# Patient Record
Sex: FEMALE | Race: WHITE | Employment: OTHER | URBAN - METROPOLITAN AREA
[De-identification: names, ages, dates, MRNs, and addresses within clinical notes are randomized per-mention and may not be internally consistent; named-entity substitution may affect disease eponyms.]

---

## 2017-02-16 ENCOUNTER — TELEPHONE (OUTPATIENT)
Dept: PHARMACY | Facility: CLINIC | Age: 62
End: 2017-02-16

## 2017-03-27 ENCOUNTER — EMPLOYEE WELLNESS (OUTPATIENT)
Dept: OTHER | Age: 62
End: 2017-03-27

## 2017-12-19 ENCOUNTER — TELEPHONE (OUTPATIENT)
Dept: PHARMACY | Facility: CLINIC | Age: 62
End: 2017-12-19

## 2017-12-19 NOTE — TELEPHONE ENCOUNTER
Patient calling back; [facility] provider office sending her Nov OV and urine test, but doesn't have an A1c since 8/2016. Noted on enrollment form, patient only taking Trulicity - inquired if she has DM dx and patient unsure, she thinks she may not? Advised her to check with provider office - if not DM dx, she is not eligible for the program.  If she does have DM dx, would need updated A1c now to be eligible.

## 2018-01-09 ENCOUNTER — CLINICAL DOCUMENTATION (OUTPATIENT)
Dept: PHARMACY | Facility: CLINIC | Age: 63
End: 2018-01-09

## 2018-03-20 VITALS — BODY MASS INDEX: 47.55 KG/M2 | WEIGHT: 260 LBS

## 2018-04-21 ENCOUNTER — EMPLOYEE WELLNESS (OUTPATIENT)
Dept: INTERNAL MEDICINE CLINIC | Age: 63
End: 2018-04-21

## 2018-04-21 LAB
CHOLESTEROL, TOTAL: 157 MG/DL (ref 0–199)
GLUCOSE BLD-MCNC: 77 MG/DL (ref 74–107)
HDLC SERPL-MCNC: 40 MG/DL
LDL CHOLESTEROL CALCULATED: 89 MG/DL (ref 0–99)
TRIGL SERPL-MCNC: 142 MG/DL (ref 0–149)

## 2018-05-02 VITALS — BODY MASS INDEX: 48.29 KG/M2 | WEIGHT: 264 LBS

## 2018-09-06 ENCOUNTER — HOSPITAL ENCOUNTER (OUTPATIENT)
Dept: PSYCHIATRY | Age: 63
Discharge: HOME | End: 2018-09-06
Payer: COMMERCIAL

## 2018-09-06 PROCEDURE — 94250 HC DIFFUSION: CPT | Performed by: COUNSELOR

## 2018-09-06 PROCEDURE — 99407 BEHAV CHNG SMOKING > 10 MIN: CPT | Performed by: COUNSELOR

## 2018-10-11 ENCOUNTER — HOSPITAL ENCOUNTER (OUTPATIENT)
Dept: PSYCHIATRY | Age: 63
Discharge: HOME | End: 2018-10-11
Payer: COMMERCIAL

## 2018-10-11 PROCEDURE — 99412 PREVENTIVE COUNSELING GROUP: CPT | Performed by: COUNSELOR

## 2018-10-11 PROCEDURE — 94250 HC DIFFUSION: CPT | Performed by: COUNSELOR

## 2018-10-12 NOTE — PROGRESS NOTES
Zef[place]   Progress Note    Client Name: Carol Ann Roman         Treatment Site:   [x][facility]      [] [facility]                      CO level:  43 ppm    Length of Service: 60 minutes   Session Type:    [] individual   [x]Group Client/Staff Ratio: 5:1                                 Clients target quit date: 10/19/18       Last date of tobacco use: 10/11/18  Pharmacotherapy provided this session:   []  NRT: Patch  []21mg . / []14mg.  / []7mg. []  NRT:  Gum []2mg. / []4mg.   x (  )boxes  []  NRT: Lozenge []2mg.  / []4mg.  x (  ) tubes       [x]  Varenicline [x]0.5 mg.  [x] 1 mg. Wks. (1&2)   []  Bupropion    Wks.  (  )  []  Other: ______________________________________                                                                        Treatment Objectives addressed during the session:       [x] Coping Skils [] Secondhand Smoke Risks   [] Relapse Prevention [] Promote Self Efficacy   [] Addiction [] Enhance Self-Image   [] Stress Management [] Use Self Affirmation   [] Health and Wellness [] Problem Solving Techniques   []  [] Conflict Resolution/Anger Management      Clients Response to counseling:  [x] Active participant                        [] Passive listener        [] No behavior change, still participating                   [] Inappropriate:    [] Implemented other strategy/behavior changes:    [x] Discussed Quit Plan that will be implemented  [x] Re-set Quit Date:  10/19/18  Clients Response to Pharmacotherapy:  [] Decreased cravings  [] Decrease in tobacco use:    [] Maintaining abstinence  [x] No change experienced by client  [x]        Risk/Benefit Meds education provided to client  []        Adverse reaction (if checked - explain):       Plan of Action:  [] Maintain abstinence  [x] Continue treatment; no changes    [x] Change pharmacotherapy: Initiate Chantix use as prescribed  [x] Attend Nicotine Anonymous

## 2018-10-18 ENCOUNTER — HOSPITAL ENCOUNTER (OUTPATIENT)
Dept: PSYCHIATRY | Age: 63
Discharge: HOME | End: 2018-10-18
Payer: COMMERCIAL

## 2018-10-18 PROCEDURE — 99412 PREVENTIVE COUNSELING GROUP: CPT | Performed by: COUNSELOR

## 2018-10-18 PROCEDURE — 94250 HC DIFFUSION: CPT | Performed by: COUNSELOR

## 2018-10-18 NOTE — PROGRESS NOTES
treatment; no changes    [] Change pharmacotherapy:    [x] Attend Nicotine Anonymous meeting   [x]         Assignments/Homework: Read the \"Relapse\" section in the client handbook completing the assignment questions. []         Triggers / Concerns to be addressed:    [] Graduated / received aftercare plan    Comments:      []No call/ no show by client.   Phone call to client:   [] Cancelled:     Counselor Signature: TEQUILA Barcenas Session Date: 10/18/18   Time: 4:10pm

## 2018-10-25 ENCOUNTER — HOSPITAL ENCOUNTER (OUTPATIENT)
Dept: PSYCHIATRY | Age: 63
Discharge: HOME | End: 2018-10-25
Payer: COMMERCIAL

## 2018-10-25 PROCEDURE — 99412 PREVENTIVE COUNSELING GROUP: CPT | Performed by: COUNSELOR

## 2018-10-25 PROCEDURE — 94250 HC DIFFUSION: CPT | Performed by: COUNSELOR

## 2018-10-29 ENCOUNTER — HOSPITAL ENCOUNTER (OUTPATIENT)
Dept: PSYCHIATRY | Age: 63
Discharge: HOME | End: 2018-10-29
Admitting: COUNSELOR
Payer: COMMERCIAL

## 2018-10-29 PROCEDURE — 99407 BEHAV CHNG SMOKING > 10 MIN: CPT | Performed by: COUNSELOR

## 2018-10-29 PROCEDURE — 94250 HC DIFFUSION: CPT | Performed by: COUNSELOR

## 2018-11-05 ENCOUNTER — HOSPITAL ENCOUNTER (OUTPATIENT)
Dept: PSYCHIATRY | Age: 63
Discharge: HOME | End: 2018-11-05
Payer: COMMERCIAL

## 2018-11-05 PROCEDURE — 94250 HC DIFFUSION: CPT | Performed by: COUNSELOR

## 2018-11-05 PROCEDURE — 99412 PREVENTIVE COUNSELING GROUP: CPT | Performed by: COUNSELOR

## 2019-03-06 ENCOUNTER — EMPLOYEE WELLNESS (OUTPATIENT)
Dept: OTHER | Age: 64
End: 2019-03-06

## 2019-03-06 LAB
CHOLESTEROL, TOTAL: 148 MG/DL (ref 0–199)
GLUCOSE BLD-MCNC: 81 MG/DL (ref 74–107)
HDLC SERPL-MCNC: 54 MG/DL
LDL CHOLESTEROL CALCULATED: 81 MG/DL (ref 0–99)
TRIGL SERPL-MCNC: 65 MG/DL (ref 0–149)

## 2019-03-20 VITALS — BODY MASS INDEX: 44.99 KG/M2 | WEIGHT: 246 LBS

## 2019-06-04 ENCOUNTER — APPOINTMENT (OUTPATIENT)
Dept: GENERAL RADIOLOGY | Age: 64
DRG: 871 | End: 2019-06-04
Payer: COMMERCIAL

## 2019-06-04 ENCOUNTER — HOSPITAL ENCOUNTER (INPATIENT)
Age: 64
LOS: 7 days | Discharge: HOME | DRG: 871 | End: 2019-06-12
Attending: EMERGENCY MEDICINE | Admitting: INTERNAL MEDICINE
Payer: COMMERCIAL

## 2019-06-04 DIAGNOSIS — R65.20 SEVERE SEPSIS (HCC): Primary | ICD-10-CM

## 2019-06-04 DIAGNOSIS — A41.9 SEVERE SEPSIS (HCC): Primary | ICD-10-CM

## 2019-06-04 DIAGNOSIS — L03.116 CELLULITIS OF LEFT LOWER LEG: ICD-10-CM

## 2019-06-04 LAB
ALBUMIN SERPL-MCNC: 3.3 G/DL (ref 3.5–5.2)
ALP BLD-CCNC: 118 U/L (ref 35–104)
ALT SERPL-CCNC: 16 U/L (ref 0–32)
ANION GAP SERPL CALCULATED.3IONS-SCNC: 12 MMOL/L (ref 7–16)
AST SERPL-CCNC: 21 U/L (ref 0–31)
BILIRUB SERPL-MCNC: 0.6 MG/DL (ref 0–1.2)
BUN BLDV-MCNC: 20 MG/DL (ref 8–23)
CALCIUM SERPL-MCNC: 8.2 MG/DL (ref 8.6–10.2)
CHLORIDE BLD-SCNC: 104 MMOL/L (ref 98–107)
CO2: 23 MMOL/L (ref 22–29)
CREAT SERPL-MCNC: 1 MG/DL (ref 0.5–1)
GFR AFRICAN AMERICAN: >60
GFR NON-AFRICAN AMERICAN: 56 ML/MIN/1.73
GLUCOSE BLD-MCNC: 111 MG/DL (ref 74–99)
LACTIC ACID, SEPSIS: 4 MMOL/L (ref 0.5–1.9)
LIPASE: 20 U/L (ref 13–60)
POTASSIUM REFLEX MAGNESIUM: 3.9 MMOL/L (ref 3.5–5)
SODIUM BLD-SCNC: 139 MMOL/L (ref 132–146)
TOTAL PROTEIN: 6.5 G/DL (ref 6.4–8.3)
TROPONIN: 0.02 NG/ML (ref 0–0.03)

## 2019-06-04 PROCEDURE — 83690 ASSAY OF LIPASE: CPT

## 2019-06-04 PROCEDURE — 83605 ASSAY OF LACTIC ACID: CPT

## 2019-06-04 PROCEDURE — 96366 THER/PROPH/DIAG IV INF ADDON: CPT

## 2019-06-04 PROCEDURE — 85025 COMPLETE CBC W/AUTO DIFF WBC: CPT

## 2019-06-04 PROCEDURE — 6360000002 HC RX W HCPCS: Performed by: EMERGENCY MEDICINE

## 2019-06-04 PROCEDURE — 96365 THER/PROPH/DIAG IV INF INIT: CPT

## 2019-06-04 PROCEDURE — 36415 COLL VENOUS BLD VENIPUNCTURE: CPT

## 2019-06-04 PROCEDURE — 80053 COMPREHEN METABOLIC PANEL: CPT

## 2019-06-04 PROCEDURE — 6370000000 HC RX 637 (ALT 250 FOR IP): Performed by: EMERGENCY MEDICINE

## 2019-06-04 PROCEDURE — 94664 DEMO&/EVAL PT USE INHALER: CPT

## 2019-06-04 PROCEDURE — 99285 EMERGENCY DEPT VISIT HI MDM: CPT

## 2019-06-04 PROCEDURE — 2580000003 HC RX 258: Performed by: EMERGENCY MEDICINE

## 2019-06-04 PROCEDURE — 71045 X-RAY EXAM CHEST 1 VIEW: CPT

## 2019-06-04 PROCEDURE — 87040 BLOOD CULTURE FOR BACTERIA: CPT

## 2019-06-04 PROCEDURE — 84484 ASSAY OF TROPONIN QUANT: CPT

## 2019-06-04 PROCEDURE — 36556 INSERT NON-TUNNEL CV CATH: CPT

## 2019-06-04 PROCEDURE — 93005 ELECTROCARDIOGRAM TRACING: CPT | Performed by: EMERGENCY MEDICINE

## 2019-06-04 RX ORDER — ACETAMINOPHEN 500 MG
1000 TABLET ORAL ONCE
Status: COMPLETED | OUTPATIENT
Start: 2019-06-04 | End: 2019-06-04

## 2019-06-04 RX ORDER — IPRATROPIUM BROMIDE AND ALBUTEROL SULFATE 2.5; .5 MG/3ML; MG/3ML
1 SOLUTION RESPIRATORY (INHALATION)
Status: DISCONTINUED | OUTPATIENT
Start: 2019-06-05 | End: 2019-06-04

## 2019-06-04 RX ORDER — 0.9 % SODIUM CHLORIDE 0.9 %
30 INTRAVENOUS SOLUTION INTRAVENOUS ONCE
Status: COMPLETED | OUTPATIENT
Start: 2019-06-04 | End: 2019-06-05

## 2019-06-04 RX ADMIN — IPRATROPIUM BROMIDE AND ALBUTEROL SULFATE 1 AMPULE: .5; 3 SOLUTION RESPIRATORY (INHALATION) at 22:25

## 2019-06-04 RX ADMIN — SODIUM CHLORIDE 3348 ML: 9 INJECTION, SOLUTION INTRAVENOUS at 22:50

## 2019-06-04 RX ADMIN — ACETAMINOPHEN 1000 MG: 500 TABLET ORAL at 22:54

## 2019-06-04 RX ADMIN — VANCOMYCIN HYDROCHLORIDE 2000 MG: 10 INJECTION, POWDER, LYOPHILIZED, FOR SOLUTION INTRAVENOUS at 22:51

## 2019-06-04 ASSESSMENT — PAIN DESCRIPTION - LOCATION: LOCATION: KNEE

## 2019-06-04 ASSESSMENT — PAIN SCALES - GENERAL
PAINLEVEL_OUTOF10: 6
PAINLEVEL_OUTOF10: 0

## 2019-06-04 ASSESSMENT — ENCOUNTER SYMPTOMS
ABDOMINAL PAIN: 0
DIARRHEA: 0
EYES NEGATIVE: 1
SORE THROAT: 0
VOMITING: 0
BACK PAIN: 0

## 2019-06-04 ASSESSMENT — PAIN DESCRIPTION - PAIN TYPE: TYPE: CHRONIC PAIN

## 2019-06-04 ASSESSMENT — PAIN DESCRIPTION - ORIENTATION: ORIENTATION: RIGHT;LEFT

## 2019-06-05 ENCOUNTER — APPOINTMENT (OUTPATIENT)
Dept: GENERAL RADIOLOGY | Age: 64
DRG: 871 | End: 2019-06-05
Payer: COMMERCIAL

## 2019-06-05 ENCOUNTER — NURSE TRIAGE (OUTPATIENT)
Dept: OTHER | Facility: CLINIC | Age: 64
End: 2019-06-05

## 2019-06-05 PROBLEM — R65.20 SEVERE SEPSIS (HCC): Status: ACTIVE | Noted: 2019-06-05

## 2019-06-05 PROBLEM — A41.9 SEPSIS (HCC): Status: ACTIVE | Noted: 2019-06-05

## 2019-06-05 PROBLEM — A41.9 SEVERE SEPSIS (HCC): Status: ACTIVE | Noted: 2019-06-05

## 2019-06-05 LAB
ANISOCYTOSIS: ABNORMAL
BACTERIA: NORMAL /HPF
BASOPHILS ABSOLUTE: 0.03 E9/L (ref 0–0.2)
BASOPHILS RELATIVE PERCENT: 0.1 % (ref 0–2)
BILIRUBIN URINE: ABNORMAL
BLOOD, URINE: NEGATIVE
CLARITY: CLEAR
COLOR: YELLOW
EKG ATRIAL RATE: 114 BPM
EKG P AXIS: 61 DEGREES
EKG P-R INTERVAL: 138 MS
EKG Q-T INTERVAL: 330 MS
EKG QRS DURATION: 84 MS
EKG QTC CALCULATION (BAZETT): 454 MS
EKG R AXIS: 19 DEGREES
EKG T AXIS: 54 DEGREES
EKG VENTRICULAR RATE: 114 BPM
EOSINOPHILS ABSOLUTE: 0 E9/L (ref 0.05–0.5)
EOSINOPHILS RELATIVE PERCENT: 0 % (ref 0–6)
FERRITIN: 63 NG/ML
GLUCOSE URINE: NEGATIVE MG/DL
HCT VFR BLD CALC: 28.9 % (ref 34–48)
HCT VFR BLD CALC: 32.9 % (ref 34–48)
HEMOGLOBIN: 9.3 G/DL (ref 11.5–15.5)
IMMATURE GRANULOCYTES #: 0.46 E9/L
IMMATURE GRANULOCYTES %: 1.9 % (ref 0–5)
IMMATURE RETIC FRACT: 17.1 % (ref 3–15.9)
IRON SATURATION: 4 % (ref 15–50)
IRON: 11 MCG/DL (ref 37–145)
KETONES, URINE: NEGATIVE MG/DL
LACTIC ACID: 0.9 MMOL/L (ref 0.5–2.2)
LACTIC ACID: 1.9 MMOL/L (ref 0.5–2.2)
LEUKOCYTE ESTERASE, URINE: NEGATIVE
LYMPHOCYTES ABSOLUTE: 0.72 E9/L (ref 1.5–4)
LYMPHOCYTES RELATIVE PERCENT: 3 % (ref 20–42)
MCH RBC QN AUTO: 19.8 PG (ref 26–35)
MCHC RBC AUTO-ENTMCNC: 28.3 % (ref 32–34.5)
MCV RBC AUTO: 70 FL (ref 80–99.9)
MONOCYTES ABSOLUTE: 1.42 E9/L (ref 0.1–0.95)
MONOCYTES RELATIVE PERCENT: 5.9 % (ref 2–12)
NEUTROPHILS ABSOLUTE: 21.46 E9/L (ref 1.8–7.3)
NEUTROPHILS RELATIVE PERCENT: 89.1 % (ref 43–80)
NITRITE, URINE: NEGATIVE
PDW BLD-RTO: 21.3 FL (ref 11.5–15)
PH UA: 5 (ref 5–9)
PLATELET # BLD: 172 E9/L (ref 130–450)
PMV BLD AUTO: ABNORMAL FL (ref 7–12)
POLYCHROMASIA: ABNORMAL
PROCALCITONIN: 9.13 NG/ML (ref 0–0.08)
PROTEIN UA: 30 MG/DL
RBC # BLD: 4.7 E12/L (ref 3.5–5.5)
RBC UA: NORMAL /HPF (ref 0–2)
RETIC HGB EQUIVALENT: 17.9 PG (ref 28.2–36.6)
RETICULOCYTE ABSOLUTE COUNT: 0.05 E12/L
RETICULOCYTE COUNT PCT: 1.2 % (ref 0.4–1.9)
SPECIFIC GRAVITY UA: 1.02 (ref 1–1.03)
TOTAL IRON BINDING CAPACITY: 298 MCG/DL (ref 250–450)
UROBILINOGEN, URINE: 0.2 E.U./DL
WBC # BLD: 24.1 E9/L (ref 4.5–11.5)
WBC UA: NORMAL /HPF (ref 0–5)

## 2019-06-05 PROCEDURE — 6360000002 HC RX W HCPCS: Performed by: INTERNAL MEDICINE

## 2019-06-05 PROCEDURE — 6370000000 HC RX 637 (ALT 250 FOR IP): Performed by: INTERNAL MEDICINE

## 2019-06-05 PROCEDURE — 99255 IP/OBS CONSLTJ NEW/EST HI 80: CPT | Performed by: INTERNAL MEDICINE

## 2019-06-05 PROCEDURE — 2580000003 HC RX 258: Performed by: INTERNAL MEDICINE

## 2019-06-05 PROCEDURE — 83550 IRON BINDING TEST: CPT

## 2019-06-05 PROCEDURE — 87088 URINE BACTERIA CULTURE: CPT

## 2019-06-05 PROCEDURE — 6360000002 HC RX W HCPCS: Performed by: EMERGENCY MEDICINE

## 2019-06-05 PROCEDURE — 84145 PROCALCITONIN (PCT): CPT

## 2019-06-05 PROCEDURE — 71045 X-RAY EXAM CHEST 1 VIEW: CPT

## 2019-06-05 PROCEDURE — C9113 INJ PANTOPRAZOLE SODIUM, VIA: HCPCS | Performed by: INTERNAL MEDICINE

## 2019-06-05 PROCEDURE — 36415 COLL VENOUS BLD VENIPUNCTURE: CPT

## 2019-06-05 PROCEDURE — 83540 ASSAY OF IRON: CPT

## 2019-06-05 PROCEDURE — 2580000003 HC RX 258: Performed by: EMERGENCY MEDICINE

## 2019-06-05 PROCEDURE — 82728 ASSAY OF FERRITIN: CPT

## 2019-06-05 PROCEDURE — 87081 CULTURE SCREEN ONLY: CPT

## 2019-06-05 PROCEDURE — 83605 ASSAY OF LACTIC ACID: CPT

## 2019-06-05 PROCEDURE — 99222 1ST HOSP IP/OBS MODERATE 55: CPT | Performed by: SURGERY

## 2019-06-05 PROCEDURE — 73590 X-RAY EXAM OF LOWER LEG: CPT

## 2019-06-05 PROCEDURE — 81001 URINALYSIS AUTO W/SCOPE: CPT

## 2019-06-05 PROCEDURE — 6370000000 HC RX 637 (ALT 250 FOR IP): Performed by: EMERGENCY MEDICINE

## 2019-06-05 PROCEDURE — 93010 ELECTROCARDIOGRAM REPORT: CPT | Performed by: INTERNAL MEDICINE

## 2019-06-05 PROCEDURE — 2500000003 HC RX 250 WO HCPCS: Performed by: EMERGENCY MEDICINE

## 2019-06-05 PROCEDURE — 85045 AUTOMATED RETICULOCYTE COUNT: CPT

## 2019-06-05 PROCEDURE — 2000000000 HC ICU R&B

## 2019-06-05 RX ORDER — ACETAMINOPHEN 650 MG/1
650 SUPPOSITORY RECTAL ONCE
Status: COMPLETED | OUTPATIENT
Start: 2019-06-05 | End: 2019-06-05

## 2019-06-05 RX ORDER — HYDROCODONE BITARTRATE AND ACETAMINOPHEN 5; 325 MG/1; MG/1
1 TABLET ORAL EVERY 6 HOURS PRN
Status: DISCONTINUED | OUTPATIENT
Start: 2019-06-05 | End: 2019-06-05

## 2019-06-05 RX ORDER — ACETAMINOPHEN 325 MG/1
650 TABLET ORAL EVERY 6 HOURS PRN
Status: DISCONTINUED | OUTPATIENT
Start: 2019-06-05 | End: 2019-06-05

## 2019-06-05 RX ORDER — SODIUM CHLORIDE 9 MG/ML
INJECTION, SOLUTION INTRAVENOUS CONTINUOUS
Status: DISCONTINUED | OUTPATIENT
Start: 2019-06-05 | End: 2019-06-05

## 2019-06-05 RX ORDER — ONDANSETRON 2 MG/ML
4 INJECTION INTRAMUSCULAR; INTRAVENOUS EVERY 6 HOURS PRN
Status: DISCONTINUED | OUTPATIENT
Start: 2019-06-05 | End: 2019-06-05 | Stop reason: SDUPTHER

## 2019-06-05 RX ORDER — ROPINIROLE 2 MG/1
2 TABLET, FILM COATED ORAL ONCE
Status: COMPLETED | OUTPATIENT
Start: 2019-06-05 | End: 2019-06-05

## 2019-06-05 RX ORDER — BISACODYL 10 MG
10 SUPPOSITORY, RECTAL RECTAL DAILY PRN
Status: DISCONTINUED | OUTPATIENT
Start: 2019-06-05 | End: 2019-06-12 | Stop reason: HOSPADM

## 2019-06-05 RX ORDER — SODIUM CHLORIDE 9 MG/ML
INJECTION, SOLUTION INTRAVENOUS CONTINUOUS
Status: DISCONTINUED | OUTPATIENT
Start: 2019-06-05 | End: 2019-06-05 | Stop reason: SDUPTHER

## 2019-06-05 RX ORDER — ONDANSETRON 2 MG/ML
4 INJECTION INTRAMUSCULAR; INTRAVENOUS EVERY 6 HOURS PRN
Status: DISCONTINUED | OUTPATIENT
Start: 2019-06-05 | End: 2019-06-12 | Stop reason: HOSPADM

## 2019-06-05 RX ORDER — PANTOPRAZOLE SODIUM 40 MG/10ML
40 INJECTION, POWDER, LYOPHILIZED, FOR SOLUTION INTRAVENOUS DAILY
Status: DISCONTINUED | OUTPATIENT
Start: 2019-06-05 | End: 2019-06-07

## 2019-06-05 RX ORDER — 0.9 % SODIUM CHLORIDE 0.9 %
2000 INTRAVENOUS SOLUTION INTRAVENOUS ONCE
Status: COMPLETED | OUTPATIENT
Start: 2019-06-05 | End: 2019-06-05

## 2019-06-05 RX ORDER — 0.9 % SODIUM CHLORIDE 0.9 %
500 INTRAVENOUS SOLUTION INTRAVENOUS ONCE
Status: COMPLETED | OUTPATIENT
Start: 2019-06-05 | End: 2019-06-05

## 2019-06-05 RX ORDER — ACETAMINOPHEN 325 MG/1
650 TABLET ORAL EVERY 6 HOURS PRN
Status: DISCONTINUED | OUTPATIENT
Start: 2019-06-05 | End: 2019-06-12 | Stop reason: HOSPADM

## 2019-06-05 RX ORDER — FENTANYL CITRATE 50 UG/ML
25 INJECTION, SOLUTION INTRAMUSCULAR; INTRAVENOUS
Status: DISCONTINUED | OUTPATIENT
Start: 2019-06-05 | End: 2019-06-06

## 2019-06-05 RX ORDER — SODIUM CHLORIDE 9 MG/ML
INJECTION, SOLUTION INTRAVENOUS CONTINUOUS
Status: DISCONTINUED | OUTPATIENT
Start: 2019-06-05 | End: 2019-06-06

## 2019-06-05 RX ORDER — FENTANYL CITRATE 50 UG/ML
50 INJECTION, SOLUTION INTRAMUSCULAR; INTRAVENOUS ONCE
Status: COMPLETED | OUTPATIENT
Start: 2019-06-05 | End: 2019-06-05

## 2019-06-05 RX ORDER — SODIUM CHLORIDE 0.9 % (FLUSH) 0.9 %
10 SYRINGE (ML) INJECTION PRN
Status: DISCONTINUED | OUTPATIENT
Start: 2019-06-05 | End: 2019-06-12 | Stop reason: HOSPADM

## 2019-06-05 RX ORDER — 0.9 % SODIUM CHLORIDE 0.9 %
10 VIAL (ML) INJECTION DAILY
Status: DISCONTINUED | OUTPATIENT
Start: 2019-06-05 | End: 2019-06-07

## 2019-06-05 RX ORDER — ACETAMINOPHEN 650 MG/1
650 SUPPOSITORY RECTAL EVERY 4 HOURS PRN
Status: DISCONTINUED | OUTPATIENT
Start: 2019-06-05 | End: 2019-06-05

## 2019-06-05 RX ORDER — SODIUM CHLORIDE 0.9 % (FLUSH) 0.9 %
10 SYRINGE (ML) INJECTION EVERY 12 HOURS SCHEDULED
Status: DISCONTINUED | OUTPATIENT
Start: 2019-06-05 | End: 2019-06-12 | Stop reason: HOSPADM

## 2019-06-05 RX ADMIN — Medication 10 ML: at 19:46

## 2019-06-05 RX ADMIN — PIPERACILLIN AND TAZOBACTAM 3.38 G: 3; .375 INJECTION, POWDER, FOR SOLUTION INTRAVENOUS at 01:28

## 2019-06-05 RX ADMIN — ENOXAPARIN SODIUM 40 MG: 40 INJECTION SUBCUTANEOUS at 10:59

## 2019-06-05 RX ADMIN — Medication 10 ML: at 17:19

## 2019-06-05 RX ADMIN — FENTANYL CITRATE 25 MCG: 50 INJECTION, SOLUTION INTRAMUSCULAR; INTRAVENOUS at 17:19

## 2019-06-05 RX ADMIN — Medication 1.5 G: at 14:49

## 2019-06-05 RX ADMIN — SODIUM CHLORIDE 10 ML: 9 INJECTION, SOLUTION INTRAMUSCULAR; INTRAVENOUS; SUBCUTANEOUS at 14:18

## 2019-06-05 RX ADMIN — PANTOPRAZOLE SODIUM 40 MG: 40 INJECTION, POWDER, FOR SOLUTION INTRAVENOUS at 14:18

## 2019-06-05 RX ADMIN — ROPINIROLE HYDROCHLORIDE 2 MG: 2 TABLET, FILM COATED ORAL at 06:17

## 2019-06-05 RX ADMIN — Medication 10 ML: at 21:44

## 2019-06-05 RX ADMIN — SODIUM CHLORIDE 2000 ML: 9 INJECTION, SOLUTION INTRAVENOUS at 02:12

## 2019-06-05 RX ADMIN — SODIUM CHLORIDE: 9 INJECTION, SOLUTION INTRAVENOUS at 17:53

## 2019-06-05 RX ADMIN — FENTANYL CITRATE 50 MCG: 50 INJECTION, SOLUTION INTRAMUSCULAR; INTRAVENOUS at 06:20

## 2019-06-05 RX ADMIN — ACETAMINOPHEN 650 MG: 325 TABLET, FILM COATED ORAL at 19:42

## 2019-06-05 RX ADMIN — FENTANYL CITRATE 25 MCG: 50 INJECTION, SOLUTION INTRAMUSCULAR; INTRAVENOUS at 19:34

## 2019-06-05 RX ADMIN — PIPERACILLIN AND TAZOBACTAM 3.38 G: 3; .375 INJECTION, POWDER, FOR SOLUTION INTRAVENOUS at 10:59

## 2019-06-05 RX ADMIN — ACETAMINOPHEN 650 MG: 650 SUPPOSITORY RECTAL at 10:25

## 2019-06-05 RX ADMIN — Medication 2 MCG/MIN: at 04:20

## 2019-06-05 RX ADMIN — ONDANSETRON HYDROCHLORIDE 4 MG: 2 INJECTION, SOLUTION INTRAMUSCULAR; INTRAVENOUS at 10:53

## 2019-06-05 RX ADMIN — SODIUM CHLORIDE 500 ML: 9 INJECTION, SOLUTION INTRAVENOUS at 16:38

## 2019-06-05 RX ADMIN — ACETAMINOPHEN 650 MG: 325 TABLET, FILM COATED ORAL at 14:18

## 2019-06-05 RX ADMIN — ALTEPLASE 1 MG: 2.2 INJECTION, POWDER, LYOPHILIZED, FOR SOLUTION INTRAVENOUS at 17:50

## 2019-06-05 RX ADMIN — Medication 1.5 G: at 23:57

## 2019-06-05 RX ADMIN — FENTANYL CITRATE 25 MCG: 50 INJECTION, SOLUTION INTRAMUSCULAR; INTRAVENOUS at 21:44

## 2019-06-05 RX ADMIN — PIPERACILLIN AND TAZOBACTAM 3.38 G: 3; .375 INJECTION, POWDER, FOR SOLUTION INTRAVENOUS at 19:34

## 2019-06-05 RX ADMIN — SODIUM CHLORIDE: 9 INJECTION, SOLUTION INTRAVENOUS at 20:51

## 2019-06-05 RX ADMIN — SODIUM CHLORIDE 500 ML: 9 INJECTION, SOLUTION INTRAVENOUS at 14:41

## 2019-06-05 RX ADMIN — SODIUM CHLORIDE: 9 INJECTION, SOLUTION INTRAVENOUS at 10:52

## 2019-06-05 ASSESSMENT — PAIN SCALES - GENERAL
PAINLEVEL_OUTOF10: 0
PAINLEVEL_OUTOF10: 0
PAINLEVEL_OUTOF10: 9
PAINLEVEL_OUTOF10: 10
PAINLEVEL_OUTOF10: 10
PAINLEVEL_OUTOF10: 0
PAINLEVEL_OUTOF10: 9
PAINLEVEL_OUTOF10: 8
PAINLEVEL_OUTOF10: 0
PAINLEVEL_OUTOF10: 10

## 2019-06-05 ASSESSMENT — PAIN DESCRIPTION - PROGRESSION
CLINICAL_PROGRESSION: NOT CHANGED
CLINICAL_PROGRESSION: NOT CHANGED

## 2019-06-05 ASSESSMENT — PAIN DESCRIPTION - LOCATION
LOCATION: KNEE
LOCATION: KNEE;LEG

## 2019-06-05 ASSESSMENT — PAIN DESCRIPTION - PAIN TYPE
TYPE: CHRONIC PAIN
TYPE: CHRONIC PAIN

## 2019-06-05 ASSESSMENT — PAIN DESCRIPTION - ORIENTATION
ORIENTATION: LEFT;RIGHT
ORIENTATION: LEFT;RIGHT

## 2019-06-05 ASSESSMENT — PAIN DESCRIPTION - FREQUENCY: FREQUENCY: CONTINUOUS

## 2019-06-05 ASSESSMENT — PAIN DESCRIPTION - ONSET
ONSET: ON-GOING
ONSET: ON-GOING

## 2019-06-05 ASSESSMENT — PAIN DESCRIPTION - DESCRIPTORS: DESCRIPTORS: OTHER (COMMENT)

## 2019-06-05 NOTE — ED NOTES
Patient incontinent of lose stool. Incontinent care & linen change provided.      Jasbir Frausto RN  06/05/19 [id number]

## 2019-06-05 NOTE — ED NOTES
Verbal orders for Requip 2mg PO and Fentanyl 50mcg IV received.       Lemuel Varela RN  06/05/19 Naun Garrett RN  06/05/19 2916

## 2019-06-05 NOTE — TELEPHONE ENCOUNTER
Reason for Disposition  Veronica Tamez Caller has already spoken with another triager and has no further questions. Protocols used: NO CONTACT OR DUPLICATE CONTACT CALL-ADULT-AH    Daughter is calling in to inform nurse access that patient has been brought into ED via ambulance for sepsis. [address]

## 2019-06-05 NOTE — ED PROVIDER NOTES
Patient is a 59year old female arriving via EMS for AMS and fever. EMS states pt was found in her car since she finished work at 3:30 PM lethargic. They state pt has a fever of 102.6 for them and is tachycardic. BGL was 110. Daughter at bedside states pt fell in April in the [facility] and has a wound to LUE which she was never seen for. Daughter denies pt having hx of COPD but pt does smoke cigarettes daily. She denies any cough, diarrhea, chest pain, or any further complaints. The history is provided by a relative and the EMS personnel (daughter). No  was used. Fever   Max temp prior to arrival:  102.6  Severity:  Moderate  Onset quality:  Unable to specify  Timing:  Constant  Chronicity:  New  Relieved by:  None tried  Ineffective treatments:  None tried  Associated symptoms: no chest pain, no congestion, no diarrhea, no sore throat and no vomiting        Review of Systems   Constitutional: Positive for fever. HENT: Negative for congestion and sore throat. Eyes: Negative. Cardiovascular: Negative for chest pain. Gastrointestinal: Negative for abdominal pain, diarrhea and vomiting. Genitourinary: Negative. Musculoskeletal: Negative for back pain and neck pain. Neurological: Positive for weakness. Decreased responsiveness. Lethargic. All other systems reviewed and are negative. Physical Exam   HENT:   Head: Normocephalic and atraumatic. Eyes: Pupils are equal, round, and reactive to light. EOM are normal.   Neck: Normal range of motion. Neck supple. Cardiovascular: Regular rhythm. Tachycardia present. Pulmonary/Chest: Effort normal. She has wheezes. Abdominal: Soft. There is no tenderness. There is no rebound and no guarding. Musculoskeletal: Normal range of motion. LLE erythematous and appears infected   Neurological:   Lethargic. Responds to verbal stimuli. Moves all extremities. Skin: Skin is warm and dry.    Nursing note and vitals reviewed. Procedures    MDM  Number of Diagnoses or Management Options  Cellulitis of left lower leg: new and requires workup  Severe sepsis [facility]): new and requires workup  Diagnosis management comments: Differential diagnoses include sepsis, sever  sepsis, cellulitis, pneumonia, bronchitis, UTI,       Amount and/or Complexity of Data Reviewed  Clinical lab tests: reviewed and ordered  Tests in the radiology section of CPT®: ordered  Tests in the medicine section of CPT®: ordered and reviewed  Discuss the patient with other providers: yes (Case discussed with Mary de anda. Will admit patient to ICU.)  Independent visualization of images, tracings, or specimens: yes (EKG was done at 10:26 PM.  Sinus tachycardia. Rate of 114. Normal axis. No acute ST-T elevation. No STEMI. No leakage available at this time for comparison.)    Risk of Complications, Morbidity, and/or Mortality  Presenting problems: high  Diagnostic procedures: high  Management options: high  General comments: Patient initially remain unchanged. Pan cultures was done. IV fluid 30 cc/kg was given. Zosyn IV was given. Vancomycin IV was given. Labs were unremarkable except for WBC of 24,000 and lactic acid of 4 area patient was absorbing in ER. Patient dropped her pressure to 90 systolic. Central line was placed in.  2 more liters of IV fluid was given. Case discussed with Dr. Ling Suarez. Case also discussed with Dr. Anup Garnica. Will admit patient to ICU. Case discussed with family member. They understood and agreed with our management. Critical Care  Total time providing critical care:  minutes           Labs      Radiology      EKG Interpretation. 6/4/19, 9:56 PM.    This note is prepared by Jacinda Cole, acting as Scribe for Mony Mckeon DO. Mony Mckeon DO:  The scribe's documentation has been prepared under my direction and personally reviewed by me in its entirety.   I confirm that the note above accurately reflects all work, treatment, procedures, and medical decision making performed by me.         Jude Osler, [place]  06/05/19 7452

## 2019-06-05 NOTE — PROGRESS NOTES
Stage  CI HR MAP TPRI SVI   Baseline 4.2 90 77 1404 47   Challenge 5.0 93 104 1875 56   Result (%Ä) 17.7 3.7 35.1 33.5 19.1

## 2019-06-05 NOTE — ED NOTES
Received report from [place], [address] [place] [address].       Christina Gutierrez RN  06/04/19 2158

## 2019-06-05 NOTE — CONSULTS
Medical Intensive Care Unit     [facility]  Resident Consult Note    Date and time: 6/5/2019 3:10 PM  Patient's name:  Farhan Kirkland  Medical Record Number: [id number]  Patient's account/billing number: [de-identified]  Patient's YOB: 1955  Age: 59 y.o. Date of Admission: 6/4/2019  9:52 PM  Length of stay during current admission: 0    Code Status: Full Code    Reason for ICU admission: Septic shock    History of Present Illness: This is a 59 y.o female with a PMH of b/l knee OA, chronic back pain, lymphedema of lower extremities, who presented to the ED with AMS. She was found in her car by her sister. She was confused and had chills. She was in her car for 3 hours with heater on. She was brought by EMS. In the ED, she was found to be hypotensive with SBP of 80s and be febrile with temp of 102.6. Significant lab work includes with LA of 4.0, WBC of 24.1, Hb of 9.3. She received 5 L of NS bolus. She was started on levophed. She was transferred to MICU for further evaluation and proper management.        CURRENT VENTILATION STATUS:   [] Ventilator  [] BIPAP  [] Nasal Cannula [x] Room Air      SECRETIONS   Amount:  [] Small [] Moderate  [] Large [x] None    Color:     [] White [] Colored  [] Bloody    SEDATION:  RAAS Score:  [] Propofol gtt  [] Versed gtt  [] Ativan gtt   [x] No Sedation    PARALYZED:  [x] No    [] Yes    VASOPRESSORS:  [] No    [x] Yes    If yes -   [x] Levophed       [] Dopamine     [] Vasopressin       [] Dobutamine  [] Phenylephrine         [] Epinephrine    CENTRAL LINES:     [] No   [x] Yes   (Date of Insertion: 6/5  )           If yes -     [] Right IJ     [] Left IJ [] Right Femoral [] Left Femoral                   [] Right Subclavian [] Left Subclavian     BOWDEN'S CATHETER:   [] No   [x] Yes  (Date of Insertion: 6/5  )     URINE OUTPUT:            [] Good   [x] Low              [] Anuric    REVIEW OF SYSTEMS:    · Constitutional: +fever, +chills, no change in weight; good appetite  · HEENT: No blurred vision, no ear problems, no sore throat, no rhinorrhea. · Respiratory: No cough, no sputum production, no pleuritic chest pain, no shortness of breath  · Cardiology: No angina, no dyspnea on exertion, no paroxysmal nocturnal dyspnea, no orthopnea, no palpitation,+b/l leg swelling R<L  · Gastroenterology: No dysphagia, no reflux; no abdominal pain, no nausea or vomiting; no constipation or diarrhea. No hematochezia   · Genitourinary: No dysuria, no frequency, hesitancy; no hematuria  · Musculoskeletal: +b/l knee joint pain, +back pain, +myalgia  · Neurology: no focal weakness in extremities, no slurred speech, no tingling or numbness sensation  · Skin: +painful swelling in LLE with erythema    OBJECTIVE:     VITAL SIGNS:  BP 95/70   Pulse 91   Temp 101.6 °F (38.7 °C) (Axillary)   Resp 20   Ht 5' 2\" (1.575 m)   Wt 246 lb (111.6 kg)   SpO2 97%   BMI 44.99 kg/m²   Tmax over 24 hours:  Temp (24hrs), Av.4 °F (38 °C), Min:98.9 °F (37.2 °C), Max:102.9 °F (39.4 °C)      Patient Vitals for the past 6 hrs:   BP Temp Temp src Pulse Resp SpO2   19 1400 95/70 -- -- 91 20 97 %   19 1300 103/60 -- -- 92 18 99 %   19 1215 110/65 101.6 °F (38.7 °C) Axillary 94 18 99 %   19 1145 107/62 100.2 °F (37.9 °C) Axillary 100 24 96 %   19 1100 114/69 -- -- 103 15 100 %   19 1045 130/70 -- -- 96 22 96 %   19 1015 (!) 96/53 -- -- 100 (!) 3 100 %   19 1000 (!) 96/49 102.9 °F (39.4 °C) Oral 89 15 --         Intake/Output Summary (Last 24 hours) at 2019 1510  Last data filed at 2019 1400  Gross per 24 hour   Intake 5348 ml   Output 1105 ml   Net 4243 ml     Wt Readings from Last 2 Encounters:   19 246 lb (111.6 kg)   19 246 lb (111.6 kg)     Body mass index is 44.99 kg/m².       PHYSICAL EXAMINATION:  General appearance -lethargic, somnolent, and in no distress  Eyes - pupils equal and reactive, extraocular eye movements intact  Mouth - mucous membranes moist, pharynx normal without lesions  Neck - supple, no significant adenopathy  Chest - clear to auscultation, no wheezes, rales or rhonchi, symmetric air entry  Heart - normal rate, regular rhythm, normal S1, S2, no murmurs, rubs, clicks or gallops  Abdomen - soft, nontender, nondistended, no masses or organomegaly  Neurological - drowsy, oriented, normal speech, no focal findings or movement disorder noted  Extremities - peripheral pulses normal, varicose vein noted in RLL, JAZZ edema with tenderness, erythema, warmth, no clubbing or cyanosis  Skin - erythema in LLE with open wound, no purulent discharge noted     Any additional physical findings:    MEDICATIONS:  Scheduled Meds:   enoxaparin  40 mg Subcutaneous Daily    piperacillin-tazobactam  3.375 g Intravenous Q8H    vancomycin  1,500 mg Intravenous Q12H    sodium chloride flush  10 mL Intravenous 2 times per day    pantoprazole  40 mg Intravenous Daily    And    sodium chloride (PF)  10 mL Intravenous Daily    sodium chloride  500 mL Intravenous Once    alteplase  1 mg Intracatheter Once     Continuous Infusions:   norepinephrine 4 mcg/min (06/05/19 0654)     PRN Meds:     ondansetron 4 mg Q6H PRN   bisacodyl 10 mg Daily PRN   sodium chloride flush 10 mL PRN   magnesium hydroxide 30 mL Daily PRN   acetaminophen 650 mg Q6H PRN   fentanNYL 25 mcg Q2H PRN       VENT SETTINGS (Comprehensive) (if applicable): Additional Respiratory  Assessments  Pulse: 91  Resp: 20  SpO2: 97 %  Oral Care: Mouth swabbed    ABGs:   No results for input(s): PH, PCO2, PO2, HCO3, BE, O2SAT in the last 72 hours.     Laboratory findings:  Complete Blood Count:   Recent Labs     06/04/19 2240 06/05/19  1345   WBC 24.1*  --    HGB 9.3*  --    HCT 32.9* 28.9*     --         Last 3 Blood Glucose:   Recent Labs     06/04/19  2240   GLUCOSE 111*        PT/INR:  No results found for: PROTIME, INR  PTT:  No results found for: APTT, PTT    Comprehensive Metabolic Profile:   Recent Labs     06/04/19  2240      K 3.9      CO2 23   BUN 20   CREATININE 1.0   GLUCOSE 111*   CALCIUM 8.2*   PROT 6.5   LABALBU 3.3*   BILITOT 0.6   ALKPHOS 118*   AST 21   ALT 16      Magnesium: No results found for: MG  Phosphorus: No results found for: PHOS  Ionized Calcium: No results found for: CAION   Troponin:   Recent Labs     06/04/19  2240   TROPONINI 0.02     Urinalysis: Urine dipstick shows positive for protein. Micro exam: negative for WBC's or RBC's. Microbiology:  Cultures during this admission:     Blood cultures:                 [x] Drawn      [] Negative             []  Positive (Details:  )  Urine Culture:                   [x] Drawn     [] Negative             []  Positive (Details:  )  Sputum Culture:               [] Drawn       [] Negative             []  Positive (Details:  )   Endotracheal aspirate:     [] Drawn       [] Negative             []  Positive (Details:  )     Other pertinent Labs:     Radiology/Imaging:   Chest Xray (6/5/2019):  1. Suspected underlying mild central pulmonary vascular congestion. 2. Vascular calcifications thoracic aorta.     ASSESSMENT  And PLAN:     <Neurologic>  Acute encephalopathy, likely 2/2 metabolic from sepsis    <Cardiovascular>  Septic shock likely 2/2 JAZZ cellulitis   - Received sepsis fluid resuscitation  - NICOM fluid responsive, order NS bolus, NICOM prn  - Wean levophed able  - Cont Zosyn and Vancomycin per ID     <Gastrointestinal>  NPO    <Infectious Disease>  JAZZ cellulitis  - Obtain XR leg   - F/u blood cx and MRSA nares   - Cont Zosyn and Vancomycin per ID     <Hematology/Oncology>  Chronic microcytic hypochromic anemia  - Baseline Hb 10  - Obtain iron study and reticulocytes     <Dermatologic/Musculoskeletal>  Chronic back pain and b/l knee OA  - Hold home pain medication: buprenorphine buccal film  - Fentanyl 25 mcg q2hr prn and tylenol prn    WEAN PER PROTOCOL:  [] No   []

## 2019-06-05 NOTE — ED NOTES
ED resident Dr. Kris Chowdhury explained CVC insertion procedure and risks to patient. Patient remains oriented. Patient verbalized understanding and gave verbal consent. Patient asked that her daughter sign consent, witnessed by second RN.       Lemuel Varela RN  06/05/19 9068

## 2019-06-05 NOTE — H&P
History and Physical      CHIEF COMPLAINT:  Fever with mental status changes      HISTORY OF PRESENT ILLNESS:      The patient is a 59 y.o. female patient of Dr Sandrine Snow who presents with AMS and fever. Paramedics were called after the patient was found in her car after work lethargic. She is unable to fever 1-2.6 and was tachycardic. Her blood glucose was 110. According to her daughter she fell while in the [facility] in April and had a wound on her left leg. The wound was draining and she self medicated with an adhesive dressing, had been oozing. She did not seek medical attention. In the emergency room she was lethargic and weak and tachycardic and had a markedly swollen erythematous left leg consistent with severe cellulitis. Cultures were obtained and she was given fluid bolus and placed on. Broad-spectrum antibiotics. She had a marked leukocytosis and her blood pressure was marginal. She was rcently placed on vasopressors and admitted to intensive care.     Past Medical History:    Past Medical History:   Diagnosis Date    Anemia     Arthritis     Cellulitis 2015    left leg    Chronic ulcer of leg with fat layer exposed ([address] 2015    Chronic ulcer of right leg, limited to breakdown of skin ([address] 2016    Lymphedema of lower extremity 2015    Peripheral vision loss     Stroke [facility])        Past Surgical History:    Past Surgical History:   Procedure Laterality Date    ABDOMINOPLASTY  2002    BREAST REDUCTION SURGERY      CATARACT REMOVAL      bilateral     SECTION      x2    COLONOSCOPY  2012    and egd    ECHOCARDIOGRAM COMPLETE 2D W DOPPLER W COLOR  2012         GASTRIC BYPASS SURGERY  2000    HERNIA REPAIR             Medications Prior to Admission:    Medications Prior to Admission: fentaNYL (DURAGESIC) 25 MCG/HR, Place 1 patch onto the skin every 72 hours  butenafine (LOTRIMIN ULTRA) 1 % CREA, Apply BID , both legs , X 1 month  Liraglutide (VICTOZA SC), Inject into the skin  ROPINIRole HCl (REQUIP PO), Take 1 mg by mouth  HYDROcodone-acetaminophen (NORCO) 5-325 MG per tablet, Take 1 tablet by mouth every 6 hours as needed for Pain. therapeutic multivitamin-minerals (THERAGRAN-M) tablet, Take 1 tablet by mouth daily. potassium chloride (MICRO-K) 10 MEQ CR capsule, Take 20 mEq by mouth daily as needed. furosemide (LASIX) 20 MG tablet,  Take 40 mg by mouth daily as needed   ferrous sulfate 325 (65 FE) MG tablet, Take 325 mg by mouth daily (with breakfast). Gabapentin (NEURONTIN PO),  Take 300 mg by mouth 3 times daily   Cyclobenzaprine HCl (FLEXERIL PO), Take 10 mg by mouth 2 times daily. Allergies:    Patient has no known allergies. Social History:    reports that she has quit smoking. She has a 19.00 pack-year smoking history. She has never used smokeless tobacco. She reports that she does not drink alcohol or use drugs. Family History:   family history includes Breast Cancer in her mother; Hypertension in her brother and sister; Stroke in her father. REVIEW OF SYSTEMS    Review of systems not obtained due to patient factors. PHYSICAL EXAM:    Vitals:  /62   Pulse 100   Temp 100.2 °F (37.9 °C) (Axillary)   Resp 24   Ht 5' 2\" (1.575 m)   Wt 246 lb (111.6 kg)   SpO2 96%   BMI 44.99 kg/m²     General appearance: delirious, distracted and fatigued  Head: Normocephalic, without obvious abnormality, atraumatic  Eyes: conjunctivae/corneas clear. PERRL, EOM's intact. Fundi benign. Ears: normal TM's and external ear canals both ears  Nose: Nares normal. Septum midline. Mucosa normal. No drainage or sinus tenderness.   Throat: lips, mucosa, and tongue normal; teeth and gums normal  Neck: no adenopathy, no carotid bruit, no JVD, supple, symmetrical, trachea midline and thyroid not enlarged, symmetric, no tenderness/mass/nodules  Lungs: diminished breath sounds bibasilar  Heart: regular rate and rhythm, S1, S2 normal, no murmur, click, rub indwelling catheter     Color, UA Yellow    Clarity, UA Clear    Glucose, Ur Negative mg/dL    Bilirubin Urine SMALLAbnormal     Ketones, Urine Negative mg/dL    Specific Gravity, UA 1.025    Blood, Urine Negative    pH, UA 5.0    Protein, UA 30Abnormal  mg/dL    Urobilinogen, Urine 0.2 E.U./dL    Nitrite, Urine Negative    Leukocyte Esterase, Urine Negative   Lactic Acid, Plasma [[id number]] Collected: 06/05/19 0308   Updated: 06/05/19 0346    Specimen Source: Blood     Lactic Acid 1.9 mmol/L   Urine Culture [[id number]] Collected: 06/05/19 0253   Updated: 06/05/19 0324    Specimen Source: Urine, indwelling catheter    CBC Auto Differential [[id number]] (Abnormal) Collected: 06/04/19 2240   Updated: 06/05/19 0003    Specimen Source: Blood     WBC 24. 1High  E9/L    RBC 4.70 E12/L    Hemoglobin 9.3Low  g/dL    Hematocrit 32.9Low  %    MCV 70.0Low  fL    MCH 19.8Low  pg    MCHC 28.3Low  %    RDW 21.3High  fL    Platelets 332 M1/D    MPV NOT CALC fL    Neutrophils % 89. 1High  %    Immature Granulocytes % 1.9 %    Lymphocytes % 3.0Low  %    Monocytes % 5.9 %    Eosinophils % 0.0 %    Basophils % 0.1 %    Neutrophils # 21. 46High  E9/L    Immature Granulocytes # 0.46 E9/L    Lymphocytes # 0.72Low  E9/L    Monocytes # 1. 42High  E9/L    Eosinophils # 0.00Low  E9/L    Basophils # 0.03 E9/L    Anisocytosis 2+    Polychromasia 1+   Troponin [[id number]] Collected: 06/04/19 2240   Updated: 06/04/19 2320    Specimen Source: Blood     Troponin 0.02 ng/mL    Comment: TROPONIN T BLOOD LEVELS:          0.03 ng/mL     Upper Reference Limit   0.04 - 0.09 ng/mL     Possible myocardial injury       >= 0.10 ng/mL     Myocardial injury       Comprehensive Metabolic Panel w/ Reflex to MG [[id number]] (Abnormal) Collected: 06/04/19 2240   Updated: 06/04/19 2319    Specimen Source: Blood     Sodium 139 mmol/L    Potassium reflex Magnesium 3.9 mmol/L    Chloride 104 mmol/L    CO2 23 mmol/L    Anion Gap 12 mmol/L    Glucose 111High  mg/dL    BUN 20 mg/dL    CREATININE 1.0 mg/dL    GFR Non-African American 56 mL/min/1.73    Comment: Chronic Kidney Disease: less than 60 ml/min/1.73 sq. m.         Kidney Failure: less than 15 ml/min/1.73 sq.m. Results valid for patients 18 years and older. GFR  >60    Calcium 8.2Low  mg/dL    Total Protein 6.5 g/dL    Alb 3.3Low  g/dL    Total Bilirubin 0.6 mg/dL    Alkaline Phosphatase 118High  U/L    ALT 16 U/L    AST 21 U/L   Lipase [[id number]] Collected: 19   Updated: 19    Specimen Source: Blood     Lipase 20 U/L   Lactate, Sepsis [[id number]] (Abnormal) Collected: 19   Updated: 19    Specimen Source: Blood     Lactic Acid, Sepsis 4.0High  mmol/L   XR CHEST PORTABLE [[id number]] Resulted: 19   Updated: 19    Narrative:     Patient MRN:  [id number]  : 1955  Age: 59 years  Gender: Female    Order Date:  2019 10:00 PM    EXAM: XR CHEST PORTABLE    COMPARISON: 2012    INDICATION:  fever   fever     FINDINGS:    There is interstitial prominence in perihilar and infrahilar  locations. The heart is normal in size. No focal airspace opacity or  pleural effusion. No pneumothorax. Impression:       Interstitial prominence in perihilar and infrahilar location could  suggest peribronchial inflammatory changes or pulmonary vascular  congestion. Comment correlation recommended. Short-term follow-up may  be helpful for further evaluation.    Culture blood #2 [[id number]] Collected: 19   Updated: 19    Specimen Source: Blood    Culture blood #1 [[id number]] Collected: 19   Updated: 19    Specimen Type: Blood            Problem list:    Patient Active Problem List   Diagnosis    Back pain    Osteoarthritis of left knee    Osteoarthritis of right knee    Morbid obesity with BMI of 50.0-59.9, adult ([address]    Lymphedema of lower extremity    Chronic ulcer of right leg, limited to breakdown of skin ([address]    Cellulitis    Sepsis ([address]    Severe sepsis (HCC)         ASSESSMENT:      1. Sepsis with septic shock due to left leg cellulitis    2. Cellulitis left leg    3. Diabetes mellitus type II, uncontrolled    4. Chronic back pain    PLAN:     1. Continue vasopressor support    2. Continue fluid resuscitation    3. Continue broad-spectrum empiric antibiotics    4. Continue local wound care    5.  Sliding scale insulin therapy    Discussed with daughter, condition critical, prognosis guarded    Mansi Lowry D.O., [facility]  12:16 PM  6/5/2019

## 2019-06-05 NOTE — CONSULTS
GENERAL SURGERY  CONSULT NOTE  2019    Physician Consulted: Dr. Sharad Reardon  Reason for Consult: soft tissue infection  Referring Physician: Dr. Aguilar CHAVEZ  Sharmin Ely is a 59 y.o. female who is admitted for treatment of septic shock. She was found by EMS in her car yesterday evening and was febrile to 102.6, lethargic, and hypotensive. In the ED had leukocytosis, hypotension, with a cellulitic left leg. She has a history chronic left shin wound since a fall in April--she had a blood blister that popped, and the wound has been healing slowly. She also has history of bilateral lower extremity lymphedema with some chronic cellulitis. She was started on low dose levophed, empiric antibiotics, and admitted to the MICU. Past Medical History:   Diagnosis Date    Anemia     Arthritis     Cellulitis -     left leg    Chronic ulcer of leg with fat layer exposed ([address] 2015    Chronic ulcer of right leg, limited to breakdown of skin ([address] 2016    Lymphedema of lower extremity 2015    Peripheral vision loss     Stroke [facility])        Past Surgical History:   Procedure Laterality Date    ABDOMINOPLASTY  2002    BREAST REDUCTION SURGERY      CATARACT REMOVAL      bilateral     SECTION      x2    COLONOSCOPY  2012    and egd    ECHOCARDIOGRAM COMPLETE 2D W DOPPLER W COLOR  2012         GASTRIC BYPASS SURGERY  2000    HERNIA REPAIR             Medications Prior to Admission:    Prior to Admission medications    Medication Sig Start Date End Date Taking?  Authorizing Provider   fentaNYL (DURAGESIC) 25 MCG/HR Place 1 patch onto the skin every 72 hours    Historical Provider, MD   butenafine (LOTRIMIN ULTRA) 1 % CREA Apply BID , both legs , X 1 month 16   Kori Blakely MD   Liraglutide (VICTOZA SC) Inject into the skin    Historical Provider, MD   ROPINIRole HCl (REQUIP PO) Take 1 mg by mouth    Historical Provider, MD   HYDROcodone-acetaminophen [facility]) Ebony Mohs 24.1*  --    HGB 9.3*  --    HCT 32.9* 28.9*     --      BMP  Recent Labs     06/04/19  2240      K 3.9      CO2 23   BUN 20   CREATININE 1.0   CALCIUM 8.2*     Liver Function  Recent Labs     06/04/19  2240   LIPASE 20   BILITOT 0.6   AST 21   ALT 16   ALKPHOS 118*   PROT 6.5   LABALBU 3.3*     Lactic acid 4 -> 1.9 -> 0.9    ASSESSMENT:  59 y.o. female with septic shock, lower extremity cellulitis    PLAN:  No acute surgical intervention  Empiric antibiotics per ID  Resuscitation per MICU--recent NICOM fluid responsive.     Electronically signed by Mekhi Figueredo MD on 6/5/19 at 2:39 PM

## 2019-06-05 NOTE — ED NOTES
Central Line Placement Procedure Note    Indication: centrally administered medications    Consent: The patient was counseled regarding the procedure, its indications, risks, potential complications and alternatives, and any questions were answered. Consent was obtained to proceed. Procedure: The patient was positioned appropriately and the skin over the right internal jugular vein was prepped with betadine and draped in a sterile fashion. Local anesthesia was obtained by infiltration using 1% Lidocaine without epinephrine. A large bore needle was used to identify the vein. A guide wire was then inserted into the vein through the needle. A triple lumen catheter was then inserted into the vessel over the guide wire using the Seldinger technique. All ports showed good, free flowing blood return and were flushed with saline solution. The catheter was then securely fastened to the skin with suture at 16 cm. Two sutures were placed into the kit included tube clamp and proximal eyelets. An antibiotic disk was placed and the site was then covered with a sterile dressing. A post procedure X-ray was ordered and showed good line position. The patient tolerated the procedure well.     Complications: None           Marnell Holstein, DO  Resident  06/05/19 2297

## 2019-06-05 NOTE — ED NOTES
Message has been sent to main pharmacy to verify and send Requip to ED.       See Mcgraw RN  06/05/19 3877

## 2019-06-05 NOTE — ED NOTES
Verbal orders received per Dr. Sallie Quiñonez at the bedside.      Patrice Friedman RN  06/05/19 5511

## 2019-06-05 NOTE — CONSULTS
[address] [place] [address] [facility]  [facility]    Consultation Note     Admit Date: 2019  9:52 PM    Reason for Consult:  SEPTIC SHOCK    Attending Physician:  Ana Baez DO       Chief Complaint   Patient presents with    Fever     got off work at 1500 today, has been in her car ever since. . Open wound to left thigh since April pt states no new drainage noticed at home. History Obtained From:  For a detailed history please see previous and current available medical records. HISTORY OF PRESENT ILLNESS:             The patient is a 59 y.o. female admitted after found drowsy in her car by her sister in law. She had LLE injury in April while in [facility] after hitting the side of a boat and had been \"baby-ing'' this leg since then. She also had a fall at work 2 days ago. Her BGL was 110 and she was given fluid bolus and one dose of iv vancomycin and zosyn in ER. Her BP was low and she was extremely drowsy as well. She was placed on levophed and sent to MICU after a central line was placed. I evaluated her in the ER before transfer.     Past Medical History:        Diagnosis Date    Anemia     Arthritis     Cellulitis -     left leg    Chronic ulcer of leg with fat layer exposed ([address] 2015    Chronic ulcer of right leg, limited to breakdown of skin ([address] 2016    Lymphedema of lower extremity 2015    Peripheral vision loss     Stroke [facility])      Past Surgical History:        Procedure Laterality Date    ABDOMINOPLASTY  2002    BREAST REDUCTION SURGERY      CATARACT REMOVAL      bilateral     SECTION      x2    COLONOSCOPY  2012    and egd    ECHOCARDIOGRAM COMPLETE 2D W DOPPLER W COLOR  2012         GASTRIC BYPASS SURGERY  2000    HERNIA REPAIR           Current Medications:   Scheduled Meds:   enoxaparin  40 mg Subcutaneous Daily    piperacillin-tazobactam  3.375 g Intravenous Q8H    vancomycin  1,500 mg Intravenous Q12H    sodium chloride flush  10 mL Intravenous 2 times per day    pantoprazole  40 mg Intravenous Daily    And    sodium chloride (PF)  10 mL Intravenous Daily     Continuous Infusions:   norepinephrine 4 mcg/min (06/05/19 0654)     PRN Meds:ondansetron, bisacodyl, sodium chloride flush, magnesium hydroxide, acetaminophen    Allergies:  Patient has no known allergies. Social History:   Social History     Socioeconomic History    Marital status:      Spouse name: None    Number of children: None    Years of education: None    Highest education level: None   Occupational History    None   Social Needs    Financial resource strain: None    Food insecurity:     Worry: None     Inability: None    Transportation needs:     Medical: None     Non-medical: None   Tobacco Use    Smoking status: Former Smoker     Packs/day: 0.50     Years: 38.00     Pack years: 19.00    Smokeless tobacco: Never Used   Substance and Sexual Activity    Alcohol use: No    Drug use: No    Sexual activity: Not Currently   Lifestyle    Physical activity:     Days per week: None     Minutes per session: None    Stress: None   Relationships    Social connections:     Talks on phone: None     Gets together: None     Attends Faith service: None     Active member of club or organization: None     Attends meetings of clubs or organizations: None     Relationship status: None    Intimate partner violence:     Fear of current or ex partner: None     Emotionally abused: None     Physically abused: None     Forced sexual activity: None   Other Topics Concern    None   Social History Narrative    None       Family History:       Problem Relation Age of Onset    Breast Cancer Mother     Stroke Father     Hypertension Sister     Hypertension Brother    . Otherwise non-pertinent to the chief complaint.     REVIEW OF SYSTEMS:    14 ROS negative unless otherwise specified in the HPI    PHYSICAL EXAM:    Vitals:    /60

## 2019-06-05 NOTE — ED NOTES
to contact attending (Dr. Kristine Campos) for admission orders.      Sebas Hayden RN  06/05/19 8545

## 2019-06-05 NOTE — ED NOTES
Dr. Daniel Late updated on patient status and came to bedside to evaluate patient.       Kyler Coto RN  06/05/19 0890

## 2019-06-05 NOTE — ED NOTES
Dr. Lola Garzon notified that patient remains hypotensive after 30mL/kg bolus almost complete     Jennifer Boggs, RN  06/05/19 0144

## 2019-06-05 NOTE — ED NOTES
Blood cultures obtained from right antecubital set one of two by this rn at 2230 and set two of two obtained from right hand by this rn at 2240-pt tolerated well.               Sheela Parikh RN  06/04/19 1530

## 2019-06-06 PROBLEM — A41.9 SEPTIC SHOCK (HCC): Status: ACTIVE | Noted: 2019-06-06

## 2019-06-06 PROBLEM — R65.21 SEPTIC SHOCK (HCC): Status: ACTIVE | Noted: 2019-06-06

## 2019-06-06 LAB
ALBUMIN SERPL-MCNC: 2.7 G/DL (ref 3.5–5.2)
ALP BLD-CCNC: 90 U/L (ref 35–104)
ALT SERPL-CCNC: 24 U/L (ref 0–32)
ANION GAP SERPL CALCULATED.3IONS-SCNC: 9 MMOL/L (ref 7–16)
ANISOCYTOSIS: ABNORMAL
AST SERPL-CCNC: 23 U/L (ref 0–31)
BASOPHILS ABSOLUTE: 0 E9/L (ref 0–0.2)
BASOPHILS RELATIVE PERCENT: 0.2 % (ref 0–2)
BILIRUB SERPL-MCNC: 0.5 MG/DL (ref 0–1.2)
BUN BLDV-MCNC: 13 MG/DL (ref 8–23)
CALCIUM SERPL-MCNC: 7.9 MG/DL (ref 8.6–10.2)
CHLORIDE BLD-SCNC: 106 MMOL/L (ref 98–107)
CO2: 24 MMOL/L (ref 22–29)
CREAT SERPL-MCNC: 0.6 MG/DL (ref 0.5–1)
EOSINOPHILS ABSOLUTE: 0 E9/L (ref 0.05–0.5)
EOSINOPHILS RELATIVE PERCENT: 0 % (ref 0–6)
GFR AFRICAN AMERICAN: >60
GFR NON-AFRICAN AMERICAN: >60 ML/MIN/1.73
GLUCOSE BLD-MCNC: 119 MG/DL (ref 74–99)
HCT VFR BLD CALC: 29.7 % (ref 34–48)
HEMOGLOBIN: 8.3 G/DL (ref 11.5–15.5)
HYPOCHROMIA: ABNORMAL
LYMPHOCYTES ABSOLUTE: 0.17 E9/L (ref 1.5–4)
LYMPHOCYTES RELATIVE PERCENT: 0.9 % (ref 20–42)
MAGNESIUM: 1.8 MG/DL (ref 1.6–2.6)
MCH RBC QN AUTO: 19.4 PG (ref 26–35)
MCHC RBC AUTO-ENTMCNC: 27.9 % (ref 32–34.5)
MCV RBC AUTO: 69.6 FL (ref 80–99.9)
MONOCYTES ABSOLUTE: 0.33 E9/L (ref 0.1–0.95)
MONOCYTES RELATIVE PERCENT: 1.7 % (ref 2–12)
NEUTROPHILS ABSOLUTE: 16.2 E9/L (ref 1.8–7.3)
NEUTROPHILS RELATIVE PERCENT: 97.4 % (ref 43–80)
PDW BLD-RTO: 21.5 FL (ref 11.5–15)
PHOSPHORUS: 2.6 MG/DL (ref 2.5–4.5)
PLATELET # BLD: 120 E9/L (ref 130–450)
PMV BLD AUTO: ABNORMAL FL (ref 7–12)
POLYCHROMASIA: ABNORMAL
POTASSIUM REFLEX MAGNESIUM: 2.9 MMOL/L (ref 3.5–5)
RBC # BLD: 4.27 E12/L (ref 3.5–5.5)
SODIUM BLD-SCNC: 139 MMOL/L (ref 132–146)
TOTAL PROTEIN: 5.2 G/DL (ref 6.4–8.3)
TROPONIN: <0.01 NG/ML (ref 0–0.03)
VANCOMYCIN TROUGH: 12.7 MCG/ML (ref 5–16)
WBC # BLD: 16.7 E9/L (ref 4.5–11.5)

## 2019-06-06 PROCEDURE — C9113 INJ PANTOPRAZOLE SODIUM, VIA: HCPCS | Performed by: INTERNAL MEDICINE

## 2019-06-06 PROCEDURE — 84484 ASSAY OF TROPONIN QUANT: CPT

## 2019-06-06 PROCEDURE — 99232 SBSQ HOSP IP/OBS MODERATE 35: CPT | Performed by: SURGERY

## 2019-06-06 PROCEDURE — 80202 ASSAY OF VANCOMYCIN: CPT

## 2019-06-06 PROCEDURE — 97162 PT EVAL MOD COMPLEX 30 MIN: CPT

## 2019-06-06 PROCEDURE — 2000000000 HC ICU R&B

## 2019-06-06 PROCEDURE — 6370000000 HC RX 637 (ALT 250 FOR IP): Performed by: INTERNAL MEDICINE

## 2019-06-06 PROCEDURE — 2580000003 HC RX 258: Performed by: INTERNAL MEDICINE

## 2019-06-06 PROCEDURE — 97166 OT EVAL MOD COMPLEX 45 MIN: CPT

## 2019-06-06 PROCEDURE — 97530 THERAPEUTIC ACTIVITIES: CPT

## 2019-06-06 PROCEDURE — 80053 COMPREHEN METABOLIC PANEL: CPT

## 2019-06-06 PROCEDURE — 85025 COMPLETE CBC W/AUTO DIFF WBC: CPT

## 2019-06-06 PROCEDURE — 83735 ASSAY OF MAGNESIUM: CPT

## 2019-06-06 PROCEDURE — 97535 SELF CARE MNGMENT TRAINING: CPT

## 2019-06-06 PROCEDURE — 6360000002 HC RX W HCPCS: Performed by: INTERNAL MEDICINE

## 2019-06-06 PROCEDURE — 36415 COLL VENOUS BLD VENIPUNCTURE: CPT

## 2019-06-06 PROCEDURE — 99233 SBSQ HOSP IP/OBS HIGH 50: CPT | Performed by: INTERNAL MEDICINE

## 2019-06-06 PROCEDURE — 84100 ASSAY OF PHOSPHORUS: CPT

## 2019-06-06 RX ORDER — FERROUS SULFATE 325(65) MG
325 TABLET ORAL
Status: DISCONTINUED | OUTPATIENT
Start: 2019-06-07 | End: 2019-06-12 | Stop reason: HOSPADM

## 2019-06-06 RX ORDER — ROPINIROLE 1 MG/1
1 TABLET, FILM COATED ORAL NIGHTLY
Status: DISCONTINUED | OUTPATIENT
Start: 2019-06-06 | End: 2019-06-06

## 2019-06-06 RX ORDER — MAGNESIUM SULFATE IN WATER 40 MG/ML
2 INJECTION, SOLUTION INTRAVENOUS ONCE
Status: COMPLETED | OUTPATIENT
Start: 2019-06-06 | End: 2019-06-06

## 2019-06-06 RX ORDER — POTASSIUM CHLORIDE 29.8 MG/ML
40 INJECTION INTRAVENOUS
Status: COMPLETED | OUTPATIENT
Start: 2019-06-06 | End: 2019-06-06

## 2019-06-06 RX ORDER — ROPINIROLE 1 MG/1
1 TABLET, FILM COATED ORAL 2 TIMES DAILY
Status: DISCONTINUED | OUTPATIENT
Start: 2019-06-06 | End: 2019-06-12 | Stop reason: HOSPADM

## 2019-06-06 RX ADMIN — FENTANYL CITRATE 25 MCG: 50 INJECTION, SOLUTION INTRAMUSCULAR; INTRAVENOUS at 10:30

## 2019-06-06 RX ADMIN — PIPERACILLIN AND TAZOBACTAM 3.38 G: 3; .375 INJECTION, POWDER, FOR SOLUTION INTRAVENOUS at 02:41

## 2019-06-06 RX ADMIN — MAGNESIUM SULFATE 2 G: 2 INJECTION INTRAVENOUS at 11:29

## 2019-06-06 RX ADMIN — Medication 1.5 G: at 12:00

## 2019-06-06 RX ADMIN — SODIUM CHLORIDE: 9 INJECTION, SOLUTION INTRAVENOUS at 06:35

## 2019-06-06 RX ADMIN — Medication 10 ML: at 20:18

## 2019-06-06 RX ADMIN — HYDROMORPHONE HYDROCHLORIDE 1 MG: 1 INJECTION, SOLUTION INTRAMUSCULAR; INTRAVENOUS; SUBCUTANEOUS at 20:18

## 2019-06-06 RX ADMIN — FENTANYL CITRATE 25 MCG: 50 INJECTION, SOLUTION INTRAMUSCULAR; INTRAVENOUS at 08:08

## 2019-06-06 RX ADMIN — Medication 10 ML: at 00:01

## 2019-06-06 RX ADMIN — Medication 10 ML: at 02:11

## 2019-06-06 RX ADMIN — FENTANYL CITRATE 25 MCG: 50 INJECTION, SOLUTION INTRAMUSCULAR; INTRAVENOUS at 14:40

## 2019-06-06 RX ADMIN — Medication 10 ML: at 11:35

## 2019-06-06 RX ADMIN — ROPINIROLE HYDROCHLORIDE 1 MG: 1 TABLET, FILM COATED ORAL at 16:02

## 2019-06-06 RX ADMIN — PIPERACILLIN AND TAZOBACTAM 3.38 G: 3; .375 INJECTION, POWDER, FOR SOLUTION INTRAVENOUS at 11:20

## 2019-06-06 RX ADMIN — POTASSIUM CHLORIDE 40 MEQ: 29.8 INJECTION, SOLUTION INTRAVENOUS at 13:45

## 2019-06-06 RX ADMIN — FENTANYL CITRATE 25 MCG: 50 INJECTION, SOLUTION INTRAMUSCULAR; INTRAVENOUS at 01:12

## 2019-06-06 RX ADMIN — SODIUM CHLORIDE 10 ML: 9 INJECTION, SOLUTION INTRAMUSCULAR; INTRAVENOUS; SUBCUTANEOUS at 11:19

## 2019-06-06 RX ADMIN — Medication 10 ML: at 06:35

## 2019-06-06 RX ADMIN — FENTANYL CITRATE 25 MCG: 50 INJECTION, SOLUTION INTRAMUSCULAR; INTRAVENOUS at 12:30

## 2019-06-06 RX ADMIN — PIPERACILLIN AND TAZOBACTAM 3.38 G: 3; .375 INJECTION, POWDER, FOR SOLUTION INTRAVENOUS at 18:50

## 2019-06-06 RX ADMIN — ENOXAPARIN SODIUM 40 MG: 40 INJECTION SUBCUTANEOUS at 11:19

## 2019-06-06 RX ADMIN — FENTANYL CITRATE 25 MCG: 50 INJECTION, SOLUTION INTRAMUSCULAR; INTRAVENOUS at 05:13

## 2019-06-06 RX ADMIN — HYDROMORPHONE HYDROCHLORIDE 1 MG: 1 INJECTION, SOLUTION INTRAMUSCULAR; INTRAVENOUS; SUBCUTANEOUS at 16:15

## 2019-06-06 RX ADMIN — PANTOPRAZOLE SODIUM 40 MG: 40 INJECTION, POWDER, FOR SOLUTION INTRAVENOUS at 11:19

## 2019-06-06 RX ADMIN — POTASSIUM CHLORIDE 40 MEQ: 29.8 INJECTION, SOLUTION INTRAVENOUS at 11:26

## 2019-06-06 RX ADMIN — Medication 10 ML: at 05:14

## 2019-06-06 ASSESSMENT — PAIN SCALES - GENERAL
PAINLEVEL_OUTOF10: 3
PAINLEVEL_OUTOF10: 10
PAINLEVEL_OUTOF10: 8
PAINLEVEL_OUTOF10: 10
PAINLEVEL_OUTOF10: 8
PAINLEVEL_OUTOF10: 3
PAINLEVEL_OUTOF10: 0
PAINLEVEL_OUTOF10: 0
PAINLEVEL_OUTOF10: 10
PAINLEVEL_OUTOF10: 10

## 2019-06-06 ASSESSMENT — PAIN DESCRIPTION - LOCATION: LOCATION: BACK;LEG

## 2019-06-06 ASSESSMENT — PAIN DESCRIPTION - DESCRIPTORS: DESCRIPTORS: SHARP

## 2019-06-06 ASSESSMENT — PAIN DESCRIPTION - ORIENTATION: ORIENTATION: LOWER;RIGHT;LEFT

## 2019-06-06 ASSESSMENT — PAIN DESCRIPTION - FREQUENCY: FREQUENCY: CONTINUOUS

## 2019-06-06 ASSESSMENT — PAIN DESCRIPTION - PAIN TYPE: TYPE: CHRONIC PAIN

## 2019-06-06 NOTE — PROGRESS NOTES
[facility]   Department of Internal Medicine   Internal Medicine Residency  MICU Progress Note    Patient:  Gagan Lamb 59 y.o. female   MRN: [id number]       Date of Service: 2019    Allergy: Patient has no known allergies. Subjective     Patient was seen and examined in AM. Patient states feels lousy. States she has mild non radiating chest pain. Denies SOB, chills, and N/V.     24 hour change:   - NICOM: not fluid responsive and remains on small dose of levophed  - Tmax 100.6 last night then remain afebrile overnight    Objective     TEMPERATURE:  Current - Temp: 98.2 °F (36.8 °C); Max - Temp  Av.2 °F (37.3 °C)  Min: 97.2 °F (36.2 °C)  Max: 100.9 °F (38.3 °C)  RESPIRATIONS RANGE: Resp  Avg: 15.8  Min: 1  Max: 24  PULSE RANGE: Pulse  Av.7  Min: 76  Max: 100  BLOOD PRESSURE RANGE:  Systolic (87XIV), FGB:853 , Min:84 , RPN:760   ; Diastolic (05EOQ), ZXF:46, Min:42, Max:77    PULSE OXIMETRY RANGE: SpO2  Av.2 %  Min: 89 %  Max: 100 %    I & O - 24hr:    Intake/Output Summary (Last 24 hours) at 2019 1622  Last data filed at 2019 0700  Gross per 24 hour   Intake 2585 ml   Output 2680 ml   Net -95 ml     I/O last 3 completed shifts: In: 3692 [P.O.:240; I.V.:1845; IV Piggyback:500]  Out: 2980 [Urine:2980] No intake/output data recorded. Weight change: 29 lb 1.6 oz (13.2 kg)    Physical Exam:  General Appearance:    Alert, lethargic, somnolent, cooperative, no acute distress. HEENT:    NC/AT, mucous membranes are moist   Neck:   Supple, no jugular venous distention. Resp:     CTAB, No wheezes, No rhonchi, no use of accessory muscles   Heart:    RRR, S1 and S2 normal, no murmur, rub or gallop.     Abdomen:     Soft, non-tender, non-distended with normal bowel sounds   Extremities:   Atraumatic, no cyanosis or edema   Pulses:  Radial and pedal pulses are intact bilaterally   Neurologic:   AAOx3, No focal motor deficit       Medications     Continuous Infusions:   Culture, Routine   Date Value Ref Range Status   06/05/2019 Growth not present, incubation continues  Preliminary     Legionella No results found for: LABLEGI  C Diff PCR No results found for: CDIFPCR  Wound culture/abscess: No results for input(s): WNDABS in the last 72 hours. Tip culture:No results for input(s): CXCATHTIP in the last 72 hours. Antibiotic  Days  Day started   Zosyn 2 6/5   Vancomycin 2   6/5                Oxygen: Additional Respiratory  Assessments  Pulse: 88  Resp: 14  SpO2: 93 %  Oral Care: Mouth swabbed     Nasal cannula L/min  2L   Face mask %     Reservoirs mask %         Lines:  Site  Day  Date inserted     TLC  RIJ  6/5          PICC              Arterial line              Peripheral line              HD cath            Urethral Catheter Non-latex 16 fr-Output (mL): 150 mL    Imaging Studies:    XR tibia 6/5  1. No acute fracture identified. If there is persistent clinical pain or symptomatology, a return to medical attention within 2-7 days and further imaging is recommended. .  2. No cortical destruction to suggest osteomyelitis. If there is  clinical concern for osteomyelitis, a triple phase nuclear medicine bone scan would be recommended  3. Soft tissue swelling concerning for possible edema or cellulitis. 4. Moderately severe medial with mild lateral and moderate  patellofemoral tricompartmental osteoarthritis. 5. Moderate naviculocuneiform and mild tibiotalar osteoarthritis. CXR 6/5/19  1. Suspected underlying mild central pulmonary vascular congestion. 2. Vascular calcifications thoracic aorta.     Resident's Assessment and Plan     <Neurologic>  Acute encephalopathy, likely 2/2 metabolic from sepsis- improving  - AAOx4     <Cardiovascular>  Septic shock likely 2/2 JAZZ cellulitis   - Received sepsis fluid resuscitation  - NICOM fluid not responsive  - Weaning levophed as able  - Blood cx negative, f/u mrsa nares  - Cont Zosyn and Vancomycin per ID    <Gastrointestinal>  Advance diet as tolerates     <Infectious Disease>  JAZZ cellulitis  - XR tibia no signs of cortical destruction to suggest osteomyelitis  - Blood cx negative, f/u mrsa nares  - Cont Zosyn and Vancomycin per ID      <Hematology/Oncology>  Chronic microcytic hypochromic anemia  - Baseline Hb 10  - Iron study reveals mixed picture of PATRICIA and anemia of chronic ds  - High reticulocyte  - start ferrous sulfate 325 mg daily     <Dermatologic/Musculoskeletal>  Chronic back pain and b/l knee OA  - Hold home pain medication: buprenorphine buccal film  - Dilaudid 1mg q4hr prn and tylenol prn    Restless leg syndrome  - Resume home ropinirole 1 mg BID    # Peptic ulcer prophylaxis: Protonix  # DVT Prophylaxis: Lovenox  # Disposition: Cont current care    Phoebe Del Rio M.D., PGY-2  Internal medicine resident  Attending Physician: Dr. Jerrod Liu     Addendum ICU Attending Statement     Dafne Yoder was seen, examined and discussed with the multi-disciplinary ICU team during rounds. A addendum note may be separate to the residents note. If not then, I have personally seen and examined the patient and the key elements of the encounter were performed by me (> 85 % time). The medications & laboratory data was discussed and adjusted where necessary. The radiographic images were reviewed either as a group or with radiologist.  Any changes are document or changed if felt dis-concordant with the exam or history. The above findings were corroborated, plans confirmed and changes made if needed. Family was updated at the bedside as available. Key issues of the case were discussed among consultants. Critical Care time is documented if appropriate.       Emilia Mccormack DO, MPH  Professor of Medicine

## 2019-06-06 NOTE — PROGRESS NOTES
Stage  CI HR MAP TPRI SVI   Baseline 2.6 85 92 2820 30   Challenge 2.5 83 82 2581 31   Result (%Ä) -0.9% -2.6% -10.9% -8.5% 1.9%

## 2019-06-06 NOTE — PROGRESS NOTES
Occupational Therapy  OCCUPATIONAL THERAPY INITIAL EVALUATION      Date:2019  Patient Name: Bernarda Victor  MRN: [id number]  : 1955  Room: [address] [place], [place] [address]    Evaluating OT: Everett Solitario OTR/L     AM-PAC Daily Activity Raw Score: 1624  Recommended Adaptive Equipment: continue to assess; pt may benefit from shower chair, LB AE depending on progress     Diagnosis: Sepsis   Pertinent Medical History:  Anemia, arthritis, cellulitis, leg ulcer , B lymphedema, peripheral vision loss, stroke    Precautions:  Falls, o2, LLE wound     Home Living: Pt lives with dtr  in a 2 story house with 2 step(s) to enter and 1 rail(s); bed/bath on 1st floor  Bathroom setup: Tub-Shower combo  Equipment owned: no DME    Prior Level of Function: IND with ADLs IND with IADLs; using no AD for ambulation. Pt reports having multiple fall recently d/t B knee pain.  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 [place]     Pain Level: yes; low back, B knees and neck; improved with pos change  Cognition: A&O: 3; Follows multi step directions   Memory: G   Sequencing: G   Problem solving: G   Judgement/safety: G     CAM-ICU: negative   RASS: 0     Functional Assessment:   Initial Eval Status  Date:  Treatment Status  Date: Short Term Goals  Treatment frequency: 1-3x/week on PRN    Feeding IND     Grooming/Hygiene Mod I seated  Pt unable to tolerate standing at sink this date d/t pain in LLE, Progress towards standing at sink for grooming task for next session per goals   IND   while standing at sink     UB Dressing Setup A   Mod I/IND  With item retrieval    LB Dressing Max A  Limited by pain and weakness; pt edu on possible need for LB AE    Mod I   with AE PRN   Bathing TBA     Toileting Mod A overall  Pt performed toilet transfer on low commode height, required Mod A x2 for transfer-edu pt on use of 3-1 over commode- RN notified to order for pt   Mod I  Including all clothing mgmt    Bed Mobility  Supine to sit: SBA  Sit to supine: NT     Functional Transfers Sit to stand: Mod Ax2 low commode height  CGA from EOB  Stand to sit: CGA  Mod I/IND  From varying surfaces with G safety    Functional Mobility CGA with [employer]  Short distances in room/bathroom; fair- tolernace   Mod I/IND  with AD PNR   Balance Sitting:      Static: Sup    Dynamic:SUP  Standing: SBA     Endurance/Activity Tolerance F-; limited by medical status, pain and weakness   G    during 15-20 min ADL task    Visual/  Perceptual Glasses: Yes; but does not need them at all times; grossly [facility]        UE strengthening    See UE Assessment Below       UE Assessment  Hand dominance: R       Strength ROM  Comments   RUE  Proximal: 4-/5  Distal: 4-/5 Proximal:WFL  Distal: WFL G  and G FMC/dexterity noted during ADL tasks   LUE Proximal: 4-/5  Distal: 4-/5 Proximal: WFL  Distal: WFL G  and G FMC/dexterity noted during ADL tasks     Hearing: WFL  Sensation:  No c/o numbness or tingling  Tone: generalized weakness  Edema: moderate in LLE    Vitals:  Blood Pressure at rest 142/84 Blood Pressure post session 136/89   Heart Rate at rest 82 bpm Heart Rate post session 87 bpm   SPO2 at rest 98% on 2 L SPO2 post session 97% on 2 L    BP:  142/84 supine. 165/94 EOB. 154/96 standing. Comments/Treatment Narrative:  OK from RN to see pt. Upon arrival pt supine in bed, eager to get OOB d/t back pain. After Bed mobility, dressing task attempt, Functional activity as noted above,simulated toileting task, pt left seated in bedside chair with all devices within reach, all lines and tubes intact. Pt educated on OT POC, OT role, OT d/c plan, Importance of completing ADL tasks daily as IND as possible to aide in recovery process, fall risk precautions, being OOB to chair, ROM exercises, use of LB AE if needed, DME recs for while in hospital and homegoing if needed. Therapist provided skilled monitoring of HR, O2 saturation, blood pressure and patients response during treatment session. Prior to and at the end of session, environmental modifications/line management completed for patients safety and efficiency of treatment session. Eval Complexity:   mod Complexity  · History: Expanded review of medical records and additional review of physical, cognitive, or psychosocial history related to current functional performance  · Exam: 3+ performance deficits  · Assistance/Modification: Min/mod assistance or modifications required to perform tasks. May have comorbidities that affect occupational performance. Assessment of current deficits:   Functional mobility [x]  ADLs [x] Strength [x]  Cognition []  Functional transfers  [x] IADLs [x] Safety Awareness []  Endurance [x]  Fine Motor Coordination [] Balance [x] Vision/perception [] Sensation []   Gross Motor Coordination [] ROM [] Delirium []                  Motor Control []    Plan of Care:  ADL retraining [x]   Equipment needs [x]   Neuromuscular re-education [] Energy Conservation Techniques [x]  Functional Transfer training [x] Patient and/or Family Education [x]  Functional Mobility training [x]  Environmental Modifications [x]  Cognitive re-training []   Compensatory techniques for ADLs [x]  Splinting Needs []   Positioning to improve overall function [x]   Therapeutic Activity [x]                       Therapeutic Exercise  [x]  Visual/Perceptual: []    Delirium prevention/treatment  [x]   Other:  []    Rehab Potential: Good for established goals    Patient / Family Goal: to feel better and decrease back pain     Patient and/or family were instructed/educated on diagnosis, prognosis/goals and plan of care. Demonstrated F+ understanding, further information could be needed. [] Malnutrition indicators have been identified and nursing has been notified to ensure a dietitian consult is ordered.       mod Evaluation + 40 timed treatment minutes  Treatment Time in: 1020  Treatment Time out: [address] [place], [place] [id number]

## 2019-06-06 NOTE — PROGRESS NOTES
[facility]  ATTENDING PHYSICIAN PROGRESS NOTE     I have examined the patient and  reviewed the record. I have reviewed all relevant labs and imaging data. The following summarizes my clinical findings and independent assessment. CC: septic shock    S. Pt is more awake and responsive today. Her levophed requirement has decreased    O.  Vitals:    06/06/19 0700   BP:    Pulse: 88   Resp: 14   Temp:    SpO2: 93%     More awake, less somnolent  Sitting in chair  Breathing comfortable  abd soft ntnd  Lower extremities--  LLE--cellulitis extensive, no fluctuance  RLE-- minimal cellulitis    ASSESSMENT:  Active Problems:    Sepsis (HCC)    Severe sepsis (HCC)    Septic shock (HCC)  Resolved Problems:    * No resolved hospital problems.  *       PLAN:  Septic shock--etiology presumed to be from LLE cellulitis  Wean pressors for MAP >65  No plans for surgical intervention  Nothing to debride  Wbc down to 16 from 24  Appears to be responding to abx  DVT Proph: SCDS/ roland Vigil MD, FACS  6/6/2019  11:25 AM

## 2019-06-06 NOTE — PROGRESS NOTES
CC- lle cellulitis    Subjective: The patient is awake and alert. Still reporting feeling achy all over   Tolerating medications. Reports no side effects. Afebrile. 10 ROS otherwise negative unless otherwise specified above. Objective:    Vitals:    06/06/19 1200   BP:    Pulse:    Resp:    Temp: 98.2 °F (36.8 °C)   SpO2:        General Appearance:    Awake, alert , no acute distress.    HEENT:    Normocephalic,PERRL,neck supple, no JVD, mucosa moist, no thrush   Lungs:     Clear to auscultation bilaterally, no wheeze , crackles   Heart:    Regular rate and rhythm, no murmur   Abdomen:     Soft, non-tender, not distended  bowel sounds present,     Extremities:   LLE redness with warmth upto midcalf with scabbed wound in the middle   Pulses:   2+ and symmetric all extremities   Skin:   Skin color, texture, turgor normal, no rashes or lesions         CBC+dif:  Reviewed and trend followed     Radiology:  Noted     Microbiology:  Pending     Assessment:  · LLE cellulitis with chronic wound  · Possible aspiration pnuemonia  · Severe sepsis  · Lactic acidosis- resolved     Plan:    · Cont iv vancomycin   · DC zosyn and switch to cefepime  · F/U [facility]  · Monitor labs  · Will follow with you            Electronically signed by Anusha Montgomery MD on 6/6/2019 at 1:50 PM

## 2019-06-06 NOTE — PROGRESS NOTES
Pharmacy Consultation Note  (Antibiotic Dosing and Monitoring)    Initial consult date: 6/5  Consulting physician: Dr. Norberto Hashimoto  Drug(s): vancomycin  Indication: sepsis secondary to cellulitis    Shabbir Acosta is a 63/F patient who presented to [facility] after she was found in her car by her sister several hours after arriving home, shaking and confused. She was found to be hypotensive in ER with high grade fever, and open wound on her lower left extremity. She was given fluid bolus and empiric vancomycin and zosyn and transferred to ICU for management of sepsis secondary to cellulitis, ID consulted, per progress note also concern for possible aspiration pneumonia. Clinical pharmacist consulted to dose vancomycin      Age/  Gender Height Weight IBW Dosing weight  Allergy Information   63/F   62\"  111.6 kg  50.1 kg  77 kg  Patient has no known allergies. Other anti-infectives Start date Stop date   Zosyn 3.375 g IV q8hr  6/5                     Date  Tmax WBC BUN/CR CrCL Drug/Dose Time   Given Level(s)   (Time) Comments   6/4 102.6 24.1 20/1 70 ml/min Vancomycin 2000 mg IV x 1  2251     6/5 102.9    Vancomycin 1500 mg IV q12 hr  1449 procalcitonin 9.13                                  Intake/Output Summary (Last 24 hours) at 6/5/2019 2217  Last data filed at 6/5/2019 2145  Gross per 24 hour   Intake 6414 ml   Output 2380 ml   Net 4034 ml       Average urine output: 0.8 ml/kg/hr    Cultures:    6/5 blood cultures sent  6/5 urine culture sent  6/5 mrsa nares screen sent    IV lines:  6/5 CVC TLC RIJ  6/5 peripheral right wrist    Radiology:  6/5 XR left tibia read as No acute fracture identified. If there is persistent clinical pain  or symptomatology, a return to medical attention within 2-7 days and  further imaging is recommended. .  2. No cortical destruction to suggest osteomyelitis. If there is  clinical concern for osteomyelitis, a triple phase nuclear medicine  bone scan would be recommended  3.  Soft tissue swelling concerning for possible edema or cellulitis. 4. Moderately severe medial with mild lateral and moderate  patellofemoral tricompartmental osteoarthritis. 5. Moderate naviculocuneiform and mild tibiotalar osteoarthritis. 6/4 CXR read as Suspected underlying mild central pulmonary vascular congestion.   2. Vascular calcifications thoracic aorta    Assessment:  · 63/F presenting with confusion and fever, admitted to MICU for management of septic shock secondary to cellulitis of left lower extremity  · Empiric vancomycin and zosyn, ID following  · Clinical pharmacist consulted to dose vancomycin; goal trough 15 - 20 mcg/mL    Plan:  · Vancomycin 2000 mg IV x 1 given in ED; vancomycin 1500 mg IV q12 hr ordered per ID, aggressive doses given septic shock requiring vasopressor therapy  · Trough tomorrow at 11:15, hold dose if level > 20 mcg/mL and dose will be adjusted if necessary  · Pharmacist will follow and monitor/adjust dosing as necessary    Leslye Best PharmD, [facility], [address] 6/5/2019 10:17 PM

## 2019-06-06 NOTE — PATIENT CARE CONFERENCE
P Quality Flow/Interdisciplinary Rounds Progress Note        Quality Flow Rounds held on June 6, 2019    Disciplines Attending:  Pt/ot, resp therapy, bedside nurse, charge nurse, nursing management    Garfield Cueto was admitted on 6/4/2019  9:52 PM    Anticipated Discharge Date:  Expected Discharge Date: 06/10/19    Disposition:    Damion Score:  Damion Scale Score: 19    Readmission Risk              Risk of Unplanned Readmission:        13           Discussed patient goal for the day, patient clinical progression, and barriers to discharge. The following Goal(s) of the Day/Commitment(s) have been identified:  Continue to monitor and treat as indicated.       Clive Lui  June 6, 2019

## 2019-06-06 NOTE — PROGRESS NOTES
Physical Therapy  Initial Assessment     Name: Helena Ochoa  : 1955  MRN: [id number]    Date of Service: 2019    Evaluating PT:  Alexandria Prado PT, DPT [id number]    Room #:  1140/7588-S  Diagnosis:  sepsis  PMHx:  Anemia, arthritis, cellulitis, leg ulcer , B lymphedema, peripheral vision loss, stroke  Precautions: Falls, O2, LLE wound   Equipment Needs:  TBD    Pt lives with daugter in a 2 story home with 2 stairs to enter and 1 rail(s). Bedroom and bathroom are on the mainlevel. Pt ambulated with no AD and was independent in ADLs PTA. 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 Initial Evaluation  Date: 19 Treatment Short Term/ Long Term   Goals   AM-PAC 6 Clicks      Was pt agreeable to Eval/treatment? Yes     Does pt have pain? Yes, low back, B knees, and neck unspecified numerical     Bed Mobility  Rolling: modified independent  Supine to sit: SBA  Sit to supine: NT  Scooting: Emmanuel  independent   Transfers Sit to stand: CGA   Stand to sit: CGA from bed; modAx2 from low commode  Stand pivot: CGA with [address]  independent   Ambulation    15x2 feet with CGA with [address]  >200 feet independent   Stair negotiation: ascended and descended NT  >2 steps with 1 rail Modified Independent      ROM BUE:  Per OT eval   BLE:  WFL     Strength BUE:  Per OT eval   BLE:  4+/5     Balance Sitting EOB:  SBA  Dynamic Standing:  CGA  Sitting EOB:  independent  Dynamic Standing:  independent     Pt is A & O x 4  RASS:  0  CAM-ICU:  NT  Sensation:  Pt denies numbness and tingling to extremities  Edema:  LLE cellulitis     Vitals:  Blood Pressure at rest 142/84 Blood Pressure post session 136/89   Heart Rate at rest 82 bpm Heart Rate post session 87 bpm   SPO2 at rest 98% on 2 L SPO2 post session 97% on 2 L    BP:  142/84 supine. 165/94 EOB. 154/96 standing.       Functional Status Score-Intensive Care Unit (FSS-ICU)   Rolling 6/7   Supine to sit transfer 5/7   Unsupported sitting  5/7   Sit to stand transfers  2-5x/week x 7-10 days.     Time in  1020  Time out  [address] [facility] [place], [place]  [id number]

## 2019-06-07 LAB
ALBUMIN SERPL-MCNC: 2.6 G/DL (ref 3.5–5.2)
ALP BLD-CCNC: 88 U/L (ref 35–104)
ALT SERPL-CCNC: 21 U/L (ref 0–32)
ANION GAP SERPL CALCULATED.3IONS-SCNC: 7 MMOL/L (ref 7–16)
ANISOCYTOSIS: ABNORMAL
AST SERPL-CCNC: 17 U/L (ref 0–31)
BASOPHILS ABSOLUTE: 0 E9/L (ref 0–0.2)
BASOPHILS RELATIVE PERCENT: 0.2 % (ref 0–2)
BILIRUB SERPL-MCNC: 0.3 MG/DL (ref 0–1.2)
BUN BLDV-MCNC: 12 MG/DL (ref 8–23)
CALCIUM SERPL-MCNC: 8 MG/DL (ref 8.6–10.2)
CHLORIDE BLD-SCNC: 103 MMOL/L (ref 98–107)
CO2: 25 MMOL/L (ref 22–29)
CREAT SERPL-MCNC: 0.6 MG/DL (ref 0.5–1)
EOSINOPHILS ABSOLUTE: 0.2 E9/L (ref 0.05–0.5)
EOSINOPHILS RELATIVE PERCENT: 1.8 % (ref 0–6)
GFR AFRICAN AMERICAN: >60
GFR NON-AFRICAN AMERICAN: >60 ML/MIN/1.73
GLUCOSE BLD-MCNC: 109 MG/DL (ref 74–99)
HCT VFR BLD CALC: 27.9 % (ref 34–48)
HEMOGLOBIN: 7.8 G/DL (ref 11.5–15.5)
HYPOCHROMIA: ABNORMAL
LYMPHOCYTES ABSOLUTE: 0.22 E9/L (ref 1.5–4)
LYMPHOCYTES RELATIVE PERCENT: 1.8 % (ref 20–42)
MAGNESIUM: 1.9 MG/DL (ref 1.6–2.6)
MCH RBC QN AUTO: 19.6 PG (ref 26–35)
MCHC RBC AUTO-ENTMCNC: 28 % (ref 32–34.5)
MCV RBC AUTO: 70.1 FL (ref 80–99.9)
MONOCYTES ABSOLUTE: 0.56 E9/L (ref 0.1–0.95)
MONOCYTES RELATIVE PERCENT: 5.3 % (ref 2–12)
NEUTROPHILS ABSOLUTE: 10.19 E9/L (ref 1.8–7.3)
NEUTROPHILS RELATIVE PERCENT: 91.2 % (ref 43–80)
OVALOCYTES: ABNORMAL
PDW BLD-RTO: 21.5 FL (ref 11.5–15)
PHOSPHORUS: 2 MG/DL (ref 2.5–4.5)
PLATELET # BLD: 129 E9/L (ref 130–450)
PMV BLD AUTO: ABNORMAL FL (ref 7–12)
POIKILOCYTES: ABNORMAL
POLYCHROMASIA: ABNORMAL
POTASSIUM REFLEX MAGNESIUM: 3.7 MMOL/L (ref 3.5–5)
RBC # BLD: 3.98 E12/L (ref 3.5–5.5)
SODIUM BLD-SCNC: 135 MMOL/L (ref 132–146)
TARGET CELLS: ABNORMAL
TOTAL PROTEIN: 5.3 G/DL (ref 6.4–8.3)
URINE CULTURE, ROUTINE: NORMAL
WBC # BLD: 11.2 E9/L (ref 4.5–11.5)

## 2019-06-07 PROCEDURE — 2580000003 HC RX 258: Performed by: INTERNAL MEDICINE

## 2019-06-07 PROCEDURE — 6360000002 HC RX W HCPCS: Performed by: INTERNAL MEDICINE

## 2019-06-07 PROCEDURE — 85025 COMPLETE CBC W/AUTO DIFF WBC: CPT

## 2019-06-07 PROCEDURE — 99233 SBSQ HOSP IP/OBS HIGH 50: CPT | Performed by: INTERNAL MEDICINE

## 2019-06-07 PROCEDURE — 2500000003 HC RX 250 WO HCPCS: Performed by: INTERNAL MEDICINE

## 2019-06-07 PROCEDURE — 99232 SBSQ HOSP IP/OBS MODERATE 35: CPT | Performed by: SURGERY

## 2019-06-07 PROCEDURE — 6370000000 HC RX 637 (ALT 250 FOR IP): Performed by: INTERNAL MEDICINE

## 2019-06-07 PROCEDURE — 2060000000 HC ICU INTERMEDIATE R&B

## 2019-06-07 PROCEDURE — 83735 ASSAY OF MAGNESIUM: CPT

## 2019-06-07 PROCEDURE — 99231 SBSQ HOSP IP/OBS SF/LOW 25: CPT | Performed by: INTERNAL MEDICINE

## 2019-06-07 PROCEDURE — 36415 COLL VENOUS BLD VENIPUNCTURE: CPT

## 2019-06-07 PROCEDURE — 84100 ASSAY OF PHOSPHORUS: CPT

## 2019-06-07 PROCEDURE — 36592 COLLECT BLOOD FROM PICC: CPT

## 2019-06-07 PROCEDURE — C9113 INJ PANTOPRAZOLE SODIUM, VIA: HCPCS | Performed by: INTERNAL MEDICINE

## 2019-06-07 PROCEDURE — 80053 COMPREHEN METABOLIC PANEL: CPT

## 2019-06-07 RX ORDER — MAGNESIUM SULFATE IN WATER 40 MG/ML
2 INJECTION, SOLUTION INTRAVENOUS ONCE
Status: COMPLETED | OUTPATIENT
Start: 2019-06-07 | End: 2019-06-07

## 2019-06-07 RX ADMIN — ROPINIROLE HYDROCHLORIDE 1 MG: 1 TABLET, FILM COATED ORAL at 09:21

## 2019-06-07 RX ADMIN — Medication 10 ML: at 02:00

## 2019-06-07 RX ADMIN — Medication 10 ML: at 09:23

## 2019-06-07 RX ADMIN — HYDROMORPHONE HYDROCHLORIDE 1 MG: 1 INJECTION, SOLUTION INTRAMUSCULAR; INTRAVENOUS; SUBCUTANEOUS at 04:22

## 2019-06-07 RX ADMIN — CEFEPIME 2 G: 2 INJECTION, POWDER, FOR SOLUTION INTRAVENOUS at 21:04

## 2019-06-07 RX ADMIN — Medication 10 ML: at 23:10

## 2019-06-07 RX ADMIN — PIPERACILLIN AND TAZOBACTAM 3.38 G: 3; .375 INJECTION, POWDER, FOR SOLUTION INTRAVENOUS at 02:30

## 2019-06-07 RX ADMIN — CEFEPIME 2 G: 2 INJECTION, POWDER, FOR SOLUTION INTRAVENOUS at 10:22

## 2019-06-07 RX ADMIN — HYDROMORPHONE HYDROCHLORIDE 1 MG: 1 INJECTION, SOLUTION INTRAMUSCULAR; INTRAVENOUS; SUBCUTANEOUS at 10:31

## 2019-06-07 RX ADMIN — FERROUS SULFATE TAB 325 MG (65 MG ELEMENTAL FE) 325 MG: 325 (65 FE) TAB at 09:21

## 2019-06-07 RX ADMIN — HYDROMORPHONE HYDROCHLORIDE 1 MG: 1 INJECTION, SOLUTION INTRAMUSCULAR; INTRAVENOUS; SUBCUTANEOUS at 00:18

## 2019-06-07 RX ADMIN — Medication 10 ML: at 04:28

## 2019-06-07 RX ADMIN — HYDROMORPHONE HYDROCHLORIDE 1 MG: 1 INJECTION, SOLUTION INTRAMUSCULAR; INTRAVENOUS; SUBCUTANEOUS at 21:04

## 2019-06-07 RX ADMIN — Medication 10 ML: at 04:00

## 2019-06-07 RX ADMIN — ROPINIROLE HYDROCHLORIDE 1 MG: 1 TABLET, FILM COATED ORAL at 21:04

## 2019-06-07 RX ADMIN — Medication 1.5 G: at 23:26

## 2019-06-07 RX ADMIN — Medication 1.5 G: at 12:53

## 2019-06-07 RX ADMIN — POTASSIUM PHOSPHATE, MONOBASIC AND POTASSIUM PHOSPHATE, DIBASIC 15 MMOL: 224; 236 INJECTION, SOLUTION, CONCENTRATE INTRAVENOUS at 10:39

## 2019-06-07 RX ADMIN — Medication 1.5 G: at 00:04

## 2019-06-07 RX ADMIN — PANTOPRAZOLE SODIUM 40 MG: 40 INJECTION, POWDER, FOR SOLUTION INTRAVENOUS at 09:22

## 2019-06-07 RX ADMIN — HYDROMORPHONE HYDROCHLORIDE 1 MG: 1 INJECTION, SOLUTION INTRAMUSCULAR; INTRAVENOUS; SUBCUTANEOUS at 15:11

## 2019-06-07 RX ADMIN — ENOXAPARIN SODIUM 40 MG: 40 INJECTION SUBCUTANEOUS at 09:25

## 2019-06-07 RX ADMIN — MAGNESIUM SULFATE 2 G: 2 INJECTION INTRAVENOUS at 10:29

## 2019-06-07 RX ADMIN — SODIUM CHLORIDE 10 ML: 9 INJECTION, SOLUTION INTRAMUSCULAR; INTRAVENOUS; SUBCUTANEOUS at 09:23

## 2019-06-07 ASSESSMENT — PAIN DESCRIPTION - ORIENTATION
ORIENTATION: RIGHT;LEFT
ORIENTATION: RIGHT;LEFT

## 2019-06-07 ASSESSMENT — PAIN SCALES - GENERAL
PAINLEVEL_OUTOF10: 8
PAINLEVEL_OUTOF10: 3
PAINLEVEL_OUTOF10: 4
PAINLEVEL_OUTOF10: 7
PAINLEVEL_OUTOF10: 8
PAINLEVEL_OUTOF10: 8
PAINLEVEL_OUTOF10: 0
PAINLEVEL_OUTOF10: 9
PAINLEVEL_OUTOF10: 7

## 2019-06-07 ASSESSMENT — PAIN DESCRIPTION - LOCATION
LOCATION: LEG;BACK;KNEE
LOCATION: LEG;BACK;KNEE

## 2019-06-07 ASSESSMENT — PAIN DESCRIPTION - ONSET
ONSET: ON-GOING
ONSET: ON-GOING

## 2019-06-07 ASSESSMENT — PAIN DESCRIPTION - DESCRIPTORS
DESCRIPTORS: ACHING;SORE;DISCOMFORT
DESCRIPTORS: ACHING;SORE;DISCOMFORT

## 2019-06-07 ASSESSMENT — PAIN DESCRIPTION - PAIN TYPE
TYPE: ACUTE PAIN;CHRONIC PAIN
TYPE: ACUTE PAIN;CHRONIC PAIN

## 2019-06-07 ASSESSMENT — PAIN DESCRIPTION - PROGRESSION
CLINICAL_PROGRESSION: GRADUALLY IMPROVING
CLINICAL_PROGRESSION: GRADUALLY IMPROVING

## 2019-06-07 ASSESSMENT — PAIN - FUNCTIONAL ASSESSMENT: PAIN_FUNCTIONAL_ASSESSMENT: PREVENTS OR INTERFERES SOME ACTIVE ACTIVITIES AND ADLS

## 2019-06-07 ASSESSMENT — PAIN DESCRIPTION - FREQUENCY
FREQUENCY: CONTINUOUS
FREQUENCY: CONTINUOUS

## 2019-06-07 NOTE — PROGRESS NOTES
Subjective:    Awake and alert. Ambulating independently but still with significant left leg pain  Denies chest pain or dyspnea. Denies abdominal pain. Tolerating diet. No nausea or vomiting. Objective:    BP (!) 114/54   Pulse 86   Temp 97.2 °F (36.2 °C) (Oral)   Resp 18   Ht 5' 2\" (1.575 m)   Wt 275 lb 1.6 oz (124.8 kg)   SpO2 94%   BMI 50.32 kg/m²   Skin: Warm and dry  Neck: Supple, no JVD  Heart:  RRR, no murmurs, gallops, or rubs. Lungs:  CTA bilaterally, no wheeze, rales or rhonchi  Abd: bowel sounds present, nontender, nondistended, no masses  Extrem:  Left leg remains swollen, erythematous    I/O last 3 completed shifts: In: 566 [I.V.:566]  Out: 7598 [Urine:1163]    Laboratory:     CBC with Differential:    Lab Results   Component Value Date    WBC 11.2 06/07/2019    RBC 3.98 06/07/2019    HGB 7.8 06/07/2019    HCT 27.9 06/07/2019     06/07/2019    MCV 70.1 06/07/2019    MCH 19.6 06/07/2019    MCHC 28.0 06/07/2019    RDW 21.5 06/07/2019    NRBC 1 11/02/2012    SEGSPCT 72 02/28/2014    LYMPHOPCT 1.8 06/07/2019    MONOPCT 5.3 06/07/2019    BASOPCT 0.2 06/07/2019    MONOSABS 0.56 06/07/2019    LYMPHSABS 0.22 06/07/2019    EOSABS 0.20 06/07/2019    BASOSABS 0.00 06/07/2019     CMP:    Lab Results   Component Value Date     06/07/2019    K 3.7 06/07/2019     06/07/2019    CO2 25 06/07/2019    BUN 12 06/07/2019    CREATININE 0.6 06/07/2019    GFRAA >60 06/07/2019    LABGLOM >60 06/07/2019    GLUCOSE 109 06/07/2019    PROT 5.3 06/07/2019    LABALBU 2.6 06/07/2019    CALCIUM 8.0 06/07/2019    BILITOT 0.3 06/07/2019    ALKPHOS 88 06/07/2019    AST 17 06/07/2019    ALT 21 06/07/2019        XR TIBIA FIBULA LEFT (2 VIEWS)   Final Result   1. No acute fracture identified. If there is persistent clinical pain   or symptomatology, a return to medical attention within 2-7 days and   further imaging is recommended. .   2. No cortical destruction to suggest osteomyelitis.  If there is Cyclobenzaprine HCl (FLEXERIL PO) Take 10 mg by mouth 2 times daily. Historical Provider, MD        Current Facility-Administered Medications   Medication Dose Route Frequency Provider Last Rate Last Dose    cefepime (MAXIPIME) 2 g IVPB extended (mini-bag)  2 g Intravenous Q12H Tracy Daly MD   Stopped at 06/07/19 1422    ferrous sulfate tablet 325 mg  325 mg Oral Daily with breakfast Tracy Daly MD   325 mg at 06/07/19 0921    rOPINIRole (REQUIP) tablet 1 mg  1 mg Oral BID Tracy Daly MD   1 mg at 06/07/19 0921    HYDROmorphone (DILAUDID) injection 1 mg  1 mg Intravenous Q4H PRN Tracy Daly MD   1 mg at 06/07/19 1511    ondansetron (ZOFRAN) injection 4 mg  4 mg Intravenous Q6H PRN Tracy Daly MD   4 mg at 06/05/19 1053    enoxaparin (LOVENOX) injection 40 mg  40 mg Subcutaneous Daily Tracy Daly MD   40 mg at 06/07/19 0925    bisacodyl (DULCOLAX) suppository 10 mg  10 mg Rectal Daily PRN Tracy Daly MD        vancomycin 1.5 g in dextrose 5% 300 mL IVPB  1,500 mg Intravenous Q12H Tracy Daly MD   Stopped at 06/07/19 1453    sodium chloride flush 0.9 % injection 10 mL  10 mL Intravenous 2 times per day Tracy Daly MD   10 mL at 06/07/19 0923    sodium chloride flush 0.9 % injection 10 mL  10 mL Intravenous PRN Tracy Daly MD   10 mL at 06/07/19 0428    magnesium hydroxide (MILK OF MAGNESIA) 400 MG/5ML suspension 30 mL  30 mL Oral Daily PRN Tracy Daly MD        acetaminophen (TYLENOL) tablet 650 mg  650 mg Oral Q6H PRN Tracy Daly MD   650 mg at 06/05/19 1942           Problem list:    Active Problems:    Sepsis ([address]    Severe sepsis ([address]    Septic shock ([address]  Resolved Problems:    * No resolved hospital problems. *      Assessment:       1. Sepsis with septic shock due to left leg cellulitis     2. Cellulitis left leg     3. Diabetes mellitus type II, uncontrolled     4. Chronic back pain           Plan:    1. Continue vancomycin    2. Continue cefepime     3. Continue current diabetic regimen    4.  Continue current pain management regimen      Thony Chavez D.O., Kimberly Scruggs  3:35 PM  6/7/2019

## 2019-06-07 NOTE — PLAN OF CARE
Problem: Risk for Impaired Skin Integrity  Goal: Tissue integrity - skin and mucous membranes  6/7/2019 0552 by Jovan Myles RN  Outcome: Met This Shift     Problem: Falls - Risk of:  Goal: Will remain free from falls  6/7/2019 0552 by Jovan Myles RN  Outcome: Met This Shift     Problem: Falls - Risk of:  Goal: Absence of physical injury  6/7/2019 0552 by Jovan Myles RN  Outcome: Met This Shift     Problem: Pain:  Goal: Pain level will decrease  6/7/2019 0552 by Jovan Myles RN  Outcome: Ongoing     Problem: Pain:  Goal: Control of acute pain  6/7/2019 0552 by Jovan Myels RN  Outcome: Ongoing     Problem: Pain:  Goal: Control of chronic pain  6/7/2019 0552 by Jovan Myles RN  Outcome: Ongoing    Plan of care discussed with patient / family.

## 2019-06-07 NOTE — PROGRESS NOTES
[facility] Quality Flow/Interdisciplinary Rounds Progress Note        Quality Flow Rounds held on June 7, 2019    Disciplines Attending:  Bedside Nurse, Nursing Unit Leadership, Respiratory Therapy, Pharmacist, Physical Therapy, Occupational Therapy and ICU team    Ale Dye was admitted on 6/4/2019  9:52 PM    Anticipated Discharge Date:  Expected Discharge Date: 06/10/19    Disposition:    Damion Score:  Damion Scale Score: 19    Readmission Risk              Risk of Unplanned Readmission:        13           Discussed patient goal for the day, patient clinical progression, and barriers to discharge.   The following Goal(s) of the Day/Commitment(s) have been identified: transfer to [address] [place] [address]  June 7, 2019

## 2019-06-07 NOTE — PROGRESS NOTES
Subjective:    Awake and more alert. No problems overnight. Denies chest pain or dyspnea. Denies abdominal pain. Tolerating diet. No nausea or vomiting. Objective:    BP 95/76   Pulse 84   Temp 99.6 °F (37.6 °C)   Resp 10   Ht 5' 2\" (1.575 m)   Wt 275 lb 1.6 oz (124.8 kg)   SpO2 98%   BMI 50.32 kg/m²   Skin: Warm and dry  Neck: Supple, no JVD  Heart:  RRR, no murmurs, gallops, or rubs. Lungs:  CTA bilaterally, no wheeze, rales or rhonchi  Abd: bowel sounds present, nontender, nondistended, no masses  Extrem:  Left leg less swollen, still erythematous    I/O last 3 completed shifts: In: 2757 [P.O.:600;  I.V.:3850; IV Piggyback:500]  Out: 5724 [Urine:3850]    Results      Component Value Units   EKG 12 Lead [[id number]] Collected: 06/06/19 1218   Updated: 06/06/19 1424     Ventricular Rate 80 BPM    Atrial Rate 80 BPM    P-R Interval 172 ms    QRS Duration 70 ms    Q-T Interval 324 ms    QTc Calculation (Bazett) 373 ms    P Axis 60 degrees    R Axis 7 degrees    T Axis 29 degrees   Narrative:     Normal sinus rhythm  Low voltage QRS  Cannot rule out Anterior infarct , age undetermined  Abnormal ECG  When compared with ECG of 04-JUN-2019 22:26,  Significant changes have occurred   Radha Turner [[id number]] Collected: 06/06/19 1118   Updated: 06/06/19 1229    Specimen Type: Blood     Vancomycin Tr 12.7 mcg/mL   Troponin [[id number]] Collected: 06/06/19 1118   Updated: 06/06/19 1227    Specimen Source: Blood     Troponin <0.01 ng/mL    Comment: TROPONIN T BLOOD LEVELS:          0.03 ng/mL     Upper Reference Limit   0.04 - 0.09 ng/mL     Possible myocardial injury       >= 0.10 ng/mL     Myocardial injury       Urine Culture [[id number]] Collected: 06/05/19 0253   Updated: 06/06/19 1122    Specimen Source: Urine, clean catch     Urine Culture, Routine Growth not present, incubation continues   Narrative:     Source: URINE       Site:              CBC auto differential [[id number]] (Abnormal) Collected: 06/06/19 0548   Updated: 06/06/19 0815    Specimen Source: Blood     WBC 16. 7High  E9/L    RBC 4.27 E12/L    Hemoglobin 8.3Low  g/dL    Hematocrit 29.7Low  %    MCV 69.6Low  fL    MCH 19.4Low  pg    MCHC 27.9Low  %    RDW 21.5High  fL    Platelets 387IQH  M7/N    MPV NOT CALC fL    Neutrophils % 97.4High  %    Lymphocytes % 0.9Low  %    Monocytes % 1.7Low  %    Eosinophils % 0.0 %    Basophils % 0.2 %    Neutrophils # 16. 20High  E9/L    Lymphocytes # 0.17Low  E9/L    Monocytes # 0.33 E9/L    Eosinophils # 0.00Low  E9/L    Basophils # 0.00 E9/L    Anisocytosis 2+    Polychromasia 1+    Hypochromia 1+   Comprehensive Metabolic Panel w/ Reflex to MG [[id number]] (Abnormal) Collected: 06/06/19 0548   Updated: 06/06/19 0717    Specimen Source: Blood     Sodium 139 mmol/L    Potassium reflex Magnesium 2.9Low  mmol/L    Chloride 106 mmol/L    CO2 24 mmol/L    Anion Gap 9 mmol/L    Glucose 119High  mg/dL    BUN 13 mg/dL    CREATININE 0.6 mg/dL    GFR Non-African American >60 mL/min/1.73    Comment: Chronic Kidney Disease: less than 60 ml/min/1.73 sq. m.         Kidney Failure: less than 15 ml/min/1.73 sq.m. Results valid for patients 18 years and older.         GFR African American >60    Calcium 7.9Low  mg/dL    Total Protein 5.2Low  g/dL    Alb 2.7Low  g/dL    Total Bilirubin 0.5 mg/dL    Alkaline Phosphatase 90 U/L    ALT 24 U/L    AST 23 U/L   Phosphorus [[id number]] Collected: 06/06/19 0548   Updated: 06/06/19 0717    Specimen Source: Blood     Phosphorus 2.6 mg/dL   Magnesium [[id number]] Collected: 06/06/19 0548   Updated: 06/06/19 0716    Specimen Type: Blood     Magnesium 1.8 mg/dL   Culture blood #2 [[id number]] Collected: 06/04/19 2230   Updated: 06/05/19 2335    Specimen Source: Blood     Culture, Blood 2 24 Hours- no growth   Narrative:     Source: BLOOD       Site:              Culture blood #1 [[id number]] Collected: 06/04/19 2240   Updated: 06/05/19 2331    Specimen Type: Blood     Blood Culture, Routine 24 Hours- no growth   Narrative:     Source: BLOOD       Site: Blood&Blood                   XR TIBIA FIBULA LEFT (2 VIEWS)   Final Result   1. No acute fracture identified. If there is persistent clinical pain   or symptomatology, a return to medical attention within 2-7 days and   further imaging is recommended. .   2. No cortical destruction to suggest osteomyelitis. If there is   clinical concern for osteomyelitis, a triple phase nuclear medicine   bone scan would be recommended   3. Soft tissue swelling concerning for possible edema or cellulitis. 4. Moderately severe medial with mild lateral and moderate   patellofemoral tricompartmental osteoarthritis. 5. Moderate naviculocuneiform and mild tibiotalar osteoarthritis. XR CHEST PORTABLE   Final Result   1. Suspected underlying mild central pulmonary vascular congestion. 2. Vascular calcifications thoracic aorta. XR CHEST PORTABLE   Final Result      Interstitial prominence in perihilar and infrahilar location could   suggest peribronchial inflammatory changes or pulmonary vascular   congestion. Comment correlation recommended. Short-term follow-up may   be helpful for further evaluation. Prior to Admission medications    Medication Sig Start Date End Date Taking? Authorizing Provider   fentaNYL (DURAGESIC) 25 MCG/HR Place 1 patch onto the skin every 72 hours    Historical Provider, MD   butenafine (LOTRIMIN ULTRA) 1 % CREA Apply BID , both legs , X 1 month 9/28/16   Lincoln Cardoso MD   Liraglutide (VICTOZA SC) Inject into the skin    Historical Provider, MD   ROPINIRole HCl (REQUIP PO) Take 1 mg by mouth    Historical Provider, MD   HYDROcodone-acetaminophen (NORCO) 5-325 MG per tablet Take 1 tablet by mouth every 6 hours as needed for Pain. Historical Provider, MD   therapeutic multivitamin-minerals (THERAGRAN-M) tablet Take 1 tablet by mouth daily.       Historical Provider, MD   potassium chloride (MICRO-K) 10 MEQ CR capsule Take 20 mEq by mouth daily as needed. Historical Provider, MD   furosemide (LASIX) 20 MG tablet   Take 40 mg by mouth daily as needed     Historical Provider, MD   ferrous sulfate 325 (65 FE) MG tablet Take 325 mg by mouth daily (with breakfast). Historical Provider, MD   Gabapentin (NEURONTIN PO)   Take 300 mg by mouth 3 times daily     Historical Provider, MD   Cyclobenzaprine HCl (FLEXERIL PO) Take 10 mg by mouth 2 times daily.     Historical Provider, MD        Current Facility-Administered Medications   Medication Dose Route Frequency Provider Last Rate Last Dose    [START ON 6/7/2019] ferrous sulfate tablet 325 mg  325 mg Oral Daily with breakfast Luna Kitchen MD        rOPINIRole (REQUIP) tablet 1 mg  1 mg Oral BID Luna Kitchen MD        HYDROmorphone (DILAUDID) injection 1 mg  1 mg Intravenous Q4H PRN Luna Kitchen MD   1 mg at 06/06/19 2018    norepinephrine (LEVOPHED) 16 mg in dextrose 5% 250 mL infusion  2 mcg/min Intravenous Continuous [employer] DO   Stopped at 06/06/19 1924    ondansetron (ZOFRAN) injection 4 mg  4 mg Intravenous Q6H PRN Nitish Richardson DO   4 mg at 06/05/19 1053    enoxaparin (LOVENOX) injection 40 mg  40 mg Subcutaneous Daily Nitish Richardson DO   40 mg at 06/06/19 1119    bisacodyl (DULCOLAX) suppository 10 mg  10 mg Rectal Daily PRN Nitish Richardson DO        piperacillin-tazobactam (ZOSYN) 3.375 g in dextrose 5 % 100 mL IVPB (mini-bag)  3.375 g Intravenous Q8H Nitish Richardson DO 25 mL/hr at 06/06/19 1850 3.375 g at 06/06/19 1850    vancomycin 1.5 g in dextrose 5% 300 mL IVPB  1,500 mg Intravenous Q12H Zac Sheikh MD   Stopped at 06/06/19 1429    sodium chloride flush 0.9 % injection 10 mL  10 mL Intravenous 2 times per day Luna Kitchen MD   10 mL at 06/06/19 2018    sodium chloride flush 0.9 % injection 10 mL  10 mL Intravenous PRN Luna Kitchen MD   10 mL at 06/06/19 2018    magnesium hydroxide (MILK OF MAGNESIA) 400 MG/5ML suspension 30 mL  30 mL Oral Daily PRN Mary Barreto MD        pantoprazole (PROTONIX) injection 40 mg  40 mg Intravenous Daily Mary Barreto MD   40 mg at 06/06/19 1119    And    sodium chloride (PF) 0.9 % injection 10 mL  10 mL Intravenous Daily Mary Barreto MD   10 mL at 06/06/19 1119    acetaminophen (TYLENOL) tablet 650 mg  650 mg Oral Q6H PRN Mary Barreto MD   650 mg at 06/05/19 1942           Problem list:    Active Problems:    Sepsis ([address]    Severe sepsis ([address]    Septic shock ([facility] [address]  Resolved Problems:    * No resolved hospital problems. *      Assessment:    1. Sepsis with septic shock due to left leg cellulitis     2. Cellulitis left leg     3. Diabetes mellitus type II, uncontrolled     4. Chronic back pain        Plan:    1. Continue Zosyn    2. Continue vancomycin    3. Continue SSI    4.  Continue pain management      Claudean Bread D.O., Jacobo Benavides  8:39 PM  6/6/2019

## 2019-06-07 NOTE — PROGRESS NOTES
cortical destruction to suggest osteomyelitis. If there is  clinical concern for osteomyelitis, a triple phase nuclear medicine  bone scan would be recommended  3. Soft tissue swelling concerning for possible edema or cellulitis. 4. Moderately severe medial with mild lateral and moderate  patellofemoral tricompartmental osteoarthritis. 5. Moderate naviculocuneiform and mild tibiotalar osteoarthritis. 6/4 CXR read as Suspected underlying mild central pulmonary vascular congestion.   2. Vascular calcifications thoracic aorta    Assessment:  · 63/F presenting with confusion and fever, admitted to MICU for management of septic shock secondary to cellulitis of left lower extremity  · Empiric vancomycin and zosyn, ID following  · Clinical pharmacist consulted to dose vancomycin; goal trough 15 - 20 mcg/mL; trough 12.7 mcg/mL before 4th dose, ok for indication of cellulitis    Plan:  · Vancomycin 1500 mg IV q12 hr  · Pharmacist will follow and monitor/adjust dosing as necessary    Jasbir Landin, PharmD, [facility], [address] 6/7/2019 2:28 PM

## 2019-06-07 NOTE — PLAN OF CARE
Problem: Pain:  Goal: Control of acute pain  Description  Control of acute pain  6/7/2019 1912 by Naima Dunn RN  Outcome: Met This Shift     Problem: Falls - Risk of:  Goal: Will remain free from falls  Description  Will remain free from falls  6/7/2019 1912 by Naima Dunn RN  Outcome: Met This Shift

## 2019-06-07 NOTE — PROGRESS NOTES
[address] [facility]   Department of Internal Medicine   Internal Medicine Residency  MICU Progress Note    Patient:  Coby Sanchez 59 y.o. female   MRN: [id number]       Date of Service: 2019    Allergy: Patient has no known allergies. Subjective     Patient was seen and examined in AM. Patient states feels better. No complaints other than LLE pain and chronic back and b/l knee pain. 24 hour change:   - Off pressor  - Afebrile  - Switched from Zosyn and cefepime per ID    Objective     TEMPERATURE:  Current - Temp: 98.4 °F (36.9 °C); Max - Temp  Av.8 °F (37.1 °C)  Min: 98.2 °F (36.8 °C)  Max: 99.6 °F (37.6 °C)  RESPIRATIONS RANGE: Resp  Av.9  Min: 10  Max: 26  PULSE RANGE: Pulse  Av.3  Min: 76  Max: 89  BLOOD PRESSURE RANGE:  Systolic (09WYD), KMB:564 , Min:95 , XFK:262   ; Diastolic (70TVH), YIZ:49, Min:70, Max:94    PULSE OXIMETRY RANGE: SpO2  Av.9 %  Min: 94 %  Max: 100 %    I & O - 24hr:    Intake/Output Summary (Last 24 hours) at 2019 1030  Last data filed at 2019 0600  Gross per 24 hour   Intake 2931 ml   Output 1595 ml   Net 1336 ml     I/O last 3 completed shifts: In: 2931 [P.O.:360; I.V.:2571]  Out: 1965 [Urine:1965] No intake/output data recorded. Weight change:     Physical Exam:  General Appearance:    Alert, awake, cooperative, no acute distress. HEENT:    NC/AT, mucous membranes are moist   Neck:   Supple, no jugular venous distention. Resp:     CTAB, No wheezes, No rhonchi, no use of accessory muscles   Heart:    RRR, S1 and S2 normal, no murmur, rub or gallop.     Abdomen:     Soft, non-tender, non-distended with normal bowel sounds   Extremities:   Atraumatic, no cyanosis or edema   Pulses:  Radial and pedal pulses are intact bilaterally   Neurologic:   AAOx3, No focal motor deficit       Medications     Continuous Infusions:    Scheduled Meds:   cefepime  2 g Intravenous Q12H    magnesium sulfate  2 g Intravenous Once    potassium phosphate IVPB  15 mmol Intravenous Once    ferrous sulfate  325 mg Oral Daily with breakfast    rOPINIRole  1 mg Oral BID    enoxaparin  40 mg Subcutaneous Daily    vancomycin  1,500 mg Intravenous Q12H    sodium chloride flush  10 mL Intravenous 2 times per day    pantoprazole  40 mg Intravenous Daily    And    sodium chloride (PF)  10 mL Intravenous Daily     PRN Meds: HYDROmorphone, ondansetron, bisacodyl, sodium chloride flush, magnesium hydroxide, acetaminophen  Nutrition:   General diet    Labs and Imaging Studies     CBC:   Recent Labs     06/04/19  2240 06/05/19  1345 06/06/19  0548 06/07/19  0425   WBC 24.1*  --  16.7* 11.2   HGB 9.3*  --  8.3* 7.8*   HCT 32.9* 28.9* 29.7* 27.9*   MCV 70.0*  --  69.6* 70.1*     --  120* 129*       BMP:    Recent Labs     06/04/19  2240 06/06/19  0548 06/07/19  0425    139 135   K 3.9 2.9* 3.7    106 103   CO2 23 24 25   BUN 20 13 12   CREATININE 1.0 0.6 0.6   GLUCOSE 111* 119* 109*       LIVER PROFILE:   Recent Labs     06/04/19  2240 06/06/19  0548 06/07/19  0425   AST 21 23 17   ALT 16 24 21   LIPASE 20  --   --    BILITOT 0.6 0.5 0.3   ALKPHOS 118* 90 88       PT/INR:   No results for input(s): PROTIME, INR in the last 72 hours. APTT:   No results for input(s): APTT in the last 72 hours.     Fasting Lipid Panel:    Lab Results   Component Value Date    CHOL 148 03/06/2019    TRIG 65 03/06/2019    HDL 54 03/06/2019       Cardiac Enzymes:    Lab Results   Component Value Date    TROPONINI <0.01 06/06/2019    TROPONINI 0.02 06/04/2019       Notable Cultures:      Blood cultures   Blood Culture, Routine   Date Value Ref Range Status   06/04/2019 24 Hours- no growth  Preliminary     Respiratory cultures No results found for: RESPCULTURE   Gram Stain Result   Date Value Ref Range Status   07/20/2015   Final    Gram stain performed from tissue touch prep  Polymorphonuclear leukocytes not seen  Epithelial cells not seen  No organisms seen       Urine   Urine Culture, Routine   Date Value Ref Range Status   06/05/2019 Growth not present  Final     Legionella No results found for: LABLEGI  C Diff PCR No results found for: CDIFPCR  Wound culture/abscess: No results for input(s): WNDABS in the last 72 hours. Tip culture:No results for input(s): CXCATHTIP in the last 72 hours. Antibiotic  Days  Day started   Zosyn D/c'd 6/5   Vancomycin 3 6/5   Cefepime  2   6/6       Oxygen: Additional Respiratory  Assessments  Pulse: 77  Resp: 20  SpO2: 97 %  Oral Care: Mouthwash     Nasal cannula L/min  2L   Face mask %     Reservoirs mask %         Lines:  Site  Day  Date inserted     TLC  RIJ  3  6/5     PICC              Arterial line              Peripheral line              HD cath            Urethral Catheter Non-latex 16 fr-Output (mL): 50 mL    Imaging Studies:    XR tibia 6/5  1. No acute fracture identified. If there is persistent clinical pain or symptomatology, a return to medical attention within 2-7 days and further imaging is recommended. .  2. No cortical destruction to suggest osteomyelitis. If there is  clinical concern for osteomyelitis, a triple phase nuclear medicine bone scan would be recommended  3. Soft tissue swelling concerning for possible edema or cellulitis. 4. Moderately severe medial with mild lateral and moderate  patellofemoral tricompartmental osteoarthritis. 5. Moderate naviculocuneiform and mild tibiotalar osteoarthritis. CXR 6/5/19  1. Suspected underlying mild central pulmonary vascular congestion. 2. Vascular calcifications thoracic aorta.     Resident's Assessment and Plan     <Neurologic>  Acute encephalopathy, likely 2/2 metabolic from sepsis- improved  - AAOx4     <Cardiovascular>  Septic shock likely 2/2 JAZZ cellulitis   - Received sepsis fluid resuscitation  - Off pressor  - Blood cx negative urine cx negative, f/u mrsa nares  - Switched from Zosyn and cefepime and cont vancomycin per ID    <Infectious Disease>  JAZZ

## 2019-06-08 PROBLEM — L03.116 CELLULITIS OF LEFT LOWER LEG: Status: ACTIVE | Noted: 2019-06-08

## 2019-06-08 PROBLEM — E87.6 HYPOKALEMIA: Status: ACTIVE | Noted: 2019-06-08

## 2019-06-08 LAB
ALBUMIN SERPL-MCNC: 2.6 G/DL (ref 3.5–5.2)
ALP BLD-CCNC: 88 U/L (ref 35–104)
ALT SERPL-CCNC: 18 U/L (ref 0–32)
ANION GAP SERPL CALCULATED.3IONS-SCNC: 11 MMOL/L (ref 7–16)
ANISOCYTOSIS: ABNORMAL
AST SERPL-CCNC: 14 U/L (ref 0–31)
BASOPHILS ABSOLUTE: 0 E9/L (ref 0–0.2)
BASOPHILS RELATIVE PERCENT: 0.4 % (ref 0–2)
BILIRUB SERPL-MCNC: 0.2 MG/DL (ref 0–1.2)
BUN BLDV-MCNC: 11 MG/DL (ref 8–23)
CALCIUM SERPL-MCNC: 8.1 MG/DL (ref 8.6–10.2)
CHLORIDE BLD-SCNC: 103 MMOL/L (ref 98–107)
CO2: 27 MMOL/L (ref 22–29)
CREAT SERPL-MCNC: 0.5 MG/DL (ref 0.5–1)
EOSINOPHILS ABSOLUTE: 0.18 E9/L (ref 0.05–0.5)
EOSINOPHILS RELATIVE PERCENT: 2.6 % (ref 0–6)
GFR AFRICAN AMERICAN: >60
GFR NON-AFRICAN AMERICAN: >60 ML/MIN/1.73
GLUCOSE BLD-MCNC: 166 MG/DL (ref 74–99)
HCT VFR BLD CALC: 25.8 % (ref 34–48)
HEMOGLOBIN: 7.3 G/DL (ref 11.5–15.5)
HYPOCHROMIA: ABNORMAL
LYMPHOCYTES ABSOLUTE: 0.28 E9/L (ref 1.5–4)
LYMPHOCYTES RELATIVE PERCENT: 4.3 % (ref 20–42)
MAGNESIUM: 1.7 MG/DL (ref 1.6–2.6)
MCH RBC QN AUTO: 19.4 PG (ref 26–35)
MCHC RBC AUTO-ENTMCNC: 28.3 % (ref 32–34.5)
MCV RBC AUTO: 68.6 FL (ref 80–99.9)
MONOCYTES ABSOLUTE: 0.28 E9/L (ref 0.1–0.95)
MONOCYTES RELATIVE PERCENT: 4.3 % (ref 2–12)
MRSA CULTURE ONLY: NORMAL
NEUTROPHILS ABSOLUTE: 6.14 E9/L (ref 1.8–7.3)
NEUTROPHILS RELATIVE PERCENT: 88.7 % (ref 43–80)
OVALOCYTES: ABNORMAL
PDW BLD-RTO: 21.3 FL (ref 11.5–15)
PHOSPHORUS: 2.8 MG/DL (ref 2.5–4.5)
PLATELET # BLD: 121 E9/L (ref 130–450)
PMV BLD AUTO: ABNORMAL FL (ref 7–12)
POIKILOCYTES: ABNORMAL
POLYCHROMASIA: ABNORMAL
POTASSIUM REFLEX MAGNESIUM: 3.3 MMOL/L (ref 3.5–5)
RBC # BLD: 3.76 E12/L (ref 3.5–5.5)
SCHISTOCYTES: ABNORMAL
SODIUM BLD-SCNC: 141 MMOL/L (ref 132–146)
TARGET CELLS: ABNORMAL
TOTAL PROTEIN: 5.3 G/DL (ref 6.4–8.3)
WBC # BLD: 6.9 E9/L (ref 4.5–11.5)

## 2019-06-08 PROCEDURE — 6360000002 HC RX W HCPCS: Performed by: INTERNAL MEDICINE

## 2019-06-08 PROCEDURE — 84100 ASSAY OF PHOSPHORUS: CPT

## 2019-06-08 PROCEDURE — 85025 COMPLETE CBC W/AUTO DIFF WBC: CPT

## 2019-06-08 PROCEDURE — 99253 IP/OBS CNSLTJ NEW/EST LOW 45: CPT | Performed by: SURGERY

## 2019-06-08 PROCEDURE — 6370000000 HC RX 637 (ALT 250 FOR IP): Performed by: INTERNAL MEDICINE

## 2019-06-08 PROCEDURE — 2580000003 HC RX 258: Performed by: INTERNAL MEDICINE

## 2019-06-08 PROCEDURE — 83735 ASSAY OF MAGNESIUM: CPT

## 2019-06-08 PROCEDURE — 80053 COMPREHEN METABOLIC PANEL: CPT

## 2019-06-08 PROCEDURE — 99232 SBSQ HOSP IP/OBS MODERATE 35: CPT | Performed by: SURGERY

## 2019-06-08 PROCEDURE — 36415 COLL VENOUS BLD VENIPUNCTURE: CPT

## 2019-06-08 PROCEDURE — 2060000000 HC ICU INTERMEDIATE R&B

## 2019-06-08 PROCEDURE — 36592 COLLECT BLOOD FROM PICC: CPT

## 2019-06-08 RX ORDER — OXYCODONE HYDROCHLORIDE AND ACETAMINOPHEN 5; 325 MG/1; MG/1
1 TABLET ORAL EVERY 4 HOURS PRN
Status: DISCONTINUED | OUTPATIENT
Start: 2019-06-08 | End: 2019-06-12 | Stop reason: HOSPADM

## 2019-06-08 RX ORDER — MAGNESIUM SULFATE IN WATER 40 MG/ML
2 INJECTION, SOLUTION INTRAVENOUS ONCE
Status: COMPLETED | OUTPATIENT
Start: 2019-06-08 | End: 2019-06-08

## 2019-06-08 RX ORDER — OXYCODONE HYDROCHLORIDE AND ACETAMINOPHEN 5; 325 MG/1; MG/1
2 TABLET ORAL EVERY 4 HOURS PRN
Status: DISCONTINUED | OUTPATIENT
Start: 2019-06-08 | End: 2019-06-12 | Stop reason: HOSPADM

## 2019-06-08 RX ORDER — POTASSIUM CHLORIDE 29.8 MG/ML
20 INJECTION INTRAVENOUS
Status: COMPLETED | OUTPATIENT
Start: 2019-06-08 | End: 2019-06-09

## 2019-06-08 RX ORDER — POTASSIUM CHLORIDE 20 MEQ/1
40 TABLET, EXTENDED RELEASE ORAL ONCE
Status: DISCONTINUED | OUTPATIENT
Start: 2019-06-08 | End: 2019-06-08

## 2019-06-08 RX ADMIN — HYDROMORPHONE HYDROCHLORIDE 0.5 MG: 1 INJECTION, SOLUTION INTRAMUSCULAR; INTRAVENOUS; SUBCUTANEOUS at 23:26

## 2019-06-08 RX ADMIN — Medication 10 ML: at 09:47

## 2019-06-08 RX ADMIN — HYDROMORPHONE HYDROCHLORIDE 1 MG: 1 INJECTION, SOLUTION INTRAMUSCULAR; INTRAVENOUS; SUBCUTANEOUS at 03:01

## 2019-06-08 RX ADMIN — ROPINIROLE HYDROCHLORIDE 1 MG: 1 TABLET, FILM COATED ORAL at 09:47

## 2019-06-08 RX ADMIN — HYDROMORPHONE HYDROCHLORIDE 0.5 MG: 1 INJECTION, SOLUTION INTRAMUSCULAR; INTRAVENOUS; SUBCUTANEOUS at 16:03

## 2019-06-08 RX ADMIN — POTASSIUM CHLORIDE 20 MEQ: 29.8 INJECTION, SOLUTION INTRAVENOUS at 23:27

## 2019-06-08 RX ADMIN — ENOXAPARIN SODIUM 40 MG: 40 INJECTION SUBCUTANEOUS at 09:47

## 2019-06-08 RX ADMIN — MAGNESIUM SULFATE HEPTAHYDRATE 2 G: 40 INJECTION, SOLUTION INTRAVENOUS at 19:55

## 2019-06-08 RX ADMIN — CEFEPIME 2 G: 2 INJECTION, POWDER, FOR SOLUTION INTRAVENOUS at 09:46

## 2019-06-08 RX ADMIN — Medication 1.5 G: at 11:39

## 2019-06-08 RX ADMIN — ROPINIROLE HYDROCHLORIDE 1 MG: 1 TABLET, FILM COATED ORAL at 19:54

## 2019-06-08 RX ADMIN — Medication 10 ML: at 19:55

## 2019-06-08 RX ADMIN — CEFEPIME 2 G: 2 INJECTION, POWDER, FOR SOLUTION INTRAVENOUS at 23:36

## 2019-06-08 RX ADMIN — Medication 10 ML: at 16:03

## 2019-06-08 RX ADMIN — FERROUS SULFATE TAB 325 MG (65 MG ELEMENTAL FE) 325 MG: 325 (65 FE) TAB at 09:50

## 2019-06-08 RX ADMIN — HYDROMORPHONE HYDROCHLORIDE 1 MG: 1 INJECTION, SOLUTION INTRAMUSCULAR; INTRAVENOUS; SUBCUTANEOUS at 11:54

## 2019-06-08 RX ADMIN — HYDROMORPHONE HYDROCHLORIDE 1 MG: 1 INJECTION, SOLUTION INTRAMUSCULAR; INTRAVENOUS; SUBCUTANEOUS at 07:01

## 2019-06-08 ASSESSMENT — PAIN DESCRIPTION - LOCATION
LOCATION: LEG;BACK;KNEE
LOCATION: LEG
LOCATION: LEG
LOCATION: BACK;LEG;KNEE
LOCATION: LEG;BACK;KNEE
LOCATION: LEG

## 2019-06-08 ASSESSMENT — PAIN DESCRIPTION - DESCRIPTORS
DESCRIPTORS: ACHING;DISCOMFORT;NAGGING
DESCRIPTORS: DISCOMFORT;ACHING
DESCRIPTORS: BURNING;DISCOMFORT
DESCRIPTORS: DISCOMFORT;NAGGING
DESCRIPTORS: ACHING;DISCOMFORT;CRAMPING
DESCRIPTORS: BURNING;DISCOMFORT

## 2019-06-08 ASSESSMENT — PAIN DESCRIPTION - PAIN TYPE
TYPE: ACUTE PAIN;CHRONIC PAIN
TYPE: ACUTE PAIN
TYPE: CHRONIC PAIN;ACUTE PAIN
TYPE: ACUTE PAIN;CHRONIC PAIN
TYPE: ACUTE PAIN
TYPE: ACUTE PAIN

## 2019-06-08 ASSESSMENT — PAIN SCALES - GENERAL
PAINLEVEL_OUTOF10: 8
PAINLEVEL_OUTOF10: 8
PAINLEVEL_OUTOF10: 4
PAINLEVEL_OUTOF10: 7
PAINLEVEL_OUTOF10: 9
PAINLEVEL_OUTOF10: 8
PAINLEVEL_OUTOF10: 0
PAINLEVEL_OUTOF10: 0
PAINLEVEL_OUTOF10: 7
PAINLEVEL_OUTOF10: 4

## 2019-06-08 ASSESSMENT — PAIN DESCRIPTION - FREQUENCY
FREQUENCY: CONTINUOUS

## 2019-06-08 ASSESSMENT — PAIN DESCRIPTION - ONSET
ONSET: ON-GOING

## 2019-06-08 ASSESSMENT — PAIN DESCRIPTION - PROGRESSION
CLINICAL_PROGRESSION: GRADUALLY IMPROVING
CLINICAL_PROGRESSION: GRADUALLY IMPROVING

## 2019-06-08 ASSESSMENT — PAIN DESCRIPTION - ORIENTATION
ORIENTATION: RIGHT;LEFT
ORIENTATION: LEFT
ORIENTATION: RIGHT;LEFT
ORIENTATION: RIGHT;LEFT

## 2019-06-08 ASSESSMENT — PAIN - FUNCTIONAL ASSESSMENT
PAIN_FUNCTIONAL_ASSESSMENT: PREVENTS OR INTERFERES SOME ACTIVE ACTIVITIES AND ADLS
PAIN_FUNCTIONAL_ASSESSMENT: PREVENTS OR INTERFERES SOME ACTIVE ACTIVITIES AND ADLS
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
## 2019-06-08 NOTE — PROGRESS NOTES
[facility]  ATTENDING PHYSICIAN PROGRESS NOTE     I have examined the patient and  reviewed the record. I have reviewed all relevant labs and imaging data. The following summarizes my clinical findings and independent assessment. CC: sepsis    S. Pt was transferred out of ICU  She is doing better    O.  Vitals:    06/08/19 0826   BP: (!) 154/68   Pulse: 92   Resp: 16   Temp: 99.3 °F (37.4 °C)   SpO2: 94%   GCS 15  Awake and alert x 3  s1s2  abd soft nt nd  Lower extremities--  LLE--cellulitis extensive, no fluctuance, purplish in color, 1 area of wound       ASSESSMENT:  Active Problems:    Sepsis (HCC)    Severe sepsis (HCC)    Septic shock (HCC)  Resolved Problems:    * No resolved hospital problems.  *       PLAN:  Septic shock Resolved  Acute encephalopathy resolved  Wbc normal     Appears to be responding to abx  Keep LLE elevated  No surgical intervention    DVT Proph: PAULINA/ lovenox Vernel Hatchet, MD, FACS  6/8/2019  8:34 AM

## 2019-06-08 NOTE — PROGRESS NOTES
Pulmonary [facility]                             Pulmonary Consult/Progress Note :        Subjective     Mild SOB  With no cough ,no CP   Still with  LLE pain and chronic back and b/l knee pain. - Afebrile  - Switched from Zosyn and cefepime per ID    Objective     Vitals:    06/07/19 2000   BP: 132/62   Pulse: 99   Resp: 16   Temp: 97.3 °F (36.3 °C)   SpO2: 95%       Physical Exam:  General Appearance:    Alert, awake, cooperative, no acute distress. HEENT:    NC/AT, mucous membranes are moist   Neck:   Supple, no jugular venous distention. Resp:     CTAB, No wheezes, No rhonchi, no use of accessory muscles   Heart:    RRR, S1 and S2 normal, no murmur, rub or gallop. Abdomen:     Soft, non-tender, non-distended with normal bowel sounds   Extremities:    Atraumatic, no cyanosis or edema. LLE withs welling and pain and tenderness    Pulses:  Radial and pedal pulses are intact bilaterally   Neurologic:   AAOx3, No focal motor deficit       Medications     Continuous Infusions:    Scheduled Meds:   cefepime  2 g Intravenous Q12H    ferrous sulfate  325 mg Oral Daily with breakfast    rOPINIRole  1 mg Oral BID    enoxaparin  40 mg Subcutaneous Daily    vancomycin  1,500 mg Intravenous Q12H    sodium chloride flush  10 mL Intravenous 2 times per day     PRN Meds: HYDROmorphone, ondansetron, bisacodyl, sodium chloride flush, magnesium hydroxide, acetaminophen  Nutrition:   General diet    Labs and Imaging Studies     CBC:   Recent Labs     06/05/19  1345 06/06/19  0548 06/07/19  0425   WBC  --  16.7* 11.2   HGB  --  8.3* 7.8*   HCT 28.9* 29.7* 27.9*   MCV  --  69.6* 70.1*   PLT  --  120* 129*       BMP:    Recent Labs     06/06/19  0548 06/07/19  0425    135   K 2.9* 3.7    103   CO2 24 25   BUN 13 12   CREATININE 0.6 0.6   GLUCOSE 119* 109*       LIVER PROFILE:   Recent Labs     06/06/19  0548 06/07/19  0425   AST 23 17   ALT 24 21   BILITOT 0.5 0.3 ALKPHOS 90 88       PT/INR:   No results for input(s): PROTIME, INR in the last 72 hours. APTT:   No results for input(s): APTT in the last 72 hours.     Fasting Lipid Panel:    Lab Results   Component Value Date    CHOL 148 03/06/2019    TRIG 65 03/06/2019    HDL 54 03/06/2019       Cardiac Enzymes:    Lab Results   Component Value Date    TROPONINI <0.01 06/06/2019    TROPONINI 0.02 06/04/2019     -Possible TOBIAS   -JAZZ cellulitis  Restless leg syndrome    Plan     Resume home ropinirole 1 mg BID  Sleep study as OP if not done  No active Pulmonary issues ,please call if   needed  Remove central line before discharged and if   any issues let us know    Azeb Caballero

## 2019-06-08 NOTE — PROGRESS NOTES
CC- lle cellulitis - improving     Subjective: The patient is awake and alert. Sitting up in the chair. Cellulitis improving on ATB. Tolerating medications. Reports no side effects. Afebrile. No respiratory complaints. 10 ROS otherwise negative unless otherwise specified above. Objective:    Vitals:    06/08/19 1152   BP: (!) 140/70   Pulse: 82   Resp: 16   Temp: 99.1 °F (37.3 °C)   SpO2:        General Appearance:    Awake, alert , no acute distress. HEENT:    Normocephalic,PERRL,neck supple, no JVD, mucosa moist, no thrush   Lungs:     Clear to auscultation bilaterally, no wheeze , crackles   Heart:    Regular rate and rhythm, no murmur   Abdomen:     Soft, non-tender, not distended  bowel sounds present,     Extremities:   LLE redness with warmth up to midcalf with scabbed wound in the middle   Pulses:   2+ and symmetric all extremities   Skin:   Skin color, texture, turgor normal, no rashes or lesions         CBC+dif:  Reviewed and trend followed     Radiology:  Noted     Microbiology:  Pending     Assessment:  · LLE cellulitis with chronic wound  · Possible aspiration pnuemonia  · Severe sepsis  · Lactic acidosis- resolved     Plan:    · Cont iv vancomycin PTD and cefepime  · Can switch to oral options IN 48 hours -Levaquin and doxycycline  · F/U [facility] - pending negative   · Monitor labs      Electronically signed by HOLLY Marc NP on 6/8/2019 at 1:40 PM    I had a face-to-face encounter with the patient at the bedside. I agree with the nurse practitioner's note and assessment and plan as detailed above. Necessary editing and changes made to the note by myself.     Vinayak AMAYA

## 2019-06-08 NOTE — PROGRESS NOTES
and  further imaging is recommended. .  2. No cortical destruction to suggest osteomyelitis. If there is  clinical concern for osteomyelitis, a triple phase nuclear medicine  bone scan would be recommended  3. Soft tissue swelling concerning for possible edema or cellulitis. 4. Moderately severe medial with mild lateral and moderate  patellofemoral tricompartmental osteoarthritis. 5. Moderate naviculocuneiform and mild tibiotalar osteoarthritis. 6/4 CXR read as Suspected underlying mild central pulmonary vascular congestion.   2. Vascular calcifications thoracic aorta    Assessment:  · 63/F presenting with confusion and fever, admitted to MICU for management of septic shock secondary to cellulitis of left lower extremity  · Empiric vancomycin and zosyn, ID following  · Clinical pharmacist consulted to dose vancomycin; goal trough 15 - 20 mcg/mL; trough 12.7 mcg/mL before 4th dose, ok for indication of cellulitis    Plan:  · Continue Vancomycin 1500 mg IV q12 hr  · Pharmacist will follow and monitor/adjust dosing as necessary    Shant MasseyD-PGY1 6/8/2019 6:47 AM   P: [phone]

## 2019-06-08 NOTE — PROGRESS NOTES
aorta.      XR CHEST PORTABLE   Final Result      Interstitial prominence in perihilar and infrahilar location could   suggest peribronchial inflammatory changes or pulmonary vascular   congestion. Comment correlation recommended. Short-term follow-up may   be helpful for further evaluation. Assessment    Active Problems: Morbid obesity with BMI of 50.0-59.9, adult (HCC)    Sepsis ([address]    Severe sepsis ([address]    Septic shock (HCC)    Hypokalemia    Cellulitis of left lower leg  Resolved Problems:    * No resolved hospital problems.  *        Cont abx per ID  No intervention or further eval per Vsurg/Gsurg  Medications for other co morbidities cont as appropriate w dosage adjustments as necessary   PT/OT  DVT PPx  DC planning        Electronically signed by Harish Valdes MD on 6/8/2019 at 3:13 PM

## 2019-06-09 ENCOUNTER — APPOINTMENT (OUTPATIENT)
Dept: ULTRASOUND IMAGING | Age: 64
DRG: 871 | End: 2019-06-09
Payer: COMMERCIAL

## 2019-06-09 PROBLEM — D69.6 THROMBOCYTOPENIA (HCC): Status: ACTIVE | Noted: 2019-06-09

## 2019-06-09 PROBLEM — D50.9 MICROCYTIC ANEMIA: Status: ACTIVE | Noted: 2019-06-09

## 2019-06-09 LAB
ANION GAP SERPL CALCULATED.3IONS-SCNC: 6 MMOL/L (ref 7–16)
BLOOD CULTURE, ROUTINE: NORMAL
BUN BLDV-MCNC: 10 MG/DL (ref 8–23)
CALCIUM SERPL-MCNC: 8.1 MG/DL (ref 8.6–10.2)
CHLORIDE BLD-SCNC: 105 MMOL/L (ref 98–107)
CO2: 33 MMOL/L (ref 22–29)
CREAT SERPL-MCNC: 0.5 MG/DL (ref 0.5–1)
CULTURE, BLOOD 2: NORMAL
GFR AFRICAN AMERICAN: >60
GFR NON-AFRICAN AMERICAN: >60 ML/MIN/1.73
GLUCOSE BLD-MCNC: 142 MG/DL (ref 74–99)
HBA1C MFR BLD: 5.4 % (ref 4–5.6)
HCT VFR BLD CALC: 26 % (ref 34–48)
HEMOGLOBIN: 7.2 G/DL (ref 11.5–15.5)
MCH RBC QN AUTO: 19.3 PG (ref 26–35)
MCHC RBC AUTO-ENTMCNC: 27.7 % (ref 32–34.5)
MCV RBC AUTO: 69.5 FL (ref 80–99.9)
PDW BLD-RTO: 21.2 FL (ref 11.5–15)
PLATELET # BLD: 121 E9/L (ref 130–450)
PMV BLD AUTO: 11 FL (ref 7–12)
POTASSIUM SERPL-SCNC: 3.8 MMOL/L (ref 3.5–5)
PRO-BNP: 1316 PG/ML (ref 0–125)
PROCALCITONIN: 0.96 NG/ML (ref 0–0.08)
RBC # BLD: 3.74 E12/L (ref 3.5–5.5)
SODIUM BLD-SCNC: 144 MMOL/L (ref 132–146)
WBC # BLD: 5.4 E9/L (ref 4.5–11.5)

## 2019-06-09 PROCEDURE — 80048 BASIC METABOLIC PNL TOTAL CA: CPT

## 2019-06-09 PROCEDURE — 6360000002 HC RX W HCPCS: Performed by: INTERNAL MEDICINE

## 2019-06-09 PROCEDURE — 85027 COMPLETE CBC AUTOMATED: CPT

## 2019-06-09 PROCEDURE — 83880 ASSAY OF NATRIURETIC PEPTIDE: CPT

## 2019-06-09 PROCEDURE — 6370000000 HC RX 637 (ALT 250 FOR IP): Performed by: INTERNAL MEDICINE

## 2019-06-09 PROCEDURE — 2060000000 HC ICU INTERMEDIATE R&B

## 2019-06-09 PROCEDURE — 2580000003 HC RX 258: Performed by: INTERNAL MEDICINE

## 2019-06-09 PROCEDURE — 83036 HEMOGLOBIN GLYCOSYLATED A1C: CPT

## 2019-06-09 PROCEDURE — 36415 COLL VENOUS BLD VENIPUNCTURE: CPT

## 2019-06-09 PROCEDURE — 84145 PROCALCITONIN (PCT): CPT

## 2019-06-09 PROCEDURE — 93971 EXTREMITY STUDY: CPT

## 2019-06-09 RX ORDER — DOXYCYCLINE HYCLATE 100 MG/1
100 CAPSULE ORAL EVERY 12 HOURS SCHEDULED
Status: DISCONTINUED | OUTPATIENT
Start: 2019-06-09 | End: 2019-06-10

## 2019-06-09 RX ORDER — FUROSEMIDE 10 MG/ML
20 INJECTION INTRAMUSCULAR; INTRAVENOUS ONCE
Status: COMPLETED | OUTPATIENT
Start: 2019-06-09 | End: 2019-06-09

## 2019-06-09 RX ORDER — POTASSIUM CHLORIDE 29.8 MG/ML
20 INJECTION INTRAVENOUS ONCE
Status: DISCONTINUED | OUTPATIENT
Start: 2019-06-09 | End: 2019-06-12 | Stop reason: HOSPADM

## 2019-06-09 RX ORDER — SENNA AND DOCUSATE SODIUM 50; 8.6 MG/1; MG/1
2 TABLET, FILM COATED ORAL 2 TIMES DAILY PRN
Status: DISCONTINUED | OUTPATIENT
Start: 2019-06-09 | End: 2019-06-12 | Stop reason: HOSPADM

## 2019-06-09 RX ADMIN — HYDROMORPHONE HYDROCHLORIDE 0.5 MG: 1 INJECTION, SOLUTION INTRAMUSCULAR; INTRAVENOUS; SUBCUTANEOUS at 04:16

## 2019-06-09 RX ADMIN — Medication 10 ML: at 20:18

## 2019-06-09 RX ADMIN — HYDROMORPHONE HYDROCHLORIDE 0.5 MG: 1 INJECTION, SOLUTION INTRAMUSCULAR; INTRAVENOUS; SUBCUTANEOUS at 08:49

## 2019-06-09 RX ADMIN — POTASSIUM CHLORIDE 20 MEQ: 29.8 INJECTION, SOLUTION INTRAVENOUS at 01:28

## 2019-06-09 RX ADMIN — Medication 10 ML: at 13:40

## 2019-06-09 RX ADMIN — ROPINIROLE HYDROCHLORIDE 1 MG: 1 TABLET, FILM COATED ORAL at 20:17

## 2019-06-09 RX ADMIN — HYDROMORPHONE HYDROCHLORIDE 0.5 MG: 1 INJECTION, SOLUTION INTRAMUSCULAR; INTRAVENOUS; SUBCUTANEOUS at 13:39

## 2019-06-09 RX ADMIN — ROPINIROLE HYDROCHLORIDE 1 MG: 1 TABLET, FILM COATED ORAL at 08:49

## 2019-06-09 RX ADMIN — Medication 1.5 G: at 11:40

## 2019-06-09 RX ADMIN — DOXYCYCLINE HYCLATE 100 MG: 100 CAPSULE ORAL at 20:17

## 2019-06-09 RX ADMIN — HYDROMORPHONE HYDROCHLORIDE 0.5 MG: 1 INJECTION, SOLUTION INTRAMUSCULAR; INTRAVENOUS; SUBCUTANEOUS at 21:46

## 2019-06-09 RX ADMIN — FUROSEMIDE 20 MG: 10 INJECTION, SOLUTION INTRAMUSCULAR; INTRAVENOUS at 17:43

## 2019-06-09 RX ADMIN — ENOXAPARIN SODIUM 40 MG: 40 INJECTION SUBCUTANEOUS at 08:49

## 2019-06-09 RX ADMIN — CEFEPIME 2 G: 2 INJECTION, POWDER, FOR SOLUTION INTRAVENOUS at 10:34

## 2019-06-09 RX ADMIN — SENNOSIDES,DOCUSATE SODIUM 2 TABLET: 8.6; 5 TABLET, FILM COATED ORAL at 21:42

## 2019-06-09 RX ADMIN — Medication 10 ML: at 17:44

## 2019-06-09 RX ADMIN — CEFEPIME 2 G: 2 INJECTION, POWDER, FOR SOLUTION INTRAVENOUS at 20:16

## 2019-06-09 RX ADMIN — Medication 10 ML: at 10:35

## 2019-06-09 RX ADMIN — Medication 1.5 G: at 01:28

## 2019-06-09 RX ADMIN — OXYCODONE HYDROCHLORIDE AND ACETAMINOPHEN 2 TABLET: 5; 325 TABLET ORAL at 20:16

## 2019-06-09 RX ADMIN — Medication 10 ML: at 08:49

## 2019-06-09 RX ADMIN — HYDROMORPHONE HYDROCHLORIDE 0.5 MG: 1 INJECTION, SOLUTION INTRAMUSCULAR; INTRAVENOUS; SUBCUTANEOUS at 17:43

## 2019-06-09 RX ADMIN — FERROUS SULFATE TAB 325 MG (65 MG ELEMENTAL FE) 325 MG: 325 (65 FE) TAB at 08:49

## 2019-06-09 ASSESSMENT — PAIN SCALES - GENERAL
PAINLEVEL_OUTOF10: 0
PAINLEVEL_OUTOF10: 8
PAINLEVEL_OUTOF10: 10
PAINLEVEL_OUTOF10: 9
PAINLEVEL_OUTOF10: 6
PAINLEVEL_OUTOF10: 0
PAINLEVEL_OUTOF10: 8
PAINLEVEL_OUTOF10: 9
PAINLEVEL_OUTOF10: 0
PAINLEVEL_OUTOF10: 0

## 2019-06-09 ASSESSMENT — PAIN DESCRIPTION - LOCATION
LOCATION: LEG;BACK
LOCATION: BACK;LEG;KNEE
LOCATION: LEG

## 2019-06-09 ASSESSMENT — PAIN DESCRIPTION - FREQUENCY
FREQUENCY: CONTINUOUS

## 2019-06-09 ASSESSMENT — PAIN DESCRIPTION - PAIN TYPE
TYPE: ACUTE PAIN
TYPE: ACUTE PAIN;CHRONIC PAIN
TYPE: ACUTE PAIN
TYPE: ACUTE PAIN;CHRONIC PAIN
TYPE: ACUTE PAIN

## 2019-06-09 ASSESSMENT — PAIN DESCRIPTION - DESCRIPTORS
DESCRIPTORS: DISCOMFORT;CONSTANT
DESCRIPTORS: DISCOMFORT;CONSTANT
DESCRIPTORS: CONSTANT;DISCOMFORT
DESCRIPTORS: DISCOMFORT;ACHING;NAGGING
DESCRIPTORS: CONSTANT;DISCOMFORT

## 2019-06-09 ASSESSMENT — PAIN DESCRIPTION - ORIENTATION
ORIENTATION: LEFT
ORIENTATION: RIGHT;LEFT
ORIENTATION: LEFT

## 2019-06-09 ASSESSMENT — PAIN DESCRIPTION - ONSET
ONSET: ON-GOING
ONSET: ON-GOING

## 2019-06-09 ASSESSMENT — PAIN DESCRIPTION - PROGRESSION: CLINICAL_PROGRESSION: GRADUALLY IMPROVING

## 2019-06-09 NOTE — PROGRESS NOTES
Subjective:  Feeling worse No CP, SOB, F, V, D, P     Objective:    BP (!) 160/76 Comment: manual  Pulse 95   Temp 98.6 °F (37 °C) (Temporal)   Resp 20   Ht 5' 2\" (1.575 m)   Wt 275 lb 4.2 oz (124.9 kg)   SpO2 93%   BMI 50.35 kg/m²     24HR INTAKE/OUTPUT:      Intake/Output Summary (Last 24 hours) at 6/9/2019 0725  Last data filed at 6/9/2019 0658  Gross per 24 hour   Intake 990 ml   Output --   Net 990 ml     nad  Heart:  RRR, no murmurs, gallops, or rubs. Lungs:  CTA bilaterally, no wheeze, rales or rhonchi  Abd: bowel sounds present, nontender, nondistended, no masses  Extrem: +erythema and edema    Most Recent Labs  Lab Results   Component Value Date    WBC 5.4 06/09/2019    HGB 7.2 (L) 06/09/2019    HCT 26.0 (L) 06/09/2019     (L) 06/09/2019     06/09/2019    K 3.8 06/09/2019     06/09/2019    CREATININE 0.5 06/09/2019    BUN 10 06/09/2019    CO2 33 (H) 06/09/2019    GLUCOSE 142 (H) 06/09/2019    ALT 18 06/08/2019    AST 14 06/08/2019    TSH 1.160 02/09/2016    LABA1C 5.4 06/09/2019     Recent Labs     06/08/19  0450   MG 1.7     Lab Results   Component Value Date    CALCIUM 8.1 (L) 06/09/2019    PHOS 2.8 06/08/2019        XR TIBIA FIBULA LEFT (2 VIEWS)   Final Result   1. No acute fracture identified. If there is persistent clinical pain   or symptomatology, a return to medical attention within 2-7 days and   further imaging is recommended. .   2. No cortical destruction to suggest osteomyelitis. If there is   clinical concern for osteomyelitis, a triple phase nuclear medicine   bone scan would be recommended   3. Soft tissue swelling concerning for possible edema or cellulitis. 4. Moderately severe medial with mild lateral and moderate   patellofemoral tricompartmental osteoarthritis. 5. Moderate naviculocuneiform and mild tibiotalar osteoarthritis. XR CHEST PORTABLE   Final Result   1. Suspected underlying mild central pulmonary vascular congestion.    2. Vascular calcifications thoracic aorta. XR CHEST PORTABLE   Final Result      Interstitial prominence in perihilar and infrahilar location could   suggest peribronchial inflammatory changes or pulmonary vascular   congestion. Comment correlation recommended. Short-term follow-up may   be helpful for further evaluation. Assessment    Principal Problem:    Cellulitis of left lower leg  Active Problems: Morbid obesity with BMI of 50.0-59.9, adult (HCC)    Sepsis ([address]    Severe sepsis ([address]    Septic shock (HCC)    Hypokalemia    Microcytic anemia    Thrombocytopenia ([facility])  Resolved Problems:    * No resolved hospital problems.  *        Cont abx per ID  WBC improved  Cxs NGTD  BnP mildly elevated  Small dose Lasix IV  Repeat pro-calcitonin  No intervention or further eval per Vsurg/Gsurg  Medications for other co morbidities cont as appropriate w dosage adjustments as necessary   PT/OT  DVT PPx  DC planning        Electronically signed by Jatin Torres MD on 6/9/2019 at 7:25 AM

## 2019-06-09 NOTE — PROGRESS NOTES
Pharmacy Consultation Note  (Antibiotic Dosing and Monitoring)    Initial consult date: 6/5  Consulting physician: Dr. Gypsy Boeck  Drug(s): vancomycin  Indication: sepsis secondary to cellulitis    Yoselin Guzman is a 63/F patient who presented to [facility] after she was found in her car by her sister several hours after arriving home, shaking and confused. She was found to be hypotensive in ER with high grade fever, and open wound on her lower left extremity. She was given fluid bolus and empiric vancomycin and zosyn and transferred to ICU for management of sepsis secondary to cellulitis, ID consulted, per progress note also concern for possible aspiration pneumonia. Clinical pharmacist consulted to dose vancomycin      Age/  Gender Height Weight IBW Dosing weight  Allergy Information   63/F   62\"  111.6 kg  50.1 kg  77 kg  Patient has no known allergies. Other anti-infectives Start date Stop date   Zosyn 3.375 g IV q8hr  6/5                     Date  Tmax WBC BUN/CR CrCL Drug/Dose Time   Given Level(s)   (Time) Comments   6/4 102.6 24.1 20/1 70 ml/min Vancomycin 2000 mg IV x 1  2251     6/5 102.9    Vancomycin 1500 mg IV q12 hr  1449    2357  procalcitonin 9.13   6/6 99.2 16.7 13/0.6 > 100 ml/mi  vancomycin 1500 mg IV q12 hr  1200 12.7 mcg/mL  (1118)    6/7  98.9 11.2  12/0.6      vancomycin 1500 mg IV q12 hr  0004  1253  2326     6/8 99.3 6.9 11/0.5 >100 ml/min Vancomycin 1500 mg IV q12h 1139     6/9 -- 5.4 10/0.5 >100 ml/min Vancomycin 1250 mg IV q12h 0128             Intake/Output Summary (Last 24 hours) at 6/9/2019 0810  Last data filed at 6/9/2019 7934  Gross per 24 hour   Intake 990 ml   Output --   Net 990 ml       Average urine output: N/A    Cultures:    6/5 blood cultures no growth  6/5 urine culture no growth  6/5 mrsa nares screen sent    IV lines:  6/5 CVC TLC RIJ  6/5 peripheral right wrist    Radiology:  6/5 XR left tibia read as No acute fracture identified.  If there is persistent clinical pain  or symptomatology, a return to medical attention within 2-7 days and  further imaging is recommended. .  2. No cortical destruction to suggest osteomyelitis. If there is  clinical concern for osteomyelitis, a triple phase nuclear medicine  bone scan would be recommended  3. Soft tissue swelling concerning for possible edema or cellulitis. 4. Moderately severe medial with mild lateral and moderate  patellofemoral tricompartmental osteoarthritis. 5. Moderate naviculocuneiform and mild tibiotalar osteoarthritis. 6/4 CXR read as Suspected underlying mild central pulmonary vascular congestion.   2. Vascular calcifications thoracic aorta    Assessment:  · 63/F presenting with confusion and fever, admitted to MICU for management of septic shock secondary to cellulitis of left lower extremity  · Empiric vancomycin and zosyn, ID following  · Clinical pharmacist consulted to dose vancomycin; goal trough 15 - 20 mcg/mL; trough 12.7 mcg/mL before 4th dose, ok for indication of cellulitis  · Per ID, plan to continue ABX regimen for 48 more hours then may switch to Levaquin and Doxycycline    Plan:  · Continue Vancomycin 1500 mg IV q12 hr  · Pharmacist will follow and monitor/adjust dosing as necessary    Shant FloresD-PGY1 6/9/2019 8:10 AM   P: [phone]

## 2019-06-09 NOTE — PROGRESS NOTES
CC- lle cellulitis - improving     Subjective: The patient is awake and alert. Sitting up in the chair. Cellulitis is not improving on ATB - redness extending past lines of demarcation. Tolerating medications. Reports no side effects. Afebrile. No respiratory complaints. 10 ROS otherwise negative unless otherwise specified above. Objective:    Vitals:    06/09/19 0838   BP: (!) 140/68   Pulse: 88   Resp: 16   Temp: 98 °F (36.7 °C)   SpO2: 92%       General Appearance:    Awake, alert , no acute distress.    HEENT:    Normocephalic,PERRL,neck supple, no JVD, mucosa moist, no thrush   Lungs:     Clear to auscultation bilaterally, no wheeze , crackles   Heart:    Regular rate and rhythm, no murmur   Abdomen:     Soft, non-tender, not distended  bowel sounds present,     Extremities:   LLE redness with warmth up to midcalf with scabbed wound in the middle - NOT IMPROVING    Pulses:   2+ and symmetric all extremities   Skin:   Skin color, texture, turgor normal, no rashes or lesions  Right IJ TLC          CBC+dif:  Reviewed and trend followed     Radiology:  Noted     Microbiology:  Pending     Assessment:  · LLE cellulitis with chronic wound   · Possible aspiration pnuemonia  · Severe sepsis  · Lactic acidosis- resolved     Plan:    · DC iv vancomycin and continue cefepime  · Recommend d/c TLC when able - patient is hard stick  · F/U [facility] - pending negative   · Monitor labs      Electronically signed by HOLLY Roberts NP on 6/9/2019 at 11:58 AM

## 2019-06-10 PROBLEM — L97.921 ULCER OF LEFT LOWER LEG, LIMITED TO BREAKDOWN OF SKIN (HCC): Status: ACTIVE | Noted: 2019-06-10

## 2019-06-10 LAB
EKG ATRIAL RATE: 80 BPM
EKG P AXIS: 60 DEGREES
EKG P-R INTERVAL: 172 MS
EKG Q-T INTERVAL: 324 MS
EKG QRS DURATION: 70 MS
EKG QTC CALCULATION (BAZETT): 373 MS
EKG R AXIS: 7 DEGREES
EKG T AXIS: 29 DEGREES
EKG VENTRICULAR RATE: 80 BPM

## 2019-06-10 PROCEDURE — 6370000000 HC RX 637 (ALT 250 FOR IP): Performed by: INTERNAL MEDICINE

## 2019-06-10 PROCEDURE — 6360000002 HC RX W HCPCS: Performed by: INTERNAL MEDICINE

## 2019-06-10 PROCEDURE — 97530 THERAPEUTIC ACTIVITIES: CPT

## 2019-06-10 PROCEDURE — 6370000000 HC RX 637 (ALT 250 FOR IP): Performed by: SURGERY

## 2019-06-10 PROCEDURE — 2060000000 HC ICU INTERMEDIATE R&B

## 2019-06-10 PROCEDURE — 2580000003 HC RX 258: Performed by: INTERNAL MEDICINE

## 2019-06-10 PROCEDURE — 99232 SBSQ HOSP IP/OBS MODERATE 35: CPT | Performed by: SURGERY

## 2019-06-10 RX ORDER — PETROLATUM 42 G/100G
OINTMENT TOPICAL
Status: DISCONTINUED | OUTPATIENT
Start: 2019-06-10 | End: 2019-06-12

## 2019-06-10 RX ORDER — TERBINAFINE HYDROCHLORIDE 250 MG/1
250 TABLET ORAL DAILY
Status: DISCONTINUED | OUTPATIENT
Start: 2019-06-10 | End: 2019-06-12 | Stop reason: HOSPADM

## 2019-06-10 RX ORDER — LINEZOLID 600 MG/1
600 TABLET, FILM COATED ORAL EVERY 12 HOURS SCHEDULED
Status: DISCONTINUED | OUTPATIENT
Start: 2019-06-10 | End: 2019-06-12 | Stop reason: HOSPADM

## 2019-06-10 RX ADMIN — Medication 10 ML: at 14:09

## 2019-06-10 RX ADMIN — Medication 10 ML: at 12:28

## 2019-06-10 RX ADMIN — Medication 10 ML: at 09:44

## 2019-06-10 RX ADMIN — ENOXAPARIN SODIUM 40 MG: 40 INJECTION SUBCUTANEOUS at 09:45

## 2019-06-10 RX ADMIN — FERROUS SULFATE TAB 325 MG (65 MG ELEMENTAL FE) 325 MG: 325 (65 FE) TAB at 09:44

## 2019-06-10 RX ADMIN — ROPINIROLE HYDROCHLORIDE 1 MG: 1 TABLET, FILM COATED ORAL at 21:12

## 2019-06-10 RX ADMIN — HYDROMORPHONE HYDROCHLORIDE 0.5 MG: 1 INJECTION, SOLUTION INTRAMUSCULAR; INTRAVENOUS; SUBCUTANEOUS at 05:45

## 2019-06-10 RX ADMIN — Medication 10 ML: at 18:23

## 2019-06-10 RX ADMIN — ROPINIROLE HYDROCHLORIDE 1 MG: 1 TABLET, FILM COATED ORAL at 09:44

## 2019-06-10 RX ADMIN — Medication 10 ML: at 21:13

## 2019-06-10 RX ADMIN — DOXYCYCLINE HYCLATE 100 MG: 100 CAPSULE ORAL at 09:44

## 2019-06-10 RX ADMIN — HYDROMORPHONE HYDROCHLORIDE 0.5 MG: 1 INJECTION, SOLUTION INTRAMUSCULAR; INTRAVENOUS; SUBCUTANEOUS at 18:23

## 2019-06-10 RX ADMIN — CEFEPIME 2 G: 2 INJECTION, POWDER, FOR SOLUTION INTRAVENOUS at 21:12

## 2019-06-10 RX ADMIN — HYDROMORPHONE HYDROCHLORIDE 0.5 MG: 1 INJECTION, SOLUTION INTRAMUSCULAR; INTRAVENOUS; SUBCUTANEOUS at 09:45

## 2019-06-10 RX ADMIN — TERBINAFINE 250 MG: 250 TABLET ORAL at 21:12

## 2019-06-10 RX ADMIN — HYDROMORPHONE HYDROCHLORIDE 0.5 MG: 1 INJECTION, SOLUTION INTRAMUSCULAR; INTRAVENOUS; SUBCUTANEOUS at 23:31

## 2019-06-10 RX ADMIN — ONDANSETRON HYDROCHLORIDE 4 MG: 2 INJECTION, SOLUTION INTRAMUSCULAR; INTRAVENOUS at 12:28

## 2019-06-10 RX ADMIN — Medication: at 21:12

## 2019-06-10 RX ADMIN — CEFEPIME 2 G: 2 INJECTION, POWDER, FOR SOLUTION INTRAVENOUS at 09:44

## 2019-06-10 RX ADMIN — HYDROMORPHONE HYDROCHLORIDE 0.5 MG: 1 INJECTION, SOLUTION INTRAMUSCULAR; INTRAVENOUS; SUBCUTANEOUS at 01:51

## 2019-06-10 RX ADMIN — HYDROMORPHONE HYDROCHLORIDE 0.5 MG: 1 INJECTION, SOLUTION INTRAMUSCULAR; INTRAVENOUS; SUBCUTANEOUS at 14:09

## 2019-06-10 RX ADMIN — LINEZOLID 600 MG: 600 TABLET, FILM COATED ORAL at 21:12

## 2019-06-10 RX ADMIN — ONDANSETRON HYDROCHLORIDE 4 MG: 2 INJECTION, SOLUTION INTRAMUSCULAR; INTRAVENOUS at 05:45

## 2019-06-10 ASSESSMENT — PAIN DESCRIPTION - ORIENTATION
ORIENTATION: RIGHT;LEFT
ORIENTATION: RIGHT;LEFT;LOWER
ORIENTATION: LEFT;MID
ORIENTATION: RIGHT;LEFT
ORIENTATION: LEFT
ORIENTATION: LEFT
ORIENTATION: RIGHT;LEFT
ORIENTATION: RIGHT;LEFT

## 2019-06-10 ASSESSMENT — PAIN SCALES - GENERAL
PAINLEVEL_OUTOF10: 6
PAINLEVEL_OUTOF10: 9
PAINLEVEL_OUTOF10: 6
PAINLEVEL_OUTOF10: 0
PAINLEVEL_OUTOF10: 8
PAINLEVEL_OUTOF10: 7
PAINLEVEL_OUTOF10: 7
PAINLEVEL_OUTOF10: 6
PAINLEVEL_OUTOF10: 10
PAINLEVEL_OUTOF10: 5
PAINLEVEL_OUTOF10: 8
PAINLEVEL_OUTOF10: 9
PAINLEVEL_OUTOF10: 5

## 2019-06-10 ASSESSMENT — PAIN DESCRIPTION - FREQUENCY
FREQUENCY: CONTINUOUS

## 2019-06-10 ASSESSMENT — PAIN DESCRIPTION - LOCATION
LOCATION: BACK;LEG
LOCATION: LEG;BACK
LOCATION: LEG
LOCATION: BACK;KNEE;LEG
LOCATION: BACK;KNEE;LEG
LOCATION: BACK;LEG
LOCATION: BACK;LEG
LOCATION: LEG

## 2019-06-10 ASSESSMENT — PAIN DESCRIPTION - PAIN TYPE
TYPE: ACUTE PAIN;CHRONIC PAIN
TYPE: ACUTE PAIN
TYPE: ACUTE PAIN
TYPE: ACUTE PAIN;CHRONIC PAIN

## 2019-06-10 ASSESSMENT — PAIN DESCRIPTION - ONSET
ONSET: ON-GOING

## 2019-06-10 ASSESSMENT — PAIN DESCRIPTION - DESCRIPTORS
DESCRIPTORS: CONSTANT;DISCOMFORT;SORE
DESCRIPTORS: ACHING;CONSTANT;DISCOMFORT
DESCRIPTORS: ACHING;CONSTANT;DISCOMFORT;SORE
DESCRIPTORS: ACHING;CONSTANT;DISCOMFORT;SORE
DESCRIPTORS: DISCOMFORT;CONSTANT
DESCRIPTORS: DISCOMFORT;CONSTANT

## 2019-06-10 ASSESSMENT — PAIN DESCRIPTION - PROGRESSION
CLINICAL_PROGRESSION: NOT CHANGED

## 2019-06-10 NOTE — PROGRESS NOTES
OT BEDSIDE TREATMENT NOTE      Date:6/10/2019  Patient Name: Sharmin Ely  MRN: [id number]  : 1955  Room: [address] [place], [place] [address]     Evaluating OT: Anola Essex, OTR/L      AM-PAC Daily Activity Raw Score:   Recommended Adaptive Equipment: continue to assess; pt may benefit from shower chair, LB AE depending on progress      Diagnosis: Sepsis   Pertinent Medical History:  Anemia, arthritis, cellulitis, leg ulcer , B lymphedema, peripheral vision loss, stroke     Precautions:  Falls, , LLE wound    Per OT Eval:   Home Living: Pt lives with dtr  in a 2 story house with 2 step(s) to enter and 1 rail(s); bed/bath on 1st floor  Bathroom setup: Tub-Shower combo  Equipment owned: no DME     Prior Level of Function: IND with ADLs IND with IADLs; using no AD for ambulation. Pt reports having multiple fall recently d/t B knee pain. 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 Did not rate. Cognition:A&O: 3; Follows multi step directions              Memory: G              Sequencing: G              Problem solving: G              Judgement/safety: G    Functional Assessment:    Initial Eval Status  Date:  Treatment Status  Date:  6/10/19 Short Term Goals  Treatment frequency: 1-3x/week on PRN    Feeding IND  --     Grooming/Hygiene Mod I seated  Pt unable to tolerate standing at sink this date d/t pain in LLE, Progress towards standing at sink for grooming task for next session per goals  SBA  Standing at sink to wash/dry hands. IND   while standing at sink     UB Dressing Setup A  S/Setup  To don/doff [facility] gown seated at EOB. Mod I/IND  With item retrieval    LB Dressing Max A  Limited by pain and weakness; pt edu on possible need for LB AE   Max A   To don slip on sandals, limited by edema in B LE's. Pt assisted to adjust velcro shoes to to allow proper fit.   Mod I   with AE PRN   Bathing TBA  --     Toileting Mod A overall  Pt performed toilet transfer on low commode height, required Mod A x2 for transfer-edu pt on use of 3-1 over commode- RN notified to order for pt  Min A  For balance/safety while completing clothing management following toileting. Min cues for safety and technique required. Mod I  Including all clothing mgmt    Bed Mobility  Supine to sit: SBA  Sit to supine: NT  --     Functional Transfers Sit to stand: Mod Ax2 low commode height  CGA from EOB  Stand to sit: CGA  Sit<>Stand: SBA  Commode Transfer: SBA  Commode transfer completed from standard commode. Mod I/IND  From varying surfaces with G safety    Functional Mobility CGA with [employer]  Short distances in room/bathroom; fair- tolernace  Min A  Completed > household distances without device with assist required for balance/safety d/t occasional unsteadiness. Mod I/IND  with AD PNR   Balance Sitting:      Static: Sup    Dynamic:SUP  Standing: SBA  Sitting:      Static: Supervisoin    Dynamic:Supervision  Standing: SBA     Endurance/Activity Tolerance F-; limited by medical status, pain and weakness  Fair-  Limited by pain and edema in L LE.  G    during 15-20 min ADL task    Visual/  Perceptual Glasses: Yes; but does not need them at all times; grossly [facility]          UE strengthening     See UE Assessment Below          Comments: Upon arrival pt sitting on bedside commode. Pt educated on transfer/mobility safety, adaptive techniques for ADL tasks, body mechanics, posture, safety, pacing and benefits of increasing activity. Pt verbalized/demonstrated fair understanding, recommend continued education. At end of session pt left seated at bedside chair with all lines and tubes intact and call light within reach. · Pt has made fair progress towards set goals.    · Continue with current plan of care    Time in: [address]  Time out:1435  Total Tx Time: [address] [place]/L [id number]

## 2019-06-10 NOTE — PROGRESS NOTES
Vascular        Chief complaint : Patient seen for evaluation of the vascular status of the left leg, patient noted to have lymphedema, left leg more than the right leg in the past    Was hospitalized with fever, altered mental status, tachycardia, extremely tired and weak    Patient states, in April she fell down and sustained a abrasion wound of the shin of the left leg, which she was dressing, recently formed a scab after having some drainage in the recent past, no drainage at the present time    Patient was found to marked erythema consistent with cellulitis and lymphangitis and was started on antibiotics, transferred to the intensive care unit      Subjective: Today complains of persistent swelling of the left leg, somewhat swollen and tight, and tender, red, denies any fever or chills today    Objective:    /71   Pulse 95   Temp 97.9 °F (36.6 °C) (Temporal)   Resp 20   Ht 5' 2\" (1.575 m)   Wt 264 lb 8 oz (120 kg)   SpO2 92%   BMI 48.38 kg/m²     General: alert and oriented. Neck:  Carotid bruits: Right No  Left No    Respiratory:  No rales or rhonchi     Cardiovascular:  Normal Sinus Rhythm. No murmur    Abdomen:  Soft, no masses palpable. No tenderness    Extremities: Swelling of both legs noted, left leg more than the right leg, clinically consistent with lymphedema    Wound, with healing and scab and eschar noted over the shin of the left leg, which the patient sustained when she fell down in [facility]    Cellulitis with marked tenderness noted involving the left leg    Pulses Right  Left    Radial       Brachial 3 3  3=normal   Femoral 2 2  2=diminished   Popliteal    1=barely palpable   Dorsalis pedis 3 3  0=absent   Posterior tibial    4=aneurysmal         Neuro: The speech is intact. Moving all extremities. No evidence of any acute focal lateralizing neurological deficit. Other pertinent information: 1.   The previous medical records, the current history physical and consultation notes were reviewed    2. Patient was seen at the wound care center in June 2015, with multiple ulcers due to blisters from lymphedema and at the time patient was recommended lymphedema therapy and compression stockings, work-up at the time of the venous ultrasound study, arterial Doppler study of the legs was normal    Labs and X-ray reviewed:    1. Venous ultrasound of the left leg, personally reviewed by me revealed no evidence of deep vein thrombosis     2. The x-ray of the left leg, reviewed by me revealed no evidence of fracture    Assessment:    1. Cellulitis left leg with lymphangitis and tenderness aggravated by underlying tinea infection, with potential portal of entry being the superficial ulcer of the skin of the left leg that she sustained due to fall in [place] in April, healing at the present time    2. No evidence of deep vein thrombosis based upon the venous ultrasound    3.   Satisfactory arterial circulation with palpable pulses    Plan:    Discussed in detail the patient regarding all options, risks benefits and alternatives    For now patient recommended continuation of antibiotics as prescribed ID consulted, and with topical antifungal cream to apply twice a day for 1 month and short-term oral Lamisil therapy and to take precaution the future to prevent any athlete's foot infection of the legs and feet    Patient also instructed leg elevated to decrease the edema and consider Ace bandage wrapping and when discharged once cellulitis resolves, consider wearing compression stockings    Patient also recommended lymphedema therapy once her cellulitis resolves and to call the office to make arrangements for the same    All her questions were answered    Discussed with Dr. Galina Krueger

## 2019-06-10 NOTE — PROGRESS NOTES
CC- lle cellulitis - improving     Subjective: The patient is awake and alert. Sitting up in the chair. Cellulitis is not improving on ATB - redness extending past lines of demarcation. Tolerating medications. Reports no side effects. Afebrile. No respiratory complaints. 10 ROS otherwise negative unless otherwise specified above. Objective:    Vitals:    06/10/19 0845   BP: 134/66   Pulse: 101   Resp: 16   Temp: 97.4 °F (36.3 °C)   SpO2: 95%       General Appearance:    Awake, alert , no acute distress.    HEENT:    Normocephalic,PERRL,neck supple, no JVD, mucosa moist, no thrush   Lungs:     Clear to auscultation bilaterally, no wheeze , crackles   Heart:    Regular rate and rhythm, no murmur   Abdomen:     Soft, non-tender, not distended  bowel sounds present,     Extremities:   LLE redness with warmth up to midcalf with scabbed wound in the middle - NOT IMPROVING    Pulses:   2+ and symmetric all extremities   Skin:   Skin color, texture, turgor normal, no rashes or lesions  Right IJ TLC          CBC+dif:  Reviewed and trend followed     Radiology:  Noted     Microbiology:  Pending     Assessment:  · LLE cellulitis with chronic wound   · Possible aspiration pnuemonia  · Severe sepsis  · Lactic acidosis- resolved     Plan:    · Po zyvox continue cefepime  · Coban dressing  · F/U [facility] - pending negative   · Monitor labs      Electronically signed by Mary Kay Oconnor MD on 6/10/2019 at 12:14 PM

## 2019-06-10 NOTE — PROGRESS NOTES
Subjective:    Awake and alert. No problems overnight. Denies chest pain or dyspnea. Denies abdominal pain. Tolerating diet. No nausea or vomiting. Objective:    /66   Pulse 101   Temp 97.4 °F (36.3 °C) (Temporal)   Resp 16   Ht 5' 2\" (1.575 m)   Wt 264 lb 8 oz (120 kg)   SpO2 95%   BMI 48.38 kg/m²   Skin: Warm and dry  Neck: Supple, no JVD  Heart:  RRR, no murmurs, gallops, or rubs. Lungs:  CTA bilaterally, no wheeze, rales or rhonchi  Abd: bowel sounds present, nontender, nondistended, no masses  Extrem:  No clubbing, cyanosis, or edema, pulses intact    I/O last 3 completed shifts:   In: 120 [P.O.:120]  Out: -     Results      Component Value Units   EKG REPORT [[id number]] Resulted: 19   Updated: 06/10/19 132    EKG 12 Lead [[id number]] Collected: 19   Updated: 06/10/19 132     Ventricular Rate 80 BPM    Atrial Rate 80 BPM    P-R Interval 172 ms    QRS Duration 70 ms    Q-T Interval 324 ms    QTc Calculation (Bazett) 373 ms    P Axis 60 degrees    R Axis 7 degrees    T Axis 29 degrees   Narrative:     Normal sinus rhythm  Low voltage QRS  Cannot rule out Anterior infarct , age undetermined  Abnormal ECG  When compared with ECG of 2019 22:26,  Significant changes have occurred  Confirmed by Sukumar Costello ([id number]) on 6/10/2019 1:26:25 PM   Culture blood #1 [[id number]] Collected: 19   Updated: 19    Specimen Type: Blood     Blood Culture, Routine 5 Days- no growth   Narrative:     Source: BLOOD       Site: Blood&Blood             Culture blood #2 [[id number]] Collected: 19   Updated: 19    Specimen Source: Blood     Culture, Blood 2 5 Days- no growth   Narrative:     Source: BLOOD       Site:              US DUP LOWER EXTREMITY LEFT EDUARDO [[id number]] Resulted: 19   Updated: 19    Narrative:     Patient MRN:  [id number]  : 1955  Age: 59 years  Gender: Female    Order Date:  2019 5:45 PM    EXAM: US DUP LOWER EXTREMITY LEFT EDUARDO    COMPARISON: May 26, 2015    INDICATION:  edema      TECHNIQUE:  Multiple real-time sonographic images of the left lower extremity were  obtained. FINDINGS:    Normal compression and augmentation is seen. No evidence to suggest  deep venous thrombosis. Peroneal vessels not visualized. .   Impression:       No evidence to suggest deep venous thrombosis of the visualized veins  of the left lower extremity. Procalcitonin [[id number]] (Abnormal) Collected: 06/09/19 0425   Updated: 06/09/19 1830    Specimen Type: Blood     Procalcitonin 0. 96High           US DUP LOWER EXTREMITY LEFT EDUARDO   Final Result      No evidence to suggest deep venous thrombosis of the visualized veins   of the left lower extremity. XR TIBIA FIBULA LEFT (2 VIEWS)   Final Result   1. No acute fracture identified. If there is persistent clinical pain   or symptomatology, a return to medical attention within 2-7 days and   further imaging is recommended. .   2. No cortical destruction to suggest osteomyelitis. If there is   clinical concern for osteomyelitis, a triple phase nuclear medicine   bone scan would be recommended   3. Soft tissue swelling concerning for possible edema or cellulitis. 4. Moderately severe medial with mild lateral and moderate   patellofemoral tricompartmental osteoarthritis. 5. Moderate naviculocuneiform and mild tibiotalar osteoarthritis. XR CHEST PORTABLE   Final Result   1. Suspected underlying mild central pulmonary vascular congestion. 2. Vascular calcifications thoracic aorta. XR CHEST PORTABLE   Final Result      Interstitial prominence in perihilar and infrahilar location could   suggest peribronchial inflammatory changes or pulmonary vascular   congestion. Comment correlation recommended. Short-term follow-up may   be helpful for further evaluation. Prior to Admission medications    Medication Sig Start Date End Date Taking? Authorizing Provider   fentaNYL (DURAGESIC) 25 MCG/HR Place 1 patch onto the skin every 72 hours    Historical Provider, MD   butenafine (LOTRIMIN ULTRA) 1 % CREA Apply BID , both legs , X 1 month 9/28/16   Kori Blakely MD   Liraglutide (VICTOZA SC) Inject into the skin    Historical Provider, MD   ROPINIRole HCl (REQUIP PO) Take 1 mg by mouth    Historical Provider, MD   HYDROcodone-acetaminophen (NORCO) 5-325 MG per tablet Take 1 tablet by mouth every 6 hours as needed for Pain. Historical Provider, MD   therapeutic multivitamin-minerals (THERAGRAN-M) tablet Take 1 tablet by mouth daily. Historical Provider, MD   potassium chloride (MICRO-K) 10 MEQ CR capsule Take 20 mEq by mouth daily as needed. Historical Provider, MD   furosemide (LASIX) 20 MG tablet   Take 40 mg by mouth daily as needed     Historical Provider, MD   ferrous sulfate 325 (65 FE) MG tablet Take 325 mg by mouth daily (with breakfast). Historical Provider, MD   Gabapentin (NEURONTIN PO)   Take 300 mg by mouth 3 times daily     Historical Provider, MD   Cyclobenzaprine HCl (FLEXERIL PO) Take 10 mg by mouth 2 times daily.     Historical Provider, MD        Current Facility-Administered Medications   Medication Dose Route Frequency Provider Last Rate Last Dose    linezolid (ZYVOX) tablet 600 mg  600 mg Oral 2 times per day Jessica Michael MD        potassium chloride 20 mEq/50 mL IVPB (Central Line)  20 mEq Intravenous Once Sandra Sterling MD        sennosides-docusate sodium (SENOKOT-S) 8.6-50 MG tablet 2 tablet  2 tablet Oral BID PRN Sandra Sterling MD   2 tablet at 06/09/19 5199    HYDROmorphone (DILAUDID) injection 0.5 mg  0.5 mg Intravenous Q4H PRN Sandra Sterling MD   0.5 mg at 06/10/19 9705    oxyCODONE-acetaminophen (PERCOCET) 5-325 MG per tablet 1 tablet  1 tablet Oral Q4H PRN Sandra Sterling MD        Or   Avendaño oxyCODONE-acetaminophen (PERCOCET) 5-325 MG per tablet 2 tablet  2 tablet Oral Q4H PRN

## 2019-06-11 LAB
ABO/RH: NORMAL
ANTIBODY SCREEN: NORMAL
HCT VFR BLD CALC: 26.1 % (ref 34–48)
HEMOGLOBIN: 7.4 G/DL (ref 11.5–15.5)
MCH RBC QN AUTO: 19.5 PG (ref 26–35)
MCHC RBC AUTO-ENTMCNC: 28.4 % (ref 32–34.5)
MCV RBC AUTO: 68.9 FL (ref 80–99.9)
PDW BLD-RTO: 21.2 FL (ref 11.5–15)
PLATELET # BLD: 172 E9/L (ref 130–450)
PMV BLD AUTO: ABNORMAL FL (ref 7–12)
RBC # BLD: 3.79 E12/L (ref 3.5–5.5)
WBC # BLD: 8.8 E9/L (ref 4.5–11.5)

## 2019-06-11 PROCEDURE — 86850 RBC ANTIBODY SCREEN: CPT

## 2019-06-11 PROCEDURE — 97530 THERAPEUTIC ACTIVITIES: CPT

## 2019-06-11 PROCEDURE — 2580000003 HC RX 258: Performed by: INTERNAL MEDICINE

## 2019-06-11 PROCEDURE — 6370000000 HC RX 637 (ALT 250 FOR IP): Performed by: INTERNAL MEDICINE

## 2019-06-11 PROCEDURE — 86901 BLOOD TYPING SEROLOGIC RH(D): CPT

## 2019-06-11 PROCEDURE — 6360000002 HC RX W HCPCS: Performed by: INTERNAL MEDICINE

## 2019-06-11 PROCEDURE — 86900 BLOOD TYPING SEROLOGIC ABO: CPT

## 2019-06-11 PROCEDURE — 1200000000 HC SEMI PRIVATE

## 2019-06-11 PROCEDURE — 6370000000 HC RX 637 (ALT 250 FOR IP): Performed by: SURGERY

## 2019-06-11 PROCEDURE — 85027 COMPLETE CBC AUTOMATED: CPT

## 2019-06-11 PROCEDURE — 36415 COLL VENOUS BLD VENIPUNCTURE: CPT

## 2019-06-11 PROCEDURE — 99232 SBSQ HOSP IP/OBS MODERATE 35: CPT | Performed by: SURGERY

## 2019-06-11 RX ADMIN — Medication 10 ML: at 11:48

## 2019-06-11 RX ADMIN — ROPINIROLE HYDROCHLORIDE 1 MG: 1 TABLET, FILM COATED ORAL at 21:50

## 2019-06-11 RX ADMIN — SENNOSIDES,DOCUSATE SODIUM 2 TABLET: 8.6; 5 TABLET, FILM COATED ORAL at 21:53

## 2019-06-11 RX ADMIN — CEFEPIME 2 G: 2 INJECTION, POWDER, FOR SOLUTION INTRAVENOUS at 09:09

## 2019-06-11 RX ADMIN — CEFEPIME 2 G: 2 INJECTION, POWDER, FOR SOLUTION INTRAVENOUS at 21:50

## 2019-06-11 RX ADMIN — OXYCODONE HYDROCHLORIDE AND ACETAMINOPHEN 2 TABLET: 5; 325 TABLET ORAL at 05:04

## 2019-06-11 RX ADMIN — FERROUS SULFATE TAB 325 MG (65 MG ELEMENTAL FE) 325 MG: 325 (65 FE) TAB at 09:08

## 2019-06-11 RX ADMIN — HYDROMORPHONE HYDROCHLORIDE 0.5 MG: 1 INJECTION, SOLUTION INTRAMUSCULAR; INTRAVENOUS; SUBCUTANEOUS at 16:04

## 2019-06-11 RX ADMIN — OXYCODONE HYDROCHLORIDE AND ACETAMINOPHEN 2 TABLET: 5; 325 TABLET ORAL at 00:15

## 2019-06-11 RX ADMIN — Medication: at 09:08

## 2019-06-11 RX ADMIN — LINEZOLID 600 MG: 600 TABLET, FILM COATED ORAL at 09:08

## 2019-06-11 RX ADMIN — ROPINIROLE HYDROCHLORIDE 1 MG: 1 TABLET, FILM COATED ORAL at 09:08

## 2019-06-11 RX ADMIN — HYDROMORPHONE HYDROCHLORIDE 0.5 MG: 1 INJECTION, SOLUTION INTRAMUSCULAR; INTRAVENOUS; SUBCUTANEOUS at 11:48

## 2019-06-11 RX ADMIN — Medication 10 ML: at 09:08

## 2019-06-11 RX ADMIN — OXYCODONE HYDROCHLORIDE AND ACETAMINOPHEN 2 TABLET: 5; 325 TABLET ORAL at 17:34

## 2019-06-11 RX ADMIN — ENOXAPARIN SODIUM 40 MG: 40 INJECTION SUBCUTANEOUS at 09:08

## 2019-06-11 RX ADMIN — LINEZOLID 600 MG: 600 TABLET, FILM COATED ORAL at 21:49

## 2019-06-11 RX ADMIN — HYDROMORPHONE HYDROCHLORIDE 0.5 MG: 1 INJECTION, SOLUTION INTRAMUSCULAR; INTRAVENOUS; SUBCUTANEOUS at 20:41

## 2019-06-11 RX ADMIN — Medication 10 ML: at 21:50

## 2019-06-11 RX ADMIN — HYDROMORPHONE HYDROCHLORIDE 0.5 MG: 1 INJECTION, SOLUTION INTRAMUSCULAR; INTRAVENOUS; SUBCUTANEOUS at 03:37

## 2019-06-11 RX ADMIN — Medication 10 ML: at 07:35

## 2019-06-11 RX ADMIN — HYDROMORPHONE HYDROCHLORIDE 0.5 MG: 1 INJECTION, SOLUTION INTRAMUSCULAR; INTRAVENOUS; SUBCUTANEOUS at 07:39

## 2019-06-11 RX ADMIN — TERBINAFINE 250 MG: 250 TABLET ORAL at 09:08

## 2019-06-11 RX ADMIN — OXYCODONE HYDROCHLORIDE AND ACETAMINOPHEN 2 TABLET: 5; 325 TABLET ORAL at 21:50

## 2019-06-11 ASSESSMENT — PAIN DESCRIPTION - FREQUENCY
FREQUENCY: CONTINUOUS

## 2019-06-11 ASSESSMENT — PAIN DESCRIPTION - PROGRESSION
CLINICAL_PROGRESSION: NOT CHANGED

## 2019-06-11 ASSESSMENT — PAIN DESCRIPTION - ORIENTATION
ORIENTATION: LEFT;LOWER
ORIENTATION: RIGHT;LEFT
ORIENTATION: RIGHT;LEFT
ORIENTATION: LEFT;LOWER
ORIENTATION: RIGHT;LEFT
ORIENTATION: RIGHT;LEFT

## 2019-06-11 ASSESSMENT — PAIN DESCRIPTION - DESCRIPTORS
DESCRIPTORS: ACHING;CONSTANT;DISCOMFORT;SORE
DESCRIPTORS: ACHING;CONSTANT;DISCOMFORT;SORE
DESCRIPTORS: ACHING;CONSTANT
DESCRIPTORS: ACHING
DESCRIPTORS: ACHING;CONSTANT
DESCRIPTORS: ACHING;CONSTANT;DISCOMFORT

## 2019-06-11 ASSESSMENT — PAIN SCALES - GENERAL
PAINLEVEL_OUTOF10: 7
PAINLEVEL_OUTOF10: 8
PAINLEVEL_OUTOF10: 7
PAINLEVEL_OUTOF10: 4
PAINLEVEL_OUTOF10: 7
PAINLEVEL_OUTOF10: 8
PAINLEVEL_OUTOF10: 4
PAINLEVEL_OUTOF10: 5
PAINLEVEL_OUTOF10: 7
PAINLEVEL_OUTOF10: 6
PAINLEVEL_OUTOF10: 7
PAINLEVEL_OUTOF10: 8
PAINLEVEL_OUTOF10: 7
PAINLEVEL_OUTOF10: 8

## 2019-06-11 ASSESSMENT — PAIN - FUNCTIONAL ASSESSMENT
PAIN_FUNCTIONAL_ASSESSMENT: ACTIVITIES ARE NOT PREVENTED
PAIN_FUNCTIONAL_ASSESSMENT: PREVENTS OR INTERFERES SOME ACTIVE ACTIVITIES AND ADLS

## 2019-06-11 ASSESSMENT — PAIN DESCRIPTION - LOCATION
LOCATION: GENERALIZED
LOCATION: BACK;LEG
LOCATION: GENERALIZED
LOCATION: BACK;KNEE;LEG
LOCATION: GENERALIZED
LOCATION: GENERALIZED
LOCATION: BACK;LEG
LOCATION: LEG;KNEE;BACK

## 2019-06-11 ASSESSMENT — PAIN DESCRIPTION - PAIN TYPE
TYPE: ACUTE PAIN;CHRONIC PAIN
TYPE: CHRONIC PAIN
TYPE: ACUTE PAIN;CHRONIC PAIN
TYPE: CHRONIC PAIN
TYPE: ACUTE PAIN;CHRONIC PAIN
TYPE: CHRONIC PAIN;ACUTE PAIN

## 2019-06-11 ASSESSMENT — PAIN DESCRIPTION - ONSET
ONSET: ON-GOING

## 2019-06-11 NOTE — PROGRESS NOTES
left seated EOB with call light left by patient. Time in: 1420  Time out: 1435    Pt is making good progress toward established Physical Therapy goals. Continue with physical therapy current plan of care.     Johanna Appl PTA  License Number: [id number]

## 2019-06-11 NOTE — PROGRESS NOTES
CC- lle cellulitis - improving     Subjective: The patient is awake and alert. Sitting up in the chair. Cellulitis is not improving on ATB - redness extending past lines of demarcation. Tolerating medications. Reports no side effects. Afebrile. No respiratory complaints. 10 ROS otherwise negative unless otherwise specified above. Objective:    Vitals:    06/11/19 1602   BP: 130/84   Pulse: 86   Resp: 19   Temp:    SpO2:        General Appearance:    Awake, alert , no acute distress.    HEENT:    Normocephalic,PERRL,neck supple, no JVD, mucosa moist, no thrush   Lungs:     Clear to auscultation bilaterally, no wheeze , crackles   Heart:    Regular rate and rhythm, no murmur   Abdomen:     Soft, non-tender, not distended  bowel sounds present,     Extremities:   LLE redness with warmth up to midcalf with scabbed wound in the middle - NOT IMPROVING    Pulses:   2+ and symmetric all extremities   Skin:   Skin color, texture, turgor normal, no rashes or lesions  Right IJ TLC          CBC+dif:  Reviewed and trend followed     Radiology:  Noted     Microbiology:  Pending     Assessment:  · LLE cellulitis with chronic wound   · Possible aspiration pnuemonia  · Severe sepsis  · Lactic acidosis- resolved     Plan:    · Po zyvox continue cefepime  · Coban dressing  · Possible for home in 48 hrs on oral zyvox and levaquin  · Monitor labs      Electronically signed by Jessica Michael MD on 6/11/2019 at 5:52 PM

## 2019-06-11 NOTE — PROGRESS NOTES
Physical Therapy  Facility/Department: [address] PICU  Daily Treatment Note  NAME: Lashae Weiner  : 1955  MRN: [id number]    Date of Service: 2019   Evaluating PT:  Willy Flores PT, DPT [id number]     Room #:  4733/7294-N  Diagnosis:  sepsis  PMHx:  Anemia, arthritis, cellulitis, leg ulcer , B lymphedema, peripheral vision loss, stroke  Precautions: Falls, O2, LLE wound   Equipment Needs:  TBD     Pt lives with mameugter in a 2 story home with 2 stairs to enter and 1 rail(s). Bedroom and bathroom are on the mainlevel. Pt ambulated with no AD and was independent in ADLs PTA. 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                  Initial Evaluation  Date: 19 Treatment  Date  See above Short Term/ Long Term   Goals   AM-PAC 6 Clicks      Was pt agreeable to Eval/treatment? Yes yes     Does pt have pain?  Yes, low back, B knees, and neck unspecified numerical C/o B LE  Pain       Bed Mobility  Rolling: modified independent  Supine to sit: SBA  Sit to supine: NT  Scooting: Emmanuel Supine to sit mod I  Sit to supine Nt  Rolling mod I  Scooting mod I independent   Transfers Sit to stand: CGA   Stand to sit: CGA from bed; modAx2 from low commode  Stand pivot: CGA with [employer] Sit <> stand  supervision  Stand pivot no device S independent   Ambulation    15x2 feet with CGA with   feet with no AD SBA  >200 feet independent   Stair negotiation: ascended and descended NT  NT >2 steps with 1 rail Modified Independent       ROM BUE:  Per OT eval   BLE:  WFL       Strength BUE:  Per OT eval   BLE:  4+/5       Balance Sitting EOB:  SBA  Dynamic Standing:  CGA  Independent seated EOB   SBA/s dynamic standing Sitting EOB:  independent  Dynamic Standing:  independent        Patient education  Pt was educated on function      Patient response to education:   Pt verbalized understanding Pt demonstrated skill Pt requires further education in this area   x x  nt     Additional Comments:  Pt ambulated to bathroom

## 2019-06-12 VITALS
BODY MASS INDEX: 49.1 KG/M2 | HEIGHT: 62 IN | HEART RATE: 86 BPM | OXYGEN SATURATION: 96 % | TEMPERATURE: 98.3 F | SYSTOLIC BLOOD PRESSURE: 156 MMHG | DIASTOLIC BLOOD PRESSURE: 86 MMHG | WEIGHT: 266.8 LBS | RESPIRATION RATE: 16 BRPM

## 2019-06-12 LAB
ANION GAP SERPL CALCULATED.3IONS-SCNC: 10 MMOL/L (ref 7–16)
BUN BLDV-MCNC: 13 MG/DL (ref 8–23)
CALCIUM SERPL-MCNC: 8.6 MG/DL (ref 8.6–10.2)
CHLORIDE BLD-SCNC: 103 MMOL/L (ref 98–107)
CO2: 30 MMOL/L (ref 22–29)
CREAT SERPL-MCNC: 0.6 MG/DL (ref 0.5–1)
GFR AFRICAN AMERICAN: >60
GFR NON-AFRICAN AMERICAN: >60 ML/MIN/1.73
GLUCOSE BLD-MCNC: 118 MG/DL (ref 74–99)
HCT VFR BLD CALC: 28.6 % (ref 34–48)
HEMOGLOBIN: 7.9 G/DL (ref 11.5–15.5)
MCH RBC QN AUTO: 19.5 PG (ref 26–35)
MCHC RBC AUTO-ENTMCNC: 27.6 % (ref 32–34.5)
MCV RBC AUTO: 70.4 FL (ref 80–99.9)
PDW BLD-RTO: 21.6 FL (ref 11.5–15)
PLATELET # BLD: 182 E9/L (ref 130–450)
PMV BLD AUTO: 11.4 FL (ref 7–12)
POTASSIUM SERPL-SCNC: 4.3 MMOL/L (ref 3.5–5)
RBC # BLD: 4.06 E12/L (ref 3.5–5.5)
SODIUM BLD-SCNC: 143 MMOL/L (ref 132–146)
WBC # BLD: 7.5 E9/L (ref 4.5–11.5)

## 2019-06-12 PROCEDURE — 6360000002 HC RX W HCPCS: Performed by: INTERNAL MEDICINE

## 2019-06-12 PROCEDURE — 2580000003 HC RX 258: Performed by: INTERNAL MEDICINE

## 2019-06-12 PROCEDURE — 6370000000 HC RX 637 (ALT 250 FOR IP): Performed by: INTERNAL MEDICINE

## 2019-06-12 PROCEDURE — 85027 COMPLETE CBC AUTOMATED: CPT

## 2019-06-12 PROCEDURE — 80048 BASIC METABOLIC PNL TOTAL CA: CPT

## 2019-06-12 PROCEDURE — 36415 COLL VENOUS BLD VENIPUNCTURE: CPT

## 2019-06-12 PROCEDURE — 6370000000 HC RX 637 (ALT 250 FOR IP): Performed by: SURGERY

## 2019-06-12 RX ORDER — LINEZOLID 600 MG/1
600 TABLET, FILM COATED ORAL EVERY 12 HOURS SCHEDULED
Qty: 20 TABLET | Refills: 0 | Status: SHIPPED | OUTPATIENT
Start: 2019-06-12 | End: 2019-06-22

## 2019-06-12 RX ORDER — LINEZOLID 600 MG/1
600 TABLET, FILM COATED ORAL 2 TIMES DAILY
Qty: 20 TABLET | Refills: 0 | Status: SHIPPED | OUTPATIENT
Start: 2019-06-12 | End: 2019-06-13

## 2019-06-12 RX ORDER — PETROLATUM 42 G/100G
OINTMENT TOPICAL
Qty: 1 TUBE | Refills: 1 | Status: SHIPPED | OUTPATIENT
Start: 2019-06-12 | End: 2019-08-14

## 2019-06-12 RX ORDER — LEVOFLOXACIN 750 MG/1
750 TABLET ORAL DAILY
Qty: 10 TABLET | Refills: 0 | Status: SHIPPED | OUTPATIENT
Start: 2019-06-12 | End: 2019-06-22

## 2019-06-12 RX ORDER — TERBINAFINE HYDROCHLORIDE 250 MG/1
250 TABLET ORAL DAILY
Qty: 7 TABLET | Refills: 0 | Status: SHIPPED | OUTPATIENT
Start: 2019-06-13 | End: 2019-06-20

## 2019-06-12 RX ADMIN — OXYCODONE HYDROCHLORIDE AND ACETAMINOPHEN 2 TABLET: 5; 325 TABLET ORAL at 06:36

## 2019-06-12 RX ADMIN — OXYCODONE HYDROCHLORIDE AND ACETAMINOPHEN 2 TABLET: 5; 325 TABLET ORAL at 02:26

## 2019-06-12 RX ADMIN — HYDROMORPHONE HYDROCHLORIDE 0.5 MG: 1 INJECTION, SOLUTION INTRAMUSCULAR; INTRAVENOUS; SUBCUTANEOUS at 05:23

## 2019-06-12 RX ADMIN — OXYCODONE HYDROCHLORIDE AND ACETAMINOPHEN 2 TABLET: 5; 325 TABLET ORAL at 12:05

## 2019-06-12 RX ADMIN — OXYCODONE HYDROCHLORIDE AND ACETAMINOPHEN 2 TABLET: 5; 325 TABLET ORAL at 17:04

## 2019-06-12 RX ADMIN — HYDROMORPHONE HYDROCHLORIDE 0.5 MG: 1 INJECTION, SOLUTION INTRAMUSCULAR; INTRAVENOUS; SUBCUTANEOUS at 01:21

## 2019-06-12 RX ADMIN — HYDROMORPHONE HYDROCHLORIDE 0.5 MG: 1 INJECTION, SOLUTION INTRAMUSCULAR; INTRAVENOUS; SUBCUTANEOUS at 09:30

## 2019-06-12 RX ADMIN — ENOXAPARIN SODIUM 40 MG: 40 INJECTION SUBCUTANEOUS at 08:33

## 2019-06-12 RX ADMIN — TERBINAFINE 250 MG: 250 TABLET ORAL at 08:33

## 2019-06-12 RX ADMIN — Medication: at 12:04

## 2019-06-12 RX ADMIN — HYDROMORPHONE HYDROCHLORIDE 0.5 MG: 1 INJECTION, SOLUTION INTRAMUSCULAR; INTRAVENOUS; SUBCUTANEOUS at 14:21

## 2019-06-12 RX ADMIN — FERROUS SULFATE TAB 325 MG (65 MG ELEMENTAL FE) 325 MG: 325 (65 FE) TAB at 08:33

## 2019-06-12 RX ADMIN — ROPINIROLE HYDROCHLORIDE 1 MG: 1 TABLET, FILM COATED ORAL at 08:33

## 2019-06-12 RX ADMIN — CEFEPIME 2 G: 2 INJECTION, POWDER, FOR SOLUTION INTRAVENOUS at 10:59

## 2019-06-12 RX ADMIN — LINEZOLID 600 MG: 600 TABLET, FILM COATED ORAL at 08:33

## 2019-06-12 RX ADMIN — Medication 10 ML: at 08:33

## 2019-06-12 ASSESSMENT — PAIN SCALES - GENERAL
PAINLEVEL_OUTOF10: 7
PAINLEVEL_OUTOF10: 8
PAINLEVEL_OUTOF10: 9
PAINLEVEL_OUTOF10: 7
PAINLEVEL_OUTOF10: 9
PAINLEVEL_OUTOF10: 8
PAINLEVEL_OUTOF10: 8
PAINLEVEL_OUTOF10: 6
PAINLEVEL_OUTOF10: 5
PAINLEVEL_OUTOF10: 9

## 2019-06-12 ASSESSMENT — PAIN DESCRIPTION - DESCRIPTORS: DESCRIPTORS: ACHING;CONSTANT;DISCOMFORT

## 2019-06-12 ASSESSMENT — PAIN DESCRIPTION - PROGRESSION: CLINICAL_PROGRESSION: NOT CHANGED

## 2019-06-12 ASSESSMENT — PAIN DESCRIPTION - FREQUENCY: FREQUENCY: CONTINUOUS

## 2019-06-12 ASSESSMENT — PAIN - FUNCTIONAL ASSESSMENT: PAIN_FUNCTIONAL_ASSESSMENT: ACTIVITIES ARE NOT PREVENTED

## 2019-06-12 ASSESSMENT — PAIN DESCRIPTION - PAIN TYPE: TYPE: CHRONIC PAIN

## 2019-06-12 ASSESSMENT — PAIN DESCRIPTION - ONSET: ONSET: ON-GOING

## 2019-06-12 ASSESSMENT — PAIN DESCRIPTION - LOCATION: LOCATION: GENERALIZED

## 2019-06-12 NOTE — CARE COORDINATION
Received RX for Zyvox called pt phamarcy no precert required at time of . Pt notified of $10.00 co pay at time of .    Vijaya Mancera RN CM

## 2019-06-12 NOTE — PLAN OF CARE
Problem: Increased nutrient needs (NI-5.1)  Goal: Food and/or Nutrient Delivery  Description: Pt to consume >75% meals, refuses ONS  Individualized approach for food/nutrient provision.   Outcome: Met This Shift

## 2019-06-12 NOTE — PROGRESS NOTES
Nutrition Assessment    Type and Reason for Visit: Initial    Nutrition Recommendations: Continue current diet, d/c ONS per pt request    Nutrition Assessment: Pt nutritionally at risk d/t increased nutrient needs for wound healing. Pt currently consuming <75% meals and refusing ONS, discussed importance of adequate nutrition for healing. Will monitor    Malnutrition Assessment:  · Malnutrition Status: At risk for malnutrition  · Context: Acute illness or injury  · Findings of the 6 clinical characteristics of malnutrition (Minimum of 2 out of 6 clinical characteristics is required to make the diagnosis of moderate or severe Protein Calorie Malnutrition based on AND/ASPEN Guidelines):  1. Energy Intake-Less than or equal to 75% of estimated energy requirement, Greater than or equal to 7 days    2. Weight Loss-Unable to assess(d/t fluids)    3. Fat Loss-No significant subcutaneous fat loss    4. Muscle Loss-No significant muscle mass loss    5. Fluid Accumulation-No significant fluid accumulation    6.   Strength-Not measured    Nutrition Risk Level: Low    Nutrient Needs:  · Estimated Daily Total Kcal: (MSJ 1726 x 1.2 SF)  · Estimated Daily Protein (g): 100-110(2.0-2.2 gm/kg IBW)  · Estimated Daily Total Fluid (ml/day):     Nutrition Diagnosis:   · Problem: Increased nutrient needs  · Etiology: related to Increased demand for energy/nutrients     Signs and symptoms:  as evidenced by Presence of wounds    Objective Information:  · Nutrition-Focused Physical Findings: pt alert in bed, abd WDL, +1-2 pitting edema, +I/Os  · Wound Type: Open Wounds  · Current Nutrition Therapies:  · Oral Diet Orders: General   · Oral Diet intake: 51-75%  · Oral Nutrition Supplement (ONS) Orders: Low Calorie High Protein Supplement(BID)  · ONS intake: Refused  · Anthropometric Measures:  · Ht: 5' 2\" (157.5 cm)   · Current Body Wt: 266 lb (120.7 kg)(bed scale 6/12)  · Admission Body Wt: 275 lb (124.7 kg)(actual

## 2019-06-12 NOTE — PROGRESS NOTES
Vascular        Chief complaint : Patient seen for evaluation of the vascular status of the left leg, patient noted to have lymphedema, left leg more than the right leg in the past    Was hospitalized with fever, altered mental status, tachycardia, extremely tired and weak    Patient states, in April she fell down and sustained a abrasion wound of the shin of the left leg, which she was dressing, recently formed a scab after having some drainage in the recent past, no drainage at the present time    Patient was found to marked erythema consistent with cellulitis and lymphangitis and was started on antibiotics, transferred to the intensive care unit      Subjective: Today complains of persistent swelling of the left leg, somewhat swollen and tight, and tender, red, denies any fever or chills today    Feels better today with the legs elevated  Objective:    BP (!) 145/83   Pulse 85   Temp 98.6 °F (37 °C) (Temporal)   Resp 16   Ht 5' 2\" (1.575 m)   Wt 266 lb 12.8 oz (121 kg)   SpO2 96%   BMI 48.80 kg/m²     General: alert and oriented. Neck:  Carotid bruits: Right No  Left No    Respiratory:  No rales or rhonchi     Cardiovascular:  Normal Sinus Rhythm. No murmur    Abdomen:  Soft, no masses palpable. No tenderness    Extremities: Swelling of both legs noted, left leg more than the right leg, clinically consistent with lymphedema    Wound, with healing and scab and eschar noted over the shin of the left leg, which the patient sustained when she fell down in [place]    Cellulitis with marked tenderness noted involving the left leg, erythema slightly better today    Pulses Right  Left    Radial       Brachial 3 3  3=normal   Femoral 2 2  2=diminished   Popliteal    1=barely palpable   Dorsalis pedis 3 3  0=absent   Posterior tibial    4=aneurysmal         Neuro: The speech is intact. Moving all extremities. No evidence of any acute focal lateralizing neurological deficit. Other pertinent information: 1.   The previous medical records, the current history physical and consultation notes were reviewed    2. Patient was seen at the wound care center in June 2015, with multiple ulcers due to blisters from lymphedema and at the time patient was recommended lymphedema therapy and compression stockings, work-up at the time of the venous ultrasound study, arterial Doppler study of the legs was normal    Labs and X-ray reviewed:    1. Venous ultrasound of the left leg, personally reviewed by me revealed no evidence of deep vein thrombosis     2. The x-ray of the left leg, reviewed by me revealed no evidence of fracture 3. Blood cultures so far no growth        Assessment:    1. Cellulitis left leg with lymphangitis and tenderness aggravated by underlying tinea infection, with potential portal of entry being the superficial ulcer of the skin of the left leg that she sustained due to fall in [facility] in April, healing at the present time    2. No evidence of deep vein thrombosis based upon the venous ultrasound    3.   Satisfactory arterial circulation with palpable pulses    Plan:    Discussed in detail the patient regarding all options, risks benefits and alternatives    For now patient recommended continuation of antibiotics as prescribed ID consulted, and with topical antifungal cream to apply twice a day for 1 month and short-term oral Lamisil therapy and to take precaution the future to prevent any athlete's foot infection of the legs and feet    Patient also instructed leg elevated to decrease the edema and consider Ace bandage wrapping and when discharged once cellulitis resolves, consider wearing compression stockings    Patient also recommended lymphedema therapy once her cellulitis resolves and to call the office to make arrangements for the same    Discussed with the patient regarding follow-up in the office regarding healing of the small superficial scab over the shin of the left leg, she indicated to me that she will call me as needed, also will call me to arrange lymphedema therapy once her cellulitis resolves and call me as needed if any worsening    All her questions were answered            Andrew Prakash

## 2019-06-12 NOTE — PROGRESS NOTES
Jai Shah called in regards to new orders for wound care. RN spoke to Enriqueta Green who took down all the information and will place the patient on their list. Per Enriqueta Logelvis patient is okay to discharge and they will follow up tomorrow with her to facilitate the home care.

## 2019-06-12 NOTE — PROGRESS NOTES
Dr. Morgan Ackerman paged through [facility] about needing a paper script for zyvox. Awaiting call back.

## 2019-06-12 NOTE — PROGRESS NOTES
Subjective:    Awake and alert. No problems overnight. Denies chest pain or dyspnea. Denies abdominal pain. Tolerating diet. No nausea or vomiting. Objective:    /84   Pulse 86   Temp 97.4 °F (36.3 °C) (Temporal)   Resp 19   Ht 5' 2\" (1.575 m)   Wt 264 lb (119.7 kg)   SpO2 96%   BMI 48.29 kg/m²   Skin: Warm and dry  Neck: Supple, no JVD  Heart:  RRR, no murmurs, gallops, or rubs. Lungs:  CTA bilaterally, no wheeze, rales or rhonchi  Abd: bowel sounds present, nontender, nondistended, no masses  Extrem:  Left leg dressing in place    I/O last 3 completed shifts: In: 7652 [P.O.:1440; IV Piggyback:50]  Out: 325 [Urine:325]    Laboratory:     CBC with Differential:    Lab Results   Component Value Date    WBC 8.8 06/11/2019    RBC 3.79 06/11/2019    HGB 7.4 06/11/2019    HCT 26.1 06/11/2019     06/11/2019    MCV 68.9 06/11/2019    MCH 19.5 06/11/2019    MCHC 28.4 06/11/2019    RDW 21.2 06/11/2019    NRBC 1 11/02/2012    SEGSPCT 72 02/28/2014    LYMPHOPCT 4.3 06/08/2019    MONOPCT 4.3 06/08/2019    BASOPCT 0.4 06/08/2019    MONOSABS 0.28 06/08/2019    LYMPHSABS 0.28 06/08/2019    EOSABS 0.18 06/08/2019    BASOSABS 0.00 06/08/2019        US DUP LOWER EXTREMITY LEFT EDUARDO   Final Result      No evidence to suggest deep venous thrombosis of the visualized veins   of the left lower extremity. XR TIBIA FIBULA LEFT (2 VIEWS)   Final Result   1. No acute fracture identified. If there is persistent clinical pain   or symptomatology, a return to medical attention within 2-7 days and   further imaging is recommended. .   2. No cortical destruction to suggest osteomyelitis. If there is   clinical concern for osteomyelitis, a triple phase nuclear medicine   bone scan would be recommended   3. Soft tissue swelling concerning for possible edema or cellulitis. 4. Moderately severe medial with mild lateral and moderate   patellofemoral tricompartmental osteoarthritis.    5. Moderate Jeaneth Rubi MD   650 mg at 06/05/19 1942           Problem list:    Principal Problem:    Cellulitis of left lower leg  Active Problems: Morbid obesity with BMI of 50.0-59.9, adult (HCC)    Lymphedema of lower extremity    Severe sepsis (HCC)    Septic shock (HCC)    Hypokalemia    Microcytic anemia    Thrombocytopenia (HCC)    Ulcer of left lower leg, limited to breakdown of skin (HCC)  Resolved Problems:    Chronic ulcer of right leg, limited to breakdown of skin ([facility])      Assessment:          1. Sepsis with septic shock due to left leg cellulitis     2. Cellulitis left leg     3. Diabetes mellitus type II, uncontrolled     4. Chronic back pain           Plan:    1. Continue Zyvox    2. Continue cefepime    3.  Continue local wound care      Dawson Barreto D.O., [facility]  8:26 PM  6/11/2019

## 2019-06-12 NOTE — PROGRESS NOTES
Inpatient wound care: Called by nursing for dressing changes at home. Chart reviewed. Fist time seeing patient. Coban removed from leg, no wounds noted. Leg extremely swollen, red and painful. Patient states Dr. Franhceska Mariee wants the leg wrapped with coban to help reduce swelling. Orders reviewed and updated. Discharge instructions placed in chart.  Ann Pages

## 2019-06-13 ENCOUNTER — APPOINTMENT (OUTPATIENT)
Dept: CT IMAGING | Age: 64
DRG: 313 | End: 2019-06-13
Payer: COMMERCIAL

## 2019-06-13 ENCOUNTER — NURSE TRIAGE (OUTPATIENT)
Dept: OTHER | Facility: CLINIC | Age: 64
End: 2019-06-13

## 2019-06-13 ENCOUNTER — CARE COORDINATION (OUTPATIENT)
Dept: CASE MANAGEMENT | Age: 64
End: 2019-06-13

## 2019-06-13 ENCOUNTER — HOSPITAL ENCOUNTER (INPATIENT)
Age: 64
LOS: 2 days | Discharge: HOME | DRG: 313 | End: 2019-06-15
Attending: EMERGENCY MEDICINE | Admitting: INTERNAL MEDICINE
Payer: COMMERCIAL

## 2019-06-13 ENCOUNTER — APPOINTMENT (OUTPATIENT)
Dept: GENERAL RADIOLOGY | Age: 64
DRG: 313 | End: 2019-06-13
Payer: COMMERCIAL

## 2019-06-13 DIAGNOSIS — R06.89 DYSPNEA AND RESPIRATORY ABNORMALITIES: ICD-10-CM

## 2019-06-13 DIAGNOSIS — R06.00 DYSPNEA AND RESPIRATORY ABNORMALITIES: ICD-10-CM

## 2019-06-13 DIAGNOSIS — R07.9 CHEST PAIN, UNSPECIFIED TYPE: Primary | ICD-10-CM

## 2019-06-13 LAB
ALBUMIN SERPL-MCNC: 3.2 G/DL (ref 3.5–5.2)
ALP BLD-CCNC: 101 U/L (ref 35–104)
ALT SERPL-CCNC: 13 U/L (ref 0–32)
ANION GAP SERPL CALCULATED.3IONS-SCNC: 10 MMOL/L (ref 7–16)
ANISOCYTOSIS: ABNORMAL
AST SERPL-CCNC: 17 U/L (ref 0–31)
BASOPHILIC STIPPLING: ABNORMAL
BASOPHILS ABSOLUTE: 0.02 E9/L (ref 0–0.2)
BASOPHILS RELATIVE PERCENT: 0.2 % (ref 0–2)
BILIRUB SERPL-MCNC: 0.6 MG/DL (ref 0–1.2)
BILIRUBIN URINE: NEGATIVE
BLOOD, URINE: NEGATIVE
BUN BLDV-MCNC: 9 MG/DL (ref 8–23)
CALCIUM SERPL-MCNC: 8.7 MG/DL (ref 8.6–10.2)
CHLORIDE BLD-SCNC: 102 MMOL/L (ref 98–107)
CLARITY: CLEAR
CO2: 30 MMOL/L (ref 22–29)
COLOR: YELLOW
CREAT SERPL-MCNC: 0.5 MG/DL (ref 0.5–1)
D DIMER: 876 NG/ML DDU
EOSINOPHILS ABSOLUTE: 0.16 E9/L (ref 0.05–0.5)
EOSINOPHILS RELATIVE PERCENT: 2 % (ref 0–6)
GFR AFRICAN AMERICAN: >60
GFR NON-AFRICAN AMERICAN: >60 ML/MIN/1.73
GLUCOSE BLD-MCNC: 95 MG/DL (ref 74–99)
GLUCOSE URINE: NEGATIVE MG/DL
HCT VFR BLD CALC: 29.6 % (ref 34–48)
HEMOGLOBIN: 8.5 G/DL (ref 11.5–15.5)
HYPOCHROMIA: ABNORMAL
IMMATURE GRANULOCYTES #: 0.13 E9/L
IMMATURE GRANULOCYTES %: 1.6 % (ref 0–5)
KETONES, URINE: NEGATIVE MG/DL
LACTIC ACID: 0.9 MMOL/L (ref 0.5–2.2)
LEUKOCYTE ESTERASE, URINE: NEGATIVE
LIPASE: 16 U/L (ref 13–60)
LYMPHOCYTES ABSOLUTE: 1.08 E9/L (ref 1.5–4)
LYMPHOCYTES RELATIVE PERCENT: 13.2 % (ref 20–42)
MAGNESIUM: 2.1 MG/DL (ref 1.6–2.6)
MCH RBC QN AUTO: 19.7 PG (ref 26–35)
MCHC RBC AUTO-ENTMCNC: 28.7 % (ref 32–34.5)
MCV RBC AUTO: 68.5 FL (ref 80–99.9)
MONOCYTES ABSOLUTE: 0.69 E9/L (ref 0.1–0.95)
MONOCYTES RELATIVE PERCENT: 8.4 % (ref 2–12)
NEUTROPHILS ABSOLUTE: 6.1 E9/L (ref 1.8–7.3)
NEUTROPHILS RELATIVE PERCENT: 74.6 % (ref 43–80)
NITRITE, URINE: NEGATIVE
OVALOCYTES: ABNORMAL
PDW BLD-RTO: 21.9 FL (ref 11.5–15)
PH UA: 8.5 (ref 5–9)
PLATELET # BLD: 247 E9/L (ref 130–450)
PMV BLD AUTO: ABNORMAL FL (ref 7–12)
POIKILOCYTES: ABNORMAL
POLYCHROMASIA: ABNORMAL
POTASSIUM SERPL-SCNC: 4.4 MMOL/L (ref 3.5–5)
PRO-BNP: 601 PG/ML (ref 0–125)
PROTEIN UA: NEGATIVE MG/DL
RBC # BLD: 4.32 E12/L (ref 3.5–5.5)
SCHISTOCYTES: ABNORMAL
SODIUM BLD-SCNC: 142 MMOL/L (ref 132–146)
SPECIFIC GRAVITY UA: 1.01 (ref 1–1.03)
STOMATOCYTES: ABNORMAL
TOTAL PROTEIN: 7 G/DL (ref 6.4–8.3)
TROPONIN: <0.01 NG/ML (ref 0–0.03)
UROBILINOGEN, URINE: 0.2 E.U./DL
WBC # BLD: 8.2 E9/L (ref 4.5–11.5)

## 2019-06-13 PROCEDURE — 87040 BLOOD CULTURE FOR BACTERIA: CPT

## 2019-06-13 PROCEDURE — 84484 ASSAY OF TROPONIN QUANT: CPT

## 2019-06-13 PROCEDURE — 83690 ASSAY OF LIPASE: CPT

## 2019-06-13 PROCEDURE — 2580000003 HC RX 258: Performed by: RADIOLOGY

## 2019-06-13 PROCEDURE — 71275 CT ANGIOGRAPHY CHEST: CPT

## 2019-06-13 PROCEDURE — 94761 N-INVAS EAR/PLS OXIMETRY MLT: CPT

## 2019-06-13 PROCEDURE — 99285 EMERGENCY DEPT VISIT HI MDM: CPT

## 2019-06-13 PROCEDURE — 96374 THER/PROPH/DIAG INJ IV PUSH: CPT

## 2019-06-13 PROCEDURE — 71046 X-RAY EXAM CHEST 2 VIEWS: CPT

## 2019-06-13 PROCEDURE — 96361 HYDRATE IV INFUSION ADD-ON: CPT

## 2019-06-13 PROCEDURE — 36415 COLL VENOUS BLD VENIPUNCTURE: CPT

## 2019-06-13 PROCEDURE — 2140000000 HC CCU INTERMEDIATE R&B

## 2019-06-13 PROCEDURE — G0378 HOSPITAL OBSERVATION PER HR: HCPCS

## 2019-06-13 PROCEDURE — 81003 URINALYSIS AUTO W/O SCOPE: CPT

## 2019-06-13 PROCEDURE — 93005 ELECTROCARDIOGRAM TRACING: CPT | Performed by: NURSE PRACTITIONER

## 2019-06-13 PROCEDURE — 96375 TX/PRO/DX INJ NEW DRUG ADDON: CPT

## 2019-06-13 PROCEDURE — 83605 ASSAY OF LACTIC ACID: CPT

## 2019-06-13 PROCEDURE — 6360000004 HC RX CONTRAST MEDICATION: Performed by: RADIOLOGY

## 2019-06-13 PROCEDURE — 85025 COMPLETE CBC W/AUTO DIFF WBC: CPT

## 2019-06-13 PROCEDURE — 83735 ASSAY OF MAGNESIUM: CPT

## 2019-06-13 PROCEDURE — 83880 ASSAY OF NATRIURETIC PEPTIDE: CPT

## 2019-06-13 PROCEDURE — 80053 COMPREHEN METABOLIC PANEL: CPT

## 2019-06-13 PROCEDURE — 85378 FIBRIN DEGRADE SEMIQUANT: CPT

## 2019-06-13 PROCEDURE — 2580000003 HC RX 258: Performed by: EMERGENCY MEDICINE

## 2019-06-13 PROCEDURE — 6360000002 HC RX W HCPCS: Performed by: EMERGENCY MEDICINE

## 2019-06-13 RX ORDER — SODIUM CHLORIDE 0.9 % (FLUSH) 0.9 %
10 SYRINGE (ML) INJECTION ONCE
Status: COMPLETED | OUTPATIENT
Start: 2019-06-13 | End: 2019-06-13

## 2019-06-13 RX ORDER — ONDANSETRON 2 MG/ML
4 INJECTION INTRAMUSCULAR; INTRAVENOUS ONCE
Status: COMPLETED | OUTPATIENT
Start: 2019-06-13 | End: 2019-06-13

## 2019-06-13 RX ORDER — BUPRENORPHINE HYDROCHLORIDE 450 UG/1
450 FILM, SOLUBLE BUCCAL EVERY 12 HOURS
Refills: 0 | COMMUNITY
Start: 2019-05-23 | End: 2019-10-18 | Stop reason: SDUPTHER

## 2019-06-13 RX ORDER — FUROSEMIDE 40 MG/1
40 TABLET ORAL DAILY PRN
Status: ON HOLD | COMMUNITY
Start: 2019-01-23 | End: 2021-03-30 | Stop reason: HOSPADM

## 2019-06-13 RX ORDER — ROPINIROLE 1 MG/1
1 TABLET, FILM COATED ORAL 3 TIMES DAILY
Status: DISCONTINUED | OUTPATIENT
Start: 2019-06-13 | End: 2019-06-15 | Stop reason: HOSPADM

## 2019-06-13 RX ORDER — CYCLOBENZAPRINE HCL 10 MG
10 TABLET ORAL 2 TIMES DAILY PRN
Status: ON HOLD | COMMUNITY
Start: 2019-01-23 | End: 2019-06-15 | Stop reason: HOSPADM

## 2019-06-13 RX ORDER — MORPHINE SULFATE 4 MG/ML
4 INJECTION, SOLUTION INTRAMUSCULAR; INTRAVENOUS ONCE
Status: COMPLETED | OUTPATIENT
Start: 2019-06-13 | End: 2019-06-13

## 2019-06-13 RX ORDER — ONDANSETRON 2 MG/ML
4 INJECTION INTRAMUSCULAR; INTRAVENOUS EVERY 6 HOURS PRN
Status: DISCONTINUED | OUTPATIENT
Start: 2019-06-13 | End: 2019-06-15 | Stop reason: HOSPADM

## 2019-06-13 RX ORDER — 0.9 % SODIUM CHLORIDE 0.9 %
1000 INTRAVENOUS SOLUTION INTRAVENOUS ONCE
Status: COMPLETED | OUTPATIENT
Start: 2019-06-13 | End: 2019-06-13

## 2019-06-13 RX ORDER — LINEZOLID 600 MG/1
600 TABLET, FILM COATED ORAL EVERY 12 HOURS SCHEDULED
Status: DISCONTINUED | OUTPATIENT
Start: 2019-06-13 | End: 2019-06-15 | Stop reason: HOSPADM

## 2019-06-13 RX ORDER — SODIUM CHLORIDE 0.9 % (FLUSH) 0.9 %
10 SYRINGE (ML) INJECTION PRN
Status: DISCONTINUED | OUTPATIENT
Start: 2019-06-13 | End: 2019-06-13 | Stop reason: SDUPTHER

## 2019-06-13 RX ORDER — ERGOCALCIFEROL (VITAMIN D2) 50 MCG
2000 CAPSULE ORAL DAILY
COMMUNITY
Start: 2019-01-23 | End: 2019-07-31 | Stop reason: ALTCHOICE

## 2019-06-13 RX ORDER — SODIUM CHLORIDE 0.9 % (FLUSH) 0.9 %
10 SYRINGE (ML) INJECTION EVERY 12 HOURS SCHEDULED
Status: DISCONTINUED | OUTPATIENT
Start: 2019-06-13 | End: 2019-06-13 | Stop reason: SDUPTHER

## 2019-06-13 RX ORDER — SODIUM CHLORIDE 0.9 % (FLUSH) 0.9 %
10 SYRINGE (ML) INJECTION PRN
Status: DISCONTINUED | OUTPATIENT
Start: 2019-06-13 | End: 2019-06-15 | Stop reason: HOSPADM

## 2019-06-13 RX ORDER — MORPHINE SULFATE 2 MG/ML
2 INJECTION, SOLUTION INTRAMUSCULAR; INTRAVENOUS EVERY 6 HOURS PRN
Status: DISCONTINUED | OUTPATIENT
Start: 2019-06-13 | End: 2019-06-14

## 2019-06-13 RX ORDER — TERBINAFINE HYDROCHLORIDE 250 MG/1
250 TABLET ORAL DAILY
Status: DISCONTINUED | OUTPATIENT
Start: 2019-06-14 | End: 2019-06-15 | Stop reason: HOSPADM

## 2019-06-13 RX ORDER — ACETAMINOPHEN 325 MG/1
650 TABLET ORAL EVERY 4 HOURS PRN
Status: DISCONTINUED | OUTPATIENT
Start: 2019-06-13 | End: 2019-06-15 | Stop reason: HOSPADM

## 2019-06-13 RX ORDER — LEVOFLOXACIN 750 MG/1
750 TABLET ORAL DAILY
Status: DISCONTINUED | OUTPATIENT
Start: 2019-06-14 | End: 2019-06-15 | Stop reason: HOSPADM

## 2019-06-13 RX ORDER — SODIUM CHLORIDE 0.9 % (FLUSH) 0.9 %
10 SYRINGE (ML) INJECTION EVERY 12 HOURS SCHEDULED
Status: DISCONTINUED | OUTPATIENT
Start: 2019-06-13 | End: 2019-06-15 | Stop reason: HOSPADM

## 2019-06-13 RX ORDER — ACETAMINOPHEN 325 MG/1
650 TABLET ORAL EVERY 4 HOURS PRN
Status: DISCONTINUED | OUTPATIENT
Start: 2019-06-13 | End: 2019-06-13 | Stop reason: SDUPTHER

## 2019-06-13 RX ADMIN — SODIUM CHLORIDE 1000 ML: 9 INJECTION, SOLUTION INTRAVENOUS at 18:02

## 2019-06-13 RX ADMIN — SODIUM CHLORIDE 1000 ML: 9 INJECTION, SOLUTION INTRAVENOUS at 20:10

## 2019-06-13 RX ADMIN — MORPHINE SULFATE 4 MG: 4 INJECTION INTRAVENOUS at 18:06

## 2019-06-13 RX ADMIN — Medication 10 ML: at 18:57

## 2019-06-13 RX ADMIN — IOPAMIDOL 75 ML: 755 INJECTION, SOLUTION INTRAVENOUS at 18:57

## 2019-06-13 RX ADMIN — MORPHINE SULFATE 4 MG: 4 INJECTION INTRAVENOUS at 22:27

## 2019-06-13 RX ADMIN — ONDANSETRON 4 MG: 2 INJECTION INTRAMUSCULAR; INTRAVENOUS at 18:07

## 2019-06-13 ASSESSMENT — ENCOUNTER SYMPTOMS
VOMITING: 0
ORTHOPNEA: 0
COUGH: 1
TROUBLE SWALLOWING: 0
SHORTNESS OF BREATH: 1
GASTROINTESTINAL NEGATIVE: 1
EYES NEGATIVE: 1
ABDOMINAL PAIN: 0
NAUSEA: 0
HEARTBURN: 0

## 2019-06-13 ASSESSMENT — PAIN - FUNCTIONAL ASSESSMENT: PAIN_FUNCTIONAL_ASSESSMENT: PREVENTS OR INTERFERES SOME ACTIVE ACTIVITIES AND ADLS

## 2019-06-13 ASSESSMENT — PAIN SCALES - GENERAL
PAINLEVEL_OUTOF10: 10
PAINLEVEL_OUTOF10: 8
PAINLEVEL_OUTOF10: 8
PAINLEVEL_OUTOF10: 2

## 2019-06-13 ASSESSMENT — PAIN DESCRIPTION - PAIN TYPE
TYPE: ACUTE PAIN
TYPE: ACUTE PAIN

## 2019-06-13 ASSESSMENT — PAIN DESCRIPTION - LOCATION
LOCATION: CHEST
LOCATION: CHEST

## 2019-06-13 ASSESSMENT — PAIN DESCRIPTION - PROGRESSION: CLINICAL_PROGRESSION: NOT CHANGED

## 2019-06-13 ASSESSMENT — PAIN DESCRIPTION - DESCRIPTORS
DESCRIPTORS: ACHING;DISCOMFORT;CONSTANT
DESCRIPTORS: ACHING

## 2019-06-13 ASSESSMENT — PAIN DESCRIPTION - ONSET: ONSET: ON-GOING

## 2019-06-13 ASSESSMENT — PAIN DESCRIPTION - FREQUENCY: FREQUENCY: CONTINUOUS

## 2019-06-13 NOTE — ED PROVIDER NOTES
This is a 59years old female with female who presented to our emergency room with a chief complaint of chest pain which is left-sided and radiates to the left lower chest wall and under the left breast that it started about 12 hours ago. Patient also complains of shortness of breath. Patient was admitted to this hospital several days ago for sepsis. She was discharged yesterday. She stated that chest pain is started sometimes this morning. Patient denies vomiting. No cough. No other complaints at this time. Questionable fever. The history is provided by the patient and a relative. No  was used.    Chest Pain   Pain location:  L chest  Pain quality: aching    Pain radiates to:  Epigastrium  Pain severity:  Moderate  Onset quality:  Gradual  Duration:  12 hours  Timing:  Intermittent  Progression:  Unchanged  Chronicity:  New  Context: not breathing, not drug use, not eating, not intercourse, not lifting, not movement, not raising an arm, not at rest, not stress and not trauma    Relieved by:  Nothing  Worsened by:  Nothing  Ineffective treatments:  None tried  Associated symptoms: anxiety, cough, fever, lower extremity edema and shortness of breath    Associated symptoms: no abdominal pain, no AICD problem, no altered mental status, no anorexia, no claudication, no diaphoresis, no dizziness, no dysphagia, no fatigue, no headache, no heartburn, no nausea, no near-syncope, no numbness, no orthopnea, no palpitations, no PND, no vomiting and no weakness    Fever:     Duration:  12 hours    Timing:  Intermittent    Temp source:  Subjective  Shortness of breath:     Severity:  Mild    Onset quality:  Gradual    Timing:  Intermittent    Progression:  Unchanged  Risk factors: obesity    Risk factors: no aortic disease, no birth control, no coronary artery disease, no diabetes mellitus, no Anjana-Danlos syndrome, no high cholesterol, no hypertension, no immobilization, not male, no Marfan's syndrome, not pregnant, no prior DVT/PE, no smoking and no surgery        Review of Systems   Constitutional: Positive for fever. Negative for diaphoresis and fatigue. HENT: Negative. Negative for trouble swallowing. Eyes: Negative. Respiratory: Positive for cough and shortness of breath. Cardiovascular: Positive for chest pain. Negative for palpitations, orthopnea, claudication, PND and near-syncope. Gastrointestinal: Negative. Negative for abdominal pain, anorexia, heartburn, nausea and vomiting. Endocrine: Negative. Genitourinary: Negative. Musculoskeletal: Negative. Skin: Negative. Neurological: Negative for dizziness, weakness, numbness and headaches. Psychiatric/Behavioral: Negative. All other systems reviewed and are negative. Physical Exam   Constitutional: She is oriented to person, place, and time. She appears well-developed and well-nourished. She appears distressed (Moderate distress). HENT:   Head: Normocephalic and atraumatic. Eyes: Pupils are equal, round, and reactive to light. Conjunctivae and EOM are normal. Right eye exhibits no discharge. Left eye exhibits no discharge. No scleral icterus. Neck: Normal range of motion. Neck supple. Cardiovascular: Normal rate, regular rhythm and normal heart sounds. Pulmonary/Chest: Effort normal and breath sounds normal. No stridor. No respiratory distress. She has no wheezes. Abdominal: Soft. Bowel sounds are normal. She exhibits no distension. There is no tenderness. Musculoskeletal: Normal range of motion. She exhibits no edema, tenderness or deformity. Neurological: She is alert and oriented to person, place, and time. No cranial nerve deficit. Coordination normal.   Skin: Skin is warm and dry. Capillary refill takes less than 2 seconds. No rash noted. She is not diaphoretic. No erythema. No pallor. Psychiatric: She has a normal mood and affect.  Her behavior is normal.   Nursing note and vitals reviewed. Procedures    MDM  Number of Diagnoses or Management Options  Chest pain, unspecified type: new and requires workup  Dyspnea and respiratory abnormalities: new and requires workup  Diagnosis management comments: Differential diagnoses include chest pain, MI, coronary artery disease, acute coronary syndrome, PE, pneumonia, pneumothorax. Amount and/or Complexity of Data Reviewed  Clinical lab tests: reviewed and ordered  Tests in the radiology section of CPT®: ordered and reviewed  Tests in the medicine section of CPT®: ordered and reviewed  Discuss the patient with other providers: yes (Case discussed with . Will admit the patient)  Independent visualization of images, tracings, or specimens: yes (EKG was done at 4:53 PM.  Normal sinus rhythm. Rate of 91. Normal axis. No acute ST-T elevation. No STEMI. When he was compared to the EKG of June 6, 2019 there is no significant changes.)    Risk of Complications, Morbidity, and/or Mortality  Presenting problems: high  Diagnostic procedures: high  Management options: high  General comments: Patient remained stable throughout her course of treatment. Labs were unremarkable except for d-dimer was elevated. Case discussed with . Will admit the patient. Case also discussed with patient and family. They understood and agreed with her management. Labs      Radiology      EKG Interpretation.          --------------------------------------------- PAST HISTORY ---------------------------------------------  Past Medical History:  has a past medical history of Anemia, Arthritis, Cellulitis, Chronic ulcer of leg with fat layer exposed ([address] Chronic ulcer of right leg, limited to breakdown of skin ([address] Lymphedema of lower extremity, Peripheral vision loss, Stroke ([address] and Ulcer of left lower leg, limited to breakdown of skin ([address]     Past Surgical History:  has a past surgical history that includes ECHO Complete 2D W ------------------------- NURSING NOTES AND VITALS REVIEWED ---------------------------  Date / Time Roomed:  6/13/2019  4:44 PM  ED Bed Assignment:  07/07    The nursing notes within the ED encounter and vital signs as below have been reviewed. Patient Vitals for the past 24 hrs:   BP Temp Pulse Resp SpO2 Height Weight   06/13/19 2104 (!) 184/99 -- 84 18 96 % -- --   06/13/19 1643 (!) 177/103 -- -- 16 -- 5' 2\" (1.575 m) 266 lb 12.8 oz (121 kg)   06/13/19 1630 -- 98.3 °F (36.8 °C) 93 -- 94 % -- --       Oxygen Saturation Interpretation: Normal    ------------------------------------------ PROGRESS NOTES ------------------------------------------  Re-evaluation(s):  Time: 9:30 pm  Patients symptoms show no change  Repeat physical examination is not changed    Counseling:  I have spoken with the patient and discussed todays results, in addition to providing specific details for the plan of care and counseling regarding the diagnosis and prognosis. Their questions are answered at this time and they are agreeable with the plan of admission.    --------------------------------- ADDITIONAL PROVIDER NOTES ---------------------------------  Consultations:  Time: 9:25 pm. Spoke with Dr. Judge Chacon. Discussed case. They will admit the patient. This patient's ED course included: a personal history and physicial examination and re-evaluation prior to disposition    This patient has remained hemodynamically stable during their ED course. Diagnosis:  1. Chest pain, unspecified type    2. Dyspnea and respiratory abnormalities        Disposition:  Patient's disposition: Admit to telemetry  Patient's condition is stable.          Lori Anderson DO  06/13/19 1715

## 2019-06-13 NOTE — ED NOTES
Bed: 07  Expected date:   Expected time:   Means of arrival:   Comments:  Meghana Koenig RN  06/13/19 6578

## 2019-06-13 NOTE — CARE COORDINATION
[facility] Transitions Initial Follow Up Call    Call within 2 business days of discharge: Yes    Patient: Erlin Cardenas Patient : 1955   MRN: [id number]  Reason for Admission: Severe sepsis [facility])  Discharge Date: 19 RARS: Readmission Risk Score: 15      Last Discharge [facility]       Complaint Diagnosis Description Type Department Provider    19 Fever Severe sepsis ([facility] . .. ED to Hosp-Admission (Discharged) (ADMITTED) [facility], DO; Mohinder Lopez. .. Spoke with: Damian Albrgiht for courtesy Care Transition follow up. Identified self/role. Facility: [facility]    Non-face-to-face services provided:  Scheduled appointment with PCP-Scheduled appointment with Dr. Katelin Sousa on 2019 at 10:00 AM.   Scheduled appointment with Specialist-Scheduled appointment with Dr. Ac Maguire on 2019 at 02:00 PM.   Obtained and reviewed discharge summary and/or continuity of care documents  Communication with home health agencies or other community services the patient is currently using-[facility] notified of patient discharge. Education of patient/family/caregiver/guardian to support self-management-Reviewed signs/symptoms. Assessment and support for treatment adherence and medication management-Declined medication review, 1111F not completed.      Care Transitions 24 Hour Call    Do you have any ongoing symptoms?:  Yes  Patient-reported symptoms:  Pain, Weakness, Fatigue, Other (Comment: \"Shaky\")  Do you have a copy of your discharge instructions?:  Yes  Do you have all of your prescriptions and are they filled?:  Yes  Have you been contacted by a [facility] Pharmacist?:  No  Have you scheduled your follow up appointment?:  Yes  How are you going to get to your appointment?:  Car - family or friend to transport  Were you discharged with any Home Care or Post Acute Services:  Yes  Post Acute Services:  Home Health (Comment: [facility])  Do you feel like you have everything you need to keep you well at home?:  Yes  Care Transitions Interventions  No Identified Needs       Patient presented to ED on 06/04/2019 with report of AMS and fever. Patient got off work at 0 and was found in her car around 2130 lethargic with temperature of 102.6 and tachycardic. Family reported a fall in 04/2019 to LLE while patient was in the [facility]. PMH includes CVA, anemia, and arthritis. Patient admitted for Severe sepsis ([address] and Cellulitis LLE. Patient returned home with new referral to [facility]. Patient states she \"feels worse than yesterday\". Patient reports she \"hurts everywhere\", weak, and shaky. Patient denies SOB, fever, chills, and N/V/D. Patient states LLE dressing intact with no visible drainage. Patient states she is eating/drinking. Reports one bowel movement since discharge. Patient states she contacted her PCP but the scheduled 06/20/2019 is the earliest she can get in. Encouraged patient to return to the ED, verbalized understanding but states she wants to Valeen Nichols it a day\". Declined medication review. 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 Reviewed upcoming appointments, verbalized understanding. Patient verbalized being upset that [facility] has not contacted her. Notified [facility] of patient's concern and current patient-reported symptoms, they advised they will visit patient today. Re-emphasized importance of monitoring signs/symptoms and when to report to the ED or contact PCP, patient verbalized understanding. Patient denies additional needs or concerns at this time.      Follow Up  Future Appointments   Date Time Provider Sydni More   6/20/2019 10:00 AM DO LC Benavidez [facility]   6/26/2019  2:00 PM Mary Kay Oconnor MD [facility]       Nia Aviles RN  Care Transition Coordinator  (U) [phone]  (T) [phone]

## 2019-06-14 PROBLEM — E66.01 MORBID OBESITY (HCC): Chronic | Status: ACTIVE | Noted: 2019-06-14

## 2019-06-14 PROBLEM — D50.9 MICROCYTIC ANEMIA: Chronic | Status: ACTIVE | Noted: 2019-06-09

## 2019-06-14 PROBLEM — L03.116 CELLULITIS OF LEFT LOWER LEG: Chronic | Status: ACTIVE | Noted: 2019-06-08

## 2019-06-14 PROBLEM — A41.9 SEVERE SEPSIS (HCC): Status: RESOLVED | Noted: 2019-06-05 | Resolved: 2019-06-14

## 2019-06-14 PROBLEM — D69.6 THROMBOCYTOPENIA (HCC): Status: RESOLVED | Noted: 2019-06-09 | Resolved: 2019-06-14

## 2019-06-14 PROBLEM — E87.6 HYPOKALEMIA: Status: RESOLVED | Noted: 2019-06-08 | Resolved: 2019-06-14

## 2019-06-14 PROBLEM — R65.21 SEPTIC SHOCK (HCC): Status: RESOLVED | Noted: 2019-06-06 | Resolved: 2019-06-14

## 2019-06-14 PROBLEM — A41.9 SEPTIC SHOCK (HCC): Status: RESOLVED | Noted: 2019-06-06 | Resolved: 2019-06-14

## 2019-06-14 PROBLEM — R65.20 SEVERE SEPSIS (HCC): Status: RESOLVED | Noted: 2019-06-05 | Resolved: 2019-06-14

## 2019-06-14 PROBLEM — G89.29 CHRONIC PAIN: Chronic | Status: ACTIVE | Noted: 2019-06-14

## 2019-06-14 LAB
FOLATE: 16.2 NG/ML (ref 4.8–24.2)
TROPONIN: <0.01 NG/ML (ref 0–0.03)
TROPONIN: <0.01 NG/ML (ref 0–0.03)
VITAMIN B-12: 241 PG/ML (ref 211–946)

## 2019-06-14 PROCEDURE — G0378 HOSPITAL OBSERVATION PER HR: HCPCS

## 2019-06-14 PROCEDURE — 82746 ASSAY OF FOLIC ACID SERUM: CPT

## 2019-06-14 PROCEDURE — 6360000002 HC RX W HCPCS: Performed by: INTERNAL MEDICINE

## 2019-06-14 PROCEDURE — 99254 IP/OBS CNSLTJ NEW/EST MOD 60: CPT | Performed by: INTERNAL MEDICINE

## 2019-06-14 PROCEDURE — 36415 COLL VENOUS BLD VENIPUNCTURE: CPT

## 2019-06-14 PROCEDURE — 6370000000 HC RX 637 (ALT 250 FOR IP): Performed by: INTERNAL MEDICINE

## 2019-06-14 PROCEDURE — 2580000003 HC RX 258: Performed by: INTERNAL MEDICINE

## 2019-06-14 PROCEDURE — 96372 THER/PROPH/DIAG INJ SC/IM: CPT

## 2019-06-14 PROCEDURE — 84484 ASSAY OF TROPONIN QUANT: CPT

## 2019-06-14 PROCEDURE — 96375 TX/PRO/DX INJ NEW DRUG ADDON: CPT

## 2019-06-14 PROCEDURE — 96376 TX/PRO/DX INJ SAME DRUG ADON: CPT

## 2019-06-14 PROCEDURE — 82607 VITAMIN B-12: CPT

## 2019-06-14 PROCEDURE — 2140000000 HC CCU INTERMEDIATE R&B

## 2019-06-14 PROCEDURE — 99253 IP/OBS CNSLTJ NEW/EST LOW 45: CPT | Performed by: SURGERY

## 2019-06-14 RX ORDER — FERROUS SULFATE 325(65) MG
325 TABLET ORAL 2 TIMES DAILY WITH MEALS
Status: DISCONTINUED | OUTPATIENT
Start: 2019-06-14 | End: 2019-06-15 | Stop reason: HOSPADM

## 2019-06-14 RX ADMIN — Medication 10 ML: at 08:45

## 2019-06-14 RX ADMIN — ROPINIROLE HYDROCHLORIDE 1 MG: 1 TABLET, FILM COATED ORAL at 00:00

## 2019-06-14 RX ADMIN — MORPHINE SULFATE 2 MG: 2 INJECTION, SOLUTION INTRAMUSCULAR; INTRAVENOUS at 05:54

## 2019-06-14 RX ADMIN — ENOXAPARIN SODIUM 40 MG: 40 INJECTION SUBCUTANEOUS at 08:44

## 2019-06-14 RX ADMIN — LEVOFLOXACIN 750 MG: 750 TABLET, FILM COATED ORAL at 08:45

## 2019-06-14 RX ADMIN — ROPINIROLE HYDROCHLORIDE 1 MG: 1 TABLET, FILM COATED ORAL at 20:01

## 2019-06-14 RX ADMIN — HYDROMORPHONE HYDROCHLORIDE 0.5 MG: 1 INJECTION, SOLUTION INTRAMUSCULAR; INTRAVENOUS; SUBCUTANEOUS at 20:01

## 2019-06-14 RX ADMIN — Medication 10 ML: at 00:00

## 2019-06-14 RX ADMIN — TERBINAFINE 250 MG: 250 TABLET ORAL at 08:44

## 2019-06-14 RX ADMIN — HYDROMORPHONE HYDROCHLORIDE 0.5 MG: 1 INJECTION, SOLUTION INTRAMUSCULAR; INTRAVENOUS; SUBCUTANEOUS at 15:34

## 2019-06-14 RX ADMIN — FERROUS SULFATE TAB 325 MG (65 MG ELEMENTAL FE) 325 MG: 325 (65 FE) TAB at 16:33

## 2019-06-14 RX ADMIN — ROPINIROLE HYDROCHLORIDE 1 MG: 1 TABLET, FILM COATED ORAL at 08:44

## 2019-06-14 RX ADMIN — ROPINIROLE HYDROCHLORIDE 1 MG: 1 TABLET, FILM COATED ORAL at 16:33

## 2019-06-14 RX ADMIN — Medication 10 ML: at 15:34

## 2019-06-14 RX ADMIN — Medication 10 ML: at 21:12

## 2019-06-14 RX ADMIN — Medication 10 ML: at 05:54

## 2019-06-14 RX ADMIN — HYDROMORPHONE HYDROCHLORIDE 0.5 MG: 1 INJECTION, SOLUTION INTRAMUSCULAR; INTRAVENOUS; SUBCUTANEOUS at 00:05

## 2019-06-14 RX ADMIN — LINEZOLID 600 MG: 600 TABLET, FILM COATED ORAL at 20:01

## 2019-06-14 RX ADMIN — Medication 10 ML: at 11:08

## 2019-06-14 RX ADMIN — LINEZOLID 600 MG: 600 TABLET, FILM COATED ORAL at 00:00

## 2019-06-14 RX ADMIN — LINEZOLID 600 MG: 600 TABLET, FILM COATED ORAL at 08:44

## 2019-06-14 ASSESSMENT — PAIN DESCRIPTION - PROGRESSION
CLINICAL_PROGRESSION: GRADUALLY WORSENING
CLINICAL_PROGRESSION: NOT CHANGED

## 2019-06-14 ASSESSMENT — PAIN DESCRIPTION - LOCATION
LOCATION: BACK;KNEE

## 2019-06-14 ASSESSMENT — PAIN DESCRIPTION - FREQUENCY
FREQUENCY: CONTINUOUS

## 2019-06-14 ASSESSMENT — PAIN - FUNCTIONAL ASSESSMENT
PAIN_FUNCTIONAL_ASSESSMENT: PREVENTS OR INTERFERES SOME ACTIVE ACTIVITIES AND ADLS

## 2019-06-14 ASSESSMENT — PAIN DESCRIPTION - ONSET
ONSET: ON-GOING

## 2019-06-14 ASSESSMENT — PAIN SCALES - GENERAL
PAINLEVEL_OUTOF10: 7
PAINLEVEL_OUTOF10: 7
PAINLEVEL_OUTOF10: 8
PAINLEVEL_OUTOF10: 5
PAINLEVEL_OUTOF10: 3
PAINLEVEL_OUTOF10: 9
PAINLEVEL_OUTOF10: 0

## 2019-06-14 ASSESSMENT — PAIN DESCRIPTION - PAIN TYPE
TYPE: ACUTE PAIN;CHRONIC PAIN
TYPE: CHRONIC PAIN
TYPE: ACUTE PAIN;CHRONIC PAIN

## 2019-06-14 ASSESSMENT — PAIN DESCRIPTION - ORIENTATION
ORIENTATION: LEFT;RIGHT
ORIENTATION: LEFT;RIGHT

## 2019-06-14 ASSESSMENT — PAIN DESCRIPTION - DESCRIPTORS
DESCRIPTORS: ACHING;DISCOMFORT
DESCRIPTORS: ACHING;DISCOMFORT;CONSTANT
DESCRIPTORS: ACHING;DISCOMFORT

## 2019-06-14 NOTE — CONSULTS
to Admission medications    Medication Sig Start Date End Date Taking? Authorizing Provider   Vitamin D, Ergocalciferol, 2000 units CAPS Take 2,000 Units by mouth daily 1/23/19  Yes Historical Provider, MD   cyclobenzaprine (FLEXERIL) 10 MG tablet Take 10 mg by mouth 2 times daily as needed for Muscle spasms 1/23/19  Yes Historical Provider, MD   furosemide (LASIX) 40 MG tablet Take 40 mg by mouth daily as needed for Other Swelling 1/23/19  Yes Historical Provider, MD   linezolid (ZYVOX) 600 MG tablet Take 1 tablet by mouth every 12 hours for 10 days 6/12/19 6/22/19 Yes Pato Weston MD   levofloxacin (LEVAQUIN) 750 MG tablet Take 1 tablet by mouth daily for 10 days 6/12/19 6/22/19 Yes Pato Weston MD   terbinafine (LAMISIL) 250 MG tablet Take 1 tablet by mouth daily for 7 days 6/13/19 6/20/19 Yes Claudean Bread, DO   miconazole nitrate 2 % OINT Apply topically 2 times daily 6/12/19  Yes Claudean Bread, DO   mineral oil-hydrophilic petrolatum (HYDROPHOR) ointment Apply topically as needed. 6/12/19  Yes Claudean Bread, DO   Liraglutide (VICTOZA SC) Inject 1.8 mg into the skin daily    Yes Historical Provider, MD   rOPINIRole (REQUIP) 1 MG tablet Take 1 mg by mouth 3 times daily    Yes Historical Provider, MD   BELBUCA 450 MCG FILM Take 450 mcg by mouth every 12 hours.  5/23/19   Historical Provider, MD       Current Medications:  Current Facility-Administered Medications   Medication Dose Route Frequency Provider Last Rate Last Dose    HYDROmorphone (DILAUDID) injection 0.5 mg  0.5 mg Intravenous Q4H PRN Cloteal Orvillealphonso Dent, DO   0.5 mg at 06/14/19 1534    ferrous sulfate tablet 325 mg  325 mg Oral BID WC Cloteal Orville Malmer, DO   325 mg at 06/14/19 1633    regadenoson (LEXISCAN) injection 0.4 mg  0.4 mg Intravenous ONCE PRN Rod Pierre MD        levofloxacin [facility]) tablet 750 mg  750 mg Oral Daily Cloteal Orville Malmer, DO   750 mg at 06/14/19 0845    linezolid (ZYVOX) tablet 600 mg  600 mg Oral 2 times per day Stephanie Turcios, DO   600 mg at 06/14/19 0844    rOPINIRole (REQUIP) tablet 1 mg  1 mg Oral TID Berta Berkshire Jailene, DO   1 mg at 06/14/19 1633    terbinafine (LAMISIL) tablet 250 mg  250 mg Oral Daily Berta Mayuri Dent, DO   250 mg at 06/14/19 0844    sodium chloride flush 0.9 % injection 10 mL  10 mL Intravenous 2 times per day Berta Dent, DO   10 mL at 06/14/19 0845    sodium chloride flush 0.9 % injection 10 mL  10 mL Intravenous PRN Berta Messina Jailene, DO   10 mL at 06/14/19 1534    magnesium hydroxide (MILK OF MAGNESIA) 400 MG/5ML suspension 30 mL  30 mL Oral Daily PRN Berta Messina Jailene, DO        ondansetron [facility]) injection 4 mg  4 mg Intravenous Q6H PRN Berta Messina Jailene, DO        enoxaparin (LOVENOX) injection 40 mg  40 mg Subcutaneous Daily Berta Messina Jailene, DO   40 mg at 06/14/19 0844    acetaminophen (TYLENOL) tablet 650 mg  650 mg Oral Q4H PRN Berta Mayuri Dent, DO             Physical Exam:  BP (!) 170/94 Comment: manual  Pulse 82   Temp 97.3 °F (36.3 °C) (Temporal)   Resp 18   Ht 5' 2\" (1.575 m)   Wt 256 lb (116.1 kg)   SpO2 95%   BMI 46.82 kg/m²   Wt Readings from Last 3 Encounters:   06/13/19 256 lb (116.1 kg)   06/12/19 266 lb 12.8 oz (121 kg)   03/06/19 246 lb (111.6 kg)     Appearance: Awake, alert, no acute respiratory distress  Skin: +LLE erythema/+wound  Head: Normocephalic, atraumatic  Eyes: EOMI, no conjunctival erythema  ENMT: No pharyngeal erythema, MMM, no rhinorrhea  Neck: Supple, no carotid bruits  Lungs: Clear to auscultation bilaterally. No wheezes, rales, or rhonchi.   Cardiac: Regular rate and rhythm, +S1S2, no murmurs apparent  Abdomen: Soft, nontender, +bowel sounds  Extremities: Moves all extremities x 4, +lower extremity edema  Neurologic: No focal motor deficits apparent, normal mood and affect    Intake/Output:    Intake/Output Summary (Last 24 hours) at 6/14/2019 1704  Last data filed at 6/14/2019 1432  Gross per 24 hour   Intake 2120 ml   Output 200 ml   Net 1920 ml     No intake/output data recorded. Laboratory Tests:  Recent Labs     06/12/19  0513 06/13/19  1711    142   K 4.3 4.4    102   CO2 30* 30*   BUN 13 9   CREATININE 0.6 0.5   GLUCOSE 118* 95   CALCIUM 8.6 8.7     Lab Results   Component Value Date    MG 2.1 06/13/2019     Recent Labs     06/13/19  1711   ALKPHOS 101   ALT 13   AST 17   PROT 7.0   BILITOT 0.6   LABALBU 3.2*     Recent Labs     06/12/19  0513 06/13/19  1711   WBC 7.5 8.2   RBC 4.06 4.32   HGB 7.9* 8.5*   HCT 28.6* 29.6*   MCV 70.4* 68.5*   MCH 19.5* 19.7*   MCHC 27.6* 28.7*   RDW 21.6* 21.9*    247   MPV 11.4 NOT CALC     Lab Results   Component Value Date    TROPONINI <0.01 06/14/2019    TROPONINI <0.01 06/14/2019    TROPONINI <0.01 06/13/2019     No results found for: INR, PROTIME  Lab Results   Component Value Date    TSH 1.160 02/09/2016     Lab Results   Component Value Date    LABA1C 5.4 06/09/2019     No results found for: EAG  Lab Results   Component Value Date    CHOL 148 03/06/2019    CHOL 157 04/21/2018    CHOL 153 03/27/2017     Lab Results   Component Value Date    TRIG 65 03/06/2019    TRIG 142 04/21/2018    TRIG 119 03/27/2017     Lab Results   Component Value Date    HDL 54 03/06/2019    HDL 40 04/21/2018    HDL 43 03/27/2017     Lab Results   Component Value Date    LDLCALC 81 03/06/2019    LDLCALC 89 04/21/2018    LDLCALC 86 03/27/2017     Lab Results   Component Value Date    LABVLDL 22 08/12/2016    LABVLDL 32 07/14/2014     No results found for: CHOLHDLRATIO  Recent Labs     06/13/19  1711   PROBNP 601*       Cardiac Tests:  ECG: SR, PRWP, NSSTT changes    Telemetry findings reviewed: SR, rate 80's    ASSESSMENT / PLAN:  1. Chest pain -- currently CP free, negative troponin x 3  2. Elevated d-dimer --> no PE on CTA chest  3. LE lymphedema  4. Recent evaluation/treatment of LLE cellulitis/sepsis  5. BMI 46.8  6. Tobacco abuse -- 0.5-1 PPD since age 20's, recently quit  7.  Chronic back pain/LLE arthritis  8. Chronic anemia -- most recent Hgb 8.5 (stable)    - NPO after midnight  - Lexiscan nuclear stress test tomorrow  - Aggressive risk factor modifications / continued tobacco cessation    Thank you for allowing me to participate in your patient's care. Please feel free to contact me if you have any questions or concerns.     Mark Mac MD  [facility]) Cardiology

## 2019-06-14 NOTE — ED NOTES
Central Monitoring@  2242  Spoke with:  Bill Chiacs   - notified of room change     Newton Franco  06/13/19 224

## 2019-06-14 NOTE — CONSULTS
750 mg Oral Daily    linezolid  600 mg Oral 2 times per day    rOPINIRole  1 mg Oral TID    terbinafine  250 mg Oral Daily    sodium chloride flush  10 mL Intravenous 2 times per day    enoxaparin  40 mg Subcutaneous Daily     Continuous Infusions:  PRN Meds:HYDROmorphone, sodium chloride flush, magnesium hydroxide, ondansetron, acetaminophen    Allergies:  Patient has no known allergies. Social History:   Social History     Socioeconomic History    Marital status:      Spouse name: None    Number of children: None    Years of education: None    Highest education level: None   Occupational History    None   Social Needs    Financial resource strain: None    Food insecurity:     Worry: None     Inability: None    Transportation needs:     Medical: None     Non-medical: None   Tobacco Use    Smoking status: Former Smoker     Packs/day: 0.50     Years: 38.00     Pack years: 19.00     Types: Cigarettes     Last attempt to quit: 2019     Years since quittin.0    Smokeless tobacco: Never Used   Substance and Sexual Activity    Alcohol use: No    Drug use: No    Sexual activity: Not Currently   Lifestyle    Physical activity:     Days per week: None     Minutes per session: None    Stress: None   Relationships    Social connections:     Talks on phone: None     Gets together: None     Attends Jain service: None     Active member of club or organization: None     Attends meetings of clubs or organizations: None     Relationship status: None    Intimate partner violence:     Fear of current or ex partner: None     Emotionally abused: None     Physically abused: None     Forced sexual activity: None   Other Topics Concern    None   Social History Narrative    None       Family History:       Problem Relation Age of Onset    Breast Cancer Mother     Stroke Father     Hypertension Sister     Hypertension Brother    . Otherwise non-pertinent to the chief complaint.     REVIEW OF SYSTEMS:    14 ROS negative unless otherwise specified in the HPI    PHYSICAL EXAM:    Vitals:    BP (!) 170/94 Comment: manual  Pulse 82   Temp 97.3 °F (36.3 °C) (Temporal)   Resp 18   Ht 5' 2\" (1.575 m)   Wt 256 lb (116.1 kg)   SpO2 95%   BMI 46.82 kg/m²     General Appearance:    Alert, cooperative, no distress, appears stated age   Head:    Normocephalic, without obvious abnormality, atraumatic   Eyes:    PERRL, conjunctiva/corneas clear      Ears:    Normal and external ear canals, both ears   Nose:   Nares normal, septum midline, mucosa normal, no drainage    or sinus tenderness   Throat:   Lips, mucosa, and tongue normal; teeth and gums normal   Neck:   Supple, trachea midline, no adenopathy; no JVD   Lungs:     Clear to auscultation bilaterally, respirations unlabored   Chest wall:    No tenderness or deformity   Heart:    Regular rate and rhythm, S1 and S2 normal, no murmur, rub   or gallop   Abdomen:     Soft, non-tender, bowel sounds active all four quadrants,     no masses, no organomegaly     Extremities:   LLE redness with warmth up to midcalf with scabbed wound in the middle   Pulses:   2+ and symmetric all extremities   Skin:   Skin color, texture, turgor normal, no rashes or lesions  Right IJ TLC          CBC+dif:  Reviewed and trend followed     Radiology:  Noted     Microbiology:  Pending     Assessment:  · LLE cellulitis with chronic wound   · lymphedema of bilateral lower extremities  · Chest Pain rule out ACS    Plan:    · Continue oral zyvox and levaquin for 10 days as planned before  · F/u with me in 2 weeks  · Monitor labs        Thank you for having us see this patient in consultation. I will be discussing this case with the treating physicians.     Electronically signed by Aixa Herrera MD on 6/14/2019 at 5:02 PM

## 2019-06-14 NOTE — PROGRESS NOTES
Message sent to [facility] NP with Dr. Jaswant Myers via perfect serve regarding pt wondering if she was going to have a stress today.

## 2019-06-14 NOTE — DISCHARGE SUMMARY
Physician Discharge Summary     Patient ID:  Trae Wynn  [id number]  59 y.o.  1955    Admit date: 6/4/2019    Discharge date and time: 6/12/2019  5:30 PM     Admission Diagnoses: Sepsis ([address] [A41.9]  Sepsis ([address] [A41.9]  Severe sepsis ([address] [A41.9, R65.20]  Severe sepsis ([address] [A41.9, R65.20]    Discharge Diagnoses:          1. Sepsis with septic shock due to left leg cellulitis     2. Cellulitis left leg     3. Diabetes mellitus type II, uncontrolled     4. Chronic back pain        Consults: ID, general surgery and vascular surgery    Procedures: none    Laboratory:   Last 3 BMP  Recent Labs     06/12/19  0513 06/13/19  1711    142   K 4.3 4.4    102   CO2 30* 30*   BUN 13 9   CREATININE 0.6 0.5   GLUCOSE 118* 95   CALCIUM 8.6 8.7       Last 3 CBC:  Recent Labs     06/11/19  0445 06/12/19  0513 06/13/19  1711   WBC 8.8 7.5 8.2   RBC 3.79 4.06 4.32   HGB 7.4* 7.9* 8.5*   HCT 26.1* 28.6* 29.6*   MCV 68.9* 70.4* 68.5*   MCH 19.5* 19.5* 19.7*   MCHC 28.4* 27.6* 28.7*   RDW 21.2* 21.6* 21.9*    182 247   MPV NOT CALC 11.4 NOT CALC           Hospital Course: The patient is a 59 y.o. female patient of Dr Wen Baca who presents with AMS and fever. Paramedics were called after the patient was found in her car after work lethargic. She is unable to fever 1-2.6 and was tachycardic. Her blood glucose was 110. According to her daughter she fell while in the [facility] in April and had a wound on her left leg. The wound was draining and she self medicated with an adhesive dressing, had been oozing. She did not seek medical attention. In the emergency room she was lethargic and weak and tachycardic and had a markedly swollen erythematous left leg consistent with severe cellulitis. Cultures were obtained and she was given fluid bolus and placed on. Broad-spectrum antibiotics.  She had a marked leukocytosis and her blood pressure was marginal. She was rcently placed on vasopressors and admitted to intensive care.       Improved with antibiotics and local wound care; seen by ID and vascular surgery; DVT rued out; discharged home on oral antibiotics with local wound care and dressing charges    Discharge Exam:  See progress note from today    Disposition: home    Patient Instructions:   Discharge Medication List as of 6/12/2019  5:01 PM      START taking these medications    Details   !! linezolid (ZYVOX) 600 MG tablet Take 1 tablet by mouth every 12 hours for 10 days, Disp-20 tablet, R-0Normal      levofloxacin (LEVAQUIN) 750 MG tablet Take 1 tablet by mouth daily for 10 days, Disp-10 tablet, R-0Normal      terbinafine (LAMISIL) 250 MG tablet Take 1 tablet by mouth daily for 7 days, Disp-7 tablet, R-0Normal      miconazole nitrate 2 % OINT Apply topically 2 times daily, Topical, 2 TIMES DAILY Starting Wed 6/12/2019, Disp-1 Tube, R-1, Normal      mineral oil-hydrophilic petrolatum (HYDROPHOR) ointment Apply topically as needed. , Disp-1 Tube, R-1, Normal      !! linezolid (ZYVOX) 600 MG tablet Take 1 tablet by mouth 2 times daily for 10 days, Disp-20 tablet, R-0Print       !! - Potential duplicate medications found. Please discuss with provider. CONTINUE these medications which have NOT CHANGED    Details   fentaNYL (DURAGESIC) 25 MCG/HR Place 1 patch onto the skin every 72 hours      Liraglutide (VICTOZA SC) Inject into the skin      rOPINIRole (REQUIP) 1 MG tablet Take 1 mg by mouth      HYDROcodone-acetaminophen (NORCO) 5-325 MG per tablet Take 1 tablet by mouth every 6 hours as needed for Pain. therapeutic multivitamin-minerals (THERAGRAN-M) tablet Take 1 tablet by mouth daily. ferrous sulfate 325 (65 FE) MG tablet Take 325 mg by mouth daily (with breakfast). Gabapentin (NEURONTIN PO)   Take 300 mg by mouth 3 times daily       Cyclobenzaprine HCl (FLEXERIL PO) Take 10 mg by mouth 2 times daily.          STOP taking these medications       butenafine (LOTRIMIN ULTRA) 1 % CREA Comments: Reason for Stopping:         potassium chloride (MICRO-K) 10 MEQ CR capsule Comments:   Reason for Stopping:         furosemide (LASIX) 20 MG tablet Comments:   Reason for Stopping:             Activity: ambulate in house  Diet: regular diet    Follow-up with Dr Katelin Sousa in 1 week. Note that over 30 minutes was spent in preparing discharge papers, discussing discharge with patient, medication review, etc.    Signed:  BONITA Soler  6/13/2019  8:25 PM

## 2019-06-14 NOTE — CONSULTS
([facility] 2016    Lymphedema of lower extremity 2015    Peripheral vision loss     Stroke [facility])     Ulcer of left lower leg, limited to breakdown of skin ([facility] 6/10/2019        Past Surgical History:   Procedure Laterality Date    ABDOMINOPLASTY  2002    BREAST REDUCTION SURGERY      reduction    CATARACT REMOVAL      bilateral     SECTION      x2    COLONOSCOPY  2012    and egd    ECHOCARDIOGRAM COMPLETE 2D W DOPPLER W COLOR  2012         GASTRIC BYPASS SURGERY  2000    HERNIA REPAIR           Current Medications:      HYDROmorphone, sodium chloride flush, magnesium hydroxide, ondansetron, acetaminophen    ferrous sulfate  325 mg Oral BID WC    levofloxacin  750 mg Oral Daily    linezolid  600 mg Oral 2 times per day    rOPINIRole  1 mg Oral TID    terbinafine  250 mg Oral Daily    sodium chloride flush  10 mL Intravenous 2 times per day    enoxaparin  40 mg Subcutaneous Daily      Allergies:  Patient has no known allergies.   Social History     Socioeconomic History    Marital status:      Spouse name: Not on file    Number of children: Not on file    Years of education: Not on file    Highest education level: Not on file   Occupational History    Not on file   Social Needs    Financial resource strain: Not on file    Food insecurity:     Worry: Not on file     Inability: Not on file    Transportation needs:     Medical: Not on file     Non-medical: Not on file   Tobacco Use    Smoking status: Former Smoker     Packs/day: 0.50     Years: 38.00     Pack years: 19.00     Types: Cigarettes     Last attempt to quit: 2019     Years since quittin.0    Smokeless tobacco: Never Used   Substance and Sexual Activity    Alcohol use: No    Drug use: No    Sexual activity: Not Currently   Lifestyle    Physical activity:     Days per week: Not on file     Minutes per session: Not on file    Stress: Not on file   Relationships    Social connections: Talks on phone: Not on file     Gets together: Not on file     Attends Mormonism service: Not on file     Active member of club or organization: Not on file     Attends meetings of clubs or organizations: Not on file     Relationship status: Not on file    Intimate partner violence:     Fear of current or ex partner: Not on file     Emotionally abused: Not on file     Physically abused: Not on file     Forced sexual activity: Not on file   Other Topics Concern    Not on file   Social History Narrative    Not on file     Family History   Problem Relation Age of Onset    Breast Cancer Mother     Stroke Father     Hypertension Sister     Hypertension Brother      PHYSICAL EXAM:    BP (!) 170/94 Comment: manual  Pulse 82   Temp 97.3 °F (36.3 °C) (Temporal)   Resp 18   Ht 5' 2\" (1.575 m)   Wt 256 lb (116.1 kg)   SpO2 95%   BMI 46.82 kg/m²   CONSTITUTIONAL:   Awake, alert, cooperative  PSYCHIATRIC :  Oriented to time, place and person      Appropriate insight to disease process  EYES: Lids and lashes normal  ENT:  External ears and nose without lesions   Hearing deficits not noted  NECK: Supple, symmetrical, trachea midline   Thyroid goiter not appreciated  LUNGS:  No increased work of breathing                 Good respiratory excursion  CARDIOVASCULAR:  regular rate and rhythm   ABDOMEN:  soft, non-distended, non-tender   Aorta is not palpable  Lymphatics : Cervical lymphadenopathy not noted     Femoral lymphadenopathy not noted  SKIN:   abNormal skin color of left calf - cellulitis   Texture and turgor abnormal, + induration  EXTREMITIES:   R UE 5/5 strength   No cyanosis noted in nail beds  L UE 5/5 strength   No cyanosis noted in nail beds  R LE Edema present   Open wounds absent   L LE Edema present   Open wound absent  Lymphedema bilaterally, L > R, cellulitis of the left calf  R posterior tibial 2+ L posterior tibial 2+   R dorsalis pedis 2+ L dorsalis pedis 2+     LABS:    Lab Results Component Value Date    WBC 8.2 06/13/2019    HGB 8.5 (L) 06/13/2019    HCT 29.6 (L) 06/13/2019     06/13/2019    K 4.4 06/13/2019    BUN 9 06/13/2019    CREATININE 0.5 06/13/2019     Radiology pertinent to vascular surgery evaluation reviewed    Assesment/Plan  Bilateral LE edema, swelling  B/L LE lymphedema  Trunkal Edema  L Calf celluiltis  · Continue abx per id  · Calf cellulitis has improved but is still present  · Pain is currently controlled per pt report  · She has no evidence of arterial occlusive disease on exam  · She has no evidence of DVT of the L LE on previous venous us  · Etiology is likely associated with lymphedema, venous reflux  · I explained to them the pathophysiology of lymphedema, venous reflux and the symptoms associated with it including swelling, pain, edema, heaviness, and even ulceration  · Elevate Bilateral LE while in bed or sitting  · Call if patient develops worsening of swelling or wounds  · All ?s answered    Jourdan Carnes

## 2019-06-15 ENCOUNTER — APPOINTMENT (OUTPATIENT)
Dept: NUCLEAR MEDICINE | Age: 64
DRG: 313 | End: 2019-06-15
Payer: COMMERCIAL

## 2019-06-15 VITALS
WEIGHT: 256 LBS | DIASTOLIC BLOOD PRESSURE: 98 MMHG | HEIGHT: 62 IN | TEMPERATURE: 97.5 F | RESPIRATION RATE: 17 BRPM | SYSTOLIC BLOOD PRESSURE: 161 MMHG | OXYGEN SATURATION: 96 % | BODY MASS INDEX: 47.11 KG/M2 | HEART RATE: 98 BPM

## 2019-06-15 LAB
EKG ATRIAL RATE: 91 BPM
EKG P AXIS: 56 DEGREES
EKG P-R INTERVAL: 172 MS
EKG Q-T INTERVAL: 348 MS
EKG QRS DURATION: 74 MS
EKG QTC CALCULATION (BAZETT): 428 MS
EKG R AXIS: 21 DEGREES
EKG T AXIS: 33 DEGREES
EKG VENTRICULAR RATE: 91 BPM
LV EF: 79 %
LVEF MODALITY: NORMAL

## 2019-06-15 PROCEDURE — 2580000003 HC RX 258: Performed by: INTERNAL MEDICINE

## 2019-06-15 PROCEDURE — 3430000000 HC RX DIAGNOSTIC RADIOPHARMACEUTICAL: Performed by: RADIOLOGY

## 2019-06-15 PROCEDURE — 99232 SBSQ HOSP IP/OBS MODERATE 35: CPT | Performed by: INTERNAL MEDICINE

## 2019-06-15 PROCEDURE — A9500 TC99M SESTAMIBI: HCPCS | Performed by: RADIOLOGY

## 2019-06-15 PROCEDURE — G0378 HOSPITAL OBSERVATION PER HR: HCPCS

## 2019-06-15 PROCEDURE — 93018 CV STRESS TEST I&R ONLY: CPT | Performed by: INTERNAL MEDICINE

## 2019-06-15 PROCEDURE — 6360000002 HC RX W HCPCS: Performed by: INTERNAL MEDICINE

## 2019-06-15 PROCEDURE — 93016 CV STRESS TEST SUPVJ ONLY: CPT | Performed by: INTERNAL MEDICINE

## 2019-06-15 PROCEDURE — 93010 ELECTROCARDIOGRAM REPORT: CPT | Performed by: INTERNAL MEDICINE

## 2019-06-15 PROCEDURE — 96376 TX/PRO/DX INJ SAME DRUG ADON: CPT

## 2019-06-15 PROCEDURE — 96372 THER/PROPH/DIAG INJ SC/IM: CPT

## 2019-06-15 PROCEDURE — 78452 HT MUSCLE IMAGE SPECT MULT: CPT

## 2019-06-15 PROCEDURE — 6370000000 HC RX 637 (ALT 250 FOR IP): Performed by: INTERNAL MEDICINE

## 2019-06-15 PROCEDURE — 93017 CV STRESS TEST TRACING ONLY: CPT

## 2019-06-15 RX ADMIN — LINEZOLID 600 MG: 600 TABLET, FILM COATED ORAL at 12:49

## 2019-06-15 RX ADMIN — HYDROMORPHONE HYDROCHLORIDE 0.5 MG: 1 INJECTION, SOLUTION INTRAMUSCULAR; INTRAVENOUS; SUBCUTANEOUS at 05:20

## 2019-06-15 RX ADMIN — REGADENOSON 0.4 MG: 0.08 INJECTION, SOLUTION INTRAVENOUS at 10:51

## 2019-06-15 RX ADMIN — ONDANSETRON HYDROCHLORIDE 4 MG: 2 SOLUTION INTRAMUSCULAR; INTRAVENOUS at 13:27

## 2019-06-15 RX ADMIN — LEVOFLOXACIN 750 MG: 750 TABLET, FILM COATED ORAL at 12:48

## 2019-06-15 RX ADMIN — TERBINAFINE 250 MG: 250 TABLET ORAL at 12:48

## 2019-06-15 RX ADMIN — Medication 12 MILLICURIE: at 09:05

## 2019-06-15 RX ADMIN — FERROUS SULFATE TAB 325 MG (65 MG ELEMENTAL FE) 325 MG: 325 (65 FE) TAB at 12:49

## 2019-06-15 RX ADMIN — HYDROMORPHONE HYDROCHLORIDE 0.5 MG: 1 INJECTION, SOLUTION INTRAMUSCULAR; INTRAVENOUS; SUBCUTANEOUS at 09:47

## 2019-06-15 RX ADMIN — ROPINIROLE HYDROCHLORIDE 1 MG: 1 TABLET, FILM COATED ORAL at 14:08

## 2019-06-15 RX ADMIN — Medication 34 MILLICURIE: at 10:59

## 2019-06-15 RX ADMIN — Medication 10 ML: at 05:20

## 2019-06-15 RX ADMIN — ENOXAPARIN SODIUM 40 MG: 40 INJECTION SUBCUTANEOUS at 12:48

## 2019-06-15 RX ADMIN — HYDROMORPHONE HYDROCHLORIDE 0.5 MG: 1 INJECTION, SOLUTION INTRAMUSCULAR; INTRAVENOUS; SUBCUTANEOUS at 14:04

## 2019-06-15 RX ADMIN — HYDROMORPHONE HYDROCHLORIDE 0.5 MG: 1 INJECTION, SOLUTION INTRAMUSCULAR; INTRAVENOUS; SUBCUTANEOUS at 00:11

## 2019-06-15 ASSESSMENT — PAIN DESCRIPTION - FREQUENCY
FREQUENCY: CONTINUOUS
FREQUENCY: CONTINUOUS

## 2019-06-15 ASSESSMENT — PAIN DESCRIPTION - ONSET
ONSET: ON-GOING
ONSET: ON-GOING

## 2019-06-15 ASSESSMENT — PAIN DESCRIPTION - ORIENTATION
ORIENTATION: LEFT;RIGHT
ORIENTATION: RIGHT;LEFT;LOWER
ORIENTATION: RIGHT;LEFT

## 2019-06-15 ASSESSMENT — PAIN SCALES - GENERAL
PAINLEVEL_OUTOF10: 5
PAINLEVEL_OUTOF10: 8
PAINLEVEL_OUTOF10: 0
PAINLEVEL_OUTOF10: 0
PAINLEVEL_OUTOF10: 10
PAINLEVEL_OUTOF10: 7
PAINLEVEL_OUTOF10: 10
PAINLEVEL_OUTOF10: 8
PAINLEVEL_OUTOF10: 10

## 2019-06-15 ASSESSMENT — PAIN DESCRIPTION - PROGRESSION
CLINICAL_PROGRESSION: NOT CHANGED
CLINICAL_PROGRESSION: NOT CHANGED

## 2019-06-15 ASSESSMENT — PAIN DESCRIPTION - PAIN TYPE
TYPE: ACUTE PAIN;CHRONIC PAIN

## 2019-06-15 ASSESSMENT — PAIN DESCRIPTION - LOCATION
LOCATION: BACK;LEG
LOCATION: BACK;KNEE

## 2019-06-15 ASSESSMENT — PAIN DESCRIPTION - DESCRIPTORS
DESCRIPTORS: ACHING;DISCOMFORT
DESCRIPTORS: ACHING;CONSTANT;DISCOMFORT

## 2019-06-15 NOTE — PROGRESS NOTES
PROGRESS  NOTE -- INTERNAL AND GERIATRIC MEDICINE  Nguyen Levy MD, [facility] coverage initial episode:  Admitted with chest pain ans sob-in context of ongoing treatment for cellulitis of the LLEXT    SUBJECTIVE:  Ailny Dawkins  is alert, awake, and cooperative. Denies chest pain, nausea, dyspnea, emesis. No abdominal pain. Tolerating diet. ROS:  Negative to a 10 system review except that mentioned in the HPI. Pertinent items are noted in HPI.          DIET:Diet NPO, After Midnight Exceptions are: Sips with Meds    No Known Allergies    MEDICATION SIDE EFFECTS:none    SCHEDULED MEDS:   Current Facility-Administered Medications   Medication Dose Route Frequency Provider Last Rate Last Dose    HYDROmorphone (DILAUDID) injection 0.5 mg  0.5 mg Intravenous Q4H PRN Ruthy Dent, DO   0.5 mg at 06/15/19 6621    ferrous sulfate tablet 325 mg  325 mg Oral BID WC Ruthy Dent, DO   325 mg at 06/14/19 1633    regadenoson (LEXISCAN) injection 0.4 mg  0.4 mg Intravenous ONCE PRN Rex Alarcon MD        levofloxacin [facility]) tablet 750 mg  750 mg Oral Daily Ruthy Dent, DO   750 mg at 06/14/19 0845    linezolid (ZYVOX) tablet 600 mg  600 mg Oral 2 times per day Cespedesair Hashimoto, DO   600 mg at 06/14/19 2001    rOPINIRole (REQUIP) tablet 1 mg  1 mg Oral TID Ruthy Dent, DO   1 mg at 06/14/19 2001    terbinafine (LAMISIL) tablet 250 mg  250 mg Oral Daily Ruthy Dent, DO   250 mg at 06/14/19 0844    sodium chloride flush 0.9 % injection 10 mL  10 mL Intravenous 2 times per day Ruthy Dent, DO   10 mL at 06/14/19 2112    sodium chloride flush 0.9 % injection 10 mL  10 mL Intravenous PRN Ruthy Dent, DO   10 mL at 06/15/19 0520    magnesium hydroxide (MILK OF MAGNESIA) 400 MG/5ML suspension 30 mL  30 mL Oral Daily PRN Ruthy Dent, DO        ondansetron [facility] injection 4 mg  4 mg Intravenous Q6H PRN Deryl Pale Malmer, DO        enoxaparin (LOVENOX) injection 40 mg  40 mg Subcutaneous Daily Deryl Pale Malmer, DO   40 mg at 06/14/19 9720    acetaminophen (TYLENOL) tablet 650 mg  650 mg Oral Q4H PRN Akiko Peaec DO           I have reviewed the patient's medications; see Medication Reconciliation. Current  Infusions    Prn MedsHYDROmorphone, regadenoson, sodium chloride flush, magnesium hydroxide, ondansetron, acetaminophen     OBJECTIVE:  Wt Readings from Last 3 Encounters:   06/13/19 256 lb (116.1 kg)   06/12/19 266 lb 12.8 oz (121 kg)   03/06/19 246 lb (111.6 kg)     Temp Readings from Last 3 Encounters:   06/15/19 97 °F (36.1 °C) (Temporal)   06/12/19 98.3 °F (36.8 °C) (Temporal)   08/17/15 98.6 °F (37 °C) (Oral)     BP Readings from Last 3 Encounters:   06/15/19 (!) 145/70   06/12/19 (!) 156/86   08/17/15 118/70     Pulse Readings from Last 3 Encounters:   06/15/19 94   06/12/19 86   05/13/16 68     @LASTSAO2(3)@      Intake/Output Summary (Last 24 hours) at 6/15/2019 0805  Last data filed at 6/15/2019 0521  Gross per 24 hour   Intake 480 ml   Output 1100 ml   Net -620 ml       Weight:   Wt Readings from Last 3 Encounters:   06/13/19 256 lb (116.1 kg)   06/12/19 266 lb 12.8 oz (121 kg)   03/06/19 246 lb (111.6 kg)     Current Inpatient Medications:   ferrous sulfate  325 mg Oral BID WC    levofloxacin  750 mg Oral Daily    linezolid  600 mg Oral 2 times per day    rOPINIRole  1 mg Oral TID    terbinafine  250 mg Oral Daily    sodium chloride flush  10 mL Intravenous 2 times per day    enoxaparin  40 mg Subcutaneous Daily       IV Infusions (if any):        General appearance: alert, appears stated age and cooperative  Skin: Skin color, texture, turgor normal. No rashes or lesions  HEENT: Head: Normocephalic, no lesions, without obvious abnormality.   Neck: no adenopathy, no carotid bruit, no JVD, supple, symmetrical, trachea midline and thyroid not enlarged, symmetric, no tenderness/mass/nodules  Lungs: clear to auscultation bilaterally  Heart: regular rate and rhythm, S1, S2 normal, no murmur, click, rub or gallop  Abdomen: soft, non-tender; bowel sounds normal; no masses,  no organomegaly  Extremities: edema-induration-swelling of the LLEXT with small anterior wound  Neurologic:  Alert, oriented, thought content appropriate    CBC:   Lab Results   Component Value Date    WBC 8.2 06/13/2019    RBC 4.32 06/13/2019    HGB 8.5 06/13/2019    HCT 29.6 06/13/2019    MCV 68.5 06/13/2019    MCH 19.7 06/13/2019    MCHC 28.7 06/13/2019    RDW 21.9 06/13/2019     06/13/2019    MPV NOT CALC 06/13/2019     CMP:    Lab Results   Component Value Date     06/13/2019    K 4.4 06/13/2019    K 3.3 06/08/2019     06/13/2019    CO2 30 06/13/2019    BUN 9 06/13/2019    CREATININE 0.5 06/13/2019    GFRAA >60 06/13/2019    LABGLOM >60 06/13/2019    GLUCOSE 95 06/13/2019    PROT 7.0 06/13/2019    LABALBU 3.2 06/13/2019    CALCIUM 8.7 06/13/2019    BILITOT 0.6 06/13/2019    ALKPHOS 101 06/13/2019    AST 17 06/13/2019    ALT 13 06/13/2019       DIAGNOSTIC/ LABORATORY DATA:  Labs:   CBC:   Recent Labs     06/13/19  1711   WBC 8.2   HGB 8.5*   HCT 29.6*        BMP:   Recent Labs     06/13/19  1711      K 4.4   CO2 30*   BUN 9   CREATININE 0.5   LABGLOM >60   CALCIUM 8.7     Mag:   Recent Labs     06/13/19  1711   MG 2.1     Phos: No results for input(s): PHOS in the last 72 hours. TSH: No results for input(s): TSH in the last 72 hours. HgA1c:   Lab Results   Component Value Date    LABA1C 5.4 06/09/2019     No results found for: EAG    BNP: No results for input(s): BNP in the last 72 hours. PT/INR: No results for input(s): PROTIME, INR in the last 72 hours. APTT:No results for input(s): APTT in the last 72 hours.   CARDIAC ENZYMES:  Recent Labs     06/13/19  1711 06/14/19  0002 06/14/19  0545   TROPONINI <0.01 <0.01 <0.01     FASTING LIPID PANEL:  Lab Results Component Value Date    CHOL 148 03/06/2019    HDL 54 03/06/2019    LDLCALC 81 03/06/2019    TRIG 65 03/06/2019     LIVER PROFILE:  Recent Labs     06/13/19  1711   AST 17   ALT 13   LABALBU 3.2*       TELEMETRY: REVIEWED--Telemetry: Sinus    RADIOLOGY: REVIEWED AVAILABLE REPORT  CTA CHEST W CONTRAST   Final Result      1. NORMAL CT OF THE THORAX WITH CTA OF THE PULMONARY ARTERIES WITH   CONTRAST ENHANCEMENT. 2. NO EVIDENCE OF PULMONARY EMBOLUS. 3. Cardiomegaly         XR CHEST STANDARD (2 VW)   Final Result   NO ACUTE CARDIOPULMONARY PROCESS          NM Cardiac Stress Test Nuclear Imaging    (Results Pending)       CONSULTANT NOTES AND RECOMMENDATIONS REVIEWED:  Vascular Assesment/Plan:  Bilateral LE edema, swelling  B/L LE lymphedema  Trunkal Edema  L Calf celluiltis  · Continue abx per id  · Calf cellulitis has improved but is still present  · Pain is currently controlled per pt report  · She has no evidence of arterial occlusive disease on exam  · She has no evidence of DVT of the L LE on previous venous us    ID Assessment:  · LLE cellulitis with chronic wound   · lymphedema of bilateral lower extremities  · Chest Pain rule out ACS     Plan:    · Continue oral zyvox and levaquin for 10 days as planned before  · F/u with me in 2 weeks  · Monitor labs    Cardiology ASSESSMENT / PLAN:  1. Chest pain -- currently CP free, negative troponin x 3  2. Elevated d-dimer --> no PE on CTA chest  3. LE lymphedema  4. Recent evaluation/treatment of LLE cellulitis/sepsis  5. BMI 46.8  6. Tobacco abuse -- 0.5-1 PPD since age 20's, recently quit  7. Chronic back pain/LLE arthritis  8.  Chronic anemia -- most recent Hgb 8.5 (stable)     - NPO after midnight  - Lexiscan nuclear stress test tomorrow  - Aggressive risk factor modifications / continued tobacco cessation      ASSESSMENT:  Patient Active Problem List   Diagnosis    Back pain    Osteoarthritis of left knee    Osteoarthritis of right knee    BMI 45.0-49.9, adult ([facility]    Lymphedema of both lower extremities    Cellulitis of left lower leg    Microcytic anemia    Ulcer of left lower leg, limited to breakdown of skin (HCC)    Chest pain    Morbid obesity (HCC)    Chronic pain    Tobacco abuse    SOB (shortness of breath)       PLAN:  If stress and cardiac assessment negative may discharge  Discussed with patient and nursing.     See  Orders  Xi Sam  [employer], [employer]  8:05 AM  6/15/2019

## 2019-06-15 NOTE — PROGRESS NOTES
INPATIENT CARDIOLOGY FOLLOW-UP    Name: Gagan Lamb    Age: 59 y.o. Date of Service: 6/15/2019    Chief Complaint: Follow-up for chest pain    Referring Physician: Lexi Seay DO    History of Present Illness:  Gagan Lamb is a 59 y.o. female (new to [facility] -- Dr. Henrietta Sellers) who presented on 6/13/19 for further evaluation of chest pain. Her PMH is outlined in detail below (see A/P below), and includes recent evaluation for LLE cellulitis/sepsis. Her functional capacity is limited by chronic back pain and LE arthritis. Prior to presenting complaint, she denies recent chest pain or worsening SOB. No palpitations, PND, or orthopnea. She experienced chest pain in the evening on 6/13/19 --> mid-sternal/left-sided, \"pain\", episode last > 30 minutes, no particular relationship to exertion, +associated SOB, no prior similar episodes, currently CP free. She is currently with no active cardiac complaints at rest. SR on EKG and telemetry. She is a nurse in the SICU. No new overnight cardiac complaints.     Review of Systems:   Cardiac: As per HPI  General: No fever, chills  Pulmonary: As per HPI  HEENT: No visual disturbances, difficult swallowing  GI: No nausea, vomiting  Endocrine: No thyroid disease or DM  Musculoskeletal: SALAZAR x 4, no focal motor deficits  Skin: +LLE erythema, +LLE wound  Neuro/Psych: No headache or seizures    Past Medical History:  Past Medical History:   Diagnosis Date    Anemia     Arthritis     Cellulitis 5- 2015    left leg    Chronic ulcer of leg with fat layer exposed ([address] 6/22/2015    Chronic ulcer of right leg, limited to breakdown of skin ([address] 9/28/2016    Lymphedema of both lower extremities 6/22/2015    Lymphedema of lower extremity 6/22/2015    Peripheral vision loss     Stroke [facility])     Ulcer of left lower leg, limited to breakdown of skin ([address] 6/10/2019       Past Surgical History:  Past Surgical History:   Procedure Laterality Date    ABDOMINOPLASTY  2002    BREAST REDUCTION SURGERY      reduction    CATARACT REMOVAL      bilateral     SECTION      x2    COLONOSCOPY  2012    and egd    ECHOCARDIOGRAM COMPLETE 2D W DOPPLER W COLOR  2012         GASTRIC BYPASS SURGERY  2000    HERNIA REPAIR      2002       Family History:  Family History   Problem Relation Age of Onset    Breast Cancer Mother     Stroke Father     Hypertension Sister     Hypertension Brother        Social History:  Social History     Socioeconomic History    Marital status:      Spouse name: Not on file    Number of children: Not on file    Years of education: Not on file    Highest education level: Not on file   Occupational History    Not on file   Social Needs    Financial resource strain: Not on file    Food insecurity:     Worry: Not on file     Inability: Not on file    Transportation needs:     Medical: Not on file     Non-medical: Not on file   Tobacco Use    Smoking status: Former Smoker     Packs/day: 0.50     Years: 38.00     Pack years: 19.00     Types: Cigarettes     Last attempt to quit: 2019     Years since quittin.0    Smokeless tobacco: Never Used   Substance and Sexual Activity    Alcohol use: No    Drug use: No    Sexual activity: Not Currently   Lifestyle    Physical activity:     Days per week: Not on file     Minutes per session: Not on file    Stress: Not on file   Relationships    Social connections:     Talks on phone: Not on file     Gets together: Not on file     Attends [employer] service: Not on file     Active member of club or organization: Not on file     Attends meetings of clubs or organizations: Not on file     Relationship status: Not on file    Intimate partner violence:     Fear of current or ex partner: Not on file     Emotionally abused: Not on file     Physically abused: Not on file     Forced sexual activity: Not on file   Other Topics Concern    Not on file   Social History Narrative    Not on file       Allergies:  No Known Allergies    Home Medications:  Prior to Admission medications    Medication Sig Start Date End Date Taking? Authorizing Provider   Vitamin D, Ergocalciferol, 2000 units CAPS Take 2,000 Units by mouth daily 1/23/19  Yes Historical Provider, MD   cyclobenzaprine (FLEXERIL) 10 MG tablet Take 10 mg by mouth 2 times daily as needed for Muscle spasms 1/23/19  Yes Historical Provider, MD   furosemide (LASIX) 40 MG tablet Take 40 mg by mouth daily as needed for Other Swelling 1/23/19  Yes Historical Provider, MD   linezolid (ZYVOX) 600 MG tablet Take 1 tablet by mouth every 12 hours for 10 days 6/12/19 6/22/19 Yes Jessica Michael MD   levofloxacin (LEVAQUIN) 750 MG tablet Take 1 tablet by mouth daily for 10 days 6/12/19 6/22/19 Yes Jessica Michael MD   terbinafine (LAMISIL) 250 MG tablet Take 1 tablet by mouth daily for 7 days 6/13/19 6/20/19 Yes Tino Latham DO   miconazole nitrate 2 % OINT Apply topically 2 times daily 6/12/19  Yes Tino Latham DO   mineral oil-hydrophilic petrolatum (HYDROPHOR) ointment Apply topically as needed. 6/12/19  Yes Tino Latham DO   Liraglutide (VICTOZA SC) Inject 1.8 mg into the skin daily    Yes Historical Provider, MD   rOPINIRole (REQUIP) 1 MG tablet Take 1 mg by mouth 3 times daily    Yes Historical Provider, MD   BELBUCA 450 MCG FILM Take 450 mcg by mouth every 12 hours.  5/23/19   Historical Provider, MD       Current Medications:  Current Facility-Administered Medications   Medication Dose Route Frequency Provider Last Rate Last Dose    technetium sestamibi (CARDIOLITE) injection 34 millicurie  34 millicurie Intravenous ONCE PRN Kavin Cox MD        HYDROmorphone (DILAUDID) injection 0.5 mg  0.5 mg Intravenous Q4H PRN David Dent, DO   0.5 mg at 06/15/19 0520    ferrous sulfate tablet 325 mg  325 mg Oral BID WC David Dent DO   325 mg at 06/14/19 1633    regadenoson (LEXISCAN) injection 0.4 mg  0.4 mg Tobacco abuse -- 0.5-1 PPD since age 20's, recently quit  7. Chronic back pain/LLE arthritis  8.  Chronic anemia -- most recent Hgb 8.5 (stable)    - Lexiscan nuclear stress test today  - Aggressive risk factor modifications / continued tobacco cessation    Yuly Snow MD  [facility] ([facility]) Cardiology

## 2019-06-15 NOTE — PROCEDURES
Jean[facility] Nuclear Stress Test:    Cardiologist: Dr. Shweta Collins    Date: 6/15/2019    Indications for study: Chest pain    1. No chest pain  2. No new arrhythmias  3. No EKG changes suggestive of stress induced ischemia  4.  Nuclear images pending    Nico Keita MD  [facility]) Cardiology

## 2019-06-15 NOTE — DISCHARGE SUMMARY
Discharge Summary    Angeles Pereira  :  1955  MRN:  [id number]    Admit date:  2019  Discharge date:  6/15/2019 4:16 PM    Admitting Physician:  Mark Dietz DO    Discharge Diagnoses:    Patient Active Problem List    Diagnosis Date Noted    Morbid obesity ([address] 2019    Chronic pain 2019    Tobacco abuse     SOB (shortness of breath)     Chest pain 2019    Ulcer of left lower leg, limited to breakdown of skin ([address] 06/10/2019    Microcytic anemia 2019    Cellulitis of left lower leg 2019    Lymphedema of both lower extremities 2015    Osteoarthritis of left knee 2014    Osteoarthritis of right knee 2014    BMI 45.0-49.9, adult ([address] 2014    Back pain 2012       Past Medical Hx :   Past Medical History:   Diagnosis Date    Anemia     Arthritis     Cellulitis -     left leg    Chronic ulcer of leg with fat layer exposed ([address] 2015    Chronic ulcer of right leg, limited to breakdown of skin ([address] 2016    Lymphedema of both lower extremities 2015    Lymphedema of lower extremity 2015    Peripheral vision loss     Stroke [facility])     Ulcer of left lower leg, limited to breakdown of skin ([address] 6/10/2019       Past Surgical Hx :   Past Surgical History:   Procedure Laterality Date    ABDOMINOPLASTY  2002    BREAST REDUCTION SURGERY      reduction    CATARACT REMOVAL      bilateral     SECTION      x2    COLONOSCOPY  2012    and egd    ECHOCARDIOGRAM COMPLETE 2D W DOPPLER W COLOR  2012         GASTRIC BYPASS SURGERY  2000    HERNIA REPAIR             Admission Condition:  fair    Discharged Condition:  fair    Labs:  CBC:   Lab Results   Component Value Date    WBC 8.2 2019    RBC 4.32 2019    HGB 8.5 2019    HCT 29.6 2019    MCV 68.5 2019    MCH 19.7 2019    MCHC 28.7 2019    RDW 21.9 2019     2019    MPV NOT of the thoracic aorta demonstrates no evidence of thoracic aneurysm or dissection identified. All of the vasculature appears to be normal caliber. There is no aneurysm or other abnormality identified in the other great vessels. The chest wall appears to be normal. There is no axillary lymphadenopathy identified. The visualized portion of the upper abdomen appears to be normal. Patient has gastric surgery with gastric bypass with surgical sutures seen. There is occasional diverticulosis. 1. NORMAL CT OF THE THORAX WITH CTA OF THE PULMONARY ARTERIES WITH CONTRAST ENHANCEMENT. 2. NO EVIDENCE OF PULMONARY EMBOLUS. 3. Cardiomegaly       Hospital Course:     Admitted with chest pain ans sob-in context of ongoing treatment for cellulitis of the LLEXT     Had Cardiology evaluation which was negative and was released    She did see both Vascular and ID services for continuation of ongoing treatment  Of celllulitis and edema of the LLEXT      Discharge Medications:    Yvette Junior   Home Medication Instructions :[id number]    Printed on:06/15/19 9594   Medication Information                      BELBUCA 450 MCG FILM  Take 450 mcg by mouth every 12 hours. furosemide (LASIX) 40 MG tablet  Take 40 mg by mouth daily as needed for Other Swelling             levofloxacin (LEVAQUIN) 750 MG tablet  Take 1 tablet by mouth daily for 10 days             linezolid (ZYVOX) 600 MG tablet  Take 1 tablet by mouth every 12 hours for 10 days             Liraglutide (VICTOZA SC)  Inject 1.8 mg into the skin daily              miconazole nitrate 2 % OINT  Apply topically 2 times daily             mineral oil-hydrophilic petrolatum (HYDROPHOR) ointment  Apply topically as needed.              rOPINIRole (REQUIP) 1 MG tablet  Take 1 mg by mouth 3 times daily              terbinafine (LAMISIL) 250 MG tablet  Take 1 tablet by mouth daily for 7 days             Vitamin D, Ergocalciferol, 2000 units CAPS  Take 2,000 Units by mouth daily                 Discharge Exam   General appearance: alert, appears stated age and cooperative  Skin: Skin color, texture, turgor normal. No rashes or lesions  HEENT: Head: Normocephalic, no lesions, without obvious abnormality.   Neck: no adenopathy, no carotid bruit, no JVD, supple, symmetrical, trachea midline and thyroid not enlarged, symmetric, no tenderness/mass/nodules  Lungs: clear to auscultation bilaterally  Heart: regular rate and rhythm, S1, S2 normal, no murmur, click, rub or gallop  Abdomen: soft, non-tender; bowel sounds normal; no masses,  no organomegaly  Extremities: edema-induration-swelling of the LLEXT with small anterior wound  Neurologic:  Alert, oriented, thought content appropriate        Disposition: home        Patient Instructions:   Continue the wound treatment as before  See PCP and ID    Signed:  Bryan Cartagena of Internal Medicine  [employer]  6/15/2019, 4:16 PM

## 2019-06-15 NOTE — PROGRESS NOTES
[address] [place] [address] [facility]  [facility]  Progress  Note       C/C : Left leg cellulitis      Pt is awake and alert  Denies fever and chills  Reports leg swelling , and redness  Afebrile       Current Facility-Administered Medications   Medication Dose Route Frequency Provider Last Rate Last Dose    HYDROmorphone (DILAUDID) injection 0.5 mg  0.5 mg Intravenous Q4H PRN Deryl Pale Malmer, DO   0.5 mg at 06/15/19 7005    ferrous sulfate tablet 325 mg  325 mg Oral BID WC Deryl Pale Malmer, DO   325 mg at 06/15/19 1249    levofloxacin (LEVAQUIN) tablet 750 mg  750 mg Oral Daily Deryl Pale Malmer, DO   750 mg at 06/15/19 1248    linezolid (ZYVOX) tablet 600 mg  600 mg Oral 2 times per day Akiko Friendly, DO   600 mg at 06/15/19 1249    rOPINIRole (REQUIP) tablet 1 mg  1 mg Oral TID Deryl Pale Malmer, DO   1 mg at 06/14/19 2001    terbinafine (LAMISIL) tablet 250 mg  250 mg Oral Daily Deryl Pale Malmer, DO   250 mg at 06/15/19 1248    sodium chloride flush 0.9 % injection 10 mL  10 mL Intravenous 2 times per day Deryl Pale Malmer, DO   10 mL at 06/14/19 2112    sodium chloride flush 0.9 % injection 10 mL  10 mL Intravenous PRN Deryl Pale Malmer, DO   10 mL at 06/15/19 0520    magnesium hydroxide (MILK OF MAGNESIA) 400 MG/5ML suspension 30 mL  30 mL Oral Daily PRN Deryl Pale Malmer, DO        ondansetron [facility]) injection 4 mg  4 mg Intravenous Q6H PRN Deryl Pale Malmer, DO        enoxaparin (LOVENOX) injection 40 mg  40 mg Subcutaneous Daily Deryl Pale Malmer, DO   40 mg at 06/15/19 1248    acetaminophen (TYLENOL) tablet 650 mg  650 mg Oral Q4H PRN Deryl Pale Malmer, DO           REVIEW OF SYSTEMS:      CONSTITUTIONAL:  Denies fever   HEENT: denies blurring of vision or double vision, denies hearing problem  RESPIRATORY: denies cough, shortness of breath, sputum expectoration, chest pain.   CARDIOVASCULAR:  Chest pain - resolved   GASTROINTESTINAL:  Denies abdomen pain, diarrhea or

## 2019-06-16 ENCOUNTER — CARE COORDINATION (OUTPATIENT)
Dept: CASE MANAGEMENT | Age: 64
End: 2019-06-16

## 2019-06-16 DIAGNOSIS — R07.2 PRECORDIAL PAIN: Primary | ICD-10-CM

## 2019-06-16 NOTE — CARE COORDINATION
Bran 45 Transitions Initial Follow Up Call    Call within 2 business days of discharge: Yes    Patient: Jorge Hannon Patient : 1955   MRN: [id number]  Reason for Admission: Chest Pain  Discharge Date: 6/15/19 RARS: Readmission Risk Score: 12      Last Discharge [facility]       Complaint Diagnosis Description Type Department Provider    19 Chest Pain; Shortness of Breath Chest pain, unspecified type . .. ED to Hosp-Admission (Discharged) (ADMITTED) LANETTE [address] [facility], DO; Jannet Necessary. ..            Spoke with: Jorge Hannon (Patient)    Facility: [facility]    Non-face-to-face services provided:  Scheduled appointment with PCP-CTC confirmed with patient she is scheudled to follow up with Dr. Isabel Pierce (PCP) on 19 at 10 am  Scheduled appointment with Specialist-CTC confirmed she is scheudled to follow up with Dr. Vinnie Davidson (I/D) on 19 at 2 pm.   Obtained and reviewed discharge summary and/or continuity of care documents  Establishment or re-establishment of referrals-CTC confirmed with patient she is resuming with [facility] and nurse is scheduled today for visit between 3pm-4pm.     Care Transitions 24 Hour Call    Do you have any ongoing symptoms?:  Yes  Patient-reported symptoms:  Other (Comment: left leg swelling, left leg pain, and left leg redness)  Do you have a copy of your discharge instructions?:  Yes  Do you have all of your prescriptions and are they filled?:  Yes  Have you been contacted by a [address]?:  No  Have you scheduled your follow up appointment?:  Yes  How are you going to get to your appointment?:  Car - family or friend to transport  Were you discharged with any Home Care or Post Acute Services:  Yes  Post Acute Services:  Home Health (Comment: [facility])  Do you feel like you have everything you need to keep you well at home?:  Yes  Care Transitions Interventions  No Identified Needs       Spoke with Amandeep Monae today 19 for courtesy [facility]/\[facility] [place] [facility]\"" discharge follow up for chest pain. Patient reports she is feeling better since discharge. Denies any shortness of breath, chest pain or chest discomfort which has resolved. Noted CXR obtained on admission showed no acute cardiopulmonary process, CTA of chest was negative for PE and Troponin markers were <0.01 times three sets. Patient did undergo cardiac stress test while IP which was normal and revealed an EF of 79%. Patient was previously admitted and discharged from [facility] on 6/12/19 for Septic Shock. States she continues to have LLE pain, redness and swelling from cellulitis. States swelling is moderate to severe in nature. Rates pain 5-6/10 in severity on pain scale and taking Belbuca for pain relief. Denies any difficulty bearing weight or ambulating. States she is walking on LLE independently. Confirmed no new medications were ordered on discharge and remains taking ABT (Levaquin/Zyvox) as directed. 1111F code entered. States she is scheduled to follow up with Dr. Javier Parekh (PCP) on 6/20/19 and with Dr. Michael Reed (I/D) 6/26/19. Denies any transportation issues as she verbalizes her sister or daughter will be taking her to follow up appointments. Confirmed she is resuming with [facility] and nurse is scheduled for visit today between 3 pm-4 pm. Denies any other complaints, concerns or needs at this time. CTC signing off for Care Transition.        Follow Up  Future Appointments   Date Time Provider Sydni More   6/20/2019 10:00 AM DO [facility]   6/26/2019  2:00 PM Myrna Roche MD [facility]       HOLLY [place]

## 2019-06-18 ENCOUNTER — TELEPHONE (OUTPATIENT)
Dept: PRIMARY CARE CLINIC | Age: 64
End: 2019-06-18

## 2019-06-18 LAB
BLOOD CULTURE, ROUTINE: NORMAL
CULTURE, BLOOD 2: NORMAL

## 2019-06-18 NOTE — TELEPHONE ENCOUNTER
Home health nurse called to inform you that pts bp was elevated today at 162/98. Asymptomatic w no prior  bp issues per nurse.

## 2019-06-20 ENCOUNTER — OFFICE VISIT (OUTPATIENT)
Dept: PRIMARY CARE CLINIC | Age: 64
End: 2019-06-20
Payer: COMMERCIAL

## 2019-06-20 ENCOUNTER — TELEPHONE (OUTPATIENT)
Dept: PRIMARY CARE CLINIC | Age: 64
End: 2019-06-20

## 2019-06-20 DIAGNOSIS — D50.9 IRON DEFICIENCY ANEMIA, UNSPECIFIED IRON DEFICIENCY ANEMIA TYPE: ICD-10-CM

## 2019-06-20 DIAGNOSIS — L03.116 CELLULITIS OF LEFT LOWER LEG: ICD-10-CM

## 2019-06-20 DIAGNOSIS — E11.9 TYPE 2 DIABETES MELLITUS WITHOUT COMPLICATION, WITHOUT LONG-TERM CURRENT USE OF INSULIN (HCC): Primary | ICD-10-CM

## 2019-06-20 DIAGNOSIS — I89.0 LYMPHEDEMA OF BOTH LOWER EXTREMITIES: Chronic | ICD-10-CM

## 2019-06-20 DIAGNOSIS — E66.01 MORBID OBESITY (HCC): Chronic | ICD-10-CM

## 2019-06-20 DIAGNOSIS — G89.4 CHRONIC PAIN SYNDROME: Chronic | ICD-10-CM

## 2019-06-20 PROCEDURE — 99215 OFFICE O/P EST HI 40 MIN: CPT | Performed by: FAMILY MEDICINE

## 2019-06-20 NOTE — PROGRESS NOTES
19  Name: Garfield Cueto    : 1955    Sex: female    Age: 59 y.o. Subjective:  Chief Complaint: Patient is here for hosp dc       Patient was admitted to the hospital with left lower leg cellulitis. Shortly after discharge was readmitted with chest pain and had a negative cardiac work-up. She is presently on a biotics from infectious disease and sees her in a few days. Had no further chest pain. She is accompanied by her sister. Review of Systems   Constitutional: Negative. HENT: Negative. Eyes: Negative. Respiratory: Positive for shortness of breath (Redness of breath with heavy exertion unchanged. ). Cardiovascular: Negative. Gastrointestinal: Negative. Endocrine: Negative. Genitourinary: Negative. Musculoskeletal: Negative. Skin:        Left lower leg completely wrapped. Allergic/Immunologic: Negative. Neurological: Negative. Hematological: Negative. Psychiatric/Behavioral: Negative. Current Outpatient Medications:     Vitamin D, Ergocalciferol, 2000 units CAPS, Take 2,000 Units by mouth daily, Disp: , Rfl:     BELBUCA 450 MCG FILM, Take 450 mcg by mouth every 12 hours. , Disp: , Rfl: 0    furosemide (LASIX) 40 MG tablet, Take 40 mg by mouth daily as needed for Other Swelling, Disp: , Rfl:     linezolid (ZYVOX) 600 MG tablet, Take 1 tablet by mouth every 12 hours for 10 days, Disp: 20 tablet, Rfl: 0    levofloxacin (LEVAQUIN) 750 MG tablet, Take 1 tablet by mouth daily for 10 days, Disp: 10 tablet, Rfl: 0    miconazole nitrate 2 % OINT, Apply topically 2 times daily, Disp: 1 Tube, Rfl: 1    mineral oil-hydrophilic petrolatum (HYDROPHOR) ointment, Apply topically as needed. , Disp: 1 Tube, Rfl: 1    Liraglutide (VICTOZA SC), Inject 1.8 mg into the skin daily , Disp: , Rfl:     rOPINIRole (REQUIP) 1 MG tablet, Take 1 mg by mouth 3 times daily , Disp: , Rfl:   No Known Allergies  Social History     Socioeconomic History    Marital status:      Spouse name: Not on file    Number of children: Not on file    Years of education: Not on file    Highest education level: Not on file   Occupational History    Not on file   Social Needs    Financial resource strain: Not on file    Food insecurity:     Worry: Not on file     Inability: Not on file    Transportation needs:     Medical: Not on file     Non-medical: Not on file   Tobacco Use    Smoking status: Former Smoker     Packs/day: 0.50     Years: 38.00     Pack years: 19.00     Types: Cigarettes     Last attempt to quit: 2019     Years since quittin.0    Smokeless tobacco: Never Used   Substance and Sexual Activity    Alcohol use: No    Drug use: No    Sexual activity: Not Currently   Lifestyle    Physical activity:     Days per week: Not on file     Minutes per session: Not on file    Stress: Not on file   Relationships    Social connections:     Talks on phone: Not on file     Gets together: Not on file     Attends Mosque service: Not on file     Active member of club or organization: Not on file     Attends meetings of clubs or organizations: Not on file     Relationship status: Not on file    Intimate partner violence:     Fear of current or ex partner: Not on file     Emotionally abused: Not on file     Physically abused: Not on file     Forced sexual activity: Not on file   Other Topics Concern    Not on file   Social History Narrative        Chronic Diagnosis: Iron deficiency anemia, Depressive disorder, Benign essential hypertension, Mixed    hyperlipidemia.     HTN    OBESITY    LIPID    OA    SMOKER    CVA L FIELD VISION    DEPRESSION    GASTRIC BYPASS 01    BREAST REDUCTION--    Toney Osuna  1955 Page #2    ANEMIA==IRON AND B-12    Admitted 2019 with cellulitis left lower extremity then readmitted with chest pain with negative stress test      Past Medical History:   Diagnosis Date    Anemia     Arthritis     Cellulitis 2015    left leg    Chronic ulcer of leg with fat layer exposed ([address] 2015    Chronic ulcer of right leg, limited to breakdown of skin ([address] 2016    Lymphedema of both lower extremities 2015    Lymphedema of lower extremity 2015    Peripheral vision loss     Stroke [facility])     Type 2 diabetes mellitus without complication, without long-term current use of insulin ([address] 2019    Ulcer of left lower leg, limited to breakdown of skin ([address] 6/10/2019     Family History   Problem Relation Age of Onset    Breast Cancer Mother     Stroke Father     Hypertension Sister     Hypertension Brother       Past Surgical History:   Procedure Laterality Date    ABDOMINOPLASTY      BREAST REDUCTION SURGERY      reduction    CATARACT REMOVAL      bilateral     SECTION      x2    COLONOSCOPY  2012    and egd    ECHOCARDIOGRAM COMPLETE 2D W DOPPLER W COLOR  2012         GASTRIC BYPASS SURGERY      HERNIA REPAIR            Vitals:    19 1008   BP: (!) 146/85   Temp: 98.6 °F (37 °C)   Weight: 254 lb 12.8 oz (115.6 kg)   Height: 5' 2\" (1.575 m)       Objective:    Physical Exam   Constitutional: She is oriented to person, place, and time. She appears well-developed and well-nourished. HENT:   Head: Normocephalic. Eyes: Pupils are equal, round, and reactive to light. EOM are normal.   Neck: Normal range of motion. Cardiovascular: Normal rate and regular rhythm. Edema both lower extremities   Pulmonary/Chest: Effort normal and breath sounds normal.   Abdominal: Soft. Musculoskeletal: Normal range of motion. Neurological: She is alert and oriented to person, place, and time. Skin:   Left lower leg completely wrapped. Psychiatric: She has a normal mood and affect. Her behavior is normal.   Vitals reviewed. Allayne Bosworth was seen today for other.     Diagnoses and all orders for this visit:    Type 2 diabetes mellitus without complication, without long-term current use of insulin (HCC)    Cellulitis of left lower leg    Lymphedema of both lower extremities    Iron deficiency anemia, unspecified iron deficiency anemia type    Morbid obesity ([address]    Chronic pain syndrome        Comments: He continues to get her pain medication from the pain clinic. I advised back on iron every day. He sees infectious disease soon. Maintain off work until July 20. The hospital records were reviewed. They both legs. Continue dressings left lower extremity daily. Blood sugars 4 times daily   A great deal of time spent reviewing medications, diet, exercise, social issues. Also reviewing the chart before entering the room with patient and finishing charting after leaving patient's room. More than half of that time was spent face to face with the patient in counseling and coordinating care. Follow Up: Return for 6-6  around 4 pm or later.      Seen by:  Maria A To,

## 2019-06-20 NOTE — PROGRESS NOTES
Pharmacy Consultation Note  (Antibiotic Dosing and Monitoring)    Initial consult date: 6/5  Consulting physician: Dr. Reji Alfaro  Drug(s): vancomycin  Indication: sepsis secondary to cellulitis    Farhan Kirkland is a 63/F patient who presented to [facility] after she was found in her car by her sister several hours after arriving home, shaking and confused. She was found to be hypotensive in ER with high grade fever, and open wound on her lower left extremity. She was given fluid bolus and empiric vancomycin and zosyn and transferred to ICU for management of sepsis secondary to cellulitis, ID consulted, per progress note also concern for possible aspiration pneumonia. Clinical pharmacist consulted to dose vancomycin      Age/  Gender Height Weight IBW Dosing weight  Allergy Information   63/F   62\"  111.6 kg  50.1 kg  77 kg  Patient has no known allergies. Other anti-infectives Start date Stop date   Zosyn 3.375 g IV q8hr  6/5                     Date  Tmax WBC BUN/CR CrCL Drug/Dose Time   Given Level(s)   (Time) Comments   6/4 102.6 24.1 20/1 70 ml/min Vancomycin 2000 mg IV x 1  2251     6/5 102.9    Vancomycin 1500 mg IV q12 hr  1449    2357  procalcitonin 9.13   6/6 99.2 16.7 13/0.6 > 100 ml/mi  vancomycin 1500 mg IV q12 hr  1200 12.7 mcg/mL  (1118)                       Intake/Output Summary (Last 24 hours) at 6/6/2019 1418  Last data filed at 6/6/2019 0700  Gross per 24 hour   Intake 2585 ml   Output 2980 ml   Net -395 ml       Average urine output: 0.8 ml/kg/hr    Cultures:    6/5 blood cultures sent  6/5 urine culture sent  6/5 mrsa nares screen sent    IV lines:  6/5 CVC TLC RIJ  6/5 peripheral right wrist    Radiology:  6/5 XR left tibia read as No acute fracture identified. If there is persistent clinical pain  or symptomatology, a return to medical attention within 2-7 days and  further imaging is recommended. .  2. No cortical destruction to suggest osteomyelitis.  If there is  clinical concern for osteomyelitis, a triple phase nuclear medicine  bone scan would be recommended  3. Soft tissue swelling concerning for possible edema or cellulitis. 4. Moderately severe medial with mild lateral and moderate  patellofemoral tricompartmental osteoarthritis. 5. Moderate naviculocuneiform and mild tibiotalar osteoarthritis. 6/4 CXR read as Suspected underlying mild central pulmonary vascular congestion.   2. Vascular calcifications thoracic aorta    Assessment:  · 63/F presenting with confusion and fever, admitted to MICU for management of septic shock secondary to cellulitis of left lower extremity  · Empiric vancomycin and zosyn, ID following  · Clinical pharmacist consulted to dose vancomycin; goal trough 15 - 20 mcg/mL; trough 12.7 mcg/mL before 4th dose, ok for indication of cellulitis    Plan:  · Vancomycin 1500 mg IV q12 hr  · Pharmacist will follow and monitor/adjust dosing as necessary    Jasbir Landin, PharmD, [facility], [facility] 6/6/2019 2:18 PM No

## 2019-06-21 VITALS
TEMPERATURE: 98.6 F | SYSTOLIC BLOOD PRESSURE: 146 MMHG | BODY MASS INDEX: 46.89 KG/M2 | WEIGHT: 254.8 LBS | DIASTOLIC BLOOD PRESSURE: 85 MMHG | HEIGHT: 62 IN

## 2019-06-21 PROBLEM — E11.9 TYPE 2 DIABETES MELLITUS WITHOUT COMPLICATION, WITHOUT LONG-TERM CURRENT USE OF INSULIN (HCC): Status: ACTIVE | Noted: 2019-06-21

## 2019-06-21 PROBLEM — D50.9 IRON DEFICIENCY ANEMIA: Status: ACTIVE | Noted: 2019-06-21

## 2019-06-21 RX ORDER — ROPINIROLE 1 MG/1
1 TABLET, FILM COATED ORAL 3 TIMES DAILY
Qty: 270 TABLET | Refills: 5 | Status: SHIPPED | OUTPATIENT
Start: 2019-06-21 | End: 2019-10-05 | Stop reason: SDUPTHER

## 2019-06-21 RX ORDER — FERROUS SULFATE 325(65) MG
325 TABLET ORAL
Qty: 90 TABLET | Refills: 5 | Status: SHIPPED | OUTPATIENT
Start: 2019-06-21 | End: 2019-08-14

## 2019-06-21 ASSESSMENT — ENCOUNTER SYMPTOMS
SHORTNESS OF BREATH: 1
GASTROINTESTINAL NEGATIVE: 1
ALLERGIC/IMMUNOLOGIC NEGATIVE: 1
EYES NEGATIVE: 1

## 2019-06-25 ENCOUNTER — TELEPHONE (OUTPATIENT)
Dept: CARDIOLOGY CLINIC | Age: 64
End: 2019-06-25

## 2019-06-25 VITALS
BODY MASS INDEX: 45.64 KG/M2 | DIASTOLIC BLOOD PRESSURE: 83 MMHG | HEIGHT: 62 IN | WEIGHT: 248 LBS | SYSTOLIC BLOOD PRESSURE: 136 MMHG | TEMPERATURE: 98.8 F

## 2019-06-25 RX ORDER — TRAZODONE HYDROCHLORIDE 100 MG/1
100 TABLET ORAL NIGHTLY
COMMUNITY
End: 2019-07-31 | Stop reason: ALTCHOICE

## 2019-06-25 RX ORDER — CYCLOBENZAPRINE HCL 10 MG
10 TABLET ORAL 3 TIMES DAILY PRN
COMMUNITY
End: 2019-08-14

## 2019-06-25 RX ORDER — CYANOCOBALAMIN 1000 UG/ML
1000 INJECTION INTRAMUSCULAR; SUBCUTANEOUS
COMMUNITY
End: 2019-08-14

## 2019-06-25 RX ORDER — GABAPENTIN 100 MG/1
100 CAPSULE ORAL
COMMUNITY
End: 2019-07-31 | Stop reason: ALTCHOICE

## 2019-06-25 RX ORDER — PRAMIPEXOLE DIHYDROCHLORIDE 1.5 MG/1
1.5 TABLET ORAL NIGHTLY
COMMUNITY
End: 2019-07-31 | Stop reason: ALTCHOICE

## 2019-06-26 ENCOUNTER — OFFICE VISIT (OUTPATIENT)
Dept: PRIMARY CARE CLINIC | Age: 64
End: 2019-06-26
Payer: COMMERCIAL

## 2019-06-26 DIAGNOSIS — I89.0 LYMPHEDEMA OF BOTH LOWER EXTREMITIES: Primary | Chronic | ICD-10-CM

## 2019-06-26 DIAGNOSIS — E66.01 MORBID OBESITY (HCC): Chronic | ICD-10-CM

## 2019-06-26 DIAGNOSIS — M15.9 PRIMARY OSTEOARTHRITIS INVOLVING MULTIPLE JOINTS: ICD-10-CM

## 2019-06-26 DIAGNOSIS — E11.9 TYPE 2 DIABETES MELLITUS WITHOUT COMPLICATION, WITHOUT LONG-TERM CURRENT USE OF INSULIN (HCC): ICD-10-CM

## 2019-06-26 PROCEDURE — 99214 OFFICE O/P EST MOD 30 MIN: CPT | Performed by: FAMILY MEDICINE

## 2019-06-26 NOTE — PROGRESS NOTES
19  Name: Janice Tang    : 1955    Sex: female    Age: 59 y.o. Subjective:  Chief Complaint: Patient is here for  Left leg------     Re ck left leg   Saw  Dr Chemo Llanos  ID     Today  She ordered a comrpession  Sleeve-----  herer with sis  Tired    very inactive at home. Declines physical therapy. Review of Systems   Eyes: Negative. Respiratory: Negative. Cardiovascular: Negative. Skin: Positive for color change. Left leg feeling better but still swollen         Current Outpatient Medications:     Handicap Placard MISC, by Does not apply route 5  Yrs   Dx   oa, Disp: 1 each, Rfl: 0    cyanocobalamin 1000 MCG/ML injection, Inject 1,000 mcg into the muscle every 30 days, Disp: , Rfl:     pramipexole (MIRAPEX) 1.5 MG tablet, Take 1.5 mg by mouth nightly, Disp: , Rfl:     cyclobenzaprine (FLEXERIL) 10 MG tablet, Take 10 mg by mouth 3 times daily as needed for Muscle spasms DO NOT DRIVE, Disp: , Rfl:     traZODone (DESYREL) 100 MG tablet, Take 100 mg by mouth nightly, Disp: , Rfl:     gabapentin (NEURONTIN) 100 MG capsule, , Disp: , Rfl:     rOPINIRole (REQUIP) 1 MG tablet, Take 1 tablet by mouth 3 times daily, Disp: 270 tablet, Rfl: 5    ferrous sulfate 325 (65 Fe) MG tablet, Take 1 tablet by mouth daily (with breakfast), Disp: 90 tablet, Rfl: 5    Vitamin D, Ergocalciferol, 2000 units CAPS, Take 2,000 Units by mouth daily, Disp: , Rfl:     BELBUCA 450 MCG FILM, Take 450 mcg by mouth every 12 hours. , Disp: , Rfl: 0    furosemide (LASIX) 40 MG tablet, Take 40 mg by mouth daily as needed for Other Swelling, Disp: , Rfl:     miconazole nitrate 2 % OINT, Apply topically 2 times daily, Disp: 1 Tube, Rfl: 1    mineral oil-hydrophilic petrolatum (HYDROPHOR) ointment, Apply topically as needed. , Disp: 1 Tube, Rfl: 1    Liraglutide (VICTOZA SC), Inject 1.8 mg into the skin daily , Disp: , Rfl:   No Known Allergies  Social History     Socioeconomic History    Marital status:      Spouse name: Not on file    Number of children: Not on file    Years of education: Not on file    Highest education level: Not on file   Occupational History    Not on file   Social Needs    Financial resource strain: Not on file    Food insecurity:     Worry: Not on file     Inability: Not on file    Transportation needs:     Medical: Not on file     Non-medical: Not on file   Tobacco Use    Smoking status: Former Smoker     Packs/day: 0.50     Years: 38.00     Pack years: 19.00     Types: Cigarettes     Last attempt to quit: 2019     Years since quittin.0    Smokeless tobacco: Never Used   Substance and Sexual Activity    Alcohol use: No    Drug use: No    Sexual activity: Not Currently   Lifestyle    Physical activity:     Days per week: Not on file     Minutes per session: Not on file    Stress: Not on file   Relationships    Social connections:     Talks on phone: Not on file     Gets together: Not on file     Attends Islam service: Not on file     Active member of club or organization: Not on file     Attends meetings of clubs or organizations: Not on file     Relationship status: Not on file    Intimate partner violence:     Fear of current or ex partner: Not on file     Emotionally abused: Not on file     Physically abused: Not on file     Forced sexual activity: Not on file   Other Topics Concern    Not on file   Social History Narrative        Chronic Diagnosis: Iron deficiency anemia, Depressive disorder, Benign essential hypertension, Mixed    hyperlipidemia.     HTN    OBESITY    LIPID    OA    SMOKER    CVA L FIELD VISION    DEPRESSION    GASTRIC BYPASS 01    BREAST REDUCTION--    Rene Matters  1955 Page #2    ANEMIA==IRON AND B-12    Admitted 2019 with cellulitis left lower extremity then readmitted with chest pain with negative stress test      Past Medical History:   Diagnosis Date    Anemia     Arthritis     Cellulitis 2015 A great deal of time spent reviewing medications, diet, exercise, social issues. Also reviewing the chart before entering the room with patient and finishing charting after leaving patient's room. More than half of that time was spent face to face with the patient in counseling and coordinating care. Follow Up: Return in about 3 weeks (around 7/17/2019).      Seen by:  Kari Fink,

## 2019-06-27 VITALS
BODY MASS INDEX: 46.93 KG/M2 | SYSTOLIC BLOOD PRESSURE: 138 MMHG | TEMPERATURE: 98.8 F | WEIGHT: 255 LBS | DIASTOLIC BLOOD PRESSURE: 82 MMHG | HEIGHT: 62 IN

## 2019-06-27 PROBLEM — M15.0 PRIMARY OSTEOARTHRITIS INVOLVING MULTIPLE JOINTS: Status: ACTIVE | Noted: 2019-06-27

## 2019-06-27 PROBLEM — M15.9 PRIMARY OSTEOARTHRITIS INVOLVING MULTIPLE JOINTS: Status: ACTIVE | Noted: 2019-06-27

## 2019-06-27 ASSESSMENT — ENCOUNTER SYMPTOMS
COLOR CHANGE: 1
RESPIRATORY NEGATIVE: 1
EYES NEGATIVE: 1

## 2019-07-15 ENCOUNTER — TELEPHONE (OUTPATIENT)
Dept: PRIMARY CARE CLINIC | Age: 64
End: 2019-07-15

## 2019-07-16 ENCOUNTER — TELEPHONE (OUTPATIENT)
Dept: VASCULAR SURGERY | Age: 64
End: 2019-07-16

## 2019-07-16 DIAGNOSIS — I89.0 LYMPHEDEMA OF BOTH LOWER EXTREMITIES: Primary | ICD-10-CM

## 2019-07-17 ENCOUNTER — OFFICE VISIT (OUTPATIENT)
Dept: PRIMARY CARE CLINIC | Age: 64
End: 2019-07-17
Payer: COMMERCIAL

## 2019-07-17 ENCOUNTER — TELEPHONE (OUTPATIENT)
Dept: VASCULAR SURGERY | Age: 64
End: 2019-07-17

## 2019-07-17 VITALS
DIASTOLIC BLOOD PRESSURE: 82 MMHG | BODY MASS INDEX: 47.29 KG/M2 | WEIGHT: 257 LBS | TEMPERATURE: 98.5 F | SYSTOLIC BLOOD PRESSURE: 134 MMHG | HEIGHT: 62 IN

## 2019-07-17 DIAGNOSIS — L03.116 CELLULITIS OF LEFT LEG: ICD-10-CM

## 2019-07-17 DIAGNOSIS — E11.9 TYPE 2 DIABETES MELLITUS WITHOUT COMPLICATION, WITHOUT LONG-TERM CURRENT USE OF INSULIN (HCC): ICD-10-CM

## 2019-07-17 DIAGNOSIS — I10 ESSENTIAL HYPERTENSION: ICD-10-CM

## 2019-07-17 DIAGNOSIS — I89.0 LYMPHEDEMA OF LEFT LOWER EXTREMITY: Primary | ICD-10-CM

## 2019-07-17 PROCEDURE — 99213 OFFICE O/P EST LOW 20 MIN: CPT | Performed by: FAMILY MEDICINE

## 2019-07-17 ASSESSMENT — ENCOUNTER SYMPTOMS
ROS SKIN COMMENTS: SEE HPI
RESPIRATORY NEGATIVE: 1
EYES NEGATIVE: 1
GASTROINTESTINAL NEGATIVE: 1
ALLERGIC/IMMUNOLOGIC NEGATIVE: 1

## 2019-07-17 NOTE — PROGRESS NOTES
19  Name: Lupillo Loomis    : 1955    Sex: female    Age: 59 y.o. Subjective:  Chief Complaint: Patient is here for eval left leg          Feel ok   No  Cp or sob    Ros neg      Left leg wrapped     She  Has  Nurse at home   Wrapping it  Pt  Feels more swollen last few days     Here alone------she called  Micky Lemus  Re comp machine--he will ge back to her--she not going to comp therpy  Due to cost     She hsa not taken lasix in a while     ---advised back onlasix  And elev leg----she  Is due for shot knee and she says that always helps  Fine trmoeor  Hands for     Months   May send to neuro   Next ov    Plan  Full lab next ov      Review of Systems   Constitutional: Negative. HENT: Negative. Eyes: Negative. Respiratory: Negative. Cardiovascular: Negative. Gastrointestinal: Negative. Endocrine: Negative. Genitourinary: Negative. Musculoskeletal: Negative. Skin:        See hpi   Allergic/Immunologic: Negative. Neurological: Negative. Hematological: Negative. Psychiatric/Behavioral: Negative.           Current Outpatient Medications:     Handicap Placard MISC, by Does not apply route 5  Yrs   Dx   oa, Disp: 1 each, Rfl: 0    cyanocobalamin 1000 MCG/ML injection, Inject 1,000 mcg into the muscle every 30 days, Disp: , Rfl:     pramipexole (MIRAPEX) 1.5 MG tablet, Take 1.5 mg by mouth nightly, Disp: , Rfl:     cyclobenzaprine (FLEXERIL) 10 MG tablet, Take 10 mg by mouth 3 times daily as needed for Muscle spasms DO NOT DRIVE, Disp: , Rfl:     traZODone (DESYREL) 100 MG tablet, Take 100 mg by mouth nightly, Disp: , Rfl:     gabapentin (NEURONTIN) 100 MG capsule, , Disp: , Rfl:     rOPINIRole (REQUIP) 1 MG tablet, Take 1 tablet by mouth 3 times daily, Disp: 270 tablet, Rfl: 5    ferrous sulfate 325 (65 Fe) MG tablet, Take 1 tablet by mouth daily (with breakfast), Disp: 90 tablet, Rfl: 5    Vitamin D, Ergocalciferol, 2000 units CAPS, Take 2,000 Units by

## 2019-07-31 ENCOUNTER — TELEPHONE (OUTPATIENT)
Dept: PRIMARY CARE CLINIC | Age: 64
End: 2019-07-31

## 2019-08-01 ENCOUNTER — OFFICE VISIT (OUTPATIENT)
Dept: PRIMARY CARE CLINIC | Age: 64
End: 2019-08-01
Payer: COMMERCIAL

## 2019-08-01 ENCOUNTER — TELEPHONE (OUTPATIENT)
Dept: VASCULAR SURGERY | Age: 64
End: 2019-08-01

## 2019-08-01 ENCOUNTER — TELEPHONE (OUTPATIENT)
Dept: PRIMARY CARE CLINIC | Age: 64
End: 2019-08-01

## 2019-08-01 VITALS
SYSTOLIC BLOOD PRESSURE: 135 MMHG | WEIGHT: 256 LBS | TEMPERATURE: 98.5 F | BODY MASS INDEX: 47.11 KG/M2 | HEIGHT: 62 IN | DIASTOLIC BLOOD PRESSURE: 82 MMHG

## 2019-08-01 DIAGNOSIS — E11.9 TYPE 2 DIABETES MELLITUS WITHOUT COMPLICATION, WITHOUT LONG-TERM CURRENT USE OF INSULIN (HCC): ICD-10-CM

## 2019-08-01 DIAGNOSIS — I89.0 LYMPHEDEMA OF BOTH LOWER EXTREMITIES: Chronic | ICD-10-CM

## 2019-08-01 DIAGNOSIS — M17.12 PRIMARY OSTEOARTHRITIS OF LEFT KNEE: Chronic | ICD-10-CM

## 2019-08-01 DIAGNOSIS — L03.116 CELLULITIS OF LEFT LOWER LEG: Primary | ICD-10-CM

## 2019-08-01 PROCEDURE — 99213 OFFICE O/P EST LOW 20 MIN: CPT | Performed by: FAMILY MEDICINE

## 2019-08-01 ASSESSMENT — ENCOUNTER SYMPTOMS
GASTROINTESTINAL NEGATIVE: 1
ALLERGIC/IMMUNOLOGIC NEGATIVE: 1
RESPIRATORY NEGATIVE: 1
EYES NEGATIVE: 1

## 2019-08-01 NOTE — PROGRESS NOTES
19  Name: Rod Singh    : 1955    Sex: female    Age: 59 y.o. Subjective:  Chief Complaint: Patient is here for swelling left leg     Home health co finall came today and   Did   tx andmeasured  Left leg and  10 cm lrger then other     She can not   get hose on left leg---  shr refuses to go to [facility] Pt       No cp or sob           I feel unable  To    Be on feet  She says she is elevating left leg bu t I doubt it        Some pain left leg    [facility]  Told her yesteday witll take  Week to get approval for machine        I convinced   Needs to  Get some  Pt at   [facility]      Review of Systems   Constitutional: Negative. HENT: Negative. Eyes: Negative. Respiratory: Negative. Cardiovascular: Negative. Gastrointestinal: Negative. Endocrine: Negative. Genitourinary: Negative. Musculoskeletal: Negative. Skin: Negative. Allergic/Immunologic: Negative. Neurological:        Marked swelling left leg     --neg homans     Hematological: Negative. Psychiatric/Behavioral: Negative. Current Outpatient Medications:     Handicap Placard MISC, by Does not apply route 5  Yrs   Dx   oa, Disp: 1 each, Rfl: 0    cyanocobalamin 1000 MCG/ML injection, Inject 1,000 mcg into the muscle every 30 days, Disp: , Rfl:     cyclobenzaprine (FLEXERIL) 10 MG tablet, Take 10 mg by mouth 3 times daily as needed for Muscle spasms DO NOT DRIVE, Disp: , Rfl:     rOPINIRole (REQUIP) 1 MG tablet, Take 1 tablet by mouth 3 times daily, Disp: 270 tablet, Rfl: 5    ferrous sulfate 325 (65 Fe) MG tablet, Take 1 tablet by mouth daily (with breakfast), Disp: 90 tablet, Rfl: 5    BELBUCA 450 MCG FILM, Take 450 mcg by mouth every 12 hours. , Disp: , Rfl: 0    furosemide (LASIX) 40 MG tablet, Take 40 mg by mouth daily as needed for Other Swelling, Disp: , Rfl:     miconazole nitrate 2 % OINT, Apply topically 2 times daily, Disp: 1 Tube, Rfl: 1    mineral oil-hydrophilic petrolatum (HYDROPHOR) AND B-12    Admitted 2019 with cellulitis left lower extremity then readmitted with chest pain with negative stress test      Past Medical History:   Diagnosis Date    Anemia     Arthritis     Cellulitis -     left leg    Chronic ulcer of leg with fat layer exposed ([address] 2015    Chronic ulcer of right leg, limited to breakdown of skin ([address] 2016    Depression     Lymphedema of both lower extremities 2015    Lymphedema of lower extremity 2015    Obesity     Osteoarthritis     Peripheral vision loss     Stroke [facility]     Type 2 diabetes mellitus without complication, without long-term current use of insulin ([address] 2019    Ulcer of left lower leg, limited to breakdown of skin ([address] 6/10/2019     Family History   Problem Relation Age of Onset    Breast Cancer Mother     Stroke Father     Hypertension Sister     Hypertension Brother       Past Surgical History:   Procedure Laterality Date    ABDOMINOPLASTY      BREAST REDUCTION SURGERY      reduction    CATARACT REMOVAL      bilateral     SECTION      x2    COLONOSCOPY  2012    and egd    ECHOCARDIOGRAM COMPLETE 2D W DOPPLER W COLOR  2012         GASTRIC BYPASS SURGERY  2000    HERNIA REPAIR            Vitals:    19 1326   BP: 135/82   Temp: 98.5 °F (36.9 °C)   Weight: 256 lb (116.1 kg)   Height: 5' 2\" (1.575 m)       Objective:    Physical Exam   Cardiovascular: Normal rate and regular rhythm. Pulmonary/Chest: Effort normal and breath sounds normal.   Abdominal: Soft. Bowel sounds are normal.   Musculoskeletal:   Left leg with marked swelling       Some erythema           Ar[facility]ie [facility] was seen today for other.     Diagnoses and all orders for this visit:    Cellulitis of left lower leg    Type 2 diabetes mellitus without complication, without long-term current use of insulin (HCC)    Lymphedema of both lower extremities  -     Cancel: External Referral To Physical Therapy  -

## 2019-08-09 ENCOUNTER — TELEPHONE (OUTPATIENT)
Dept: PRIMARY CARE CLINIC | Age: 64
End: 2019-08-09

## 2019-08-14 ENCOUNTER — TELEPHONE (OUTPATIENT)
Dept: PRIMARY CARE CLINIC | Age: 64
End: 2019-08-14

## 2019-08-15 ENCOUNTER — OFFICE VISIT (OUTPATIENT)
Dept: PRIMARY CARE CLINIC | Age: 64
End: 2019-08-15
Payer: COMMERCIAL

## 2019-08-15 VITALS — BODY MASS INDEX: 47.48 KG/M2 | TEMPERATURE: 98.5 F | HEIGHT: 62 IN | WEIGHT: 258 LBS

## 2019-08-15 DIAGNOSIS — M17.12 PRIMARY OSTEOARTHRITIS OF LEFT KNEE: Primary | Chronic | ICD-10-CM

## 2019-08-15 DIAGNOSIS — E11.9 TYPE 2 DIABETES MELLITUS WITHOUT COMPLICATION, WITHOUT LONG-TERM CURRENT USE OF INSULIN (HCC): ICD-10-CM

## 2019-08-15 DIAGNOSIS — L03.116 CELLULITIS OF LEFT LOWER LEG: ICD-10-CM

## 2019-08-15 DIAGNOSIS — E78.2 MIXED HYPERLIPIDEMIA: ICD-10-CM

## 2019-08-15 PROCEDURE — 99213 OFFICE O/P EST LOW 20 MIN: CPT | Performed by: FAMILY MEDICINE

## 2019-08-15 ASSESSMENT — ENCOUNTER SYMPTOMS
GASTROINTESTINAL NEGATIVE: 1
RESPIRATORY NEGATIVE: 1

## 2019-08-15 NOTE — TELEPHONE ENCOUNTER
.  Have her pick out this morning.   She needs a marked in the dates that she was admitted in the hospital

## 2019-08-16 ENCOUNTER — TELEPHONE (OUTPATIENT)
Dept: PRIMARY CARE CLINIC | Age: 64
End: 2019-08-16

## 2019-08-16 NOTE — TELEPHONE ENCOUNTER
Pt called stating the letter that was written for her need to have a time limit on the restrictions. She is requesting that it be fixed and refaxed.

## 2019-08-19 ENCOUNTER — TELEPHONE (OUTPATIENT)
Dept: ADMINISTRATIVE | Age: 64
End: 2019-08-19

## 2019-08-19 NOTE — TELEPHONE ENCOUNTER
Patient called reports returning to work yesterday after leave and states she left in tears. Patients leg was hurting and swollen. Patient scheduled 8/21 please let me know if appointment is needed sooner.

## 2019-08-20 ENCOUNTER — OFFICE VISIT (OUTPATIENT)
Dept: PRIMARY CARE CLINIC | Age: 64
End: 2019-08-20
Payer: COMMERCIAL

## 2019-08-20 VITALS
SYSTOLIC BLOOD PRESSURE: 135 MMHG | HEIGHT: 62 IN | TEMPERATURE: 99 F | DIASTOLIC BLOOD PRESSURE: 83 MMHG | BODY MASS INDEX: 47.48 KG/M2 | WEIGHT: 258 LBS

## 2019-08-20 DIAGNOSIS — L03.116 CELLULITIS OF LEFT LOWER LEG: ICD-10-CM

## 2019-08-20 DIAGNOSIS — M17.11 PRIMARY OSTEOARTHRITIS OF RIGHT KNEE: Chronic | ICD-10-CM

## 2019-08-20 DIAGNOSIS — I89.0 LYMPHEDEMA OF BOTH LOWER EXTREMITIES: Primary | Chronic | ICD-10-CM

## 2019-08-20 DIAGNOSIS — M17.12 PRIMARY OSTEOARTHRITIS OF LEFT KNEE: Chronic | ICD-10-CM

## 2019-08-20 PROCEDURE — 99213 OFFICE O/P EST LOW 20 MIN: CPT | Performed by: FAMILY MEDICINE

## 2019-08-20 ASSESSMENT — ENCOUNTER SYMPTOMS
ALLERGIC/IMMUNOLOGIC NEGATIVE: 1
RESPIRATORY NEGATIVE: 1
ROS SKIN COMMENTS: HPI
GASTROINTESTINAL NEGATIVE: 1
EYES NEGATIVE: 1

## 2019-08-20 NOTE — PROGRESS NOTES
current use of insulin ([facility] [address] 2019    Ulcer of left lower leg, limited to breakdown of skin ([facility] [address] 6/10/2019     Family History   Problem Relation Age of Onset    Breast Cancer Mother     Stroke Father     Hypertension Sister     Hypertension Brother       Past Surgical History:   Procedure Laterality Date    ABDOMINOPLASTY      BREAST REDUCTION SURGERY      reduction    CATARACT REMOVAL      bilateral     SECTION      x2    COLONOSCOPY  2012    and egd    ECHOCARDIOGRAM COMPLETE 2D W DOPPLER W COLOR  2012         GASTRIC BYPASS SURGERY      HERNIA REPAIR            Vitals:    19 1546   BP: 135/83   Temp: 99 °F (37.2 °C)   Weight: 258 lb (117 kg)   Height: 5' 2\" (1.575 m)       Objective:    Physical Exam   Cardiovascular: Normal rate and regular rhythm. Pulmonary/Chest: Effort normal and breath sounds normal.   Musculoskeletal:   Marked  Dec rom left knee   Skin:   Raoul and firmness of left lower leg     With no discharge       Adarsh Franco was seen today for other. Diagnoses and all orders for this visit:    Lymphedema of both lower extremities    Primary osteoarthritis of left knee  -     Gregory Zimmerman MD, Pain Medicine, [place]    Cellulitis of left lower leg    Primary osteoarthritis of right knee  -     Gregory Zimmerman MD, Pain Medicine, [place]        Comments: will keep off work     FU with dr Violet Pro  And  Dr Arun Díaz   [facility]     Dc smoking      rtw  19    A great deal of time spent reviewing medications, diet, exercise, social issues. Also reviewing the chart before entering the room with patient and finishing charting after leaving patient's room. More than half of that time was spent face to face with the patient in counseling and coordinating care. Follow Up: Return in about 1 month (around 2019) for rtw  19  ---.      Seen by:  Clement Martell DO

## 2019-08-22 ENCOUNTER — TELEPHONE (OUTPATIENT)
Dept: PRIMARY CARE CLINIC | Age: 64
End: 2019-08-22

## 2019-08-22 DIAGNOSIS — M17.12 PRIMARY OSTEOARTHRITIS OF LEFT KNEE: Primary | ICD-10-CM

## 2019-08-22 DIAGNOSIS — M15.9 PRIMARY OSTEOARTHRITIS INVOLVING MULTIPLE JOINTS: ICD-10-CM

## 2019-08-22 DIAGNOSIS — I89.0 LYMPHEDEMA OF BOTH LOWER EXTREMITIES: ICD-10-CM

## 2019-08-23 ENCOUNTER — TELEPHONE (OUTPATIENT)
Dept: PRIMARY CARE CLINIC | Age: 64
End: 2019-08-23

## 2019-08-23 DIAGNOSIS — I89.0 LYMPHEDEMA OF BOTH LOWER EXTREMITIES: Primary | Chronic | ICD-10-CM

## 2019-08-26 ENCOUNTER — HOSPITAL ENCOUNTER (OUTPATIENT)
Dept: OCCUPATIONAL THERAPY | Age: 64
Setting detail: THERAPIES SERIES
Discharge: HOME | End: 2019-08-26
Payer: COMMERCIAL

## 2019-08-26 ENCOUNTER — TELEPHONE (OUTPATIENT)
Dept: ADMINISTRATIVE | Age: 64
End: 2019-08-26

## 2019-08-26 PROCEDURE — 97165 OT EVAL LOW COMPLEX 30 MIN: CPT | Performed by: OCCUPATIONAL THERAPIST

## 2019-08-27 NOTE — PROGRESS NOTES
limited to breakdown of skin ([facility] [address] 6/10/2019           Past Surgical History         Past Surgical History:   Procedure Laterality Date    ABDOMINOPLASTY       BREAST REDUCTION SURGERY         reduction    CATARACT REMOVAL         bilateral     SECTION         x2    COLONOSCOPY   2012     and egd    ECHOCARDIOGRAM COMPLETE 2D W DOPPLER W COLOR   2012          GASTRIC BYPASS SURGERY   2000    HERNIA REPAIR                   []Surgery:    []Lymph node removal:   []Radiation Therapy:   []Chemotherapy:   [x]History of Cellulitis/Infection: six years ago per patient  [x]Family history of lymphedema: mother secondary to breast cancer treatment  []Previous treatment for lymphedema:   [x]Current compression garment use: patient stated has compression socks she purchased on line    Home Living: patient lives with sister and daughter in two floor home with first floor set up. Patient has two step entry with rail and does not use an assisted device. Patient has a tub/shower combo and basement that she does not use. Prior Level of Function: independent all aspects per patient    IADL History  Homemaking Responsibility: performs all aspects  Shopping Responsibility: performs all aspects  Mode of Transportation: car  Leisure & Hobbies: crafts  Work: full time        Pain Level: 5 on scale of 1-10 aching pain of bilateral knees  Pitting Edema: yes throughout bilateral lower extremities  Fibrotic tissue: Moderate fibrotic tissue noted bilateral lower extremities left > right. Skin Integrity: fair - patient with coloration change bilateral lower extremities with increased warmth left lower leg with increased sensation issues. Therapist to monitor for increased signs and symptoms of cellulitis.         Lymphedema Lower Extremity Measurements      Measurements are made in 4cm increments and are circumferential.       Evaluation left - [id number] Follow up r #1 Follow up r #2 Follow up r #3

## 2019-08-29 DIAGNOSIS — M17.11 PRIMARY OSTEOARTHRITIS OF RIGHT KNEE: Primary | Chronic | ICD-10-CM

## 2019-08-30 ENCOUNTER — HOSPITAL ENCOUNTER (OUTPATIENT)
Dept: OCCUPATIONAL THERAPY | Age: 64
Setting detail: THERAPIES SERIES
Discharge: HOME | End: 2019-08-30
Payer: COMMERCIAL

## 2019-08-30 PROCEDURE — 97530 THERAPEUTIC ACTIVITIES: CPT | Performed by: OCCUPATIONAL THERAPIST

## 2019-09-03 ENCOUNTER — OFFICE VISIT (OUTPATIENT)
Dept: ORTHOPEDIC SURGERY | Age: 64
End: 2019-09-03
Payer: COMMERCIAL

## 2019-09-03 ENCOUNTER — HOSPITAL ENCOUNTER (OUTPATIENT)
Dept: GENERAL RADIOLOGY | Age: 64
Discharge: HOME | End: 2019-09-05
Payer: COMMERCIAL

## 2019-09-03 VITALS
WEIGHT: 260 LBS | BODY MASS INDEX: 47.55 KG/M2 | SYSTOLIC BLOOD PRESSURE: 147 MMHG | DIASTOLIC BLOOD PRESSURE: 88 MMHG | HEART RATE: 83 BPM

## 2019-09-03 DIAGNOSIS — M17.12 PRIMARY OSTEOARTHRITIS OF LEFT KNEE: Primary | Chronic | ICD-10-CM

## 2019-09-03 DIAGNOSIS — M17.11 PRIMARY OSTEOARTHRITIS OF RIGHT KNEE: Chronic | ICD-10-CM

## 2019-09-03 DIAGNOSIS — M17.12 PRIMARY OSTEOARTHRITIS OF LEFT KNEE: Chronic | ICD-10-CM

## 2019-09-03 PROCEDURE — 99212 OFFICE O/P EST SF 10 MIN: CPT | Performed by: PHYSICIAN ASSISTANT

## 2019-09-03 PROCEDURE — 73560 X-RAY EXAM OF KNEE 1 OR 2: CPT

## 2019-09-03 PROCEDURE — 2500000003 HC RX 250 WO HCPCS

## 2019-09-03 PROCEDURE — 6360000002 HC RX W HCPCS

## 2019-09-03 PROCEDURE — 20610 DRAIN/INJ JOINT/BURSA W/O US: CPT | Performed by: PHYSICIAN ASSISTANT

## 2019-09-03 PROCEDURE — 99213 OFFICE O/P EST LOW 20 MIN: CPT | Performed by: PHYSICIAN ASSISTANT

## 2019-09-03 NOTE — PATIENT INSTRUCTIONS
Activity as tolerated. You may be sore at the injection site for several days. You may apply ice to your injection site. Call the office if any unusual new swelling, pain or redness develops around your knee.     Injection can be repeated in 3 months if needed  Recommend follow up in [facility] if more convenient    Walker prescription written today for ambulatory assistance when pain severe

## 2019-09-03 NOTE — PROGRESS NOTES
skin warm and dry, speech and dress appropriate for noted age, affect euthymic. Extremity:  Bilateral Knee exam  Skin intact. No erythema/induration/fluctuence at knee. No effusion noted to the knee  Patient does have moderate lymphedema below the knee left > right  Moderate crepitus with flexion and extension of the knee  Varus alignment of the left knee, valgus alignment of the right knee  Bilateral joint line pain with palpation   Stable to varus and valgus at 0 and 30 degrees of flexion. Negative Lachman's and posterior drawer. Active range of motion R: 0-100, L:0-90  Compartments soft and compressible throughout leg. Calf soft and nontender with palpation    Demonstrates active ankle plantar/dorsiflexion/great toe extension. Sensation intact to light touch sural/deep peroneal/superficial peroneal/saphenous/posterior tibial nerve distributions to foot/ankle. Palpable dorsalis pedis and posterior tibialis pulses. XR: X-rays of bilateral knees, weightbearing are obtained. AP and lateral views of the right knee demonstrate tricompartmental degenerative changes, most significant joint space narrowing in the patellofemoral and medial joint lines. No acute fracture or dislocation noted. Mild valgus alignment  AP and lateral weightbearing views of the left knee demonstrates varus alignment of the knee overall, severe tricompartmental degenerative changes, patient has markedly decreased joint space the medial joint compartment and patellofemoral.  No acute fracture/ dislocation noted. BP (!) 147/88 (Site: Left Upper Arm, Position: Sitting, Cuff Size: Large Adult)   Pulse 83   Wt 260 lb (117.9 kg)   BMI 47.55 kg/m²     ASSESSMENT:     Diagnosis Orders   1. Primary osteoarthritis of left knee  XR KNEE LEFT (1-2 VIEWS)    Misc.  Devices (WALKER) MISC    bupivacaine (PF) (MARCAINE) 0.25 % injection 20 mg    lidocaine 1 % injection 8 mL    triamcinolone acetonide (KENALOG-40) injection 40 mg using computerize voiced recognition software. Every effort has been made to ensure accuracy; however, inadvertent computerized transcription errors may be present.

## 2019-09-04 ENCOUNTER — HOSPITAL ENCOUNTER (OUTPATIENT)
Dept: OCCUPATIONAL THERAPY | Age: 64
Setting detail: THERAPIES SERIES
Discharge: HOME | End: 2019-09-04
Payer: COMMERCIAL

## 2019-09-04 PROCEDURE — 97530 THERAPEUTIC ACTIVITIES: CPT | Performed by: OCCUPATIONAL THERAPIST

## 2019-09-04 RX ORDER — TRIAMCINOLONE ACETONIDE 40 MG/ML
40 INJECTION, SUSPENSION INTRA-ARTICULAR; INTRAMUSCULAR ONCE
Status: COMPLETED | OUTPATIENT
Start: 2019-09-04 | End: 2019-09-04

## 2019-09-04 RX ORDER — LIDOCAINE HYDROCHLORIDE 10 MG/ML
8 INJECTION, SOLUTION INFILTRATION; PERINEURAL ONCE
Status: COMPLETED | OUTPATIENT
Start: 2019-09-04 | End: 2019-09-04

## 2019-09-04 RX ORDER — BUPIVACAINE HYDROCHLORIDE 2.5 MG/ML
8 INJECTION, SOLUTION EPIDURAL; INFILTRATION; INTRACAUDAL ONCE
Status: COMPLETED | OUTPATIENT
Start: 2019-09-04 | End: 2019-09-04

## 2019-09-04 RX ADMIN — TRIAMCINOLONE ACETONIDE 40 MG: 40 INJECTION, SUSPENSION INTRA-ARTICULAR; INTRAMUSCULAR at 08:50

## 2019-09-04 RX ADMIN — BUPIVACAINE HYDROCHLORIDE 20 MG: 2.5 INJECTION, SOLUTION EPIDURAL; INFILTRATION; INTRACAUDAL at 08:49

## 2019-09-04 RX ADMIN — TRIAMCINOLONE ACETONIDE 40 MG: 40 INJECTION, SUSPENSION INTRA-ARTICULAR; INTRAMUSCULAR at 08:51

## 2019-09-04 RX ADMIN — LIDOCAINE HYDROCHLORIDE 8 ML: 10 INJECTION, SOLUTION INFILTRATION; PERINEURAL at 08:49

## 2019-09-05 ENCOUNTER — HOSPITAL ENCOUNTER (OUTPATIENT)
Dept: OCCUPATIONAL THERAPY | Age: 64
Setting detail: THERAPIES SERIES
Discharge: HOME | End: 2019-09-05
Payer: COMMERCIAL

## 2019-09-05 PROCEDURE — 97530 THERAPEUTIC ACTIVITIES: CPT | Performed by: OCCUPATIONAL THERAPIST

## 2019-09-05 NOTE — PROGRESS NOTES
management of lymphedema. 3.  Patient will present with decreased limb volume in the involved extremity from mild to trace  for improved functional mobility.      Plan: Continue to address:  [x]Bandaging  [x] Self Bandaging/Family Assist  [x]MLD  [x]Self Massage  [x]Exercise  [x]Education  []Obtain referral for garments  []Medical Hold  []Discharge to [facility] OTR/L, CLT

## 2019-09-06 ENCOUNTER — OFFICE VISIT (OUTPATIENT)
Dept: PAIN MANAGEMENT | Age: 64
End: 2019-09-06
Payer: COMMERCIAL

## 2019-09-06 ENCOUNTER — APPOINTMENT (OUTPATIENT)
Dept: OCCUPATIONAL THERAPY | Age: 64
End: 2019-09-06
Payer: COMMERCIAL

## 2019-09-06 ENCOUNTER — HOSPITAL ENCOUNTER (OUTPATIENT)
Age: 64
Discharge: HOME | End: 2019-09-08
Payer: COMMERCIAL

## 2019-09-06 VITALS
BODY MASS INDEX: 47.48 KG/M2 | WEIGHT: 258 LBS | HEART RATE: 80 BPM | TEMPERATURE: 98.7 F | RESPIRATION RATE: 18 BRPM | HEIGHT: 62 IN | SYSTOLIC BLOOD PRESSURE: 128 MMHG | DIASTOLIC BLOOD PRESSURE: 68 MMHG | OXYGEN SATURATION: 96 %

## 2019-09-06 DIAGNOSIS — M25.511 CHRONIC RIGHT SHOULDER PAIN: ICD-10-CM

## 2019-09-06 DIAGNOSIS — G89.4 CHRONIC PAIN SYNDROME: ICD-10-CM

## 2019-09-06 DIAGNOSIS — G89.29 CHRONIC RIGHT-SIDED LOW BACK PAIN WITHOUT SCIATICA: Primary | ICD-10-CM

## 2019-09-06 DIAGNOSIS — G89.29 CHRONIC RIGHT SHOULDER PAIN: ICD-10-CM

## 2019-09-06 DIAGNOSIS — M54.50 CHRONIC RIGHT-SIDED LOW BACK PAIN WITHOUT SCIATICA: Primary | ICD-10-CM

## 2019-09-06 DIAGNOSIS — M17.0 PRIMARY OSTEOARTHRITIS OF BOTH KNEES: ICD-10-CM

## 2019-09-06 PROCEDURE — 99204 OFFICE O/P NEW MOD 45 MIN: CPT | Performed by: PHYSICIAN ASSISTANT

## 2019-09-06 PROCEDURE — G0480 DRUG TEST DEF 1-7 CLASSES: HCPCS

## 2019-09-06 PROCEDURE — 80307 DRUG TEST PRSMV CHEM ANLYZR: CPT

## 2019-09-06 NOTE — PROGRESS NOTES
Son De La Torre presents to the [facility] on 9/6/2019. Naima Machado is complaining of pain in the back and knee . The pain is constant. The pain is described as aching, throbbing, shooting and stabbing. Pain is rated on her best day at a 5, on her worst day at a 5, and on average at a 5 on the VAS scale. She took her last dose of belbuca      Any procedures since your last visit: Yes, knee injection by dr James Brought     Pacemaker or defibrilator: No managed by none. She has not been on anticoagulation medication      /68   Pulse 80   Temp 98.7 °F (37.1 °C)   Resp 18   Ht 5' 2\" (1.575 m)   Wt 258 lb (117 kg)   SpO2 96%   BMI 47.19 kg/m²      No LMP recorded.  Patient is postmenopausal.

## 2019-09-06 NOTE — PROGRESS NOTES
[address]        [address] [facility], [address]          Consult Note      Patient:Selena Whitney,  1955    Date of Service:  19    Requesting Physician:  Joey Guzman DO    Reason for Consult:  B/L Knees, lower back and right shoulder pain    Patient presents with complaints of bilateral back, bilateral knee and right shoulder pain that started 6 years ago and has been getting worse. Patient recently in the hospital in 2019 for sepsis. States that she had a blister on her leg but no other issues. Had just returned from the [facility]. Currently a patient a [facility] pain clinic. Was on Fentanyl 25 mcg and norco 5/325 TID. Was not helping and caused drowsiness. Now on belbuca 450 mcg and doing very well. [facility] is too expensive and is transferring care over to our office. HISTORY OF PRESENT ILLNESS:      She states the pain began following:   No specific cause    Pain is constant and is described as sharp and throbbing. She rates the pain as a 10/10 on her worst day and a 5/10 on her best day, on the VAS scale. Pain does not radiate to both lower extremities. She  does not have numbness, tingling, weakness of the both lower extremities and does not have bladder or bowel dysfunction. Alleviating factors include: rest and medication. Aggravating factors include:  walking, standing, sitting. She states that the pain does keep her from sleeping at night. She has not been on anticoagulation medications to include ASA, NSAIDS, Plavix, heparin, LMW heparin and warfarin and has not been on herbal supplements. She is diabetic. Imagin/3/2019 left knee x-ray    The bones appear to be in anatomic alignment.     No foreign body is identified   No fracture is identified     There is moderate tricompartmental joint space loss greatest long   patellofemoral and medial femoral tibial joint   The are moderate degenerative changes present negative facet loading left and right. Range of motion:abnormal mildly Lateral bending, flexion, extension rotation bilateral and is not painful. Musculoskeletal:    SI joint tenderness:negative right, negative left              NGA test:not done right, not done left - unable to perform  Piriformis tenderness:negative right, negative left  Trochanteric bursa tenderness:negative right, negative left  SLR:negative right, negative left, sitting     Extremities:    Tremors:None bilaterally upper and lower  Range of motion:Generally limited extension shoulder - right, pain with internal rotation of hips negative. Intact:Yes  Varicose veins:absent   Pulses:radial pulse 2+ left  Cyanosis:none  Edema:severe pitting edema pretibial on the left    Knee: Inspection:symmetric, swelling none bilaterally  Tenderness of Bony Landmarks:Lateral,left  Tenderness of Bursa:None, bilateral  Drawer Test:negative  Effusion:absent bilaterally  Crepitus:present bilaterally  ROM:Left 10-80 Right 10-80    Neurological:    Cranial nerves:normal  Sensory:normal to light touch   Motor:   Right Quadriceps5/5          Left Quadriceps5/5           Right Gastrocnemius5/5    Left Gastrocnemius5/5  Right Ant Tibialis5/5  Left Ant Tibialis5/5  Coordination:normal  Reflexes:    Right Quadriceps reflex2+  Left Quadriceps reflex2+  Right Achilles reflex2+  Left Achilles reflex2+  Gait:antalgic    Dermatology:    Skin: Erythema left lower leg    Assessment/Plan:    B/L Knee Pain, LBP, right shoulder pain  Knee x-rays: Moderate DJD. No lower back imaging  Hx of pain management with [facility] pain clinic. Was on fentanyl 25 mg and norco 5/325 TID. Did not help and caused drowsiness. Now on Belbuca 450 mcg which is working very well without side effects.   Transferring care to [facility] due to cost.    Hx of LESI by Dr. Tara Velarde and [facility] with no relief  Hx of gastric bypass  Recent hospital stay in [place] 2019 x 3 weeks for sepsis  [address] [facility]

## 2019-09-07 LAB — SPECIFIC GRAVITY UA: 1.01 (ref 1–1.03)

## 2019-09-09 ENCOUNTER — HOSPITAL ENCOUNTER (OUTPATIENT)
Dept: OCCUPATIONAL THERAPY | Age: 64
Setting detail: THERAPIES SERIES
Discharge: HOME | End: 2019-09-09
Payer: COMMERCIAL

## 2019-09-09 PROCEDURE — 97530 THERAPEUTIC ACTIVITIES: CPT | Performed by: OCCUPATIONAL THERAPIST

## 2019-09-09 NOTE — PROGRESS NOTES
management of lymphedema. 3.  Patient will present with decreased limb volume in the involved extremity from mild to trace  for improved functional mobility.      Plan: Continue to address:  [x]Bandaging  [x] Self Bandaging/Family Assist  [x]MLD  [x]Self Massage  [x]Exercise  [x]Education  []Obtain referral for garments  []Medical Hold  []Discharge to [facility] OTR/L, CLT

## 2019-09-09 NOTE — PROGRESS NOTES
stretch bandaging and educated patient on each layer as it was applied. Patient able to verbalize understanding and tolerated bandaging well. Patient educated on signs and symptoms that would require bandaging to be removed. Patient able to verbalize understanding. Progressing toward goal.          LT weeks  1. Patient will be independent with self management of lymphedema including understanding garment wear schedule, self compression bandaging, HEP and self care. 2.  Patient will be fitted for appropriate compression garments for long term management of lymphedema. 3.  Patient will present with decreased limb volume in the involved extremity from mild to trace  for improved functional mobility.      Plan: Continue to address:  [x]Bandaging  [x] Self Bandaging/Family Assist  [x]MLD  [x]Self Massage  [x]Exercise  [x]Education  []Obtain referral for garments  []Medical Hold  []Discharge to [facility] OTR/L, CLT

## 2019-09-11 ENCOUNTER — HOSPITAL ENCOUNTER (OUTPATIENT)
Dept: OCCUPATIONAL THERAPY | Age: 64
Setting detail: THERAPIES SERIES
Discharge: HOME | End: 2019-09-11
Payer: COMMERCIAL

## 2019-09-11 LAB
6AM URINE: <10 NG/ML
7-AMINOCLONAZEPAM, URINE: <5 NG/ML
ALPHA-HYDROXYALPRAZOLAM, URINE: <5 NG/ML
ALPHA-HYDROXYMIDAZOLAM, URINE: <20 NG/ML
ALPRAZOLAM, URINE: <5 NG/ML
CHLORDIAZEPOXIDE, URINE: <20 NG/ML
CLONAZEPAM, URINE: <5 NG/ML
CODEINE, URINE: <20 NG/ML
DIAZEPAM, URINE: <20 NG/ML
HYDROCODONE, URINE: <20 NG/ML
HYDROMORPHONE, URINE: <20 NG/ML
LORAZEPAM, URINE: <20 NG/ML
MIDAZOLAM, URINE: <20 NG/ML
MORPHINE URINE: <20 NG/ML
NORDIAZEPAM, URINE: <20 NG/ML
NORHYDROCODONE, URINE: <20 NG/ML
NOROXYCODONE, URINE: <20 NG/ML
NOROXYMORPHONE, URINE: <20 NG/ML
OXAZEPAM, URINE: <20 NG/ML
OXYCODONE, URINE CONFIRMATION: <20 NG/ML
OXYMORPHONE, URINE: <20 NG/ML
TEMAZEPAM, URINE: <20 NG/ML

## 2019-09-11 PROCEDURE — 97530 THERAPEUTIC ACTIVITIES: CPT | Performed by: OCCUPATIONAL THERAPIST

## 2019-09-17 LAB
Lab: NORMAL
REPORT: NORMAL
THIS TEST SENT TO: NORMAL

## 2019-09-18 ENCOUNTER — HOSPITAL ENCOUNTER (OUTPATIENT)
Dept: OCCUPATIONAL THERAPY | Age: 64
Setting detail: THERAPIES SERIES
Discharge: HOME | End: 2019-09-18
Payer: COMMERCIAL

## 2019-09-18 PROCEDURE — 97530 THERAPEUTIC ACTIVITIES: CPT | Performed by: OCCUPATIONAL THERAPIST

## 2019-09-20 ENCOUNTER — HOSPITAL ENCOUNTER (OUTPATIENT)
Dept: OCCUPATIONAL THERAPY | Age: 64
Setting detail: THERAPIES SERIES
Discharge: HOME | End: 2019-09-20
Payer: COMMERCIAL

## 2019-09-20 PROCEDURE — 97530 THERAPEUTIC ACTIVITIES: CPT | Performed by: OCCUPATIONAL THERAPIST

## 2019-09-20 NOTE — PROGRESS NOTES
Patient will demonstrate knowledge and understanding for lymphedema precautions, skin care and self management to decrease progression of lymphedema and risk of infection. Therapist continued patient education regarding lymphedema precautions and skin care and self management. Patient able to verbalize understanding. Progressing toward goals. 2.  Patient will present with decreased limb volume in the involved extremity from moderate to mild for improved functional mobility. Therapist cotninued patient education regarding self manual lymphatic massage sequence for lower extremity involvement. Patient presented with decreased /increased edema left lower extremity. Measurements taken and recorded above. Patient stated she performed lymphatic massage since last treatment session. Therapist performed abbreviated lymphatic massage sequence for lower extremity. Discussed with patient using this method when working to relieve discomfort until able to get home to perform entire sequence. Patient able to verbalize understanding. Progressing toward goal   3. Patient will demonstrate compliance with compression therapy for independent management of lymphedema. Patient education continued on Circaid reduction kit as alternative to short stretch bandaging. Patient able to verbalize understanding. Therapist continued patient education regarding short stretch bandaging for left lower extremity to knee. Therapist performed short stretch bandaging and educated patient on each layer as it was applied. Patient able to verbalize understanding and tolerated bandaging well. Patient educated on signs and symptoms that would require bandaging to be removed. Patient able to verbalize understanding. Progressing toward goal.          LT weeks  1. Patient will be independent with self management of lymphedema including understanding garment wear schedule, self compression bandaging, HEP and self care.   2.  Patient will be fitted for appropriate compression garments for long term management of lymphedema. Therapist initiated patient education regarding compression stocking. Patient anxious regarding when she can order stockings in preparation to returning to work. Therapist educated on reduction of edema prior to fitting for compression stockings. Patient able to verbalize understanding. 3.  Patient will present with decreased limb volume in the involved extremity from mild to trace  for improved functional mobility.      Plan: Continue to address:  [x]Bandaging  [x] Self Bandaging/Family Assist  [x]MLD  [x]Self Massage  [x]Exercise  [x]Education  []Obtain referral for garments  []Medical Hold  []Discharge to [facility] OTR/L, CLT

## 2019-09-23 ENCOUNTER — HOSPITAL ENCOUNTER (OUTPATIENT)
Dept: OCCUPATIONAL THERAPY | Age: 64
Setting detail: THERAPIES SERIES
Discharge: HOME | End: 2019-09-23
Payer: COMMERCIAL

## 2019-09-23 PROCEDURE — 97530 THERAPEUTIC ACTIVITIES: CPT | Performed by: OCCUPATIONAL THERAPIST

## 2019-09-23 NOTE — PROGRESS NOTES
will be independent with self management of lymphedema including understanding garment wear schedule, self compression bandaging, HEP and self care. 2.  Patient will be fitted for appropriate compression garments for long term management of lymphedema. Therapist initiated patient education regarding compression stocking. Patient anxious regarding when she can order stockings in preparation to returning to work. Therapist educated on reduction of edema prior to fitting for compression stockings. Patient able to verbalize understanding. 3.  Patient will present with decreased limb volume in the involved extremity from mild to trace  for improved functional mobility.      Plan: Continue to address:  [x]Bandaging  [x] Self Bandaging/Family Assist  [x]MLD  [x]Self Massage  [x]Exercise  [x]Education  []Obtain referral for garments  []Medical Hold  []Discharge to [facility] OTR/L, CLT

## 2019-09-23 NOTE — PROGRESS NOTES
noted     Objective:  [x] Measurement []Manual Lymph Drainage  [x]Bandaging   []Kinesiotaping [x] Education    []Self Massage   []Self Bandaging [] Exercise    []Wound Care  []Hygiene  [] Other      Assessment:  Knowledge of home program:   [] Good [x] Fair  [] Poor  Patient is programming at home:  [x] Yes  [] No  Family is assisting with programming: [] Yes  [x] No  Home programming is consistent: [] Yes  [x] No  Other:   Response to treatment:  good  Instructions for patient:   [x] Verbal [x] Written  Instructions addressed: manual massage sequence, bandaging, compression options    Lymphedema Lower Extremity Measurements        Measurements are made in 4cm increments and are circumferential.         Evaluation left - [id number] Follow up r #1 11Sept. 2019 Follow up r #2 18Sept.2019 Follow up r #3 20Sept. 2019 Follow up r #4 Follow up r #5 Evaluation right - [id number] Follow up l #1  Follow up l #2 Follow up l #3 Follow up l #4 Follow up l #5   Toes   23  22.25  22.25  22     22             4cm 23  21  21.25  21.5     21.75             8cm 26  23.75  23.5  24     23. 5             12cm 34  33  32.75  31.75     33.75             16cm 31.75  29  30.5  28.75     32.25             20cm 37.5  33.75  34.5  32.75     36.25             24cm 43.75  38.25  39  38.25     39. 5             28cm 51.5  43.5  42.75  42.75     43. 5             32cm 57.5  47.25  47.25  47     48             36cm 60  50.75  50.75  51.75     51.25             40cm 58.75  52  52  53.5     50             44cm 56  60  53.25  53.5     52. 5             48cm 61  63  59.5  61     58.25             52cm 65.5           62             56cm 66           62             60cm                           64cm                           68cm                           72cm                           76cm                           80cm                           84cm                           88cm                           92cm                         Patient Goal: to be able to walk better     STG: 3 weeks  1. Patient will demonstrate knowledge and understanding for lymphedema precautions, skin care and self management to decrease progression of lymphedema and risk of infection. Therapist continued patient education regarding lymphedema precautions and skin care and self management. Patient able to verbalize understanding. Progressing toward goals. 2.  Patient will present with decreased limb volume in the involved extremity from moderate to mild for improved functional mobility. Therapist cotninued patient education regarding self manual lymphatic massage sequence for lower extremity involvement. Patient presented with decreased /increased edema left lower extremity. Measurements taken and recorded above. Patient stated she performed lymphatic massage once per side per day. Therapist discussed with patient to concentrate on only performing on left lower extremity until next treatment session. Patient able to verbalize understanding. Progressing toward goal   3. Patient will demonstrate compliance with compression therapy for independent management of lymphedema. Patient education continued on Circaid reduction kit as alternative to short stretch bandaging. Patient able to verbalize understanding. Therapist continued patient education regarding short stretch bandaging for left lower extremity to knee. Therapist performed short stretch bandaging and educated patient on each layer as it was applied. Patient able to verbalize understanding and tolerated bandaging well. Therapist added bandaging to mid thigh this treatment session to assist with reduction. Patient educated on signs and symptoms that would require bandaging to be removed. Patient able to verbalize understanding. Progressing toward goal.          LT weeks  1.   Patient will be independent with self management of lymphedema including understanding garment wear

## 2019-09-25 ENCOUNTER — HOSPITAL ENCOUNTER (OUTPATIENT)
Dept: OCCUPATIONAL THERAPY | Age: 64
Setting detail: THERAPIES SERIES
Discharge: HOME | End: 2019-09-25
Payer: COMMERCIAL

## 2019-09-25 PROCEDURE — 97530 THERAPEUTIC ACTIVITIES: CPT | Performed by: OCCUPATIONAL THERAPIST

## 2019-09-25 NOTE — PROGRESS NOTES
Occupational Therapy      OCCUPATIONAL THERAPY PROGRESS NOTE  Phone: [phone]  Fax: [phone]     Date:  19  Initial Evaluation Date: 2019    Patient Name:  Chitra Moreno    :  1955     Restrictions/Precautions: Morbid obesity, fall risk  Diagnosis:  Lymphedema of both lower extremities (I89.0)                                                   Insurance/Certification information:  [employer] [facility] - [id number]  Referring Physician:  Diana Mooney DO  Plan of care signed (Y/N):  No  Visit# / total visits: Visit #8     Time In: 7103                 Time Out:  1505     Restrictions/Precautions:  [] No blood pressure/blood draw in: [] Left Upper Extremity [] Right Upper Extremity                        [x] Fall Risk       Intervention:   []Other    Pain:  [] No    [x] Yes  Location:  Left shin  Pain Rating (0-10 pain scale):  Does not rate at time of treatment  Pain Description:  Ache pain  Interruption of Treatment [] Yes  [x] No    Came to Clinic:  [] Bandaged    [x]Unbandaged    [] With Stocking     [] With Sleeve     [] Kinesiotaped    [] [place]    [] With Alternative Compression:    Skin Integrity:  [] Normal [] Dry  []Rough []Moist []Rash  Location of problem area/s:  [] Wound: Location/Description   [x] Fibrosis: Moderate fibrotic tissue noted bilateral lower extremities left > right.  [] Keratosis: Location/Description  [] Papillomas: Location/Description  [x] Other: patient with coloration change bilateral lower extremities with increased warmth left lower leg with increased sensation issues. Therapist to monitor for increased signs and symptoms of cellulitis     Color:  [] Normal [] Mottled [] Flushed [x] Other/Description  Location of problem area/s: patient with coloration change bilateral lower extremities     Edema:  Patient exhibits bilateral lower extremity edema    Subjective:  Patient attended treatment session alone.   Patient stated having fall two

## 2019-09-30 ENCOUNTER — HOSPITAL ENCOUNTER (OUTPATIENT)
Dept: OCCUPATIONAL THERAPY | Age: 64
Setting detail: THERAPIES SERIES
Discharge: HOME | End: 2019-09-30
Payer: COMMERCIAL

## 2019-09-30 PROCEDURE — 97530 THERAPEUTIC ACTIVITIES: CPT | Performed by: OCCUPATIONAL THERAPIST

## 2019-10-02 ENCOUNTER — HOSPITAL ENCOUNTER (OUTPATIENT)
Dept: OCCUPATIONAL THERAPY | Age: 64
Setting detail: THERAPIES SERIES
Discharge: HOME | End: 2019-10-02
Payer: COMMERCIAL

## 2019-10-02 PROCEDURE — 97530 THERAPEUTIC ACTIVITIES: CPT | Performed by: OCCUPATIONAL THERAPIST

## 2019-10-04 ENCOUNTER — OFFICE VISIT (OUTPATIENT)
Dept: PRIMARY CARE CLINIC | Age: 64
End: 2019-10-04
Payer: COMMERCIAL

## 2019-10-04 DIAGNOSIS — E66.01 MORBID OBESITY (HCC): Chronic | ICD-10-CM

## 2019-10-04 DIAGNOSIS — Z12.11 SCREENING FOR MALIGNANT NEOPLASM OF COLON: Primary | ICD-10-CM

## 2019-10-04 DIAGNOSIS — I89.0 LYMPHEDEMA OF LEFT LOWER EXTREMITY: ICD-10-CM

## 2019-10-04 DIAGNOSIS — Z12.39 SCREENING FOR MALIGNANT NEOPLASM OF BREAST: ICD-10-CM

## 2019-10-04 PROCEDURE — 99213 OFFICE O/P EST LOW 20 MIN: CPT | Performed by: FAMILY MEDICINE

## 2019-10-05 VITALS
BODY MASS INDEX: 47.11 KG/M2 | HEIGHT: 62 IN | DIASTOLIC BLOOD PRESSURE: 82 MMHG | TEMPERATURE: 98.5 F | SYSTOLIC BLOOD PRESSURE: 132 MMHG | WEIGHT: 256 LBS

## 2019-10-05 DIAGNOSIS — I89.0 LYMPHEDEMA OF BOTH LOWER EXTREMITIES: Chronic | ICD-10-CM

## 2019-10-05 RX ORDER — ROPINIROLE 1 MG/1
1 TABLET, FILM COATED ORAL 3 TIMES DAILY
Qty: 270 TABLET | Refills: 5 | Status: SHIPPED | OUTPATIENT
Start: 2019-10-05 | End: 2020-10-20 | Stop reason: SDUPTHER

## 2019-10-05 RX ORDER — CEFDINIR 300 MG/1
300 CAPSULE ORAL 2 TIMES DAILY
Qty: 20 CAPSULE | Refills: 0 | Status: SHIPPED | OUTPATIENT
Start: 2019-10-05 | End: 2019-10-15

## 2019-10-05 ASSESSMENT — ENCOUNTER SYMPTOMS
SHORTNESS OF BREATH: 1
GASTROINTESTINAL NEGATIVE: 1
EYES NEGATIVE: 1
ALLERGIC/IMMUNOLOGIC NEGATIVE: 1

## 2019-10-05 ASSESSMENT — PATIENT HEALTH QUESTIONNAIRE - PHQ9
SUM OF ALL RESPONSES TO PHQ QUESTIONS 1-9: 0
SUM OF ALL RESPONSES TO PHQ9 QUESTIONS 1 & 2: 0
1. LITTLE INTEREST OR PLEASURE IN DOING THINGS: 0
SUM OF ALL RESPONSES TO PHQ QUESTIONS 1-9: 0
2. FEELING DOWN, DEPRESSED OR HOPELESS: 0

## 2019-10-09 ENCOUNTER — TELEPHONE (OUTPATIENT)
Dept: PRIMARY CARE CLINIC | Age: 64
End: 2019-10-09

## 2019-10-09 ENCOUNTER — HOSPITAL ENCOUNTER (OUTPATIENT)
Dept: OCCUPATIONAL THERAPY | Age: 64
Setting detail: THERAPIES SERIES
Discharge: HOME | End: 2019-10-09
Payer: COMMERCIAL

## 2019-10-09 PROCEDURE — 97530 THERAPEUTIC ACTIVITIES: CPT | Performed by: OCCUPATIONAL THERAPIST

## 2019-10-11 ENCOUNTER — HOSPITAL ENCOUNTER (OUTPATIENT)
Dept: OCCUPATIONAL THERAPY | Age: 64
Setting detail: THERAPIES SERIES
Discharge: HOME | End: 2019-10-11
Payer: COMMERCIAL

## 2019-10-11 PROCEDURE — 97530 THERAPEUTIC ACTIVITIES: CPT | Performed by: OCCUPATIONAL THERAPIST

## 2019-10-14 ENCOUNTER — HOSPITAL ENCOUNTER (OUTPATIENT)
Dept: OCCUPATIONAL THERAPY | Age: 64
Setting detail: THERAPIES SERIES
Discharge: HOME | End: 2019-10-14
Payer: COMMERCIAL

## 2019-10-14 PROCEDURE — 97530 THERAPEUTIC ACTIVITIES: CPT | Performed by: OCCUPATIONAL THERAPIST

## 2019-10-16 ENCOUNTER — HOSPITAL ENCOUNTER (OUTPATIENT)
Dept: OCCUPATIONAL THERAPY | Age: 64
Setting detail: THERAPIES SERIES
Discharge: HOME | End: 2019-10-16
Payer: COMMERCIAL

## 2019-10-18 ENCOUNTER — OFFICE VISIT (OUTPATIENT)
Dept: PAIN MANAGEMENT | Age: 64
End: 2019-10-18
Payer: COMMERCIAL

## 2019-10-18 VITALS
DIASTOLIC BLOOD PRESSURE: 64 MMHG | RESPIRATION RATE: 18 BRPM | HEART RATE: 94 BPM | SYSTOLIC BLOOD PRESSURE: 142 MMHG | OXYGEN SATURATION: 94 % | TEMPERATURE: 98.4 F

## 2019-10-18 DIAGNOSIS — M25.511 CHRONIC RIGHT SHOULDER PAIN: ICD-10-CM

## 2019-10-18 DIAGNOSIS — G89.4 CHRONIC PAIN SYNDROME: ICD-10-CM

## 2019-10-18 DIAGNOSIS — M54.50 CHRONIC RIGHT-SIDED LOW BACK PAIN WITHOUT SCIATICA: Primary | ICD-10-CM

## 2019-10-18 DIAGNOSIS — M17.0 PRIMARY OSTEOARTHRITIS OF BOTH KNEES: ICD-10-CM

## 2019-10-18 DIAGNOSIS — G89.29 CHRONIC RIGHT-SIDED LOW BACK PAIN WITHOUT SCIATICA: Primary | ICD-10-CM

## 2019-10-18 DIAGNOSIS — G89.29 CHRONIC RIGHT SHOULDER PAIN: ICD-10-CM

## 2019-10-18 PROCEDURE — 99213 OFFICE O/P EST LOW 20 MIN: CPT | Performed by: PHYSICIAN ASSISTANT

## 2019-10-18 RX ORDER — BUPRENORPHINE HYDROCHLORIDE 450 UG/1
450 FILM, SOLUBLE BUCCAL EVERY 12 HOURS
Qty: 60 EACH | Refills: 0 | Status: SHIPPED | OUTPATIENT
Start: 2019-10-23 | End: 2019-11-15 | Stop reason: SDUPTHER

## 2019-10-23 DIAGNOSIS — I89.0 LYMPHEDEMA OF BOTH LOWER EXTREMITIES: Primary | ICD-10-CM

## 2019-11-12 ENCOUNTER — OFFICE VISIT (OUTPATIENT)
Dept: PRIMARY CARE CLINIC | Age: 64
End: 2019-11-12
Payer: COMMERCIAL

## 2019-11-12 VITALS
WEIGHT: 268 LBS | BODY MASS INDEX: 49.32 KG/M2 | HEIGHT: 62 IN | DIASTOLIC BLOOD PRESSURE: 83 MMHG | SYSTOLIC BLOOD PRESSURE: 138 MMHG | TEMPERATURE: 98.2 F

## 2019-11-12 DIAGNOSIS — I89.0 LYMPHEDEMA OF BOTH LOWER EXTREMITIES: Chronic | ICD-10-CM

## 2019-11-12 DIAGNOSIS — L03.116 CELLULITIS OF LEFT LOWER LEG: Primary | Chronic | ICD-10-CM

## 2019-11-12 DIAGNOSIS — M51.9 INTERVERTEBRAL LUMBAR DISC DISORDER: ICD-10-CM

## 2019-11-12 DIAGNOSIS — M17.12 PRIMARY OSTEOARTHRITIS OF LEFT KNEE: Chronic | ICD-10-CM

## 2019-11-12 PROBLEM — E11.9 TYPE 2 DIABETES MELLITUS WITHOUT COMPLICATION, WITHOUT LONG-TERM CURRENT USE OF INSULIN (HCC): Chronic | Status: ACTIVE | Noted: 2019-06-21

## 2019-11-12 PROBLEM — L97.921 ULCER OF LEFT LOWER LEG, LIMITED TO BREAKDOWN OF SKIN (HCC): Status: RESOLVED | Noted: 2019-06-10 | Resolved: 2019-11-12

## 2019-11-12 PROBLEM — M15.0 PRIMARY OSTEOARTHRITIS INVOLVING MULTIPLE JOINTS: Chronic | Status: ACTIVE | Noted: 2019-06-27

## 2019-11-12 PROBLEM — M15.9 PRIMARY OSTEOARTHRITIS INVOLVING MULTIPLE JOINTS: Chronic | Status: ACTIVE | Noted: 2019-06-27

## 2019-11-12 PROBLEM — D50.9 IRON DEFICIENCY ANEMIA: Chronic | Status: ACTIVE | Noted: 2019-06-21

## 2019-11-12 PROCEDURE — 99214 OFFICE O/P EST MOD 30 MIN: CPT | Performed by: FAMILY MEDICINE

## 2019-11-12 RX ORDER — CEPHALEXIN 500 MG/1
500 CAPSULE ORAL 3 TIMES DAILY
Qty: 30 CAPSULE | Refills: 0 | Status: SHIPPED | OUTPATIENT
Start: 2019-11-12 | End: 2019-12-13

## 2019-11-12 ASSESSMENT — ENCOUNTER SYMPTOMS
RESPIRATORY NEGATIVE: 1
ROS SKIN COMMENTS: SEE HPI
EYES NEGATIVE: 1
GASTROINTESTINAL NEGATIVE: 1
ALLERGIC/IMMUNOLOGIC NEGATIVE: 1

## 2019-11-12 ASSESSMENT — PATIENT HEALTH QUESTIONNAIRE - PHQ9
1. LITTLE INTEREST OR PLEASURE IN DOING THINGS: 0
SUM OF ALL RESPONSES TO PHQ QUESTIONS 1-9: 0
SUM OF ALL RESPONSES TO PHQ QUESTIONS 1-9: 0
2. FEELING DOWN, DEPRESSED OR HOPELESS: 0
SUM OF ALL RESPONSES TO PHQ9 QUESTIONS 1 & 2: 0

## 2019-11-15 ENCOUNTER — OFFICE VISIT (OUTPATIENT)
Dept: PAIN MANAGEMENT | Age: 64
End: 2019-11-15
Payer: COMMERCIAL

## 2019-11-15 VITALS
HEART RATE: 85 BPM | OXYGEN SATURATION: 96 % | WEIGHT: 260 LBS | SYSTOLIC BLOOD PRESSURE: 132 MMHG | DIASTOLIC BLOOD PRESSURE: 82 MMHG | BODY MASS INDEX: 47.84 KG/M2 | HEIGHT: 62 IN | RESPIRATION RATE: 18 BRPM

## 2019-11-15 DIAGNOSIS — G89.29 CHRONIC RIGHT SHOULDER PAIN: ICD-10-CM

## 2019-11-15 DIAGNOSIS — M54.50 CHRONIC RIGHT-SIDED LOW BACK PAIN WITHOUT SCIATICA: ICD-10-CM

## 2019-11-15 DIAGNOSIS — G89.4 CHRONIC PAIN SYNDROME: ICD-10-CM

## 2019-11-15 DIAGNOSIS — M17.0 PRIMARY OSTEOARTHRITIS OF BOTH KNEES: ICD-10-CM

## 2019-11-15 DIAGNOSIS — M25.511 CHRONIC RIGHT SHOULDER PAIN: ICD-10-CM

## 2019-11-15 DIAGNOSIS — G89.29 CHRONIC RIGHT-SIDED LOW BACK PAIN WITHOUT SCIATICA: ICD-10-CM

## 2019-11-15 PROCEDURE — 99213 OFFICE O/P EST LOW 20 MIN: CPT | Performed by: PHYSICIAN ASSISTANT

## 2019-11-15 RX ORDER — BUPRENORPHINE HYDROCHLORIDE 450 UG/1
450 FILM, SOLUBLE BUCCAL EVERY 12 HOURS
Qty: 60 EACH | Refills: 0 | Status: SHIPPED | OUTPATIENT
Start: 2019-11-17 | End: 2019-11-18 | Stop reason: SDUPTHER

## 2019-11-18 RX ORDER — BUPRENORPHINE HYDROCHLORIDE 450 UG/1
450 FILM, SOLUBLE BUCCAL EVERY 12 HOURS
Qty: 60 EACH | Refills: 0 | Status: SHIPPED | OUTPATIENT
Start: 2019-11-18 | End: 2019-12-13 | Stop reason: SDUPTHER

## 2019-11-19 ENCOUNTER — OFFICE VISIT (OUTPATIENT)
Dept: PRIMARY CARE CLINIC | Age: 64
End: 2019-11-19
Payer: COMMERCIAL

## 2019-11-19 ENCOUNTER — HOSPITAL ENCOUNTER (OUTPATIENT)
Age: 64
Discharge: HOME | End: 2019-11-21
Payer: COMMERCIAL

## 2019-11-19 VITALS
SYSTOLIC BLOOD PRESSURE: 135 MMHG | DIASTOLIC BLOOD PRESSURE: 83 MMHG | TEMPERATURE: 98.9 F | WEIGHT: 264 LBS | BODY MASS INDEX: 48.58 KG/M2 | HEIGHT: 62 IN

## 2019-11-19 DIAGNOSIS — I89.0 LYMPHEDEMA OF BOTH LOWER EXTREMITIES: Chronic | ICD-10-CM

## 2019-11-19 DIAGNOSIS — E78.2 MIXED HYPERLIPIDEMIA: ICD-10-CM

## 2019-11-19 DIAGNOSIS — L03.116 CELLULITIS OF LEFT LOWER LEG: Primary | Chronic | ICD-10-CM

## 2019-11-19 DIAGNOSIS — E11.9 TYPE 2 DIABETES MELLITUS WITHOUT COMPLICATION, WITHOUT LONG-TERM CURRENT USE OF INSULIN (HCC): ICD-10-CM

## 2019-11-19 PROCEDURE — 80053 COMPREHEN METABOLIC PANEL: CPT

## 2019-11-19 PROCEDURE — 83036 HEMOGLOBIN GLYCOSYLATED A1C: CPT

## 2019-11-19 PROCEDURE — 36415 COLL VENOUS BLD VENIPUNCTURE: CPT

## 2019-11-19 PROCEDURE — 99213 OFFICE O/P EST LOW 20 MIN: CPT | Performed by: FAMILY MEDICINE

## 2019-11-19 PROCEDURE — 80061 LIPID PANEL: CPT

## 2019-11-19 PROCEDURE — 85025 COMPLETE CBC W/AUTO DIFF WBC: CPT

## 2019-11-19 ASSESSMENT — ENCOUNTER SYMPTOMS
EYES NEGATIVE: 1
RESPIRATORY NEGATIVE: 1
ALLERGIC/IMMUNOLOGIC NEGATIVE: 1
GASTROINTESTINAL NEGATIVE: 1
ROS SKIN COMMENTS: SEE  HPI

## 2019-11-20 LAB
ALBUMIN SERPL-MCNC: 3.8 G/DL (ref 3.5–5.2)
ALP BLD-CCNC: 139 U/L (ref 35–104)
ALT SERPL-CCNC: 10 U/L (ref 0–32)
ANION GAP SERPL CALCULATED.3IONS-SCNC: 12 MMOL/L (ref 7–16)
AST SERPL-CCNC: 16 U/L (ref 0–31)
BILIRUB SERPL-MCNC: 0.5 MG/DL (ref 0–1.2)
BUN BLDV-MCNC: 11 MG/DL (ref 8–23)
CALCIUM SERPL-MCNC: 9.1 MG/DL (ref 8.6–10.2)
CHLORIDE BLD-SCNC: 102 MMOL/L (ref 98–107)
CHOLESTEROL, TOTAL: 143 MG/DL (ref 0–199)
CO2: 27 MMOL/L (ref 22–29)
CREAT SERPL-MCNC: 0.5 MG/DL (ref 0.5–1)
GFR AFRICAN AMERICAN: >60
GFR NON-AFRICAN AMERICAN: >60 ML/MIN/1.73
GLUCOSE BLD-MCNC: 84 MG/DL (ref 74–99)
HBA1C MFR BLD: 5.1 % (ref 4–5.6)
HDLC SERPL-MCNC: 48 MG/DL
LDL CHOLESTEROL CALCULATED: 81 MG/DL (ref 0–99)
POTASSIUM SERPL-SCNC: 5.2 MMOL/L (ref 3.5–5)
SODIUM BLD-SCNC: 141 MMOL/L (ref 132–146)
TOTAL PROTEIN: 7.7 G/DL (ref 6.4–8.3)
TRIGL SERPL-MCNC: 69 MG/DL (ref 0–149)
VLDLC SERPL CALC-MCNC: 14 MG/DL

## 2019-11-21 LAB
ACANTHOCYTES: ABNORMAL
ANISOCYTOSIS: ABNORMAL
BASOPHILS ABSOLUTE: 0.16 E9/L (ref 0–0.2)
BASOPHILS RELATIVE PERCENT: 1.8 % (ref 0–2)
BURR CELLS: ABNORMAL
EOSINOPHILS ABSOLUTE: 0 E9/L (ref 0.05–0.5)
EOSINOPHILS RELATIVE PERCENT: 0.9 % (ref 0–6)
HCT VFR BLD CALC: 37.5 % (ref 34–48)
HEMOGLOBIN: 9 G/DL (ref 11.5–15.5)
HYPOCHROMIA: ABNORMAL
LYMPHOCYTES ABSOLUTE: 4.35 E9/L (ref 1.5–4)
LYMPHOCYTES RELATIVE PERCENT: 49.6 % (ref 20–42)
MCH RBC QN AUTO: 17.3 PG (ref 26–35)
MCHC RBC AUTO-ENTMCNC: 24 % (ref 32–34.5)
MCV RBC AUTO: 72.3 FL (ref 80–99.9)
MONOCYTES ABSOLUTE: 0.78 E9/L (ref 0.1–0.95)
MONOCYTES RELATIVE PERCENT: 8.8 % (ref 2–12)
NEUTROPHILS ABSOLUTE: 3.48 E9/L (ref 1.8–7.3)
NEUTROPHILS RELATIVE PERCENT: 39.8 % (ref 43–80)
NUCLEATED RED BLOOD CELLS: 0.9 /100 WBC
OVALOCYTES: ABNORMAL
PDW BLD-RTO: 23.9 FL (ref 11.5–15)
PLATELET # BLD: 211 E9/L (ref 130–450)
PMV BLD AUTO: ABNORMAL FL (ref 7–12)
POIKILOCYTES: ABNORMAL
POLYCHROMASIA: ABNORMAL
RBC # BLD: 5.19 E12/L (ref 3.5–5.5)
SPHEROCYTES: ABNORMAL
TEAR DROP CELLS: ABNORMAL
VACUOLATED NEUTROPHILS: ABNORMAL
WBC # BLD: 8.7 E9/L (ref 4.5–11.5)

## 2019-11-25 ENCOUNTER — TELEPHONE (OUTPATIENT)
Dept: PRIMARY CARE CLINIC | Age: 64
End: 2019-11-25

## 2019-12-02 ENCOUNTER — OFFICE VISIT (OUTPATIENT)
Dept: PRIMARY CARE CLINIC | Age: 64
End: 2019-12-02
Payer: COMMERCIAL

## 2019-12-02 VITALS
TEMPERATURE: 98.3 F | DIASTOLIC BLOOD PRESSURE: 82 MMHG | BODY MASS INDEX: 47.84 KG/M2 | WEIGHT: 260 LBS | HEART RATE: 87 BPM | SYSTOLIC BLOOD PRESSURE: 135 MMHG | HEIGHT: 62 IN

## 2019-12-02 DIAGNOSIS — I89.0 LYMPHEDEMA OF BOTH LOWER EXTREMITIES: Primary | Chronic | ICD-10-CM

## 2019-12-02 DIAGNOSIS — F32.9 REACTIVE DEPRESSION: ICD-10-CM

## 2019-12-02 DIAGNOSIS — M51.9 INTERVERTEBRAL LUMBAR DISC DISORDER: Chronic | ICD-10-CM

## 2019-12-02 PROCEDURE — 99214 OFFICE O/P EST MOD 30 MIN: CPT | Performed by: FAMILY MEDICINE

## 2019-12-02 RX ORDER — BUPROPION HYDROCHLORIDE 150 MG/1
150 TABLET ORAL EVERY MORNING
Qty: 90 TABLET | Refills: 3 | Status: SHIPPED | OUTPATIENT
Start: 2019-12-02 | End: 2020-04-01 | Stop reason: SDUPTHER

## 2019-12-02 ASSESSMENT — ENCOUNTER SYMPTOMS
BACK PAIN: 1
GASTROINTESTINAL NEGATIVE: 1
RESPIRATORY NEGATIVE: 1
ALLERGIC/IMMUNOLOGIC NEGATIVE: 1
EYES NEGATIVE: 1

## 2019-12-10 ENCOUNTER — OFFICE VISIT (OUTPATIENT)
Dept: ORTHOPEDIC SURGERY | Age: 64
End: 2019-12-10
Payer: COMMERCIAL

## 2019-12-10 VITALS — SYSTOLIC BLOOD PRESSURE: 183 MMHG | DIASTOLIC BLOOD PRESSURE: 94 MMHG | HEART RATE: 88 BPM | RESPIRATION RATE: 16 BRPM

## 2019-12-10 DIAGNOSIS — M17.12 PRIMARY OSTEOARTHRITIS OF LEFT KNEE: ICD-10-CM

## 2019-12-10 DIAGNOSIS — M17.11 PRIMARY OSTEOARTHRITIS OF RIGHT KNEE: Primary | ICD-10-CM

## 2019-12-10 PROCEDURE — 20610 DRAIN/INJ JOINT/BURSA W/O US: CPT | Performed by: NURSE PRACTITIONER

## 2019-12-10 RX ORDER — BUPIVACAINE HYDROCHLORIDE 2.5 MG/ML
10 INJECTION, SOLUTION EPIDURAL; INFILTRATION; INTRACAUDAL ONCE
Status: DISCONTINUED | OUTPATIENT
Start: 2019-12-10 | End: 2021-02-23 | Stop reason: HOSPADM

## 2019-12-10 RX ORDER — LIDOCAINE HYDROCHLORIDE 10 MG/ML
3 INJECTION, SOLUTION INFILTRATION; PERINEURAL ONCE
Status: DISCONTINUED | OUTPATIENT
Start: 2019-12-10 | End: 2021-02-23 | Stop reason: HOSPADM

## 2019-12-10 RX ORDER — TRIAMCINOLONE ACETONIDE 40 MG/ML
40 INJECTION, SUSPENSION INTRA-ARTICULAR; INTRAMUSCULAR ONCE
Status: DISCONTINUED | OUTPATIENT
Start: 2019-12-10 | End: 2021-02-23 | Stop reason: HOSPADM

## 2019-12-10 RX ORDER — BUPIVACAINE HYDROCHLORIDE 2.5 MG/ML
3 INJECTION, SOLUTION EPIDURAL; INFILTRATION; INTRACAUDAL ONCE
Status: DISCONTINUED | OUTPATIENT
Start: 2019-12-10 | End: 2021-02-23 | Stop reason: HOSPADM

## 2019-12-10 RX ORDER — LIDOCAINE HYDROCHLORIDE AND EPINEPHRINE 10; 10 MG/ML; UG/ML
10 INJECTION, SOLUTION INFILTRATION; PERINEURAL ONCE
Status: DISCONTINUED | OUTPATIENT
Start: 2019-12-10 | End: 2021-02-23 | Stop reason: HOSPADM

## 2019-12-13 ENCOUNTER — OFFICE VISIT (OUTPATIENT)
Dept: PAIN MANAGEMENT | Age: 64
End: 2019-12-13
Payer: COMMERCIAL

## 2019-12-13 VITALS
WEIGHT: 258 LBS | BODY MASS INDEX: 47.48 KG/M2 | DIASTOLIC BLOOD PRESSURE: 78 MMHG | HEART RATE: 103 BPM | HEIGHT: 62 IN | OXYGEN SATURATION: 98 % | RESPIRATION RATE: 18 BRPM | SYSTOLIC BLOOD PRESSURE: 132 MMHG | TEMPERATURE: 98.6 F

## 2019-12-13 DIAGNOSIS — M17.0 PRIMARY OSTEOARTHRITIS OF BOTH KNEES: ICD-10-CM

## 2019-12-13 DIAGNOSIS — M54.50 CHRONIC RIGHT-SIDED LOW BACK PAIN WITHOUT SCIATICA: ICD-10-CM

## 2019-12-13 DIAGNOSIS — G89.29 CHRONIC RIGHT-SIDED LOW BACK PAIN WITHOUT SCIATICA: ICD-10-CM

## 2019-12-13 DIAGNOSIS — G89.29 CHRONIC RIGHT SHOULDER PAIN: ICD-10-CM

## 2019-12-13 DIAGNOSIS — M25.511 CHRONIC RIGHT SHOULDER PAIN: ICD-10-CM

## 2019-12-13 DIAGNOSIS — G89.4 CHRONIC PAIN SYNDROME: Primary | ICD-10-CM

## 2019-12-13 PROCEDURE — 99214 OFFICE O/P EST MOD 30 MIN: CPT | Performed by: PAIN MEDICINE

## 2019-12-13 RX ORDER — BUPRENORPHINE HYDROCHLORIDE 450 UG/1
450 FILM, SOLUBLE BUCCAL EVERY 12 HOURS
Qty: 60 EACH | Refills: 0 | Status: SHIPPED | OUTPATIENT
Start: 2019-12-18 | End: 2020-01-13 | Stop reason: SDUPTHER

## 2019-12-27 ENCOUNTER — HOSPITAL ENCOUNTER (OUTPATIENT)
Dept: GENERAL RADIOLOGY | Age: 64
Discharge: HOME | End: 2019-12-29
Payer: COMMERCIAL

## 2019-12-27 ENCOUNTER — HOSPITAL ENCOUNTER (OUTPATIENT)
Age: 64
Discharge: HOME | End: 2019-12-29
Payer: COMMERCIAL

## 2019-12-27 DIAGNOSIS — G89.29 CHRONIC RIGHT-SIDED LOW BACK PAIN WITHOUT SCIATICA: ICD-10-CM

## 2019-12-27 DIAGNOSIS — M54.50 CHRONIC RIGHT-SIDED LOW BACK PAIN WITHOUT SCIATICA: ICD-10-CM

## 2019-12-27 PROCEDURE — 72114 X-RAY EXAM L-S SPINE BENDING: CPT

## 2020-01-02 ENCOUNTER — OFFICE VISIT (OUTPATIENT)
Dept: PRIMARY CARE CLINIC | Age: 65
End: 2020-01-02
Payer: COMMERCIAL

## 2020-01-02 PROCEDURE — 99214 OFFICE O/P EST MOD 30 MIN: CPT | Performed by: FAMILY MEDICINE

## 2020-01-02 NOTE — PROGRESS NOTES
20  Name: Tj Pham    : 1955    Sex: female    Age: 62 y.o. Subjective:  Chief Complaint: Patient is here for  Mul issues          Sees psych  Today    Had  bilat  Knee shots      Sever pain both knees  Some persistent swelling lower extremities. Having hard time wearing her hose. She discontinued going to physical therapy for lymphedema therapy. She has significant back pain. Her gait is unsteady and near falling. Very anxious does not sleep well she has severe back pain seeing pain clinic. Her fatigue is severe. Shortness of breath on very minimal exertion. Extreme weakness and fatigue. Depressed and very anxious. Review of Systems   Constitutional: Negative. HENT: Negative. Eyes: Negative. Respiratory: Negative. Negative for shortness of breath. Cardiovascular: Positive for leg swelling. Negative for chest pain and palpitations. Gastrointestinal: Negative. Endocrine: Negative. Genitourinary: Negative. Musculoskeletal: Positive for arthralgias and back pain. Skin: Negative. Allergic/Immunologic: Negative. Neurological: Negative. Hematological: Negative. Psychiatric/Behavioral: Positive for dysphoric mood. The patient is nervous/anxious. Current Outpatient Medications:     BELBUCA 450 MCG FILM, Take 450 mcg by mouth every 12 hours for 30 days. , Disp: 60 each, Rfl: 0    buPROPion (WELLBUTRIN XL) 150 MG extended release tablet, Take 1 tablet by mouth every morning, Disp: 90 tablet, Rfl: 3    rOPINIRole (REQUIP) 1 MG tablet, Take 1 tablet by mouth 3 times daily, Disp: 270 tablet, Rfl: 5    Misc.  Devices (WALKER) MISC, 1 each by Does not apply route daily, Disp: 1 each, Rfl: 0    Handicap Placard MISC, by Does not apply route 5  Yrs   Dx   oa, Disp: 1 each, Rfl: 0    furosemide (LASIX) 40 MG tablet, Take 40 mg by mouth daily as needed for Other Swelling, Disp: , Rfl:     Liraglutide (VICTOZA SC), Inject 1.8 mg into the skin daily , Disp: , Rfl:   No Known Allergies  Social History     Socioeconomic History    Marital status:      Spouse name: Not on file    Number of children: Not on file    Years of education: Not on file    Highest education level: Not on file   Occupational History    Not on file   Social Needs    Financial resource strain: Not on file    Food insecurity:     Worry: Not on file     Inability: Not on file    Transportation needs:     Medical: Not on file     Non-medical: Not on file   Tobacco Use    Smoking status: Former Smoker     Packs/day: 0.50     Years: 38.00     Pack years: 19.00     Types: Cigarettes     Last attempt to quit: 2019     Years since quittin.6    Smokeless tobacco: Never Used   Substance and Sexual Activity    Alcohol use: No    Drug use: No    Sexual activity: Not Currently   Lifestyle    Physical activity:     Days per week: Not on file     Minutes per session: Not on file    Stress: Not on file   Relationships    Social connections:     Talks on phone: Not on file     Gets together: Not on file     Attends Hinduism service: Not on file     Active member of club or organization: Not on file     Attends meetings of clubs or organizations: Not on file     Relationship status: Not on file    Intimate partner violence:     Fear of current or ex partner: Not on file     Emotionally abused: Not on file     Physically abused: Not on file     Forced sexual activity: Not on file   Other Topics Concern    Not on file   Social History Narrative        Chronic Diagnosis: Iron deficiency anemia, Depressive disorder, Benign essential hypertension, Mixed    hyperlipidemia.     HTN    OBESITY    LIPID    OA    SMOKER    CVA L FIELD VISION    DEPRESSION    GASTRIC BYPASS 01    BREAST REDUCTION--    Barrett PIEDRA 1955 Page #2    ANEMIA==IRON AND B-12    Admitted 2019 with cellulitis left lower extremity then readmitted with chest pain with negative

## 2020-01-06 ENCOUNTER — TELEPHONE (OUTPATIENT)
Dept: PRIMARY CARE CLINIC | Age: 65
End: 2020-01-06

## 2020-01-06 VITALS
WEIGHT: 268 LBS | HEIGHT: 62 IN | DIASTOLIC BLOOD PRESSURE: 82 MMHG | SYSTOLIC BLOOD PRESSURE: 134 MMHG | BODY MASS INDEX: 49.32 KG/M2 | TEMPERATURE: 98.8 F

## 2020-01-06 PROBLEM — G89.29 CHRONIC RIGHT-SIDED LOW BACK PAIN WITHOUT SCIATICA: Chronic | Status: ACTIVE | Noted: 2019-12-13

## 2020-01-06 PROBLEM — G89.4 CHRONIC PAIN SYNDROME: Chronic | Status: ACTIVE | Noted: 2019-12-13

## 2020-01-06 PROBLEM — G89.29 CHRONIC RIGHT SHOULDER PAIN: Chronic | Status: ACTIVE | Noted: 2019-12-13

## 2020-01-06 PROBLEM — F41.9 ANXIETY: Chronic | Status: ACTIVE | Noted: 2020-01-06

## 2020-01-06 PROBLEM — F32.9 REACTIVE DEPRESSION: Chronic | Status: ACTIVE | Noted: 2019-12-02

## 2020-01-06 PROBLEM — M25.511 CHRONIC RIGHT SHOULDER PAIN: Chronic | Status: ACTIVE | Noted: 2019-12-13

## 2020-01-06 PROBLEM — R53.82 CHRONIC FATIGUE: Status: ACTIVE | Noted: 2020-01-06

## 2020-01-06 PROBLEM — F41.9 ANXIETY: Status: ACTIVE | Noted: 2020-01-06

## 2020-01-06 PROBLEM — R53.82 CHRONIC FATIGUE: Chronic | Status: ACTIVE | Noted: 2020-01-06

## 2020-01-06 PROBLEM — M54.50 CHRONIC RIGHT-SIDED LOW BACK PAIN WITHOUT SCIATICA: Chronic | Status: ACTIVE | Noted: 2019-12-13

## 2020-01-06 PROBLEM — M17.0 PRIMARY OSTEOARTHRITIS OF BOTH KNEES: Chronic | Status: ACTIVE | Noted: 2019-12-13

## 2020-01-06 ASSESSMENT — PATIENT HEALTH QUESTIONNAIRE - PHQ9
1. LITTLE INTEREST OR PLEASURE IN DOING THINGS: 0
SUM OF ALL RESPONSES TO PHQ QUESTIONS 1-9: 0
2. FEELING DOWN, DEPRESSED OR HOPELESS: 0
SUM OF ALL RESPONSES TO PHQ QUESTIONS 1-9: 0
SUM OF ALL RESPONSES TO PHQ9 QUESTIONS 1 & 2: 0

## 2020-01-06 ASSESSMENT — ENCOUNTER SYMPTOMS
BACK PAIN: 1
GASTROINTESTINAL NEGATIVE: 1
EYES NEGATIVE: 1
SHORTNESS OF BREATH: 0
ALLERGIC/IMMUNOLOGIC NEGATIVE: 1
RESPIRATORY NEGATIVE: 1

## 2020-01-13 ENCOUNTER — OFFICE VISIT (OUTPATIENT)
Dept: PAIN MANAGEMENT | Age: 65
End: 2020-01-13
Payer: COMMERCIAL

## 2020-01-13 ENCOUNTER — HOSPITAL ENCOUNTER (OUTPATIENT)
Age: 65
Discharge: HOME | End: 2020-01-15
Payer: COMMERCIAL

## 2020-01-13 VITALS
BODY MASS INDEX: 47.84 KG/M2 | DIASTOLIC BLOOD PRESSURE: 88 MMHG | OXYGEN SATURATION: 94 % | SYSTOLIC BLOOD PRESSURE: 128 MMHG | TEMPERATURE: 98.4 F | RESPIRATION RATE: 18 BRPM | HEIGHT: 62 IN | HEART RATE: 112 BPM | WEIGHT: 260 LBS

## 2020-01-13 LAB — SPECIFIC GRAVITY UA: >=1.03 (ref 1–1.03)

## 2020-01-13 PROCEDURE — 80307 DRUG TEST PRSMV CHEM ANLYZR: CPT

## 2020-01-13 PROCEDURE — G0480 DRUG TEST DEF 1-7 CLASSES: HCPCS

## 2020-01-13 PROCEDURE — 99213 OFFICE O/P EST LOW 20 MIN: CPT | Performed by: PHYSICIAN ASSISTANT

## 2020-01-13 RX ORDER — BUPRENORPHINE HYDROCHLORIDE 450 UG/1
450 FILM, SOLUBLE BUCCAL EVERY 12 HOURS
Qty: 60 EACH | Refills: 0 | Status: SHIPPED
Start: 2020-01-13 | End: 2020-02-14 | Stop reason: SDUPTHER

## 2020-01-13 NOTE — PROGRESS NOTES
[address]  [address]    Follow up Note      Alie Andrews     Date of Visit:  2020    CC:  Patient presents for follow up   Chief Complaint   Patient presents with    Pain     knee back shoulder        HPI:    Pain is unchanged currently. Doing well on belbuca. Worse with colder weather. States that she has been walking a lot due to visiting a family member in the hospital.    Appropriate analgesia with current medications regimen: yes   Change in quality of symptoms:no. Medication side effects:none. Recent diagnostic testing: Lumbar spine x-ray 2019. Recent interventional procedures:none. She has not been on anticoagulation medications to include ASA, NSAIDS, Plavix, heparin, LMW heparin and warfarin and has not been on herbal supplements. She is diabetic. Imagin2019 Lumbar Spine X-ray    Alignment of the vertebral bodies appears to be near-anatomic   No fracture or foreign body is identified. The disc spaces demonstrate moderate degenerative changes. There is mild loss of the vertebral body height   There are moderate degenerative changes involving the facets. There is grade 1 anterolisthesis of L3 on L4. Flexion-extension views demonstrates movement of the anterolisthesis   of L3 on L4.            Impression   Grade 1 anterolisthesis of L3 on L4 which does move with extension. Findings consistent with moderate degenerative disc disease and   degenerative facet disease.       The exam has been dictated and signed by Berkley Guerrero. HOLLY Connell Junior-PEDRO LUIS   and Lottie Conrad MD, reviewed and concurred with these findings.          9/3/2019 left knee x-ray     The bones appear to be in anatomic alignment.     No foreign body is identified   No fracture is identified     There is moderate tricompartmental joint space loss greatest long   patellofemoral and medial femoral tibial joint   The are moderate degenerative changes present along the patellofemoral   and medial femoral tibial compartment           Impression   Moderate degenerative changes as described above          9/3/2019 right knee x-ray     The bones appear to be in anatomic alignment. No foreign body is identified   No fracture is identified. Marginal bone spurs are again seen along the inferior margins of the   patella. There is interval worsening of moderate tricompartmental joint space   loss    The are interval worsening of moderate degenerative tricompartmental   changes present            Impression   Moderate tricompartmental degenerative changes.  This has   worsened in the interim            Potential Aberrant Drug-Related Behavior:  None    Urine Drug Screenin2019 Consistent for buprenorphine and metabolites    OARRS report:  10/2019 Consistent  2019 Consistent  2020 Consistent    Past Medical History:   Diagnosis Date    Anemia     Arthritis     Cellulitis -     left leg    Chronic ulcer of leg with fat layer exposed ([address] 2015    Chronic ulcer of right leg, limited to breakdown of skin ([address] 2016    Depression     Lymphedema of both lower extremities 2015    Lymphedema of lower extremity 2015    Obesity     Osteoarthritis     Peripheral vision loss     Stroke [facility])     Type 2 diabetes mellitus without complication, without long-term current use of insulin ([address] 2019    Ulcer of left lower leg, limited to breakdown of skin ([address] 6/10/2019    Ulcer of left lower leg, limited to breakdown of skin ([address] 6/10/2019       Past Surgical History:   Procedure Laterality Date    ABDOMINOPLASTY  2002    BREAST REDUCTION SURGERY      reduction    CATARACT REMOVAL      bilateral     SECTION      x2    COLONOSCOPY  2012    and egd    ECHOCARDIOGRAM COMPLETE 2D W DOPPLER W COLOR  2012         GASTRIC BYPASS SURGERY  2000    HERNIA REPAIR             Prior to Admission member of club or organization: Not on file     Attends meetings of clubs or organizations: Not on file     Relationship status: Not on file    Intimate partner violence:     Fear of current or ex partner: Not on file     Emotionally abused: Not on file     Physically abused: Not on file     Forced sexual activity: Not on file   Other Topics Concern    Not on file   Social History Narrative        Chronic Diagnosis: Iron deficiency anemia, Depressive disorder, Benign essential hypertension, Mixed    hyperlipidemia. HTN    OBESITY    LIPID    OA    SMOKER    CVA L FIELD VISION    DEPRESSION    GASTRIC BYPASS 01    BREAST REDUCTION--    Kenneth Parent  1955 Page #2    ANEMIA==IRON AND B-12    Admitted 2019 with cellulitis left lower extremity then readmitted with chest pain with negative stress test       Family History   Problem Relation Age of Onset    Breast Cancer Mother     Stroke Father     Hypertension Sister     Hypertension Brother        REVIEW OF SYSTEMS:     Kelly Shabazz denies fever/chills, chest pain, shortness of breath, new bowel or bladder complaints. All other review of systems was negative. PHYSICAL EXAMINATION:      /88   Pulse 112   Temp 98.4 °F (36.9 °C)   Resp 18   Ht 5' 2\" (1.575 m)   Wt 260 lb (117.9 kg)   SpO2 94%   BMI 47.55 kg/m²     General:       General appearance: awake, alert, oriented, in no acute distress, well developed, well nourished and in no acute distress   pleasant and well-hydrated. , in no distress and A & O x3  Build:Obese  Function:Rises from aseated position with difficulty     Head:     Head:normocephalic and atraumatic  Pupils:regular, round and equal.  Sclera: icterus absent,   EOM:full and intact.     Lungs:     Breathing:Normal expansion. Clear to auscultation.   No rales, rhonchi, or wheezing.     Abdomen:     Shape:non-distended and normal  Tenderness:none  Guarding:none     Lumbar spine:     Spine inspection:normal return to work recently, but was too difficult. States that she is filing for disability. Prior rt SIJ injection under US not helpful  Gets b/l knee CSI's through orthopedics and they continue to be helpful     Discussed negative SE's associated with chronic opioid therapy  Discussed consideration for a dosage reduction to 300 mg BID given she is no longer working and therefore pain is not as severe. Will consider for next visit. Discussed at length.       Plan:  Xray lumbar spine reviewed. Aquatherapy helped in the past.  Reports that she would rather attend when it is warmer outside. Consider MRI lumbar spine. OARRS reviewed 01/2020. UDS ordered today    Belbuca 450 mcg BID ordered today. Patient is s/p b/l knee CSI (ortho) on 12/10/2019. Has failed knee viscosupplementation  Patient encouraged to stay active and to lose weight  Treatment plan discussed with the patient     Controlled Substance Monitoring:    Acute and Chronic Pain Monitoring:   RX Monitoring 1/13/2020   Periodic Controlled Substance Monitoring Possible medication side effects, risk of tolerance/dependence & alternative treatments discussed. ;No signs of potential drug abuse or diversion identified. ;Assessed functional status. ;Obtaining appropriate analgesic effect of treatment. ;Random urine drug screen sent today. Plan:    We discussed with the patient that combining opioids, benzodiazepines, alcohol, illicit drugs or sleep aids increases the risk of respiratory depression including death. We discussed that these medications may cause drowsiness, sedation or dizziness and have counseled the patient not to drive or operate machinery. We have discussed that these medications will be prescribed only by one provider. We have discussed with the patient about age related risk factors and have thoroughly discussed the importance of taking these medications as prescribed.  The patient verbalizes

## 2020-01-15 ENCOUNTER — HOSPITAL ENCOUNTER (OUTPATIENT)
Dept: OCCUPATIONAL THERAPY | Age: 65
Setting detail: THERAPIES SERIES
Discharge: HOME | End: 2020-01-15
Payer: COMMERCIAL

## 2020-01-15 NOTE — DISCHARGE SUMMARY
Occupational Therapy       OCCUPATIONAL THERAPY DEPARTMENT    Phone: [phone]             Fax: [phone]     To: Ever Barriga DO     Date: 1/15/2020  Patient: Abdoul Bishop       : 1955  Diagnosis:  Lymphedema of both lower extremities (I89.0)          Treatment Diagnosis:  lymphedema    OccupationalTherapy Progress/Discharge Note    Evaluation Date: 2019 Total Visits to date: 13   Cancels/No-shows to date:  0    Plan of Care/Treatment to date:  Plan of Care:   [x]97140-Manual Lymph Drainage and Combined Decongestive Therapy  [x]12140- Skilled Multilayer Short Stretch Compression Bandaging/ Therapeutic Exercise  [x]Skin Care Education   [x]HEP including Self MLD Education and/or Self Bandaging  [x]Education for Lymphedema Risks/Precautions     [x] Caregiver Education             Significant Findings At Last Visit/Comments:    Patient last seen 2019. Following last treatment session patient was to call [facility] to schedule additional treatment sessions for lymphedema clinic once compression stockings were received that next week. Patient never called to reschedule. Lymphedema Lower Extremity Measurements        Measurements are made in 4cm increments and are circumferential.         Evaluation left - [id number] Follow up left 2019 Follow up left 2019 Follow up left  2019 Follow up left 2019 Follow up left Evaluation right - 2019 Follow up right 2019  Follow up right Follow up right Follow up right Follow up right   Toes   23  22.25 21.5 22.75     22  21.75           4cm 23  21 22.25 21     21.75  21           8cm 26  23.75 23.5 24     23.5  22           12cm 34  33 34.5 32     33.75  32.5           16cm 31.75  29 33.5 30     32.25  29           20cm 37.5  33.75 38.25 36     36.25  33           24cm 43.75  38.25 44 41.5     39.5  36.25           28cm 51.5  43.5 49 45.5     43. 5  40.25           32cm 57.5  47.25 52 48.5 year, over several years   Exercise:  >1 year, over several years        PNEUMATIC COMPRESSION DEVICE EVALUATION:   Patient has tried an  basic pneumatic compression device? Yes    o   If YES:   [id number] basic pneumatic compression device was used for <4 weeks  Was the  basic pneumatic compression device effective in managing the patients lymphedema? No   IF NO, what symptoms demonstrate that  basic pneumatic compression device is ineffective in managing lymphedema? (check all that apply):    basic pneumatic compression device caused or exacerbated swelling proximal to pump sleeve (top of thigh, genitals, abdomen, hips, buttocks, etc.) Yes        basic pneumatic compression device did not control swelling Yes    Patient with increased difficulty tolerating accumulation of  edema at hip / abdominal areas after pump use. Patient has tried an  advanced pneumatic compression device? Yes       If YES:   [id number] advanced pneumatic compression device was used for <4 weeks   Was the  advanced pneumatic compression device effective in managing the patients lymphedema? Yes        TREATMENT PLAN:   Patient to complete the following treatment(s): Compression Garments and/or Bandagin-3x/wk      Advanced Pneumatic Compression Device: daily once aquired   Professional Lymphedema Treatment: 2-3x/wk   Self-MLD: daily    Exercise: 2-3x/wk   Elevation: kaitlynn       Goals Status:    Patient Goal: to be able to walk better     STG: 3 weeks  1.  Patient will demonstrate knowledge and understanding for lymphedema precautions, skin care and self management to decrease progression of lymphedema and risk of infection. Goal Met   2.  Patient will present with decreased limb volume in the involved extremity from moderate to mild for improved functional mobility. Goal partially met  3. Patient will demonstrate compliance with compression therapy for independent management of lymphedema.   Goal partially met        LT weeks  1.  Patient will be independent with self management of lymphedema including understanding garment wear schedule, self compression bandaging, HEP and self care. Goal not met  2.  Patient will be fitted for appropriate compression garments for long term management of lymphedema. Goal partially met      3.  Patient will present with decreased limb volume in the involved extremity from mild to trace  for improved functional mobility. Goal not met    Frequency/Duration:  # Days per week:  2-3x/wk # Weeks: 6     Rehab Potential: [] Excellent [x] Good [] Fair  [] Poor     Goal Status:  [] Achieved [x] Partially Achieved  [] Not Achieved     Patient Status: [] Continue per initial plan of Care     [x] Patient now discharged     [] Additional visits requested, Please re-certify for additional visits:          Electronically signed by: MARLEEN Gar/JONES, JOHNATHAN    If you have any questions or concerns, please don't hesitate to call.   Thank you for your referral.    Physician Signature:________________________________Date:__________________  By signing above, therapists plan is approved by physician

## 2020-01-16 ENCOUNTER — OFFICE VISIT (OUTPATIENT)
Dept: PRIMARY CARE CLINIC | Age: 65
End: 2020-01-16
Payer: COMMERCIAL

## 2020-01-16 ENCOUNTER — TELEPHONE (OUTPATIENT)
Dept: PRIMARY CARE CLINIC | Age: 65
End: 2020-01-16

## 2020-01-16 LAB
6AM URINE: <10 NG/ML
CODEINE, URINE: <20 NG/ML
HYDROCODONE, URINE: <20 NG/ML
HYDROMORPHONE, URINE: <20 NG/ML
MORPHINE URINE: <20 NG/ML
NORHYDROCODONE, URINE: <20 NG/ML
NOROXYCODONE, URINE: <20 NG/ML
NOROXYMORPHONE, URINE: <20 NG/ML
OXYCODONE, URINE CONFIRMATION: <20 NG/ML
OXYMORPHONE, URINE: <20 NG/ML

## 2020-01-16 PROCEDURE — 96372 THER/PROPH/DIAG INJ SC/IM: CPT | Performed by: FAMILY MEDICINE

## 2020-01-16 PROCEDURE — 99213 OFFICE O/P EST LOW 20 MIN: CPT | Performed by: FAMILY MEDICINE

## 2020-01-16 RX ORDER — CEFDINIR 300 MG/1
300 CAPSULE ORAL 2 TIMES DAILY
Qty: 20 CAPSULE | Refills: 0 | Status: SHIPPED | OUTPATIENT
Start: 2020-01-16 | End: 2020-01-26

## 2020-01-16 RX ORDER — PREDNISONE 10 MG/1
TABLET ORAL
Qty: 18 TABLET | Refills: 0 | Status: SHIPPED
Start: 2020-01-16 | End: 2020-02-19 | Stop reason: ALTCHOICE

## 2020-01-16 RX ORDER — LIDOCAINE HYDROCHLORIDE 10 MG/ML
1 INJECTION, SOLUTION INFILTRATION; PERINEURAL ONCE
Status: COMPLETED | OUTPATIENT
Start: 2020-01-16 | End: 2020-01-16

## 2020-01-16 RX ORDER — CEFTRIAXONE 500 MG/1
500 INJECTION, POWDER, FOR SOLUTION INTRAMUSCULAR; INTRAVENOUS ONCE
Status: COMPLETED | OUTPATIENT
Start: 2020-01-16 | End: 2020-01-16

## 2020-01-16 RX ORDER — DEXTROMETHORPHAN HYDROBROMIDE AND PROMETHAZINE HYDROCHLORIDE 15; 6.25 MG/5ML; MG/5ML
5 SYRUP ORAL 4 TIMES DAILY PRN
Qty: 120 ML | Refills: 0 | Status: SHIPPED | OUTPATIENT
Start: 2020-01-16 | End: 2020-01-23

## 2020-01-16 RX ORDER — METHYLPREDNISOLONE ACETATE 40 MG/ML
40 INJECTION, SUSPENSION INTRA-ARTICULAR; INTRALESIONAL; INTRAMUSCULAR; SOFT TISSUE ONCE
Status: COMPLETED | OUTPATIENT
Start: 2020-01-16 | End: 2020-01-16

## 2020-01-16 RX ADMIN — LIDOCAINE HYDROCHLORIDE 1 ML: 10 INJECTION, SOLUTION INFILTRATION; PERINEURAL at 14:05

## 2020-01-16 RX ADMIN — CEFTRIAXONE 500 MG: 500 INJECTION, POWDER, FOR SOLUTION INTRAMUSCULAR; INTRAVENOUS at 14:04

## 2020-01-16 RX ADMIN — METHYLPREDNISOLONE ACETATE 40 MG: 40 INJECTION, SUSPENSION INTRA-ARTICULAR; INTRALESIONAL; INTRAMUSCULAR; SOFT TISSUE at 14:05

## 2020-01-16 NOTE — PROGRESS NOTES
20  Name: Sai Olivera    : 1955    Sex: female    Age: 62 y.o. Subjective:  Chief Complaint: Patient is here for cough     3 days. No temperature sweats chills or chest pain. Has been visiting her sister in the hospital.  Continues to smoke. Gave her self nebulizer treatment last night. Edema lower extremities stable she does have her support hose on today. She is very weak in the legs and unable to stand very long but this is now chronic. Review of Systems   Constitutional: Negative. HENT: Positive for congestion. Negative for trouble swallowing. Respiratory: Positive for cough (productive with yellow mucus). Negative for apnea, chest tightness, shortness of breath, wheezing and stridor. No temperature or sweats or chills   Cardiovascular: Negative. Current Outpatient Medications:     cefdinir (OMNICEF) 300 MG capsule, Take 1 capsule by mouth 2 times daily for 10 days, Disp: 20 capsule, Rfl: 0    promethazine-dextromethorphan (PROMETHAZINE-DM) 6.25-15 MG/5ML syrup, Take 5 mLs by mouth 4 times daily as needed for Cough, Disp: 120 mL, Rfl: 0    predniSONE (DELTASONE) 10 MG tablet, ONE TID FOR THREE DAYS, ONE BID FOR THREE DAYS, ONE QD FOR THREE DAYS   FOOD, Disp: 18 tablet, Rfl: 0    BELBUCA 450 MCG FILM, Take 450 mcg by mouth every 12 hours for 30 days. , Disp: 60 each, Rfl: 0    buPROPion (WELLBUTRIN XL) 150 MG extended release tablet, Take 1 tablet by mouth every morning, Disp: 90 tablet, Rfl: 3    rOPINIRole (REQUIP) 1 MG tablet, Take 1 tablet by mouth 3 times daily, Disp: 270 tablet, Rfl: 5    Misc.  Devices (WALKER) MISC, 1 each by Does not apply route daily, Disp: 1 each, Rfl: 0    Handicap Placard MISC, by Does not apply route 5  Yrs   Dx   oa, Disp: 1 each, Rfl: 0    furosemide (LASIX) 40 MG tablet, Take 40 mg by mouth daily as needed for Other Swelling, Disp: , Rfl:     Liraglutide (VICTOZA SC), Inject 1.8 mg into the skin daily , Disp: , Rfl: No Known Allergies  Social History     Socioeconomic History    Marital status:      Spouse name: Not on file    Number of children: Not on file    Years of education: Not on file    Highest education level: Not on file   Occupational History    Not on file   Social Needs    Financial resource strain: Not on file    Food insecurity:     Worry: Not on file     Inability: Not on file    Transportation needs:     Medical: Not on file     Non-medical: Not on file   Tobacco Use    Smoking status: Former Smoker     Packs/day: 0.50     Years: 38.00     Pack years: 19.00     Types: Cigarettes     Last attempt to quit: 2019     Years since quittin.6    Smokeless tobacco: Never Used   Substance and Sexual Activity    Alcohol use: No    Drug use: No    Sexual activity: Not Currently   Lifestyle    Physical activity:     Days per week: Not on file     Minutes per session: Not on file    Stress: Not on file   Relationships    Social connections:     Talks on phone: Not on file     Gets together: Not on file     Attends Hindu service: Not on file     Active member of club or organization: Not on file     Attends meetings of clubs or organizations: Not on file     Relationship status: Not on file    Intimate partner violence:     Fear of current or ex partner: Not on file     Emotionally abused: Not on file     Physically abused: Not on file     Forced sexual activity: Not on file   Other Topics Concern    Not on file   Social History Narrative        Chronic Diagnosis: Iron deficiency anemia, Depressive disorder, Benign essential hypertension, Mixed    hyperlipidemia.     HTN    OBESITY    LIPID    OA    SMOKER    CVA L FIELD VISION    DEPRESSION    GASTRIC BYPASS 01    BREAST REDUCTION--    Alie PIEDRA 1955 Page #2    ANEMIA==IRON AND B-12    Admitted 2019 with cellulitis left lower extremity then readmitted with chest pain with negative stress test      Past

## 2020-01-17 VITALS
RESPIRATION RATE: 20 BRPM | SYSTOLIC BLOOD PRESSURE: 128 MMHG | HEART RATE: 110 BPM | WEIGHT: 271 LBS | BODY MASS INDEX: 49.87 KG/M2 | DIASTOLIC BLOOD PRESSURE: 82 MMHG | HEIGHT: 62 IN | OXYGEN SATURATION: 92 % | TEMPERATURE: 97.6 F

## 2020-01-17 LAB
7-AMINOCLONAZEPAM, URINE: <5 NG/ML
ALPHA-HYDROXYALPRAZOLAM, URINE: <5 NG/ML
ALPHA-HYDROXYMIDAZOLAM, URINE: <20 NG/ML
ALPRAZOLAM, URINE: <5 NG/ML
CHLORDIAZEPOXIDE, URINE: <20 NG/ML
CLONAZEPAM, URINE: <5 NG/ML
DIAZEPAM, URINE: <20 NG/ML
LORAZEPAM, URINE: <20 NG/ML
MIDAZOLAM, URINE: <20 NG/ML
NORDIAZEPAM, URINE: <20 NG/ML
OXAZEPAM, URINE: <20 NG/ML
TEMAZEPAM, URINE: <20 NG/ML

## 2020-01-17 ASSESSMENT — ENCOUNTER SYMPTOMS
APNEA: 0
TROUBLE SWALLOWING: 0
COUGH: 1
SHORTNESS OF BREATH: 0
CHEST TIGHTNESS: 0
STRIDOR: 0
WHEEZING: 0

## 2020-01-20 LAB
Lab: NORMAL
REPORT: NORMAL
THIS TEST SENT TO: NORMAL

## 2020-02-03 ENCOUNTER — OFFICE VISIT (OUTPATIENT)
Dept: PRIMARY CARE CLINIC | Age: 65
End: 2020-02-03
Payer: COMMERCIAL

## 2020-02-03 VITALS
BODY MASS INDEX: 50.24 KG/M2 | SYSTOLIC BLOOD PRESSURE: 132 MMHG | TEMPERATURE: 98.3 F | WEIGHT: 273 LBS | HEIGHT: 62 IN | DIASTOLIC BLOOD PRESSURE: 78 MMHG

## 2020-02-03 PROBLEM — R25.1 TREMOR: Status: ACTIVE | Noted: 2020-02-03

## 2020-02-03 PROCEDURE — 99214 OFFICE O/P EST MOD 30 MIN: CPT | Performed by: FAMILY MEDICINE

## 2020-02-03 ASSESSMENT — PATIENT HEALTH QUESTIONNAIRE - PHQ9
SUM OF ALL RESPONSES TO PHQ QUESTIONS 1-9: 0
SUM OF ALL RESPONSES TO PHQ QUESTIONS 1-9: 0
2. FEELING DOWN, DEPRESSED OR HOPELESS: 0
1. LITTLE INTEREST OR PLEASURE IN DOING THINGS: 0
SUM OF ALL RESPONSES TO PHQ9 QUESTIONS 1 & 2: 0

## 2020-02-03 ASSESSMENT — ENCOUNTER SYMPTOMS
SHORTNESS OF BREATH: 1
EYES NEGATIVE: 1
CHEST TIGHTNESS: 0
ALLERGIC/IMMUNOLOGIC NEGATIVE: 1
GASTROINTESTINAL NEGATIVE: 1

## 2020-02-03 NOTE — PROGRESS NOTES
2/3/20  Name: Ambrose Howell    : 1955    Sex: female    Age: 62 y.o. Subjective:  Chief Complaint: Patient is here for one mo ck  Re  Legs          Ache daily basis     Must elev legs all day for them to be controlled and wearing her hose daily----smoking still  Anxious and seeing  Psych again  soon----- and seeing pain doc soon  Tremor hands  For  Few months      Review of Systems   Constitutional: Negative. HENT: Negative. Eyes: Negative. Respiratory: Positive for shortness of breath (with steps    ). Negative for chest tightness. Cardiovascular: Positive for leg swelling. Negative for chest pain. Gastrointestinal: Negative. Endocrine: Negative. Genitourinary: Negative. Musculoskeletal: Negative. Skin: Negative. Allergic/Immunologic: Negative. Neurological: Negative. Hematological: Negative. Psychiatric/Behavioral: Negative. Current Outpatient Medications:     predniSONE (DELTASONE) 10 MG tablet, ONE TID FOR THREE DAYS, ONE BID FOR THREE DAYS, ONE QD FOR THREE DAYS   FOOD, Disp: 18 tablet, Rfl: 0    BELBUCA 450 MCG FILM, Take 450 mcg by mouth every 12 hours for 30 days. , Disp: 60 each, Rfl: 0    buPROPion (WELLBUTRIN XL) 150 MG extended release tablet, Take 1 tablet by mouth every morning, Disp: 90 tablet, Rfl: 3    rOPINIRole (REQUIP) 1 MG tablet, Take 1 tablet by mouth 3 times daily, Disp: 270 tablet, Rfl: 5    Misc.  Devices (WALKER) MISC, 1 each by Does not apply route daily, Disp: 1 each, Rfl: 0    Handicap Placard MISC, by Does not apply route 5  Yrs   Dx   oa, Disp: 1 each, Rfl: 0    furosemide (LASIX) 40 MG tablet, Take 40 mg by mouth daily as needed for Other Swelling, Disp: , Rfl:     Liraglutide (VICTOZA SC), Inject 1.8 mg into the skin daily , Disp: , Rfl:   No Known Allergies  Social History     Socioeconomic History    Marital status:      Spouse name: Not on file    Number of children: Not on file    Years of education: Not on file    Highest education level: Not on file   Occupational History    Not on file   Social Needs    Financial resource strain: Not on file    Food insecurity:     Worry: Not on file     Inability: Not on file    Transportation needs:     Medical: Not on file     Non-medical: Not on file   Tobacco Use    Smoking status: Former Smoker     Packs/day: 0.50     Years: 38.00     Pack years: 19.00     Types: Cigarettes     Last attempt to quit: 2019     Years since quittin.6    Smokeless tobacco: Never Used   Substance and Sexual Activity    Alcohol use: No    Drug use: No    Sexual activity: Not Currently   Lifestyle    Physical activity:     Days per week: Not on file     Minutes per session: Not on file    Stress: Not on file   Relationships    Social connections:     Talks on phone: Not on file     Gets together: Not on file     Attends Bahai service: Not on file     Active member of club or organization: Not on file     Attends meetings of clubs or organizations: Not on file     Relationship status: Not on file    Intimate partner violence:     Fear of current or ex partner: Not on file     Emotionally abused: Not on file     Physically abused: Not on file     Forced sexual activity: Not on file   Other Topics Concern    Not on file   Social History Narrative        Chronic Diagnosis: Iron deficiency anemia, Depressive disorder, Benign essential hypertension, Mixed    hyperlipidemia.     HTN    OBESITY    LIPID    OA    SMOKER    CVA L FIELD VISION    DEPRESSION    GASTRIC BYPASS 01    BREAST REDUCTION--    Sri PIEDRA 1955 Page #2    ANEMIA==IRON AND B-12    Admitted 2019 with cellulitis left lower extremity then readmitted with chest pain with negative stress test      Past Medical History:   Diagnosis Date    Anemia     Arthritis     Cellulitis 2015    left leg    Chronic ulcer of leg with fat layer exposed ([facility] [address] 2015    Chronic and all orders for this visit:    Lymphedema of both lower extremities    Morbid obesity ([address]    Chronic fatigue    Anxiety    Tremor  -     Louie Smith MD, Neurology, [facility]        Comments: elev legs   minimla   Standing  But wak more   fu with   Vascular      Die exer  Hm iuses  Dc smoking     A great deal of time spent reviewing medications, diet, exercise, social issues. Also reviewing the chart before entering the room with patient and finishing charting after leaving patient's room. More than half of that time was spent face to face with the patient in counseling and coordinating care.   plaan  Lab  Around april    Follow Up: Return in about 1 month (around 3/3/2020) for see  referral.     Seen by:  Tiffany Dobson DO

## 2020-02-14 ENCOUNTER — OFFICE VISIT (OUTPATIENT)
Dept: PAIN MANAGEMENT | Age: 65
End: 2020-02-14
Payer: COMMERCIAL

## 2020-02-14 VITALS
SYSTOLIC BLOOD PRESSURE: 132 MMHG | BODY MASS INDEX: 50.24 KG/M2 | RESPIRATION RATE: 16 BRPM | TEMPERATURE: 98.4 F | DIASTOLIC BLOOD PRESSURE: 78 MMHG | HEART RATE: 98 BPM | WEIGHT: 273 LBS | HEIGHT: 62 IN | OXYGEN SATURATION: 93 %

## 2020-02-14 PROCEDURE — 99213 OFFICE O/P EST LOW 20 MIN: CPT | Performed by: PHYSICIAN ASSISTANT

## 2020-02-14 RX ORDER — BUPRENORPHINE HYDROCHLORIDE 450 UG/1
450 FILM, SOLUBLE BUCCAL EVERY 12 HOURS
Qty: 60 EACH | Refills: 0 | Status: SHIPPED
Start: 2020-02-14 | End: 2020-03-13 | Stop reason: SDUPTHER

## 2020-02-14 NOTE — PROGRESS NOTES
compartment           Impression   Moderate degenerative changes as described above          9/3/2019 right knee x-ray     The bones appear to be in anatomic alignment. No foreign body is identified   No fracture is identified. Marginal bone spurs are again seen along the inferior margins of the   patella. There is interval worsening of moderate tricompartmental joint space   loss    The are interval worsening of moderate degenerative tricompartmental   changes present            Impression   Moderate tricompartmental degenerative changes.  This has   worsened in the interim            Potential Aberrant Drug-Related Behavior:  None    Urine Drug Screenin2019 Consistent for buprenorphine and metabolites  2020 Consistent for buprenorphine    OARRS report:  10/2019 Consistent  2019 Consistent  2020 Consistent  2020 Consistent    Past Medical History:   Diagnosis Date    Anemia     Arthritis     Cellulitis -     left leg    Chronic ulcer of leg with fat layer exposed ([address] 2015    Chronic ulcer of right leg, limited to breakdown of skin ([address] 2016    Depression     Lymphedema of both lower extremities 2015    Lymphedema of lower extremity 2015    Obesity     Osteoarthritis     Peripheral vision loss     Stroke [facility])     Type 2 diabetes mellitus without complication, without long-term current use of insulin ([address] 2019    Ulcer of left lower leg, limited to breakdown of skin ([address] 6/10/2019    Ulcer of left lower leg, limited to breakdown of skin ([address] 6/10/2019       Past Surgical History:   Procedure Laterality Date    ABDOMINOPLASTY  2002    BREAST REDUCTION SURGERY      reduction    CATARACT REMOVAL      bilateral     SECTION      x2    COLONOSCOPY  2012    and egd    ECHOCARDIOGRAM COMPLETE 2D W DOPPLER W COLOR  2012         GASTRIC BYPASS SURGERY  2000    HERNIA REPAIR             Prior to Admission medications Medication Sig Start Date End Date Taking? Authorizing Provider   BELBUCA 450 MCG FILM Take 450 mcg by mouth every 12 hours for 30 days. 2/14/20 3/15/20 Yes CHERYL Vega   predniSONE (DELTASONE) 10 MG tablet ONE TID FOR THREE DAYS, ONE BID FOR THREE DAYS, ONE QD FOR THREE DAYS   FOOD 20  Yes Gautam Razo DO   buPROPion (WELLBUTRIN XL) 150 MG extended release tablet Take 1 tablet by mouth every morning 19  Yes Gautam Razo DO   rOPINIRole (REQUIP) 1 MG tablet Take 1 tablet by mouth 3 times daily 10/5/19  Yes Amie Razo DO   Misc.  Devices [facility]) MISC 1 each by Does not apply route daily 9/3/19  Yes Ankit Guo PA-C   Handicap Placard MISC by Does not apply route 5  Yrs   Dx   oa 19  Yes Gautam Razo DO   furosemide (LASIX) 40 MG tablet Take 40 mg by mouth daily as needed for Other Swelling 19  Yes Historical Provider, MD   Liraglutide (VICTOZA SC) Inject 1.8 mg into the skin daily    Yes Historical Provider, MD       No Known Allergies    Social History     Socioeconomic History    Marital status:      Spouse name: Not on file    Number of children: Not on file    Years of education: Not on file    Highest education level: Not on file   Occupational History    Not on file   Social Needs    Financial resource strain: Not on file    Food insecurity:     Worry: Not on file     Inability: Not on file    Transportation needs:     Medical: Not on file     Non-medical: Not on file   Tobacco Use    Smoking status: Former Smoker     Packs/day: 0.50     Years: 38.00     Pack years: 19.00     Types: Cigarettes     Last attempt to quit: 2019     Years since quittin.7    Smokeless tobacco: Never Used   Substance and Sexual Activity    Alcohol use: No    Drug use: No    Sexual activity: Not Currently   Lifestyle    Physical activity:     Days per week: Not on file     Minutes per session: Not on file    Stress: Not on file No rales, rhonchi, or wheezing.     Abdomen:     Shape:non-distended and normal  Tenderness:none  Guarding:none     Lumbar spine:     Spine inspection:normal   CVA tenderness:No   Palpation: Right paraspinal TTP, negative midline TTP, negative facet loading left and right. Range of motion:abnormal mildly Lateral bending, flexion, extension rotation bilateral and is not painful.     Musculoskeletal:     SI joint tenderness:negative right, negative left              NGA test:not done right, not done left - unable to perform  Piriformis tenderness:negative right, negative left  Trochanteric bursa tenderness:negative right, negative left  SLR:negative right, negative left, sitting      Extremities:     Tremors:None bilaterally upper and lower  Range of motion:Generally limited extension shoulder - right, pain with internal rotation of hips negative. Intact:Yes  Varicose veins:absent   Pulses:radial pulse 2+ left  Cyanosis:none  Edema:severe pitting edema pretibial on the left.       Knee:     Inspection:symmetric, swelling none bilaterally  Tenderness of Bony Landmarks: medial left  Tenderness of Bursa:None, bilateral  Drawer Test:negative  Effusion:absent bilaterally  Crepitus:present bilaterally  ROM:Left 10-80 Right 10-80     Neurological:     Cranial nerves:normal  Sensory:normal to light touch   Motor:   Right Quadriceps5/5          Left Quadriceps5/5           Right Gastrocnemius5/5    Left Gastrocnemius5/5  Right Ant Tibialis5/5  Left Ant Tibialis5/5  Coordination:normal  Gait:antalgic     Dermatology:     Skin: Erythema left lower leg     Impression:      B/L Knee Pain, LBP, right shoulder pain  Knee x-rays:  Moderate DJD.    Hx of pain management with [facility] pain clinic.  Was on fentanyl 25 mg and norco 5/325 TID.  Did not help and caused drowsiness.  Now on Belbuca 450 mcg BID (increased from 300 mg BID) which is working very well without side effects.  Transferred [facility] to [facility] ([facility] due to cost.    Hx of LESI by Dr. 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 States that she is filing for disability. Prior rt SIJ injection under US not helpful  Gets b/l knee CSI's through orthopedics and they continue to be helpful     Discussed negative SE's associated with chronic opioid therapy  Discussed consideration for a dosage reduction to 300 mg BID given she is no longer working and therefore pain is not as severe. Discussed at length today. Will plan to do this with weather changes which she believe she will be able to tolerate much better.       Plan:  Aquatherapy helped in the past.  Reports that she would rather attend when it is warmer outside. Consider MRI lumbar spine. OARRS reviewed 02/2020. UDS reviewed and is consistent  Belbuca 450 mcg BID ordered today. Patient is s/p b/l knee CSI (ortho) on 12/10/2019. Has failed knee viscosupplementation  Patient encouraged to stay active and to lose weight  Treatment plan discussed with the patient     Controlled Substance Monitoring:    Acute and Chronic Pain Monitoring:   RX Monitoring 2/14/2020   Periodic Controlled Substance Monitoring Possible medication side effects, risk of tolerance/dependence & alternative treatments discussed. ;No signs of potential drug abuse or diversion identified. ;Assessed functional status. ;Obtaining appropriate analgesic effect of treatment. Plan:    We discussed with the patient that combining opioids, benzodiazepines, alcohol, illicit drugs or sleep aids increases the risk of respiratory depression including death. We discussed that these medications may cause drowsiness, sedation or dizziness and have counseled the patient not to drive or operate machinery. We have discussed that these medications will be prescribed only by one provider.  We have discussed with the patient about age related risk factors and have thoroughly discussed the importance of taking these medications as prescribed. The patient verbalizes understanding.     ccreferring physic

## 2020-02-19 ENCOUNTER — OFFICE VISIT (OUTPATIENT)
Dept: NEUROLOGY | Age: 65
End: 2020-02-19
Payer: COMMERCIAL

## 2020-02-19 VITALS
DIASTOLIC BLOOD PRESSURE: 88 MMHG | RESPIRATION RATE: 16 BRPM | SYSTOLIC BLOOD PRESSURE: 158 MMHG | OXYGEN SATURATION: 98 % | TEMPERATURE: 98.5 F | BODY MASS INDEX: 50.42 KG/M2 | HEART RATE: 101 BPM | WEIGHT: 274 LBS | HEIGHT: 62 IN

## 2020-02-19 PROCEDURE — 99244 OFF/OP CNSLTJ NEW/EST MOD 40: CPT | Performed by: PSYCHIATRY & NEUROLOGY

## 2020-02-19 ASSESSMENT — ENCOUNTER SYMPTOMS
TROUBLE SWALLOWING: 0
PHOTOPHOBIA: 0
VOMITING: 0
SHORTNESS OF BREATH: 0
NAUSEA: 0

## 2020-02-19 NOTE — PROGRESS NOTES
NEUROLOGY NEW PATIENT NOTE     Date: 2/19/2020  Name: Maria E Stein  MRN: [id number]  Patient's PCP: Rebecca Baumann DO     Dear, Dr. Rebecca Baumann DO    REASON FOR VISIT/CHIEF COMPLAINT: Tremors     HISTORY OF PRESENT ILLNESS: Maria E Stein is a 62 y.o.  female stroke with residual vision loss, restless leg syndrome, anxiety is coming in to establish care for tremors. Tremor     Onset: June 2019 after she was discharged from a hospitalization for sepsis.  Location: Bilateral upper extremity   Severity: Mild   Context: She notices the tremors mostly on posture especially when she is holding a cup of coffee   Aggravating factors: None   Reliving factors: None   Associated signs and symptoms: No other associated signs or symptoms including stiffness, rigidity, bradykinesia, ataxia.  At rest: Mild   On action: No   Head tremor: No   Voice tremor: No   Jaw tremor: No   Presence of a discomfort or pulling sensation in the part of the body that is shaking: No   Slowing of movements: No   Loss of smell or taste: No   Interferes with ADL's: No   Falls: No   Effect of EtOH on tremor: Unknown   Associated with medication use: No   Family history of shaking or tremor:  Mother   Treatments tried: None    No other aggravating or relieving factors  No other associated symptoms    Sleep is poor  Mood is stable    I have personally reviewed her medical records    PAST MEDICAL HISTORY:   Past Medical History:   Diagnosis Date    Anemia     Arthritis     Cellulitis 5- 2015    left leg    Chronic ulcer of leg with fat layer exposed ([address] 6/22/2015    Chronic ulcer of right leg, limited to breakdown of skin ([address] 9/28/2016    Depression     Lymphedema of both lower extremities 6/22/2015    Lymphedema of lower extremity 6/22/2015    Obesity     Osteoarthritis     Peripheral vision loss     Stroke [facility])     Type 2 diabetes mellitus without complication, without long-term current use of insulin [facility] 2019    Ulcer of left lower leg, limited to breakdown of skin ([facility] 6/10/2019    Ulcer of left lower leg, limited to breakdown of skin ([facility] 6/10/2019     PAST SURGICAL HISTORY:   Past Surgical History:   Procedure Laterality Date    ABDOMINOPLASTY  2002    BREAST REDUCTION SURGERY      reduction    CATARACT REMOVAL      bilateral     SECTION      x2    COLONOSCOPY  2012    and egd    ECHOCARDIOGRAM COMPLETE 2D W DOPPLER W COLOR  2012         GASTRIC BYPASS SURGERY  2000    HERNIA REPAIR           FAMILY MEDICAL HISTORY:   Family History   Problem Relation Age of Onset    Breast Cancer Mother     Stroke Father     Hypertension Sister     Hypertension Brother      SOCIAL HISTORY:   Social History     Socioeconomic History    Marital status:      Spouse name: None    Number of children: None    Years of education: None    Highest education level: None   Occupational History    None   Social Needs    Financial resource strain: None    Food insecurity:     Worry: None     Inability: None    Transportation needs:     Medical: None     Non-medical: None   Tobacco Use    Smoking status: Current Every Day Smoker     Packs/day: 0.25     Years: 38.00     Pack years: 9.50     Types: Cigarettes     Last attempt to quit: 2019     Years since quittin.7    Smokeless tobacco: Never Used   Substance and Sexual Activity    Alcohol use: No    Drug use: No    Sexual activity: Not Currently   Lifestyle    Physical activity:     Days per week: None     Minutes per session: None    Stress: None   Relationships    Social connections:     Talks on phone: None     Gets together: None     Attends Episcopalian service: None     Active member of club or organization: None     Attends meetings of clubs or organizations: None     Relationship status: None    Intimate partner violence:     Fear of current or ex partner: None     Emotionally abused: None Physically abused: None     Forced sexual activity: None   Other Topics Concern    None   Social History Narrative        Chronic Diagnosis: Iron deficiency anemia, Depressive disorder, Benign essential hypertension, Mixed    hyperlipidemia. HTN    OBESITY    LIPID    OA    SMOKER    CVA L FIELD VISION    DEPRESSION    GASTRIC BYPASS 01    BREAST REDUCTION--    Abdoul Bishop  1955 Page #2    ANEMIA==IRON AND B-12    Admitted 2019 with cellulitis left lower extremity then readmitted with chest pain with negative stress test     Allergy: No Known Allergies  MEDS:   Current Outpatient Medications:     BELBUCA 450 MCG FILM, Take 450 mcg by mouth every 12 hours for 30 days. , Disp: 60 each, Rfl: 0    buPROPion (WELLBUTRIN XL) 150 MG extended release tablet, Take 1 tablet by mouth every morning, Disp: 90 tablet, Rfl: 3    rOPINIRole (REQUIP) 1 MG tablet, Take 1 tablet by mouth 3 times daily, Disp: 270 tablet, Rfl: 5    Misc. Devices (WALKER) MISC, 1 each by Does not apply route daily, Disp: 1 each, Rfl: 0    Handicap Placard MISC, by Does not apply route 5  Yrs   Dx   oa, Disp: 1 each, Rfl: 0    furosemide (LASIX) 40 MG tablet, Take 40 mg by mouth daily as needed for Other Swelling, Disp: , Rfl:     Liraglutide (VICTOZA SC), Inject 1.8 mg into the skin daily , Disp: , Rfl:     REVIEW OF SYSTEMS  Review of Systems   Constitutional: Negative for appetite change, fatigue and unexpected weight change. HENT: Negative for drooling, hearing loss, tinnitus and trouble swallowing. Eyes: Negative for photophobia and visual disturbance. Respiratory: Negative for shortness of breath. Cardiovascular: Negative for palpitations. Gastrointestinal: Negative for nausea and vomiting. Endocrine: Negative for polyuria. Genitourinary: Negative for flank pain. Musculoskeletal: Negative for neck pain and neck stiffness. Skin: Negative for rash.    Allergic/Immunologic: Negative for food allergies. Neurological: Positive for tremors. Negative for dizziness, seizures, syncope, speech difficulty, weakness, light-headedness, numbness and headaches. Hematological: Negative for adenopathy. Psychiatric/Behavioral: Negative for agitation, behavioral problems and sleep disturbance. PHYSICAL EXAM:   BP (!) 158/88   Pulse 101   Temp 98.5 °F (36.9 °C) (Oral)   Resp 16   Ht 5' 2\" (1.575 m)   Wt 274 lb (124.3 kg)   SpO2 98%   BMI 50.12 kg/m²   GENERAL APPEARANCE: Alert, well-developed, well-nourished female in no acute distress. HEENT: Normocephalic and atraumatic. PERRL. Oropharynx unremarkable. PULM: Normal respiratory effort. No accessory muscle use. CV: RRR. ABDOMEN: Soft, nontender. EXTREMITIES: No obvious signs of vascular compromise. Pulses present. No cyanosis, clubbing or edema. SKIN: Clear; no rashes, lesions or skin breaks in exposed areas. NEURO:     Neurological examination     MENTAL STATUS: Patient awake and oriented to time, place, and person. Recent/remote memory normal. Attention span/concentration normal. Speech fluent. Good comprehension, naming, and repetition. Fund of knowledge appropriate for patient's level of education. Affect normal.    CRANIAL NERVES:  CN I: Not tested. CN II: Fundoscopic exam not performed. CN III, IV, VI: Pupils equal, round and reactive to light; extra ocular movements full and intact. CN V: Facial sensation normal.  CN VII: No facial asymmetry. CN VIII:  Hearing grossly normal bilaterally. No pathologic nystagmus or skew deviation. CN IX, X: Palate elevates symmetrically. CN XI: Shoulder shrug and chin rotation equal with intact strength. CN XII: Tongue protrusion midline. MOTOR: Normal bulk. Tone normal and symmetric throughout. Strength 5/5 throughout.       ABNORMAL MOVEMENTS/TREMORS: YES    Recurrent/oscillatory:Yes  Joints involved:wrist(s): both  Remergent quality: Yes  Accompanied by abnormal posture: No  Distractibility:No  Entrainment:No  Intentional component:No  Associated dystonic movements or postures: No    REFLEXES: DTRs 2+; normal and symmetric throughout. Plantar response downgoing. SENSATION: Sensation grossly intact to fine touch, pain/temperature, vibration and position. COORDINATION: Finger-to-nose and heel to shin normal for age and symmetric. Finger tapping and alternating movements normal.    STATION: Negative Romberg. GAIT:  Normal heel, toe and tandem; no ataxia. DIAGNOSTIC TESTS:     I have personally reviewed the most recent lab results:    Sodium   Date Value Ref Range Status   11/19/2019 141 132 - 146 mmol/L Final   06/13/2019 142 132 - 146 mmol/L Final   06/12/2019 143 132 - 146 mmol/L Final     Potassium   Date Value Ref Range Status   11/19/2019 5.2 (H) 3.5 - 5.0 mmol/L Final   06/13/2019 4.4 3.5 - 5.0 mmol/L Final   06/12/2019 4.3 3.5 - 5.0 mmol/L Final     Potassium reflex Magnesium   Date Value Ref Range Status   06/08/2019 3.3 (L) 3.5 - 5.0 mmol/L Final   06/07/2019 3.7 3.5 - 5.0 mmol/L Final   06/06/2019 2.9 (L) 3.5 - 5.0 mmol/L Final     Chloride   Date Value Ref Range Status   11/19/2019 102 98 - 107 mmol/L Final   06/13/2019 102 98 - 107 mmol/L Final   06/12/2019 103 98 - 107 mmol/L Final     CO2   Date Value Ref Range Status   11/19/2019 27 22 - 29 mmol/L Final   06/13/2019 30 (H) 22 - 29 mmol/L Final   06/12/2019 30 (H) 22 - 29 mmol/L Final     BUN   Date Value Ref Range Status   11/19/2019 11 8 - 23 mg/dL Final   06/13/2019 9 8 - 23 mg/dL Final   06/12/2019 13 8 - 23 mg/dL Final     CREATININE   Date Value Ref Range Status   11/19/2019 0.5 0.5 - 1.0 mg/dL Final   06/13/2019 0.5 0.5 - 1.0 mg/dL Final   06/12/2019 0.6 0.5 - 1.0 mg/dL Final     GFR Non-   Date Value Ref Range Status   11/19/2019 >60 >=60 mL/min/1.73 Final     Comment:     Chronic Kidney Disease: less than 60 ml/min/1.73 sq.m.           Kidney Failure: less than 15 ml/min/1.73 12.0 % Final     Total Protein   Date Value Ref Range Status   11/19/2019 7.7 6.4 - 8.3 g/dL Final   06/13/2019 7.0 6.4 - 8.3 g/dL Final   06/08/2019 5.3 (L) 6.4 - 8.3 g/dL Final     Total Bilirubin   Date Value Ref Range Status   11/19/2019 0.5 0.0 - 1.2 mg/dL Final   06/13/2019 0.6 0.0 - 1.2 mg/dL Final   06/08/2019 0.2 0.0 - 1.2 mg/dL Final     Alkaline Phosphatase   Date Value Ref Range Status   11/19/2019 139 (H) 35 - 104 U/L Final   06/13/2019 101 35 - 104 U/L Final   06/08/2019 88 35 - 104 U/L Final     ALT   Date Value Ref Range Status   11/19/2019 10 0 - 32 U/L Final   06/13/2019 13 0 - 32 U/L Final   06/08/2019 18 0 - 32 U/L Final     AST   Date Value Ref Range Status   11/19/2019 16 0 - 31 U/L Final   06/13/2019 17 0 - 31 U/L Final   06/08/2019 14 0 - 31 U/L Final     No results found for: PTT, INR  Lab Results   Component Value Date    TRIG 69 11/19/2019    HDL 48 11/19/2019     No components found for: HGBA1C  No results found for: PROTEINCSF, GLUCCSF, WBCCSF    Controlled Substance Monitoring:    Acute and Chronic Pain Monitoring:   RX Monitoring 2/14/2020   Periodic Controlled Substance Monitoring Possible medication side effects, risk of tolerance/dependence & alternative treatments discussed. ;No signs of potential drug abuse or diversion identified. ;Assessed functional status. ;Obtaining appropriate analgesic effect of treatment. MEDICAL DECISION MAKING  ASSESSMENT/PLAN    Jet Guajardo was seen today for tremors. Diagnoses and all orders for this visit:    Tremor    Restless leg syndrome    Anxiety    Left homonymous inferior quadrantanopsia    -     CT Head WO Contrast; Future    · The patient is coming in with complaints of tremors in bilateral upper extremities which started after hospitalization. · She has family history of essential tremor in her mother.   · Currently the tremors are in bilateral upper extremities, involving the bilateral wrist joint, high-frequency, low amplitude, mostly

## 2020-03-03 ENCOUNTER — OFFICE VISIT (OUTPATIENT)
Dept: PRIMARY CARE CLINIC | Age: 65
End: 2020-03-03
Payer: COMMERCIAL

## 2020-03-03 VITALS
BODY MASS INDEX: 49.87 KG/M2 | SYSTOLIC BLOOD PRESSURE: 138 MMHG | HEIGHT: 62 IN | DIASTOLIC BLOOD PRESSURE: 82 MMHG | TEMPERATURE: 98.6 F | WEIGHT: 271 LBS

## 2020-03-03 PROCEDURE — 99214 OFFICE O/P EST MOD 30 MIN: CPT | Performed by: FAMILY MEDICINE

## 2020-03-03 ASSESSMENT — ENCOUNTER SYMPTOMS
SHORTNESS OF BREATH: 1
GASTROINTESTINAL NEGATIVE: 1
BACK PAIN: 1
ALLERGIC/IMMUNOLOGIC NEGATIVE: 1
STRIDOR: 0
WHEEZING: 0
EYES NEGATIVE: 1
CHEST TIGHTNESS: 0

## 2020-03-03 NOTE — PROGRESS NOTES
name: Not on file    Number of children: Not on file    Years of education: Not on file    Highest education level: Not on file   Occupational History    Not on file   Social Needs    Financial resource strain: Not on file    Food insecurity:     Worry: Not on file     Inability: Not on file    Transportation needs:     Medical: Not on file     Non-medical: Not on file   Tobacco Use    Smoking status: Current Every Day Smoker     Packs/day: 0.25     Years: 38.00     Pack years: 9.50     Types: Cigarettes     Last attempt to quit: 2019     Years since quittin.7    Smokeless tobacco: Never Used   Substance and Sexual Activity    Alcohol use: No    Drug use: No    Sexual activity: Not Currently   Lifestyle    Physical activity:     Days per week: Not on file     Minutes per session: Not on file    Stress: Not on file   Relationships    Social connections:     Talks on phone: Not on file     Gets together: Not on file     Attends Druze service: Not on file     Active member of club or organization: Not on file     Attends meetings of clubs or organizations: Not on file     Relationship status: Not on file    Intimate partner violence:     Fear of current or ex partner: Not on file     Emotionally abused: Not on file     Physically abused: Not on file     Forced sexual activity: Not on file   Other Topics Concern    Not on file   Social History Narrative        Chronic Diagnosis: Iron deficiency anemia, Depressive disorder, Benign essential hypertension, Mixed    hyperlipidemia.     HTN    OBESITY    LIPID    OA    SMOKER    CVA L FIELD VISION    DEPRESSION    GASTRIC BYPASS 01    BREAST REDUCTION--    Nedrajaky Ianpascale  1955 Page #2    ANEMIA==IRON AND B-12    Admitted 2019 with cellulitis left lower extremity then readmitted with chest pain with negative stress test      Past Medical History:   Diagnosis Date    Anemia     Arthritis     Cellulitis 2015    left

## 2020-03-13 ENCOUNTER — OFFICE VISIT (OUTPATIENT)
Dept: PAIN MANAGEMENT | Age: 65
End: 2020-03-13
Payer: COMMERCIAL

## 2020-03-13 VITALS
HEIGHT: 62 IN | RESPIRATION RATE: 16 BRPM | TEMPERATURE: 98.4 F | SYSTOLIC BLOOD PRESSURE: 144 MMHG | BODY MASS INDEX: 49.87 KG/M2 | HEART RATE: 76 BPM | WEIGHT: 271 LBS | DIASTOLIC BLOOD PRESSURE: 96 MMHG

## 2020-03-13 PROCEDURE — 99213 OFFICE O/P EST LOW 20 MIN: CPT | Performed by: PHYSICIAN ASSISTANT

## 2020-03-13 RX ORDER — BUPRENORPHINE HYDROCHLORIDE 450 UG/1
450 FILM, SOLUBLE BUCCAL EVERY 12 HOURS
Qty: 60 EACH | Refills: 0 | Status: SHIPPED
Start: 2020-03-15 | End: 2020-04-08 | Stop reason: SDUPTHER

## 2020-03-13 RX ORDER — BUPRENORPHINE HYDROCHLORIDE 450 UG/1
450 FILM, SOLUBLE BUCCAL EVERY 12 HOURS
Qty: 60 EACH | Refills: 0 | Status: CANCELLED | OUTPATIENT
Start: 2020-03-13 | End: 2020-04-12

## 2020-03-13 NOTE — PROGRESS NOTES
[facility] [facility]  [address]    Follow up Note      Yisel Carrillo     Date of Visit:  3/13/2020    CC:  Patient presents for follow up   Chief Complaint   Patient presents with    Follow-up     lower back/knees       HPI:    Pain is unchanged currently. Doing well on belbuca. Will be getting injections in April. Appropriate analgesia with current medications regimen: yes   Change in quality of symptoms:no. Medication side effects:none. Recent diagnostic testing: None. Recent interventional procedures:none. She has not been on anticoagulation medications to include ASA, NSAIDS, Plavix, heparin, LMW heparin and warfarin and has not been on herbal supplements. She is diabetic. Imagin2019 Lumbar Spine X-ray    Alignment of the vertebral bodies appears to be near-anatomic   No fracture or foreign body is identified. The disc spaces demonstrate moderate degenerative changes. There is mild loss of the vertebral body height   There are moderate degenerative changes involving the facets. There is grade 1 anterolisthesis of L3 on L4. Flexion-extension views demonstrates movement of the anterolisthesis   of L3 on L4.            Impression   Grade 1 anterolisthesis of L3 on L4 which does move with extension. Findings consistent with moderate degenerative disc disease and   degenerative facet disease.       The exam has been dictated and signed by Araceli Best. Nu Stewart, APRN-CNP   and Valentina Cadena MD, reviewed and concurred with these findings.          9/3/2019 left knee x-ray     The bones appear to be in anatomic alignment.     No foreign body is identified   No fracture is identified     There is moderate tricompartmental joint space loss greatest long   patellofemoral and medial femoral tibial joint   The are moderate degenerative changes present along the patellofemoral   and medial femoral tibial compartment           Impression Moderate degenerative changes as described above          9/3/2019 right knee x-ray     The bones appear to be in anatomic alignment. No foreign body is identified   No fracture is identified. Marginal bone spurs are again seen along the inferior margins of the   patella. There is interval worsening of moderate tricompartmental joint space   loss    The are interval worsening of moderate degenerative tricompartmental   changes present            Impression   Moderate tricompartmental degenerative changes.  This has   worsened in the interim            Potential Aberrant Drug-Related Behavior:  None    Urine Drug Screenin2019 Consistent for buprenorphine and metabolites  2020 Consistent for buprenorphine    OARRS report:  10/2019 Consistent  2019 Consistent  2020 Consistent  2020 Consistent  2020 Consistent    Past Medical History:   Diagnosis Date    Anemia     Arthritis     Cellulitis -     left leg    Chronic ulcer of leg with fat layer exposed ([address] 2015    Chronic ulcer of right leg, limited to breakdown of skin ([address] 2016    Depression     Lymphedema of both lower extremities 2015    Lymphedema of lower extremity 2015    Obesity     Osteoarthritis     Peripheral vision loss     Stroke [facility])     Type 2 diabetes mellitus without complication, without long-term current use of insulin ([address] 2019    Ulcer of left lower leg, limited to breakdown of skin ([address] 6/10/2019    Ulcer of left lower leg, limited to breakdown of skin ([address] 6/10/2019       Past Surgical History:   Procedure Laterality Date    ABDOMINOPLASTY  2002    BREAST REDUCTION SURGERY      reduction    CATARACT REMOVAL      bilateral     SECTION      x2    COLONOSCOPY  2012    and egd    ECHOCARDIOGRAM COMPLETE 2D W DOPPLER W COLOR  2012         GASTRIC BYPASS SURGERY  2000    HERNIA REPAIR             Prior to Admission medications    Medication Sig Start Date End Date Taking? Authorizing Provider   BELBUCA 450 MCG FILM Take 450 mcg by mouth every 12 hours for 30 days. 2/14/20 3/15/20 Yes CHERYL Santacruz   buPROPion (WELLBUTRIN XL) 150 MG extended release tablet Take 1 tablet by mouth every morning 19  Yes Gautam Razo DO   rOPINIRole (REQUIP) 1 MG tablet Take 1 tablet by mouth 3 times daily 10/5/19  Yes Obdulio Razo DO   Misc.  Devices [facility]) MISC 1 each by Does not apply route daily 9/3/19  Yes [employer], BRENDA   Handicap Placard MISC by Does not apply route 5  Yrs   Dx   oa 19  Yes Gautam Razo DO   furosemide (LASIX) 40 MG tablet Take 40 mg by mouth daily as needed for Other Swelling 19  Yes Historical Provider, MD   Liraglutide (VICTOZA SC) Inject 1.8 mg into the skin daily    Yes Historical Provider, MD       No Known Allergies    Social History     Socioeconomic History    Marital status:      Spouse name: Not on file    Number of children: Not on file    Years of education: Not on file    Highest education level: Not on file   Occupational History    Not on file   Social Needs    Financial resource strain: Not on file    Food insecurity     Worry: Not on file     Inability: Not on file    Transportation needs     Medical: Not on file     Non-medical: Not on file   Tobacco Use    Smoking status: Current Every Day Smoker     Packs/day: 0.25     Years: 38.00     Pack years: 9.50     Types: Cigarettes     Last attempt to quit: 2019     Years since quittin.7    Smokeless tobacco: Never Used   Substance and Sexual Activity    Alcohol use: No    Drug use: No    Sexual activity: Not Currently   Lifestyle    Physical activity     Days per week: Not on file     Minutes per session: Not on file    Stress: Not on file   Relationships    Social connections     Talks on phone: Not on file     Gets together: Not on file     Attends Buddhist service: Not on file     Active member of negative facet loading left and right. Range of motion:abnormal mildly Lateral bending, flexion, extension rotation bilateral and is not painful.     Musculoskeletal:     SI joint tenderness:negative right, negative left              NGA test:not done right, not done left - unable to perform  Piriformis tenderness:negative right, negative left  Trochanteric bursa tenderness:negative right, negative left  SLR:negative right, negative left, sitting      Extremities:     Tremors:None bilaterally upper and lower  Range of motion:Generally limited extension shoulder - right, pain with internal rotation of hips negative. Intact:Yes  Varicose veins:absent   Cyanosis:none  Edema:severe pitting edema pretibial on the left.       Knee:     Inspection:symmetric, swelling none bilaterally  Tenderness of Bony Landmarks: medial left  Tenderness of Bursa:None, bilateral  Drawer Test:negative  Effusion:absent bilaterally  Crepitus:present bilaterally  ROM:Left 10-80 Right 10-80     Neurological:     Cranial nerves:normal  Sensory:normal to light touch   Motor:   Right Quadriceps5/5          Left Quadriceps5/5           Right Gastrocnemius5/5    Left Gastrocnemius5/5  Right Ant Tibialis5/5  Left Ant Tibialis5/5  Coordination:normal  Gait:antalgic     Dermatology:     Skin: Erythema left lower leg     Impression:      B/L Knee Pain, LBP, right shoulder pain  Knee x-rays:  Moderate DJD.    Hx of pain management with [facility] pain clinic.  Was on fentanyl 25 mg and norco 5/325 TID.  Did not help and caused drowsiness.  Now on Belbuca 450 mcg BID (increased from 300 mg BID) which is working very well without side effects. Transferred care to [facility]) due to cost.    Hx of LESI by Dr. 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 States that she is filing for disability.     Prior rt [facility]

## 2020-03-23 RX ORDER — CREAM BASE NO.39
CREAM (GRAM) TOPICAL
Qty: 200 G | Refills: 1 | Status: SHIPPED | OUTPATIENT
Start: 2020-03-23 | End: 2022-06-29

## 2020-04-01 ENCOUNTER — TELEPHONE (OUTPATIENT)
Dept: ORTHOPEDIC SURGERY | Age: 65
End: 2020-04-01

## 2020-04-01 RX ORDER — BUPROPION HYDROCHLORIDE 150 MG/1
150 TABLET ORAL EVERY MORNING
Qty: 90 TABLET | Refills: 3 | Status: SHIPPED
Start: 2020-04-01 | End: 2021-02-17 | Stop reason: SDUPTHER

## 2020-04-02 ENCOUNTER — OFFICE VISIT (OUTPATIENT)
Dept: PRIMARY CARE CLINIC | Age: 65
End: 2020-04-02
Payer: COMMERCIAL

## 2020-04-02 VITALS
TEMPERATURE: 98.5 F | BODY MASS INDEX: 50.61 KG/M2 | HEIGHT: 62 IN | WEIGHT: 275 LBS | SYSTOLIC BLOOD PRESSURE: 142 MMHG | DIASTOLIC BLOOD PRESSURE: 82 MMHG

## 2020-04-02 PROCEDURE — 99214 OFFICE O/P EST MOD 30 MIN: CPT | Performed by: FAMILY MEDICINE

## 2020-04-02 ASSESSMENT — ENCOUNTER SYMPTOMS
ALLERGIC/IMMUNOLOGIC NEGATIVE: 1
BACK PAIN: 1
GASTROINTESTINAL NEGATIVE: 1
RESPIRATORY NEGATIVE: 1
EYES NEGATIVE: 1

## 2020-04-02 ASSESSMENT — PATIENT HEALTH QUESTIONNAIRE - PHQ9
SUM OF ALL RESPONSES TO PHQ QUESTIONS 1-9: 0
SUM OF ALL RESPONSES TO PHQ QUESTIONS 1-9: 0
1. LITTLE INTEREST OR PLEASURE IN DOING THINGS: 0
SUM OF ALL RESPONSES TO PHQ9 QUESTIONS 1 & 2: 0
2. FEELING DOWN, DEPRESSED OR HOPELESS: 0

## 2020-04-02 NOTE — PROGRESS NOTES
20  Name: Nat Becker    : 1955    Sex: female    Age: 62 y.o. Subjective:  Chief Complaint: Patient is here for left leg     Bilateral knee pain and low back pain. Chronic. She sees a pain management specialist and on high-dose medications. Her left leg swells but she is wearing support hose and it helps. She is doing exercising. She continues to smoke. Does get shortness of breath on exertion but no change. No chest pains. Review of Systems   Constitutional: Negative. HENT: Negative. Eyes: Negative. Respiratory: Negative. Cardiovascular: Negative. Left leg pain and swelling improved   Gastrointestinal: Negative. Endocrine: Negative. Genitourinary: Negative. Musculoskeletal: Positive for arthralgias and back pain. Skin: Negative. Allergic/Immunologic: Negative. Neurological: Negative. Hematological: Negative. Psychiatric/Behavioral: Negative. Current Outpatient Medications:     buPROPion (WELLBUTRIN XL) 150 MG extended release tablet, Take 1 tablet by mouth every morning, Disp: 90 tablet, Rfl: 3    Cream Base (VERSAPRO) CREA, APPLY ONE (1) GRAM (ONE PUMP) EXTERNALLY FOUR TIMES A DAY TO EACH KNEE, Disp: 200 g, Rfl: 1    BELBUCA 450 MCG FILM, Take 450 mcg by mouth every 12 hours for 30 days. , Disp: 60 each, Rfl: 0    rOPINIRole (REQUIP) 1 MG tablet, Take 1 tablet by mouth 3 times daily, Disp: 270 tablet, Rfl: 5    Misc.  Devices (WALKER) MISC, 1 each by Does not apply route daily, Disp: 1 each, Rfl: 0    Handicap Placard MISC, by Does not apply route 5  Yrs   Dx   oa, Disp: 1 each, Rfl: 0    furosemide (LASIX) 40 MG tablet, Take 40 mg by mouth daily as needed for Other Swelling, Disp: , Rfl:     Liraglutide (VICTOZA SC), Inject 1.8 mg into the skin daily , Disp: , Rfl:   No Known Allergies  Social History     Socioeconomic History    Marital status:      Spouse name: Not on file    Number of children: Not on file seen today for leg swelling. Diagnoses and all orders for this visit:    Lymphedema of both lower extremities    Primary osteoarthritis of both knees    Anxiety    Intervertebral lumbar disc disorder        Comments: Discontinue smoking. Diet exercise. Lose weight. Range of motion exercises taught. Support hose. Again offered lymphedema therapy and she declines. Will Wellbutrin was renewed yesterday. Follow-up pain management. A great deal of time spent reviewing medications, diet, exercise, social issues. Also reviewing the chart before entering the room with patient and finishing charting after leaving patient's room. More than half of that time was spent face to face with the patient in counseling and coordinating care. Follow Up: Return in about 3 months (around 7/2/2020).      Seen by:  Tamika Falcon,

## 2020-04-03 ENCOUNTER — OFFICE VISIT (OUTPATIENT)
Dept: ORTHOPEDIC SURGERY | Age: 65
End: 2020-04-03
Payer: COMMERCIAL

## 2020-04-03 VITALS
WEIGHT: 270 LBS | SYSTOLIC BLOOD PRESSURE: 187 MMHG | HEART RATE: 94 BPM | DIASTOLIC BLOOD PRESSURE: 95 MMHG | HEIGHT: 62 IN | BODY MASS INDEX: 49.69 KG/M2

## 2020-04-03 PROCEDURE — 2500000003 HC RX 250 WO HCPCS

## 2020-04-03 PROCEDURE — 99212 OFFICE O/P EST SF 10 MIN: CPT

## 2020-04-03 PROCEDURE — 99213 OFFICE O/P EST LOW 20 MIN: CPT | Performed by: ORTHOPAEDIC SURGERY

## 2020-04-03 PROCEDURE — 20610 DRAIN/INJ JOINT/BURSA W/O US: CPT | Performed by: ORTHOPAEDIC SURGERY

## 2020-04-03 PROCEDURE — 6360000002 HC RX W HCPCS

## 2020-04-03 RX ORDER — TRIAMCINOLONE ACETONIDE 40 MG/ML
40 INJECTION, SUSPENSION INTRA-ARTICULAR; INTRAMUSCULAR ONCE
Status: DISCONTINUED | OUTPATIENT
Start: 2020-04-03 | End: 2020-04-03

## 2020-04-03 RX ORDER — BUPIVACAINE HYDROCHLORIDE 2.5 MG/ML
6 INJECTION, SOLUTION EPIDURAL; INFILTRATION; INTRACAUDAL ONCE
Status: DISCONTINUED | OUTPATIENT
Start: 2020-04-03 | End: 2020-04-03

## 2020-04-03 RX ORDER — TRIAMCINOLONE ACETONIDE 40 MG/ML
40 INJECTION, SUSPENSION INTRA-ARTICULAR; INTRAMUSCULAR ONCE
Status: COMPLETED | OUTPATIENT
Start: 2020-04-03 | End: 2020-04-03

## 2020-04-03 RX ORDER — LIDOCAINE HYDROCHLORIDE 10 MG/ML
6 INJECTION, SOLUTION INFILTRATION; PERINEURAL ONCE
Status: COMPLETED | OUTPATIENT
Start: 2020-04-03 | End: 2020-04-03

## 2020-04-03 RX ORDER — BUPIVACAINE HYDROCHLORIDE 2.5 MG/ML
6 INJECTION, SOLUTION EPIDURAL; INFILTRATION; INTRACAUDAL ONCE
Status: COMPLETED | OUTPATIENT
Start: 2020-04-03 | End: 2020-04-03

## 2020-04-03 RX ADMIN — TRIAMCINOLONE ACETONIDE 40 MG: 40 INJECTION, SUSPENSION INTRA-ARTICULAR; INTRAMUSCULAR at 10:13

## 2020-04-03 RX ADMIN — BUPIVACAINE HYDROCHLORIDE 15 MG: 2.5 INJECTION, SOLUTION EPIDURAL; INFILTRATION; INTRACAUDAL at 10:02

## 2020-04-03 RX ADMIN — LIDOCAINE HYDROCHLORIDE 6 ML: 10 INJECTION, SOLUTION INFILTRATION; PERINEURAL at 10:17

## 2020-04-03 RX ADMIN — TRIAMCINOLONE ACETONIDE 40 MG: 40 INJECTION, SUSPENSION INTRA-ARTICULAR; INTRAMUSCULAR at 10:11

## 2020-04-03 RX ADMIN — LIDOCAINE HYDROCHLORIDE 6 ML: 10 INJECTION, SOLUTION INFILTRATION; PERINEURAL at 10:15

## 2020-04-03 NOTE — PROGRESS NOTES
SUBJECTIVE:    Salud Neves is here today requesting injections into bilateral knees. She has had bilateral knee pain for many years. Recently retired as a surgical intensive nurse. She has received multiple injections in the past with relief. Most recently had injections in December. Denies any new concerns. Denies other orthopedic complaints      Objective:   Alert and oriented X 3, no acute distress, respirations easy and unlabored with no audible wheezes, skin warm and dry, speech and dress appropriate for noted age, affect euthymic. Skin - Clean, dry and intact   Effusion is not present     PROCEDURE:    With the patient's written and verbal permission, Bilateral  knee was prepped in standard sterile fashion with betadine and alcohol. The skin of the knee was then injected with 2.5ml 1% PF Lidocaine, then 1 ml Kenalog (40mg), 3ml PF 0.25% marcaine and 3ml 1% PF Lidocaine were injected using the lateral inferior approach without difficulty. The patient tolerated this well. A band-aid was applied. The patient ambulated out of clinic on their own accord without difficulty. ASSESSMENT :      Diagnosis Orders   1. Primary osteoarthritis of left knee     2. Primary osteoarthritis of right knee         Plan:   Continue weight bearing and activity as tolerated. Steroid injections to bilateral knees today. Patient states she lost Rx for wheeled walker, however does not want one today. She will call office when she feels ready. You may be sore at the injection site for several days. You may apply ice to your injection site. Call the office if any unusual new swelling, pain or redness develops around your knee.     Follow up in 3 months or sooner for any new concerns    Electronically signed by Enid Edwards DO on 4/3/2020 at 8:26 AM    Note: This report was completed using [employer] voiced recognition software.  Every effort has been made to ensure accuracy; however, inadvertent computerized transcription errors may be present. I have seen and evaluated the patient and agree with the above assessment on today's visit. I have performed the key components of the history and physical examination and concur completely with the findings and plans as documented. Agree with ROS, examination, FMH, PMH, PSH, SocHx, and allergies as above. Patient physically seen and examined. Patient here for bilateral knee chronic osteoarthritis. Wishes to have injections once again they worked very well last time. We went over the risk again including infection and cartilage degradation. She understands would like to proceed. See procedure note above. Physical Examination:   General appearance: alert, well appearing, and in no distress,  normal appearing weight. No visible signs of trauma   Mental status: alert, oriented to person, place, and time, normal mood, behavior, speech, dress, motor activity, and thought processes  Abdomen: soft, nondistended  Resp:   resp easy and unlabored, no audible wheezes note, normal symmetrical expansion of both hemithoraces  Cardiac: distal pulses palpable, skin and extremities well perfused  Neurological: alert, oriented X3, normal speech, no focal findings or movement disorder noted, motor and sensory grossly normal bilaterally, normal muscle tone, no tremors  HEENT: normochephalic atraumatic, external ears and eyes normal, sclera normal, neck supple, no nasal discharge. Extremities:   peripheral pulses normal, no edema, redness or tenderness in the calves   Skin: normal coloration, no rashes or open wounds, no suspicious skin lesions noted  Psych: Affect euthymic   Musculoskeletal:   Extremity:  Agree with above examination. Very little to no worsening from her last examination of bilateral knees. ELECTRONICALLY signed by:    Tiarra Capps MD  4/3/20   This is been dictated utilizing voice recognition software.   All efforts have been made to make the note

## 2020-04-03 NOTE — PATIENT INSTRUCTIONS
You may be sore at the injection site for several days. You may apply ice to your injection site. Call the office if any unusual new swelling, pain or redness develops around your knee.   Follow up in 3 months

## 2020-04-08 ENCOUNTER — VIRTUAL VISIT (OUTPATIENT)
Dept: PAIN MANAGEMENT | Age: 65
End: 2020-04-08
Payer: COMMERCIAL

## 2020-04-08 PROCEDURE — 99213 OFFICE O/P EST LOW 20 MIN: CPT | Performed by: PHYSICIAN ASSISTANT

## 2020-04-08 RX ORDER — BUPRENORPHINE HYDROCHLORIDE 450 UG/1
450 FILM, SOLUBLE BUCCAL EVERY 12 HOURS
Qty: 60 EACH | Refills: 0 | Status: SHIPPED
Start: 2020-04-12 | End: 2020-05-07 | Stop reason: SDUPTHER

## 2020-04-08 NOTE — PROGRESS NOTES
with extension. Findings consistent with moderate degenerative disc disease and   degenerative facet disease.       The exam has been dictated and signed by Li Kang. [address] [facility], RIMA   and Yudelka Flores MD, reviewed and concurred with these findings.            9/3/2019 left knee x-ray     The bones appear to be in anatomic alignment. No foreign body is identified   No fracture is identified     There is moderate tricompartmental joint space loss greatest long   patellofemoral and medial femoral tibial joint   The are moderate degenerative changes present along the patellofemoral   and medial femoral tibial compartment           Impression   Moderate degenerative changes as described above          9/3/2019 right knee x-ray     The bones appear to be in anatomic alignment. No foreign body is identified   No fracture is identified. Marginal bone spurs are again seen along the inferior margins of the   patella. There is interval worsening of moderate tricompartmental joint space   loss    The are interval worsening of moderate degenerative tricompartmental   changes present            Impression   Moderate tricompartmental degenerative changes. This has   worsened in the interim                                               Potential Aberrant Drug-Related Behavior:  None     Urine Drug Screenin2019 Consistent for buprenorphine and metabolites  2020 Consistent for buprenorphine     OARRS report:  10/2019 Consistent to 2020       Past Medical History: Reviewed    Past Surgical History: Reviewed     Home Medications: Reviewed    Allergies: Reviewed     Social History: Reviewed     REVIEW OF SYSTEMS:     Heidi Yourand denies fever/chills, chest pain, shortness of breath, new bowel or bladder complaints. All other review of systems was negative. PHYSICAL EXAMINATION:      General:       A & O x3  Build: Morbidly obese    HEENT:    Head:normocephalic and atraumatic  Pupils:regular, round and equal.  Sclera: icterus absent,     Lungs:    Breathing:Normal expansion. No rales, rhonchi, or wheezing. .    Lumbar spine:    Range of motion:abnormal mildly Lateral bending, flexion, extension rotation bilateral and is not painful. Extremities:    Tremors:None bilaterally upper and lower  Range of motion:Generally normal shoulders, pain with internal rotation of hips not done. Intact:Yes      Neurological:     Motor:          No apparent weakness per patient       Gait: not observed. Dermatology:    Skin:no unusual rashes, no skin lesions, no palpable subcutaneous nodules and good skin turgor    Impression:    B/L Knee Pain, LBP, right shoulder pain  Knee x-rays:  Moderate DJD.    Hx of pain management with [facility] pain clinic.  Was on fentanyl 25 mg and norco 5/325 TID.  Did not help and caused drowsiness.  Now on Belbuca 450 mcg BID (increased from 300 mg BID) which is working very well without side effects. Transferred care to [facility]) due to cost.    Hx of LESI by Dr. 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  States that she is filing for disability.    Prior rt SIJ injection under US not helpful  Gets b/l knee CSI's through orthopedics and they continue to be helpful        Plan:  Aquatherapy helped in the past.  Reports that she would rather attend when it is warmer outside. Consider MRI lumbar spine. OARRS reviewed 04/2020. Continue Belbuca 450 mcg BID   Patient is s/p b/l knee CSI (ortho) recently.     Has failed knee viscosupplementation  Patient encouraged to stay active and to lose weight  Treatment plan discussed with the patient      Controlled Substance Monitoring:    Acute and Chronic Pain Monitoring:   RX Monitoring 4/8/2020   Periodic Controlled Substance Monitoring Possible medication side effects, risk of tolerance/dependence & alternative treatments discussed. ;No signs of potential drug abuse or diversion identified. ;Assessed functional status. ;Obtaining appropriate analgesic effect of treatment. We discussed with the patient that combining opioids, benzodiazepines, alcohol, illicit drugs or sleep aids increases the risk of respiratory depression including death. We discussed that these medications may cause drowsiness, sedation or dizziness and have counseled the patient not to drive or operate machinery. We have discussed that these medications will be prescribed only by one provider. We have discussed with the patient about age related risk factors and have thoroughly discussed the importance of taking these medications as prescribed. The patient verbalizes understanding. Patient advised regarding steps to help prevent the spread of COVID-19   SOURCE - https://josie-brown.info/. html     1-Stay home except to get medical care  2-Clean your hands often for atleast 20 seconds, avoid touching: Avoid touching your eyes, nose, and mouth with unwashed hands. 3-Seek medical attention: Seek prompt medical attention if your illness is worsening (e.g., difficulty breathing). Call you doctor first.  3-Wear a facemask if you are sick   4-Cover your coughs and sneezes           I affirm this is a Patient Initiated Episode with an established Patient who has not had a related appointment within my department in the past 7 days or scheduled within the next 24 hours.     Total Time: 15 minutes 9 seconds    Cc: Referring physician

## 2020-05-07 ENCOUNTER — VIRTUAL VISIT (OUTPATIENT)
Dept: PAIN MANAGEMENT | Age: 65
End: 2020-05-07
Payer: COMMERCIAL

## 2020-05-07 PROCEDURE — 99213 OFFICE O/P EST LOW 20 MIN: CPT | Performed by: PHYSICIAN ASSISTANT

## 2020-05-07 RX ORDER — BUPRENORPHINE HYDROCHLORIDE 450 UG/1
450 FILM, SOLUBLE BUCCAL EVERY 12 HOURS
Qty: 60 EACH | Refills: 0 | Status: SHIPPED
Start: 2020-05-09 | End: 2020-06-04 | Stop reason: SDUPTHER

## 2020-05-07 RX ORDER — BUPRENORPHINE HYDROCHLORIDE 450 UG/1
450 FILM, SOLUBLE BUCCAL EVERY 12 HOURS
Qty: 60 EACH | Refills: 0 | Status: SHIPPED
Start: 2020-05-09 | End: 2020-05-07 | Stop reason: SDUPTHER

## 2020-05-07 NOTE — PROGRESS NOTES
Maylin Mantilla was read the following message We want to confirm that, for purposes of billing, this is a virtual visit with your provider for which we will submit a claim for reimbursement with your insurance company. You will be responsible for any copays, coinsurance amounts or other amounts not covered by your insurance company. If you do not accept this, unfortunately we will not be able to schedule a virtual visit with the provider. Do you accept? Olivia Quan responded Yes .

## 2020-05-07 NOTE — PROGRESS NOTES
[address] [facility], [address] [place] [address] Mark  [phone]    Tele health follow up Note      Gaby Guerrero     Date of Visit:  2020    CC:  Tele health follow up     Consent:  The patient and/or health care decision maker is aware that he/she may receive a bill for this tele-health service [facility], depending on his insurance coverage, and has provided verbal consent to proceed: Yes  I have considered the risks of abuse, dependence, addiction and diversion. My patient is aware that they will need a follow-up visit (in-person or virtually) at the appropriate time indicated for continued medications. Further, my patient is aware that when this acute crisis has lifted, they will be expected to return for an in-person visit and all elements of standard local and hospital guidelines in order to continue this medication. Patient Location:Home   Provider Location:  Home    HPI:    Pain is unchanged. Had 2 days of unbearable pain to her knees recently but has settled down. Appropriate analgesia with current medications regimen: yes. Change in quality of symptoms:no. Medication side effects:none. Recent diagnostic testing:none. Recent interventional procedures:none. She has not been on anticoagulation medications to include ASA, NSAIDS, Plavix, heparin, LMW heparin and warfarin and has not been on herbal supplements.  She is diabetic.     Imagin/27/2019 Lumbar Spine X-ray     Alignment of the vertebral bodies appears to be near-anatomic   No fracture or foreign body is identified. The disc spaces demonstrate moderate degenerative changes. There is mild loss of the vertebral body height   There are moderate degenerative changes involving the facets. There is grade 1 anterolisthesis of L3 on L4.    Flexion-extension views demonstrates movement of the anterolisthesis   of L3 on L4.            Impression   Grade 1 anterolisthesis of L3 on L4 which does move with extension. Findings consistent with moderate degenerative disc disease and   degenerative facet disease.       The exam has been dictated and signed by Zaira Figueroa. RIMA Mccoy   and Jessica Kebede MD, reviewed and concurred with these findings.            9/3/2019 left knee x-ray     The bones appear to be in anatomic alignment. No foreign body is identified   No fracture is identified     There is moderate tricompartmental joint space loss greatest long   patellofemoral and medial femoral tibial joint   The are moderate degenerative changes present along the patellofemoral   and medial femoral tibial compartment           Impression   Moderate degenerative changes as described above          9/3/2019 right knee x-ray     The bones appear to be in anatomic alignment. No foreign body is identified   No fracture is identified. Marginal bone spurs are again seen along the inferior margins of the   patella. There is interval worsening of moderate tricompartmental joint space   loss    The are interval worsening of moderate degenerative tricompartmental   changes present            Impression   Moderate tricompartmental degenerative changes. This has   worsened in the interim                                               Potential Aberrant Drug-Related Behavior:  None     Urine Drug Screenin2019 Consistent for buprenorphine and metabolites  2020 Consistent for buprenorphine     OARRS report:  10/2019 Consistent to 2020  Consistent       Past Medical History: Reviewed    Past Surgical History: Reviewed     Home Medications: Reviewed    Allergies: Reviewed     Social History: Reviewed     REVIEW OF SYSTEMS:     Martinezazam Simms denies fever/chills, chest pain, shortness of breath, new bowel or bladder complaints. All other review of systems was negative. PHYSICAL EXAMINATION:      General:       A & O x3  Build: Morbidly obese    HEENT:    Head:normocephalic and

## 2020-05-18 ENCOUNTER — TELEPHONE (OUTPATIENT)
Dept: NEUROLOGY | Age: 65
End: 2020-05-18

## 2020-06-04 ENCOUNTER — VIRTUAL VISIT (OUTPATIENT)
Dept: PAIN MANAGEMENT | Age: 65
End: 2020-06-04
Payer: COMMERCIAL

## 2020-06-04 PROCEDURE — 99214 OFFICE O/P EST MOD 30 MIN: CPT | Performed by: PHYSICIAN ASSISTANT

## 2020-06-04 RX ORDER — BUPRENORPHINE HYDROCHLORIDE 450 UG/1
450 FILM, SOLUBLE BUCCAL EVERY 12 HOURS
Qty: 60 EACH | Refills: 0 | Status: SHIPPED
Start: 2020-06-06 | End: 2020-07-01 | Stop reason: SDUPTHER

## 2020-06-04 NOTE — PROGRESS NOTES
Cain Ring was read the following message We want to confirm that, for purposes of billing, this is a virtual visit with your provider for which we will submit a claim for reimbursement with your insurance company. You will be responsible for any copays, coinsurance amounts or other amounts not covered by your insurance company. If you do not accept this, unfortunately we will not be able to schedule a virtual visit with the provider. Do you accept? Cora Gomez responded Yes .
x3  Build: Morbidly obese    HEENT:    Head:normocephalic and atraumatic  Pupils:regular, round and equal.  Sclera: icterus absent,     Lungs:    Breathing:Normal expansion. No rales, rhonchi, or wheezing. .    Lumbar spine:    Range of motion:abnormal mildly Lateral bending, flexion, extension rotation bilateral and is not painful. Extremities:    Tremors:None bilaterally upper and lower  Range of motion:Generally normal shoulders, pain with internal rotation of hips not done. Intact:Yes      Neurological:     Motor:          No apparent weakness per patient       Gait: not observed. Dermatology:    Skin:no unusual rashes, no skin lesions, no palpable subcutaneous nodules and good skin turgor    Impression:    B/L Knee Pain, LBP, right shoulder pain  Knee x-rays:  Moderate DJD.    Hx of pain management with [facility] pain clinic.  Was on fentanyl 25 mg and norco 5/325 TID.  Did not help and caused drowsiness.  Now on Belbuca 450 mcg BID (increased from 300 mg BID) which is working very well without side effects. Transferred care to [facility]) due to cost.    Hx of LESI by Dr. 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  States that she is filing for disability.    Prior rt SIJ injection under US not helpful  Gets b/l knee CSI's through orthopedics and they continue to be helpful        Plan:  Aquatherapy helped in the past.  Reports that she would rather attend when it is warmer outside. Consider MRI lumbar spine. OARRS reviewed 06/2020. Continue Belbuca 450 mcg BID   Patient is s/p b/l knee CSI (ortho) 2 months ago, now worn off.     Has failed knee viscosupplementation  Patient encouraged to stay active and to lose weight  Treatment plan discussed with the patient      Controlled Substance Monitoring:    Acute and Chronic Pain Monitoring:   RX Monitoring

## 2020-07-01 ENCOUNTER — OFFICE VISIT (OUTPATIENT)
Dept: PRIMARY CARE CLINIC | Age: 65
End: 2020-07-01
Payer: COMMERCIAL

## 2020-07-01 ENCOUNTER — OFFICE VISIT (OUTPATIENT)
Dept: PAIN MANAGEMENT | Age: 65
End: 2020-07-01
Payer: COMMERCIAL

## 2020-07-01 ENCOUNTER — HOSPITAL ENCOUNTER (OUTPATIENT)
Age: 65
Discharge: HOME | End: 2020-07-03

## 2020-07-01 VITALS
BODY MASS INDEX: 49.69 KG/M2 | WEIGHT: 270 LBS | DIASTOLIC BLOOD PRESSURE: 74 MMHG | RESPIRATION RATE: 16 BRPM | SYSTOLIC BLOOD PRESSURE: 128 MMHG | OXYGEN SATURATION: 98 % | HEART RATE: 68 BPM | HEIGHT: 62 IN | TEMPERATURE: 98.6 F

## 2020-07-01 VITALS
HEIGHT: 62 IN | SYSTOLIC BLOOD PRESSURE: 128 MMHG | BODY MASS INDEX: 53.92 KG/M2 | DIASTOLIC BLOOD PRESSURE: 83 MMHG | TEMPERATURE: 98.5 F | WEIGHT: 293 LBS

## 2020-07-01 LAB — SPECIFIC GRAVITY UA: 1.02 (ref 1–1.03)

## 2020-07-01 PROCEDURE — G0480 DRUG TEST DEF 1-7 CLASSES: HCPCS

## 2020-07-01 PROCEDURE — 99213 OFFICE O/P EST LOW 20 MIN: CPT | Performed by: FAMILY MEDICINE

## 2020-07-01 PROCEDURE — 99213 OFFICE O/P EST LOW 20 MIN: CPT | Performed by: PHYSICIAN ASSISTANT

## 2020-07-01 PROCEDURE — 80307 DRUG TEST PRSMV CHEM ANLYZR: CPT

## 2020-07-01 RX ORDER — VIT C/B6/B5/MAGNESIUM/HERB 173 50-5-6-5MG
CAPSULE ORAL DAILY
COMMUNITY
End: 2021-04-21

## 2020-07-01 RX ORDER — BUPRENORPHINE HYDROCHLORIDE 450 UG/1
450 FILM, SOLUBLE BUCCAL EVERY 12 HOURS
Qty: 60 EACH | Refills: 0 | Status: SHIPPED
Start: 2020-07-03 | End: 2020-07-28 | Stop reason: SDUPTHER

## 2020-07-01 ASSESSMENT — ENCOUNTER SYMPTOMS
EYES NEGATIVE: 1
SHORTNESS OF BREATH: 1
BACK PAIN: 1
ALLERGIC/IMMUNOLOGIC NEGATIVE: 1
WHEEZING: 0
GASTROINTESTINAL NEGATIVE: 1

## 2020-07-01 ASSESSMENT — PATIENT HEALTH QUESTIONNAIRE - PHQ9
SUM OF ALL RESPONSES TO PHQ QUESTIONS 1-9: 0
1. LITTLE INTEREST OR PLEASURE IN DOING THINGS: 0
SUM OF ALL RESPONSES TO PHQ QUESTIONS 1-9: 0
2. FEELING DOWN, DEPRESSED OR HOPELESS: 0
SUM OF ALL RESPONSES TO PHQ9 QUESTIONS 1 & 2: 0

## 2020-07-01 NOTE — PROGRESS NOTES
20  Name: Dago Oreilly    : 1955    Sex: female    Age: 62 y.o. Subjective:  Chief Complaint: Patient is here for 3-month checkup regarding edema, blood pressure    Well. No chest pain or shortness of breath. Still smoking heavily. Swelling lower extremities improved when she wears her support hose. She did receive her disability. Her knees are sore and she got shots in both knees. Weight is up 18 pounds. Very inactive. Review of Systems   Constitutional: Negative. HENT: Negative. Eyes: Negative. Respiratory: Positive for shortness of breath (With exertion but no change). Negative for wheezing. Cardiovascular: Positive for leg swelling. Negative for chest pain and palpitations. Gastrointestinal: Negative. Endocrine: Negative. Genitourinary: Negative. Musculoskeletal: Positive for arthralgias and back pain. Skin: Negative. Allergic/Immunologic: Negative. Neurological: Negative. Hematological: Negative. Psychiatric/Behavioral: Negative. Current Outpatient Medications:     Turmeric 500 MG CAPS, Take by mouth, Disp: , Rfl:     [START ON 7/3/2020] BELBUCA 450 MCG FILM, Take 450 mcg by mouth every 12 hours for 30 days. , Disp: 60 each, Rfl: 0    buPROPion (WELLBUTRIN XL) 150 MG extended release tablet, Take 1 tablet by mouth every morning, Disp: 90 tablet, Rfl: 3    Cream Base (VERSAPRO) CREA, APPLY ONE (1) GRAM (ONE PUMP) EXTERNALLY FOUR TIMES A DAY TO EACH KNEE, Disp: 200 g, Rfl: 1    rOPINIRole (REQUIP) 1 MG tablet, Take 1 tablet by mouth 3 times daily, Disp: 270 tablet, Rfl: 5    furosemide (LASIX) 40 MG tablet, Take 40 mg by mouth daily as needed for Other Swelling, Disp: , Rfl:     Liraglutide (VICTOZA SC), Inject 1.8 mg into the skin daily , Disp: , Rfl:   No Known Allergies  Social History     Socioeconomic History    Marital status:      Spouse name: Not on file    Number of children: Not on file    Years of education: Not on file    Highest education level: Not on file   Occupational History    Not on file   Social Needs    Financial resource strain: Not on file    Food insecurity     Worry: Not on file     Inability: Not on file    Transportation needs     Medical: Not on file     Non-medical: Not on file   Tobacco Use    Smoking status: Current Every Day Smoker     Packs/day: 0.25     Years: 38.00     Pack years: 9.50     Types: Cigarettes     Last attempt to quit: 2019     Years since quittin.0    Smokeless tobacco: Never Used   Substance and Sexual Activity    Alcohol use: No    Drug use: No    Sexual activity: Not Currently   Lifestyle    Physical activity     Days per week: Not on file     Minutes per session: Not on file    Stress: Not on file   Relationships    Social connections     Talks on phone: Not on file     Gets together: Not on file     Attends Worship service: Not on file     Active member of club or organization: Not on file     Attends meetings of clubs or organizations: Not on file     Relationship status: Not on file    Intimate partner violence     Fear of current or ex partner: Not on file     Emotionally abused: Not on file     Physically abused: Not on file     Forced sexual activity: Not on file   Other Topics Concern    Not on file   Social History Narrative        Chronic Diagnosis: Iron deficiency anemia, Depressive disorder, Benign essential hypertension, Mixed    hyperlipidemia.     HTN    OBESITY    LIPID    OA    SMOKER    CVA L FIELD VISION    DEPRESSION    GASTRIC BYPASS 01    BREAST REDUCTION--    Minesh PIEDRA 1955 Page #2    ANEMIA==IRON AND B-12    Admitted 2019 with cellulitis left lower extremity then readmitted with chest pain with negative stress test      Past Medical History:   Diagnosis Date    Anemia     Arthritis     Cellulitis 2015    left leg    Chronic ulcer of leg with fat layer exposed ([facility] [address] 2015    for back pain and knee pain. Diagnoses and all orders for this visit:    Primary osteoarthritis involving multiple joints    Lymphedema of both lower extremities        Comments: Low sugar diet. Wear hose. Elevate legs. Discontinue smoking. Range of motion exercises taught for arthritis. Lymphedema therapy and patient refuses. A great deal of time spent reviewing medications, diet, exercise, social issues. Also reviewing the chart before entering the room with patient and finishing charting after leaving patient's room. More than half of that time was spent face to face with the patient in counseling and coordinating care. Follow Up: Return in about 4 months (around 11/1/2020) for lab before.      Seen by:  Jarrett Martines,

## 2020-07-01 NOTE — PROGRESS NOTES
[address]  [address] [facility], [address]    Follow up Note      Finn Eid     Date of Visit:  2020    CC:  Patient presents for follow up   Chief Complaint   Patient presents with    Pain     back Efraín Murphy       HPI:    Pain is unchanged currently. States that turmeric is helping with her knees. Overall, doing well. Appropriate analgesia with current medications regimen: yes   Change in quality of symptoms:no. Medication side effects:none. Recent diagnostic testing: None. Recent interventional procedures:none. She has not been on anticoagulation medications to include ASA, NSAIDS, Plavix, heparin, LMW heparin and warfarin and has not been on herbal supplements. She is diabetic. Imagin2019 Lumbar Spine X-ray    Alignment of the vertebral bodies appears to be near-anatomic   No fracture or foreign body is identified. The disc spaces demonstrate moderate degenerative changes. There is mild loss of the vertebral body height   There are moderate degenerative changes involving the facets. There is grade 1 anterolisthesis of L3 on L4. Flexion-extension views demonstrates movement of the anterolisthesis   of L3 on L4.            Impression   Grade 1 anterolisthesis of L3 on L4 which does move with extension. Findings consistent with moderate degenerative disc disease and   degenerative facet disease.       The exam has been dictated and signed by Simon Canchola. Mary Cm APRN-CNP   and Tea Nix MD, reviewed and concurred with these findings.          9/3/2019 left knee x-ray     The bones appear to be in anatomic alignment.     No foreign body is identified   No fracture is identified     There is moderate tricompartmental joint space loss greatest long   patellofemoral and medial femoral tibial joint   The are moderate degenerative changes present along the patellofemoral   and medial femoral tibial compartment           Impression Moderate degenerative changes as described above          9/3/2019 right knee x-ray     The bones appear to be in anatomic alignment. No foreign body is identified   No fracture is identified. Marginal bone spurs are again seen along the inferior margins of the   patella. There is interval worsening of moderate tricompartmental joint space   loss    The are interval worsening of moderate degenerative tricompartmental   changes present            Impression   Moderate tricompartmental degenerative changes. This has   worsened in the interim     Urine Drug Screenin2019 Consistent for buprenorphine and metabolites  2020 Consistent for buprenorphine     OARRS report:  10/2019 Consistent to 2020  Consistent           Past Medical History:   Diagnosis Date    Anemia     Arthritis     Cellulitis -     left leg    Chronic ulcer of leg with fat layer exposed ([address] 2015    Chronic ulcer of right leg, limited to breakdown of skin ([address] 2016    Depression     Low back pain     Lymphedema of both lower extremities 2015    Lymphedema of lower extremity 2015    Obesity     Osteoarthritis     Peripheral vision loss     Stroke [facility])     Type 2 diabetes mellitus without complication, without long-term current use of insulin ([address] 2019    Ulcer of left lower leg, limited to breakdown of skin ([address] 6/10/2019    Ulcer of left lower leg, limited to breakdown of skin ([address] 6/10/2019       Past Surgical History:   Procedure Laterality Date    ABDOMINOPLASTY  2002    BREAST REDUCTION SURGERY      reduction    CATARACT REMOVAL      bilateral     SECTION      x2    COLONOSCOPY  2012    and egd    ECHOCARDIOGRAM COMPLETE 2D W DOPPLER W COLOR  2012         GASTRIC BYPASS SURGERY  2000    HERNIA REPAIR             Prior to Admission medications    Medication Sig Start Date End Date Taking?  Authorizing Provider   Turmeric 500 MG CAPS Take by Talks on phone: Not on file     Gets together: Not on file     Attends Religion service: Not on file     Active member of club or organization: Not on file     Attends meetings of clubs or organizations: Not on file     Relationship status: Not on file    Intimate partner violence     Fear of current or ex partner: Not on file     Emotionally abused: Not on file     Physically abused: Not on file     Forced sexual activity: Not on file   Other Topics Concern    Not on file   Social History Narrative        Chronic Diagnosis: Iron deficiency anemia, Depressive disorder, Benign essential hypertension, Mixed    hyperlipidemia. HTN    OBESITY    LIPID    OA    SMOKER    CVA L FIELD VISION    DEPRESSION    GASTRIC BYPASS 01    BREAST REDUCTION--    Nicolás Jaime  1955 Page #2    ANEMIA==IRON AND B-12    Admitted 2019 with cellulitis left lower extremity then readmitted with chest pain with negative stress test       Family History   Problem Relation Age of Onset    Breast Cancer Mother     Stroke Father     Hypertension Sister     Hypertension Brother        REVIEW OF SYSTEMS:     [place] denies fever/chills, chest pain, shortness of breath, new bowel or bladder complaints. All other review of systems was negative. PHYSICAL EXAMINATION:      /74   Pulse 68   Temp 98.6 °F (37 °C) (Oral)   Resp 16   Ht 5' 2\" (1.575 m)   Wt 270 lb (122.5 kg)   SpO2 98%   BMI 49.38 kg/m²     General:       General appearance: awake, alert, oriented, in no acute distress, well developed, well nourished and in no acute distress   pleasant and well-hydrated. , in no distress and A & O x3  Build:Obese  Function:Rises from aseated position with difficulty     Head:     Head:normocephalic and atraumatic  Pupils:regular, round and equal.  Sclera: icterus absent,   EOM:full and intact.     Lungs:     Breathing:Normal expansion. Clear to auscultation.   No rales, rhonchi, or wheezing.     Abdomen:     Shape:non-distended and normal  Tenderness:none  Guarding:none     Lumbar spine:     Spine inspection:normal   CVA tenderness:No   Palpation: Right paraspinal TTP, negative midline TTP, negative facet loading left and right. Range of motion:abnormal mildly Lateral bending, flexion, extension rotation bilateral and is not painful.        Extremities:     Tremors:None bilaterally upper and lower  Range of motion:Generally limited extension shoulder - right, pain with internal rotation of hips negative. Intact:Yes  Varicose veins:absent   Cyanosis:none  Edema: decreased since last visit.     Knee:     Inspection:symmetric, swelling none bilaterally  Tenderness of Bursa:None, bilateral  Drawer Test:negative  Effusion:absent bilaterally  Crepitus:present bilaterally  ROM:Left 10-80 Right 10-80     Neurological:     Cranial nerves:normal  Sensory:normal to light touch   Motor:   Right Quadriceps5/5          Left Quadriceps5/5           Right Gastrocnemius5/5    Left Gastrocnemius5/5  Right Ant Tibialis5/5  Left Ant Tibialis5/5  Coordination:normal  Gait:antalgic     Dermatology:     Skin: Erythema left lower leg     Impression:    B/L Knee Pain, LBP, right shoulder pain  Knee x-rays:  Moderate DJD.    Hx of pain management with [facility] pain clinic.  Was on fentanyl 25 mg and norco 5/325 TID.  Did not help and caused drowsiness.  Now on Belbuca 450 mcg BID (increased from 300 mg BID) which is working very well without side effects. Transferred care to [facility]) due to cost.    Hx of LESI by Dr. 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    Started turmeric which is helping her pain  Working on losing weight. Encouragement given. Consider MRI lumbar spine.    OARRS reviewed 07/2020. UDS ordered today  Continue Belbuca 450 mcg BID   Patient is s/p b/l knee CSI (ortho) 3 months ago   Has failed knee viscosupplementation  Patient encouraged to stay active and to lose weight  Treatment plan discussed with the patient         Controlled Substance Monitoring:    Acute and Chronic Pain Monitoring:   RX Monitoring 7/1/2020   Periodic Controlled Substance Monitoring Possible medication side effects, risk of tolerance/dependence & alternative treatments discussed. ;No signs of potential drug abuse or diversion identified. ;Assessed functional status. ;Obtaining appropriate analgesic effect of treatment. Plan:    We discussed with the patient that combining opioids, benzodiazepines, alcohol, illicit drugs or sleep aids increases the risk of respiratory depression including death. We discussed that these medications may cause drowsiness, sedation or dizziness and have counseled the patient not to drive or operate machinery. We have discussed that these medications will be prescribed only by one provider. We have discussed with the patient about age related risk factors and have thoroughly discussed the importance of taking these medications as prescribed. The patient verbalizes understanding.     ccreferring physic

## 2020-07-01 NOTE — PROGRESS NOTES
Do you currently have any of the following:    Fever: No  Headache:  No  Cough: No  Shortness of breath: No  Exposed to anyone with these symptoms: Candi Garza presents to the [facility] on 7/1/2020. Aure Daly is complaining of pain knees/back  The pain is constant. The pain is described as aching, throbbing, shooting and stabbing. Pain is rated on her best day at a 6, on her worst day at a 10, and on average at a 7 on the VAS scale. She took her last dose of Belbuca    Any procedures since your last visit:     Pacemaker or defibrilator: No managed by     She is not on NSAIDS and is not on anticoagulation medications to include none and is managed by    /74   Pulse 68   Temp 98.6 °F (37 °C) (Oral)   Resp 16   Ht 5' 2\" (1.575 m)   Wt 270 lb (122.5 kg)   SpO2 98%   BMI 49.38 kg/m²      No LMP recorded.  Patient is postmenopausal.

## 2020-07-08 LAB
Lab: NORMAL
REPORT: NORMAL
THIS TEST SENT TO: NORMAL

## 2020-07-28 ENCOUNTER — VIRTUAL VISIT (OUTPATIENT)
Dept: PAIN MANAGEMENT | Age: 65
End: 2020-07-28
Payer: COMMERCIAL

## 2020-07-28 PROCEDURE — 99213 OFFICE O/P EST LOW 20 MIN: CPT | Performed by: PHYSICIAN ASSISTANT

## 2020-07-28 RX ORDER — BUPRENORPHINE HYDROCHLORIDE 450 UG/1
450 FILM, SOLUBLE BUCCAL EVERY 12 HOURS
Qty: 60 EACH | Refills: 0 | Status: SHIPPED
Start: 2020-08-01 | End: 2020-08-25 | Stop reason: SDUPTHER

## 2020-07-28 NOTE — PROGRESS NOTES
Jai Bowers was read the following message We want to confirm that, for purposes of billing, this is a virtual visit with your provider for which we will submit a claim for reimbursement with your insurance company. You will be responsible for any copays, coinsurance amounts or other amounts not covered by your insurance company. If you do not accept this, unfortunately we will not be able to schedule a virtual visit with the provider. Do you accept? Kael Llanos responded Yes .

## 2020-07-28 NOTE — PROGRESS NOTES
[address]  [address] [facility], [address] [facility], [place]  [phone]    Tele health follow up Note      Michael Berrios     Date of Visit:  2020    CC:  Tele health follow up     Consent:  The patient and/or health care decision maker is aware that he/she may receive a bill for this tele-health service Doxy Me, depending on his insurance coverage, and has provided verbal consent to proceed: Yes  I have considered the risks of abuse, dependence, addiction and diversion. My patient is aware that they will need a follow-up visit (in-person or virtually) at the appropriate time indicated for continued medications. Further, my patient is aware that when this acute crisis has lifted, they will be expected to return for an in-person visit and all elements of standard local and hospital guidelines in order to continue this medication. Patient Location:Home   Provider Location:  Office    HPI:    Pain is worse. Waiting for [employer] insurance and has not been able to get knee injections. Knees very painful. Appropriate analgesia with current medications regimen: yes. Change in quality of symptoms:no. Medication side effects:none. Recent diagnostic testing:none. Recent interventional procedures:none. She has not been on anticoagulation medications to include ASA, NSAIDS, Plavix, heparin, LMW heparin and warfarin and has not been on herbal supplements.  She is diabetic.     Imagin/27/2019 Lumbar Spine X-ray     Alignment of the vertebral bodies appears to be near-anatomic   No fracture or foreign body is identified. The disc spaces demonstrate moderate degenerative changes. There is mild loss of the vertebral body height   There are moderate degenerative changes involving the facets. There is grade 1 anterolisthesis of L3 on L4.    Flexion-extension views demonstrates movement of the anterolisthesis   of L3 on L4.            Impression   Grade 1 anterolisthesis of L3 on L4 which does move with extension. Findings consistent with moderate degenerative disc disease and   degenerative facet disease.       The exam has been dictated and signed by Jenny Moffett. RIMA Gonzalez   and Nilay Schulz MD, reviewed and concurred with these findings.            9/3/2019 left knee x-ray     The bones appear to be in anatomic alignment. No foreign body is identified   No fracture is identified     There is moderate tricompartmental joint space loss greatest long   patellofemoral and medial femoral tibial joint   The are moderate degenerative changes present along the patellofemoral   and medial femoral tibial compartment           Impression   Moderate degenerative changes as described above          9/3/2019 right knee x-ray     The bones appear to be in anatomic alignment. No foreign body is identified   No fracture is identified. Marginal bone spurs are again seen along the inferior margins of the   patella. There is interval worsening of moderate tricompartmental joint space   loss    The are interval worsening of moderate degenerative tricompartmental   changes present            Impression   Moderate tricompartmental degenerative changes. This has   worsened in the interim                                               Potential Aberrant Drug-Related Behavior:  None     Urine Drug Screenin2019 Consistent for buprenorphine and metabolites  2020 Consistent for buprenorphine  2020 Consistent      OARRS report:  10/2019 Consistent to 2020  Consistent       Past Medical History: Reviewed    Past Surgical History: Reviewed     Home Medications: Reviewed    Allergies: Reviewed     Social History: Reviewed     REVIEW OF SYSTEMS:     Sheyla Osuna denies fever/chills, chest pain, shortness of breath, new bowel or bladder complaints. All other review of systems was negative. PHYSICAL EXAMINATION:      General:       A & O x3  Build: Morbidly obese    HEENT:    Head:normocephalic and atraumatic  Pupils:regular, round and equal.  Sclera: icterus absent,     Lungs:    Breathing:Normal expansion. No rales, rhonchi, or wheezing. .    Lumbar spine:    Range of motion:abnormal mildly Lateral bending, flexion, extension rotation bilateral and is not painful. Extremities:    Tremors:None bilaterally upper and lower  Range of motion:Generally normal shoulders, pain with internal rotation of hips not done. Intact:Yes      Neurological:     Motor:          No apparent weakness per patient       Gait: not observed. Dermatology:    Skin:no unusual rashes, no skin lesions, no palpable subcutaneous nodules and good skin turgor    Impression:    B/L Knee Pain, LBP, right shoulder pain  Knee x-rays:  Moderate DJD.    Hx of pain management with [facility] pain clinic.  Was on fentanyl 25 mg and norco 5/325 TID.  Did not help and caused drowsiness.  Now on Belbuca 450 mcg BID (increased from 300 mg BID) which is working very well without side effects. Transferred care to [facility]) due to cost.    Hx of LESI by Dr. 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    Started turmeric which is helping her pain  Working on losing weight. Encouragement given. Consider MRI lumbar spine.    OARRS reviewed 07/2020.   UDS reviewed and is consistent  Continue Belbuca 450 mcg BID   B/L knee CSIs through ortho  Has failed knee viscosupplementation  Patient encouraged to stay active and to lose weight  Treatment plan discussed with the patient        Controlled Substance Monitoring:    Acute and Chronic Pain Monitoring:   RX Monitoring 7/28/2020   Periodic Controlled Substance Monitoring Possible medication side effects, risk of tolerance/dependence & alternative treatments discussed. ;No signs of potential drug abuse or diversion identified. ;Assessed functional status. ;Obtaining appropriate analgesic effect of treatment. We discussed with the patient that combining opioids, benzodiazepines, alcohol, illicit drugs or sleep aids increases the risk of respiratory depression including death. We discussed that these medications may cause drowsiness, sedation or dizziness and have counseled the patient not to drive or operate machinery. We have discussed that these medications will be prescribed only by one provider. We have discussed with the patient about age related risk factors and have thoroughly discussed the importance of taking these medications as prescribed. The patient verbalizes understanding. Patient advised regarding steps to help prevent the spread of COVID-19   SOURCE - https://josie-brown.info/. html     1-Stay home except to get medical care  2-Clean your hands often for atleast 20 seconds, avoid touching: Avoid touching your eyes, nose, and mouth with unwashed hands. 3-Seek medical attention: Seek prompt medical attention if your illness is worsening (e.g., difficulty breathing). Call you doctor first.  3-Wear a facemask if you are sick   4-Cover your coughs and sneezes           I affirm this is a Patient Initiated Episode with an established Patient who has not had a related appointment within my department in the past 7 days or scheduled within the next 24 hours.     Total Time: 10 minutes 34 seconds    Cc: Referring physician

## 2020-07-31 ENCOUNTER — OFFICE VISIT (OUTPATIENT)
Dept: PRIMARY CARE CLINIC | Age: 65
End: 2020-07-31
Payer: COMMERCIAL

## 2020-07-31 PROCEDURE — 99213 OFFICE O/P EST LOW 20 MIN: CPT | Performed by: FAMILY MEDICINE

## 2020-07-31 RX ORDER — CEPHALEXIN 500 MG/1
500 CAPSULE ORAL 3 TIMES DAILY
Qty: 21 CAPSULE | Refills: 3 | Status: SHIPPED
Start: 2020-07-31 | End: 2020-10-08

## 2020-07-31 NOTE — PROGRESS NOTES
20  Name: Juan Jose Novak    : 1955    Sex: female    Age: 62 y.o. Subjective:  Chief Complaint: Patient is here for legs. Patient's girlfriend called and made his appointment because she thought her legs were red but the patient states they are not much different than he normally are. She is super inactive has her legs dependent all the time. Refuses to wear support hose and refuses to go to physical therapy for lymphedema therapy. She has no chills or temperature sweats. She continues to smoke heavily. Review of Systems   Constitutional: Negative. HENT: Negative. Eyes: Negative. Respiratory: Negative. Cardiovascular: Negative. Gastrointestinal: Negative. Endocrine: Negative. Genitourinary: Negative. Skin:        Redness both lower legs   Allergic/Immunologic: Negative. Neurological: Negative. Hematological: Negative. Psychiatric/Behavioral: Negative. Current Outpatient Medications:     cephALEXin (KEFLEX) 500 MG capsule, Take 1 capsule by mouth 3 times daily, Disp: 21 capsule, Rfl: 3    BELBUCA 450 MCG FILM, Take 450 mcg by mouth every 12 hours for 30 days. , Disp: 60 each, Rfl: 0    Turmeric 500 MG CAPS, Take by mouth, Disp: , Rfl:     buPROPion (WELLBUTRIN XL) 150 MG extended release tablet, Take 1 tablet by mouth every morning, Disp: 90 tablet, Rfl: 3    Cream Base (VERSAPRO) CREA, APPLY ONE (1) GRAM (ONE PUMP) EXTERNALLY FOUR TIMES A DAY TO EACH KNEE, Disp: 200 g, Rfl: 1    rOPINIRole (REQUIP) 1 MG tablet, Take 1 tablet by mouth 3 times daily, Disp: 270 tablet, Rfl: 5    furosemide (LASIX) 40 MG tablet, Take 40 mg by mouth daily as needed for Other Swelling, Disp: , Rfl:     Liraglutide (VICTOZA SC), Inject 1.8 mg into the skin daily , Disp: , Rfl:   No Known Allergies  Social History     Socioeconomic History    Marital status:      Spouse name: Not on file    Number of children: Not on file    Years of education: Not on file    Highest education level: Not on file   Occupational History    Not on file   Social Needs    Financial resource strain: Not on file    Food insecurity     Worry: Not on file     Inability: Not on file    Transportation needs     Medical: Not on file     Non-medical: Not on file   Tobacco Use    Smoking status: Current Every Day Smoker     Packs/day: 0.25     Years: 38.00     Pack years: 9.50     Types: Cigarettes     Last attempt to quit: 2019     Years since quittin.1    Smokeless tobacco: Never Used   Substance and Sexual Activity    Alcohol use: No    Drug use: No    Sexual activity: Not Currently   Lifestyle    Physical activity     Days per week: Not on file     Minutes per session: Not on file    Stress: Not on file   Relationships    Social connections     Talks on phone: Not on file     Gets together: Not on file     Attends Mandaeism service: Not on file     Active member of club or organization: Not on file     Attends meetings of clubs or organizations: Not on file     Relationship status: Not on file    Intimate partner violence     Fear of current or ex partner: Not on file     Emotionally abused: Not on file     Physically abused: Not on file     Forced sexual activity: Not on file   Other Topics Concern    Not on file   Social History Narrative        Chronic Diagnosis: Iron deficiency anemia, Depressive disorder, Benign essential hypertension, Mixed    hyperlipidemia.     HTN    OBESITY    LIPID    OA    SMOKER    CVA L FIELD VISION    DEPRESSION    GASTRIC BYPASS 01    BREAST REDUCTION--    Reda Loges  1955 Page #2    ANEMIA==IRON AND B-12    Admitted 2019 with cellulitis left lower extremity then readmitted with chest pain with negative stress test      Past Medical History:   Diagnosis Date    Anemia     Arthritis     Cellulitis 2015    left leg    [facility] ulcer of leg with fat layer exposed ([facility] 2015    Chronic ulcer both knees  -     Walker rolling  -     PA WALKER SEAT ATTACHMENT    Cellulitis of both lower extremities  -     cephALEXin (KEFLEX) 500 MG capsule; Take 1 capsule by mouth 3 times daily        Comments: Antibiotic. Worse go to ER. Advised elevation, support hose    lymphedema therapy. Elevation. Is infectious disease and vascular she refuses. Discontinue smoking. See back in 1 week. .A great deal of time spent reviewing medications, diet, exercise, social issues. Also reviewing the chart before entering the room with patient and finishing charting after leaving patient's room. More than half of that time was spent face to face with the patient in counseling and coordinating care. Follow Up: Return if symptoms worsen or fail to improve.      Seen by:  Godwin Palacios,

## 2020-08-01 VITALS
BODY MASS INDEX: 53.96 KG/M2 | SYSTOLIC BLOOD PRESSURE: 135 MMHG | TEMPERATURE: 98.4 F | DIASTOLIC BLOOD PRESSURE: 82 MMHG | WEIGHT: 293 LBS

## 2020-08-01 ASSESSMENT — ENCOUNTER SYMPTOMS
ALLERGIC/IMMUNOLOGIC NEGATIVE: 1
EYES NEGATIVE: 1
RESPIRATORY NEGATIVE: 1
GASTROINTESTINAL NEGATIVE: 1

## 2020-08-21 ENCOUNTER — TELEPHONE (OUTPATIENT)
Dept: ORTHOPEDIC SURGERY | Age: 65
End: 2020-08-21

## 2020-08-21 NOTE — TELEPHONE ENCOUNTER
Pt called in to schedule an injection for bilateral knees. She would like to get in ASAP, she is having a hard time walking. Please, advise. Thank you.

## 2020-08-25 ENCOUNTER — VIRTUAL VISIT (OUTPATIENT)
Dept: PAIN MANAGEMENT | Age: 65
End: 2020-08-25
Payer: COMMERCIAL

## 2020-08-25 PROCEDURE — 99213 OFFICE O/P EST LOW 20 MIN: CPT | Performed by: PHYSICIAN ASSISTANT

## 2020-08-25 RX ORDER — BUPRENORPHINE HYDROCHLORIDE 450 UG/1
450 FILM, SOLUBLE BUCCAL EVERY 12 HOURS
Qty: 60 EACH | Refills: 0 | Status: SHIPPED
Start: 2020-08-28 | End: 2020-09-25 | Stop reason: SDUPTHER

## 2020-08-25 NOTE — PROGRESS NOTES
consistent with moderate degenerative disc disease and   degenerative facet disease.       The exam has been dictated and signed by Juana Mckenzie. HOLLY Farley-PEDRO LUIS   and Daiana Kwon MD, reviewed and concurred with these findings.            9/3/2019 left knee x-ray     The bones appear to be in anatomic alignment. No foreign body is identified   No fracture is identified     There is moderate tricompartmental joint space loss greatest long   patellofemoral and medial femoral tibial joint   The are moderate degenerative changes present along the patellofemoral   and medial femoral tibial compartment           Impression   Moderate degenerative changes as described above          9/3/2019 right knee x-ray     The bones appear to be in anatomic alignment. No foreign body is identified   No fracture is identified. Marginal bone spurs are again seen along the inferior margins of the   patella. There is interval worsening of moderate tricompartmental joint space   loss    The are interval worsening of moderate degenerative tricompartmental   changes present            Impression   Moderate tricompartmental degenerative changes. This has   worsened in the interim                                               Potential Aberrant Drug-Related Behavior:  None     Urine Drug Screenin2019 Consistent for buprenorphine and metabolites  2020 Consistent for buprenorphine  2020 Consistent      OARRS report:  10/2019 Consistent to 2020  Consistent       Past Medical History: Reviewed    Past Surgical History: Reviewed     Home Medications: Reviewed    Allergies: Reviewed     Social History: Reviewed     REVIEW OF SYSTEMS:     Aidan Valles denies fever/chills, chest pain, shortness of breath, new bowel or bladder complaints. All other review of systems was negative. PHYSICAL EXAMINATION:      General:       A & O x3  Build: Morbidly obese    HEENT:    Head:normocephalic and atraumatic  Pupils:regular, round and equal.  Sclera: icterus absent,     Lungs:    Breathing:Normal expansion. No rales, rhonchi, or wheezing. .    Lumbar spine:    Range of motion:abnormal mildly Lateral bending, flexion, extension rotation bilateral and is not painful. Extremities:    Tremors:None bilaterally upper and lower  Range of motion:Generally normal shoulders, pain with internal rotation of hips not done. Intact:Yes      Neurological:     Motor:          No apparent weakness per patient       Gait: not observed. Dermatology:    Skin:no unusual rashes, no skin lesions, no palpable subcutaneous nodules and good skin turgor    Impression:    B/L Knee Pain, LBP, right shoulder pain  Knee x-rays:  Moderate DJD.    Hx of pain management with [facility] pain clinic.  Was on fentanyl 25 mg and norco 5/325 TID.  Did not help and caused drowsiness.  Now on Belbuca 450 mcg BID (increased from 300 mg BID) which is working very well without side effects. Transferred care to [facility]) due to cost.    Hx of LESI by Dr. 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    Started turmeric which is helping her pain  Working on losing weight. Encouragement given. Consider MRI lumbar spine.    OARRS reviewed 08/2020. Continue Belbuca 450 mcg BID   B/L knee CSIs through ortho - appointment on 9/4/2020. Has failed knee viscosupplementation  Patient encouraged to stay active and to lose weight  Treatment plan discussed with the patient        Controlled Substance Monitoring:    Acute and Chronic Pain Monitoring:   RX Monitoring 8/25/2020   Periodic Controlled Substance Monitoring Possible medication side effects, risk of tolerance/dependence & alternative treatments discussed. ;No signs of potential drug abuse or diversion identified. ;Assessed functional status. ;Obtaining appropriate analgesic effect of treatment. We discussed with the patient that combining opioids, benzodiazepines, alcohol, illicit drugs or sleep aids increases the risk of respiratory depression including death. We discussed that these medications may cause drowsiness, sedation or dizziness and have counseled the patient not to drive or operate machinery. We have discussed that these medications will be prescribed only by one provider. We have discussed with the patient about age related risk factors and have thoroughly discussed the importance of taking these medications as prescribed. The patient verbalizes understanding. Patient advised regarding steps to help prevent the spread of COVID-19   SOURCE - https://josie-brown.info/. html     1-Stay home except to get medical care  2-Clean your hands often for atleast 20 seconds, avoid touching: Avoid touching your eyes, nose, and mouth with unwashed hands. 3-Seek medical attention: Seek prompt medical attention if your illness is worsening (e.g., difficulty breathing). Call you doctor first.  3-Wear a facemask if you are sick   4-Cover your coughs and sneezes           I affirm this is a Patient Initiated Episode with an established Patient who has not had a related appointment within my department in the past 7 days or scheduled within the next 24 hours. Total Time:     Cc:  Referring physician

## 2020-08-25 NOTE — PROGRESS NOTES
Casie Sellers was read the following message We want to confirm that, for purposes of billing, this is a virtual visit with your provider for which we will submit a claim for reimbursement with your insurance company. You will be responsible for any copays, coinsurance amounts or other amounts not covered by your insurance company. If you do not accept this, unfortunately we will not be able to schedule a virtual visit with the provider. Do you accept? Rachel Coello responded Yes .

## 2020-09-04 ENCOUNTER — OFFICE VISIT (OUTPATIENT)
Dept: ORTHOPEDIC SURGERY | Age: 65
End: 2020-09-04
Payer: COMMERCIAL

## 2020-09-04 ENCOUNTER — HOSPITAL ENCOUNTER (OUTPATIENT)
Dept: GENERAL RADIOLOGY | Age: 65
Discharge: HOME | End: 2020-09-06
Payer: COMMERCIAL

## 2020-09-04 VITALS — DIASTOLIC BLOOD PRESSURE: 88 MMHG | HEART RATE: 83 BPM | SYSTOLIC BLOOD PRESSURE: 149 MMHG

## 2020-09-04 PROCEDURE — 99212 OFFICE O/P EST SF 10 MIN: CPT | Performed by: NURSE PRACTITIONER

## 2020-09-04 PROCEDURE — 20610 DRAIN/INJ JOINT/BURSA W/O US: CPT | Performed by: PHYSICIAN ASSISTANT

## 2020-09-04 PROCEDURE — 73565 X-RAY EXAM OF KNEES: CPT

## 2020-09-04 PROCEDURE — 99212 OFFICE O/P EST SF 10 MIN: CPT | Performed by: PHYSICIAN ASSISTANT

## 2020-09-04 PROCEDURE — 6360000002 HC RX W HCPCS

## 2020-09-04 PROCEDURE — 2500000003 HC RX 250 WO HCPCS

## 2020-09-04 RX ORDER — TRIAMCINOLONE ACETONIDE 40 MG/ML
40 INJECTION, SUSPENSION INTRA-ARTICULAR; INTRAMUSCULAR ONCE
Status: COMPLETED | OUTPATIENT
Start: 2020-09-04 | End: 2020-09-04

## 2020-09-04 RX ORDER — LIDOCAINE HYDROCHLORIDE 10 MG/ML
3 INJECTION, SOLUTION INFILTRATION; PERINEURAL ONCE
Status: COMPLETED | OUTPATIENT
Start: 2020-09-04 | End: 2020-09-04

## 2020-09-04 RX ORDER — BUPIVACAINE HYDROCHLORIDE 2.5 MG/ML
6 INJECTION, SOLUTION EPIDURAL; INFILTRATION; INTRACAUDAL ONCE
Status: COMPLETED | OUTPATIENT
Start: 2020-09-04 | End: 2020-09-04

## 2020-09-04 RX ADMIN — LIDOCAINE HYDROCHLORIDE 3 ML: 10 INJECTION, SOLUTION INFILTRATION; PERINEURAL at 15:49

## 2020-09-04 RX ADMIN — TRIAMCINOLONE ACETONIDE 40 MG: 40 INJECTION, SUSPENSION INTRA-ARTICULAR; INTRAMUSCULAR at 15:49

## 2020-09-04 RX ADMIN — TRIAMCINOLONE ACETONIDE 40 MG: 40 INJECTION, SUSPENSION INTRA-ARTICULAR; INTRAMUSCULAR at 15:50

## 2020-09-04 RX ADMIN — BUPIVACAINE HYDROCHLORIDE 15 MG: 2.5 INJECTION, SOLUTION EPIDURAL; INFILTRATION; INTRACAUDAL at 15:47

## 2020-09-04 RX ADMIN — LIDOCAINE HYDROCHLORIDE 3 ML: 10 INJECTION, SOLUTION INFILTRATION; PERINEURAL at 15:48

## 2020-09-04 NOTE — PROGRESS NOTES
Chief Complaint   Patient presents with    Knee Pain     B/L knee pain. Requesting injections        SUBJECTIVE: Dandy Gutierrez is an 62 y.o. female who is long established with our office for bilateral knee pain and osteoarthritis. Patient treated with corticosteroid injections. Last injection given 04/03/2020. Patient states achieved nearly 6 weeks of relief. Patient now retired, states delayed repeat injections due to insurance coverage. Patient denies any new falls or injuries but states pain severe and limiting WB activities and nearly all ADLs. Patient would like to discuss repeat CSI injections to bilateral knees today in office. Review of Systems   Constitutional: Negative for fever, chills, diaphoresis, appetite change and fatigue. HENT: Negative for dental issues, hearing loss and tinnitus. Negative for congestion, sinus pressure, sneezing, sore throat. Negative for headache. Eyes: Negative for visual disturbance, blurred and double vision. Negative for pain, discharge, redness and itching  Respiratory: Negative for cough, shortness of breath and wheezing. Cardiovascular: Negative for chest pain, palpitations and leg swelling. No dyspnea on exertion   Gastrointestinal:   Negative for nausea, vomiting, abdominal pain, diarrhea, constipation  or black or bloody. Hematologic\Lymphatic:  negative for bleeding, petechiae,   Genitourinary: Negative for hematuria and difficulty urinating. Musculoskeletal: Negative for neck pain and stiffness. Negative for back pain, see HPI  Skin: Negative for pallor, rash and wound. Neurological: Negative for dizziness, tremors, seizures, weakness, light-headedness, no TIA or stroke symptoms. No numbness and headaches. Psychiatric/Behavioral: Negative. OBJECTIVE:      Physical Examination:   General appearance: alert, well appearing, and in no distress,  normal appearing weight.  No visible signs of trauma   Mental status: alert, oriented to person, place, and time, normal mood, behavior, speech, dress, motor activity, and thought processes  Abdomen: soft, nondistended  Resp:   resp easy and unlabored, no audible wheezes note, normal symmetrical expansion of both hemithoraces  Cardiac: distal pulses palpable, skin and extremities well perfused  Neurological: alert, oriented X3, normal speech, no focal findings or movement disorder noted, motor and sensory grossly normal bilaterally, normal muscle tone, no tremors, strength 5/5, normal gait and station  HEENT: normochephalic atraumatic, external ears and eyes normal, sclera normal, neck supple, no nasal discharge. Extremities:   peripheral pulses normal, no edema, redness or tenderness in the calves   Skin: normal coloration, no rashes or open wounds, no suspicious skin lesions noted  Psych: Affect euthymic   Musculoskeletal:   Extremity:  Bilateral Lower Extremity  Skin clean dry and intact, without signs of infection  Patient with moderate chronic lymphedema to BLE, chronic venous stasis changes noted  Compartments supple throughout thigh and leg,   Calf supple and nontender  Demonstrates active knee flexion/extension, ankle plantar/dorsiflexion/great toe extension. AROM of the knees bilaterally from 5-95 with pain at terminal ROM  No instability of the knees appreciated  Moderate crepitus palpable at patellofemoral joints  TTP medial joint line bilaterally  States sensation intact to touch in sural/deep peroneal/superficial peroneal/saphenous/posterior tibial nerve distributions to foot/ankle. Palpable dorsalis pedis and posterior tibialis pulses, cap refill brisk in toes, foot warm/perfused. BP (!) 149/88 (Site: Left Lower Arm, Position: Sitting, Cuff Size: Medium Adult)   Pulse 83        XR: 9/4/20        ASSESSMENT:     Diagnosis Orders   1.  Primary osteoarthritis of left knee  AK ARTHROCENTESIS ASPIR&/INJ MAJOR JT/BURSA W/O US    triamcinolone acetonide (KENALOG-40) injection 40 mg    lidocaine 1 % injection 3 mL    bupivacaine (PF) (MARCAINE) 0.25 % injection 15 mg    ND ARTHROCENTESIS ASPIR&/INJ MAJOR JT/BURSA W/O US    triamcinolone acetonide (KENALOG-40) injection 40 mg    lidocaine 1 % injection 3 mL   2. Primary osteoarthritis of right knee  ND ARTHROCENTESIS ASPIR&/INJ MAJOR JT/BURSA W/O US    triamcinolone acetonide (KENALOG-40) injection 40 mg    lidocaine 1 % injection 3 mL    bupivacaine (PF) (MARCAINE) 0.25 % injection 15 mg    ND ARTHROCENTESIS ASPIR&/INJ MAJOR JT/BURSA W/O US    triamcinolone acetonide (KENALOG-40) injection 40 mg    lidocaine 1 % injection 3 mL        Discussion:   I discussed the natural course and history of knee arthritis with the patient as well as treatment options including NSAIDs, weight loss, activity modification, and injections as well as surgery. The patient would like to proceed with repeat steroid injections to bilateral knees. Discussed risks and benefits of CSI including risk of infection at the joint, bleeding or bruising at the injection site and elevation of blood sugar levels and cartilage degradation with repetitive use. Patient expressed understanding of these risks and elected to move forward with corticosteroid injection today in the office. PROCEDURE:  With the patient's written and verbal permission, Bilateral  knee was prepped in standard sterile fashion with betadine and alcohol. The skin of the knee was then injected with  1 ml Kenalog (40mg), 3ml PF 0.25% marcaine and 3ml 1% PF Lidocaine were injected using the lateral inferior approach without difficulty. The patient tolerated this well. A band-aid was applied. The patient ambulated out of clinic on their own accord without difficulty.     PLAN:  CSI as above  Post injection care instructed  Advised on s/s for sooner follow up  Anticipatory guidance provided regarding expected duration of symptom relief as well as multimodal approach to pain  Instructed to stay as active as possible and

## 2020-09-04 NOTE — PATIENT INSTRUCTIONS
Continue weight bearing as tolerated. Regular exercise and activity  Steroid injections to bilateral knee today  You may be sore at the injection site.   Ok to apply ice to the injection sites  May repeat the injections every 3 months  Call with any problems or concerns

## 2020-09-09 ENCOUNTER — TELEPHONE (OUTPATIENT)
Dept: PRIMARY CARE CLINIC | Age: 65
End: 2020-09-09

## 2020-09-25 ENCOUNTER — VIRTUAL VISIT (OUTPATIENT)
Dept: PAIN MANAGEMENT | Age: 65
End: 2020-09-25
Payer: COMMERCIAL

## 2020-09-25 ENCOUNTER — TELEPHONE (OUTPATIENT)
Dept: PAIN MANAGEMENT | Age: 65
End: 2020-09-25

## 2020-09-25 PROCEDURE — 99213 OFFICE O/P EST LOW 20 MIN: CPT | Performed by: PHYSICIAN ASSISTANT

## 2020-09-25 RX ORDER — BUPRENORPHINE HYDROCHLORIDE 450 UG/1
450 FILM, SOLUBLE BUCCAL EVERY 12 HOURS
Qty: 60 EACH | Refills: 0 | Status: SHIPPED
Start: 2020-09-25 | End: 2020-10-23 | Stop reason: SDUPTHER

## 2020-09-25 RX ORDER — BUPRENORPHINE HYDROCHLORIDE 450 UG/1
450 FILM, SOLUBLE BUCCAL EVERY 12 HOURS
Qty: 60 EACH | Refills: 0 | Status: SHIPPED
Start: 2020-09-25 | End: 2020-09-25 | Stop reason: SDUPTHER

## 2020-09-25 NOTE — PROGRESS NOTES
Karolyn Sanderson was read the following message We want to confirm that, for purposes of billing, this is a virtual visit with your provider for which we will submit a claim for reimbursement with your insurance company. You will be responsible for any copays, coinsurance amounts or other amounts not covered by your insurance company. If you do not accept this, unfortunately we will not be able to schedule a virtual visit with the provider. Do you accept? Jess Pike responded Yes .

## 2020-09-25 NOTE — TELEPHONE ENCOUNTER
[facility] called, they are unable to get the script for Aidan Valles, but [facility] does have it, can you send it over to them. I did put it in the computer.

## 2020-09-25 NOTE — PROGRESS NOTES
Findings consistent with moderate degenerative disc disease and   degenerative facet disease.       The exam has been dictated and signed by Manuel Licea [facility], HOLLY-PEDRO LUIS   and Nilay Schulz MD, reviewed and concurred with these findings.            9/3/2019 left knee x-ray     The bones appear to be in anatomic alignment. No foreign body is identified   No fracture is identified     There is moderate tricompartmental joint space loss greatest long   patellofemoral and medial femoral tibial joint   The are moderate degenerative changes present along the patellofemoral   and medial femoral tibial compartment           Impression   Moderate degenerative changes as described above          9/3/2019 right knee x-ray     The bones appear to be in anatomic alignment. No foreign body is identified   No fracture is identified. Marginal bone spurs are again seen along the inferior margins of the   patella. There is interval worsening of moderate tricompartmental joint space   loss    The are interval worsening of moderate degenerative tricompartmental   changes present            Impression   Moderate tricompartmental degenerative changes. This has   worsened in the interim                                               Potential Aberrant Drug-Related Behavior:  None     Urine Drug Screenin2019 Consistent for buprenorphine and metabolites  2020 Consistent for buprenorphine  2020 Consistent      OARRS report:  10/2019 Consistent to 2020  Consistent       Past Medical History: Reviewed    Past Surgical History: Reviewed     Home Medications: Reviewed    Allergies: Reviewed     Social History: Reviewed     REVIEW OF SYSTEMS:     Sheyla Osuna denies fever/chills, chest pain, shortness of breath, new bowel or bladder complaints. All other review of systems was negative. PHYSICAL EXAMINATION:      General:       A & O x3  Build: Morbidly obese    HEENT:    Head:normocephalic and & alternative treatments discussed. ;No signs of potential drug abuse or diversion identified. ;Assessed functional status. ;Obtaining appropriate analgesic effect of treatment. We discussed with the patient that combining opioids, benzodiazepines, alcohol, illicit drugs or sleep aids increases the risk of respiratory depression including death. We discussed that these medications may cause drowsiness, sedation or dizziness and have counseled the patient not to drive or operate machinery. We have discussed that these medications will be prescribed only by one provider. We have discussed with the patient about age related risk factors and have thoroughly discussed the importance of taking these medications as prescribed. The patient verbalizes understanding. Patient advised regarding steps to help prevent the spread of COVID-19   SOURCE - https://josie-brown.info/. html     1-Stay home except to get medical care  2-Clean your hands often for atleast 20 seconds, avoid touching: Avoid touching your eyes, nose, and mouth with unwashed hands. 3-Seek medical attention: Seek prompt medical attention if your illness is worsening (e.g., difficulty breathing). Call you doctor first.  3-Wear a facemask if you are sick   4-Cover your coughs and sneezes           I affirm this is a Patient Initiated Episode with an established Patient who has not had a related appointment within my department in the past 7 days or scheduled within the next 24 hours.     Total Time:  14 minutes 2 seconds    Cc: Referring physician

## 2020-10-08 ENCOUNTER — OFFICE VISIT (OUTPATIENT)
Dept: PODIATRY | Age: 65
End: 2020-10-08
Payer: COMMERCIAL

## 2020-10-08 VITALS
SYSTOLIC BLOOD PRESSURE: 146 MMHG | DIASTOLIC BLOOD PRESSURE: 88 MMHG | BODY MASS INDEX: 51.34 KG/M2 | TEMPERATURE: 98.2 F | WEIGHT: 279 LBS | HEIGHT: 62 IN

## 2020-10-08 PROCEDURE — 99203 OFFICE O/P NEW LOW 30 MIN: CPT | Performed by: PODIATRIST

## 2020-10-08 PROCEDURE — 11721 DEBRIDE NAIL 6 OR MORE: CPT | Performed by: PODIATRIST

## 2020-10-08 RX ORDER — AMMONIUM LACTATE 12 G/100G
CREAM TOPICAL
Qty: 385 G | Refills: 2 | Status: SHIPPED
Start: 2020-10-08 | End: 2022-06-29

## 2020-10-08 NOTE — PROGRESS NOTES
[facility] PODIATRY  [address] [address]  Dept: [phone]  Dept Fax: [phone]    NEW PATIENT PROGRESS NOTE  Date of patient's visit: 10/8/2020  Patient's Name:  Bill Rodriguez YOB: 1955            Patient Care Team:  DO Kandy as PCP - General (Family Medicine)  DO Kandy as PCP - [facility] Empaneled Provider  Ziyad Gilbert MD as Consulting Physician (Neurosurgery)        Chief Complaint   Patient presents with   [facility] Doctor     saw pcp Dr. Nat Quiñones 7/31/2020    Toe Pain    Callouses     corns and callous care       HPI  HPI:   Bill Rodriguez is a 60 y.o. female who presents to the office today complaining of painful corns calluses and nails. .  Symptoms began 11 year(s) ago. Patient relates pain is Present. Pain is rated 5 out of 10 and is described as moderate. Treatments prior to today's visit include: debridement. Currently denies F/C/N/V. Pt's primary care physician is Ha Razo DO last seen     No Known Allergies    Past Medical History:   Diagnosis Date    Anemia     Arthritis     Cellulitis 5- 2015    left leg    Chronic ulcer of leg with fat layer exposed ([address] 6/22/2015    Chronic ulcer of right leg, limited to breakdown of skin ([address] 9/28/2016    Depression     Low back pain     Lymphedema of both lower extremities 6/22/2015    Lymphedema of lower extremity 6/22/2015    Obesity     Osteoarthritis     Peripheral vision loss     Stroke [facility])     Type 2 diabetes mellitus without complication, without long-term current use of insulin ([address] 6/21/2019    Ulcer of left lower leg, limited to breakdown of skin ([address] 6/10/2019    Ulcer of left lower leg, limited to breakdown of skin ([address] 6/10/2019       Prior to Admission medications    Medication Sig Start Date End Date Taking?  Authorizing Provider   ammonium lactate (LAC-HYDRIN) 12 % cream Apply .  Neurological ROS: negative for - behavioral changes, confusion, headaches or seizures. Negative for weakness and numbness. Dermatological ROS: negative for - mole changes, rash  Cardiovascular: Negative for leg swelling. Gastrointestinal: Negative for constipation, diarrhea, nausea and vomiting.                Lower Extremity Physical Examination:   Vitals:   Vitals:    10/08/20 0710   BP: (!) 146/88   Temp: 98.2 °F (36.8 °C)   Toenail Description  Sites of Onychomycosis Involvement (Check affected area)  [x] [x] [x] [x] [x] [x] [x] [x] [x] [x]  5 4 3 2 1 1 2 3 4 5                          Right                                        Left    Thickness  [x] [x] [x] [x] [x] [x] [x] [x] [x] [x]  5 4 3 2 1 1 2 3 4 5                         Right                                        Left    Dystrophic Changes   [x] [x] [x] [x] [x] [x] [x] [x] [x] [x]  5 4 3 2 1 1 2 3 4 5                         Right                                        Left    discolored   [x] [x] [x] [x] [x] [x] [x] [x] [x] [x]  5 4 3 2 1 1 2 3 4 5                          Right                                        Left    Incurvation/Ingrowin   [] [] [] [] [] [] [] [] [] []  5 4 3 2 1 1 2 3 4 5                         Right                                        Left    Inflammation/Pain   [x] [x] [x] [x] [x] [x] [x] [x] [x] [x]  5 4 3 2 1 1 2 3 4 5                         Right                                        Left       Foot Exam    General  General Appearance: appears stated age and healthy   Orientation: alert and oriented to person, place, and time       Right Foot/Ankle     Inspection and Palpation  Ecchymosis: none  Tenderness: bony tenderness   Skin Exam: corn;     Neurovascular  Dorsalis pedis: 2+  Posterior tibial: absent  Saphenous nerve sensation: normal  Tibial nerve sensation: normal  Superficial peroneal nerve sensation: normal  Deep peroneal nerve sensation: normal  Sural nerve sensation: normal  Achilles reflex: 2+  Babinski reflex: 2+      Left Foot/Ankle      Inspection and Palpation  Ecchymosis: none  Tenderness: bony tenderness   Skin Exam: corn, skin changes and abnormal color; Neurovascular  Dorsalis pedis: 1+  Posterior tibial: 1+  Saphenous nerve sensation: normal  Tibial nerve sensation: normal  Superficial peroneal nerve sensation: normal  Deep peroneal nerve sensation: normal  Sural nerve sensation: normal  Achilles reflex: 2+  Babinski reflex: 2+    Muscle Strength  Ankle dorsiflexion: 5  Ankle plantar flexion: 5  Ankle inversion: 5  Ankle eversion: 5  Great toe extension: 5  Great toe flexion: 5          Left Ankle Exam     Muscle Strength   The patient has normal left ankle strength. Dorsiflexion:  5/5   Plantar flexion:  5/5             General: AAO x 3 in NAD. Dermatologic Exam:  Skin lesion/ulceration Absent . Skin No rashes or nodules noted. .   Musculoskeletal: There are hammertoes noted fifth digit bilaterally there is a corn noted dorsal aspect fifth digit bilateral.  Bilateral heels have thick eschar medial aspect noninfected. Vascular: The pulses DP bilaterally are palpable PT on the right side is absent there is a lack of hair growth to lower extremity the nails are thick pitting mycotic there is atrophic skin changes which include skin texture has changed skin color is discolored    Radiographs:  3 views  foot/ankle:     Asessment: Patient is a 60 y.o. female with:    Diagnosis Orders   1. Hammer toes of both feet     2. Tinea unguium     3. Corn or callus     4. Pain in left toe(s)     5. Pain in toe of right foot     6. Peripheral vascular disease, unspecified ([address]     7. Difficulty walking         Plan: Patient examined and evaluated. Debridement of all painful nails 1-5 maximiliano was performed with both manual and electrical debridement to prevent infection and/or ulceration. Patient tolerated the debridement well, without any complaints.   Patient was asymptomatic after debridement  Current condition and treatment options discussed in detail. Discussed conservative and surgical options with the patient. Treatment options today consisted of verbal and written instructions given to patient. Contact office with any questions/problems/concerns. Did advise wide shoes and corn pads for fifth digit bilaterally Lac-Hydrin was ordered for the calluses on bilateral heels RTC in 3month(s).     10/8/2020    Electronically signed by Chemo Orozco DPM on 10/8/2020 at 10:36 AM  10/8/2020

## 2020-10-20 RX ORDER — ROPINIROLE 1 MG/1
1 TABLET, FILM COATED ORAL 3 TIMES DAILY
Qty: 270 TABLET | Refills: 3 | Status: SHIPPED
Start: 2020-10-20 | End: 2020-10-24 | Stop reason: SDUPTHER

## 2020-10-23 ENCOUNTER — VIRTUAL VISIT (OUTPATIENT)
Dept: PAIN MANAGEMENT | Age: 65
End: 2020-10-23
Payer: COMMERCIAL

## 2020-10-23 PROCEDURE — 99213 OFFICE O/P EST LOW 20 MIN: CPT | Performed by: PHYSICIAN ASSISTANT

## 2020-10-23 RX ORDER — BUPRENORPHINE HYDROCHLORIDE 450 UG/1
450 FILM, SOLUBLE BUCCAL EVERY 12 HOURS
Qty: 60 EACH | Refills: 0 | Status: SHIPPED
Start: 2020-10-23 | End: 2020-11-17 | Stop reason: SDUPTHER

## 2020-10-23 RX ORDER — BUPRENORPHINE HYDROCHLORIDE 450 UG/1
450 FILM, SOLUBLE BUCCAL EVERY 12 HOURS
Qty: 60 EACH | Refills: 0 | Status: CANCELLED | OUTPATIENT
Start: 2020-10-25 | End: 2020-11-24

## 2020-10-23 NOTE — PROGRESS NOTES
Tariq Crisostomo was read the following message We want to confirm that, for purposes of billing, this is a virtual visit with your provider for which we will submit a claim for reimbursement with your insurance company. You will be responsible for any copays, coinsurance amounts or other amounts not covered by your insurance company. If you do not accept this, unfortunately we will not be able to schedule a virtual visit with the provider. Do you accept? Jennifer Quick responded Yes .
tolerance/dependence & alternative treatments discussed. ;No signs of potential drug abuse or diversion identified. ;Assessed functional status. ;Obtaining appropriate analgesic effect of treatment. We discussed with the patient that combining opioids, benzodiazepines, alcohol, illicit drugs or sleep aids increases the risk of respiratory depression including death. We discussed that these medications may cause drowsiness, sedation or dizziness and have counseled the patient not to drive or operate machinery. We have discussed that these medications will be prescribed only by one provider. We have discussed with the patient about age related risk factors and have thoroughly discussed the importance of taking these medications as prescribed. The patient verbalizes understanding. Patient advised regarding steps to help prevent the spread of COVID-19   SOURCE - https://galindo-brown.info/. html     1-Stay home except to get medical care  2-Clean your hands often for atleast 20 seconds, avoid touching: Avoid touching your eyes, nose, and mouth with unwashed hands. 3-Seek medical attention: Seek prompt medical attention if your illness is worsening (e.g., difficulty breathing). Call you doctor first.  3-Wear a facemask if you are sick   4-Cover your coughs and sneezes           I affirm this is a Patient Initiated Episode with an established Patient who has not had a related appointment within my department in the past 7 days or scheduled within the next 24 hours.     Total Time:  12 minutes    Cc: Referring physician

## 2020-10-24 RX ORDER — ROPINIROLE 1 MG/1
1 TABLET, FILM COATED ORAL 3 TIMES DAILY
Qty: 270 TABLET | Refills: 3 | Status: SHIPPED
Start: 2020-10-24 | End: 2020-11-02 | Stop reason: SDUPTHER

## 2020-10-28 ENCOUNTER — TELEPHONE (OUTPATIENT)
Dept: ORTHOPEDIC SURGERY | Age: 65
End: 2020-10-28

## 2020-10-28 NOTE — TELEPHONE ENCOUNTER
Pt is c/o pain in both knees, requesting injections, last injection 09-04-20. Please call pt with recommendations, she states she is very uncomfortable, pain with walking less than 5 feet.  [phone]

## 2020-10-29 NOTE — TELEPHONE ENCOUNTER
Pt called in to scheduled an injection. I advised pt a message was sent over to the office. 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 Pt can be reached at [phone]. Thank you.

## 2020-10-30 NOTE — TELEPHONE ENCOUNTER
Call placed to pt to inquire about her knees. Per pt they have been bothering her a lot lately especially \"with all the rain. \" Pt requesting injection. I informed pt that we need 3 months in between CSI and she had this done on 9/4/20 so it wouldn't be until around 12/4/20 that we could repeat this. She responded \"Well Dr. Eladia Olivera would always do them early for me. \" I informed her that sure we could probably do these a few days early but not a whole month. Pt responded \"Well next week will be 10 weeks. ..whatever\" and hung up the phone.

## 2020-11-02 ENCOUNTER — OFFICE VISIT (OUTPATIENT)
Dept: PRIMARY CARE CLINIC | Age: 65
End: 2020-11-02
Payer: COMMERCIAL

## 2020-11-02 VITALS
HEIGHT: 62 IN | SYSTOLIC BLOOD PRESSURE: 138 MMHG | WEIGHT: 282 LBS | TEMPERATURE: 98.4 F | DIASTOLIC BLOOD PRESSURE: 84 MMHG | BODY MASS INDEX: 51.89 KG/M2

## 2020-11-02 PROCEDURE — 99214 OFFICE O/P EST MOD 30 MIN: CPT | Performed by: FAMILY MEDICINE

## 2020-11-02 PROCEDURE — 90472 IMMUNIZATION ADMIN EACH ADD: CPT | Performed by: FAMILY MEDICINE

## 2020-11-02 PROCEDURE — 90686 IIV4 VACC NO PRSV 0.5 ML IM: CPT | Performed by: FAMILY MEDICINE

## 2020-11-02 PROCEDURE — 90732 PPSV23 VACC 2 YRS+ SUBQ/IM: CPT | Performed by: FAMILY MEDICINE

## 2020-11-02 PROCEDURE — 90471 IMMUNIZATION ADMIN: CPT | Performed by: FAMILY MEDICINE

## 2020-11-02 RX ORDER — ROPINIROLE 2 MG/1
2 TABLET, FILM COATED ORAL 4 TIMES DAILY
Qty: 360 TABLET | Refills: 12 | Status: SHIPPED
Start: 2020-11-02 | End: 2022-02-22 | Stop reason: SDUPTHER

## 2020-11-02 ASSESSMENT — ENCOUNTER SYMPTOMS
ALLERGIC/IMMUNOLOGIC NEGATIVE: 1
GASTROINTESTINAL NEGATIVE: 1
RESPIRATORY NEGATIVE: 1
SHORTNESS OF BREATH: 0
EYES NEGATIVE: 1

## 2020-11-02 ASSESSMENT — PATIENT HEALTH QUESTIONNAIRE - PHQ9
SUM OF ALL RESPONSES TO PHQ QUESTIONS 1-9: 0
1. LITTLE INTEREST OR PLEASURE IN DOING THINGS: 0
SUM OF ALL RESPONSES TO PHQ9 QUESTIONS 1 & 2: 0
SUM OF ALL RESPONSES TO PHQ QUESTIONS 1-9: 0
SUM OF ALL RESPONSES TO PHQ QUESTIONS 1-9: 0
2. FEELING DOWN, DEPRESSED OR HOPELESS: 0

## 2020-11-02 NOTE — PROGRESS NOTES
20  Name: Keri Cantor    : 1955    Sex: female    Age: 60 y.o. Subjective:  Chief Complaint: Patient is here for  4 mo ck   Re  Chol  arhtriis   edema     No  Cp or  Sob      Edema legs stable       knes bad and due for shots soon with dr Milka Jara  She says  Edema goes down if gets stots  She says lasix not help  And  So she stopped it  faiedl to g et lab done   No  Cp  Leg jumps at hs  Still smoking      Review of Systems   Constitutional: Negative. HENT: Negative. Eyes: Negative. Respiratory: Negative. Negative for shortness of breath. Cardiovascular: Positive for leg swelling. Negative for chest pain and palpitations. Gastrointestinal: Negative. Endocrine: Negative. Genitourinary: Negative. Musculoskeletal: Negative. Skin: Negative. Allergic/Immunologic: Negative. Neurological: Negative. Hematological: Negative. Psychiatric/Behavioral: Negative. Current Outpatient Medications:     rOPINIRole (REQUIP) 2 MG tablet, Take 1 tablet by mouth 4 times daily, Disp: 360 tablet, Rfl: 12    BELBUCA 450 MCG FILM, Take 450 mcg by mouth every 12 hours for 30 days. Due 10/25 - ordered today in case it is not at pharmacy. , Disp: 60 each, Rfl: 0    ammonium lactate (LAC-HYDRIN) 12 % cream, Apply topically as needed. , Disp: 385 g, Rfl: 2    Turmeric 500 MG CAPS, Take by mouth, Disp: , Rfl:     buPROPion (WELLBUTRIN XL) 150 MG extended release tablet, Take 1 tablet by mouth every morning, Disp: 90 tablet, Rfl: 3    Cream Base (VERSAPRO) CREA, APPLY ONE (1) GRAM (ONE PUMP) EXTERNALLY FOUR TIMES A DAY TO EACH KNEE, Disp: 200 g, Rfl: 1    furosemide (LASIX) 40 MG tablet, Take 40 mg by mouth daily as needed for Other Swelling, Disp: , Rfl:     Liraglutide (VICTOZA SC), Inject 1.8 mg into the skin daily , Disp: , Rfl:   No Known Allergies  Social History     Socioeconomic History    Marital status:      Spouse name: Not on file    Number of children: Not on file    Years of education: Not on file    Highest education level: Not on file   Occupational History    Not on file   Social Needs    Financial resource strain: Not on file    Food insecurity     Worry: Not on file     Inability: Not on file    Transportation needs     Medical: Not on file     Non-medical: Not on file   Tobacco Use    Smoking status: Current Every Day Smoker     Packs/day: 0.25     Years: 38.00     Pack years: 9.50     Types: Cigarettes     Last attempt to quit: 2019     Years since quittin.4    Smokeless tobacco: Never Used   Substance and Sexual Activity    Alcohol use: No    Drug use: No    Sexual activity: Not Currently   Lifestyle    Physical activity     Days per week: Not on file     Minutes per session: Not on file    Stress: Not on file   Relationships    Social connections     Talks on phone: Not on file     Gets together: Not on file     Attends Rastafarian service: Not on file     Active member of club or organization: Not on file     Attends meetings of clubs or organizations: Not on file     Relationship status: Not on file    Intimate partner violence     Fear of current or ex partner: Not on file     Emotionally abused: Not on file     Physically abused: Not on file     Forced sexual activity: Not on file   Other Topics Concern    Not on file   Social History Narrative        Chronic Diagnosis: Iron deficiency anemia, Depressive disorder, Benign essential hypertension, Mixed    hyperlipidemia.     HTN    OBESITY    LIPID    OA    SMOKER    CVA L FIELD VISION    DEPRESSION    GASTRIC BYPASS 01    BREAST REDUCTION--    Shannon Alejandro  1955 Page #2    ANEMIA==IRON AND B-12    Admitted 2019 with cellulitis left lower extremity then readmitted with chest pain with negative stress test      Past Medical History:   Diagnosis Date    Anemia     Arthritis     Cellulitis 2015    left leg    Chronic ulcer of leg with fat layer exposed ([address] 2015    Chronic ulcer of right leg, limited to breakdown of skin ([address] 2016    Depression     Low back pain     Lymphedema of both lower extremities 2015    Lymphedema of lower extremity 2015    Obesity     Osteoarthritis     Peripheral vision loss     Stroke [facility])     Type 2 diabetes mellitus without complication, without long-term current use of insulin ([address] 2019    Ulcer of left lower leg, limited to breakdown of skin ([address] 6/10/2019    Ulcer of left lower leg, limited to breakdown of skin ([address] 6/10/2019     Family History   Problem Relation Age of Onset    Breast Cancer Mother     Stroke Father     Hypertension Sister     Hypertension Brother       Past Surgical History:   Procedure Laterality Date    ABDOMINOPLASTY      BREAST REDUCTION SURGERY      reduction    CATARACT REMOVAL      bilateral     SECTION      x2    COLONOSCOPY  2012    and egd    ECHOCARDIOGRAM COMPLETE 2D W DOPPLER W COLOR  2012         GASTRIC BYPASS SURGERY      HERNIA REPAIR            Vitals:    20 0936   BP: 138/84   Temp: 98.4 °F (36.9 °C)   Weight: 282 lb (127.9 kg)   Height: 5' 2\" (1.575 m)       Objective:    Physical Exam  Vitals signs reviewed. Constitutional:       Appearance: She is well-developed. HENT:      Head: Normocephalic. Eyes:      Pupils: Pupils are equal, round, and reactive to light. Neck:      Musculoskeletal: Normal range of motion. Cardiovascular:      Rate and Rhythm: Normal rate and regular rhythm. Comments: Edema both legs  Pulmonary:      Effort: Pulmonary effort is normal.      Breath sounds: Normal breath sounds. Abdominal:      Palpations: Abdomen is soft. Musculoskeletal: Normal range of motion. Skin:     General: Skin is warm. Neurological:      Mental Status: She is alert and oriented to person, place, and time.    Psychiatric:         Behavior: Behavior normal. Laurel Burnette was seen today for knee pain and other. Diagnoses and all orders for this visit:    Lymphedema of both lower extremities  -     rOPINIRole (REQUIP) 2 MG tablet; Take 1 tablet by mouth 4 times daily    Need for influenza vaccination  -     INFLUENZA, QUADV, 6 MO AND OLDER, IM, PF, PREFILL SYR, 0.5ML (FLUARIX QUADV, PF)    Need for pneumococcal vaccination  -     PNEUMOVAX 23 subcutaneous/IM (Pneumococcal polysaccharide vaccine 23-valent >= 3yo)    Restless leg syndrome  -     rOPINIRole (REQUIP) 2 MG tablet; Take 1 tablet by mouth 4 times daily    Primary osteoarthritis involving multiple joints    Primary osteoarthritis of both knees        Comments: take lasix    Diet  exer hm isuses lsoe w t hm isuses    rom e xer inc  rquip   A great deal of time spent reviewing medications, diet, exercise, social issues. Also reviewing the chart before entering the room with patient and finishing charting after leaving patient's room. More than half of that time was spent face to face with the patient in counseling and coordinating care. elev legs  Lab soon and veda  After       Follow Up: Return in about 4 months (around 3/2/2021) for Lab Before.      Seen by:  Kailash Grady DO

## 2020-11-16 NOTE — PROGRESS NOTES
[address]  [address]  [place] [facility], [facility]    Follow up Note      Sandra Erwin     Date of Visit:  2020    CC:  Patient presents for follow up   Chief Complaint   Patient presents with    Follow-up    Knee Pain     bilateral    Lower Back Pain       HPI:    Pain is unchanged. Knees are painful. Injections due next month. Appropriate analgesia with current medications regimen: yes   Change in quality of symptoms:no. Medication side effects:none. Recent diagnostic testing: None. Recent interventional procedures:none. She has not been on anticoagulation medications to include ASA, NSAIDS, Plavix, heparin, LMW heparin and warfarin and has not been on herbal supplements. She is diabetic. Imagin2019 Lumbar Spine X-ray    Alignment of the vertebral bodies appears to be near-anatomic   No fracture or foreign body is identified. The disc spaces demonstrate moderate degenerative changes. There is mild loss of the vertebral body height   There are moderate degenerative changes involving the facets. There is grade 1 anterolisthesis of L3 on L4. Flexion-extension views demonstrates movement of the anterolisthesis   of L3 on L4.            Impression   Grade 1 anterolisthesis of L3 on L4 which does move with extension. Findings consistent with moderate degenerative disc disease and   degenerative facet disease.       The exam has been dictated and signed by Awanda Client. Chipper Lesches, APRN-CNP   and Suman Russo MD, reviewed and concurred with these findings.          9/3/2019 left knee x-ray     The bones appear to be in anatomic alignment.     No foreign body is identified   No fracture is identified     There is moderate tricompartmental joint space loss greatest long   patellofemoral and medial femoral tibial joint   The are moderate degenerative changes present along the patellofemoral   and medial femoral tibial compartment         Impression   Moderate degenerative changes as described above          9/3/2019 right knee x-ray     The bones appear to be in anatomic alignment. No foreign body is identified   No fracture is identified. Marginal bone spurs are again seen along the inferior margins of the   patella. There is interval worsening of moderate tricompartmental joint space   loss    The are interval worsening of moderate degenerative tricompartmental   changes present            Impression   Moderate tricompartmental degenerative changes. This has   worsened in the interim     Urine Drug Screenin2019 Consistent for buprenorphine and metabolites  2020 Consistent for buprenorphine  2020 Consistent      OARRS report:  10/2019 Consistent to 2020 Consistent           Past Medical History:   Diagnosis Date    Anemia     Arthritis     Cellulitis 2015    left leg    Chronic ulcer of leg with fat layer exposed ([address] 2015    Chronic ulcer of right leg, limited to breakdown of skin ([address] 2016    Depression     Low back pain     Lymphedema of both lower extremities 2015    Lymphedema of lower extremity 2015    Obesity     Osteoarthritis     Peripheral vision loss     Stroke [facility])     Type 2 diabetes mellitus without complication, without long-term current use of insulin ([address] 2019    Ulcer of left lower leg, limited to breakdown of skin ([address] 6/10/2019    Ulcer of left lower leg, limited to breakdown of skin ([address] 6/10/2019       Past Surgical History:   Procedure Laterality Date    ABDOMINOPLASTY  2002    BREAST REDUCTION SURGERY      reduction    CATARACT REMOVAL      bilateral     SECTION      x2    COLONOSCOPY  2012    and egd    ECHOCARDIOGRAM COMPLETE 2D W DOPPLER W COLOR  2012         GASTRIC BYPASS SURGERY  2000    HERNIA REPAIR             Prior to Admission medications    Medication Sig Start Date End Date Taking?  Authorizing Provider rOPINIRole (REQUIP) 2 MG tablet Take 1 tablet by mouth 4 times daily 20  Yes Gautam Razo DO   BELBUCA 450 MCG FILM Take 450 mcg by mouth every 12 hours for 30 days. Due 10/25 - ordered today in case it is not at pharmacy. 10/23/20 11/22/20 Yes CHERYL Barr   ammonium lactate (LAC-HYDRIN) 12 % cream Apply topically as needed.  10/8/20  Yes Joon Gr DPM   Turmeric 500 MG CAPS Take by mouth   Yes Historical Provider, MD   buPROPion (WELLBUTRIN XL) 150 MG extended release tablet Take 1 tablet by mouth every morning 20  Yes Gautam Razo DO   Cream Base (VERSAPRO) CREA APPLY ONE (1) GRAM (ONE PUMP) EXTERNALLY FOUR TIMES A DAY TO EACH KNEE 3/23/20  Yes Paz Davalos PA-C   furosemide (LASIX) 40 MG tablet Take 40 mg by mouth daily as needed for Other Swelling 19  Yes Historical Provider, MD   Liraglutide (VICTOZA SC) Inject 1.8 mg into the skin daily    Yes Historical Provider, MD       No Known Allergies    Social History     Socioeconomic History    Marital status:      Spouse name: Not on file    Number of children: Not on file    Years of education: Not on file    Highest education level: Not on file   Occupational History    Not on file   Social Needs    Financial resource strain: Not on file    Food insecurity     Worry: Not on file     Inability: Not on file    Transportation needs     Medical: Not on file     Non-medical: Not on file   Tobacco Use    Smoking status: Current Every Day Smoker     Packs/day: 0.25     Years: 38.00     Pack years: 9.50     Types: Cigarettes     Last attempt to quit: 2019     Years since quittin.4    Smokeless tobacco: Never Used   Substance and Sexual Activity    Alcohol use: No    Drug use: No    Sexual activity: Not Currently   Lifestyle    Physical activity     Days per week: Not on file     Minutes per session: Not on file    Stress: Not on file   Relationships    Social connections     Talks on phone: identified. ;Assessed functional status. ;Obtaining appropriate analgesic effect of treatment. Plan:    We discussed with the patient that combining opioids, benzodiazepines, alcohol, illicit drugs or sleep aids increases the risk of respiratory depression including death. We discussed that these medications may cause drowsiness, sedation or dizziness and have counseled the patient not to drive or operate machinery. We have discussed that these medications will be prescribed only by one provider. We have discussed with the patient about age related risk factors and have thoroughly discussed the importance of taking these medications as prescribed. The patient verbalizes understanding.     ccreferring physic

## 2020-11-17 ENCOUNTER — OFFICE VISIT (OUTPATIENT)
Dept: PAIN MANAGEMENT | Age: 65
End: 2020-11-17
Payer: COMMERCIAL

## 2020-11-17 ENCOUNTER — TELEPHONE (OUTPATIENT)
Dept: ORTHOPEDIC SURGERY | Age: 65
End: 2020-11-17

## 2020-11-17 VITALS
RESPIRATION RATE: 18 BRPM | SYSTOLIC BLOOD PRESSURE: 136 MMHG | TEMPERATURE: 97.8 F | HEART RATE: 88 BPM | DIASTOLIC BLOOD PRESSURE: 80 MMHG

## 2020-11-17 DIAGNOSIS — G89.4 CHRONIC PAIN SYNDROME: Chronic | ICD-10-CM

## 2020-11-17 PROCEDURE — 99214 OFFICE O/P EST MOD 30 MIN: CPT | Performed by: PHYSICIAN ASSISTANT

## 2020-11-17 PROCEDURE — 99213 OFFICE O/P EST LOW 20 MIN: CPT

## 2020-11-17 RX ORDER — BUPRENORPHINE HYDROCHLORIDE 450 UG/1
450 FILM, SOLUBLE BUCCAL EVERY 12 HOURS
Qty: 60 EACH | Refills: 1 | Status: SHIPPED
Start: 2020-11-24 | End: 2021-01-20 | Stop reason: SDUPTHER

## 2020-11-17 NOTE — TELEPHONE ENCOUNTER
Pt called in schedule an injection for her knees. Jing Aravind can be reached at [phone]. Thank you.

## 2020-11-17 NOTE — PROGRESS NOTES
Do you currently have any of the following:    Fever: No  Headache:  No  Cough: No  Shortness of breath: No  Exposed to anyone with these symptoms: No         Jarred Lepe presents to the [address] [place] [address] on 11/17/2020. Jing Nazario is complaining of pain in her knees and lower back. The pain is constant. The pain is described as nagging. Pain is rated on her best day at a 6, on her worst day at a 10, and on average at a 7 on the VAS scale. She took her last dose of Belbuca today. Any procedures since your last visit: No.    Pacemaker or defibrilator: No managed by N/A. She is not on NSAIDS and is not on anticoagulation medications. Medication Contract and Consent for Opioid Use Documents Filed     Patient Documents       Type of Document Status Date Received Received By Description     Medication Contract Signed 9/6/2019 12:04 PM ARELIS DALLAS med contract                /80   Pulse 88   Temp 97.8 °F (36.6 °C)   Resp 18      No LMP recorded.  Patient is postmenopausal.

## 2020-11-20 NOTE — TELEPHONE ENCOUNTER
Call placed to patient. Appointment scheduled at this time.     Future Appointments   Date Time Provider Sydni Argenis   12/21/2020  8:00 AM SCHEDULE, SE ORTHO [facility]   1/7/2021  7:00 AM NELIDA Bruce [facility]   1/20/2021  9:00 AM CHERYL Moser [facility]   3/2/2021  9:45 AM DO [facility]

## 2020-11-29 LAB
Lab: NORMAL
REPORT: NORMAL
THIS TEST SENT TO: NORMAL

## 2020-12-03 ENCOUNTER — PROCEDURE VISIT (OUTPATIENT)
Dept: PAIN MANAGEMENT | Age: 65
End: 2020-12-03
Payer: COMMERCIAL

## 2020-12-03 VITALS
WEIGHT: 282 LBS | BODY MASS INDEX: 51.89 KG/M2 | HEIGHT: 62 IN | DIASTOLIC BLOOD PRESSURE: 78 MMHG | RESPIRATION RATE: 16 BRPM | TEMPERATURE: 97.2 F | HEART RATE: 88 BPM | SYSTOLIC BLOOD PRESSURE: 118 MMHG | OXYGEN SATURATION: 98 %

## 2020-12-03 PROCEDURE — 20610 DRAIN/INJ JOINT/BURSA W/O US: CPT | Performed by: PAIN MEDICINE

## 2020-12-03 PROCEDURE — 99213 OFFICE O/P EST LOW 20 MIN: CPT | Performed by: PAIN MEDICINE

## 2020-12-03 RX ORDER — BUPIVACAINE HYDROCHLORIDE 2.5 MG/ML
2 INJECTION, SOLUTION EPIDURAL; INFILTRATION; INTRACAUDAL ONCE
Status: COMPLETED | OUTPATIENT
Start: 2020-12-03 | End: 2020-12-03

## 2020-12-03 RX ORDER — TRIAMCINOLONE ACETONIDE 40 MG/ML
40 INJECTION, SUSPENSION INTRA-ARTICULAR; INTRAMUSCULAR ONCE
Status: COMPLETED | OUTPATIENT
Start: 2020-12-03 | End: 2020-12-03

## 2020-12-03 RX ADMIN — TRIAMCINOLONE ACETONIDE 40 MG: 40 INJECTION, SUSPENSION INTRA-ARTICULAR; INTRAMUSCULAR at 14:13

## 2020-12-03 RX ADMIN — BUPIVACAINE HYDROCHLORIDE 2 ML: 2.5 INJECTION, SOLUTION EPIDURAL; INFILTRATION; INTRACAUDAL at 14:08

## 2020-12-03 RX ADMIN — BUPIVACAINE HYDROCHLORIDE 2 ML: 2.5 INJECTION, SOLUTION EPIDURAL; INFILTRATION; INTRACAUDAL at 14:10

## 2020-12-03 RX ADMIN — TRIAMCINOLONE ACETONIDE 40 MG: 40 INJECTION, SUSPENSION INTRA-ARTICULAR; INTRAMUSCULAR at 14:11

## 2020-12-03 NOTE — PROGRESS NOTES
12/3/2020    Patient: Jaxon Way  :  1955  Age:  60 y.o. Sex:  female     PRE-OPERATIVE DIAGNOSIS:Bilateral   Knee pain, osteoarthitis. POST-OPERATIVE DIAGNOSIS: Same. PROCEDURE PERFORMED: Bilateral knee steroid injection. SURGEON:   ANALILIA Vargas. ANESTHESIA: local with ethyl chloride spray    ESTIMATED BLOOD LOSS: None. BRIEF HISTORY: Jaxon Way comes in today for Bilateral Knee steroid injection. After discussing the potential risks and benefits of the procedure with the patient. Oriana Avila did request that we proceed. A complete History & Physical was reviewed and it is unchanged. DESCRIPTION OF PROCEDURE:   The patient was placed in a seated position. Then the targeted area lateral to the Bilateral patellar tendon was palpated and marked and was sprayed with ethyl chloride spray. The area of the Bilateral knee was prepped with chloraprep and draped in a sterile manner. A 25 gauge 1 1/2 inch  needle was advanced into the joint capsule and 2 cc's of 0.25% bupivacaine and 40 mg of Kenalog was injected easily (in each knee joint) without complications. The needle was then removed and Band-Aid applied. Disposition the patient tolerated the procedure well and there were no complications . Oriana Avila will follow up in our [facility] as scheduled. She was encouraged to call with questions, concerns or if worsening of symptoms occurs.     Katelyn Schulz DO

## 2020-12-03 NOTE — PROGRESS NOTES
Do you currently have any of the following:    Fever: No  Headache:  No  Cough: No  Shortness of breath: No  Exposed to anyone with these symptoms: No         Delma Garnica presents to the [facility] on 12/3/2020. Trent Velázquez is complaining of pain B/L knees The pain is constant. The pain is described as aching, throbbing, shooting and stabbing. Pain is rated on her best day at a 10, on her worst day at a 10, and on average at a 10 on the VAS scale. She took her last dose of Belbuca    Any procedures since your last visit:     Pacemaker or defibrilator: No managed by     She is not on NSAIDS and is not on anticoagulation medications to include none and is managed by     Medication Contract and Consent for Opioid Use Documents Filed     Patient Documents       Type of Document Status Date Received Received By Description     Medication Contract Signed 9/6/2019 12:04 PM Gerardine Bombard med contract     Medication Contract Received 11/17/2020 10:57 AM Bobby MORRISONH med contract                /78   Pulse 88   Temp 97.2 °F (36.2 °C) (Oral)   Resp 16   Ht 5' 2\" (1.575 m)   Wt 282 lb (127.9 kg)   SpO2 98%   BMI 51.58 kg/m²      No LMP recorded.  Patient is postmenopausal.

## 2021-01-18 ENCOUNTER — OFFICE VISIT (OUTPATIENT)
Dept: PRIMARY CARE CLINIC | Age: 66
End: 2021-01-18
Payer: COMMERCIAL

## 2021-01-18 VITALS
SYSTOLIC BLOOD PRESSURE: 138 MMHG | TEMPERATURE: 98.4 F | WEIGHT: 293 LBS | HEIGHT: 62 IN | BODY MASS INDEX: 53.92 KG/M2 | DIASTOLIC BLOOD PRESSURE: 83 MMHG

## 2021-01-18 DIAGNOSIS — M51.9 INTERVERTEBRAL LUMBAR DISC DISORDER: Primary | Chronic | ICD-10-CM

## 2021-01-18 PROCEDURE — 99213 OFFICE O/P EST LOW 20 MIN: CPT | Performed by: FAMILY MEDICINE

## 2021-01-18 RX ORDER — LIRAGLUTIDE 6 MG/ML
1.8 INJECTION SUBCUTANEOUS DAILY
Qty: 2 PEN | Refills: 3 | Status: SHIPPED
Start: 2021-01-18 | End: 2021-02-17 | Stop reason: SDUPTHER

## 2021-01-18 RX ORDER — PREDNISONE 10 MG/1
TABLET ORAL
Qty: 18 TABLET | Refills: 0 | Status: SHIPPED
Start: 2021-01-18 | End: 2021-02-16 | Stop reason: ALTCHOICE

## 2021-01-18 ASSESSMENT — PATIENT HEALTH QUESTIONNAIRE - PHQ9
SUM OF ALL RESPONSES TO PHQ QUESTIONS 1-9: 0
SUM OF ALL RESPONSES TO PHQ9 QUESTIONS 1 & 2: 0
SUM OF ALL RESPONSES TO PHQ QUESTIONS 1-9: 0

## 2021-01-18 NOTE — PROGRESS NOTES
21  Name: Isatu Mclain    : 1955    Sex: female    Age: 60 y.o. Subjective:  Chief Complaint: Patient is here for right low back and right leg pain     The night of her right knee shot she dvelped pain in right low back an dradination into right entire leg  The shot on  1-3  Seeing pain clinic  Pain worse when she lays on the right side  She not ck bs at all       [place] off an don      Review of Systems   Constitutional: Negative. HENT: Negative. Eyes: Negative. Respiratory: Negative. Cardiovascular: Negative. Gastrointestinal: Negative. Endocrine: Negative. Genitourinary: Negative. Musculoskeletal: Positive for back pain. See  hpi   Skin: Negative. Allergic/Immunologic: Negative. Neurological: Negative. Hematological: Negative. Psychiatric/Behavioral: Negative. Current Outpatient Medications:     predniSONE (DELTASONE) 10 MG tablet, ONE TID FOR THREE DAYS, ONE BID FOR THREE DAYS, ONE QD FOR THREE DAYS   FOOD, Disp: 18 tablet, Rfl: 0    rOPINIRole (REQUIP) 2 MG tablet, Take 1 tablet by mouth 4 times daily, Disp: 360 tablet, Rfl: 12    ammonium lactate (LAC-HYDRIN) 12 % cream, Apply topically as needed. , Disp: 385 g, Rfl: 2    Turmeric 500 MG CAPS, Take by mouth, Disp: , Rfl:     buPROPion (WELLBUTRIN XL) 150 MG extended release tablet, Take 1 tablet by mouth every morning, Disp: 90 tablet, Rfl: 3    Cream Base (VERSAPRO) CREA, APPLY ONE (1) GRAM (ONE PUMP) EXTERNALLY FOUR TIMES A DAY TO EACH KNEE, Disp: 200 g, Rfl: 1    furosemide (LASIX) 40 MG tablet, Take 40 mg by mouth daily as needed for Other Swelling, Disp: , Rfl:     Liraglutide (VICTOZA SC), Inject 1.8 mg into the skin daily , Disp: , Rfl:   No Known Allergies  Social History     Socioeconomic History    Marital status:      Spouse name: Not on file    Number of children: Not on file    Years of education: Not on file  Highest education level: Not on file   Occupational History    Not on file   Social Needs    Financial resource strain: Not on file    Food insecurity     Worry: Not on file     Inability: Not on file    Transportation needs     Medical: Not on file     Non-medical: Not on file   Tobacco Use    Smoking status: Current Every Day Smoker     Packs/day: 0.25     Years: 38.00     Pack years: 9.50     Types: Cigarettes     Last attempt to quit: 2019     Years since quittin.6    Smokeless tobacco: Never Used   Substance and Sexual Activity    Alcohol use: No    Drug use: No    Sexual activity: Not Currently   Lifestyle    Physical activity     Days per week: Not on file     Minutes per session: Not on file    Stress: Not on file   Relationships    Social connections     Talks on phone: Not on file     Gets together: Not on file     Attends Rastafari service: Not on file     Active member of club or organization: Not on file     Attends meetings of clubs or organizations: Not on file     Relationship status: Not on file    Intimate partner violence     Fear of current or ex partner: Not on file     Emotionally abused: Not on file     Physically abused: Not on file     Forced sexual activity: Not on file   Other Topics Concern    Not on file   Social History Narrative        Chronic Diagnosis: Iron deficiency anemia, Depressive disorder, Benign essential hypertension, Mixed    hyperlipidemia.     HTN    OBESITY    LIPID    OA    SMOKER    CVA L FIELD VISION    DEPRESSION    GASTRIC BYPASS 01    BREAST REDUCTION--    Anisha Barcenas  1955 Page #2    ANEMIA==IRON AND B-12    Admitted 2019 with cellulitis left lower extremity then readmitted with chest pain with negative stress test      Past Medical History:   Diagnosis Date    Anemia     Arthritis     Cellulitis 2015    left leg    Chronic ulcer of leg with fat layer exposed ([facility] [address] 2015  Chronic ulcer of right leg, limited to breakdown of skin ([address] 2016    Depression     Low back pain     Lymphedema of both lower extremities 2015    Lymphedema of lower extremity 2015    Obesity     Osteoarthritis     Peripheral vision loss     Stroke [facility])     Type 2 diabetes mellitus without complication, without long-term current use of insulin ([address] 2019    Ulcer of left lower leg, limited to breakdown of skin ([address] 6/10/2019    Ulcer of left lower leg, limited to breakdown of skin ([address] 6/10/2019     Family History   Problem Relation Age of Onset    Breast Cancer Mother     Stroke Father     Hypertension Sister     Hypertension Brother       Past Surgical History:   Procedure Laterality Date    ABDOMINOPLASTY      BREAST REDUCTION SURGERY      reduction    CATARACT REMOVAL      bilateral     SECTION      x2    COLONOSCOPY  2012    and egd    ECHOCARDIOGRAM COMPLETE 2D W DOPPLER W COLOR  2012         GASTRIC BYPASS SURGERY  2000    HERNIA REPAIR            Vitals:    21 1718   BP: 138/83   Temp: 98.4 °F (36.9 °C)   TempSrc: Oral   Weight: 297 lb (134.7 kg)   Height: 5' 2\" (1.575 m)       Objective:    Physical Exam  Vitals signs reviewed. Constitutional:       Appearance: She is well-developed. HENT:      Head: Normocephalic. Eyes:      Pupils: Pupils are equal, round, and reactive to light. Neck:      Musculoskeletal: Normal range of motion. Cardiovascular:      Rate and Rhythm: Normal rate and regular rhythm. Pulmonary:      Effort: Pulmonary effort is normal.      Breath sounds: Normal breath sounds. Abdominal:      Palpations: Abdomen is soft. Musculoskeletal:      Comments: Para lubmar spasm with dec lubmar flex  50 %   tende rwith palp right si  joint   Skin:     General: Skin is warm. Neurological:      Mental Status: She is alert and oriented to person, place, and time.    Psychiatric: Behavior: Behavior normal.         Iván Butcher was seen today for back pain and knee pain. Diagnoses and all orders for this visit:    Intervertebral lumbar disc disorder  -     predniSONE (DELTASONE) 10 MG tablet; ONE TID FOR THREE DAYS, ONE BID FOR THREE DAYS, ONE QD FOR THREE DAYS   FOOD        Comments: meds not grea tsee  Worse to e r   A great deal of time spent reviewing medications, diet, exercise, social issues. Also reviewing the chart before entering the room with patient and finishing charting after leaving patient's room. More than half of that time was spent face to face with the patient in counseling and coordinating care. Advised  Lab and ck bs  She  rf lab due to cost    See ana gonzáles          Follow Up: No follow-ups on file.      Seen by:  Miranda Palomino DO

## 2021-01-20 ENCOUNTER — VIRTUAL VISIT (OUTPATIENT)
Dept: PAIN MANAGEMENT | Age: 66
End: 2021-01-20
Payer: COMMERCIAL

## 2021-01-20 DIAGNOSIS — G89.29 CHRONIC RIGHT-SIDED LOW BACK PAIN WITHOUT SCIATICA: ICD-10-CM

## 2021-01-20 DIAGNOSIS — M17.0 PRIMARY OSTEOARTHRITIS OF BOTH KNEES: Primary | ICD-10-CM

## 2021-01-20 DIAGNOSIS — M25.511 CHRONIC RIGHT SHOULDER PAIN: ICD-10-CM

## 2021-01-20 DIAGNOSIS — M54.50 CHRONIC RIGHT-SIDED LOW BACK PAIN WITHOUT SCIATICA: ICD-10-CM

## 2021-01-20 DIAGNOSIS — G89.29 CHRONIC RIGHT SHOULDER PAIN: ICD-10-CM

## 2021-01-20 DIAGNOSIS — G89.4 CHRONIC PAIN SYNDROME: ICD-10-CM

## 2021-01-20 PROCEDURE — 99213 OFFICE O/P EST LOW 20 MIN: CPT | Performed by: PHYSICIAN ASSISTANT

## 2021-01-20 RX ORDER — BUPRENORPHINE HYDROCHLORIDE 450 UG/1
450 FILM, SOLUBLE BUCCAL EVERY 12 HOURS
Qty: 60 EACH | Refills: 1 | Status: ON HOLD
Start: 2021-01-21 | End: 2021-02-23

## 2021-01-20 NOTE — PROGRESS NOTES
Mirza Terrazas was read the following message We want to confirm that, for purposes of billing, this is a virtual visit with your provider for which we will submit a claim for reimbursement with your insurance company. You will be responsible for any copays, coinsurance amounts or other amounts not covered by your insurance company. If you do not accept this, unfortunately we will not be able to schedule a virtual visit with the provider. Do you accept?  Timoteo Cabello responded YES

## 2021-01-20 NOTE — PROGRESS NOTES
[address]  [facility]    Tele health follow up Note      Evaristo Dean     Date of Visit:  2021    CC:  Tele health follow up     Consent:  The patient and/or health care decision maker is aware that he/she may receive a bill for this tele-health service [facility], depending on his insurance coverage, and has provided verbal consent to proceed: Yes  I have considered the risks of abuse, dependence, addiction and diversion. My patient is aware that they will need a follow-up visit (in-person or virtually) at the appropriate time indicated for continued medications. Further, my patient is aware that when this acute crisis has lifted, they will be expected to return for an in-person visit and all elements of standard local and hospital guidelines in order to continue this medication. Patient Location:Home   Provider Location:  Office    HPI:    Pain is a little better to her knees after her knee CSIs on 2020. Reports that her lower back has been bothersome. Saw her PCP who have her prednisone. Having pain to her buttocks and legs as well. Appropriate analgesia with current medications regimen: yes. Change in quality of symptoms:no. Medication side effects:none. Recent diagnostic testing:none. Recent interventional procedures: 2021 PROCEDURE PERFORMED: Bilateral knee steroid injection - good relief x 6 weeks with some residual relief at this time. She has not been on anticoagulation medications to include ASA, NSAIDS, Plavix, heparin, LMW heparin and warfarin and has not been on herbal supplements.  She is diabetic.     Imagin/27/2019 Lumbar Spine X-ray     Alignment of the vertebral bodies appears to be near-anatomic   No fracture or foreign body is identified. The disc spaces demonstrate moderate degenerative changes.     There is mild loss of the vertebral body height   There are moderate degenerative changes involving the facets. There is grade 1 anterolisthesis of L3 on L4. Flexion-extension views demonstrates movement of the anterolisthesis   of L3 on L4.            Impression   Grade 1 anterolisthesis of L3 on L4 which does move with extension. Findings consistent with moderate degenerative disc disease and   degenerative facet disease.       The exam has been dictated and signed by Latonia Shafer. Felipa Lancaster APRN-CNP   and Mason Ortiz MD, reviewed and concurred with these findings.            9/3/2019 left knee x-ray     The bones appear to be in anatomic alignment. No foreign body is identified   No fracture is identified     There is moderate tricompartmental joint space loss greatest long   patellofemoral and medial femoral tibial joint   The are moderate degenerative changes present along the patellofemoral   and medial femoral tibial compartment           Impression   Moderate degenerative changes as described above          9/3/2019 right knee x-ray     The bones appear to be in anatomic alignment. No foreign body is identified   No fracture is identified. Marginal bone spurs are again seen along the inferior margins of the   patella. There is interval worsening of moderate tricompartmental joint space   loss    The are interval worsening of moderate degenerative tricompartmental   changes present            Impression   Moderate tricompartmental degenerative changes.  This has   worsened in the interim                                               Potential Aberrant Drug-Related Behavior:  None     Urine Drug Screenin2019 Consistent for buprenorphine and metabolites  2020 Consistent for buprenorphine  2020 Consistent   2020 Consistent     OARRS report:  10/2019 Consistent to 2021 Consistent       Past Medical History: Reviewed    Past Surgical History: Reviewed     Home Medications: Reviewed    Allergies: Reviewed     Social History: Reviewed     REVIEW OF SYSTEMS: Brandon Bill denies fever/chills, chest pain, shortness of breath, new bowel or bladder complaints. All other review of systems was negative. PHYSICAL EXAMINATION:      General:       A & O x3  Build: Morbidly obese    HEENT:    Head:normocephalic and atraumatic  Pupils:regular, round and equal.  Sclera: icterus absent,     Lungs:    Breathing:Normal expansion. No rales, rhonchi, or wheezing. .    Lumbar spine:    Range of motion:abnormal mildly Lateral bending, flexion, extension rotation bilateral and is not painful. Extremities:    Tremors:None bilaterally upper and lower  Range of motion:Generally normal shoulders, pain with internal rotation of hips not done. Intact:Yes      Neurological:     Motor:          No apparent weakness per patient       Gait: not observed. Dermatology:    Skin:no unusual rashes, no skin lesions, no palpable subcutaneous nodules and good skin turgor    Impression:    B/L Knee Pain, LBP, right shoulder pain  Knee x-rays:  Moderate DJD.    Hx of pain management with [facility] pain clinic.  Was on fentanyl 25 mg and norco 5/325 TID.  Did not help and caused drowsiness.  Now on Belbuca 450 mcg BID (increased from 300 mg BID) which is working very well without side effects. Transferred care to [facility]) due to cost.    Hx of LESI by Dr. 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 PCP ordered steroids. She will call if not helpful. States injections not helpful in the past.      Plan:  Aquatherapy helped in the past.    Started turmeric which is helping her pain  Working on losing weight. Lost 32 lbs.    Consider MRI lumbar spine.    OARRS reviewed 01/2021  UDS reviewed and is consistent  Continue Belbuca 450 mcg BID   B/L knee CSIs on 12/03/2021 with 6 weeks relief with some residual relief at this time. Patient requests same dosing as ortho. RI ARTHROCENTESIS ASPIR&/INJ MAJOR JT/BURSA W/O US      triamcinolone acetonide (KENALOG-40) injection 40 mg     lidocaine 1 % injection 3 mL     bupivacaine (PF) (MARCAINE) 0.25 % injection 15 mg     RI ARTHROCENTESIS ASPIR&/INJ MAJOR JT/BURSA W/O US     triamcinolone acetonide (KENALOG-40) injection 40 mg     lidocaine 1 % injection 3 mL       Has failed knee viscosupplementation  Patient encouraged to stay active and to lose weight  Treatment plan discussed with the patient        Controlled Substance Monitoring:    Acute and Chronic Pain Monitoring:   RX Monitoring 1/20/2021   Periodic Controlled Substance Monitoring Possible medication side effects, risk of tolerance/dependence & alternative treatments discussed. ;No signs of potential drug abuse or diversion identified. ;Assessed functional status. ;Obtaining appropriate analgesic effect of treatment. We discussed with the patient that combining opioids, benzodiazepines, alcohol, illicit drugs or sleep aids increases the risk of respiratory depression including death. We discussed that these medications may cause drowsiness, sedation or dizziness and have counseled the patient not to drive or operate machinery. We have discussed that these medications will be prescribed only by one provider. We have discussed with the patient about age related risk factors and have thoroughly discussed the importance of taking these medications as prescribed. The patient verbalizes understanding. Patient advised regarding steps to help prevent the spread of COVID-19   SOURCE - https://josie-stephanie.info/. html     1-Stay home except to get medical care  2-Clean your hands often for atleast 20 seconds, avoid touching: Avoid touching your eyes, nose, and mouth with unwashed hands.   3-Seek medical attention: Seek prompt medical attention if your illness is worsening (e.g., difficulty breathing). Call you doctor first.  3-Wear a facemask if you are sick   4-Cover your coughs and sneezes           I affirm this is a Patient Initiated Episode with an established Patient who has not had a related appointment within my department in the past 7 days or scheduled within the next 24 hours.     Total Time:  20 minutes    Cc: Referring physician

## 2021-02-16 ENCOUNTER — OFFICE VISIT (OUTPATIENT)
Dept: FAMILY MEDICINE CLINIC | Age: 66
End: 2021-02-16
Payer: COMMERCIAL

## 2021-02-16 VITALS
HEART RATE: 101 BPM | OXYGEN SATURATION: 96 % | DIASTOLIC BLOOD PRESSURE: 82 MMHG | WEIGHT: 293 LBS | SYSTOLIC BLOOD PRESSURE: 136 MMHG | BODY MASS INDEX: 53.92 KG/M2 | RESPIRATION RATE: 18 BRPM | HEIGHT: 62 IN | TEMPERATURE: 97.7 F

## 2021-02-16 DIAGNOSIS — G89.29 CHRONIC RIGHT-SIDED LOW BACK PAIN WITHOUT SCIATICA: Primary | ICD-10-CM

## 2021-02-16 DIAGNOSIS — M54.50 CHRONIC RIGHT-SIDED LOW BACK PAIN WITHOUT SCIATICA: Primary | ICD-10-CM

## 2021-02-16 PROCEDURE — 96372 THER/PROPH/DIAG INJ SC/IM: CPT | Performed by: PHYSICIAN ASSISTANT

## 2021-02-16 PROCEDURE — 99214 OFFICE O/P EST MOD 30 MIN: CPT | Performed by: PHYSICIAN ASSISTANT

## 2021-02-16 RX ORDER — METHYLPREDNISOLONE ACETATE 80 MG/ML
60 INJECTION, SUSPENSION INTRA-ARTICULAR; INTRALESIONAL; INTRAMUSCULAR; SOFT TISSUE ONCE
Status: COMPLETED | OUTPATIENT
Start: 2021-02-16 | End: 2021-02-16

## 2021-02-16 RX ORDER — KETOROLAC TROMETHAMINE 30 MG/ML
30 INJECTION, SOLUTION INTRAMUSCULAR; INTRAVENOUS ONCE
Status: COMPLETED | OUTPATIENT
Start: 2021-02-16 | End: 2021-02-16

## 2021-02-16 RX ORDER — PREDNISONE 20 MG/1
TABLET ORAL
Qty: 18 TABLET | Refills: 0 | Status: ON HOLD
Start: 2021-02-16 | End: 2021-02-23 | Stop reason: HOSPADM

## 2021-02-16 RX ADMIN — METHYLPREDNISOLONE ACETATE 60 MG: 80 INJECTION, SUSPENSION INTRA-ARTICULAR; INTRALESIONAL; INTRAMUSCULAR; SOFT TISSUE at 15:07

## 2021-02-16 RX ADMIN — KETOROLAC TROMETHAMINE 30 MG: 30 INJECTION, SOLUTION INTRAMUSCULAR; INTRAVENOUS at 15:07

## 2021-02-16 NOTE — PROGRESS NOTES
21  [facility] Shoulder : 1955 Sex: female  Age 60 y.o. Subjective:  Chief Complaint   Patient presents with    Back Pain         HPI:   Linda Reyes , 60 y.o. female presents to express care for evaluation of right low back pain. The patient does see pain management for this chronic back pain. The patient states that this seems to be a acute exacerbation. The patient had a similar episode about a month ago. The patient denies falls or injury. The patient states that she is always incontinent of urine and this is not a new finding for her. No new bowel incontinence. The patient thought having any saddle anesthesia. It is worse when she is walking. The patient not having any abdominal pain, upper back pain. No dysuria or hematuria. The patient has been taking her prescribed medications as instructed. The patient denies any fevers. ROS:   Unless otherwise stated in this report the patient's positive and negative responses for review of systems for constitutional, eyes, ENT, cardiovascular, respiratory, gastrointestinal, neurological, , musculoskeletal, and integument systems and related systems to the presenting problem are either stated in the history of present illness or were not pertinent or were negative for the symptoms and/or complaints related to the presenting medical problem. Positives and pertinent negatives as per HPI. All others reviewed and are negative.       PMH:     Past Medical History:   Diagnosis Date    Anemia     Arthritis     Cellulitis 2015    left leg    Chronic ulcer of leg with fat layer exposed ([facility] [address] 2015    Chronic ulcer of right leg, limited to breakdown of skin ([facility] [address] 2016    Depression     Low back pain     Lymphedema of both lower extremities 2015    Lymphedema of lower extremity 2015    Obesity     Osteoarthritis     Peripheral vision loss     Stroke [facility])  Type 2 diabetes mellitus without complication, without long-term current use of insulin ([facility] [address] 2019    Ulcer of left lower leg, limited to breakdown of skin ([address] 6/10/2019    Ulcer of left lower leg, limited to breakdown of skin ([facility] [address] 6/10/2019       Past Surgical History:   Procedure Laterality Date    ABDOMINOPLASTY      BREAST REDUCTION SURGERY      reduction    CATARACT REMOVAL      bilateral     SECTION      x2    COLONOSCOPY  2012    and egd    ECHOCARDIOGRAM COMPLETE 2D W DOPPLER W COLOR  2012         GASTRIC BYPASS SURGERY  2000    HERNIA REPAIR             Family History   Problem Relation Age of Onset    Breast Cancer Mother     Stroke Father     Hypertension Sister     Hypertension Brother        Medications:     Current Outpatient Medications:     BELBUCA 450 MCG FILM, Take 450 mcg by mouth every 12 hours for 30 days. Due 10/25 - ordered today in case it is not at pharmacy. , Disp: 60 each, Rfl: 1    Liraglutide (VICTOZA) 18 MG/3ML SOPN SC injection, Inject 1.8 mg into the skin daily, Disp: 2 pen, Rfl: 3    rOPINIRole (REQUIP) 2 MG tablet, Take 1 tablet by mouth 4 times daily, Disp: 360 tablet, Rfl: 12    ammonium lactate (LAC-HYDRIN) 12 % cream, Apply topically as needed. , Disp: 385 g, Rfl: 2    Turmeric 500 MG CAPS, Take by mouth, Disp: , Rfl:     buPROPion (WELLBUTRIN XL) 150 MG extended release tablet, Take 1 tablet by mouth every morning, Disp: 90 tablet, Rfl: 3    Cream Base (VERSAPRO) CREA, APPLY ONE (1) GRAM (ONE PUMP) EXTERNALLY FOUR TIMES A DAY TO EACH KNEE, Disp: 200 g, Rfl: 1    furosemide (LASIX) 40 MG tablet, Take 40 mg by mouth daily as needed for Other Swelling, Disp: , Rfl:     Allergies:   No Known Allergies    Social History:     Social History     Tobacco Use    Smoking status: Current Every Day Smoker     Packs/day: 0.25     Years: 38.00     Pack years: 9.50     Types: Cigarettes     Last attempt to quit: 2019 Years since quittin.7    Smokeless tobacco: Never Used   Substance Use Topics    Alcohol use: No    Drug use: No       Patient lives at home. Physical Exam:     Vitals:    21 1455   BP: 136/82   Pulse: 101   Resp: 18   Temp: 97.7 °F (36.5 °C)   SpO2: 96%   Weight: 297 lb (134.7 kg)   Height: 5' 2\" (1.575 m)       Exam:  Physical Exam  Nurses note and vital signs reviewed and patient is not hypoxic. General: The patient appears well and in no apparent distress. Patient is resting comfortably on cart. Skin: Warm, dry, no pallor noted. There is no rash noted. Head: Normocephalic, atraumatic. Eye: Normal conjunctiva  Ears, Nose, Mouth, and Throat: Oral mucosa is moist  Cardiovascular: Regular Rate and Rhythm  Respiratory: Patient is in no distress, no accessory muscle use, lungs are clear to auscultation, no wheezing, crackles or rhonchi  Back: Diffuse tenderness noted to the right paralumbar and into the right SI joint and minimal tenderness to the right buttocks, negative straight leg raise, no midline tenderness, no thoracic spinal tenderness, no left-sided tenderness, no CVA tenderness. The patient was able to stand and rise from a squatting sitting position. GI: Normal bowel sounds, no tenderness to palpation, no masses appreciated. No rebound, guarding, or rigidity noted. Musculoskeletal: The patient has no evidence of calf tenderness, no pitting edema, symmetrical pulses noted bilaterally the patient does have evidence of edema noted to the bilateral lower extremities that appears to be symmetrical, no significant calf tenderness, negative straight leg raise, normal sensation  Neurological: A&O x4, normal speech      Testing:           Medical Decision Making:     Vital signs reviewed    Past medical history reviewed. Allergies reviewed. Medications reviewed. Patient on arrival does not appear to be in any apparent distress or discomfort. The patient was noted to be grossly neurologically intact and no presence of red flags. The patient was treated with adequate analgesia here in the office. The patient vital signs were reviewed in the patient had no neurologic deficit noted. Due to the nature of the patient's history of present illness and the benign physical exam we do not feel that x-rays were clinically warranted. The patient will be given a tapering dose of prednisone. The patient will follow up with pain management. The patient is to followup in the next 3-5 days for repeat evaluation or go to the emergency department is not able to do this. The patient will be discharged home with adequate analgesia. The patient has no other questions or concerns at this time      Clinical Impression:   Marivel Egan was seen today for back pain. Diagnoses and all orders for this visit:    Chronic right-sided low back pain without sciatica    Other orders  -     methylPREDNISolone acetate (DEPO-MEDROL) injection 60 mg  -     ketorolac (TORADOL) injection 30 mg  -     predniSONE (DELTASONE) 20 MG tablet; 3 tablets once daily for 3 days, 2 tablets once daily for 3 days, one tablet once daily for 3 days        The patient is to call for any concerns or return if any of the signs or symptoms worsen. The patient is to follow-up with PCP in the next 2-3 days for repeat evaluation repeat assessment or go directly to the emergency department.      SIGNATURE: Omar Elaine III, PA-C

## 2021-02-17 ENCOUNTER — OFFICE VISIT (OUTPATIENT)
Dept: PRIMARY CARE CLINIC | Age: 66
End: 2021-02-17
Payer: COMMERCIAL

## 2021-02-17 ENCOUNTER — NURSE TRIAGE (OUTPATIENT)
Dept: OTHER | Facility: CLINIC | Age: 66
End: 2021-02-17

## 2021-02-17 VITALS
TEMPERATURE: 98.9 F | BODY MASS INDEX: 54.32 KG/M2 | SYSTOLIC BLOOD PRESSURE: 142 MMHG | DIASTOLIC BLOOD PRESSURE: 83 MMHG | WEIGHT: 293 LBS

## 2021-02-17 DIAGNOSIS — S39.012A STRAIN OF LUMBAR REGION, INITIAL ENCOUNTER: ICD-10-CM

## 2021-02-17 DIAGNOSIS — M53.3 SACRO-ILIAC PAIN: Primary | ICD-10-CM

## 2021-02-17 DIAGNOSIS — F32.9 REACTIVE DEPRESSION: ICD-10-CM

## 2021-02-17 DIAGNOSIS — E11.9 TYPE 2 DIABETES MELLITUS WITHOUT COMPLICATION, WITHOUT LONG-TERM CURRENT USE OF INSULIN (HCC): ICD-10-CM

## 2021-02-17 PROCEDURE — 99213 OFFICE O/P EST LOW 20 MIN: CPT | Performed by: FAMILY MEDICINE

## 2021-02-17 PROCEDURE — 96372 THER/PROPH/DIAG INJ SC/IM: CPT | Performed by: FAMILY MEDICINE

## 2021-02-17 RX ORDER — KETOROLAC TROMETHAMINE 30 MG/ML
30 INJECTION, SOLUTION INTRAMUSCULAR; INTRAVENOUS ONCE
Qty: 1 ML | Refills: 0
Start: 2021-02-17 | End: 2021-02-17

## 2021-02-17 RX ORDER — LIRAGLUTIDE 6 MG/ML
1.8 INJECTION SUBCUTANEOUS DAILY
Qty: 2 PEN | Refills: 3 | Status: ON HOLD
Start: 2021-02-17 | End: 2021-02-23 | Stop reason: HOSPADM

## 2021-02-17 RX ORDER — BACLOFEN 10 MG/1
10 TABLET ORAL 3 TIMES DAILY
Qty: 30 TABLET | Refills: 5 | Status: SHIPPED
Start: 2021-02-17 | End: 2021-02-17

## 2021-02-17 RX ORDER — BACLOFEN 10 MG/1
10 TABLET ORAL 3 TIMES DAILY
Qty: 30 TABLET | Refills: 5 | Status: ON HOLD
Start: 2021-02-17 | End: 2021-05-17 | Stop reason: HOSPADM

## 2021-02-17 RX ORDER — BUPROPION HYDROCHLORIDE 150 MG/1
150 TABLET ORAL EVERY MORNING
Qty: 90 TABLET | Refills: 3 | Status: SHIPPED
Start: 2021-02-17 | End: 2021-07-27 | Stop reason: SDUPTHER

## 2021-02-17 RX ORDER — KETOROLAC TROMETHAMINE 30 MG/ML
30 INJECTION, SOLUTION INTRAMUSCULAR; INTRAVENOUS ONCE
Status: COMPLETED | OUTPATIENT
Start: 2021-02-17 | End: 2021-02-17

## 2021-02-17 RX ADMIN — KETOROLAC TROMETHAMINE 30 MG: 30 INJECTION, SOLUTION INTRAMUSCULAR; INTRAVENOUS at 16:59

## 2021-02-17 ASSESSMENT — ENCOUNTER SYMPTOMS
GASTROINTESTINAL NEGATIVE: 1
ALLERGIC/IMMUNOLOGIC NEGATIVE: 1
RESPIRATORY NEGATIVE: 1
BACK PAIN: 1
EYES NEGATIVE: 1

## 2021-02-18 ENCOUNTER — HOSPITAL ENCOUNTER (INPATIENT)
Age: 66
LOS: 3 days | Discharge: HOME HEALTH | DRG: 552 | End: 2021-02-23
Attending: EMERGENCY MEDICINE | Admitting: INTERNAL MEDICINE
Payer: COMMERCIAL

## 2021-02-18 ENCOUNTER — APPOINTMENT (OUTPATIENT)
Dept: CT IMAGING | Age: 66
DRG: 552 | End: 2021-02-18
Payer: COMMERCIAL

## 2021-02-18 DIAGNOSIS — R03.0 ELEVATED BLOOD PRESSURE READING: ICD-10-CM

## 2021-02-18 DIAGNOSIS — M54.50 CHRONIC RIGHT-SIDED LOW BACK PAIN WITHOUT SCIATICA: ICD-10-CM

## 2021-02-18 DIAGNOSIS — M17.0 PRIMARY OSTEOARTHRITIS OF BOTH KNEES: ICD-10-CM

## 2021-02-18 DIAGNOSIS — G89.29 CHRONIC RIGHT-SIDED LOW BACK PAIN WITHOUT SCIATICA: ICD-10-CM

## 2021-02-18 DIAGNOSIS — M54.31 SCIATICA OF RIGHT SIDE: ICD-10-CM

## 2021-02-18 DIAGNOSIS — M54.9 INTRACTABLE BACK PAIN: Primary | ICD-10-CM

## 2021-02-18 DIAGNOSIS — M25.511 CHRONIC RIGHT SHOULDER PAIN: ICD-10-CM

## 2021-02-18 DIAGNOSIS — G89.29 CHRONIC RIGHT SHOULDER PAIN: ICD-10-CM

## 2021-02-18 DIAGNOSIS — G89.4 CHRONIC PAIN SYNDROME: ICD-10-CM

## 2021-02-18 PROBLEM — M54.41 ACUTE MIDLINE LOW BACK PAIN WITH RIGHT-SIDED SCIATICA: Status: ACTIVE | Noted: 2021-02-18

## 2021-02-18 LAB
ANION GAP SERPL CALCULATED.3IONS-SCNC: 5 MMOL/L (ref 7–16)
ANISOCYTOSIS: ABNORMAL
BASOPHILS ABSOLUTE: 0 E9/L (ref 0–0.2)
BASOPHILS RELATIVE PERCENT: 0.2 % (ref 0–2)
BILIRUBIN URINE: NEGATIVE
BLOOD, URINE: NEGATIVE
BUN BLDV-MCNC: 16 MG/DL (ref 8–23)
CALCIUM SERPL-MCNC: 8.8 MG/DL (ref 8.6–10.2)
CHLORIDE BLD-SCNC: 102 MMOL/L (ref 98–107)
CLARITY: CLEAR
CO2: 28 MMOL/L (ref 22–29)
COLOR: YELLOW
CREAT SERPL-MCNC: 0.5 MG/DL (ref 0.5–1)
EOSINOPHILS ABSOLUTE: 0 E9/L (ref 0.05–0.5)
EOSINOPHILS RELATIVE PERCENT: 0 % (ref 0–6)
GFR AFRICAN AMERICAN: >60
GFR NON-AFRICAN AMERICAN: >60 ML/MIN/1.73
GLUCOSE BLD-MCNC: 121 MG/DL (ref 74–99)
GLUCOSE URINE: NEGATIVE MG/DL
HCT VFR BLD CALC: 36.6 % (ref 34–48)
HEMOGLOBIN: 9.1 G/DL (ref 11.5–15.5)
HYPOCHROMIA: ABNORMAL
KETONES, URINE: NEGATIVE MG/DL
LEUKOCYTE ESTERASE, URINE: NEGATIVE
LYMPHOCYTES ABSOLUTE: 1.22 E9/L (ref 1.5–4)
LYMPHOCYTES RELATIVE PERCENT: 8.7 % (ref 20–42)
MCH RBC QN AUTO: 16.2 PG (ref 26–35)
MCHC RBC AUTO-ENTMCNC: 24.9 % (ref 32–34.5)
MCV RBC AUTO: 65.1 FL (ref 80–99.9)
MONOCYTES ABSOLUTE: 0.68 E9/L (ref 0.1–0.95)
MONOCYTES RELATIVE PERCENT: 5.2 % (ref 2–12)
NEUTROPHILS ABSOLUTE: 11.61 E9/L (ref 1.8–7.3)
NEUTROPHILS RELATIVE PERCENT: 86.1 % (ref 43–80)
NITRITE, URINE: NEGATIVE
NUCLEATED RED BLOOD CELLS: 0.9 /100 WBC
OVALOCYTES: ABNORMAL
PDW BLD-RTO: 25.2 FL (ref 11.5–15)
PH UA: 6 (ref 5–9)
PLATELET # BLD: 278 E9/L (ref 130–450)
PMV BLD AUTO: ABNORMAL FL (ref 7–12)
POIKILOCYTES: ABNORMAL
POLYCHROMASIA: ABNORMAL
POTASSIUM SERPL-SCNC: 4.6 MMOL/L (ref 3.5–5)
PROTEIN UA: NEGATIVE MG/DL
RBC # BLD: 5.62 E12/L (ref 3.5–5.5)
SODIUM BLD-SCNC: 135 MMOL/L (ref 132–146)
SPECIFIC GRAVITY UA: >=1.03 (ref 1–1.03)
UROBILINOGEN, URINE: 1 E.U./DL
WBC # BLD: 13.5 E9/L (ref 4.5–11.5)

## 2021-02-18 PROCEDURE — 96372 THER/PROPH/DIAG INJ SC/IM: CPT

## 2021-02-18 PROCEDURE — 6360000002 HC RX W HCPCS: Performed by: INTERNAL MEDICINE

## 2021-02-18 PROCEDURE — 96375 TX/PRO/DX INJ NEW DRUG ADDON: CPT

## 2021-02-18 PROCEDURE — G0378 HOSPITAL OBSERVATION PER HR: HCPCS

## 2021-02-18 PROCEDURE — 6360000002 HC RX W HCPCS: Performed by: NURSE PRACTITIONER

## 2021-02-18 PROCEDURE — 6370000000 HC RX 637 (ALT 250 FOR IP): Performed by: PHYSICIAN ASSISTANT

## 2021-02-18 PROCEDURE — 6370000000 HC RX 637 (ALT 250 FOR IP): Performed by: INTERNAL MEDICINE

## 2021-02-18 PROCEDURE — 99222 1ST HOSP IP/OBS MODERATE 55: CPT | Performed by: NEUROLOGICAL SURGERY

## 2021-02-18 PROCEDURE — 96374 THER/PROPH/DIAG INJ IV PUSH: CPT

## 2021-02-18 PROCEDURE — 36415 COLL VENOUS BLD VENIPUNCTURE: CPT

## 2021-02-18 PROCEDURE — 80048 BASIC METABOLIC PNL TOTAL CA: CPT

## 2021-02-18 PROCEDURE — 6370000000 HC RX 637 (ALT 250 FOR IP): Performed by: NURSE PRACTITIONER

## 2021-02-18 PROCEDURE — 85025 COMPLETE CBC W/AUTO DIFF WBC: CPT

## 2021-02-18 PROCEDURE — 81003 URINALYSIS AUTO W/O SCOPE: CPT

## 2021-02-18 PROCEDURE — 96376 TX/PRO/DX INJ SAME DRUG ADON: CPT

## 2021-02-18 PROCEDURE — 2580000003 HC RX 258: Performed by: INTERNAL MEDICINE

## 2021-02-18 PROCEDURE — 6360000002 HC RX W HCPCS: Performed by: PHYSICIAN ASSISTANT

## 2021-02-18 PROCEDURE — 72131 CT LUMBAR SPINE W/O DYE: CPT

## 2021-02-18 PROCEDURE — 99284 EMERGENCY DEPT VISIT MOD MDM: CPT

## 2021-02-18 RX ORDER — METHYLPREDNISOLONE 4 MG/1
4 TABLET ORAL
Status: COMPLETED | OUTPATIENT
Start: 2021-02-19 | End: 2021-02-21

## 2021-02-18 RX ORDER — MORPHINE SULFATE 4 MG/ML
5 INJECTION, SOLUTION INTRAMUSCULAR; INTRAVENOUS EVERY 4 HOURS PRN
Status: DISCONTINUED | OUTPATIENT
Start: 2021-02-18 | End: 2021-02-18

## 2021-02-18 RX ORDER — SODIUM CHLORIDE 0.9 % (FLUSH) 0.9 %
10 SYRINGE (ML) INJECTION EVERY 12 HOURS SCHEDULED
Status: DISCONTINUED | OUTPATIENT
Start: 2021-02-18 | End: 2021-02-23 | Stop reason: HOSPADM

## 2021-02-18 RX ORDER — METHYLPREDNISOLONE 4 MG/1
4 TABLET ORAL
Status: COMPLETED | OUTPATIENT
Start: 2021-02-19 | End: 2021-02-20

## 2021-02-18 RX ORDER — ACETAMINOPHEN 325 MG/1
650 TABLET ORAL EVERY 6 HOURS PRN
Status: DISCONTINUED | OUTPATIENT
Start: 2021-02-18 | End: 2021-02-23 | Stop reason: HOSPADM

## 2021-02-18 RX ORDER — POLYETHYLENE GLYCOL 3350 17 G/17G
17 POWDER, FOR SOLUTION ORAL DAILY PRN
Status: DISCONTINUED | OUTPATIENT
Start: 2021-02-18 | End: 2021-02-23 | Stop reason: HOSPADM

## 2021-02-18 RX ORDER — OXYCODONE HYDROCHLORIDE 10 MG/1
10 TABLET ORAL ONCE
Status: COMPLETED | OUTPATIENT
Start: 2021-02-18 | End: 2021-02-18

## 2021-02-18 RX ORDER — PROMETHAZINE HYDROCHLORIDE 25 MG/1
12.5 TABLET ORAL EVERY 6 HOURS PRN
Status: DISCONTINUED | OUTPATIENT
Start: 2021-02-18 | End: 2021-02-23 | Stop reason: HOSPADM

## 2021-02-18 RX ORDER — SODIUM CHLORIDE 0.9 % (FLUSH) 0.9 %
10 SYRINGE (ML) INJECTION PRN
Status: DISCONTINUED | OUTPATIENT
Start: 2021-02-18 | End: 2021-02-23 | Stop reason: HOSPADM

## 2021-02-18 RX ORDER — METHYLPREDNISOLONE 4 MG/1
4 TABLET ORAL
Status: COMPLETED | OUTPATIENT
Start: 2021-02-19 | End: 2021-02-23

## 2021-02-18 RX ORDER — METHYLPREDNISOLONE 4 MG/1
8 TABLET ORAL NIGHTLY
Status: COMPLETED | OUTPATIENT
Start: 2021-02-19 | End: 2021-02-19

## 2021-02-18 RX ORDER — MORPHINE SULFATE 2 MG/ML
2 INJECTION, SOLUTION INTRAMUSCULAR; INTRAVENOUS EVERY 4 HOURS PRN
Status: DISCONTINUED | OUTPATIENT
Start: 2021-02-18 | End: 2021-02-19

## 2021-02-18 RX ORDER — ACETAMINOPHEN 650 MG/1
650 SUPPOSITORY RECTAL EVERY 6 HOURS PRN
Status: DISCONTINUED | OUTPATIENT
Start: 2021-02-18 | End: 2021-02-23 | Stop reason: HOSPADM

## 2021-02-18 RX ORDER — ORPHENADRINE CITRATE 30 MG/ML
60 INJECTION INTRAMUSCULAR; INTRAVENOUS ONCE
Status: COMPLETED | OUTPATIENT
Start: 2021-02-18 | End: 2021-02-18

## 2021-02-18 RX ORDER — METHYLPREDNISOLONE 4 MG/1
24 TABLET ORAL ONCE
Status: COMPLETED | OUTPATIENT
Start: 2021-02-18 | End: 2021-02-18

## 2021-02-18 RX ORDER — CYCLOBENZAPRINE HCL 10 MG
10 TABLET ORAL 3 TIMES DAILY PRN
Status: DISCONTINUED | OUTPATIENT
Start: 2021-02-18 | End: 2021-02-19

## 2021-02-18 RX ORDER — LORAZEPAM 2 MG/ML
1 INJECTION INTRAMUSCULAR SEE ADMIN INSTRUCTIONS
Status: DISCONTINUED | OUTPATIENT
Start: 2021-02-18 | End: 2021-02-23 | Stop reason: HOSPADM

## 2021-02-18 RX ORDER — ONDANSETRON 2 MG/ML
4 INJECTION INTRAMUSCULAR; INTRAVENOUS EVERY 6 HOURS PRN
Status: DISCONTINUED | OUTPATIENT
Start: 2021-02-18 | End: 2021-02-23 | Stop reason: HOSPADM

## 2021-02-18 RX ORDER — KETOROLAC TROMETHAMINE 30 MG/ML
15 INJECTION, SOLUTION INTRAMUSCULAR; INTRAVENOUS ONCE
Status: COMPLETED | OUTPATIENT
Start: 2021-02-18 | End: 2021-02-18

## 2021-02-18 RX ORDER — GABAPENTIN 600 MG/1
300 TABLET ORAL 3 TIMES DAILY
Status: DISCONTINUED | OUTPATIENT
Start: 2021-02-18 | End: 2021-02-19

## 2021-02-18 RX ORDER — METHYLPREDNISOLONE 4 MG/1
4 TABLET ORAL NIGHTLY
Status: COMPLETED | OUTPATIENT
Start: 2021-02-20 | End: 2021-02-22

## 2021-02-18 RX ORDER — BUPROPION HYDROCHLORIDE 150 MG/1
150 TABLET ORAL EVERY MORNING
Status: DISCONTINUED | OUTPATIENT
Start: 2021-02-18 | End: 2021-02-23 | Stop reason: HOSPADM

## 2021-02-18 RX ORDER — HYDRALAZINE HYDROCHLORIDE 20 MG/ML
10 INJECTION INTRAMUSCULAR; INTRAVENOUS ONCE
Status: COMPLETED | OUTPATIENT
Start: 2021-02-18 | End: 2021-02-18

## 2021-02-18 RX ORDER — MORPHINE SULFATE 4 MG/ML
10 INJECTION, SOLUTION INTRAMUSCULAR; INTRAVENOUS ONCE
Status: COMPLETED | OUTPATIENT
Start: 2021-02-18 | End: 2021-02-18

## 2021-02-18 RX ORDER — ROPINIROLE 2 MG/1
2 TABLET, FILM COATED ORAL 4 TIMES DAILY
Status: DISCONTINUED | OUTPATIENT
Start: 2021-02-18 | End: 2021-02-23 | Stop reason: HOSPADM

## 2021-02-18 RX ADMIN — ROPINIROLE HYDROCHLORIDE 2 MG: 2 TABLET, FILM COATED ORAL at 14:08

## 2021-02-18 RX ADMIN — Medication 2 MG: at 10:25

## 2021-02-18 RX ADMIN — GABAPENTIN 300 MG: 600 TABLET, FILM COATED ORAL at 21:14

## 2021-02-18 RX ADMIN — SODIUM CHLORIDE, PRESERVATIVE FREE 10 ML: 5 INJECTION INTRAVENOUS at 21:14

## 2021-02-18 RX ADMIN — MORPHINE SULFATE 10 MG: 4 INJECTION, SOLUTION INTRAMUSCULAR; INTRAVENOUS at 00:40

## 2021-02-18 RX ADMIN — ROPINIROLE HYDROCHLORIDE 2 MG: 2 TABLET, FILM COATED ORAL at 15:44

## 2021-02-18 RX ADMIN — LORAZEPAM 1 MG: 2 INJECTION INTRAMUSCULAR; INTRAVENOUS at 15:44

## 2021-02-18 RX ADMIN — ENOXAPARIN SODIUM 40 MG: 40 INJECTION SUBCUTANEOUS at 10:11

## 2021-02-18 RX ADMIN — SODIUM CHLORIDE, PRESERVATIVE FREE 10 ML: 5 INJECTION INTRAVENOUS at 10:11

## 2021-02-18 RX ADMIN — ORPHENADRINE CITRATE 60 MG: 60 INJECTION INTRAMUSCULAR; INTRAVENOUS at 03:04

## 2021-02-18 RX ADMIN — ROPINIROLE HYDROCHLORIDE 2 MG: 2 TABLET, FILM COATED ORAL at 21:13

## 2021-02-18 RX ADMIN — OXYCODONE HYDROCHLORIDE 10 MG: 10 TABLET ORAL at 01:23

## 2021-02-18 RX ADMIN — CYCLOBENZAPRINE 10 MG: 10 TABLET, FILM COATED ORAL at 10:11

## 2021-02-18 RX ADMIN — KETOROLAC TROMETHAMINE 15 MG: 30 INJECTION, SOLUTION INTRAMUSCULAR; INTRAVENOUS at 03:33

## 2021-02-18 RX ADMIN — Medication 2 MG: at 23:17

## 2021-02-18 RX ADMIN — MORPHINE SULFATE 5 MG: 4 INJECTION, SOLUTION INTRAMUSCULAR; INTRAVENOUS at 05:32

## 2021-02-18 RX ADMIN — ROPINIROLE HYDROCHLORIDE 2 MG: 2 TABLET, FILM COATED ORAL at 09:12

## 2021-02-18 RX ADMIN — Medication 2 MG: at 14:19

## 2021-02-18 RX ADMIN — HYDRALAZINE HYDROCHLORIDE 10 MG: 20 INJECTION, SOLUTION INTRAMUSCULAR; INTRAVENOUS at 03:14

## 2021-02-18 RX ADMIN — HYDROMORPHONE HYDROCHLORIDE 1 MG: 1 INJECTION, SOLUTION INTRAMUSCULAR; INTRAVENOUS; SUBCUTANEOUS at 03:00

## 2021-02-18 RX ADMIN — METHYLPREDNISOLONE 24 MG: 4 TABLET ORAL at 14:08

## 2021-02-18 RX ADMIN — BUPROPION HYDROCHLORIDE 150 MG: 150 TABLET, EXTENDED RELEASE ORAL at 10:12

## 2021-02-18 RX ADMIN — HYDROMORPHONE HYDROCHLORIDE 1 MG: 1 INJECTION, SOLUTION INTRAMUSCULAR; INTRAVENOUS; SUBCUTANEOUS at 02:35

## 2021-02-18 RX ADMIN — GABAPENTIN 300 MG: 600 TABLET, FILM COATED ORAL at 14:08

## 2021-02-18 ASSESSMENT — PAIN SCALES - GENERAL
PAINLEVEL_OUTOF10: 10
PAINLEVEL_OUTOF10: 8
PAINLEVEL_OUTOF10: 10
PAINLEVEL_OUTOF10: 6
PAINLEVEL_OUTOF10: 7
PAINLEVEL_OUTOF10: 8
PAINLEVEL_OUTOF10: 9
PAINLEVEL_OUTOF10: 8
PAINLEVEL_OUTOF10: 4
PAINLEVEL_OUTOF10: 10
PAINLEVEL_OUTOF10: 9

## 2021-02-18 ASSESSMENT — PAIN DESCRIPTION - DESCRIPTORS
DESCRIPTORS: SHARP;STABBING;DISCOMFORT
DESCRIPTORS: DISCOMFORT;SHARP;STABBING
DESCRIPTORS: SHARP

## 2021-02-18 ASSESSMENT — PAIN DESCRIPTION - LOCATION
LOCATION: BACK

## 2021-02-18 ASSESSMENT — ENCOUNTER SYMPTOMS
SHORTNESS OF BREATH: 0
BACK PAIN: 1
PHOTOPHOBIA: 0
ABDOMINAL PAIN: 0
TROUBLE SWALLOWING: 0

## 2021-02-18 ASSESSMENT — PAIN DESCRIPTION - ORIENTATION: ORIENTATION: LOWER;POSTERIOR

## 2021-02-18 ASSESSMENT — PAIN DESCRIPTION - PAIN TYPE
TYPE: ACUTE PAIN
TYPE: ACUTE PAIN
TYPE: ACUTE PAIN;CHRONIC PAIN

## 2021-02-18 ASSESSMENT — PAIN DESCRIPTION - FREQUENCY: FREQUENCY: CONTINUOUS

## 2021-02-18 NOTE — ED PROVIDER NOTES
ED Attending  CC: Candi       [facility]  Department of Emergency Medicine   ED  Encounter Note  Admit Date/RoomTime: 2021 12:28 AM  ED Room:     NAME: Laurence Oscar  : 1955  MRN: [id number]     Chief Complaint:  Back Pain (went to PCP recieved injections yesterday and today. Patient states that her back pain has become unbearable)    History of Present Illness       Laurence Oscar is a 60 y.o. old female with a prior history of chronic back pain, presents to the emergency department by private vehicle, for non-traumatic acute-on-chronic, sharp bilateral middle and lower lumbar spine pain without radiation, for 3 day(s) prior to arrival.  There has been no recent injury as it relates to today's visit. Since onset the symptoms have been persistent and is moderate-to-severe. She has associated signs & symptoms of nothing additional.   She denies any bowel incontinence , new weakness, tingling or paresthesias, recent back injection, recent spinal surgery, recent spinal/chiropractic manipulation, history of IVDA, fever, abdominal pain, bowel incontinence, bladder retention, bladder urgency, bowel urgency, saddle paresthesias  or sacral numbness. She states that she has chronic bladder incontinence and this has not changed. The pain is aggraveated by any movement and walking and relieved by nothing in particular. She is not currently enrolled in an pain management program.  She states that she has been seen by her primary care provider and was given a muscle relaxer, shot of Toradol, and prednisone without improvement. She states that she has not run out of her narcotic pain relievers at home. .  ROS   Pertinent positives and negatives are stated within HPI, all other systems reviewed and are negative.     Past Medical History:  has a past medical history of Anemia, Arthritis, Cellulitis, Chronic ulcer of leg with fat layer exposed ([address] Chronic ulcer of right leg, limited to breakdown of skin ([address] Depression, Low back pain, Lymphedema of both lower extremities, Lymphedema of lower extremity, Obesity, Osteoarthritis, Peripheral vision loss, Stroke ([address] Type 2 diabetes mellitus without complication, without long-term current use of insulin ([address] Ulcer of left lower leg, limited to breakdown of skin ([address] and Ulcer of left lower leg, limited to breakdown of skin ([address] Surgical History:  has a past surgical history that includes ECHO Complete 2D W Doppler W Color (2012);  section; Gastric bypass surgery (); Abdominoplasty (); Cataract removal; hernia repair; Colonoscopy (2012); and Breast reduction surgery. Social History:  reports that she has been smoking cigarettes. She has a 9.50 pack-year smoking history. She has never used smokeless tobacco. She reports that she does not drink alcohol or use drugs. Family History: family history includes Breast Cancer in her mother; Hypertension in her brother and sister; Stroke in her father. Allergies: Patient has no known allergies. Physical Exam   Oxygen Saturation Interpretation: Normal.        ED Triage Vitals   BP Temp Temp src Pulse Resp SpO2 Height Weight   21 0026 21 0019 -- 21 0019 21 0019 21 0019 21 0026 21 0026   (!) 173/112 96.9 °F (36.1 °C)  95 20 96 % 5' 2\" (1.575 m) 297 lb (134.7 kg)         Constitutional:  Alert, development consistent with age. HEENT:  NC/NT. Airway patent. Neck:  Normal ROM. Supple. Respiratory:  Clear to auscultation and breath sounds equal.  CV:  Regular rate and rhythm, normal heart sounds, without pathological murmurs, ectopy, gallops, or rubs. GI:  Abdomen Soft, nontender, good bowel sounds. No firm or pulsatile mass. Back: middle and lower lumbar spine bilateral.             Tenderness: None. Swelling: no.              Range of Motion: diminished range with pain.               CVA Tenderness: No.            Straight leg raising:  Bilateral negative. Skin:  no erythema, rash or swelling noted. Distal Function:              Motor deficit: none. Sensory deficit: none. Pulse deficit: none. Calf Tenderness:  No Bilateral.               Edema:  none Both lower extremity(s). Deep Tendon Reflexes (DTR's) to bilateral knee's and ankle's are normo-reflexive   Gait: Severe pain with 1 assist.  Integument:  Normal turgor. Warm, dry, without visible rash. Lymphatics: No lymphangitis or adenopathy noted. Neurological:  Oriented. Motor functions intact. Lab / Imaging Results   (All laboratory and radiology results have been personally reviewed by myself)  Labs:  No results found for this visit on 02/18/21. Imaging: All Radiology results interpreted by Radiologist unless otherwise noted. CT LUMBAR SPINE WO CONTRAST   Final Result   Degenerative findings. Notably, there is L3-4 prominent facet arthropathy   and moderate degenerative disc disease with associated grade 1   spondylolisthesis of L3. Findings, as above, cause moderate spinal stenosis,   effacement of right lateral recess and marked right neural foraminal   narrowing.           ED Course / Medical Decision Making     Medications   morphine sulfate (PF) injection 10 mg (10 mg Intramuscular Given 2/18/21 0040)   oxyCODONE HCl (OXY-IR) immediate release tablet 10 mg (10 mg Oral Given 2/18/21 0123)   HYDROmorphone (DILAUDID) injection 1 mg (1 mg Intravenous Given 2/18/21 0235)   HYDROmorphone (DILAUDID) injection 1 mg (1 mg Intravenous Given 2/18/21 0300)   orphenadrine (NORFLEX) injection 60 mg (60 mg Intramuscular Given 2/18/21 0304)   hydrALAZINE (APRESOLINE) injection 10 mg (10 mg Intravenous Given 2/18/21 0314)   ketorolac (TORADOL) injection 15 mg (15 mg Intravenous Given 2/18/21 0333)        Re-examination:  2/18/21       Time: 0215   Patients condition remains unchanged. Patient states that she is still having back pain despite being medicated twice. 2106: Patient states her back pain still has not improved with medication. Will remedicate. 0310: Patient states she is still not comfortable. Will remedicate for pain. Blood pressure 200/106. Will medicate for elevated blood pressure. Consult(s):   IP CONSULT TO INTERNAL MEDICINE    0340: Spoke with Dr. Fredi Mauricio the patient will be admitted to a general floor for further treatment and observation. Procedure(s):   none    Medical Decision Making:    Patient presented with acute on chronic lumbar pain. CT of the lumbar spine was negative for acute pathology. Patient appeared uncomfortable but nontoxic. Her diagnostics reviewed and discussed with her, her daughter, and internal medicine. After multiple rounds of pain medication the patient remained and pain and appears uncomfortable. Her blood pressure was also elevated and she was medicated with hydralazine. Patient will be admitted for further treatment and observation. Plan of Care/Counseling:  I reviewed today's visit with the patient and daughter in addition to providing specific details for the plan of care and counseling regarding the diagnosis and prognosis. Questions are answered at this time and are agreeable with the plan. Assessment      1. Intractable back pain    2. Sciatica of right side    3. Elevated blood pressure reading      Plan   Admission to General Medical Floor. Patient condition is stable    New Medications     New Prescriptions    No medications on file     Electronically signed by Toribio Duran MD   DD: 2/18/21  **This report was transcribed using voice recognition software. Every effort was made to ensure accuracy; however, inadvertent computerized transcription errors may be present.   END OF ED PROVIDER NOTE     HOLLY Bryant CNP  02/18/21 0346    ATTENDING PROVIDER ATTESTATION:     I have personally performed and/or participated in the history, exam, medical decision making, and procedures and agree with all pertinent clinical information. I have also reviewed and agree with the past medical, family and social history unless otherwise noted. My findings/Plan: Patient presenting here because of back pain. Patient reports ongoing back pain but worsening over the last several days. Patient reports its mainly right-sided going down her right leg she does see pain management. Patient reporting no new incontinence she reports no abdominal pain or vomiting there is no chest pain or difficulty breathing. Patient reported taking her home medications also reporting that she did see her family doctor was given Toradol and was also seen at urgent care the day before. Patient reporting pain has not really resolved. She is currently on steroids. Patient reporting no cough or fever. Patient is awake alert oriented heart and lung exam normal she is in mild to moderate distress. Patient is actually standing and bending forward on bed because of the pain. Patient is tender in her right sacroiliac region she has noted edema bilaterally she has mild redness to her lower extremity she states that this is chronic. Pulses are intact distally she is able to plantarflex and dorsiflex. .  Patient was initially seen by nurse practitioner and patient was medicated with morphine as well as oral medication. Patient still having pain. I became involved with the patient due to the fact that she had intractable pain her blood pressure was still elevated. Patient was given 2 doses of Dilaudid as well as given Toradol and Norflex and still reporting pain. Patient also medicated for high blood pressure. CT of the lumbar spine was done and noted. Patient had labs also obtained. Patient was rechecked several times in the emergency department and still having pain. Patient uncomfortable with being discharged home.   We did speak to on-call internal medicine  and he will admit. Please note I did review the labs white count 13,000 patient currently on steroid she is afebrile electrolytes are within normal limits.   Mis Velarde MD  02/18/21 2700 Hank Cartagena MD  02/18/21 2700 Hank Cartagena MD  02/18/21 0516

## 2021-02-18 NOTE — PROGRESS NOTES
I called [facility] to see if they were available for Selena's Lumbar MRI w/o contrast.  They are if [facility] is able to provide transportation ASAP. I called [facility] and spoke to Halle Mcginnis, they can be here in 30 minutes to pick her up for a wait and return to [place] [id number]. Called [facility] back to confirm that she will be there for MRI of LS.

## 2021-02-18 NOTE — H&P
[address]                  [address] [place],  [facility]                              HISTORY AND PHYSICAL    PATIENT NAME: Jackelyn Hillman                  :        1955  MED REC NO:   [id number]                            ROOM:       5216  ACCOUNT NO:   [de-identified]                           ADMIT DATE: 2021  PROVIDER:     Abel Sal DO    PRIMARY CARE PHYSICIAN:  George Prescott DO    CHIEF COMPLAINT:  Back pain. HISTORY OF PRESENT ILLNESS:  The patient is a 46-year-old   female who presented to the emergency room complaining of intractable  back pain. She has a history of chronic pain. She was seen by Dr. Michael Freeman, Pain Management. She is on chronic Belbuca. She had a flare-up  of low back pain with radiating into the right leg. She was seen in the  Urgent Care. She was given some injections and started on prednisone 2  days ago. She was then seen by Dr. Kayla Valle, given another injection,  started on baclofen. She had persistence of pain radiating to right  leg, presented to the emergency room and was admitted for further  evaluation and treatment. MEDICATIONS PRIOR TO ADMISSION:  Wellbutrin XL; Victoza for weight loss; Lioresal; prednisone, which was recently started; Belbuca per Dr. Michael Freeman,  Pain Management; Requip; Lac-Hydrin; Turmeric; Versapro cream topical;  furosemide p.r.n. PAST MEDICAL HISTORY:  Chronic pain; restless leg syndrome; morbid  obesity; chronic lymphedema, lower extremities; osteoarthritis; CVA with  peripheral vision loss. PAST SURGICAL HISTORY:  , gastric bypass, abdominoplasty,  bilateral eye cataract surgery, hernia repair, breast reduction surgery. SOCIAL HISTORY:  The patient continues to smoke cigarettes. Denies  alcohol use. REVIEW OF SYSTEMS:  Remarkable for above-stated chief complaint plus  chronic lymphedema, lower extremities. ALLERGIES:  None known. PHYSICAL EXAMINATION:  GENERAL APPEARANCE:  Physical exam reveals a 71-year-old   female who is alert and oriented x3, cooperative and a good historian. VITAL SIGNS:  On admission, temperature 96.9, pulse 95, respirations 20,  blood pressure 173/112. HEAD:  Normocephalic, atraumatic. EYES:  Pupils equal and reactive to light. Extraocular muscles intact. Fundi not well visualized. NOSE:  No obstruction, polyp, or discharge noted. MOUTH:  Mucosa without lesion. Teeth edentulous. Pharynx noninjected  without exudate. NECK:  Supple. No JVD. No thyromegaly. No carotid bruits. HEART:  Regular rate and rhythm without murmur. LUNGS:  Clear to auscultation bilaterally. ABDOMEN:  Positive bowel sounds, soft, nontender. No rebound. No  guarding. No hepatosplenomegaly. No masses. BACK:  There is spasm with tenderness to palpation on the right  lumbosacral paraspinal muscle region. EXTREMITIES:  With bilateral lower extremity lymphedema. LYMPH NODES:  No adenopathy noted. SKIN:  With chronic changes of the lower extremities. IMPRESSION:  Intractable back pain; lumbar spinal canal stenosis; lumbar  DJD/DDD; chronic lymphedema, lower extremities; morbid obesity; chronic  pain, on chronic Belbuca per Dr. Yeison Goldsmith, Pain Clinic; osteoarthritis. PLAN:  Admit. Pain control. Neurosurgery to see. PT, OT eval.  Social  services for discharge planning. Discharge plan home when stable.         Darylene Hippo, DO    D: 02/18/2021 7:31:27       T: 02/18/2021 7:39:43     [email or web address]  Job#: [id number]     Doc#: [id number]    CC:

## 2021-02-18 NOTE — CARE COORDINATION
2/18/2021 - Care Coordination - admitted for intractable back pain. Neurosurgery following. PT/OT evals ordered. For MRI of lumbar spine. Spoke with pt and her daughter in room to discuss transition of care. PCP is Dr Dionisio Rodriguez. Pharmacy is [facility]. Uses [facility] for her belbuca prescription. Lives with her sister in a 1 story home with 2 steps to enter home. Denies hx of HHC, DME, or IVANNA/ARU. Plan is to return home when medically stable. Family can provide transportation home. SW/CM will follow.

## 2021-02-18 NOTE — PROGRESS NOTES
OT BEDSIDE TREATMENT NOTE      Date:2021  Patient Name: Lyle Wade  MRN: [id number]  : 1955  Room: [address] [place] [address]-A     OT orders received & appreciated, chart reviewed. Will hold awaiting open MRI & NS POC. Will follow. Thank you,  Shorty Cobb.  Mickey, CORRYR/L   License #  [id number]

## 2021-02-18 NOTE — TELEPHONE ENCOUNTER
Reason for Disposition   [1] SEVERE abdominal pain AND [2] present > 1 hour    Answer Assessment - Initial Assessment Questions  1. ONSET: \"When did the pain begin? \"       2 weeks ago, worsened 3 days ago,    2. LOCATION: \"Where does it hurt? \" (upper, mid or lower back)      Lower back    3. SEVERITY: \"How bad is the pain? \"  (e.g., Scale 1-10; mild, moderate, or severe)    - MILD (1-3): doesn't interfere with normal activities     - MODERATE (4-7): interferes with normal activities or awakens from sleep     - SEVERE (8-10): excruciating pain, unable to do any normal activities       10/10    4. PATTERN: \"Is the pain constant? \" (e.g., yes, no; constant, intermittent)       Intermittent because of pain medication given by MD, but pain returns when medicines wears off    5. RADIATION: \"Does the pain shoot into your legs or elsewhere? \"      Shoot down both right leg    6. CAUSE:  \"What do you think is causing the back pain? \"      Sciatica    7. BACK OVERUSE:  [place] recent lifting of heavy objects, strenuous work or exercise? \"      Denies    8. MEDICATIONS: \"What have you taken so far for the pain? \" (e.g., nothing, acetaminophen, NSAIDS)      Only taken medications given by MD    9. NEUROLOGIC SYMPTOMS: \"Do you have any weakness, numbness, or problems with bowel/bladder control? \"      Reports hx of bladder incontinence, reports \"it might be worse since this back pain started\"    10. OTHER SYMPTOMS: \"Do you have any other symptoms? \" (e.g., fever, abdominal pain, burning with urination, blood in urine)        Denies    11. PREGNANCY: \"Is there any chance you are pregnant? \" (e.g., yes, no; LMP)       n/a    Protocols used: BACK PAIN-ADULT-    Brief description of triage: See above note    Triage indicates for patient to: See disposition    Care advice provided, patient verbalizes understanding; denies any other questions or concerns; instructed to call back for any new or worsening symptoms.     Attention Provider: Thank you for allowing me to participate in the care of your patient. The patient was connected to triage in response to symptoms provided. Please do not respond through this encounter as the response is not directed to a shared pool.

## 2021-02-18 NOTE — CONSULTS
NEUROSURGERY CONSULTATION     Chief Complaint: back pain, right leg pain    HPI:   Tiffany James is a 60 y.o.  female who presents to the hospital c/o low back pain with radiation down her right leg x3 days. Denies injury. Describes the pain as sharp and shooting. Pain starts in her low back and goes down her right leg stopping at the ankle. Ambulation and movement of her right leg makes the pain worse. CT lumbar spine demonstrates lumbar spondylolisthesis and spinal stenosis at L3-L4 for which neurosurgery was consulted. Denies loss of bowel or bladder, saddle anesthesia, pain down the left leg, numbness, fever, chills, cough, SOB, or chest pain.      Past Medical History:   Diagnosis Date    Anemia     Arthritis     Cellulitis -     left leg    Chronic ulcer of leg with fat layer exposed ([address] 2015    Chronic ulcer of right leg, limited to breakdown of skin ([address] 2016    Depression     Low back pain     Lymphedema of both lower extremities 2015    Lymphedema of lower extremity 2015    Obesity     Osteoarthritis     Peripheral vision loss     Stroke [facility])     Type 2 diabetes mellitus without complication, without long-term current use of insulin ([address] 2019    Ulcer of left lower leg, limited to breakdown of skin ([address] 6/10/2019    Ulcer of left lower leg, limited to breakdown of skin ([address] 6/10/2019     Past Surgical History:   Procedure Laterality Date    ABDOMINOPLASTY  2002    BREAST REDUCTION SURGERY      reduction    CATARACT REMOVAL      bilateral     SECTION      x2    COLONOSCOPY  2012    and egd    ECHOCARDIOGRAM COMPLETE 2D W DOPPLER W COLOR  2012         GASTRIC BYPASS SURGERY  2000    HERNIA REPAIR            Family History   Problem Relation Age of Onset    Breast Cancer Mother     Stroke Father     Hypertension Sister     Hypertension Brother       Social History     Socioeconomic History    Marital status:  Spouse name: Not on file    Number of children: Not on file    Years of education: Not on file    Highest education level: Not on file   Occupational History    Not on file   Social Needs    Financial resource strain: Not on file    Food insecurity     Worry: Not on file     Inability: Not on file    Transportation needs     Medical: Not on file     Non-medical: Not on file   Tobacco Use    Smoking status: Current Every Day Smoker     Packs/day: 0.25     Years: 38.00     Pack years: 9.50     Types: Cigarettes     Last attempt to quit: 2019     Years since quittin.7    Smokeless tobacco: Never Used   Substance and Sexual Activity    Alcohol use: No    Drug use: No    Sexual activity: Not Currently   Lifestyle    Physical activity     Days per week: Not on file     Minutes per session: Not on file    Stress: Not on file   Relationships    Social connections     Talks on phone: Not on file     Gets together: Not on file     Attends Buddhist service: Not on file     Active member of club or organization: Not on file     Attends meetings of clubs or organizations: Not on file     Relationship status: Not on file    Intimate partner violence     Fear of current or ex partner: Not on file     Emotionally abused: Not on file     Physically abused: Not on file     Forced sexual activity: Not on file   Other Topics Concern    Not on file   Social History Narrative        Chronic Diagnosis: Iron deficiency anemia, Depressive disorder, Benign essential hypertension, Mixed    hyperlipidemia.     HTN    OBESITY    LIPID    OA    SMOKER    CVA L FIELD VISION    DEPRESSION    GASTRIC BYPASS 01    BREAST REDUCTION--    Maldonado PIEDRA 1955 Page #2    ANEMIA==IRON AND B-12    Admitted 2019 with cellulitis left lower extremity then readmitted with chest pain with negative stress test       Medications:   Current Facility-Administered Medications   Medication Dose Route Frequency Provider Last Rate Last Admin    morphine (PF) injection 2 mg  2 mg Intravenous Q4H PRN Gurinder Small Malmer, DO        Buprenorphine HCl FILM 450 mcg  450 mcg Oral Q12H Londell Pine, DO   Stopped at 02/18/21 1018    buPROPion (WELLBUTRIN XL) extended release tablet 150 mg  150 mg Oral QAM Londell Selawik, DO   150 mg at 02/18/21 1012    rOPINIRole (REQUIP) tablet 2 mg  2 mg Oral 4x daily Gurinder Small Malmer, DO   2 mg at 02/18/21 0657    sodium chloride flush 0.9 % injection 10 mL  10 mL Intravenous 2 times per day Gurinder Small Malmer, DO   10 mL at 02/18/21 1011    sodium chloride flush 0.9 % injection 10 mL  10 mL Intravenous PRN Gurinder Small Malmer, DO        enoxaparin (LOVENOX) injection 40 mg  40 mg Subcutaneous Daily Gurinder Small Malmer, DO   40 mg at 02/18/21 1011    promethazine (PHENERGAN) tablet 12.5 mg  12.5 mg Oral Q6H PRN Bell Small Malmer, DO        Or    ondansetron [facility]) injection 4 mg  4 mg Intravenous Q6H PRN Gurinder Small Malmer, DO        polyethylene glycol (GLYCOLAX) packet 17 g  17 g Oral Daily PRN Gurinder Small Malmer, DO        acetaminophen (TYLENOL) tablet 650 mg  650 mg Oral Q6H PRN Bell Small Malmer, DO        Or    acetaminophen (TYLENOL) suppository 650 mg  650 mg Rectal Q6H PRN Bell Small Malmer, DO        cyclobenzaprine (FLEXERIL) tablet 10 mg  10 mg Oral TID PRN Gurinder Small Malmer, DO   10 mg at 02/18/21 1011        Allergies:    Patient has no known allergies. Review of Systems   Constitutional: Negative for fever. HENT: Negative for trouble swallowing. Eyes: Negative for photophobia. Respiratory: Negative for shortness of breath. Cardiovascular: Negative for chest pain. Gastrointestinal: Negative for abdominal pain. Endocrine: Negative for heat intolerance. Genitourinary: Negative for flank pain. Musculoskeletal: Positive for arthralgias, back pain, gait problem and myalgias. Skin: Negative for wound. Neurological: Positive for weakness.  Negative for headaches. Psychiatric/Behavioral: Negative for confusion. Physical Exam  Constitutional:       Appearance: She is well-developed. She is obese. HENT:      Head: Normocephalic and atraumatic. Eyes:      Extraocular Movements: Extraocular movements intact. Conjunctiva/sclera: Conjunctivae normal.      Pupils: Pupils are equal, round, and reactive to light. Neck:      Musculoskeletal: Normal range of motion and neck supple. Cardiovascular:      Rate and Rhythm: Normal rate. Pulmonary:      Effort: Pulmonary effort is normal.   Abdominal:      General: There is no distension. Musculoskeletal:      Comments: Severe tenderness to palpation of lumbar spine   Skin:     General: Skin is warm and dry. Neurological:      Mental Status: She is alert. Comments: Alert and oriented x3  CN3-12 intact  Upper strength full  Limited exam of RLE due to pain  At least 4/5 strength LLE  Sensation intact to light touch     Psychiatric:         Thought Content: Thought content normal.          /62   Pulse 89   Temp 97.5 °F (36.4 °C) (Temporal)   Resp 16   Ht 5' 2\" (1.575 m)   Wt 297 lb (134.7 kg)   SpO2 98%   BMI 54.32 kg/m²        Assessment:   Lumbar radiculopathy     Plan:  -Plan for open MRI lumbar spine at [facility]. Will arrange. -Treatment plan to be discussed after imaging completed  -Pain control       Electronically signed by Fernanda De La Garza PA-C on 2/18/2021 at 11:33 AM     I have interviewed and examined the patient and agree with above. Her CT shows spondylolisthesis at L3-L4 with stenosis from L3-L5.   Will get MRI and will try epidural injections    Rony Whyte

## 2021-02-18 NOTE — ED NOTES
Bed: 16  Expected date:   Expected time:   Means of arrival:   Comments:  Triage     Delbert Heredia RN  02/18/21 0028

## 2021-02-18 NOTE — PROGRESS NOTES
Anastasia Counter was ordered [facility] which is a nonformulary medication. This medication is not stocked in the pharmacy and must be brought from home. If the medication has not been administered by 1400 on the following day from the time the order was placed, a pharmacist will follow-up with the nurse of the patient to assess the capability of the patient to bring in the medication. If it is determined that the patient cannot supply the medication and it is not available to be dispensed from the pharmacy, a call will be placed to the ordering provider to discuss alternative options.       Bony Dean RPh 2/18/2021 7:33 AM

## 2021-02-19 ENCOUNTER — APPOINTMENT (OUTPATIENT)
Dept: MRI IMAGING | Age: 66
DRG: 552 | End: 2021-02-19
Payer: COMMERCIAL

## 2021-02-19 LAB
INR BLD: 1
PROTHROMBIN TIME: 11.2 SEC (ref 9.3–12.4)
SARS-COV-2, NAAT: NOT DETECTED

## 2021-02-19 PROCEDURE — 6360000002 HC RX W HCPCS: Performed by: INTERNAL MEDICINE

## 2021-02-19 PROCEDURE — G0378 HOSPITAL OBSERVATION PER HR: HCPCS

## 2021-02-19 PROCEDURE — 99232 SBSQ HOSP IP/OBS MODERATE 35: CPT | Performed by: NEUROLOGICAL SURGERY

## 2021-02-19 PROCEDURE — 6370000000 HC RX 637 (ALT 250 FOR IP): Performed by: INTERNAL MEDICINE

## 2021-02-19 PROCEDURE — 96372 THER/PROPH/DIAG INJ SC/IM: CPT

## 2021-02-19 PROCEDURE — 6370000000 HC RX 637 (ALT 250 FOR IP): Performed by: PHYSICIAN ASSISTANT

## 2021-02-19 PROCEDURE — 2580000003 HC RX 258: Performed by: INTERNAL MEDICINE

## 2021-02-19 PROCEDURE — 87635 SARS-COV-2 COVID-19 AMP PRB: CPT

## 2021-02-19 PROCEDURE — 6360000002 HC RX W HCPCS: Performed by: NEUROLOGICAL SURGERY

## 2021-02-19 PROCEDURE — 72148 MRI LUMBAR SPINE W/O DYE: CPT

## 2021-02-19 PROCEDURE — 6370000000 HC RX 637 (ALT 250 FOR IP): Performed by: NEUROLOGICAL SURGERY

## 2021-02-19 PROCEDURE — 6360000002 HC RX W HCPCS: Performed by: PHYSICIAN ASSISTANT

## 2021-02-19 PROCEDURE — 36415 COLL VENOUS BLD VENIPUNCTURE: CPT

## 2021-02-19 PROCEDURE — 85610 PROTHROMBIN TIME: CPT

## 2021-02-19 PROCEDURE — 96376 TX/PRO/DX INJ SAME DRUG ADON: CPT

## 2021-02-19 RX ORDER — GABAPENTIN 300 MG/1
600 CAPSULE ORAL 3 TIMES DAILY
Status: DISCONTINUED | OUTPATIENT
Start: 2021-02-19 | End: 2021-02-23 | Stop reason: HOSPADM

## 2021-02-19 RX ORDER — OXYCODONE HYDROCHLORIDE 15 MG/1
15 TABLET ORAL EVERY 4 HOURS PRN
Status: DISCONTINUED | OUTPATIENT
Start: 2021-02-19 | End: 2021-02-23 | Stop reason: HOSPADM

## 2021-02-19 RX ORDER — DIAZEPAM 5 MG/1
5 TABLET ORAL EVERY 6 HOURS PRN
Status: DISCONTINUED | OUTPATIENT
Start: 2021-02-19 | End: 2021-02-23 | Stop reason: HOSPADM

## 2021-02-19 RX ADMIN — SODIUM CHLORIDE, PRESERVATIVE FREE 10 ML: 5 INJECTION INTRAVENOUS at 07:58

## 2021-02-19 RX ADMIN — GABAPENTIN 600 MG: 300 CAPSULE ORAL at 14:18

## 2021-02-19 RX ADMIN — HYDROMORPHONE HYDROCHLORIDE 1 MG: 1 INJECTION, SOLUTION INTRAMUSCULAR; INTRAVENOUS; SUBCUTANEOUS at 19:28

## 2021-02-19 RX ADMIN — ROPINIROLE HYDROCHLORIDE 2 MG: 2 TABLET, FILM COATED ORAL at 07:56

## 2021-02-19 RX ADMIN — METHYLPREDNISOLONE 4 MG: 4 TABLET ORAL at 17:25

## 2021-02-19 RX ADMIN — ENOXAPARIN SODIUM 40 MG: 40 INJECTION SUBCUTANEOUS at 07:57

## 2021-02-19 RX ADMIN — METHYLPREDNISOLONE 4 MG: 4 TABLET ORAL at 11:34

## 2021-02-19 RX ADMIN — METHYLPREDNISOLONE 4 MG: 4 TABLET ORAL at 06:23

## 2021-02-19 RX ADMIN — ROPINIROLE HYDROCHLORIDE 2 MG: 2 TABLET, FILM COATED ORAL at 14:18

## 2021-02-19 RX ADMIN — GABAPENTIN 300 MG: 600 TABLET, FILM COATED ORAL at 07:56

## 2021-02-19 RX ADMIN — BUPROPION HYDROCHLORIDE 150 MG: 150 TABLET, EXTENDED RELEASE ORAL at 07:56

## 2021-02-19 RX ADMIN — CYCLOBENZAPRINE 10 MG: 10 TABLET, FILM COATED ORAL at 06:06

## 2021-02-19 RX ADMIN — HYDROMORPHONE HYDROCHLORIDE 1 MG: 1 INJECTION, SOLUTION INTRAMUSCULAR; INTRAVENOUS; SUBCUTANEOUS at 14:34

## 2021-02-19 RX ADMIN — OXYCODONE HYDROCHLORIDE 15 MG: 15 TABLET ORAL at 22:05

## 2021-02-19 RX ADMIN — ROPINIROLE HYDROCHLORIDE 2 MG: 2 TABLET, FILM COATED ORAL at 22:00

## 2021-02-19 RX ADMIN — METHYLPREDNISOLONE 8 MG: 4 TABLET ORAL at 21:59

## 2021-02-19 RX ADMIN — DIAZEPAM 5 MG: 5 TABLET ORAL at 10:50

## 2021-02-19 RX ADMIN — Medication 2 MG: at 06:06

## 2021-02-19 RX ADMIN — GABAPENTIN 600 MG: 300 CAPSULE ORAL at 21:59

## 2021-02-19 RX ADMIN — HYDROMORPHONE HYDROCHLORIDE 1 MG: 1 INJECTION, SOLUTION INTRAMUSCULAR; INTRAVENOUS; SUBCUTANEOUS at 09:12

## 2021-02-19 RX ADMIN — ROPINIROLE HYDROCHLORIDE 2 MG: 2 TABLET, FILM COATED ORAL at 17:25

## 2021-02-19 RX ADMIN — OXYCODONE HYDROCHLORIDE 15 MG: 15 TABLET ORAL at 10:50

## 2021-02-19 ASSESSMENT — PAIN SCALES - GENERAL
PAINLEVEL_OUTOF10: 10
PAINLEVEL_OUTOF10: 2
PAINLEVEL_OUTOF10: 7
PAINLEVEL_OUTOF10: 10
PAINLEVEL_OUTOF10: 8

## 2021-02-19 ASSESSMENT — PAIN DESCRIPTION - PROGRESSION: CLINICAL_PROGRESSION: NOT CHANGED

## 2021-02-19 ASSESSMENT — PAIN DESCRIPTION - ONSET
ONSET: GRADUAL
ONSET: ON-GOING

## 2021-02-19 ASSESSMENT — PAIN DESCRIPTION - FREQUENCY: FREQUENCY: CONTINUOUS

## 2021-02-19 ASSESSMENT — PAIN DESCRIPTION - LOCATION: LOCATION: BACK;LEG

## 2021-02-19 NOTE — PROGRESS NOTES
OT consult received and appreciated. Chart reviewed. Will hold evaluation due to patient to receive DOUGIE injections this afternoon . Will evaluate at a later time. Thank you.  Estela Solorio, OTR/L #[id number]

## 2021-02-19 NOTE — CARE COORDINATION
2/19/2021 - Neurosurgery following. Had MRI lumbar spine yesterday. For IR today for lumbar epidural injection. PT/OT evals pending. Will need to rely on evals and recommendations to determine appropriate discharge plan. SW/CM will follow.

## 2021-02-19 NOTE — PROGRESS NOTES
Hospital Medicine    Subjective:  Pt seen this am c/o ongoing pain      Current Facility-Administered Medications:     HYDROmorphone (DILAUDID) injection 1 mg, 1 mg, Intravenous, Q2H PRN, Ac Parkinson MD, 1 mg at 02/19/21 0912    oxyCODONE (OXY-IR) immediate release tablet 15 mg, 15 mg, Oral, Q4H PRN, Ac Parkinson MD, 15 mg at 02/19/21 1050    diazePAM (VALIUM) tablet 5 mg, 5 mg, Oral, Q6H PRN, Ac Parkinson MD, 5 mg at 02/19/21 1050    gabapentin (NEURONTIN) capsule 600 mg, 600 mg, Oral, TID, Ac Parkinson MD    Buprenorphine HCl FILM 450 mcg, 450 mcg, Oral, Q12H, Sarah Dent, DO, 450 mcg at 02/19/21 0756    buPROPion (WELLBUTRIN XL) extended release tablet 150 mg, 150 mg, Oral, QAM, Sarah Dent, DO, 150 mg at 02/19/21 0756    rOPINIRole (REQUIP) tablet 2 mg, 2 mg, Oral, 4x daily, Sarah Dent, DO, 2 mg at 02/19/21 0756    sodium chloride flush 0.9 % injection 10 mL, 10 mL, Intravenous, 2 times per day, Areatha Vick, DO, 10 mL at 02/19/21 1622    sodium chloride flush 0.9 % injection 10 mL, 10 mL, Intravenous, PRN, Sarah Dent, DO    promethazine (PHENERGAN) tablet 12.5 mg, 12.5 mg, Oral, Q6H PRN **OR** ondansetron (ZOFRAN) injection 4 mg, 4 mg, Intravenous, Q6H PRN, Sarah Dent, DO    polyethylene glycol (GLYCOLAX) packet 17 g, 17 g, Oral, Daily PRN, Sarah Dent, DO    acetaminophen (TYLENOL) tablet 650 mg, 650 mg, Oral, Q6H PRN **OR** acetaminophen (TYLENOL) suppository 650 mg, 650 mg, Rectal, Q6H PRN, Sarah Dent, DO    methylPREDNISolone (MEDROL) tablet 4 mg, 4 mg, Oral, QAM AC, Tyra Kang PA-C, 4 mg at 02/19/21 8143    methylPREDNISolone (MEDROL) tablet 4 mg, 4 mg, Oral, Lunch, Tyra Kang PA-C, 4 mg at 02/19/21 1134    methylPREDNISolone (MEDROL) tablet 4 mg, 4 mg, Oral, Dinner, Tyra Kang PA-C    methylPREDNISolone (MEDROL) tablet 8 mg, 8 mg, Oral, Nightly, Pipe Aguero PA-C    [START ON 2/20/2021] methylPREDNISolone (MEDROL) tablet 4 mg, 4 mg, Oral, Nightly, Mary Daigle PA-C    LORazepam (ATIVAN) injection 1 mg, 1 mg, Intravenous, See Admin Instructions, Mary Daigle PA-C, 1 mg at 02/18/21 1544    Objective:    BP (!) 193/92   Pulse 77   Temp 97.1 °F (36.2 °C) (Temporal)   Resp 16   Ht 5' 2\" (1.575 m)   Wt 297 lb (134.7 kg)   SpO2 95%   BMI 54.32 kg/m²     Heart:  reg  Lungs:  ctab  Abd: + bs soft nontender  Extrem:  Edema legs    CBC with Differential:    Lab Results   Component Value Date    WBC 13.5 02/18/2021    RBC 5.62 02/18/2021    HGB 9.1 02/18/2021    HCT 36.6 02/18/2021     02/18/2021    MCV 65.1 02/18/2021    MCH 16.2 02/18/2021    MCHC 24.9 02/18/2021    RDW 25.2 02/18/2021    NRBC 0.9 02/18/2021    SEGSPCT 72 02/28/2014    LYMPHOPCT 8.7 02/18/2021    MONOPCT 5.2 02/18/2021    BASOPCT 0.2 02/18/2021    MONOSABS 0.68 02/18/2021    LYMPHSABS 1.22 02/18/2021    EOSABS 0.00 02/18/2021    BASOSABS 0.00 02/18/2021     CMP:    Lab Results   Component Value Date     02/18/2021    K 4.6 02/18/2021    K 3.3 06/08/2019     02/18/2021    CO2 28 02/18/2021    BUN 16 02/18/2021    CREATININE 0.5 02/18/2021    GFRAA >60 02/18/2021    LABGLOM >60 02/18/2021    GLUCOSE 121 02/18/2021    PROT 7.7 11/19/2019    LABALBU 3.8 11/19/2019    CALCIUM 8.8 02/18/2021    BILITOT 0.5 11/19/2019    ALKPHOS 139 11/19/2019    AST 16 11/19/2019    ALT 10 11/19/2019     Warfarin PT/INR:    Lab Results   Component Value Date    INR 1.0 02/19/2021    PROTIME 11.2 02/19/2021       Assessment:    Active Problems:    Lymphedema of both lower extremities    Morbid obesity (HCC)    Tobacco abuse    Intervertebral lumbar disc disorder    Chronic pain syndrome    Intractable back pain    Acute midline low back pain with right-sided sciatica  Resolved Problems:    * No resolved hospital problems.  *      Plan:  Discussed plan with dr Bonnie Garcia for lumbar carmen per jacobo Jenkins  12:50 PM  2/19/2021

## 2021-02-19 NOTE — PROGRESS NOTES
Reviewed patient's chart with Dr Shree Roger regarding order for lumbar DOUGIE. Patient had lovenox on 2/19/21 at 69 [phone] 60. Due to the lovenox being given Dr Shree Roger wants to proceed with the procedure on 2/22/21. Called and notified patient's nurse and informed her of change of date. Asked her to place orders for NPO at midnight on 2/22/21 except sips with meds and to continue to hold lovenox until post IR procedure if okay with ordering provider. Any questions call angio [phone].

## 2021-02-19 NOTE — PROGRESS NOTES
Department of Neurosurgery  Progress Note    CHIEF COMPLAINT: seen for low back and right leg pain    SUBJECTIVE:  Severe back and leg pain    REVIEW OF SYSTEMS :  Constitutional: Negative for chills and fever. Neurological: Negative for dizziness, tremors and speech change. GI: Negative for abdominal pain  : negative for hmaturia    OBJECTIVE:   VITALS:  BP (!) 193/92   Pulse 77   Temp 97.1 °F (36.2 °C) (Temporal)   Resp 16   Ht 5' 2\" (1.575 m)   Wt 297 lb (134.7 kg)   SpO2 95%   BMI 54.32 kg/m²   PHYSICAL:  CONSTITUTIONAL:  awake, alert, cooperative, no apparent distress, and appears stated age and SALAZAR.   FC    DATA:  CBC:   Lab Results   Component Value Date    WBC 13.5 02/18/2021    RBC 5.62 02/18/2021    HGB 9.1 02/18/2021    HCT 36.6 02/18/2021    MCV 65.1 02/18/2021    MCH 16.2 02/18/2021    MCHC 24.9 02/18/2021    RDW 25.2 02/18/2021     02/18/2021    MPV NOT CALC 02/18/2021     BMP:    Lab Results   Component Value Date     02/18/2021    K 4.6 02/18/2021    K 3.3 06/08/2019     02/18/2021    CO2 28 02/18/2021    BUN 16 02/18/2021    LABALBU 3.8 11/19/2019    CREATININE 0.5 02/18/2021    CALCIUM 8.8 02/18/2021    GFRAA >60 02/18/2021    LABGLOM >60 02/18/2021    GLUCOSE 121 02/18/2021     PT/INR:  No results found for: PROTIME, INR  PTT:  No results found for: APTT, PTT[APTT}    Current Inpatient Medications  Current Facility-Administered Medications: Buprenorphine HCl FILM 450 mcg, 450 mcg, Oral, Q12H  buPROPion (WELLBUTRIN XL) extended release tablet 150 mg, 150 mg, Oral, QAM  rOPINIRole (REQUIP) tablet 2 mg, 2 mg, Oral, 4x daily  sodium chloride flush 0.9 % injection 10 mL, 10 mL, Intravenous, 2 times per day  sodium chloride flush 0.9 % injection 10 mL, 10 mL, Intravenous, PRN  promethazine (PHENERGAN) tablet 12.5 mg, 12.5 mg, Oral, Q6H PRN **OR** ondansetron (ZOFRAN) injection 4 mg, 4 mg, Intravenous, Q6H PRN  polyethylene glycol (GLYCOLAX) packet 17 g, 17 g, Oral, Daily PRN  acetaminophen (TYLENOL) tablet 650 mg, 650 mg, Oral, Q6H PRN **OR** acetaminophen (TYLENOL) suppository 650 mg, 650 mg, Rectal, Q6H PRN  methylPREDNISolone (MEDROL) tablet 4 mg, 4 mg, Oral, QAM AC  methylPREDNISolone (MEDROL) tablet 4 mg, 4 mg, Oral, Lunch  methylPREDNISolone (MEDROL) tablet 4 mg, 4 mg, Oral, Dinner  methylPREDNISolone (MEDROL) tablet 8 mg, 8 mg, Oral, Nightly  [START ON 2/20/2021] methylPREDNISolone (MEDROL) tablet 4 mg, 4 mg, Oral, Nightly  LORazepam (ATIVAN) injection 1 mg, 1 mg, Intravenous, See Admin Instructions    ASSESSMENT:   · Severe low back and right leg pain. Lumbar MRI reveals large L3-4 disc herniation with severe stenosis. Grade one L3-4 spondylolithesis. PLAN:  · PT/OT  · WBAT  · Pain control  · Consult IR for DOUGIE  · F/u in clinic  · No surgical intervention planned for this admission      Electronically signed by CHERYL Groves on 2/19/2021 at 8:31 AM     I have interviewed and examined the patient and agree with above.   Await epidural    Dickson Benavides

## 2021-02-20 PROCEDURE — 1200000000 HC SEMI PRIVATE

## 2021-02-20 PROCEDURE — 6360000002 HC RX W HCPCS: Performed by: NEUROLOGICAL SURGERY

## 2021-02-20 PROCEDURE — 2580000003 HC RX 258: Performed by: INTERNAL MEDICINE

## 2021-02-20 PROCEDURE — 6360000002 HC RX W HCPCS: Performed by: PHYSICIAN ASSISTANT

## 2021-02-20 PROCEDURE — 97161 PT EVAL LOW COMPLEX 20 MIN: CPT

## 2021-02-20 PROCEDURE — 6370000000 HC RX 637 (ALT 250 FOR IP): Performed by: NEUROLOGICAL SURGERY

## 2021-02-20 PROCEDURE — 6370000000 HC RX 637 (ALT 250 FOR IP): Performed by: INTERNAL MEDICINE

## 2021-02-20 PROCEDURE — 97165 OT EVAL LOW COMPLEX 30 MIN: CPT

## 2021-02-20 PROCEDURE — 96376 TX/PRO/DX INJ SAME DRUG ADON: CPT

## 2021-02-20 RX ADMIN — BUPROPION HYDROCHLORIDE 150 MG: 150 TABLET, EXTENDED RELEASE ORAL at 08:59

## 2021-02-20 RX ADMIN — SODIUM CHLORIDE, PRESERVATIVE FREE 10 ML: 5 INJECTION INTRAVENOUS at 21:26

## 2021-02-20 RX ADMIN — METHYLPREDNISOLONE 4 MG: 4 TABLET ORAL at 21:27

## 2021-02-20 RX ADMIN — OXYCODONE HYDROCHLORIDE 15 MG: 15 TABLET ORAL at 12:43

## 2021-02-20 RX ADMIN — OXYCODONE HYDROCHLORIDE 15 MG: 15 TABLET ORAL at 06:11

## 2021-02-20 RX ADMIN — DIAZEPAM 5 MG: 5 TABLET ORAL at 15:06

## 2021-02-20 RX ADMIN — HYDROMORPHONE HYDROCHLORIDE 1 MG: 1 INJECTION, SOLUTION INTRAMUSCULAR; INTRAVENOUS; SUBCUTANEOUS at 08:57

## 2021-02-20 RX ADMIN — METHYLPREDNISOLONE 4 MG: 4 TABLET ORAL at 12:36

## 2021-02-20 RX ADMIN — DIAZEPAM 5 MG: 5 TABLET ORAL at 21:30

## 2021-02-20 RX ADMIN — GABAPENTIN 600 MG: 300 CAPSULE ORAL at 08:58

## 2021-02-20 RX ADMIN — HYDROMORPHONE HYDROCHLORIDE 1 MG: 1 INJECTION, SOLUTION INTRAMUSCULAR; INTRAVENOUS; SUBCUTANEOUS at 15:06

## 2021-02-20 RX ADMIN — ROPINIROLE HYDROCHLORIDE 2 MG: 2 TABLET, FILM COATED ORAL at 12:36

## 2021-02-20 RX ADMIN — GABAPENTIN 600 MG: 300 CAPSULE ORAL at 13:35

## 2021-02-20 RX ADMIN — DIAZEPAM 5 MG: 5 TABLET ORAL at 01:44

## 2021-02-20 RX ADMIN — PROMETHAZINE HYDROCHLORIDE 12.5 MG: 25 TABLET ORAL at 17:01

## 2021-02-20 RX ADMIN — HYDROMORPHONE HYDROCHLORIDE 1 MG: 1 INJECTION, SOLUTION INTRAMUSCULAR; INTRAVENOUS; SUBCUTANEOUS at 01:45

## 2021-02-20 RX ADMIN — SODIUM CHLORIDE, PRESERVATIVE FREE 10 ML: 5 INJECTION INTRAVENOUS at 08:57

## 2021-02-20 RX ADMIN — HYDROMORPHONE HYDROCHLORIDE 1 MG: 1 INJECTION, SOLUTION INTRAMUSCULAR; INTRAVENOUS; SUBCUTANEOUS at 19:18

## 2021-02-20 RX ADMIN — ROPINIROLE HYDROCHLORIDE 2 MG: 2 TABLET, FILM COATED ORAL at 16:58

## 2021-02-20 RX ADMIN — GABAPENTIN 600 MG: 300 CAPSULE ORAL at 21:28

## 2021-02-20 RX ADMIN — METHYLPREDNISOLONE 4 MG: 4 TABLET ORAL at 06:56

## 2021-02-20 RX ADMIN — SODIUM CHLORIDE, PRESERVATIVE FREE 10 ML: 5 INJECTION INTRAVENOUS at 01:45

## 2021-02-20 RX ADMIN — METHYLPREDNISOLONE 4 MG: 4 TABLET ORAL at 16:58

## 2021-02-20 RX ADMIN — ROPINIROLE HYDROCHLORIDE 2 MG: 2 TABLET, FILM COATED ORAL at 21:27

## 2021-02-20 RX ADMIN — DIAZEPAM 5 MG: 5 TABLET ORAL at 08:58

## 2021-02-20 RX ADMIN — ROPINIROLE HYDROCHLORIDE 2 MG: 2 TABLET, FILM COATED ORAL at 08:58

## 2021-02-20 RX ADMIN — HYDROMORPHONE HYDROCHLORIDE 1 MG: 1 INJECTION, SOLUTION INTRAMUSCULAR; INTRAVENOUS; SUBCUTANEOUS at 22:15

## 2021-02-20 ASSESSMENT — PAIN SCALES - GENERAL
PAINLEVEL_OUTOF10: 10
PAINLEVEL_OUTOF10: 10
PAINLEVEL_OUTOF10: 0
PAINLEVEL_OUTOF10: 4
PAINLEVEL_OUTOF10: 9
PAINLEVEL_OUTOF10: 4

## 2021-02-20 ASSESSMENT — PAIN - FUNCTIONAL ASSESSMENT: PAIN_FUNCTIONAL_ASSESSMENT: PREVENTS OR INTERFERES SOME ACTIVE ACTIVITIES AND ADLS

## 2021-02-20 ASSESSMENT — PAIN DESCRIPTION - ORIENTATION: ORIENTATION: RIGHT

## 2021-02-20 ASSESSMENT — PAIN DESCRIPTION - LOCATION: LOCATION: BACK;LEG

## 2021-02-20 ASSESSMENT — PAIN DESCRIPTION - PAIN TYPE
TYPE: ACUTE PAIN

## 2021-02-20 ASSESSMENT — PAIN DESCRIPTION - ONSET: ONSET: GRADUAL

## 2021-02-20 NOTE — PROGRESS NOTES
[facility] Medicine    Subjective:  Pt c/o ongoing pain      Current Facility-Administered Medications:     HYDROmorphone (DILAUDID) injection 1 mg, 1 mg, Intravenous, Q2H PRN, Giovanny Patino MD, 1 mg at 02/20/21 0857    oxyCODONE (OXY-IR) immediate release tablet 15 mg, 15 mg, Oral, Q4H PRN, Giovanny Patino MD, 15 mg at 02/20/21 6170    diazePAM (VALIUM) tablet 5 mg, 5 mg, Oral, Q6H PRN, Giovanny Patino MD, 5 mg at 02/20/21 0858    gabapentin (NEURONTIN) capsule 600 mg, 600 mg, Oral, TID, Giovanny Patino MD, 600 mg at 02/20/21 8703    Buprenorphine HCl FILM 450 mcg, 450 mcg, Oral, Q12H, Roxanna Dent DO, 450 mcg at 02/20/21 7936    buPROPion (WELLBUTRIN XL) extended release tablet 150 mg, 150 mg, Oral, QAM, Roxanna Dent DO, 150 mg at 02/20/21 0859    rOPINIRole (REQUIP) tablet 2 mg, 2 mg, Oral, 4x daily, Roxanna Dent DO, 2 mg at 02/20/21 9530    sodium chloride flush 0.9 % injection 10 mL, 10 mL, Intravenous, 2 times per day, Jeff Miguel, DO, 10 mL at 02/20/21 0156    sodium chloride flush 0.9 % injection 10 mL, 10 mL, Intravenous, PRN, Roxanna Dent DO, 10 mL at 02/20/21 0145    promethazine (PHENERGAN) tablet 12.5 mg, 12.5 mg, Oral, Q6H PRN **OR** ondansetron (ZOFRAN) injection 4 mg, 4 mg, Intravenous, Q6H PRN, Roxanna Dent DO    polyethylene glycol (GLYCOLAX) packet 17 g, 17 g, Oral, Daily PRN, Roxanna Dent DO    acetaminophen (TYLENOL) tablet 650 mg, 650 mg, Oral, Q6H PRN **OR** acetaminophen (TYLENOL) suppository 650 mg, 650 mg, Rectal, Q6H PRN, Roxanna Dent DO    methylPREDNISolone (MEDROL) tablet 4 mg, 4 mg, Oral, QAM AC, Tyra Kang PA-C, 4 mg at 02/20/21 5272    methylPREDNISolone (MEDROL) tablet 4 mg, 4 mg, Oral, Lunch, Tyra Kang PA-C, 4 mg at 02/19/21 1134    methylPREDNISolone (MEDROL) tablet 4 mg, 4 mg, Oral, Dinner, Tyra Kang PA-C, 4 mg at 02/19/21 1725    methylPREDNISolone (MEDROL) tablet 4 mg, 4 mg, Oral, Nightly, BRENDA Urrutia LORazepam (ATIVAN) injection 1 mg, 1 mg, Intravenous, See Admin Instructions, Patricia Topete PA-C, 1 mg at 02/18/21 1544    Objective:    BP (!) 192/98   Pulse 92   Temp 97 °F (36.1 °C) (Temporal)   Resp 18   Ht 5' 2\" (1.575 m)   Wt 297 lb (134.7 kg)   SpO2 97%   BMI 54.32 kg/m²     Heart:  reg  Lungs:  ctab  Abd: + bs soft nontender  Extrem:  Edema legs    CBC with Differential:    Lab Results   Component Value Date    WBC 13.5 02/18/2021    RBC 5.62 02/18/2021    HGB 9.1 02/18/2021    HCT 36.6 02/18/2021     02/18/2021    MCV 65.1 02/18/2021    MCH 16.2 02/18/2021    MCHC 24.9 02/18/2021    RDW 25.2 02/18/2021    NRBC 0.9 02/18/2021    SEGSPCT 72 02/28/2014    LYMPHOPCT 8.7 02/18/2021    MONOPCT 5.2 02/18/2021    BASOPCT 0.2 02/18/2021    MONOSABS 0.68 02/18/2021    LYMPHSABS 1.22 02/18/2021    EOSABS 0.00 02/18/2021    BASOSABS 0.00 02/18/2021     CMP:    Lab Results   Component Value Date     02/18/2021    K 4.6 02/18/2021    K 3.3 06/08/2019     02/18/2021    CO2 28 02/18/2021    BUN 16 02/18/2021    CREATININE 0.5 02/18/2021    GFRAA >60 02/18/2021    LABGLOM >60 02/18/2021    GLUCOSE 121 02/18/2021    PROT 7.7 11/19/2019    LABALBU 3.8 11/19/2019    CALCIUM 8.8 02/18/2021    BILITOT 0.5 11/19/2019    ALKPHOS 139 11/19/2019    AST 16 11/19/2019    ALT 10 11/19/2019     Warfarin PT/INR:    Lab Results   Component Value Date    INR 1.0 02/19/2021    PROTIME 11.2 02/19/2021       Assessment:    Active Problems:    Lymphedema of both lower extremities    Morbid obesity (HCC)    Tobacco abuse    Intervertebral lumbar disc disorder    Chronic pain syndrome    Intractable back pain    Acute midline low back pain with right-sided sciatica  Resolved Problems:    * No resolved hospital problems. *      Plan:   For carmen on mon cont pain meds        Chio Dent  9:15 AM  2/20/2021

## 2021-02-20 NOTE — PROGRESS NOTES
Department of Neurosurgery  Progress Note    CHIEF COMPLAINT: seen for low back and right leg pain    SUBJECTIVE:  Severe back and leg pain    REVIEW OF SYSTEMS :  Constitutional: Negative for chills and fever. Neurological: Negative for dizziness, tremors and speech change. OBJECTIVE:   VITALS:  BP (!) 192/98   Pulse 92   Temp 97 °F (36.1 °C) (Temporal)   Resp 18   Ht 5' 2\" (1.575 m)   Wt 297 lb (134.7 kg)   SpO2 97%   BMI 54.32 kg/m²   PHYSICAL:  CONSTITUTIONAL:  awake, alert, cooperative, no apparent distress, and appears stated age and SALAZAR.   FC    DATA:  CBC:   Lab Results   Component Value Date    WBC 13.5 02/18/2021    RBC 5.62 02/18/2021    HGB 9.1 02/18/2021    HCT 36.6 02/18/2021    MCV 65.1 02/18/2021    MCH 16.2 02/18/2021    MCHC 24.9 02/18/2021    RDW 25.2 02/18/2021     02/18/2021    MPV NOT CALC 02/18/2021     BMP:    Lab Results   Component Value Date     02/18/2021    K 4.6 02/18/2021    K 3.3 06/08/2019     02/18/2021    CO2 28 02/18/2021    BUN 16 02/18/2021    LABALBU 3.8 11/19/2019    CREATININE 0.5 02/18/2021    CALCIUM 8.8 02/18/2021    GFRAA >60 02/18/2021    LABGLOM >60 02/18/2021    GLUCOSE 121 02/18/2021     PT/INR:    Lab Results   Component Value Date    PROTIME 11.2 02/19/2021    INR 1.0 02/19/2021     PTT:  No results found for: APTT, PTT[APTT}    Current Inpatient Medications  Current Facility-Administered Medications: HYDROmorphone (DILAUDID) injection 1 mg, 1 mg, Intravenous, Q2H PRN  oxyCODONE (OXY-IR) immediate release tablet 15 mg, 15 mg, Oral, Q4H PRN  diazePAM (VALIUM) tablet 5 mg, 5 mg, Oral, Q6H PRN  gabapentin (NEURONTIN) capsule 600 mg, 600 mg, Oral, TID  Buprenorphine HCl FILM 450 mcg, 450 mcg, Oral, Q12H  buPROPion (WELLBUTRIN XL) extended release tablet 150 mg, 150 mg, Oral, QAM  rOPINIRole (REQUIP) tablet 2 mg, 2 mg, Oral, 4x daily  sodium chloride flush 0.9 % injection 10 mL, 10 mL, Intravenous, 2 times per day  sodium chloride flush 0.9 % injection 10 mL, 10 mL, Intravenous, PRN  promethazine (PHENERGAN) tablet 12.5 mg, 12.5 mg, Oral, Q6H PRN **OR** ondansetron (ZOFRAN) injection 4 mg, 4 mg, Intravenous, Q6H PRN  polyethylene glycol (GLYCOLAX) packet 17 g, 17 g, Oral, Daily PRN  acetaminophen (TYLENOL) tablet 650 mg, 650 mg, Oral, Q6H PRN **OR** acetaminophen (TYLENOL) suppository 650 mg, 650 mg, Rectal, Q6H PRN  methylPREDNISolone (MEDROL) tablet 4 mg, 4 mg, Oral, QAM AC  methylPREDNISolone (MEDROL) tablet 4 mg, 4 mg, Oral, Lunch  methylPREDNISolone (MEDROL) tablet 4 mg, 4 mg, Oral, Dinner  methylPREDNISolone (MEDROL) tablet 4 mg, 4 mg, Oral, Nightly  LORazepam (ATIVAN) injection 1 mg, 1 mg, Intravenous, See Admin Instructions    ASSESSMENT:   · Severe low back and right leg pain. Lumbar MRI reveals large L3-4 disc herniation with severe stenosis. Grade one L3-4 spondylolithesis.     PLAN:  · PT/OT  · WBAT  · Pain control  · IR for DOUGIE  · F/u in clinic  · No surgical intervention planned for this admission      Electronically signed by CHERYL Rodríguez on 2/20/2021 at 9:42 AM

## 2021-02-20 NOTE — PLAN OF CARE
Problem: Pain:  Goal: Pain level will decrease  Description: Pain level will decrease  Outcome: Met This Shift  Goal: Control of acute pain  Description: Control of acute pain  Outcome: Met This Shift  Goal: Control of chronic pain  Description: Control of chronic pain  Outcome: Met This Shift     Problem: Infection:  Goal: Will remain free from infection  Description: Will remain free from infection  Outcome: Met This Shift     Problem: Safety:  Goal: Free from accidental physical injury  Description: Free from accidental physical injury  Outcome: Met This Shift  Goal: Free from intentional harm  Description: Free from intentional harm  Outcome: Met This Shift     Problem: Daily Care:  Goal: Daily care needs are met  Description: Daily care needs are met  Outcome: Met This Shift     Problem: Skin Integrity:  Goal: Skin integrity will stabilize  Description: Skin integrity will stabilize  Outcome: Met This Shift     Problem: Musculor/Skeletal Functional Status  Goal: Highest potential functional level  Outcome: Met This Shift  Goal: Absence of falls  Outcome: Met This Shift

## 2021-02-20 NOTE — PROGRESS NOTES
Physical Therapy  Physical Therapy Initial Assessment     Name: Linda Reyes  : 1955  MRN: [id number]    Referring Provider:  Roberto De La Torre DO    Date of Service: 2021    Evaluating PT:  Karon Kong PT, DPT. [id number]    Room #:  0625/3066-K  Diagnosis:  Intractable back pain   Reason for admission:  As above, RLE pain  Precautions:  Falls  Procedures: none, plan for epidural   Equipment Recommendations:  FWW    SUBJECTIVE:  Pt lives with sister in a 2 story home with 2 stair(s) to enter and 1 rail(s). Bed is on 1st floor and bath is on 1st floor. Pt ambulated with no AD PTA - began using [employer] at hospital.     OBJECTIVE:   Initial Evaluation  Date:  Treatment   Short Term/ Long Term   Goals   AM-PAC 6 Clicks 48/81     Was pt agreeable to Eval/treatment? Yes      Does pt have pain? 8/10 back and RLE increased to 10/10 with moving     Bed Mobility  Rolling: NT  Supine to sit: NT  Sit to supine: NT  Scooting: NT  Independent    Transfers Sit to stand: SBA  Stand to sit: SBA  Stand pivot: NT  Independent    Ambulation    40 feet with [employer] Chris    >150 feet with [employer] Mod I    Stair negotiation: ascended and descended  NT  2 steps with 1 rail Mod I    ROM BUE:  See OT eval   BLE:  WFL     Strength BUE:  See OT eval   BLE:  knee ext 4/5  Ankle DF 4/5  Increase by 1/3 MMT grade    Balance Sitting EOB:  indep  Dynamic Standing:  Chris  Sitting EOB:  indep  Dynamic Standing: Mod I [employer]     -Pt is A & O x 3  -Sensation:  unremarkable   -Edema:  unremarkable     Therapeutic Exercises:  functional activity     Patient education  Pt educated on safety, sequencing of transfers, and role of PT    Patient response to education:   Pt verbalized understanding Pt demonstrated skill Pt requires further education in this area   Yes  No  Yes      ASSESSMENT:    Comments:  Pt received sitting EOB and agreeable to PT session with OT collaboration.    Pt limited by significant pain in back and RLE specifically with ambulation. Cued for step to pattern leading with RLE which provided some relief per pt. However, after short distance pain became severe to the point pt had to lean on elbows on walker to rest before continuing. Pt walking with severely limped, antalgic pattern. Returned to sitting EOB. Pt with all needs met and call light in reach. Pt would benefit from continued PT POC to address functional deficits described above. Treatment:  Patient practiced and was instructed in the following treatment:     Patient education provided continuously throughout session for sequencing, safety maintenance, and improving any deficits found during the evaluation.  STS and pivot transfer training - pt educated on proper hand and foot placement, safety and sequencing, and use of proper technique to safely complete sit<>stand and pivot transfers with hands on assistance to complete task safely     Gait training- pt was given verbal and tactile cues to facilitate step to pattern and use of UEs on walker during ambulation as well as provided with physical assistance to complete task. Pt's/ family goals   1. Return home, pain relief    Patient and or family understand(s) diagnosis, prognosis, and plan of care. Yes     PLAN:    Current Treatment Recommendations   [x] Strengthening     [] ROM   [x] Balance Training   [x] Endurance Training   [x] Transfer Training   [x] Gait Training   [x] Stair Training   [] Positioning   [] Safety and Education Training   [] Patient/Caregiver Education   [] HEP  [] Other     Frequency of treatments: 2-5x/week x 1-2 weeks. Time in  0810  Time out  0820    Total Treatment Time - minutes     Evaluation Time includes thorough review of current medical information, gathering information on past medical history/social history and prior level of function, completion of standardized testing/informal observation of tasks, assessment of data and education on plan of care and goals.     CPT codes: [x] Low Complexity PT evaluation 04211  [] Moderate Complexity PT evaluation 37051  [] High Complexity PT evaluation [id number]  [] PT Re-evaluation 53496  [] Gait training 75449 - minutes  [] Manual therapy 73465 - minutes  [] Therapeutic activities 18949 - minutes  [] Therapeutic exercises 34561 - minutes  [] Neuromuscular reeducation 60370 - minutes     Sb Shelton, PT, DPT  [id number]

## 2021-02-20 NOTE — PLAN OF CARE
Problem: Pain:  Goal: Pain level will decrease  Description: Pain level will decrease  Outcome: Met This Shift  Goal: Control of acute pain  Description: Control of acute pain  Outcome: Met This Shift     Problem: Infection:  Goal: Will remain free from infection  Description: Will remain free from infection  Outcome: Met This Shift     Problem: Safety:  Goal: Free from accidental physical injury  Description: Free from accidental physical injury  Outcome: Met This Shift  Goal: Free from intentional harm  Description: Free from intentional harm  Outcome: Met This Shift

## 2021-02-20 NOTE — PROGRESS NOTES
OCCUPATIONAL THERAPY INITIAL EVALUATION      Date:2021  Patient Name: Rhoda Stanley  MRN: [id number]  : 1955  Room: [address] [place], [place] [address]  Referring Provider:  Karla Goodson    Evaluating OT: Emmie Arevalo OTR/L   License #  [id number]     AM-PAC Daily Activity Raw Score:     Recommended Adaptive Equipment:  TBD for A.E.    Diagnosis:  L3-4 disc herniation with severe stenosis, L3-4 spondylolisthesis  Patient presented to ED for severe low back & LE pain    Surgery: NS plan is for DOUGIE with IR on 21. Past Surgical History:   Procedure Laterality Date    ABDOMINOPLASTY      BREAST REDUCTION SURGERY      reduction    CATARACT REMOVAL      bilateral     SECTION      x2    COLONOSCOPY  2012    and egd    ECHOCARDIOGRAM COMPLETE 2D W DOPPLER W COLOR  2012         GASTRIC BYPASS SURGERY      HERNIA REPAIR            Pertinent Medical History:   Past Medical History:   Diagnosis Date    Anemia     Arthritis     Cellulitis 2015    left leg    Chronic ulcer of leg with fat layer exposed ([address] 2015    Chronic ulcer of right leg, limited to breakdown of skin ([address] 2016    Depression     Low back pain     Lymphedema of both lower extremities 2015    Lymphedema of lower extremity 2015    Obesity     Osteoarthritis     Peripheral vision loss     Stroke [facility])     Type 2 diabetes mellitus without complication, without long-term current use of insulin ([address] 2019    Ulcer of left lower leg, limited to breakdown of skin ([address] 6/10/2019    Ulcer of left lower leg, limited to breakdown of skin ([address] 6/10/2019      Precautions:  Falls, neutral spine (no B,L,T), WBAT per NS     Home Living: Pt lives with sister (retired & able to assist upon d/c, as well as dtr.) in a 2 story home with 2 steps to enter and 1 HR. Bathroom setup: tub/shower, std. commode.  Pt. states she is currently remodeling bathroom to put in walk in shower in the next 2 wks. Equipment owned: none, pt. has been using ww since being in hospital    Prior Level of Function: Ind. with ADLs , Ind. with IADLs; ambulated no A.D. 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 E's    Pain Level: 7/10 low back & R LE pain  Cognition: A&O: 4/4; Follows 2 step directions   Memory:  good   Sequencing:  good   Problem solving:  good   Judgement/safety:  Fair/good     Functional Assessment:   Initial Eval Status  Date: 2- Treatment Status  Date: Short Term Goals = Long Term Goals  Treatment frequency: 3-5 days   Feeding Independent       Grooming Set up seated  Modified [place]    UB Dressing Stand by Assist   Modified [place]    LB Dressing Maximal Assist.  Pt. will benefit from use of A.E.   Modified [place]    Bathing Moderate Assist with sim. task. Pt. will benefit from [address] sponge  Modified [place]    Toileting NT, pt. deferred. Modified [place]    Bed Mobility  Supine to sit: NT   Pt. received sitting EOB. Sit to supine: NT  Pt. wished to return to EOB for comfort. Supine to sit: Modified [place]   Sit to supine: Modified [place]    Functional Transfers SBA with sit <> stand   Min A with SPT using ww   Modified [place]    Functional Mobility Minimal Assist with ww short household distances in room  Modified [place]    Balance Sitting:     Static:  Ind. Dynamic:SBA  Standing: Min A     Activity Tolerance Fair with lt. ax. Good with lt./mod. ax.    Visual/  Perceptual Glasses: yes        Safety Awareness Fair/good                    good     Hand dominance: R     Strength ROM Additional Info:    RUE  WFL  WFL good  and wfl FMC/dexterity noted during ADL tasks     LUE WFL WFL good  and wfl FMC/dexterity noted during ADL tasks       Hearing: WFL   Sensation:  Pt. c/o no numbness/ tingling B UE  Tone: WFL   Edema: none noted B UE Comments: Upon arrival, patient seated EOB, cleared by Nursing, agreeable to OT. Pt demonstrating good understanding of education/techniques, requiring additional training / education. At end of session, patient returned to EOB, all needs met, RN notified, with call light and phone within reach, all lines and tubes intact. Pt would benefit from continued skilled OT to increase safety and independence with completion of ADL/iADL tasks, functional transfers/mobility to improve independence and quality of life. Treatment:  OT services provided include: facilitation of safe ADLs/functional transfer/mobility training, skilled monitoring of vitals & pt.'s response to treatment, instruction/training on safe & adapted techniques within precautions for completion of ADLs, proper posture and/or positioning, facilitation of functional seated & standing tolerance & balance ax. during ADLs and functional ax., skilled cuing on hand placement & proper body mechanics during functional transfer/mobility training with focus on safety, technique, precautions. Pt.  also Instructed RE: safe transfers/mobility, ADL training, role of OT, E.C. techniques, treatment plan, recs. , prec.      Eval Complexity: Low    Assessment of current deficits:    Functional mobility [x]  ADLs [x] Strength [x]  Cognition []  Functional transfers  [x] IADLs [x] Safety Awareness [x]  Endurance [x]  Fine Motor Coordination [] Balance [x] Vision/perception [] Sensation []   Gross Motor Coordination [] ROM [] Delirium []                  Motor Control []   Plan of Care: 2-4 days/week for 1-2 weeks PRN   ADL retraining/adapted techniques and AE recommendations to increase functional independence within precautions                    Energy conservation techniques to improve tolerance for selfcare routine   Functional transfer/mobility training/DME recommendations for increased independence, safety and fall prevention         Patient/family education to increase safety and functional independence             Environmental modifications for safe mobility and completion of ADLs                 Therapeutic activity to improve functional performance during ADLs. Therapeutic exercise to improve tolerance and functional strength for ADLs   Balance retraining/tolerance tasks for facilitation of postural control with dynamic challenges during ADLs . Positioning to improve functional independence    Rehab Potential:  Good for established goals     Patient / Family Goal: to return home soon     Patient and/or family were instructed on functional diagnosis, prognosis/goals and OT plan of care. Demonstrated good understanding. Eval Complexity: Low    Total Treatment Time: eval only    Min Units   OT Eval Low 71544  x     OT Eval Medium 24007      OT Eval High 34903       OT Re-Eval [id number]       Therapeutic Ex 82908       Therapeutic Activities 57709       ADL/Self Care 15822       Orthotic Management 42002       Neuro Re-Ed 05244       Non-Billable Time          Evaluation Time includes thorough review of current medical information, gathering information on past medical history/social history and prior level of function, completion of standardized testing/informal observation of tasks, assessment of data and education on plan of care and goals. Genesis Arevalo, OTR/L   License #  [id number]

## 2021-02-21 PROCEDURE — 6370000000 HC RX 637 (ALT 250 FOR IP): Performed by: INTERNAL MEDICINE

## 2021-02-21 PROCEDURE — 2580000003 HC RX 258: Performed by: INTERNAL MEDICINE

## 2021-02-21 PROCEDURE — 6360000002 HC RX W HCPCS: Performed by: PHYSICIAN ASSISTANT

## 2021-02-21 PROCEDURE — 6360000002 HC RX W HCPCS: Performed by: NEUROLOGICAL SURGERY

## 2021-02-21 PROCEDURE — 6370000000 HC RX 637 (ALT 250 FOR IP): Performed by: NEUROLOGICAL SURGERY

## 2021-02-21 PROCEDURE — 1200000000 HC SEMI PRIVATE

## 2021-02-21 RX ADMIN — HYDROMORPHONE HYDROCHLORIDE 1 MG: 1 INJECTION, SOLUTION INTRAMUSCULAR; INTRAVENOUS; SUBCUTANEOUS at 02:15

## 2021-02-21 RX ADMIN — ROPINIROLE HYDROCHLORIDE 2 MG: 2 TABLET, FILM COATED ORAL at 09:11

## 2021-02-21 RX ADMIN — GABAPENTIN 600 MG: 300 CAPSULE ORAL at 14:44

## 2021-02-21 RX ADMIN — SODIUM CHLORIDE, PRESERVATIVE FREE 10 ML: 5 INJECTION INTRAVENOUS at 20:13

## 2021-02-21 RX ADMIN — METHYLPREDNISOLONE 4 MG: 4 TABLET ORAL at 12:28

## 2021-02-21 RX ADMIN — DIAZEPAM 5 MG: 5 TABLET ORAL at 06:44

## 2021-02-21 RX ADMIN — ROPINIROLE HYDROCHLORIDE 2 MG: 2 TABLET, FILM COATED ORAL at 20:12

## 2021-02-21 RX ADMIN — OXYCODONE HYDROCHLORIDE 15 MG: 15 TABLET ORAL at 21:45

## 2021-02-21 RX ADMIN — ROPINIROLE HYDROCHLORIDE 2 MG: 2 TABLET, FILM COATED ORAL at 17:17

## 2021-02-21 RX ADMIN — OXYCODONE HYDROCHLORIDE 15 MG: 15 TABLET ORAL at 12:28

## 2021-02-21 RX ADMIN — SODIUM CHLORIDE, PRESERVATIVE FREE 10 ML: 5 INJECTION INTRAVENOUS at 09:00

## 2021-02-21 RX ADMIN — HYDROMORPHONE HYDROCHLORIDE 1 MG: 1 INJECTION, SOLUTION INTRAMUSCULAR; INTRAVENOUS; SUBCUTANEOUS at 14:44

## 2021-02-21 RX ADMIN — GABAPENTIN 600 MG: 300 CAPSULE ORAL at 20:12

## 2021-02-21 RX ADMIN — METHYLPREDNISOLONE 4 MG: 4 TABLET ORAL at 20:12

## 2021-02-21 RX ADMIN — HYDROMORPHONE HYDROCHLORIDE 1 MG: 1 INJECTION, SOLUTION INTRAMUSCULAR; INTRAVENOUS; SUBCUTANEOUS at 10:34

## 2021-02-21 RX ADMIN — ROPINIROLE HYDROCHLORIDE 2 MG: 2 TABLET, FILM COATED ORAL at 12:28

## 2021-02-21 RX ADMIN — ACETAMINOPHEN 650 MG: 325 TABLET ORAL at 09:11

## 2021-02-21 RX ADMIN — BUPROPION HYDROCHLORIDE 150 MG: 150 TABLET, EXTENDED RELEASE ORAL at 09:11

## 2021-02-21 RX ADMIN — OXYCODONE HYDROCHLORIDE 15 MG: 15 TABLET ORAL at 17:17

## 2021-02-21 RX ADMIN — DIAZEPAM 5 MG: 5 TABLET ORAL at 17:17

## 2021-02-21 RX ADMIN — OXYCODONE HYDROCHLORIDE 15 MG: 15 TABLET ORAL at 06:44

## 2021-02-21 RX ADMIN — HYDROMORPHONE HYDROCHLORIDE 1 MG: 1 INJECTION, SOLUTION INTRAMUSCULAR; INTRAVENOUS; SUBCUTANEOUS at 20:13

## 2021-02-21 RX ADMIN — GABAPENTIN 600 MG: 300 CAPSULE ORAL at 09:11

## 2021-02-21 RX ADMIN — METHYLPREDNISOLONE 4 MG: 4 TABLET ORAL at 06:44

## 2021-02-21 ASSESSMENT — PAIN SCALES - GENERAL
PAINLEVEL_OUTOF10: 3
PAINLEVEL_OUTOF10: 9
PAINLEVEL_OUTOF10: 2
PAINLEVEL_OUTOF10: 3
PAINLEVEL_OUTOF10: 8
PAINLEVEL_OUTOF10: 3
PAINLEVEL_OUTOF10: 9
PAINLEVEL_OUTOF10: 3
PAINLEVEL_OUTOF10: 9
PAINLEVEL_OUTOF10: 7
PAINLEVEL_OUTOF10: 9
PAINLEVEL_OUTOF10: 8
PAINLEVEL_OUTOF10: 8

## 2021-02-21 ASSESSMENT — PAIN DESCRIPTION - ORIENTATION
ORIENTATION: RIGHT
ORIENTATION: RIGHT

## 2021-02-21 ASSESSMENT — PAIN DESCRIPTION - PAIN TYPE: TYPE: ACUTE PAIN

## 2021-02-21 NOTE — PROGRESS NOTES
Department of Neurosurgery  Physician Assistant Progress Note    CHIEF COMPLAINT: Low back and right leg pain    SUBJECTIVE:  Severe back and leg pain. C/o right leg n/t. States pain medications are providing some relief. Has not have DOUGIE at this time. PT has evaluated, 16/24. REVIEW OF SYSTEMS :  Constitutional: Negative for chills and fever. Neurological: Negative for dizziness, tremors and speech change.      OBJECTIVE:   VITALS:  BP (!) 166/79   Pulse 79   Temp 97.8 °F (36.6 °C) (Temporal)   Resp 18   Ht 5' 2\" (1.575 m)   Wt 297 lb (134.7 kg)   SpO2 95%   BMI 54.32 kg/m²   PHYSICAL:   Obese, no apparent distress   Non-labored breathing   FC x 4 ext, pain noted   Sensory grossly intact, decreased in RLE   BLE statis changes noted    DATA:  CBC:   Lab Results   Component Value Date    WBC 13.5 02/18/2021    RBC 5.62 02/18/2021    HGB 9.1 02/18/2021    HCT 36.6 02/18/2021    MCV 65.1 02/18/2021    MCH 16.2 02/18/2021    MCHC 24.9 02/18/2021    RDW 25.2 02/18/2021     02/18/2021    MPV NOT CALC 02/18/2021     BMP:    Lab Results   Component Value Date     02/18/2021    K 4.6 02/18/2021    K 3.3 06/08/2019     02/18/2021    CO2 28 02/18/2021    BUN 16 02/18/2021    LABALBU 3.8 11/19/2019    CREATININE 0.5 02/18/2021    CALCIUM 8.8 02/18/2021    GFRAA >60 02/18/2021    LABGLOM >60 02/18/2021    GLUCOSE 121 02/18/2021     PT/INR:    Lab Results   Component Value Date    PROTIME 11.2 02/19/2021    INR 1.0 02/19/2021     PTT:  No results found for: APTT, PTT[APTT}    Current Inpatient Medications  Current Facility-Administered Medications: HYDROmorphone (DILAUDID) injection 1 mg, 1 mg, Intravenous, Q2H PRN  oxyCODONE (OXY-IR) immediate release tablet 15 mg, 15 mg, Oral, Q4H PRN  diazePAM (VALIUM) tablet 5 mg, 5 mg, Oral, Q6H PRN  gabapentin (NEURONTIN) capsule 600 mg, 600 mg, Oral, TID  Buprenorphine HCl FILM 450 mcg, 450 mcg, Oral, Q12H  buPROPion (WELLBUTRIN XL) extended release tablet 150 mg, 150 mg, Oral, QAM  rOPINIRole (REQUIP) tablet 2 mg, 2 mg, Oral, 4x daily  sodium chloride flush 0.9 % injection 10 mL, 10 mL, Intravenous, 2 times per day  sodium chloride flush 0.9 % injection 10 mL, 10 mL, Intravenous, PRN  promethazine (PHENERGAN) tablet 12.5 mg, 12.5 mg, Oral, Q6H PRN **OR** ondansetron (ZOFRAN) injection 4 mg, 4 mg, Intravenous, Q6H PRN  polyethylene glycol (GLYCOLAX) packet 17 g, 17 g, Oral, Daily PRN  acetaminophen (TYLENOL) tablet 650 mg, 650 mg, Oral, Q6H PRN **OR** acetaminophen (TYLENOL) suppository 650 mg, 650 mg, Rectal, Q6H PRN  methylPREDNISolone (MEDROL) tablet 4 mg, 4 mg, Oral, QAM AC  methylPREDNISolone (MEDROL) tablet 4 mg, 4 mg, Oral, Lunch  methylPREDNISolone (MEDROL) tablet 4 mg, 4 mg, Oral, Nightly  LORazepam (ATIVAN) injection 1 mg, 1 mg, Intravenous, See Admin Instructions    ASSESSMENT:   · Severe low back and right leg pain. Lumbar MRI reveals large L3-4 disc herniation with severe stenosis. Grade one L3-4 spondylolithesis. Stable.      PLAN:  · PT/OT-16/24  · WBAT  · Pain control  · IR for DOUGIE--awaiting recommendation   · Will follow       Electronically signed by CHERYL Menezes on 2/21/2021 at 9:14 AM

## 2021-02-21 NOTE — PROGRESS NOTES
Hospital Medicine    Subjective:  Pt alert conversive pain better with current pain med regimen      Current Facility-Administered Medications:     HYDROmorphone (DILAUDID) injection 1 mg, 1 mg, Intravenous, Q2H PRN, Jan Adan MD, 1 mg at 02/21/21 0215    oxyCODONE (OXY-IR) immediate release tablet 15 mg, 15 mg, Oral, Q4H PRN, Jan Adan MD, 15 mg at 02/21/21 0644    diazePAM (VALIUM) tablet 5 mg, 5 mg, Oral, Q6H PRN, Jan Adan MD, 5 mg at 02/21/21 9656    gabapentin (NEURONTIN) capsule 600 mg, 600 mg, Oral, TID, Jan Adan MD, 600 mg at 02/21/21 0911    Buprenorphine HCl FILM 450 mcg, 450 mcg, Oral, Q12H, Kaleva Graver Malmer, DO, 450 mcg at 02/21/21 4036    buPROPion (WELLBUTRIN XL) extended release tablet 150 mg, 150 mg, Oral, QAM, Otf Graver Malmer, DO, 150 mg at 02/21/21 0911    rOPINIRole (REQUIP) tablet 2 mg, 2 mg, Oral, 4x daily, Kisha Blush, DO, 2 mg at 02/21/21 0911    sodium chloride flush 0.9 % injection 10 mL, 10 mL, Intravenous, 2 times per day, Kisha Blush, DO, 10 mL at 02/20/21 2126    sodium chloride flush 0.9 % injection 10 mL, 10 mL, Intravenous, PRN, Kaleva Graver Malmer, DO, 10 mL at 02/20/21 0145    promethazine (PHENERGAN) tablet 12.5 mg, 12.5 mg, Oral, Q6H PRN, 12.5 mg at 02/20/21 1701 **OR** ondansetron (ZOFRAN) injection 4 mg, 4 mg, Intravenous, Q6H PRN, Otf Graver Malmer, DO    polyethylene glycol (GLYCOLAX) packet 17 g, 17 g, Oral, Daily PRN, Otf Graver Malmer, DO    acetaminophen (TYLENOL) tablet 650 mg, 650 mg, Oral, Q6H PRN, 650 mg at 02/21/21 0911 **OR** acetaminophen (TYLENOL) suppository 650 mg, 650 mg, Rectal, Q6H PRN, Otf Hany Dent,     methylPREDNISolone (MEDROL) tablet 4 mg, 4 mg, Oral, QAM AC, Tyra Kang PA-C, 4 mg at 02/21/21 4880    methylPREDNISolone (MEDROL) tablet 4 mg, 4 mg, Oral, Lunch, Tyra Kang PA-C, 4 mg at 02/20/21 1236    methylPREDNISolone (MEDROL) tablet 4 mg, 4 mg, Oral, Nightly, Tyra Kang PA-C, 4 mg at 02/20/21 2127    LORazepam (ATIVAN) injection 1 mg, 1 mg, Intravenous, See Admin Instructions, Sinai Briggs PA-C, 1 mg at 02/18/21 1544    Objective:    BP (!) 166/79   Pulse 79   Temp 97.8 °F (36.6 °C) (Temporal)   Resp 18   Ht 5' 2\" (1.575 m)   Wt 297 lb (134.7 kg)   SpO2 95%   BMI 54.32 kg/m²     Heart:  reg  Lungs:  Min rhonchi  Abd: + bs soft nontender  Extrem:  Edema legs    CBC with Differential:    Lab Results   Component Value Date    WBC 13.5 02/18/2021    RBC 5.62 02/18/2021    HGB 9.1 02/18/2021    HCT 36.6 02/18/2021     02/18/2021    MCV 65.1 02/18/2021    MCH 16.2 02/18/2021    MCHC 24.9 02/18/2021    RDW 25.2 02/18/2021    NRBC 0.9 02/18/2021    SEGSPCT 72 02/28/2014    LYMPHOPCT 8.7 02/18/2021    MONOPCT 5.2 02/18/2021    BASOPCT 0.2 02/18/2021    MONOSABS 0.68 02/18/2021    LYMPHSABS 1.22 02/18/2021    EOSABS 0.00 02/18/2021    BASOSABS 0.00 02/18/2021     CMP:    Lab Results   Component Value Date     02/18/2021    K 4.6 02/18/2021    K 3.3 06/08/2019     02/18/2021    CO2 28 02/18/2021    BUN 16 02/18/2021    CREATININE 0.5 02/18/2021    GFRAA >60 02/18/2021    LABGLOM >60 02/18/2021    GLUCOSE 121 02/18/2021    PROT 7.7 11/19/2019    LABALBU 3.8 11/19/2019    CALCIUM 8.8 02/18/2021    BILITOT 0.5 11/19/2019    ALKPHOS 139 11/19/2019    AST 16 11/19/2019    ALT 10 11/19/2019     Warfarin PT/INR:    Lab Results   Component Value Date    INR 1.0 02/19/2021    PROTIME 11.2 02/19/2021       Assessment:    Active Problems:    Lymphedema of both lower extremities    Morbid obesity (HCC)    Tobacco abuse    Intervertebral lumbar disc disorder    Chronic pain syndrome    Intractable back pain    Acute midline low back pain with right-sided sciatica  Resolved Problems:    * No resolved hospital problems. *      Plan:   For carmen tomorrow / cont pain med regimen as per neurosurgery        Roberto De La Torre  9:19 AM  2/21/2021

## 2021-02-22 PROCEDURE — 6370000000 HC RX 637 (ALT 250 FOR IP): Performed by: INTERNAL MEDICINE

## 2021-02-22 PROCEDURE — 99232 SBSQ HOSP IP/OBS MODERATE 35: CPT | Performed by: NEUROLOGICAL SURGERY

## 2021-02-22 PROCEDURE — 97535 SELF CARE MNGMENT TRAINING: CPT

## 2021-02-22 PROCEDURE — 97110 THERAPEUTIC EXERCISES: CPT

## 2021-02-22 PROCEDURE — 6370000000 HC RX 637 (ALT 250 FOR IP): Performed by: NEUROLOGICAL SURGERY

## 2021-02-22 PROCEDURE — 6360000002 HC RX W HCPCS: Performed by: NEUROLOGICAL SURGERY

## 2021-02-22 PROCEDURE — 6360000002 HC RX W HCPCS: Performed by: PHYSICIAN ASSISTANT

## 2021-02-22 PROCEDURE — 2580000003 HC RX 258: Performed by: INTERNAL MEDICINE

## 2021-02-22 PROCEDURE — 1200000000 HC SEMI PRIVATE

## 2021-02-22 RX ORDER — FUROSEMIDE 40 MG/1
40 TABLET ORAL ONCE
Status: COMPLETED | OUTPATIENT
Start: 2021-02-22 | End: 2021-02-22

## 2021-02-22 RX ORDER — FUROSEMIDE 20 MG/1
20 TABLET ORAL ONCE
Status: DISCONTINUED | OUTPATIENT
Start: 2021-02-22 | End: 2021-02-22

## 2021-02-22 RX ADMIN — HYDROMORPHONE HYDROCHLORIDE 1 MG: 1 INJECTION, SOLUTION INTRAMUSCULAR; INTRAVENOUS; SUBCUTANEOUS at 23:21

## 2021-02-22 RX ADMIN — ROPINIROLE HYDROCHLORIDE 2 MG: 2 TABLET, FILM COATED ORAL at 12:03

## 2021-02-22 RX ADMIN — HYDROMORPHONE HYDROCHLORIDE 1 MG: 1 INJECTION, SOLUTION INTRAMUSCULAR; INTRAVENOUS; SUBCUTANEOUS at 11:51

## 2021-02-22 RX ADMIN — HYDROMORPHONE HYDROCHLORIDE 1 MG: 1 INJECTION, SOLUTION INTRAMUSCULAR; INTRAVENOUS; SUBCUTANEOUS at 09:16

## 2021-02-22 RX ADMIN — DIAZEPAM 5 MG: 5 TABLET ORAL at 10:42

## 2021-02-22 RX ADMIN — GABAPENTIN 600 MG: 300 CAPSULE ORAL at 08:08

## 2021-02-22 RX ADMIN — SODIUM CHLORIDE, PRESERVATIVE FREE 10 ML: 5 INJECTION INTRAVENOUS at 20:02

## 2021-02-22 RX ADMIN — HYDROMORPHONE HYDROCHLORIDE 1 MG: 1 INJECTION, SOLUTION INTRAMUSCULAR; INTRAVENOUS; SUBCUTANEOUS at 16:53

## 2021-02-22 RX ADMIN — DIAZEPAM 5 MG: 5 TABLET ORAL at 02:55

## 2021-02-22 RX ADMIN — SODIUM CHLORIDE, PRESERVATIVE FREE 10 ML: 5 INJECTION INTRAVENOUS at 08:08

## 2021-02-22 RX ADMIN — ROPINIROLE HYDROCHLORIDE 2 MG: 2 TABLET, FILM COATED ORAL at 08:08

## 2021-02-22 RX ADMIN — OXYCODONE HYDROCHLORIDE 15 MG: 15 TABLET ORAL at 19:59

## 2021-02-22 RX ADMIN — HYDROMORPHONE HYDROCHLORIDE 1 MG: 1 INJECTION, SOLUTION INTRAMUSCULAR; INTRAVENOUS; SUBCUTANEOUS at 14:31

## 2021-02-22 RX ADMIN — OXYCODONE HYDROCHLORIDE 15 MG: 15 TABLET ORAL at 13:17

## 2021-02-22 RX ADMIN — GABAPENTIN 600 MG: 300 CAPSULE ORAL at 14:31

## 2021-02-22 RX ADMIN — OXYCODONE HYDROCHLORIDE 15 MG: 15 TABLET ORAL at 08:08

## 2021-02-22 RX ADMIN — METHYLPREDNISOLONE 4 MG: 4 TABLET ORAL at 08:08

## 2021-02-22 RX ADMIN — HYDROMORPHONE HYDROCHLORIDE 1 MG: 1 INJECTION, SOLUTION INTRAMUSCULAR; INTRAVENOUS; SUBCUTANEOUS at 01:04

## 2021-02-22 RX ADMIN — METHYLPREDNISOLONE 4 MG: 4 TABLET ORAL at 19:59

## 2021-02-22 RX ADMIN — FUROSEMIDE 40 MG: 40 TABLET ORAL at 20:00

## 2021-02-22 RX ADMIN — GABAPENTIN 600 MG: 300 CAPSULE ORAL at 20:00

## 2021-02-22 RX ADMIN — OXYCODONE HYDROCHLORIDE 15 MG: 15 TABLET ORAL at 02:55

## 2021-02-22 RX ADMIN — DIAZEPAM 5 MG: 5 TABLET ORAL at 23:12

## 2021-02-22 RX ADMIN — HYDROMORPHONE HYDROCHLORIDE 1 MG: 1 INJECTION, SOLUTION INTRAMUSCULAR; INTRAVENOUS; SUBCUTANEOUS at 21:22

## 2021-02-22 RX ADMIN — BUPROPION HYDROCHLORIDE 150 MG: 150 TABLET, EXTENDED RELEASE ORAL at 08:08

## 2021-02-22 RX ADMIN — HYDROMORPHONE HYDROCHLORIDE 1 MG: 1 INJECTION, SOLUTION INTRAMUSCULAR; INTRAVENOUS; SUBCUTANEOUS at 04:05

## 2021-02-22 RX ADMIN — ROPINIROLE HYDROCHLORIDE 2 MG: 2 TABLET, FILM COATED ORAL at 19:59

## 2021-02-22 RX ADMIN — ROPINIROLE HYDROCHLORIDE 2 MG: 2 TABLET, FILM COATED ORAL at 16:53

## 2021-02-22 ASSESSMENT — PAIN - FUNCTIONAL ASSESSMENT: PAIN_FUNCTIONAL_ASSESSMENT: PREVENTS OR INTERFERES SOME ACTIVE ACTIVITIES AND ADLS

## 2021-02-22 ASSESSMENT — PAIN SCALES - GENERAL
PAINLEVEL_OUTOF10: 10
PAINLEVEL_OUTOF10: 9
PAINLEVEL_OUTOF10: 5
PAINLEVEL_OUTOF10: 9

## 2021-02-22 ASSESSMENT — PAIN DESCRIPTION - LOCATION
LOCATION: BACK;LEG
LOCATION: BACK

## 2021-02-22 ASSESSMENT — PAIN DESCRIPTION - PAIN TYPE
TYPE: ACUTE PAIN

## 2021-02-22 ASSESSMENT — PAIN DESCRIPTION - DESCRIPTORS
DESCRIPTORS: ACHING;DISCOMFORT;SHOOTING
DESCRIPTORS: ACHING;DISCOMFORT;SHOOTING

## 2021-02-22 ASSESSMENT — PAIN DESCRIPTION - ORIENTATION: ORIENTATION: RIGHT

## 2021-02-22 ASSESSMENT — PAIN DESCRIPTION - ONSET: ONSET: ON-GOING

## 2021-02-22 ASSESSMENT — PAIN DESCRIPTION - FREQUENCY
FREQUENCY: CONTINUOUS

## 2021-02-22 NOTE — PROGRESS NOTES
Hospital Medicine    Subjective:  Pt alert conversive pt states she slept a little last pm      Current Facility-Administered Medications:     HYDROmorphone (DILAUDID) injection 1 mg, 1 mg, Intravenous, Q2H PRN, Donnie Dupree MD, 1 mg at 02/22/21 0405    oxyCODONE (OXY-IR) immediate release tablet 15 mg, 15 mg, Oral, Q4H PRN, Donnie Dupree MD, 15 mg at 02/22/21 0255    diazePAM (VALIUM) tablet 5 mg, 5 mg, Oral, Q6H PRN, Donnie Dupree MD, 5 mg at 02/22/21 0255    gabapentin (NEURONTIN) capsule 600 mg, 600 mg, Oral, TID, Donnie Dupree MD, 600 mg at 02/21/21 2012    Buprenorphine HCl FILM 450 mcg, 450 mcg, Oral, Q12H, Armando Dent DO, 450 mcg at 02/21/21 0957    buPROPion (WELLBUTRIN XL) extended release tablet 150 mg, 150 mg, Oral, QAM, Armando Dent DO, 150 mg at 02/21/21 0911    rOPINIRole (REQUIP) tablet 2 mg, 2 mg, Oral, 4x daily, Francis Hodges DO, 2 mg at 02/21/21 2012    sodium chloride flush 0.9 % injection 10 mL, 10 mL, Intravenous, 2 times per day, Francis Hodges DO, 10 mL at 02/21/21 2013    sodium chloride flush 0.9 % injection 10 mL, 10 mL, Intravenous, PRN, Armando Dent DO, 10 mL at 02/20/21 0145    promethazine (PHENERGAN) tablet 12.5 mg, 12.5 mg, Oral, Q6H PRN, 12.5 mg at 02/20/21 1701 **OR** ondansetron (ZOFRAN) injection 4 mg, 4 mg, Intravenous, Q6H PRN, Armando Dent DO    polyethylene glycol (GLYCOLAX) packet 17 g, 17 g, Oral, Daily PRN, Armando Dent DO    acetaminophen (TYLENOL) tablet 650 mg, 650 mg, Oral, Q6H PRN, 650 mg at 02/21/21 0911 **OR** acetaminophen (TYLENOL) suppository 650 mg, 650 mg, Rectal, Q6H PRN, Armando Yonny Dent DO    methylPREDNISolone (MEDROL) tablet 4 mg, 4 mg, Oral, QAM AC, Tyra Kang PA-C, 4 mg at 02/21/21 5190    methylPREDNISolone (MEDROL) tablet 4 mg, 4 mg, Oral, Nightly, Tyra Kang PA-C, 4 mg at 02/21/21 2012    LORazepam (ATIVAN) injection 1 mg, 1 mg, Intravenous, See Admin Instructions, Romulo Juarez PA-C, 1 mg at

## 2021-02-22 NOTE — PROGRESS NOTES
Called [facility] [address] w/ Dr Tobias Bolden [facility] until [facility].

## 2021-02-22 NOTE — PROGRESS NOTES
currently remodeling bathroom to put in walk in shower in the next 2 wks. Equipment owned: none, pt. has been using ww since being in hospital     Prior Level of Function: Ind. with ADLs , Ind. with IADLs; ambulated no A.D. 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[place] [address]     Pain Level: Pt complaining of back pain, BLE pain this session  Cognition: A&O: 4/4; Follows 2 step directions              Memory:  good              Sequencing:  good              Problem solving:  good              Judgement/safety:  Fair/good                Functional Assessment:    Initial Eval Status  Date: 2- Treatment Status  Date: 2/22/21 Short Term Goals = Long Term Goals  Treatment frequency: 3-5 days   Feeding Independent   Independent     Grooming Set up seated  SBA  Pt stood at sink and washed hands Modified [place]    UB Dressing Stand by Assist  DNT  SBA per previous level Modified [place]    LB Dressing Maximal Assist.  Pt. will benefit from use of A.E.   Emmanuel  Pt donned/doffed hospital socks with use of reacher and sockaide, with assistance to adjust once donned while seated EOB    *Reacher, sockaide and longe florecita sponge issued 2/22/21 Modified [place]    Bathing Moderate Assist with sim. task. Pt. will benefit from [address] sponge  modA  Simulated Task     *Long handle sponge issued 2/22/21   Modified [place]    Toileting NT, pt. deferred. SBA  Pt completed toileting task on standard commode, with pt able to complete of clothing management, hygiene and transfer with use of grab bars  Modified [place]    Bed Mobility  Supine to sit: NT   Pt. received sitting EOB.   Sit to supine: NT  Pt. wished to return to EOB for comfort.  DNT  Pt seated EOB upon arrival and at end of session Supine to sit: Modified [place]   Sit to supine: Modified [place]    Functional Transfers SBA with sit <> stand   Min A with SPT using ww   SBA Sit to Stand  Stand to Sit    Cueing for hand placement Modified [place]    Functional Mobility Minimal Assist with ww short household distances in room [facility]  Pt ambulated short distance in room EOB<>Bathroom with w.w., cueing for safety and walker management Modified [place]    Balance Sitting:     Static:  Ind. Dynamic:SBA  Standing: Min A  Sitting EOB:  Independent    Standing:  SBA/CGA     Activity Tolerance Fair with lt. ax. Fair  Good with lt./mod. ax. Visual/  Perceptual Glasses: yes          Safety Awareness Fair/good                     good      Hand dominance: R       Strength ROM Additional Info:    RUE  WFL  WFL good  and wfl FMC/dexterity noted during ADL tasks      LUE WFL WFL good  and wfl FMC/dexterity noted during ADL tasks         Hearing: WFL   Sensation:  Pt. c/o no numbness/ tingling B UE  Tone: WFL   Edema: none noted B UE        Education:  Pt was educated through out treatment regarding precautions to follow, proper technique & safety with functional transfers & mobility, use of AE to assist with LB dressing tasks to improve safety & prevent falls and allow pt to return home safely. Comments: Upon arrival pt seated EOB, agreeable to therapy, nursing becoming present okaying pt to be seen this session. Pt completed of transfers, functional mobility, ADL tasks and UB exercises this session. Pt sat EOB and completed of BUE's for 10reps in all planes, with rest breaks, focusing on increasing of ROM, encouraging pt to complete daily. Spoke with pt in regards to home set up, denying need for shower bench and or raised commode at discharge. At end of session, pt seated EOB per pt's prefrence, all lines and tubes intact, call light within reach. · Pt has made fair progress towards set goals.    · Continue with current plan of care focusing on increasing of independency with transfers and ADL tasks      Treatment Time In: 1:05pm          Treatment Time Out: 1:30pm Treatment Charges: Mins Units   Ther Ex  91036 9 1   Manual Therapy 04802     Thera Activities 11890     ADL/Home Mgt 08762 16 1   Neuro Re-ed 57734     Group Therapy      Orthotic manage/training  [id number]     Non-Billable Time     Total Timed Treatment 25 2        Kylah RICE/L [id number]

## 2021-02-22 NOTE — PROGRESS NOTES
Department of Neurosurgery  Physician Assistant Progress Note    CHIEF COMPLAINT: Low back and right leg pain    SUBJECTIVE:  Continued severe back and leg pain. Plan for [facility] today. REVIEW OF SYSTEMS :  Constitutional: Negative for chills and fever. Neurological: Negative for dizziness, tremors and speech change.   GI: Negative for abdominal pain  : Negative for hematuria    OBJECTIVE:   VITALS:  /75   Pulse 82   Temp 97.6 °F (36.4 °C) (Temporal)   Resp 16   Ht 5' 2\" (1.575 m)   Wt 297 lb (134.7 kg)   SpO2 91%   BMI 54.32 kg/m²   PHYSICAL:   Obese, no apparent distress   Non-labored breathing   FC x 4 ext, pain noted   Sensory grossly intact, decreased in RLE   BLE statis changes noted    DATA:  CBC:   Lab Results   Component Value Date    WBC 13.5 02/18/2021    RBC 5.62 02/18/2021    HGB 9.1 02/18/2021    HCT 36.6 02/18/2021    MCV 65.1 02/18/2021    MCH 16.2 02/18/2021    MCHC 24.9 02/18/2021    RDW 25.2 02/18/2021     02/18/2021    MPV NOT CALC 02/18/2021     BMP:    Lab Results   Component Value Date     02/18/2021    K 4.6 02/18/2021    K 3.3 06/08/2019     02/18/2021    CO2 28 02/18/2021    BUN 16 02/18/2021    LABALBU 3.8 11/19/2019    CREATININE 0.5 02/18/2021    CALCIUM 8.8 02/18/2021    GFRAA >60 02/18/2021    LABGLOM >60 02/18/2021    GLUCOSE 121 02/18/2021     PT/INR:    Lab Results   Component Value Date    PROTIME 11.2 02/19/2021    INR 1.0 02/19/2021     PTT:  No results found for: APTT, PTT[APTT}    Current Inpatient Medications  Current Facility-Administered Medications: HYDROmorphone (DILAUDID) injection 1 mg, 1 mg, Intravenous, Q2H PRN  oxyCODONE (OXY-IR) immediate release tablet 15 mg, 15 mg, Oral, Q4H PRN  diazePAM (VALIUM) tablet 5 mg, 5 mg, Oral, Q6H PRN  gabapentin (NEURONTIN) capsule 600 mg, 600 mg, Oral, TID  Buprenorphine HCl FILM 450 mcg, 450 mcg, Oral, Q12H  buPROPion (WELLBUTRIN XL) extended release tablet 150 mg, 150 mg, Oral, QAM  rOPINIRole (REQUIP) tablet 2 mg, 2 mg, Oral, 4x daily  sodium chloride flush 0.9 % injection 10 mL, 10 mL, Intravenous, 2 times per day  sodium chloride flush 0.9 % injection 10 mL, 10 mL, Intravenous, PRN  promethazine (PHENERGAN) tablet 12.5 mg, 12.5 mg, Oral, Q6H PRN **OR** ondansetron (ZOFRAN) injection 4 mg, 4 mg, Intravenous, Q6H PRN  polyethylene glycol (GLYCOLAX) packet 17 g, 17 g, Oral, Daily PRN  acetaminophen (TYLENOL) tablet 650 mg, 650 mg, Oral, Q6H PRN **OR** acetaminophen (TYLENOL) suppository 650 mg, 650 mg, Rectal, Q6H PRN  methylPREDNISolone (MEDROL) tablet 4 mg, 4 mg, Oral, QAM AC  methylPREDNISolone (MEDROL) tablet 4 mg, 4 mg, Oral, Nightly  LORazepam (ATIVAN) injection 1 mg, 1 mg, Intravenous, See Admin Instructions    ASSESSMENT:   · Severe low back and right leg pain. Lumbar MRI reveals large L3-4 disc herniation with severe stenosis. Grade one L3-4 spondylolithesis. Stable. PLAN:  · PT/OT-16/24  · WBAT  · Pain control  · IR for DOUGIE today      Electronically signed by Rayne Belcher PA-C on 2/22/2021 at 12:05 PM     I have interviewed and examined the patient and agree with above. She has listhesis and HNP at L3-L4.  Await melanie Elias

## 2021-02-22 NOTE — CARE COORDINATION
2/22/2021 - neurosurgery following. For epidural in IR today. Plan is  To return home when medically stable. SW/CM will follow.

## 2021-02-22 NOTE — PLAN OF CARE
Problem: Pain:  Goal: Pain level will decrease  Description: Pain level will decrease  Outcome: Met This Shift  Goal: Control of acute pain  Description: Control of acute pain  Outcome: Met This Shift  Goal: Control of chronic pain  Description: Control of chronic pain  Outcome: Met This Shift     Problem: Infection:  Goal: Will remain free from infection  Description: Will remain free from infection  Outcome: Met This Shift     Problem: Safety:  Goal: Free from accidental physical injury  Description: Free from accidental physical injury  Outcome: Met This Shift  Goal: Free from intentional harm  Description: Free from intentional harm  Outcome: Met This Shift

## 2021-02-23 ENCOUNTER — APPOINTMENT (OUTPATIENT)
Dept: INTERVENTIONAL RADIOLOGY/VASCULAR | Age: 66
DRG: 552 | End: 2021-02-23
Payer: COMMERCIAL

## 2021-02-23 VITALS
HEART RATE: 99 BPM | RESPIRATION RATE: 18 BRPM | TEMPERATURE: 97.8 F | HEIGHT: 62 IN | DIASTOLIC BLOOD PRESSURE: 86 MMHG | WEIGHT: 293 LBS | OXYGEN SATURATION: 92 % | SYSTOLIC BLOOD PRESSURE: 168 MMHG | BODY MASS INDEX: 53.92 KG/M2

## 2021-02-23 PROCEDURE — 2580000003 HC RX 258: Performed by: INTERNAL MEDICINE

## 2021-02-23 PROCEDURE — 3E0S3BZ INTRODUCTION OF ANESTHETIC AGENT INTO EPIDURAL SPACE, PERCUTANEOUS APPROACH: ICD-10-PCS | Performed by: RADIOLOGY

## 2021-02-23 PROCEDURE — 6360000002 HC RX W HCPCS: Performed by: PHYSICIAN ASSISTANT

## 2021-02-23 PROCEDURE — 64483 NJX AA&/STRD TFRM EPI L/S 1: CPT | Performed by: RADIOLOGY

## 2021-02-23 PROCEDURE — 62323 NJX INTERLAMINAR LMBR/SAC: CPT

## 2021-02-23 PROCEDURE — 64483 NJX AA&/STRD TFRM EPI L/S 1: CPT

## 2021-02-23 PROCEDURE — 6360000004 HC RX CONTRAST MEDICATION: Performed by: RADIOLOGY

## 2021-02-23 PROCEDURE — 6370000000 HC RX 637 (ALT 250 FOR IP): Performed by: NEUROLOGICAL SURGERY

## 2021-02-23 PROCEDURE — 3E0S33Z INTRODUCTION OF ANTI-INFLAMMATORY INTO EPIDURAL SPACE, PERCUTANEOUS APPROACH: ICD-10-PCS | Performed by: RADIOLOGY

## 2021-02-23 PROCEDURE — 2500000003 HC RX 250 WO HCPCS: Performed by: RADIOLOGY

## 2021-02-23 PROCEDURE — 2709999900 IR INJ INTERLAMINAR EPI/SUBARACHNOID LUMB/SAC

## 2021-02-23 PROCEDURE — 6360000002 HC RX W HCPCS: Performed by: NEUROLOGICAL SURGERY

## 2021-02-23 PROCEDURE — 6370000000 HC RX 637 (ALT 250 FOR IP): Performed by: INTERNAL MEDICINE

## 2021-02-23 PROCEDURE — 6360000002 HC RX W HCPCS: Performed by: RADIOLOGY

## 2021-02-23 RX ORDER — BUPRENORPHINE HYDROCHLORIDE 450 UG/1
450 FILM, SOLUBLE BUCCAL EVERY 12 HOURS
Qty: 60 EACH | Refills: 1
Start: 2021-02-23 | End: 2021-03-09 | Stop reason: SDUPTHER

## 2021-02-23 RX ORDER — LIDOCAINE HYDROCHLORIDE 20 MG/ML
INJECTION, SOLUTION INFILTRATION; PERINEURAL
Status: COMPLETED | OUTPATIENT
Start: 2021-02-23 | End: 2021-02-23

## 2021-02-23 RX ORDER — BETAMETHASONE SODIUM PHOSPHATE AND BETAMETHASONE ACETATE 3; 3 MG/ML; MG/ML
INJECTION, SUSPENSION INTRA-ARTICULAR; INTRALESIONAL; INTRAMUSCULAR; SOFT TISSUE
Status: COMPLETED | OUTPATIENT
Start: 2021-02-23 | End: 2021-02-23

## 2021-02-23 RX ADMIN — BETAMETHASONE SODIUM PHOSPHATE AND BETAMETHASONE ACETATE 15 MG: 3; 3 INJECTION, SUSPENSION INTRA-ARTICULAR; INTRALESIONAL; INTRAMUSCULAR at 10:31

## 2021-02-23 RX ADMIN — OXYCODONE HYDROCHLORIDE 15 MG: 15 TABLET ORAL at 16:11

## 2021-02-23 RX ADMIN — GABAPENTIN 600 MG: 300 CAPSULE ORAL at 08:14

## 2021-02-23 RX ADMIN — GABAPENTIN 600 MG: 300 CAPSULE ORAL at 14:06

## 2021-02-23 RX ADMIN — BUPROPION HYDROCHLORIDE 150 MG: 150 TABLET, EXTENDED RELEASE ORAL at 08:14

## 2021-02-23 RX ADMIN — BETAMETHASONE SODIUM PHOSPHATE AND BETAMETHASONE ACETATE 15 MG: 3; 3 INJECTION, SUSPENSION INTRA-ARTICULAR; INTRALESIONAL; INTRAMUSCULAR at 10:33

## 2021-02-23 RX ADMIN — ROPINIROLE HYDROCHLORIDE 2 MG: 2 TABLET, FILM COATED ORAL at 08:14

## 2021-02-23 RX ADMIN — OXYCODONE HYDROCHLORIDE 15 MG: 15 TABLET ORAL at 00:37

## 2021-02-23 RX ADMIN — OXYCODONE HYDROCHLORIDE 15 MG: 15 TABLET ORAL at 04:44

## 2021-02-23 RX ADMIN — IOPAMIDOL 1 ML: 612 INJECTION, SOLUTION INTRATHECAL at 10:56

## 2021-02-23 RX ADMIN — SODIUM CHLORIDE, PRESERVATIVE FREE 10 ML: 5 INJECTION INTRAVENOUS at 08:17

## 2021-02-23 RX ADMIN — METHYLPREDNISOLONE 4 MG: 4 TABLET ORAL at 06:22

## 2021-02-23 RX ADMIN — LIDOCAINE HYDROCHLORIDE 10 ML: 20 INJECTION, SOLUTION INFILTRATION; PERINEURAL at 10:28

## 2021-02-23 RX ADMIN — ROPINIROLE HYDROCHLORIDE 2 MG: 2 TABLET, FILM COATED ORAL at 12:12

## 2021-02-23 RX ADMIN — DIAZEPAM 5 MG: 5 TABLET ORAL at 05:50

## 2021-02-23 RX ADMIN — HYDROMORPHONE HYDROCHLORIDE 1 MG: 1 INJECTION, SOLUTION INTRAMUSCULAR; INTRAVENOUS; SUBCUTANEOUS at 05:50

## 2021-02-23 RX ADMIN — OXYCODONE HYDROCHLORIDE 15 MG: 15 TABLET ORAL at 09:21

## 2021-02-23 RX ADMIN — HYDROMORPHONE HYDROCHLORIDE 1 MG: 1 INJECTION, SOLUTION INTRAMUSCULAR; INTRAVENOUS; SUBCUTANEOUS at 08:17

## 2021-02-23 ASSESSMENT — PAIN DESCRIPTION - FREQUENCY
FREQUENCY: CONTINUOUS

## 2021-02-23 ASSESSMENT — PAIN DESCRIPTION - LOCATION
LOCATION: BACK;LEG

## 2021-02-23 ASSESSMENT — PAIN DESCRIPTION - PAIN TYPE
TYPE: ACUTE PAIN

## 2021-02-23 ASSESSMENT — PAIN DESCRIPTION - ORIENTATION
ORIENTATION: RIGHT

## 2021-02-23 ASSESSMENT — PAIN DESCRIPTION - DESCRIPTORS
DESCRIPTORS: ACHING;DISCOMFORT;SORE;TENDER
DESCRIPTORS: ACHING;CONSTANT;DISCOMFORT
DESCRIPTORS: ACHING;DISCOMFORT;SORE
DESCRIPTORS: ACHING;CONSTANT;DISCOMFORT

## 2021-02-23 ASSESSMENT — PAIN SCALES - GENERAL
PAINLEVEL_OUTOF10: 10
PAINLEVEL_OUTOF10: 10
PAINLEVEL_OUTOF10: 6
PAINLEVEL_OUTOF10: 10

## 2021-02-23 NOTE — INTERVAL H&P NOTE
H&P Update    Patient's History and Physical  was reviewed. The patient appears likely to able to tolerate the procedure. Risk and benefits discussed including ultimate complications, possibly death and consent obtained.     Raad Kenney, II

## 2021-02-23 NOTE — PROGRESS NOTES
Hospital Medicine    Subjective:  Pt alert conversive carmen not done yesterdsy for carmen today      Current Facility-Administered Medications:     HYDROmorphone (DILAUDID) injection 1 mg, 1 mg, Intravenous, Q2H PRN, Roshni Arshad MD, 1 mg at 02/23/21 0550    oxyCODONE (OXY-IR) immediate release tablet 15 mg, 15 mg, Oral, Q4H PRN, Roshni Arshad MD, 15 mg at 02/23/21 0444    diazePAM (VALIUM) tablet 5 mg, 5 mg, Oral, Q6H PRN, Roshni Arshad MD, 5 mg at 02/23/21 0550    gabapentin (NEURONTIN) capsule 600 mg, 600 mg, Oral, TID, Roshni Arshad MD, 600 mg at 02/22/21 2000    Buprenorphine HCl FILM 450 mcg, 450 mcg, Oral, Q12H, María Dent DO, 450 mcg at 02/22/21 0808    buPROPion (WELLBUTRIN XL) extended release tablet 150 mg, 150 mg, Oral, QAM, María Dent DO, 150 mg at 02/22/21 0808    rOPINIRole (REQUIP) tablet 2 mg, 2 mg, Oral, 4x daily, Arabella Hang, DO, 2 mg at 02/22/21 1959    sodium chloride flush 0.9 % injection 10 mL, 10 mL, Intravenous, 2 times per day, Arabella Hang, DO, 10 mL at 02/22/21 2002    sodium chloride flush 0.9 % injection 10 mL, 10 mL, Intravenous, PRN, María Dent DO, 10 mL at 02/20/21 0145    promethazine (PHENERGAN) tablet 12.5 mg, 12.5 mg, Oral, Q6H PRN, 12.5 mg at 02/20/21 1701 **OR** ondansetron (ZOFRAN) injection 4 mg, 4 mg, Intravenous, Q6H PRN, María Dent DO    polyethylene glycol (GLYCOLAX) packet 17 g, 17 g, Oral, Daily PRN, María Dent DO    acetaminophen (TYLENOL) tablet 650 mg, 650 mg, Oral, Q6H PRN, 650 mg at 02/21/21 0911 **OR** acetaminophen (TYLENOL) suppository 650 mg, 650 mg, Rectal, Q6H PRN, María Dent,     LORazepam (ATIVAN) injection 1 mg, 1 mg, Intravenous, See Admin Instructions, Sofia Espinoza PA-C, 1 mg at 02/18/21 1544    Objective:    /61   Pulse 89   Temp 97.1 °F (36.2 °C) (Temporal)   Resp 16   Ht 5' 2\" (1.575 m)   Wt 297 lb (134.7 kg)   SpO2 91%   BMI 54.32 kg/m²     Heart:  reg  Lungs:  ctab  Abd: + bs soft nontender  Extrem:  Edema legs    CBC with Differential:    Lab Results   Component Value Date    WBC 13.5 02/18/2021    RBC 5.62 02/18/2021    HGB 9.1 02/18/2021    HCT 36.6 02/18/2021     02/18/2021    MCV 65.1 02/18/2021    MCH 16.2 02/18/2021    MCHC 24.9 02/18/2021    RDW 25.2 02/18/2021    NRBC 0.9 02/18/2021    SEGSPCT 72 02/28/2014    LYMPHOPCT 8.7 02/18/2021    MONOPCT 5.2 02/18/2021    BASOPCT 0.2 02/18/2021    MONOSABS 0.68 02/18/2021    LYMPHSABS 1.22 02/18/2021    EOSABS 0.00 02/18/2021    BASOSABS 0.00 02/18/2021     CMP:    Lab Results   Component Value Date     02/18/2021    K 4.6 02/18/2021    K 3.3 06/08/2019     02/18/2021    CO2 28 02/18/2021    BUN 16 02/18/2021    CREATININE 0.5 02/18/2021    GFRAA >60 02/18/2021    LABGLOM >60 02/18/2021    GLUCOSE 121 02/18/2021    PROT 7.7 11/19/2019    LABALBU 3.8 11/19/2019    CALCIUM 8.8 02/18/2021    BILITOT 0.5 11/19/2019    ALKPHOS 139 11/19/2019    AST 16 11/19/2019    ALT 10 11/19/2019     Warfarin PT/INR:    Lab Results   Component Value Date    INR 1.0 02/19/2021    PROTIME 11.2 02/19/2021       Assessment:    Active Problems:    Lymphedema of both lower extremities    Morbid obesity (HCC)    Tobacco abuse    Intervertebral lumbar disc disorder    Chronic pain syndrome    Intractable back pain    Acute midline low back pain with right-sided sciatica  Resolved Problems:    * No resolved hospital problems.  *      Plan:  c[place]en [place] home        Deirdre Simms  7:39 AM  2/23/2021

## 2021-02-23 NOTE — CARE COORDINATION
2/23, SW met with patient-who was sitting on side of bed. Discussed discharge plan which is home. Patient lives with her sister in a 1 story home with 2 steps to enter the home. DME is none owned. PCP is Dr. Chelsea Joseph and Pharmacy is [facility] in [place] or [facility] in. Recent PT-16/24 and ambulated 40 feet with Min A OT-17/24. Patient feels she will need a rollator and would like [facility]. Will need order for rollator-[place] from DME informed. Discussed HHC-patient would like HHC feels she is weak and needs it. Patient not wanting a IVANNA. HHC-patient would like [facility] had them in past.  Referral made to [facility]. Will need [facility] order for PT/OT. Daughter to be transportation. SW to follow for further discharge planning needs.       KANE Staton  [facility] Case Management  [phone]

## 2021-02-23 NOTE — PLAN OF CARE
Problem: Pain:  Goal: Pain level will decrease  Description: Pain level will decrease  Outcome: Completed  Goal: Control of acute pain  Description: Control of acute pain  Outcome: Completed  Goal: Control of chronic pain  Description: Control of chronic pain  Outcome: Completed     Problem: Infection:  Goal: Will remain free from infection  Description: Will remain free from infection  Outcome: Completed     Problem: Safety:  Goal: Free from accidental physical injury  Description: Free from accidental physical injury  Outcome: Completed  Goal: Free from intentional harm  Description: Free from intentional harm  Outcome: Completed     Problem: Daily Care:  Goal: Daily care needs are met  Description: Daily care needs are met  Outcome: Completed     Problem: Skin Integrity:  Goal: Skin integrity will stabilize  Description: Skin integrity will stabilize  Outcome: Completed     Problem: Discharge Planning:  Goal: Patients continuum of care needs are met  Description: Patients continuum of care needs are met  Outcome: Completed     Problem: Musculor/Skeletal Functional Status  Goal: Highest potential functional level  Outcome: Completed  Goal: Absence of falls  Outcome: Completed     Problem: Skin Integrity:  Goal: Will show no infection signs and symptoms  Description: Will show no infection signs and symptoms  Outcome: Completed  Goal: Absence of new skin breakdown  Description: Absence of new skin breakdown  Outcome: Completed

## 2021-02-23 NOTE — BRIEF OP NOTE
Brief Postoperative Note    Patria Leyden  YOB: 1955  [id number]    Pre-operative Diagnosis and Procedure: right leg radiculopathy for epidural injection    Post-operative Diagnosis: Same    Anesthesia: Local    Estimated Blood Loss: < 10 cc    Surgeon: Jaime PAN     Complications: none    Specimen obtained: none     Findings: none     Roberta Ortega, II   2/23/2021 10:11 AM

## 2021-02-24 ENCOUNTER — CARE COORDINATION (OUTPATIENT)
Dept: OTHER | Facility: CLINIC | Age: 66
End: 2021-02-24

## 2021-02-24 ENCOUNTER — OFFICE VISIT (OUTPATIENT)
Dept: PRIMARY CARE CLINIC | Age: 66
End: 2021-02-24
Payer: COMMERCIAL

## 2021-02-24 DIAGNOSIS — M51.9 INTERVERTEBRAL LUMBAR DISC DISORDER: Primary | ICD-10-CM

## 2021-02-24 DIAGNOSIS — M47.26 OSTEOARTHRITIS OF SPINE WITH RADICULOPATHY, LUMBAR REGION: ICD-10-CM

## 2021-02-24 DIAGNOSIS — M48.062 SPINAL STENOSIS OF LUMBAR REGION WITH NEUROGENIC CLAUDICATION: ICD-10-CM

## 2021-02-24 DIAGNOSIS — I89.0 LYMPHEDEMA OF BOTH LOWER EXTREMITIES: ICD-10-CM

## 2021-02-24 PROCEDURE — 99214 OFFICE O/P EST MOD 30 MIN: CPT | Performed by: FAMILY MEDICINE

## 2021-02-24 PROCEDURE — 1111F DSCHRG MED/CURRENT MED MERGE: CPT | Performed by: FAMILY MEDICINE

## 2021-02-24 NOTE — CARE COORDINATION
[facility] Transitions Initial Follow Up Call    Call within 2 business days of discharge: Yes    Patient: Patria Leyden Patient : 1955   MRN: [id number]  Reason for Admission: Intractable back pain  Discharge Date: 21 RARS: Readmission Risk Score: 8      Last Discharge [address] [address]       Complaint Diagnosis Description Type Department Provider    21 Back Pain Intractable back pain . .. ED to Hosp-Admission (Discharged) (ADMITTED) [facility] 5S NS Arabella Mauricio DO; Urban Hernandez. .. Challenges to be reviewed by the provider   Additional needs identified to be addressed with provider Yes  advance care planning-No ACP documetns on file    Discussed COVID-19 related testing which was available at this time. Test results were negative. Patient informed of results, if available? Yes         Method of communication with provider : chart routing    Advance Care Planning:   Does patient have an Advance Directive:  not on file. Non-face-to-face services provided:  Obtained and reviewed discharge summary and/or continuity of care documents  Assessment and support for treatment adherence and medication management-Medications and current services reviewed    Was this a readmission? No  Patient stated reason for admission: Back pain    Associate Care Manager [facility]) contacted the patient by telephone to perform post hospital discharge assessment. Verified name and  with patient as identifiers. Provided introduction to self, and explanation of the ACM role. ACM reviewed discharge instructions, medical action plan and red flags with patient who verbalized understanding. Patient given an opportunity to ask questions and does not have any further questions or concerns at this time. Were discharge instructions available to patient? Yes.  Reviewed appropriate site of care based on symptoms and resources available to patient including: PCP, Specialist, Benefits related nurse triage line, Urgent care clinics, Home health, When to call 911, MyChart Messaging and AC. The patient agrees to contact the PCP office for questions related to their healthcare. Medication reconciliation was performed with patient, who verbalizes understanding of administration of home medications. Advised obtaining a 90-day supply of all daily and as-needed medications. Pt states she is feeling better. She has her follow up appt scheduled for this afternoon and her daughter will drive her. She is able to describe hr medication regimen and changes to home medications. She was Dc'd with a Rolator and [facility] services. Discussed Falls preventions and home safety. Pt verbalized understanding. Patient completed her PCP follow up appt this afternoon. She states she is still having quite a bit of numbness in her leg. She states she is high risk for surgery so she is wondering if she is able to go to the [facility]. She states her neurosurgeon and told her before that she may need a second opinion. Advised her there is a process for seeing  A specialist outside of the [facility] network if needed. She will call her neurosurgeon to schedule a follow up appt to discuss. [facility] Pt  admitted her to services this morning and they are planning on coming out 2 x week. Her PCP did not change any medications and medications reviewed and reconciled. He sister is providing her with transportation to her appts at this time as she is not able to drive.       Patients top risk factors for readmission:  functional physical ability, ineffective coping and medical condition    Interventions to address risk factors:   Education provided:   Red Flag symptoms to report  Symptom management  Diet education/compliance  Medication compliance  Provider follow up appointment compliance  Specialist appointment compliance  Utilization of appropriate level of care: Right Care, Right Place, Right Time  Increasing physical activity   Reinforced Yes  How are you going to get to your appointment?: Car - family or friend to transport  Were you discharged with any Home Care or [address]: Yes  Post Acute Services: Home Health (Comment: [facility] and PT)  Do you feel like you have everything you need to keep you well at home?: Yes  Care Transitions Interventions    Physical Therapy: Completed Other Services: Completed (Comment: 2/24/21: Admissted to [facility] services)     Specialty Service Referral: Completed           Follow Up  Future Appointments   Date Time Provider Sydni More   2/24/2021  3:30 PM DO LC Ferrera [facility]   3/2/2021  9:45 AM DO LC Casper [facility]   3/23/2021 11:00 AM DO NORI Mcclendon [facility]   4/6/2021 12:30 PM MD Ole Gusman RN

## 2021-02-24 NOTE — PROGRESS NOTES
Post-Discharge Transitional Care Management Services or Hospital Follow Up      Evaristo Dean   YOB: 1955    Date of Office Visit:  2/24/2021  Date of Hospital Admission: 2/18/21  Date of Hospital Discharge: 2/23/21  Risk of hospital readmission (high >=14%. Medium >=10%) :Readmission Risk Score: 8      Care management risk score Rising risk (score 2-5) and Complex Care (Scores >=6): 5     Non face to face  following discharge, date last encounter closed (first attempt may have been earlier): 2/24/2021  4:36 PM    Call initiated 2 business days of discharge: Yes    Patient Active Problem List   Diagnosis    Osteoarthritis of left knee    Osteoarthritis of right knee    BMI 45.0-49.9, adult ([address]    Lymphedema of both lower extremities    Cellulitis of left lower leg    Microcytic anemia    Morbid obesity ([address]    Tobacco abuse    SOB (shortness of breath)    Iron deficiency anemia    Primary osteoarthritis involving multiple joints    Lymphedema of left lower extremity    Intervertebral lumbar disc disorder    Reactive depression    Chronic pain syndrome    Primary osteoarthritis of both knees    Chronic right shoulder pain    Chronic right-sided low back pain without sciatica    Anxiety    Chronic fatigue    Tremor    Intractable back pain    Acute midline low back pain with right-sided sciatica    Spinal stenosis of lumbar region with neurogenic claudication    Osteoarthritis of spine with radiculopathy, lumbar region       No Known Allergies    Medications listed as ordered at the time of discharge from hospital   [address] Medication Instructions MORGAN:    Printed on:02/25/21 6451   Medication Information                      ammonium lactate (LAC-HYDRIN) 12 % cream  Apply topically as needed.              baclofen (LIORESAL) 10 MG tablet  Take 1 tablet by mouth 3 times daily Tired   Do not drive             BELBUCA 450 MCG FILM  Take 450 mcg by mouth every 12 hours for 30 days. Due 10/25 - ordered today in case it is not at pharmacy. buPROPion (WELLBUTRIN XL) 150 MG extended release tablet  Take 1 tablet by mouth every morning             Cream Base (VERSAPRO) CREA  APPLY ONE (1) GRAM (ONE PUMP) EXTERNALLY FOUR TIMES A DAY TO EACH KNEE             furosemide (LASIX) 40 MG tablet  Take 40 mg by mouth daily as needed for Other Swelling             rOPINIRole (REQUIP) 2 MG tablet  Take 1 tablet by mouth 4 times daily             Turmeric 500 MG CAPS  Take by mouth                   Medications marked \"taking\" at this time  No outpatient medications have been marked as taking for the 2/24/21 encounter (Office Visit) with West Razo DO.        Medications patient taking as of now reconciled against medications ordered at time of hospital discharge: Yes    Chief Complaint   Patient presents with    Follow-Up from Hospital       History of Present illness - Follow up of Hospital diagnosis(es): low back pain    Inpatient course: Discharge summary reviewed- see chart. Interval history/Current status: Patient presents for hospital follow-up she was in the hospital with severe low back pain pain with right leg numbness and decreased strength. She was seen by plastic surgery and had an injection with interventional radiology. MRI was done she has severe spinal stenosis. Was recommended for neurosurgery and she is considering. She is in a wheelchair today she is unable to lift her leg hardly at all. She had at home physical therapy start yesterday. A comprehensive review of systems was negative except for what was noted in the HPI. Vitals:    02/24/21 1532   BP: (!) 145/82   Temp: 99 °F (37.2 °C)   TempSrc: Oral     There is no height or weight on file to calculate BMI.    Wt Readings from Last 3 Encounters:   02/18/21 297 lb (134.7 kg)   02/17/21 297 lb (134.7 kg)   02/16/21 297 lb (134.7 kg)     BP Readings from Last 3 Encounters: 02/24/21 (!) 145/82   02/23/21 (!) 168/86   02/17/21 (!) 142/83        Physical Exam:  General Appearance: alert and oriented to person, place and time, well developed and well- nourished, in no acute distress  Skin: warm and dry, no rash or erythema  Head: normocephalic and atraumatic  Eyes: pupils equal, round, and reactive to light, extraocular eye movements intact, conjunctivae normal  ENT: tympanic membrane, external ear and ear canal normal bilaterally, nose without deformity, nasal mucosa and turbinates normal without polyps  Neck: supple and non-tender without mass, no thyromegaly or thyroid nodules, no cervical lymphadenopathy  Pulmonary/Chest: clear to auscultation bilaterally- no wheezes, rales or rhonchi, normal air movement, no respiratory distress  Cardiovascular: normal rate, regular rhythm, normal S1 and S2, no murmurs, rubs, clicks, or gallops, distal pulses intact, no carotid bruits  Abdomen: soft, non-tender, non-distended, normal bowel sounds, no masses or organomegaly  Extremities: no cyanosis, clubbing or edema  Musculoskeletal: Marked weakness right lower extremity barely able to lift it from a sitting position. Neurologic: reflexes diminished in the right knee. , no cranial nerve deficit, gait, coordination and speech normal    Assessment/Plan:  1. Intervertebral lumbar disc disorder  Went over the MRI in the hospital admission with the patient. She has significant right leg weakness and pain. Her low back pain is better from the injection which she just had the day of discharge yesterday. - [place] DISCHARGE MEDS RECONCILED W/ CURRENT OUTPATIENT MED LIST    2. Spinal stenosis of lumbar region with neurogenic claudication  Advised her that the spinal stenosis most likely will not get better with an injection and she will probably need surgery in the near future. I advised her not to put off too long for fear of permanent damage.     3. Lymphedema of both lower extremities  Swelling in lower extremities basically stable. She is very sedentary and sits with her legs down all day and is not wearing support hose. 4. Osteoarthritis of spine with radiculopathy, lumbar region  She has marked spasticity low back and paralumbar spasm. I went through the adverse risk of waiting too long to have surgery if she is not getting better soon. She will follow up with neurosurgery. Continue physical therapy. Continue with the pain management. A great deal of time spent reviewing medications, diet, exercise, social issues. Also reviewing the chart before entering the room with patient and finishing charting after leaving patient's room. More than half of that time was spent face to face with the patient in counseling and coordinating care.           Medical Decision Making: high complexity

## 2021-02-25 ENCOUNTER — IMMUNIZATION (OUTPATIENT)
Dept: PRIMARY CARE CLINIC | Age: 66
End: 2021-02-25
Payer: COMMERCIAL

## 2021-02-25 VITALS — DIASTOLIC BLOOD PRESSURE: 82 MMHG | TEMPERATURE: 99 F | SYSTOLIC BLOOD PRESSURE: 145 MMHG

## 2021-02-25 PROBLEM — M48.062 SPINAL STENOSIS OF LUMBAR REGION WITH NEUROGENIC CLAUDICATION: Status: ACTIVE | Noted: 2021-02-25

## 2021-02-25 PROBLEM — M47.26 OSTEOARTHRITIS OF SPINE WITH RADICULOPATHY, LUMBAR REGION: Status: ACTIVE | Noted: 2021-02-25

## 2021-02-25 PROCEDURE — 91300 COVID-19, PFIZER VACCINE 30MCG/0.3ML DOSE: CPT | Performed by: PHYSICIAN ASSISTANT

## 2021-02-25 PROCEDURE — 0001A COVID-19, PFIZER VACCINE 30MCG/0.3ML DOSE: CPT | Performed by: PHYSICIAN ASSISTANT

## 2021-02-25 ASSESSMENT — PATIENT HEALTH QUESTIONNAIRE - PHQ9
SUM OF ALL RESPONSES TO PHQ QUESTIONS 1-9: 0
2. FEELING DOWN, DEPRESSED OR HOPELESS: 0
SUM OF ALL RESPONSES TO PHQ QUESTIONS 1-9: 0

## 2021-02-25 NOTE — DISCHARGE SUMMARY
[address]                  [address] [facility],  [facility]                               DISCHARGE SUMMARY    PATIENT NAME: Joey Casey                  :        1955  MED REC NO:   [id number]                            ROOM:       5216  ACCOUNT NO:   [de-identified]                           ADMIT DATE: 2021  PROVIDER:     Gonzalo Brenner DO                  DISCHARGE DATE:  2021    DISCHARGE DIAGNOSES:  1. Intractable back pain. 2.  Lumbar spinal canal stenosis. 3.  Lumbar DJD/DDD. 4.  Osteoarthritis. 5.  Chronic lymphedema, lower extremities. 6.  Morbid obesity. 7.  Chronic pain. 8.  Tobacco abuse. HOSPITAL COURSE:  The patient is a 80-year-old  female who  presented to the hospital complaining of back pain radiating into the  right leg. She had intractable pain. She was admitted to the hospital.  She was seen by Neurosurgery, Dr. Saba Young. MRI of the lumbar spine was  performed which revealed disk extrusion at L3-L4 measuring 4 to 5 mm and  extending superiorly behind the inferior endplate of A8-R5 on the right  causing severe narrowing of the right neural foramen. There is mild  stenosis of the thecal sac at L3-L4 and L4-L5. Dr. Saba Young recommended  epidural steroid injection. She underwent epidural steroid injection on  2021. She was discharged home in stable condition on 2021. DISCHARGE MEDICATIONS:  As per discharge med rec which include:  1. Lac-Hydrin cream p.r.n.  2.  Baclofen t.i.d.  3.  Belbuca as directed per Dr. Kurt Duenas, pain specialist.  4.  Wellbutrin  mg daily. 5.  Furosemide 40 mg daily p.r.n.  6.  Requip 2 mg four times daily. 7.  Turmeric over-the-counter. DISCHARGE INSTRUCTIONS:  The patient instructed to follow up with Dr. Neha Wayne, call office for appointment. Follow up with Justice Randall, call office for appointment.   Follow up with Dr. Saba Young, call  office for appointment.         Francisco Nj DO    D: 02/24/2021 12:06:15       T: 02/24/2021 12:13:18     [email or web address]  Job#: [id number]     Doc#: [id number]    CC:  Marlyn Corbin DO

## 2021-03-01 ENCOUNTER — CARE COORDINATION (OUTPATIENT)
Dept: OTHER | Facility: CLINIC | Age: 66
End: 2021-03-01

## 2021-03-01 NOTE — CARE COORDINATION
[facility] Transitions Follow Up Call    3/1/2021    Patient: Kirk Simmons  Patient : 1955   MRN: [id number]  Reason for Admission: Intractable back pain  Discharge Date: 21 RARS: Readmission Risk Score: 8       Associate Care Manager [facility]) contacted the patient by telephone to follow up after admission on 21. Verified name and  with  patient as identifiers. Discharged needs reviewed: none  Follow up appointment completed? Yes    ACM reviewed discharge instructions, medical action plan and red flags with  patient and discussed any barriers to care and/or understanding of plan of care after discharge. Discussed appropriate site of care based on symptoms and resources available to patient including: PCP, Specialist, Benefits related nurse triage line, Urgent care clinics and [employer]. The patient agrees to contact the PCP office for questions related to their healthcare. Pt reports that she has not had any improvement or worsening of symptoms. She has her follow up appt with the neurosurgeon later this week to determine plan of care. She has reliable transportation to her appts She denies any new issues or concerns at this time. Patients top risk factors for readmission:  functional physical ability, medical condition and multiple health system providers    Interventions to address risk factors:   Education provided:   Symptom management  Specialist appointment compliance  Utilization of appropriate level of care: Right Care, Right Place, Right Time  Reinforced Discharge instructions    Discussed follow-up appointments. If no appointment was previously scheduled, appointment scheduling offered: NA     Non-[facility] follow up appointment(s): None    Plan for follow-up call in 3-5 days based on severity of symptoms and risk factors.     Intervention Plan for next call:   Red Flag symptoms to report  Symptom management  Utilization of appropriate level of care    ACM

## 2021-03-02 ENCOUNTER — OFFICE VISIT (OUTPATIENT)
Dept: PRIMARY CARE CLINIC | Age: 66
End: 2021-03-02
Payer: COMMERCIAL

## 2021-03-02 ENCOUNTER — OFFICE VISIT (OUTPATIENT)
Dept: NEUROSURGERY | Age: 66
End: 2021-03-02
Payer: COMMERCIAL

## 2021-03-02 VITALS
BODY MASS INDEX: 53.92 KG/M2 | HEART RATE: 97 BPM | WEIGHT: 293 LBS | DIASTOLIC BLOOD PRESSURE: 67 MMHG | SYSTOLIC BLOOD PRESSURE: 129 MMHG | TEMPERATURE: 97.4 F | HEIGHT: 62 IN

## 2021-03-02 VITALS — DIASTOLIC BLOOD PRESSURE: 83 MMHG | TEMPERATURE: 98.6 F | SYSTOLIC BLOOD PRESSURE: 142 MMHG

## 2021-03-02 DIAGNOSIS — M54.16 LUMBAR RADICULOPATHY: Primary | ICD-10-CM

## 2021-03-02 DIAGNOSIS — M48.062 SPINAL STENOSIS OF LUMBAR REGION WITH NEUROGENIC CLAUDICATION: ICD-10-CM

## 2021-03-02 DIAGNOSIS — M15.9 PRIMARY OSTEOARTHRITIS INVOLVING MULTIPLE JOINTS: Primary | Chronic | ICD-10-CM

## 2021-03-02 DIAGNOSIS — I89.0 LYMPHEDEMA OF BOTH LOWER EXTREMITIES: Chronic | ICD-10-CM

## 2021-03-02 DIAGNOSIS — D50.9 IRON DEFICIENCY ANEMIA, UNSPECIFIED IRON DEFICIENCY ANEMIA TYPE: Chronic | ICD-10-CM

## 2021-03-02 PROCEDURE — 99214 OFFICE O/P EST MOD 30 MIN: CPT | Performed by: FAMILY MEDICINE

## 2021-03-02 PROCEDURE — 99213 OFFICE O/P EST LOW 20 MIN: CPT | Performed by: NEUROLOGICAL SURGERY

## 2021-03-02 RX ORDER — GABAPENTIN 300 MG/1
300 CAPSULE ORAL 3 TIMES DAILY
Qty: 90 CAPSULE | Refills: 0 | Status: SHIPPED
Start: 2021-03-02 | End: 2021-04-12

## 2021-03-02 ASSESSMENT — ENCOUNTER SYMPTOMS
GASTROINTESTINAL NEGATIVE: 1
RESPIRATORY NEGATIVE: 1

## 2021-03-02 NOTE — PROGRESS NOTES
Patient is here for follow up from a hospital consult for: back and right leg pain    Physical exam  Alert and Oriented X3  PERRLA, EOMI  SALAZAR 5/5 except 4-/5 in RLE  Sensation intact to LT and PP  Reflexes are 2+ and symmetric    A/P: patient is here for follow up for: back and right leg pain and weakness. Her MRI shows an L3-L4 spondylolisthesis and a large right L3-L4 herniated disk. I am recommending an L3-L4 posterior lumbar interbody fusion as she has failed epidurals and oral narcotic pain medications, membrane stabilizers and NSAIDs. She will need medical clearance.   She has increased morbidity due to her BMI and she will quit smoking    Ivonne Juarez

## 2021-03-02 NOTE — PROGRESS NOTES
3/2/21  Name: Evaristo Dean    : 1955    Sex: female    Age: 60 y.o. Subjective:  Chief Complaint: Patient is here for ome weel cl  Re   roght  Leg pain       Sees dr Merry Lomeli today    And pt  consdiering  Surgery   No   Cp or sob      Review of Systems   Respiratory: Negative. Cardiovascular: Negative. Gastrointestinal: Negative. Musculoskeletal:        See  hpi         Current Outpatient Medications:     BELBUCA 450 MCG FILM, Take 450 mcg by mouth every 12 hours for 30 days. Due 10/25 - ordered today in case it is not at pharmacy. , Disp: 60 each, Rfl: 1    buPROPion (WELLBUTRIN XL) 150 MG extended release tablet, Take 1 tablet by mouth every morning, Disp: 90 tablet, Rfl: 3    baclofen (LIORESAL) 10 MG tablet, Take 1 tablet by mouth 3 times daily Tired   Do not drive, Disp: 30 tablet, Rfl: 5    rOPINIRole (REQUIP) 2 MG tablet, Take 1 tablet by mouth 4 times daily, Disp: 360 tablet, Rfl: 12    ammonium lactate (LAC-HYDRIN) 12 % cream, Apply topically as needed. , Disp: 385 g, Rfl: 2    Turmeric 500 MG CAPS, Take by mouth, Disp: , Rfl:     Cream Base (VERSAPRO) CREA, APPLY ONE (1) GRAM (ONE PUMP) EXTERNALLY FOUR TIMES A DAY TO EACH KNEE, Disp: 200 g, Rfl: 1    furosemide (LASIX) 40 MG tablet, Take 40 mg by mouth daily as needed for Other Swelling, Disp: , Rfl:   No Known Allergies  Social History     Socioeconomic History    Marital status:      Spouse name: Not on file    Number of children: Not on file    Years of education: Not on file    Highest education level: Not on file   Occupational History    Not on file   Social Needs    Financial resource strain: Not on file    Food insecurity     Worry: Not on file     Inability: Not on file   [employer] needs     Medical: Not on file     Non-medical: Not on file   Tobacco Use    Smoking status: Current Every Day Smoker     Packs/day: 0.25     Years: 38.00     Pack years: 9.50     Types: Cigarettes     Last attempt to quit: 2019     Years since quittin.7    Smokeless tobacco: Never Used   Substance and Sexual Activity    Alcohol use: No    Drug use: No    Sexual activity: Not Currently   Lifestyle    Physical activity     Days per week: Not on file     Minutes per session: Not on file    Stress: Not on file   Relationships    Social connections     Talks on phone: Not on file     Gets together: Not on file     Attends Muslim service: Not on file     Active member of club or organization: Not on file     Attends meetings of clubs or organizations: Not on file     Relationship status: Not on file    Intimate partner violence     Fear of current or ex partner: Not on file     Emotionally abused: Not on file     Physically abused: Not on file     Forced sexual activity: Not on file   Other Topics Concern    Not on file   Social History Narrative        Chronic Diagnosis: Iron deficiency anemia, Depressive disorder, Benign essential hypertension, Mixed    hyperlipidemia.     HTN    OBESITY    LIPID    OA    SMOKER    CVA L FIELD VISION    DEPRESSION    GASTRIC BYPASS 01    BREAST REDUCTION--    Arturo Boom  1955 Page #2    ANEMIA==IRON AND B-12    Admitted 2019 with cellulitis left lower extremity then readmitted with chest pain with negative stress test      Past Medical History:   Diagnosis Date    Anemia     Arthritis     Cellulitis 2015    left leg    Chronic ulcer of leg with fat layer exposed ([address] 2015    Chronic ulcer of right leg, limited to breakdown of skin ([address] 2016    Depression     Low back pain     Lymphedema of both lower extremities 2015    Lymphedema of lower extremity 2015    Obesity     Osteoarthritis     Peripheral vision loss     Stroke [facility])     Type 2 diabetes mellitus without complication, without long-term current use of insulin ([address] 2019    Ulcer of left lower leg, limited to breakdown of skin ([address] 6/10/2019    Ulcer of left lower leg, limited to breakdown of skin ([facility] 6/10/2019     Family History   Problem Relation Age of Onset    Breast Cancer Mother     Stroke Father     Hypertension Sister     Hypertension Brother       Past Surgical History:   Procedure Laterality Date    ABDOMINOPLASTY      BREAST REDUCTION SURGERY      reduction    CATARACT REMOVAL      bilateral     SECTION      x2    COLONOSCOPY  2012    and egd    ECHOCARDIOGRAM COMPLETE 2D W DOPPLER W COLOR  2012         GASTRIC BYPASS SURGERY  2000    HERNIA REPAIR            Vitals:    21 0938   BP: (!) 142/83   Temp: 98.6 °F (37 °C)   TempSrc: Oral   Weight: Comment: declined       Objective:    Physical Exam  Cardiovascular:      Rate and Rhythm: Normal rate and regular rhythm. Pulses: Normal pulses. Pulmonary:      Effort: Respiratory distress present. Breath sounds: Normal breath sounds. Musculoskeletal:      Comments: Dec  dtr right knee         [place] was seen today for back pain. Diagnoses and all orders for this visit:    Primary osteoarthritis involving multiple joints    Lymphedema of both lower extremities    Spinal stenosis of lumbar region with neurogenic claudication    Iron deficiency anemia, unspecified iron deficiency anemia type        Comments:  Await neuro surg appt   Die te xer hm siseus  Lose wt    Lab    Today    awit neuro srug appt A great deal of time spent reviewing medications, diet, exercise, social issues. Also reviewing the chart before entering the room with patient and finishing charting after leaving patient's room. More than half of that time was spent face to face with the patient in counseling and coordinating care. Follow Up: No follow-ups on file.      Seen by:  Inocencio Leggett DO

## 2021-03-03 ENCOUNTER — TELEPHONE (OUTPATIENT)
Dept: NEUROSURGERY | Age: 66
End: 2021-03-03

## 2021-03-03 NOTE — TELEPHONE ENCOUNTER
Nicole Josephtylor called in stating her daughter is taking an out of state test and was wondering if she can r/s her surgery until after 4/26?  Pts #[phone]

## 2021-03-05 ENCOUNTER — CARE COORDINATION (OUTPATIENT)
Dept: OTHER | Facility: CLINIC | Age: 66
End: 2021-03-05

## 2021-03-05 NOTE — CARE COORDINATION
Bran  Transitions Follow Up Call    3/5/2021    Patient: Patria Leyden  Patient : 1955   MRN: [id number]  Reason for Admission: intractable back pain  Discharge Date: 21 RARS: Readmission Risk Score: 8      Associate Care Manager [facility]) contacted the patient by telephone to follow up after admission on 21. Verified name and  with patient as identifiers. Patient reports that she has completed OT but continues to follow up with PT . She did follow up with neurosurgery and was to have an appt for medical clearance, but she needed to postpone her surgery as her daughter had to go out of town. It has been rescheduled for April. She states she is getting her bathroom remodeled so it will be easier for her to maneuver  In the area safely. She is hoping this will be completed next week. Discussed Falls prevention and home safety and pt verbalized understanding and provided teach back of PT /OT recommendations. Advance Care Planning:   Does patient have an Advance Directive:  Not on file, but pateint states she has them completed. Encouraged pt to take to next appt to have scanned into the chart. .     ACM reviewed discharge instructions, medical action plan and red flags with patient and discussed any barriers to care and/or understanding of plan of care after discharge. Discussed appropriate site of care based on symptoms and resources available to patient including: PCP, Specialist, Home health and "[employer]t Messaging. The patientagrees to contact the PCP office for questions related to their healthcare.      Patients top risk factors for readmission: functional physical ability, ineffective coping, medical condition and multiple health system providers  Interventions to address risk factors: Education of patient/family/caregiver/guardian to support self-management-Red Flag symptoms to report, Imprtance and benefits of PT/OT complinace,Falls and home safety education and Assessment and support for treatment adherence and medication management-Pt is active with recommended services, has and using DME as recommended, has medications and taking as directed. Discussed follow-up appointments. If no appointment was previously scheduled, appointment scheduling offered: Yes Is follow up appointment scheduled within 7 days of discharge? Yes  Non-[facility] follow up appointment(s): None    Plan for follow-up call in 10-14 days based on severity of symptoms and risk factors. Plan for next call:   Symptom management  Medication compliance  Provider follow up appointment compliance  Utilization of appropriate level of care    ACM provided contact information for future needs. Care Transitions Subsequent and Final Call    Schedule Follow Up Appointment with PCP: Completed  Subsequent and Final Calls  Do you have any ongoing symptoms?: Yes  Onset of Patient-reported symptoms: Other  Patient-reported symptoms: Pain  Interventions for patient-reported symptoms: Other  Have your medications changed?: No  Do you have any questions related to your medications?: No  Do you currently have any active services?: Yes  Are you currently active with any services?: Home Health  Do you have any needs or concerns that I can assist you with?: No  Identified Barriers: None  Care Transitions Interventions    Physical Therapy: Completed Other Services: Completed (Comment: 2/24/21: Admis[facility] to [facility] services)     Specialty Service Referral: Completed    Other Interventions:            Follow Up  Future Appointments   Date Time Provider Sydni More   3/9/2021 10:00 AM CHERYL Carty BD PAIN [facility]   3/18/2021 11:30 AM MHYX [place] COVID IMM, PFIZER 21 DAY SECOND DOSE [place] COVID [facility]   3/23/2021 11:00 AM Theron Kim DO [facility] PAIN [facility]       Len Simons RN

## 2021-03-08 NOTE — PROGRESS NOTES
[address]  [address]  [facility]    Follow up Note      Juvenal Lopez     Date of Visit:  3/9/2021    CC:  Patient presents for follow up   Chief Complaint   Patient presents with    Follow-up    Knee Pain    Back Pain    Shoulder Pain       HPI:    Pain is worse to lower back. She was recently hospitalized for intractable back pain. Had an injection through IR. Helped somewhat. Scheduled for surgery with Dr. Meseret Lozano on . Needs to be cleared and stop smoking. Right shoulder has been painful recently. Appropriate analgesia with current medications regimen: yes   Change in quality of symptoms:no. Medication side effects:none. Recent diagnostic testing: None. Recent interventional procedures: None through our office. She has not been on anticoagulation medications to include ASA, NSAIDS, Plavix, heparin, LMW heparin and warfarin and has not been on herbal supplements. She is diabetic. Imagin2021 MRI Lumbar Spine    FINDINGS:   BONES/ALIGNMENT: There is normal alignment of the spine. The vertebral body   heights are maintained. The bone marrow signal appears unremarkable.  There   is degenerative disc disease with disc space narrowing and osteophytes at   L3-L4, L4-L5, and L5-S1.  The bony spinal canal overall is congenitally small. SPINAL CORD: The conus terminates normally. SOFT TISSUES: No paraspinal mass identified. L1-L2:  The thecal sac and neural foramina are intact. L2-L3: There is a minimum disc protrusion measuring 2-3 mm.  Disc and/or   osteophytes cause minimal narrowing of the neural foramina bilaterally.  The   thecal sac is not stenotic. L3-L4: There is a disc extrusion measuring 4-5 mm toward the right extending   superiorly behind the inferior endplate of L3 causing severe narrowing of the   right neural foramen.  The thecal sac is mildly stenotic measuring 9 mm.    There is minimal narrowing of the left Moderate degenerative changes as described above          9/3/2019 right knee x-ray     The bones appear to be in anatomic alignment. No foreign body is identified   No fracture is identified. Marginal bone spurs are again seen along the inferior margins of the   patella. There is interval worsening of moderate tricompartmental joint space   loss    The are interval worsening of moderate degenerative tricompartmental   changes present            Impression   Moderate tricompartmental degenerative changes.  This has   worsened in the interim     Urine Drug Screenin2019 Consistent for buprenorphine and metabolites  2020 Consistent for buprenorphine  2020 Consistent   2020 Consistent     OARRS report:  10/2019 Consistent to 2021 Consistent    Opioid Agreement:  2020         Past Medical History:   Diagnosis Date    Anemia     Arthritis     Cellulitis 2015    left leg    Chronic ulcer of leg with fat layer exposed ([address] 2015    Chronic ulcer of right leg, limited to breakdown of skin ([address] 2016    Depression     Low back pain     Lymphedema of both lower extremities 2015    Lymphedema of lower extremity 2015    Obesity     Osteoarthritis     Peripheral vision loss     Stroke [facility])     Type 2 diabetes mellitus without complication, without long-term current use of insulin ([address] 2019    Ulcer of left lower leg, limited to breakdown of skin ([address] 6/10/2019    Ulcer of left lower leg, limited to breakdown of skin ([address] 6/10/2019       Past Surgical History:   Procedure Laterality Date    ABDOMINOPLASTY  2002    BREAST REDUCTION SURGERY      reduction    CATARACT REMOVAL      bilateral     SECTION      x2    COLONOSCOPY  2012    and egd    ECHOCARDIOGRAM COMPLETE 2D W DOPPLER W COLOR  2012         GASTRIC BYPASS SURGERY  2000    HERNIA REPAIR             Prior to Admission medications    Medication Sig Start Date No    Sexual activity: Not Currently   Lifestyle    Physical activity     Days per week: Not on file     Minutes per session: Not on file    Stress: Not on file   Relationships    Social connections     Talks on phone: Not on file     Gets together: Not on file     Attends Jain service: Not on file     Active member of club or organization: Not on file     Attends meetings of clubs or organizations: Not on file     Relationship status: Not on file    Intimate partner violence     Fear of current or ex partner: Not on file     Emotionally abused: Not on file     Physically abused: Not on file     Forced sexual activity: Not on file   Other Topics Concern    Not on file   Social History Narrative        Chronic Diagnosis: Iron deficiency anemia, Depressive disorder, Benign essential hypertension, Mixed    hyperlipidemia. HTN    OBESITY    LIPID    OA    SMOKER    CVA L FIELD VISION    DEPRESSION    GASTRIC BYPASS 01    BREAST REDUCTION--    Christine Bryant  1955 Page #2    ANEMIA==IRON AND B-12    Admitted 2019 with cellulitis left lower extremity then readmitted with chest pain with negative stress test       Family History   Problem Relation Age of Onset    Breast Cancer Mother     Stroke Father     Hypertension Sister     Hypertension Brother        REVIEW OF SYSTEMS:     Heather Collier denies fever/chills, chest pain, shortness of breath, new bowel or bladder complaints. All other review of systems was negative. PHYSICAL EXAMINATION:      BP (!) 168/90   Pulse 78   Temp 97 °F (36.1 °C)   Resp 16   Ht 5' 2\" (1.575 m)   Wt 297 lb (134.7 kg)   SpO2 98%   BMI 54.32 kg/m²     General:       General appearance: awake, alert, oriented, in no acute distress, well developed, well nourished and in no acute distress   pleasant and well-hydrated.    , in no distress and A & O x3  Build:Obese  Function:Rises from a seated position with difficulty     Head:     Head:normocephalic and atraumatic  Pupils:regular, round and equal.  Sclera: icterus absent,   EOM:full and intact.     Lungs:     Breathing:Normal expansion. No wheezing.     Abdomen:     Shape:non-distended and normal    Lumbar spine:     Spine inspection:normal   CVA tenderness:No   Range of motion:abnormal mildly Lateral bending, flexion, extension rotation bilateral and is not painful.        Extremities:     Tremors:None bilaterally upper and lower  Range of motion:Generally limited extension shoulder - right, pain with internal rotation of hips not done. Intact:Yes  Varicose veins: Not assessed. Cyanosis:none  Right shoulder:  FF at 100 degrees, Abduction at 90 degrees. Stopped by pain.       Neurological:     Cranial nerves:normal  Motor:   Coordination:normal  Gait:antalgic     Dermatology:     Skin: No skin changes.       Impression:    B/L Knee Pain, LBP, right shoulder pain  Knee x-rays:  Moderate DJD.    Hx of pain management with [facility] pain clinic.  Was on fentanyl 25 mg and norco 5/325 TID.  Did not help and caused drowsiness.  Now on Belbuca 450 mcg BID (increased from 300 mg BID) which is working very well without side effects. Transferred care to [facility]) due to cost.    Hx of LESI by Dr. 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 Dr. Luis Eduardo Darby recommending PLIF L3-L4 for a large right L3-L4 herniated disc and L3-L4 spondylolisthesis and is scheduled for 4/28/2021 but must be cleared and quit smoking.       Plan:  Aquatherapy helped in the past.    OARRS reviewed 03/2021  Continue gabapentin 300 mg TID (order through NS 1 week ago). May need titrated up at some point. No results yet.    Continue Belbuca 450 mcg BID   B/L knee CSIs - patient is scheduled for 3/23/2021. Requests the same dosing as ortho. Patient c/o right shoulder pain. X-ray ordered. Hx of injections in the past which were helpful. Will schedule for right shoulder CSI but x-ray must be completed first.      30 minutes was spent on this case counseling and educating the patient on her treatment plan. Controlled Substance Monitoring:    Acute and Chronic Pain Monitoring:   RX Monitoring 3/9/2021   Periodic Controlled Substance Monitoring Possible medication side effects, risk of tolerance/dependence & alternative treatments discussed. ;No signs of potential drug abuse or diversion identified. ;Assessed functional status. ;Obtaining appropriate analgesic effect of treatment.                       We discussed with the patient that combining opioids, benzodiazepines, alcohol, illicit drugs or sleep aids increases the risk of respiratory depression including death. We discussed that these medications may cause drowsiness, sedation or dizziness and have counseled the patient not to drive or operate machinery. We have discussed that these medications will be prescribed only by one provider. We have discussed with the patient about age related risk factors and have thoroughly discussed the importance of taking these medications as prescribed. The patient verbalizes understanding.     ccreferring physic

## 2021-03-09 ENCOUNTER — OFFICE VISIT (OUTPATIENT)
Dept: PAIN MANAGEMENT | Age: 66
End: 2021-03-09
Payer: COMMERCIAL

## 2021-03-09 VITALS
OXYGEN SATURATION: 98 % | HEART RATE: 78 BPM | SYSTOLIC BLOOD PRESSURE: 168 MMHG | HEIGHT: 62 IN | TEMPERATURE: 97 F | DIASTOLIC BLOOD PRESSURE: 90 MMHG | BODY MASS INDEX: 53.92 KG/M2 | RESPIRATION RATE: 16 BRPM | WEIGHT: 293 LBS

## 2021-03-09 DIAGNOSIS — G89.4 CHRONIC PAIN SYNDROME: ICD-10-CM

## 2021-03-09 DIAGNOSIS — G89.29 CHRONIC RIGHT-SIDED LOW BACK PAIN WITHOUT SCIATICA: Primary | ICD-10-CM

## 2021-03-09 DIAGNOSIS — M17.0 PRIMARY OSTEOARTHRITIS OF BOTH KNEES: ICD-10-CM

## 2021-03-09 DIAGNOSIS — M54.50 CHRONIC RIGHT-SIDED LOW BACK PAIN WITHOUT SCIATICA: Primary | ICD-10-CM

## 2021-03-09 DIAGNOSIS — G89.29 CHRONIC RIGHT SHOULDER PAIN: ICD-10-CM

## 2021-03-09 DIAGNOSIS — M25.511 CHRONIC RIGHT SHOULDER PAIN: ICD-10-CM

## 2021-03-09 PROCEDURE — 99213 OFFICE O/P EST LOW 20 MIN: CPT | Performed by: PHYSICIAN ASSISTANT

## 2021-03-09 PROCEDURE — 99214 OFFICE O/P EST MOD 30 MIN: CPT | Performed by: PHYSICIAN ASSISTANT

## 2021-03-09 RX ORDER — BUPRENORPHINE HYDROCHLORIDE 450 UG/1
450 FILM, SOLUBLE BUCCAL EVERY 12 HOURS
Qty: 60 EACH | Refills: 1 | Status: SHIPPED
Start: 2021-03-18 | End: 2021-03-12 | Stop reason: SDUPTHER

## 2021-03-10 DIAGNOSIS — M15.9 PRIMARY OSTEOARTHRITIS INVOLVING MULTIPLE JOINTS: Chronic | ICD-10-CM

## 2021-03-10 DIAGNOSIS — E03.9 ACQUIRED HYPOTHYROIDISM: ICD-10-CM

## 2021-03-10 DIAGNOSIS — E11.9 TYPE 2 DIABETES MELLITUS WITHOUT COMPLICATION, WITHOUT LONG-TERM CURRENT USE OF INSULIN (HCC): ICD-10-CM

## 2021-03-10 DIAGNOSIS — I10 ESSENTIAL HYPERTENSION: ICD-10-CM

## 2021-03-10 LAB
ALBUMIN SERPL-MCNC: 3.8 G/DL (ref 3.5–5.2)
ALP BLD-CCNC: 126 U/L (ref 35–104)
ALT SERPL-CCNC: 18 U/L (ref 0–32)
ANION GAP SERPL CALCULATED.3IONS-SCNC: 9 MMOL/L (ref 7–16)
ANISOCYTOSIS: ABNORMAL
AST SERPL-CCNC: 17 U/L (ref 0–31)
BASOPHILS ABSOLUTE: 0 E9/L (ref 0–0.2)
BASOPHILS RELATIVE PERCENT: 0.2 % (ref 0–2)
BILIRUB SERPL-MCNC: 0.3 MG/DL (ref 0–1.2)
BILIRUBIN URINE: NEGATIVE
BLOOD, URINE: NEGATIVE
BUN BLDV-MCNC: 13 MG/DL (ref 8–23)
CALCIUM SERPL-MCNC: 8.7 MG/DL (ref 8.6–10.2)
CHLORIDE BLD-SCNC: 104 MMOL/L (ref 98–107)
CHOLESTEROL, TOTAL: 163 MG/DL (ref 0–199)
CLARITY: CLEAR
CO2: 29 MMOL/L (ref 22–29)
COLOR: YELLOW
CREAT SERPL-MCNC: 0.5 MG/DL (ref 0.5–1)
EOSINOPHILS ABSOLUTE: 0.31 E9/L (ref 0.05–0.5)
EOSINOPHILS RELATIVE PERCENT: 3.5 % (ref 0–6)
GFR AFRICAN AMERICAN: >60
GFR NON-AFRICAN AMERICAN: >60 ML/MIN/1.73
GLUCOSE BLD-MCNC: 113 MG/DL (ref 74–99)
GLUCOSE URINE: NEGATIVE MG/DL
HBA1C MFR BLD: 5.3 % (ref 4–5.6)
HCT VFR BLD CALC: 35.4 % (ref 34–48)
HDLC SERPL-MCNC: 61 MG/DL
HEMOGLOBIN: 9 G/DL (ref 11.5–15.5)
HYPOCHROMIA: ABNORMAL
KETONES, URINE: NEGATIVE MG/DL
LDL CHOLESTEROL CALCULATED: 90 MG/DL (ref 0–99)
LEUKOCYTE ESTERASE, URINE: NEGATIVE
LYMPHOCYTES ABSOLUTE: 1.25 E9/L (ref 1.5–4)
LYMPHOCYTES RELATIVE PERCENT: 13.9 % (ref 20–42)
MCH RBC QN AUTO: 17.3 PG (ref 26–35)
MCHC RBC AUTO-ENTMCNC: 25.4 % (ref 32–34.5)
MCV RBC AUTO: 68.1 FL (ref 80–99.9)
MONOCYTES ABSOLUTE: 0.09 E9/L (ref 0.1–0.95)
MONOCYTES RELATIVE PERCENT: 0.9 % (ref 2–12)
MYELOCYTE PERCENT: 0.9 % (ref 0–0)
NEUTROPHILS ABSOLUTE: 7.3 E9/L (ref 1.8–7.3)
NEUTROPHILS RELATIVE PERCENT: 80.9 % (ref 43–80)
NITRITE, URINE: NEGATIVE
OVALOCYTES: ABNORMAL
PDW BLD-RTO: 25.9 FL (ref 11.5–15)
PH UA: 6.5 (ref 5–9)
PLATELET # BLD: 212 E9/L (ref 130–450)
PMV BLD AUTO: ABNORMAL FL (ref 7–12)
POIKILOCYTES: ABNORMAL
POTASSIUM SERPL-SCNC: 4.4 MMOL/L (ref 3.5–5)
PROTEIN UA: NEGATIVE MG/DL
RBC # BLD: 5.2 E12/L (ref 3.5–5.5)
SODIUM BLD-SCNC: 142 MMOL/L (ref 132–146)
SPECIFIC GRAVITY UA: 1.02 (ref 1–1.03)
T4 TOTAL: 6.9 MCG/DL (ref 4.5–11.7)
TOTAL PROTEIN: 6.7 G/DL (ref 6.4–8.3)
TRIGL SERPL-MCNC: 62 MG/DL (ref 0–149)
TSH SERPL DL<=0.05 MIU/L-ACNC: 0.99 UIU/ML (ref 0.27–4.2)
UROBILINOGEN, URINE: 1 E.U./DL
VLDLC SERPL CALC-MCNC: 12 MG/DL
WBC # BLD: 8.9 E9/L (ref 4.5–11.5)

## 2021-03-11 ENCOUNTER — TELEPHONE (OUTPATIENT)
Dept: PRIMARY CARE CLINIC | Age: 66
End: 2021-03-11

## 2021-03-11 DIAGNOSIS — D50.9 IRON DEFICIENCY ANEMIA, UNSPECIFIED IRON DEFICIENCY ANEMIA TYPE: Primary | ICD-10-CM

## 2021-03-12 DIAGNOSIS — G89.29 CHRONIC RIGHT SHOULDER PAIN: ICD-10-CM

## 2021-03-12 DIAGNOSIS — M54.50 CHRONIC RIGHT-SIDED LOW BACK PAIN WITHOUT SCIATICA: ICD-10-CM

## 2021-03-12 DIAGNOSIS — G89.29 CHRONIC RIGHT-SIDED LOW BACK PAIN WITHOUT SCIATICA: ICD-10-CM

## 2021-03-12 DIAGNOSIS — M25.511 CHRONIC RIGHT SHOULDER PAIN: ICD-10-CM

## 2021-03-12 DIAGNOSIS — G89.4 CHRONIC PAIN SYNDROME: ICD-10-CM

## 2021-03-12 DIAGNOSIS — M17.0 PRIMARY OSTEOARTHRITIS OF BOTH KNEES: ICD-10-CM

## 2021-03-12 RX ORDER — BUPRENORPHINE HYDROCHLORIDE 450 UG/1
450 FILM, SOLUBLE BUCCAL EVERY 12 HOURS
Qty: 60 EACH | Refills: 1 | Status: SHIPPED
Start: 2021-03-18 | End: 2021-03-12 | Stop reason: SDUPTHER

## 2021-03-12 RX ORDER — BUPRENORPHINE HYDROCHLORIDE 450 UG/1
450 FILM, SOLUBLE BUCCAL EVERY 12 HOURS
Qty: 60 EACH | Refills: 1 | Status: SHIPPED
Start: 2021-03-18 | End: 2021-04-13 | Stop reason: SDUPTHER

## 2021-03-12 NOTE — TELEPHONE ENCOUNTER
Note referral
Notify patient her lab is all stable but her hemoglobin is at 9.0 which is where she has been the last couple times. It may benefit her by seeing hematology and getting infusions of iron.   If she is doing to do that let me know
Pt notified. Is willing to see hematology.   Would like referral to Dr. Franchesca Avelar where her sister goes
Referral to staff
hysterectomy

## 2021-03-18 ENCOUNTER — IMMUNIZATION (OUTPATIENT)
Dept: PRIMARY CARE CLINIC | Age: 66
End: 2021-03-18
Payer: COMMERCIAL

## 2021-03-18 PROCEDURE — 0002A COVID-19, PFIZER VACCINE 30MCG/0.3ML DOSE: CPT | Performed by: PHYSICIAN ASSISTANT

## 2021-03-18 PROCEDURE — 91300 COVID-19, PFIZER VACCINE 30MCG/0.3ML DOSE: CPT | Performed by: PHYSICIAN ASSISTANT

## 2021-03-19 ENCOUNTER — TELEPHONE (OUTPATIENT)
Dept: PRIMARY CARE CLINIC | Age: 66
End: 2021-03-19

## 2021-03-19 ENCOUNTER — CARE COORDINATION (OUTPATIENT)
Dept: OTHER | Facility: CLINIC | Age: 66
End: 2021-03-19

## 2021-03-19 SDOH — ECONOMIC STABILITY: TRANSPORTATION INSECURITY
IN THE PAST 12 MONTHS, HAS THE LACK OF TRANSPORTATION KEPT YOU FROM MEDICAL APPOINTMENTS OR FROM GETTING MEDICATIONS?: NO

## 2021-03-19 SDOH — HEALTH STABILITY: MENTAL HEALTH
STRESS IS WHEN SOMEONE FEELS TENSE, NERVOUS, ANXIOUS, OR CAN'T SLEEP AT NIGHT BECAUSE THEIR MIND IS TROUBLED. HOW STRESSED ARE YOU?: ONLY A LITTLE

## 2021-03-19 SDOH — ECONOMIC STABILITY: TRANSPORTATION INSECURITY
IN THE PAST 12 MONTHS, HAS LACK OF TRANSPORTATION KEPT YOU FROM MEETINGS, WORK, OR FROM GETTING THINGS NEEDED FOR DAILY LIVING?: NO

## 2021-03-19 SDOH — HEALTH STABILITY: PHYSICAL HEALTH: ON AVERAGE, HOW MANY DAYS PER WEEK DO YOU ENGAGE IN MODERATE TO STRENUOUS EXERCISE (LIKE A BRISK WALK)?: NOT ASKED

## 2021-03-19 SDOH — ECONOMIC STABILITY: INCOME INSECURITY: HOW HARD IS IT FOR YOU TO PAY FOR THE VERY BASICS LIKE FOOD, HOUSING, MEDICAL CARE, AND HEATING?: NOT VERY HARD

## 2021-03-19 SDOH — ECONOMIC STABILITY: FOOD INSECURITY: WITHIN THE PAST 12 MONTHS, THE FOOD YOU BOUGHT JUST DIDN'T LAST AND YOU DIDN'T HAVE MONEY TO GET MORE.: NEVER TRUE

## 2021-03-19 NOTE — CARE COORDINATION
[facility]  Transitions Follow Up Call    3/19/2021    Patient: Amilcar Zuñiga  Patient : 1955   MRN: [id number]  Reason for Admission: Intractable Back Pain  Discharge Date: 21 RARS: Readmission Risk Score: 8        Associate Care Manager [facility]) contacted the patient by telephone to follow up after admission on 21. Verified name and  with patient as identifiers. Patient reports she is \"ding the same\". She completed PT, but has not met all goals. She is needing to \"save\" some visits for after the surgery. She is able to describe the recommended HEP and states she is following. She states her surgery has been rescheduled for 21. She has not made her appt for medical clearance yet with PCP. She did quit smoking last week as recommended. She states she just quit \"cold turkey\". She is not sure why she started smoking again anyway since she went a full week not smoking while she was admitted. She feels she is not needing any additional support for smoking cessation at this time. She did get her bathroom finished and it has been helpful. She denies any immediate issue, concerns oor ACM needs at this time. She is agreeable to AC follow up whil e awaiting her surgery. ACM reviewed discharge instructions, medical action plan and red flags with patient and discussed any barriers to care and/or understanding of plan of care after discharge. Discussed appropriate site of care based on symptoms and resources available to patient including: PCP, Specialist, Benefits related nurse triage line, Urgent care clinics and [place] [facility]. The patientagrees to contact the PCP office for questions related to their healthcare.      Patients top risk factors for readmission: functional physical ability, ineffective coping, medical condition and multiple health system providers  Interventions to address risk factors: Education of patient/family/caregiver/guardian to support self-management-Red Flag symptoms to report and Smoking cessation support and motivation and Assessment and support for treatment adherence and medication management-Pateint denies need for additional Smoking Cessationsuppport, she is able to describe medications and taking as directed, she has completed her follow up appts and has reliable transportation to appts. Discussed follow-up appointments. If no appointment was previously scheduled, appointment scheduling offered: N/A Is follow up appointment scheduled within 7 days of discharge? Yes  Non-[facility] follow up appointment(s): None    Plan follow up call in 3 weeks  based on severity of symptoms and risk factors. Plan for next call:   Symptom management  Medication compliance  Provider follow up appointment compliance  Utilization of appropriate level of care  Smoking Cessation  Referral to [address] provided contact information for future needs. Care Transitions Subsequent and Final Call    Schedule Follow Up Appointment with PCP: Completed  Subsequent and Final Calls  Do you have any ongoing symptoms?: No  Have your medications changed?: No  Do you have any questions related to your medications?: No  Do you currently have any active services?: Yes  Are you currently active with any services?: Home Health  Do you have any needs or concerns that I can assist you with?: No  Care Transitions Interventions    Physical Therapy: Completed Other Services: Completed (Comment: 2/24/21: Admitted to [facility] services)     Specialty Service Referral: Completed    Other Interventions:            Follow Up  Future Appointments   Date Time Provider Sydni More   3/23/2021 11:00 AM Garrick Daly DO B[facility] Lea Lantigua RN

## 2021-03-23 ENCOUNTER — NURSE TRIAGE (OUTPATIENT)
Dept: OTHER | Facility: CLINIC | Age: 66
End: 2021-03-23

## 2021-03-23 ENCOUNTER — PROCEDURE VISIT (OUTPATIENT)
Dept: PAIN MANAGEMENT | Age: 66
End: 2021-03-23
Payer: COMMERCIAL

## 2021-03-23 ENCOUNTER — APPOINTMENT (OUTPATIENT)
Dept: GENERAL RADIOLOGY | Age: 66
DRG: 603 | End: 2021-03-23
Payer: COMMERCIAL

## 2021-03-23 ENCOUNTER — OFFICE VISIT (OUTPATIENT)
Dept: FAMILY MEDICINE CLINIC | Age: 66
End: 2021-03-23
Payer: COMMERCIAL

## 2021-03-23 ENCOUNTER — TELEPHONE (OUTPATIENT)
Dept: ADMINISTRATIVE | Age: 66
End: 2021-03-23

## 2021-03-23 ENCOUNTER — APPOINTMENT (OUTPATIENT)
Dept: ULTRASOUND IMAGING | Age: 66
DRG: 603 | End: 2021-03-23
Payer: COMMERCIAL

## 2021-03-23 ENCOUNTER — HOSPITAL ENCOUNTER (INPATIENT)
Age: 66
LOS: 7 days | Discharge: HOME | DRG: 603 | End: 2021-03-30
Attending: EMERGENCY MEDICINE | Admitting: INTERNAL MEDICINE
Payer: COMMERCIAL

## 2021-03-23 VITALS
WEIGHT: 293 LBS | OXYGEN SATURATION: 98 % | BODY MASS INDEX: 53.92 KG/M2 | TEMPERATURE: 98.5 F | HEIGHT: 62 IN | HEART RATE: 90 BPM

## 2021-03-23 VITALS
HEIGHT: 62 IN | OXYGEN SATURATION: 91 % | BODY MASS INDEX: 53.92 KG/M2 | WEIGHT: 293 LBS | RESPIRATION RATE: 16 BRPM | SYSTOLIC BLOOD PRESSURE: 162 MMHG | TEMPERATURE: 92.7 F | HEART RATE: 82 BPM | DIASTOLIC BLOOD PRESSURE: 90 MMHG

## 2021-03-23 DIAGNOSIS — L03.116 CELLULITIS OF BOTH LOWER EXTREMITIES: Primary | ICD-10-CM

## 2021-03-23 DIAGNOSIS — L03.90 CELLULITIS, UNSPECIFIED CELLULITIS SITE: ICD-10-CM

## 2021-03-23 DIAGNOSIS — G89.4 CHRONIC PAIN SYNDROME: Chronic | ICD-10-CM

## 2021-03-23 DIAGNOSIS — L03.115 CELLULITIS OF BOTH LOWER EXTREMITIES: Primary | ICD-10-CM

## 2021-03-23 DIAGNOSIS — G89.29 CHRONIC RIGHT-SIDED LOW BACK PAIN WITHOUT SCIATICA: Primary | ICD-10-CM

## 2021-03-23 DIAGNOSIS — M17.0 PRIMARY OSTEOARTHRITIS OF BOTH KNEES: ICD-10-CM

## 2021-03-23 DIAGNOSIS — G89.29 CHRONIC RIGHT SHOULDER PAIN: ICD-10-CM

## 2021-03-23 DIAGNOSIS — M54.50 CHRONIC RIGHT-SIDED LOW BACK PAIN WITHOUT SCIATICA: Primary | ICD-10-CM

## 2021-03-23 DIAGNOSIS — I89.0 LYMPHEDEMA: Primary | ICD-10-CM

## 2021-03-23 DIAGNOSIS — M25.511 CHRONIC RIGHT SHOULDER PAIN: ICD-10-CM

## 2021-03-23 PROBLEM — L03.119 CELLULITIS OF LEG: Status: ACTIVE | Noted: 2019-06-08

## 2021-03-23 LAB
ALBUMIN SERPL-MCNC: 3.3 G/DL (ref 3.5–5.2)
ALP BLD-CCNC: 127 U/L (ref 35–104)
ALT SERPL-CCNC: 12 U/L (ref 0–32)
ANION GAP SERPL CALCULATED.3IONS-SCNC: 6 MMOL/L (ref 7–16)
ANISOCYTOSIS: ABNORMAL
AST SERPL-CCNC: 16 U/L (ref 0–31)
BASOPHILS ABSOLUTE: 0.02 E9/L (ref 0–0.2)
BASOPHILS RELATIVE PERCENT: 0.2 % (ref 0–2)
BILIRUB SERPL-MCNC: 0.3 MG/DL (ref 0–1.2)
BUN BLDV-MCNC: 10 MG/DL (ref 8–23)
CALCIUM SERPL-MCNC: 8.7 MG/DL (ref 8.6–10.2)
CHLORIDE BLD-SCNC: 106 MMOL/L (ref 98–107)
CO2: 28 MMOL/L (ref 22–29)
CREAT SERPL-MCNC: 0.5 MG/DL (ref 0.5–1)
EOSINOPHILS ABSOLUTE: 0.11 E9/L (ref 0.05–0.5)
EOSINOPHILS RELATIVE PERCENT: 1.3 % (ref 0–6)
GFR AFRICAN AMERICAN: >60
GFR NON-AFRICAN AMERICAN: >60 ML/MIN/1.73
GLUCOSE BLD-MCNC: 104 MG/DL (ref 74–99)
HCT VFR BLD CALC: 31.5 % (ref 34–48)
HEMOGLOBIN: 8 G/DL (ref 11.5–15.5)
HYPOCHROMIA: ABNORMAL
IMMATURE GRANULOCYTES #: 0.04 E9/L
IMMATURE GRANULOCYTES %: 0.5 % (ref 0–5)
LACTIC ACID: 1 MMOL/L (ref 0.5–2.2)
LYMPHOCYTES ABSOLUTE: 1.7 E9/L (ref 1.5–4)
LYMPHOCYTES RELATIVE PERCENT: 20 % (ref 20–42)
MCH RBC QN AUTO: 16.9 PG (ref 26–35)
MCHC RBC AUTO-ENTMCNC: 25.4 % (ref 32–34.5)
MCV RBC AUTO: 66.7 FL (ref 80–99.9)
MONOCYTES ABSOLUTE: 0.65 E9/L (ref 0.1–0.95)
MONOCYTES RELATIVE PERCENT: 7.6 % (ref 2–12)
NEUTROPHILS ABSOLUTE: 5.98 E9/L (ref 1.8–7.3)
NEUTROPHILS RELATIVE PERCENT: 70.4 % (ref 43–80)
OVALOCYTES: ABNORMAL
PDW BLD-RTO: 24.7 FL (ref 11.5–15)
PLATELET # BLD: 216 E9/L (ref 130–450)
PMV BLD AUTO: ABNORMAL FL (ref 7–12)
POIKILOCYTES: ABNORMAL
POLYCHROMASIA: ABNORMAL
POTASSIUM SERPL-SCNC: 4.4 MMOL/L (ref 3.5–5)
PRO-BNP: 257 PG/ML (ref 0–125)
RBC # BLD: 4.72 E12/L (ref 3.5–5.5)
SODIUM BLD-SCNC: 140 MMOL/L (ref 132–146)
TARGET CELLS: ABNORMAL
TOTAL PROTEIN: 6.5 G/DL (ref 6.4–8.3)
TROPONIN: <0.01 NG/ML (ref 0–0.03)
WBC # BLD: 8.5 E9/L (ref 4.5–11.5)

## 2021-03-23 PROCEDURE — 93970 EXTREMITY STUDY: CPT | Performed by: RADIOLOGY

## 2021-03-23 PROCEDURE — 1200000000 HC SEMI PRIVATE

## 2021-03-23 PROCEDURE — 83880 ASSAY OF NATRIURETIC PEPTIDE: CPT

## 2021-03-23 PROCEDURE — 99283 EMERGENCY DEPT VISIT LOW MDM: CPT

## 2021-03-23 PROCEDURE — 6360000002 HC RX W HCPCS: Performed by: EMERGENCY MEDICINE

## 2021-03-23 PROCEDURE — 99213 OFFICE O/P EST LOW 20 MIN: CPT | Performed by: FAMILY MEDICINE

## 2021-03-23 PROCEDURE — 71046 X-RAY EXAM CHEST 2 VIEWS: CPT

## 2021-03-23 PROCEDURE — G0378 HOSPITAL OBSERVATION PER HR: HCPCS

## 2021-03-23 PROCEDURE — 93005 ELECTROCARDIOGRAM TRACING: CPT | Performed by: PHYSICIAN ASSISTANT

## 2021-03-23 PROCEDURE — 84484 ASSAY OF TROPONIN QUANT: CPT

## 2021-03-23 PROCEDURE — 83605 ASSAY OF LACTIC ACID: CPT

## 2021-03-23 PROCEDURE — 93970 EXTREMITY STUDY: CPT

## 2021-03-23 PROCEDURE — 80053 COMPREHEN METABOLIC PANEL: CPT

## 2021-03-23 PROCEDURE — 85025 COMPLETE CBC W/AUTO DIFF WBC: CPT

## 2021-03-23 PROCEDURE — 96374 THER/PROPH/DIAG INJ IV PUSH: CPT

## 2021-03-23 PROCEDURE — 96375 TX/PRO/DX INJ NEW DRUG ADDON: CPT

## 2021-03-23 PROCEDURE — 99213 OFFICE O/P EST LOW 20 MIN: CPT

## 2021-03-23 PROCEDURE — 87040 BLOOD CULTURE FOR BACTERIA: CPT

## 2021-03-23 RX ORDER — ACETAMINOPHEN 325 MG/1
650 TABLET ORAL EVERY 6 HOURS PRN
Status: DISCONTINUED | OUTPATIENT
Start: 2021-03-23 | End: 2021-03-30 | Stop reason: HOSPADM

## 2021-03-23 RX ORDER — GABAPENTIN 300 MG/1
300 CAPSULE ORAL 3 TIMES DAILY
Status: DISCONTINUED | OUTPATIENT
Start: 2021-03-23 | End: 2021-03-30 | Stop reason: HOSPADM

## 2021-03-23 RX ORDER — FUROSEMIDE 10 MG/ML
40 INJECTION INTRAMUSCULAR; INTRAVENOUS ONCE
Status: COMPLETED | OUTPATIENT
Start: 2021-03-23 | End: 2021-03-23

## 2021-03-23 RX ORDER — ONDANSETRON 2 MG/ML
4 INJECTION INTRAMUSCULAR; INTRAVENOUS EVERY 6 HOURS PRN
Status: DISCONTINUED | OUTPATIENT
Start: 2021-03-23 | End: 2021-03-30 | Stop reason: HOSPADM

## 2021-03-23 RX ORDER — BACLOFEN 10 MG/1
10 TABLET ORAL 3 TIMES DAILY
Status: DISCONTINUED | OUTPATIENT
Start: 2021-03-23 | End: 2021-03-30 | Stop reason: HOSPADM

## 2021-03-23 RX ORDER — BUPROPION HYDROCHLORIDE 150 MG/1
150 TABLET ORAL EVERY MORNING
Status: DISCONTINUED | OUTPATIENT
Start: 2021-03-24 | End: 2021-03-30 | Stop reason: HOSPADM

## 2021-03-23 RX ORDER — ROPINIROLE 2 MG/1
2 TABLET, FILM COATED ORAL 4 TIMES DAILY
Status: DISCONTINUED | OUTPATIENT
Start: 2021-03-23 | End: 2021-03-30 | Stop reason: HOSPADM

## 2021-03-23 RX ORDER — SODIUM CHLORIDE 0.9 % (FLUSH) 0.9 %
10 SYRINGE (ML) INJECTION EVERY 12 HOURS SCHEDULED
Status: DISCONTINUED | OUTPATIENT
Start: 2021-03-23 | End: 2021-03-30 | Stop reason: HOSPADM

## 2021-03-23 RX ORDER — SODIUM CHLORIDE 0.9 % (FLUSH) 0.9 %
10 SYRINGE (ML) INJECTION PRN
Status: DISCONTINUED | OUTPATIENT
Start: 2021-03-23 | End: 2021-03-27 | Stop reason: SDUPTHER

## 2021-03-23 RX ORDER — FUROSEMIDE 10 MG/ML
40 INJECTION INTRAMUSCULAR; INTRAVENOUS 2 TIMES DAILY
Status: DISCONTINUED | OUTPATIENT
Start: 2021-03-24 | End: 2021-03-25

## 2021-03-23 RX ORDER — ACETAMINOPHEN 650 MG/1
650 SUPPOSITORY RECTAL EVERY 6 HOURS PRN
Status: DISCONTINUED | OUTPATIENT
Start: 2021-03-23 | End: 2021-03-30 | Stop reason: HOSPADM

## 2021-03-23 RX ORDER — POLYETHYLENE GLYCOL 3350 17 G/17G
17 POWDER, FOR SOLUTION ORAL DAILY PRN
Status: DISCONTINUED | OUTPATIENT
Start: 2021-03-23 | End: 2021-03-30 | Stop reason: HOSPADM

## 2021-03-23 RX ORDER — PROMETHAZINE HYDROCHLORIDE 25 MG/1
12.5 TABLET ORAL EVERY 6 HOURS PRN
Status: DISCONTINUED | OUTPATIENT
Start: 2021-03-23 | End: 2021-03-30 | Stop reason: HOSPADM

## 2021-03-23 RX ADMIN — Medication 2000 MG: at 20:59

## 2021-03-23 RX ADMIN — FUROSEMIDE 40 MG: 10 INJECTION, SOLUTION INTRAMUSCULAR; INTRAVENOUS at 20:59

## 2021-03-23 ASSESSMENT — ENCOUNTER SYMPTOMS
ROS SKIN COMMENTS: SEE HPI
SHORTNESS OF BREATH: 1
GASTROINTESTINAL NEGATIVE: 1
ALLERGIC/IMMUNOLOGIC NEGATIVE: 1
STRIDOR: 0
EYES NEGATIVE: 1
COUGH: 0

## 2021-03-23 NOTE — PROGRESS NOTES
3/23/21  Name: Derrick Little    : 1955    Sex: female    Age: 60 y.o. Subjective:  Chief Complaint: Patient is here for bilat leg swelling and  redness     Patient went to pain management for an injection today was told to come right ear. She was first told to go to ER for admission because of cellulitis lower extremities but she wanted my opinion first.  Her legs been swollen for a week she did trip and fall on her left knee yesterday. She has no chills or temperature she has much more short of breath and gaining weight. She saw neurosurgery and scheduled for back surgery 1 month from now. She quit smoking 2 weeks ago      Review of Systems   Constitutional: Negative. HENT: Negative. Eyes: Negative. Respiratory: Positive for shortness of breath. Negative for cough and stridor. Cardiovascular: Positive for leg swelling. Negative for chest pain. Gastrointestinal: Negative. Endocrine: Negative. Genitourinary: Negative. Musculoskeletal: Negative. Skin: Negative. See HPI   Allergic/Immunologic: Negative. Neurological: Negative. Hematological: Negative. Psychiatric/Behavioral: Negative. Current Outpatient Medications:     BELBUCA 450 MCG FILM, Take 450 mcg by mouth every 12 hours for 30 days. , Disp: 60 each, Rfl: 1    gabapentin (NEURONTIN) 300 MG capsule, Take 1 capsule by mouth 3 times daily for 30 days. Intended supply: 30 days, Disp: 90 capsule, Rfl: 0    buPROPion (WELLBUTRIN XL) 150 MG extended release tablet, Take 1 tablet by mouth every morning, Disp: 90 tablet, Rfl: 3    baclofen (LIORESAL) 10 MG tablet, Take 1 tablet by mouth 3 times daily Tired   Do not drive, Disp: 30 tablet, Rfl: 5    rOPINIRole (REQUIP) 2 MG tablet, Take 1 tablet by mouth 4 times daily, Disp: 360 tablet, Rfl: 12    ammonium lactate (LAC-HYDRIN) 12 % cream, Apply topically as needed. , Disp: 385 g, Rfl: 2    Turmeric 500 MG CAPS, Take by mouth, Disp: , Rfl:    Cream Base (VERSAPRO) CREA, APPLY ONE (1) GRAM (ONE PUMP) EXTERNALLY FOUR TIMES A DAY TO EACH KNEE, Disp: 200 g, Rfl: 1    furosemide (LASIX) 40 MG tablet, Take 40 mg by mouth daily as needed for Other Swelling, Disp: , Rfl:   No Known Allergies  Social History     Socioeconomic History    Marital status:      Spouse name: Not on file    Number of children: Not on file    Years of education: Not on file    Highest education level: Not on file   Occupational History    Not on file   Social Needs    Financial resource strain: Not very hard    Food insecurity     Worry: Never true     Inability: Never true    Transportation needs     Medical: No     Non-medical: No   Tobacco Use    Smoking status: Former Smoker     Packs/day: 0.25     Years: 38.00     Pack years: 9.50     Types: Cigarettes     Quit date: 3/11/2021     Years since quittin.0    Smokeless tobacco: Never Used    Tobacco comment: Pt states she quit \"Cold Turkey\"   Substance and Sexual Activity    Alcohol use: No    Drug use: No    Sexual activity: Not Currently   Lifestyle    Physical activity     Days per week: Not on file     Minutes per session: Not on file    Stress: Only a little   Relationships    Social connections     Talks on phone: More than three times a week     Gets together: Not on file     Attends Pentecostal service: Not on file     Active member of club or organization: Not on file     Attends meetings of clubs or organizations: Not on file     Relationship status: Not on file    Intimate partner violence     Fear of current or ex partner: Not on file     Emotionally abused: Not on file     Physically abused: Not on file     Forced sexual activity: Not on file   Other Topics Concern    Not on file   Social History Narrative        Chronic Diagnosis: Iron deficiency anemia, Depressive disorder, Benign essential hypertension, Mixed    hyperlipidemia.     HTN    OBESITY    LIPID    OA    SMOKER    CVA [facility] VISION    DEPRESSION    GASTRIC BYPASS 01    BREAST REDUCTION--    Krish Baptiste  1955 Page #2    ANEMIA==IRON AND B-12    Admitted 2019 with cellulitis left lower extremity then readmitted with chest pain with negative stress test      Past Medical History:   Diagnosis Date    Anemia     Arthritis     Cellulitis 2015    left leg    Chronic ulcer of leg with fat layer exposed ([address] 2015    Chronic ulcer of right leg, limited to breakdown of skin ([address] 2016    Depression     Low back pain     Lymphedema of both lower extremities 2015    Lymphedema of lower extremity 2015    Obesity     Osteoarthritis     Peripheral vision loss     Stroke [facility])     Type 2 diabetes mellitus without complication, without long-term current use of insulin ([address] 2019    Ulcer of left lower leg, limited to breakdown of skin ([address] 6/10/2019    Ulcer of left lower leg, limited to breakdown of skin ([address] 6/10/2019     Family History   Problem Relation Age of Onset    Breast Cancer Mother     Stroke Father     Hypertension Sister     Hypertension Brother       Past Surgical History:   Procedure Laterality Date    ABDOMINOPLASTY      BREAST REDUCTION SURGERY      reduction    CATARACT REMOVAL      bilateral     SECTION      x2    COLONOSCOPY  2012    and egd    ECHOCARDIOGRAM COMPLETE 2D W DOPPLER W COLOR  2012         GASTRIC BYPASS SURGERY  2000    HERNIA REPAIR            Vitals:    21 1409   Pulse: 90   Temp: 98.5 °F (36.9 °C)   TempSrc: Oral   SpO2: 98%   Weight: (!) 301 lb (136.5 kg)   Height: 5' 2\" (1.575 m)       Objective:    Physical Exam  Vitals signs reviewed. Constitutional:       Appearance: She is well-developed. HENT:      Head: Normocephalic. Eyes:      Pupils: Pupils are equal, round, and reactive to light. Neck:      Musculoskeletal: Normal range of motion.    Cardiovascular:      Rate and Rhythm: Normal rate.   Pulmonary:      Effort: Pulmonary effort is normal.   Abdominal:      Palpations: Abdomen is soft. Musculoskeletal:      Comments: Marked decreased range of motion both knees   Skin:     General: Skin is warm. Findings: Erythema (Severe bilateral lower extremity erythema with warmth. Marked swelling) present. Neurological:      Mental Status: She is alert and oriented to person, place, and time. Psychiatric:         Behavior: Behavior normal.         Clayton Henry was seen today for leg swelling, shortness of breath and fall. Diagnoses and all orders for this visit:    Cellulitis of both lower extremities        Comments: Long discussion try to convince the patient go to emergency room for admission and infectious disease and cardiology evaluation. Follow Up: Return for Emergency room now.      Seen by:  Lupe Day, DO

## 2021-03-23 NOTE — TELEPHONE ENCOUNTER
Brief description of triage: No triage needed. See below for more detail. Attention Provider: Thank you for allowing me to participate in the care of your patient. The patient was connected to triage in response to symptoms provided. Please do not respond through this encounter as the response is not directed to a shared pool. Pt sent to the ED from her PCP's office, due to SOB and cellulitis. Reason for Disposition   Caller has already spoken with the PCP and has no further questions.     Protocols used: NO CONTACT OR DUPLICATE CONTACT CALL-ADULT-

## 2021-03-23 NOTE — ED NOTES
FIRST PROVIDER CONTACT ASSESSMENT NOTE      Department of Emergency Medicine   ED  First Provider Note   3/23/21  3:45 PM EDT    Chief Complaint: Shortness of Breath (patient arrives with complaints of SOB and increased leg swelling that started x1 week , patient states that sob has gotten worse over the past few days. )      History of Present Illness:    William Ramirez is a 60 y.o. female who presents to the ED by private car for b/l leg swelling, SOB. Focused Screening Exam:  Constitutional:  Alert, appears stated age and is in no distress. B/l lower extremity swelling, redness. Regular heart rate and rhythm. *ALLERGIES*     Patient has no known allergies.      ED Triage Vitals   BP Temp Temp src Pulse Resp SpO2 Height Weight   03/23/21 1544 03/23/21 1512 -- 03/23/21 1512 03/23/21 1544 03/23/21 1512 -- --   (!) 164/128 97.7 °F (36.5 °C)  90 14 93 %          Initial Plan of Care:  Initiate Treatment-Testing, Proceed toTreatment Area When Bed Available for ED Attending/MLP to Continue Care    -----------------END OF FIRST PROVIDER CONTACT ASSESSMENT NOTE--------------  Electronically signed by CHERYL Tavarez   DD: 3/23/21       Melani Tavarez  03/23/21 1547

## 2021-03-23 NOTE — PROGRESS NOTES
Patient here today for b/l knee and right shoulder injections. Bilateral lower legs significantly erythematous with moderate swelling. Left knee with ecchymosis s/p fall. Patient reports that she has an appointment with Dr. Goldie Cronin this afternoon to evaluate her cellulitis and states that he has treated her in the past for similar symptoms. She denies any fevers, chills, sweats. Procedures cancelled today. She should go to the ED for any worsening symptoms.

## 2021-03-23 NOTE — PROGRESS NOTES
Do you currently have any of the following:    Fever: No  Headache:  No  Cough: No  Shortness of breath: No  Exposed to anyone with these symptoms: No         Juliann Brandon presents to the [facility] on 3/23/[facility]21. Su Perkins is complaining of pain back. Both knees, and shoulder. The pain is constant. The pain is described as aching, throbbing, shooting and stabbing. Pain is rated on her best day at a 6, on her worst day at a 10, and on average at a 6 on the VAS scale. She took her last dose of belbuca this morning. Any procedures since your last visit: No, with  % relief. Pacemaker or defibrillator: No managed by . She is not on NSAIDS and is not on anticoagulation medications to include none and is managed by . Medication Contract and Consent for Opioid Use Documents Filed     Patient Documents       Type of Document Status Date Received Received By Description     Medication Contract Signed 9/6/2019 12:04 PM Tere Howell med contract     Medication Contract Received 11/17/2020 10:57 AM Estuardo MORRISONH med contract                BP (!) 162/90   Pulse 82   Temp 92.7 °F (33.7 °C)   Resp 16   Ht 5' 2\" (1.575 m)   Wt 297 lb (134.7 kg)   SpO2 91%   BMI 54.32 kg/m²      No LMP recorded.  Patient is postmenopausal.

## 2021-03-23 NOTE — TELEPHONE ENCOUNTER
Reason for Disposition   SEVERE swelling (e.g., swelling extends above knee, entire leg is swollen, weeping fluid)    Answer Assessment - Initial Assessment Questions  1. ONSET: \"When did the swelling start? \" (e.g., minutes, hours, days)      Lymphedema getting worse x last couple days. 2. LOCATION: \"What part of the leg is swollen? \"  \"Are both legs swollen or just one leg? \"      Bilateral    3. SEVERITY: \"How bad is the swelling? \" (e.g., localized; mild, moderate, severe)   - Localized - small area of swelling localized to one leg   - MILD pedal edema - swelling limited to foot and ankle, pitting edema < 1/4 inch (6 mm) deep, rest and elevation eliminate most or all swelling   - MODERATE edema - swelling of lower leg to knee, pitting edema > 1/4 inch (6 mm) deep, rest and elevation only partially reduce swelling   - SEVERE edema - swelling extends above knee, facial or hand swelling present       Severe    4. REDNESS: \"Does the swelling look red or infected? \"      Yes-- both legs swollen    5. PAIN: \"Is the swelling painful to touch? \" If so, ask: \"How painful is it? \"   (Scale 1-10; mild, moderate or severe)      10/10    6. FEVER: \"Do you have a fever? \" If so, ask: \"What is it, how was it measured, and when did it start? \"       None    7. CAUSE: \"What do you think is causing the leg swelling? \"      Lymphedema and fall yesterday with left knee injury    8. MEDICAL HISTORY: \"Do you have a history of heart failure, kidney disease, liver failure, or cancer? \"      None    9. RECURRENT SYMPTOM: \"Have you had leg swelling before? \" If so, ask: \"When was the last time? \" \"What happened that time? \"     1 year    10. OTHER SYMPTOMS: \"Do you have any other symptoms? \" (e.g., chest pain, difficulty breathing)        \"a little bit SOB\" \"I can't walk 5 feet due to pain\"    11. PREGNANCY: \"Is there any chance you are pregnant? \" \"When was your last menstrual period? \"        no    Protocols used: LEG SWELLING AND EDEMA-ADULT-OH    Patient called Tenzin Tobar at [facility] pre-service center [facility])  with red flag complaint. Brief description of triage: fell yesterday onto left knee and now with pain but chief complaint is \"bilateral lymphedema and I need a lasix prescription and antibiotics. \" (Pt is a nurse). Legs \"red, swollen, 4+ pitting edema and 10/10 pain\". Swelling extends to just above knees bilat. Mildly SOB with very little exertion-- pt states this is due to severe back and leg pain. No SOB at rest, no chest pain. Triage indicates for patient to have consult to evealuate being seen by PCP. Call to Loe Austin NP: pt okay to be seen by office, preferably today but okay for tomorrow as well as long as SOB doesn't get worse. Pt unable to wait for NP recommendation due to pain doctor appt. This writer attempted to call her back x 3 without success. Unable to give recommendation or care advice at this time. Addendum: 5134-- call back to pt to give disposition and recommendation. Transferred to Kaycee Crandall in Dr. Joselin Nichols office for in person or VV. Attention Provider: Thank you for allowing me to participate in the care of your patient. The patient was connected to triage in response to information provided to the [facility]. Please do not respond through this encounter as the response is not directed to a shared pool.

## 2021-03-24 ENCOUNTER — CARE COORDINATION (OUTPATIENT)
Dept: OTHER | Facility: CLINIC | Age: 66
End: 2021-03-24

## 2021-03-24 LAB
ANION GAP SERPL CALCULATED.3IONS-SCNC: 10 MMOL/L (ref 7–16)
BUN BLDV-MCNC: 9 MG/DL (ref 8–23)
CALCIUM SERPL-MCNC: 8.5 MG/DL (ref 8.6–10.2)
CHLORIDE BLD-SCNC: 102 MMOL/L (ref 98–107)
CO2: 28 MMOL/L (ref 22–29)
CREAT SERPL-MCNC: 0.6 MG/DL (ref 0.5–1)
EKG ATRIAL RATE: 91 BPM
EKG P AXIS: 61 DEGREES
EKG P-R INTERVAL: 184 MS
EKG Q-T INTERVAL: 354 MS
EKG QRS DURATION: 74 MS
EKG QTC CALCULATION (BAZETT): 435 MS
EKG R AXIS: 27 DEGREES
EKG T AXIS: 45 DEGREES
EKG VENTRICULAR RATE: 91 BPM
FERRITIN: 10 NG/ML
FOLATE: 14.4 NG/ML (ref 4.8–24.2)
GFR AFRICAN AMERICAN: >60
GFR NON-AFRICAN AMERICAN: >60 ML/MIN/1.73
GLUCOSE BLD-MCNC: 81 MG/DL (ref 74–99)
HCT VFR BLD CALC: 31.3 % (ref 34–48)
IMMATURE RETIC FRACT: 21.4 % (ref 3–15.9)
IRON SATURATION: 7 % (ref 15–50)
IRON: 32 MCG/DL (ref 37–145)
LV EF: 65 %
LVEF MODALITY: NORMAL
MAGNESIUM: 2 MG/DL (ref 1.6–2.6)
POTASSIUM SERPL-SCNC: 3.4 MMOL/L (ref 3.5–5)
RETIC HGB EQUIVALENT: 14.3 PG (ref 28.2–36.6)
RETICULOCYTE ABSOLUTE COUNT: 0.08 E12/L
RETICULOCYTE COUNT PCT: 1.7 % (ref 0.4–1.9)
SODIUM BLD-SCNC: 140 MMOL/L (ref 132–146)
TOTAL IRON BINDING CAPACITY: 443 MCG/DL (ref 250–450)
VITAMIN B-12: 186 PG/ML (ref 211–946)

## 2021-03-24 PROCEDURE — 80048 BASIC METABOLIC PNL TOTAL CA: CPT

## 2021-03-24 PROCEDURE — 93010 ELECTROCARDIOGRAM REPORT: CPT | Performed by: INTERNAL MEDICINE

## 2021-03-24 PROCEDURE — 2500000003 HC RX 250 WO HCPCS: Performed by: INTERNAL MEDICINE

## 2021-03-24 PROCEDURE — 82728 ASSAY OF FERRITIN: CPT

## 2021-03-24 PROCEDURE — 6370000000 HC RX 637 (ALT 250 FOR IP): Performed by: INTERNAL MEDICINE

## 2021-03-24 PROCEDURE — 93306 TTE W/DOPPLER COMPLETE: CPT

## 2021-03-24 PROCEDURE — G0378 HOSPITAL OBSERVATION PER HR: HCPCS

## 2021-03-24 PROCEDURE — 85045 AUTOMATED RETICULOCYTE COUNT: CPT

## 2021-03-24 PROCEDURE — 36415 COLL VENOUS BLD VENIPUNCTURE: CPT

## 2021-03-24 PROCEDURE — 83540 ASSAY OF IRON: CPT

## 2021-03-24 PROCEDURE — 83735 ASSAY OF MAGNESIUM: CPT

## 2021-03-24 PROCEDURE — 99254 IP/OBS CNSLTJ NEW/EST MOD 60: CPT | Performed by: INTERNAL MEDICINE

## 2021-03-24 PROCEDURE — 6360000002 HC RX W HCPCS: Performed by: INTERNAL MEDICINE

## 2021-03-24 PROCEDURE — 83550 IRON BINDING TEST: CPT

## 2021-03-24 PROCEDURE — 82607 VITAMIN B-12: CPT

## 2021-03-24 PROCEDURE — 2580000003 HC RX 258: Performed by: INTERNAL MEDICINE

## 2021-03-24 PROCEDURE — 97161 PT EVAL LOW COMPLEX 20 MIN: CPT

## 2021-03-24 PROCEDURE — 1200000000 HC SEMI PRIVATE

## 2021-03-24 PROCEDURE — 82746 ASSAY OF FOLIC ACID SERUM: CPT

## 2021-03-24 PROCEDURE — 97165 OT EVAL LOW COMPLEX 30 MIN: CPT

## 2021-03-24 PROCEDURE — 86340 INTRINSIC FACTOR ANTIBODY: CPT

## 2021-03-24 PROCEDURE — 83516 IMMUNOASSAY NONANTIBODY: CPT

## 2021-03-24 RX ORDER — POTASSIUM CHLORIDE 20 MEQ/1
40 TABLET, EXTENDED RELEASE ORAL ONCE
Status: COMPLETED | OUTPATIENT
Start: 2021-03-24 | End: 2021-03-24

## 2021-03-24 RX ORDER — FERROUS SULFATE 325(65) MG
325 TABLET ORAL 2 TIMES DAILY WITH MEALS
Status: DISCONTINUED | OUTPATIENT
Start: 2021-03-24 | End: 2021-03-24

## 2021-03-24 RX ORDER — HYDROCODONE BITARTRATE AND ACETAMINOPHEN 5; 325 MG/1; MG/1
1 TABLET ORAL EVERY 6 HOURS PRN
Status: DISCONTINUED | OUTPATIENT
Start: 2021-03-24 | End: 2021-03-30 | Stop reason: HOSPADM

## 2021-03-24 RX ORDER — CYANOCOBALAMIN 1000 UG/ML
1000 INJECTION INTRAMUSCULAR; SUBCUTANEOUS DAILY
Status: COMPLETED | OUTPATIENT
Start: 2021-03-24 | End: 2021-03-26

## 2021-03-24 RX ADMIN — BACLOFEN 10 MG: 10 TABLET ORAL at 00:49

## 2021-03-24 RX ADMIN — GABAPENTIN 300 MG: 300 CAPSULE ORAL at 00:49

## 2021-03-24 RX ADMIN — ENOXAPARIN SODIUM 40 MG: 40 INJECTION SUBCUTANEOUS at 08:53

## 2021-03-24 RX ADMIN — GABAPENTIN 300 MG: 300 CAPSULE ORAL at 09:11

## 2021-03-24 RX ADMIN — BUPROPION HYDROCHLORIDE 150 MG: 150 TABLET, EXTENDED RELEASE ORAL at 08:53

## 2021-03-24 RX ADMIN — BACLOFEN 10 MG: 10 TABLET ORAL at 08:52

## 2021-03-24 RX ADMIN — FUROSEMIDE 40 MG: 10 INJECTION, SOLUTION INTRAMUSCULAR; INTRAVENOUS at 08:53

## 2021-03-24 RX ADMIN — ROPINIROLE HYDROCHLORIDE 2 MG: 2 TABLET, FILM COATED ORAL at 21:14

## 2021-03-24 RX ADMIN — ROPINIROLE HYDROCHLORIDE 2 MG: 2 TABLET, FILM COATED ORAL at 08:52

## 2021-03-24 RX ADMIN — Medication 2000 MG: at 14:28

## 2021-03-24 RX ADMIN — GABAPENTIN 300 MG: 300 CAPSULE ORAL at 14:28

## 2021-03-24 RX ADMIN — Medication 2000 MG: at 05:35

## 2021-03-24 RX ADMIN — FUROSEMIDE 40 MG: 10 INJECTION, SOLUTION INTRAMUSCULAR; INTRAVENOUS at 17:42

## 2021-03-24 RX ADMIN — GABAPENTIN 300 MG: 300 CAPSULE ORAL at 21:14

## 2021-03-24 RX ADMIN — Medication 10 ML: at 00:50

## 2021-03-24 RX ADMIN — ROPINIROLE HYDROCHLORIDE 2 MG: 2 TABLET, FILM COATED ORAL at 12:22

## 2021-03-24 RX ADMIN — Medication 2000 MG: at 21:15

## 2021-03-24 RX ADMIN — ROPINIROLE HYDROCHLORIDE 2 MG: 2 TABLET, FILM COATED ORAL at 16:02

## 2021-03-24 RX ADMIN — SODIUM CHLORIDE 125 MG: 9 INJECTION, SOLUTION INTRAVENOUS at 16:03

## 2021-03-24 RX ADMIN — BACLOFEN 10 MG: 10 TABLET ORAL at 14:28

## 2021-03-24 RX ADMIN — HYDROCODONE BITARTRATE AND ACETAMINOPHEN 1 TABLET: 5; 325 TABLET ORAL at 00:49

## 2021-03-24 RX ADMIN — CYANOCOBALAMIN 1000 MCG: 1000 INJECTION, SOLUTION INTRAMUSCULAR; SUBCUTANEOUS at 16:03

## 2021-03-24 RX ADMIN — Medication 10 ML: at 21:15

## 2021-03-24 RX ADMIN — BACLOFEN 10 MG: 10 TABLET ORAL at 21:14

## 2021-03-24 RX ADMIN — Medication 10 ML: at 08:52

## 2021-03-24 RX ADMIN — HYDROCODONE BITARTRATE AND ACETAMINOPHEN 1 TABLET: 5; 325 TABLET ORAL at 16:02

## 2021-03-24 RX ADMIN — POTASSIUM CHLORIDE 40 MEQ: 1500 TABLET, EXTENDED RELEASE ORAL at 09:11

## 2021-03-24 RX ADMIN — ROPINIROLE HYDROCHLORIDE 2 MG: 2 TABLET, FILM COATED ORAL at 00:50

## 2021-03-24 ASSESSMENT — PAIN - FUNCTIONAL ASSESSMENT: PAIN_FUNCTIONAL_ASSESSMENT: PREVENTS OR INTERFERES SOME ACTIVE ACTIVITIES AND ADLS

## 2021-03-24 ASSESSMENT — PAIN SCALES - GENERAL
PAINLEVEL_OUTOF10: 0
PAINLEVEL_OUTOF10: 7

## 2021-03-24 ASSESSMENT — PAIN DESCRIPTION - LOCATION: LOCATION: LEG

## 2021-03-24 ASSESSMENT — PAIN DESCRIPTION - DESCRIPTORS: DESCRIPTORS: ACHING;CRAMPING;DISCOMFORT

## 2021-03-24 ASSESSMENT — PAIN DESCRIPTION - ONSET: ONSET: ON-GOING

## 2021-03-24 NOTE — H&P
[address]                  [address] [facility],  [facility]                              HISTORY AND PHYSICAL    PATIENT NAME: Alea Barreto                  :        1955  MED REC NO:   [id number]                            ROOM:       200  ACCOUNT NO:   [de-identified]                           ADMIT DATE: 2021  PROVIDER:     Elver Walker DO    CHIEF COMPLAINT:  Bilateral leg redness, swelling    HISTORY OF PRESENT ILLNESS:  The patient is a 42-year-old   female who presented to the emergency room complaining of several-day  history of increasing leg redness, swelling, and shortness of breath. She was seen by her primary care physician who sent her in. She is  scheduled in April for low back surgery with Dr. Ammy Solomon. She was found  to be anemic as an outpatient. She is supposed to be on iron pills,  which she does not take. She states she had a colonoscopy in the past  several years ago. She quit smoking about 2 weeks ago. MEDICATIONS PRIOR TO ADMISSION:  Belbuca, Neurontin, Wellbutrin XL,  baclofen, Requip, Lac-Hydrin, turmeric, Versapro cream, furosemide  p.r.n. PAST MEDICAL HISTORY:  Chronic pain; restless leg syndrome; morbid  obesity; chronic lymphedema, lower extremities; osteoarthritis; CVA with  peripheral vision loss. PAST SURGICAL HISTORY:  , gastric bypass, abdominoplasty,  bilateral eye cataract surgery, hernia repair, breast reduction surgery,  microcytic anemia. SOCIAL HISTORY:  The patient quit tobacco 2 weeks ago. Denies alcohol. REVIEW OF SYSTEMS:  Remarkable for above-stated chief complaint. ALLERGIES:  None known. PHYSICAL EXAMINATION:  GENERAL APPEARANCE:  Physical exam reveals a 42-year-old   female who is alert and oriented x3, cooperative and a good historian. VITAL SIGNS:  On admission, temperature 97.7, pulse 90, respirations 14,  blood pressure 164/128.   HEAD: Normocephalic, atraumatic. EYES:  Pupils equal and reactive to light. Extraocular muscles intact. Fundi not well visualized. NOSE:  No obstruction, polyp, or discharge noted. MOUTH:  Mucosa without lesion. Teeth edentulous. Pharynx noninjected  without exudate. NECK:  Supple. No JVD. No thyromegaly. No carotid bruits. HEART:  Regular rate and rhythm without murmur. LUNGS:  Clear to auscultation bilaterally. ABDOMEN:  Positive bowel sounds, soft, nontender. No rebound. No  guarding. No hepatosplenomegaly. No masses. BACK:. There is minimal tenderness to palpation in the bilateral lumbar  paraspinal muscle region. EXTREMITIES:  With 2+ pitting edema with bilateral lower extremity  erythema. LYMPH NODES:  No adenopathy noted. SKIN:  With bilateral lower extremity erythema. IMPRESSION:  Bilateral leg cellulitis; chronic lymphedema, legs;  microcytic anemia; chronic pain; lumbar spinal canal stenosis; lumbar  DJD/DDD; morbid obesity; osteoarthritis; restless leg syndrome; history  of CVA. PLAN:  Admit. Diurese. Empiric antibiotics. Cardiology to see for  preop clearance as per Dr. Argelia Ricci recommendations. Hematology  to see regarding microcytic anemia. The patient was scheduled this week  as an outpatient with Dr. Leandra Hanh. Discharge plan home when stable.         Jordy Landon DO    D: 03/24/2021 9:00:12       T: 03/24/2021 9:06:33     [email or web address]  Job#: [id number]     Doc#: [id number]    CC:

## 2021-03-24 NOTE — CONSULTS
I independently performed an evaluation on the patient. I have reviewed the above documentation completed by the advance practitioner. Please see my additional contributions to the HPI, physical exam, assessment and medical decision making. Physical Exam   /82   Pulse 92   Temp 96.8 °F (36 °C) (Temporal)   Resp 16   Ht 5' 2\" (1.575 m)   Wt (!) 302 lb (137 kg)   SpO2 96%   BMI 55.24 kg/m²   Constitutional: Oriented to person, place, and time. Well-developed and well-nourished. No distress. Head: Normocephalic and atraumatic. Eyes: EOM are normal. Pupils are equal, round, and reactive to light. Neck: Normal range of motion. Neck supple. No hepatojugular reflux and no JVD present. Carotid bruit is not present. No tracheal deviation present. No thyromegaly present. Cardiovascular: Normal rate, regular rhythm, normal heart sounds and intact distal pulses. Exam reveals no gallop and no friction rub. No murmur heard. Pulmonary/Chest: Effort normal and breath sounds normal. No respiratory distress. No wheezes. No rales. No tenderness. Abdominal: Soft. Bowel sounds are normal. No distension and no mass. No tenderness. No rebound and no guarding. Musculoskeletal: Normal range of motion. No edema and no tenderness. Lymphadenopathy:   No cervical adenopathy. Neurological: Alert and oriented to person, place, and time. Skin: Skin is warm and dry. No rash noted. Not diaphoretic. No erythema. Psychiatric: Normal mood and affect. Behavior is normal.     See accompanying documentation for full consult. ASSESSMENT AND PLAN:  1. Pre-op Risk Stratification/Lumbar surgery:     Echo. Low risk stress 6/2019. If echo okay then patient is an acceptable risk to proceed. 2. PATEL/Lymphedema/Volume overload/Cellulitis: Per primary service. Echo. Diurese. On IV lasix. 3. Hx of CVA?: Consider ASA and statin. 4. Anemia: Follow labs. Heme following. 5. RLS: On Requip    6.  Hx of gastric bypass    7. DJD    8. Obesity    Nieves Caicedo D.O.   Cardiologist  Cardiology, [facility]

## 2021-03-24 NOTE — CARE COORDINATION
Met with the pt at the bedside to discuss transition of care. The pt lives with her sister, who is also a pt in this hospital. Her home has 2 steps to enter and then everything is on the same floor. She has been using a walker lately, because she has has some problems with weakness and knee-buckling. She will return home when medically stable. She has recently had [facility]. She doesn't think she will need C, but would like to use their services again, if needed. Referral made.  Pam Stephens RN

## 2021-03-24 NOTE — PROGRESS NOTES
Physical Therapy    Physical Therapy Initial Assessment     Name: Jaja Garcia  : 1955  MRN: [id number]    Referring Provider:  Grzegorz Anderson DO    Date of Service: 3/24/2021    Evaluating PT:  Blanche Roland PT, DPT PT [id number]    Room #:  9229/6087-F  Diagnosis:  Cellulitis  PMHx/PSHx:  Chronic LE Ulcers, Lymphedema, Chronic Back pain  Procedure/Surgery:    Precautions:  Falls  Equipment Needs:       SUBJECTIVE:    Pt lives with sister in a 2 story home( first floor set up) with 2 stairs to enter and single rail. Bed is on first floor and bath is on first floor. Pt ambulated with [employer]  PTA. Equipment Owned: [employer]    OBJECTIVE:   Initial Evaluation  Date: 3/24/21 Treatment Short Term/ Long Term   Goals   AM-PAC 6 Clicks 96/92     Was pt agreeable to Eval/treatment? yes     Does pt have pain? Mild pain BLES     Bed Mobility  Rolling: Independent  Supine to sit: Independent  Sit to supine: Independent  Scooting: Independent    Rolling: Independent  Supine to sit:  Independent  Sit to supine: Independent  Scooting: Independent     Transfers Sit to stand: SBA  Stand to sit: SBA  Stand pivot: Chris [employer]  Sit to stand: Modified Independent  Stand to sit: Modified Independent  Stand pivot: Modified Independent     Ambulation    100', 100' feet with Chris [employer]  >150 feet with Modified Independent  [employer]   Stair negotiation: ascended and descended  2 steps with single rail SBA  >4 steps with single rail Modified Independent     ROM BUE:  See OT Note  BLE:  WFL     Strength BUE:  See OT Note  BLE:  4/5  Improve Strength 1/3 MMT Grade   Balance Sitting EOB:  Supervision  Dynamic Standing:  SBA [employer]  Sitting EOB:  Independent    Dynamic Standing:  Modified Independent       Pt is A & O x 4  Sensation: Decreased RLE  Edema: WNL        Patient education  Pt educated on role of PT    Patient response to education:   Pt verbalized understanding Pt demonstrated skill Pt requires further education in this area   x x x     ASSESSMENT: Comments:  Pt received in supine agreeable to PT evaluation. Pt performing bed mobility independently. Pt performing functional transfers and ambulation with verbal cues for appropriate use of WW. Pt ambulates with steady gait at step to pattern. Cues to maintain appropriate distance from [facility]. Patient would benefit from continued skilled PT to maximize functional mobility independence. Treatment:  Patient practiced and was instructed in the following treatment:     Bed mobility- verbal cues to facilitate independence   Functional transfers-Verbal cues for proper positioning and sequencing to perform transfers safely with maximum independence.  Gait training-Verbal cues for proper positioning and sequencing using assistive device to maximize functional mobility independence.  Stair negotiation- verbal cues to facilitate independence    Pt's/ family goals   1. Get better    Patient and or family understand(s) diagnosis, prognosis, and plan of care. yes    PLAN:      PLAN OF CARE:      Current Treatment Recommendations     [x] Strengthening     [x] ROM   [x] Balance Training   [x] Endurance Training   [x] Transfer Training   [x] Gait Training   [x] Stair Training   [x] Positioning   [x] Safety and Education Training   [x] Patient/Caregiver Education   [x] HEP  [] Other       PT care will be provided in accordance with the objectives noted above. Exercises and functional mobility practice will be used as well as appropriate assistive devices or modalities to obtain goals. Patient and family education will also be administered as needed. Frequency of treatments: 2-5x/week x 1-2 weeks.     Time in  1400  Time out  1418    Total Treatment Time  0 minutes     Evaluation Time includes thorough review of current medical information, gathering information on past medical history/social history and prior level of function, completion of standardized testing/informal observation of tasks, assessment of data and education on plan of care and goals.     CPT codes:  [] Low Complexity PT evaluation 34092  [x] Moderate Complexity PT evaluation 35954  [] High Complexity PT evaluation 01125  [] PT Re-evaluation 92136  [] Gait training 09190 0 minutes  [] Manual therapy 37399 0 minutes  [] Therapeutic activities 08351 0 minutes  [] Therapeutic exercises 64215 0 minutes  [] Neuromuscular reeducation 28599 0 minutes       Mac Alfonso PT, DPT   [id number]

## 2021-03-24 NOTE — HOME CARE
Received referral for possible home health. Spoke with patient; verified demos and discussed home health. If need home IV cost will be: Ordered therapy at time of quote: cefazolin 2gm iv every 8 hours Coverage: MMO   PAYS 90% Total pt responsibility: $14.95/DAY. Patient agrees with pricing and reports sister will assist/learn. Patient will need home health orders at discharge; if services are needed.  Thank you for this referral, Richardson [place]ar[place] 144

## 2021-03-24 NOTE — PROGRESS NOTES
Occupational Therapy  OCCUPATIONAL THERAPY INITIAL EVALUATION      Date:3/24/2021  Patient Name: Nas Hidalgo  MRN: [id number]  : 1955  Room: [address] [place] [address]-A    Referring Provider: Salomón Flowers DO      Evaluating OT: Marco Roach OTR/L [id number]    AM-PAC Daily Activity Raw Score:     Recommended Adaptive Equipment:  TBD     Diagnosis: Cellulitis   Presented to ED for LE cellulitis and SOB    Recent fall on L knee  Pertinent Medical History: chronic LE ulcers, lymphedema LEs, chronic back pain  (schgeduled for  back surgery 1 month from now) OA, chronic bladder incontinence   Precautions:  Falls,      Home Living: Pt lives with sister, 1 story with 2 steps/rails to enter   Bathroom setup: walk in shower    Equipment owned: walker, soc aide, reacher, LH bath sponge     Prior Level of Function: Mod I  with ADLs , assist as needed  with IADLs; ambulated with walker    Pain Level: no pain ; patient stating she just feels so tired   Cognition: alert, oriented x3 and conversing    Memory:  Good    Sequencing:  fair   Problem solving:  Fair    Judgement/safety:  Fair      Functional Assessment:   Initial Eval Status  Date: 3/24/21 Treatment Status  Date: STGs = LTGs  Time frame: 7-10 days   Feeding Independent      Grooming Set-up   Independent    UB Dressing Set-up   Independent    LB Dressing Min A  Has AE at home  Mod I    Bathing Mod A  Mod I    Toileting      Bed Mobility  Independent   Supine <> sit      Functional Transfers SBA   Sit-stand from bed   Mod I    Functional Mobility SBA,w/walker   (patienr reports knee buckling LE swelling noted   Mod I with good tolerance    Balance Sitting:     Static:  Independent   Standing: SBA   Independent   with good tolerance    Activity Tolerance Fair - with light activity   Good with ADL activity    Visual/  Perceptual No focal deficits                 Hand Dominance right    Strength ROM Additional Info:    RUE  WFL WFL good  and wfl FMC/dexterity noted during ADL tasks       ART [facility] WFL good  and wfl FMC/dexterity noted during ADL tasks       Hearing: WFL   Sensation:  No c/o numbness or tingling   Tone: WFL     Comments: Upon arrival patient lying in bed . At end of session, patient returned to bed  with call light and phone within reach, all lines and tubes intact. Overall patient demonstrated  decreased independence and safety during completion of ADL/functional transfer/mobility tasks. Pt would benefit from continued skilled OT to increase safety and independence with completion of ADL/IADL tasks for functional independence and quality of life.          Assessment of current deficits   Functional mobility [x]  ADLs [x] Strength [x]  Cognition []  Functional transfers  [x] IADLs [x] Safety Awareness [x]  Endurance [x]  Fine Motor Coordination [] Balance [x] Vision/perception [] Sensation []   Gross Motor Coordination [] ROM [] Delirium []                  Motor Control []       Plan of Care: 1-3 days/week for 1-2 weeks PRN   [x]ADL retraining/adapted techniques and AE recommendations to increase functional independence within precautions                    [x]Energy conservation techniques to improve tolerance for selfcare routine   [x]Functional transfer/mobility training/DME recommendations for increased independence, safety and fall prevention         [x]Patient/family education to increase safety and functional independence             [x]Environmental modifications for safe mobility and completion of ADLs                             []Cognitive retraining ex's to improve problem solving skills & safe participation in ADLs/IADLs     []Sensory re-education techniques to improve extremity awareness, maintain skin integrity and improve hand function                             []Visual/Perceptual retraining  to improve body awareness and safety during transfers and ADLs  []Splinting/positioning needs to maintain joint/skin integrity and prevent

## 2021-03-24 NOTE — ED PROVIDER NOTES
Department of Emergency Medicine   ED  Provider Note  Admit Date/RoomTime: 3/23/2021  7:30 PM  ED Room: [facility]          History of Present Illness:  3/23/21, Time: 8:52 PM EDT  Chief Complaint   Patient presents with    Shortness of Breath     patient arrives with complaints of SOB and increased leg swelling that started x1 week , patient states that sob has gotten worse over the past few days. Yenni Ferreira is a 60 y.o. female presenting to the ED for shortness of breath. Patient been short of breath for the past week. Came on gradually, nothing makes it better, worse with exertion, no associated pain. She also feels her lower extremities are more swollen than usual, she does have lymphedema, but she is gained about 15 pounds over the past 2 weeks. She feels that she has volume overload. She also claims of new erythema over anterior tibias, history of cellulitis, she is concerned it has returned. Denies fever, chills, nausea, vomiting, cough, sputum, back pain, neck pain or stiffness, or any other symptoms or complaints. Review of Systems:   Pertinent positives and negatives are stated within HPI, all other systems reviewed and are negative.        --------------------------------------------- PAST HISTORY ---------------------------------------------  Past Medical History:  has a past medical history of Anemia, Arthritis, Cellulitis, Chronic ulcer of leg with fat layer exposed ([address] Chronic ulcer of right leg, limited to breakdown of skin ([address] Depression, Low back pain, Lymphedema of both lower extremities, Lymphedema of lower extremity, Obesity, Osteoarthritis, Peripheral vision loss, Stroke ([address] Type 2 diabetes mellitus without complication, without long-term current use of insulin ([address] Ulcer of left lower leg, limited to breakdown of skin ([address] and Ulcer of left lower leg, limited to breakdown of skin ([address]     Past Surgical History:  has a past surgical history that includes ECHO Complete 2D W Doppler W Color (2012);  section; Gastric bypass surgery (); Abdominoplasty (); Cataract removal; hernia repair; Colonoscopy (2012); and Breast reduction surgery. Social History:  reports that she quit smoking 12 days ago. Her smoking use included cigarettes. She has a 9.50 pack-year smoking history. She has never used smokeless tobacco. She reports that she does not drink alcohol or use drugs. Family History: family history includes Breast Cancer in her mother; Hypertension in her brother and sister; Stroke in her father. . Unless otherwise noted, family history is non contributory    The patients home medications have been reviewed. Allergies: Patient has no known allergies. ---------------------------------------------------PHYSICAL EXAM--------------------------------------    Constitutional/General: Alert and oriented x3  Head: Normocephalic and atraumatic  Eyes: PERRL, EOMI, sclera non icteric  Mouth: Oropharynx clear, handling secretions, no trismus, no asymmetry of the posterior oropharynx or uvular edema  Neck: Supple, full ROM, no stridor, no meningeal signs  Respiratory: Lungs clear to auscultation bilaterally, no wheezes, rales, or rhonchi. Not in respiratory distress  Cardiovascular:  Regular rate. Regular rhythm. 2+ distal pulses. Equal extremity pulses. Chest: No chest wall tenderness  GI:  Abdomen Soft, Non tender, Non distended. No rebound, guarding, or rigidity. No pulsatile masses. Musculoskeletal: Moves all extremities x 4. Significant bilateral lower extremity edema, no signs of compartment syndrome, no signs of ischemic limb, cellulitis noted anterior tibias bilaterally  Integument: skin warm and dry. No rashes.    Neurologic: GCS 15, no focal deficits, symmetric strength 5/5 in the upper and lower extremities bilaterally  Psychiatric: Normal Affect          -------------------------------------------------- RESULTS -------------------------------------------------  I have personally reviewed all laboratory and imaging results for this patient. Results are listed below.      LABS: (Lab results interpreted by me)  Results for orders placed or performed during the hospital encounter of 03/23/21   CBC Auto Differential   Result Value Ref Range    WBC 8.5 4.5 - 11.5 E9/L    RBC 4.72 3.50 - 5.50 E12/L    Hemoglobin 8.0 (L) 11.5 - 15.5 g/dL    Hematocrit 31.5 (L) 34.0 - 48.0 %    MCV 66.7 (L) 80.0 - 99.9 fL    MCH 16.9 (L) 26.0 - 35.0 pg    MCHC 25.4 (L) 32.0 - 34.5 %    RDW 24.7 (H) 11.5 - 15.0 fL    Platelets 974 936 - 420 E9/L    MPV NOT CALC 7.0 - 12.0 fL    Neutrophils % 70.4 43.0 - 80.0 %    Immature Granulocytes % 0.5 0.0 - 5.0 %    Lymphocytes % 20.0 20.0 - 42.0 %    Monocytes % 7.6 2.0 - 12.0 %    Eosinophils % 1.3 0.0 - 6.0 %    Basophils % 0.2 0.0 - 2.0 %    Neutrophils Absolute 5.98 1.80 - 7.30 E9/L    Immature Granulocytes # 0.04 E9/L    Lymphocytes Absolute 1.70 1.50 - 4.00 E9/L    Monocytes Absolute 0.65 0.10 - 0.95 E9/L    Eosinophils Absolute 0.11 0.05 - 0.50 E9/L    Basophils Absolute 0.02 0.00 - 0.20 E9/L    Anisocytosis 3+     Polychromasia 1+     Hypochromia 2+     Poikilocytes 1+     Ovalocytes 1+     Target Cells 1+    Comprehensive Metabolic Panel   Result Value Ref Range    Sodium 140 132 - 146 mmol/L    Potassium 4.4 3.5 - 5.0 mmol/L    Chloride 106 98 - 107 mmol/L    CO2 28 22 - 29 mmol/L    Anion Gap 6 (L) 7 - 16 mmol/L    Glucose 104 (H) 74 - 99 mg/dL    BUN 10 8 - 23 mg/dL    CREATININE 0.5 0.5 - 1.0 mg/dL    GFR Non-African American >60 >=60 mL/min/1.73    GFR African American >60     Calcium 8.7 8.6 - 10.2 mg/dL    Total Protein 6.5 6.4 - 8.3 g/dL    Albumin 3.3 (L) 3.5 - 5.2 g/dL    Total Bilirubin 0.3 0.0 - 1.2 mg/dL    Alkaline Phosphatase 127 (H) 35 - 104 U/L    ALT 12 0 - 32 U/L    AST 16 0 - 31 U/L   Troponin   Result Value Ref Range    Troponin <0.01 0.00 - 0.03 ng/mL   Brain Natriuretic Peptide Result Value Ref Range    Pro- (H) 0 - 125 pg/mL   Lactic Acid, Plasma   Result Value Ref Range    Lactic Acid 1.0 0.5 - 2.2 mmol/L   EKG 12 Lead   Result Value Ref Range    Ventricular Rate 91 BPM    Atrial Rate 91 BPM    P-R Interval 184 ms    QRS Duration 74 ms    Q-T Interval 354 ms    QTc Calculation (Bazett) 435 ms    P Axis 61 degrees    R Axis 27 degrees    T Axis 45 degrees   ,       RADIOLOGY:  Interpreted by Radiologist unless otherwise specified  US DUP LOWER EXTREMITIES BILATERAL VENOUS   Final Result   Within the visualized vessels there is no evidence for deep venous   thrombosis               XR CHEST (2 VW)   Final Result   No acute process               EKG Interpretation  Interpreted by emergency department physician, Dr. Gonzales Cam     Sinus rhythm, rate 91, no STEMI      ------------------------- NURSING NOTES AND VITALS REVIEWED ---------------------------   The nursing notes within the ED encounter and vital signs as below have been reviewed by myself  BP (!) 164/128   Pulse 90   Temp 97.7 °F (36.5 °C)   Resp 14   SpO2 93%     Oxygen Saturation Interpretation: Normal    The patients available past medical records and past encounters were reviewed. ------------------------------ ED COURSE/MEDICAL DECISION MAKING----------------------  Medications   furosemide (LASIX) injection 40 mg (has no administration in time range)   ceFAZolin (ANCEF) 2000 mg in sterile water 20 mL IV syringe (has no administration in time range)             Medical Decision Making:    Labs and imaging reviewed. Patient does have significant cellulitis. She was started on Ancef. Discussed with the hospitalist, patient was admitted. Counseling: The emergency provider has spoken with the patient and discussed todays results, in addition to providing specific details for the plan of care and counseling regarding the diagnosis and prognosis.   Questions are answered at this time and they are agreeable with the plan.       --------------------------------- IMPRESSION AND DISPOSITION ---------------------------------    IMPRESSION  1. Lymphedema    2. Cellulitis, unspecified cellulitis site        DISPOSITION  Disposition: Admit to med/surg floor  Patient condition is stable        NOTE: This report was transcribed using voice recognition software.  Every effort was made to ensure accuracy; however, inadvertent computerized transcription errors may be present        Kathryn Pierre MD  03/24/21 3481

## 2021-03-24 NOTE — CONSULTS
Inpatient Cardiology Consultation      Reason for Consult:  Pre-operative Clearance for Lumbar surgery. Volume Overload/CHF    Consulting Physician: Dr Diana Marin    Requesting Physician:  Dr Li Delvalle    Date of Consultation: 3/24/2021    HISTORY OF PRESENT ILLNESS: 59 yo morbidly obese caucasain female known to Dr Trever Holloway only as inpatient consult 6/14/2021  PMH: BMI 55.2, chronic anemia, chronic back, knee and shoulder pain, LLE cellulitis, breast reduction surgery, gastric bypass in 2000, hernia repair, 40 pack years quit in 3/11/2021, and nonischemic stress 6/20219  Sent in from PCP's office for BLE swelling and PATEL. [facility] 3/23/2021 BP upon arrival 164/128 HR 90's O2 saturation 93%. BLE Dopplers negative for DVT. CXR no CHF or infiltrates. Na 140, K+ 4.4-->3.4, Bun/Cr 10/05-->9/0.6, p-, troponin <0.01, Albumin 3.3, Hgb 8.0, WBC 8.5, Plt 216, BC x 2 drawn. Cardiology consulted along with oncology/hematology  Denies any CP, dizziness. Recently got a new adjustable bed, but denies orthopnea (sleeps on her side)        Please note: past medical records were reviewed per electronic medical record (EMR) - see detailed reports under Past Medical/ Surgical History. Past Medical History:    1. 40 pack years reports she stopped 3/11/2021  2. BMI 55.2 on 3/23/2021   3. Depression  4. Lymphedema  5. Osteoarthritis  6. Chronic back, knees and R shoulder pain with history of injections  7. Lymphedema BLE  8. RLS on Requip  9. CVA late 1990's with loss of perpherial vision  10. Anemia  11. 2012 TTE Dr Esmer Blancas: EF 60-65%. NWM. No VHD.  Normal RA/LA  12. 2012 EGD/Colonoscopy ( for anemia): No significant findings to account for the anemia  13. 8/2012 Hgb 8.4  14. 10/2012 Hematology consult for anemia--> Iron deficiency anemia (reported patient did not tolerate PO Iron supplementation) IV Iron infusiions were discussed  15. 6/2015 treated by Dr Torito Brown for LLE ulcers  16. 6/2015 Normal ankle arm index with adequate perfusion of feet  17. 6/4/2019-6/12/2019 Admission for LLE cellulitis and sepsis requiring ICU admission  18. 6/9/2019 Hgb 7.2  19. 6/9/2019 p-BNP 1316  20. Admission 6/13/20219-6/15/2019 for CP negative troponin. Evaluated by Dr Rahel Benson  21. 6/13/20219 Hgb 8.5  22. 6/13/20219 p-  23. 6/15/2019 Lexiscan MPS: non ischemic EF 79%. NWM  24. 11/209 recurrent cellulitis LLE  25. 11/19/2019 Hgb 9.0  26. 1/2020 evaluation by [facility]-->Anxiety/Depression  27. 2020 Declines lymphedema therapy and use of compression hose  28. Admission 2/18/2021-2/23/2021 for intractable back pain  29. 2/23/2021 Epidural injection   30. 3/2/2021 Outpatient with Dr Terri Gaona shows an L3-L4 spondylolisthesis and a large right L3-L4 herniated disk. I am recommending an L3-L4 posterior lumbar interbody fusion as she has failed epidurals and oral narcotic pain medications, membrane stabilizers and NSAIDs. She will need medical clearance  31. 3/10/2021 TSH 0.988  32. 3/10/2021 T chol 163 triglycerides 62 LDL 90 HDL 61  33. 3/10/2021 HgbA1c  5.3  34. 3/10/2021 Hgb 9.0  35. R rotator cuff tear with limited ROM  36. Ambulates with walker      Past Surgical History:    Breast reduction surgery, Desi-en-Y 2000 (failed), bilateral cataract surgery, hernia repair, abdominoplasty, excision of epidermal cyst 2010      Medications Prior to admit:  Prior to Admission medications    Medication Sig Start Date End Date Taking? Authorizing Provider   BELBUCA 450 MCG FILM Take 450 mcg by mouth every 12 hours for 30 days. 3/18/21 4/17/21  CHERYL Silvestre   gabapentin (NEURONTIN) 300 MG capsule Take 1 capsule by mouth 3 times daily for 30 days.  Intended supply: 30 days 3/2/21 4/1/21  Eladia Rousseau MD   buPROPion (WELLBUTRIN XL) 150 MG extended release tablet Take 1 tablet by mouth every morning 2/17/21   Gautam Razo DO   baclofen (LIORESAL) 10 MG tablet Take 1 tablet by mouth 3 times daily Tired   Do not drive 3/76/96   Catia GOLDMAN Activity: Lives with sister and daughter in 2 story home with 2 steps to enter (everything on first floor). Ambulates with walker since last admission in 2/2021 (RLE numb)  Code Status: full Code      Family History: No family history of early CAD or SCD      REVIEW OF SYSTEMS:     · Constitutional: Denies fatigue, fevers, chills or night sweats  · Eyes: Denies visual changes or drainage  · ENT: Denies headaches or hearing loss. No mouth sores or sore throat. No epistaxis   · Cardiovascular: Denies chest pain, pressure or palpitations. + BLE swelling. No lower extremity swelling. · Respiratory: Denies PATEL, cough, orthopnea or PND. No hemoptysis   · Gastrointestinal: Denies hematemesis or anorexia. No hematochezia or melena    · Genitourinary: Denies urgency, dysuria or hematuria. · Musculoskeletal: + bilateral knee, R shoulder and back pain with RLE sciatica. + ambulating witht cane since 2//2021. · Integumentary: + erythema BLE. Denies rash, hives or pruritis   · Neurological: Denies dizziness, headaches or seizures. + numbness RLE. · Psychiatric: Denies anxiety or depression. · Endocrine: Denies temperature intolerance. No recent weight change. .  · Hematologic/Lymphatic: Denies abnormal bruising or bleeding. No swollen lymph nodes    PHYSICAL EXAM:   /82   Pulse 92   Temp 96.8 °F (36 °C) (Temporal)   Resp 16   Ht 5' 2\" (1.575 m)   Wt (!) 302 lb (137 kg)   SpO2 96%   BMI 55.24 kg/m²   CONST:  Well developed, morbidly obese  female who appears of stated age. Awake, alert and cooperative. No apparent distress. HEENT:   Head- Normocephalic, atraumatic   Eyes- Conjunctivae pink, anicteric  Throat- Oral mucosa pink and moist  Neck-  Thick. No stridor, trachea midline, no jugular venous distention. No carotid bruit. CHEST: Chest symmetrical and non-tender to palpation.  No accessory muscle use or intercostal retractions  RESPIRATORY: Lung sounds - crackles in the bases, on RA CARDIOVASCULAR:     Heart Ausculation- Regular rate and rhythm, no murmur heard. PV: + Lymphedema. No varicosities. Pedal pulses palpable, no clubbing or cyanosis   ABDOMEN: Soft, obese,  non-tender to light palpation. Bowel sounds present. No palpable masses; no abdominal bruit  MS: Good muscle strength and tone. No atrophy or abnormal movements. : Deferred  SKIN: Warm and dry no statis dermatitis or ulcers   NEURO / PSYCH: Oriented to person, place and time. Speech clear and appropriate. Follows all commands. Pleasant affect     DATA:    ECG SR  Tele strips: non monitored floor    Diagnostic:    See HPI    Intake/Output Summary (Last 24 hours) at 3/24/2021 1014  Last data filed at 3/24/2021 1013  Gross per 24 hour   Intake 480 ml   Output    Net 480 ml       Labs:   CBC:   Recent Labs     03/23/21  1600   WBC 8.5   HGB 8.0*   HCT 31.5*        BMP:   Recent Labs     03/23/21  1600 03/24/21  0511    140   K 4.4 3.4*   CO2 28 28   BUN 10 9   CREATININE 0.5 0.6   LABGLOM >60 >60   CALCIUM 8.7 8.5*     HgA1c:   Lab Results   Component Value Date    LABA1C 5.3 03/10/2021     proBNP:   Recent Labs     03/23/21  1600   PROBNP 257*     CARDIAC ENZYMES:  Recent Labs     03/23/21  1600   TROPONINI <0.01     FASTING LIPID PANEL:  Lab Results   Component Value Date    CHOL 163 03/10/2021    HDL 61 03/10/2021    LDLCALC 90 03/10/2021    TRIG 62 03/10/2021     LIVER PROFILE:  Recent Labs     03/23/21  1600   AST 16   ALT 12   LABALBU 3.3*   A&P per Dr Jess Tello  Electronically signed by Estrella Rodriguez. JORGE Le on 3/24/2021 at 10:14 AM     I independently performed an evaluation on the patient. I have reviewed the above documentation completed by the advance practitioner. Please see my additional contributions to the HPI, physical exam, assessment and medical decision making.     Physical Exam   /82   Pulse 92   Temp 96.8 °F (36 °C) (Temporal)   Resp 16   Ht 5' 2\" (1.575 m)   Wt (!) 302 lb (137 kg)

## 2021-03-25 PROBLEM — D64.9 ANEMIA: Status: ACTIVE | Noted: 2021-03-25

## 2021-03-25 LAB
ANION GAP SERPL CALCULATED.3IONS-SCNC: 9 MMOL/L (ref 7–16)
BUN BLDV-MCNC: 11 MG/DL (ref 8–23)
CALCIUM SERPL-MCNC: 8.6 MG/DL (ref 8.6–10.2)
CHLORIDE BLD-SCNC: 100 MMOL/L (ref 98–107)
CO2: 28 MMOL/L (ref 22–29)
CREAT SERPL-MCNC: 0.6 MG/DL (ref 0.5–1)
GFR AFRICAN AMERICAN: >60
GFR NON-AFRICAN AMERICAN: >60 ML/MIN/1.73
GLUCOSE BLD-MCNC: 133 MG/DL (ref 74–99)
HCT VFR BLD CALC: 32 % (ref 34–48)
HEMOGLOBIN: 8 G/DL (ref 11.5–15.5)
MAGNESIUM: 2.3 MG/DL (ref 1.6–2.6)
MCH RBC QN AUTO: 16.6 PG (ref 26–35)
MCHC RBC AUTO-ENTMCNC: 25 % (ref 32–34.5)
MCV RBC AUTO: 66.3 FL (ref 80–99.9)
PDW BLD-RTO: 24.8 FL (ref 11.5–15)
PLATELET # BLD: 226 E9/L (ref 130–450)
PMV BLD AUTO: ABNORMAL FL (ref 7–12)
POTASSIUM SERPL-SCNC: 3.6 MMOL/L (ref 3.5–5)
RBC # BLD: 4.83 E12/L (ref 3.5–5.5)
SODIUM BLD-SCNC: 137 MMOL/L (ref 132–146)
WBC # BLD: 7.6 E9/L (ref 4.5–11.5)

## 2021-03-25 PROCEDURE — 6360000002 HC RX W HCPCS: Performed by: INTERNAL MEDICINE

## 2021-03-25 PROCEDURE — 36415 COLL VENOUS BLD VENIPUNCTURE: CPT

## 2021-03-25 PROCEDURE — 83735 ASSAY OF MAGNESIUM: CPT

## 2021-03-25 PROCEDURE — 6370000000 HC RX 637 (ALT 250 FOR IP): Performed by: INTERNAL MEDICINE

## 2021-03-25 PROCEDURE — 85027 COMPLETE CBC AUTOMATED: CPT

## 2021-03-25 PROCEDURE — 2500000003 HC RX 250 WO HCPCS: Performed by: INTERNAL MEDICINE

## 2021-03-25 PROCEDURE — 80048 BASIC METABOLIC PNL TOTAL CA: CPT

## 2021-03-25 PROCEDURE — 2580000003 HC RX 258: Performed by: INTERNAL MEDICINE

## 2021-03-25 PROCEDURE — 99233 SBSQ HOSP IP/OBS HIGH 50: CPT | Performed by: INTERNAL MEDICINE

## 2021-03-25 PROCEDURE — 1200000000 HC SEMI PRIVATE

## 2021-03-25 RX ORDER — METOLAZONE 5 MG/1
5 TABLET ORAL ONCE
Status: COMPLETED | OUTPATIENT
Start: 2021-03-25 | End: 2021-03-25

## 2021-03-25 RX ORDER — BUMETANIDE 0.25 MG/ML
1 INJECTION, SOLUTION INTRAMUSCULAR; INTRAVENOUS 2 TIMES DAILY
Status: DISCONTINUED | OUTPATIENT
Start: 2021-03-25 | End: 2021-03-26

## 2021-03-25 RX ORDER — FUROSEMIDE 10 MG/ML
40 INJECTION INTRAMUSCULAR; INTRAVENOUS 3 TIMES DAILY
Status: DISCONTINUED | OUTPATIENT
Start: 2021-03-25 | End: 2021-03-25

## 2021-03-25 RX ADMIN — BUMETANIDE 1 MG: 0.25 INJECTION, SOLUTION INTRAMUSCULAR; INTRAVENOUS at 20:15

## 2021-03-25 RX ADMIN — BUPROPION HYDROCHLORIDE 150 MG: 150 TABLET, EXTENDED RELEASE ORAL at 09:16

## 2021-03-25 RX ADMIN — ROPINIROLE HYDROCHLORIDE 2 MG: 2 TABLET, FILM COATED ORAL at 15:23

## 2021-03-25 RX ADMIN — BACLOFEN 10 MG: 10 TABLET ORAL at 09:16

## 2021-03-25 RX ADMIN — Medication 10 ML: at 20:15

## 2021-03-25 RX ADMIN — GABAPENTIN 300 MG: 300 CAPSULE ORAL at 09:16

## 2021-03-25 RX ADMIN — ROPINIROLE HYDROCHLORIDE 2 MG: 2 TABLET, FILM COATED ORAL at 05:29

## 2021-03-25 RX ADMIN — ROPINIROLE HYDROCHLORIDE 2 MG: 2 TABLET, FILM COATED ORAL at 20:15

## 2021-03-25 RX ADMIN — Medication 2000 MG: at 05:29

## 2021-03-25 RX ADMIN — HYDROCODONE BITARTRATE AND ACETAMINOPHEN 1 TABLET: 5; 325 TABLET ORAL at 04:46

## 2021-03-25 RX ADMIN — CYANOCOBALAMIN 1000 MCG: 1000 INJECTION, SOLUTION INTRAMUSCULAR; SUBCUTANEOUS at 09:15

## 2021-03-25 RX ADMIN — GABAPENTIN 300 MG: 300 CAPSULE ORAL at 15:16

## 2021-03-25 RX ADMIN — Medication 2000 MG: at 20:15

## 2021-03-25 RX ADMIN — FUROSEMIDE 40 MG: 10 INJECTION, SOLUTION INTRAMUSCULAR; INTRAVENOUS at 09:17

## 2021-03-25 RX ADMIN — ROPINIROLE HYDROCHLORIDE 2 MG: 2 TABLET, FILM COATED ORAL at 12:29

## 2021-03-25 RX ADMIN — Medication 10 ML: at 09:19

## 2021-03-25 RX ADMIN — ENOXAPARIN SODIUM 40 MG: 40 INJECTION SUBCUTANEOUS at 09:19

## 2021-03-25 RX ADMIN — METOLAZONE 5 MG: 5 TABLET ORAL at 12:28

## 2021-03-25 RX ADMIN — SODIUM CHLORIDE 125 MG: 9 INJECTION, SOLUTION INTRAVENOUS at 18:20

## 2021-03-25 RX ADMIN — GABAPENTIN 300 MG: 300 CAPSULE ORAL at 20:15

## 2021-03-25 RX ADMIN — BACLOFEN 10 MG: 10 TABLET ORAL at 20:15

## 2021-03-25 RX ADMIN — BACLOFEN 10 MG: 10 TABLET ORAL at 15:16

## 2021-03-25 RX ADMIN — BUMETANIDE 1 MG: 0.25 INJECTION, SOLUTION INTRAMUSCULAR; INTRAVENOUS at 15:16

## 2021-03-25 RX ADMIN — Medication 2000 MG: at 12:29

## 2021-03-25 ASSESSMENT — PAIN DESCRIPTION - FREQUENCY: FREQUENCY: CONTINUOUS

## 2021-03-25 ASSESSMENT — PAIN DESCRIPTION - ORIENTATION: ORIENTATION: RIGHT;LEFT

## 2021-03-25 ASSESSMENT — PAIN SCALES - GENERAL
PAINLEVEL_OUTOF10: 4
PAINLEVEL_OUTOF10: 10
PAINLEVEL_OUTOF10: 4
PAINLEVEL_OUTOF10: 0

## 2021-03-25 ASSESSMENT — PAIN DESCRIPTION - LOCATION: LOCATION: LEG

## 2021-03-25 NOTE — CONSULTS
[address]                  [address] [facility]  [facility]                                  CONSULTATION    PATIENT NAME: Pablo Liu                  :        1955  MED REC NO:   [id number]                            ROOM:       5410  ACCOUNT NO:   [de-identified]                           ADMIT DATE: 2021  PROVIDER:     Karuna Medina MD    CONSULT DATE:  2021    REASON FOR CONSULTATION:  Anemia. HISTORY OF PRESENT ILLNESS:  The patient is a 23-year-old woman,  admitted with pain and swelling along with redness in her legs. She was  admitted with a cellulitis. The patient reports history of cellulitis  in the past.  On presentation, she had worsening redness and swelling  over the last few days, some chills at home. She states she is having  chills today. No fevers. She has gotten really weak and fatigued  yesterday as well. She had fallen and hit her left knee and has a  little bit of bruise there. She has had worsening shortness of breath,  especially with walking, extreme fatigue over the last several weeks. No chest pain or palpitations. Bowels have been moving well. No  dysuria or hematuria. She does have some urinary stress incontinence. She had some chronic low back pain. She had some herniated disk. She  is supposed to have back surgery with Dr. Robert Mccann in 2021. She has  had ice cravings for several years, some hand cramping as well. PAST MEDICAL HISTORY:  Significant for CVA with peripheral vision loss,  chronic lymphedema, chronic low back pain, herniated disks, arthritis,  two C sections, gastric bypass surgery with a Desi-en-Y bypass surgery,  hernia repair, breast reduction, bilateral cataract surgery. MEDICATIONS:  As per the chart. ALLERGIES:  No known drug allergies. FAMILY HISTORY:  Sister with hypertension, CKD. Mother with  hypertension, diabetes, and breast cancer.   Father; hypertension,  diabetes, and CVA. Brother; cerebral aneurysm, hypertension. SOCIAL HISTORY:  She is . She has two children. She smoked one  pack per day of tobacco.  She quit about two weeks ago in preparation  for her back surgery. No alcohol. She had worked at [employer] for about 40 years. REVIEW OF SYSTEMS:  As per the HPI. Remaining 12-point review of  systems is negative. PHYSICAL EXAMINATION:  VITAL SIGNS:  Temperature 96.8, pulse 92, respirations 16, blood  pressure 120/82, O2 saturation 96%. GENERAL:  Pleasant woman, obese, alert, and oriented x3 in no apparent  distress. HEENT:  PERRLA. Anicteric sclerae. Oropharynx is clear. NECK:  Supple. No adenopathy. LUNGS:  Good air movement. No wheezing, rales, or rhonchi. HEART:  S1, S2.  Regular rate and rhythm. ABDOMEN:  Soft. Bowel sounds present. Obese. EXTREMITIES:  Lower extremities:  Bilateral lower extremity lymphedema,  significant redness and swelling up to the knees with pitting and  chronic changes. Mild ecchymosis, lateral aspect of the left knee. SKIN:  Intact. NEUROLOGIC:  No focal neurologic deficit. PSYCHOLOGIC:  The patient in control. LABORATORY DATA:  WBC 8.5, hemoglobin 8.6, platelets 065, MCV 66. Vitamin B12 of 186. Creatinine 0.6. Ferritin 10. Iron saturation 7%,  iron 32. ASSESSMENT:  A 50-year-old woman with iron deficiency anemia, vitamin  B12 deficiency anemia, and admitted with bilateral lower extremity  cellulitis. Secondary to her prior gastric bypass surgery, she will not  be able to absorb the oral iron. Therefore, recommendation is to  proceed with IV Prilosec. RECOMMENDATIONS:  Proceed with IV Prilosec. We will give it daily for  the next five days while she is in the hospital.  Started her on vitamin  B12 IM. She will receive vitamin B12 1000 mcg IM for the next three  days.   Would recommend GI evaluation because of the iron deficiency  anemia in a postmenopausal woman; however, this is most likely secondary  to poor absorption from a prior gastric bypass surgery. We will also  check her pernicious anemia; however, most likely vitamin B12 deficiency  is from her prior gastric bypass surgery. Continue with IV antibiotics  as per primary team.  We will continue to follow along with you. Thank you very much for allowing us to participate in her care.         BOOKER BRITO MD    D: 03/24/2021 16:47:50       T: 03/24/2021 16:56:56     [email or web address]  Job#: [id number]     Doc#: [id number]    CC:

## 2021-03-25 NOTE — PROGRESS NOTES
Hospital Medicine    Subjective:  Pt alert conversive no sob      Current Facility-Administered Medications:     metOLazone (ZAROXOLYN) tablet 5 mg, 5 mg, Oral, Once, Trev Lorenz DO    furosemide (LASIX) injection 40 mg, 40 mg, Intravenous, TID, Juan Dent DO    HYDROcodone-acetaminophen (NORCO) 5-325 MG per tablet 1 tablet, 1 tablet, Oral, Q6H PRN, Juan Dent DO, 1 tablet at 03/25/21 0446    perflutren lipid microspheres (DEFINITY) injection 1.65 mg, 1.5 mL, Intravenous, ONCE PRN, Raford Halsted.  JORGE Hunt    cyanocobalamin injection 1,000 mcg, 1,000 mcg, Intramuscular, Daily, Nataly Gonzales MD, 1,000 mcg at 03/24/21 1603    ferric gluconate (FERRLECIT) 125 mg in sodium chloride 0.9 % 100 mL IVPB, 125 mg, Intravenous, Daily, Nataly Gonzales MD, Stopped at 03/24/21 1730    baclofen (LIORESAL) tablet 10 mg, 10 mg, Oral, TID, Juan Dent DO, 10 mg at 03/24/21 2114    Buprenorphine HCl FILM 450 mcg, 450 mcg, Oral, Q12H, Juan Dent DO, 450 mcg at 03/25/21 0530    buPROPion (WELLBUTRIN XL) extended release tablet 150 mg, 150 mg, Oral, QAM, Juan Dent DO, 150 mg at 03/24/21 9344    gabapentin (NEURONTIN) capsule 300 mg, 300 mg, Oral, TID, Juan Dent DO, 300 mg at 03/24/21 2114    rOPINIRole (REQUIP) tablet 2 mg, 2 mg, Oral, 4x daily, Juan Dent DO, 2 mg at 03/25/21 6060    ceFAZolin (ANCEF) 2000 mg in sterile water 20 mL IV syringe, 2,000 mg, Intravenous, Q8H, Juan Dent DO, 2,000 mg at 03/25/21 1159    sodium chloride flush 0.9 % injection 10 mL, 10 mL, Intravenous, 2 times per day, Juan Dent DO, 10 mL at 03/24/21 2115    sodium chloride flush 0.9 % injection 10 mL, 10 mL, Intravenous, PRN, Juan Dent DO    enoxaparin (LOVENOX) injection 40 mg, 40 mg, Subcutaneous, Daily, Juan Dent DO, 40 mg at 03/24/21 0874    promethazine (PHENERGAN) tablet 12.5 mg, 12.5 mg, Oral, Q6H PRN **OR** ondansetron (ZOFRAN) injection 4 mg, 4

## 2021-03-25 NOTE — CARE COORDINATION
Discharge plan is to return home when medically stable. The pt's daughter will provide transportation home. Dary Dior RN

## 2021-03-26 ENCOUNTER — APPOINTMENT (OUTPATIENT)
Dept: GENERAL RADIOLOGY | Age: 66
DRG: 603 | End: 2021-03-26
Payer: COMMERCIAL

## 2021-03-26 ENCOUNTER — ANESTHESIA EVENT (OUTPATIENT)
Dept: ENDOSCOPY | Age: 66
DRG: 603 | End: 2021-03-26
Payer: COMMERCIAL

## 2021-03-26 LAB
ANION GAP SERPL CALCULATED.3IONS-SCNC: 7 MMOL/L (ref 7–16)
BUN BLDV-MCNC: 13 MG/DL (ref 8–23)
CALCIUM SERPL-MCNC: 8.4 MG/DL (ref 8.6–10.2)
CHLORIDE BLD-SCNC: 97 MMOL/L (ref 98–107)
CO2: 36 MMOL/L (ref 22–29)
CREAT SERPL-MCNC: 0.7 MG/DL (ref 0.5–1)
GFR AFRICAN AMERICAN: >60
GFR NON-AFRICAN AMERICAN: >60 ML/MIN/1.73
GLUCOSE BLD-MCNC: 101 MG/DL (ref 74–99)
HCT VFR BLD CALC: 33.5 % (ref 34–48)
HEMOGLOBIN: 8.3 G/DL (ref 11.5–15.5)
MCH RBC QN AUTO: 16.6 PG (ref 26–35)
MCHC RBC AUTO-ENTMCNC: 24.8 % (ref 32–34.5)
MCV RBC AUTO: 67.1 FL (ref 80–99.9)
PDW BLD-RTO: 25.1 FL (ref 11.5–15)
PLATELET # BLD: 223 E9/L (ref 130–450)
PMV BLD AUTO: ABNORMAL FL (ref 7–12)
POTASSIUM SERPL-SCNC: 3.2 MMOL/L (ref 3.5–5)
RBC # BLD: 4.99 E12/L (ref 3.5–5.5)
SODIUM BLD-SCNC: 140 MMOL/L (ref 132–146)
WBC # BLD: 8.8 E9/L (ref 4.5–11.5)

## 2021-03-26 PROCEDURE — 1200000000 HC SEMI PRIVATE

## 2021-03-26 PROCEDURE — 80048 BASIC METABOLIC PNL TOTAL CA: CPT

## 2021-03-26 PROCEDURE — 2580000003 HC RX 258: Performed by: INTERNAL MEDICINE

## 2021-03-26 PROCEDURE — 2500000003 HC RX 250 WO HCPCS: Performed by: INTERNAL MEDICINE

## 2021-03-26 PROCEDURE — 99233 SBSQ HOSP IP/OBS HIGH 50: CPT | Performed by: INTERNAL MEDICINE

## 2021-03-26 PROCEDURE — 6370000000 HC RX 637 (ALT 250 FOR IP): Performed by: STUDENT IN AN ORGANIZED HEALTH CARE EDUCATION/TRAINING PROGRAM

## 2021-03-26 PROCEDURE — 36415 COLL VENOUS BLD VENIPUNCTURE: CPT

## 2021-03-26 PROCEDURE — 6360000002 HC RX W HCPCS: Performed by: INTERNAL MEDICINE

## 2021-03-26 PROCEDURE — 6370000000 HC RX 637 (ALT 250 FOR IP): Performed by: INTERNAL MEDICINE

## 2021-03-26 PROCEDURE — 99222 1ST HOSP IP/OBS MODERATE 55: CPT | Performed by: SURGERY

## 2021-03-26 PROCEDURE — 85027 COMPLETE CBC AUTOMATED: CPT

## 2021-03-26 PROCEDURE — 6370000000 HC RX 637 (ALT 250 FOR IP): Performed by: NURSE PRACTITIONER

## 2021-03-26 PROCEDURE — 73560 X-RAY EXAM OF KNEE 1 OR 2: CPT

## 2021-03-26 RX ORDER — CLINDAMYCIN HYDROCHLORIDE 150 MG/1
600 CAPSULE ORAL EVERY 6 HOURS SCHEDULED
Status: DISCONTINUED | OUTPATIENT
Start: 2021-03-26 | End: 2021-03-28

## 2021-03-26 RX ORDER — POTASSIUM CHLORIDE 20 MEQ/1
20 TABLET, EXTENDED RELEASE ORAL 2 TIMES DAILY WITH MEALS
Status: DISCONTINUED | OUTPATIENT
Start: 2021-03-26 | End: 2021-03-30 | Stop reason: HOSPADM

## 2021-03-26 RX ORDER — BUMETANIDE 0.25 MG/ML
2 INJECTION, SOLUTION INTRAMUSCULAR; INTRAVENOUS 2 TIMES DAILY
Status: DISCONTINUED | OUTPATIENT
Start: 2021-03-26 | End: 2021-03-30 | Stop reason: HOSPADM

## 2021-03-26 RX ORDER — POTASSIUM CHLORIDE 20 MEQ/1
40 TABLET, EXTENDED RELEASE ORAL ONCE
Status: COMPLETED | OUTPATIENT
Start: 2021-03-26 | End: 2021-03-26

## 2021-03-26 RX ADMIN — POTASSIUM CHLORIDE 40 MEQ: 1500 TABLET, EXTENDED RELEASE ORAL at 08:45

## 2021-03-26 RX ADMIN — GABAPENTIN 300 MG: 300 CAPSULE ORAL at 08:36

## 2021-03-26 RX ADMIN — SODIUM CHLORIDE 125 MG: 9 INJECTION, SOLUTION INTRAVENOUS at 18:06

## 2021-03-26 RX ADMIN — CYANOCOBALAMIN 1000 MCG: 1000 INJECTION, SOLUTION INTRAMUSCULAR; SUBCUTANEOUS at 08:37

## 2021-03-26 RX ADMIN — BACLOFEN 10 MG: 10 TABLET ORAL at 15:04

## 2021-03-26 RX ADMIN — BACLOFEN 10 MG: 10 TABLET ORAL at 20:14

## 2021-03-26 RX ADMIN — ROPINIROLE HYDROCHLORIDE 2 MG: 2 TABLET, FILM COATED ORAL at 08:36

## 2021-03-26 RX ADMIN — MICONAZOLE NITRATE: 20 CREAM TOPICAL at 20:15

## 2021-03-26 RX ADMIN — SODIUM CHLORIDE, PRESERVATIVE FREE 10 ML: 5 INJECTION INTRAVENOUS at 06:38

## 2021-03-26 RX ADMIN — MAGNESIUM CITRATE 592 ML: 1.75 LIQUID ORAL at 18:05

## 2021-03-26 RX ADMIN — BISACODYL 10 MG: 5 TABLET ORAL at 18:06

## 2021-03-26 RX ADMIN — Medication 2000 MG: at 13:30

## 2021-03-26 RX ADMIN — HYDROCODONE BITARTRATE AND ACETAMINOPHEN 1 TABLET: 5; 325 TABLET ORAL at 21:31

## 2021-03-26 RX ADMIN — CLINDAMYCIN HYDROCHLORIDE 600 MG: 150 CAPSULE ORAL at 23:25

## 2021-03-26 RX ADMIN — ONDANSETRON 4 MG: 2 INJECTION INTRAMUSCULAR; INTRAVENOUS at 20:14

## 2021-03-26 RX ADMIN — CLINDAMYCIN HYDROCHLORIDE 600 MG: 150 CAPSULE ORAL at 18:06

## 2021-03-26 RX ADMIN — HYDROCODONE BITARTRATE AND ACETAMINOPHEN 1 TABLET: 5; 325 TABLET ORAL at 15:04

## 2021-03-26 RX ADMIN — BUPROPION HYDROCHLORIDE 150 MG: 150 TABLET, EXTENDED RELEASE ORAL at 08:37

## 2021-03-26 RX ADMIN — POTASSIUM CHLORIDE 20 MEQ: 20 TABLET, EXTENDED RELEASE ORAL at 08:45

## 2021-03-26 RX ADMIN — GABAPENTIN 300 MG: 300 CAPSULE ORAL at 15:04

## 2021-03-26 RX ADMIN — ROPINIROLE HYDROCHLORIDE 2 MG: 2 TABLET, FILM COATED ORAL at 20:14

## 2021-03-26 RX ADMIN — Medication 2000 MG: at 20:15

## 2021-03-26 RX ADMIN — Medication 2000 MG: at 06:38

## 2021-03-26 RX ADMIN — GABAPENTIN 300 MG: 300 CAPSULE ORAL at 20:14

## 2021-03-26 RX ADMIN — POTASSIUM CHLORIDE 20 MEQ: 20 TABLET, EXTENDED RELEASE ORAL at 18:07

## 2021-03-26 RX ADMIN — MAGNESIUM CITRATE 592 ML: 1.75 LIQUID ORAL at 15:05

## 2021-03-26 RX ADMIN — BACLOFEN 10 MG: 10 TABLET ORAL at 08:36

## 2021-03-26 RX ADMIN — BUMETANIDE 2 MG: 0.25 INJECTION INTRAMUSCULAR; INTRAVENOUS at 10:09

## 2021-03-26 RX ADMIN — BUMETANIDE 2 MG: 0.25 INJECTION INTRAMUSCULAR; INTRAVENOUS at 20:14

## 2021-03-26 RX ADMIN — ROPINIROLE HYDROCHLORIDE 2 MG: 2 TABLET, FILM COATED ORAL at 18:09

## 2021-03-26 RX ADMIN — BISACODYL 10 MG: 5 TABLET ORAL at 15:08

## 2021-03-26 ASSESSMENT — ENCOUNTER SYMPTOMS: SHORTNESS OF BREATH: 1

## 2021-03-26 ASSESSMENT — PAIN DESCRIPTION - PAIN TYPE: TYPE: ACUTE PAIN

## 2021-03-26 ASSESSMENT — PAIN DESCRIPTION - ORIENTATION
ORIENTATION: LOWER
ORIENTATION: LOWER

## 2021-03-26 ASSESSMENT — PAIN SCALES - GENERAL
PAINLEVEL_OUTOF10: 0
PAINLEVEL_OUTOF10: 10
PAINLEVEL_OUTOF10: 3

## 2021-03-26 ASSESSMENT — PAIN DESCRIPTION - FREQUENCY: FREQUENCY: CONTINUOUS

## 2021-03-26 ASSESSMENT — PAIN DESCRIPTION - LOCATION
LOCATION: GENERALIZED;LEG
LOCATION: GENERALIZED;LEG
LOCATION: LEG

## 2021-03-26 ASSESSMENT — PAIN DESCRIPTION - DESCRIPTORS
DESCRIPTORS: ACHING;BURNING;DISCOMFORT
DESCRIPTORS: ACHING;BURNING;DISCOMFORT

## 2021-03-26 NOTE — PROGRESS NOTES
Exam   /63   Pulse 92   Temp 96.1 °F (35.6 °C) (Temporal)   Resp 16   Ht 5' 2\" (1.575 m)   Wt 285 lb 14.4 oz (129.7 kg)   SpO2 95%   BMI 52.29 kg/m²   Constitutional: Oriented to person, place, and time. No distress. Well developed. Head: Normocephalic and atraumatic. Neck: Neck supple. No hepatojugular reflux. No JVD present. Carotid bruit is not present. No tracheal deviation present. No thyromegaly present. Cardiovascular: Normal rate, regular rhythm, normal heart sounds. intact distal pulses. No gallop and no friction rub. No murmur heard. Pulmonary: Breath sounds normal. No respiratory distress. No wheezes. No rales. Chest: Effort normal. No tenderness. Abdominal: Soft. Bowel sounds are normal. No distension or mass. No tenderness, rebound or guarding. Musculoskeletal: . No tenderness. No clubbing or cyanosis. Extremitites: Intact distal pulses. No edema  Neurological: Alert and oriented to person, place, and time. Skin: Skin is warm and dry. No rash noted. Not diaphoretic. No erythema. Psychiatric: Normal mood and affect. Behavior is normal.   Lymphadenopathy: No cervical adenopathy. No groin adenopathy.       CBC:   Lab Results   Component Value Date    WBC 8.8 03/26/2021    RBC 4.99 03/26/2021    HGB 8.3 03/26/2021    HCT 33.5 03/26/2021    MCV 67.1 03/26/2021    MCH 16.6 03/26/2021    MCHC 24.8 03/26/2021    RDW 25.1 03/26/2021     03/26/2021    MPV NOT CALC 03/26/2021     BMP:   Lab Results   Component Value Date     03/26/2021    K 3.2 03/26/2021    K 3.3 06/08/2019    CL 97 03/26/2021    CO2 36 03/26/2021    BUN 13 03/26/2021    LABALBU 3.3 03/23/2021    CREATININE 0.7 03/26/2021    CALCIUM 8.4 03/26/2021    GFRAA >60 03/26/2021    LABGLOM >60 03/26/2021     Magnesium:    Lab Results   Component Value Date    MG 2.3 03/25/2021     Cardiac Enzymes:   Lab Results   Component Value Date    TROPONINI <0.01 03/23/2021    TROPONINI <0.01 06/14/2019    TROPONINI

## 2021-03-26 NOTE — PROGRESS NOTES
Subjective: The patient is awake and alert. She is frustrated about her edema and erythema of bilateral lower extremities despite being on antibiotics for multiple days. Surgery following for possible EGD and colonoscopy. Denies chest pain, angina, dyspnea or abdominal discomfort. No nausea or vomiting. Objective:    /63   Pulse 92   Temp 96.1 °F (35.6 °C) (Temporal)   Resp 16   Ht 5' 2\" (1.575 m)   Wt 285 lb 14.4 oz (129.7 kg)   SpO2 95%   BMI 52.29 kg/m²     General: NAD  HEENT: No thrush or mucositis, EOMI, PERRLA  Heart:  RRR, no murmurs, gallops, or rubs. Lungs:  CTA bilaterally, no wheeze, rales or rhonchi  Abd: BS present, nontender, nondistended, no masses  Extrem:  No clubbing, cyanosis.   BLE erythema and lymphedema  Lymphatics: No palpable adenopathy in cervical and supraclavicular regions  Skin: Intact, no petechia or purpura    CBC with Differential:    Lab Results   Component Value Date    WBC 8.8 03/26/2021    RBC 4.99 03/26/2021    HGB 8.3 03/26/2021    HCT 33.5 03/26/2021     03/26/2021    MCV 67.1 03/26/2021    MCH 16.6 03/26/2021    MCHC 24.8 03/26/2021    RDW 25.1 03/26/2021    NRBC 0.9 02/18/2021    SEGSPCT 72 02/28/2014    LYMPHOPCT 20.0 03/23/2021    MONOPCT 7.6 03/23/2021    MYELOPCT 0.9 03/10/2021    BASOPCT 0.2 03/23/2021    MONOSABS 0.65 03/23/2021    LYMPHSABS 1.70 03/23/2021    EOSABS 0.11 03/23/2021    BASOSABS 0.02 03/23/2021     CMP:    Lab Results   Component Value Date     03/26/2021    K 3.2 03/26/2021    K 3.3 06/08/2019    CL 97 03/26/2021    CO2 36 03/26/2021    BUN 13 03/26/2021    CREATININE 0.7 03/26/2021    GFRAA >60 03/26/2021    LABGLOM >60 03/26/2021    GLUCOSE 101 03/26/2021    PROT 6.5 03/23/2021    LABALBU 3.3 03/23/2021    CALCIUM 8.4 03/26/2021    BILITOT 0.3 03/23/2021    ALKPHOS 127 03/23/2021    AST 16 03/23/2021    ALT 12 03/23/2021      [id number]     Current Facility-Administered Medications:     polyethylene glycol (GoLYTELY) solution 4,000 mL, 4,000 mL, Oral, Once, Dominic Duque MD    potassium chloride (KLOR-CON M) extended release tablet 20 mEq, 20 mEq, Oral, BID WC, Whittemore Quiet Malmer, DO, 20 mEq at 03/26/21 0845    bumetanide (BUMEX) injection 2 mg, 2 mg, Intravenous, BID, Whittemore Quiet Malmer, DO    HYDROcodone-acetaminophen [facility]) 5-325 MG per tablet 1 tablet, 1 tablet, Oral, Q6H PRN, Billy Quiet Malmer, DO, 1 tablet at 03/25/21 0446    perflutren lipid microspheres (DEFINITY) injection 1.65 mg, 1.5 mL, Intravenous, ONCE PRN, Sana Lees.  JORGE Hunt    ferric gluconate (FERRLECIT) 125 mg in sodium chloride 0.9 % 100 mL IVPB, 125 mg, Intravenous, Daily, Nataly Raymundo MD, Stopped at 03/25/21 1922    baclofen (LIORESAL) tablet 10 mg, 10 mg, Oral, TID, Billy Quiet Malmer, DO, 10 mg at 03/26/21 1107    Buprenorphine HCl FILM 450 mcg, 450 mcg, Oral, Q12H, Whittemore Quiet Malmer, DO, 450 mcg at 03/26/21 9412    buPROPion (WELLBUTRIN XL) extended release tablet 150 mg, 150 mg, Oral, QAM, Whittemore Quiet Malmer, DO, 150 mg at 03/26/21 7949    gabapentin (NEURONTIN) capsule 300 mg, 300 mg, Oral, TID, Whittemore Quiet Malmer, DO, 300 mg at 03/26/21 9941    rOPINIRole (REQUIP) tablet 2 mg, 2 mg, Oral, 4x daily, Billy Quiet Malmer, DO, 2 mg at 03/26/21 6535    ceFAZolin (ANCEF) 2000 mg in sterile water 20 mL IV syringe, 2,000 mg, Intravenous, Q8H, Billy Quiet Malmer, DO, 2,000 mg at 03/26/21 5815    sodium chloride flush 0.9 % injection 10 mL, 10 mL, Intravenous, 2 times per day, Tacho Macy, DO, 10 mL at 03/25/21 2015    sodium chloride flush 0.9 % injection 10 mL, 10 mL, Intravenous, PRN, Billy Quiet Jailene, DO, 10 mL at 03/26/21 5445    enoxaparin (LOVENOX) injection 40 mg, 40 mg, Subcutaneous, Daily, Whittemore Quiet Osvaldomer, DO, 40 mg at 03/25/21 0919    promethazine (PHENERGAN) tablet 12.5 mg, 12.5 mg, Oral, Q6H PRN **OR** ondansetron (ZOFRAN) injection 4 mg, 4 mg, Intravenous, Q6H PRN, Billy Dent, DO    polyethylene glycol [facility]) packet 17 g, 17 g, Oral, Daily PRN, Gale Sandhoff Malmer, DO    acetaminophen (TYLENOL) tablet 650 mg, 650 mg, Oral, Q6H PRN **OR** acetaminophen (TYLENOL) suppository 650 mg, 650 mg, Rectal, Q6H PRN, Gale Sandhoff Malmer, DO    US DUP LOWER EXTREMITIES BILATERAL VENOUS   Final Result   Within the visualized vessels there is no evidence for deep venous   thrombosis               XR CHEST (2 VW)   Final Result   No acute process             Assessment:    Principal Problem:    Cellulitis of leg  Active Problems:    Lymphedema of both lower extremities    Microcytic anemia    Morbid obesity (HCC)    Primary osteoarthritis involving multiple joints    Lymphedema    Chronic pain syndrome    Anemia  Resolved Problems:    * No resolved hospital problems. *     72-year-old woman with iron deficiency anemia, vitamin B12 deficiency anemia, and hx of gastric bypass surgery. Admitted with bilateral lower extremity cellulitis. Plan:  IV Ferrlecit daily for up to 5 doses, can continue subsequent infusions as outpatient. Vitamin B12 injection daily for 3 doses followed by monthly as outpatient. Lovenox 40 mg subcutaneous daily for DVT prophylaxis. Continue antibiotics and full supportive care.         Electronically signed by Constance Luevano MD on 3/26/2021 at 9:27 AM

## 2021-03-26 NOTE — CONSULTS
[address] [place] [address] [facility]  [facility]  Consultation Note     Admit Date: 3/23/2021  7:30 PM    Reason for Consult:   cellulitis    Attending Physician:  Shaheed Sykes DO    HISTORY OF PRESENT ILLNESS:             The history is obtained from extensive review of available past medical records. Patient is know to ID. The patient is a 60 y.o. female who presents with bilat leg redness and swelling. She also had sob. She does have lymphedema and gained about 15 pounds over the past 2 weeks. She has been on ancef and treated for volume overload with diuretic. She is also beng treated for iron deficiency anemia, vitamin B12 deficiency anemia with hx of gastric bypass surgery. She is schedule for lumbar fusion in the near future. Blood cultures NGTD. US BLE neg for DVT. Normal vascular studies in 2015. She was last seen by ID 2019 for cellulitis and was treated with zyvox and levaquin. She has mauricio hose at home but does not always wear them. She was following with lymphedema specialist in [facility] however she stopped going she was unable to do daily wraps on her own. She plans to go to [place] once she is eligible for medicare. She moisturizes her legs daily. She has bruise, pain and increased swelling to left knee due to a fall. She did not have any fever or chills. She is neg 3 liters. Reports swelling down to leg but redness is not any better after 3 days of antibiotic. She also has pain, erythema and swelling to right arm due to IV.      Past Medical History:        Diagnosis Date    Anemia     Arthritis     Cellulitis 5- 2015    left leg    Chronic ulcer of leg with fat layer exposed ([facility] 6/22/2015    Chronic ulcer of right leg, limited to breakdown of skin ([facility] [address] 9/28/2016    Depression     Low back pain     Lymphedema of both lower extremities 6/22/2015    Lymphedema of lower extremity 6/22/2015    Obesity     Osteoarthritis     Peripheral vision loss     Stroke [facility])     Type 2 diabetes mellitus without complication, without long-term current use of insulin ([facility] [address] [facility] 2019    Ulcer of left lower leg, limited to breakdown of skin ([facility] [address] [facility] 6/10/2019    Ulcer of left lower leg, limited to breakdown of skin ([facility] 6/10/2019     Past Surgical History:        Procedure Laterality Date    ABDOMINOPLASTY  2002    BREAST REDUCTION SURGERY      reduction    CATARACT REMOVAL      bilateral     SECTION      x2    COLONOSCOPY  2012    and egd    ECHOCARDIOGRAM COMPLETE 2D W DOPPLER W COLOR  2012         GASTRIC BYPASS SURGERY      HERNIA REPAIR           Current Medications:   Scheduled Meds:   potassium chloride  20 mEq Oral BID WC    bumetanide  2 mg Intravenous BID    magnesium citrate  592 mL Oral Q4H    bisacodyl  10 mg Oral Q4H    ferric gluconate (FERRLECIT) IVPB  125 mg Intravenous Daily    baclofen  10 mg Oral TID    Buprenorphine HCl  450 mcg Oral Q12H    buPROPion  150 mg Oral QAM    gabapentin  300 mg Oral TID    rOPINIRole  2 mg Oral 4x daily    ceFAZolin  2,000 mg Intravenous Q8H    sodium chloride flush  10 mL Intravenous 2 times per day    enoxaparin  40 mg Subcutaneous Daily     Continuous Infusions:  PRN Meds:HYDROcodone 5 mg - acetaminophen, perflutren lipid microspheres, sodium chloride flush, promethazine **OR** ondansetron, polyethylene glycol, acetaminophen **OR** acetaminophen    Allergies:  Patient has no known allergies.     Social History:   Social History     Socioeconomic History    Marital status:      Spouse name: None    Number of children: None    Years of education: None    Highest education level: None   Occupational History    None   Social Needs    Financial resource strain: Not very hard    Food insecurity     Worry: Never true     Inability: Never true    Transportation needs     Medical: No     Non-medical: No   Tobacco Use    Smoking status: Former Smoker     Packs/day: 0.25     Years: 38.00     Pack years: 9.50     Types: Cigarettes     Quit date: 3/11/2021     Years since quittin.0    Smokeless tobacco: Never Used    Tobacco comment: Pt states she quit \"[place]\"   Substance and Sexual Activity    Alcohol use: No    Drug use: No    Sexual activity: Not Currently   Lifestyle    Physical activity     Days per week: None     Minutes per session: None    Stress: Only a little   Relationships    Social connections     Talks on phone: More than three times a week     Gets together: None     Attends Church service: None     Active member of club or organization: None     Attends meetings of clubs or organizations: None     Relationship status: None    Intimate partner violence     Fear of current or ex partner: None     Emotionally abused: None     Physically abused: None     Forced sexual activity: None   Other Topics Concern    None   Social History Narrative        Chronic Diagnosis: Iron deficiency anemia, Depressive disorder, Benign essential hypertension, Mixed    hyperlipidemia. HTN    OBESITY    LIPID    OA    SMOKER    CVA L FIELD VISION    DEPRESSION    GASTRIC BYPASS 01    BREAST REDUCTION--    Chalino Rashid  1955 Page #2    ANEMIA==IRON AND B-12    Admitted 2019 with cellulitis left lower extremity then readmitted with chest pain with negative stress test      Pets: dog  Travel: none    Family History:       Problem Relation Age of Onset    Breast Cancer Mother     Stroke Father     Hypertension Sister     Hypertension Brother    . Otherwise non-pertinent to the chief complaint.     REVIEW OF SYSTEMS:    Constitutional: Negative for fevers, chills, diaphoresis  Neurologic: Negative   Psychiatric: Negative  Rheumatologic: Negative   Endocrine: Negative  Hematologic: anemia  Immunologic: Negative  ENT: Negative  Respiratory: Negative   Cardiovascular: Negative  GI: Negative  : Negative  Musculoskeletal: see hpi  Skin: see hpi    PHYSICAL EXAM:    Vitals:   BP 122/63   Pulse 92   Temp 96.1 °F (35.6 °C) (Temporal)   Resp 16   Ht 5' 2\" (1.575 m)   Wt 285 lb 14.4 oz (129.7 kg)   SpO2 95%   BMI 52.29 kg/m²   Constitutional: The patient is awake, alert, and oriented. Skin: Warm and dry. No rashes were noted. Tinea pedia and intertriginous areas. HEENT: Eyes show round, and reactive pupils. No jaundice. Moist mucous membranes, no ulcerations, no thrush. Neck: Supple to movements. No lymphadenopathy. Chest: No use of accessory muscles to breathe. Symmetrical expansion. Auscultation reveals no wheezing, crackles, or rhonchi. Cardiovascular: S1 and S2 are rhythmic and regular. No murmurs appreciated. Abdomen: Positive bowel sounds to auscultation. Benign to palpation. No masses felt. No hepatosplenomegaly. Extremities: No clubbing, no cyanosis, right leg warm to touch, right arm erythema, firmness due to IV infiltrate. Toes without athletes foot  Lines: peripheral            CBC+dif:  Recent Labs     03/23/21  1600 03/23/21  1600 03/25/21  1021 03/26/21  0440   WBC 8.5  --  7.6 8.8   HGB 8.0*  --  8.0* 8.3*   HCT 31.5*   < > 32.0* 33.5*   MCV 66.7*  --  66.3* 67.1*     --  226 223   NEUTROABS 5.98  --   --   --     < > = values in this interval not displayed.      Lab Results   Component Value Date    CRP 6.5 (H) 05/26/2015    CRP 2.6 (H) 02/28/2014      No results found for: CRP  Lab Results   Component Value Date    SEDRATE 26 (H) 05/26/2015    SEDRATE 33 (H) 02/28/2014     Lab Results   Component Value Date    ALT 12 03/23/2021    AST 16 03/23/2021    ALKPHOS 127 (H) 03/23/2021    BILITOT 0.3 03/23/2021     Lab Results   Component Value Date     03/26/2021    K 3.2 03/26/2021    K 3.3 06/08/2019    CL 97 03/26/2021    CO2 36 03/26/2021    BUN 13 03/26/2021    CREATININE 0.7 03/26/2021    GFRAA >60 03/26/2021    LABGLOM >60 03/26/2021    GLUCOSE 101 03/26/2021    PROT 6.5 03/23/2021    LABALBU 3.3 03/23/2021    CALCIUM 8.4 03/26/2021    BILITOT physicians. Electronically signed by HOLLY Manning CNP on 3/26/2021 at 12:48 PM  12:48 PM  3/26/2021       Pt seen and examined. Above discussed agree with advanced practice nurse. Labs, cultures, and radiographs reviewed. Face to Face encounter occurred. Changes made as necessary.      Rios Rodriguez MD

## 2021-03-26 NOTE — CONSULTS
GENERAL SURGERY   CONSULTNOTE    Date of consultation:  3/26/2021    Марина Marie MD, FACS   PCP: Gabriel aKtz DO   Requesting Provider: Grzegorz Anderson DO  Reason for consult: Iron deficiency anemia    IMPRESSION/RECOMMENDATIONS:  1. Chronic anemia with iron deficiency and B12 deficiency status post remote Desi-en-Y gastric bypass surgery  2. Lower extremity cellulitis    Plan for bowel prep and clear liquid diet today and esophagogastroduodenoscopy and colonoscopy with Dr. Joce Pineda tomorrow. History:   Jaja Garcia  is a 60 y.o.  femalewho presents for evaluation of cellulitis. She is receiving IV antibiotics. She is found to have anemia with iron deficiency and B12 deficiency. She denies any clinical bleeding or abdominal pain. There is no family history of colorectal cancer. We are asked to see her for gastrointestinal endoscopy to rule out occult GI bleeding. She was seen by hematology. She was given B12 injections and has been receiving iron supplements.     Past Medical History:   Diagnosis Date    Anemia     Arthritis     Cellulitis -     left leg    Chronic ulcer of leg with fat layer exposed ([address] 2015    Chronic ulcer of right leg, limited to breakdown of skin ([address] 2016    Depression     Low back pain     Lymphedema of both lower extremities 2015    Lymphedema of lower extremity 2015    Obesity     Osteoarthritis     Peripheral vision loss     Stroke [facility])     Type 2 diabetes mellitus without complication, without long-term current use of insulin ([address] 2019    Ulcer of left lower leg, limited to breakdown of skin ([address] 6/10/2019    Ulcer of left lower leg, limited to breakdown of skin ([address] 6/10/2019        Past Surgical History:   Procedure Laterality Date    ABDOMINOPLASTY  2002    BREAST REDUCTION SURGERY      reduction    CATARACT REMOVAL      bilateral     SECTION      x2    COLONOSCOPY 08/30/2012    and egd    ECHOCARDIOGRAM COMPLETE 2D W DOPPLER W COLOR  1/6/2012         GASTRIC BYPASS SURGERY  2000    HERNIA REPAIR      2002        Family History   Problem Relation Age of Onset    Breast Cancer Mother     Stroke Father     Hypertension Sister     Hypertension Brother         Current Facility-Administered Medications: potassium chloride (KLOR-CON M) extended release tablet 20 mEq, 20 mEq, Oral, BID WC  bumetanide (BUMEX) injection 2 mg, 2 mg, Intravenous, BID  magnesium citrate solution 592 mL, 592 mL, Oral, Q4H  bisacodyl (DULCOLAX) EC tablet 10 mg, 10 mg, Oral, Q4H  clindamycin (CLEOCIN) capsule 600 mg, 600 mg, Oral, 4 times per day  miconazole (MICOTIN) 2 % cream, , Topical, BID  HYDROcodone-acetaminophen (NORCO) 5-325 MG per tablet 1 tablet, 1 tablet, Oral, Q6H PRN  perflutren lipid microspheres (DEFINITY) injection 1.65 mg, 1.5 mL, Intravenous, ONCE PRN  ferric gluconate (FERRLECIT) 125 mg in sodium chloride 0.9 % 100 mL IVPB, 125 mg, Intravenous, Daily  baclofen (LIORESAL) tablet 10 mg, 10 mg, Oral, TID  Buprenorphine HCl FILM 450 mcg, 450 mcg, Oral, Q12H  buPROPion (WELLBUTRIN XL) extended release tablet 150 mg, 150 mg, Oral, QAM  gabapentin (NEURONTIN) capsule 300 mg, 300 mg, Oral, TID  rOPINIRole (REQUIP) tablet 2 mg, 2 mg, Oral, 4x daily  ceFAZolin (ANCEF) 2000 mg in sterile water 20 mL IV syringe, 2,000 mg, Intravenous, Q8H  sodium chloride flush 0.9 % injection 10 mL, 10 mL, Intravenous, 2 times per day  sodium chloride flush 0.9 % injection 10 mL, 10 mL, Intravenous, PRN  enoxaparin (LOVENOX) injection 40 mg, 40 mg, Subcutaneous, Daily  promethazine (PHENERGAN) tablet 12.5 mg, 12.5 mg, Oral, Q6H PRN **OR** ondansetron (ZOFRAN) injection 4 mg, 4 mg, Intravenous, Q6H PRN  polyethylene glycol (GLYCOLAX) packet 17 g, 17 g, Oral, Daily PRN  acetaminophen (TYLENOL) tablet 650 mg, 650 mg, Oral, Q6H PRN **OR** acetaminophen (TYLENOL) suppository 650 mg, 650 mg, Rectal, Q6H PRN    No Known Allergies           Review of Systems:    See HPI      Physical exam:   /63   Pulse 92   Temp 96.1 °F (35.6 °C) (Temporal)   Resp 16   Ht 5' 2\" (1.575 m)   Wt 285 lb 14.4 oz (129.7 kg)   SpO2 95%   BMI 52.29 kg/m²    Generalobese white woman in no acute distress  Abdomennondistended, nontender, no palpable mass  Remediesmarked peripheral edema with reddening of the skin and mild tenderness    Bedford Libman, MD, FACS

## 2021-03-26 NOTE — PROGRESS NOTES
[facility] Medicine    Subjective:  Pt alert conversive      Current Facility-Administered Medications:     polyethylene glycol (GoLYTELY) solution 4,000 mL, 4,000 mL, Oral, Once, Tanvi Cook MD    potassium chloride (KLOR-CON M) extended release tablet 40 mEq, 40 mEq, Oral, Once, [address], DO    potassium chloride (KLOR-CON M) extended release tablet 20 mEq, 20 mEq, Oral, BID WC, Misty Dent,     bumetanide (BUMEX) injection 2 mg, 2 mg, Intravenous, BID, Misty Dent,     HYDROcodone-acetaminophen (NORCO) 5-325 MG per tablet 1 tablet, 1 tablet, Oral, Q6H PRN, Misty Dent DO, 1 tablet at 03/25/21 0446    perflutren lipid microspheres (DEFINITY) injection 1.65 mg, 1.5 mL, Intravenous, ONCE PRN, Darren White.  JORGE Hunt    cyanocobalamin injection 1,000 mcg, 1,000 mcg, Intramuscular, Daily, Nataly Devi MD, 1,000 mcg at 03/25/21 0915    ferric gluconate (FERRLECIT) 125 mg in sodium chloride 0.9 % 100 mL IVPB, 125 mg, Intravenous, Daily, Nataly Devi MD, Stopped at 03/25/21 1922    baclofen (LIORESAL) tablet 10 mg, 10 mg, Oral, TID, Misty Dent DO, 10 mg at 03/25/21 2015    Buprenorphine HCl FILM 450 mcg, 450 mcg, Oral, Q12H, Misty Dent DO, 450 mcg at 03/26/21 0642    buPROPion (WELLBUTRIN XL) extended release tablet 150 mg, 150 mg, Oral, QAM, Misty Dent DO, 150 mg at 03/25/21 3539    gabapentin (NEURONTIN) capsule 300 mg, 300 mg, Oral, TID, Misty Dent DO, 300 mg at 03/25/21 2015    rOPINIRole (REQUIP) tablet 2 mg, 2 mg, Oral, 4x daily, Misty Dent DO, 2 mg at 03/25/21 2015    ceFAZolin (ANCEF) 2000 mg in sterile water 20 mL IV syringe, 2,000 mg, Intravenous, Q8H, Misty Dent DO, 2,000 mg at 03/26/21 3718    sodium chloride flush 0.9 % injection 10 mL, 10 mL, Intravenous, 2 times per day, Misty Dent DO, 10 mL at 03/25/21 2015    sodium chloride flush 0.9 % injection 10 mL, 10 mL, Intravenous, PRN, Misty Dent DO, 10 Cellulitis of leg  Active Problems:    Lymphedema of both lower extremities    Microcytic anemia    Morbid obesity (HCC)    Primary osteoarthritis involving multiple joints    Lymphedema    Chronic pain syndrome    Anemia  Resolved Problems:    * No resolved hospital problems.  *      Plan:  Cont diuresis replace k / start daily k supplement / id to see per pt request / for endoscopy per surgery / dc planning        Dickinson Massing  7:57 AM  3/26/2021

## 2021-03-26 NOTE — ANESTHESIA PRE PROCEDURE
Department of Anesthesiology  Preprocedure Note       Name:  Derrick Little   Age:  60 y.o.  :  1955                                          MRN:  [id number]         Date:  3/26/2021      Surgeon: Emeka Persaud):  Adela Renteria MD    Procedure: Procedure(s):  EGD ESOPHAGOGASTRODUODENOSCOPY  COLONOSCOPY DIAGNOSTIC    Medications prior to admission:   Prior to Admission medications    Medication Sig Start Date End Date Taking? Authorizing Provider   BELBUCA 450 MCG FILM Take 450 mcg by mouth every 12 hours for 30 days. 3/18/21 4/17/21  CHERYL Maza   gabapentin (NEURONTIN) 300 MG capsule Take 1 capsule by mouth 3 times daily for 30 days. Intended supply: 30 days 3/2/21 4/1/21  Prateek Stallworth MD   buPROPion (WELLBUTRIN XL) 150 MG extended release tablet Take 1 tablet by mouth every morning 21   Gautam Razo DO   baclofen (LIORESAL) 10 MG tablet Take 1 tablet by mouth 3 times daily Tired   Do not drive    Gautam Razo DO   rOPINIRole (REQUIP) 2 MG tablet Take 1 tablet by mouth 4 times daily 20   Gautam Razo DO   ammonium lactate (LAC-HYDRIN) 12 % cream Apply topically as needed.  10/8/20   Santo Alvarado DPM   Turmeric 500 MG CAPS Take by mouth    Historical Provider, MD   Cream Base (VERSAPRO) CREA APPLY ONE (1) GRAM (ONE PUMP) EXTERNALLY FOUR TIMES A DAY TO [facility] KNEE 3/23/20   Tierra Haddad PA-C   furosemide (LASIX) 40 MG tablet Take 40 mg by mouth daily as needed for Other Swelling 19   Historical Provider, MD       Current medications:    Current Facility-Administered Medications   Medication Dose Route Frequency Provider Last Rate Last Admin    potassium chloride (KLOR-CON M) extended release tablet 20 mEq  20 mEq Oral BID  Drew Dent DO   20 mEq at 21 0845    bumetanide (BUMEX) injection 2 mg  2 mg Intravenous BID Drew Dent DO   2 mg at 21 1009    magnesium citrate solution 592 mL  592 mL Oral Q4H Niru Aguila MD       [facility] bisacodyl (DULCOLAX) EC tablet 10 mg  10 mg Oral Q4H Tanesha Li MD        clindamycin (CLEOCIN) capsule 600 mg  600 mg Oral 4 times per day [place] Limb, APRN - CNP        miconazole (MICOTIN) 2 % cream   Topical BID [place] Limb, APRN - CNP        HYDROcodone-acetaminophen (NORCO) 5-325 MG per tablet 1 tablet  1 tablet Oral Q6H PRN Asberry Homans Malmer, DO   1 tablet at 03/25/21 0446    perflutren lipid microspheres (DEFINITY) injection 1.65 mg  1.5 mL Intravenous ONCE PRN Kathyleen Aase.  JORGE Hunt        ferric gluconate (FERRLECIT) 125 mg in sodium chloride 0.9 % 100 mL IVPB  125 mg Intravenous Daily Nataly Chavez MD   Stopped at 03/25/21 1922    baclofen (LIORESAL) tablet 10 mg  10 mg Oral TID Asberry Homans Malmer, DO   10 mg at 03/26/21 3220    Buprenorphine HCl FILM 450 mcg  450 mcg Oral Q12H Asberry Homans Malmer, DO   450 mcg at 03/26/21 4723    buPROPion (WELLBUTRIN XL) extended release tablet 150 mg  150 mg Oral QAM Asberry Homans Malmer, DO   150 mg at 03/26/21 3957    gabapentin (NEURONTIN) capsule 300 mg  300 mg Oral TID Asberry Homans Malmer, DO   300 mg at 03/26/21 9783    rOPINIRole (REQUIP) tablet 2 mg  2 mg Oral 4x daily Asberry Homans Malmer, DO   2 mg at 03/26/21 5634    ceFAZolin (ANCEF) 2000 mg in sterile water 20 mL IV syringe  2,000 mg Intravenous Q8H Asberry Homans Malmer, DO   2,000 mg at 03/26/21 6696    sodium chloride flush 0.9 % injection 10 mL  10 mL Intravenous 2 times per day Asberry Homans Malmer, DO   10 mL at 03/25/21 2015    sodium chloride flush 0.9 % injection 10 mL  10 mL Intravenous PRN Asberry Homans Malmer, DO   10 mL at 03/26/21 4762    enoxaparin (LOVENOX) injection 40 mg  40 mg Subcutaneous Daily Asberry Homans Malmer, DO   40 mg at 03/25/21 0919    promethazine (PHENERGAN) tablet 12.5 mg  12.5 mg Oral Q6H PRN Asberry Homans Malmer, DO        Or    [facility] injection 4 mg  4 mg Intravenous Q6H PRN Asberry Homans Malmer, DO        polyethylene glycol (GLYCOLAX) packet 17 g  17 g Oral Daily PRN Roberto De La Torre DO        acetaminophen (TYLENOL) tablet 650 mg  650 mg Oral Q6H PRN Day Dent DO        Or    acetaminophen (TYLENOL) suppository 650 mg  650 mg Rectal Q6H PRN Day Dent DO           Allergies:  No Known Allergies    Problem List:    Patient Active Problem List   Diagnosis Code    Osteoarthritis of left knee M17.12    Osteoarthritis of right knee M17.11    BMI 45.0-49.9, adult ([facility]) Z68.42    Lymphedema of both lower extremities I89.0    Cellulitis of leg L03.119    Microcytic anemia D50.9    Morbid obesity ([facility]) E66.01    Tobacco abuse Z72.0    SOB (shortness of breath) R06.02    Iron deficiency anemia D50.9    Primary osteoarthritis involving multiple joints M89.49    Lymphedema I89.0    Intervertebral lumbar disc disorder M51.9    Reactive depression F32.9    Chronic pain syndrome G89.4    Primary osteoarthritis of both knees M17.0    Chronic right shoulder pain M25.511, G89.29    Chronic right-sided low back pain without sciatica M54.5, G89.29    Anxiety F41.9    Chronic fatigue R53.82    Tremor R25.1    Intractable back pain M54.9    Acute midline low back pain with right-sided sciatica M54.41    Spinal stenosis of lumbar region with neurogenic claudication M48.062    Osteoarthritis of spine with radiculopathy, lumbar region M47.26    Cellulitis L03.90    Anemia D64.9       Past Medical History:        Diagnosis Date    Anemia     Arthritis     Cellulitis 5- 2015    left leg    Chronic ulcer of leg with fat layer exposed ([address] 6/22/2015    Chronic ulcer of right leg, limited to breakdown of skin ([address] 9/28/2016    Depression     Low back pain     Lymphedema of both lower extremities 6/22/2015    Lymphedema of lower extremity 6/22/2015    Obesity     Osteoarthritis     Peripheral vision loss     Stroke [facility])     Type 2 diabetes mellitus without complication, without long-term current use of insulin ([address] 6/21/2019    Ulcer of left lower leg, limited to breakdown of skin ([facility] 6/10/2019    Ulcer of left lower leg, limited to breakdown of skin ([facility] 6/10/2019       Past Surgical History:        Procedure Laterality Date    ABDOMINOPLASTY  2002    BREAST REDUCTION SURGERY      reduction    CATARACT REMOVAL      bilateral     SECTION      x2    COLONOSCOPY  2012    and egd    ECHOCARDIOGRAM COMPLETE 2D W DOPPLER W COLOR  2012         GASTRIC BYPASS SURGERY  2000    HERNIA REPAIR             Social History:    Social History     Tobacco Use    Smoking status: Former Smoker     Packs/day: 0.25     Years: 38.00     Pack years: 9.50     Types: Cigarettes     Quit date: 3/11/2021     Years since quittin.0    Smokeless tobacco: Never Used    Tobacco comment: Pt states she quit [employer]"   Substance Use Topics    Alcohol use: No                                Counseling given: Not Answered  Comment: Pt states she quit \"[place]\"      Vital Signs (Current):   Vitals:    21 0745 21 1106 21 1600 21 0830   BP: 120/60  (!) 150/63 122/63   Pulse: 76  97 92   Resp: 18  16 16   Temp: 36 °C (96.8 °F)  35.8 °C (96.5 °F) 35.6 °C (96.1 °F)   TempSrc: Temporal  Temporal Temporal   SpO2: 90%  97% 95%   Weight:  285 lb 14.4 oz (129.7 kg)     Height:                                                  BP Readings from Last 3 Encounters:   21 122/63   21 (!) 162/90   21 (!) 168/90       NPO Status:   Instructed to have nothing by mouth after 23:59 2021                                                                               BMI:   Wt Readings from Last 3 Encounters:   21 285 lb 14.4 oz (129.7 kg)   21 (!) 301 lb (136.5 kg)   21 297 lb (134.7 kg)     Body mass index is 52.29 kg/m².     CBC:   Lab Results   Component Value Date    WBC 8.8 2021    RBC 4.99 2021    HGB 8.3 2021    HCT 33.5 2021    MCV 67.1 2021    RDW 25.1 2021  03/26/2021       CMP:   Lab Results   Component Value Date     03/26/2021    K 3.2 03/26/2021    K 3.3 06/08/2019    CL 97 03/26/2021    CO2 36 03/26/2021    BUN 13 03/26/2021    CREATININE 0.7 03/26/2021    GFRAA >60 03/26/2021    LABGLOM >60 03/26/2021    GLUCOSE 101 03/26/2021    PROT 6.5 03/23/2021    CALCIUM 8.4 03/26/2021    BILITOT 0.3 03/23/2021    ALKPHOS 127 03/23/2021    AST 16 03/23/2021    ALT 12 03/23/2021       POC Tests: No results for input(s): POCGLU, POCNA, POCK, POCCL, POCBUN, POCHEMO, POCHCT in the last 72 hours. Coags:   Lab Results   Component Value Date    PROTIME 11.2 02/19/2021    INR 1.0 02/19/2021       HCG (If Applicable): No results found for: PREGTESTUR, PREGSERUM, HCG, HCGQUANT     ABGs: No results found for: PHART, PO2ART, FSG1BWA, ERJ1HTF, BEART, D2RJRIPL     Type & Screen (If Applicable):  No results found for: LABABO, LABRH    Drug/Infectious Status (If Applicable):  No results found for: HIV, HEPCAB    COVID-19 Screening (If Applicable):   Lab Results   Component Value Date    COVID19 Not Detected 02/19/2021     ECHO 3/24/2021   Summary   Ejection fraction is visually estimated at 65%. No regional wall motion abnormalities seen. Moderate left ventricular concentric hypertrophy noted. Normal right ventricle structure and function. Physiologic and/or trace mitral regurgitation is present. Physiologic and/or trace tricuspid regurgitation. EKG 03/23/2021  Normal sinus rhythm  Normal ECG  When compared with ECG of 13-JUN-2019 16:53,  Significant changes have occurred    Stress test 06/15/2019  Indications for study: Chest pain     1. No chest pain   2. No new arrhythmias   3. No EKG changes suggestive of stress induced ischemia   4.  Nuclear images pending     Anesthesia Evaluation  Patient summary reviewed and Nursing notes reviewed no history of anesthetic complications:   Airway: Mallampati: II  TM distance: >3 FB   Neck ROM: full  Mouth opening: > = 3 FB Dental:    (+) upper dentures, lower dentures and edentulous      Pulmonary:   (+) shortness of breath:  decreased breath sounds,                            ROS comment: Smoker of 9.5 pack years, quit 3/2021   Cardiovascular:          ECG reviewed  Rhythm: regular  Rate: normal  Echocardiogram reviewed  Stress test reviewed       Beta Blocker:  Not on Beta Blocker         Neuro/Psych:   (+) CVA (some peripheral vision loss in both eyes,):, neuromuscular disease (hx of tremors, chronic pain syndrome.):, depression/anxiety              ROS comment: On neurontin, states does not help. GI/Hepatic/Renal:   (+) morbid obesity         ROS comment: Hx of Gastric bypass, \"sometime in the 1980s\". Endo/Other:    (+) blood dyscrasia: anemia, arthritis (Bilat knees, L spine with radiculopathy): OA., .                  ROS comment: Lymphedema of both lower extremities with cellulitis. Abdominal:   (+) obese,         Vascular: negative vascular ROS. Anesthesia Plan      MAC     ASA 3       Induction: intravenous. Anesthetic plan and risks discussed with patient. Use of blood products discussed with patient whom consented to blood products. Plan discussed with CRNA and attending.                   Angel Arthur, RN   3/26/2021

## 2021-03-27 ENCOUNTER — ANESTHESIA (OUTPATIENT)
Dept: ENDOSCOPY | Age: 66
DRG: 603 | End: 2021-03-27
Payer: COMMERCIAL

## 2021-03-27 VITALS — DIASTOLIC BLOOD PRESSURE: 77 MMHG | OXYGEN SATURATION: 66 % | SYSTOLIC BLOOD PRESSURE: 137 MMHG

## 2021-03-27 LAB
ANION GAP SERPL CALCULATED.3IONS-SCNC: 6 MMOL/L (ref 7–16)
ANTISTREPTOLYSIN-O: 138 IU/ML (ref 0–200)
BUN BLDV-MCNC: 12 MG/DL (ref 8–23)
CALCIUM SERPL-MCNC: 8.7 MG/DL (ref 8.6–10.2)
CHLORIDE BLD-SCNC: 95 MMOL/L (ref 98–107)
CO2: 38 MMOL/L (ref 22–29)
CREAT SERPL-MCNC: 0.8 MG/DL (ref 0.5–1)
GASTRIC PARIETAL CELL ANTIBODY: 1.4 UNITS (ref 0–24.9)
GFR AFRICAN AMERICAN: >60
GFR NON-AFRICAN AMERICAN: >60 ML/MIN/1.73
GLUCOSE BLD-MCNC: 104 MG/DL (ref 74–99)
HCT VFR BLD CALC: 31 % (ref 34–48)
HEMOGLOBIN: 7.7 G/DL (ref 11.5–15.5)
MCH RBC QN AUTO: 16.6 PG (ref 26–35)
MCHC RBC AUTO-ENTMCNC: 24.8 % (ref 32–34.5)
MCV RBC AUTO: 67 FL (ref 80–99.9)
PDW BLD-RTO: 24.9 FL (ref 11.5–15)
PLATELET # BLD: 199 E9/L (ref 130–450)
PMV BLD AUTO: ABNORMAL FL (ref 7–12)
POTASSIUM SERPL-SCNC: 3.5 MMOL/L (ref 3.5–5)
RBC # BLD: 4.63 E12/L (ref 3.5–5.5)
SODIUM BLD-SCNC: 139 MMOL/L (ref 132–146)
WBC # BLD: 6.9 E9/L (ref 4.5–11.5)

## 2021-03-27 PROCEDURE — C1751 CATH, INF, PER/CENT/MIDLINE: HCPCS

## 2021-03-27 PROCEDURE — 43235 EGD DIAGNOSTIC BRUSH WASH: CPT | Performed by: SURGERY

## 2021-03-27 PROCEDURE — 80048 BASIC METABOLIC PNL TOTAL CA: CPT

## 2021-03-27 PROCEDURE — 85027 COMPLETE CBC AUTOMATED: CPT

## 2021-03-27 PROCEDURE — 0DJD8ZZ INSPECTION OF LOWER INTESTINAL TRACT, VIA NATURAL OR ARTIFICIAL OPENING ENDOSCOPIC: ICD-10-PCS | Performed by: SURGERY

## 2021-03-27 PROCEDURE — 6370000000 HC RX 637 (ALT 250 FOR IP): Performed by: SURGERY

## 2021-03-27 PROCEDURE — 36415 COLL VENOUS BLD VENIPUNCTURE: CPT

## 2021-03-27 PROCEDURE — 36410 VNPNXR 3YR/> PHY/QHP DX/THER: CPT

## 2021-03-27 PROCEDURE — 7100000001 HC PACU RECOVERY - ADDTL 15 MIN: Performed by: SURGERY

## 2021-03-27 PROCEDURE — 2500000003 HC RX 250 WO HCPCS: Performed by: SURGERY

## 2021-03-27 PROCEDURE — 3609010300 HC COLONOSCOPY W/BIOPSY SINGLE/MULTIPLE: Performed by: SURGERY

## 2021-03-27 PROCEDURE — 2580000003 HC RX 258

## 2021-03-27 PROCEDURE — 45380 COLONOSCOPY AND BIOPSY: CPT | Performed by: SURGERY

## 2021-03-27 PROCEDURE — 86060 ANTISTREPTOLYSIN O TITER: CPT

## 2021-03-27 PROCEDURE — 88305 TISSUE EXAM BY PATHOLOGIST: CPT

## 2021-03-27 PROCEDURE — 76937 US GUIDE VASCULAR ACCESS: CPT

## 2021-03-27 PROCEDURE — 2580000003 HC RX 258: Performed by: SURGERY

## 2021-03-27 PROCEDURE — 2700000000 HC OXYGEN THERAPY PER DAY

## 2021-03-27 PROCEDURE — 05HC33Z INSERTION OF INFUSION DEVICE INTO LEFT BASILIC VEIN, PERCUTANEOUS APPROACH: ICD-10-PCS | Performed by: INTERNAL MEDICINE

## 2021-03-27 PROCEDURE — 6370000000 HC RX 637 (ALT 250 FOR IP): Performed by: INTERNAL MEDICINE

## 2021-03-27 PROCEDURE — 1200000000 HC SEMI PRIVATE

## 2021-03-27 PROCEDURE — 6360000002 HC RX W HCPCS

## 2021-03-27 PROCEDURE — 2580000003 HC RX 258: Performed by: INTERNAL MEDICINE

## 2021-03-27 PROCEDURE — 0DJ08ZZ INSPECTION OF UPPER INTESTINAL TRACT, VIA NATURAL OR ARTIFICIAL OPENING ENDOSCOPIC: ICD-10-PCS | Performed by: SURGERY

## 2021-03-27 PROCEDURE — 3700000000 HC ANESTHESIA ATTENDED CARE: Performed by: SURGERY

## 2021-03-27 PROCEDURE — 3609017100 HC EGD: Performed by: SURGERY

## 2021-03-27 PROCEDURE — 6360000002 HC RX W HCPCS: Performed by: INTERNAL MEDICINE

## 2021-03-27 PROCEDURE — 6360000002 HC RX W HCPCS: Performed by: SURGERY

## 2021-03-27 PROCEDURE — 0DBL8ZZ EXCISION OF TRANSVERSE COLON, VIA NATURAL OR ARTIFICIAL OPENING ENDOSCOPIC: ICD-10-PCS | Performed by: SURGERY

## 2021-03-27 PROCEDURE — 7100000000 HC PACU RECOVERY - FIRST 15 MIN: Performed by: SURGERY

## 2021-03-27 PROCEDURE — 3700000001 HC ADD 15 MINUTES (ANESTHESIA): Performed by: SURGERY

## 2021-03-27 PROCEDURE — 2500000003 HC RX 250 WO HCPCS: Performed by: INTERNAL MEDICINE

## 2021-03-27 PROCEDURE — 2709999900 HC NON-CHARGEABLE SUPPLY: Performed by: SURGERY

## 2021-03-27 RX ORDER — SODIUM CHLORIDE 0.9 % (FLUSH) 0.9 %
10 SYRINGE (ML) INJECTION PRN
Status: DISCONTINUED | OUTPATIENT
Start: 2021-03-27 | End: 2021-03-30 | Stop reason: HOSPADM

## 2021-03-27 RX ORDER — HEPARIN SODIUM (PORCINE) LOCK FLUSH IV SOLN 100 UNIT/ML 100 UNIT/ML
1 SOLUTION INTRAVENOUS PRN
Status: DISCONTINUED | OUTPATIENT
Start: 2021-03-27 | End: 2021-03-30 | Stop reason: HOSPADM

## 2021-03-27 RX ORDER — SODIUM CHLORIDE 9 MG/ML
INJECTION, SOLUTION INTRAVENOUS CONTINUOUS PRN
Status: DISCONTINUED | OUTPATIENT
Start: 2021-03-27 | End: 2021-03-27 | Stop reason: SDUPTHER

## 2021-03-27 RX ORDER — PROPOFOL 10 MG/ML
INJECTION, EMULSION INTRAVENOUS PRN
Status: DISCONTINUED | OUTPATIENT
Start: 2021-03-27 | End: 2021-03-27 | Stop reason: SDUPTHER

## 2021-03-27 RX ORDER — PROMETHAZINE HYDROCHLORIDE 25 MG/ML
6.25 INJECTION, SOLUTION INTRAMUSCULAR; INTRAVENOUS
Status: DISCONTINUED | OUTPATIENT
Start: 2021-03-27 | End: 2021-03-27 | Stop reason: HOSPADM

## 2021-03-27 RX ORDER — HYDRALAZINE HYDROCHLORIDE 20 MG/ML
5 INJECTION INTRAMUSCULAR; INTRAVENOUS EVERY 10 MIN PRN
Status: DISCONTINUED | OUTPATIENT
Start: 2021-03-27 | End: 2021-03-27 | Stop reason: HOSPADM

## 2021-03-27 RX ORDER — LABETALOL HYDROCHLORIDE 5 MG/ML
5 INJECTION, SOLUTION INTRAVENOUS EVERY 10 MIN PRN
Status: DISCONTINUED | OUTPATIENT
Start: 2021-03-27 | End: 2021-03-27 | Stop reason: HOSPADM

## 2021-03-27 RX ORDER — MEPERIDINE HYDROCHLORIDE 25 MG/ML
12.5 INJECTION INTRAMUSCULAR; INTRAVENOUS; SUBCUTANEOUS EVERY 5 MIN PRN
Status: DISCONTINUED | OUTPATIENT
Start: 2021-03-27 | End: 2021-03-27 | Stop reason: HOSPADM

## 2021-03-27 RX ORDER — HEPARIN SODIUM (PORCINE) LOCK FLUSH IV SOLN 100 UNIT/ML 100 UNIT/ML
1 SOLUTION INTRAVENOUS EVERY 12 HOURS SCHEDULED
Status: DISCONTINUED | OUTPATIENT
Start: 2021-03-27 | End: 2021-03-30 | Stop reason: HOSPADM

## 2021-03-27 RX ORDER — MORPHINE SULFATE 2 MG/ML
2 INJECTION, SOLUTION INTRAMUSCULAR; INTRAVENOUS EVERY 4 HOURS PRN
Status: DISCONTINUED | OUTPATIENT
Start: 2021-03-27 | End: 2021-03-30 | Stop reason: HOSPADM

## 2021-03-27 RX ORDER — OXYCODONE HYDROCHLORIDE AND ACETAMINOPHEN 5; 325 MG/1; MG/1
1 TABLET ORAL
Status: DISCONTINUED | OUTPATIENT
Start: 2021-03-27 | End: 2021-03-27 | Stop reason: HOSPADM

## 2021-03-27 RX ADMIN — CLINDAMYCIN HYDROCHLORIDE 600 MG: 150 CAPSULE ORAL at 17:43

## 2021-03-27 RX ADMIN — PROPOFOL 420 MG: 10 INJECTION, EMULSION INTRAVENOUS at 13:04

## 2021-03-27 RX ADMIN — BUMETANIDE 2 MG: 0.25 INJECTION INTRAMUSCULAR; INTRAVENOUS at 09:10

## 2021-03-27 RX ADMIN — MICONAZOLE NITRATE: 20 CREAM TOPICAL at 09:17

## 2021-03-27 RX ADMIN — ROPINIROLE HYDROCHLORIDE 2 MG: 2 TABLET, FILM COATED ORAL at 21:05

## 2021-03-27 RX ADMIN — Medication 2000 MG: at 03:22

## 2021-03-27 RX ADMIN — GABAPENTIN 300 MG: 300 CAPSULE ORAL at 21:06

## 2021-03-27 RX ADMIN — Medication 10 ML: at 21:05

## 2021-03-27 RX ADMIN — SODIUM CHLORIDE 125 MG: 9 INJECTION, SOLUTION INTRAVENOUS at 17:03

## 2021-03-27 RX ADMIN — SODIUM CHLORIDE, PRESERVATIVE FREE 10 ML: 5 INJECTION INTRAVENOUS at 17:43

## 2021-03-27 RX ADMIN — POTASSIUM CHLORIDE 20 MEQ: 20 TABLET, EXTENDED RELEASE ORAL at 17:03

## 2021-03-27 RX ADMIN — ROPINIROLE HYDROCHLORIDE 2 MG: 2 TABLET, FILM COATED ORAL at 17:03

## 2021-03-27 RX ADMIN — HYDROCODONE BITARTRATE AND ACETAMINOPHEN 1 TABLET: 5; 325 TABLET ORAL at 03:22

## 2021-03-27 RX ADMIN — SODIUM CHLORIDE: 9 INJECTION, SOLUTION INTRAVENOUS at 13:01

## 2021-03-27 RX ADMIN — BUMETANIDE 2 MG: 0.25 INJECTION INTRAMUSCULAR; INTRAVENOUS at 21:06

## 2021-03-27 RX ADMIN — Medication 2000 MG: at 21:04

## 2021-03-27 RX ADMIN — ONDANSETRON 4 MG: 2 INJECTION INTRAMUSCULAR; INTRAVENOUS at 17:43

## 2021-03-27 RX ADMIN — MICONAZOLE NITRATE: 20 CREAM TOPICAL at 21:04

## 2021-03-27 RX ADMIN — Medication 10 ML: at 09:10

## 2021-03-27 RX ADMIN — HYDROCODONE BITARTRATE AND ACETAMINOPHEN 1 TABLET: 5; 325 TABLET ORAL at 09:46

## 2021-03-27 RX ADMIN — SODIUM CHLORIDE, PRESERVATIVE FREE 10 ML: 5 INJECTION INTRAVENOUS at 17:03

## 2021-03-27 RX ADMIN — SODIUM CHLORIDE, PRESERVATIVE FREE 100 UNITS: 5 INJECTION INTRAVENOUS at 21:05

## 2021-03-27 RX ADMIN — BACLOFEN 10 MG: 10 TABLET ORAL at 21:06

## 2021-03-27 ASSESSMENT — PAIN DESCRIPTION - FREQUENCY
FREQUENCY: CONTINUOUS

## 2021-03-27 ASSESSMENT — PAIN DESCRIPTION - ONSET: ONSET: ON-GOING

## 2021-03-27 ASSESSMENT — PAIN SCALES - GENERAL
PAINLEVEL_OUTOF10: 6
PAINLEVEL_OUTOF10: 7
PAINLEVEL_OUTOF10: 0
PAINLEVEL_OUTOF10: 6
PAINLEVEL_OUTOF10: 8
PAINLEVEL_OUTOF10: 0

## 2021-03-27 ASSESSMENT — PAIN DESCRIPTION - DESCRIPTORS: DESCRIPTORS: ACHING;DULL;DISCOMFORT

## 2021-03-27 ASSESSMENT — PAIN DESCRIPTION - PAIN TYPE
TYPE: ACUTE PAIN
TYPE: CHRONIC PAIN
TYPE: ACUTE PAIN

## 2021-03-27 ASSESSMENT — PAIN DESCRIPTION - LOCATION: LOCATION: BACK;KNEE;SHOULDER

## 2021-03-27 NOTE — ANESTHESIA POSTPROCEDURE EVALUATION
Department of Anesthesiology  Postprocedure Note    Patient: Jess Zavaleta  MRN: [id number]  YOB: 1955  Date of evaluation: 3/27/2021  Time:  2:04 PM     Procedure Summary     Date: 03/27/21 Room / Location: [address] [place] [address] [place] [facility]    Anesthesia Start: 1301 Anesthesia Stop: 1355    Procedures:       EGD ESOPHAGOGASTRODUODENOSCOPY (N/A )      COLONOSCOPY DIAGNOSTIC (N/A ) Diagnosis: (/)    Surgeons: Tori Cobb MD Responsible Provider: Yasmin Fernandes MD    Anesthesia Type: MAC ASA Status: 3          Anesthesia Type: MAC    Izzy Phase I:      Izzy Phase II:      Last vitals: Reviewed and per EMR flowsheets.        Anesthesia Post Evaluation    Patient location during evaluation: PACU  Patient participation: complete - patient participated  Level of consciousness: awake and alert  Airway patency: patent  Nausea & Vomiting: no nausea and no vomiting  Complications: no  Cardiovascular status: hemodynamically stable  Respiratory status: acceptable  Hydration status: euvolemic

## 2021-03-27 NOTE — PROGRESS NOTES
Power midline Placement 3/27/2021    Product number: [id number]   Lot Number: [id number]      Ultrasound: yes   Left Brachial vein:                Upper Arm Circumference: 49cm    Size: 4.5fsl    Exposed Length: 4cm    Internal Length: 11cm   Cut: 0   Vein Measurement: 0.52cm    Stone Marion  3/27/2021  12:08 PM

## 2021-03-27 NOTE — PROGRESS NOTES
expansion. No wheezing, crackles or rhonchi. Cardiovascular: S1 and S2 are rhythmic and regular. No murmurs appreciated. Abdomen: Positive bowel sounds to auscultation. Benign to palpation. No masses felt. No hepatosplenomegaly. Genitourinary: female  Extremities: No clubbing, no cyanosis, less redness and edema.   Lines: peripheral    Laboratory and Tests Review:  Lab Results   Component Value Date    WBC 6.9 03/27/2021    WBC 8.8 03/26/2021    WBC 7.6 03/25/2021    HGB 7.7 (L) 03/27/2021    HCT 31.0 (L) 03/27/2021    MCV 67.0 (L) 03/27/2021     03/27/2021     Lab Results   Component Value Date    NEUTROABS 5.98 03/23/2021    NEUTROABS 7.30 03/10/2021    NEUTROABS 11.61 (H) 02/18/2021     No results found for: CRP  Lab Results   Component Value Date    ALT 12 03/23/2021    AST 16 03/23/2021    ALKPHOS 127 (H) 03/23/2021    BILITOT 0.3 03/23/2021     Lab Results   Component Value Date     03/27/2021    K 3.5 03/27/2021    K 3.3 06/08/2019    CL 95 03/27/2021    CO2 38 03/27/2021    BUN 12 03/27/2021    CREATININE 0.8 03/27/2021    CREATININE 0.7 03/26/2021    CREATININE 0.6 03/25/2021    GFRAA >60 03/27/2021    LABGLOM >60 03/27/2021    GLUCOSE 104 03/27/2021    PROT 6.5 03/23/2021    LABALBU 3.3 03/23/2021    CALCIUM 8.7 03/27/2021    BILITOT 0.3 03/23/2021    ALKPHOS 127 03/23/2021    AST 16 03/23/2021    ALT 12 03/23/2021     Lab Results   Component Value Date    CRP 6.5 (H) 05/26/2015    CRP 2.6 (H) 02/28/2014     Lab Results   Component Value Date    SEDRATE 26 (H) 05/26/2015    SEDRATE 33 (H) 02/28/2014     Radiology:  reviewed    Microbiology:   Lab Results   Component Value Date    BC 24 Hours no growth 03/23/2021    BC 5 Days- no growth 06/13/2019    BC 5 Days- no growth 06/04/2019    ORG Coagulase Negative Staphylococci 07/20/2015    ORG Globicatella species 06/22/2015    ORG Proteus mirabilis 06/22/2015    ORG Moraxella species 06/22/2015    ORG Morganella morganii ssp morganii 06/22/2015     Lab Results   Component Value Date    BLOODCULT2 24 Hours no growth 03/23/2021    BLOODCULT2 5 Days- no growth 06/13/2019    BLOODCULT2 5 Days- no growth 06/04/2019    ORG Coagulase Negative Staphylococci 07/20/2015    ORG Globicatella species 06/22/2015    ORG Proteus mirabilis 06/22/2015    ORG Moraxella species 06/22/2015    ORG Morganella morganii ssp morganii 06/22/2015     No results found for: WNDABS  No results found for: RESPSMEAR  No results found for: MPNEUMO, CLAMYDCU, LABLEGI, AFBCX, FUNGSM, LABFUNG  No results found for: CULTRESP  No results found for: CXCATHTIP  No results found for: BFCS  Culture Surgical   Date Value Ref Range Status   07/20/2015 Moderate growth  Final   06/22/2015 Heavy growth  Final   06/22/2015   Final    Light growth  Susceptibility testing of this isolate is not routinely  indicated to guide therapy.   Beta Lactamase POSITIVE       Urine Culture, Routine   Date Value Ref Range Status   06/05/2019 Growth not present  Final     MRSA Culture Only   Date Value Ref Range Status   06/05/2019 Methicillin resistant Staph aureus not isolated  Final       ASSESSMENT:  · Lymphedema and cellulitis    PLAN:  · Continue c tubigrips   · Switch to po in am  · Check final cultures  · Monitor labs    Clayton Robin  4:56 PM  3/27/2021

## 2021-03-27 NOTE — PROGRESS NOTES
Patient seen and examined this morning. Resting comfortably on supplemental oxygen. She has been undergoing prep for colonoscopy and EGD today. She is not clear from below per the bedside nurse. She is mostly clear with brown flecks. Plan for tap water enemas this morning. We will continue to assess for suitability for endoscopy today.       Hemalatha Santiago

## 2021-03-27 NOTE — OP NOTE
Operative Note      Patient: Lupillo French  YOB: 1955  MRN: [id number]    Date of Procedure: 3/27/2021    Pre-Op Diagnosis: anemia    Post-Op Diagnosis: Same and minimal gastritis; 3 mm mid-transverse colon polyp; 4 mm distal transverse colon polyp; marginal prep       Procedure(s):  EGD ESOPHAGOGASTRODUODENOSCOPY  COLONOSCOPY DIAGNOSTIC    Surgeon(s):  Srini Bird MD    Assistant:   * No surgical staff found *    Anesthesia: Monitor Anesthesia Care    Estimated Blood Loss (mL): Minimal    Complications: None    Specimens:   * No specimens in log *    Implants:  * No implants in log *      Drains: * No LDAs found *    Findings: minimal gastritis; 3 mm mid-transverse colon polyp; 4 mm distal transverse colon polyp; marginal prep    Detailed Description of Procedure:   ESOPHAGOGASTRODUODENOSCOPY PROCEDURE NOTE  BRIEF HISTORY:  This is a 60 y.o. female who presents with the complaint of anemia. It was recommended the patient undergo an esophagogastroduodenoscopy. The risks/benefits/alternatives/expected outcomes were explained the the patient. The patient verbalized understanding and agreed to proceed. PROCEDURE:  The patient was brought into the endoscopy suite and placed in the left lateral decubitus position. A bite block was placed in the patients mouth. After the initiation of LMAC anesthesia, a gastroscope was inserted into the patient's mouth and passed into the esophagus and into the stomach. Immediately upon entering the stomach the note of minimal pouch gastritis was made. The scope was advanced into the afferent and efferent limbs of the Desi-en-Y bypass which appeared normal.  The scope was then withdrawn the entire length of the esophagus, making the note of a normal esophagus without masses, ulcerations, or lesions noted. The scope was withdrawn entirely. The patient tolerated the procedure well and there were no complications.       COLONOSCOPY PROCEDURE NOTE  PROCEDURE: The patient was brought into the endoscopy suite and placed in the left lateral decubitus position. A digital rectal exam was performed after the initiation of LMAC anesthesia and failed to reveal any obstructing masses or lesions. A colonoscope was inserted into the patient's anus and passed through the rectum, sigmoid, descending, transverse, and ascending colon all the way to the level of the cecum. Visualization of the cecum was confirmed by visualization of the ileo-cecal valve and confluence of the tinea. The scope was then withdrawn the entire length of the colon. There were no masses, polyps, or lesions noted until reaching the mid-transverse colon where a 3 mm polyp was seen and biopsied. In the distal transverse colon a 4 mm polyp was seen and biopsied. Upon reaching the anus, the scope was retroflexed. There were no significant hemorrhoids noted. The scope was straightened and withdrawn entirely. The patient tolerated the procedure well and there were no complications. Prep was marginal with retained stool which could obscure a small mucosal or submucosal lesion; repeat colonoscopy in 3 years pending pathology.       Tara Monteiro MD  3/27/2021          Electronically signed by Tara Monteiro MD on 3/27/2021 at 1:52 PM

## 2021-03-27 NOTE — PLAN OF CARE
Problem: Falls - Risk of:  Goal: Will remain free from falls  Description: Will remain free from falls  Outcome: Met This Shift     Problem: Falls - Risk of:  Goal: Absence of physical injury  Description: Absence of physical injury  Outcome: Met This Shift     Problem: Pain:  Goal: Pain level will decrease  Description: Pain level will decrease  Outcome: Met This Shift     Problem: Skin Integrity:  Goal: Absence of new skin breakdown  Description: Absence of new skin breakdown  Outcome: Met This Shift

## 2021-03-27 NOTE — PROGRESS NOTES
Patient admitted to PACU. All appropriate monitors applied, siderails up, bed locked, and in low position. 08-Apr-2019 12:28

## 2021-03-27 NOTE — PROGRESS NOTES
Tubi  Size G obtained for bilateral lower extremities per physician order. Single for right leg and double for left leg. Bedside nurse to apply.

## 2021-03-27 NOTE — PROGRESS NOTES
Labs: 3-24 ferritin=10, B12 was low at 186  Today wbc=7 hb=7.7 jhr=566    A/P: 60year-old female new with Dr. Margarito Ramires  with iron deficiency anemia, vitamin B12 deficiency anemia, and hx of gastric bypass surgery. She has been given 3 doses of IV ferrlecit due to malabsorption from gastric bypass and B12 injection. She is getting EGD and colonoscopy to evaluate for any source of bleeding vs just malabsorption from gastric bypass. Also in with cellulitis  bilateral lower extremities. 1. Patient to f/u at [facility] 1 to 2 weeks with Dr. Margarito Ramires to arrange further IV iron  2. Will sign off.   3. Please call us back if any pathologic lesion found on EGD or colonoscopy during her hospital stay or if new questions arise    Rai Simpson

## 2021-03-27 NOTE — PROGRESS NOTES
Hospital Medicine    Subjective: Having a lot of pain in the lower extremity, right arm      Current Facility-Administered Medications:     potassium chloride (KLOR-CON M) extended release tablet 20 mEq, 20 mEq, Oral, BID WC, Dinh Dent DO, Stopped at 03/27/21 0846    bumetanide (BUMEX) injection 2 mg, 2 mg, Intravenous, BID, Dinh Dent DO, 2 mg at 03/27/21 0910    clindamycin (CLEOCIN) capsule 600 mg, 600 mg, Oral, 4 times per day, Donney Barthel, APRN - CNP, Stopped at 03/27/21 0600    miconazole (MICOTIN) 2 % cream, , Topical, BID, Donney Barthel, APRN - CNP, Given at 03/27/21 0917    HYDROcodone-acetaminophen (NORCO) 5-325 MG per tablet 1 tablet, 1 tablet, Oral, Q6H PRN, Dinh Dent DO, 1 tablet at 03/27/21 0946    perflutren lipid microspheres (DEFINITY) injection 1.65 mg, 1.5 mL, Intravenous, ONCE PRN, No Trevino.  JORGE Hunt    ferric gluconate (FERRLECIT) 125 mg in sodium chloride 0.9 % 100 mL IVPB, 125 mg, Intravenous, Daily, Nataly Wood MD, Stopped at 03/26/21 1932    baclofen (LIORESAL) tablet 10 mg, 10 mg, Oral, TID, Dinh Dent DO, Stopped at 03/27/21 0845    Buprenorphine HCl FILM 450 mcg, 450 mcg, Oral, Q12H, Dinh Dent DO, Stopped at 03/27/21 0600    buPROPion (WELLBUTRIN XL) extended release tablet 150 mg, 150 mg, Oral, QAM, Dinh Dent DO, Stopped at 03/27/21 0845    gabapentin (NEURONTIN) capsule 300 mg, 300 mg, Oral, TID, Dinh Dent DO, Stopped at 03/27/21 0846    rOPINIRole (REQUIP) tablet 2 mg, 2 mg, Oral, 4x daily, Dinh Dent DO, Stopped at 03/27/21 0847    ceFAZolin (ANCEF) 2000 mg in sterile water 20 mL IV syringe, 2,000 mg, Intravenous, Q8H, Dinh Dent DO, 2,000 mg at 03/27/21 2178    sodium chloride flush 0.9 % injection 10 mL, 10 mL, Intravenous, 2 times per day, Dinh Dent DO, 10 mL at 03/27/21 0910    sodium chloride flush 0.9 % injection 10 mL, 10 mL, Intravenous, PRN, Dinh Dent DO, 10 mL at 02/19/2021    PROTIME 11.2 02/19/2021       Assessment:    Principal Problem:    Cellulitis of leg  Active Problems:    Lymphedema of both lower extremities    Microcytic anemia    Morbid obesity (HCC)    Primary osteoarthritis involving multiple joints    Lymphedema    Chronic pain syndrome    Anemia  Resolved Problems:    * No resolved hospital problems.  *      Plan:  Patient requesting stronger pain medications  We will try morphine  Infectious disease input noted  For colonoscopy and EGD        Sánchez Billingsley  11:07 AM  3/27/2021

## 2021-03-28 LAB
ANION GAP SERPL CALCULATED.3IONS-SCNC: 9 MMOL/L (ref 7–16)
BLOOD CULTURE, ROUTINE: NORMAL
BUN BLDV-MCNC: 13 MG/DL (ref 8–23)
CALCIUM SERPL-MCNC: 8.1 MG/DL (ref 8.6–10.2)
CHLORIDE BLD-SCNC: 98 MMOL/L (ref 98–107)
CO2: 36 MMOL/L (ref 22–29)
CREAT SERPL-MCNC: 0.9 MG/DL (ref 0.5–1)
CULTURE, BLOOD 2: NORMAL
GFR AFRICAN AMERICAN: >60
GFR NON-AFRICAN AMERICAN: >60 ML/MIN/1.73
GLUCOSE BLD-MCNC: 87 MG/DL (ref 74–99)
INTRINSIC FACTOR BLOCKING AB: NEGATIVE
POTASSIUM SERPL-SCNC: 3.4 MMOL/L (ref 3.5–5)
SODIUM BLD-SCNC: 143 MMOL/L (ref 132–146)

## 2021-03-28 PROCEDURE — 1200000000 HC SEMI PRIVATE

## 2021-03-28 PROCEDURE — 2500000003 HC RX 250 WO HCPCS: Performed by: SURGERY

## 2021-03-28 PROCEDURE — 6360000002 HC RX W HCPCS: Performed by: SURGERY

## 2021-03-28 PROCEDURE — 6370000000 HC RX 637 (ALT 250 FOR IP): Performed by: SURGERY

## 2021-03-28 PROCEDURE — 2580000003 HC RX 258: Performed by: SURGERY

## 2021-03-28 PROCEDURE — 6370000000 HC RX 637 (ALT 250 FOR IP): Performed by: SPECIALIST

## 2021-03-28 PROCEDURE — 36415 COLL VENOUS BLD VENIPUNCTURE: CPT

## 2021-03-28 PROCEDURE — 80048 BASIC METABOLIC PNL TOTAL CA: CPT

## 2021-03-28 RX ORDER — CEPHALEXIN 500 MG/1
500 CAPSULE ORAL EVERY 6 HOURS SCHEDULED
Status: DISCONTINUED | OUTPATIENT
Start: 2021-03-28 | End: 2021-03-30 | Stop reason: HOSPADM

## 2021-03-28 RX ORDER — CEPHALEXIN 500 MG/1
500 CAPSULE ORAL EVERY 6 HOURS
Qty: 28 CAPSULE | Refills: 0 | Status: SHIPPED | OUTPATIENT
Start: 2021-03-28 | End: 2021-04-04

## 2021-03-28 RX ADMIN — CEPHALEXIN 500 MG: 500 CAPSULE ORAL at 21:47

## 2021-03-28 RX ADMIN — BUMETANIDE 2 MG: 0.25 INJECTION INTRAMUSCULAR; INTRAVENOUS at 08:31

## 2021-03-28 RX ADMIN — ROPINIROLE HYDROCHLORIDE 2 MG: 2 TABLET, FILM COATED ORAL at 08:30

## 2021-03-28 RX ADMIN — BACLOFEN 10 MG: 10 TABLET ORAL at 21:47

## 2021-03-28 RX ADMIN — MICONAZOLE NITRATE: 20 CREAM TOPICAL at 21:47

## 2021-03-28 RX ADMIN — BUPROPION HYDROCHLORIDE 150 MG: 150 TABLET, EXTENDED RELEASE ORAL at 08:30

## 2021-03-28 RX ADMIN — Medication 10 ML: at 08:39

## 2021-03-28 RX ADMIN — SODIUM CHLORIDE, PRESERVATIVE FREE 100 UNITS: 5 INJECTION INTRAVENOUS at 08:29

## 2021-03-28 RX ADMIN — BACLOFEN 10 MG: 10 TABLET ORAL at 13:17

## 2021-03-28 RX ADMIN — ROPINIROLE HYDROCHLORIDE 2 MG: 2 TABLET, FILM COATED ORAL at 13:17

## 2021-03-28 RX ADMIN — Medication 2000 MG: at 06:13

## 2021-03-28 RX ADMIN — SODIUM CHLORIDE, PRESERVATIVE FREE 100 UNITS: 5 INJECTION INTRAVENOUS at 21:47

## 2021-03-28 RX ADMIN — CLINDAMYCIN HYDROCHLORIDE 600 MG: 150 CAPSULE ORAL at 06:14

## 2021-03-28 RX ADMIN — GABAPENTIN 300 MG: 300 CAPSULE ORAL at 21:47

## 2021-03-28 RX ADMIN — CEPHALEXIN 500 MG: 500 CAPSULE ORAL at 16:02

## 2021-03-28 RX ADMIN — HYDROCODONE BITARTRATE AND ACETAMINOPHEN 1 TABLET: 5; 325 TABLET ORAL at 00:40

## 2021-03-28 RX ADMIN — Medication 10 ML: at 21:47

## 2021-03-28 RX ADMIN — BACLOFEN 10 MG: 10 TABLET ORAL at 08:30

## 2021-03-28 RX ADMIN — BUMETANIDE 2 MG: 0.25 INJECTION INTRAMUSCULAR; INTRAVENOUS at 21:47

## 2021-03-28 RX ADMIN — POTASSIUM CHLORIDE 20 MEQ: 20 TABLET, EXTENDED RELEASE ORAL at 16:17

## 2021-03-28 RX ADMIN — ENOXAPARIN SODIUM 40 MG: 40 INJECTION SUBCUTANEOUS at 08:29

## 2021-03-28 RX ADMIN — SODIUM CHLORIDE 125 MG: 9 INJECTION, SOLUTION INTRAVENOUS at 16:03

## 2021-03-28 RX ADMIN — CLINDAMYCIN HYDROCHLORIDE 600 MG: 150 CAPSULE ORAL at 00:39

## 2021-03-28 RX ADMIN — MICONAZOLE NITRATE: 20 CREAM TOPICAL at 08:33

## 2021-03-28 RX ADMIN — ROPINIROLE HYDROCHLORIDE 2 MG: 2 TABLET, FILM COATED ORAL at 16:16

## 2021-03-28 RX ADMIN — GABAPENTIN 300 MG: 300 CAPSULE ORAL at 08:30

## 2021-03-28 RX ADMIN — GABAPENTIN 300 MG: 300 CAPSULE ORAL at 13:17

## 2021-03-28 RX ADMIN — POTASSIUM CHLORIDE 20 MEQ: 20 TABLET, EXTENDED RELEASE ORAL at 08:30

## 2021-03-28 RX ADMIN — ROPINIROLE HYDROCHLORIDE 2 MG: 2 TABLET, FILM COATED ORAL at 21:48

## 2021-03-28 ASSESSMENT — PAIN SCALES - GENERAL: PAINLEVEL_OUTOF10: 6

## 2021-03-28 NOTE — PROGRESS NOTES
[facility]    Subjective: She has held her home narcotic with her  Pain is improving      Current Facility-Administered Medications:     cephALEXin (KEFLEX) capsule 500 mg, 500 mg, Oral, 4 times per day, Ej Turner MD    morphine (PF) injection 2 mg, 2 mg, Intravenous, Q4H PRN, Deonna Naylor MD    lidocaine 1 % injection 5 mL, 5 mL, Intradermal, Once, Deonna Naylor MD    sodium chloride flush 0.9 % injection 10 mL, 10 mL, Intravenous, PRN, Deonna Naylor MD, 10 mL at 03/27/21 1743    heparin flush 100 UNIT/ML injection 100 Units, 1 mL, Intravenous, 2 times per day, Deonna Naylor MD, 100 Units at 03/28/21 0829    heparin flush 100 UNIT/ML injection 100 Units, 1 mL, Intracatheter, PRN, Deonna Naylor MD    potassium chloride (KLOR-CON M) extended release tablet 20 mEq, 20 mEq, Oral, BID WC, Deonna Naylor MD, 20 mEq at 03/28/21 0830    bumetanide (BUMEX) injection 2 mg, 2 mg, Intravenous, BID, Deonna Naylor MD, 2 mg at 03/28/21 0831    miconazole (MICOTIN) 2 % cream, , Topical, BID, Deonna Naylor MD, Given at 03/28/21 0833    HYDROcodone-acetaminophen ([address]) 5-325 MG per tablet 1 tablet, 1 tablet, Oral, Q6H PRN, Deonna Naylor MD, 1 tablet at 03/28/21 0040    perflutren lipid microspheres (DEFINITY) injection 1.65 mg, 1.5 mL, Intravenous, ONCE PRN, Deonna Naylor MD    ferric gluconate (FERRLECIT) 125 mg in sodium chloride 0.9 % 100 mL IVPB, 125 mg, Intravenous, Daily, Deonna Naylor MD, Stopped at 03/27/21 1803    baclofen (LIORESAL) tablet 10 mg, 10 mg, Oral, TID, Deonna Naylor MD, 10 mg at 03/28/21 1317    Buprenorphine HCl FILM 450 mcg, 450 mcg, Oral, Q12H, Deonna Naylor MD, 450 mcg at 03/28/21 1637    buPROPion (WELLBUTRIN XL) extended release tablet 150 mg, 150 mg, Oral, QAM, Deonna Naylor MD, 150 mg at 03/28/21 0830    gabapentin (NEURONTIN) capsule 300 mg, 300 mg, Oral, TID, Deonna Naylor MD, 300 mg at 03/28/21 1317    rOPINIRole (REQUIP) tablet 2 mg, 2 mg, Oral, 4x daily, [facility], 2 mg at 03/28/21 1317    sodium chloride flush 0.9 % injection 10 mL, 10 mL, Intravenous, 2 times per day, Silvino Whitehead MD, 10 mL at 03/28/21 0839    enoxaparin (LOVENOX) injection 40 mg, 40 mg, Subcutaneous, Daily, Silvino Whitehead MD, 40 mg at 03/28/21 0829    promethazine (PHENERGAN) tablet 12.5 mg, 12.5 mg, Oral, Q6H PRN **OR** ondansetron (ZOFRAN) injection 4 mg, 4 mg, Intravenous, Q6H PRN, Silvino Whitehead MD, 4 mg at 03/27/21 1743    polyethylene glycol (GLYCOLAX) packet 17 g, 17 g, Oral, Daily PRN, Silvino Whitehead MD    acetaminophen (TYLENOL) tablet 650 mg, 650 mg, Oral, Q6H PRN **OR** acetaminophen (TYLENOL) suppository 650 mg, 650 mg, Rectal, Q6H PRN, Silvino Whitehead MD    Objective:    /65   Pulse 94   Temp 97.5 °F (36.4 °C) (Temporal)   Resp 16   Ht 5' 2\" (1.575 m)   Wt 285 lb 14.4 oz (129.7 kg)   SpO2 94%   BMI 52.29 kg/m²     Heart:  reg  Lungs:  ctab  Abd: + bs soft nontender  Extrem:  Edema erythema legs  Significant induration of the right arm noted at the old IV sites    CBC with Differential:    Lab Results   Component Value Date    WBC 6.9 03/27/2021    RBC 4.63 03/27/2021    HGB 7.7 03/27/2021    HCT 31.0 03/27/2021     03/27/2021    MCV 67.0 03/27/2021    MCH 16.6 03/27/2021    MCHC 24.8 03/27/2021    RDW 24.9 03/27/2021    NRBC 0.9 02/18/2021    SEGSPCT 72 02/28/2014    LYMPHOPCT 20.0 03/23/2021    MONOPCT 7.6 03/23/2021    MYELOPCT 0.9 03/10/2021    BASOPCT 0.2 03/23/2021    MONOSABS 0.65 03/23/2021    LYMPHSABS 1.70 03/23/2021    EOSABS 0.11 03/23/2021    BASOSABS 0.02 03/23/2021     CMP:    Lab Results   Component Value Date     03/28/2021    K 3.4 03/28/2021    K 3.3 06/08/2019    CL 98 03/28/2021    CO2 36 03/28/2021    BUN 13 03/28/2021    CREATININE 0.9 03/28/2021    GFRAA >60 03/28/2021    LABGLOM >60 03/28/2021    GLUCOSE 87 03/28/2021    PROT 6.5 03/23/2021    LABALBU 3.3 03/23/2021    CALCIUM 8.1 03/28/2021    BILITOT 0.3 03/23/2021    ALKPHOS 127 03/23/2021    AST 16 03/23/2021    ALT 12 03/23/2021     Warfarin PT/INR:    Lab Results   Component Value Date    INR 1.0 02/19/2021    PROTIME 11.2 02/19/2021       Assessment:    Principal Problem:    Cellulitis of leg  Active Problems:    Lymphedema of both lower extremities    Microcytic anemia    Morbid obesity (HCC)    Primary osteoarthritis involving multiple joints    Lymphedema    Chronic pain syndrome    Anemia  Resolved Problems:    * No resolved hospital problems.  *      Plan:  Continue diuresis  Scope results noted  Discharge plan is home      [address] [place] [address]  1:39 PM  3/28/2021

## 2021-03-28 NOTE — PROGRESS NOTES
[address] [place] [address] [facility]  Progress Note      Chief Complaint   Patient presents with    Shortness of Breath     patient arrives with complaints of SOB and increased leg swelling that started x1 week , patient states that sob has gotten worse over the past few days. SUBJECTIVE:  Patient is tolerating medications. No reported adverse drug reactions. No nausea, vomiting, diarrhea. Afebrile  Feels better with reference to lower extremities however she had a lot of follow-up postprandial issues this morning with some reflux and chest tightness that was relieved after a bowel movement. Patient is status post gastric bypass. Review of systems:  As stated above in the chief complaint, otherwise negative. Medications:  Scheduled Meds:   lidocaine  5 mL Intradermal Once    heparin flush  1 mL Intravenous 2 times per day    potassium chloride  20 mEq Oral BID WC    bumetanide  2 mg Intravenous BID    clindamycin  600 mg Oral 4 times per day    miconazole   Topical BID    ferric gluconate (FERRLECIT) IVPB  125 mg Intravenous Daily    baclofen  10 mg Oral TID    Buprenorphine HCl  450 mcg Oral Q12H    buPROPion  150 mg Oral QAM    gabapentin  300 mg Oral TID    rOPINIRole  2 mg Oral 4x daily    ceFAZolin  2,000 mg Intravenous Q8H    sodium chloride flush  10 mL Intravenous 2 times per day    enoxaparin  40 mg Subcutaneous Daily     Continuous Infusions:  PRN Meds:morphine, sodium chloride flush, heparin flush, HYDROcodone 5 mg - acetaminophen, perflutren lipid microspheres, promethazine **OR** ondansetron, polyethylene glycol, acetaminophen **OR** acetaminophen    OBJECTIVE:  /65   Pulse 94   Temp 97.5 °F (36.4 °C) (Temporal)   Resp 16   Ht 5' 2\" (1.575 m)   Wt 285 lb 14.4 oz (129.7 kg)   SpO2 94%   BMI 52.29 kg/m²   Temp  Av.5 °F (36.4 °C)  Min: 97 °F (36.1 °C)  Max: 98.6 °F (37 °C)  Constitutional: The patient is awake, alert, and oriented.    Skin: Warm and dry. No rashes were noted. HEENT: Round and reactive pupils. Moist mucous membranes. No ulcerations or thrush. Neck: Supple to movements. Chest: No use of accessory muscles to breathe. Symmetrical expansion. No wheezing, crackles or rhonchi. Cardiovascular: S1 and S2 are rhythmic and regular. No murmurs appreciated. Abdomen: Positive bowel sounds to auscultation. Benign to palpation. No masses felt. No hepatosplenomegaly. Genitourinary: female  Extremities: No clubbing, no cyanosis, less redness and edema.   Tolerating Tubigrip's  Lines: peripheral    Laboratory and Tests Review:  Lab Results   Component Value Date    WBC 6.9 03/27/2021    WBC 8.8 03/26/2021    WBC 7.6 03/25/2021    HGB 7.7 (L) 03/27/2021    HCT 31.0 (L) 03/27/2021    MCV 67.0 (L) 03/27/2021     03/27/2021     Lab Results   Component Value Date    NEUTROABS 5.98 03/23/2021    NEUTROABS 7.30 03/10/2021    NEUTROABS 11.61 (H) 02/18/2021     No results found for: CRPHS  Lab Results   Component Value Date    ALT 12 03/23/2021    AST 16 03/23/2021    ALKPHOS 127 (H) 03/23/2021    BILITOT 0.3 03/23/2021     Lab Results   Component Value Date     03/28/2021    K 3.4 03/28/2021    K 3.3 06/08/2019    CL 98 03/28/2021    CO2 36 03/28/2021    BUN 13 03/28/2021    CREATININE 0.9 03/28/2021    CREATININE 0.8 03/27/2021    CREATININE 0.7 03/26/2021    GFRAA >60 03/28/2021    LABGLOM >60 03/28/2021    GLUCOSE 87 03/28/2021    PROT 6.5 03/23/2021    LABALBU 3.3 03/23/2021    CALCIUM 8.1 03/28/2021    BILITOT 0.3 03/23/2021    ALKPHOS 127 03/23/2021    AST 16 03/23/2021    ALT 12 03/23/2021     Lab Results   Component Value Date    CRP 6.5 (H) 05/26/2015    CRP 2.6 (H) 02/28/2014     Lab Results   Component Value Date    SEDRATE 26 (H) 05/26/2015    SEDRATE 33 (H) 02/28/2014     Radiology:  reviewed    Microbiology:   Lab Results   Component Value Date    BC 24 Hours no growth 03/23/2021    BC 5 Days- no growth 06/13/2019    BC 5 Days- no growth 06/04/2019    ORG Coagulase Negative Staphylococci 07/20/2015    ORG Globicatella species 06/22/2015    ORG Proteus mirabilis 06/22/2015    ORG Moraxella species 06/22/2015    ORG Morganella morganii ssp morganii 06/22/2015     Lab Results   Component Value Date    BLOODCULT2 24 Hours no growth 03/23/2021    BLOODCULT2 5 Days- no growth 06/13/2019    BLOODCULT2 5 Days- no growth 06/04/2019    ORG Coagulase Negative Staphylococci 07/20/2015    ORG Globicatella species 06/22/2015    ORG Proteus mirabilis 06/22/2015    ORG Moraxella species 06/22/2015    ORG Morganella morganii ssp morganii 06/22/2015     No results found for: WNDABS  No results found for: RESPSMEAR  No results found for: MPNEUMO, CLAMYDCU, LABLEGI, AFBCX, FUNGSM, LABFUNG  No results found for: CULTRESP  No results found for: CXCATHTIP  No results found for: BFCS  Culture Surgical   Date Value Ref Range Status   07/20/2015 Moderate growth  Final   06/22/2015 Heavy growth  Final   06/22/2015   Final    Light growth  Susceptibility testing of this isolate is not routinely  indicated to guide therapy.   Beta Lactamase POSITIVE       Urine Culture, Routine   Date Value Ref Range Status   06/05/2019 Growth not present  Final     MRSA Culture Only   Date Value Ref Range Status   06/05/2019 Methicillin resistant Staph aureus not isolated  Final       ASSESSMENT:  · Lymphedema and cellulitis-resolving  · Anemia; iron supplementation ongoing    PLAN:  · Continue c tubigrips   · Switch to po  · Check final cultures  · Monitor labs    Buddie Rom  12:49 PM  3/28/2021

## 2021-03-29 ENCOUNTER — APPOINTMENT (OUTPATIENT)
Dept: GENERAL RADIOLOGY | Age: 66
DRG: 603 | End: 2021-03-29
Payer: COMMERCIAL

## 2021-03-29 LAB
ANION GAP SERPL CALCULATED.3IONS-SCNC: 7 MMOL/L (ref 7–16)
BUN BLDV-MCNC: 14 MG/DL (ref 8–23)
CALCIUM SERPL-MCNC: 8.5 MG/DL (ref 8.6–10.2)
CHLORIDE BLD-SCNC: 98 MMOL/L (ref 98–107)
CO2: 37 MMOL/L (ref 22–29)
CREAT SERPL-MCNC: 0.7 MG/DL (ref 0.5–1)
GFR AFRICAN AMERICAN: >60
GFR NON-AFRICAN AMERICAN: >60 ML/MIN/1.73
GLUCOSE BLD-MCNC: 96 MG/DL (ref 74–99)
HCT VFR BLD CALC: 32.6 % (ref 34–48)
HEMOGLOBIN: 8.3 G/DL (ref 11.5–15.5)
MCH RBC QN AUTO: 17.7 PG (ref 26–35)
MCHC RBC AUTO-ENTMCNC: 25.5 % (ref 32–34.5)
MCV RBC AUTO: 69.5 FL (ref 80–99.9)
PDW BLD-RTO: 27.3 FL (ref 11.5–15)
PLATELET # BLD: 188 E9/L (ref 130–450)
PMV BLD AUTO: ABNORMAL FL (ref 7–12)
POTASSIUM SERPL-SCNC: 3.6 MMOL/L (ref 3.5–5)
RBC # BLD: 4.69 E12/L (ref 3.5–5.5)
SODIUM BLD-SCNC: 142 MMOL/L (ref 132–146)
WBC # BLD: 7.5 E9/L (ref 4.5–11.5)

## 2021-03-29 PROCEDURE — 6370000000 HC RX 637 (ALT 250 FOR IP): Performed by: SPECIALIST

## 2021-03-29 PROCEDURE — 2580000003 HC RX 258: Performed by: SURGERY

## 2021-03-29 PROCEDURE — 6360000002 HC RX W HCPCS: Performed by: SURGERY

## 2021-03-29 PROCEDURE — 97535 SELF CARE MNGMENT TRAINING: CPT

## 2021-03-29 PROCEDURE — 97530 THERAPEUTIC ACTIVITIES: CPT

## 2021-03-29 PROCEDURE — 1200000000 HC SEMI PRIVATE

## 2021-03-29 PROCEDURE — 2500000003 HC RX 250 WO HCPCS: Performed by: SURGERY

## 2021-03-29 PROCEDURE — 85027 COMPLETE CBC AUTOMATED: CPT

## 2021-03-29 PROCEDURE — 6370000000 HC RX 637 (ALT 250 FOR IP): Performed by: SURGERY

## 2021-03-29 PROCEDURE — 80048 BASIC METABOLIC PNL TOTAL CA: CPT

## 2021-03-29 PROCEDURE — 36415 COLL VENOUS BLD VENIPUNCTURE: CPT

## 2021-03-29 PROCEDURE — 71045 X-RAY EXAM CHEST 1 VIEW: CPT

## 2021-03-29 RX ADMIN — BUPROPION HYDROCHLORIDE 150 MG: 150 TABLET, EXTENDED RELEASE ORAL at 09:50

## 2021-03-29 RX ADMIN — CEPHALEXIN 500 MG: 500 CAPSULE ORAL at 22:42

## 2021-03-29 RX ADMIN — GABAPENTIN 300 MG: 300 CAPSULE ORAL at 09:50

## 2021-03-29 RX ADMIN — ROPINIROLE HYDROCHLORIDE 2 MG: 2 TABLET, FILM COATED ORAL at 09:52

## 2021-03-29 RX ADMIN — MICONAZOLE NITRATE: 20 CREAM TOPICAL at 22:43

## 2021-03-29 RX ADMIN — POTASSIUM CHLORIDE 20 MEQ: 20 TABLET, EXTENDED RELEASE ORAL at 17:14

## 2021-03-29 RX ADMIN — ROPINIROLE HYDROCHLORIDE 2 MG: 2 TABLET, FILM COATED ORAL at 22:43

## 2021-03-29 RX ADMIN — SODIUM CHLORIDE, PRESERVATIVE FREE 100 UNITS: 5 INJECTION INTRAVENOUS at 22:42

## 2021-03-29 RX ADMIN — MICONAZOLE NITRATE: 20 CREAM TOPICAL at 10:18

## 2021-03-29 RX ADMIN — BACLOFEN 10 MG: 10 TABLET ORAL at 09:51

## 2021-03-29 RX ADMIN — CEPHALEXIN 500 MG: 500 CAPSULE ORAL at 17:09

## 2021-03-29 RX ADMIN — CEPHALEXIN 500 MG: 500 CAPSULE ORAL at 04:36

## 2021-03-29 RX ADMIN — SODIUM CHLORIDE, PRESERVATIVE FREE 100 UNITS: 5 INJECTION INTRAVENOUS at 09:53

## 2021-03-29 RX ADMIN — GABAPENTIN 300 MG: 300 CAPSULE ORAL at 15:33

## 2021-03-29 RX ADMIN — BACLOFEN 10 MG: 10 TABLET ORAL at 22:41

## 2021-03-29 RX ADMIN — Medication 10 ML: at 22:43

## 2021-03-29 RX ADMIN — POTASSIUM CHLORIDE 20 MEQ: 20 TABLET, EXTENDED RELEASE ORAL at 09:50

## 2021-03-29 RX ADMIN — Medication 10 ML: at 10:00

## 2021-03-29 RX ADMIN — BUMETANIDE 2 MG: 0.25 INJECTION INTRAMUSCULAR; INTRAVENOUS at 09:51

## 2021-03-29 RX ADMIN — BUMETANIDE 2 MG: 0.25 INJECTION INTRAMUSCULAR; INTRAVENOUS at 22:42

## 2021-03-29 RX ADMIN — ENOXAPARIN SODIUM 40 MG: 40 INJECTION SUBCUTANEOUS at 09:52

## 2021-03-29 RX ADMIN — CEPHALEXIN 500 MG: 500 CAPSULE ORAL at 10:54

## 2021-03-29 RX ADMIN — BACLOFEN 10 MG: 10 TABLET ORAL at 15:33

## 2021-03-29 RX ADMIN — GABAPENTIN 300 MG: 300 CAPSULE ORAL at 22:42

## 2021-03-29 RX ADMIN — ROPINIROLE HYDROCHLORIDE 2 MG: 2 TABLET, FILM COATED ORAL at 15:35

## 2021-03-29 RX ADMIN — ROPINIROLE HYDROCHLORIDE 2 MG: 2 TABLET, FILM COATED ORAL at 15:33

## 2021-03-29 ASSESSMENT — PAIN SCALES - GENERAL: PAINLEVEL_OUTOF10: 0

## 2021-03-29 NOTE — PROGRESS NOTES
Physical Therapy    Physical Therapy Rx     Name: Bernabe Campbell  : 1955  MRN: [id number]    Referring Provider:  Araceli Torres DO    Date of Service: 3/29/2021    Evaluating PT:  Zuleika Arciniega PT, DPT PT [id number]    Room #:  7951/7287-B  Diagnosis:  Cellulitis  PMHx/PSHx:  Chronic LE Ulcers, Lymphedema, Chronic Back pain  Procedure/Surgery:    Precautions:  Falls  Equipment Needs:       SUBJECTIVE:    Pt lives with sister in a 2 story home( first floor set up) with 2 stairs to enter and single rail. Bed is on first floor and bath is on first floor. Pt ambulated with [employer]  PTA. Equipment Owned: [employer]    OBJECTIVE:   Initial Evaluation  Date: 3/24/21 Treatment  3/29/21  Short Term/ Long Term   Goals   AM-PAC 6 Clicks 92/72     Was pt agreeable to Eval/treatment? yes Yes     Does pt have pain? Mild pain BLES NT    Bed Mobility  Rolling: Independent  Supine to sit: Independent  Sit to supine: Independent  Scooting: Independent   NT Rolling: Independent  Supine to sit:  Independent  Sit to supine: Independent  Scooting: Independent     Transfers Sit to stand: SBA  Stand to sit: SBA  Stand pivot: Chris [employer] Sit to stand supervision  Stand to sit supervision   Stand pivot ww sba Sit to stand: Modified Independent  Stand to sit: Modified Independent  Stand pivot: Modified Independent     Ambulation    100', 100' feet with Chris  feet ww  sba  Poor safety/walker approximation >150 feet with Modified Independent  [employer]   Stair negotiation: ascended and descended  2 steps with single rail SBA NT >4 steps with single rail Modified Independent     ROM BUE:  See OT Note  BLE:  WFL     Strength BUE:  See OT Note  BLE:  4/5  Improve Strength 1/3 MMT Grade   Balance Sitting EOB:  Supervision  Dynamic Standing:  SBA [employer] Sitting eob  Independent     Dynamic standing sba ww  Sitting EOB:  Independent    Dynamic Standing:  Modified Independent       Pt is A & O x 3   Sensation: Decreased RLE  Edema: WNL        Patient education  Pt educated on role of PT    Patient response to education:   Pt verbalized understanding Pt demonstrated skill Pt requires further education in this area   x x x     ASSESSMENT:    Comments:  Pt up sitting on the eob upon arrival.   Pt reports she is tired that her blood levels are down. Pt ambulated and was going back to get her mask and she did not want to walk anymore. Discussed with pt her decrease safety. Pt reports right foot numb walks with walker too far fwd. Pt reports she has to do that due to her back pain. Told pt to slow mitchel with a numb foot. Pt left sitting on the eob with call light. Patient would benefit from continued skilled PT to maximize functional mobility independence. Treatment:  Patient practiced and was instructed in the following treatment:     Bed mobility- NT Functional transfers-Verbal cues for proper positioning and sequencing to perform transfers safely with maximum independence.  Gait training-Verbal cues for proper positioning and sequencing using assistive device to maximize functional mobility independence. Stair negotiation-NT Pt's/ family goals   1. Get better    Patient and or family understand(s) diagnosis, prognosis, and plan of care. yes    PLAN:      PLAN OF CARE:      Current Treatment Recommendations     [x] Strengthening     [x] ROM   [x] Balance Training   [x] Endurance Training   [x] Transfer Training   [x] Gait Training   [x] Stair Training   [x] Positioning   [x] Safety and Education Training   [x] Patient/Caregiver Education   [x] HEP  [] Other       PT care will be provided in accordance with the objectives noted above. Exercises and functional mobility practice will be used as well as appropriate assistive devices or modalities to obtain goals. Patient and family education will also be administered as needed. Frequency of treatments: 2-5x/week x 1-2 weeks.     Time in  120 Time out  130    Total Treatment Time 10 minutes     Evaluation Time includes thorough review of current medical information, gathering information on past medical history/social history and prior level of function, completion of standardized testing/informal observation of tasks, assessment of data and education on plan of care and goals.     CPT codes:  [] Low Complexity PT evaluation 95371  [] Moderate Complexity PT evaluation 99222  [] High Complexity PT evaluation 58608  [] PT Re-evaluation 21620  [] Gait training 77665 0 minutes  [] Manual therapy 28704 0 minutes  [x] Therapeutic activities 24619 10 minutes  [] Therapeutic exercises 50220 0 minutes  [] Neuromuscular reeducation 79607 0 minutes       Roger Ribeiro, PTA  [id number]

## 2021-03-29 NOTE — PROGRESS NOTES
Occupational Therapy  OT BEDSIDE TREATMENT NOTE      Date:3/29/2021  Patient Name: Joyce Cassidy  MRN: [id number]  : 1955  Room: [address] [place] [address]-A     Referring Provider: Rose Sanchez DO        Evaluating OT: Zee Thomas OTR/L      AM-PAC Daily Activity Raw Score:      Recommended Adaptive Equipment:  TBD      Diagnosis: Cellulitis   Presented to ED for LE cellulitis and SOB    Recent fall on L knee  Pertinent Medical History: chronic LE ulcers, lymphedema LEs, chronic back pain  (scheduled for  back surgery 1 month from now) OA, chronic bladder incontinence   Precautions:  Falls,      Home Living: Pt lives with sister, 1 story with 2 steps/rails to enter   Bathroom setup: walk in shower    Equipment owned: walker, sock aide, reacher, LH bath sponge      Prior Level of Function: Mod I  with ADLs , assist as needed  with IADLs; ambulated with walker     Pain Level: no pain reported at this time  Cognition: alert, oriented x3 and conversing               Memory:  Good               Sequencing:  fair+              Problem solving:  Fair+               Judgement/safety:  Fair+                 Functional Assessment:    Initial Eval Status  Date: 3/24/21 Treatment Status  Date: 3/29/21 STGs = LTGs  Time frame: 7-10 days   Feeding Independent  Ind      Grooming Set-up  SBA  To wash hands standing at sink  Independent    UB Dressing Set-up  Set up  Per last tx  Independent    LB Dressing Min A  Has AE at home SBA  To don/doff socks seated on commode  Mod I    Bathing Mod A Min A  Simulated seated  Mod I    Toileting    SBA- clothing management     Bed Mobility  Independent   Supine <> sit  Ind  Per last tx      Functional Transfers SBA   Sit-stand from bed  SBA- sit<->stand  Cuing for hand placement  SBA- commode transfer using grab bar  Mod I    Functional Mobility SBA,w/walker   (patienr reports knee buckling LE swelling noted   SBA  Home distance no AD Mod I with good tolerance    Balance Sitting: Static:  Independent   Standing: SBA  Sitting:     Static:  Independent   Standing: SBA  Independent   with good tolerance    Activity Tolerance Fair - with light activity   Fair Good with ADL activity    Visual/  Perceptual No focal deficits                         Comments: Upon arrival pt seated on commode. Pt educated on techniques to increase independence and safety during ADL's, and functional transfers. Discussed home set up with pt, giving suggestions to increase safety at discharge. At end of session pt left seated with gait team present, call light within reach. · Pt has made good progress towards set goals.      · Continue with current plan of care    Treatment Time In: 3:25            Treatment Time Out: 3:35             Treatment Charges: Mins Units   Ther Ex  60919     Manual Therapy 01635     Thera Activities 60334     ADL/Home Mgt 64317 10 1   Neuro Re-ed 55028     Group Therapy      Orthotic manage/training  92735     Non-Billable Time     Total Timed Treatment 10 [address], [address]

## 2021-03-29 NOTE — PROGRESS NOTES
[address] [place] [address] [facility]  Progress Note      Chief Complaint   Patient presents with    Shortness of Breath     patient arrives with complaints of SOB and increased leg swelling that started x1 week , patient states that sob has gotten worse over the past few days. SUBJECTIVE:  Patient is tolerating medications. No reported adverse drug reactions. No nausea, vomiting, diarrhea. Afebrile  Feels better with reference to lower extremities however stills feels tired      Review of systems:  As stated above in the chief complaint, otherwise negative. Medications:  Scheduled Meds:   cephALEXin  500 mg Oral 4 times per day    lidocaine  5 mL Intradermal Once    heparin flush  1 mL Intravenous 2 times per day    potassium chloride  20 mEq Oral BID WC    bumetanide  2 mg Intravenous BID    miconazole   Topical BID    baclofen  10 mg Oral TID    Buprenorphine HCl  450 mcg Oral Q12H    buPROPion  150 mg Oral QAM    gabapentin  300 mg Oral TID    rOPINIRole  2 mg Oral 4x daily    sodium chloride flush  10 mL Intravenous 2 times per day    enoxaparin  40 mg Subcutaneous Daily     Continuous Infusions:  PRN Meds:morphine, sodium chloride flush, heparin flush, HYDROcodone 5 mg - acetaminophen, perflutren lipid microspheres, promethazine **OR** ondansetron, polyethylene glycol, acetaminophen **OR** acetaminophen    OBJECTIVE:  BP (!) 141/78   Pulse 87   Temp 97.7 °F (36.5 °C) (Temporal)   Resp 16   Ht 5' 2\" (1.575 m)   Wt 280 lb (127 kg)   SpO2 94%   BMI 51.21 kg/m²   Temp  Av.9 °F (36.6 °C)  Min: 97.7 °F (36.5 °C)  Max: 98 °F (36.7 °C)  Constitutional: The patient is awake, alert, and oriented. Skin: Warm and dry. No rashes were noted. HEENT: Round and reactive pupils. Moist mucous membranes. No ulcerations or thrush. Neck: Supple to movements. Chest: No use of accessory muscles to breathe. Symmetrical expansion. No wheezing, crackles or rhonchi. Component Value Date    BLOODCULT2 5 Days no growth 03/23/2021    BLOODCULT2 5 Days- no growth 06/13/2019    BLOODCULT2 5 Days- no growth 06/04/2019    ORG Coagulase Negative Staphylococci 07/20/2015    ORG Globicatella species 06/22/2015    ORG Proteus mirabilis 06/22/2015    ORG Moraxella species 06/22/2015    ORG Morganella morganii ssp morganii 06/22/2015     No results found for: WNDABS  No results found for: RESPSMEAR  No results found for: MPNEUMO, CLAMYDCU, LABLEGI, AFBCX, FUNGSM, LABFUNG  No results found for: CULTRESP  No results found for: CXCATHTIP  No results found for: BFCS  Culture Surgical   Date Value Ref Range Status   07/20/2015 Moderate growth  Final   06/22/2015 Heavy growth  Final   06/22/2015   Final    Light growth  Susceptibility testing of this isolate is not routinely  indicated to guide therapy.   Beta Lactamase POSITIVE       Urine Culture, Routine   Date Value Ref Range Status   06/05/2019 Growth not present  Final     MRSA Culture Only   Date Value Ref Range Status   06/05/2019 Methicillin resistant Staph aureus not isolated  Final       ASSESSMENT:  · Lymphedema and cellulitis-resolving  · Anemia; iron supplementation ongoing    PLAN:  · Continue c tubigrips   · Switch to po- scripts on chart for discharge  · Check final cultures  · Monitor labs    Jillian Youssef  12:16 PM  3/29/2021

## 2021-03-29 NOTE — PROGRESS NOTES
10 mL, 10 mL, Intravenous, 2 times per day, Dwayne Chamorro MD, 10 mL at 03/29/21 1000    enoxaparin (LOVENOX) injection 40 mg, 40 mg, Subcutaneous, Daily, Dwayne Chamorro MD, 40 mg at 03/29/21 0952    promethazine (PHENERGAN) tablet 12.5 mg, 12.5 mg, Oral, Q6H PRN **OR** ondansetron (ZOFRAN) injection 4 mg, 4 mg, Intravenous, Q6H PRN, Dwayne Chamorro MD, 4 mg at 03/27/21 1743    polyethylene glycol (GLYCOLAX) packet 17 g, 17 g, Oral, Daily PRN, Dwayne Chamorro MD    acetaminophen (TYLENOL) tablet 650 mg, 650 mg, Oral, Q6H PRN **OR** acetaminophen (TYLENOL) suppository 650 mg, 650 mg, Rectal, Q6H PRN, Dwayne Chamorro MD    Objective:    /70   Pulse 95   Temp 97.2 °F (36.2 °C) (Temporal)   Resp 16   Ht 5' 2\" (1.575 m)   Wt 280 lb (127 kg)   SpO2 94%   BMI 51.21 kg/m²     Heart:  reg  Lungs:  ctab  Abd: + bs soft nontender  Extrem:  Edema erythema legs  Significant induration of the right arm noted at the old IV sites    CBC with Differential:    Lab Results   Component Value Date    WBC 7.5 03/29/2021    RBC 4.69 03/29/2021    HGB 8.3 03/29/2021    HCT 32.6 03/29/2021     03/29/2021    MCV 69.5 03/29/2021    MCH 17.7 03/29/2021    MCHC 25.5 03/29/2021    RDW 27.3 03/29/2021    NRBC 0.9 02/18/2021    SEGSPCT 72 02/28/2014    LYMPHOPCT 20.0 03/23/2021    MONOPCT 7.6 03/23/2021    MYELOPCT 0.9 03/10/2021    BASOPCT 0.2 03/23/2021    MONOSABS 0.65 03/23/2021    LYMPHSABS 1.70 03/23/2021    EOSABS 0.11 03/23/2021    BASOSABS 0.02 03/23/2021     CMP:    Lab Results   Component Value Date     03/29/2021    K 3.6 03/29/2021    K 3.3 06/08/2019    CL 98 03/29/2021    CO2 37 03/29/2021    BUN 14 03/29/2021    CREATININE 0.7 03/29/2021    GFRAA >60 03/29/2021    LABGLOM >60 03/29/2021    GLUCOSE 96 03/29/2021    PROT 6.5 03/23/2021    LABALBU 3.3 03/23/2021    CALCIUM 8.5 03/29/2021    BILITOT 0.3 03/23/2021    ALKPHOS 127 03/23/2021    AST 16 03/23/2021    ALT 12 03/23/2021     Warfarin PT/INR:    Lab Results   Component Value Date    INR 1.0 02/19/2021    PROTIME 11.2 02/19/2021       Assessment:    Principal Problem:    Cellulitis of leg  Active Problems:    Lymphedema of both lower extremities    Microcytic anemia    Morbid obesity (HCC)    Primary osteoarthritis involving multiple joints    Lymphedema    Chronic pain syndrome    Anemia  Resolved Problems:    * No resolved hospital problems.  *      Plan:  Continue diuresis for today  Possible discharge tomorrow morning  Chest X-Ray      Ivania Mayfield  4:22 PM  3/29/2021

## 2021-03-29 NOTE — PROGRESS NOTES
Comprehensive Nutrition Assessment    Type and Reason for Visit:  Initial, RD Nutrition Re-Screen/LOS    Nutrition Recommendations/Plan: 1. Recommend pt continue current diet as tolerated. 2. Suggest obtaining updated vitamin D levels as most recent levels are from 2016. Pt may benefit from supplementation. 3.Note pt may benefit from MVI given hx of gastric bp    Nutrition Assessment:  Pt w/ pmh gastric bp in 2000, DM, anemia, admit w/ leg swelling & SOB 2/2 cellulitis w/ chronic lymphedema. 3/27 s/p EGD w/ colonoscopy. Malnutrition Assessment:  Malnutrition Status:  No malnutrition    Context:  Chronic Illness     Findings of the 6 clinical characteristics of malnutrition:  Energy Intake:  Mild decrease in energy intake (Comment)  Weight Loss:  No significant weight loss     Body Fat Loss:  No significant body fat loss     Muscle Mass Loss:  No significant muscle mass loss    Fluid Accumulation:  No significant fluid accumulation     Strength:  Not Performed    Estimated Daily Nutrient Needs:  Energy (kcal):  MSJ;1.2x1773=2127; kcal; Weight Used for Energy Requirements:  Current     Protein (g):  100-110 gm; Weight Used for Protein Requirements:  Ideal(2-2.2 g/kg IBW)        Fluid (ml/day):   kcal; Method Used for Fluid Requirements:  1 ml/kcal      Nutrition Related Findings:  A/Ox4, dentures, abd wdl, +BS, +2 edema, -I/O's      Wounds:  Surgical Incision       Current Nutrition Therapies:    DIET GENERAL;     Anthropometric Measures:  · Height: 5' 2\" (157.5 cm)  · Current Body Weight: 280 lb (127 kg)(3/29)   · Admission Body Weight: 285 lb 14.4 oz (129.7 kg)(3/25)    · Usual Body Weight: 282 lb (127.9 kg)(11/2020 Per EMR)     · Ideal Body Weight: 110 lbs; % Ideal Body Weight 254.5 %   · BMI: 51.2  · BMI Categories: Obese Class 3 (BMI 40.0 or greater)       Nutrition Diagnosis:   No nutrition diagnosis at this time     Nutrition Interventions:   Food and/or Nutrient Delivery:  Continue Current Diet  Nutrition Education/Counseling:  Education not indicated   Coordination of Nutrition Care:  Continue to monitor while inpatient    Goals:  Pt to consume > 75% of all meals       Nutrition Monitoring and Evaluation:   Behavioral-Environmental Outcomes:  None Identified   Food/Nutrient Intake Outcomes:  Food and Nutrient Intake  Physical Signs/Symptoms Outcomes:  Biochemical Data, Nutrition Focused Physical Findings, Skin, Weight, GI Status, Fluid Status or Edema, Hemodynamic Status     Discharge Planning:     Too soon to determine     Electronically signed by Mima Robles RD, CARMEN on 3/29/21 at 2:11 PM EDT    Contact: [phone]

## 2021-03-30 VITALS
HEIGHT: 62 IN | RESPIRATION RATE: 16 BRPM | TEMPERATURE: 97 F | SYSTOLIC BLOOD PRESSURE: 137 MMHG | DIASTOLIC BLOOD PRESSURE: 88 MMHG | OXYGEN SATURATION: 94 % | HEART RATE: 74 BPM | BODY MASS INDEX: 51.53 KG/M2 | WEIGHT: 280 LBS

## 2021-03-30 LAB
ANION GAP SERPL CALCULATED.3IONS-SCNC: 11 MMOL/L (ref 7–16)
BUN BLDV-MCNC: 17 MG/DL (ref 8–23)
CALCIUM SERPL-MCNC: 8.8 MG/DL (ref 8.6–10.2)
CHLORIDE BLD-SCNC: 100 MMOL/L (ref 98–107)
CO2: 33 MMOL/L (ref 22–29)
CREAT SERPL-MCNC: 0.8 MG/DL (ref 0.5–1)
GFR AFRICAN AMERICAN: >60
GFR NON-AFRICAN AMERICAN: >60 ML/MIN/1.73
GLUCOSE BLD-MCNC: 96 MG/DL (ref 74–99)
POTASSIUM SERPL-SCNC: 3.8 MMOL/L (ref 3.5–5)
SODIUM BLD-SCNC: 144 MMOL/L (ref 132–146)

## 2021-03-30 PROCEDURE — 6360000002 HC RX W HCPCS: Performed by: SURGERY

## 2021-03-30 PROCEDURE — 36415 COLL VENOUS BLD VENIPUNCTURE: CPT

## 2021-03-30 PROCEDURE — 2500000003 HC RX 250 WO HCPCS: Performed by: SURGERY

## 2021-03-30 PROCEDURE — 6370000000 HC RX 637 (ALT 250 FOR IP): Performed by: SPECIALIST

## 2021-03-30 PROCEDURE — 6370000000 HC RX 637 (ALT 250 FOR IP): Performed by: SURGERY

## 2021-03-30 PROCEDURE — 2580000003 HC RX 258: Performed by: SURGERY

## 2021-03-30 PROCEDURE — 80048 BASIC METABOLIC PNL TOTAL CA: CPT

## 2021-03-30 PROCEDURE — 2700000000 HC OXYGEN THERAPY PER DAY

## 2021-03-30 RX ORDER — POTASSIUM CHLORIDE 20 MEQ/1
20 TABLET, EXTENDED RELEASE ORAL 2 TIMES DAILY WITH MEALS
Qty: 60 TABLET | Refills: 3 | Status: SHIPPED | OUTPATIENT
Start: 2021-03-30 | End: 2022-06-29

## 2021-03-30 RX ORDER — BUMETANIDE 2 MG/1
2 TABLET ORAL 2 TIMES DAILY
Qty: 30 TABLET | Refills: 3 | Status: SHIPPED | OUTPATIENT
Start: 2021-03-30 | End: 2022-06-29

## 2021-03-30 RX ADMIN — CEPHALEXIN 500 MG: 500 CAPSULE ORAL at 03:17

## 2021-03-30 RX ADMIN — MICONAZOLE NITRATE: 20 CREAM TOPICAL at 09:33

## 2021-03-30 RX ADMIN — ROPINIROLE HYDROCHLORIDE 2 MG: 2 TABLET, FILM COATED ORAL at 09:10

## 2021-03-30 RX ADMIN — POTASSIUM CHLORIDE 20 MEQ: 20 TABLET, EXTENDED RELEASE ORAL at 09:31

## 2021-03-30 RX ADMIN — BACLOFEN 10 MG: 10 TABLET ORAL at 14:47

## 2021-03-30 RX ADMIN — CEPHALEXIN 500 MG: 500 CAPSULE ORAL at 09:30

## 2021-03-30 RX ADMIN — GABAPENTIN 300 MG: 300 CAPSULE ORAL at 09:11

## 2021-03-30 RX ADMIN — ROPINIROLE HYDROCHLORIDE 2 MG: 2 TABLET, FILM COATED ORAL at 13:10

## 2021-03-30 RX ADMIN — SODIUM CHLORIDE, PRESERVATIVE FREE 100 UNITS: 5 INJECTION INTRAVENOUS at 09:31

## 2021-03-30 RX ADMIN — ENOXAPARIN SODIUM 40 MG: 40 INJECTION SUBCUTANEOUS at 09:11

## 2021-03-30 RX ADMIN — BUPROPION HYDROCHLORIDE 150 MG: 150 TABLET, EXTENDED RELEASE ORAL at 09:11

## 2021-03-30 RX ADMIN — GABAPENTIN 300 MG: 300 CAPSULE ORAL at 14:47

## 2021-03-30 RX ADMIN — BACLOFEN 10 MG: 10 TABLET ORAL at 09:10

## 2021-03-30 RX ADMIN — Medication 10 ML: at 09:12

## 2021-03-30 RX ADMIN — BUMETANIDE 2 MG: 0.25 INJECTION INTRAMUSCULAR; INTRAVENOUS at 09:10

## 2021-03-30 NOTE — CARE COORDINATION
Plan is to return home with [facility] when medically stable.  Kierra Golden RN

## 2021-03-30 NOTE — CARE COORDINATION
3/30/2021  RN/ Please document Pulse Ox in PG note the following way;(Cut and Paste, Fill in Blanks).  If home o2 is needed:    Pulse OX on Room Air sitting_____%  Pulse OX ON ROOM AIR AMBULATING _______%  PULSE OX ON_______ LITERS SITTING RECOVERY_____%  PULSE OX ON________LITERS AMBULATING RECOVERY_____%    Electronically signed by KANE Billingsley on 3/30/2021 at 2:31 PM

## 2021-03-30 NOTE — PLAN OF CARE
Problem: Falls - Risk of:  Goal: Will remain free from falls  Description: Will remain free from falls  Outcome: Ongoing  Goal: Absence of physical injury  Description: Absence of physical injury  Outcome: Ongoing   Patient remains free of injury. Fall precautions in place, call light in reach.

## 2021-03-30 NOTE — PROGRESS NOTES
Hospital Medicine    Subjective:   Doing better today  Denies any new complaints    Current Facility-Administered Medications:     cephALEXin (KEFLEX) capsule 500 mg, 500 mg, Oral, 4 times per day, Jose G Pulliam MD, 500 mg at 03/30/21 0930    morphine (PF) injection 2 mg, 2 mg, Intravenous, Q4H PRN, Pam Rdedy MD    lidocaine 1 % injection 5 mL, 5 mL, Intradermal, Once, Pam Reddy MD    sodium chloride flush 0.9 % injection 10 mL, 10 mL, Intravenous, PRN, Pam Reddy MD, 10 mL at 03/27/21 1743    heparin flush 100 UNIT/ML injection 100 Units, 1 mL, Intravenous, 2 times per day, Pam Reddy MD, 100 Units at 03/30/21 0931    heparin flush 100 UNIT/ML injection 100 Units, 1 mL, Intracatheter, PRN, Pam Reddy MD    potassium chloride (KLOR-CON M) extended release tablet 20 mEq, 20 mEq, Oral, BID WC, Pam Reddy MD, 20 mEq at 03/30/21 0931    bumetanide (BUMEX) injection 2 mg, 2 mg, Intravenous, BID, Pam Reddy MD, 2 mg at 03/30/21 0910    miconazole (MICOTIN) 2 % cream, , Topical, BID, Pam Reddy MD, Given at 03/30/21 0933    HYDROcodone-acetaminophen ([address]) 5-325 MG per tablet 1 tablet, 1 tablet, Oral, Q6H PRN, Pam Reddy MD, 1 tablet at 03/28/21 0040    perflutren lipid microspheres (DEFINITY) injection 1.65 mg, 1.5 mL, Intravenous, ONCE PRN, Pam Reddy MD    baclofen (LIORESAL) tablet 10 mg, 10 mg, Oral, TID, Pam Reddy MD, 10 mg at 03/30/21 0910    Buprenorphine HCl FILM 450 mcg, 450 mcg, Oral, Q12H, Pam Reddy MD, 450 mcg at 03/30/21 0649    buPROPion (WELLBUTRIN XL) extended release tablet 150 mg, 150 mg, Oral, QAM, Pam Reddy MD, 150 mg at 03/30/21 0911    gabapentin (NEURONTIN) capsule 300 mg, 300 mg, Oral, TID, Pam Reddy MD, 300 mg at 03/30/21 0911    rOPINIRole (REQUIP) tablet 2 mg, 2 mg, Oral, 4x daily, Pam Reddy MD, 2 mg at 03/30/21 1310    sodium chloride flush 0.9 % injection 10 mL, 10 mL, Intravenous, 2 times per day, Pam Reddy MD, 10 mL at 03/30/21 0912    enoxaparin (LOVENOX) injection 40 mg, 40 mg, Subcutaneous, Daily, Brielle Horton MD, 40 mg at 03/30/21 0911    promethazine (PHENERGAN) tablet 12.5 mg, 12.5 mg, Oral, Q6H PRN **OR** ondansetron (ZOFRAN) injection 4 mg, 4 mg, Intravenous, Q6H PRN, Brielle Horton MD, 4 mg at 03/27/21 1743    polyethylene glycol (GLYCOLAX) packet 17 g, 17 g, Oral, Daily PRN, Brielle Horton MD    acetaminophen (TYLENOL) tablet 650 mg, 650 mg, Oral, Q6H PRN **OR** acetaminophen (TYLENOL) suppository 650 mg, 650 mg, Rectal, Q6H PRN, Brielle Horton MD    Objective:    /88   Pulse 74   Temp 97 °F (36.1 °C) (Temporal)   Resp 16   Ht 5' 2\" (1.575 m)   Wt 280 lb (127 kg)   SpO2 94%   BMI 51.21 kg/m²     Heart:  reg  Lungs:  ctab  Abd: + bs soft nontender  Extrem:  Edema erythema legs  Significant induration of the right arm noted at the old IV sites    CBC with Differential:    Lab Results   Component Value Date    WBC 7.5 03/29/2021    RBC 4.69 03/29/2021    HGB 8.3 03/29/2021    HCT 32.6 03/29/2021     03/29/2021    MCV 69.5 03/29/2021    MCH 17.7 03/29/2021    MCHC 25.5 03/29/2021    RDW 27.3 03/29/2021    NRBC 0.9 02/18/2021    SEGSPCT 72 02/28/2014    LYMPHOPCT 20.0 03/23/2021    MONOPCT 7.6 03/23/2021    MYELOPCT 0.9 03/10/2021    BASOPCT 0.2 03/23/2021    MONOSABS 0.65 03/23/2021    LYMPHSABS 1.70 03/23/2021    EOSABS 0.11 03/23/2021    BASOSABS 0.02 03/23/2021     CMP:    Lab Results   Component Value Date     03/30/2021    K 3.8 03/30/2021    K 3.3 06/08/2019     03/30/2021    CO2 33 03/30/2021    BUN 17 03/30/2021    CREATININE 0.8 03/30/2021    GFRAA >60 03/30/2021    LABGLOM >60 03/30/2021    GLUCOSE 96 03/30/2021    PROT 6.5 03/23/2021    LABALBU 3.3 03/23/2021    CALCIUM 8.8 03/30/2021    BILITOT 0.3 03/23/2021    ALKPHOS 127 03/23/2021    AST 16 03/23/2021    ALT 12 03/23/2021     Warfarin PT/INR:    Lab Results   Component Value Date    INR 1.0 02/19/2021    PROTIME 11.2 02/19/2021       Assessment:    Principal Problem:    Cellulitis of leg  Active Problems:    Lymphedema of both lower extremities    Microcytic anemia    Morbid obesity (HCC)    Primary osteoarthritis involving multiple joints    Lymphedema    Chronic pain syndrome    Anemia  Resolved Problems:    * No resolved hospital problems.  *      Plan:  Chest x-ray reveals atelectasis  Unlikely pneumonia  ID is following  Likely discharge home on home oxygen    Sánchez Underwood  1:51 PM  3/30/2021

## 2021-03-31 ENCOUNTER — CARE COORDINATION (OUTPATIENT)
Dept: OTHER | Facility: CLINIC | Age: 66
End: 2021-03-31

## 2021-03-31 NOTE — CARE COORDINATION
Bran 45 Transitions Initial Follow Up Call    Call within 2 business days of discharge: Yes    Patient: Alexandria Baugh Patient : 1955   MRN: [id number]  Reason for Admission: Lymphedema  Discharge Date: 3/30/21 RARS: Readmission Risk Score: 15      Last Discharge [facility]       Complaint Diagnosis Description Type Department Provider    3/23/21 Shortness of Breath Lymphedema . .. ED to Hosp-Admission (Discharged) (ADMITTED) [facility] Candelario Benz DO; Nick Alvarez. .. Spoke with: Ady Markham, patient    Facility: [address]    Non-face-to-face services provided:  Obtained and reviewed discharge summary and/or continuity of care documents  Communication with home health agencies or other community services the patient is currently using-ACM called [address] and spoke to Eden Watt to notify patient declined services as she wants to keep visits for post-op in one month  Assessment and support for treatment adherence and medication management-complete medication review performed with patient    Care Transitions 24 Hour Call    Do you have any ongoing symptoms?: No  Do you have a copy of your discharge instructions?: Yes  Do you have all of your prescriptions and are they filled?: Yes  Have you been contacted by a [address]?: No  Have you scheduled your follow up appointment?: Yes  Were you discharged with any Home Care or Post Acute Services: Yes  Post Acute Services: Home Health (Comment: 3/31/21: Patient declined [facility] due to wants to save visits for post-op next month)  Do you feel like you have everything you need to keep you well at home?: Yes  Care Transitions Interventions         Was this an external facility discharge?  No Discharge Facility: [facility] [address]    Challenges to be reviewed by the provider   Additional needs identified to be addressed with provider No  none             Method of communication with provider : none    Discussed COVID-19 related testing which was not done at this time. Test results were not done. Patient informed of results, if available? N/A    Advance Care Planning:   Does patient have an Advance Directive:  not addressed this date. Was this a readmission? Yes  Patient stated reason for admission: lymphedema  Patients top risk factors for readmission: functional physical ability, falls and medical condition    Associate Care Management (ACM) contacted the patient by telephone to perform post hospital discharge assessment. Verified name and  with patient as identifiers. Provided introduction to self, and explanation of the ACM role. ACM called patient for CT inpatient follow up. Patient states doing ok but weak and tired since discharge home yesterday. Patient declined [facility] services (PT & OT ordered) due to wants to save visits for post-op next month. ACM called [facility]  and notified of patient declined services. Patient states was scheduled follow up with hematology at [facility]. Verbalized not wanting to follow up with [facility] due to had appt with Dr. Pablo Schulz prior to hospitalization and would like to see them. Patient states she called and cancelled appt with [facility] and scheduled with Dr. Pablo Schulz 2021. States part of the reason for wanting to see Dr. Pablo Schulz is due to sister follows with them on same date. States sister was hospitalized same time as patient. Patient reports to AC that she and sister live together. Patient reports using rolling walker with seat for ambulation. States had fall prior to recent hospitalization; denies falls since then. States had x2 IV infiltrates to RUE and reports sites still swollen but improving. States BLE still red but improved since hospitalization. Reports taking all new meds (Keflex, Bumex, & Pot Chl) as ordered. Reports stopped Lasix as ordered. Patient states has tubi  to BLE and able to apply independently. Denies shortness of breath since home. to Protect Yourself     Was patient discharged with a pulse oximeter? No. ACM discussed patient purchasing OTC pulse ox as she states she was supposed to be discharged with home oxygen but did not due to insurance company denied. Discussed follow-up appointments. If no appointment was previously scheduled, appointment scheduling offered: Yes. Is follow up appointment scheduled within 7 days of discharge? No  Non-[facility] follow up appointment(s): None known    Plan for follow-up call in 7-10 days based on severity of symptoms and risk factors. Plan for next call: self management-use of tubi  to BLE, follow up appointment-Patient to schedule follow up with PCP and medication management-New home meds of Keflex, Bumex, & Pot Chl  ACM provided contact information for future needs. [facility] sent message to primary Renu Dean RN with update and handoff. Follow Up  Future Appointments   Date Time Provider Sydni More   4/12/2021  1:30 PM Hardik Contreras MD MED ONC [facility]   4/12/2021  1:30 PM [facility] MED ONC FAST TRACK 2 [facility] Med Onc [facility] Radha       Goals      Conditions and Symptoms      I will schedule office visits, as directed by my provider. I will keep my appointment or reschedule if I have to cancel. I will notify my provider of any barriers to my plan of care. I will notify my provider of any symptoms that indicate a worsening of my condition. Barriers: none  Plan for overcoming my barriers: N/A  Confidence: 9/10  Anticipated Goal Completion Date: 4/30/2021    3/31/2021: Patient states has follow up with Dr. Sharee Bass 4/12/2021. [facility] will call PCP to schedule follow up. Patient declined [facility] services (PT, OT ordered) due to wanting to save visits for post-op next month. [facility] reviewed red flags with patient. Gilmar Wong RN BSN  Associate Care Manager  Phone: [phone]  Email: [email or web address]

## 2021-03-31 NOTE — PROGRESS NOTES
Patient discharged in stable condition. Midline removed, AVS reviewed. Patients home Buprenorphine returned with 2 films remaining.

## 2021-04-02 ENCOUNTER — TELEPHONE (OUTPATIENT)
Dept: ADMINISTRATIVE | Age: 66
End: 2021-04-02

## 2021-04-02 NOTE — TELEPHONE ENCOUNTER
HUGO--pt called and scheduled a hosp f/u for 4/8. She said she was discharged from [facility] on 3/30. She was admitted for cellulitis, shortness of breath and anemia. Just wanted to verify ok for face to face visit due to sob.

## 2021-04-06 ENCOUNTER — PREP FOR PROCEDURE (OUTPATIENT)
Dept: NEUROSURGERY | Age: 66
End: 2021-04-06

## 2021-04-06 DIAGNOSIS — Z01.818 PRE-OP TESTING: Primary | ICD-10-CM

## 2021-04-06 RX ORDER — SODIUM CHLORIDE 9 MG/ML
INJECTION, SOLUTION INTRAVENOUS CONTINUOUS
Status: CANCELLED | OUTPATIENT
Start: 2021-04-06

## 2021-04-06 RX ORDER — SODIUM CHLORIDE 0.9 % (FLUSH) 0.9 %
10 SYRINGE (ML) INJECTION EVERY 12 HOURS SCHEDULED
Status: CANCELLED | OUTPATIENT
Start: 2021-04-06

## 2021-04-06 RX ORDER — SODIUM CHLORIDE 0.9 % (FLUSH) 0.9 %
10 SYRINGE (ML) INJECTION PRN
Status: CANCELLED | OUTPATIENT
Start: 2021-04-06

## 2021-04-07 ENCOUNTER — TELEPHONE (OUTPATIENT)
Dept: CARDIOLOGY CLINIC | Age: 66
End: 2021-04-07

## 2021-04-07 NOTE — TELEPHONE ENCOUNTER
I called patient to schedule a hospital f/u but she wants to wait until she has her surgery and will call the office back to schedule.

## 2021-04-08 ENCOUNTER — OFFICE VISIT (OUTPATIENT)
Dept: PRIMARY CARE CLINIC | Age: 66
End: 2021-04-08
Payer: COMMERCIAL

## 2021-04-08 DIAGNOSIS — M48.062 SPINAL STENOSIS OF LUMBAR REGION WITH NEUROGENIC CLAUDICATION: Chronic | ICD-10-CM

## 2021-04-08 DIAGNOSIS — I89.0 LYMPHEDEMA OF BOTH LOWER EXTREMITIES: Chronic | ICD-10-CM

## 2021-04-08 DIAGNOSIS — L03.115 CELLULITIS OF BOTH LOWER EXTREMITIES: ICD-10-CM

## 2021-04-08 DIAGNOSIS — D50.9 IRON DEFICIENCY ANEMIA, UNSPECIFIED IRON DEFICIENCY ANEMIA TYPE: Primary | ICD-10-CM

## 2021-04-08 DIAGNOSIS — L03.116 CELLULITIS OF BOTH LOWER EXTREMITIES: ICD-10-CM

## 2021-04-08 PROCEDURE — 1111F DSCHRG MED/CURRENT MED MERGE: CPT | Performed by: FAMILY MEDICINE

## 2021-04-08 PROCEDURE — 99496 TRANSJ CARE MGMT HIGH F2F 7D: CPT | Performed by: FAMILY MEDICINE

## 2021-04-08 NOTE — PROGRESS NOTES
by mouth every 12 hours for 30 days. bumetanide (BUMEX) 2 MG tablet  Take 1 tablet by mouth 2 times daily             buPROPion (WELLBUTRIN XL) 150 MG extended release tablet  Take 1 tablet by mouth every morning             Cream Base (VERSAPRO) CREA  APPLY ONE (1) GRAM (ONE PUMP) EXTERNALLY FOUR TIMES A DAY TO EACH KNEE             gabapentin (NEURONTIN) 300 MG capsule  Take 1 capsule by mouth 3 times daily for 30 days. Intended supply: 30 days             potassium chloride (KLOR-CON M) 20 MEQ extended release tablet  Take 1 tablet by mouth 2 times daily (with meals)             rOPINIRole (REQUIP) 2 MG tablet  Take 1 tablet by mouth 4 times daily             Turmeric 500 MG CAPS  Take by mouth                   Medications marked \"taking\" at this time  No outpatient medications have been marked as taking for the 4/8/21 encounter (Office Visit) with Miguel Angel Buckley DO.        Medications patient taking as of now reconciled against medications ordered at time of hospital discharge: Yes    Chief Complaint   Patient presents with   [address] Management     Transitional Care Mgmt       History of Present illness - Follow up of Hospital diagnosis(es): Cellulitis lower extremities and anemia    Inpatient course: Discharge summary reviewed- see chart. Interval history/Current status: Patient went to the emergency room with shortness of breath cellulitis lower extremities drop in hemoglobin. She had a scopes done she does demonstrate gastritis and a colonic polyp. She was seen by hematology of the [facility] but she was already scheduled to see Dr. Nilay Reeder has appoint with him in a few days. Her legs are improved she is not wearing support hose today. She is temporarily scheduled for surgery with neurosurgery on April 28. A comprehensive review of systems was negative except for what was noted in the HPI.     Vitals:    04/08/21 0953   BP: 134/82   Pulse: 91   Temp: 99.1 °F (37.3 °C) TempSrc: Oral   SpO2: 93%   Weight: 295 lb (133.8 kg)     Body mass index is 53.96 kg/m². Wt Readings from Last 3 Encounters:   04/08/21 295 lb (133.8 kg)   03/29/21 280 lb (127 kg)   03/23/21 (!) 301 lb (136.5 kg)     BP Readings from Last 3 Encounters:   04/08/21 134/82   03/30/21 137/88   03/27/21 137/77        Physical Exam:  General Appearance: alert and oriented to person, place and time, well developed and well- nourished, in no acute distress, obese  Skin: warm and dry, erythema bilateral lower extremities. Head: normocephalic and atraumatic  Eyes: pupils equal, round, and reactive to light, extraocular eye movements intact, conjunctivae normal  ENT: tympanic membrane, external ear and ear canal normal bilaterally, nose without deformity, nasal mucosa and turbinates normal without polyps  Neck: supple and non-tender without mass, no thyromegaly or thyroid nodules, no cervical lymphadenopathy  Pulmonary/Chest: Faint wheezing in the bases. Cardiovascular: normal rate, regular rhythm, normal S1 and S2, no murmurs, rubs, clicks, or gallops, distal pulses intact, no carotid bruits  Abdomen: soft, non-tender, non-distended, normal bowel sounds, no masses or organomegaly, obese  Extremities: no cyanosis, clubbing or edema  Musculoskeletal: normal range of motion, no joint swelling, deformity or tenderness  Neurologic: reflexes normal and symmetric, no cranial nerve deficit, gait, coordination and speech normal    Assessment/Plan:  1. Iron deficiency anemia, unspecified iron deficiency anemia type  Sees hematology in a few days.  - DE DISCHARGE MEDS RECONCILED W/ CURRENT OUTPATIENT MED LIST    2. Cellulitis of both lower extremities  Her legs do show some improvement. If there no resolution to notify for repeat antibiotics. May need follow-up with infectious disease in the office.  - DE DISCHARGE MEDS RECONCILED W/ CURRENT OUTPATIENT MED LIST    3.  Spinal stenosis of lumbar region with neurogenic claudication Her surgery is still tentatively scheduled with neurosurgery. 4. Lymphedema of both lower extremities  Encourage the support hose daily she does not have them on right now. Low-fat low sugar diet. A great deal of time was spent reviewing hospital records with the patient.   See back in 1 week        Medical Decision Making: high complexity

## 2021-04-09 ENCOUNTER — CARE COORDINATION (OUTPATIENT)
Dept: OTHER | Facility: CLINIC | Age: 66
End: 2021-04-09

## 2021-04-09 VITALS
WEIGHT: 293 LBS | SYSTOLIC BLOOD PRESSURE: 134 MMHG | DIASTOLIC BLOOD PRESSURE: 82 MMHG | HEART RATE: 91 BPM | BODY MASS INDEX: 53.96 KG/M2 | TEMPERATURE: 99.1 F | OXYGEN SATURATION: 93 %

## 2021-04-09 PROBLEM — L03.115 CELLULITIS OF BOTH LOWER EXTREMITIES: Status: ACTIVE | Noted: 2019-06-08

## 2021-04-09 ASSESSMENT — PATIENT HEALTH QUESTIONNAIRE - PHQ9
2. FEELING DOWN, DEPRESSED OR HOPELESS: 0
SUM OF ALL RESPONSES TO PHQ QUESTIONS 1-9: 0
SUM OF ALL RESPONSES TO PHQ QUESTIONS 1-9: 0

## 2021-04-09 NOTE — CARE COORDINATION
Bran 45 Transitions Follow Up Call    2021    Patient: Shaheen Rothman  Patient : 1955   MRN: [id number]  Reason for Admission: Lymphedema/ LE cellulitis  Discharge Date: 3/30/21 RARS: Readmission Risk Score: 15    Associate Care Manager [facility]) contacted the patient by telephone to follow up after admission on 3/21/21. Verified name and  with patient as identifiers. Addressed changes since last contact: symptom management-No new or worsenign symptoms an d pt able ot describe Red Flag symptoms to report  Discharged needs reviewed: none  Follow up appointment completed? Yes    Patient reports she is about the same. She is scheduled to see the hematologist next week. She is supposed to have another surgery at the end of the month. She needs to see these other specialist first. She continues to use her walker for ambulation and reinforced falls preventions measures. Pt verbalized uderstadning. Advance Care Planning:   Does patient have an Advance Directive:  not on file. ACM reviewed discharge instructions, medical action plan and red flags with patient and discussed any barriers to care and/or understanding of plan of care after discharge. Discussed appropriate site of care based on symptoms and resources available to patient including: PCP, Specialist, Benefits related nurse triage line and Urgent care clinics. The patientagrees to contact the PCP office for questions related to their healthcare.      Patients top risk factors for readmission: functional physical ability, medical condition and multiple health system providers  Interventions to address risk factors: Education of patient/family/caregiver/guardian to support self-management-Review DC instructions, Red Flag symptoms to report, and Energy conservation techniques and Assessment and support for treatment adherence and medication management-Pt is able ot describe medications and Plan of care as she understands it Discussed follow-up appointments. If no appointment was previously scheduled, appointment scheduling offered: N/A; completed. Is follow up appointment scheduled within 7 days of discharge? Yes  [facility] follow up appointment(s):   Future Appointments   Date Time Provider Sydni More   4/12/2021  1:30 PM Jennifer Burdick MD MED ONC [facility]   4/12/2021  1:30 PM [facility] MED ONC FAST TRACK 2 [facility] Med Onc [facility] follow up appointment(s): None    Plan for follow-up call in 10-14 days based on severity of symptoms and risk factors. Plan for next call:   Symptom management  Provider follow up appointment compliance    ACM provided contact information for future needs. Care Transitions Subsequent and Final Call    Schedule Follow Up Appointment with PCP: Completed  Subsequent and Final Calls  Are you currently active with any services?: Home Health  Care Transitions Interventions    Specialty Service Referral: Completed    Other Interventions:            Follow Up  Future Appointments   Date Time Provider Sydni More   4/12/2021  1:30 PM Jennifer Burdick MD MED ONC [facility]   4/12/2021  1:30 PM [facility] MED ONC FAST TRACK 2 [facility] Med Onc Roney Bray RN

## 2021-04-11 NOTE — PROGRESS NOTES
Department of [facility] Oncology  Attending Consult Note    Reason for Visit: Consultation on a patient with Iron Deficiency Anemia    Referring Physician: Antonio Pearson DO     PCP:  Lyndon Razo DO    History of Present Illness:  60year old female with PMH of Desi-en-Y gastric bypass in 2000 by Dr. Neela Casiano, recently admitted to the hospital for lower extremity cellulitis. She was found to have iron deficiency anemia b12 deficiency and received 4 doses IV Ferrlecit and 4 doses of VitB12 injections. EGD/Colonoscopy by Dr. Yohan Douglass 03/27/2021 showing gastritis, 3 mm mid-transverse colon polyp, 4 mm distal transverse colon polyp. A.  Mid transverse colon, biopsy: Fragments of tubular adenoma   B.  Distal transverse colon, biopsy: Fragments of tubular adenoma     Plan for back surgery with Dr. Steve Smith April 28th, 2021. Review of Systems;  CONSTITUTIONAL: No fever, chills. Fair appetite. Fatigue  ENMT: Eyes: No diplopia; Nose: No epistaxis. Mouth: No sore throat. RESPIRATORY: No hemoptysis, shortness of breath, cough. CARDIOVASCULAR: No chest pain, palpitations. GASTROINTESTINAL: No nausea/vomiting, abdominal pain. GENITOURINARY: No dysuria, urinary frequency, hematuria. MSK: Back pain  NEURO: No syncope, presyncope, headache.   Remainder:  ROS NEGATIVE    Past Medical History:      Diagnosis Date    Anemia     Arthritis     Cellulitis 5- 2015    left leg    Chronic ulcer of leg with fat layer exposed ([address] 6/22/2015    Chronic ulcer of right leg, limited to breakdown of skin ([address] 9/28/2016    Depression     Low back pain     Lymphedema of both lower extremities 6/22/2015    Lymphedema of lower extremity 6/22/2015    Obesity     Osteoarthritis     Peripheral vision loss     Stroke [facility])     Type 2 diabetes mellitus without complication, without long-term current use of insulin ([address] 6/21/2019    Ulcer of left lower leg, limited to breakdown of skin ([address] 6/10/2019    Ulcer of left lower leg, limited to breakdown of skin [address] [address] [facility] 6/10/2019     Past Surgical History:      Procedure Laterality Date    ABDOMINOPLASTY      BREAST REDUCTION SURGERY      reduction    CATARACT REMOVAL      bilateral     SECTION      x2    COLONOSCOPY  2012    and egd    COLONOSCOPY  2021    polyps; marginal prep--jessica    COLONOSCOPY N/A 3/27/2021    COLONOSCOPY WITH BIOPSY performed by Kelsea Matthews MD at [facility] [address] ECHOCARDIOGRAM COMPLETE 2D W DOPPLER W COLOR  2012         GASTRIC BYPASS SURGERY  2000    HERNIA REPAIR      2002    UPPER GASTROINTESTINAL ENDOSCOPY  2021    minimal pouch gastritis--jessica    UPPER GASTROINTESTINAL ENDOSCOPY N/A 3/27/2021    EGD ESOPHAGOGASTRODUODENOSCOPY performed by Kelsea Matthews MD at [facility] ENDOSCOPY     Family History:  Family History   Problem Relation Age of Onset    Breast Cancer Mother    Carlos Galla Stroke Father     Hypertension Sister     Hypertension Brother      Medications:  Reviewed and reconciled.     Social History:  Social History     Socioeconomic History    Marital status:      Spouse name: Not on file    Number of children: Not on file    Years of education: Not on file    Highest education level: Not on file   Occupational History    Not on file   Social Needs    Financial resource strain: Not very hard    Food insecurity     Worry: Never true     Inability: Never true    Transportation needs     Medical: No     Non-medical: No   Tobacco Use    Smoking status: Former Smoker     Packs/day: 0.25     Years: 38.00     Pack years: 9.50     Types: Cigarettes     Quit date: 3/11/2021     Years since quittin.0    Smokeless tobacco: Never Used    Tobacco comment: Pt states she quit \"Cold Turkey\"   Substance and Sexual Activity    Alcohol use: No    Drug use: No    Sexual activity: Not Currently   Lifestyle    Physical activity     Days per week: Not on file     Minutes per session: Not on file    Stress: Only a little   Relationships    Social connections     Talks on phone: More than three times a week     Gets together: Not on file     Attends Islam service: Not on file     Active member of club or organization: Not on file     Attends meetings of clubs or organizations: Not on file     Relationship status: Not on file    Intimate partner violence     Fear of current or ex partner: Not on file     Emotionally abused: Not on file     Physically abused: Not on file     Forced sexual activity: Not on file   Other Topics Concern    Not on file   Social History Narrative        Chronic Diagnosis: Iron deficiency anemia, Depressive disorder, Benign essential hypertension, Mixed    hyperlipidemia. HTN    OBESITY    LIPID    OA    SMOKER    CVA L FIELD VISION    DEPRESSION    GASTRIC BYPASS 01    BREAST REDUCTION--    Yann Johnson  1955 Page #2    ANEMIA==IRON AND B-12    Admitted 2019 with cellulitis left lower extremity then readmitted with chest pain with negative stress test     Allergies:  No Known Allergies    Physical Exam:  BP (!) 225/104 (Site: Right Upper Arm, Position: Sitting, Cuff Size: Medium Adult)   Pulse 102   Temp 98.1 °F (36.7 °C) (Temporal)   Ht 5' 2\" (1.575 m)   Wt 287 lb (130.2 kg)   SpO2 93%   BMI 52.49 kg/m²   GENERAL: Alert, oriented x 3,tired  HEENT: PERRLA; EOMI. Oropharynx clear. NECK: Supple. Without lymphadenopathy. LUNGS: Good air entry bilaterally. No wheezing, crackles or rhonchi. CARDIOVASCULAR: Regular rate. No murmurs, rubs or gallops. ABDOMEN: Soft. Non-tender, non-distended. Positive bowel sounds. EXTREMITIES: Without clubbing or cyanosis or edema. NEUROLOGIC: No focal deficits. Impression/Plan:  59-year-old woman with hx of iron deficiency anemia, vitamin B12 deficiency anemia, and hx of gastric bypass surgery in  by Dr. Joselin Grimm    Recently admitted to the hospital for lower extremity cellulitis.  She was found to have iron deficiency anemia b12 deficiency and received 4 doses IV Ferrlecit and 4 doses of VitB12 injections. EGD/Colonoscopy by Dr. Berna Chris 03/27/2021 showing gastritis, 3 mm mid-transverse colon polyp, 4 mm distal transverse colon polyp. A.  Mid transverse colon, biopsy: Fragments of tubular adenoma   B.  Distal transverse colon, biopsy: Fragments of tubular adenoma     Plan for back surgery with Dr. Nathan Garcia April 28, 2021. Labs drawn today 04/12/2021  RTC next to review labs and possible Ferrlecit and VitB12 injection    Thank you for allowing us to participate in the care of Mrs. Ward    HOLLY Burdick CNP      The patient was seen and examined, I agree with RIMA Burdick  findings, assessment and plan as outlined.   04/12/2021  Apolonia Madrigal MD

## 2021-04-12 ENCOUNTER — OFFICE VISIT (OUTPATIENT)
Dept: ONCOLOGY | Age: 66
End: 2021-04-12
Payer: COMMERCIAL

## 2021-04-12 ENCOUNTER — HOSPITAL ENCOUNTER (OUTPATIENT)
Dept: INFUSION THERAPY | Age: 66
Discharge: HOME | End: 2021-04-12
Payer: COMMERCIAL

## 2021-04-12 VITALS
TEMPERATURE: 98.1 F | WEIGHT: 287 LBS | SYSTOLIC BLOOD PRESSURE: 140 MMHG | HEART RATE: 102 BPM | DIASTOLIC BLOOD PRESSURE: 100 MMHG | OXYGEN SATURATION: 93 % | HEIGHT: 62 IN | BODY MASS INDEX: 52.81 KG/M2

## 2021-04-12 DIAGNOSIS — D50.9 IRON DEFICIENCY ANEMIA, UNSPECIFIED IRON DEFICIENCY ANEMIA TYPE: Primary | ICD-10-CM

## 2021-04-12 DIAGNOSIS — D50.9 MICROCYTIC ANEMIA: Chronic | ICD-10-CM

## 2021-04-12 DIAGNOSIS — D50.9 MICROCYTIC ANEMIA: Primary | Chronic | ICD-10-CM

## 2021-04-12 PROCEDURE — 99245 OFF/OP CONSLTJ NEW/EST HI 55: CPT | Performed by: INTERNAL MEDICINE

## 2021-04-12 PROCEDURE — 99214 OFFICE O/P EST MOD 30 MIN: CPT

## 2021-04-12 RX ORDER — DIPHENHYDRAMINE HYDROCHLORIDE 50 MG/ML
50 INJECTION INTRAMUSCULAR; INTRAVENOUS ONCE
Status: CANCELLED | OUTPATIENT
Start: 2021-04-19 | End: 2021-04-19

## 2021-04-12 RX ORDER — METHYLPREDNISOLONE SODIUM SUCCINATE 125 MG/2ML
125 INJECTION, POWDER, LYOPHILIZED, FOR SOLUTION INTRAMUSCULAR; INTRAVENOUS ONCE
Status: CANCELLED | OUTPATIENT
Start: 2021-04-19 | End: 2021-04-19

## 2021-04-12 RX ORDER — HEPARIN SODIUM (PORCINE) LOCK FLUSH IV SOLN 100 UNIT/ML 100 UNIT/ML
500 SOLUTION INTRAVENOUS PRN
Status: CANCELLED | OUTPATIENT
Start: 2021-04-19

## 2021-04-12 RX ORDER — CYANOCOBALAMIN 1000 UG/ML
1000 INJECTION INTRAMUSCULAR; SUBCUTANEOUS ONCE
Status: CANCELLED
Start: 2021-05-10

## 2021-04-12 RX ORDER — SODIUM CHLORIDE 0.9 % (FLUSH) 0.9 %
5-40 SYRINGE (ML) INJECTION PRN
Status: CANCELLED | OUTPATIENT
Start: 2021-04-19

## 2021-04-12 RX ORDER — SODIUM CHLORIDE 9 MG/ML
INJECTION, SOLUTION INTRAVENOUS CONTINUOUS
Status: CANCELLED | OUTPATIENT
Start: 2021-04-19

## 2021-04-12 RX ORDER — SODIUM CHLORIDE 9 MG/ML
25 INJECTION, SOLUTION INTRAVENOUS PRN
Status: CANCELLED | OUTPATIENT
Start: 2021-04-19

## 2021-04-12 RX ORDER — EPINEPHRINE 1 MG/ML
0.3 INJECTION, SOLUTION, CONCENTRATE INTRAVENOUS PRN
Status: CANCELLED | OUTPATIENT
Start: 2021-04-19

## 2021-04-12 NOTE — PROGRESS NOTES
Date    Anemia     Arthritis     Cellulitis -     left leg    Chronic ulcer of leg with fat layer exposed ([address] 2015    Chronic ulcer of right leg, limited to breakdown of skin ([address] 2016    Depression     Low back pain     Lymphedema of both lower extremities 2015    Lymphedema of lower extremity 2015    Obesity     Osteoarthritis     Peripheral vision loss     Stroke [facility])     Type 2 diabetes mellitus without complication, without long-term current use of insulin ([address] 2019    Ulcer of left lower leg, limited to breakdown of skin ([address] 6/10/2019    Ulcer of left lower leg, limited to breakdown of skin ([address] 6/10/2019       Past Surgical History:   Procedure Laterality Date    ABDOMINOPLASTY  2002    BREAST REDUCTION SURGERY      reduction    CATARACT REMOVAL      bilateral     SECTION      x2    COLONOSCOPY  2012    and egd    COLONOSCOPY  2021    polyps; marginal prep--jessica    COLONOSCOPY N/A 3/27/2021    COLONOSCOPY WITH BIOPSY performed by Adia Curtis MD at [address] [place] [address] ECHOCARDIOGRAM COMPLETE 2D W DOPPLER W COLOR  2012         GASTRIC BYPASS SURGERY  2000    HERNIA REPAIR          UPPER GASTROINTESTINAL ENDOSCOPY  2021    minimal pouch gastritis--jessica    UPPER GASTROINTESTINAL ENDOSCOPY N/A 3/27/2021    EGD ESOPHAGOGASTRODUODENOSCOPY performed by Adia Curtis MD at [place] [address] History   Problem Relation Age of Onset    Breast Cancer Mother     Stroke Father     Hypertension Sister     Hypertension Brother        Social History     Socioeconomic History    Marital status:      Spouse name: Not on file    Number of children: Not on file    Years of education: Not on file    Highest education level: Not on file   Occupational History    Not on file   Social Needs    Financial resource strain: Not very hard    Food insecurity     Worry: Never true     Inability: Never true  Transportation needs     Medical: No     Non-medical: No   Tobacco Use    Smoking status: Former Smoker     Packs/day: 0.25     Years: 38.00     Pack years: 9.50     Types: Cigarettes     Quit date: 3/11/2021     Years since quittin.0    Smokeless tobacco: Never Used    Tobacco comment: Pt states she quit \"Cold Turkey\"   Substance and Sexual Activity    Alcohol use: No    Drug use: No    Sexual activity: Not Currently   Lifestyle    Physical activity     Days per week: Not on file     Minutes per session: Not on file    Stress: Only a little   Relationships    Social connections     Talks on phone: More than three times a week     Gets together: Not on file     Attends Synagogue service: Not on file     Active member of club or organization: Not on file     Attends meetings of clubs or organizations: Not on file     Relationship status: Not on file    Intimate partner violence     Fear of current or ex partner: Not on file     Emotionally abused: Not on file     Physically abused: Not on file     Forced sexual activity: Not on file   Other Topics Concern    Not on file   Social History Narrative        Chronic Diagnosis: Iron deficiency anemia, Depressive disorder, Benign essential hypertension, Mixed    hyperlipidemia. HTN    OBESITY    LIPID    OA    SMOKER    CVA L FIELD VISION    DEPRESSION    GASTRIC BYPASS 01    BREAST REDUCTION--    Yann Johnson  1955 Page #2    ANEMIA==IRON AND B-12    Admitted 2019 with cellulitis left lower extremity then readmitted with chest pain with negative stress test           Occupation: Nurse at [employer]  Retired:  YES: Patient is retired from [employer].           REVIEW OF SYSTEMS: <<For Level 5, 10 or more systems>>     Pacemaker/Defibulator/ICD:  No    Mediport: No           FALLS RISK SCREENING ASSESSMENT    Instructions:  Assess the patient and [place] the appropriate indicators that are present for fall risk identification. Total the numbers circled and assign a fall risk score from Table 2.  Reassess patient at a minimum every 12 weeks or with status change. Assessment   Date  4/12/2021     1. Mental Ability: confusion/cognitively impaired No - 0       2. Elimination Issues: incontinence, frequency Yes - 3       3. Ambulatory: use of assistive devices (walker, cane, off-loading devices), attached to equipment (IV pole, oxygen) Yes - 2     4. Sensory Limitations: dizziness, vertigo, impaired vision No - 0       5. Age Less than 65 years - 0       6. Medication: diuretics, strong analgesics, hypnotics, sedatives, antihypertensive agents   Yes - 3   7. Falls:  recent history of falls within the last 3 months (not to include slipping or tripping)   Yes - 7   TOTAL 15    If score of 4 or greater was education given? Yes       TABLE 2   Risk Score Risk Level Plan of Care   0-3 Little or  No Risk 1. Provide assistance as indicated for ambulation activities  2. Reorient confused/cognitively impaired patient  3. Call-light/bell within patient's reach  4. Chair/bed in low position, stretcher/bed with siderails up except when performing patient care activities  5. Educate patient/family/caregiver on falls prevention  6.  Reassess in 12 weeks or with any noted change in patient condition which places them at a risk for a fall   4-6 Moderate Risk 1. Provide assistance as indicated for ambulation activities  2. Reorient confused/cognitively impaired patient  3. Call-light/bell within patient's reach  4. Chair/bed in low position, stretcher/bed with siderails up except when performing patient care activities  5. Educate patient/family/caregiver on falls prevention  6. Falls risk precaution (Yellow sticker Level II) placed on patient chart   7 or   Higher High Risk 1. Place patient in easily observable treatment room  2. Patient attended at all times by family member or staff  3.   Provide assistance as indicated for ambulation activities  4. Reorient confused/cognitively impaired patient  5. Call-light/bell within patient's reach  6. Chair/bed in low position, stretcher/bed with siderails up except when performing patient care activities  7. Educate patient/family/caregiver on falls prevention  8. Falls risk precaution (Yellow sticker Level III) placed on patient chart           MALNUTRITION RISK SCREENING ASSESSMENT    Instructions:  Assess the patient and enter the appropriate indicators that are present for nutrition risk identification. Total the numbers entered and assign a risk score. Follow the appropriate action for total score listed below. Assessment   Date  4/12/2021     1. Have you lost weight without trying? 0- No     2. Have you been eating poorly because of a decreased appetite? 0- No   3. Do you have a diagnosis of head and neck cancer?       0- No                                                                                    TOTAL 0        Score of 0-1: No action  Score 2 or greater:  · For Non-Diabetic Patient: Recommend adding Ensure Enlive 2 x daily and provide patient with Ensure wellness bag with coupons  · For Diabetic Patient: Recommend adding Glucerna Shake 2 x daily and provide patient with Glucerna Wellness bag with coupons  · Route to the dietitian via [address] [place] [address]

## 2021-04-13 ENCOUNTER — VIRTUAL VISIT (OUTPATIENT)
Dept: PAIN MANAGEMENT | Age: 66
End: 2021-04-13
Payer: COMMERCIAL

## 2021-04-13 DIAGNOSIS — M25.511 CHRONIC RIGHT SHOULDER PAIN: ICD-10-CM

## 2021-04-13 DIAGNOSIS — M17.0 PRIMARY OSTEOARTHRITIS OF BOTH KNEES: ICD-10-CM

## 2021-04-13 DIAGNOSIS — G89.29 CHRONIC RIGHT SHOULDER PAIN: ICD-10-CM

## 2021-04-13 DIAGNOSIS — G89.4 CHRONIC PAIN SYNDROME: Primary | ICD-10-CM

## 2021-04-13 DIAGNOSIS — G89.29 CHRONIC RIGHT-SIDED LOW BACK PAIN WITHOUT SCIATICA: ICD-10-CM

## 2021-04-13 DIAGNOSIS — M54.50 CHRONIC RIGHT-SIDED LOW BACK PAIN WITHOUT SCIATICA: ICD-10-CM

## 2021-04-13 PROCEDURE — 99214 OFFICE O/P EST MOD 30 MIN: CPT | Performed by: PHYSICIAN ASSISTANT

## 2021-04-13 RX ORDER — BUPRENORPHINE HYDROCHLORIDE 450 UG/1
450 FILM, SOLUBLE BUCCAL EVERY 12 HOURS
Qty: 60 EACH | Refills: 1 | Status: ON HOLD
Start: 2021-04-14 | End: 2021-05-17

## 2021-04-13 NOTE — PROGRESS NOTES
Kirk Robles was read the following message We want to confirm that, for purposes of billing, this is a virtual visit with your provider for which we will submit a claim for reimbursement with your insurance company. You will be responsible for any copays, coinsurance amounts or other amounts not covered by your insurance company. If you do not accept this, unfortunately we will not be able to schedule or proceed with a virtual visit with the provider. Do you accept? Fadumo Rosenthal responded Yes.  Kirk Robles is currently in [place]

## 2021-04-13 NOTE — PROGRESS NOTES
[address]  [facility]    Tele health follow up Note      Jaqui Rodriguez     Date of Visit:  2021    CC:  Tele health follow up     Consent:  The patient and/or health care decision maker is aware that he/she may receive a bill for this tele-health service [employer], depending on his insurance coverage, and has provided verbal consent to proceed: Yes  I have considered the risks of abuse, dependence, addiction and diversion. My patient is aware that they will need a follow-up visit (in-person or virtually) at the appropriate time indicated for continued medications. Further, my patient is aware that when this acute crisis has lifted, they will be expected to return for an in-person visit and all elements of standard local and hospital guidelines in order to continue this medication. Patient Location:Home   Provider Location:  Office    HPI:    Pain is     Appropriate analgesia with current medications regimen: yes. Change in quality of symptoms:no. Medication side effects:none. Recent diagnostic testing:none. Recent interventional procedures: None. She has not been on anticoagulation medications to include ASA, NSAIDS, Plavix, heparin, LMW heparin and warfarin and has not been on herbal supplements.  She is diabetic.     Imagin/27/2019 Lumbar Spine X-ray     Alignment of the vertebral bodies appears to be near-anatomic   No fracture or foreign body is identified. The disc spaces demonstrate moderate degenerative changes. There is mild loss of the vertebral body height   There are moderate degenerative changes involving the facets. There is grade 1 anterolisthesis of L3 on L4. Flexion-extension views demonstrates movement of the anterolisthesis   of L3 on L4.            Impression   Grade 1 anterolisthesis of L3 on L4 which does move with extension.     Findings consistent with moderate degenerative disc disease and degenerative facet disease.       The exam has been dictated and signed by Roxanne Red. HOLLY Zavaleta-PEDRO LUIS   and Ayana Abbott MD, reviewed and concurred with these findings.            9/3/2019 left knee x-ray     The bones appear to be in anatomic alignment. No foreign body is identified   No fracture is identified     There is moderate tricompartmental joint space loss greatest long   patellofemoral and medial femoral tibial joint   The are moderate degenerative changes present along the patellofemoral   and medial femoral tibial compartment           Impression   Moderate degenerative changes as described above          9/3/2019 right knee x-ray     The bones appear to be in anatomic alignment. No foreign body is identified   No fracture is identified. Marginal bone spurs are again seen along the inferior margins of the   patella. There is interval worsening of moderate tricompartmental joint space   loss    The are interval worsening of moderate degenerative tricompartmental   changes present            Impression   Moderate tricompartmental degenerative changes. This has   worsened in the interim                                               Potential Aberrant Drug-Related Behavior:  None     Urine Drug Screenin2019 Consistent for buprenorphine and metabolites  2020 Consistent for buprenorphine  2020 Consistent   2020 Consistent     OARRS report:  10/2019 Consistent to 2021 Consistent     Opioid Agreement:  2020                                         Past Medical History: Reviewed    Past Surgical History: Reviewed     Home Medications: Reviewed    Allergies: Reviewed     Social History: Reviewed     REVIEW OF SYSTEMS:     Patricia Junior denies fever/chills, chest pain, shortness of breath, new bowel or bladder complaints. All other review of systems was negative. PHYSICAL EXAMINATION:      General:       A & O x3  Build: Morbidly obese    HEENT:    Head:normocephalic and for cellulitis. Controlled Substance Monitoring:    Acute and Chronic Pain Monitoring:   RX Monitoring 4/13/2021   Periodic Controlled Substance Monitoring Possible medication side effects, risk of tolerance/dependence & alternative treatments discussed. ;No signs of potential drug abuse or diversion identified. ;Assessed functional status. ;Obtaining appropriate analgesic effect of treatment. We discussed with the patient that combining opioids, benzodiazepines, alcohol, illicit drugs or sleep aids increases the risk of respiratory depression including death. We discussed that these medications may cause drowsiness, sedation or dizziness and have counseled the patient not to drive or operate machinery. We have discussed that these medications will be prescribed only by one provider. We have discussed with the patient about age related risk factors and have thoroughly discussed the importance of taking these medications as prescribed. The patient verbalizes understanding. Patient advised regarding steps to help prevent the spread of COVID-19   SOURCE - https://josie-stephanie.info/. html     1-Stay home except to get medical care  2-Clean your hands often for atleast 20 seconds, avoid touching: Avoid touching your eyes, nose, and mouth with unwashed hands. 3-Seek medical attention: Seek prompt medical attention if your illness is worsening (e.g., difficulty breathing). Call you doctor first.  3-Wear a facemask if you are sick   4-Cover your coughs and sneezes           I affirm this is a Patient Initiated Episode with an established Patient who has not had a related appointment within my department in the past 7 days or scheduled within the next 24 hours.     Total Time:  15 minutes    Cc: Referring physician

## 2021-04-15 ENCOUNTER — HOSPITAL ENCOUNTER (OUTPATIENT)
Dept: INFUSION THERAPY | Age: 66
Discharge: HOME | End: 2021-04-15
Payer: COMMERCIAL

## 2021-04-15 ENCOUNTER — PROCEDURE VISIT (OUTPATIENT)
Dept: PAIN MANAGEMENT | Age: 66
End: 2021-04-15
Payer: COMMERCIAL

## 2021-04-15 VITALS
OXYGEN SATURATION: 95 % | RESPIRATION RATE: 16 BRPM | TEMPERATURE: 97.6 F | SYSTOLIC BLOOD PRESSURE: 124 MMHG | HEART RATE: 84 BPM | DIASTOLIC BLOOD PRESSURE: 62 MMHG

## 2021-04-15 DIAGNOSIS — M43.16 SPONDYLOLISTHESIS OF LUMBAR REGION: Primary | ICD-10-CM

## 2021-04-15 DIAGNOSIS — G89.29 CHRONIC RIGHT SHOULDER PAIN: ICD-10-CM

## 2021-04-15 DIAGNOSIS — M25.511 CHRONIC RIGHT SHOULDER PAIN: ICD-10-CM

## 2021-04-15 DIAGNOSIS — M17.0 PRIMARY OSTEOARTHRITIS OF BOTH KNEES: Primary | ICD-10-CM

## 2021-04-15 DIAGNOSIS — D50.9 MICROCYTIC ANEMIA: Primary | ICD-10-CM

## 2021-04-15 DIAGNOSIS — D50.9 MICROCYTIC ANEMIA: ICD-10-CM

## 2021-04-15 LAB
ALBUMIN SERPL-MCNC: 3.7 G/DL (ref 3.5–5.2)
ALP BLD-CCNC: 128 U/L (ref 35–104)
ALT SERPL-CCNC: 17 U/L (ref 0–32)
ANION GAP SERPL CALCULATED.3IONS-SCNC: 11 MMOL/L (ref 7–16)
ANISOCYTOSIS: ABNORMAL
AST SERPL-CCNC: 17 U/L (ref 0–31)
BASOPHILS ABSOLUTE: 0 E9/L (ref 0–0.2)
BASOPHILS RELATIVE PERCENT: 0.2 % (ref 0–2)
BILIRUB SERPL-MCNC: 0.3 MG/DL (ref 0–1.2)
BUN BLDV-MCNC: 10 MG/DL (ref 8–23)
CALCIUM SERPL-MCNC: 8.7 MG/DL (ref 8.6–10.2)
CHLORIDE BLD-SCNC: 106 MMOL/L (ref 98–107)
CO2: 24 MMOL/L (ref 22–29)
CREAT SERPL-MCNC: 0.5 MG/DL (ref 0.5–1)
EOSINOPHILS ABSOLUTE: 0 E9/L (ref 0.05–0.5)
EOSINOPHILS RELATIVE PERCENT: 0.7 % (ref 0–6)
FERRITIN: 42 NG/ML
FOLATE: 17.5 NG/ML (ref 4.8–24.2)
GFR AFRICAN AMERICAN: >60
GFR NON-AFRICAN AMERICAN: >60 ML/MIN/1.73
GLUCOSE BLD-MCNC: 157 MG/DL (ref 74–99)
HCT VFR BLD CALC: 36.1 % (ref 34–48)
HEMOGLOBIN: 9.6 G/DL (ref 11.5–15.5)
HOMOCYSTEINE: 9.7 UMOL/L (ref 0–15)
HYPOCHROMIA: ABNORMAL
IMMATURE RETIC FRACT: 18.7 % (ref 3–15.9)
IRON SATURATION: 6 % (ref 15–50)
IRON: 19 MCG/DL (ref 37–145)
LYMPHOCYTES ABSOLUTE: 0.63 E9/L (ref 1.5–4)
LYMPHOCYTES RELATIVE PERCENT: 11.3 % (ref 20–42)
MCH RBC QN AUTO: 19.3 PG (ref 26–35)
MCHC RBC AUTO-ENTMCNC: 26.6 % (ref 32–34.5)
MCV RBC AUTO: 72.6 FL (ref 80–99.9)
MONOCYTES ABSOLUTE: 0.17 E9/L (ref 0.1–0.95)
MONOCYTES RELATIVE PERCENT: 2.6 % (ref 2–12)
NEUTROPHILS ABSOLUTE: 4.9 E9/L (ref 1.8–7.3)
NEUTROPHILS RELATIVE PERCENT: 86.1 % (ref 43–80)
OVALOCYTES: ABNORMAL
PDW BLD-RTO: 28.5 FL (ref 11.5–15)
PLATELET # BLD: 199 E9/L (ref 130–450)
PMV BLD AUTO: ABNORMAL FL (ref 7–12)
POIKILOCYTES: ABNORMAL
POLYCHROMASIA: ABNORMAL
POTASSIUM SERPL-SCNC: 4 MMOL/L (ref 3.5–5)
RBC # BLD: 4.97 E12/L (ref 3.5–5.5)
RETIC HGB EQUIVALENT: 17.4 PG (ref 28.2–36.6)
RETICULOCYTE ABSOLUTE COUNT: 0.04 E12/L
RETICULOCYTE COUNT PCT: 0.9 % (ref 0.4–1.9)
SODIUM BLD-SCNC: 141 MMOL/L (ref 132–146)
TARGET CELLS: ABNORMAL
TOTAL IRON BINDING CAPACITY: 325 MCG/DL (ref 250–450)
TOTAL PROTEIN: 7 G/DL (ref 6.4–8.3)
VITAMIN B-12: 345 PG/ML (ref 211–946)
WBC # BLD: 5.7 E9/L (ref 4.5–11.5)

## 2021-04-15 PROCEDURE — 83540 ASSAY OF IRON: CPT

## 2021-04-15 PROCEDURE — 82728 ASSAY OF FERRITIN: CPT

## 2021-04-15 PROCEDURE — 85025 COMPLETE CBC W/AUTO DIFF WBC: CPT

## 2021-04-15 PROCEDURE — 36415 COLL VENOUS BLD VENIPUNCTURE: CPT

## 2021-04-15 PROCEDURE — 20611 DRAIN/INJ JOINT/BURSA W/US: CPT | Performed by: PAIN MEDICINE

## 2021-04-15 PROCEDURE — 83550 IRON BINDING TEST: CPT

## 2021-04-15 PROCEDURE — 82607 VITAMIN B-12: CPT

## 2021-04-15 PROCEDURE — 85045 AUTOMATED RETICULOCYTE COUNT: CPT

## 2021-04-15 PROCEDURE — 20610 DRAIN/INJ JOINT/BURSA W/O US: CPT | Performed by: PAIN MEDICINE

## 2021-04-15 PROCEDURE — 80053 COMPREHEN METABOLIC PANEL: CPT

## 2021-04-15 PROCEDURE — 83090 ASSAY OF HOMOCYSTEINE: CPT

## 2021-04-15 PROCEDURE — 82746 ASSAY OF FOLIC ACID SERUM: CPT

## 2021-04-15 PROCEDURE — 84165 PROTEIN E-PHORESIS SERUM: CPT

## 2021-04-15 RX ORDER — LIDOCAINE HYDROCHLORIDE 20 MG/ML
6 INJECTION, SOLUTION EPIDURAL; INFILTRATION; INTRACAUDAL; PERINEURAL ONCE
Status: COMPLETED | OUTPATIENT
Start: 2021-04-15 | End: 2021-04-15

## 2021-04-15 RX ORDER — LIDOCAINE HYDROCHLORIDE 20 MG/ML
2 INJECTION, SOLUTION EPIDURAL; INFILTRATION; INTRACAUDAL; PERINEURAL ONCE
Status: COMPLETED | OUTPATIENT
Start: 2021-04-15 | End: 2021-04-15

## 2021-04-15 RX ORDER — TRIAMCINOLONE ACETONIDE 40 MG/ML
40 INJECTION, SUSPENSION INTRA-ARTICULAR; INTRAMUSCULAR ONCE
Status: COMPLETED | OUTPATIENT
Start: 2021-04-15 | End: 2021-04-15

## 2021-04-15 RX ORDER — BUPIVACAINE HYDROCHLORIDE 2.5 MG/ML
6 INJECTION, SOLUTION EPIDURAL; INFILTRATION; INTRACAUDAL ONCE
Status: COMPLETED | OUTPATIENT
Start: 2021-04-15 | End: 2021-04-15

## 2021-04-15 RX ORDER — BUPIVACAINE HYDROCHLORIDE 2.5 MG/ML
3 INJECTION, SOLUTION EPIDURAL; INFILTRATION; INTRACAUDAL ONCE
Status: COMPLETED | OUTPATIENT
Start: 2021-04-15 | End: 2021-04-15

## 2021-04-15 RX ADMIN — LIDOCAINE HYDROCHLORIDE 2 ML: 20 INJECTION, SOLUTION EPIDURAL; INFILTRATION; INTRACAUDAL; PERINEURAL at 16:48

## 2021-04-15 RX ADMIN — BUPIVACAINE HYDROCHLORIDE 3 ML: 2.5 INJECTION, SOLUTION EPIDURAL; INFILTRATION; INTRACAUDAL at 16:47

## 2021-04-15 RX ADMIN — BUPIVACAINE HYDROCHLORIDE 6 ML: 2.5 INJECTION, SOLUTION EPIDURAL; INFILTRATION; INTRACAUDAL at 16:35

## 2021-04-15 RX ADMIN — LIDOCAINE HYDROCHLORIDE 6 ML: 20 INJECTION, SOLUTION EPIDURAL; INFILTRATION; INTRACAUDAL; PERINEURAL at 16:38

## 2021-04-15 RX ADMIN — TRIAMCINOLONE ACETONIDE 40 MG: 40 INJECTION, SUSPENSION INTRA-ARTICULAR; INTRAMUSCULAR at 16:39

## 2021-04-15 RX ADMIN — TRIAMCINOLONE ACETONIDE 40 MG: 40 INJECTION, SUSPENSION INTRA-ARTICULAR; INTRAMUSCULAR at 16:49

## 2021-04-15 RX ADMIN — TRIAMCINOLONE ACETONIDE 40 MG: 40 INJECTION, SUSPENSION INTRA-ARTICULAR; INTRAMUSCULAR at 16:42

## 2021-04-15 NOTE — PROGRESS NOTES
4/15/2021    Patient: Shaheen Rothman  :  1955  Age:  60 y.o. Sex:  female      PRE-OPERATIVE DIAGNOSIS: Bilateral  Knee pain, osteoarthritis. POST-OPERATIVE DIAGNOSIS: Same. PROCEDURE PERFORMED:  Bilateral knee injection under ultrasound guidance. SURGEON:   ANALILIA Bolaños. ANESTHESIA: local    ESTIMATED BLOOD LOSS: None. BRIEF HISTORY: Shaheen Rothman comes in today for Bilateral Knee steroid injection under ultrasound guidance. After discussing the potential risks and benefits of the procedure with the patient  Joe Briggs did request that we proceed. A complete History & Physical was reviewed and it is unchanged. DESCRIPTION OF PROCEDURE:   The patient was placed in a seated position. The area of the Bilateral knee was prepped with chloraprep and draped in a sterile manner. The overlying skin and subcutaneous tissues were anesthetized with 0.5% Lidocaine. Then, under ultrasound guidance, a 25 gauge 1 1/2 inch needle was inserted into the suprapatellar bursa under direct visualization. A solution of 3 cc's of 0.25% bupivacaine, 3 cc's of 2% lidocaine, and 40 mg of Kenalog was injected easily, in each knee, with appropriate spread of medicatioons without complications. The needle was then removed and Band-Aid applied. Disposition the patient tolerated the procedure well and there were no complications . Joe Briggs will follow up in our comprehensive [facility] as scheduled. She was encouraged to call with questions, concerns or if worsening of symptoms occurs. Michelle Husain DO    4/15/2021    Patient: Shaheen Rothman  :  1955  Age:  60 y.o. Sex:  female     PRE-OPERATIVE DIAGNOSIS: Right   Shoulder pain, osteoarthritis. POST-OPERATIVE DIAGNOSIS: Same. PROCEDURE PERFORMED: Right  Shoulder steroid injection. SURGEON:   ANALILIA Bolaños. ANESTHESIA: local    ESTIMATED BLOOD LOSS: None.     BRIEF HISTORY: Shaheen Rothman comes in today for Right Shoulder steroid injection. After discussing the potential risks and benefits of the procedure with the patient. Starr Walker did request that we proceed. A complete History & Physical was reviewed and it is unchanged. DESCRIPTION OF PROCEDURE:   The patient was placed in a seated position. The area of the Right  shoulder was prepped with chloraprep and draped in a sterile manner. The overlying skin and subcutaneous tissues were anesthetized with 0.5% Lidocaine. Using the posterior approach, a 25 gauge 1 1/2 inch  needle was advanced  In anterolateral projection into the joint capsule. After negative aspiration a solution of 0.25 % marcaine 3 cc's, 3 cc's 2% lidocaine and 40 mg Kenalog was injected easily without complications. The needle was then removed and Band-Aid applied. Disposition the patient tolerated the procedure well and there were no complications . Starr Walker will follow up in our comprehensive Pain Management Center as scheduled. She was encouraged to call with questions, concerns or if worsening of symptoms occurs.     Sulaiman Mckoy,

## 2021-04-15 NOTE — PROGRESS NOTES
Do you currently have any of the following:    Fever: No  Headache:  No  Cough: No  Shortness of breath: No  Exposed to anyone with these symptoms: No         Nas Hidalgo presents to the [address] [place] [address] on 4/15/2021. Micki Garcia is complaining of pain B/L knees/rt. Shoulder  The pain is constant. The pain is described as aching, throbbing, shooting and stabbing. Pain is rated on her best day at a 7, on her worst day at a 10, and on average at a 9 on the VAS scale. She took her last dose of Belbuca today     Any procedures since your last visit:     Pacemaker or defibrillator: No managed by     She is not on NSAIDS and is not on anticoagulation medications to include none and is managed by. Medication Contract and Consent for Opioid Use Documents Filed     Patient Documents       Type of Document Status Date Received Received By Description     Medication Contract Signed 9/6/2019 12:04 PM Pricilla Gutierrez med contract     Medication Contract Received 11/17/2020 10:57 AM Dilma MORRISON 68 [facility] med contract                /62   Pulse 84   Temp 97.6 °F (36.4 °C) (Temporal)   Resp 16   SpO2 95%      No LMP recorded.  Patient is postmenopausal.

## 2021-04-16 LAB — PATHOLOGIST REVIEW: NORMAL

## 2021-04-18 NOTE — DISCHARGE SUMMARY
Physician Discharge Summary     Patient ID:  Vito Zuniga  [id number]  60 y.o.  1955    Admit date: 3/23/2021    Discharge date and time: 3/30/2021  5:32 PM     Admission Diagnoses: Principal Problem:    Cellulitis of both lower extremities  Active Problems:    Lymphedema of both lower extremities    Microcytic anemia    Morbid obesity (HCC)    Primary osteoarthritis involving multiple joints    Lymphedema    Chronic pain syndrome    Anemia  Resolved Problems:    * No resolved hospital problems. *      Discharge Diagnoses: Principal Problem:    Cellulitis of both lower extremities  Active Problems:    Lymphedema of both lower extremities    Microcytic anemia    Morbid obesity (HCC)    Primary osteoarthritis involving multiple joints    Lymphedema    Chronic pain syndrome    Anemia  Resolved Problems:    * No resolved hospital problems. *      Condition at discharge : Stable    Consults: IP CONSULT TO HOSPITALIST  IP CONSULT TO SOCIAL WORK  IP CONSULT TO CARDIOLOGY  IP CONSULT TO ONCOLOGY  IP CONSULT TO GENERAL SURGERY  IP CONSULT TO INFECTIOUS DISEASES    Procedures: EGD, colonoscopy    Hospital Course: 29-year-old lady presents to the emergency room because of several day history of increasing left redness, swelling, shortness of breath. Thought to have cellulitis. Recently had low back surgery with neurosurgery. She was found to be anemic as outpatient. Apparently has not been compliant with iron pills. She was started on diuresis. Empiric antibiotics were started. Cardiology consulted for preop clearance. Hematology consulted regarding anemia. Patient was recommended for colonoscopy and EGD because of anemia. These revealed gastritis, 3 mm mid transverse colon polyp, 4 mm transfer colonic polyp. She did have some shortness of breath. Diuresis continued. Chest x-ray was consistent with atelectasis. Patient was then discharged home.     XR CHEST PORTABLE   Final Result   Borderline cardiomegaly with atelectasis/beginning infiltrates in the lung   bases right more than left concerning for pneumonia. XR KNEE LEFT (1-2 VIEWS)   Final Result   Tricompartmental osteoarthritis with severe medial joint space narrowing. No acute bony pathology.          US DUP LOWER EXTREMITIES BILATERAL VENOUS   Final Result   Within the visualized vessels there is no evidence for deep venous   thrombosis               XR CHEST (2 VW)   Final Result   No acute process             Results for orders placed or performed during the hospital encounter of 04/15/21 (from the past 336 hour(s))   HOMOCYSTEINE, SERUM    Collection Time: 04/15/21  2:22 PM   Result Value Ref Range    Homocysteine 9.7 0.0 - 15.0 umol/L   Vitamin B12 & Folate    Collection Time: 04/15/21  2:22 PM   Result Value Ref Range    Vitamin B-12 345 211 - 946 pg/mL    Folate 17.5 4.8 - 24.2 ng/mL   Reticulocytes    Collection Time: 04/15/21  2:22 PM   Result Value Ref Range    Retic Ct Pct 0.9 0.4 - 1.9 %    Retic Ct Abs 0.040 E12/L    Immature Retic Fract 18.7 (H) 3.0 - 15.9 %    Hematocrit 36.1 34.0 - 48.0 %    Retic HGB Equivalent 17.4 (L) 28.2 - 36.6 pg   Iron and TIBC    Collection Time: 04/15/21  2:22 PM   Result Value Ref Range    Iron 19 (L) 37 - 145 mcg/dL    TIBC 325 250 - 450 mcg/dL    Iron Saturation 6 (L) 15 - 50 %   Ferritin    Collection Time: 04/15/21  2:22 PM   Result Value Ref Range    Ferritin 42 ng/mL   Comprehensive Metabolic Panel    Collection Time: 04/15/21  2:22 PM   Result Value Ref Range    Sodium 141 132 - 146 mmol/L    Potassium 4.0 3.5 - 5.0 mmol/L    Chloride 106 98 - 107 mmol/L    CO2 24 22 - 29 mmol/L    Anion Gap 11 7 - 16 mmol/L    Glucose 157 (H) 74 - 99 mg/dL    BUN 10 8 - 23 mg/dL    CREATININE 0.5 0.5 - 1.0 mg/dL    GFR Non-African American >60 >=60 mL/min/1.73    GFR African American >60     Calcium 8.7 8.6 - 10.2 mg/dL    Total Protein 7.0 6.4 - 8.3 g/dL    Albumin 3.7 3.5 - 5.2 g/dL    Total Bilirubin 0.3 0.0 - 1.2 mg/dL    Alkaline Phosphatase 128 (H) 35 - 104 U/L    ALT 17 0 - 32 U/L    AST 17 0 - 31 U/L   CBC Auto Differential    Collection Time: 04/15/21  2:22 PM   Result Value Ref Range    WBC 5.7 4.5 - 11.5 E9/L    RBC 4.97 3.50 - 5.50 E12/L    Hemoglobin 9.6 (L) 11.5 - 15.5 g/dL    MCV 72.6 (L) 80.0 - 99.9 fL    MCH 19.3 (L) 26.0 - 35.0 pg    MCHC 26.6 (L) 32.0 - 34.5 %    RDW 28.5 (H) 11.5 - 15.0 fL    Platelets 208 831 - 777 E9/L    MPV NOT CALC 7.0 - 12.0 fL    Neutrophils % 86.1 (H) 43.0 - 80.0 %    Lymphocytes % 11.3 (L) 20.0 - 42.0 %    Monocytes % 2.6 2.0 - 12.0 %    Eosinophils % 0.7 0.0 - 6.0 %    Basophils % 0.2 0.0 - 2.0 %    Neutrophils Absolute 4.90 1.80 - 7.30 E9/L    Lymphocytes Absolute 0.63 (L) 1.50 - 4.00 E9/L    Monocytes Absolute 0.17 0.10 - 0.95 E9/L    Eosinophils Absolute 0.00 (L) 0.05 - 0.50 E9/L    Basophils Absolute 0.00 0.00 - 0.20 E9/L    Anisocytosis 2+     Polychromasia 2+     Hypochromia 1+     Poikilocytes 2+     Ovalocytes 2+     Target Cells 1+    Path Review, Smear    Collection Time: 04/15/21  2:22 PM   Result Value Ref Range    Pathologist Review SEE BELOW          Discharge Exam:  See progress note from today    Disposition: Home    Patient Instructions:   Discharge Medication List as of 3/30/2021  3:55 PM      START taking these medications    Details   bumetanide (BUMEX) 2 MG tablet Take 1 tablet by mouth 2 times daily, Disp-30 tablet, R-3Normal      potassium chloride (KLOR-CON M) 20 MEQ extended release tablet Take 1 tablet by mouth 2 times daily (with meals), Disp-60 tablet, R-3Normal      cephALEXin (KEFLEX) 500 MG capsule Take 1 capsule by mouth every 6 hours for 7 days, Disp-28 capsule, R-0Print         CONTINUE these medications which have NOT CHANGED    Details   BELBUCA 450 MCG FILM Take 450 mcg by mouth every 12 hours for 30 days. , Disp-60 each, R-1, DAWNormal      gabapentin (NEURONTIN) 300 MG capsule Take 1 capsule by mouth 3 times daily for 30 days.  Intended supply: 30 days, Disp-90 capsule, R-0Normal      buPROPion (WELLBUTRIN XL) 150 MG extended release tablet Take 1 tablet by mouth every morning, Disp-90 tablet, R-3Normal      baclofen (LIORESAL) 10 MG tablet Take 1 tablet by mouth 3 times daily Tired   Do not drive, NCOK-62 tablet, R-5Normal      rOPINIRole (REQUIP) 2 MG tablet Take 1 tablet by mouth 4 times daily, Disp-360 tablet,R-12Normal      ammonium lactate (LAC-HYDRIN) 12 % cream Apply topically as needed. , Disp-385 g,R-2, Normal      Turmeric 500 MG CAPS Take by mouthHistorical Med      Cream Base (VERSAPRO) CREA Disp-200 g, R-1, Normal         STOP taking these medications       furosemide (LASIX) 40 MG tablet Comments:   Reason for Stopping:               Activity: As tolerated    Diet: Cardiac    Follow-up with [facility]  [address], [address] [facility] [address] [phone]    In 2 weeks  Make follow up     Betsy Cochran MD  [address] [phone]      Appointment: April 5th at 10:30 am         Note that over 30 minutes was spent in preparing discharge papers, discussing discharge with patient, medication review, etc.    Signed:  Precious Triana  4/17/2021  8:41 PM

## 2021-04-19 LAB
ALBUMIN SERPL-MCNC: 3 G/DL (ref 3.5–4.7)
ALPHA-1-GLOBULIN: 0.3 G/DL (ref 0.2–0.4)
ALPHA-2-GLOBULIN: 0.8 G/FL (ref 0.5–1)
BETA GLOBULIN: 1 G/DL (ref 0.8–1.3)
ELECTROPHORESIS: ABNORMAL
GAMMA GLOBULIN: 1.2 G/DL (ref 0.7–1.6)
TOTAL PROTEIN: 6.4 G/DL (ref 6.4–8.3)

## 2021-04-21 NOTE — PROGRESS NOTES
[facility] 37 PRE-ADMISSION TESTING GENERAL INSTRUCTIONS- [facility]-phone number:[phone]    GENERAL INSTRUCTIONS  [x] Antibacterial Soap shower Night before Surgery  [x] Gaurang wipe instruction sheet and wipes given. [x] Nothing by mouth after midnight, including gum, candy, mints, or water. [x] You may brush your teeth, gargle, but do NOT swallow water. [x]No smoking, chewing tobacco, illegal drugs, or alcohol within 24 hours of your surgery. [x] Jewelry, valuables or body piercing's should not be brought to the hospital. All body and/or tongue piercing's must be removed prior to arriving to hospital.  ALL hair pins must be removed. [x] Do not wear makeup, lotions, powders, deodorant. Nail polish as directed by the nurse. [x] Arrange transportation with a responsible adult  to and from the hospital. If you do not have a responsible adult  to transport you, you will need to make arrangements with a medical transportation company (i.e. [employer]. A Uber/taxi/bus is not appropriate unless you are accompanied by a responsible adult ). Arrange for someone to be with you for the remainder of the day and for 24 hours after your procedure due to having had anesthesia. Who will be your  for transportation?_______Jill________     [x] Bring insurance card and photo ID. [x] Transfusion Bracelet: Please bring with you to hospital, day of surgery       PARKING INSTRUCTIONS:   [x] Arrival Time:__0500___  · [x] Parking lot '\"I\"  is located on [facility] (the corner of [facility] and [facility]). To enter, press the button and the gate will lift. A free token will be provided to exit the lot. One car per patient is allowed to park in this lot. All other cars are to park on [address] [place] [address] either in the parking garage or the handicap lot.            [] To reach the [facility] lobby from [address] [place] [address], upon entering the hospital, take elevator B to the 3rd other items  to occupy your time in the waiting area. [x]  Delays may occur with surgery and staff will make a sincere effort to keep you informed of delays. If any delays occur with your procedure, we apologize ahead of time for your inconvenience as we recognize the value of your time.

## 2021-04-23 ENCOUNTER — TELEPHONE (OUTPATIENT)
Dept: PRIMARY CARE CLINIC | Age: 66
End: 2021-04-23

## 2021-04-23 ENCOUNTER — HOSPITAL ENCOUNTER (OUTPATIENT)
Dept: PREADMISSION TESTING | Age: 66
Discharge: HOME | End: 2021-04-23
Payer: COMMERCIAL

## 2021-04-23 ENCOUNTER — CARE COORDINATION (OUTPATIENT)
Dept: OTHER | Facility: CLINIC | Age: 66
End: 2021-04-23

## 2021-04-23 ENCOUNTER — HOSPITAL ENCOUNTER (OUTPATIENT)
Age: 66
Discharge: HOME | End: 2021-04-25
Payer: COMMERCIAL

## 2021-04-23 ENCOUNTER — HOSPITAL ENCOUNTER (OUTPATIENT)
Dept: GENERAL RADIOLOGY | Age: 66
Discharge: HOME | End: 2021-04-25
Payer: COMMERCIAL

## 2021-04-23 VITALS
DIASTOLIC BLOOD PRESSURE: 80 MMHG | TEMPERATURE: 98.5 F | HEART RATE: 71 BPM | HEIGHT: 62 IN | RESPIRATION RATE: 18 BRPM | SYSTOLIC BLOOD PRESSURE: 148 MMHG | WEIGHT: 280 LBS | BODY MASS INDEX: 51.53 KG/M2 | OXYGEN SATURATION: 95 %

## 2021-04-23 DIAGNOSIS — Z01.812 PRE-OPERATIVE LABORATORY EXAMINATION: Primary | ICD-10-CM

## 2021-04-23 DIAGNOSIS — Z01.818 PRE-OP TESTING: ICD-10-CM

## 2021-04-23 DIAGNOSIS — U07.1 COVID-19: ICD-10-CM

## 2021-04-23 LAB
ABO/RH: NORMAL
ANION GAP SERPL CALCULATED.3IONS-SCNC: 7 MMOL/L (ref 7–16)
ANISOCYTOSIS: ABNORMAL
ANTIBODY SCREEN: NORMAL
BACTERIA: ABNORMAL /HPF
BASOPHILS ABSOLUTE: 0 E9/L (ref 0–0.2)
BASOPHILS RELATIVE PERCENT: 0.5 % (ref 0–2)
BILIRUBIN URINE: NEGATIVE
BLOOD, URINE: NEGATIVE
BUN BLDV-MCNC: 18 MG/DL (ref 6–23)
CALCIUM SERPL-MCNC: 9.1 MG/DL (ref 8.6–10.2)
CHLORIDE BLD-SCNC: 103 MMOL/L (ref 98–107)
CLARITY: CLEAR
CO2: 30 MMOL/L (ref 22–29)
COLOR: YELLOW
CREAT SERPL-MCNC: 0.6 MG/DL (ref 0.5–1)
EOSINOPHILS ABSOLUTE: 0.07 E9/L (ref 0.05–0.5)
EOSINOPHILS RELATIVE PERCENT: 0.9 % (ref 0–6)
GFR AFRICAN AMERICAN: >60
GFR NON-AFRICAN AMERICAN: >60 ML/MIN/1.73
GLUCOSE BLD-MCNC: 97 MG/DL (ref 74–99)
GLUCOSE URINE: NEGATIVE MG/DL
HCT VFR BLD CALC: 38.5 % (ref 34–48)
HEMOGLOBIN: 10.5 G/DL (ref 11.5–15.5)
HYPOCHROMIA: ABNORMAL
INR BLD: 1.1
KETONES, URINE: NEGATIVE MG/DL
LEUKOCYTE ESTERASE, URINE: ABNORMAL
LYMPHOCYTES ABSOLUTE: 1.41 E9/L (ref 1.5–4)
LYMPHOCYTES RELATIVE PERCENT: 16.5 % (ref 20–42)
MCH RBC QN AUTO: 19.4 PG (ref 26–35)
MCHC RBC AUTO-ENTMCNC: 27.3 % (ref 32–34.5)
MCV RBC AUTO: 71.3 FL (ref 80–99.9)
MONOCYTES ABSOLUTE: 0.33 E9/L (ref 0.1–0.95)
MONOCYTES RELATIVE PERCENT: 4.3 % (ref 2–12)
NEUTROPHILS ABSOLUTE: 6.47 E9/L (ref 1.8–7.3)
NEUTROPHILS RELATIVE PERCENT: 78.3 % (ref 43–80)
NITRITE, URINE: POSITIVE
OVALOCYTES: ABNORMAL
PDW BLD-RTO: 27.3 FL (ref 11.5–15)
PH UA: 6.5 (ref 5–9)
PLATELET # BLD: 200 E9/L (ref 130–450)
PMV BLD AUTO: ABNORMAL FL (ref 7–12)
POIKILOCYTES: ABNORMAL
POLYCHROMASIA: ABNORMAL
POTASSIUM REFLEX MAGNESIUM: 4.3 MMOL/L (ref 3.5–5)
PROTEIN UA: NEGATIVE MG/DL
PROTHROMBIN TIME: 11.7 SEC (ref 9.3–12.4)
RBC # BLD: 5.4 E12/L (ref 3.5–5.5)
RBC UA: ABNORMAL /HPF (ref 0–2)
SODIUM BLD-SCNC: 140 MMOL/L (ref 132–146)
SPECIFIC GRAVITY UA: 1.02 (ref 1–1.03)
TARGET CELLS: ABNORMAL
UROBILINOGEN, URINE: 1 E.U./DL
WBC # BLD: 8.3 E9/L (ref 4.5–11.5)
WBC UA: ABNORMAL /HPF (ref 0–5)

## 2021-04-23 PROCEDURE — U0003 INFECTIOUS AGENT DETECTION BY NUCLEIC ACID (DNA OR RNA); SEVERE ACUTE RESPIRATORY SYNDROME CORONAVIRUS 2 (SARS-COV-2) (CORONAVIRUS DISEASE [COVID-19]), AMPLIFIED PROBE TECHNIQUE, MAKING USE OF HIGH THROUGHPUT TECHNOLOGIES AS DESCRIBED BY CMS-2020-01-R: HCPCS

## 2021-04-23 PROCEDURE — 86901 BLOOD TYPING SEROLOGIC RH(D): CPT

## 2021-04-23 PROCEDURE — 87088 URINE BACTERIA CULTURE: CPT

## 2021-04-23 PROCEDURE — 85025 COMPLETE CBC W/AUTO DIFF WBC: CPT

## 2021-04-23 PROCEDURE — 71046 X-RAY EXAM CHEST 2 VIEWS: CPT

## 2021-04-23 PROCEDURE — 87077 CULTURE AEROBIC IDENTIFY: CPT

## 2021-04-23 PROCEDURE — 36415 COLL VENOUS BLD VENIPUNCTURE: CPT

## 2021-04-23 PROCEDURE — 80048 BASIC METABOLIC PNL TOTAL CA: CPT

## 2021-04-23 PROCEDURE — 87081 CULTURE SCREEN ONLY: CPT

## 2021-04-23 PROCEDURE — 86850 RBC ANTIBODY SCREEN: CPT

## 2021-04-23 PROCEDURE — 81001 URINALYSIS AUTO W/SCOPE: CPT

## 2021-04-23 PROCEDURE — 86900 BLOOD TYPING SEROLOGIC ABO: CPT

## 2021-04-23 PROCEDURE — 87186 SC STD MICRODIL/AGAR DIL: CPT

## 2021-04-23 PROCEDURE — 85610 PROTHROMBIN TIME: CPT

## 2021-04-23 NOTE — PROGRESS NOTES
Dr Pablo Perera office  Notified patient was seen in PAT today and reported feeling like she might have a UTI. She said she is having frequent urination and burning. Notified of UA results. Urine culture in process. UA routed to [employer].

## 2021-04-23 NOTE — TELEPHONE ENCOUNTER
Nurse from Dr. Jose Austin office calling stating patient is having sx 4/28 and your office note from 4/8 needs addended to state patient is cleared for surgery if she is cleared. Please advise.

## 2021-04-23 NOTE — CARE COORDINATION
[facility] Transitions Follow Up Call    2021    Patient: Zulema Mayfield  Patient : 1955   MRN: [id number]  Reason for Admission: cellulitis/ Fall  Discharge Date: 3/30/21 RARS: Readmission Risk Score: 13      Spoke with: [facility]) contacted the patient by telephone to follow up on progress, discuss new issues or concerns, and reinforce/ provide pt education. Verified name and  with patient as identifiers. Patient reports that she is doing fine. She is frustrated that her surgery is cancelled. She states she is not symptomatic at all with COVID. Discussed COVID disease course and Red Flag symptoms to report. Discussed quarantine precautions and pt is agreeable. She has help at home and no barriers to obtain food, care, or get help if needed. -    Interventions:    ACM reviewed and updated CC protocol, SDOH, medications, goals, education and disease specific assessments. Counseling and education provided at today's visit on:   Red Flag symptoms to report  Symptom management  Provider follow up appointment compliance  Specialist appointment compliance  Utilization of appropriate level of care: Right Care, Right Place, Right Time  Disease Process and complications      Medication reconciliation was performed with patient, who verbalizes understanding of administration of home medications. Advised obtaining a 90-day supply of all daily and as-needed medications. Reinforced resources available to patient including:   PCP, Specialist, Benefits related nurse triage line, Urgent care clinics, [employer] and ACM. Plan:  Continue weekly outreaches to provide telephonic support, education and resources as needed.    Next outreach discuss / follow up on:  Red Flag symptoms to report  Symptom management  Medication compliance  Provider follow up appointment compliance  Routine/ diagnostic testing compliance  Utilization of appropriate level of care    Patient verbalized understanding and is agreeable. Care Transitions Subsequent and Final Call    Schedule Follow Up Appointment with PCP: Completed  Subsequent and Final Calls  Are you currently active with any services?: Home Health  Care Transitions Interventions    Specialty Service Referral: Completed    Other Interventions:            Follow Up  Future Appointments   Date Time Provider Sydni More   4/26/2021  1:30 PM Harpreet Henley MD MED ONC [facility]   4/26/2021  2:15 PM LANETTE MED ONC CHAIR 11 YZ Med Onc [facility]   5/26/2021 10:00 AM Roberto Mays PA-C [facility] NSURG [facility]   6/9/2021 10:15 AM CHERYL Serna BDKELLI PAIN [facility]   7/21/2021  2:00 PM Roberto Mays PA-C [address], RN

## 2021-04-24 LAB
MRSA CULTURE ONLY: NORMAL
SARS-COV-2, PCR: DETECTED

## 2021-04-25 LAB
ORGANISM: ABNORMAL
URINE CULTURE, ROUTINE: ABNORMAL

## 2021-04-26 ENCOUNTER — TELEPHONE (OUTPATIENT)
Dept: NEUROSURGERY | Age: 66
End: 2021-04-26

## 2021-04-26 ENCOUNTER — HOSPITAL ENCOUNTER (OUTPATIENT)
Dept: INFUSION THERAPY | Age: 66
End: 2021-04-26
Payer: COMMERCIAL

## 2021-04-26 RX ORDER — CIPROFLOXACIN 500 MG/1
500 TABLET, FILM COATED ORAL 2 TIMES DAILY
Qty: 20 TABLET | Refills: 0 | Status: SHIPPED | OUTPATIENT
Start: 2021-04-26 | End: 2021-05-06 | Stop reason: ALTCHOICE

## 2021-04-26 RX ORDER — GABAPENTIN 300 MG/1
CAPSULE ORAL
Qty: 90 CAPSULE | Refills: 0 | Status: SHIPPED
Start: 2021-04-26 | End: 2021-09-13 | Stop reason: SDUPTHER

## 2021-04-26 NOTE — TELEPHONE ENCOUNTER
Patient requesting antibiotic for UTI. Also testing positive for COVID and wants to know how accurate that is and if her surgery will be canceled. No symptoms.

## 2021-04-27 ENCOUNTER — VIRTUAL VISIT (OUTPATIENT)
Dept: PRIMARY CARE CLINIC | Age: 66
End: 2021-04-27
Payer: COMMERCIAL

## 2021-04-27 DIAGNOSIS — U07.1 COVID-19: Primary | ICD-10-CM

## 2021-04-27 DIAGNOSIS — I89.0 LYMPHEDEMA OF BOTH LOWER EXTREMITIES: Chronic | ICD-10-CM

## 2021-04-27 PROCEDURE — 99213 OFFICE O/P EST LOW 20 MIN: CPT | Performed by: FAMILY MEDICINE

## 2021-04-27 ASSESSMENT — ENCOUNTER SYMPTOMS
ALLERGIC/IMMUNOLOGIC NEGATIVE: 1
GASTROINTESTINAL NEGATIVE: 1
BACK PAIN: 1
EYES NEGATIVE: 1
RESPIRATORY NEGATIVE: 1

## 2021-04-27 NOTE — TELEPHONE ENCOUNTER
I see she has been tested positive for Covid.   Please put in a virtual visit with me today if she is able

## 2021-04-27 NOTE — PROGRESS NOTES
21  Name: Joyce Cassidy    : 1955    Sex: female    Age: 60 y.o. Subjective:    eMedicine Video Visit    This visit was performed as a virtual video visit using a synchronous, two-way, audio-video telehealth technology platform. Patient identification was verified at the start of the visit, including the patient's telephone number and physical location. I discussed with the patient the nature of our telehealth visits, that:     1. Due to the nature of an audio- video modality, the only components of a physical exam that could be done are the elements supported by direct observation. 2. I would evaluate the patient and recommend diagnostics and treatments based on my assessment. 3. If it was felt that the patient should be evaluated in clinic or an emergency room setting, then they would be directed there. 4. Our sessions are not being recorded and that personal health information is protected. 5. Our team would provide follow up care in person if/when the patient needs it. Patient does agree to proceed with telemedicine consultation. Patient's location: home address in [facility]  Physician  location home address in [facility] other people involved in call [facility]      Time spent: Greater than Not billed by time    This visit was completed virtually using Doxy. me       Patient has requested an audio/video evaluation for the following concern(s):      coivd      To the requested virtual visit discuss Covid. She went for Covid testing PCR on 423 told later it was positive she has [place] no symptoms her surgery was canceled as well as her infusions. Feels well no symptoms  o2 sat perfect pre pt  Not smoking  Legs  Swelling down per pt  Got knees injected and doing better      Review of Systems   Constitutional: Negative. HENT: Negative. Eyes: Negative. Respiratory: Negative. Cardiovascular: Negative. Gastrointestinal: Negative. Endocrine: Negative. Genitourinary: Negative. Musculoskeletal: Positive for back pain. Skin: Negative. Allergic/Immunologic: Negative. Neurological: Negative. Hematological: Negative. Psychiatric/Behavioral: Negative. Current Outpatient Medications:     gabapentin (NEURONTIN) 300 MG capsule, TAKE 1 CAPSULE BY MOUTH THREE TIMES DAILY FOR 30 DAYS, Disp: 90 capsule, Rfl: 0    ciprofloxacin (CIPRO) 500 MG tablet, Take 1 tablet by mouth 2 times daily for 10 days, Disp: 20 tablet, Rfl: 0    Liraglutide (VICTOZA SC), Inject 1.8 mg into the skin daily, Disp: , Rfl:     BELBUCA 450 MCG FILM, Take 450 mcg by mouth every 12 hours for 30 days. , Disp: 60 each, Rfl: 1    bumetanide (BUMEX) 2 MG tablet, Take 1 tablet by mouth 2 times daily, Disp: 30 tablet, Rfl: 3    potassium chloride (KLOR-CON M) 20 MEQ extended release tablet, Take 1 tablet by mouth 2 times daily (with meals), Disp: 60 tablet, Rfl: 3    buPROPion (WELLBUTRIN XL) 150 MG extended release tablet, Take 1 tablet by mouth every morning, Disp: 90 tablet, Rfl: 3    baclofen (LIORESAL) 10 MG tablet, Take 1 tablet by mouth 3 times daily Tired   Do not drive, Disp: 30 tablet, Rfl: 5    rOPINIRole (REQUIP) 2 MG tablet, Take 1 tablet by mouth 4 times daily, Disp: 360 tablet, Rfl: 12    ammonium lactate (LAC-HYDRIN) 12 % cream, Apply topically as needed. , Disp: 385 g, Rfl: 2    Cream Base (VERSAPRO) CREA, APPLY ONE (1) GRAM (ONE PUMP) EXTERNALLY FOUR TIMES A DAY TO EACH KNEE, Disp: 200 g, Rfl: 1  No Known Allergies    Social History     Socioeconomic History    Marital status:      Spouse name: Not on file    Number of children: Not on file    Years of education: Not on file    Highest education level: Not on file   Occupational History    Not on file   Social Needs    Financial resource strain: Not very hard    Food insecurity     Worry: Never true     Inability: Never true    Transportation needs     Medical: No     Non-medical: No   Tobacco Use    Smoking status: Former Smoker     Packs/day: 0.25     Years: 38.00     Pack years: 9.50     Types: Cigarettes     Quit date: 3/11/2021     Years since quittin.1    Smokeless tobacco: Never Used    Tobacco comment: Pt states she quit \"Cold Turkey\"   Substance and Sexual Activity    Alcohol use: No    Drug use: No    Sexual activity: Not Currently   Lifestyle    Physical activity     Days per week: Not on file     Minutes per session: Not on file    Stress: Only a little   Relationships    Social connections     Talks on phone: More than three times a week     Gets together: Not on file     Attends Jehovah's witness service: Not on file     Active member of club or organization: Not on file     Attends meetings of clubs or organizations: Not on file     Relationship status: Not on file    Intimate partner violence     Fear of current or ex partner: Not on file     Emotionally abused: Not on file     Physically abused: Not on file     Forced sexual activity: Not on file   Other Topics Concern    Not on file   Social History Narrative        Chronic Diagnosis: Iron deficiency anemia, Depressive disorder, Benign essential hypertension, Mixed    hyperlipidemia.     HTN    OBESITY    LIPID    OA    SMOKER    CVA L FIELD VISION    DEPRESSION    GASTRIC BYPASS 01    BREAST REDUCTION--    Yousif Cotton  1955 Page #2    ANEMIA==IRON AND B-12    Admitted 2019 with cellulitis left lower extremity then readmitted with chest pain with negative stress test      Past Medical History:   Diagnosis Date    Anemia     Arthritis     Cellulitis 2015    left leg    Chronic ulcer of leg with fat layer exposed ([facility] 2015    Chronic ulcer of right leg, limited to breakdown of skin ([facility] 2016    Depression     Low back pain     Lymphedema of both lower extremities 2015    Lymphedema of lower extremity 2015    Obesity     Osteoarthritis     Peripheral vision loss     Stroke [facility])     Type 2 diabetes mellitus without complication, without long-term current use of insulin ([facility] [address] 2019    Ulcer of left lower leg, limited to breakdown of skin ([address] 6/10/2019    Ulcer of left lower leg, limited to breakdown of skin ([address] 6/10/2019     Past Surgical History:   Procedure Laterality Date    ABDOMINOPLASTY  2002    BREAST REDUCTION SURGERY      reduction    CATARACT REMOVAL      bilateral     SECTION      x2    COLONOSCOPY  2012    and egd    COLONOSCOPY  2021    polyps; marginal prep--jessica    COLONOSCOPY N/A 3/27/2021    COLONOSCOPY WITH BIOPSY performed by Sincere George MD at [address] ECHOCARDIOGRAM COMPLETE 2D W DOPPLER W COLOR  2012         GASTRIC BYPASS SURGERY  2000    HERNIA REPAIR          UPPER GASTROINTESTINAL ENDOSCOPY  2021    minimal pouch gastritis--jessica    UPPER GASTROINTESTINAL ENDOSCOPY N/A 3/27/2021    EGD ESOPHAGOGASTRODUODENOSCOPY performed by Sincere George MD at [facility] History   Problem Relation Age of Onset    Breast Cancer Mother     Stroke Father     Hypertension Sister     Hypertension Brother       Past Surgical History:   Procedure Laterality Date    ABDOMINOPLASTY  2002    BREAST REDUCTION SURGERY      reduction    CATARACT REMOVAL      bilateral     SECTION      x2    COLONOSCOPY  2012    and egd    COLONOSCOPY  2021    polyps; marginal prep--jessica    COLONOSCOPY N/A 3/27/2021    COLONOSCOPY WITH BIOPSY performed by Sincere George MD at [address] ECHOCARDIOGRAM COMPLETE 2D W DOPPLER W COLOR  2012         GASTRIC BYPASS SURGERY  2000    HERNIA REPAIR          UPPER GASTROINTESTINAL ENDOSCOPY  2021    minimal pouch gastritis--jessica    UPPER GASTROINTESTINAL ENDOSCOPY N/A 3/27/2021    EGD ESOPHAGOGASTRODUODENOSCOPY performed by Sincere George MD at [address] [facility] [address] History   Administered Date(s) Administered    COVID-19, Pfizer, PF, 30mcg/0.3mL 02/25/2021, 03/18/2021    Influenza Virus Vaccine 10/04/2017, 11/20/2018, 11/06/2019    Influenza, Quadv, IM, PF (6 mo and older Fluzone, Flulaval, Fluarix, and 3 yrs and older Afluria) 11/02/2020    Pneumococcal Polysaccharide ([id number]) 11/02/2020     Health Maintenance   Topic Date Due    Hepatitis C screen  Never done    Diabetic foot exam  Never done    Diabetic retinal exam  Never done    HIV screen  Never done    Diabetic microalbuminuria test  Never done    DTaP/Tdap/Td vaccine (1 - Tdap) Never done    Cervical cancer screen  Never done    Shingles Vaccine (1 of 2) Never done    Breast cancer screen  10/24/2013    A1C test (Diabetic or Prediabetic)  03/10/2022    Lipid screen  03/10/2022    Potassium monitoring  04/23/2022    Creatinine monitoring  04/23/2022    Colon cancer screen colonoscopy  03/27/2031    Flu vaccine  Completed    Pneumococcal 0-64 years Vaccine  Completed    COVID-19 Vaccine  Completed    Hepatitis A vaccine  Aged Out    Hib vaccine  Aged Out    Meningococcal (ACWY) vaccine  Aged Out         There were no vitals filed for this visit. Objective:    Physical Exam  Constitutional:       General: She is not in acute distress. Appearance: Normal appearance. HENT:      Head: Normocephalic. Right Ear: External ear normal.      Left Ear: External ear normal.   Eyes:      General:         Right eye: No discharge. Left eye: No discharge. Extraocular Movements: Extraocular movements intact. Neck:      Musculoskeletal: Neck supple. No neck rigidity. Pulmonary:      Effort: Pulmonary effort is normal. No respiratory distress. Musculoskeletal: Normal range of motion. Skin:     Findings: No bruising or rash. Neurological:      Mental Status: She is alert and oriented to person, place, and time.    Psychiatric:         Mood and Affect: Mood and affect normal.         Speech: Speech normal. Behavior: Behavior normal. Behavior is cooperative. Thought Content: Thought content normal.         Judgment: Judgment normal.         Anyi Perdomo was seen today for other. Diagnoses and all orders for this visit:    COVID-19    Lymphedema of both lower extremities        Comments: isoalte for ten days    A great deal of time spent reviewing medications, diet, exercise, social issues. Also reviewing the chart before entering the room with patient and finishing charting after leaving patient's room. More than half of that time was spent face to face with the patient in counseling and coordinating care. .  Over-the-counter zinc and vitamin C. Purchase an oximeter and call if oxygen saturation less than 90%. If any temperature over 102 degree, shortness of breath,  chest pain go to the emergency room. Complete isolation until results are back from the Covid testing. Do not work until results are back. A great deal of time spent reviewing medications, diet, exercise, social issues. Also reviewing the chart before entering the room with patient and finishing charting after leaving patient's room. More than half of that time was spent face to face with the patient in counseling and coordinating care. The patient is being evaluated by a Virtual Visit (video visit) encounter to address concerns as mentioned above. A caregiver was present when appropriate. Due to this being a TeleHealth encounter ([facility] public health emergency), evaluation of the following organ systems was limited: Vitals/Constitutional/EENT/Resp/CV/GI//MS/Neuro/Skin/Heme-Lymph-Imm.   Pursuant to the emergency declaration under the [facility], [address] [place] [address] authority and the [employer] Act, this Virtual Visit was conducted with patient's (and/or legal guardian's) consent, to reduce the patient's risk of exposure to COVID-19 and provide necessary medical care. The patient (and/or legal guardian) has also been advised to contact this office for worsening conditions or problems, and seek emergency medical treatment and/or call 911 if deemed necessary. Patient identification was verified at the start of the visit: Yes    Total time spent on this encounter: Not billed by time    Services were provided through a video synchronous discussion virtually to substitute for in-person clinic visit. Patient and provider were located at their individual homes. Follow Up: Return for Reg Appt.      Seen by:  Yohan Hernandez DO

## 2021-04-27 NOTE — TELEPHONE ENCOUNTER
Left detailed message on voice mailed.   Please call back to schedule VV with Dr. Radha Villar for today

## 2021-04-28 NOTE — PROGRESS NOTES
Patient agreed to COVID test on 05/07/2021 at the  [address] [place] [address] between the hours of 6 am- 10 am located at  [address] [place] [address]. Patient instructed to bring ID. Patient instructed to self isolate until day of surgery.

## 2021-04-29 ENCOUNTER — PREP FOR PROCEDURE (OUTPATIENT)
Dept: NEUROSURGERY | Age: 66
End: 2021-04-29

## 2021-04-29 RX ORDER — SODIUM CHLORIDE 0.9 % (FLUSH) 0.9 %
10 SYRINGE (ML) INJECTION PRN
Status: CANCELLED | OUTPATIENT
Start: 2021-04-29

## 2021-04-29 RX ORDER — SODIUM CHLORIDE 9 MG/ML
INJECTION, SOLUTION INTRAVENOUS CONTINUOUS
Status: CANCELLED | OUTPATIENT
Start: 2021-04-29

## 2021-04-29 RX ORDER — SODIUM CHLORIDE 9 MG/ML
25 INJECTION, SOLUTION INTRAVENOUS PRN
Status: CANCELLED | OUTPATIENT
Start: 2021-04-29

## 2021-04-29 RX ORDER — SODIUM CHLORIDE 0.9 % (FLUSH) 0.9 %
10 SYRINGE (ML) INJECTION EVERY 12 HOURS SCHEDULED
Status: CANCELLED | OUTPATIENT
Start: 2021-04-29

## 2021-05-06 RX ORDER — EPINEPHRINE 1 MG/ML
0.3 INJECTION, SOLUTION, CONCENTRATE INTRAVENOUS PRN
Status: CANCELLED | OUTPATIENT
Start: 2021-05-10

## 2021-05-06 RX ORDER — DIPHENHYDRAMINE HYDROCHLORIDE 50 MG/ML
50 INJECTION INTRAMUSCULAR; INTRAVENOUS ONCE
Status: CANCELLED | OUTPATIENT
Start: 2021-05-10 | End: 2021-05-10

## 2021-05-06 RX ORDER — SODIUM CHLORIDE 9 MG/ML
25 INJECTION, SOLUTION INTRAVENOUS PRN
Status: CANCELLED | OUTPATIENT
Start: 2021-05-10

## 2021-05-06 RX ORDER — HEPARIN SODIUM (PORCINE) LOCK FLUSH IV SOLN 100 UNIT/ML 100 UNIT/ML
500 SOLUTION INTRAVENOUS PRN
Status: CANCELLED | OUTPATIENT
Start: 2021-05-10

## 2021-05-06 RX ORDER — SODIUM CHLORIDE 9 MG/ML
INJECTION, SOLUTION INTRAVENOUS CONTINUOUS
Status: CANCELLED | OUTPATIENT
Start: 2021-05-10

## 2021-05-06 RX ORDER — METHYLPREDNISOLONE SODIUM SUCCINATE 125 MG/2ML
125 INJECTION, POWDER, LYOPHILIZED, FOR SOLUTION INTRAMUSCULAR; INTRAVENOUS ONCE
Status: CANCELLED | OUTPATIENT
Start: 2021-05-10 | End: 2021-05-10

## 2021-05-06 RX ORDER — SODIUM CHLORIDE 0.9 % (FLUSH) 0.9 %
5-40 SYRINGE (ML) INJECTION PRN
Status: CANCELLED | OUTPATIENT
Start: 2021-05-10

## 2021-05-06 NOTE — PROGRESS NOTES
[facility] PRE-ADMISSION TESTING GENERAL INSTRUCTIONS- [facility]-phone number:[phone]    GENERAL INSTRUCTIONS  [x] Antibacterial Soap shower Night before Surgery  [x] Gaurang wipe instruction sheet and wipes given. [x] Nothing by mouth after midnight, including gum, candy, mints, or water. .    [x]No smoking, chewing tobacco, illegal drugs, or alcohol within 24 hours of your surgery. [x] Jewelry, valuables or body piercing's should not be brought to the hospital. All body and/or tongue piercing's must be removed prior to arriving to hospital.  ALL hair pins must be removed. [x] Do not wear makeup, lotions, powders, deodorant. Nail polish as directed by the nurse. [x] Arrange transportation with a responsible adult  to and from the hospital..  [x] Transfusion Bracelet: Please bring with you to hospital, day of surgery. PARKING INSTRUCTIONS:   [x] Arrival Time:______0500 FOR SURGERY AT 0700 AM WITH DR Ernestine Deutsch 5-13 THURSDAY_______  · [] Parking lot '\"I\"  is located on [facility] (the corner of [facility] and [facility]). To enter, press the button and the gate will lift. A free token will be provided to exit the lot    EDUCATION INSTRUCTIONS:    . Naila Flores [x] Pre-admission Testing educational folder given  [x] Incentive Spirometry,coughing & deep breathing exercises reviewed. [x]Fluoroscopy-Xray used in surgery reviewed with patient. Educational pamphlet placed in chart. [x]Pain: Post-op pain is normal and to be expected. You will be asked to rate your pain from 0-10(a zero is not acceptable-education is needed). Your post-op pain goal is:  [x] Ask your nurse for your pain medication.  [][][] Other:___________________________    MEDICATION INSTRUCTIONS:   [x]Bring a complete list of your medications, please write the last time you took the medicine, give this list to the nurse. SEE MED SHEET  [x] Take the following medications the morning of surgery with 1-2 ounces of water: GABAPENTIN Carmel Susi. [x] DO NOT take any diabetic medicine the morning of surgery. Follow instructions for insulin the day before surgery. [x] If you are diabetic and your blood sugar is low or you feel symptomatic, you may drink 1-2 ounces of apple juice or take a glucose tablet. The morning of your procedure, you may call the pre-op area if you have concerns about your blood sugar [phone]. [x] Follow physician instructions regarding any blood thinners you may be taking. NONE  WHAT TO EXPECT:  [x] The day of surgery you will be greeted and checked in by the [employer].  In addition, you will be registered in the [facility] by a Patient Access Representative. Please bring your photo ID and insurance card. A nurse will greet you in accordance to the time you are needed in the pre-op area to prepare you for surgery. Please do not be discouraged if you are not greeted in the order you arrive as there are many variables that are involved in patient preparation. Your patience is greatly appreciated as you wait for your nurse. Please bring in items such as: books, magazines, newspapers, electronics, or any other items  to occupy your time in the waiting area.

## 2021-05-07 ENCOUNTER — HOSPITAL ENCOUNTER (OUTPATIENT)
Age: 66
Discharge: HOME | End: 2021-05-09
Payer: COMMERCIAL

## 2021-05-07 DIAGNOSIS — U07.1 COVID-19: ICD-10-CM

## 2021-05-07 PROCEDURE — U0003 INFECTIOUS AGENT DETECTION BY NUCLEIC ACID (DNA OR RNA); SEVERE ACUTE RESPIRATORY SYNDROME CORONAVIRUS 2 (SARS-COV-2) (CORONAVIRUS DISEASE [COVID-19]), AMPLIFIED PROBE TECHNIQUE, MAKING USE OF HIGH THROUGHPUT TECHNOLOGIES AS DESCRIBED BY CMS-2020-01-R: HCPCS

## 2021-05-07 PROCEDURE — U0005 INFEC AGEN DETEC AMPLI PROBE: HCPCS

## 2021-05-08 LAB
SARS-COV-2: NOT DETECTED
SOURCE: NORMAL

## 2021-05-10 ENCOUNTER — OFFICE VISIT (OUTPATIENT)
Dept: ONCOLOGY | Age: 66
End: 2021-05-10
Payer: COMMERCIAL

## 2021-05-10 ENCOUNTER — HOSPITAL ENCOUNTER (OUTPATIENT)
Dept: INFUSION THERAPY | Age: 66
Discharge: HOME | End: 2021-05-10
Payer: COMMERCIAL

## 2021-05-10 ENCOUNTER — HOSPITAL ENCOUNTER (OUTPATIENT)
Dept: PREADMISSION TESTING | Age: 66
Discharge: HOME | End: 2021-05-10
Payer: COMMERCIAL

## 2021-05-10 VITALS
SYSTOLIC BLOOD PRESSURE: 186 MMHG | BODY MASS INDEX: 52.81 KG/M2 | WEIGHT: 287 LBS | HEART RATE: 104 BPM | DIASTOLIC BLOOD PRESSURE: 96 MMHG | HEIGHT: 62 IN | TEMPERATURE: 97.2 F | OXYGEN SATURATION: 90 %

## 2021-05-10 VITALS
OXYGEN SATURATION: 97 % | BODY MASS INDEX: 51.53 KG/M2 | WEIGHT: 280 LBS | DIASTOLIC BLOOD PRESSURE: 91 MMHG | HEART RATE: 94 BPM | SYSTOLIC BLOOD PRESSURE: 193 MMHG | TEMPERATURE: 97.7 F | HEIGHT: 62 IN | RESPIRATION RATE: 20 BRPM

## 2021-05-10 VITALS — DIASTOLIC BLOOD PRESSURE: 84 MMHG | RESPIRATION RATE: 18 BRPM | SYSTOLIC BLOOD PRESSURE: 181 MMHG | HEART RATE: 79 BPM

## 2021-05-10 DIAGNOSIS — I10 ESSENTIAL HYPERTENSION: Primary | ICD-10-CM

## 2021-05-10 DIAGNOSIS — D50.9 IRON DEFICIENCY ANEMIA, UNSPECIFIED IRON DEFICIENCY ANEMIA TYPE: ICD-10-CM

## 2021-05-10 DIAGNOSIS — D50.9 IRON DEFICIENCY ANEMIA, UNSPECIFIED IRON DEFICIENCY ANEMIA TYPE: Primary | ICD-10-CM

## 2021-05-10 DIAGNOSIS — Z01.818 PREOP TESTING: Primary | ICD-10-CM

## 2021-05-10 DIAGNOSIS — Z01.818 PRE-OP TESTING: ICD-10-CM

## 2021-05-10 DIAGNOSIS — D50.9 MICROCYTIC ANEMIA: Primary | ICD-10-CM

## 2021-05-10 DIAGNOSIS — D50.8 OTHER IRON DEFICIENCY ANEMIA: ICD-10-CM

## 2021-05-10 LAB
ABO/RH: NORMAL
ALBUMIN SERPL-MCNC: 3.5 G/DL (ref 3.5–5.2)
ALP BLD-CCNC: 113 U/L (ref 35–104)
ALT SERPL-CCNC: 19 U/L (ref 0–32)
ANION GAP SERPL CALCULATED.3IONS-SCNC: 8 MMOL/L (ref 7–16)
ANISOCYTOSIS: ABNORMAL
ANTIBODY SCREEN: NORMAL
AST SERPL-CCNC: 18 U/L (ref 0–31)
BASOPHILS ABSOLUTE: 0.02 E9/L (ref 0–0.2)
BASOPHILS RELATIVE PERCENT: 0.2 % (ref 0–2)
BILIRUB SERPL-MCNC: 0.2 MG/DL (ref 0–1.2)
BILIRUBIN URINE: NEGATIVE
BLOOD, URINE: NEGATIVE
BUN BLDV-MCNC: 14 MG/DL (ref 6–23)
CALCIUM SERPL-MCNC: 8.7 MG/DL (ref 8.6–10.2)
CHLORIDE BLD-SCNC: 104 MMOL/L (ref 98–107)
CLARITY: CLEAR
CO2: 27 MMOL/L (ref 22–29)
COLOR: YELLOW
CREAT SERPL-MCNC: 0.6 MG/DL (ref 0.5–1)
EOSINOPHILS ABSOLUTE: 0.11 E9/L (ref 0.05–0.5)
EOSINOPHILS RELATIVE PERCENT: 1.3 % (ref 0–6)
GFR AFRICAN AMERICAN: >60
GFR NON-AFRICAN AMERICAN: >60 ML/MIN/1.73
GLUCOSE BLD-MCNC: 72 MG/DL (ref 74–99)
GLUCOSE URINE: NEGATIVE MG/DL
HCT VFR BLD CALC: 38.1 % (ref 34–48)
HEMOGLOBIN: 10.6 G/DL (ref 11.5–15.5)
HYPOCHROMIA: ABNORMAL
IMMATURE GRANULOCYTES #: 0.04 E9/L
IMMATURE GRANULOCYTES %: 0.5 % (ref 0–5)
INR BLD: 1
KETONES, URINE: NEGATIVE MG/DL
LEUKOCYTE ESTERASE, URINE: NEGATIVE
LYMPHOCYTES ABSOLUTE: 1.46 E9/L (ref 1.5–4)
LYMPHOCYTES RELATIVE PERCENT: 17.7 % (ref 20–42)
MCH RBC QN AUTO: 20.3 PG (ref 26–35)
MCHC RBC AUTO-ENTMCNC: 27.8 % (ref 32–34.5)
MCV RBC AUTO: 73.1 FL (ref 80–99.9)
MONOCYTES ABSOLUTE: 0.56 E9/L (ref 0.1–0.95)
MONOCYTES RELATIVE PERCENT: 6.8 % (ref 2–12)
NEUTROPHILS ABSOLUTE: 6.06 E9/L (ref 1.8–7.3)
NEUTROPHILS RELATIVE PERCENT: 73.5 % (ref 43–80)
NITRITE, URINE: NEGATIVE
OVALOCYTES: ABNORMAL
PDW BLD-RTO: 25.4 FL (ref 11.5–15)
PH UA: 6 (ref 5–9)
PLATELET # BLD: 183 E9/L (ref 130–450)
PMV BLD AUTO: ABNORMAL FL (ref 7–12)
POIKILOCYTES: ABNORMAL
POLYCHROMASIA: ABNORMAL
POTASSIUM SERPL-SCNC: 4.1 MMOL/L (ref 3.5–5)
PROTEIN UA: NEGATIVE MG/DL
PROTHROMBIN TIME: 11.1 SEC (ref 9.3–12.4)
RBC # BLD: 5.21 E12/L (ref 3.5–5.5)
SODIUM BLD-SCNC: 139 MMOL/L (ref 132–146)
SPECIFIC GRAVITY UA: 1.02 (ref 1–1.03)
TOTAL PROTEIN: 6.9 G/DL (ref 6.4–8.3)
UROBILINOGEN, URINE: 0.2 E.U./DL
WBC # BLD: 8.3 E9/L (ref 4.5–11.5)

## 2021-05-10 PROCEDURE — 81003 URINALYSIS AUTO W/O SCOPE: CPT

## 2021-05-10 PROCEDURE — 6360000002 HC RX W HCPCS: Performed by: NURSE PRACTITIONER

## 2021-05-10 PROCEDURE — 99214 OFFICE O/P EST MOD 30 MIN: CPT | Performed by: INTERNAL MEDICINE

## 2021-05-10 PROCEDURE — 80053 COMPREHEN METABOLIC PANEL: CPT

## 2021-05-10 PROCEDURE — 36415 COLL VENOUS BLD VENIPUNCTURE: CPT

## 2021-05-10 PROCEDURE — 85025 COMPLETE CBC W/AUTO DIFF WBC: CPT

## 2021-05-10 PROCEDURE — 2580000003 HC RX 258: Performed by: NURSE PRACTITIONER

## 2021-05-10 PROCEDURE — 6360000002 HC RX W HCPCS

## 2021-05-10 PROCEDURE — 86901 BLOOD TYPING SEROLOGIC RH(D): CPT

## 2021-05-10 PROCEDURE — 96365 THER/PROPH/DIAG IV INF INIT: CPT

## 2021-05-10 PROCEDURE — 6360000002 HC RX W HCPCS: Performed by: INTERNAL MEDICINE

## 2021-05-10 PROCEDURE — 86850 RBC ANTIBODY SCREEN: CPT

## 2021-05-10 PROCEDURE — 85610 PROTHROMBIN TIME: CPT

## 2021-05-10 PROCEDURE — 99212 OFFICE O/P EST SF 10 MIN: CPT

## 2021-05-10 PROCEDURE — 2580000003 HC RX 258

## 2021-05-10 PROCEDURE — 96375 TX/PRO/DX INJ NEW DRUG ADDON: CPT

## 2021-05-10 PROCEDURE — 87088 URINE BACTERIA CULTURE: CPT

## 2021-05-10 PROCEDURE — 86900 BLOOD TYPING SEROLOGIC ABO: CPT

## 2021-05-10 PROCEDURE — 6370000000 HC RX 637 (ALT 250 FOR IP): Performed by: NURSE PRACTITIONER

## 2021-05-10 RX ORDER — DIPHENHYDRAMINE HYDROCHLORIDE 50 MG/ML
50 INJECTION INTRAMUSCULAR; INTRAVENOUS ONCE
Status: CANCELLED | OUTPATIENT
Start: 2021-05-17 | End: 2021-05-17

## 2021-05-10 RX ORDER — SODIUM CHLORIDE 9 MG/ML
25 INJECTION, SOLUTION INTRAVENOUS PRN
Status: CANCELLED | OUTPATIENT
Start: 2021-05-17

## 2021-05-10 RX ORDER — SODIUM CHLORIDE 9 MG/ML
INJECTION, SOLUTION INTRAVENOUS CONTINUOUS
Status: CANCELLED | OUTPATIENT
Start: 2021-05-17

## 2021-05-10 RX ORDER — HYDRALAZINE HYDROCHLORIDE 25 MG/1
25 TABLET, FILM COATED ORAL ONCE
Status: DISCONTINUED | OUTPATIENT
Start: 2021-05-10 | End: 2021-05-10

## 2021-05-10 RX ORDER — EPINEPHRINE 1 MG/ML
0.3 INJECTION, SOLUTION, CONCENTRATE INTRAVENOUS PRN
Status: CANCELLED | OUTPATIENT
Start: 2021-05-17

## 2021-05-10 RX ORDER — SODIUM CHLORIDE 0.9 % (FLUSH) 0.9 %
5-40 SYRINGE (ML) INJECTION PRN
Status: DISCONTINUED | OUTPATIENT
Start: 2021-05-10 | End: 2021-05-10

## 2021-05-10 RX ORDER — SODIUM CHLORIDE 9 MG/ML
INJECTION, SOLUTION INTRAVENOUS CONTINUOUS
Status: DISCONTINUED | OUTPATIENT
Start: 2021-05-10 | End: 2021-05-10

## 2021-05-10 RX ORDER — SODIUM CHLORIDE 0.9 % (FLUSH) 0.9 %
5-40 SYRINGE (ML) INJECTION PRN
Status: DISCONTINUED | OUTPATIENT
Start: 2021-05-10 | End: 2021-05-11 | Stop reason: HOSPADM

## 2021-05-10 RX ORDER — HYDRALAZINE HYDROCHLORIDE 25 MG/1
25 TABLET, FILM COATED ORAL ONCE
Status: COMPLETED | OUTPATIENT
Start: 2021-05-10 | End: 2021-05-10

## 2021-05-10 RX ORDER — CYANOCOBALAMIN 1000 UG/ML
1000 INJECTION INTRAMUSCULAR; SUBCUTANEOUS ONCE
Status: CANCELLED
Start: 2021-06-07

## 2021-05-10 RX ORDER — EPINEPHRINE 1 MG/ML
0.3 INJECTION, SOLUTION, CONCENTRATE INTRAVENOUS PRN
Status: CANCELLED | OUTPATIENT
Start: 2021-05-10

## 2021-05-10 RX ORDER — HYDRALAZINE HYDROCHLORIDE 25 MG/1
25 TABLET, FILM COATED ORAL ONCE
Status: DISCONTINUED | OUTPATIENT
Start: 2021-05-10 | End: 2021-05-20 | Stop reason: ALTCHOICE

## 2021-05-10 RX ORDER — SODIUM CHLORIDE 0.9 % (FLUSH) 0.9 %
5-40 SYRINGE (ML) INJECTION PRN
Status: CANCELLED | OUTPATIENT
Start: 2021-05-17

## 2021-05-10 RX ORDER — SODIUM CHLORIDE 9 MG/ML
25 INJECTION, SOLUTION INTRAVENOUS PRN
Status: CANCELLED | OUTPATIENT
Start: 2021-05-10

## 2021-05-10 RX ORDER — DIPHENHYDRAMINE HCL 50 MG
50 CAPSULE ORAL ONCE
Qty: 1 CAPSULE | Refills: 0 | Status: SHIPPED | OUTPATIENT
Start: 2021-05-10 | End: 2021-05-10

## 2021-05-10 RX ORDER — DIPHENHYDRAMINE HYDROCHLORIDE 50 MG/ML
INJECTION INTRAMUSCULAR; INTRAVENOUS
Status: COMPLETED
Start: 2021-05-10 | End: 2021-05-10

## 2021-05-10 RX ORDER — HEPARIN SODIUM (PORCINE) LOCK FLUSH IV SOLN 100 UNIT/ML 100 UNIT/ML
500 SOLUTION INTRAVENOUS PRN
Status: CANCELLED | OUTPATIENT
Start: 2021-05-17

## 2021-05-10 RX ORDER — SODIUM CHLORIDE 9 MG/ML
INJECTION, SOLUTION INTRAVENOUS CONTINUOUS
Status: DISCONTINUED | OUTPATIENT
Start: 2021-05-10 | End: 2021-05-11 | Stop reason: HOSPADM

## 2021-05-10 RX ORDER — METHYLPREDNISOLONE SODIUM SUCCINATE 125 MG/2ML
125 INJECTION, POWDER, LYOPHILIZED, FOR SOLUTION INTRAMUSCULAR; INTRAVENOUS ONCE
Status: CANCELLED | OUTPATIENT
Start: 2021-05-17 | End: 2021-05-17

## 2021-05-10 RX ORDER — CYANOCOBALAMIN 1000 UG/ML
1000 INJECTION INTRAMUSCULAR; SUBCUTANEOUS ONCE
Status: COMPLETED
Start: 2021-05-10 | End: 2021-05-10

## 2021-05-10 RX ORDER — DIPHENHYDRAMINE HYDROCHLORIDE 50 MG/ML
50 INJECTION INTRAMUSCULAR; INTRAVENOUS ONCE
Status: COMPLETED | OUTPATIENT
Start: 2021-05-10 | End: 2021-05-10

## 2021-05-10 RX ORDER — DEXAMETHASONE 0.5 MG/1
0.5 TABLET ORAL ONCE
Qty: 1 TABLET | Refills: 0 | Status: SHIPPED | OUTPATIENT
Start: 2021-05-10 | End: 2021-05-10

## 2021-05-10 RX ORDER — HEPARIN SODIUM (PORCINE) LOCK FLUSH IV SOLN 100 UNIT/ML 100 UNIT/ML
500 SOLUTION INTRAVENOUS PRN
Status: CANCELLED | OUTPATIENT
Start: 2021-05-10

## 2021-05-10 RX ORDER — SODIUM CHLORIDE 9 MG/ML
INJECTION, SOLUTION INTRAVENOUS
Status: COMPLETED
Start: 2021-05-10 | End: 2021-05-10

## 2021-05-10 RX ORDER — METHYLPREDNISOLONE SODIUM SUCCINATE 125 MG/2ML
125 INJECTION, POWDER, LYOPHILIZED, FOR SOLUTION INTRAMUSCULAR; INTRAVENOUS ONCE
Status: CANCELLED | OUTPATIENT
Start: 2021-05-10 | End: 2021-05-10

## 2021-05-10 RX ADMIN — SODIUM CHLORIDE 250 ML: 9 INJECTION, SOLUTION INTRAVENOUS at 14:03

## 2021-05-10 RX ADMIN — CYANOCOBALAMIN 1000 MCG: 1000 INJECTION, SOLUTION INTRAMUSCULAR; SUBCUTANEOUS at 14:48

## 2021-05-10 RX ADMIN — HYDROCORTISONE SODIUM SUCCINATE 100 MG: 100 INJECTION, POWDER, FOR SOLUTION INTRAMUSCULAR; INTRAVENOUS at 14:17

## 2021-05-10 RX ADMIN — HYDRALAZINE HYDROCHLORIDE 25 MG: 25 TABLET, FILM COATED ORAL at 14:53

## 2021-05-10 RX ADMIN — FERUMOXYTOL 510 MG: 510 INJECTION INTRAVENOUS at 14:03

## 2021-05-10 RX ADMIN — DIPHENHYDRAMINE HYDROCHLORIDE 50 MG: 50 INJECTION, SOLUTION INTRAMUSCULAR; INTRAVENOUS at 14:12

## 2021-05-10 RX ADMIN — DIPHENHYDRAMINE HYDROCHLORIDE 50 MG: 50 INJECTION INTRAMUSCULAR; INTRAVENOUS at 14:12

## 2021-05-10 ASSESSMENT — PAIN DESCRIPTION - ORIENTATION: ORIENTATION: RIGHT;LEFT;LOWER

## 2021-05-10 ASSESSMENT — PAIN DESCRIPTION - PAIN TYPE: TYPE: CHRONIC PAIN

## 2021-05-10 ASSESSMENT — PAIN SCALES - GENERAL: PAINLEVEL_OUTOF10: 8

## 2021-05-10 NOTE — PROGRESS NOTES
Patient felt SOB and became flushed with administration of Feraheme dose #1. She was placed on 6L NC and vital signs remained stable. Infusion stopped and she was given benadryl and solucortef. Of note, patient's sister had similar reaction with her first Feraheme infusion. Patient tolerated Ferrlecit in the hospital.   She will take 50 mg of Benadryl and 50 mg of Decadron before her next infusion on Wednesday.   Patient with elevated BP-ordered hydralazine one time and to follow with PCP

## 2021-05-10 NOTE — PROGRESS NOTES
Pt presents to clinic for iron infusion. During which she had a reaction. Please see Monse Landa NP note. Pt is resting with no distress and was observed for 30 minutes post treatment. Pt BP still elevated even after hydrazine administration. Monse Landa NP notified. Pt advised to follow up with PCP. She denies any questions at this time, discharged ambulatory in stable condition.

## 2021-05-10 NOTE — PROGRESS NOTES
(urinary tract infection) 04/2021     Medications:  Reviewed and reconciled. Allergies:  No Known Allergies    Physical Exam:  BP (!) 186/96 (Site: Right Lower Arm, Position: Sitting, Cuff Size: Medium Adult)   Pulse 104   Temp 97.2 °F (36.2 °C) (Temporal)   Ht 5' 2\" (1.575 m)   Wt 287 lb (130.2 kg)   SpO2 90%   BMI 52.49 kg/m²   GENERAL: Alert, oriented x 3,tired  HEENT: PERRLA; EOMI. Oropharynx clear. NECK: Supple. Without lymphadenopathy. LUNGS: Good air entry bilaterally. No wheezing, crackles or rhonchi. CARDIOVASCULAR: Regular rate. No murmurs, rubs or gallops. ABDOMEN: Soft. Non-tender, non-distended. Positive bowel sounds. EXTREMITIES: Without clubbing or cyanosis or edema. NEUROLOGIC: No focal deficits. Impression/Plan:  76-year-old woman with hx of iron deficiency anemia, vitamin B12 deficiency anemia, and hx of gastric bypass surgery in 2000 by Dr. Benjamin Grady    Recently admitted to the hospital for lower extremity cellulitis. She was found to have iron deficiency anemia b12 deficiency and received 4 doses IV Ferrlecit and 4 doses of VitB12 injections. EGD/Colonoscopy by Dr. Lizbeth Almonte 03/27/2021 showing gastritis, 3 mm mid-transverse colon polyp, 4 mm distal transverse colon polyp. A.  Mid transverse colon, biopsy: Fragments of tubular adenoma   B.  Distal transverse colon, biopsy: Fragments of tubular adenoma     On 04/15/2021  Hb 9.6  Hct 28.5  MCV 72.6   WBC 5.7       Peripheral blood smear:   Microcytic hypochromic anemia is present with polychromasia and anisocytosis.    Moderate lymphopenia is noted. Other white blood cells are present in normal numbers.    All white blood cells have normal morphology.    Platelets are present in normal number with normal morphology. BUN 10  Creat 0.5  Retic 0.9% (0.4-1.9%)  Fe 19 TIBC 325  FeSat 6%  Ferritin 42    Folate 17.5  VitB12 345  Homocysteine 9.7 (0-15)  Methylmalonic acid pending  SPEP: No monoclonal protein identified. Recommended 2 doses of Feraheme. Side effects reviewed. She agreed to proceed. Dose # 1 Feraheme today 05/10/2021; Dose # 2 Feraheme 05/12/2021. RTC 5 weeks with prior labs and possible Feraheme.  Monthly B12 injections (one today 05/10/2021)    05/10/2021  Jemma Virk MD

## 2021-05-11 NOTE — PROGRESS NOTES
Saw pt in PAT, reviewed:    Surgical Procedure-reviewed procedure and post-op events:pre-op/PACU, Unit admission, PT/OT evaluation, Discharge Little 18 Stay expectations-reviewed importance of early mobilization. Instructions of Spine Precautions given-PT to review on evaluation. Surgical Pathway Booklet-reviewed and given  Post-op/Discharge Instructions-reviewed activity allowances/restrictions  Use of Incentive Spirometer-instructed on importance of use post-op and continued use @ home to prevent complications. Instructions on use given  Setting realistic pain goals-reaching goal before discharge. ORTHOTICS: Has brace, will bring DOS    Discharge Plans- home with self/family care. Verbalized understanding of all instructions and questions answered. Contact number given.     Marilee Hammond RN, Spine Navigator  [phone] Office  [phone] Cell

## 2021-05-12 ENCOUNTER — HOSPITAL ENCOUNTER (OUTPATIENT)
Dept: INFUSION THERAPY | Age: 66
Discharge: HOME | End: 2021-05-12
Payer: COMMERCIAL

## 2021-05-12 VITALS
OXYGEN SATURATION: 95 % | TEMPERATURE: 98 F | RESPIRATION RATE: 18 BRPM | HEART RATE: 74 BPM | SYSTOLIC BLOOD PRESSURE: 161 MMHG | DIASTOLIC BLOOD PRESSURE: 79 MMHG

## 2021-05-12 DIAGNOSIS — D50.9 IRON DEFICIENCY ANEMIA, UNSPECIFIED IRON DEFICIENCY ANEMIA TYPE: ICD-10-CM

## 2021-05-12 DIAGNOSIS — D50.8 OTHER IRON DEFICIENCY ANEMIA: Primary | ICD-10-CM

## 2021-05-12 LAB
ERYTHROPOIETIN: 91 MU/ML (ref 4–27)
URINE CULTURE, ROUTINE: NORMAL

## 2021-05-12 PROCEDURE — 2580000003 HC RX 258: Performed by: NURSE PRACTITIONER

## 2021-05-12 PROCEDURE — 6360000002 HC RX W HCPCS: Performed by: NURSE PRACTITIONER

## 2021-05-12 PROCEDURE — 96365 THER/PROPH/DIAG IV INF INIT: CPT

## 2021-05-12 RX ORDER — SODIUM CHLORIDE 0.9 % (FLUSH) 0.9 %
5-40 SYRINGE (ML) INJECTION PRN
Status: CANCELLED | OUTPATIENT
Start: 2021-05-17

## 2021-05-12 RX ORDER — HEPARIN SODIUM (PORCINE) LOCK FLUSH IV SOLN 100 UNIT/ML 100 UNIT/ML
500 SOLUTION INTRAVENOUS PRN
Status: CANCELLED | OUTPATIENT
Start: 2021-05-17

## 2021-05-12 RX ORDER — DIPHENHYDRAMINE HYDROCHLORIDE 50 MG/ML
25 INJECTION INTRAMUSCULAR; INTRAVENOUS ONCE
Status: CANCELLED
Start: 2021-05-17 | End: 2021-05-17

## 2021-05-12 RX ORDER — SODIUM CHLORIDE 9 MG/ML
INJECTION, SOLUTION INTRAVENOUS CONTINUOUS
Status: CANCELLED | OUTPATIENT
Start: 2021-05-17

## 2021-05-12 RX ORDER — DIPHENHYDRAMINE HYDROCHLORIDE 50 MG/ML
50 INJECTION INTRAMUSCULAR; INTRAVENOUS ONCE
Status: CANCELLED | OUTPATIENT
Start: 2021-05-17 | End: 2021-05-17

## 2021-05-12 RX ORDER — SODIUM CHLORIDE 9 MG/ML
25 INJECTION, SOLUTION INTRAVENOUS PRN
Status: CANCELLED | OUTPATIENT
Start: 2021-05-17

## 2021-05-12 RX ORDER — METHYLPREDNISOLONE SODIUM SUCCINATE 125 MG/2ML
125 INJECTION, POWDER, LYOPHILIZED, FOR SOLUTION INTRAMUSCULAR; INTRAVENOUS ONCE
Status: CANCELLED | OUTPATIENT
Start: 2021-05-17 | End: 2021-05-17

## 2021-05-12 RX ORDER — EPINEPHRINE 1 MG/ML
0.3 INJECTION, SOLUTION, CONCENTRATE INTRAVENOUS PRN
Status: CANCELLED | OUTPATIENT
Start: 2021-05-17

## 2021-05-12 RX ORDER — DEXAMETHASONE SODIUM PHOSPHATE 4 MG/ML
4 INJECTION, SOLUTION INTRA-ARTICULAR; INTRALESIONAL; INTRAMUSCULAR; INTRAVENOUS; SOFT TISSUE ONCE
Status: CANCELLED
Start: 2021-05-12 | End: 2021-05-12

## 2021-05-12 RX ORDER — DEXAMETHASONE SODIUM PHOSPHATE 4 MG/ML
4 INJECTION, SOLUTION INTRA-ARTICULAR; INTRALESIONAL; INTRAMUSCULAR; INTRAVENOUS; SOFT TISSUE ONCE
Status: CANCELLED
Start: 2021-05-17 | End: 2021-05-17

## 2021-05-12 RX ORDER — DIPHENHYDRAMINE HYDROCHLORIDE 50 MG/ML
25 INJECTION INTRAMUSCULAR; INTRAVENOUS ONCE
Status: CANCELLED
Start: 2021-05-12 | End: 2021-05-12

## 2021-05-12 RX ORDER — SODIUM CHLORIDE 9 MG/ML
INJECTION, SOLUTION INTRAVENOUS CONTINUOUS
Status: DISCONTINUED | OUTPATIENT
Start: 2021-05-12 | End: 2021-05-13 | Stop reason: HOSPADM

## 2021-05-12 RX ORDER — SODIUM CHLORIDE 0.9 % (FLUSH) 0.9 %
5-40 SYRINGE (ML) INJECTION PRN
Status: DISCONTINUED | OUTPATIENT
Start: 2021-05-12 | End: 2021-05-13 | Stop reason: HOSPADM

## 2021-05-12 RX ADMIN — FERUMOXYTOL 510 MG: 510 INJECTION INTRAVENOUS at 14:55

## 2021-05-12 RX ADMIN — SODIUM CHLORIDE, PRESERVATIVE FREE 10 ML: 5 INJECTION INTRAVENOUS at 14:54

## 2021-05-12 RX ADMIN — SODIUM CHLORIDE: 9 INJECTION, SOLUTION INTRAVENOUS at 14:55

## 2021-05-12 NOTE — PROGRESS NOTES
Patient is okay to receive Feraheme today one day early due to back surgery 5/13/2021. Patient took Benadryl 50 and Decadron 50 at home so will not receive them here as pre meds.

## 2021-05-12 NOTE — H&P
Brandy Pina is a 60 y.o.  female who presents withlow back pain with radiation down her right leg. Denies injury. Describes the pain as sharp and shooting. Pain starts in her low back and goes down her right leg stopping at the ankle. Ambulation and movement of her right leg makes the pain worse. CT lumbar spine demonstrates lumbar spondylolisthesis and spinal stenosis at L3-L4 for which neurosurgery was consulted.  Denies loss of bowel or bladder, saddle anesthesia, pain down the left leg, numbness, fever, chills, cough, SOB, or chest pain.      Past Medical History        Past Medical History:   Diagnosis Date    Anemia      Arthritis      Cellulitis 2015     left leg    Chronic ulcer of leg with fat layer exposed ([address] 2015    Chronic ulcer of right leg, limited to breakdown of skin ([address] 2016    Depression      Low back pain      Lymphedema of both lower extremities 2015    Lymphedema of lower extremity 2015    Obesity      Osteoarthritis      Peripheral vision loss      Stroke [facility])      Type 2 diabetes mellitus without complication, without long-term current use of insulin ([address] 2019    Ulcer of left lower leg, limited to breakdown of skin ([address] 6/10/2019    Ulcer of left lower leg, limited to breakdown of skin ([address] 6/10/2019         Past Surgical History         Past Surgical History:   Procedure Laterality Date    ABDOMINOPLASTY   2002    BREAST REDUCTION SURGERY         reduction    CATARACT REMOVAL         bilateral     SECTION         x2    COLONOSCOPY   2012     and egd    ECHOCARDIOGRAM COMPLETE 2D W DOPPLER W COLOR   2012          GASTRIC BYPASS SURGERY   2000    HERNIA REPAIR                  Family History         Family History   Problem Relation Age of Onset    Breast Cancer Mother      Stroke Father      Hypertension Sister      Hypertension Brother           Social History               Socioeconomic History    Marital status:        Spouse name: Not on file    Number of children: Not on file    Years of education: Not on file    Highest education level: Not on file   Occupational History    Not on file   Social Needs    Financial resource strain: Not on file    Food insecurity       Worry: Not on file       Inability: Not on file    Transportation needs       Medical: Not on file       Non-medical: Not on file   Tobacco Use    Smoking status: Current Every Day Smoker       Packs/day: 0.25       Years: 38.00       Pack years: 9.50       Types: Cigarettes       Last attempt to quit: 2019       Years since quittin.7    Smokeless tobacco: Never Used   Substance and Sexual Activity    Alcohol use: No    Drug use: No    Sexual activity: Not Currently   Lifestyle    Physical activity       Days per week: Not on file       Minutes per session: Not on file    Stress: Not on file   Relationships    Social connections       Talks on phone: Not on file       Gets together: Not on file       Attends [employer] service: Not on file       Active member of club or organization: Not on file       Attends meetings of clubs or organizations: Not on file       Relationship status: Not on file    Intimate partner violence       Fear of current or ex partner: Not on file       Emotionally abused: Not on file       Physically abused: Not on file       Forced sexual activity: Not on file   Other Topics Concern    Not on file   Social History Narrative           Chronic Diagnosis: Iron deficiency anemia, Depressive disorder, Benign essential hypertension, Mixed     hyperlipidemia.     HTN     OBESITY     LIPID     OA     SMOKER     CVA L FIELD VISION     DEPRESSION     GASTRIC BYPASS 01     BREAST REDUCTION--     Dandre Faster  1955 Page #2     ANEMIA==IRON AND B-12     Admitted 2019 with cellulitis left lower extremity then readmitted with chest pain with negative stress test       Medications:   Current Facility-Administered Medications             Current Facility-Administered Medications   Medication Dose Route Frequency Provider Last Rate Last Admin    morphine (PF) injection 2 mg  2 mg Intravenous Q4H PRN Lay Portal Malmer, DO        Buprenorphine HCl FILM 450 mcg  450 mcg Oral Q12H Rashid Lever, DO   Stopped at 02/18/21 1018    buPROPion (WELLBUTRIN XL) extended release tablet 150 mg  150 mg Oral QAM Rashid Lever, DO   150 mg at 02/18/21 1012    rOPINIRole (REQUIP) tablet 2 mg  2 mg Oral 4x daily Lay Portal Malmer, DO   2 mg at 02/18/21 1776    sodium chloride flush 0.9 % injection 10 mL  10 mL Intravenous 2 times per day Lay Portal Malmer, DO   10 mL at 02/18/21 1011    sodium chloride flush 0.9 % injection 10 mL  10 mL Intravenous PRN Lay Portal Malmer, DO        enoxaparin (LOVENOX) injection 40 mg  40 mg Subcutaneous Daily Lay Portal Malmer, DO   40 mg at 02/18/21 1011    promethazine (PHENERGAN) tablet 12.5 mg  12.5 mg Oral Q6H PRN Lay Portal Malmer, DO         Or    ondansetron (ZOFRAN) injection 4 mg  4 mg Intravenous Q6H PRN Lay Portal Malmer, DO        polyethylene glycol (GLYCOLAX) packet 17 g  17 g Oral Daily PRN Lay Portal Malmer, DO        acetaminophen (TYLENOL) tablet 650 mg  650 mg Oral Q6H PRN Lay Portal Malmer, DO         Or    acetaminophen (TYLENOL) suppository 650 mg  650 mg Rectal Q6H PRN Lay Portal Malmer, DO        cyclobenzaprine (FLEXERIL) tablet 10 mg  10 mg Oral TID PRN Lay Portal Malmer, DO   10 mg at 02/18/21 1011            Allergies:    Patient has no known allergies.         Review of Systems   Constitutional: Negative for fever. HENT: Negative for trouble swallowing. Eyes: Negative for photophobia. Respiratory: Negative for shortness of breath. Cardiovascular: Negative for chest pain. Gastrointestinal: Negative for abdominal pain. Endocrine: Negative for heat intolerance. Genitourinary: Negative for flank pain.

## 2021-05-13 ENCOUNTER — APPOINTMENT (OUTPATIENT)
Dept: GENERAL RADIOLOGY | Age: 66
DRG: 454 | End: 2021-05-13
Attending: NEUROLOGICAL SURGERY
Payer: COMMERCIAL

## 2021-05-13 ENCOUNTER — HOSPITAL ENCOUNTER (INPATIENT)
Age: 66
LOS: 4 days | Discharge: NURSING, REHAB OR LONG-TERM CARE | DRG: 454 | End: 2021-05-18
Attending: NEUROLOGICAL SURGERY | Admitting: NEUROLOGICAL SURGERY
Payer: COMMERCIAL

## 2021-05-13 ENCOUNTER — ANESTHESIA EVENT (OUTPATIENT)
Dept: OPERATING ROOM | Age: 66
DRG: 454 | End: 2021-05-13
Payer: COMMERCIAL

## 2021-05-13 ENCOUNTER — ANESTHESIA (OUTPATIENT)
Dept: OPERATING ROOM | Age: 66
DRG: 454 | End: 2021-05-13
Payer: COMMERCIAL

## 2021-05-13 VITALS — SYSTOLIC BLOOD PRESSURE: 142 MMHG | DIASTOLIC BLOOD PRESSURE: 87 MMHG | OXYGEN SATURATION: 96 % | TEMPERATURE: 95.9 F

## 2021-05-13 DIAGNOSIS — Z01.818 PRE-OP TESTING: ICD-10-CM

## 2021-05-13 DIAGNOSIS — G89.29 CHRONIC RIGHT-SIDED LOW BACK PAIN WITHOUT SCIATICA: ICD-10-CM

## 2021-05-13 DIAGNOSIS — G89.29 CHRONIC RIGHT SHOULDER PAIN: ICD-10-CM

## 2021-05-13 DIAGNOSIS — M25.511 CHRONIC RIGHT SHOULDER PAIN: ICD-10-CM

## 2021-05-13 DIAGNOSIS — G89.4 CHRONIC PAIN SYNDROME: ICD-10-CM

## 2021-05-13 DIAGNOSIS — M54.50 CHRONIC RIGHT-SIDED LOW BACK PAIN WITHOUT SCIATICA: ICD-10-CM

## 2021-05-13 DIAGNOSIS — U07.1 COVID-19: Primary | ICD-10-CM

## 2021-05-13 DIAGNOSIS — M17.0 PRIMARY OSTEOARTHRITIS OF BOTH KNEES: ICD-10-CM

## 2021-05-13 PROBLEM — M43.16 SPONDYLOLISTHESIS OF LUMBAR REGION: Status: ACTIVE | Noted: 2021-05-13

## 2021-05-13 LAB
Lab: NORMAL
METER GLUCOSE: 152 MG/DL (ref 74–99)
METER GLUCOSE: 268 MG/DL (ref 74–99)
METER GLUCOSE: 96 MG/DL (ref 74–99)
METHYLMALONIC ACID: 0.38 UMOL/L (ref 0–0.4)
REPORT: NORMAL
THIS TEST SENT TO: NORMAL

## 2021-05-13 PROCEDURE — 2500000003 HC RX 250 WO HCPCS: Performed by: NURSE ANESTHETIST, CERTIFIED REGISTERED

## 2021-05-13 PROCEDURE — 22630 ARTHRD PST TQ 1NTRSPC LUM: CPT | Performed by: PHYSICIAN ASSISTANT

## 2021-05-13 PROCEDURE — 82962 GLUCOSE BLOOD TEST: CPT

## 2021-05-13 PROCEDURE — 0SG00AJ FUSION OF LUMBAR VERTEBRAL JOINT WITH INTERBODY FUSION DEVICE, POSTERIOR APPROACH, ANTERIOR COLUMN, OPEN APPROACH: ICD-10-PCS | Performed by: NEUROLOGICAL SURGERY

## 2021-05-13 PROCEDURE — G0378 HOSPITAL OBSERVATION PER HR: HCPCS

## 2021-05-13 PROCEDURE — C1821 INTERSPINOUS IMPLANT: HCPCS | Performed by: NEUROLOGICAL SURGERY

## 2021-05-13 PROCEDURE — 3700000001 HC ADD 15 MINUTES (ANESTHESIA): Performed by: NEUROLOGICAL SURGERY

## 2021-05-13 PROCEDURE — 2580000003 HC RX 258: Performed by: NEUROLOGICAL SURGERY

## 2021-05-13 PROCEDURE — 2580000003 HC RX 258: Performed by: PHYSICIAN ASSISTANT

## 2021-05-13 PROCEDURE — 97161 PT EVAL LOW COMPLEX 20 MIN: CPT

## 2021-05-13 PROCEDURE — 97165 OT EVAL LOW COMPLEX 30 MIN: CPT

## 2021-05-13 PROCEDURE — 6360000002 HC RX W HCPCS: Performed by: NEUROLOGICAL SURGERY

## 2021-05-13 PROCEDURE — 2500000003 HC RX 250 WO HCPCS: Performed by: ANESTHESIOLOGY

## 2021-05-13 PROCEDURE — 6370000000 HC RX 637 (ALT 250 FOR IP): Performed by: NEUROLOGICAL SURGERY

## 2021-05-13 PROCEDURE — 3600000015 HC SURGERY LEVEL 5 ADDTL 15MIN: Performed by: NEUROLOGICAL SURGERY

## 2021-05-13 PROCEDURE — 97535 SELF CARE MNGMENT TRAINING: CPT

## 2021-05-13 PROCEDURE — 7100000001 HC PACU RECOVERY - ADDTL 15 MIN: Performed by: NEUROLOGICAL SURGERY

## 2021-05-13 PROCEDURE — 95940 IONM IN OPERATNG ROOM 15 MIN: CPT | Performed by: AUDIOLOGIST

## 2021-05-13 PROCEDURE — 22630 ARTHRD PST TQ 1NTRSPC LUM: CPT | Performed by: NEUROLOGICAL SURGERY

## 2021-05-13 PROCEDURE — 0SG0071 FUSION OF LUMBAR VERTEBRAL JOINT WITH AUTOLOGOUS TISSUE SUBSTITUTE, POSTERIOR APPROACH, POSTERIOR COLUMN, OPEN APPROACH: ICD-10-PCS | Performed by: NEUROLOGICAL SURGERY

## 2021-05-13 PROCEDURE — 6360000002 HC RX W HCPCS: Performed by: NURSE ANESTHETIST, CERTIFIED REGISTERED

## 2021-05-13 PROCEDURE — 2709999900 HC NON-CHARGEABLE SUPPLY: Performed by: NEUROLOGICAL SURGERY

## 2021-05-13 PROCEDURE — 02HV33Z INSERTION OF INFUSION DEVICE INTO SUPERIOR VENA CAVA, PERCUTANEOUS APPROACH: ICD-10-PCS | Performed by: ANESTHESIOLOGY

## 2021-05-13 PROCEDURE — C1713 ANCHOR/SCREW BN/BN,TIS/BN: HCPCS | Performed by: NEUROLOGICAL SURGERY

## 2021-05-13 PROCEDURE — 3600000005 HC SURGERY LEVEL 5 BASE: Performed by: NEUROLOGICAL SURGERY

## 2021-05-13 PROCEDURE — 0SB20ZZ EXCISION OF LUMBAR VERTEBRAL DISC, OPEN APPROACH: ICD-10-PCS | Performed by: NEUROLOGICAL SURGERY

## 2021-05-13 PROCEDURE — 2720000010 HC SURG SUPPLY STERILE: Performed by: NEUROLOGICAL SURGERY

## 2021-05-13 PROCEDURE — 2780000010 HC IMPLANT OTHER: Performed by: NEUROLOGICAL SURGERY

## 2021-05-13 PROCEDURE — 88304 TISSUE EXAM BY PATHOLOGIST: CPT

## 2021-05-13 PROCEDURE — 2500000003 HC RX 250 WO HCPCS: Performed by: NEUROLOGICAL SURGERY

## 2021-05-13 PROCEDURE — 6360000002 HC RX W HCPCS: Performed by: ANESTHESIOLOGY

## 2021-05-13 PROCEDURE — 95908 NRV CNDJ TST 3-4 STUDIES: CPT | Performed by: AUDIOLOGIST

## 2021-05-13 PROCEDURE — 88311 DECALCIFY TISSUE: CPT

## 2021-05-13 PROCEDURE — C1729 CATH, DRAINAGE: HCPCS | Performed by: NEUROLOGICAL SURGERY

## 2021-05-13 PROCEDURE — 00NY0ZZ RELEASE LUMBAR SPINAL CORD, OPEN APPROACH: ICD-10-PCS | Performed by: NEUROLOGICAL SURGERY

## 2021-05-13 PROCEDURE — 3700000000 HC ANESTHESIA ATTENDED CARE: Performed by: NEUROLOGICAL SURGERY

## 2021-05-13 PROCEDURE — 6360000002 HC RX W HCPCS: Performed by: PHYSICIAN ASSISTANT

## 2021-05-13 PROCEDURE — 22840 INSERT SPINE FIXATION DEVICE: CPT | Performed by: NEUROLOGICAL SURGERY

## 2021-05-13 PROCEDURE — 97530 THERAPEUTIC ACTIVITIES: CPT

## 2021-05-13 PROCEDURE — 22853 INSJ BIOMECHANICAL DEVICE: CPT | Performed by: PHYSICIAN ASSISTANT

## 2021-05-13 PROCEDURE — 22853 INSJ BIOMECHANICAL DEVICE: CPT | Performed by: NEUROLOGICAL SURGERY

## 2021-05-13 PROCEDURE — 22840 INSERT SPINE FIXATION DEVICE: CPT | Performed by: PHYSICIAN ASSISTANT

## 2021-05-13 PROCEDURE — 3209999900 FLUORO FOR SURGICAL PROCEDURES

## 2021-05-13 PROCEDURE — 7100000000 HC PACU RECOVERY - FIRST 15 MIN: Performed by: NEUROLOGICAL SURGERY

## 2021-05-13 DEVICE — CREO® THREADED 8.5 X 50MM POLYAXIAL SCREW
Type: IMPLANTABLE DEVICE | Site: SPINE LUMBAR | Status: FUNCTIONAL
Brand: CREO

## 2021-05-13 DEVICE — THREADED LOCKING CAP, CREO
Type: IMPLANTABLE DEVICE | Site: SPINE LUMBAR | Status: FUNCTIONAL
Brand: CREO

## 2021-05-13 DEVICE — VIASORB STRP 20X50X5MM: Type: IMPLANTABLE DEVICE | Status: FUNCTIONAL

## 2021-05-13 DEVICE — RISE SPACER 10X22MM, 9-15MM
Type: IMPLANTABLE DEVICE | Site: SPINE LUMBAR | Status: FUNCTIONAL
Brand: RISE

## 2021-05-13 DEVICE — 5.5MM CURVED ROD, TITANIUM ALLOY, 45MM LENGTH
Type: IMPLANTABLE DEVICE | Site: SPINE LUMBAR | Status: FUNCTIONAL
Brand: CREO

## 2021-05-13 RX ORDER — HYDRALAZINE HYDROCHLORIDE 25 MG/1
25 TABLET, FILM COATED ORAL ONCE
Status: COMPLETED | OUTPATIENT
Start: 2021-05-13 | End: 2021-05-13

## 2021-05-13 RX ORDER — NICOTINE POLACRILEX 4 MG
15 LOZENGE BUCCAL PRN
Status: DISCONTINUED | OUTPATIENT
Start: 2021-05-13 | End: 2021-05-18 | Stop reason: HOSPADM

## 2021-05-13 RX ORDER — SODIUM CHLORIDE 0.9 % (FLUSH) 0.9 %
5-40 SYRINGE (ML) INJECTION EVERY 12 HOURS SCHEDULED
Status: DISCONTINUED | OUTPATIENT
Start: 2021-05-13 | End: 2021-05-18 | Stop reason: HOSPADM

## 2021-05-13 RX ORDER — ONDANSETRON 2 MG/ML
4 INJECTION INTRAMUSCULAR; INTRAVENOUS EVERY 6 HOURS PRN
Status: DISCONTINUED | OUTPATIENT
Start: 2021-05-13 | End: 2021-05-18 | Stop reason: HOSPADM

## 2021-05-13 RX ORDER — PROPOFOL 10 MG/ML
INJECTION, EMULSION INTRAVENOUS PRN
Status: DISCONTINUED | OUTPATIENT
Start: 2021-05-13 | End: 2021-05-13 | Stop reason: SDUPTHER

## 2021-05-13 RX ORDER — SODIUM CHLORIDE 9 MG/ML
INJECTION, SOLUTION INTRAVENOUS CONTINUOUS
Status: DISCONTINUED | OUTPATIENT
Start: 2021-05-13 | End: 2021-05-15

## 2021-05-13 RX ORDER — SODIUM CHLORIDE 9 MG/ML
INJECTION, SOLUTION INTRAVENOUS CONTINUOUS
Status: DISCONTINUED | OUTPATIENT
Start: 2021-05-13 | End: 2021-05-13

## 2021-05-13 RX ORDER — NEOSTIGMINE METHYLSULFATE 1 MG/ML
INJECTION, SOLUTION INTRAVENOUS PRN
Status: DISCONTINUED | OUTPATIENT
Start: 2021-05-13 | End: 2021-05-13 | Stop reason: SDUPTHER

## 2021-05-13 RX ORDER — GLYCOPYRROLATE 1 MG/5 ML
SYRINGE (ML) INTRAVENOUS PRN
Status: DISCONTINUED | OUTPATIENT
Start: 2021-05-13 | End: 2021-05-13 | Stop reason: SDUPTHER

## 2021-05-13 RX ORDER — ONDANSETRON 2 MG/ML
INJECTION INTRAMUSCULAR; INTRAVENOUS PRN
Status: DISCONTINUED | OUTPATIENT
Start: 2021-05-13 | End: 2021-05-13 | Stop reason: SDUPTHER

## 2021-05-13 RX ORDER — SODIUM CHLORIDE 9 MG/ML
25 INJECTION, SOLUTION INTRAVENOUS PRN
Status: DISCONTINUED | OUTPATIENT
Start: 2021-05-13 | End: 2021-05-13 | Stop reason: HOSPADM

## 2021-05-13 RX ORDER — DEXTROSE MONOHYDRATE 25 G/50ML
12.5 INJECTION, SOLUTION INTRAVENOUS PRN
Status: DISCONTINUED | OUTPATIENT
Start: 2021-05-13 | End: 2021-05-18 | Stop reason: HOSPADM

## 2021-05-13 RX ORDER — DEXAMETHASONE SODIUM PHOSPHATE 10 MG/ML
INJECTION INTRAMUSCULAR; INTRAVENOUS PRN
Status: DISCONTINUED | OUTPATIENT
Start: 2021-05-13 | End: 2021-05-13 | Stop reason: SDUPTHER

## 2021-05-13 RX ORDER — ROCURONIUM BROMIDE 10 MG/ML
INJECTION, SOLUTION INTRAVENOUS PRN
Status: DISCONTINUED | OUTPATIENT
Start: 2021-05-13 | End: 2021-05-13 | Stop reason: SDUPTHER

## 2021-05-13 RX ORDER — MIDAZOLAM HYDROCHLORIDE 1 MG/ML
INJECTION INTRAMUSCULAR; INTRAVENOUS PRN
Status: DISCONTINUED | OUTPATIENT
Start: 2021-05-13 | End: 2021-05-13 | Stop reason: SDUPTHER

## 2021-05-13 RX ORDER — GABAPENTIN 300 MG/1
300 CAPSULE ORAL 3 TIMES DAILY
Status: DISCONTINUED | OUTPATIENT
Start: 2021-05-13 | End: 2021-05-18

## 2021-05-13 RX ORDER — ROPINIROLE 2 MG/1
2 TABLET, FILM COATED ORAL 4 TIMES DAILY
Status: DISCONTINUED | OUTPATIENT
Start: 2021-05-13 | End: 2021-05-18 | Stop reason: HOSPADM

## 2021-05-13 RX ORDER — LIDOCAINE HYDROCHLORIDE 20 MG/ML
INJECTION, SOLUTION INTRAVENOUS PRN
Status: DISCONTINUED | OUTPATIENT
Start: 2021-05-13 | End: 2021-05-13 | Stop reason: SDUPTHER

## 2021-05-13 RX ORDER — SENNA AND DOCUSATE SODIUM 50; 8.6 MG/1; MG/1
1 TABLET, FILM COATED ORAL 2 TIMES DAILY
Status: DISCONTINUED | OUTPATIENT
Start: 2021-05-13 | End: 2021-05-18 | Stop reason: HOSPADM

## 2021-05-13 RX ORDER — SODIUM CHLORIDE 0.9 % (FLUSH) 0.9 %
10 SYRINGE (ML) INJECTION EVERY 12 HOURS SCHEDULED
Status: DISCONTINUED | OUTPATIENT
Start: 2021-05-13 | End: 2021-05-13 | Stop reason: HOSPADM

## 2021-05-13 RX ORDER — FENTANYL CITRATE 50 UG/ML
INJECTION, SOLUTION INTRAMUSCULAR; INTRAVENOUS PRN
Status: DISCONTINUED | OUTPATIENT
Start: 2021-05-13 | End: 2021-05-13 | Stop reason: SDUPTHER

## 2021-05-13 RX ORDER — DEXTROSE MONOHYDRATE 50 MG/ML
100 INJECTION, SOLUTION INTRAVENOUS PRN
Status: DISCONTINUED | OUTPATIENT
Start: 2021-05-13 | End: 2021-05-18 | Stop reason: HOSPADM

## 2021-05-13 RX ORDER — MEPERIDINE HYDROCHLORIDE 25 MG/ML
12.5 INJECTION INTRAMUSCULAR; INTRAVENOUS; SUBCUTANEOUS EVERY 5 MIN PRN
Status: DISCONTINUED | OUTPATIENT
Start: 2021-05-13 | End: 2021-05-13 | Stop reason: HOSPADM

## 2021-05-13 RX ORDER — LABETALOL HYDROCHLORIDE 5 MG/ML
5 INJECTION, SOLUTION INTRAVENOUS EVERY 10 MIN PRN
Status: DISCONTINUED | OUTPATIENT
Start: 2021-05-13 | End: 2021-05-13 | Stop reason: HOSPADM

## 2021-05-13 RX ORDER — AMMONIUM LACTATE 12 G/100G
LOTION TOPICAL
Status: DISCONTINUED | OUTPATIENT
Start: 2021-05-13 | End: 2021-05-18 | Stop reason: HOSPADM

## 2021-05-13 RX ORDER — PROMETHAZINE HYDROCHLORIDE 25 MG/ML
6.25 INJECTION, SOLUTION INTRAMUSCULAR; INTRAVENOUS PRN
Status: DISCONTINUED | OUTPATIENT
Start: 2021-05-13 | End: 2021-05-13 | Stop reason: HOSPADM

## 2021-05-13 RX ORDER — BUMETANIDE 1 MG/1
2 TABLET ORAL DAILY
Status: DISCONTINUED | OUTPATIENT
Start: 2021-05-14 | End: 2021-05-18 | Stop reason: HOSPADM

## 2021-05-13 RX ORDER — BACLOFEN 10 MG/1
10 TABLET ORAL 3 TIMES DAILY
Status: DISCONTINUED | OUTPATIENT
Start: 2021-05-13 | End: 2021-05-18 | Stop reason: HOSPADM

## 2021-05-13 RX ORDER — LIDOCAINE HYDROCHLORIDE AND EPINEPHRINE 10; 10 MG/ML; UG/ML
INJECTION, SOLUTION INFILTRATION; PERINEURAL PRN
Status: DISCONTINUED | OUTPATIENT
Start: 2021-05-13 | End: 2021-05-13 | Stop reason: HOSPADM

## 2021-05-13 RX ORDER — POLYETHYLENE GLYCOL 3350 17 G/17G
17 POWDER, FOR SOLUTION ORAL DAILY
Status: DISCONTINUED | OUTPATIENT
Start: 2021-05-13 | End: 2021-05-18 | Stop reason: HOSPADM

## 2021-05-13 RX ORDER — POTASSIUM CHLORIDE 20 MEQ/1
20 TABLET, EXTENDED RELEASE ORAL
Status: DISCONTINUED | OUTPATIENT
Start: 2021-05-14 | End: 2021-05-18 | Stop reason: HOSPADM

## 2021-05-13 RX ORDER — LIDOCAINE HYDROCHLORIDE AND EPINEPHRINE 5; 5 MG/ML; UG/ML
INJECTION, SOLUTION INFILTRATION; PERINEURAL PRN
Status: DISCONTINUED | OUTPATIENT
Start: 2021-05-13 | End: 2021-05-13 | Stop reason: HOSPADM

## 2021-05-13 RX ORDER — PROMETHAZINE HYDROCHLORIDE 25 MG/ML
25 INJECTION, SOLUTION INTRAMUSCULAR; INTRAVENOUS PRN
Status: DISCONTINUED | OUTPATIENT
Start: 2021-05-13 | End: 2021-05-13

## 2021-05-13 RX ORDER — SODIUM CHLORIDE 9 MG/ML
25 INJECTION, SOLUTION INTRAVENOUS PRN
Status: DISCONTINUED | OUTPATIENT
Start: 2021-05-13 | End: 2021-05-18 | Stop reason: HOSPADM

## 2021-05-13 RX ORDER — ACETAMINOPHEN 325 MG/1
650 TABLET ORAL EVERY 4 HOURS PRN
Status: DISCONTINUED | OUTPATIENT
Start: 2021-05-13 | End: 2021-05-18 | Stop reason: HOSPADM

## 2021-05-13 RX ORDER — OXYCODONE HYDROCHLORIDE 5 MG/1
5 TABLET ORAL EVERY 4 HOURS PRN
Status: DISCONTINUED | OUTPATIENT
Start: 2021-05-13 | End: 2021-05-18 | Stop reason: HOSPADM

## 2021-05-13 RX ORDER — SODIUM CHLORIDE 0.9 % (FLUSH) 0.9 %
10 SYRINGE (ML) INJECTION PRN
Status: DISCONTINUED | OUTPATIENT
Start: 2021-05-13 | End: 2021-05-13 | Stop reason: HOSPADM

## 2021-05-13 RX ORDER — PROMETHAZINE HYDROCHLORIDE 25 MG/1
12.5 TABLET ORAL EVERY 6 HOURS PRN
Status: DISCONTINUED | OUTPATIENT
Start: 2021-05-13 | End: 2021-05-18 | Stop reason: HOSPADM

## 2021-05-13 RX ORDER — VANCOMYCIN HYDROCHLORIDE 500 MG/10ML
INJECTION, POWDER, LYOPHILIZED, FOR SOLUTION INTRAVENOUS PRN
Status: DISCONTINUED | OUTPATIENT
Start: 2021-05-13 | End: 2021-05-13 | Stop reason: HOSPADM

## 2021-05-13 RX ORDER — BUPIVACAINE HYDROCHLORIDE 2.5 MG/ML
INJECTION, SOLUTION EPIDURAL; INFILTRATION; INTRACAUDAL PRN
Status: DISCONTINUED | OUTPATIENT
Start: 2021-05-13 | End: 2021-05-13 | Stop reason: HOSPADM

## 2021-05-13 RX ORDER — OXYCODONE HYDROCHLORIDE 10 MG/1
10 TABLET ORAL EVERY 4 HOURS PRN
Status: DISCONTINUED | OUTPATIENT
Start: 2021-05-13 | End: 2021-05-18 | Stop reason: HOSPADM

## 2021-05-13 RX ORDER — BUPROPION HYDROCHLORIDE 150 MG/1
150 TABLET ORAL EVERY EVENING
Status: DISCONTINUED | OUTPATIENT
Start: 2021-05-13 | End: 2021-05-18 | Stop reason: HOSPADM

## 2021-05-13 RX ORDER — SODIUM CHLORIDE 0.9 % (FLUSH) 0.9 %
5-40 SYRINGE (ML) INJECTION PRN
Status: DISCONTINUED | OUTPATIENT
Start: 2021-05-13 | End: 2021-05-18 | Stop reason: HOSPADM

## 2021-05-13 RX ADMIN — LABETALOL HYDROCHLORIDE 5 MG: 5 INJECTION INTRAVENOUS at 12:35

## 2021-05-13 RX ADMIN — MIDAZOLAM 2 MG: 1 INJECTION INTRAMUSCULAR; INTRAVENOUS at 06:45

## 2021-05-13 RX ADMIN — GABAPENTIN 300 MG: 300 CAPSULE ORAL at 13:33

## 2021-05-13 RX ADMIN — SODIUM CHLORIDE: 9 INJECTION, SOLUTION INTRAVENOUS at 09:05

## 2021-05-13 RX ADMIN — LABETALOL HYDROCHLORIDE 5 MG: 5 INJECTION INTRAVENOUS at 11:45

## 2021-05-13 RX ADMIN — HYDROMORPHONE HYDROCHLORIDE 0.5 MG: 1 INJECTION, SOLUTION INTRAMUSCULAR; INTRAVENOUS; SUBCUTANEOUS at 12:38

## 2021-05-13 RX ADMIN — ROPINIROLE HYDROCHLORIDE 2 MG: 2 TABLET, FILM COATED ORAL at 15:43

## 2021-05-13 RX ADMIN — GABAPENTIN 300 MG: 300 CAPSULE ORAL at 22:11

## 2021-05-13 RX ADMIN — SODIUM CHLORIDE: 9 INJECTION, SOLUTION INTRAVENOUS at 06:03

## 2021-05-13 RX ADMIN — ROPINIROLE HYDROCHLORIDE 2 MG: 2 TABLET, FILM COATED ORAL at 22:11

## 2021-05-13 RX ADMIN — DEXAMETHASONE SODIUM PHOSPHATE 10 MG: 10 INJECTION INTRAMUSCULAR; INTRAVENOUS at 06:50

## 2021-05-13 RX ADMIN — BACLOFEN 10 MG: 10 TABLET ORAL at 15:43

## 2021-05-13 RX ADMIN — FENTANYL CITRATE 50 MCG: 50 INJECTION, SOLUTION INTRAMUSCULAR; INTRAVENOUS at 08:10

## 2021-05-13 RX ADMIN — SODIUM CHLORIDE: 9 INJECTION, SOLUTION INTRAVENOUS at 06:45

## 2021-05-13 RX ADMIN — FENTANYL CITRATE 50 MCG: 50 INJECTION, SOLUTION INTRAMUSCULAR; INTRAVENOUS at 07:15

## 2021-05-13 RX ADMIN — LIDOCAINE HYDROCHLORIDE 60 MG: 20 INJECTION, SOLUTION INTRAVENOUS at 06:50

## 2021-05-13 RX ADMIN — HYDROMORPHONE HYDROCHLORIDE 0.5 MG: 1 INJECTION, SOLUTION INTRAMUSCULAR; INTRAVENOUS; SUBCUTANEOUS at 11:00

## 2021-05-13 RX ADMIN — HYDROMORPHONE HYDROCHLORIDE 0.5 MG: 1 INJECTION, SOLUTION INTRAMUSCULAR; INTRAVENOUS; SUBCUTANEOUS at 10:47

## 2021-05-13 RX ADMIN — HYDROMORPHONE HYDROCHLORIDE 0.5 MG: 1 INJECTION, SOLUTION INTRAMUSCULAR; INTRAVENOUS; SUBCUTANEOUS at 11:36

## 2021-05-13 RX ADMIN — CEFAZOLIN 3000 MG: 10 INJECTION, POWDER, FOR SOLUTION INTRAVENOUS at 23:45

## 2021-05-13 RX ADMIN — ROCURONIUM BROMIDE 50 MG: 10 INJECTION, SOLUTION INTRAVENOUS at 06:50

## 2021-05-13 RX ADMIN — HYDROMORPHONE HYDROCHLORIDE 1 MG: 1 INJECTION, SOLUTION INTRAMUSCULAR; INTRAVENOUS; SUBCUTANEOUS at 22:13

## 2021-05-13 RX ADMIN — SODIUM CHLORIDE: 9 INJECTION, SOLUTION INTRAVENOUS at 13:53

## 2021-05-13 RX ADMIN — OXYCODONE HYDROCHLORIDE 10 MG: 10 TABLET ORAL at 15:51

## 2021-05-13 RX ADMIN — INSULIN LISPRO 1 UNITS: 100 INJECTION, SOLUTION INTRAVENOUS; SUBCUTANEOUS at 22:09

## 2021-05-13 RX ADMIN — Medication 0.6 MG: at 09:25

## 2021-05-13 RX ADMIN — PROMETHAZINE HYDROCHLORIDE 6.25 MG: 25 INJECTION INTRAMUSCULAR; INTRAVENOUS at 12:40

## 2021-05-13 RX ADMIN — FENTANYL CITRATE 50 MCG: 50 INJECTION, SOLUTION INTRAMUSCULAR; INTRAVENOUS at 07:08

## 2021-05-13 RX ADMIN — HYDROMORPHONE HYDROCHLORIDE 1 MG: 1 INJECTION, SOLUTION INTRAMUSCULAR; INTRAVENOUS; SUBCUTANEOUS at 13:33

## 2021-05-13 RX ADMIN — CEFAZOLIN 3000 MG: 10 INJECTION, POWDER, FOR SOLUTION INTRAVENOUS at 07:10

## 2021-05-13 RX ADMIN — CEFAZOLIN 3000 MG: 10 INJECTION, POWDER, FOR SOLUTION INTRAVENOUS at 15:44

## 2021-05-13 RX ADMIN — Medication 10 ML: at 22:15

## 2021-05-13 RX ADMIN — HYDROMORPHONE HYDROCHLORIDE 0.5 MG: 1 INJECTION, SOLUTION INTRAMUSCULAR; INTRAVENOUS; SUBCUTANEOUS at 11:22

## 2021-05-13 RX ADMIN — SENNOSIDES AND DOCUSATE SODIUM 1 TABLET: 8.6; 5 TABLET ORAL at 22:14

## 2021-05-13 RX ADMIN — ROCURONIUM BROMIDE 20 MG: 10 INJECTION, SOLUTION INTRAVENOUS at 07:35

## 2021-05-13 RX ADMIN — HYDROMORPHONE HYDROCHLORIDE 1 MG: 1 INJECTION, SOLUTION INTRAMUSCULAR; INTRAVENOUS; SUBCUTANEOUS at 17:05

## 2021-05-13 RX ADMIN — ONDANSETRON HYDROCHLORIDE 4 MG: 2 INJECTION, SOLUTION INTRAMUSCULAR; INTRAVENOUS at 09:39

## 2021-05-13 RX ADMIN — Medication 3 MG: at 09:25

## 2021-05-13 RX ADMIN — BACLOFEN 10 MG: 10 TABLET ORAL at 22:11

## 2021-05-13 RX ADMIN — PROPOFOL 130 MG: 10 INJECTION, EMULSION INTRAVENOUS at 06:50

## 2021-05-13 RX ADMIN — BUPROPION HYDROCHLORIDE 150 MG: 150 TABLET, EXTENDED RELEASE ORAL at 17:12

## 2021-05-13 RX ADMIN — HYDROMORPHONE HYDROCHLORIDE 0.5 MG: 1 INJECTION, SOLUTION INTRAMUSCULAR; INTRAVENOUS; SUBCUTANEOUS at 10:40

## 2021-05-13 RX ADMIN — HYDRALAZINE HYDROCHLORIDE 25 MG: 25 TABLET, FILM COATED ORAL at 13:33

## 2021-05-13 RX ADMIN — LABETALOL HYDROCHLORIDE 5 MG: 5 INJECTION INTRAVENOUS at 11:09

## 2021-05-13 ASSESSMENT — PULMONARY FUNCTION TESTS
PIF_VALUE: 1
PIF_VALUE: 23
PIF_VALUE: 22
PIF_VALUE: 24
PIF_VALUE: 23
PIF_VALUE: 23
PIF_VALUE: 2
PIF_VALUE: 28
PIF_VALUE: 2
PIF_VALUE: 24
PIF_VALUE: 1
PIF_VALUE: 2
PIF_VALUE: 25
PIF_VALUE: 23
PIF_VALUE: 25
PIF_VALUE: 0
PIF_VALUE: 24
PIF_VALUE: 25
PIF_VALUE: 22
PIF_VALUE: 22
PIF_VALUE: 25
PIF_VALUE: 25
PIF_VALUE: 24
PIF_VALUE: 24
PIF_VALUE: 23
PIF_VALUE: 24
PIF_VALUE: 23
PIF_VALUE: 1
PIF_VALUE: 24
PIF_VALUE: 24
PIF_VALUE: 25
PIF_VALUE: 26
PIF_VALUE: 2
PIF_VALUE: 31
PIF_VALUE: 31
PIF_VALUE: 24
PIF_VALUE: 25
PIF_VALUE: 24
PIF_VALUE: 25
PIF_VALUE: 24
PIF_VALUE: 25
PIF_VALUE: 25
PIF_VALUE: 24
PIF_VALUE: 24
PIF_VALUE: 1
PIF_VALUE: 20
PIF_VALUE: 23
PIF_VALUE: 2
PIF_VALUE: 23
PIF_VALUE: 24
PIF_VALUE: 28
PIF_VALUE: 25
PIF_VALUE: 28
PIF_VALUE: 24
PIF_VALUE: 2
PIF_VALUE: 30
PIF_VALUE: 24
PIF_VALUE: 25
PIF_VALUE: 25
PIF_VALUE: 24
PIF_VALUE: 25
PIF_VALUE: 4
PIF_VALUE: 22
PIF_VALUE: 24
PIF_VALUE: 23
PIF_VALUE: 25
PIF_VALUE: 23
PIF_VALUE: 23
PIF_VALUE: 24
PIF_VALUE: 23
PIF_VALUE: 1
PIF_VALUE: 24
PIF_VALUE: 2
PIF_VALUE: 2
PIF_VALUE: 28
PIF_VALUE: 2
PIF_VALUE: 4
PIF_VALUE: 24
PIF_VALUE: 24
PIF_VALUE: 28
PIF_VALUE: 1
PIF_VALUE: 28
PIF_VALUE: 6
PIF_VALUE: 24
PIF_VALUE: 32
PIF_VALUE: 24
PIF_VALUE: 2
PIF_VALUE: 22
PIF_VALUE: 2
PIF_VALUE: 23
PIF_VALUE: 23
PIF_VALUE: 24
PIF_VALUE: 24
PIF_VALUE: 3
PIF_VALUE: 2
PIF_VALUE: 22
PIF_VALUE: 22
PIF_VALUE: 24
PIF_VALUE: 28
PIF_VALUE: 1
PIF_VALUE: 24
PIF_VALUE: 23
PIF_VALUE: 22
PIF_VALUE: 4
PIF_VALUE: 1
PIF_VALUE: 25
PIF_VALUE: 25
PIF_VALUE: 24
PIF_VALUE: 23
PIF_VALUE: 1
PIF_VALUE: 25
PIF_VALUE: 25
PIF_VALUE: 24
PIF_VALUE: 30
PIF_VALUE: 24
PIF_VALUE: 2
PIF_VALUE: 2
PIF_VALUE: 32
PIF_VALUE: 1
PIF_VALUE: 25
PIF_VALUE: 22
PIF_VALUE: 2
PIF_VALUE: 4
PIF_VALUE: 13
PIF_VALUE: 23
PIF_VALUE: 23
PIF_VALUE: 24

## 2021-05-13 ASSESSMENT — PAIN DESCRIPTION - DESCRIPTORS
DESCRIPTORS: ACHING;CONSTANT;DISCOMFORT;SORE
DESCRIPTORS: ACHING;DISCOMFORT
DESCRIPTORS: ACHING;CONSTANT

## 2021-05-13 ASSESSMENT — PAIN DESCRIPTION - LOCATION
LOCATION: BACK

## 2021-05-13 ASSESSMENT — PAIN SCALES - GENERAL
PAINLEVEL_OUTOF10: 9
PAINLEVEL_OUTOF10: 10
PAINLEVEL_OUTOF10: 6
PAINLEVEL_OUTOF10: 10
PAINLEVEL_OUTOF10: 7

## 2021-05-13 ASSESSMENT — ENCOUNTER SYMPTOMS: SHORTNESS OF BREATH: 1

## 2021-05-13 ASSESSMENT — PAIN DESCRIPTION - FREQUENCY
FREQUENCY: INTERMITTENT
FREQUENCY: INTERMITTENT
FREQUENCY: CONTINUOUS
FREQUENCY: INTERMITTENT
FREQUENCY: INTERMITTENT
FREQUENCY: CONTINUOUS

## 2021-05-13 ASSESSMENT — PAIN DESCRIPTION - PAIN TYPE: TYPE: SURGICAL PAIN

## 2021-05-13 ASSESSMENT — LIFESTYLE VARIABLES: SMOKING_STATUS: 0

## 2021-05-13 ASSESSMENT — PAIN - FUNCTIONAL ASSESSMENT
PAIN_FUNCTIONAL_ASSESSMENT: 0-10
PAIN_FUNCTIONAL_ASSESSMENT: PREVENTS OR INTERFERES SOME ACTIVE ACTIVITIES AND ADLS

## 2021-05-13 ASSESSMENT — PAIN DESCRIPTION - ORIENTATION: ORIENTATION: LOWER

## 2021-05-13 ASSESSMENT — PAIN DESCRIPTION - PROGRESSION: CLINICAL_PROGRESSION: NOT CHANGED

## 2021-05-13 NOTE — BRIEF OP NOTE
Brief Postoperative Note      Patient: Daniel Mckeon  YOB: 1955  MRN: [id number]    Date of Procedure: 5/13/2021    Pre-Op Diagnosis: SPONDYLOLITHESIS    Post-Op Diagnosis: Same       Procedure(s):  L3-L4  POSTERIOR LUMBAR INTERBODY  FUSION--OARM, JESSI, YAJAIRA TABLE, CELL SAVER, PLATELET GEL, AUDIOLOGY, CAGES, PLATES, SCREWS -- GLOBUS    Surgeon(s):  Param Zabala MD    Assistant:  Physician Assistant: CHERYL Bird    Anesthesia: General    Estimated Blood Loss (mL): 542     Complications: None    Specimens:   ID Type Source Tests Collected by Time Destination   A : L3-L4 DISC  Tissue Tissue SURGICAL PATHOLOGY Param Zabala MD 5/13/2021 9577        Implants:  Implant Name Type Inv. Item Serial No.  Lot No. LRB No. Used Action   VIASORB STRP [id number]  VIASOR[employer] STRP [id number]  [employer]  N/A 1 Implanted   SCREW SPNL L50MM DIA8. 5MM THORLUM THRD PREASSEMBLED FOR  SCREW SPNL L50MM Alberto Lm. 5MM THORLUM THRD PREASSEMBLED FOR  [employer]  N/A 4 Implanted   CAP SPNL THORLUM SHAVON THRD CREO  CAP SPNL THORLUM SHAVON THRD CREO  [employer]  N/A 4 Implanted   SPACER SPNL [id number] TI THORACOLUM LUM INTBDY FUS EXP  SPACER SPNL [id number] TI THORACOLUM LUM INTBDY FUS EXP  [employer]  N/A 2 Implanted         Drains:   Closed/Suction Drain Back Accordion (Active)   Site Description Unable to view 05/13/21 1015   Dressing Status Clean;Dry; Intact 05/13/21 1015   Drainage Appearance Bloody 05/13/21 1015   Status Compressed 05/13/21 1015       Urethral Catheter 16 fr (Active)   Site Assessment No urethral drainage 05/13/21 1015   Urine Color Yellow 05/13/21 1015   Urine Appearance Clear 05/13/21 1015       Findings: see dictated op note    Electronically signed by Claudetta Ranks, MD on 5/13/2021 at 10:50 AM

## 2021-05-13 NOTE — PROGRESS NOTES
OCCUPATIONAL THERAPY INITIAL EVALUATION      Date:2021  Patient Name: Leilani Guy  MRN: [id number]  : 1955  Room: [address] [place], [place] [address]  Referring Provider:  Mireya Phoenix MD    Evaluating OT: Toshia Arevalo OTR/L   License #  [id number]     AM-PAC Daily Activity Raw Score:     Recommended Adaptive Equipment:  TBD     Diagnosis:  SPONDYLOLITHESIS    Surgery:  21: L3-L4  POSTERIOR LUMBAR INTERBODY  FUSION  Pertinent Medical History:   Past Medical History:   Diagnosis Date    Anemia     Arthritis     Cellulitis 2015    left leg    Chronic ulcer of leg with fat layer exposed ([address] 2015    Chronic ulcer of right leg, limited to breakdown of skin ([address] 2016    COVID-19 2021    postive test no symptoms    Depression     Full dentures     Low back pain     Lymphedema of both lower extremities 2015    Obesity     280 #    Osteoarthritis     Peripheral vision loss     Stroke ([address]     Type 2 diabetes mellitus without complication, without long-term current use of insulin ([address] 2019    Ulcer of left lower leg, limited to breakdown of skin ([address] 06/10/2019    Ulcer of left lower leg, limited to breakdown of skin ([address] 06/10/2019    UTI (urinary tract infection) 2021      Precautions:  Falls, Neutral spine, LSO brace when greater than 45, triple lumen, hemovac drain, jean     Home Living: Pt lives with sister in a 2 story with 2 steps to enter and B HR. Bathroom setup: walk in shower, higher commode  Equipment owned: shower bench, grab bars in shower, HH shower, rollator    Prior Level of Function: Ind. with ADLs , Ind. with IADLs; ambulated without A.D.   Driving: active  Occupation: retired RN    Pain Level: 8/10 R thigh  Cognition: A&O: 4/4; Follows multi- step directions   Memory:  good   Sequencing:  good   Problem solving:  good   Judgement/safety:  Fair/good     Functional Assessment:   Initial Eval Status  Date: 2021 Treatment Status  Date: Short Term Goals = Long Term Goals  Treatment frequency: 3-5 days   Feeding Ind. Grooming SBA seated  Modified [place]    UB Dressing Max A with gown & LSO brace log-rolling in bed, supine. Pt. & dtr. Instructed RE: brace training throughout  Modified [place]    LB Dressing Max A/Dep  Unable to perform figure 4 technique, pt. Will benefit from use of A.E.  Modified [place]    Bathing Maximal Assist with sim. task  Modified [place]    Toileting ted [place]  Modified [place]    Bed Mobility  Logroll: Mod Ax2  Supine to sit: Mod A x2  Sit to supine: Mod A x2  Supine to sit: Modified [place]   Sit to supine: Modified [place]    Functional Transfers Moderate Assist x2 with sit <> stand, SPT. Modified [place]    Functional Mobility Mod A x2 with ww few steps towards chair   Modified [place]    Balance Sitting:     Static:  SBA    Dynamic:Min A  Standing: Mod A     Activity Tolerance Fair- lt. ax.  spO2 & HR remained WFL  BP monitored with RN present. 160/92  Fair+ with mod. Ax. Visual/  Perceptual Glasses: yes        Safety Awareness fair                    good     Hand dominance: R     Strength ROM Additional Info:    RUE  WFL within spine prec. WFL good  and wfl FMC/dexterity noted during ADL tasks     LUE WFL within spine prec. WFL good  and wfl FMC/dexterity noted during ADL tasks       Hearing: WFL   Sensation:  Pt. c/o no numbness/ tingling B UE  Tone: WFL   Edema: none noted B UE                            Comments: Upon arrival, patient supine in bed, cleared by Nursing, agreeable to OT with PT collaboration d/t multiple lines, body habitus & brace training/family training. Pt demonstrating good understanding of education/techniques, requiring additional training / education. At end of session, patient seated in bedside chair, all needs met, RN notified, with call light and phone within reach, all lines and tubes intact.   Pt would benefit from continued skilled OT to increase safety and independence with completion of ADL/iADL tasks, functional transfers/mobility to improve independence and quality of life. Treatment:  OT services provided included:   ADL-instruction/training on safe & adapted techniques within precautions for completion of ADLs   Mobility- instruction/training on safety & increased [place] with bed mobility, functional transfers & functional mobility.  Skilled monitoring- of O2 sats, HR & BP as needed/indicated throughout session with pt.'s response to treatment.  Sitting/standing Balance/Tolerance- to increased balance & activity tolerance during ADLs as well as facilitate proper posture and/or positioning.    Energy Conservation- techniques to increase [place] with self care ADLs & iADLs, work simplification to improve endurance, neutral spine   Safety Awareness- techniques to improve overall safety with focus on technique, precautions, hand placement, proper body mechanics   Skilled positioning- to maintain skin integrity, prevent skin breakdown, encourage proper alignment in bed    Eval Complexity: Low    Assessment of current deficits:    Functional mobility [x]  ADLs [x] Strength [x]  Cognition []  Functional transfers  [x] IADLs [x] Safety Awareness [x]  Endurance [x]  Fine Motor Coordination [] Balance [x] Vision/perception [] Sensation []   Gross Motor Coordination [] ROM [] Delirium []                  Motor Control []   Plan of Care: 2-4 days/week for 1-2 weeks PRN   ADL retraining/adapted techniques and AE recommendations to increase functional independence within precautions                    Energy conservation techniques to improve tolerance for selfcare routine   Functional transfer/mobility training/DME recommendations for increased independence, safety and fall prevention         Patient/family education to increase safety and functional independence             Environmental modifications for safe mobility and completion of ADLs- back safety                           Therapeutic activity to improve functional performance during ADLs. Therapeutic exercise to improve tolerance and functional strength for ADLs   Balance retraining/tolerance tasks for facilitation of postural control with dynamic challenges during ADLs                      Positioning to improve functional independence-neutral spine    Rehab Potential:  Good for established goals     Patient / Family Goal: to return home soon      Patient and/or family were instructed on functional diagnosis, prognosis/goals and OT plan of care. Demonstrated good understanding. Eval Complexity: Low      Time In: 15:15  Time Out: 15:50  Total Treatment Time: 10    Min Units   OT Eval Low 36401  x     OT Eval Medium 34618      OT Eval High 62820       OT Re-Eval [id number]       Therapeutic Ex 95299       Therapeutic Activities 80456       ADL/Self Care 02047  10  1   Orthotic Management 78983       Neuro Re-Ed 41136       Non-Billable Time          Evaluation Time includes thorough review of current medical information, gathering information on past medical history/social history and prior level of function, completion of standardized testing/informal observation of tasks, assessment of data and education on plan of care and goals. Genesis Arevalo, OTR/L   License #  [id number]

## 2021-05-13 NOTE — PROGRESS NOTES
Physical Therapy  Physical Therapy Initial Assessment     Name: Sheyla Ash  : 1955  MRN: [id number]    Referring Provider:        Ole Pool MD     Date of Service: 2021    Evaluating PT:  Poli Cochran PT   Room #:  7923/4376-P  Diagnosis: Spondylolisthesis of lumbar region [M43.16]     PMHx/PSHx:   has a past medical history of Anemia, Arthritis, Cellulitis, Chronic ulcer of leg with fat layer exposed ([address] Chronic ulcer of right leg, limited to breakdown of skin ([address] COVID-19, Depression, Full dentures, Low back pain, Lymphedema of both lower extremities, Obesity, Osteoarthritis, Peripheral vision loss, Stroke ([address] Type 2 diabetes mellitus without complication, without long-term current use of insulin ([address] Ulcer of left lower leg, limited to breakdown of skin ([address] Ulcer of left lower leg, limited to breakdown of skin ([address] and UTI (urinary tract infection). has a past surgical history that includes ECHO Complete 2D W Doppler W Color (2012);  section; Gastric bypass surgery (); Abdominoplasty (); Cataract removal; hernia repair; Colonoscopy (2012); Breast reduction surgery; Upper gastrointestinal endoscopy (2021); Colonoscopy (2021); Upper gastrointestinal endoscopy (N/A, 2021); and Colonoscopy (N/A, 2021). Procedure/Surgery:  21 L3-L4  POSTERIOR LUMBAR INTERBODY  FUSION  Precautions:  Falls,  , , Hemovac drain, Spinal precautions and LSO  Equipment Needs: To be determined      SUBJECTIVE:    Patient lives with sister  in a two story home resides first  with 2 steps to enter with 2Rail  Bed is on 1 floor and bath is on 1 floor. Patient ambulated with wheeled walker  PTA. Equipment owned: Demi Antonio,      OBJECTIVE:   Initial Evaluation  Date: 21 Treatment Short Term/ Long Term   Goals   AM-PAC 6 Clicks      Was pt agreeable to Eval/treatment? yes     Does pt have pain? Yes      Bed Mobility  Rolling:  Mod A x 2  Supine to sit: Mod a x 2  Sit to supine: NT  Scooting: Mod A x 2  Rolling: Ind  Supine to sit: Ind  Sit to supine: Ind  Scooting: Ind   Transfers Sit to stand: Mod A x 2 to fww. Stand to sit: Mod   Stand pivot: Mod a x 2  Sit to stand: Mod Ind  Stand to sit: Mod Ind  Stand pivot: Mod Ind   Ambulation   3 feet with fww Mod a x 2 to bedside chair. 100+ feet with fww Mod Ind   Stair negotiation: ascended and descended  NT  2 steps with 2 rail Min   ROM BUE:  See OT eval  BLE:  wfl     Strength BUE: See OT   RLE:  4/5  LLE:   4/5  4+/5   Balance Sitting EOB:  SBA  Dynamic Standing: Mod a x 2 with fww  Sitting EOB:  Ind  Dynamic Standing:  ind     Patient is Alert & Oriented x person, place, time and situation and follows directions   Sensation:  Pt denies numbness and tingling to extremities  Edema:  none    Vitals:  199/97 Blood Pressure at rest R arm  200/92 Blood Pressure at rest L forearm  RN then took manual seated. 162/92     SPO2 at rest 97% RA       Therapeutic Exercises:  AROM for BLE while seated. Patient education  Patient educated on role of Physical Therapy, risks of immobility, safety and plan of care,  importance of mobility while in hospital  and spinal precautions Application  Of LSO    Patient response to education:   Pt verbalized understanding Pt demonstrated skill Pt requires further education in this area   yes yes yes     ASSESSMENT:    Comments:  RN cleared patient for participation in therapy session. Patient was seen this date for PT evaluation. . Patient was agreeable to intervention. Results of the functional assessment are noted above. Upon entering the room patient was found supine in bed. Required assistance to don LSO and to position it correctly. Sat EOB x 10 minutes to increase dynamic sitting balance and activity tolerance. Transfer/ gait completed to bedside chair.     At end of session, patient in chair with  call light and phone within reach,  all lines and tubes intact, nursing notified. This patient can benefit from the continuation of skilled PT  to maximize functional level and return to PLOF. Treatment:  Patient practiced and was instructed in the following treatment:    · Bed mobility training - pt given verbal and tactile cues to facilitate proper sequencing and safety during rolling and supine>sit as well as provided with physical assistance to complete task    · Assistive device training - pt educated on usingWW approximation/negotiation, and hand placement during sit<>stand to [employer]  · STS and transfer training - educated on hand/foot placement, safety, and sequencing during STS and pivot transfers using assistive device  · Education in spinal precautions and application of LSO adhering to those precautions. · Therapeutic exercise was completed to improve strength of BLE to improve functional mobility status. Pt's/ family goals   1. Home when stronger    Patient and or family understand(s) diagnosis, prognosis, and plan of care. yes    PLAN OF CARE:    PT care will be provided in accordance with the objectives noted above. Exercises and functional mobility practice will be used as well as appropriate assistive devices or modalities to obtain goals. Patient and family education will also be administered as needed. Current Treatment Recommendations     [x] Strengthening     [x] ROM   [x] Balance Training   [x] Endurance Training   [x] Transfer Training   [x] Gait Training   [x] Stair Training   [] Positioning   [] Safety and Education Training   [] Patient/Caregiver Education   [] HEP  [] Other     Frequency of treatments: 2-5x/week x 1-2 weeks.     Time in  1515  Time out  1555    Total Treatment Time  40 minutes     Evaluation Time includes thorough review of current medical information, gathering information on past medical history/social history and prior level of function, completion of standardized testing/informal observation of tasks, assessment of data and education on plan of care and goals.     CPT codes:  [x] Low Complexity PT evaluation 74559  [] Moderate Complexity PT evaluation 12266  [] High Complexity PT evaluation 69679  [] PT Re-evaluation 80288  [] Gait training 25086 - minutes  [] Manual therapy 82998 - minutes  [x] Therapeutic activities 55881 10 minutes  [] Therapeutic exercises 78019 - minutes  [] Neuromuscular reeducation 73342 - minutes       Jhonatan Kwon, [address] [facility]

## 2021-05-13 NOTE — PROGRESS NOTES
Admitted to \[facility]". Instructions reviewed. Covid test done:  5/7    Results:  not detected    Self quarantine guidelines followed since tested? yes    Any unusual S/S or concerns expressed/observed? No    Povidine-iodine 5% nasal antiseptic pre-op prep completed 15 seconds per nare and repeated. 2% CHG pre-op skin prep completed in appropriate order using all 6 cloths:    With 1st cloth, wipe the neck, chest, and abdomen. Scrub inside nd  around the navel/belly-button area.  With 2nd cloth, wipe both arms, starting each with the shoulder  and ending at the fingertips. Be sure to thoroughly wipe the arm  pit area.  With 3rd cloth, wipe the right and left hip followed by the groin. Be sure to wipe folds in the abdominal and groin areas.  With 4th cloth, wipe both legs, starting at the thigh and ending   at the toes. Be sure to thoroughly wipe behind the knees.  With 5th cloth, wipe the back starting at the base of the neck and   Ending at the waist line.  With 6th cloth, wipe the buttocks.

## 2021-05-13 NOTE — ANESTHESIA PRE PROCEDURE
Department of Anesthesiology  Preprocedure Note       Name:  Magdalena Salinas   Age:  60 y.o.  :  1955                                          MRN:  [id number]         Date:  2021      Surgeon: Trent Restrepo):  Maycol Angelo MD    Procedure: Procedure(s):  L3-L4  POSTERIOR LUMBAR INTERBODY  FUSION--OARM, JESSI, YAJAIRA TABLE, CELL SAVER, PLATELET GEL, AUDIOLOGY, CAGES, PLATES, SCREWS -- GLOBUS    Medications prior to admission:   Prior to Admission medications    Medication Sig Start Date End Date Taking? Authorizing Provider   gabapentin (NEURONTIN) 300 MG capsule TAKE 1 CAPSULE BY MOUTH THREE TIMES DAILY FOR 30 DAYS 21 Yes Tyra Kang PA-C   Liraglutide (VICTOZA SC) Inject 1.8 mg into the skin daily   Yes Historical Provider, MD   BELBUCA 450 MCG FILM Take 450 mcg by mouth every 12 hours for 30 days. 21 Yes CHERYL Olguin   bumetanide (BUMEX) 2 MG tablet Take 1 tablet by mouth 2 times daily  Patient taking differently: Take 2 mg by mouth daily as needed  3/30/21  Yes Shiva Browning MD   potassium chloride (KLOR-CON M) 20 MEQ extended release tablet Take 1 tablet by mouth 2 times daily (with meals)  Patient taking differently: Take 20 mEq by mouth daily as needed  3/30/21  Yes Shiva Browning MD   buPROPion (WELLBUTRIN XL) 150 MG extended release tablet Take 1 tablet by mouth every morning  Patient taking differently: Take 150 mg by mouth every evening  21  Yes Gautam Razo DO   baclofen (LIORESAL) 10 MG tablet Take 1 tablet by mouth 3 times daily Tired   Do not drive   Yes Gautam Razo DO   rOPINIRole (REQUIP) 2 MG tablet Take 1 tablet by mouth 4 times daily 20  Yes Gautam Razo DO   ammonium lactate (LAC-HYDRIN) 12 % cream Apply topically as needed.  10/8/20  Yes Nato Young DPM   Cream Base (VERSAPRO) CREA APPLY ONE (1) GRAM (ONE PUMP) EXTERNALLY FOUR TIMES A DAY TO [facility] KNEE 3/23/20  Yes Gutierrez Hamilton PA-C Current medications:    Current Facility-Administered Medications   Medication Dose Route Frequency Provider Last Rate Last Admin    0.9 % sodium chloride infusion   Intravenous Continuous CHERYL Lin 100 mL/hr at 05/13/21 0603 New Bag at 05/13/21 0603    0.9 % sodium chloride infusion  25 mL Intravenous PRN CHERYL Lin        ceFAZolin (ANCEF) 3,000 mg in dextrose 5 % 100 mL IVPB  3,000 mg Intravenous On Call to CHERYL Mackenzie        sodium chloride flush 0.9 % injection 10 mL  10 mL Intravenous 2 times per day CHERYL Lni        sodium chloride flush 0.9 % injection 10 mL  10 mL Intravenous PRN CHERYL Lin           Allergies: Allergies   Allergen Reactions    Ferritin Shortness Of Breath and Other (See Comments)     Flushed,  Severe back pain       Problem List:    Patient Active Problem List   Diagnosis Code    Osteoarthritis of left knee M17.12    Osteoarthritis of right knee M17.11    BMI 45.0-49.9, adult ([facility]) Z68.42    Lymphedema of both lower extremities I89.0    Cellulitis of both lower extremities L03.115, L03. 116    Microcytic anemia D50.9    Morbid obesity ([facility]) E66.01    Tobacco abuse Z72.0    SOB (shortness of breath) R06.02    Iron deficiency anemia D50.9    Primary osteoarthritis involving multiple joints M89.49    Lymphedema I89.0    Intervertebral lumbar disc disorder M51.9    Reactive depression F32.9    Chronic pain syndrome G89.4    Primary osteoarthritis of both knees M17.0    Chronic right shoulder pain M25.511, G89.29    Chronic right-sided low back pain without sciatica M54.5, G89.29    Anxiety F41.9    Chronic fatigue R53.82    Tremor R25.1    Intractable back pain M54.9    Acute midline low back pain with right-sided sciatica M54.41    Spinal stenosis of lumbar region with neurogenic claudication M48.062    Osteoarthritis of spine with radiculopathy, lumbar region M47.26    Cellulitis L03.90    Anemia D64.9 Past Medical History:        Diagnosis Date    Anemia     Arthritis     Cellulitis 2015    left leg    Chronic ulcer of leg with fat layer exposed ([address] 2015    Chronic ulcer of right leg, limited to breakdown of skin ([address] 2016    COVID-19 2021    postive test no symptoms    Depression     Full dentures     Low back pain     Lymphedema of both lower extremities 2015    Obesity     280 #    Osteoarthritis     Peripheral vision loss     Stroke ([address]     Type 2 diabetes mellitus without complication, without long-term current use of insulin ([address] 2019    Ulcer of left lower leg, limited to breakdown of skin ([address] 06/10/2019    Ulcer of left lower leg, limited to breakdown of skin ([address] 06/10/2019    UTI (urinary tract infection) 2021       Past Surgical History:        Procedure Laterality Date    ABDOMINOPLASTY  2002    BREAST REDUCTION SURGERY      reduction    CATARACT REMOVAL      bilateral     SECTION      x2    COLONOSCOPY  2012    and egd    COLONOSCOPY  2021    polyps; marginal prep--jessica    COLONOSCOPY N/A 2021    COLONOSCOPY WITH BIOPSY performed by Silvino Whitehead MD at [facility][note 3] [address] ECHOCARDIOGRAM COMPLETE 2D W DOPPLER W COLOR  2012         GASTRIC BYPASS SURGERY  2000    NO NASTOGASTRIC TUBE    HERNIA REPAIR          UPPER GASTROINTESTINAL ENDOSCOPY  2021    minimal pouch gastritis--jessica    UPPER GASTROINTESTINAL ENDOSCOPY N/A 2021    EGD ESOPHAGOGASTRODUODENOSCOPY performed by Silvino Whitehead MD at [facility] History:    Social History     Tobacco Use    Smoking status: Former Smoker     Packs/day: 0.25     Years: 38.00     Pack years: 9.50     Types: Cigarettes     Quit date: 3/11/2021     Years since quittin.1    Smokeless tobacco: Never Used    Tobacco comment: Pt states she quit [employer]"   Substance Use Topics    Alcohol use:  No Counseling given: Not Answered  Comment: Pt states she quit \"[place]\"      Vital Signs (Current):   Vitals:    05/13/21 0529 05/13/21 0600   BP: (!) 225/104 (!) 185/109   Pulse: 87    Resp: 22    Temp: 36.2 °C (97.1 °F)    TempSrc: Temporal    SpO2: 97%    Weight: 280 lb (127 kg)    Height: 5' 2\" (1.575 m)                                               BP Readings from Last 3 Encounters:   05/13/21 (!) 185/109   05/12/21 (!) 161/79   05/10/21 (!) 181/84       NPO Status: Time of last liquid consumption: 0430 Instructed to have nothing by mouth after 23:59 03/26/2021                        Time of last solid consumption: 2000                        Date of last liquid consumption: 05/13/21                        Date of last solid food consumption: 05/12/21    BMI:   Wt Readings from Last 3 Encounters:   05/13/21 280 lb (127 kg)   05/10/21 287 lb (130.2 kg)   05/10/21 280 lb (127 kg)     Body mass index is 51.21 kg/m². CBC:   Lab Results   Component Value Date    WBC 8.3 05/10/2021    RBC 5.21 05/10/2021    HGB 10.6 05/10/2021    HCT 38.1 05/10/2021    MCV 73.1 05/10/2021    RDW 25.4 05/10/2021     05/10/2021       CMP:   Lab Results   Component Value Date     05/10/2021    K 4.1 05/10/2021    K 4.3 04/23/2021     05/10/2021    CO2 27 05/10/2021    BUN 14 05/10/2021    CREATININE 0.6 05/10/2021    GFRAA >60 05/10/2021    LABGLOM >60 05/10/2021    GLUCOSE 72 05/10/2021    PROT 6.9 05/10/2021    CALCIUM 8.7 05/10/2021    BILITOT 0.2 05/10/2021    ALKPHOS 113 05/10/2021    AST 18 05/10/2021    ALT 19 05/10/2021       POC Tests: No results for input(s): POCGLU, POCNA, POCK, POCCL, POCBUN, POCHEMO, POCHCT in the last 72 hours.     Coags:   Lab Results   Component Value Date    PROTIME 11.1 05/10/2021    INR 1.0 05/10/2021       HCG (If Applicable): No results found for: PREGTESTUR, PREGSERUM, HCG, HCGQUANT     ABGs: No results found for: PHART, PO2ART, VDV4TPN, SMA2HTC, BEART, Z4CNPAAS dyscrasia: anemia, arthritis (Bilat knees, L spine with radiculopathy): OA., .                  ROS comment: Lymphedema of both lower extremities with cellulitis. Abdominal:   (+) obese,         Vascular: negative vascular ROS. Anesthesia Plan      general     ASA 3       Induction: intravenous. Anesthetic plan and risks discussed with patient. Use of blood products discussed with patient whom consented to blood products. Plan discussed with CRNA and attending. HOLLY Diego - CRNA   5/13/2021    Pt seen, examined, chart reviewed, plan discussed.   [facility]  5/13/2021  6:26 AM

## 2021-05-13 NOTE — PROGRESS NOTES
INTRAOPERATIVE MONITORING EMG REPORT    Diagnosis: Spondylolisthesis  Procedure: PLIF L3-4   Anesthesia: Isoflurane  Surgeon: Daniel Roberson M.D. Intra-op Monitorin.25 hours    Procedure:     EMG recording electrodes were placed over the vastus medialis, vastus lateralis, anterior tibialis, and medial gastrocnemius musles for recording spontaneous EMG activity. A silent control was performed to test the integrity of the system prior to the procedure. Results:  Spontaneous EMG: No spontaneous EMG activity was observed throughout the procedure. Evoked EMG:   LEFT    Direct Nerve (mA)            Screw (mA)   L3               6*                                       30  L4                                                        >30     RIGHT     L3                                                          27  L4                                                        >30    *Initial test with TOF less than 70%. Reversal given and screw test with tof >80%.

## 2021-05-13 NOTE — PROGRESS NOTES
Called medical management consult to Dr SANTILLAN [facility] answering service. Awaiting call back or response.        1337: Called back and added to treatment team.

## 2021-05-13 NOTE — ANESTHESIA PROCEDURE NOTES
Central Venous Line:    A central venous line was placed using ultrasound guidance, in the OR for the following indication(s): central venous access. Sterility preparation included the following: hand hygiene performed prior to procedure, maximum sterile barriers used and sterile technique used to drape from head to toe. The patient was placed in Trendelenburg position. The right internal jugular vein was prepped. The site was prepped with Chloraprep.20 (length), triple lumen was placed. During the procedure, the following specific steps were taken: target vein identified, needle advanced into vein and blood aspirated and guidewire advanced into vein. Intravenous verification was obtained by ultrasound and venous blood return. Post insertion care included: all ports aspirated, all ports flushed easily, guidewire removed intact, Biopatch applied, line sutured in place and dressing applied. During the procedure the patient experienced: patient tolerated procedure well with no complications.       Anesthesia type: general  Staffing  Anesthesiologist: Lewis Olivera MD  Preanesthetic Checklist  Completed: patient identified, IV checked, site marked, risks and benefits discussed, surgical consent, monitors and equipment checked, pre-op evaluation, timeout performed, anesthesia consent given, oxygen available and patient being monitored

## 2021-05-14 PROBLEM — M48.062 LUMBAR STENOSIS WITH NEUROGENIC CLAUDICATION: Status: ACTIVE | Noted: 2021-05-14

## 2021-05-14 LAB
METER GLUCOSE: 115 MG/DL (ref 74–99)
METER GLUCOSE: 119 MG/DL (ref 74–99)
METER GLUCOSE: 169 MG/DL (ref 74–99)

## 2021-05-14 PROCEDURE — 97530 THERAPEUTIC ACTIVITIES: CPT

## 2021-05-14 PROCEDURE — 82962 GLUCOSE BLOOD TEST: CPT

## 2021-05-14 PROCEDURE — 6360000002 HC RX W HCPCS: Performed by: NEUROLOGICAL SURGERY

## 2021-05-14 PROCEDURE — 36592 COLLECT BLOOD FROM PICC: CPT

## 2021-05-14 PROCEDURE — 96365 THER/PROPH/DIAG IV INF INIT: CPT

## 2021-05-14 PROCEDURE — 6370000000 HC RX 637 (ALT 250 FOR IP): Performed by: NEUROLOGICAL SURGERY

## 2021-05-14 PROCEDURE — 2580000003 HC RX 258: Performed by: NEUROLOGICAL SURGERY

## 2021-05-14 PROCEDURE — 97535 SELF CARE MNGMENT TRAINING: CPT

## 2021-05-14 PROCEDURE — 99253 IP/OBS CNSLTJ NEW/EST LOW 45: CPT | Performed by: PHYSICAL MEDICINE & REHABILITATION

## 2021-05-14 PROCEDURE — 1200000000 HC SEMI PRIVATE

## 2021-05-14 RX ADMIN — OXYCODONE HYDROCHLORIDE 10 MG: 10 TABLET ORAL at 16:29

## 2021-05-14 RX ADMIN — CEFAZOLIN 3000 MG: 10 INJECTION, POWDER, FOR SOLUTION INTRAVENOUS at 16:29

## 2021-05-14 RX ADMIN — ROPINIROLE HYDROCHLORIDE 2 MG: 2 TABLET, FILM COATED ORAL at 08:13

## 2021-05-14 RX ADMIN — OXYCODONE HYDROCHLORIDE 10 MG: 10 TABLET ORAL at 07:46

## 2021-05-14 RX ADMIN — ROPINIROLE HYDROCHLORIDE 2 MG: 2 TABLET, FILM COATED ORAL at 12:01

## 2021-05-14 RX ADMIN — GABAPENTIN 300 MG: 300 CAPSULE ORAL at 20:38

## 2021-05-14 RX ADMIN — Medication 10 ML: at 08:51

## 2021-05-14 RX ADMIN — GABAPENTIN 300 MG: 300 CAPSULE ORAL at 09:49

## 2021-05-14 RX ADMIN — CEFAZOLIN 3000 MG: 10 INJECTION, POWDER, FOR SOLUTION INTRAVENOUS at 23:48

## 2021-05-14 RX ADMIN — ACETAMINOPHEN 650 MG: 325 TABLET ORAL at 17:55

## 2021-05-14 RX ADMIN — ROPINIROLE HYDROCHLORIDE 2 MG: 2 TABLET, FILM COATED ORAL at 16:29

## 2021-05-14 RX ADMIN — POTASSIUM CHLORIDE 20 MEQ: 1500 TABLET, EXTENDED RELEASE ORAL at 08:13

## 2021-05-14 RX ADMIN — GABAPENTIN 300 MG: 300 CAPSULE ORAL at 14:07

## 2021-05-14 RX ADMIN — HYDROMORPHONE HYDROCHLORIDE 1 MG: 1 INJECTION, SOLUTION INTRAMUSCULAR; INTRAVENOUS; SUBCUTANEOUS at 18:18

## 2021-05-14 RX ADMIN — HYDROMORPHONE HYDROCHLORIDE 1 MG: 1 INJECTION, SOLUTION INTRAMUSCULAR; INTRAVENOUS; SUBCUTANEOUS at 08:48

## 2021-05-14 RX ADMIN — BUPROPION HYDROCHLORIDE 150 MG: 150 TABLET, EXTENDED RELEASE ORAL at 17:55

## 2021-05-14 RX ADMIN — HYDROMORPHONE HYDROCHLORIDE 1 MG: 1 INJECTION, SOLUTION INTRAMUSCULAR; INTRAVENOUS; SUBCUTANEOUS at 13:03

## 2021-05-14 RX ADMIN — BACLOFEN 10 MG: 10 TABLET ORAL at 20:38

## 2021-05-14 RX ADMIN — INSULIN LISPRO 2 UNITS: 100 INJECTION, SOLUTION INTRAVENOUS; SUBCUTANEOUS at 12:02

## 2021-05-14 RX ADMIN — HYDROMORPHONE HYDROCHLORIDE 1 MG: 1 INJECTION, SOLUTION INTRAMUSCULAR; INTRAVENOUS; SUBCUTANEOUS at 05:37

## 2021-05-14 RX ADMIN — SODIUM CHLORIDE, PRESERVATIVE FREE 10 ML: 5 INJECTION INTRAVENOUS at 05:37

## 2021-05-14 RX ADMIN — SENNOSIDES AND DOCUSATE SODIUM 1 TABLET: 8.6; 5 TABLET ORAL at 20:38

## 2021-05-14 RX ADMIN — ROPINIROLE HYDROCHLORIDE 2 MG: 2 TABLET, FILM COATED ORAL at 20:38

## 2021-05-14 RX ADMIN — CEFAZOLIN 3000 MG: 10 INJECTION, POWDER, FOR SOLUTION INTRAVENOUS at 08:13

## 2021-05-14 RX ADMIN — BISACODYL 5 MG: 5 TABLET, COATED ORAL at 09:48

## 2021-05-14 RX ADMIN — OXYCODONE HYDROCHLORIDE 10 MG: 10 TABLET ORAL at 01:47

## 2021-05-14 RX ADMIN — SENNOSIDES AND DOCUSATE SODIUM 1 TABLET: 8.6; 5 TABLET ORAL at 09:48

## 2021-05-14 RX ADMIN — BACLOFEN 10 MG: 10 TABLET ORAL at 14:10

## 2021-05-14 RX ADMIN — BACLOFEN 10 MG: 10 TABLET ORAL at 09:49

## 2021-05-14 RX ADMIN — BUMETANIDE 2 MG: 1 TABLET ORAL at 10:21

## 2021-05-14 RX ADMIN — OXYCODONE HYDROCHLORIDE 10 MG: 10 TABLET ORAL at 11:47

## 2021-05-14 ASSESSMENT — PAIN SCALES - GENERAL
PAINLEVEL_OUTOF10: 10
PAINLEVEL_OUTOF10: 10
PAINLEVEL_OUTOF10: 7
PAINLEVEL_OUTOF10: 8
PAINLEVEL_OUTOF10: 10

## 2021-05-14 ASSESSMENT — PAIN DESCRIPTION - DESCRIPTORS
DESCRIPTORS: SHARP;SORE;STABBING
DESCRIPTORS: CONSTANT;THROBBING;ACHING
DESCRIPTORS: SHARP;SORE;STABBING
DESCRIPTORS: SHARP;SORE;STABBING

## 2021-05-14 ASSESSMENT — PAIN DESCRIPTION - ONSET
ONSET: AWAKENED FROM SLEEP
ONSET: ON-GOING

## 2021-05-14 ASSESSMENT — PAIN DESCRIPTION - FREQUENCY
FREQUENCY: CONTINUOUS
FREQUENCY: INTERMITTENT

## 2021-05-14 ASSESSMENT — PAIN - FUNCTIONAL ASSESSMENT
PAIN_FUNCTIONAL_ASSESSMENT: PREVENTS OR INTERFERES SOME ACTIVE ACTIVITIES AND ADLS

## 2021-05-14 ASSESSMENT — PAIN DESCRIPTION - PROGRESSION
CLINICAL_PROGRESSION: NOT CHANGED

## 2021-05-14 ASSESSMENT — PAIN DESCRIPTION - LOCATION
LOCATION: BACK

## 2021-05-14 ASSESSMENT — PAIN DESCRIPTION - PAIN TYPE
TYPE: SURGICAL PAIN

## 2021-05-14 ASSESSMENT — PAIN DESCRIPTION - ORIENTATION: ORIENTATION: MID;LOWER

## 2021-05-14 NOTE — OP NOTE
[address]                  [address] [facility],  [facility]                                OPERATIVE REPORT    PATIENT NAME: Mani Carrizales                  :        1955  MED REC NO:   [id number]                            ROOM:       52  ACCOUNT NO:   [de-identified]                           ADMIT DATE: 2021  PROVIDER:     Ole Pool MD    DATE OF PROCEDURE:  2021    PREOPERATIVE DIAGNOSIS:  L3-L4 degenerative spondylolisthesis. POSTOPERATIVE DIAGNOSIS:  L3-L4 degenerative spondylolisthesis. OPERATIVE PROCEDURES:  1.  Bilateral segmental arthrodesis and fusion at L3-L4 with the use of  bilateral L3 and L4 pedicle screws with use of locally harvested  autograft plus allograft in the form of BioZorb strips for  posterolateral fusion at L3-L4. 2.  360-degree fusion with posterior lumbar interbody fusion at L3-L4  with use of bilateral Globus Rise expandable cages packed with locally  harvested autograft. 3.  Bilateral L3 and L4 laminectomy, bilateral L3-L4 medial facetectomy,  bilateral L3 and L4 foraminotomy, and bilateral L3-L4 diskectomy. 4.  Use of O-arm assisted navigation with placement of pedicle screws. 5.  Use of free-running EMG to test pedicle screw heads. 6.  Use of intraoperative fluoroscopy interpreted by myself, the  surgeon. 7.  22 modifier for degree of difficulty for the patient being morbidly  obese with a BMI of 51.21.  8.  AS modifier for oJsé Culp [facility], who assisted with primary  exposure, primary closure, and retraction of neural elements. ANESTHESIA:  Generalized endotracheal anesthesia. SURGEON:  Ole Pool MD    [facility]    COMPLICATIONS:  None. ESTIMATED BLOOD LOSS:  200 mL. SPECIMEN:  Disk. OPERATIVE INDICATIONS:  The patient is a 69-year-old lady who presented  to the office complaining of back pain that radiated into her legs.   She  had an MRI that showed that she had a herniated disk at L3-L4 with a  degenerative spondylolisthesis at L3-L4. She had failed conservative  therapy and after risks, benefits, and alternatives were discussed with  the patient, it was determined that she would undergo the above-listed  procedure. Of note, Vilma Love, [facility], was the only qualified assistant. His  services were required to assist with primary exposure, primary closure,  and retraction of neural elements. DESCRIPTION OF PROCEDURE:  The patient was brought into the operating  room. A time-out was performed where she was identified by her name,  medical record number, and the operative procedure which she was about  to undergo. Next, induction of generalized endotracheal anesthesia was  then commenced. Upon completion of induction of generalized  endotracheal anesthesia, she received preoperative antibiotics. Pettit  catheter was placed. Monitoring electrodes were placed into the muscle  groups in her lower extremity for free-running EMG testing. She was  then flipped into prone position on a Suman table. All pressure  points were padded. Lumbosacral region was prepped and draped in usual  sterile fashion. After this was done, a #10-blade was used to make a  skin incision. Monopolar cautery was used to dissect through the  subcutaneous tissue. I placed a self-retaining Weitlaner retractor into  the wound. Next, I opened up the lumbodorsal fascia sharply with  monopolar cautery and exposed the spinous processes at L3 and L4. I  proceeded to then perform a subperiosteal dissection to expose the  bilateral lamina and transverse processes at L3 and L4 and after this  was done, I attached the O-arm reference frame to the L4 spinous  process. Using O-arm assisted navigation, I placed bilateral L3 and L4  pedicle screws. I then performed an O-arm spin that showed that screws  were within the pedicles. I then removed the O-arm from the field.   I  placed self-retaining Zelpi retractors into the wound. I used a  handheld stimulator to test pedicle screw heads. There was no evidence  of pedicle breach. I then used a Leksell rongeur to bite up the spinous  process at L3 and L4. I used a high-speed bur to thin out the lamina  bilaterally at L3 and L4. I then used #4 Kerrison punch to start my  central decompression. I performed a bilateral L3 and L4 laminectomy. I then subsequently proceeded to perform a bilateral L3-L4 medial  facetectomy and a bilateral L3 and L4 foraminotomy. After this was  done, I identified the L3-L4 disk space on the left. I retracted thecal  sac medially. I performed an annulotomy with a #11-blade. I removed  disk material with pituitary rongeurs and curettes. I prepared both the  superior and inferior endplates. I then proceeded to then place a #10  shaver that was rotated 360 degrees and after this was done, I again  prepared the superior and inferior endplates. Once this was completed,  I then placed a #9 Globus Rise expandable cage that was expanded to 15  mm. This was packed with locally harvested autograft. After this was  done, I went over the patient's right side, identified the L3-L4 disk  space, performed an annulotomy with a #11-blade. I removed disk  material with pituitary rongeurs and curettes. I prepared both the  superior and inferior endplates. I placed a #10 shaver that was rotated  360 degrees and after this was done, I then placed a #9 Globus Rise  expandable cage, packed with locally harvested autograft. It was  expanded to 15 mm. Next, I then proceeded to use intraoperative  fluoroscopy to inspect the construct, it appeared sound. I placed rods  into the screw heads. I locked the rods now using locking caps in a  torque/counter-torque mechanism. I then used high-speed bur to  decorticate the transverse processes bilaterally at L3 and L4.   I  irrigated the wound copiously with antibiotic-impregnated saline. I  then laid out my bone grafting material in lateral gutters which  consisted of locally harvested autograft plus allograft in the form of  BioZorb strips. After this was done, I obtained adequate hemostasis  with both monopolar and bipolar cautery. A Hemovac drain was then  placed into the wound. I then subsequently proceeded to close the wound  in layers using 0 Vicryl for the fascia, 2-0 Vicryl for the subcutaneous  layer, and 4-0 Monocryl in a subcuticular fashion for the skin. Dermabond was applied over the skin surface. A dry sterile dressing was  placed over this. The patient was then flipped into supine position on  her hospital bed. She was extubated and transported to the  postanesthesia care unit in stable condition. There were no  complications. Counts were correct. I was present for the entire case. Please note Lesly Hassan, [facility], was the only qualified assistant. He  assisted with primary exposure, primary closure, and retraction of  neural elements.         Gina Duarte MD    D: 05/13/2021 22:25:37       T: 05/13/2021 22:33:43     [email or web address]  Job#: [id number]     Doc#: [id number]    CC:

## 2021-05-14 NOTE — PROGRESS NOTES
Department of Neurosurgery  Progress Note    CHIEF COMPLAINT: s/p lumbar fusion    SUBJECTIVE:  C/o severe op site pain    REVIEW OF SYSTEMS :  Constitutional: Negative for chills and fever. Neurological: Negative for dizziness, tremors and speech change.      OBJECTIVE:   VITALS:  BP (!) 171/89   Pulse 87   Temp 98 °F (36.7 °C) (Temporal)   Resp 16   Ht 5' 2\" (1.575 m)   Wt 280 lb (127 kg)   SpO2 94%   BMI 51.21 kg/m²   PHYSICAL:  CONSTITUTIONAL:  awake, alert, cooperative, no apparent distress, and appears stated age    DATA:  CBC:   Lab Results   Component Value Date    WBC 8.3 05/10/2021    RBC 5.21 05/10/2021    HGB 10.6 05/10/2021    HCT 38.1 05/10/2021    MCV 73.1 05/10/2021    MCH 20.3 05/10/2021    MCHC 27.8 05/10/2021    RDW 25.4 05/10/2021     05/10/2021    MPV NOT CALC 05/10/2021     BMP:    Lab Results   Component Value Date     05/10/2021    K 4.1 05/10/2021    K 4.3 04/23/2021     05/10/2021    CO2 27 05/10/2021    BUN 14 05/10/2021    LABALBU 3.5 05/10/2021    CREATININE 0.6 05/10/2021    CALCIUM 8.7 05/10/2021    GFRAA >60 05/10/2021    LABGLOM >60 05/10/2021    GLUCOSE 72 05/10/2021     PT/INR:    Lab Results   Component Value Date    PROTIME 11.1 05/10/2021    INR 1.0 05/10/2021     PTT:  No results found for: APTT, PTT[APTT}    Current Inpatient Medications  Current Facility-Administered Medications: ammonium lactate (LAC-HYDRIN) 12 % lotion, , Topical, Q2H PRN  baclofen (LIORESAL) tablet 10 mg, 10 mg, Oral, TID  Buprenorphine HCl FILM 450 mcg, 450 mcg, Oral, BID  bumetanide (BUMEX) tablet 2 mg, 2 mg, Oral, Daily  buPROPion (WELLBUTRIN XL) extended release tablet 150 mg, 150 mg, Oral, QPM  gabapentin (NEURONTIN) capsule 300 mg, 300 mg, Oral, TID  potassium chloride (KLOR-CON M) extended release tablet 20 mEq, 20 mEq, Oral, Daily with breakfast  rOPINIRole (REQUIP) tablet 2 mg, 2 mg, Oral, 4x daily  sodium chloride flush 0.9 % injection 5-40 mL, 5-40 mL, Intravenous, 2 times per day  sodium chloride flush 0.9 % injection 5-40 mL, 5-40 mL, Intravenous, PRN  0.9 % sodium chloride infusion, 25 mL, Intravenous, PRN  promethazine (PHENERGAN) tablet 12.5 mg, 12.5 mg, Oral, Q6H PRN **OR** ondansetron (ZOFRAN) injection 4 mg, 4 mg, Intravenous, Q6H PRN  0.9 % sodium chloride infusion, , Intravenous, Continuous  ceFAZolin (ANCEF) 3,000 mg in dextrose 5 % 100 mL IVPB, 3,000 mg, Intravenous, Q8H  oxyCODONE (ROXICODONE) immediate release tablet 5 mg, 5 mg, Oral, Q4H PRN **OR** oxyCODONE HCl (OXY-IR) immediate release tablet 10 mg, 10 mg, Oral, Q4H PRN  HYDROmorphone (DILAUDID) injection 1 mg, 1 mg, Intravenous, Q3H PRN  polyethylene glycol (GLYCOLAX) packet 17 g, 17 g, Oral, Daily  bisacodyl (DULCOLAX) EC tablet 5 mg, 5 mg, Oral, Daily  sennosides-docusate sodium (SENOKOT-S) 8.6-50 MG tablet 1 tablet, 1 tablet, Oral, BID  benzocaine-menthol (CEPACOL SORE THROAT) lozenge 1 lozenge, 1 lozenge, Oral, Q2H PRN  acetaminophen (TYLENOL) tablet 650 mg, 650 mg, Oral, Q4H PRN  insulin lispro (HUMALOG) injection vial 0-12 Units, 0-12 Units, Subcutaneous, TID WC  insulin lispro (HUMALOG) injection vial 0-6 Units, 0-6 Units, Subcutaneous, Nightly  glucose (GLUTOSE) 40 % oral gel 15 g, 15 g, Oral, PRN  dextrose 50 % IV solution, 12.5 g, Intravenous, PRN  glucagon (rDNA) injection 1 mg, 1 mg, Intramuscular, PRN  dextrose 5 % solution, 100 mL/hr, Intravenous, PRN    ASSESSMENT:   · S/p L3-4 PLIF on 5/13 - stable  Drain intact  Severe op site pain requiring IV pain meds  PLAN:  · Pt will be admitted for pain management and drain management  · PT/OT  · WBAT  · Pain control  · Drain care      Electronically signed by CHERYL Blake on 5/14/2021 at 10:56 AM

## 2021-05-14 NOTE — CONSULTS
[address]                  [address] [facility],  [address]                                  CONSULTATION    PATIENT NAME: Mayte Rajan                  :        1955  MED REC NO:   [id number]                            ROOM:       5213  ACCOUNT NO:   [de-identified]                           ADMIT DATE: 2021  PROVIDER:     Vance Snow DO    CONSULT DATE:  2021    ADMITTING PROVIDER:  Parvin Becerra MD    PRIMARY CARE PROVIDER:  Bonita Foster DO    REASON FOR CONSULTATION:  Medical management. CHIEF COMPLAINT:  Status post posterior lumbar interbody fusion. HISTORY OF PRESENT ILLNESS:  The patient is a 69-year-old   female who was admitted to the hospital after undergoing posterior  lumbar interbody fusion per Dr. Roberts Escort on 2021. PAST MEDICAL HISTORY:  Chronic pain; restless legs syndrome; morbid  obesity; chronic lymphedema, lower extremities; osteoarthritis; CVA with  peripheral vision loss. PAST SURGICAL HISTORY:  , gastric bypass, abdominoplasty,  bilateral eye cataract surgery, hernia repair, breast reduction surgery. SOCIAL HISTORY:  The patient quit tobacco a few months ago. Denies  alcohol. REVIEW OF SYSTEMS:  Remarkable for above-stated chief complaint plus  allergy to _____. MEDICATIONS PRIOR TO ADMISSION:  Neurontin, Victoza, Belbuca, Bumex,  potassium chloride, Wellbutrin, Lioresal, Requip, Lac-Hydrin topical.    PHYSICAL EXAMINATION:  GENERAL APPEARANCE:  Reveals a 69-year-old  female who is alert  and oriented x3, cooperative and a good historian. VITAL SIGNS:  Temperature 98 degrees, pulse 87, respirations 16, blood  pressure 171/89. HEENT:  Head:  Normocephalic, atraumatic. Eyes:  Pupils are equal and  reactive to light. Extraocular muscles intact. Fundi not well  visualized. Nose:  No obstruction, polyp or discharge noted. Mouth:   Mucosa without lesion. Teeth:  Edentulous. Pharynx:  Noninjected  without exudate. NECK:  Supple. No JVD. No thyromegaly. No carotid bruits. HEART:  Regular rate and rhythm without murmur. LUNGS:  Clear to auscultation bilaterally. ABDOMEN:  Positive bowel sounds, soft, nontender. No rebound or  guarding. No hepatosplenomegaly. No masses. BACK:  With minimal increased thoracic kyphosis. EXTREMITIES:  With 1+ pitting edema in the bilateral lower extremities  without erythema. LYMPH NODES:  No adenopathy noted. SKIN:  With chronic venous stasis changes in lower extremities. IMPRESSION:  Postop day #1 status post posterior lumbar interbody  fusion, chronic lymphedema of legs, chronic pain, morbid obesity,  osteoarthritis, restless legs syndrome, history of CVA, former tobacco  abuse. PLAN:  Continue postop care as per Neurosurgery. Pain control. Serial  lab. Discharge plan home versus rehab as per the patient's progress.         Katie Lagunas DO    D: 05/14/2021 8:31:05       T: 05/14/2021 8:40:21     [id number]  Job#: [id number]     Doc#: [id number]    CC:

## 2021-05-14 NOTE — PROGRESS NOTES
Physical Therapy  Treatment Note     Name: Leilani Guy  : 1955  MRN: [id number]    Referring Provider:        Mireya Phoenix MD     Date of Service: 2021    Evaluating PT:  Alberto Shirley PT   Room #:  0804/3398-M  Diagnosis: Spondylolisthesis of lumbar region [M43.16]  Lumbar stenosis with neurogenic claudication [M48.062]     PMHx/PSHx:   has a past medical history of Anemia, Arthritis, Cellulitis, Chronic ulcer of leg with fat layer exposed ([address] Chronic ulcer of right leg, limited to breakdown of skin ([address] COVID-19, Depression, Full dentures, Low back pain, Lymphedema of both lower extremities, Obesity, Osteoarthritis, Peripheral vision loss, Stroke ([address] Type 2 diabetes mellitus without complication, without long-term current use of insulin ([address] Ulcer of left lower leg, limited to breakdown of skin ([address] Ulcer of left lower leg, limited to breakdown of skin ([address] and UTI (urinary tract infection). has a past surgical history that includes ECHO Complete 2D W Doppler W Color (2012);  section; Gastric bypass surgery (); Abdominoplasty (); Cataract removal; hernia repair; Colonoscopy (2012); Breast reduction surgery; Upper gastrointestinal endoscopy (2021); Colonoscopy (2021); Upper gastrointestinal endoscopy (N/A, 2021); Colonoscopy (N/A, 2021); and Lumbar spine surgery (N/A, 2021). Procedure/Surgery:  21 L3-L4  POSTERIOR LUMBAR INTERBODY  FUSION  Precautions:  Falls,  , , Hemovac drain, Spinal precautions and LSO  Equipment Needs: To be determined      SUBJECTIVE:    Patient lives with sister  in a two story home resides first  with 2 steps to enter with 2Rail  Bed is on 1 floor and bath is on 1 floor. Patient ambulated with wheeled walker  PTA.  Equipment owned: Adebayo Mack,      OBJECTIVE:   Initial Evaluation  Date: 21 Treatment  21 Short Term/ Long Term   Goals   AM-PAC 6 Clicks 22/91 75/58    Was pt agreeable to Eval/treatment? yes Yes    Does pt have pain? Yes  10/10 LBP    Bed Mobility  Rolling: Mod A x 2  Supine to sit: Mod a x 2  Sit to supine: NT  Scooting: Mod A x 2 NT   Pt received sitting up in chair Rolling: Ind  Supine to sit: Ind  Sit to supine: Ind  Scooting: Ind   Transfers Sit to stand: Mod A x 2 to fww. Stand to sit: Mod   Stand pivot: Mod a x 2 Sit to stand: Mod A   Stand to sit: Mod A   Stand pivot: Mod A using [employer]  Sit to stand: Mod Ind  Stand to sit: Mod Ind  Stand pivot: Mod Ind   Ambulation   3 feet with fww Mod a x 2 to bedside chair. 30 feet using [employer] with Mod A  100+ feet with fww Mod Ind   Stair negotiation: ascended and descended  NT NT 2 steps with 2 rail Min   ROM BUE:  See OT eval  BLE:  wfl BLE: WFL    Strength BUE: See OT   RLE:  4/5  LLE:   4/5 RLE: 4-/5  LLE: 4/5 4+/5   Balance Sitting EOB:  SBA  Dynamic Standing: Mod a x 2 with fww Sitting EOB: SBA  Dynamic Standing: Mod A using [employer] Sitting EOB:  Ind  Dynamic Standing:  ind     Patient is Alert & Oriented x person, place, time and situation and follows directions   Sensation:  Pt reports numbness and tingling to RLE  Edema:  none    Therapeutic Exercises:     Neural Glides: BLE 2x10 reps   STS    Patient education  Patient educated on spinal precautions, donning/doffing LSO, transfer technique, gait training and performing neural glides. Patient response to education:   Pt verbalized understanding Pt demonstrated skill Pt requires further education in this area   yes yes yes     ASSESSMENT:    Comments:  RN cleared patient for participation in therapy session. Patient was received this date sitting up in chair. Pt with c/o increased pain and numbness in RLE. VCs for transfer technique and safety with mobility. Pt reports B knees needing TKA and having history of CVA with peripheral vision loss that impairs amb. Pt amb with decreased konstantin and decreased B heel strike. VCs for [employer] approximation and forward gaze.   As distance increased, R knee instability noted. Pt required assist for [address] management with pivots due to attempting to abandon. Pt was left sitting up with call button within reach and all needs met. This patient can benefit from the continuation of skilled PT  to maximize functional level and return to PLOF. Treatment:  Patient practiced and was instructed in the following treatment:    · Assistive device training - pt educated on usingWW approximation/negotiation, and hand placement during sit<>stand to [address]  · STS and transfer training - educated on hand/foot placement, safety, and sequencing during STS and pivot transfers using assistive device  · Education in spinal precautions and application of LSO adhering to those precautions. · Therapeutic exercise was completed to improve strength of BLE to improve functional mobility status and prevent nerve root adhesion. PLAN OF CARE:  Patient is making good progress towards goals. Will continue with current POC.      Time in  0845  Time out  0920    Total Treatment Time  25 minutes     CPT codes:  [] Low Complexity PT evaluation 82461  [] Moderate Complexity PT evaluation 93339  [] High Complexity PT evaluation 07008  [] PT Re-evaluation 72451  [] Gait training 04602 - minutes  [] Manual therapy 57456 - minutes  [x] Therapeutic activities 04273 25 minutes  [] Therapeutic exercises 64922 - minutes  [] Neuromuscular reeducation 35187 - minutes     Dakota Montana, PT, DPT  License [id number]

## 2021-05-14 NOTE — CARE COORDINATION
5/14/2021 - Care coordination - S/P L3-L4 PLIF. Neurosurgery following. Hemovac drain intact. Wearing LSO brace, sitting up in chair. PM&R consult on chart. Spoke with pt in room to discuss transition of care. PCP is Dr Clement Cárdenas. Pharmacy is [facility]. Lives with her sister in a 1 story home with 2 steps to enter home. [facility]. DME - fww. Denies hx IVANNA/ARU. Anticipating ARU stay. States her daughter would stay with her if she went home. Will await evals and recommendations to determine appropriate discharge plan. SW/CM will follow.

## 2021-05-14 NOTE — ANESTHESIA POSTPROCEDURE EVALUATION
Department of Anesthesiology  Postprocedure Note    Patient: Zulema Mayfield  MRN: [id number]  YOB: 1955  Date of evaluation: 5/14/2021  Time:  1:16 PM     Procedure Summary     Date: 05/13/21 Room / Location: [facility] OR 06 / [facility]    Anesthesia Start: 2364 Anesthesia Stop: 1018    Procedure: L3-L4  POSTERIOR LUMBAR INTERBODY  FUSION--OARM, JESSI, YAJAIRA TABLE, CELL SAVER, PLATELET GEL, AUDIOLOGY, CAGES, PLATES, SCREWS -- GLOBUS (N/A Spine Lumbar) Diagnosis: (SPONDYLOLITHESIS)    Surgeons: Nick Jennings MD Responsible Provider: Hang Monsalve MD    Anesthesia Type: general ASA Status: 3          Anesthesia Type: general    Izzy Phase I: Izzy Score: 9    Izzy Phase II:      Last vitals: Reviewed and per EMR flowsheets.        Anesthesia Post Evaluation    Patient location during evaluation: PACU  Patient participation: complete - patient participated  Level of consciousness: awake  Airway patency: patent  Nausea & Vomiting: no nausea and no vomiting  Complications: no  Cardiovascular status: hemodynamically stable  Respiratory status: acceptable  Hydration status: stable

## 2021-05-14 NOTE — PROGRESS NOTES
Occupational Therapy  OT BEDSIDE TREATMENT NOTE      Date:2021  Patient Name: Brandy Pina  MRN: [id number]  : 1955  Room: [address] [place] [address]     Evaluating OT: Chivo Arevalo OTR/L   License #  [id number]      AM-PAC Daily Activity Raw Score:      Recommended Adaptive Equipment:  LB AE, leg      Diagnosis:  SPONDYLOLITHESIS     Surgery:  21: L3-L4  POSTERIOR LUMBAR INTERBODY  FUSION  Pertinent Medical History:   Past Medical History        Past Medical History:   Diagnosis Date    Anemia      Arthritis      Cellulitis 2015     left leg    Chronic ulcer of leg with fat layer exposed ([address] 2015    Chronic ulcer of right leg, limited to breakdown of skin ([address] 2016    COVID-19 2021     postive test no symptoms    Depression      Full dentures      Low back pain      Lymphedema of both lower extremities 2015    Obesity       280 #    Osteoarthritis      Peripheral vision loss      Stroke (HCC)      Type 2 diabetes mellitus without complication, without long-term current use of insulin ([address] 2019    Ulcer of left lower leg, limited to breakdown of skin ([address] 06/10/2019    Ulcer of left lower leg, limited to breakdown of skin ([address] 06/10/2019    UTI (urinary tract infection) 2021         Precautions:  Falls, Neutral spine, LSO brace when greater than 45, triple lumen, hemovac drain, jean     Home Living: Pt lives with sister in a 2 story with 2 steps to enter and B HR. Bathroom setup: walk in shower, higher commode  Equipment owned: shower bench, grab bars in shower, HH shower, rollator     Prior Level of Function: Ind. with ADLs , Ind. with IADLs; ambulated without A.D.   Driving: active  Occupation: retired RN     Pain Level: Pt reports 10/10 back pain  Cognition: A&O: 4/4; Follows multi- step directions              Memory:  good              Sequencing:  good              Problem solving:  good              Judgement/safety:  Fair+ Functional Assessment:    Initial Eval Status  Date: 5- Treatment Status  Date: 5/14/2021 Short Term Goals = Long Term Goals  Treatment frequency: 3-5 days   Feeding Ind.  Ind     Grooming SBA seated  Set up  To comb hair seated Modified [place]    UB Dressing Max A with gown & LSO brace log-rolling in bed, supine. Pt. & dtr. Instructed RE: brace training throughout  Max A  seated Modified [place]    LB Dressing Max A/Dep  Unable to perform figure 4 technique, pt. Will benefit from use of A.E.  Min A  Pt educated on LB AE, donned/doffed socks seated EOB Modified [place]    Bathing Maximal Assist with sim. task Max A  Educated on [address]  Recommend bedside commode Modified [place]    Bed Mobility  Logroll: Mod Ax2  Supine to sit: Mod A x2  Sit to supine: Mod A x2  Per last tx Supine to sit: Modified [place]   Sit to supine: Modified [place]    Functional Transfers Moderate Assist x2 with sit <> stand, SPT. Mod A- sit<->stand  Cuing for hand placement and body mechanics  Modified [place]    Functional Mobility Mod A x2 with ww few steps towards chair  Mod A  Short home distance using w/w  Modified [place]    Balance Sitting:     Static:  SBA    Dynamic:Min A  Standing: Mod A Sitting:     Static:  Ind    Dynamic: SBA  Standing: Mod A      Activity Tolerance Fair- lt. ax.  spO2 & HR remained WFL  BP monitored with RN present. 160/92  Fair Fair+ with mod. Ax. Visual/  Perceptual Glasses: yes          Safety Awareness fair  Fair+                   good       Comments: Upon arrival pt seated in bedside chair. Pt able to verbalize 2/3 spinal precautions, demonstrates fair+ understanding. Pt educated on adaptive techniques to increase independence and safety during ADL's, and functional transfers while maintaining precautions. Discussed home set up with pt, giving suggestions to increase safety at discharge.  At end of session pt left seated in bedside chair, call light within reach. · Pt has made good progress towards set goals.      · Continue with current plan of care    Treatment Time In: 8:54            Treatment Time Out: 9:20             Treatment Charges: Mins Units   Ther Ex  41872     Manual Therapy [id number]     Thera Activities 15918 10 1   ADL/Home Mgt 08579 16 1   Neuro Re-ed 95461     Group Therapy      Orthotic manage/training  01118     Non-Billable Time     Total Timed Treatment 26 [address] [place] [address]

## 2021-05-14 NOTE — PROGRESS NOTES
Called Phil at  to inquire on the consult for PM&R that was placed and called yesterday 5/13. Left message.

## 2021-05-15 LAB
ANION GAP SERPL CALCULATED.3IONS-SCNC: 10 MMOL/L (ref 7–16)
BUN BLDV-MCNC: 15 MG/DL (ref 6–23)
CALCIUM SERPL-MCNC: 8.6 MG/DL (ref 8.6–10.2)
CHLORIDE BLD-SCNC: 98 MMOL/L (ref 98–107)
CO2: 31 MMOL/L (ref 22–29)
CREAT SERPL-MCNC: 0.7 MG/DL (ref 0.5–1)
GFR AFRICAN AMERICAN: >60
GFR NON-AFRICAN AMERICAN: >60 ML/MIN/1.73
GLUCOSE BLD-MCNC: 143 MG/DL (ref 74–99)
HCT VFR BLD CALC: 30.7 % (ref 34–48)
HEMOGLOBIN: 8.4 G/DL (ref 11.5–15.5)
JAK2 GENE MUTATION QUAL: NOT DETECTED
MCH RBC QN AUTO: 20.7 PG (ref 26–35)
MCHC RBC AUTO-ENTMCNC: 27.4 % (ref 32–34.5)
MCV RBC AUTO: 75.8 FL (ref 80–99.9)
PDW BLD-RTO: 26.7 FL (ref 11.5–15)
PLATELET # BLD: 139 E9/L (ref 130–450)
PMV BLD AUTO: ABNORMAL FL (ref 7–12)
POTASSIUM SERPL-SCNC: 3.8 MMOL/L (ref 3.5–5)
RBC # BLD: 4.05 E12/L (ref 3.5–5.5)
SODIUM BLD-SCNC: 139 MMOL/L (ref 132–146)
WBC # BLD: 10.3 E9/L (ref 4.5–11.5)

## 2021-05-15 PROCEDURE — 2580000003 HC RX 258: Performed by: NEUROLOGICAL SURGERY

## 2021-05-15 PROCEDURE — 94640 AIRWAY INHALATION TREATMENT: CPT

## 2021-05-15 PROCEDURE — 6370000000 HC RX 637 (ALT 250 FOR IP): Performed by: NEUROLOGICAL SURGERY

## 2021-05-15 PROCEDURE — 51701 INSERT BLADDER CATHETER: CPT

## 2021-05-15 PROCEDURE — 80048 BASIC METABOLIC PNL TOTAL CA: CPT

## 2021-05-15 PROCEDURE — 85027 COMPLETE CBC AUTOMATED: CPT

## 2021-05-15 PROCEDURE — 96375 TX/PRO/DX INJ NEW DRUG ADDON: CPT

## 2021-05-15 PROCEDURE — 36415 COLL VENOUS BLD VENIPUNCTURE: CPT

## 2021-05-15 PROCEDURE — 6370000000 HC RX 637 (ALT 250 FOR IP): Performed by: INTERNAL MEDICINE

## 2021-05-15 PROCEDURE — 94664 DEMO&/EVAL PT USE INHALER: CPT

## 2021-05-15 PROCEDURE — 1200000000 HC SEMI PRIVATE

## 2021-05-15 PROCEDURE — 6360000002 HC RX W HCPCS: Performed by: NEUROLOGICAL SURGERY

## 2021-05-15 RX ORDER — METHYLPREDNISOLONE 4 MG/1
4 TABLET ORAL
Status: DISCONTINUED | OUTPATIENT
Start: 2021-05-16 | End: 2021-05-18 | Stop reason: HOSPADM

## 2021-05-15 RX ORDER — METHYLPREDNISOLONE 4 MG/1
24 TABLET ORAL ONCE
Status: COMPLETED | OUTPATIENT
Start: 2021-05-15 | End: 2021-05-15

## 2021-05-15 RX ORDER — IPRATROPIUM BROMIDE AND ALBUTEROL SULFATE 2.5; .5 MG/3ML; MG/3ML
1 SOLUTION RESPIRATORY (INHALATION)
Status: DISCONTINUED | OUTPATIENT
Start: 2021-05-15 | End: 2021-05-18 | Stop reason: HOSPADM

## 2021-05-15 RX ORDER — METHYLPREDNISOLONE 4 MG/1
8 TABLET ORAL NIGHTLY
Status: COMPLETED | OUTPATIENT
Start: 2021-05-16 | End: 2021-05-16

## 2021-05-15 RX ORDER — METHYLPREDNISOLONE 4 MG/1
4 TABLET ORAL
Status: DISCONTINUED | OUTPATIENT
Start: 2021-05-16 | End: 2021-05-18

## 2021-05-15 RX ORDER — METHYLPREDNISOLONE 4 MG/1
4 TABLET ORAL
Status: DISCONTINUED | OUTPATIENT
Start: 2021-05-16 | End: 2021-05-17

## 2021-05-15 RX ORDER — DIAZEPAM 5 MG/1
5 TABLET ORAL EVERY 6 HOURS PRN
Status: DISCONTINUED | OUTPATIENT
Start: 2021-05-15 | End: 2021-05-18 | Stop reason: HOSPADM

## 2021-05-15 RX ORDER — METHYLPREDNISOLONE 4 MG/1
4 TABLET ORAL NIGHTLY
Status: DISCONTINUED | OUTPATIENT
Start: 2021-05-17 | End: 2021-05-18 | Stop reason: HOSPADM

## 2021-05-15 RX ADMIN — GABAPENTIN 300 MG: 300 CAPSULE ORAL at 08:41

## 2021-05-15 RX ADMIN — DIAZEPAM 5 MG: 5 TABLET ORAL at 10:05

## 2021-05-15 RX ADMIN — METHYLPREDNISOLONE 24 MG: 4 TABLET ORAL at 11:00

## 2021-05-15 RX ADMIN — CEFAZOLIN 3000 MG: 10 INJECTION, POWDER, FOR SOLUTION INTRAVENOUS at 23:44

## 2021-05-15 RX ADMIN — IPRATROPIUM BROMIDE AND ALBUTEROL SULFATE 1 AMPULE: .5; 3 SOLUTION RESPIRATORY (INHALATION) at 10:57

## 2021-05-15 RX ADMIN — SODIUM CHLORIDE, PRESERVATIVE FREE 10 ML: 5 INJECTION INTRAVENOUS at 23:44

## 2021-05-15 RX ADMIN — ROPINIROLE HYDROCHLORIDE 2 MG: 2 TABLET, FILM COATED ORAL at 08:41

## 2021-05-15 RX ADMIN — BACLOFEN 10 MG: 10 TABLET ORAL at 20:43

## 2021-05-15 RX ADMIN — GABAPENTIN 300 MG: 300 CAPSULE ORAL at 20:43

## 2021-05-15 RX ADMIN — HYDROMORPHONE HYDROCHLORIDE 1 MG: 1 INJECTION, SOLUTION INTRAMUSCULAR; INTRAVENOUS; SUBCUTANEOUS at 12:25

## 2021-05-15 RX ADMIN — SENNOSIDES AND DOCUSATE SODIUM 1 TABLET: 8.6; 5 TABLET ORAL at 20:43

## 2021-05-15 RX ADMIN — BISACODYL 5 MG: 5 TABLET, COATED ORAL at 08:43

## 2021-05-15 RX ADMIN — Medication 10 ML: at 09:04

## 2021-05-15 RX ADMIN — OXYCODONE HYDROCHLORIDE 10 MG: 10 TABLET ORAL at 23:44

## 2021-05-15 RX ADMIN — IPRATROPIUM BROMIDE AND ALBUTEROL SULFATE 1 AMPULE: .5; 3 SOLUTION RESPIRATORY (INHALATION) at 18:36

## 2021-05-15 RX ADMIN — CEFAZOLIN 3000 MG: 10 INJECTION, POWDER, FOR SOLUTION INTRAVENOUS at 15:49

## 2021-05-15 RX ADMIN — HYDROMORPHONE HYDROCHLORIDE 1 MG: 1 INJECTION, SOLUTION INTRAMUSCULAR; INTRAVENOUS; SUBCUTANEOUS at 08:38

## 2021-05-15 RX ADMIN — HYDROMORPHONE HYDROCHLORIDE 1 MG: 1 INJECTION, SOLUTION INTRAMUSCULAR; INTRAVENOUS; SUBCUTANEOUS at 05:06

## 2021-05-15 RX ADMIN — BACLOFEN 10 MG: 10 TABLET ORAL at 08:42

## 2021-05-15 RX ADMIN — BUMETANIDE 2 MG: 1 TABLET ORAL at 08:42

## 2021-05-15 RX ADMIN — POLYETHYLENE GLYCOL 3350 17 G: 17 POWDER, FOR SOLUTION ORAL at 08:41

## 2021-05-15 RX ADMIN — ROPINIROLE HYDROCHLORIDE 2 MG: 2 TABLET, FILM COATED ORAL at 15:49

## 2021-05-15 RX ADMIN — Medication 10 ML: at 20:47

## 2021-05-15 RX ADMIN — BUPROPION HYDROCHLORIDE 150 MG: 150 TABLET, EXTENDED RELEASE ORAL at 18:31

## 2021-05-15 RX ADMIN — POTASSIUM CHLORIDE 20 MEQ: 1500 TABLET, EXTENDED RELEASE ORAL at 08:43

## 2021-05-15 RX ADMIN — ENOXAPARIN SODIUM 40 MG: 40 INJECTION SUBCUTANEOUS at 10:05

## 2021-05-15 RX ADMIN — ROPINIROLE HYDROCHLORIDE 2 MG: 2 TABLET, FILM COATED ORAL at 12:16

## 2021-05-15 RX ADMIN — DIAZEPAM 5 MG: 5 TABLET ORAL at 20:43

## 2021-05-15 RX ADMIN — OXYCODONE HYDROCHLORIDE 10 MG: 10 TABLET ORAL at 03:50

## 2021-05-15 RX ADMIN — ONDANSETRON 4 MG: 2 INJECTION INTRAMUSCULAR; INTRAVENOUS at 06:23

## 2021-05-15 RX ADMIN — OXYCODONE HYDROCHLORIDE 10 MG: 10 TABLET ORAL at 10:05

## 2021-05-15 RX ADMIN — SENNOSIDES AND DOCUSATE SODIUM 1 TABLET: 8.6; 5 TABLET ORAL at 08:43

## 2021-05-15 RX ADMIN — SODIUM CHLORIDE, PRESERVATIVE FREE 10 ML: 5 INJECTION INTRAVENOUS at 06:23

## 2021-05-15 RX ADMIN — GABAPENTIN 300 MG: 300 CAPSULE ORAL at 14:13

## 2021-05-15 RX ADMIN — HYDROMORPHONE HYDROCHLORIDE 1 MG: 1 INJECTION, SOLUTION INTRAMUSCULAR; INTRAVENOUS; SUBCUTANEOUS at 17:19

## 2021-05-15 RX ADMIN — IPRATROPIUM BROMIDE AND ALBUTEROL SULFATE 1 AMPULE: .5; 3 SOLUTION RESPIRATORY (INHALATION) at 15:27

## 2021-05-15 RX ADMIN — CEFAZOLIN 3000 MG: 10 INJECTION, POWDER, FOR SOLUTION INTRAVENOUS at 08:37

## 2021-05-15 RX ADMIN — OXYCODONE HYDROCHLORIDE 10 MG: 10 TABLET ORAL at 15:44

## 2021-05-15 RX ADMIN — BACLOFEN 10 MG: 10 TABLET ORAL at 14:13

## 2021-05-15 RX ADMIN — ROPINIROLE HYDROCHLORIDE 2 MG: 2 TABLET, FILM COATED ORAL at 20:43

## 2021-05-15 ASSESSMENT — PAIN DESCRIPTION - PAIN TYPE
TYPE: SURGICAL PAIN

## 2021-05-15 ASSESSMENT — PAIN DESCRIPTION - DESCRIPTORS
DESCRIPTORS: ACHING;DISCOMFORT;DULL
DESCRIPTORS: ACHING;CONSTANT
DESCRIPTORS: ACHING;CONSTANT;DISCOMFORT;SORE;TENDER
DESCRIPTORS: ACHING;CONSTANT;DISCOMFORT;SORE;TENDER

## 2021-05-15 ASSESSMENT — PAIN SCALES - GENERAL
PAINLEVEL_OUTOF10: 10
PAINLEVEL_OUTOF10: 3
PAINLEVEL_OUTOF10: 9
PAINLEVEL_OUTOF10: 9
PAINLEVEL_OUTOF10: 3
PAINLEVEL_OUTOF10: 9

## 2021-05-15 ASSESSMENT — PAIN DESCRIPTION - ORIENTATION
ORIENTATION: LOWER;MID
ORIENTATION: LOWER;MID
ORIENTATION: LOWER;MID;RIGHT
ORIENTATION: LOWER;MID

## 2021-05-15 ASSESSMENT — PAIN DESCRIPTION - ONSET
ONSET: ON-GOING

## 2021-05-15 ASSESSMENT — PAIN DESCRIPTION - LOCATION
LOCATION: BACK
LOCATION: BACK;LEG
LOCATION: BACK
LOCATION: BACK

## 2021-05-15 ASSESSMENT — PAIN DESCRIPTION - FREQUENCY
FREQUENCY: CONTINUOUS

## 2021-05-15 ASSESSMENT — PAIN - FUNCTIONAL ASSESSMENT: PAIN_FUNCTIONAL_ASSESSMENT: PREVENTS OR INTERFERES SOME ACTIVE ACTIVITIES AND ADLS

## 2021-05-15 ASSESSMENT — PAIN DESCRIPTION - PROGRESSION: CLINICAL_PROGRESSION: NOT CHANGED

## 2021-05-15 ASSESSMENT — PAIN DESCRIPTION - DIRECTION: RADIATING_TOWARDS: BILATERAL KNEES

## 2021-05-15 NOTE — PROGRESS NOTES
[facility] Medicine    Subjective:  Pt alert conversive unable to urinate this am      Current Facility-Administered Medications:     ipratropium-albuterol (DUONEB) nebulizer solution 1 ampule, 1 ampule, Inhalation, Q4H Pam MCNEIL DO    ammonium lactate (LAC-HYDRIN) 12 % lotion, , Topical, Q2H PRN, Myron Garcia MD    baclofen (LIORESAL) tablet 10 mg, 10 mg, Oral, TID, Myron Garcia MD, 10 mg at 05/15/21 7799    Buprenorphine HCl FILM 450 mcg, 450 mcg, Oral, BID, Myron Garcia MD, 450 mcg at 05/15/21 0842    bumetanide (BUMEX) tablet 2 mg, 2 mg, Oral, Daily, Myron Garcia MD, 2 mg at 05/15/21 0842    buPROPion (WELLBUTRIN XL) extended release tablet 150 mg, 150 mg, Oral, QPM, Myron Garcia MD, 150 mg at 05/14/21 1755    gabapentin (NEURONTIN) capsule 300 mg, 300 mg, Oral, TID, Myron Garcia MD, 300 mg at 05/15/21 0841    potassium chloride (KLOR-CON M) extended release tablet 20 mEq, 20 mEq, Oral, Daily with breakfast, Myron Garcia MD, 20 mEq at 05/15/21 0843    rOPINIRole (REQUIP) tablet 2 mg, 2 mg, Oral, 4x daily, Myron Garcia MD, 2 mg at 05/15/21 0841    sodium chloride flush 0.9 % injection 5-40 mL, 5-40 mL, Intravenous, 2 times per day, Myron Garcia MD, 10 mL at 05/14/21 0851    sodium chloride flush 0.9 % injection 5-40 mL, 5-40 mL, Intravenous, PRN, Myron Garcia MD, 10 mL at 05/15/21 0623    0.9 % sodium chloride infusion, 25 mL, Intravenous, PRN, Myron Garcia MD    promethazine (PHENERGAN) tablet 12.5 mg, 12.5 mg, Oral, Q6H PRN **OR** ondansetron (ZOFRAN) injection 4 mg, 4 mg, Intravenous, Q6H PRN, Myron Garcia MD, 4 mg at 05/15/21 9573    ceFAZolin (ANCEF) 3,000 mg in dextrose 5 % 100 mL IVPB, 3,000 mg, Intravenous, Q8H, Myron Garcia MD, Last Rate: 200 mL/hr at 05/15/21 0837, 3,000 mg at 05/15/21 0837    oxyCODONE (ROXICODONE) immediate release tablet 5 mg, 5 mg, Oral, Q4H PRN **OR** oxyCODONE HCl (OXY-IR) immediate release tablet 10 mg, 10 mg, Oral, Q4H PRN, Myron Garcia MD, 10 mg at 05/15/21 0350    HYDROmorphone (DILAUDID) injection 1 mg, 1 mg, Intravenous, Q3H PRN, Melissa Olivares MD, 1 mg at 05/15/21 0838    polyethylene glycol (GLYCOLAX) packet 17 g, 17 g, Oral, Daily, Melissa Olivares MD, 17 g at 05/15/21 0841    bisacodyl (DULCOLAX) EC tablet 5 mg, 5 mg, Oral, Daily, Melissa Olivares MD, 5 mg at 05/15/21 0843    sennosides-docusate sodium (SENOKOT-S) 8.6-50 MG tablet 1 tablet, 1 tablet, Oral, BID, Melissa Olivares MD, 1 tablet at 05/15/21 0843    benzocaine-menthol (CEPACOL SORE THROAT) lozenge 1 lozenge, 1 lozenge, Oral, Q2H PRN, Melissa Olivares MD    acetaminophen (TYLENOL) tablet 650 mg, 650 mg, Oral, Q4H PRN, Melissa Olivares MD, 650 mg at 05/14/21 1755    insulin lispro (HUMALOG) injection vial 0-12 Units, 0-12 Units, Subcutaneous, TID WC, Melissa Olivares MD, 2 Units at 05/14/21 1202    insulin lispro (HUMALOG) injection vial 0-6 Units, 0-6 Units, Subcutaneous, Nightly, Melissa Olivares MD, 1 Units at 05/13/21 2209    glucose (GLUTOSE) 40 % oral gel 15 g, 15 g, Oral, PRN, Melissa Olivares MD    dextrose 50 % IV solution, 12.5 g, Intravenous, PRN, Melissa Olivares MD    glucagon (rDNA) injection 1 mg, 1 mg, Intramuscular, PRN, Melissa Olivares MD    dextrose 5 % solution, 100 mL/hr, Intravenous, PRN, Melissa Olivares MD    Objective:    /67   Pulse 89   Temp 98.7 °F (37.1 °C) (Temporal)   Resp 16   Ht 5' 2\" (1.575 m)   Wt 280 lb (127 kg)   SpO2 93%   BMI 51.21 kg/m²     Heart:  reg  Lungs:  Upper airway rhonchi  Abd: + bs soft nontender  Extrem:  Edema legs    CBC with Differential:    Lab Results   Component Value Date    WBC 8.3 05/10/2021    RBC 5.21 05/10/2021    HGB 10.6 05/10/2021    HCT 38.1 05/10/2021     05/10/2021    MCV 73.1 05/10/2021    MCH 20.3 05/10/2021    MCHC 27.8 05/10/2021    RDW 25.4 05/10/2021    NRBC 0.9 02/18/2021    SEGSPCT 72 02/28/2014    LYMPHOPCT 17.7 05/10/2021    MONOPCT 6.8 05/10/2021    MYELOPCT 0.9 03/10/2021    BASOPCT 0.2 05/10/2021    MONOSABS 0.56 05/10/2021 LYMPHSABS 1.46 05/10/2021    EOSABS 0.11 05/10/2021    BASOSABS 0.02 05/10/2021     CMP:    Lab Results   Component Value Date     05/10/2021    K 4.1 05/10/2021    K 4.3 04/23/2021     05/10/2021    CO2 27 05/10/2021    BUN 14 05/10/2021    CREATININE 0.6 05/10/2021    GFRAA >60 05/10/2021    LABGLOM >60 05/10/2021    GLUCOSE 72 05/10/2021    PROT 6.9 05/10/2021    LABALBU 3.5 05/10/2021    CALCIUM 8.7 05/10/2021    BILITOT 0.2 05/10/2021    ALKPHOS 113 05/10/2021    AST 18 05/10/2021    ALT 19 05/10/2021     Warfarin PT/INR:    Lab Results   Component Value Date    INR 1.0 05/10/2021    INR 1.1 04/23/2021    INR 1.0 02/19/2021    PROTIME 11.1 05/10/2021    PROTIME 11.7 04/23/2021    PROTIME 11.2 02/19/2021       Assessment:    Active Problems:    Spondylolisthesis of lumbar region    Lumbar stenosis with neurogenic claudication  Resolved Problems:    * No resolved hospital problems.  *      Plan:  Await am lab order aerosols dc ivf cont bumex        Dilma Cazares  8:53 AM  5/15/2021

## 2021-05-15 NOTE — CONSULTS
PM&R Consult Note  Patient: Monie Pappas  Age/sex: 60 y.o. female  Medical Record #: [id number]      Referring physician: Alana Kaplan MD  Consulting physician: Dr. Savannah Padron MD  Primary care provider: Robert Wynne DO      Chief complaint:   Impairments and acitivities limitations in ADLs and mobility secondary to L3-4 spondylolisthesis, now s/p L3-L4 PLIF    HPI:   Monie Pappas is a 60 y.o. female with history of low back pain with radiation to the right lower extremity due to lumbar spondylolisthesis and spinal stenosis at L3-L4. She presented to [facility] on 2021 and underwent L4-L4 PLIF. Post operative course noted for severe post operative pain requiring IV pain medications. She has participated in therapy evaluations.         Past Medical History:   Diagnosis Date    Anemia     Arthritis     Cellulitis 2015    left leg    Chronic ulcer of leg with fat layer exposed ([address] 2015    Chronic ulcer of right leg, limited to breakdown of skin ([address] 2016    COVID-19 2021    postive test no symptoms    Depression     Full dentures     Low back pain     Lymphedema of both lower extremities 2015    Obesity     280 #    Osteoarthritis     Peripheral vision loss     Stroke ([address]     Type 2 diabetes mellitus without complication, without long-term current use of insulin ([address] 2019    Ulcer of left lower leg, limited to breakdown of skin ([address] 06/10/2019    Ulcer of left lower leg, limited to breakdown of skin ([address] 06/10/2019    UTI (urinary tract infection) 2021     Past Surgical History:   Procedure Laterality Date    ABDOMINOPLASTY  2002    BREAST REDUCTION SURGERY      reduction    CATARACT REMOVAL      bilateral     SECTION      x2    COLONOSCOPY  2012    and egd    COLONOSCOPY  2021    polyps; marginal prep--jessica    COLONOSCOPY N/A 2021    COLONOSCOPY WITH BIOPSY performed by Bridgette Cheung MD at [facility] ENDOSCOPY    ECHOCARDIOGRAM COMPLETE 2D W DOPPLER W COLOR  01/06/2012         GASTRIC BYPASS SURGERY  2000    NO NASTOGASTRIC TUBE    HERNIA REPAIR      2002    LUMBAR SPINE SURGERY N/A 5/13/2021    L3-L4  POSTERIOR LUMBAR INTERBODY  FUSION--OARM, JESSI, YAJAIRA TABLE, CELL SAVER, PLATELET GEL, AUDIOLOGY, CAGES, PLATES, SCREWS -- GLOBUS performed by Brandon Frederick MD at [facility]  03/27/2021    minimal pouch gastritis--jessica    UPPER GASTROINTESTINAL ENDOSCOPY N/A 03/27/2021    EGD ESOPHAGOGASTRODUODENOSCOPY performed by Tara Monteiro MD at [place] [address] History   Problem Relation Age of Onset    Breast Cancer Mother     Stroke Father     Hypertension Sister     Hypertension Brother        Allergies   Allergen Reactions    Ferritin Shortness Of Breath and Other (See Comments)     Flushed,  Severe back pain       Scheduled Meds:  baclofen, 10 mg, TID  Buprenorphine HCl, 450 mcg, BID  bumetanide, 2 mg, Daily  buPROPion, 150 mg, QPM  gabapentin, 300 mg, TID  potassium chloride, 20 mEq, Daily with breakfast  rOPINIRole, 2 mg, 4x daily  sodium chloride flush, 5-40 mL, 2 times per day  ceFAZolin (ANCEF) IVPB, 3,000 mg, Q8H  polyethylene glycol, 17 g, Daily  bisacodyl, 5 mg, Daily  sennosides-docusate sodium, 1 tablet, BID  insulin lispro, 0-12 Units, TID WC  insulin lispro, 0-6 Units, Nightly        PRN Meds  ammonium lactate, , Q2H PRN  sodium chloride flush, 5-40 mL, PRN  sodium chloride, 25 mL, PRN  promethazine, 12.5 mg, Q6H PRN    Or  ondansetron, 4 mg, Q6H PRN  oxyCODONE, 5 mg, Q4H PRN    Or  oxyCODONE, 10 mg, Q4H PRN  HYDROmorphone, 1 mg, Q3H PRN  benzocaine-menthol, 1 lozenge, Q2H PRN  acetaminophen, 650 mg, Q4H PRN  glucose, 15 g, PRN  dextrose, 12.5 g, PRN  glucagon (rDNA), 1 mg, PRN  dextrose, 100 mL/hr, PRN        Social History:  Social History     Socioeconomic History    Marital status:      Spouse name: Not on file    Number of children: Not on file    Years of education: Not on file    Highest education level: Not on file   Occupational History    Not on file   Social Needs    Financial resource strain: Not very hard    Food insecurity     Worry: Never true     Inability: Never true    Transportation needs     Medical: No     Non-medical: No   Tobacco Use    Smoking status: Former Smoker     Packs/day: 0.25     Years: 38.00     Pack years: 9.50     Types: Cigarettes     Quit date: 3/11/2021     Years since quittin.1    Smokeless tobacco: Never Used    Tobacco comment: Pt states she quit \"Cold [place]\"   Substance and Sexual Activity    Alcohol use: No    Drug use: No    Sexual activity: Not Currently   Lifestyle    Physical activity     Days per week: Not on file     Minutes per session: Not on file    Stress: Only a little   Relationships    Social connections     Talks on phone: More than three times a week     Gets together: Not on file     Attends Cheondoism service: Not on file     Active member of club or organization: Not on file     Attends meetings of clubs or organizations: Not on file     Relationship status: Not on file    Intimate partner violence     Fear of current or ex partner: Not on file     Emotionally abused: Not on file     Physically abused: Not on file     Forced sexual activity: Not on file   Other Topics Concern    Not on file   Social History Narrative        Chronic Diagnosis: Iron deficiency anemia, Depressive disorder, Benign essential hypertension, Mixed    hyperlipidemia. HTN    OBESITY    LIPID    OA    SMOKER    CVA L FIELD VISION    DEPRESSION    GASTRIC BYPASS 01    BREAST REDUCTION--    Daisy PIEDRA 1955 Page #2    ANEMIA==IRON AND B-12    Admitted 2019 with cellulitis left lower extremity then readmitted with chest pain with negative stress test     Home situation: Lives with sister in a 2 level home, resides on 1st floor. 2 steps to enter and 2 rails.  Bed/bath on 1st floor. Functional History:  Premorbid ADL's: independent   Premorbid Mobility: ambulated with [employer]  Present: significant decrease in mobility and function    Physical Therapy:  Bed mobility: Mod A x2  Transfers: Mod A  Ambulation: 30 ft [employer] Mod A    Occupational Therapy:  Feeding: Independent  Grooming: Setup  UB dressing: Max A  LB dressing: Min A      Review Of Systems:   Constitutional: No Fever, chills  HEENT: No HA, blurry vision  Respiratory: No Cough, SOB  Cardiovascular: No CP  Gastrointestinal: No nausea, vomiting, diarrhea, abdominal discomfort  Genitourinary: No Dysuria,  Musculoskeletal: per HPI  Neurologic: per HPI  Psychiatric: No Depression, No anxiety      Physical Examination:  Vitals:   Vitals:    05/14/21 1800 05/14/21 1818 05/14/21 1830 05/14/21 2030   BP:   (!) 125/58 (!) 117/56   Pulse:   97 98   Resp:   16 16   Temp:    98.5 °F (36.9 °C)   TempSrc:    Temporal   SpO2: (!) 84% 92%  93%   Weight:       Height:           GEN: NAD, resting in bed  HEENT: NC/AT, PERRL, EOMI  RESP: CTAB, no R/R/W  CV: +S1 S2, RRR  ABD: soft, obese abdomen, NT, normal BS   EXT: No erythema/clubbing/cyanosis. 2+ BLE edema   Skin: dressing intact, lumbar drain in place  Psych: appropriate mood and affect     Neuro Exam:  AAOx4  Follows commands    Cranial Nerves:  CN II-XII intact       Sensory:    LT: intact         Motor:    Strength is 5/5 throughout bilateral upper extremities, 2/5 bilateral hip flexors; 3/5 b/l KE; 4+/5 b/l ankle DF/PF. MMT of lower extremities limited by pain       DTRs:  2+ in bilateral upper extremities, 1+ bilateral patellar, 0 at ankles.      No pathological reflexes     Laboratory:    Lab Results   Component Value Date    WBC 8.3 05/10/2021    HGB 10.6 (L) 05/10/2021    HCT 38.1 05/10/2021    MCV 73.1 (L) 05/10/2021     05/10/2021     Lab Results   Component Value Date     05/10/2021    K 4.1 05/10/2021    K 4.3 04/23/2021     05/10/2021    CO2 27 05/10/2021 BUN 14 05/10/2021    CREATININE 0.6 05/10/2021    GLUCOSE 72 05/10/2021    CALCIUM 8.7 05/10/2021      Lab Results   Component Value Date    ALT 19 05/10/2021    AST 18 05/10/2021    ALKPHOS 113 (H) 05/10/2021    BILITOT 0.2 05/10/2021       Lab Results   Component Value Date    COLORU Yellow 05/10/2021    NITRU Negative 05/10/2021    GLUCOSEU Negative 05/10/2021    KETUA Negative 05/10/2021    UROBILINOGEN 0.2 05/10/2021    BILIRUBINUR Negative 05/10/2021     Lab Results   Component Value Date    LABA1C 5.3 03/10/2021       Pertinent Imaging:  Lumbar MRI        Impression   Disc extrusion at L3-L4 measuring 4-5 mm and extending superiorly behind the   inferior endplate of P9-A9 on the right causing severe narrowing of the right   neural foramen.  There is mild stenosis of the thecal sac at L3-L4 and L4-L5   as discussed above. CT lumbar spine     Impression   Degenerative findings.  Notably, there is L3-4 prominent facet arthropathy   and moderate degenerative disc disease with associated grade 1   spondylolisthesis of L3.  Findings, as above, cause moderate spinal stenosis,   effacement of right lateral recess and marked right neural foraminal   narrowing. IMPRESSION:  Kirk Robles is a 60 y.o. female with impairments and acitivities limitations in ADLs and mobility secondary to L3-4 spondylolisthesis, now s/p L3-L4 PLIF    Demonstrating impaired strength, impaired ROM, impaired balance, decreased endurance, impaired functional mobility and impaired self care. Recommendations:   Patient is appropriate for Acute Rehabilitation, once medically cleared and pending precert. Will accept to ARU if she continues to have appropriate ARU criteria once her drain is out. Will follow up Monday. Thank you for the consult.     Cherelle Faulkner MD  Physical Medicine and Rehabilitation

## 2021-05-15 NOTE — PROGRESS NOTES
Department of Neurosurgery  Attending Progress Note    CHIEF COMPLAINT:    SUBJECTIVE:  Having significant pain today and unable to void    ROS:    OBJECTIVE  Physical  VITALS:  /67   Pulse 89   Temp 98.7 °F (37.1 °C) (Temporal)   Resp 16   Ht 5' 2\" (1.575 m)   Wt 280 lb (127 kg)   SpO2 93%   BMI 51.21 kg/m²   NEUROLOGIC:  Mental Status Exam:  Level of Alertness:   awake  Orientation:   person, place, time  Motor Exam:  Motor exam is symmetrical 5 out of 5 all extremities bilaterally  Sensory:  Sensory intact    Data  CBC:   Lab Results   Component Value Date    WBC 8.3 05/10/2021    RBC 5.21 05/10/2021    HGB 10.6 05/10/2021    HCT 38.1 05/10/2021    MCV 73.1 05/10/2021    MCH 20.3 05/10/2021    MCHC 27.8 05/10/2021    RDW 25.4 05/10/2021     05/10/2021    MPV NOT CALC 05/10/2021     BMP:    Lab Results   Component Value Date     05/10/2021    K 4.1 05/10/2021    K 4.3 04/23/2021     05/10/2021    CO2 27 05/10/2021    BUN 14 05/10/2021    LABALBU 3.5 05/10/2021    CREATININE 0.6 05/10/2021    CALCIUM 8.7 05/10/2021    GFRAA >60 05/10/2021    LABGLOM >60 05/10/2021    GLUCOSE 72 05/10/2021     Current Inpatient Medications  Current Facility-Administered Medications: ipratropium-albuterol (DUONEB) nebulizer solution 1 ampule, 1 ampule, Inhalation, Q4H WA  diazePAM (VALIUM) tablet 5 mg, 5 mg, Oral, Q6H PRN  methylPREDNISolone (MEDROL) tablet 24 mg, 24 mg, Oral, Once  [START ON 5/16/2021] methylPREDNISolone (MEDROL) tablet 4 mg, 4 mg, Oral, QAM AC  [START ON 5/16/2021] methylPREDNISolone (MEDROL) tablet 4 mg, 4 mg, Oral, Lunch  [START ON 5/16/2021] methylPREDNISolone (MEDROL) tablet 4 mg, 4 mg, Oral, Dinner  [START ON 5/16/2021] methylPREDNISolone (MEDROL) tablet 8 mg, 8 mg, Oral, Nightly  [START ON 5/17/2021] methylPREDNISolone (MEDROL) tablet 4 mg, 4 mg, Oral, Nightly  ammonium lactate (LAC-HYDRIN) 12 % lotion, , Topical, Q2H PRN  baclofen (LIORESAL) tablet 10 mg, 10 mg, Oral, TID  Buprenorphine HCl FILM 450 mcg, 450 mcg, Oral, BID  bumetanide (BUMEX) tablet 2 mg, 2 mg, Oral, Daily  buPROPion (WELLBUTRIN XL) extended release tablet 150 mg, 150 mg, Oral, QPM  gabapentin (NEURONTIN) capsule 300 mg, 300 mg, Oral, TID  potassium chloride (KLOR-CON M) extended release tablet 20 mEq, 20 mEq, Oral, Daily with breakfast  rOPINIRole (REQUIP) tablet 2 mg, 2 mg, Oral, 4x daily  sodium chloride flush 0.9 % injection 5-40 mL, 5-40 mL, Intravenous, 2 times per day  sodium chloride flush 0.9 % injection 5-40 mL, 5-40 mL, Intravenous, PRN  0.9 % sodium chloride infusion, 25 mL, Intravenous, PRN  promethazine (PHENERGAN) tablet 12.5 mg, 12.5 mg, Oral, Q6H PRN **OR** ondansetron (ZOFRAN) injection 4 mg, 4 mg, Intravenous, Q6H PRN  ceFAZolin (ANCEF) 3,000 mg in dextrose 5 % 100 mL IVPB, 3,000 mg, Intravenous, Q8H  oxyCODONE (ROXICODONE) immediate release tablet 5 mg, 5 mg, Oral, Q4H PRN **OR** oxyCODONE HCl (OXY-IR) immediate release tablet 10 mg, 10 mg, Oral, Q4H PRN  HYDROmorphone (DILAUDID) injection 1 mg, 1 mg, Intravenous, Q3H PRN  polyethylene glycol (GLYCOLAX) packet 17 g, 17 g, Oral, Daily  bisacodyl (DULCOLAX) EC tablet 5 mg, 5 mg, Oral, Daily  sennosides-docusate sodium (SENOKOT-S) 8.6-50 MG tablet 1 tablet, 1 tablet, Oral, BID  benzocaine-menthol (CEPACOL SORE THROAT) lozenge 1 lozenge, 1 lozenge, Oral, Q2H PRN  acetaminophen (TYLENOL) tablet 650 mg, 650 mg, Oral, Q4H PRN  insulin lispro (HUMALOG) injection vial 0-12 Units, 0-12 Units, Subcutaneous, TID WC  insulin lispro (HUMALOG) injection vial 0-6 Units, 0-6 Units, Subcutaneous, Nightly  glucose (GLUTOSE) 40 % oral gel 15 g, 15 g, Oral, PRN  dextrose 50 % IV solution, 12.5 g, Intravenous, PRN  glucagon (rDNA) injection 1 mg, 1 mg, Intramuscular, PRN  dextrose 5 % solution, 100 mL/hr, Intravenous, PRN    ASSESSMENT AND PLAN:    POD 2 s/p lumbar fusion. Stable. Will straight cath. D/C drain.   Will start valium and will try medrol dose cindy Muir

## 2021-05-15 NOTE — PROGRESS NOTES
Urinary retention after jean discontinued. Straight cath for 500mL. Per Dr Troncoso Founds if unable to void 5/15 by 7pm, bladder scan, insert jean if needed, and contact urology.

## 2021-05-16 LAB
METER GLUCOSE: 107 MG/DL (ref 74–99)
METER GLUCOSE: 189 MG/DL (ref 74–99)
METER GLUCOSE: 226 MG/DL (ref 74–99)

## 2021-05-16 PROCEDURE — 2700000000 HC OXYGEN THERAPY PER DAY

## 2021-05-16 PROCEDURE — 94640 AIRWAY INHALATION TREATMENT: CPT

## 2021-05-16 PROCEDURE — 6360000002 HC RX W HCPCS: Performed by: NEUROLOGICAL SURGERY

## 2021-05-16 PROCEDURE — 6370000000 HC RX 637 (ALT 250 FOR IP): Performed by: NEUROLOGICAL SURGERY

## 2021-05-16 PROCEDURE — 6370000000 HC RX 637 (ALT 250 FOR IP): Performed by: INTERNAL MEDICINE

## 2021-05-16 PROCEDURE — 97530 THERAPEUTIC ACTIVITIES: CPT

## 2021-05-16 PROCEDURE — 82962 GLUCOSE BLOOD TEST: CPT

## 2021-05-16 PROCEDURE — 2580000003 HC RX 258: Performed by: NEUROLOGICAL SURGERY

## 2021-05-16 PROCEDURE — 1200000000 HC SEMI PRIVATE

## 2021-05-16 RX ADMIN — IPRATROPIUM BROMIDE AND ALBUTEROL SULFATE 1 AMPULE: .5; 3 SOLUTION RESPIRATORY (INHALATION) at 09:39

## 2021-05-16 RX ADMIN — BACLOFEN 10 MG: 10 TABLET ORAL at 13:47

## 2021-05-16 RX ADMIN — HYDROMORPHONE HYDROCHLORIDE 1 MG: 1 INJECTION, SOLUTION INTRAMUSCULAR; INTRAVENOUS; SUBCUTANEOUS at 13:30

## 2021-05-16 RX ADMIN — IPRATROPIUM BROMIDE AND ALBUTEROL SULFATE 1 AMPULE: .5; 3 SOLUTION RESPIRATORY (INHALATION) at 15:23

## 2021-05-16 RX ADMIN — CEFAZOLIN 3000 MG: 10 INJECTION, POWDER, FOR SOLUTION INTRAVENOUS at 08:05

## 2021-05-16 RX ADMIN — ENOXAPARIN SODIUM 40 MG: 40 INJECTION SUBCUTANEOUS at 07:58

## 2021-05-16 RX ADMIN — OXYCODONE HYDROCHLORIDE 10 MG: 10 TABLET ORAL at 15:53

## 2021-05-16 RX ADMIN — Medication 20 ML: at 08:05

## 2021-05-16 RX ADMIN — BUPROPION HYDROCHLORIDE 150 MG: 150 TABLET, EXTENDED RELEASE ORAL at 16:41

## 2021-05-16 RX ADMIN — POLYETHYLENE GLYCOL 3350 17 G: 17 POWDER, FOR SOLUTION ORAL at 07:53

## 2021-05-16 RX ADMIN — METHYLPREDNISOLONE 8 MG: 4 TABLET ORAL at 20:05

## 2021-05-16 RX ADMIN — ROPINIROLE HYDROCHLORIDE 2 MG: 2 TABLET, FILM COATED ORAL at 20:05

## 2021-05-16 RX ADMIN — IPRATROPIUM BROMIDE AND ALBUTEROL SULFATE 1 AMPULE: .5; 3 SOLUTION RESPIRATORY (INHALATION) at 13:44

## 2021-05-16 RX ADMIN — OXYCODONE HYDROCHLORIDE 10 MG: 10 TABLET ORAL at 20:23

## 2021-05-16 RX ADMIN — GABAPENTIN 300 MG: 300 CAPSULE ORAL at 13:47

## 2021-05-16 RX ADMIN — IPRATROPIUM BROMIDE AND ALBUTEROL SULFATE 1 AMPULE: .5; 3 SOLUTION RESPIRATORY (INHALATION) at 18:59

## 2021-05-16 RX ADMIN — DIAZEPAM 5 MG: 5 TABLET ORAL at 10:28

## 2021-05-16 RX ADMIN — SENNOSIDES AND DOCUSATE SODIUM 1 TABLET: 8.6; 5 TABLET ORAL at 20:05

## 2021-05-16 RX ADMIN — HYDROMORPHONE HYDROCHLORIDE 1 MG: 1 INJECTION, SOLUTION INTRAMUSCULAR; INTRAVENOUS; SUBCUTANEOUS at 00:51

## 2021-05-16 RX ADMIN — METHYLPREDNISOLONE 4 MG: 4 TABLET ORAL at 12:02

## 2021-05-16 RX ADMIN — BUMETANIDE 2 MG: 1 TABLET ORAL at 08:01

## 2021-05-16 RX ADMIN — HYDROMORPHONE HYDROCHLORIDE 1 MG: 1 INJECTION, SOLUTION INTRAMUSCULAR; INTRAVENOUS; SUBCUTANEOUS at 18:52

## 2021-05-16 RX ADMIN — DIAZEPAM 5 MG: 5 TABLET ORAL at 03:10

## 2021-05-16 RX ADMIN — METHYLPREDNISOLONE 4 MG: 4 TABLET ORAL at 06:26

## 2021-05-16 RX ADMIN — HYDROMORPHONE HYDROCHLORIDE 1 MG: 1 INJECTION, SOLUTION INTRAMUSCULAR; INTRAVENOUS; SUBCUTANEOUS at 08:05

## 2021-05-16 RX ADMIN — DIAZEPAM 5 MG: 5 TABLET ORAL at 22:57

## 2021-05-16 RX ADMIN — SODIUM CHLORIDE, PRESERVATIVE FREE 10 ML: 5 INJECTION INTRAVENOUS at 18:52

## 2021-05-16 RX ADMIN — Medication 10 ML: at 20:01

## 2021-05-16 RX ADMIN — OXYCODONE HYDROCHLORIDE 10 MG: 10 TABLET ORAL at 11:49

## 2021-05-16 RX ADMIN — BACLOFEN 10 MG: 10 TABLET ORAL at 08:00

## 2021-05-16 RX ADMIN — ROPINIROLE HYDROCHLORIDE 2 MG: 2 TABLET, FILM COATED ORAL at 08:00

## 2021-05-16 RX ADMIN — BISACODYL 5 MG: 5 TABLET, COATED ORAL at 07:58

## 2021-05-16 RX ADMIN — SENNOSIDES AND DOCUSATE SODIUM 1 TABLET: 8.6; 5 TABLET ORAL at 07:58

## 2021-05-16 RX ADMIN — GABAPENTIN 300 MG: 300 CAPSULE ORAL at 07:58

## 2021-05-16 RX ADMIN — SODIUM CHLORIDE, PRESERVATIVE FREE 10 ML: 5 INJECTION INTRAVENOUS at 13:31

## 2021-05-16 RX ADMIN — POTASSIUM CHLORIDE 20 MEQ: 1500 TABLET, EXTENDED RELEASE ORAL at 07:58

## 2021-05-16 RX ADMIN — GABAPENTIN 300 MG: 300 CAPSULE ORAL at 20:04

## 2021-05-16 RX ADMIN — BACLOFEN 10 MG: 10 TABLET ORAL at 20:04

## 2021-05-16 RX ADMIN — METHYLPREDNISOLONE 4 MG: 4 TABLET ORAL at 16:40

## 2021-05-16 RX ADMIN — SODIUM CHLORIDE, PRESERVATIVE FREE 10 ML: 5 INJECTION INTRAVENOUS at 00:51

## 2021-05-16 RX ADMIN — OXYCODONE HYDROCHLORIDE 10 MG: 10 TABLET ORAL at 06:27

## 2021-05-16 RX ADMIN — ROPINIROLE HYDROCHLORIDE 2 MG: 2 TABLET, FILM COATED ORAL at 12:02

## 2021-05-16 RX ADMIN — ROPINIROLE HYDROCHLORIDE 2 MG: 2 TABLET, FILM COATED ORAL at 16:41

## 2021-05-16 RX ADMIN — DIAZEPAM 5 MG: 5 TABLET ORAL at 16:40

## 2021-05-16 ASSESSMENT — PAIN SCALES - GENERAL
PAINLEVEL_OUTOF10: 9
PAINLEVEL_OUTOF10: 10
PAINLEVEL_OUTOF10: 5
PAINLEVEL_OUTOF10: 8
PAINLEVEL_OUTOF10: 9
PAINLEVEL_OUTOF10: 8
PAINLEVEL_OUTOF10: 3
PAINLEVEL_OUTOF10: 8
PAINLEVEL_OUTOF10: 7
PAINLEVEL_OUTOF10: 10
PAINLEVEL_OUTOF10: 6

## 2021-05-16 ASSESSMENT — PAIN DESCRIPTION - ORIENTATION
ORIENTATION: RIGHT;LOWER;MID
ORIENTATION: LOWER;MID;RIGHT
ORIENTATION: LOWER;MID;RIGHT

## 2021-05-16 ASSESSMENT — PAIN DESCRIPTION - PAIN TYPE
TYPE: CHRONIC PAIN;SURGICAL PAIN
TYPE: CHRONIC PAIN;ACUTE PAIN;SURGICAL PAIN
TYPE: ACUTE PAIN;SURGICAL PAIN
TYPE: CHRONIC PAIN;SURGICAL PAIN

## 2021-05-16 ASSESSMENT — PAIN DESCRIPTION - LOCATION
LOCATION: BACK;LEG

## 2021-05-16 ASSESSMENT — PAIN DESCRIPTION - FREQUENCY
FREQUENCY: CONTINUOUS
FREQUENCY: CONTINUOUS

## 2021-05-16 ASSESSMENT — PAIN - FUNCTIONAL ASSESSMENT
PAIN_FUNCTIONAL_ASSESSMENT: PREVENTS OR INTERFERES WITH MANY ACTIVE NOT PASSIVE ACTIVITIES
PAIN_FUNCTIONAL_ASSESSMENT: PREVENTS OR INTERFERES WITH ALL ACTIVE AND SOME PASSIVE ACTIVITIES
PAIN_FUNCTIONAL_ASSESSMENT: PREVENTS OR INTERFERES SOME ACTIVE ACTIVITIES AND ADLS
PAIN_FUNCTIONAL_ASSESSMENT: PREVENTS OR INTERFERES WITH MANY ACTIVE NOT PASSIVE ACTIVITIES

## 2021-05-16 ASSESSMENT — PAIN DESCRIPTION - DIRECTION: RADIATING_TOWARDS: RIGHT LEG

## 2021-05-16 ASSESSMENT — PAIN DESCRIPTION - DESCRIPTORS
DESCRIPTORS: ACHING;CONSTANT
DESCRIPTORS: ACHING;CONSTANT
DESCRIPTORS: ACHING;CONSTANT;DISCOMFORT;SORE;TENDER
DESCRIPTORS: ACHING;CONSTANT;SPASM

## 2021-05-16 ASSESSMENT — PAIN DESCRIPTION - PROGRESSION
CLINICAL_PROGRESSION: NOT CHANGED

## 2021-05-16 ASSESSMENT — PAIN DESCRIPTION - ONSET
ONSET: ON-GOING

## 2021-05-16 NOTE — PROGRESS NOTES
Patient is refusing insulin. Stated she is not diabetic and does not want to take it. Also stated, \"You people wait til after I eat to check my sugars and wonder why they are high\". Patient's FBS was checked before she ate and her lunch sugar was also checked before she ate her hospital lunch. Patient ate outside food but did not notify nurse to have sugar checked. .. Patient also stated they only want her sugar monitored bc she takes Victoza to lose weight but always forgets to take it anyway. Patient also complained while checking her pulse ox that we check it with the BP cuff on her arm and that's why we get low readings. BP was done pumping when Pulse OX registered at 94%. Offered to check pulse ox again in her other hand and patient pulled her hands away and shouted, \"NO NO NO IT'S FINE FORGET IT!!\".

## 2021-05-16 NOTE — PLAN OF CARE
Problem: Falls - Risk of:  Goal: Will remain free from falls  Description: Will remain free from falls  Outcome: Met This Shift     Problem: Falls - Risk of:  Goal: Absence of physical injury  Description: Absence of physical injury  Outcome: Met This Shift     Problem: Skin Integrity:  Goal: Will show no infection signs and symptoms  Description: Will show no infection signs and symptoms  Outcome: Met This Shift     Problem: Infection - Surgical Site:  Goal: Will show no infection signs and symptoms  Description: Will show no infection signs and symptoms  Outcome: Met This Shift     Problem: Pain - Acute:  Goal: Pain level will decrease  Description: Pain level will decrease  Outcome: Met This Shift     Problem: Pain:  Goal: Pain level will decrease  Description: Pain level will decrease  Outcome: Met This Shift     Problem: Pain:  Goal: Control of acute pain  Description: Control of acute pain  Outcome: Met This Shift

## 2021-05-16 NOTE — PLAN OF CARE
Problem: Falls - Risk of:  Goal: Will remain free from falls  Description: Will remain free from falls  Outcome: Met This Shift  Goal: Absence of physical injury  Description: Absence of physical injury  Outcome: Met This Shift     Problem: Skin Integrity:  Goal: Will show no infection signs and symptoms  Description: Will show no infection signs and symptoms  Outcome: Met This Shift     Problem: Infection - Surgical Site:  Goal: Will show no infection signs and symptoms  Description: Will show no infection signs and symptoms  Outcome: Met This Shift     Problem: Pain - Acute:  Goal: Pain level will decrease  Description: Pain level will decrease  Outcome: Ongoing     Problem: Pain:  Goal: Pain level will decrease  Description: Pain level will decrease  Outcome: Ongoing  Goal: Control of acute pain  Description: Control of acute pain  Outcome: Ongoing

## 2021-05-16 NOTE — PROGRESS NOTES
Patient has complaints of pain on ears where oxygen is. No redness seen on ears. Bilateral ear protectors for nasal cannula provided to patient.   Electronically signed by Christopher Arce RN on 5/16/2021 at 8:37 AM

## 2021-05-16 NOTE — PROGRESS NOTES
[facility] Medicine    Subjective:  Pt alert conversive feels a little better today      Current Facility-Administered Medications:     ipratropium-albuterol (DUONEB) nebulizer solution 1 ampule, 1 ampule, Inhalation, Q4H Quin MCNEIL, , 1 ampule at 05/15/21 1836    diazePAM (VALIUM) tablet 5 mg, 5 mg, Oral, Q6H PRN, Carol Duncan MD, 5 mg at 05/16/21 0310    methylPREDNISolone (MEDROL) tablet 4 mg, 4 mg, Oral, QAM AC, Carol Duncan MD, 4 mg at 05/16/21 0626    methylPREDNISolone (MEDROL) tablet 4 mg, 4 mg, Oral, Lunch, Carol Duncan MD    methylPREDNISolone (MEDROL) tablet 4 mg, 4 mg, Oral, Dinner, Carol Duncan MD    methylPREDNISolone (MEDROL) tablet 8 mg, 8 mg, Oral, Nightly, Carol Duncan MD    [START ON 5/17/2021] methylPREDNISolone (MEDROL) tablet 4 mg, 4 mg, Oral, Nightly, Carol Duncan MD    enoxaparin (LOVENOX) injection 40 mg, 40 mg, Subcutaneous, Daily, Carol Duncan MD, 40 mg at 05/16/21 0758    ammonium lactate (LAC-HYDRIN) 12 % lotion, , Topical, Q2H PRN, Carol Duncan MD    baclofen (LIORESAL) tablet 10 mg, 10 mg, Oral, TID, Carol Duncan MD, 10 mg at 05/16/21 0800    Buprenorphine HCl FILM 450 mcg, 450 mcg, Oral, BID, Carol Duncan MD, 450 mcg at 05/16/21 0813    bumetanide (BUMEX) tablet 2 mg, 2 mg, Oral, Daily, Carol Duncan MD, 2 mg at 05/16/21 0801    buPROPion (WELLBUTRIN XL) extended release tablet 150 mg, 150 mg, Oral, QPM, Carol Duncan MD, 150 mg at 05/15/21 1831    gabapentin (NEURONTIN) capsule 300 mg, 300 mg, Oral, TID, Carol Duncan MD, 300 mg at 05/16/21 0758    potassium chloride (KLOR-CON M) extended release tablet 20 mEq, 20 mEq, Oral, Daily with breakfast, Carol Duncan MD, 20 mEq at 05/16/21 0758    rOPINIRole (REQUIP) tablet 2 mg, 2 mg, Oral, 4x daily, Carol Duncan MD, 2 mg at 05/16/21 0800    sodium chloride flush 0.9 % injection 5-40 mL, 5-40 mL, Intravenous, 2 times per day, Carol Duncan MD, 20 mL at 05/16/21 0805    sodium chloride flush 0.9 % injection 5-40 mL, 5-40 mL, Intravenous, PRN, Mireya Phoenix MD, 10 mL at 05/16/21 0051    0.9 % sodium chloride infusion, 25 mL, Intravenous, PRN, Mireya Phoenix MD    promethazine (PHENERGAN) tablet 12.5 mg, 12.5 mg, Oral, Q6H PRN **OR** ondansetron (ZOFRAN) injection 4 mg, 4 mg, Intravenous, Q6H PRN, Mireya Phoenix MD, 4 mg at 05/15/21 2109    oxyCODONE (ROXICODONE) immediate release tablet 5 mg, 5 mg, Oral, Q4H PRN **OR** oxyCODONE HCl (OXY-IR) immediate release tablet 10 mg, 10 mg, Oral, Q4H PRN, Mireya Phoenix MD, 10 mg at 05/16/21 9091    HYDROmorphone (DILAUDID) injection 1 mg, 1 mg, Intravenous, Q3H PRN, Mireya Phoenix MD, 1 mg at 05/16/21 0805    polyethylene glycol (GLYCOLAX) packet 17 g, 17 g, Oral, Daily, Mireya Phoenix MD, 17 g at 05/16/21 0753    bisacodyl (DULCOLAX) EC tablet 5 mg, 5 mg, Oral, Daily, Mireya Phoenix MD, 5 mg at 05/16/21 0758    sennosides-docusate sodium (SENOKOT-S) 8.6-50 MG tablet 1 tablet, 1 tablet, Oral, BID, Mireya Phoenix MD, 1 tablet at 05/16/21 0758    benzocaine-menthol (CEPACOL SORE THROAT) lozenge 1 lozenge, 1 lozenge, Oral, Q2H PRN, Mireya Phoenix MD    acetaminophen (TYLENOL) tablet 650 mg, 650 mg, Oral, Q4H PRN, Mireya Phoenix MD, 650 mg at 05/14/21 1755    insulin lispro (HUMALOG) injection vial 0-12 Units, 0-12 Units, Subcutaneous, TID WC, Mireya Phoenix MD, 2 Units at 05/14/21 1202    insulin lispro (HUMALOG) injection vial 0-6 Units, 0-6 Units, Subcutaneous, Nightly, Mireya Phoenix MD, 1 Units at 05/13/21 2209    glucose (GLUTOSE) 40 % oral gel 15 g, 15 g, Oral, PRN, Mireya Phoenix MD    dextrose 50 % IV solution, 12.5 g, Intravenous, PRN, Mireya Phoenix MD    glucagon (rDNA) injection 1 mg, 1 mg, Intramuscular, PRN, Mireya Phoenix MD    dextrose 5 % solution, 100 mL/hr, Intravenous, PRN, Mireya Phoenix MD    Objective:    BP (!) 152/72   Pulse 81   Temp 98 °F (36.7 °C) (Temporal)   Resp 18   Ht 5' 2\" (1.575 m)   Wt 280 lb (127 kg)   SpO2 97%   BMI 51.21 kg/m²     Heart:  reg  Lungs:  Min rhonchi  Abd: + bs soft nontender  Extrem:  Edema legs    CBC with Differential:    Lab Results   Component Value Date    WBC 10.3 05/15/2021    RBC 4.05 05/15/2021    HGB 8.4 05/15/2021    HCT 30.7 05/15/2021     05/15/2021    MCV 75.8 05/15/2021    MCH 20.7 05/15/2021    MCHC 27.4 05/15/2021    RDW 26.7 05/15/2021    NRBC 0.9 02/18/2021    SEGSPCT 72 02/28/2014    LYMPHOPCT 17.7 05/10/2021    MONOPCT 6.8 05/10/2021    MYELOPCT 0.9 03/10/2021    BASOPCT 0.2 05/10/2021    MONOSABS 0.56 05/10/2021    LYMPHSABS 1.46 05/10/2021    EOSABS 0.11 05/10/2021    BASOSABS 0.02 05/10/2021     CMP:    Lab Results   Component Value Date     05/15/2021    K 3.8 05/15/2021    K 4.3 04/23/2021    CL 98 05/15/2021    CO2 31 05/15/2021    BUN 15 05/15/2021    CREATININE 0.7 05/15/2021    GFRAA >60 05/15/2021    LABGLOM >60 05/15/2021    GLUCOSE 143 05/15/2021    PROT 6.9 05/10/2021    LABALBU 3.5 05/10/2021    CALCIUM 8.6 05/15/2021    BILITOT 0.2 05/10/2021    ALKPHOS 113 05/10/2021    AST 18 05/10/2021    ALT 19 05/10/2021     Warfarin PT/INR:    Lab Results   Component Value Date    INR 1.0 05/10/2021    INR 1.1 04/23/2021    INR 1.0 02/19/2021    PROTIME 11.1 05/10/2021    PROTIME 11.7 04/23/2021    PROTIME 11.2 02/19/2021       Assessment:    Active Problems:    Spondylolisthesis of lumbar region    Lumbar stenosis with neurogenic claudication  Resolved Problems:    * No resolved hospital problems.  *      Plan:  Cont postop care / cont as per neurosurg / dc planning / encourage incentive spirometer / check pulsox on rm air        Chano Course, DO  8:57 AM  5/16/2021

## 2021-05-16 NOTE — PROGRESS NOTES
Occupational Therapy  OT BEDSIDE TREATMENT NOTE      Date:2021  Patient Name: Alona Franklin  MRN: [id number]  : 1955  Room: [address] [place] [address]     Evaluating OT: Claritza Arevalo OTR/L   License #  [id number]      [facility] Daily Activity Raw Score:      Recommended Adaptive Equipment:  LB AE, leg      Diagnosis:  SPONDYLOLITHESIS     Surgery:  21: L3-L4  POSTERIOR LUMBAR INTERBODY  FUSION  Pertinent Medical History:   Past Medical History        Past Medical History:   Diagnosis Date    Anemia      Arthritis      Cellulitis 2015     left leg    Chronic ulcer of leg with fat layer exposed ([address] 2015    Chronic ulcer of right leg, limited to breakdown of skin ([address] 2016    COVID-19 2021     postive test no symptoms    Depression      Full dentures      Low back pain      Lymphedema of both lower extremities 2015    Obesity       280 #    Osteoarthritis      Peripheral vision loss      Stroke (HCC)      Type 2 diabetes mellitus without complication, without long-term current use of insulin ([address] 2019    Ulcer of left lower leg, limited to breakdown of skin ([address] 06/10/2019    Ulcer of left lower leg, limited to breakdown of skin ([address] 06/10/2019    UTI (urinary tract infection) 2021         Precautions:  Falls, Neutral spine, LSO brace when greater than 45, triple lumen, hemovac drain, jean     Home Living: Pt lives with sister in a 2 story with 2 steps to enter and B HR. Bathroom setup: walk in shower, higher commode  Equipment owned: shower bench, grab bars in shower, HH shower, rollator     Prior Level of Function: Ind. with ADLs , Ind. with IADLs; ambulated without A.D.   Driving: active  Occupation: retired RN     Pain Level: Pt reports Mod back and RLE pain  Cognition: A&O: 4/4; Follows multi- step directions              Memory:  good              Sequencing:  good              Problem solving:  good              Judgement/safety: Fair+                Functional Assessment:    Initial Eval Status  Date: 5- Treatment Status  Date: 5/16/2021 Short Term Goals = Long Term Goals  Treatment frequency: 3-5 days   Feeding Ind.  Ind     Grooming SBA seated  Set up  To comb hair seated Modified [place]    UB Dressing Max A with gown & LSO brace log-rolling in bed, supine. Pt. & dtr. Instructed RE: brace training throughout  Max A  Min A to don gown supine. Max A to don LSO supine rolling side to side Modified [place]    LB Dressing Max A/Dep  Unable to perform figure 4 technique, pt. Will benefit from use of A.E.  Min A  Per last tx Modified [place]    Bathing Maximal Assist with sim. task Max A  Supine in bed  Modified [place]    Toileting ted [place]  Max A- clothing management Modified [place]    Bed Mobility  Logroll: Mod Ax2  Supine to sit: Mod A x2  Sit to supine: Mod A x2  Max A- supine>sit  Educated pt on technique to increase independence. Supine to sit: Modified [place]   Sit to supine: Modified [place]    Functional Transfers Moderate Assist x2 with sit <> stand, SPT. Mod A- sit<->stand  Cuing for hand placement and body mechanics  Modified [place]    Functional Mobility Mod A x2 with ww few steps towards chair  Min A  Short home distance using w/w  Modified [place]    Balance Sitting:     Static:  SBA    Dynamic:Min A  Standing: Mod A Sitting:     Static:  Ind    Dynamic: SBA  Standing: Min A      Activity Tolerance Fair- lt. ax.  spO2 & HR remained WFL  BP monitored with RN present. 160/92  Fair Fair+ with mod. Ax. Visual/  Perceptual Glasses: yes          Safety Awareness fair  Fair+                   good       Comments: Upon arrival pt supine in bed. Pt educated on adaptive techniques to increase independence and safety during ADL's, bed mobility, and functional transfers while maintaining precautions.  Discussed home set up with pt, giving suggestions to increase safety at

## 2021-05-16 NOTE — PROGRESS NOTES
Physical Therapy  Facility/Department: [address] NEURO SPINE  Daily Treatment Note  NAME: Bernabe Campbell  : 1955  MRN: [id number]    Date of Service: 2021   Referring Provider:         Becca Hassan MD      Date of Service: 2021     Evaluating PT:  Catherine Jones PT   Room #:  8291/2316-L  Diagnosis: Spondylolisthesis of lumbar region [M43.16]  Lumbar stenosis with neurogenic claudication [M48.062]     PMHx/PSHx:   has a past medical history of Anemia, Arthritis, Cellulitis, Chronic ulcer of leg with fat layer exposed ([address] Chronic ulcer of right leg, limited to breakdown of skin ([address] COVID-19, Depression, Full dentures, Low back pain, Lymphedema of both lower extremities, Obesity, Osteoarthritis, Peripheral vision loss, Stroke ([address] Type 2 diabetes mellitus without complication, without long-term current use of insulin ([address] Ulcer of left lower leg, limited to breakdown of skin ([address] Ulcer of left lower leg, limited to breakdown of skin ([address] and UTI (urinary tract infection). has a past surgical history that includes ECHO Complete 2D W Doppler W Color (2012);  section; Gastric bypass surgery (); Abdominoplasty (); Cataract removal; hernia repair; Colonoscopy (2012); Breast reduction surgery; Upper gastrointestinal endoscopy (2021); Colonoscopy (2021); Upper gastrointestinal endoscopy (N/A, 2021); Colonoscopy (N/A, 2021); and Lumbar spine surgery (N/A, 2021).    Procedure/Surgery:  21 L3-L4  POSTERIOR LUMBAR INTERBODY  FUSION  Precautions:  Falls,  , , , Spinal precautions and LSO  Equipment Needs: To be determined       SUBJECTIVE:     Patient lives with sister  in a two story home resides first  with 2 steps to enter with 2Rail  Bed is on 1 floor and bath is on 1 floor. Patient ambulated with wheeled walker  PTA.  Equipment owned: Maren Manzo,       OBJECTIVE:    Initial Evaluation  Date: 21 Treatment  21 Short Term/ Long Term   Goals   AM-PAC 6 Clicks 88/68 99/76     Was pt agreeable to Eval/treatment? yes Yes     Does pt have pain? Yes  Moderate pain in back     Bed Mobility  Rolling: Mod A x 2  Supine to sit: Mod a x 2  Sit to supine: NT  Scooting: Mod A x 2 Rolling: MaxA  Supine to sit: MaxA  Sit to supine: NT  Scooting: Mod A Rolling: Ind  Supine to sit: Ind  Sit to supine: Ind  Scooting: Ind   Transfers Sit to stand: Mod A x 2 to fww. Stand to sit: Mod   Stand pivot: Mod a x 2 Sit to stand: Mod A   Stand to sit: Mod A   Stand pivot: [employer] Sit to stand: Mod Ind  Stand to sit: Mod Ind  Stand pivot: Mod Ind   Ambulation   3 feet with fww Mod a x 2 to bedside chair. 5' [employer] 100+ feet with fww Mod Ind   Stair negotiation: ascended and descended  NT NT 2 steps with 2 rail Min   ROM BUE:  See OT eval  BLE:  wfl BLE: WFL     Strength BUE: See OT   RLE:  4/5  LLE:   4/5 RLE: 4-/5  LLE: 4/5 4+/5   Balance Sitting EOB:  SBA  Dynamic Standing: Mod a x 2 with fww Sitting EOB: SBA  Dynamic Standing: Mod A using [employer] Sitting EOB:  Ind  Dynamic Standing:  ind      Patient is Alert & Oriented x person, place, time and situation and follows directions   Sensation:  Pt reports numbness and tingling to RLE      Patient education  Pt educated on role of PT    Patient response to education:   Pt verbalized understanding Pt demonstrated skill Pt requires further education in this area   x x x     ASSESSMENT:    Comments:  Pt received in supine agreeable to PT. Pt performing bed mobility with cues and assist to don brace. Pt continues to demonstrate strength, balance, and endurance deficits. Patient would benefit from continued skilled PT to maximize functional mobility independence.        Treatment:  Patient practiced and was instructed in the following treatment:     Bed mobility- verbal cues to facilitate independence   Functional transfers-Verbal cues for proper positioning and sequencing to perform transfers safely with maximum independence.  Gait training-Verbal cues for proper positioning and sequencing using assistive device to maximize functional mobility independence. PLAN:      PLAN OF CARE:    Current Treatment Recommendations     [x] Strengthening     [x] ROM   [x] Balance Training   [x] Endurance Training   [x] Transfer Training   [x] Gait Training   [x] Stair Training   [x] Positioning   [x] Safety and Education Training   [x] Patient/Caregiver Education   [x] HEP  [] Other       Patient is making good progress towards established goals. Will continue with current POC.       Time in  1220  Time out  1244    Total Treatment Time  24 minutes     CPT codes:  [] Gait training 65329 0 minutes  [] Manual therapy 93020 0 minutes  [x] Therapeutic activities 49118 24 minutes   [] Therapeutic exercises 22303 0 minutes  [] Neuromuscular reeducation 24553 0 minutes    Doc Devon PT, DPT  [id number]

## 2021-05-16 NOTE — PROGRESS NOTES
Patient's pulse ox on room air resting is 92%. Patient's pulse ox on room air with activity is 86%. Patient's recovery pulse ox on 2 L of O2 nasal cannula after activity is 94%.   Electronically signed by Emmanuel Ulloa RN on 5/16/2021 at 2:16 PM

## 2021-05-16 NOTE — PROGRESS NOTES
Department of Neurosurgery  Attending Progress Note    CHIEF COMPLAINT:    SUBJECTIVE:  Still having incisional pain and right leg weakness    ROS:    OBJECTIVE  Physical  VITALS:  BP (!) 152/72   Pulse 81   Temp 98 °F (36.7 °C) (Temporal)   Resp 18   Ht 5' 2\" (1.575 m)   Wt 280 lb (127 kg)   SpO2 97%   BMI 51.21 kg/m²   NEUROLOGIC:  Mental Status Exam:  Level of Alertness:   awake  Orientation:   person, place, time  Motor Exam:  Motor exam is 5 out of 5 all extremities with the exception of 4-/5 in RLE  Sensory:  Sensory intact    Data  CBC:   Lab Results   Component Value Date    WBC 10.3 05/15/2021    RBC 4.05 05/15/2021    HGB 8.4 05/15/2021    HCT 30.7 05/15/2021    MCV 75.8 05/15/2021    MCH 20.7 05/15/2021    MCHC 27.4 05/15/2021    RDW 26.7 05/15/2021     05/15/2021    MPV NOT CALC 05/15/2021     BMP:    Lab Results   Component Value Date     05/15/2021    K 3.8 05/15/2021    K 4.3 04/23/2021    CL 98 05/15/2021    CO2 31 05/15/2021    BUN 15 05/15/2021    LABALBU 3.5 05/10/2021    CREATININE 0.7 05/15/2021    CALCIUM 8.6 05/15/2021    GFRAA >60 05/15/2021    LABGLOM >60 05/15/2021    GLUCOSE 143 05/15/2021     Current Inpatient Medications  Current Facility-Administered Medications: ipratropium-albuterol (DUONEB) nebulizer solution 1 ampule, 1 ampule, Inhalation, Q4H WA  diazePAM (VALIUM) tablet 5 mg, 5 mg, Oral, Q6H PRN  methylPREDNISolone (MEDROL) tablet 4 mg, 4 mg, Oral, QAM AC  methylPREDNISolone (MEDROL) tablet 4 mg, 4 mg, Oral, Lunch  methylPREDNISolone (MEDROL) tablet 4 mg, 4 mg, Oral, Dinner  methylPREDNISolone (MEDROL) tablet 8 mg, 8 mg, Oral, Nightly  [START ON 5/17/2021] methylPREDNISolone (MEDROL) tablet 4 mg, 4 mg, Oral, Nightly  enoxaparin (LOVENOX) injection 40 mg, 40 mg, Subcutaneous, Daily  ammonium lactate (LAC-HYDRIN) 12 % lotion, , Topical, Q2H PRN  baclofen (LIORESAL) tablet 10 mg, 10 mg, Oral, TID  Buprenorphine HCl FILM 450 mcg, 450 mcg, Oral, BID  bumetanide

## 2021-05-17 PROCEDURE — 6360000002 HC RX W HCPCS: Performed by: NEUROLOGICAL SURGERY

## 2021-05-17 PROCEDURE — 6370000000 HC RX 637 (ALT 250 FOR IP): Performed by: NEUROLOGICAL SURGERY

## 2021-05-17 PROCEDURE — 1200000000 HC SEMI PRIVATE

## 2021-05-17 PROCEDURE — 2700000000 HC OXYGEN THERAPY PER DAY

## 2021-05-17 PROCEDURE — 6370000000 HC RX 637 (ALT 250 FOR IP): Performed by: INTERNAL MEDICINE

## 2021-05-17 PROCEDURE — 2580000003 HC RX 258: Performed by: NEUROLOGICAL SURGERY

## 2021-05-17 PROCEDURE — 94640 AIRWAY INHALATION TREATMENT: CPT

## 2021-05-17 RX ORDER — SENNA AND DOCUSATE SODIUM 50; 8.6 MG/1; MG/1
1 TABLET, FILM COATED ORAL 2 TIMES DAILY
Status: CANCELLED | OUTPATIENT
Start: 2021-05-17

## 2021-05-17 RX ORDER — OXYCODONE HYDROCHLORIDE 10 MG/1
10 TABLET ORAL EVERY 4 HOURS PRN
Status: CANCELLED | OUTPATIENT
Start: 2021-05-17

## 2021-05-17 RX ORDER — SODIUM CHLORIDE 0.9 % (FLUSH) 0.9 %
5-40 SYRINGE (ML) INJECTION EVERY 12 HOURS SCHEDULED
Status: CANCELLED | OUTPATIENT
Start: 2021-05-17

## 2021-05-17 RX ORDER — BUPRENORPHINE HYDROCHLORIDE 450 UG/1
450 FILM, SOLUBLE BUCCAL EVERY 12 HOURS
Qty: 60 EACH | Refills: 1 | Status: SHIPPED | OUTPATIENT
Start: 2021-05-17 | End: 2021-06-09 | Stop reason: SDUPTHER

## 2021-05-17 RX ORDER — DIAZEPAM 5 MG/1
5 TABLET ORAL EVERY 6 HOURS PRN
Qty: 40 TABLET | Refills: 0 | Status: ON HOLD | OUTPATIENT
Start: 2021-05-17 | End: 2021-06-04 | Stop reason: HOSPADM

## 2021-05-17 RX ORDER — METHYLPREDNISOLONE 4 MG/1
4 TABLET ORAL
Status: CANCELLED | OUTPATIENT
Start: 2021-05-17 | End: 2021-05-19

## 2021-05-17 RX ORDER — OXYCODONE HYDROCHLORIDE 5 MG/1
5 TABLET ORAL EVERY 4 HOURS PRN
Status: CANCELLED | OUTPATIENT
Start: 2021-05-17

## 2021-05-17 RX ORDER — BUMETANIDE 1 MG/1
2 TABLET ORAL DAILY
Status: CANCELLED | OUTPATIENT
Start: 2021-05-17

## 2021-05-17 RX ORDER — PROMETHAZINE HYDROCHLORIDE 25 MG/1
12.5 TABLET ORAL EVERY 6 HOURS PRN
Status: CANCELLED | OUTPATIENT
Start: 2021-05-17

## 2021-05-17 RX ORDER — OXYCODONE HYDROCHLORIDE 5 MG/1
5 TABLET ORAL EVERY 4 HOURS PRN
Qty: 42 TABLET | Refills: 0 | Status: ON HOLD | OUTPATIENT
Start: 2021-05-17 | End: 2021-06-04 | Stop reason: HOSPADM

## 2021-05-17 RX ORDER — POTASSIUM CHLORIDE 20 MEQ/1
20 TABLET, EXTENDED RELEASE ORAL
Status: CANCELLED | OUTPATIENT
Start: 2021-05-17

## 2021-05-17 RX ORDER — BACLOFEN 10 MG/1
10 TABLET ORAL 3 TIMES DAILY
Status: CANCELLED | OUTPATIENT
Start: 2021-05-17

## 2021-05-17 RX ORDER — ROPINIROLE 2 MG/1
2 TABLET, FILM COATED ORAL 4 TIMES DAILY
Status: CANCELLED | OUTPATIENT
Start: 2021-05-17

## 2021-05-17 RX ORDER — METHYLPREDNISOLONE 4 MG/1
4 TABLET ORAL
Status: CANCELLED | OUTPATIENT
Start: 2021-05-18 | End: 2021-05-21

## 2021-05-17 RX ORDER — ONDANSETRON 2 MG/ML
4 INJECTION INTRAMUSCULAR; INTRAVENOUS EVERY 6 HOURS PRN
Status: CANCELLED | OUTPATIENT
Start: 2021-05-17

## 2021-05-17 RX ORDER — GABAPENTIN 300 MG/1
300 CAPSULE ORAL 3 TIMES DAILY
Status: CANCELLED | OUTPATIENT
Start: 2021-05-17

## 2021-05-17 RX ORDER — IPRATROPIUM BROMIDE AND ALBUTEROL SULFATE 2.5; .5 MG/3ML; MG/3ML
1 SOLUTION RESPIRATORY (INHALATION)
Status: CANCELLED | OUTPATIENT
Start: 2021-05-17

## 2021-05-17 RX ORDER — BUPROPION HYDROCHLORIDE 150 MG/1
150 TABLET ORAL EVERY EVENING
Status: CANCELLED | OUTPATIENT
Start: 2021-05-17

## 2021-05-17 RX ORDER — METHYLPREDNISOLONE 4 MG/1
4 TABLET ORAL NIGHTLY
Status: CANCELLED | OUTPATIENT
Start: 2021-05-17 | End: 2021-05-20

## 2021-05-17 RX ORDER — DIAZEPAM 5 MG/1
5 TABLET ORAL EVERY 6 HOURS PRN
Status: CANCELLED | OUTPATIENT
Start: 2021-05-17

## 2021-05-17 RX ORDER — POLYETHYLENE GLYCOL 3350 17 G/17G
17 POWDER, FOR SOLUTION ORAL DAILY
Status: CANCELLED | OUTPATIENT
Start: 2021-05-17

## 2021-05-17 RX ORDER — METHYLPREDNISOLONE 4 MG/1
8 TABLET ORAL NIGHTLY
Status: CANCELLED | OUTPATIENT
Start: 2021-05-17 | End: 2021-05-18

## 2021-05-17 RX ORDER — ACETAMINOPHEN 325 MG/1
650 TABLET ORAL EVERY 4 HOURS PRN
Status: CANCELLED | OUTPATIENT
Start: 2021-05-17

## 2021-05-17 RX ORDER — METHYLPREDNISOLONE 4 MG/1
4 TABLET ORAL
Status: CANCELLED | OUTPATIENT
Start: 2021-05-17 | End: 2021-05-18

## 2021-05-17 RX ORDER — NICOTINE POLACRILEX 4 MG
15 LOZENGE BUCCAL PRN
Status: CANCELLED | OUTPATIENT
Start: 2021-05-17

## 2021-05-17 RX ORDER — AMMONIUM LACTATE 12 G/100G
LOTION TOPICAL
Status: CANCELLED | OUTPATIENT
Start: 2021-05-17

## 2021-05-17 RX ADMIN — ENOXAPARIN SODIUM 40 MG: 40 INJECTION SUBCUTANEOUS at 08:29

## 2021-05-17 RX ADMIN — POLYETHYLENE GLYCOL 3350 17 G: 17 POWDER, FOR SOLUTION ORAL at 08:26

## 2021-05-17 RX ADMIN — SODIUM CHLORIDE, PRESERVATIVE FREE 10 ML: 5 INJECTION INTRAVENOUS at 06:39

## 2021-05-17 RX ADMIN — HYDROMORPHONE HYDROCHLORIDE 1 MG: 1 INJECTION, SOLUTION INTRAMUSCULAR; INTRAVENOUS; SUBCUTANEOUS at 06:40

## 2021-05-17 RX ADMIN — Medication 10 ML: at 21:17

## 2021-05-17 RX ADMIN — Medication 10 ML: at 08:29

## 2021-05-17 RX ADMIN — SENNOSIDES AND DOCUSATE SODIUM 1 TABLET: 8.6; 5 TABLET ORAL at 21:17

## 2021-05-17 RX ADMIN — OXYCODONE HYDROCHLORIDE 10 MG: 10 TABLET ORAL at 08:34

## 2021-05-17 RX ADMIN — POTASSIUM CHLORIDE 20 MEQ: 1500 TABLET, EXTENDED RELEASE ORAL at 08:23

## 2021-05-17 RX ADMIN — BUPROPION HYDROCHLORIDE 150 MG: 150 TABLET, EXTENDED RELEASE ORAL at 18:17

## 2021-05-17 RX ADMIN — ROPINIROLE HYDROCHLORIDE 2 MG: 2 TABLET, FILM COATED ORAL at 16:59

## 2021-05-17 RX ADMIN — HYDROMORPHONE HYDROCHLORIDE 1 MG: 1 INJECTION, SOLUTION INTRAMUSCULAR; INTRAVENOUS; SUBCUTANEOUS at 19:42

## 2021-05-17 RX ADMIN — MAGNESIUM SULFATE: 1 CRYSTAL ORAL; TOPICAL at 11:18

## 2021-05-17 RX ADMIN — DIAZEPAM 5 MG: 5 TABLET ORAL at 05:29

## 2021-05-17 RX ADMIN — DIAZEPAM 5 MG: 5 TABLET ORAL at 12:00

## 2021-05-17 RX ADMIN — METHYLPREDNISOLONE 4 MG: 4 TABLET ORAL at 12:00

## 2021-05-17 RX ADMIN — BUMETANIDE 2 MG: 1 TABLET ORAL at 08:23

## 2021-05-17 RX ADMIN — IPRATROPIUM BROMIDE AND ALBUTEROL SULFATE 1 AMPULE: .5; 3 SOLUTION RESPIRATORY (INHALATION) at 21:28

## 2021-05-17 RX ADMIN — GABAPENTIN 300 MG: 300 CAPSULE ORAL at 08:23

## 2021-05-17 RX ADMIN — METHYLPREDNISOLONE 4 MG: 4 TABLET ORAL at 21:17

## 2021-05-17 RX ADMIN — MAGNESIUM HYDROXIDE 30 ML: 2400 SUSPENSION ORAL at 08:27

## 2021-05-17 RX ADMIN — BACLOFEN 10 MG: 10 TABLET ORAL at 08:23

## 2021-05-17 RX ADMIN — GABAPENTIN 300 MG: 300 CAPSULE ORAL at 14:38

## 2021-05-17 RX ADMIN — IPRATROPIUM BROMIDE AND ALBUTEROL SULFATE 1 AMPULE: .5; 3 SOLUTION RESPIRATORY (INHALATION) at 16:46

## 2021-05-17 RX ADMIN — BISACODYL 5 MG: 5 TABLET, COATED ORAL at 08:23

## 2021-05-17 RX ADMIN — IPRATROPIUM BROMIDE AND ALBUTEROL SULFATE 1 AMPULE: .5; 3 SOLUTION RESPIRATORY (INHALATION) at 12:37

## 2021-05-17 RX ADMIN — BACLOFEN 10 MG: 10 TABLET ORAL at 21:17

## 2021-05-17 RX ADMIN — GABAPENTIN 300 MG: 300 CAPSULE ORAL at 21:17

## 2021-05-17 RX ADMIN — ROPINIROLE HYDROCHLORIDE 2 MG: 2 TABLET, FILM COATED ORAL at 19:41

## 2021-05-17 RX ADMIN — SENNOSIDES AND DOCUSATE SODIUM 1 TABLET: 8.6; 5 TABLET ORAL at 08:23

## 2021-05-17 RX ADMIN — ROPINIROLE HYDROCHLORIDE 2 MG: 2 TABLET, FILM COATED ORAL at 08:23

## 2021-05-17 RX ADMIN — ROPINIROLE HYDROCHLORIDE 2 MG: 2 TABLET, FILM COATED ORAL at 12:00

## 2021-05-17 RX ADMIN — OXYCODONE HYDROCHLORIDE 10 MG: 10 TABLET ORAL at 03:27

## 2021-05-17 RX ADMIN — METHYLPREDNISOLONE 4 MG: 4 TABLET ORAL at 06:40

## 2021-05-17 RX ADMIN — BACLOFEN 10 MG: 10 TABLET ORAL at 14:38

## 2021-05-17 RX ADMIN — DIAZEPAM 5 MG: 5 TABLET ORAL at 18:17

## 2021-05-17 RX ADMIN — METHYLPREDNISOLONE 4 MG: 4 TABLET ORAL at 18:17

## 2021-05-17 ASSESSMENT — PAIN DESCRIPTION - PROGRESSION
CLINICAL_PROGRESSION: NOT CHANGED
CLINICAL_PROGRESSION: NOT CHANGED

## 2021-05-17 ASSESSMENT — PAIN - FUNCTIONAL ASSESSMENT
PAIN_FUNCTIONAL_ASSESSMENT: PREVENTS OR INTERFERES SOME ACTIVE ACTIVITIES AND ADLS
PAIN_FUNCTIONAL_ASSESSMENT: PREVENTS OR INTERFERES WITH MANY ACTIVE NOT PASSIVE ACTIVITIES

## 2021-05-17 ASSESSMENT — PAIN SCALES - GENERAL
PAINLEVEL_OUTOF10: 0
PAINLEVEL_OUTOF10: 10
PAINLEVEL_OUTOF10: 8
PAINLEVEL_OUTOF10: 5

## 2021-05-17 ASSESSMENT — PAIN DESCRIPTION - DESCRIPTORS
DESCRIPTORS: ACHING;BURNING;DISCOMFORT
DESCRIPTORS: ACHING;CONSTANT

## 2021-05-17 ASSESSMENT — PAIN DESCRIPTION - FREQUENCY: FREQUENCY: CONTINUOUS

## 2021-05-17 ASSESSMENT — PAIN DESCRIPTION - ONSET
ONSET: ON-GOING
ONSET: ON-GOING

## 2021-05-17 ASSESSMENT — PAIN DESCRIPTION - ORIENTATION: ORIENTATION: RIGHT;LOWER;MID

## 2021-05-17 ASSESSMENT — PAIN DESCRIPTION - LOCATION: LOCATION: BACK

## 2021-05-17 NOTE — PLAN OF CARE
Problem: Falls - Risk of:  Goal: Will remain free from falls  Description: Will remain free from falls  5/17/2021 1050 by Michelle Mendoza RN  Outcome: Met This Shift  5/17/2021 0156 by Nitish Mac RN  Outcome: Met This Shift  5/17/2021 0156 by Nitish Mac RN  Outcome: Met This Shift  Goal: Absence of physical injury  Description: Absence of physical injury  5/17/2021 1050 by Michelle Mendoza RN  Outcome: Met This Shift  5/17/2021 0156 by Nitish Mac RN  Outcome: Met This Shift  5/17/2021 0156 by Nitish Mac RN  Outcome: Met This Shift     Problem: Skin Integrity:  Goal: Will show no infection signs and symptoms  Description: Will show no infection signs and symptoms  5/17/2021 1050 by Michelle Mendoza RN  Outcome: Met This Shift  5/17/2021 0156 by Nitish Mac RN  Outcome: Met This Shift     Problem: Infection - Surgical Site:  Goal: Will show no infection signs and symptoms  Description: Will show no infection signs and symptoms  5/17/2021 1050 by Michelle Mendoza RN  Outcome: Met This Shift  5/17/2021 0156 by Nitish Mac RN  Outcome: Met This Shift     Problem: Sensory Perception - Impaired:  Goal: Sensory function intact, lower extremity  Description: Sensory function intact, lower extremity  5/17/2021 0156 by Nitish Mac RN  Outcome: Met This Shift     Problem: Pain:  Goal: Control of chronic pain  Description: Control of chronic pain  5/17/2021 0156 by Nitish Mac RN  Outcome: Met This Shift     Problem: Pain - Acute:  Goal: Pain level will decrease  Description: Pain level will decrease  5/17/2021 1050 by Michelle Mendoza RN  Outcome: Ongoing  5/17/2021 0156 by Nitish Mac RN  Outcome: Met This Shift     Problem: Pain:  Goal: Pain level will decrease  Description: Pain level will decrease  5/17/2021 1050 by Michelle Mendoza RN  Outcome: Ongoing  5/17/2021 0156 by Nitish Mac RN  Outcome: Met This Shift  Goal: Control of acute pain  Description: Control of acute pain  5/17/2021 1050 by Michelle Mendoza RN  Outcome: Ongoing  5/17/2021 0156 by Subhash Ruiz RN  Outcome: Met This Shift     Problem: Mobility - Impaired:  Goal: Mobility will improve to maximum level  Description: Mobility will improve to maximum level  5/17/2021 1050 by Ifeanyi Stubbs RN  Outcome: Not Met This Shift  5/17/2021 0156 by Subhash Ruiz RN  Outcome: Met This Shift  5/17/2021 0156 by Subhash Ruiz RN  Outcome: Ongoing

## 2021-05-17 NOTE — CARE COORDINATION
5/1/18474 - S/P L3-L4 PLIF. Neurosurgery following. PM&R consult done. ARU following and submitted for precert today for acute rehab. Brett of [facility]. Referral called to Juancarlos at [facility] for back up plan if denied by insurance for ARU. SW/CM will follow.

## 2021-05-17 NOTE — PROGRESS NOTES
Department of Neurosurgery  Progress Note    CHIEF COMPLAINT: s/p lumbar fusion    SUBJECTIVE:  C/o  op site pain    REVIEW OF SYSTEMS :  Constitutional: Negative for chills and fever. Neurological: Negative for dizziness, tremors and speech change.      OBJECTIVE:   VITALS:  BP (!) 154/75   Pulse 89   Temp 97.1 °F (36.2 °C) (Temporal)   Resp 19   Ht 5' 2\" (1.575 m)   Wt 280 lb (127 kg)   SpO2 95%   BMI 51.21 kg/m²   PHYSICAL:  CONSTITUTIONAL:  awake, alert, cooperative, no apparent distress, and appears stated age    DATA:  CBC:   Lab Results   Component Value Date    WBC 10.3 05/15/2021    RBC 4.05 05/15/2021    HGB 8.4 05/15/2021    HCT 30.7 05/15/2021    MCV 75.8 05/15/2021    MCH 20.7 05/15/2021    MCHC 27.4 05/15/2021    RDW 26.7 05/15/2021     05/15/2021    MPV NOT CALC 05/15/2021     BMP:    Lab Results   Component Value Date     05/15/2021    K 3.8 05/15/2021    K 4.3 04/23/2021    CL 98 05/15/2021    CO2 31 05/15/2021    BUN 15 05/15/2021    LABALBU 3.5 05/10/2021    CREATININE 0.7 05/15/2021    CALCIUM 8.6 05/15/2021    GFRAA >60 05/15/2021    LABGLOM >60 05/15/2021    GLUCOSE 143 05/15/2021     PT/INR:    Lab Results   Component Value Date    PROTIME 11.1 05/10/2021    INR 1.0 05/10/2021     PTT:  No results found for: APTT, PTT[APTT}    Current Inpatient Medications  Current Facility-Administered Medications: magnesium hydroxide (MILK OF MAGNESIA) 400 MG/5ML suspension 30 mL, 30 mL, Oral, Daily PRN  magnesium-glycerin-water (1-2-3) enema, , Rectal, Once  ipratropium-albuterol (DUONEB) nebulizer solution 1 ampule, 1 ampule, Inhalation, Q4H WA  diazePAM (VALIUM) tablet 5 mg, 5 mg, Oral, Q6H PRN  methylPREDNISolone (MEDROL) tablet 4 mg, 4 mg, Oral, QAM AC  methylPREDNISolone (MEDROL) tablet 4 mg, 4 mg, Oral, Lunch  methylPREDNISolone (MEDROL) tablet 4 mg, 4 mg, Oral, Dinner  methylPREDNISolone (MEDROL) tablet 4 mg, 4 mg, Oral, Nightly  enoxaparin (LOVENOX) injection 40 mg, 40 mg, Subcutaneous, Daily  ammonium lactate (LAC-HYDRIN) 12 % lotion, , Topical, Q2H PRN  baclofen (LIORESAL) tablet 10 mg, 10 mg, Oral, TID  Buprenorphine HCl FILM 450 mcg, 450 mcg, Oral, BID  bumetanide (BUMEX) tablet 2 mg, 2 mg, Oral, Daily  buPROPion (WELLBUTRIN XL) extended release tablet 150 mg, 150 mg, Oral, QPM  gabapentin (NEURONTIN) capsule 300 mg, 300 mg, Oral, TID  potassium chloride (KLOR-CON M) extended release tablet 20 mEq, 20 mEq, Oral, Daily with breakfast  rOPINIRole (REQUIP) tablet 2 mg, 2 mg, Oral, 4x daily  sodium chloride flush 0.9 % injection 5-40 mL, 5-40 mL, Intravenous, 2 times per day  sodium chloride flush 0.9 % injection 5-40 mL, 5-40 mL, Intravenous, PRN  0.9 % sodium chloride infusion, 25 mL, Intravenous, PRN  promethazine (PHENERGAN) tablet 12.5 mg, 12.5 mg, Oral, Q6H PRN **OR** ondansetron (ZOFRAN) injection 4 mg, 4 mg, Intravenous, Q6H PRN  oxyCODONE (ROXICODONE) immediate release tablet 5 mg, 5 mg, Oral, Q4H PRN **OR** oxyCODONE HCl (OXY-IR) immediate release tablet 10 mg, 10 mg, Oral, Q4H PRN  HYDROmorphone (DILAUDID) injection 1 mg, 1 mg, Intravenous, Q3H PRN  polyethylene glycol (GLYCOLAX) packet 17 g, 17 g, Oral, Daily  bisacodyl (DULCOLAX) EC tablet 5 mg, 5 mg, Oral, Daily  sennosides-docusate sodium (SENOKOT-S) 8.6-50 MG tablet 1 tablet, 1 tablet, Oral, BID  benzocaine-menthol (CEPACOL SORE THROAT) lozenge 1 lozenge, 1 lozenge, Oral, Q2H PRN  acetaminophen (TYLENOL) tablet 650 mg, 650 mg, Oral, Q4H PRN  insulin lispro (HUMALOG) injection vial 0-12 Units, 0-12 Units, Subcutaneous, TID WC  insulin lispro (HUMALOG) injection vial 0-6 Units, 0-6 Units, Subcutaneous, Nightly  glucose (GLUTOSE) 40 % oral gel 15 g, 15 g, Oral, PRN  dextrose 50 % IV solution, 12.5 g, Intravenous, PRN  glucagon (rDNA) injection 1 mg, 1 mg, Intramuscular, PRN  dextrose 5 % solution, 100 mL/hr, Intravenous, PRN    ASSESSMENT:   · S/p L3-4 PLIF on 5/13 - stable  Severe op site pain requiring IV pain meds  PLAN:  · Pt admitted for pain management PT/OT  · WBAT  · Pain control  · Discharge planning        Electronically signed by CHERYL Young on 5/17/2021 at 10:25 AM

## 2021-05-17 NOTE — PROGRESS NOTES
[facility] Medicine    Subjective:  Pt alert conversive c/o pain      Current Facility-Administered Medications:     magnesium hydroxide (MILK OF MAGNESIA) 400 MG/5ML suspension 30 mL, 30 mL, Oral, Daily PRN, Arabella Mohamud MD    ipratropium-albuterol (DUONEB) nebulizer solution 1 ampule, 1 ampule, Inhalation, Q4H WA, Misty eDnt, , 1 ampule at 05/16/21 1859    diazePAM (VALIUM) tablet 5 mg, 5 mg, Oral, Q6H PRN, Arabella Mohamud MD, 5 mg at 05/17/21 0529    methylPREDNISolone (MEDROL) tablet 4 mg, 4 mg, Oral, QAM AC, Arabella Mohamud MD, 4 mg at 05/17/21 0640    methylPREDNISolone (MEDROL) tablet 4 mg, 4 mg, Oral, Lunch, Arabella Mohamud MD, 4 mg at 05/16/21 1202    methylPREDNISolone (MEDROL) tablet 4 mg, 4 mg, Oral, Dinner, Arabella Mohamud MD, 4 mg at 05/16/21 1640    methylPREDNISolone (MEDROL) tablet 4 mg, 4 mg, Oral, Nightly, Arabella Mohamud MD    enoxaparin (LOVENOX) injection 40 mg, 40 mg, Subcutaneous, Daily, Arabella Mohamud MD, 40 mg at 05/16/21 0758    ammonium lactate (LAC-HYDRIN) 12 % lotion, , Topical, Q2H PRN, Arabella Mohamud MD    baclofen (LIORESAL) tablet 10 mg, 10 mg, Oral, TID, Arabella Mohamud MD, 10 mg at 05/17/21 3940    Buprenorphine HCl FILM 450 mcg, 450 mcg, Oral, BID, Arabella Mohamud MD, 450 mcg at 05/16/21 2004    bumetanide (BUMEX) tablet 2 mg, 2 mg, Oral, Daily, Arabella Mohamud MD, 2 mg at 05/17/21 2567    buPROPion (WELLBUTRIN XL) extended release tablet 150 mg, 150 mg, Oral, QPM, Arabella oMhamud MD, 150 mg at 05/16/21 1641    gabapentin (NEURONTIN) capsule 300 mg, 300 mg, Oral, TID, Arabella Mohamud MD, 300 mg at 05/17/21 9989    potassium chloride (KLOR-CON M) extended release tablet 20 mEq, 20 mEq, Oral, Daily with breakfast, Arabella Mohamud MD, 20 mEq at 05/17/21 0823    rOPINIRole (REQUIP) tablet 2 mg, 2 mg, Oral, 4x daily, Arabella Mohamud MD, 2 mg at 05/17/21 5455    sodium chloride flush 0.9 % injection 5-40 mL, 5-40 mL, Intravenous, 2 times per day, Arabella Mohamud MD, 10 mL at 05/16/21 2001    sodium chloride flush 0.9 % injection 5-40 mL, 5-40 mL, Intravenous, PRN, Myron Garcia MD, 10 mL at 05/17/21 0639    0.9 % sodium chloride infusion, 25 mL, Intravenous, PRN, Myron Garcia MD    promethazine (PHENERGAN) tablet 12.5 mg, 12.5 mg, Oral, Q6H PRN **OR** ondansetron (ZOFRAN) injection 4 mg, 4 mg, Intravenous, Q6H PRN, Myron Garcia MD, 4 mg at 05/15/21 3643    oxyCODONE (ROXICODONE) immediate release tablet 5 mg, 5 mg, Oral, Q4H PRN **OR** oxyCODONE HCl (OXY-IR) immediate release tablet 10 mg, 10 mg, Oral, Q4H PRN, Myron Garcia MD, 10 mg at 05/17/21 0327    HYDROmorphone (DILAUDID) injection 1 mg, 1 mg, Intravenous, Q3H PRN, Myron Garcia MD, 1 mg at 05/17/21 0640    polyethylene glycol (GLYCOLAX) packet 17 g, 17 g, Oral, Daily, Myron Garcia MD, 17 g at 05/16/21 0753    bisacodyl (DULCOLAX) EC tablet 5 mg, 5 mg, Oral, Daily, Myron Garcia MD, 5 mg at 05/17/21 0823    sennosides-docusate sodium (SENOKOT-S) 8.6-50 MG tablet 1 tablet, 1 tablet, Oral, BID, Myron Garcia MD, 1 tablet at 05/17/21 0823    benzocaine-menthol (CEPACOL SORE THROAT) lozenge 1 lozenge, 1 lozenge, Oral, Q2H PRN, Myron Garcia MD    acetaminophen (TYLENOL) tablet 650 mg, 650 mg, Oral, Q4H PRN, Myron Garcia MD, 650 mg at 05/14/21 1755    insulin lispro (HUMALOG) injection vial 0-12 Units, 0-12 Units, Subcutaneous, TID WC, Myron Garcia MD, 2 Units at 05/14/21 1202    insulin lispro (HUMALOG) injection vial 0-6 Units, 0-6 Units, Subcutaneous, Nightly, Myron Garcia MD, 1 Units at 05/13/21 2209    glucose (GLUTOSE) 40 % oral gel 15 g, 15 g, Oral, PRN, Myron Garcia MD    dextrose 50 % IV solution, 12.5 g, Intravenous, PRN, Myron Garcia MD    glucagon (rDNA) injection 1 mg, 1 mg, Intramuscular, PRN, Myron Garcia MD    dextrose 5 % solution, 100 mL/hr, Intravenous, Myron LAW MD    Objective:    BP (!) 154/75   Pulse 89   Temp 97.1 °F (36.2 °C) (Temporal)   Resp 19   Ht 5' 2\" (1.575 m)   Wt 280 lb (127 kg)   SpO2 95%   BMI 51.21 kg/m²     Heart:  reg  Lungs:  ctab  Abd: + bs soft nontender  Extrem:  Edema legs    CBC with Differential:    Lab Results   Component Value Date    WBC 10.3 05/15/2021    RBC 4.05 05/15/2021    HGB 8.4 05/15/2021    HCT 30.7 05/15/2021     05/15/2021    MCV 75.8 05/15/2021    MCH 20.7 05/15/2021    MCHC 27.4 05/15/2021    RDW 26.7 05/15/2021    NRBC 0.9 02/18/2021    SEGSPCT 72 02/28/2014    LYMPHOPCT 17.7 05/10/2021    MONOPCT 6.8 05/10/2021    MYELOPCT 0.9 03/10/2021    BASOPCT 0.2 05/10/2021    MONOSABS 0.56 05/10/2021    LYMPHSABS 1.46 05/10/2021    EOSABS 0.11 05/10/2021    BASOSABS 0.02 05/10/2021     CMP:    Lab Results   Component Value Date     05/15/2021    K 3.8 05/15/2021    K 4.3 04/23/2021    CL 98 05/15/2021    CO2 31 05/15/2021    BUN 15 05/15/2021    CREATININE 0.7 05/15/2021    GFRAA >60 05/15/2021    LABGLOM >60 05/15/2021    GLUCOSE 143 05/15/2021    PROT 6.9 05/10/2021    LABALBU 3.5 05/10/2021    CALCIUM 8.6 05/15/2021    BILITOT 0.2 05/10/2021    ALKPHOS 113 05/10/2021    AST 18 05/10/2021    ALT 19 05/10/2021     Warfarin PT/INR:    Lab Results   Component Value Date    INR 1.0 05/10/2021    INR 1.1 04/23/2021    INR 1.0 02/19/2021    PROTIME 11.1 05/10/2021    PROTIME 11.7 04/23/2021    PROTIME 11.2 02/19/2021       Assessment:    Active Problems:    Spondylolisthesis of lumbar region    Lumbar stenosis with neurogenic claudication  Resolved Problems:    * No resolved hospital problems.  *      Plan:  Cont postop care as per neurosurgery        Marky Porras DO  8:27 AM  5/17/2021

## 2021-05-17 NOTE — PROGRESS NOTES
Met with patient at bedside to discuss ARU. Patient agreeable to transition to ARU for therapy to improve functional status. Informed patient that she will have a shared room at rehab. Will initiate precert with [facility] today. SW and CM notified of above.

## 2021-05-17 NOTE — PROGRESS NOTES
Acute Rehab Pre-Admission Screen      Referral date: 5/13/21  Onset/Hospital Admit Date: 5/13/2021  5:13 AM    Current Location: [facility]    Name: Artemio Calderontead: 1955  Age: 60 y.o. Admitting Diagnosis: lumbar spondylolisthesis/stenosis  Address: [address] [place] [address]  Home Phone: [phone] (home)  [address]:     Sex: female  Race:   Marital Status:    Ethnic/Cultural/Presybeterian Considerations: Islam    Advanced Directives: [x] Full Code  [] [facility] [] Medications only       [] Living Will  [] DPOA      []Organ donor      [] No mechanical breathing or ventilation     [] no tube feeding, nutrition or hydration      [x] Patient does not have advanced directives or living will        COVERAGE INFORMATION   Primary Insurer: medical mutual  Payor Contact: reviewlink  Authorization #: [id number]    Verified coverage: [] Patient  [] Family/caregiver    [x] financial department [] insurance carrier    COVID SCREEN DATE: 5/18/21 Result: negative      MEDICAL UPDATE:  History of present admission: 60year old female admitted 5/13/21 for elective L3-L4 PLIF to treat lumbar spondylolisthesis and spinal stenosis. Patient with history of low back pain with radiation to RLE.   5/15- severe post op pain continues. Medrol dose pack started, start valium. DC drain.      PHYSICIAN / REFERRAL INFORMATION  Referring Physician: Maddie Martinez MD  Attending Physician: Claudette Alu, MD  Primary Care Physician: Su Larsen DO  Consultants/Opinions (see full consult notes on chart): internal med- medical managemetn    SOCIAL INFORMATION  Primary  Contact: Halina Munoz  Relationship: sister  Primary Phone: [phone]  Secondary Phone: [phone]  Secondary  Contact: Aurelio Boyer  Relationship: child  Primary Phone: [phone]      Previous Community Services: none  Caregiver available: [x] Yes [] No Hours per day available: tbd  Patient previously employed:  [] Yes [] Part Time [] Full Time [x] No [] Retired  Occupation/Profession: disabled  Prior living arrangements: [x] Home  [] Assisted living  [] SNF [] Other  Lived with:  [] Alone  [] Spouse  [x] Family  [] Other  Lived with: sister  Contact phone: see above  Home:  2 story home  2 entry steps  Rails: 2  Steps:  flight to 2nd floor  Rails:  1   Bedroom: [] 1st floor  [x] 2nd floor    Bathroom:  [x] 1st floor  [x] 2nd floor    Prior Functional Level: Independent for: ADLs, IADLs, drove  Assistance for:   Dependent for:   Dominant hand: [x] Right  [] Left    Previous Home Equipment:  [x] Cane [] Grab bars [] Orthotic / prosthetic   [] Shower chair [x] Tub bench  [] 3-in-1 Commode [] Long handle sponge   [] Oxygen [] Sock aide  [] Wheelchair  [] motorized wc/scooter  [] Wheelchair cushion   [] Crutches [] Long handle shoehorn  [] Reachers [] Toilet seat elevator [x] Rollator  [] Walker(wheeled)   [] Walker(standard) [] Mechanical lift    [] None of the above     Has patient had 2 or more falls in the past year or any fall with injury in the past year? [] yes   [x] no   []unknown    Has patient had major surgery during past 100 days prior to admission?    [x] yes   [] no Type/ Date: 21: L3-L4  POSTERIOR LUMBAR INTERBODY  FUSION    Surgical History:  Past Surgical History:   Procedure Laterality Date    ABDOMINOPLASTY      BREAST REDUCTION SURGERY      reduction    CATARACT REMOVAL      bilateral     SECTION      x2    COLONOSCOPY  2012    and egd    COLONOSCOPY  2021    polyps; marginal prep--jessica    COLONOSCOPY N/A 2021    COLONOSCOPY WITH BIOPSY performed by Ashley Petty MD at [address] [facility] ECHOCARDIOGRAM COMPLETE 2D W DOPPLER W COLOR  2012         GASTRIC BYPASS SURGERY  2000    NO NASTOGASTRIC TUBE    HERNIA REPAIR          LUMBAR SPINE SURGERY N/A 2021    L3-L4  POSTERIOR LUMBAR INTERBODY  FUSION--OARM, JESSI, YAJAIRA TABLE, CELL SAVER, PLATELET GEL, Hyperlipidemia   [] Sleep apnea  [x] Depression   [x] Morbid obesity  [] Spinal cord injury  [] Diabetes   [] MRSA   [] Stroke  [] Diabetic nephropathy  [] Myocardial infarction  [] Tracheostomy  [] Diabetic neuropathy  [x] Osteoarthritis  [] Traumatic brain injury   [] Diabetic retinopathy  [] Osteoporosis   [] Urinary tract infection  [] DVT    [] Pancytopenia  [] Vocal cord paralysis  [x] lumbar Spinal stenosis   []  kidney disease [] VRE  [x] Post op PLIF   [x] lymphedema   []        Medical/Functional Conditions requiring inpatient rehabilitation: Requires multidisciplinary treatment including PM&R physician daily care, 24 hour rehabilitation nursing, physical therapy, occupational therapy, rehabilitation psychology, recreation therapy and rehabilitation social work, nutrition services due to new deficits    Risk for Medical/Clinical Complications: Falls, injury, pain, skin breakdown, abnormal vitals, abnormal labs, DVT, PE, pneumonia, decreased mobility, neuro changes     CLINICAL DATA:     Height : 5'2     Weight:  280 lbs   BMI: 51.21       Date: 5/17/21 Date:  Date:    temperature 97.1     pulse 89     respirations 19     Blood pressure 154/75     Pulse oximeter 95% 2L NC        ALLERGIES: Ferritin    DIET : DIET GENERAL;    Current Lab and Diagnostic Tests:   No results found for this or any previous visit (from the past 24 hour(s)). FLUORO FOR SURGICAL PROCEDURES  Result Date: 5/13/2021  Intraprocedural fluoroscopic spot images as above. See separate procedure report for more information.        Additional labs or diagnostic studies needed before admission to rehabilitation unit:  none    Medications:   ipratropium-albuterol  1 ampule Inhalation Q4H WA    methylPREDNISolone  4 mg Oral QAM AC    methylPREDNISolone  4 mg Oral Lunch    methylPREDNISolone  4 mg Oral Nightly    enoxaparin  40 mg Subcutaneous Daily    baclofen  10 mg Oral TID    Buprenorphine HCl  450 mcg Oral BID    bumetanide  2 mg Oral Daily    buPROPion  150 mg Oral QPM    gabapentin  300 mg Oral TID    potassium chloride  20 mEq Oral Daily with breakfast    rOPINIRole  2 mg Oral 4x daily    sodium chloride flush  5-40 mL Intravenous 2 times per day    polyethylene glycol  17 g Oral Daily    bisacodyl  5 mg Oral Daily    sennosides-docusate sodium  1 tablet Oral BID    insulin lispro  0-12 Units Subcutaneous TID WC    insulin lispro  0-6 Units Subcutaneous Nightly      sodium chloride      dextrose       magnesium hydroxide, diazePAM, ammonium lactate, sodium chloride flush, sodium chloride, promethazine **OR** ondansetron, oxyCODONE **OR** oxyCODONE, HYDROmorphone, benzocaine-menthol, acetaminophen, glucose, dextrose, glucagon (rDNA), dextrose    SPECIAL PRECAUTIONS: [] No current precautions  [] Cardiac  [] Renal [] Sternal [] Respiratory      [] Neurological           [] Hip  [x] Spinal [] Seizure  [] Aspiration  [] Isolation precautions:    [] Contact   [] Respiratory   [] Protective     [] Droplet    [x] Weight Bearing precautions:         [] Non Weight Bearing :         [] Toe Touch Weight Bearing :        [] Partial Weight Bearing :         [x] Weight Bearing as Tolerated :         [x] Fall Risk:   [] Recent history of falls [x] Falls risk level (Andres Scale): high      [] Bed Alarm    [] Do not leave alone in the bathroom    [] Chair Alarm    [] Cognitive impairment      [] One to One supervision  [] Sitter / Scarlet Constant sitter   [] Safety enclosure bed  [x] Decreased balance     SPECIAL REHABILITATION NEEDS:   [x] IV Therapy: [] PRN Adapter  [] Midline  [] PICC      [] Central Line    [] TPN       [x] Oxygen: [] Trach [] Bi-PAP [] CPAP  [x] Nasal cannula  [x] Liters: 2     [x] Wound Care:   [] Pressure ulcers(stage and location) -    [] Wound vac   [x] Wound or incision care- spinal incision    [x] Pain Management (level of pain, meds): 10-0, back, oxycodone     [] Incontinence Bladder [] Pettit  Insertion date: []Hemodialysis and  Frequency:   [] Incontinence Bowel    [x] Last bowel movement : 5/17/21    Substance use history: [] Yes  [x] No   [] Tobacco  [] Alcohol  [] Other     [] Ethnic  [] Cultural  [] Spiritual  [] Language [] Needs  [] Other than English  [] Hearing Impaired  [] Visually Impaired  [] Speaking Impaired  [] Blind  [] Special equipment:  [] Devices/Splints  [] Type   [] Brace   [] Type  [] Bariatric bed  [] Extra wide commode  [] Extra wide wheelchair [] Extra wide walker  [] Billy walker  [] Billy wheelchair  [] Transfer lift    [] Other equipment     FUNCTIONAL STATUS PT / Enrique / Jose Alfredo Aas:  FIM / EVAL Discipline Initial: 5/13/21 Follow Up: 5/16 Current:    Eating OT Independent Independent    Grooming OT Stand by Assist Set up    Bathing OT Max Assist Max Assist    Dressing Upper Extremity OT Max Assist Max Assist    Dressing Lower Extremity OT Dependent Minimum assistance    Toileting OT nt Max Assist    Toilet Transfers OT nt Moderate Assist    Tub/Shower Transfers OT nt nt    Homemaking OT nt nt    [employer] Mobility PT Max Assist Max Assist    Bed/Wheelchair Transfers PT Moderate Assist   x2 Moderate Assist    Locomotion Walk / Wheelchair  Device:  Distance: PT 3 ft [employer] Max Assist 5 ft ww Minimum assistance    Endurance PT Fair- fair    Expression SP  nt    Social Interaction SP  nt    Problem Solving SP  nt    Memory SP  nt    Comprehension SP  nt    Swallowing SP  nt    Bowel Management NSG  continent    Bladder Management NSG  continent      Comments on Functional Status: Able to tolerate 3 hours of therapy a day    [x] Able to participate a minimum of 3 hours per day of therapy intervention    Required treatments/services: [x] Rehabilitation nursing [] Dietitian / nurtition                 [x] Case management  [] Respiratory Therapy      [x] Social work   [] Other     Required Therapy:  Therapy Hours per Day Days per Week Therapeutic Interventions Required   [x] Physical Therapy 1.5 5-7 Gait, transfers, Safety, strength, education, endurance   [x] Occupational Therapy 1.5 5-7 ADLs, IADLs, Safety, strength, education, endurance   [] Speech Pathology      [] Prosthetics / Orthotics       []         Anticipated Discharge Plan:   Anticipated DME Needs:  [x] Home     [] Commode   [] Alone    [] Wheelchair   [x] Supervised    [] Víctor Paling   [] Assist    [] Oxygen        [] Hospital Bed  [] Assisted Living    [] Ramp        [x] To Be Determined    Anticipated Home Health Services:  Anticipated Outpatient Services:  [] PT       [] PT  [] OT      [] OT  [] Speech     [] Speech  [] Nursing     [] Dialysis  [] Aide      [x] To Be Determined  [x] To Be Determined    Anticipated support group:  [] Amputation  [] Multiple Sclerosis  [] Stroke  [] Brain Injury  [] Spinal cord injury  [] Other     Barriers to discharge: impaired mobility, impaired self care, pain    Discharge Support: [] Patient lives alone and does not have a caregiver available     [x] Patient has a caregiver available     [] Discharge plan has been verified with patient's caregiver      [] Caregiver is in agreement with the discharge plan     Expected functional status for safe discharge: modified independent    Patient/support person goals: go home pain free    Expected length of stay: 1-2 weeks    Discussed expected length of stay and agreeable to IRF plan: [x] Yes   [] No    Impairment Group Category: 8.9    Etiological Diagnosis: lumbar stenosis    Primary Rehabilitation Diagnosis: lumbar stenosis    Electronically signed by Regina Schaefer RN on 5/18/2021 at 7:44 AM    Prescreen completed __________________________________ (signature of prescreener)    Date:   5/18/21 Time:  0800    JUSTIFICATION FOR ADMISSION TO ACUTE REHABILITATION:  Patient has suffered decline in functional abilities for gait, transfers, speech, swallowing, cognition,  ADL's and IADL's as well as endurance.  Patient has functional deficits requiring intensive therapy across multiple disciplines in order to return home safely. Patient will need physician oversight for respiratory issues, abnormal vital signs, nutritional and hydration status, safety issues, medications and therapy modalities. PT, OT and speech will work on deficits as noted in evaluations. Case management and social work will provide services for DME and management of a safe discharge home. RECOMMEND LEVEL OF CARE  Recommend inpatient rehabilitation: [x] Yes   [] No  If no indicate reason:  [] Functional level too high  [] Unmotivated  [] No insurance carrier approval [] Unlikely to return to community  [] No medical necessity  [] Patient or family chose other facility  [] Too medically complex  [] Inadequate discharge plan  [] Rehabilitation bed unavailable [] Functional level too low  [] patient or family refused ARU    If patient not accepted for IRF admission, recommended level of care:  [] [address]  [] [address]  [] [place]   [] Home Care  [] Other      [] LTAC       Physician Assigned:  [x] Dr. Jeannie Villalta         [] Dr. Keila Neil              [] Dr. Tabor [] Dr. Leticia De Dios  [] Dr. Monet Huang (if not admitted within 48 hours of initial pre-screen)    Medical Update/Changes: Prescreen updated to reflect current data since initiated. Functional Update/Changes: Therapy notes updated on prescreen graph to reflect current status.     Reviewer Signature:_____________________________________    Date:  5/18/21 Time: 0800    PHYSICIAN ADMISSION DETERMINATION AND REVIEW UPDATE:     ____________________________________________________________________  ____________________________________________________________________  ____________________________________________________________________  ____________________________________________________________________  ____________________________________________________________________    Physician Signature:_____________________________________    Print Signature:_________________________________________    Date: 5/18/21    Time:  0800

## 2021-05-17 NOTE — PROGRESS NOTES
Spoke with patient and she is refusing blood sugar checks and insulin. Education provided on importance of keeping blood sugar under control. Patient stated she is not a diabetic and does not want her blood glucose checked or insulin.    Electronically signed by Kamlesh Chavez RN on 5/17/2021 at 7:30 AM

## 2021-05-17 NOTE — PROGRESS NOTES
Physical Therapy    Patient is currently on PT caseload and treatment attempted this PM. Pt declined participating at this time reporting that she just returned to bed and was having increased pain. Will continue to follow and re-attempt tomorrow. Thank you.    Flex Beltran, PT, DPT  License [id number]

## 2021-05-17 NOTE — PLAN OF CARE
Problem: Falls - Risk of:  Goal: Will remain free from falls  Description: Will remain free from falls  5/17/2021 0156 by Kyree Jasmine RN  Outcome: Met This Shift  5/17/2021 0156 by Kyree Jasmine RN  Outcome: Met This Shift  5/16/2021 1431 by Jade Guerra RN  Outcome: Met This Shift  Goal: Absence of physical injury  Description: Absence of physical injury  5/17/2021 0156 by Kyree Jasmine RN  Outcome: Met This Shift  5/17/2021 0156 by Kyree Jasmine RN  Outcome: Met This Shift  5/16/2021 1431 by Jade Guerra RN  Outcome: Met This Shift     Problem: Skin Integrity:  Goal: Will show no infection signs and symptoms  Description: Will show no infection signs and symptoms  5/17/2021 0156 by Kyree Jasmine RN  Outcome: Met This Shift  5/16/2021 1431 by Jade Guerra RN  Outcome: Met This Shift     Problem: Infection - Surgical Site:  Goal: Will show no infection signs and symptoms  Description: Will show no infection signs and symptoms  5/17/2021 0156 by Kyree Jasmine RN  Outcome: Met This Shift  5/16/2021 1431 by Jade Guerra RN  Outcome: Met This Shift     Problem: Mobility - Impaired:  Goal: Mobility will improve to maximum level  Description: Mobility will improve to maximum level  5/17/2021 0156 by Kyree Jasmine RN  Outcome: Met This Shift  5/17/2021 0156 by Kyree Jasmine RN  Outcome: Ongoing  5/16/2021 1431 by Jade Guerra RN  Outcome: Not Met This Shift     Problem: Pain - Acute:  Goal: Pain level will decrease  Description: Pain level will decrease  5/17/2021 0156 by Kyree Jasmine RN  Outcome: Met This Shift  5/16/2021 1431 by Jade Guerra RN  Outcome: Ongoing     Problem: Sensory Perception - Impaired:  Goal: Sensory function intact, lower extremity  Description: Sensory function intact, lower extremity  Outcome: Met This Shift     Problem: Pain:  Goal: Pain level will decrease  Description: Pain level will decrease  5/17/2021 0156 by Kyree Jasmine RN  Outcome: Met This Shift  5/16/2021 1431 by Maday Leos CLARE Mcneal  Outcome: Ongoing  Goal: Control of acute pain  Description: Control of acute pain  5/17/2021 0156 by Dale Avila RN  Outcome: Met This Shift  5/16/2021 1431 by Horacio Restrepo RN  Outcome: Ongoing  Goal: Control of chronic pain  Description: Control of chronic pain  Outcome: Met This Shift

## 2021-05-18 ENCOUNTER — HOSPITAL ENCOUNTER (INPATIENT)
Age: 66
LOS: 17 days | Discharge: HOME HEALTH | DRG: 560 | End: 2021-06-04
Attending: PHYSICAL MEDICINE & REHABILITATION | Admitting: PHYSICAL MEDICINE & REHABILITATION
Payer: COMMERCIAL

## 2021-05-18 VITALS
TEMPERATURE: 97 F | RESPIRATION RATE: 18 BRPM | BODY MASS INDEX: 51.53 KG/M2 | HEART RATE: 91 BPM | OXYGEN SATURATION: 92 % | HEIGHT: 62 IN | SYSTOLIC BLOOD PRESSURE: 161 MMHG | WEIGHT: 280 LBS | DIASTOLIC BLOOD PRESSURE: 89 MMHG

## 2021-05-18 LAB
METER GLUCOSE: 128 MG/DL (ref 74–99)
METER GLUCOSE: 280 MG/DL (ref 74–99)
SARS-COV-2, NAAT: NOT DETECTED

## 2021-05-18 PROCEDURE — 82962 GLUCOSE BLOOD TEST: CPT

## 2021-05-18 PROCEDURE — 2580000003 HC RX 258: Performed by: PHYSICIAN ASSISTANT

## 2021-05-18 PROCEDURE — 6370000000 HC RX 637 (ALT 250 FOR IP): Performed by: PHYSICAL MEDICINE & REHABILITATION

## 2021-05-18 PROCEDURE — 2580000003 HC RX 258: Performed by: NEUROLOGICAL SURGERY

## 2021-05-18 PROCEDURE — 6370000000 HC RX 637 (ALT 250 FOR IP): Performed by: NEUROLOGICAL SURGERY

## 2021-05-18 PROCEDURE — 6360000002 HC RX W HCPCS: Performed by: NEUROLOGICAL SURGERY

## 2021-05-18 PROCEDURE — 6360000002 HC RX W HCPCS: Performed by: PHYSICIAN ASSISTANT

## 2021-05-18 PROCEDURE — 87635 SARS-COV-2 COVID-19 AMP PRB: CPT

## 2021-05-18 PROCEDURE — 94640 AIRWAY INHALATION TREATMENT: CPT

## 2021-05-18 PROCEDURE — 6370000000 HC RX 637 (ALT 250 FOR IP): Performed by: PHYSICIAN ASSISTANT

## 2021-05-18 PROCEDURE — 6370000000 HC RX 637 (ALT 250 FOR IP): Performed by: INTERNAL MEDICINE

## 2021-05-18 PROCEDURE — 1280000000 HC REHAB R&B

## 2021-05-18 PROCEDURE — 2700000000 HC OXYGEN THERAPY PER DAY

## 2021-05-18 RX ORDER — POLYETHYLENE GLYCOL 3350 17 G/17G
17 POWDER, FOR SOLUTION ORAL DAILY PRN
Status: DISCONTINUED | OUTPATIENT
Start: 2021-05-18 | End: 2021-06-04 | Stop reason: HOSPADM

## 2021-05-18 RX ORDER — METHYLPREDNISOLONE 4 MG/1
4 TABLET ORAL
Status: COMPLETED | OUTPATIENT
Start: 2021-05-18 | End: 2021-05-18

## 2021-05-18 RX ORDER — METHYLPREDNISOLONE 4 MG/1
4 TABLET ORAL NIGHTLY
Status: COMPLETED | OUTPATIENT
Start: 2021-05-18 | End: 2021-05-20

## 2021-05-18 RX ORDER — GABAPENTIN 600 MG/1
600 TABLET ORAL 3 TIMES DAILY
Status: DISCONTINUED | OUTPATIENT
Start: 2021-05-18 | End: 2021-05-18 | Stop reason: HOSPADM

## 2021-05-18 RX ORDER — DIAZEPAM 5 MG/1
5 TABLET ORAL EVERY 6 HOURS PRN
Status: DISCONTINUED | OUTPATIENT
Start: 2021-05-18 | End: 2021-05-25

## 2021-05-18 RX ORDER — OXYCODONE HYDROCHLORIDE 10 MG/1
10 TABLET ORAL EVERY 4 HOURS PRN
Status: DISCONTINUED | OUTPATIENT
Start: 2021-05-18 | End: 2021-05-21

## 2021-05-18 RX ORDER — METHYLPREDNISOLONE 4 MG/1
4 TABLET ORAL
Status: COMPLETED | OUTPATIENT
Start: 2021-05-19 | End: 2021-05-20

## 2021-05-18 RX ORDER — IPRATROPIUM BROMIDE AND ALBUTEROL SULFATE 2.5; .5 MG/3ML; MG/3ML
1 SOLUTION RESPIRATORY (INHALATION)
Status: DISCONTINUED | OUTPATIENT
Start: 2021-05-18 | End: 2021-06-04 | Stop reason: HOSPADM

## 2021-05-18 RX ORDER — AMMONIUM LACTATE 12 G/100G
LOTION TOPICAL
Status: DISCONTINUED | OUTPATIENT
Start: 2021-05-18 | End: 2021-06-04 | Stop reason: HOSPADM

## 2021-05-18 RX ORDER — METHYLPREDNISOLONE 4 MG/1
8 TABLET ORAL NIGHTLY
Status: COMPLETED | OUTPATIENT
Start: 2021-05-18 | End: 2021-05-18

## 2021-05-18 RX ORDER — POTASSIUM CHLORIDE 20 MEQ/1
20 TABLET, EXTENDED RELEASE ORAL
Status: DISCONTINUED | OUTPATIENT
Start: 2021-05-19 | End: 2021-06-04 | Stop reason: HOSPADM

## 2021-05-18 RX ORDER — GABAPENTIN 300 MG/1
600 CAPSULE ORAL 3 TIMES DAILY
Status: DISCONTINUED | OUTPATIENT
Start: 2021-05-18 | End: 2021-06-02

## 2021-05-18 RX ORDER — METHYLPREDNISOLONE 4 MG/1
4 TABLET ORAL
Status: COMPLETED | OUTPATIENT
Start: 2021-05-19 | End: 2021-05-21

## 2021-05-18 RX ORDER — BUMETANIDE 1 MG/1
2 TABLET ORAL DAILY
Status: DISCONTINUED | OUTPATIENT
Start: 2021-05-19 | End: 2021-06-04 | Stop reason: HOSPADM

## 2021-05-18 RX ORDER — ONDANSETRON 2 MG/ML
4 INJECTION INTRAMUSCULAR; INTRAVENOUS EVERY 6 HOURS PRN
Status: DISCONTINUED | OUTPATIENT
Start: 2021-05-18 | End: 2021-05-18

## 2021-05-18 RX ORDER — BACLOFEN 10 MG/1
10 TABLET ORAL 3 TIMES DAILY
Status: DISCONTINUED | OUTPATIENT
Start: 2021-05-18 | End: 2021-06-02

## 2021-05-18 RX ORDER — SENNA AND DOCUSATE SODIUM 50; 8.6 MG/1; MG/1
1 TABLET, FILM COATED ORAL 2 TIMES DAILY
Status: DISCONTINUED | OUTPATIENT
Start: 2021-05-18 | End: 2021-05-30

## 2021-05-18 RX ORDER — OXYCODONE HYDROCHLORIDE 5 MG/1
5 TABLET ORAL EVERY 4 HOURS PRN
Status: DISCONTINUED | OUTPATIENT
Start: 2021-05-18 | End: 2021-05-21

## 2021-05-18 RX ORDER — NICOTINE POLACRILEX 4 MG
15 LOZENGE BUCCAL PRN
Status: DISCONTINUED | OUTPATIENT
Start: 2021-05-18 | End: 2021-05-24

## 2021-05-18 RX ORDER — PROMETHAZINE HYDROCHLORIDE 25 MG/1
12.5 TABLET ORAL EVERY 6 HOURS PRN
Status: DISCONTINUED | OUTPATIENT
Start: 2021-05-18 | End: 2021-05-18

## 2021-05-18 RX ORDER — ROPINIROLE 2 MG/1
2 TABLET, FILM COATED ORAL 4 TIMES DAILY
Status: DISCONTINUED | OUTPATIENT
Start: 2021-05-18 | End: 2021-06-04 | Stop reason: HOSPADM

## 2021-05-18 RX ORDER — SODIUM CHLORIDE 0.9 % (FLUSH) 0.9 %
5-40 SYRINGE (ML) INJECTION EVERY 12 HOURS SCHEDULED
Status: DISCONTINUED | OUTPATIENT
Start: 2021-05-18 | End: 2021-06-04 | Stop reason: HOSPADM

## 2021-05-18 RX ORDER — ACETAMINOPHEN 325 MG/1
650 TABLET ORAL EVERY 4 HOURS PRN
Status: DISCONTINUED | OUTPATIENT
Start: 2021-05-18 | End: 2021-06-04 | Stop reason: HOSPADM

## 2021-05-18 RX ORDER — BUPROPION HYDROCHLORIDE 150 MG/1
150 TABLET ORAL EVERY EVENING
Status: DISCONTINUED | OUTPATIENT
Start: 2021-05-18 | End: 2021-06-04 | Stop reason: HOSPADM

## 2021-05-18 RX ORDER — POLYETHYLENE GLYCOL 3350 17 G/17G
17 POWDER, FOR SOLUTION ORAL DAILY
Status: DISCONTINUED | OUTPATIENT
Start: 2021-05-19 | End: 2021-05-30

## 2021-05-18 RX ORDER — ONDANSETRON 4 MG/1
4 TABLET, ORALLY DISINTEGRATING ORAL EVERY 6 HOURS PRN
Status: DISCONTINUED | OUTPATIENT
Start: 2021-05-18 | End: 2021-06-04 | Stop reason: HOSPADM

## 2021-05-18 RX ADMIN — GABAPENTIN 600 MG: 600 TABLET, FILM COATED ORAL at 10:46

## 2021-05-18 RX ADMIN — IPRATROPIUM BROMIDE AND ALBUTEROL SULFATE 1 AMPULE: 2.5; .5 SOLUTION RESPIRATORY (INHALATION) at 20:29

## 2021-05-18 RX ADMIN — BUMETANIDE 2 MG: 1 TABLET ORAL at 09:38

## 2021-05-18 RX ADMIN — DIAZEPAM 5 MG: 5 TABLET ORAL at 00:36

## 2021-05-18 RX ADMIN — DIAZEPAM 5 MG: 5 TABLET ORAL at 16:22

## 2021-05-18 RX ADMIN — METHYLPREDNISOLONE 4 MG: 4 TABLET ORAL at 06:39

## 2021-05-18 RX ADMIN — METHYLPREDNISOLONE 4 MG: 4 TABLET ORAL at 20:46

## 2021-05-18 RX ADMIN — ROPINIROLE HYDROCHLORIDE 2 MG: 2 TABLET, FILM COATED ORAL at 20:46

## 2021-05-18 RX ADMIN — GABAPENTIN 600 MG: 300 CAPSULE ORAL at 20:45

## 2021-05-18 RX ADMIN — HYDROMORPHONE HYDROCHLORIDE 1 MG: 1 INJECTION, SOLUTION INTRAMUSCULAR; INTRAVENOUS; SUBCUTANEOUS at 04:48

## 2021-05-18 RX ADMIN — DIAZEPAM 5 MG: 5 TABLET ORAL at 06:39

## 2021-05-18 RX ADMIN — INSULIN LISPRO 3 UNITS: 100 INJECTION, SOLUTION INTRAVENOUS; SUBCUTANEOUS at 20:54

## 2021-05-18 RX ADMIN — ROPINIROLE HYDROCHLORIDE 2 MG: 2 TABLET, FILM COATED ORAL at 09:38

## 2021-05-18 RX ADMIN — OXYCODONE HYDROCHLORIDE 10 MG: 10 TABLET ORAL at 11:21

## 2021-05-18 RX ADMIN — METHYLPREDNISOLONE 8 MG: 4 TABLET ORAL at 20:48

## 2021-05-18 RX ADMIN — SENNOSIDES AND DOCUSATE SODIUM 1 TABLET: 8.6; 5 TABLET ORAL at 20:45

## 2021-05-18 RX ADMIN — POLYETHYLENE GLYCOL 3350 17 G: 17 POWDER, FOR SOLUTION ORAL at 09:34

## 2021-05-18 RX ADMIN — BACLOFEN 10 MG: 10 TABLET ORAL at 13:53

## 2021-05-18 RX ADMIN — POTASSIUM CHLORIDE 20 MEQ: 1500 TABLET, EXTENDED RELEASE ORAL at 09:37

## 2021-05-18 RX ADMIN — ENOXAPARIN SODIUM 40 MG: 40 INJECTION SUBCUTANEOUS at 09:35

## 2021-05-18 RX ADMIN — BISACODYL 5 MG: 5 TABLET, COATED ORAL at 09:38

## 2021-05-18 RX ADMIN — ROPINIROLE HYDROCHLORIDE 2 MG: 2 TABLET, FILM COATED ORAL at 11:21

## 2021-05-18 RX ADMIN — GABAPENTIN 600 MG: 300 CAPSULE ORAL at 16:54

## 2021-05-18 RX ADMIN — METHYLPREDNISOLONE 4 MG: 4 TABLET ORAL at 16:55

## 2021-05-18 RX ADMIN — IPRATROPIUM BROMIDE AND ALBUTEROL SULFATE 1 AMPULE: .5; 3 SOLUTION RESPIRATORY (INHALATION) at 07:54

## 2021-05-18 RX ADMIN — Medication 10 ML: at 09:40

## 2021-05-18 RX ADMIN — SODIUM CHLORIDE, PRESERVATIVE FREE 10 ML: 5 INJECTION INTRAVENOUS at 04:48

## 2021-05-18 RX ADMIN — Medication 10 ML: at 22:41

## 2021-05-18 RX ADMIN — IPRATROPIUM BROMIDE AND ALBUTEROL SULFATE 1 AMPULE: .5; 3 SOLUTION RESPIRATORY (INHALATION) at 11:58

## 2021-05-18 RX ADMIN — BACLOFEN 10 MG: 10 TABLET ORAL at 20:46

## 2021-05-18 RX ADMIN — METHYLPREDNISOLONE 4 MG: 4 TABLET ORAL at 11:20

## 2021-05-18 RX ADMIN — BACLOFEN 10 MG: 10 TABLET ORAL at 09:38

## 2021-05-18 RX ADMIN — BUPROPION HYDROCHLORIDE 150 MG: 150 TABLET, EXTENDED RELEASE ORAL at 16:55

## 2021-05-18 RX ADMIN — SENNOSIDES AND DOCUSATE SODIUM 1 TABLET: 8.6; 5 TABLET ORAL at 09:39

## 2021-05-18 ASSESSMENT — PAIN SCALES - GENERAL
PAINLEVEL_OUTOF10: 1
PAINLEVEL_OUTOF10: 3
PAINLEVEL_OUTOF10: 10
PAINLEVEL_OUTOF10: 10

## 2021-05-18 ASSESSMENT — PAIN - FUNCTIONAL ASSESSMENT: PAIN_FUNCTIONAL_ASSESSMENT: PREVENTS OR INTERFERES SOME ACTIVE ACTIVITIES AND ADLS

## 2021-05-18 ASSESSMENT — PAIN DESCRIPTION - ONSET: ONSET: ON-GOING

## 2021-05-18 ASSESSMENT — PAIN DESCRIPTION - FREQUENCY: FREQUENCY: CONTINUOUS

## 2021-05-18 ASSESSMENT — PAIN DESCRIPTION - LOCATION: LOCATION: BACK

## 2021-05-18 NOTE — CARE COORDINATION
4/64/9279 - Precert obtained for pt to discharge to ARU. Pt is assigned room 5516A. Covid test done. SW/CM will follow.

## 2021-05-18 NOTE — PROGRESS NOTES
Physical Therapy    Patient is currently on PT caseload and treatment attempted this PM.  Pt being transferred to ARU at time of attempt. Thank you.    Lottie Price, PT, DPT  License [id number]

## 2021-05-18 NOTE — DISCHARGE INSTR - COC
Continuity of Care Form    Patient Name: Magda Sellers   :  1955  MRN:  [id number]    [facility] date:  2021  Discharge date:  21    Code Status Order: Full Code   Advance Directives:   [facility] Documentation     Date/Time Healthcare Directive Type of Healthcare Directive Copy in [address] Agent's Name Healthcare Agent's Phone Number    21 [phone]  Yes, patient has an advance directive for healthcare treatment --  No, copy requested from family -- -- --          Admitting Physician:  Ria Lyles MD  PCP: Yohan Hernandez DO    Discharging Nurse: [facility] Unit/Room#: 4727/0988-C  Discharging Unit Phone Number: [phone]    Emergency Contact:   Extended Emergency Contact Information  Primary Emergency Contact: Georgia Harris  Address: [address] [place] [address], [address] [place] [address] [address]  Phone: [phone]  Work Phone: [phone]  Mobile Phone: [phone]  Relation: Brother/Sister  Secondary Emergency Contact: [address] Phone: [phone]  Relation: Child    Past Surgical History:  Past Surgical History:   Procedure Laterality Date    ABDOMINOPLASTY  2002    BREAST REDUCTION SURGERY      reduction    CATARACT REMOVAL      bilateral     SECTION      x2    COLONOSCOPY  2012    and egd    COLONOSCOPY  2021    polyps; marginal prep--jessica    COLONOSCOPY N/A 2021    COLONOSCOPY WITH BIOPSY performed by Willian Thomas MD at [address] ECHOCARDIOGRAM COMPLETE 2D W DOPPLER W COLOR  2012         GASTRIC BYPASS SURGERY  2000    NO NASTOGASTRIC TUBE    HERNIA REPAIR          LUMBAR SPINE SURGERY N/A 2021    L3-L4  POSTERIOR LUMBAR INTERBODY  FUSION--OARM, JESSI, YAJAIRA TABLE, CELL SAVER, PLATELET GEL, AUDIOLOGY, CAGES, PLATES, SCREWS -- GLOBUS performed by Ria Lyles MD at [address] [facility]  2021    minimal Expiration Resolved Resolved By    None active    Resolved    COVID-19 Rule Out 21 Covid-19 Ambulatory (Ordered)   21 Rule-Out Test Resulted    COVID-19 21 COVID-19   21     COVID-19 Rule Out 21 Covid-19 Ambulatory (Ordered)   21 Rule-Out Test Resulted          Nurse Assessment:  Last Vital Signs: BP (!) 161/89   Pulse 91   Temp 97 °F (36.1 °C) (Temporal)   Resp 18   Ht 5' 2\" (1.575 m)   Wt 280 lb (127 kg)   SpO2 92%   BMI 51.21 kg/m²     Last documented pain score (0-10 scale): Pain Level: 10  Last Weight:   Wt Readings from Last 1 Encounters:   21 280 lb (127 kg)     Mental Status:  oriented and alert    IV Access5    Nursing Mobility/ADLs:  Walking   {CHP DME PPYP:375337470}  Transfer  Assisted  Bathing  Assisted  Dressing  Assisted  Toileting  Assisted  Feeding            Independent  Med Admin  Assisted  Med Delivery   prefers mixed with applesauce    Wound Care Documentation and Therapy:  Wound 06/22/15 Venous ulcer Leg Left;Lateral;Lower wound #2 left lateral calf , acquired 5-25-15, venous (Active)   Number of days: 7        Elimination:  Continence:   · Bowel: Yes  · Bladder: No  Urinary Catheter: None   Colostomy/Ileostomy/Ileal Conduit: No       Date of Last BM:     Intake/Output Summary (Last 24 hours) at 2021 1144  Last data filed at 2021 0952  Gross per 24 hour   Intake 600 ml   Output 1500 ml   Net -900 ml     I/O last 3 completed shifts: In: 480 [P.O.:480]  Out: 1500 [Urine:1500]    Safety Concerns:     None    Impairments/Disabilities:      Vision    Nutrition Therapy:  Current Nutrition Therapy:   - Oral Diet:  General    Routes of Feeding: Oral  Liquids:  Thin Liquids  Daily Fluid Restriction: no  Last Modified Barium Swallow with Video (Video Swallowing Test): not done    Treatments at the Time of Hospital Discharge:   Respiratory Treatments: MAR  Oxygen Therapy:  is not on home oxygen therapy. refusing O2  Ventilator:    - No ventilator support    Rehab Therapies: Physical Therapy and Occupational Therapy  Weight Bearing Status/Restrictions: No weight bearing restirctions  Other Medical Equipment (for information only, NOT a DME order):  walker  Other Treatments:     Patient's personal belongings (please select all that are sent with patient):  {[facility] DME Belongings:566895964}    RN SIGNATURE:  {Esignature:[id number]}    CASE MANAGEMENT/SOCIAL WORK SECTION    Inpatient Status Date: ***    Readmission Risk Assessment Score:  Readmission Risk              Risk of Unplanned Readmission:  18           Discharging to Facility/ Agency   · Name:   · Address:  · Phone:  · Fax:    Dialysis Facility (if applicable)   · Name:  · Address:  · Dialysis Schedule:  · Phone:  · Fax:    / signature: {Esignature:794386701}    PHYSICIAN SECTION    Prognosis: {Prognosis:6849695766}    Condition at Discharge: [address] [place] [address] Patient Condition:316885816}    Rehab Potential (if transferring to Rehab): {Prognosis:7614868234}    Recommended Labs or Other Treatments After Discharge: ***    Physician Certification: I certify the above information and transfer of Sindhu Kim  is necessary for the continuing treatment of the diagnosis listed and that she requires {Admit to Appropriate Level of Care:35242} for {GREATER/LESS:338958379} 30 days.      Update Admission H&P: {P DME Changes in VBDYJ:378371347}    PHYSICIAN SIGNATURE:  {Esignature:187365781}

## 2021-05-18 NOTE — LETTER
PORTABLE PATIENT PROFILE  Edgar Carter  [id number]    MEDICAL DIAGNOSIS/CONDITION:   Patient Active Problem List   Diagnosis    Osteoarthritis of left knee    Osteoarthritis of right knee    BMI 45.0-49.9, adult ([address]    Lymphedema of both lower extremities    Cellulitis of both lower extremities    Microcytic anemia    Morbid obesity ([facility] [address]    Tobacco abuse    SOB (shortness of breath)    Iron deficiency anemia    Primary osteoarthritis involving multiple joints    Lymphedema    Intervertebral lumbar disc disorder    Reactive depression    Chronic pain syndrome    Primary osteoarthritis of both knees    Chronic right shoulder pain    Chronic right-sided low back pain without sciatica    Anxiety    Chronic fatigue    Tremor    Intractable back pain    Acute midline low back pain with right-sided sciatica    Spinal stenosis of lumbar region with neurogenic claudication    Osteoarthritis of spine with radiculopathy, lumbar region    Cellulitis    Anemia    Spondylolisthesis of lumbar region    Lumbar stenosis with neurogenic claudication       INSURANCE INFORMATION:  Payor: [employer] /  /  /     ADVANCED DIRECTIVES:   Advance Directives (For Healthcare)  Healthcare Directive: No, patient does not have an advance directive for healthcare treatment  [unfilled]     EMERGENCY CONTACT:       RISK FACTORS:   Social History     Tobacco Use    Smoking status: Former Smoker     Packs/day: 0.25     Years: 38.00     Pack years: 9.50     Types: Cigarettes     Quit date: 3/11/2021     Years since quittin.2    Smokeless tobacco: Never Used    Tobacco comment: Pt states she quit [employer]"   Substance Use Topics    Alcohol use: No       ALLERGIES:  Allergies   Allergen Reactions    Ferritin Shortness Of Breath and Other (See Comments)     Flushed,  Severe back pain       IMMUNIZATIONS:  Immunization History   Administered Date(s) Administered    COVID-19, Pfizer, PF, 30mcg/0.3mL 02/25/2021, 03/18/2021    Influenza Virus Vaccine 10/04/2017, 11/20/2018, 11/06/2019    Influenza, Quadv, IM, PF (6 mo and older Fluzone, Flulaval, Fluarix, and 3 yrs and older Afluria) 11/02/2020    Pneumococcal Polysaccharide ([id number]) 11/02/2020       SWALLOWING:   Difficulty Chewing or Swallowing Food: No    VISION AND HEARING:   Sensory Problems  Visual impairment: Glasses    PHYSICIANS INVOLVED WITH CARE:    Cary Aleman MD  No ref.  provider found  Wei Medrano MD

## 2021-05-18 NOTE — PROGRESS NOTES
Patient oriented to room and new admission folder given. Patient Guide reviewed and explanation of Patient Rights and Responsibilities Completed.  Sigifredo Ruiz  5/18/2021  3:32 PM

## 2021-05-18 NOTE — PROGRESS NOTES
(LIORESAL) tablet 10 mg, 10 mg, Oral, TID  Buprenorphine HCl FILM 450 mcg, 450 mcg, Oral, BID  bumetanide (BUMEX) tablet 2 mg, 2 mg, Oral, Daily  buPROPion (WELLBUTRIN XL) extended release tablet 150 mg, 150 mg, Oral, QPM  gabapentin (NEURONTIN) capsule 300 mg, 300 mg, Oral, TID  potassium chloride (KLOR-CON M) extended release tablet 20 mEq, 20 mEq, Oral, Daily with breakfast  rOPINIRole (REQUIP) tablet 2 mg, 2 mg, Oral, 4x daily  sodium chloride flush 0.9 % injection 5-40 mL, 5-40 mL, Intravenous, 2 times per day  sodium chloride flush 0.9 % injection 5-40 mL, 5-40 mL, Intravenous, PRN  0.9 % sodium chloride infusion, 25 mL, Intravenous, PRN  promethazine (PHENERGAN) tablet 12.5 mg, 12.5 mg, Oral, Q6H PRN **OR** ondansetron (ZOFRAN) injection 4 mg, 4 mg, Intravenous, Q6H PRN  oxyCODONE (ROXICODONE) immediate release tablet 5 mg, 5 mg, Oral, Q4H PRN **OR** oxyCODONE HCl (OXY-IR) immediate release tablet 10 mg, 10 mg, Oral, Q4H PRN  HYDROmorphone (DILAUDID) injection 1 mg, 1 mg, Intravenous, Q3H PRN  polyethylene glycol (GLYCOLAX) packet 17 g, 17 g, Oral, Daily  bisacodyl (DULCOLAX) EC tablet 5 mg, 5 mg, Oral, Daily  sennosides-docusate sodium (SENOKOT-S) 8.6-50 MG tablet 1 tablet, 1 tablet, Oral, BID  benzocaine-menthol (CEPACOL SORE THROAT) lozenge 1 lozenge, 1 lozenge, Oral, Q2H PRN  acetaminophen (TYLENOL) tablet 650 mg, 650 mg, Oral, Q4H PRN  insulin lispro (HUMALOG) injection vial 0-12 Units, 0-12 Units, Subcutaneous, TID WC  insulin lispro (HUMALOG) injection vial 0-6 Units, 0-6 Units, Subcutaneous, Nightly  glucose (GLUTOSE) 40 % oral gel 15 g, 15 g, Oral, PRN  dextrose 50 % IV solution, 12.5 g, Intravenous, PRN  glucagon (rDNA) injection 1 mg, 1 mg, Intramuscular, PRN  dextrose 5 % solution, 100 mL/hr, Intravenous, PRN    ASSESSMENT:   · S/p L3-4 PLIF on 5/13 - stable  Severe op site pain requiring IV pain meds    PLAN:  · Pt admitted for pain management PT/OT  · WBAT  · Pain control  · Discharge planning  · Increase gabapentin 600mg TID        Electronically signed by CHERYL Vargas on 5/18/2021 at 9:21 AM

## 2021-05-18 NOTE — PROGRESS NOTES
[facility] Medicine    Subjective:  Pt seen this am sitting up in chair c/o pain      Current Facility-Administered Medications:     gabapentin (NEURONTIN) tablet 600 mg, 600 mg, Oral, TID, CHERYL Owusu, 600 mg at 05/18/21 1046    magnesium hydroxide (MILK OF MAGNESIA) 400 MG/5ML suspension 30 mL, 30 mL, Oral, Daily PRN, Ole Pool MD, 30 mL at 05/17/21 0827    ipratropium-albuterol (DUONEB) nebulizer solution 1 ampule, 1 ampule, Inhalation, Q4H WA, Eliane Rileymer, DO, 1 ampule at 05/18/21 1158    diazePAM (VALIUM) tablet 5 mg, 5 mg, Oral, Q6H PRN, Ole Pool MD, 5 mg at 05/18/21 0639    methylPREDNISolone (MEDROL) tablet 4 mg, 4 mg, Oral, QAM AC, Ole Pool MD, 4 mg at 05/18/21 7721    methylPREDNISolone (MEDROL) tablet 4 mg, 4 mg, Oral, Nightly, Ole Pool MD, 4 mg at 05/17/21 2117    enoxaparin (LOVENOX) injection 40 mg, 40 mg, Subcutaneous, Daily, Ole Pool MD, 40 mg at 05/18/21 0935    ammonium lactate (LAC-HYDRIN) 12 % lotion, , Topical, Q2H PRN, Ole Pool MD    baclofen (LIORESAL) tablet 10 mg, 10 mg, Oral, TID, Ole Pool MD, 10 mg at 05/18/21 1353    Buprenorphine HCl FILM 450 mcg, 450 mcg, Oral, BID, Ole Pool MD, 450 mcg at 05/18/21 0941    bumetanide (BUMEX) tablet 2 mg, 2 mg, Oral, Daily, Ole Pool MD, 2 mg at 05/18/21 4086    buPROPion (WELLBUTRIN XL) extended release tablet 150 mg, 150 mg, Oral, QPM, Ole Pool MD, 150 mg at 05/17/21 1817    potassium chloride (KLOR-CON M) extended release tablet 20 mEq, 20 mEq, Oral, Daily with breakfast, Ole Pool MD, 20 mEq at 05/18/21 0937    rOPINIRole (REQUIP) tablet 2 mg, 2 mg, Oral, 4x daily, Ole Pool MD, 2 mg at 05/18/21 1121    sodium chloride flush 0.9 % injection 5-40 mL, 5-40 mL, Intravenous, 2 times per day, Ole Pool MD, 10 mL at 05/18/21 0940    sodium chloride flush 0.9 % injection 5-40 mL, 5-40 mL, Intravenous, PRN, Ole Pool MD, 10 mL at 05/18/21 2388    0.9 % sodium chloride infusion, 25 mL, Intravenous, PRN, Josef Mendoza MD    promethazine (PHENERGAN) tablet 12.5 mg, 12.5 mg, Oral, Q6H PRN **OR** ondansetron (ZOFRAN) injection 4 mg, 4 mg, Intravenous, Q6H PRN, Josef Mendoza MD, 4 mg at 05/15/21 9795    oxyCODONE (ROXICODONE) immediate release tablet 5 mg, 5 mg, Oral, Q4H PRN **OR** oxyCODONE HCl (OXY-IR) immediate release tablet 10 mg, 10 mg, Oral, Q4H PRN, Josef Mendoza MD, 10 mg at 05/18/21 1121    HYDROmorphone (DILAUDID) injection 1 mg, 1 mg, Intravenous, Q3H PRN, Josef Mendoza MD, 1 mg at 05/18/21 0448    polyethylene glycol (GLYCOLAX) packet 17 g, 17 g, Oral, Daily, Josef Mendoza MD, 17 g at 05/18/21 0934    bisacodyl (DULCOLAX) EC tablet 5 mg, 5 mg, Oral, Daily, Josef Mendoza MD, 5 mg at 05/18/21 0938    sennosides-docusate sodium (SENOKOT-S) 8.6-50 MG tablet 1 tablet, 1 tablet, Oral, BID, Josef Mendoza MD, 1 tablet at 05/18/21 0939    benzocaine-menthol (CEPACOL SORE THROAT) lozenge 1 lozenge, 1 lozenge, Oral, Q2H PRN, Josef Mendoza MD    acetaminophen (TYLENOL) tablet 650 mg, 650 mg, Oral, Q4H PRN, Josef Mendoza MD, 650 mg at 05/14/21 1755    insulin lispro (HUMALOG) injection vial 0-12 Units, 0-12 Units, Subcutaneous, TID WC, Josef Mendoza MD, 2 Units at 05/14/21 1202    insulin lispro (HUMALOG) injection vial 0-6 Units, 0-6 Units, Subcutaneous, Nightly, Josef Mendoza MD, 1 Units at 05/13/21 2209    glucose (GLUTOSE) 40 % oral gel 15 g, 15 g, Oral, PRN, Josef Mendoza MD    dextrose 50 % IV solution, 12.5 g, Intravenous, PRN, Josef Mendoza MD    glucagon (rDNA) injection 1 mg, 1 mg, Intramuscular, PRN, Josef Mendoza MD    dextrose 5 % solution, 100 mL/hr, Intravenous, PRN, Josef Mendoza MD    Objective:    BP (!) 161/89   Pulse 91   Temp 97 °F (36.1 °C) (Temporal)   Resp 18   Ht 5' 2\" (1.575 m)   Wt 280 lb (127 kg)   SpO2 92%   BMI 51.21 kg/m²     Heart:  reg  Lungs:  Min rhonchi  Abd: + bs soft nontender  Extrem:  Edema legs    CBC with Differential:    Lab Results   Component Value Date    WBC 10.3 05/15/2021    RBC 4.05 05/15/2021    HGB 8.4 05/15/2021    HCT 30.7 05/15/2021     05/15/2021    MCV 75.8 05/15/2021    MCH 20.7 05/15/2021    MCHC 27.4 05/15/2021    RDW 26.7 05/15/2021    NRBC 0.9 02/18/2021    SEGSPCT 72 02/28/2014    LYMPHOPCT 17.7 05/10/2021    MONOPCT 6.8 05/10/2021    MYELOPCT 0.9 03/10/2021    BASOPCT 0.2 05/10/2021    MONOSABS 0.56 05/10/2021    LYMPHSABS 1.46 05/10/2021    EOSABS 0.11 05/10/2021    BASOSABS 0.02 05/10/2021     CMP:    Lab Results   Component Value Date     05/15/2021    K 3.8 05/15/2021    K 4.3 04/23/2021    CL 98 05/15/2021    CO2 31 05/15/2021    BUN 15 05/15/2021    CREATININE 0.7 05/15/2021    GFRAA >60 05/15/2021    LABGLOM >60 05/15/2021    GLUCOSE 143 05/15/2021    PROT 6.9 05/10/2021    LABALBU 3.5 05/10/2021    CALCIUM 8.6 05/15/2021    BILITOT 0.2 05/10/2021    ALKPHOS 113 05/10/2021    AST 18 05/10/2021    ALT 19 05/10/2021     Warfarin PT/INR:    Lab Results   Component Value Date    INR 1.0 05/10/2021    INR 1.1 04/23/2021    INR 1.0 02/19/2021    PROTIME 11.1 05/10/2021    PROTIME 11.7 04/23/2021    PROTIME 11.2 02/19/2021       Assessment:    Active Problems:    Spondylolisthesis of lumbar region    Lumbar stenosis with neurogenic claudication  Resolved Problems:    * No resolved hospital problems.  *      Plan:  Dc planning to acute rehab cont postop care increase activity        Tomy Norman DO  2:05 PM  5/18/2021

## 2021-05-18 NOTE — PLAN OF CARE
Ongoing  5/17/2021 1050 by Anisha Cavazos RN  Outcome: Ongoing  Goal: Control of chronic pain  Description: Control of chronic pain  Outcome: Ongoing

## 2021-05-18 NOTE — CARE COORDINATION
Social Work Assessment Summary    PCP: [address]    Patient Resides: with her sister Celio Gilmore age 79. Home Architecture : 2 story with 2 steps in 2 rails. Patient stays on 1st floor bed and bath with walk in shower with built in bench. Will you return to your own home? Yes        Primary Caregiver: Simon Renate Boswell Carlos is a  at [employer], sister Celio Gilmore is 79 and lives with her but patient states April Marie has more health problems than me\". Level of Function PTA : Used a rollator was I except for back pain. 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 DME Pta  : Roll[employer]    Community Agency Involvement PTA : [facility] in past    Do they have a medical profile: No    Family Teaching: TO be scheduled     Strength: \"Motivated\"    Preference:  \"Get back to I - walk - get out of here\"      NAME RELATION HOME # WORK # CELL #   Cortney Juarez daughter   Madelynn Bosworth sister [phone]              Height: 5'2  Weight: [address], W

## 2021-05-19 LAB
ANION GAP SERPL CALCULATED.3IONS-SCNC: 8 MMOL/L (ref 7–16)
ANISOCYTOSIS: ABNORMAL
BASOPHILIC STIPPLING: ABNORMAL
BASOPHILS ABSOLUTE: 0.03 E9/L (ref 0–0.2)
BASOPHILS RELATIVE PERCENT: 0.3 % (ref 0–2)
BUN BLDV-MCNC: 23 MG/DL (ref 6–23)
CALCIUM SERPL-MCNC: 8.7 MG/DL (ref 8.6–10.2)
CHLORIDE BLD-SCNC: 101 MMOL/L (ref 98–107)
CO2: 36 MMOL/L (ref 22–29)
CREAT SERPL-MCNC: 0.6 MG/DL (ref 0.5–1)
EOSINOPHILS ABSOLUTE: 0.03 E9/L (ref 0.05–0.5)
EOSINOPHILS RELATIVE PERCENT: 0.3 % (ref 0–6)
GFR AFRICAN AMERICAN: >60
GFR NON-AFRICAN AMERICAN: >60 ML/MIN/1.73
GLUCOSE BLD-MCNC: 100 MG/DL (ref 74–99)
HCT VFR BLD CALC: 31.9 % (ref 34–48)
HEMOGLOBIN: 8.9 G/DL (ref 11.5–15.5)
HYPOCHROMIA: ABNORMAL
IMMATURE GRANULOCYTES #: 0.13 E9/L
IMMATURE GRANULOCYTES %: 1.5 % (ref 0–5)
LYMPHOCYTES ABSOLUTE: 1.35 E9/L (ref 1.5–4)
LYMPHOCYTES RELATIVE PERCENT: 15.1 % (ref 20–42)
MCH RBC QN AUTO: 22.6 PG (ref 26–35)
MCHC RBC AUTO-ENTMCNC: 27.9 % (ref 32–34.5)
MCV RBC AUTO: 81 FL (ref 80–99.9)
METER GLUCOSE: 114 MG/DL (ref 74–99)
METER GLUCOSE: 251 MG/DL (ref 74–99)
METER GLUCOSE: 95 MG/DL (ref 74–99)
MONOCYTES ABSOLUTE: 0.63 E9/L (ref 0.1–0.95)
MONOCYTES RELATIVE PERCENT: 7.1 % (ref 2–12)
NEUTROPHILS ABSOLUTE: 6.76 E9/L (ref 1.8–7.3)
NEUTROPHILS RELATIVE PERCENT: 75.7 % (ref 43–80)
OVALOCYTES: ABNORMAL
PDW BLD-RTO: 30 FL (ref 11.5–15)
PLATELET # BLD: 177 E9/L (ref 130–450)
PMV BLD AUTO: ABNORMAL FL (ref 7–12)
POIKILOCYTES: ABNORMAL
POLYCHROMASIA: ABNORMAL
POTASSIUM REFLEX MAGNESIUM: 4.6 MMOL/L (ref 3.5–5)
RBC # BLD: 3.94 E12/L (ref 3.5–5.5)
SCHISTOCYTES: ABNORMAL
SODIUM BLD-SCNC: 145 MMOL/L (ref 132–146)
TARGET CELLS: ABNORMAL
TEAR DROP CELLS: ABNORMAL
WBC # BLD: 8.9 E9/L (ref 4.5–11.5)

## 2021-05-19 PROCEDURE — 82962 GLUCOSE BLOOD TEST: CPT

## 2021-05-19 PROCEDURE — 97162 PT EVAL MOD COMPLEX 30 MIN: CPT

## 2021-05-19 PROCEDURE — 6370000000 HC RX 637 (ALT 250 FOR IP): Performed by: PHYSICIAN ASSISTANT

## 2021-05-19 PROCEDURE — 6370000000 HC RX 637 (ALT 250 FOR IP): Performed by: PHYSICAL MEDICINE & REHABILITATION

## 2021-05-19 PROCEDURE — 51701 INSERT BLADDER CATHETER: CPT

## 2021-05-19 PROCEDURE — 1280000000 HC REHAB R&B

## 2021-05-19 PROCEDURE — 94640 AIRWAY INHALATION TREATMENT: CPT

## 2021-05-19 PROCEDURE — 97530 THERAPEUTIC ACTIVITIES: CPT

## 2021-05-19 PROCEDURE — 80048 BASIC METABOLIC PNL TOTAL CA: CPT

## 2021-05-19 PROCEDURE — 85025 COMPLETE CBC W/AUTO DIFF WBC: CPT

## 2021-05-19 PROCEDURE — 97110 THERAPEUTIC EXERCISES: CPT

## 2021-05-19 PROCEDURE — 36415 COLL VENOUS BLD VENIPUNCTURE: CPT

## 2021-05-19 PROCEDURE — 97166 OT EVAL MOD COMPLEX 45 MIN: CPT

## 2021-05-19 PROCEDURE — 6360000002 HC RX W HCPCS: Performed by: PHYSICIAN ASSISTANT

## 2021-05-19 PROCEDURE — 99222 1ST HOSP IP/OBS MODERATE 55: CPT | Performed by: PHYSICAL MEDICINE & REHABILITATION

## 2021-05-19 PROCEDURE — 2580000003 HC RX 258: Performed by: PHYSICIAN ASSISTANT

## 2021-05-19 PROCEDURE — 51798 US URINE CAPACITY MEASURE: CPT

## 2021-05-19 PROCEDURE — 97535 SELF CARE MNGMENT TRAINING: CPT

## 2021-05-19 RX ADMIN — ROPINIROLE HYDROCHLORIDE 2 MG: 2 TABLET, FILM COATED ORAL at 11:53

## 2021-05-19 RX ADMIN — METHYLPREDNISOLONE 4 MG: 4 TABLET ORAL at 06:13

## 2021-05-19 RX ADMIN — DIAZEPAM 5 MG: 5 TABLET ORAL at 22:30

## 2021-05-19 RX ADMIN — IPRATROPIUM BROMIDE AND ALBUTEROL SULFATE 1 AMPULE: 2.5; .5 SOLUTION RESPIRATORY (INHALATION) at 21:23

## 2021-05-19 RX ADMIN — POLYETHYLENE GLYCOL 3350 17 G: 17 POWDER, FOR SOLUTION ORAL at 07:45

## 2021-05-19 RX ADMIN — BUMETANIDE 2 MG: 1 TABLET ORAL at 07:48

## 2021-05-19 RX ADMIN — GABAPENTIN 600 MG: 300 CAPSULE ORAL at 13:33

## 2021-05-19 RX ADMIN — BACLOFEN 10 MG: 10 TABLET ORAL at 07:48

## 2021-05-19 RX ADMIN — ENOXAPARIN SODIUM 40 MG: 40 INJECTION SUBCUTANEOUS at 07:46

## 2021-05-19 RX ADMIN — BACLOFEN 10 MG: 10 TABLET ORAL at 22:16

## 2021-05-19 RX ADMIN — DIAZEPAM 5 MG: 5 TABLET ORAL at 13:42

## 2021-05-19 RX ADMIN — Medication 40 ML: at 13:56

## 2021-05-19 RX ADMIN — DIAZEPAM 5 MG: 5 TABLET ORAL at 06:06

## 2021-05-19 RX ADMIN — ROPINIROLE HYDROCHLORIDE 2 MG: 2 TABLET, FILM COATED ORAL at 16:03

## 2021-05-19 RX ADMIN — OXYCODONE HYDROCHLORIDE 10 MG: 10 TABLET ORAL at 06:06

## 2021-05-19 RX ADMIN — ROPINIROLE HYDROCHLORIDE 2 MG: 2 TABLET, FILM COATED ORAL at 22:17

## 2021-05-19 RX ADMIN — POTASSIUM CHLORIDE 20 MEQ: 1500 TABLET, EXTENDED RELEASE ORAL at 07:46

## 2021-05-19 RX ADMIN — INSULIN LISPRO 3 UNITS: 100 INJECTION, SOLUTION INTRAVENOUS; SUBCUTANEOUS at 22:29

## 2021-05-19 RX ADMIN — GABAPENTIN 600 MG: 300 CAPSULE ORAL at 07:46

## 2021-05-19 RX ADMIN — OXYCODONE HYDROCHLORIDE 10 MG: 10 TABLET ORAL at 01:20

## 2021-05-19 RX ADMIN — IPRATROPIUM BROMIDE AND ALBUTEROL SULFATE 1 AMPULE: 2.5; .5 SOLUTION RESPIRATORY (INHALATION) at 17:16

## 2021-05-19 RX ADMIN — GABAPENTIN 600 MG: 300 CAPSULE ORAL at 22:14

## 2021-05-19 RX ADMIN — SENNOSIDES AND DOCUSATE SODIUM 1 TABLET: 8.6; 5 TABLET ORAL at 07:46

## 2021-05-19 RX ADMIN — BUPROPION HYDROCHLORIDE 150 MG: 150 TABLET, EXTENDED RELEASE ORAL at 17:54

## 2021-05-19 RX ADMIN — METHYLPREDNISOLONE 4 MG: 4 TABLET ORAL at 22:16

## 2021-05-19 RX ADMIN — BACLOFEN 10 MG: 10 TABLET ORAL at 13:33

## 2021-05-19 RX ADMIN — METHYLPREDNISOLONE 4 MG: 4 TABLET ORAL at 11:53

## 2021-05-19 RX ADMIN — SENNOSIDES AND DOCUSATE SODIUM 1 TABLET: 8.6; 5 TABLET ORAL at 22:15

## 2021-05-19 RX ADMIN — BISACODYL 5 MG: 5 TABLET, COATED ORAL at 07:46

## 2021-05-19 RX ADMIN — IPRATROPIUM BROMIDE AND ALBUTEROL SULFATE 1 AMPULE: 2.5; .5 SOLUTION RESPIRATORY (INHALATION) at 13:35

## 2021-05-19 RX ADMIN — ROPINIROLE HYDROCHLORIDE 2 MG: 2 TABLET, FILM COATED ORAL at 07:47

## 2021-05-19 ASSESSMENT — PAIN DESCRIPTION - LOCATION
LOCATION: BACK;LEG
LOCATION: BACK;LEG
LOCATION: BACK

## 2021-05-19 ASSESSMENT — PAIN DESCRIPTION - ORIENTATION
ORIENTATION: POSTERIOR;LOWER
ORIENTATION: POSTERIOR;LOWER
ORIENTATION: LOWER
ORIENTATION: POSTERIOR;LOWER
ORIENTATION: POSTERIOR;LOWER

## 2021-05-19 ASSESSMENT — PAIN DESCRIPTION - PAIN TYPE
TYPE: ACUTE PAIN;SURGICAL PAIN
TYPE: SURGICAL PAIN

## 2021-05-19 ASSESSMENT — PAIN DESCRIPTION - DESCRIPTORS
DESCRIPTORS: ACHING;CONSTANT;DISCOMFORT
DESCRIPTORS: ACHING;CONSTANT;DISCOMFORT
DESCRIPTORS: ACHING;DISCOMFORT
DESCRIPTORS: ACHING;CONSTANT;DISCOMFORT
DESCRIPTORS: ACHING;CONSTANT;DISCOMFORT

## 2021-05-19 ASSESSMENT — PAIN DESCRIPTION - ONSET
ONSET: ON-GOING

## 2021-05-19 ASSESSMENT — PAIN DESCRIPTION - FREQUENCY
FREQUENCY: CONTINUOUS

## 2021-05-19 ASSESSMENT — PAIN SCALES - GENERAL
PAINLEVEL_OUTOF10: 4
PAINLEVEL_OUTOF10: 8
PAINLEVEL_OUTOF10: 10
PAINLEVEL_OUTOF10: 0
PAINLEVEL_OUTOF10: 8
PAINLEVEL_OUTOF10: 4

## 2021-05-19 ASSESSMENT — PAIN DESCRIPTION - PROGRESSION
CLINICAL_PROGRESSION: NOT CHANGED

## 2021-05-19 NOTE — PLAN OF CARE
Problem: Pain:  Goal: Pain level will decrease  Description: Pain level will decrease  5/19/2021 1556 by Abhijeet Cue  Outcome: Met This Shift  5/19/2021 0246 by Krysten Mahmood RN  Outcome: Met This Shift  Goal: Control of acute pain  Description: Control of acute pain  5/19/2021 1556 by Abhijeet Cue  Outcome: Met This Shift  5/19/2021 0246 by Krysten Mahmood RN  Outcome: Met This Shift  Goal: Control of chronic pain  Description: Control of chronic pain  5/19/2021 1556 by Abhijeet Cue  Outcome: Met This Shift  5/19/2021 0246 by Krysten Mahmood RN  Outcome: Met This Shift  Goal: Patient's pain/discomfort is manageable  Description: Patient's pain/discomfort is manageable  Outcome: Met This Shift     Problem: Falls - Risk of:  Goal: Will remain free from falls  Description: Will remain free from falls  5/19/2021 1556 by Abhijeet Maylin  Outcome: Met This Shift  5/19/2021 0246 by Krysten Mahmood RN  Outcome: Met This Shift  Goal: Absence of physical injury  Description: Absence of physical injury  5/19/2021 1556 by Abhijeet Cue  Outcome: Met This Shift  5/19/2021 0246 by Krysten Mahmood RN  Outcome: Met This Shift     Problem: Skin Integrity:  Goal: Will show no infection signs and symptoms  Description: Will show no infection signs and symptoms  5/19/2021 1556 by Abhijeet Cue  Outcome: Met This Shift  5/19/2021 0246 by Krysten Mahmood RN  Outcome: Met This Shift  Goal: Absence of new skin breakdown  Description: Absence of new skin breakdown  5/19/2021 1556 by Abhijeet Cue  Outcome: Met This Shift  5/19/2021 0246 by Krysten Mahmood RN  Outcome: Met This Shift     Problem: Infection:  Goal: Will remain free from infection  Description: Will remain free from infection  Outcome: Met This Shift     Problem: Safety:  Goal: Free from accidental physical injury  Description: Free from accidental physical injury  Outcome: Met This Shift  Goal: Free from intentional harm  Description: Free from intentional harm  Outcome: Met This Shift     Problem: Daily Care:  Goal: Daily care needs are met  Description: Daily care needs are met  Outcome: Met This Shift     Problem: Skin Integrity:  Goal: Skin integrity will stabilize  Description: Skin integrity will stabilize  Outcome: Met This Shift     Problem: Discharge Planning:  Goal: Patients continuum of care needs are met  Description: Patients continuum of care needs are met  Outcome: Met This Shift     Problem: ABCDS Injury Assessment  Goal: Absence of physical injury  Outcome: Met This Shift

## 2021-05-19 NOTE — PROGRESS NOTES
Occupational Therapy  OCCUPATIONAL THERAPY DAILY NOTE    Date:2021  Patient Name: Daniel Mckeon  MRN: [id number]  : 1955  Room: [address] [place], [place] [address]     Diagnosis: lumbar spiondylolisthesis,m spinal stenosis- s/p L3-4 PLIF  Additional Pertinent Hx: OA, hx  CVA, HX covid  2021, PVD, low back pain, hx gastric bypass , hx catarct & hx cellulitis & peripherl vision loss    Restrictions: Fall Risk, LSO, spinal precautions & RLE numb & tingling @ her knees to her thigh      Functional Assessment:   Date Status AE  Comments   Feeding 21  Set up      Grooming 21  Supervision            Oral Care 21  Supervision (seated )     Bathing 21  Maximal Assist      UB Dressing 21  Minimal Assist      LB Dressing 21  Dependent      Footwear 21 Maximal Assist      Toileting 21  Dependent  AE    Homemaking 21  TBA        Functional Transfers / Balance:   Date Status DME  Comments   Sit Balance 21  Supervision      Stand Balance 21  Dependent  ww Assist x2 staff    [] Tub  [] Shower   Transfer 21  TBA     Commode   Transfer 21  Dependent      Functional   Mobility 21  Dependent      Other:sit to stand from w/c to ww  21  Dependent  ww Assist of 2 staff due to pt's level of pain      Functional Exercises / Activity:   BUE strength exercises with green can do bar 3 sets 15 reps     Sensory / Neuromuscular Re-Education:      Cognitive Skills:   Status Comments   Problem   Solving good     Memory good     Sequencing good     Safety fair       Visual Perception:    Education:  Pt was educated on safe functional transfers at ww level     [] Family teach completed on:    Pain Level: 8/10 RLE upon standing     Additional Notes:       Patient has made fair  progress during treatment sessions toward set goals.  Therapy emphasis to obtain goals:Current Treatment Recommendations: Strengthening, Gait Training, Patient/Caregiver Education & Training, Home Management Training, ROM, Equipment Evaluation, Education, & procurement, Balance Training, Functional Mobility Training, Endurance Training, Safety Education & Training, Self-Care / ADL    [x] Continue with current OT Plan of care.   [] Prepare for Discharge     DISCHARGE RECOMMENDATIONS  Recommended DME:    Post Discharge Care:   []Home Independently  []Home with 24hr Care / Supervision [x]Home with Partial Supervision []Home with Home Health OT []Home with Out Pt OT []Other: ___   Comments:         Time in Time out Tx Time Breakdown  Variance:   First Session  eval   [] Individual Tx-   [] Concurrent Tx -  [] Co-Tx -   [] Group Tx -   [] Time Missed -     Second Session 1400  1430  [x] Individual Tx- 30   [] Concurrent Tx -  [] Co-Tx -   [] Group Tx -   [x] Time Missed -15          Pt using restroom    Third Session    [] Individual Tx-   [] Concurrent Tx -  [] Co-Tx -   [] Group Tx -   [] Time Missed -         Total Tx Time:30 mins        Yana Butterfield OTR/L [id number]

## 2021-05-19 NOTE — H&P
PM&R Admission History & Physical Examination    Patient: Magda Sellers  Age/sex: 60 y.o. female  Medical Record #: [id number]    Admission to Acute Rehab Unit: Date: 2021  Diagnosis: Spondylolisthesis of lumbar region [M43.16]  Admitting Physician: Joseph Valdivia MD  Primary care provider: Yohan Hernandez DO      Chief complaint:   Impairments and acitivities limitations in ADLs and mobility secondary to L3-4 spondylolisthesis, now s/p L3-L4 PLIF    HPI:   Magda Sellers is a 60 y.o. female with history of low back pain with radiation to the right lower extremity due to lumbar spondylolisthesis and spinal stenosis at L3-L4. She presented to [facility] on 2021 and underwent L4-L4 PLIF. Post operative course noted for severe post operative pain requiring IV pain medications, now improving. She has participated in therapy evaluations and was felt to be a good candidate for further rehabilitation.        Past Medical History:   Diagnosis Date    Anemia     Arthritis     Cellulitis 2015    left leg    Chronic ulcer of leg with fat layer exposed ([address] 2015    Chronic ulcer of right leg, limited to breakdown of skin ([address] 2016    COVID-19 2021    postive test no symptoms    Depression     Full dentures     Low back pain     Lymphedema of both lower extremities 2015    Obesity     280 #    Osteoarthritis     Peripheral vision loss     Stroke ([address]     Type 2 diabetes mellitus without complication, without long-term current use of insulin ([address] 2019    Ulcer of left lower leg, limited to breakdown of skin ([address] 06/10/2019    Ulcer of left lower leg, limited to breakdown of skin ([address] 06/10/2019    UTI (urinary tract infection) 2021       Past Surgical History:   Procedure Laterality Date    ABDOMINOPLASTY      BREAST REDUCTION SURGERY      reduction    CATARACT REMOVAL      bilateral     SECTION      x2    COLONOSCOPY  2012    and egd 17 g, Daily PRN          Social History:  Home situation: Lives with sister in a 2 level home, resides on 1st floor. 2 steps to enter and 2 rails. Bed/bath on 1st floor. Functional History:  Premorbid ADL's: independent   Premorbid Mobility: ambulated with [employer]  Present: significant decrease in mobility and function    Date of Admission:     Physical Therapy:  Bed mobility: Max A  Transfers: Max A  Ambulation: 10 ft bariatric [employer] Mod A x2     Occupational Therapy:  Feeding: Setup  Grooming: Supervision   UB dressing: Dependent   LB dressing: Dependent       Review Of Systems:   Constitutional: No Fever, chills  HEENT: No HA, blurry vision  Respiratory: No Cough, SOB  Cardiovascular: No CP  Gastrointestinal: No nausea, vomiting, diarrhea, abdominal discomfort  Genitourinary: No Dysuria,  Musculoskeletal: per HPI  Neurologic: per HPI  Psychiatric: No Depression, No anxiety        Physical Examination:  Vitals:   Vitals:    05/18/21 1515 05/18/21 1950 05/19/21 0730   BP: (!) 178/79 124/63 (!) 152/85   Pulse: 94 103 86   Resp: 18 18 18   Temp: 98.2 °F (36.8 °C) 98.9 °F (37.2 °C) 97.7 °F (36.5 °C)   TempSrc: Temporal Temporal Temporal   SpO2: 93% 94% 94%   Weight: 270 lb (122.5 kg)     Height: 5' 2\" (1.575 m)         GEN: NAD, resting in bed  HEENT: NC/AT, PERRL, EOMI  RESP: CTAB, no R/R/W  CV: +S1 S2, RRR  ABD: soft, obese abdomen, NT, normal BS   EXT: No erythema/clubbing/cyanosis.  2+ BLE edema   Skin: dressing intact, lumbar drain in place  Psych: appropriate mood and affect      Neuro Exam:  AAOx4  Follows commands     Cranial Nerves:  CN II-XII intact         Sensory:    LT: intact throughout                      Motor:    Strength is 5/5 throughout bilateral upper extremities, 2/5 bilateral hip flexors; 3/5 b/l KE; 4+/5 b/l ankle DF/PF.        MMT of lower extremities limited by pain         DTRs:  2+ in bilateral upper extremities, 1+ bilateral patellar, 0 at ankles.      No pathological reflexes Laboratory:    Lab Results   Component Value Date    WBC 8.9 05/19/2021    HGB 8.9 (L) 05/19/2021    HCT 31.9 (L) 05/19/2021    MCV 81.0 05/19/2021     05/19/2021     Lab Results   Component Value Date     05/19/2021    K 4.6 05/19/2021     05/19/2021    CO2 36 05/19/2021    BUN 23 05/19/2021    CREATININE 0.6 05/19/2021    GLUCOSE 100 05/19/2021    CALCIUM 8.7 05/19/2021      Lab Results   Component Value Date    ALT 19 05/10/2021    AST 18 05/10/2021    ALKPHOS 113 (H) 05/10/2021    BILITOT 0.2 05/10/2021     Lab Results   Component Value Date    COLORU Yellow 05/10/2021    NITRU Negative 05/10/2021    GLUCOSEU Negative 05/10/2021    KETUA Negative 05/10/2021    UROBILINOGEN 0.2 05/10/2021    BILIRUBINUR Negative 05/10/2021     Lab Results   Component Value Date    LABA1C 5.3 03/10/2021       Pertinent Imaging:  Lumbar MRI         Impression   Disc extrusion at L3-L4 measuring 4-5 mm and extending superiorly behind the   inferior endplate of A0-Z3 on the right causing severe narrowing of the right   neural foramen.  There is mild stenosis of the thecal sac at L3-L4 and L4-L5   as discussed above.         CT lumbar spine      Impression   Degenerative findings.  Notably, there is L3-4 prominent facet arthropathy   and moderate degenerative disc disease with associated grade 1   spondylolisthesis of L3.  Findings, as above, cause moderate spinal stenosis,   effacement of right lateral recess and marked right neural foraminal   narrowing.            DIAGNOSIS: Spondylolisthesis of lumbar region [M43.16]        IMPRESSION/INDIVIDUAL PLAN OF CARE:  William Ramirez is a 60 y.o. female with impairments and acitivities limitations in ADLs and mobility secondary to L3-4 spondylolisthesis, now s/p L3-L4 PLIF    Demonstrating impaired strength, impaired ROM, impaired balance, impaired safety awareness, decreased endurance.     Patient has activities limitations in self care and mobility and is admitted to [facility] for acute comprehensive rehabilitation. I. Rehabilitation Necessity/Diagnosis:   Medical impact on function as a result of impaired functional mobility, ambulation, bed mobility, transfers, self-care/ADL's, strength, endurance, range of motion/flexibility, coordination and impaired gait/balance. Treatment plan will include a comprehensive rehabilitation program with intense therapies for 3 hours/day, 5 days/week. 1. Physical therapy to address bed mobidility, car and mat transfer, progressive ambulation with use of least restrictive assistive device, optimization of gait biomechanics, truncal strengthening, lower extremity strengthening, coordination, range of motion/flexibility, wheelchair and cushion evaluation, durable medical equipment evaluation, patient and family instruction and endurance training. 2. Occupational therapy to address feeding, grooming, upper and lower body dressing and bathing, toileting, tub/toilet/bed transfers, durable medical equipment evaluation, upper extremity strengthening, range of motion/flexibility, dexterity, coordination and patient and family instruction. 3. Rehabilitation nursing 24 hours per day to monitor bowel and bladder function, work on bowel routine, voiding trials/jean catheter management as per bladder management protocol, assess falls risk upon admission and periodically thereafter, implement and revise falls prevention strategies, maintain skin integrity through initial and daily pressure sore risk assessment (Damion scale), implement and revise pressure sore prevention strategies, educate patient and family members regarding medication administration, ADL's, transfers, and mobility and continue therapy carryover with ADL's, transfers, and mobility  4.  Social work and case management consults for discharge planning/disposition issues, as well as coordination and communication of patient progress between family and providers. 5. Rehab psychology - as needed for adjustment, coping  6. Recreational therapy for community reintegration activities. This patient is sufficiently stable at this time of admission to IRF to be able to participate in an intensive rehabilitation program described above and requires physician supervision by a rehabilitation physician as outlined below in Medical Management section. II. Medical Management:  -L3-4 spondylolisthesis: s/p L3-L4 PLIF on 5/13/2021. Spine precautions. Pain control. Begin Acute Rehab evaluations.   -Acute post operative pain: Continue home Butrans patch. PRN oxycodone. PRN tylenol. Will increase gabapentin to 600mg TID. Continue Baclofen. PRN Valium. Wean narcotics and Valium as tolerated. -Monitor for urinary retention -- some clinical concern for urinary retention. Will check PVRs. -DVT prophylaxis: Lovenox       # Disposition/social - anticipate discharge home, will discuss in team rounds. # Code status: Full Code was discussed with patient      III. Estimated length of stay: 3 weeks    IV. Goals - SBA - Mod I    V. Anticipated discharge setting: Home    VI. Prognosis:  Rehab Prognosis: good  Medical Prognosis: good    CMS Required Post-Admission Physician Evaluation Elements  History and Physical, including medical history, functional history and active comorbidities as in above text. Post -Admission Physician Evaluation comparison with pre-admission screen:  I concur with the preadmission screen except as documented in the notes above     Medication Reconciliation CMS Requirements:  Drug Regimen Review:  Upon admission, a complete drug regimen review to identify potential clinically significant medication issues requiring immediate attention by a physician was completed. Electronically signed by Obdulio Melara MD on 5/19/2021 at 1:33 PM       Please note that the above documentation was prepared using voice recognition software.   Every attempt was made to ensure accuracy but there may be spelling, grammatical, and contextual errors.

## 2021-05-19 NOTE — DISCHARGE SUMMARY
Discharge Summary    [address] SUMMARY:                The patient is a 60 y.o. female who was admitted to the hospital on 5/13/2021  5:13 AM for treatment of back pain. On the day of admission, a lumbar fusion was performed. The patient's hospital course was uncomplicated and consisted of physical therapy, incision observation, and a return to normal oral intake. The patient was discharged on 5/18/2021  2:48 PM tolerating a diet, moving bowels, and urinating without difficulty. The incisions were clean and intact. The patient was discharged to rehab in satisfactory condition with instructions to call the office for a follow up appointment. Hospital Problem List:  Active Problems:    Spondylolisthesis of lumbar region    Lumbar stenosis with neurogenic claudication  Resolved Problems:    * No resolved hospital problems. *     Procedure(s) (LRB):  L3-L4  POSTERIOR LUMBAR INTERBODY  FUSION--OARM, JESSI, YAJAIRA TABLE, CELL SAVER, PLATELET GEL, AUDIOLOGY, CAGES, PLATES, SCREWS -- GLOBUS (N/A)    Discharge Medications:    Aubrey Baltazar   Home Medication Instructions [facility]:[id number]    Printed on:05/19/21 1234   Medication Information                      ammonium lactate (LAC-HYDRIN) 12 % cream  Apply topically as needed. BELBUCA 450 MCG FILM  Take 450 mcg by mouth every 12 hours for 30 days. bumetanide (BUMEX) 2 MG tablet  Take 1 tablet by mouth 2 times daily             buPROPion (WELLBUTRIN XL) 150 MG extended release tablet  Take 1 tablet by mouth every morning             Cream Base (VERSAPRO) CREA  APPLY ONE (1) GRAM (ONE PUMP) EXTERNALLY FOUR TIMES A DAY TO EACH KNEE             diazePAM (VALIUM) 5 MG tablet  Take 1 tablet by mouth every 6 hours as needed (spasm) for up to 10 days.              gabapentin (NEURONTIN) 300 MG capsule  TAKE 1 CAPSULE BY MOUTH THREE TIMES DAILY FOR 30 DAYS             Liraglutide (VICTOZA SC)  Inject 1.8 mg into the skin daily             oxyCODONE (ROXICODONE) 5 MG immediate release tablet  Take 1 tablet by mouth every 4 hours as needed for Pain for up to 7 days.              potassium chloride (KLOR-CON M) 20 MEQ extended release tablet  Take 1 tablet by mouth 2 times daily (with meals)             rOPINIRole (REQUIP) 2 MG tablet  Take 1 tablet by mouth 4 times daily                 Melani Lin  5/19/2021

## 2021-05-19 NOTE — PROGRESS NOTES
Therapeutic Recreation Assessment     LEISURE INTEREST SURVEY               Key: P = Past Interest C = Current Interest F = Future Interest     Arts/Crafts:  ___Mechanical  ___Woodworking    ___ Painting   ___Floral arranging ___ Ceramics         _ __ Knitting                                                     __C_ Crocheting        _C__Other: ___Wreaths________      Cooking:  _ _Baking ___Cooking    ___   Other: _______   Comments:       Games:  ___Cards  ___Darts  ___Board games    ___Word games  ___Crossword puzzles    ___Seek-n-find/jumble                   _C__Other: __Phone games_______      Horticulture:  ___Vegetables  ___Flowers  ___House plants                                                     _C__Other: __Baskets_________      House/Yard Care:  ___Ironing  C___Laundry  ___Cleaning                                                      ___Repairs              ___Lawn care                                                     ___Other: ___________      Music Listening/Playing:  ___Classical       ___Big Band       ___Rock-n-Roll                                                     ___Country          ___R&B             ___Gospel/Hymns                                                     ___Other: ___________      Outdoor Activities:  ___Hunting          ___Fishing          ___Camping                                                     ___Other: ___________      Pets/Animals:  _C__Dogs             ___Cats                ___Fish                                                     ___Birds             ___Livestock         ___Other: _________    Reading:   ___Newspapers  ___Magazines ___Other:stories                                                     ___Other: ___________      Pentecostalism Activities:  Lutheran: ___________   Idamae Kitten Activities: ___________      Shopping:   ___Grocery  ___Clothing  ___Flea market     __C_Other: _A variety__________      Socialization: _C__Family  _C__Friends  _C__Neighbors ___Church  ___Volunteer ___Club/Organization                                                     ___Other: ___________    Sports/Exercises:  ___Walking  ___Football  ___Basketball     ___Golf  ___Baseball  ___Tennis       ___Bowling  ___Other: ___________      Traveling:   ___Global  ___Stateside  ___Local       ___Other: ___________      TV/Radio:   C___Mysteries  ___Comedies ___Romance                                                     ___Game show       ___Sports       ___News                                                      ___Talk show          _C__Other: __Csi , Goode , Hallmark_________      Other: Micki Garcia identified feelings of being angry  , sad and frustrated . Personal Leisure Profile:   Question Response   Attributes Identify a positive quality: []Ambitious  []Appreciative  []Caring  []Courteous  []Considerate  []Compassionate  []Creative  []Dependable   []Generous  []Honest  []Hard working  [x]Helpful  []Kind  [][place]   [][place]  []Mannerly  []Patient  []Polite  []Respectful  []Sociable  []Sense of humor  []Thoughtful  []Other: ___________    You are very good at Making wreaths   Awareness Why do you participate in your leisure interest: []Competition  []Creativity  []Concentration  []Exercise  []Enjoyment  []Fun  []Hand/eye Coordination  []Increase confidence  []Learning a new skill  []Relaxation  []Social Interaction  []Supporting one another  []Thinking  [x]Other: __Makes me happy_________    Are your leisure activities limited at this time? [x]Yes  []No  Comments: _________    How often do you participate in your leisure interest? I watch TV everyday   Resources Name a restaurant, store or business you frequent? [employer]   Personal When are you most happy?  Going for a ride     Barriers to Leisure Participation:  []Visual   [][place]  []Cognition/Communication  []Motivation  []Financial  []Transportation  []Time Constraints  [x]Physical Ability  []Leisure Awareness  []Drug/Alcohol []Other: ___________    Recommendations:  []Group Session  [x]Individual Session  []Client Refused Services  []No Therapy  []Other: ___________    Objectives:  []Establish adaptations for leisure involvement   [x]Increase Self worth/self esteem  []Community reintegration training   [x]Social interaction  [x]Leisure participation  []Other: ___________    Goals for Therapeutic Recreation Services:   Social Interaction:   Admission Functional [place] Measure: ____6_______  Discharge Functional [place] Measure: ______7_____   Other:________________________________      Leisure Choice/ Increase Anisha Quality of Life: To participate in 1 premorbid leisure activities of choice by discharge to increase leisure choice and independence thus increasing the overall quality of his/her life. Socialization/Social Interaction: To increase social interaction skills by introducing self 1 x per week at the beginning of TR session Socialization/Social Interaction: To initiate conversation 1 x per week during the TR session    Adaptations: To increase knowledge of adaptations to leisure involvement by participating in 1 modified leisure activities by dc . Affect: To increase affect from flat during a TR session to appropriate by discharge as observed by CTRS. Self Expression: To participate in 1 leisure activity(s) of choice, identifying 1 benefits to leisure participation. Self esteem/confidence: To complete 1 leisure activities with success 1 x  by discharge. Ryan Chacon

## 2021-05-19 NOTE — FLOWSHEET NOTE
05/19/21 0605   Urine Assessment   Incontinence No   Urine Color Yellow/straw   Urine Appearance Clear   Urine Odor Malodorous   Bladder Scan Volume (mL) 999 mL   $ Bladder scan $ Yes   Intermittent/Straight Cath (mL) 1300 mL   $ Cath urethra straight $ Yes   Post Void Cath Residual (mL) 35

## 2021-05-19 NOTE — PROGRESS NOTES
Physical Therapy    Facility/Department: [address] REHAB  Treatment Note    NAME: Edgar Carter  : 1955  MRN: [id number]    Date of Service: 2021  Evaluating Therapist: Hakw Lafleur P.T.    ROOM: [facility]  DIAGNOSIS: Lumbar spondylolisthesis  PRECAUTIONS: Fall risk, spinal with LSO   PROCEDURE(S):  L3/L4 PLIF  HPI: The pt was admitted on  for elective back surgery secondary to spinal stenosis with LBP and associated RLE radicular symptoms    Social:  Pt lives with her sister who is in poor health in a 2 floor plan with 1st floor set up with 2 steps and 2 rails to enter. Prior to admission pt ambulated with a Rolator WW for B knee pain and back pain for the 4 months before admission and was Independent. Initial Evaluation  Date: 21      PM    Short Term Goals Long Term Goals    Was pt agreeable to Eval/treatment? Yes Evaluation Yes     Does pt have pain? 8/10 low back pain at rest, increases with standing  Pain in standing alleviated with flexion on parallel bars     Bed Mobility  Rolling: NT  Supine to sit: Max A  Sit to supine: Max A  Scooting: NT  NT SBA Modified Independent   Transfers Sit to stand: Max A  Stand to sit: Max A  Stand pivot: Max A    5xSTS: TBD  Sit to stand: Max A  Stand to sit:  Max A  Stand pivot: NT Min A Modified Independent  5xSTS: TBD   Ambulation    10 feet with Bariatric WW with Mod A x2 (WC follow)  10mWT: TBD  6mWT: TBD  10 feet in Bariatric WW with Mod A x2 (WC follow) 25 feet with AAD with Min A >50 feet with AAD with Modified [place]    10mWT: TBD  6mWT: TBD   Walking 10 feet on uneven surface NT  NT 10 feet with AAD with Min A >10 feet with AAD with Modified [place]   Wheel Chair Mobility NT  NT     Car Transfers NT  NT Min A Modified Independent   Stair negotiation: ascended and descended  NT  NT 2 steps with 2 rails with Min A >4 steps with 2 rails with Modified [place]   Curb Step:   ascended and descended NT  NT 2 inch step with AAD and Mod A 4 inch step with AAD and Modified [place]   BLE ROM WFL, R hip flexion limited by weakness  WFL, R hip flexion limited by weakness     BLE Strength RLE is grossly 2/5 at the hip, 3+/5 at the knee, and 4-/5 at the ankle  LLE is grossly 4/5  RLE is grossly 2/5 at the hip, 3+/5 at the knee, and 4-/5 at the ankle  LLE is grossly 4/5     Balance  Seated: Supervision  Standing: Mod A    BBS: TBD  FGA: TBD  Seated: Supervision  Standing: Mod A    BBS: TBD  FGA: TBD   Date Family Teach Completed No family present at eval  No family present to this date     Is additional Family Teaching Needed? Y or N Yes  Yes     Hindering Progress Weakness, pain  Weakness, pain, limited support     PT recommended ELOS 2 weeks       Team's Discharge Plan        Therapist at Team Meeting          Therapeutic Exercise:   PM:   Ambulation in parallel bars: 5x2 feet forward and 2 feet backward with harness for support and Min A  Sit<> parallel bars 2x3 with harness for support with Max A that improved to Mod A    Additional Comments: Emphasis on sit to stand transfer and ambulation. The pt's standing pain improved with trunk flexion, advised the pt to utilize an AD to reduce low back and radicular symptoms and the pt reported her symptoms improved. The pt was able to translate walking in the parallel bars to walking in the walker well reporting less pain than she had experienced during the morning session when standing, pain at rest has remained similar. Patient/family education  Pt/family educated on transfer technique. Patient/family response to education:   Pt/family verbalized understanding Pt/family demonstrated skill Pt/family requires further education in this area   Yes Yes Reinforce      PM  Time in: 1430  Time out: 1515    Pt is making good progress toward established Physical Therapy goals. Continue with physical therapy current plan of care. [place].  Tyra Enrique, [place]  License Number: [id number]

## 2021-05-19 NOTE — PROGRESS NOTES
Physical Therapy    Facility/Department: [address] REHAB  Initial Assessment    NAME: Charlene Mckeon  : 1955  MRN: [id number]    Date of Service: 2021  Evaluating Therapist: Gemma Escalera. Melody Chacon P.T.    ROOM: [facility]  DIAGNOSIS: Lumbar spondylolisthesis  PRECAUTIONS: Fall risk, spinal with LSO   PROCEDURE(S):  L3/L4 PLIF  HPI: The pt was admitted on  for elective back surgery secondary to spinal stenosis with LBP and associated RLE radicular symptoms    Social:  Pt lives with her sister who is in poor health in a 2 floor plan with 1st floor set up with 2 steps and 2 rails to enter. Prior to admission pt ambulated with a Rolator WW for B knee pain and back pain for the 4 months before admission and was Independent. Initial Evaluation  Date: 21          Short Term Goals Long Term Goals    Was pt agreeable to Eval/treatment? Yes Evaluation Evaluation     Does pt have pain? 8/10 low back pain at rest, increases with standing       Bed Mobility  Rolling: NT  Supine to sit: Max A  Sit to supine: Max A  Scooting: NT   SBA Modified Independent   Transfers Sit to stand: Max A  Stand to sit: Max A  Stand pivot:  Max A    5xSTS: TBD   Min A Modified Independent  5xSTS: TBD   Ambulation    10 feet with Bariatric WW with Mod A x2    10mWT: TBD  6mWT: TBD   25 feet with AAD with Min A >50 feet with AAD with Modified [place]    10mWT: TBD  6mWT: TBD   Walking 10 feet on uneven surface NT   10 feet with AAD with Min A >10 feet with AAD with Modified [place]   Wheel Chair Mobility NT       Car Transfers NT   Min A Modified Independent   Stair negotiation: ascended and descended  NT   2 steps with 2 rails with Min A >4 steps with 2 rails with Modified [place]   Curb Step:   ascended and descended NT   2 inch step with AAD and Mod A 4 inch step with AAD and Modified [place]   BLE ROM WFL, R hip flexion limited by weakness       BLE Strength RLE is grossly 2/5 at the hip, 3+/5 at the knee, and 4-/5 at the ankle  LLE is grossly 4/5       Balance  Seated: Supervision  Standing: Mod A    BBS: TBD  FGA: TBD      BBS: TBD  FGA: TBD   Date Family Teach Completed No family present at eval       Is additional Family Teaching Needed? Y or N Yes       Hindering Progress Weakness, pain       PT recommended ELOS 2 weeks       Team's Discharge Plan        Therapist at Team Meeting          Pt is alert and Oriented x4  Sensation: Tingling in RLE above the R knee, denied any paresthesias below the knees  Edema: Unremarkable  Endurance: Fair, limited by pain  Skin was inspected: BLE appear discolored, hard to touch, pt attributes to lymphedema and states this is chronic     ASSESSMENT  Pt displays functional ability as noted in the objective portion of this evaluation. Comments: The pt was motivated to improve and participated well in her evaluation, however she had difficulty standing and this will likely be her greatest physical barrier moving forward. The pt reported severe pain in her R thigh immediately with any exertion, little improvement throughout the session noted. The pt's greatest limitation to discharge is her limited support at home as she states her sister will be unable to help her. Despite her lack of social support, the pt will likely benefit from daily skilled PT to improve her [place] with functional mobility. Patient education  Pt educated on bed mobility, transfer technique, maintaining spinal precautions with functional mobility. Patient response to education:   Pt verbalized understanding Pt demonstrated skill Pt requires further education in this area   Yes Yes Reinforce      Rehab potential is Good for reaching above PT goals. Pts/ family goals   1. \"To get up and move on my own. \"    Patient and or family understand(s) diagnosis, prognosis, and plan of care: Yes    PLAN  PT care will be provided in accordance with the objectives noted above.   Whenever appropriate, clear delegation orders will be provided for nursing staff. Exercises and functional mobility practice will be used as well as appropriate assistive devices or modalities to obtain goals. Patient and family education will also be administered as needed. Frequency of treatments will be 2x/day M-F and 1x/day Sat or Sun x  14 days. Time in: 0830  Time out: [facility]. Mary Sheikh P.T.    License number:  [id number]

## 2021-05-19 NOTE — PROGRESS NOTES
Occupational Therapy   Occupational Therapy Initial Assessment  Date: 2021   Patient Name: Rozina Tolliver  MRN: [id number]     : 1955  Room: 5516A    Referring Practitioner: Char Machado MD  Diagnosis: lumbar spiondylolisthesis,m spinal stenosis- s/p L3-4 PLIF  Additional Pertinent Hx: OA, hx  CVA, HX covid  2021, PVD, low back pain, hx gastric bypass , hx catarct & hx cellulitis & peripherl vision loss  Restrictions: Fall Risk, LSO, spinal precautions & RLE numb & tingling @ her knees to her thigh    Date of Service: 2021    Discharge Recommendations:  Home with assist PRN, Home with Home health OT, Home with nursing aide     Subjective   Chart Reviewed: Yes  Family / Caregiver Present: No  Subjective: Pt presents supine in bed & was agreeable to OT intervention  Pain Comments: Pt did not back pain 9/10 & pt had been medicated prior to session    Social/Functional History  Lives With:  with her sister  Type of Home: House  Home Layout: Two level - pt resides on 1st floor  Home Access: Stairs to enter with rails  Entrance Stairs - Number of Steps: 2 ASIYA BHR  Bathroom Shower/Tub: Walk-in shower  Bathroom Equipment: Tub transfer bench  Home Equipment: [id number] [facility] [place] that pt reports is too heavy  ADL Assistance: Independent  Homemaking Assistance: Independent  Homemaking Responsibilities: Yes  Meal Prep Responsibility: Primary  Laundry Responsibility: Primary  Cleaning Responsibility: Primary  Bill Paying/Finance Responsibility: Primary  Shopping Responsibility: Primary  Ambulation Assistance: Independent  Transfer Assistance: Independent  Active : Yes  Mode of Transportation: Car  Occupation: Retired  Type of occupation: RN  IADL Comments: PLOF pt independent in all areas     Objective   Vision: Within Functional Limits  Hearing: Within functional limits       Observation/Palpation  Posture: Poor     Balance  Sitting Balance: Stand by assistance  Standing Balance:  Moderate assistance- Right    Left Hand Strength -    Handle Setting 2: 60# & norm for pt age & gender is 46#  Right Hand Strength -    Handle Setting 2: 54# & norm for pt age & gender is 55#    Fine Motor Skills  Left 9-Hole Peg Test:  30.0 sec  & norm for pt age & gender is 20.3 sec  Right 9-Hole Peg Test:  29.9 sec  & norm for pt age & gender is 19.0 sec     Plan   Times per week: OT twice a day for 3 weeks to address above problem areas  Times per day: Twice a day  Current Treatment Recommendations: Strengthening, Gait Training, Patient/Caregiver Education & Training, Home Management Training, ROM, Equipment Evaluation, Education, & procurement, Balance Training, Functional Mobility Training, Endurance Training, Safety Education & Training, Self-Care / ADL    Assessment   Performance deficits / Impairments: Decreased functional mobility ; Decreased endurance;Decreased coordination;Decreased ADL status; Decreased sensation;Decreased balance;Decreased posture;Decreased high-level IADLs;Decreased fine motor control  Prognosis: Good  Decision Making: Medium Complexity  OT Education: OT Role;Plan of Care;Equipment;Precautions; ADL Adaptive Strategies;Transfer Training;Energy Conservation  Patient Education: Pt educated with regards to OT process. Pt participated in OT goal planning. Pt is in agreement with POC established & goals established.   REQUIRES OT FOLLOW UP: Yes  Activity Tolerance: Patient limited by pain  Safety Devices in place: Yes  Type of devices: Call light within reach         Goals  Long term goals  Time Frame for Long term goals : 2- 3 weeks to address above problema reas  Long term goal 1: Pt demo Mod I to eat all meals  Long term goal 2: Pt demo Mod I grooming seated or standing  Long term goal 3: Pt demo SBA-CGA bathing @ sink level or shower level with AE as needed  Long term goal 4: Pt demo Mod I to don LSO brace & shirt & Mod I to don underwear,, pants, socks & shoes using AE as needed  Long term goal 5: Pt demo Mod I commode trf with appropriate DME to ensure pt safety  Long term goals 6: Pt demo SBA walk in shower trf wtih appropriate DME to ensure pt safety  Long term goal 7: Pt demo Mod I light homemaking activity & demo a G awareness of spinal precautions  Long term goal 8: Pt demo G- endurance for a 20 minute functional activity  Long term goal 9: Pt will state & adhere to spinal precautions during ADLs, transfesr & mobitliy with a 100% accuracy  Patient Goals   Patient goals : \"Be able to drive & shop. I love to go shopping. \"     Therapy Time   Individual Concurrent Group Co-treatment   Time In  630-OT eval  640-OT rx         Time Out  640-OT eval  725-OT rx         Minutes  55 Min OT total  10 Min OT eval  45 Min OT rx            Sofya Look, OTR/L [id number]

## 2021-05-20 LAB
BACTERIA: ABNORMAL /HPF
BILIRUBIN URINE: NEGATIVE
BLOOD, URINE: NEGATIVE
CLARITY: CLEAR
COLOR: YELLOW
EPITHELIAL CELLS, UA: ABNORMAL /HPF
GLUCOSE URINE: NEGATIVE MG/DL
KETONES, URINE: NEGATIVE MG/DL
LEUKOCYTE ESTERASE, URINE: ABNORMAL
Lab: NORMAL
METER GLUCOSE: 103 MG/DL (ref 74–99)
METER GLUCOSE: 127 MG/DL (ref 74–99)
METER GLUCOSE: 137 MG/DL (ref 74–99)
METER GLUCOSE: 147 MG/DL (ref 74–99)
NITRITE, URINE: POSITIVE
PH UA: 6 (ref 5–9)
PROTEIN UA: NEGATIVE MG/DL
RBC UA: ABNORMAL /HPF (ref 0–2)
REPORT: NORMAL
SPECIFIC GRAVITY UA: 1.02 (ref 1–1.03)
THIS TEST SENT TO: NORMAL
UROBILINOGEN, URINE: 1 E.U./DL
WBC UA: ABNORMAL /HPF (ref 0–5)

## 2021-05-20 PROCEDURE — 97530 THERAPEUTIC ACTIVITIES: CPT

## 2021-05-20 PROCEDURE — 87088 URINE BACTERIA CULTURE: CPT

## 2021-05-20 PROCEDURE — 51798 US URINE CAPACITY MEASURE: CPT

## 2021-05-20 PROCEDURE — 6370000000 HC RX 637 (ALT 250 FOR IP): Performed by: PHYSICAL MEDICINE & REHABILITATION

## 2021-05-20 PROCEDURE — 99232 SBSQ HOSP IP/OBS MODERATE 35: CPT | Performed by: PHYSICAL MEDICINE & REHABILITATION

## 2021-05-20 PROCEDURE — 97110 THERAPEUTIC EXERCISES: CPT

## 2021-05-20 PROCEDURE — 81001 URINALYSIS AUTO W/SCOPE: CPT

## 2021-05-20 PROCEDURE — 6360000002 HC RX W HCPCS: Performed by: PHYSICIAN ASSISTANT

## 2021-05-20 PROCEDURE — 51701 INSERT BLADDER CATHETER: CPT

## 2021-05-20 PROCEDURE — 82962 GLUCOSE BLOOD TEST: CPT

## 2021-05-20 PROCEDURE — 6370000000 HC RX 637 (ALT 250 FOR IP): Performed by: PHYSICIAN ASSISTANT

## 2021-05-20 PROCEDURE — 1280000000 HC REHAB R&B

## 2021-05-20 PROCEDURE — 87186 SC STD MICRODIL/AGAR DIL: CPT

## 2021-05-20 RX ORDER — CIPROFLOXACIN 500 MG/1
500 TABLET, FILM COATED ORAL EVERY 12 HOURS SCHEDULED
Status: DISCONTINUED | OUTPATIENT
Start: 2021-05-20 | End: 2021-05-21

## 2021-05-20 RX ADMIN — CIPROFLOXACIN HYDROCHLORIDE 500 MG: 500 TABLET, FILM COATED ORAL at 13:30

## 2021-05-20 RX ADMIN — METHYLPREDNISOLONE 4 MG: 4 TABLET ORAL at 21:27

## 2021-05-20 RX ADMIN — BISACODYL 5 MG: 5 TABLET, COATED ORAL at 09:15

## 2021-05-20 RX ADMIN — ROPINIROLE HYDROCHLORIDE 2 MG: 2 TABLET, FILM COATED ORAL at 20:00

## 2021-05-20 RX ADMIN — BACLOFEN 10 MG: 10 TABLET ORAL at 21:28

## 2021-05-20 RX ADMIN — ENOXAPARIN SODIUM 40 MG: 40 INJECTION SUBCUTANEOUS at 09:15

## 2021-05-20 RX ADMIN — INSULIN LISPRO 2 UNITS: 100 INJECTION, SOLUTION INTRAVENOUS; SUBCUTANEOUS at 16:34

## 2021-05-20 RX ADMIN — BACLOFEN 10 MG: 10 TABLET ORAL at 14:00

## 2021-05-20 RX ADMIN — GABAPENTIN 600 MG: 300 CAPSULE ORAL at 09:15

## 2021-05-20 RX ADMIN — OXYCODONE HYDROCHLORIDE 10 MG: 10 TABLET ORAL at 11:30

## 2021-05-20 RX ADMIN — BACLOFEN 10 MG: 10 TABLET ORAL at 09:15

## 2021-05-20 RX ADMIN — METHYLPREDNISOLONE 4 MG: 4 TABLET ORAL at 06:33

## 2021-05-20 RX ADMIN — METHYLPREDNISOLONE 4 MG: 4 TABLET ORAL at 12:30

## 2021-05-20 RX ADMIN — SENNOSIDES AND DOCUSATE SODIUM 1 TABLET: 8.6; 5 TABLET ORAL at 21:26

## 2021-05-20 RX ADMIN — POTASSIUM CHLORIDE 20 MEQ: 1500 TABLET, EXTENDED RELEASE ORAL at 08:59

## 2021-05-20 RX ADMIN — ROPINIROLE HYDROCHLORIDE 2 MG: 2 TABLET, FILM COATED ORAL at 12:30

## 2021-05-20 RX ADMIN — GABAPENTIN 600 MG: 300 CAPSULE ORAL at 21:26

## 2021-05-20 RX ADMIN — DIAZEPAM 5 MG: 5 TABLET ORAL at 11:30

## 2021-05-20 RX ADMIN — GABAPENTIN 600 MG: 300 CAPSULE ORAL at 14:00

## 2021-05-20 RX ADMIN — BUPROPION HYDROCHLORIDE 150 MG: 150 TABLET, EXTENDED RELEASE ORAL at 16:09

## 2021-05-20 RX ADMIN — SENNOSIDES AND DOCUSATE SODIUM 1 TABLET: 8.6; 5 TABLET ORAL at 09:15

## 2021-05-20 RX ADMIN — ROPINIROLE HYDROCHLORIDE 2 MG: 2 TABLET, FILM COATED ORAL at 16:09

## 2021-05-20 RX ADMIN — BUMETANIDE 2 MG: 1 TABLET ORAL at 09:15

## 2021-05-20 RX ADMIN — ROPINIROLE HYDROCHLORIDE 2 MG: 2 TABLET, FILM COATED ORAL at 08:59

## 2021-05-20 ASSESSMENT — PAIN DESCRIPTION - ONSET: ONSET: ON-GOING

## 2021-05-20 ASSESSMENT — PAIN DESCRIPTION - PROGRESSION: CLINICAL_PROGRESSION: NOT CHANGED

## 2021-05-20 ASSESSMENT — PAIN DESCRIPTION - LOCATION: LOCATION: BACK

## 2021-05-20 ASSESSMENT — PAIN DESCRIPTION - ORIENTATION: ORIENTATION: LOWER

## 2021-05-20 ASSESSMENT — PAIN - FUNCTIONAL ASSESSMENT: PAIN_FUNCTIONAL_ASSESSMENT: PREVENTS OR INTERFERES SOME ACTIVE ACTIVITIES AND ADLS

## 2021-05-20 ASSESSMENT — PAIN SCALES - GENERAL
PAINLEVEL_OUTOF10: 6
PAINLEVEL_OUTOF10: 9

## 2021-05-20 ASSESSMENT — PAIN DESCRIPTION - FREQUENCY: FREQUENCY: CONTINUOUS

## 2021-05-20 ASSESSMENT — PAIN DESCRIPTION - DESCRIPTORS: DESCRIPTORS: ACHING;CONSTANT;DISCOMFORT

## 2021-05-20 ASSESSMENT — PAIN DESCRIPTION - PAIN TYPE: TYPE: SURGICAL PAIN

## 2021-05-20 NOTE — PROGRESS NOTES
Physical Therapy    Facility/Department: [address] REHAB  Treatment Note    NAME: Clair Hearn  : 1955  MRN: [id number]    Date of Service: 2021  Evaluating Therapist: Bernarda Mccullough P.T.    ROOM: [facility]  DIAGNOSIS: Lumbar spondylolisthesis  PRECAUTIONS: Fall risk, spinal with LSO   PROCEDURE(S):  L3/L4 PLIF  HPI: The pt was admitted on  for elective back surgery secondary to spinal stenosis with LBP and associated RLE radicular symptoms    Social:  Pt lives with her sister who is in poor health in a 2 floor plan with 1st floor set up with 2 steps and 2 rails to enter. Prior to admission pt ambulated with a Rolator WW for B knee pain and back pain for the 4 months before admission and was Independent. Initial Evaluation  Date: 21 AM     PM    Short Term Goals Long Term Goals    Was pt agreeable to Eval/treatment? Yes Yes Yes     Does pt have pain? 8/10 low back pain at rest, increases with standing Pain with standing and advancing AD, reports improved from yesterday Pain in standing alleviated with flexion on parallel bars     Bed Mobility  Rolling: NT  Supine to sit: Max A  Sit to supine: Max A  Scooting: NT NT Rolling: Min A  Supine to sit: Min A  Scooting: Min A  (hospital bed) SBA Modified Independent   Transfers Sit to stand: Max A  Stand to sit: Max A  Stand pivot: Max A    5xSTS: TBD Sit to stand: Max A  Stand to sit: Max A  Stand pivot: NT Sit to stand: Max A  Stand to sit: Max A  Stand pivot:  Max A with [employer] Min A Modified Independent  5xSTS: TBD   Ambulation    10 feet with Bariatric WW with Mod A x2 (WC follow)  10mWT: TBD  6mWT: TBD 20 feet in Bariatric [employer] with Mod A x2 (WC follow) 6 feet in Bariatric [employer] with Mod A x2 (WC follow) 25 feet with AAD with Min A >50 feet with AAD with Modified [place]    10mWT: TBD  6mWT: TBD   Walking 10 feet on uneven surface NT NT NT 10 feet with AAD with Min A >10 feet with AAD with Modified [place]   Wheel Chair Mobility NT NT NT     Car Transfers NT NT NT Min A Modified Independent   Stair negotiation: ascended and descended  NT NT NT 2 steps with 2 rails with Min A >4 steps with 2 rails with Modified [place]   Curb Step:   ascended and descended NT NT NT 2 inch step with AAD and Mod A 4 inch step with AAD and Modified [place]   BLE ROM WFL, R hip flexion limited by weakness  WFL, R hip flexion limited by weakness     BLE Strength RLE is grossly 2/5 at the hip, 3+/5 at the knee, and 4-/5 at the ankle  LLE is grossly 4/5  RLE is grossly 2/5 at the hip, 3+/5 at the knee, and 4-/5 at the ankle  LLE is grossly 4/5     Balance  Seated: Supervision  Standing: Mod A    BBS: TBD  FGA: TBD  Seated: Supervision  Standing: Mod A    BBS: TBD  FGA: TBD   Date Family Teach Completed No family present at [facility]  No family present to this date     Is additional Family Teaching Needed? Y or N Yes  Yes     Hindering Progress Weakness, pain  Weakness, pain, limited support     PT recommended ELOS 2 weeks       Team's Discharge Plan        Therapist at Team Meeting          Therapeutic Exercise:   AM:   Sit<>stand 2x3 in parallel bars with BUE support on bars with Min A (harness behind pt)  Sit<>stand 2x3 in parallel bars with single UE support on bars with Min/Mod A (harness behind pt)  Ambulation: 2x10 feet in parallel bars with BUE support on bars with Min A (harness behind pt)  Seated BUE WC presses x10  PM:   Sit<>stand x6 at hospital bed, lowered between each set with CGA that became Min/Mod A  Rolling in hospital bed x5 each direction with Min A that improved to SBA with cues and repetition    Additional Comments: The pt reported she felt as though she were \"sliding\" out of her chair, noted the pt's hips were forward in the chair with no support for either LE, the pt was encouraged to always use leg rests and given exercise to improve technique with WC positioning.  The pt reported that she was not feeling well in the afternoon and OT reported the pt had some trouble with completing her OT session but the pt was agreeable to afternoon PT session. The pt required assistance with dressing and performed bed mobility with cues for technique to improve [place]. The pt was then assisted with standing transfers at the EOB, the pt was able to tolerate less activity but remains motivated. Educated the pt on proper donning and doffing of LSO and assisted the pt with donning the brace, educated the pt on bed mobility technique with proper back mechanics and she was able to demonstrate well. Patient/family education  Pt/family educated on chair positioning techniques, transfer techniques, sequencing with WC. Patient/family response to education:   Pt/family verbalized understanding Pt/family demonstrated skill Pt/family requires further education in this area   Yes Yes Reinforce     AM  Time in: 0830  Time out: 0915    PM  Time in: 1430  Time out: 1515    Pt is making good progress toward established Physical Therapy goals. Continue with physical therapy current plan of care. Dariela Trevizo.  Ada Reyes, [address] [place] [address]  License Number: [id number]

## 2021-05-20 NOTE — PLAN OF CARE
Problem: Pain:  Goal: Pain level will decrease  Description: Pain level will decrease  Outcome: Met This Shift  Goal: Control of acute pain  Description: Control of acute pain  Outcome: Met This Shift  Goal: Control of chronic pain  Description: Control of chronic pain  Outcome: Met This Shift  Goal: Patient's pain/discomfort is manageable  Description: Patient's pain/discomfort is manageable  Outcome: Met This Shift     Problem: Falls - Risk of:  Goal: Will remain free from falls  Description: Will remain free from falls  Outcome: Met This Shift  Goal: Absence of physical injury  Description: Absence of physical injury  Outcome: Met This Shift     Problem: Skin Integrity:  Goal: Will show no infection signs and symptoms  Description: Will show no infection signs and symptoms  Outcome: Met This Shift  Goal: Absence of new skin breakdown  Description: Absence of new skin breakdown  Outcome: Met This Shift     Problem: Infection:  Goal: Will remain free from infection  Description: Will remain free from infection  Outcome: Met This Shift     Problem: Safety:  Goal: Free from accidental physical injury  Description: Free from accidental physical injury  Outcome: Met This Shift  Goal: Free from intentional harm  Description: Free from intentional harm  Outcome: Met This Shift     Problem: Daily Care:  Goal: Daily care needs are met  Description: Daily care needs are met  Outcome: Met This Shift     Problem: Skin Integrity:  Goal: Skin integrity will stabilize  Description: Skin integrity will stabilize  Outcome: Met This Shift     Problem: Discharge Planning:  Goal: Patients continuum of care needs are met  Description: Patients continuum of care needs are met  Outcome: Met This Shift     Problem: ABCDS Injury Assessment  Goal: Absence of physical injury  Outcome: Met This Shift

## 2021-05-20 NOTE — PROGRESS NOTES
Occupational Therapy  OCCUPATIONAL THERAPY DAILY NOTE    Date:2021  Patient Name: Dee Rodas  MRN: [id number]  : 1955  Room: [address] [place], [place] [address]A     Diagnosis: lumbar spiondylolisthesis,m spinal stenosis- s/p L3-4 PLIF  Additional Pertinent Hx: OA, hx  CVA, HX covid  2021, PVD, low back pain, hx gastric bypass , hx catarct & hx cellulitis & peripherl vision loss    Restrictions: Fall Risk, LSO, spinal precautions & RLE numb & tingling @ her knees to her thigh      Functional Assessment:   Date Status AE  Comments   Feeding 21  Set up      Grooming 21  Supervision            Oral Care 21  Supervision (seated )     Bathing 21  Maximal Assist      UB Dressing 21  Minimal Assist      LB Dressing 21  Dependent      Footwear 21 Maximal Assist      Toileting 21  Dependent  AE    Homemaking 21  TBA        Functional Transfers / Balance:   Date Status DME  Comments   Sit Balance 21  Supervision      Stand Balance 21  Min A  ww    [] Tub  [] Shower   Transfer 21  TBA     Commode   Transfer 21  Dependent      Functional   Mobility 21  Dependent      Other:sit to stand from w/c to ww  21   Max A  ww Assist with sit to stand at ww level      Functional Exercises / Activity:   BUE strength exercises : herminia box with 5 # wt 3 sets 10 reps in all planes on table top surface                                             2 # weighted ball 3 sets 10 reps in all planes of pt's tolerance     Pt completed sit to stand transfers at ww level . Pt stood ~ 1-2 mins x 3 reps requiring Max A with the sit to stand transfer. Pt needed Min A for balance once standing at ww level.      Sensory / Neuromuscular Re-Education:      Cognitive Skills:   Status Comments   Problem   Solving good     Memory good     Sequencing good     Safety fair       Visual Perception:    Education:  Pt was educated hand placement during sit to stand and stand to sit transfers at Tx-  [x] Concurrent Tx -20  [] Co-Tx -   [] Group Tx -   [x] Time Missed -25          Pt needed to use the restroom        Third Session    [] Individual Tx-   [] Concurrent Tx -  [] Co-Tx -   [] Group Tx -   [] Time Missed -         Total Tx Time: 65 mins         [place] Juan, OTR/L [id number]

## 2021-05-20 NOTE — PROGRESS NOTES
Physical Therapy    Facility/Department: [facility] REHAB  Weekly Note    NAME: Vito Zuniga  : 1955  MRN: [id number]    Date of Service: 2021  Evaluating Therapist: Bhaskar Dubois P.T.    ROOM: [facility]  DIAGNOSIS: Lumbar spondylolisthesis  PRECAUTIONS: Fall risk, spinal with LSO   PROCEDURE(S):  L3/L4 PLIF  HPI: The pt was admitted on  for elective back surgery secondary to spinal stenosis with LBP and associated RLE radicular symptoms    Social:  Pt lives with her sister who is in poor health in a 2 floor plan with 1st floor set up with 2 steps and 2 rails to enter. Prior to admission pt ambulated with a Rolator WW for B knee pain and back pain for the 4 months before admission and was Independent. Initial Evaluation  Date: 21 Comments Short Term Goals Long Term Goals    Was pt agreeable to Eval/treatment? Yes Yes      Does pt have pain? 8/10 low back pain at rest, increases with standing Pain with standing and advancing AD, reports improved from yesterday      Bed Mobility  Rolling: NT  Supine to sit: Max A  Sit to supine: Max A  Scooting: NT Rolling: Min A  Supine to sit: Min A  Scooting: Min A  (hospital bed) Pt requires less assist in hospital bed, Max A for all components in a flat bed, no rail SBA Modified Independent   Transfers Sit to stand: Max A  Stand to sit: Max A  Stand pivot: Max A    5xSTS: TBD Sit to stand: Max A  Stand to sit: Max A  Stand pivot:  Max A with [address] Able to stand with Min A from elevated surfaces but requires heavy assist from [facility] Min A Modified Independent  5xSTS: TBD   Ambulation    10 feet with Bariatric WW with Mod A x2 (WC follow)  10mWT: TBD  6mWT: TBD 20 feet in Bariatric [address] with Mod A + WC follow Pt does well once standing, back pain increases with walker advancement 25 feet with AAD with Min A >50 feet with AAD with Modified [place]    10mWT: TBD  6mWT: TBD   Wheel Chair Mobility NT NT      Car Transfers NT NT Safety Min A Modified

## 2021-05-20 NOTE — PROGRESS NOTES
[facility]  Comprehensive Progress Note    Subjective:      Jaqui Rodriguez is a 60 y.o. female admitted to inpatient rehabilitation for impairments and acitivities limitations in ADLs and mobility secondary to L3-4 spondylolisthesis, now s/p L3-L4 PLIF. Urinary retention with high PVRs. Patient was straight cathed overnight. UA positive for UTI, culture pending. No cp, sob, n/v. No fevers/chills. Tolerating therapy evaluations. Pain adequately controlled. The patient's medical records have been reviewed. Scheduled Meds:ciprofloxacin, 500 mg, 2 times per day  bisacodyl, 5 mg, Daily  polyethylene glycol, 17 g, Daily  sennosides-docusate sodium, 1 tablet, BID  sodium chloride flush, 5-40 mL, 2 times per day  baclofen, 10 mg, TID  bumetanide, 2 mg, Daily  Buprenorphine HCl, 450 mcg, BID  buPROPion, 150 mg, QPM  enoxaparin, 40 mg, Daily  insulin lispro, 0-12 Units, TID WC  insulin lispro, 0-6 Units, Nightly  ipratropium-albuterol, 1 ampule, Q4H WA  methylPREDNISolone, 4 mg, Nightly  methylPREDNISolone, 4 mg, QAM AC  methylPREDNISolone, 4 mg, Lunch  potassium chloride, 20 mEq, Daily with breakfast  rOPINIRole, 2 mg, 4x daily  gabapentin, 600 mg, TID      Continuous Infusions:  PRN Meds:oxyCODONE, 5 mg, Q4H PRN   Or  oxyCODONE, 10 mg, Q4H PRN  glucagon (rDNA), 1 mg, PRN  glucose, 15 g, PRN  acetaminophen, 650 mg, Q4H PRN  ammonium lactate, , Q2H PRN  benzocaine-menthol, 1 lozenge, Q2H PRN  diazePAM, 5 mg, Q6H PRN  ondansetron, 4 mg, Q6H PRN  polyethylene glycol, 17 g, Daily PRN         Objective:      Vitals:    05/19/21 1607 05/19/21 1719 05/20/21 0126 05/20/21 0915   BP: (!) 145/80  136/79 (!) 160/76   Pulse: 95  89 88   Resp: 18  18 18   Temp: 97.7 °F (36.5 °C)  98.8 °F (37.1 °C) 97.7 °F (36.5 °C)   TempSrc: Temporal  Oral Infrared   SpO2: 98% 91%  96%   Weight:       Height:         General appearance: alert, appears stated age and cooperative. NAD. Up in chair. Lungs: clear to auscultation bilaterally  Heart: RRR, S1, S2  Abdomen: soft, obese abdomen, NT, normal BS   Extremities: extremities normal, atraumatic, 2+ BLE edema unchanged  Musculoskeletal: Range of motion impaired   Neurologic: Alert, oriented. SILT. Motor 5/5 throughout bilateral upper extremities, 2/5 bilateral hip flexors; 3/5 b/l KE; 4+/5 b/l ankle DF/PF.       BLE motor exam limited by pain    Laboratory:    Lab Results   Component Value Date    WBC 8.9 05/19/2021    HGB 8.9 (L) 05/19/2021    HCT 31.9 (L) 05/19/2021    MCV 81.0 05/19/2021     05/19/2021     Lab Results   Component Value Date     05/19/2021    K 4.6 05/19/2021     05/19/2021    CO2 36 05/19/2021    BUN 23 05/19/2021    CREATININE 0.6 05/19/2021    GLUCOSE 100 05/19/2021    CALCIUM 8.7 05/19/2021      Lab Results   Component Value Date    ALT 19 05/10/2021    AST 18 05/10/2021    ALKPHOS 113 (H) 05/10/2021    BILITOT 0.2 05/10/2021       Lab Results   Component Value Date    COLORU Yellow 05/20/2021    NITRU POSITIVE 05/20/2021    GLUCOSEU Negative 05/20/2021    KETUA Negative 05/20/2021    UROBILINOGEN 1.0 05/20/2021    BILIRUBINUR Negative 05/20/2021     Lab Results   Component Value Date    LABA1C 5.3 03/10/2021         Functional Status:   Physical Therapy:  Bed mobility: Max A  Transfers: Max A  Ambulation: 10 ft bariatric [employer] Mod A x2     Occupational Therapy:  Feeding: Setup  Grooming: Supervision   UB dressing: Dependent   LB dressing: Dependent        Assessment/Plan:       60 y.o. female admitted to inpatient rehabilitation for impairments and acitivities limitations in ADLs and mobility secondary to L3-4 spondylolisthesis, now s/p L3-L4 PLIF      -L3-4 spondylolisthesis: s/p L3-L4 PLIF on 5/13/2021. Spine precautions. Pain control. Begin Acute Rehab evaluations.   -Acute post operative pain: Continue home Butrans patch. PRN oxycodone. PRN tylenol. Will increase gabapentin to 600mg TID. Continue Baclofen.  PRN Valium. Wean narcotics and Valium as tolerated. -Monitor for urinary retention -- some clinical concern for urinary retention. Will check PVRs. -DVT prophylaxis: Lovenox       -UTI - will start Cipro. Culture pending. VSS. Afebrile.   -Post op urinary retention - voiding minimally, high residuals. Replace jean. Void trial when more mobile.    -Decrease oxycodone, patient taking minimally, she feels the valium helps more with her spasms    Team conference tomorrow    Electronically signed by Angelia Shin MD on 5/20/2021 at 1:09 PM

## 2021-05-21 ENCOUNTER — APPOINTMENT (OUTPATIENT)
Dept: GENERAL RADIOLOGY | Age: 66
DRG: 560 | End: 2021-05-21
Attending: PHYSICAL MEDICINE & REHABILITATION
Payer: COMMERCIAL

## 2021-05-21 LAB
ANION GAP SERPL CALCULATED.3IONS-SCNC: 8 MMOL/L (ref 7–16)
ANISOCYTOSIS: ABNORMAL
BASOPHILS ABSOLUTE: 0 E9/L (ref 0–0.2)
BASOPHILS RELATIVE PERCENT: 0.2 % (ref 0–2)
BUN BLDV-MCNC: 23 MG/DL (ref 6–23)
CALCIUM SERPL-MCNC: 8.7 MG/DL (ref 8.6–10.2)
CHLORIDE BLD-SCNC: 98 MMOL/L (ref 98–107)
CO2: 32 MMOL/L (ref 22–29)
CREAT SERPL-MCNC: 0.7 MG/DL (ref 0.5–1)
EOSINOPHILS ABSOLUTE: 0.19 E9/L (ref 0.05–0.5)
EOSINOPHILS RELATIVE PERCENT: 1.8 % (ref 0–6)
GFR AFRICAN AMERICAN: >60
GFR NON-AFRICAN AMERICAN: >60 ML/MIN/1.73
GLUCOSE BLD-MCNC: 129 MG/DL (ref 74–99)
HCT VFR BLD CALC: 36.4 % (ref 34–48)
HEMOGLOBIN: 9.8 G/DL (ref 11.5–15.5)
HYPOCHROMIA: ABNORMAL
LYMPHOCYTES ABSOLUTE: 1.3 E9/L (ref 1.5–4)
LYMPHOCYTES RELATIVE PERCENT: 11.7 % (ref 20–42)
MCH RBC QN AUTO: 22.3 PG (ref 26–35)
MCHC RBC AUTO-ENTMCNC: 26.9 % (ref 32–34.5)
MCV RBC AUTO: 82.7 FL (ref 80–99.9)
METER GLUCOSE: 105 MG/DL (ref 74–99)
METER GLUCOSE: 110 MG/DL (ref 74–99)
METER GLUCOSE: 111 MG/DL (ref 74–99)
METER GLUCOSE: 120 MG/DL (ref 74–99)
MONOCYTES ABSOLUTE: 0.43 E9/L (ref 0.1–0.95)
MONOCYTES RELATIVE PERCENT: 3.6 % (ref 2–12)
MYELOCYTE PERCENT: 0.9 % (ref 0–0)
NEUTROPHILS ABSOLUTE: 8.96 E9/L (ref 1.8–7.3)
NEUTROPHILS RELATIVE PERCENT: 82 % (ref 43–80)
NUCLEATED RED BLOOD CELLS: 0.9 /100 WBC
OVALOCYTES: ABNORMAL
PDW BLD-RTO: 31.9 FL (ref 11.5–15)
PLATELET # BLD: 190 E9/L (ref 130–450)
PMV BLD AUTO: ABNORMAL FL (ref 7–12)
POIKILOCYTES: ABNORMAL
POLYCHROMASIA: ABNORMAL
POTASSIUM REFLEX MAGNESIUM: 3.8 MMOL/L (ref 3.5–5)
RBC # BLD: 4.4 E12/L (ref 3.5–5.5)
SODIUM BLD-SCNC: 138 MMOL/L (ref 132–146)
WBC # BLD: 10.8 E9/L (ref 4.5–11.5)

## 2021-05-21 PROCEDURE — 97530 THERAPEUTIC ACTIVITIES: CPT

## 2021-05-21 PROCEDURE — 80048 BASIC METABOLIC PNL TOTAL CA: CPT

## 2021-05-21 PROCEDURE — 71045 X-RAY EXAM CHEST 1 VIEW: CPT

## 2021-05-21 PROCEDURE — 6360000002 HC RX W HCPCS: Performed by: PHYSICAL MEDICINE & REHABILITATION

## 2021-05-21 PROCEDURE — 94640 AIRWAY INHALATION TREATMENT: CPT

## 2021-05-21 PROCEDURE — 36415 COLL VENOUS BLD VENIPUNCTURE: CPT

## 2021-05-21 PROCEDURE — 97110 THERAPEUTIC EXERCISES: CPT

## 2021-05-21 PROCEDURE — 6360000002 HC RX W HCPCS: Performed by: PHYSICIAN ASSISTANT

## 2021-05-21 PROCEDURE — 99232 SBSQ HOSP IP/OBS MODERATE 35: CPT | Performed by: PHYSICAL MEDICINE & REHABILITATION

## 2021-05-21 PROCEDURE — 97535 SELF CARE MNGMENT TRAINING: CPT

## 2021-05-21 PROCEDURE — 1280000000 HC REHAB R&B

## 2021-05-21 PROCEDURE — 6370000000 HC RX 637 (ALT 250 FOR IP): Performed by: PHYSICAL MEDICINE & REHABILITATION

## 2021-05-21 PROCEDURE — 85025 COMPLETE CBC W/AUTO DIFF WBC: CPT

## 2021-05-21 PROCEDURE — 2580000003 HC RX 258: Performed by: PHYSICIAN ASSISTANT

## 2021-05-21 PROCEDURE — 6370000000 HC RX 637 (ALT 250 FOR IP): Performed by: PHYSICIAN ASSISTANT

## 2021-05-21 PROCEDURE — 2580000003 HC RX 258: Performed by: PHYSICAL MEDICINE & REHABILITATION

## 2021-05-21 PROCEDURE — 82962 GLUCOSE BLOOD TEST: CPT

## 2021-05-21 RX ORDER — OXYCODONE HYDROCHLORIDE 5 MG/1
5 TABLET ORAL EVERY 6 HOURS PRN
Status: DISCONTINUED | OUTPATIENT
Start: 2021-05-21 | End: 2021-06-04

## 2021-05-21 RX ADMIN — GABAPENTIN 600 MG: 300 CAPSULE ORAL at 08:26

## 2021-05-21 RX ADMIN — BISACODYL 5 MG: 5 TABLET, COATED ORAL at 08:26

## 2021-05-21 RX ADMIN — GABAPENTIN 600 MG: 300 CAPSULE ORAL at 14:03

## 2021-05-21 RX ADMIN — BACLOFEN 10 MG: 10 TABLET ORAL at 21:15

## 2021-05-21 RX ADMIN — OXYCODONE HYDROCHLORIDE 10 MG: 10 TABLET ORAL at 02:31

## 2021-05-21 RX ADMIN — IPRATROPIUM BROMIDE AND ALBUTEROL SULFATE 1 AMPULE: 2.5; .5 SOLUTION RESPIRATORY (INHALATION) at 16:20

## 2021-05-21 RX ADMIN — ROPINIROLE HYDROCHLORIDE 2 MG: 2 TABLET, FILM COATED ORAL at 11:18

## 2021-05-21 RX ADMIN — ROPINIROLE HYDROCHLORIDE 2 MG: 2 TABLET, FILM COATED ORAL at 15:54

## 2021-05-21 RX ADMIN — BACLOFEN 10 MG: 10 TABLET ORAL at 14:03

## 2021-05-21 RX ADMIN — BACLOFEN 10 MG: 10 TABLET ORAL at 08:26

## 2021-05-21 RX ADMIN — IPRATROPIUM BROMIDE AND ALBUTEROL SULFATE 1 AMPULE: 2.5; .5 SOLUTION RESPIRATORY (INHALATION) at 12:30

## 2021-05-21 RX ADMIN — DIAZEPAM 5 MG: 5 TABLET ORAL at 08:26

## 2021-05-21 RX ADMIN — ROPINIROLE HYDROCHLORIDE 2 MG: 2 TABLET, FILM COATED ORAL at 08:26

## 2021-05-21 RX ADMIN — IPRATROPIUM BROMIDE AND ALBUTEROL SULFATE 1 AMPULE: 2.5; .5 SOLUTION RESPIRATORY (INHALATION) at 03:10

## 2021-05-21 RX ADMIN — CEFTRIAXONE 1000 MG: 1 INJECTION, POWDER, FOR SOLUTION INTRAMUSCULAR; INTRAVENOUS at 16:04

## 2021-05-21 RX ADMIN — GABAPENTIN 600 MG: 300 CAPSULE ORAL at 21:15

## 2021-05-21 RX ADMIN — CIPROFLOXACIN HYDROCHLORIDE 500 MG: 500 TABLET, FILM COATED ORAL at 08:26

## 2021-05-21 RX ADMIN — BUMETANIDE 2 MG: 1 TABLET ORAL at 08:26

## 2021-05-21 RX ADMIN — METHYLPREDNISOLONE 4 MG: 4 TABLET ORAL at 06:58

## 2021-05-21 RX ADMIN — SENNOSIDES AND DOCUSATE SODIUM 1 TABLET: 8.6; 5 TABLET ORAL at 21:15

## 2021-05-21 RX ADMIN — POLYETHYLENE GLYCOL 3350 17 G: 17 POWDER, FOR SOLUTION ORAL at 08:27

## 2021-05-21 RX ADMIN — IPRATROPIUM BROMIDE AND ALBUTEROL SULFATE 1 AMPULE: 2.5; .5 SOLUTION RESPIRATORY (INHALATION) at 20:01

## 2021-05-21 RX ADMIN — Medication 10 ML: at 21:40

## 2021-05-21 RX ADMIN — ROPINIROLE HYDROCHLORIDE 2 MG: 2 TABLET, FILM COATED ORAL at 21:15

## 2021-05-21 RX ADMIN — IPRATROPIUM BROMIDE AND ALBUTEROL SULFATE 1 AMPULE: 2.5; .5 SOLUTION RESPIRATORY (INHALATION) at 08:19

## 2021-05-21 RX ADMIN — ENOXAPARIN SODIUM 40 MG: 40 INJECTION SUBCUTANEOUS at 21:16

## 2021-05-21 RX ADMIN — SENNOSIDES AND DOCUSATE SODIUM 1 TABLET: 8.6; 5 TABLET ORAL at 08:26

## 2021-05-21 RX ADMIN — POTASSIUM CHLORIDE 20 MEQ: 1500 TABLET, EXTENDED RELEASE ORAL at 08:27

## 2021-05-21 ASSESSMENT — PAIN - FUNCTIONAL ASSESSMENT: PAIN_FUNCTIONAL_ASSESSMENT: PREVENTS OR INTERFERES SOME ACTIVE ACTIVITIES AND ADLS

## 2021-05-21 ASSESSMENT — PAIN DESCRIPTION - ORIENTATION
ORIENTATION: RIGHT;LEFT;LOWER
ORIENTATION: RIGHT;LEFT;LOWER

## 2021-05-21 ASSESSMENT — PAIN SCALES - GENERAL
PAINLEVEL_OUTOF10: 9
PAINLEVEL_OUTOF10: 5
PAINLEVEL_OUTOF10: 0
PAINLEVEL_OUTOF10: 9
PAINLEVEL_OUTOF10: 5

## 2021-05-21 ASSESSMENT — PAIN DESCRIPTION - DESCRIPTORS
DESCRIPTORS: DISCOMFORT;ACHING;SORE
DESCRIPTORS: ACHING;DISCOMFORT;DULL;SORE

## 2021-05-21 ASSESSMENT — PAIN DESCRIPTION - PAIN TYPE
TYPE: CHRONIC PAIN
TYPE: ACUTE PAIN;SURGICAL PAIN
TYPE: CHRONIC PAIN

## 2021-05-21 ASSESSMENT — PAIN DESCRIPTION - ONSET
ONSET: ON-GOING
ONSET: ON-GOING

## 2021-05-21 ASSESSMENT — PAIN DESCRIPTION - PROGRESSION
CLINICAL_PROGRESSION: NOT CHANGED
CLINICAL_PROGRESSION: NOT CHANGED

## 2021-05-21 ASSESSMENT — PAIN DESCRIPTION - FREQUENCY
FREQUENCY: CONTINUOUS

## 2021-05-21 ASSESSMENT — PAIN DESCRIPTION - LOCATION
LOCATION: BACK;LEG
LOCATION: BACK
LOCATION: BACK

## 2021-05-21 NOTE — CARE COORDINATION
Per team;  Plan re eval (3 wks). Updated the pt. And left a message for her daughterSwilbert Garcia. LTG: CGA / Mod I/Indepedent.     KANE Douglas  5/21/2021

## 2021-05-21 NOTE — PATIENT CARE CONFERENCE
goal: Modified independent     Chair/bed transfers:  Current level: wheelchair max assist  to wheeled walker pain with transition  Short term Chair/bed transfers goal: min assist   Long term Chair/bed transfers goal: Modified independent       Ambulation:   Current level: 20 ft bariatric wheeled walkerwith wheelchair follow  Short term ambulation goal: 25 ft min assist   Long term ambulation goal: 50 ft Modified independent       Car transfers:   Current level: To be assessed    Stairs:   Current level : To be assessed        Other comments: Knee pain and back pain hinder patient therapy      OCCUPATIONAL THERAPY:      Tub/shower:   Current level: To be assessed      Feeding:  Current level: Modified independent   Short term goal: met  Long term feeding goal: met    Grooming:   Current level: seated supervision  Short term grooming goal: Modified independent   Long term Grooming Goal: independent       Bathing:  Current level: max assist   Short term bathing goal: mod assist   Long term bathing goal: Contact guard assist     Homemaking:   Current level: To be assessed    Upper body dressing:  Current level: min assist   Short term upper body dressing goal: supervision  Long term upper body dressing goal: independent     Lower body dressing:                                                                          Current level: pants max assist/dependent             Short term lower body dressing goal: min assist   Long term lower body dressing goal: Modified independent            Footwear  Current level: max assist/dependent  Short term goal: min assist   Long term goal:Modified independent       Toilet transfer:   Current level: max assist/dependent  Short term toilet transfer goal: mod assist   Long term toilet transfer goal: Modified independent         Other comments: Fatigue and pain are hindering and limiting          Safety awareness: Fair        Patient/family's personal goals:  \"Get back to independent - walk - get out of here\"  Factors supporting goal achievement:  motivated  Factors hindering goal achievement:  Pain and fatigue        Discharge Plan   Estimated Length of Stay: 3 weeks           Destination: Home  Services at Discharge: to be assessed      INTERDISCIPLINARY TEAM/PHYSICIAN RECOMMENDATION AND/OR REVISIONS OF PLAN OF CARE:  Ongoing care coordination, weekly team confrences, schedule family teaching. I approve the established interdisciplinary plan of care as documented within the medical record of Fernanda Browning. Electronically signed by Denae Hazel RN on 5/21/2021 at 9:37 AM  The following interdisciplinary team members were present:  Denae Staff, RN  Roxanna Edouard, RN  Corrine Robin, LSW  Miguel Brwester, DPT  Stephany Adler, OTR

## 2021-05-21 NOTE — PROGRESS NOTES
[facility]  Comprehensive Progress Note    Subjective:      Monie Whipple is a 60 y.o. female admitted to inpatient rehabilitation for impairments and acitivities limitations in ADLs and mobility secondary to L3-4 spondylolisthesis, now s/p L3-L4 PLIF. Patient reports she feels somewhat more weak this am. No fevers/chills. She reports non-productive cough. No SOB. No cp. She endorses back pain that is moderate. The patient's medical records have been reviewed. Scheduled Meds:cefTRIAXone (ROCEPHIN) IV, 1,000 mg, Q24H  bisacodyl, 5 mg, Daily  polyethylene glycol, 17 g, Daily  sennosides-docusate sodium, 1 tablet, BID  sodium chloride flush, 5-40 mL, 2 times per day  baclofen, 10 mg, TID  bumetanide, 2 mg, Daily  Buprenorphine HCl, 450 mcg, BID  buPROPion, 150 mg, QPM  enoxaparin, 40 mg, Daily  insulin lispro, 0-12 Units, TID WC  insulin lispro, 0-6 Units, Nightly  ipratropium-albuterol, 1 ampule, Q4H WA  potassium chloride, 20 mEq, Daily with breakfast  rOPINIRole, 2 mg, 4x daily  gabapentin, 600 mg, TID      Continuous Infusions:  PRN Meds:oxyCODONE, 5 mg, Q6H PRN  glucagon (rDNA), 1 mg, PRN  glucose, 15 g, PRN  acetaminophen, 650 mg, Q4H PRN  ammonium lactate, , Q2H PRN  benzocaine-menthol, 1 lozenge, Q2H PRN  diazePAM, 5 mg, Q6H PRN  ondansetron, 4 mg, Q6H PRN  polyethylene glycol, 17 g, Daily PRN         Objective:      Vitals:    05/20/21 0126 05/20/21 0915 05/21/21 0114 05/21/21 0819   BP: 136/79 (!) 160/76 137/71 (!) 148/68   Pulse: 89 88 89 86   Resp: 18 18 20 20   Temp: 98.8 °F (37.1 °C) 97.7 °F (36.5 °C) 99.3 °F (37.4 °C) 98.8 °F (37.1 °C)   TempSrc: Oral Infrared Temporal Temporal   SpO2:  96%  98%   Weight:       Height:         General appearance: alert, appears stated age and cooperative. Resting in bed.    Lungs: mildly diminished at bases, no wheezes  Heart: RRR, S1, S2  Abdomen: soft, obese abdomen, NT, normal BS   Extremities: extremities normal, atraumatic, 2+ BLE edema unchanged  Musculoskeletal: Range of motion impaired   Neurologic: Alert, oriented. SILT. Motor 5/5 throughout bilateral upper extremities, 2/5 bilateral hip flexors; 3/5 b/l KE; 4+/5 b/l ankle DF/PF.       BLE motor exam limited by pain    Laboratory:    Lab Results   Component Value Date    WBC 8.9 05/19/2021    HGB 8.9 (L) 05/19/2021    HCT 31.9 (L) 05/19/2021    MCV 81.0 05/19/2021     05/19/2021     Lab Results   Component Value Date     05/19/2021    K 4.6 05/19/2021     05/19/2021    CO2 36 05/19/2021    BUN 23 05/19/2021    CREATININE 0.6 05/19/2021    GLUCOSE 100 05/19/2021    CALCIUM 8.7 05/19/2021      Lab Results   Component Value Date    ALT 19 05/10/2021    AST 18 05/10/2021    ALKPHOS 113 (H) 05/10/2021    BILITOT 0.2 05/10/2021       Lab Results   Component Value Date    COLORU Yellow 05/20/2021    NITRU POSITIVE 05/20/2021    GLUCOSEU Negative 05/20/2021    KETUA Negative 05/20/2021    UROBILINOGEN 1.0 05/20/2021    BILIRUBINUR Negative 05/20/2021     Lab Results   Component Value Date    LABA1C 5.3 03/10/2021         Functional Status:   Physical Therapy:  Bed mobility: Max A  Transfers: Max A  Ambulation: 10 ft bariatric [employer] Mod A x2     Occupational Therapy:  Feeding: Setup  Grooming: Supervision   UB dressing: Dependent   LB dressing: Dependent        Assessment/Plan:       60 y.o. female admitted to inpatient rehabilitation for impairments and acitivities limitations in ADLs and mobility secondary to L3-4 spondylolisthesis, now s/p L3-L4 PLIF      -L3-4 spondylolisthesis: s/p L3-L4 PLIF on 5/13/2021. Spine precautions. Pain control. Begin Acute Rehab evaluations.   -Acute post operative pain: Continue home Butrans patch. PRN oxycodone. PRN tylenol. Will increase gabapentin to 600mg TID. Continue Baclofen. PRN Valium. Wean narcotics and Valium as tolerated. -UTI: Continue Rocephin. Cultures pending.   -Post operative urinary retention.  [place]

## 2021-05-21 NOTE — PROGRESS NOTES
Physical Therapy    Facility/Department: [facility]  Treatment Note    NAME: Jaqui Rodriguez  : 1955  MRN: [id number]    Date of Service: 2021  Evaluating Therapist: Sung Cooper P.T.    ROOM: [facility]  DIAGNOSIS: Lumbar spondylolisthesis  PRECAUTIONS: Fall risk, spinal with LSO   PROCEDURE(S):  L3/L4 PLIF  HPI: The pt was admitted on  for elective back surgery secondary to spinal stenosis with LBP and associated RLE radicular symptoms    Social:  Pt lives with her sister who is in poor health in a 2 floor plan with 1st floor set up with 2 steps and 2 rails to enter. Prior to admission pt ambulated with a Rolator WW for B knee pain and back pain for the 4 months before admission and was Independent. Initial Evaluation  Date: 21 AM     PM    Short Term Goals Long Term Goals    Was pt agreeable to Eval/treatment? Yes Yes Yes     Does pt have pain? 8/10 low back pain at rest, increases with standing Pt reports pain continues to improve, frustrated with catheter 8/10 back pain with activity     Bed Mobility  Rolling: NT  Supine to sit: Max A  Sit to supine: Max A  Scooting: NT NT Rolling: SBA  Supine<>sit: SBA  Scooting: SBA  (simulated rail) SBA Modified Independent   Transfers Sit to stand: Max A  Stand to sit: Max A  Stand pivot:  Max A    5xSTS: TBD Sit to stand: Min A  Stand to sit: Min A  Stand pivot: Min A with [employer] Sit to stand: Min A  Stand to sit: Min A  Stand pivot: Min A with [employer] Min A Modified Independent  5xSTS: TBD   Ambulation    10 feet with Bariatric [employer] with Mod A x2 (WC follow)  10mWT: TBD  6mWT: TBD 20 feet in Bariatric [employer] with Min A 12 feet in Bariatric [employer] with Min A 25 feet with AAD with Min A >50 feet with AAD with Modified [place]    10mWT: TBD  6mWT: TBD   Walking 10 feet on uneven surface NT NT 10 feet with bariatric WW with Min A 10 feet with AAD with Min A >10 feet with AAD with Modified [place]   Wheel Chair Mobility NT NT NT     Car Transfers NT NT Min A Min A Modified Independent   Stair negotiation: ascended and descended  NT NT 4 steps with 2 rails with Min A (descended retrograde) 2 steps with 2 rails with Min A >4 steps with 2 rails with Modified [place]   Curb Step:   ascended and descended NT NT NT 2 inch step with AAD and Mod A 4 inch step with AAD and Modified [place]   BLE ROM WFL, R hip flexion limited by weakness  WFL, R hip flexion limited by weakness     BLE Strength RLE is grossly 2/5 at the hip, 3+/5 at the knee, and 4-/5 at the ankle  LLE is grossly 4/5  RLE is grossly 2/5 at the hip, 3+/5 at the knee, and 4-/5 at the ankle  LLE is grossly 4/5     Balance  Seated: Supervision  Standing: Mod A    BBS: TBD  FGA: TBD  Seated: Supervision  Standing: Mod A    BBS: TBD  FGA: TBD   Date Family Teach Completed No family present at [facility]  No family present to this date     Is additional Family Teaching Needed? Y or N Yes  Yes     Hindering Progress Weakness, pain  Weakness, pain, limited support     PT recommended ELOS 2 weeks       Team's Discharge Plan        Therapist at Team Meeting          Therapeutic Exercise:   AM:   Sit<>stand x5 in parallel bars with SBA  Ambulation: 10 feet in parallel bars with CGA  Ambulation: 6x10 feet with bariatric WW with CGA/Min A  Stand pivot transfer x6 with bariatric WW with CGA/Min A  PM:   Ambulation: 2x12 feet in Bariatric [employer] with Min A  Step in parallel bars with BUE support descending retrograde with CGA/Min A:  -4 inch step x4  --6 inch step x4    Additional Comments: The pt demonstrated improved confidence, less pain with standing, and improved BLE strength and was able to ambulate outside of the parallel bars and perform a stand pivot transfer with a bariatric [employer] with far less assistance than at any other point since her initial evaluation.  The pt was unable to place her R hand back to the chair despite multiple attempts at a mat table with a push-up block and at her [facility] but is able to stand with relative ease when placing both hands on her walker. Discussed the risks of improper hand placement during transfers with the pt and she insisted she was unable to place her hands in the correct position and continues with B hand placement on walker. The pt was able to perform more functional mobility to include stair negotiation, car transfer, and ambulation over uneven surfaces in the afternoon. Patient/family education  Pt/family educated on hand placement, posture with ambulation. Patient/family response to education:   Pt/family verbalized understanding Pt/family demonstrated skill Pt/family requires further education in this area   Yes No, attempted Yes     AM  Time in: 0830  Time out: 0915    PM  Time in: 1430  Time out: 1515    Pt is making good progress toward established Physical Therapy goals. Continue with physical therapy current plan of care. Belkis Irby.  Mary Sheikh, [address] [place] [address]  License Number: [id number]

## 2021-05-21 NOTE — PROGRESS NOTES
Occupational Therapy  OCCUPATIONAL THERAPY DAILY NOTE    Date:2021  Patient Name: Brandy Pina  MRN: [id number]  : 1955  Room: [address] [place], [place] [address]     Diagnosis: lumbar spiondylolisthesis,m spinal stenosis- s/p L3-4 PLIF  Additional Pertinent Hx: OA, hx  CVA, HX covid  2021, PVD, low back pain, hx gastric bypass , hx catarct & hx cellulitis & peripherl vision loss    Restrictions: Fall Risk, LSO, spinal precautions & RLE numb & tingling @ her knees to her thigh      Functional Assessment:   Date Status AE  Comments   Feeding 21  Set up      Grooming 21  Supervision            Oral Care 21  Supervision (seated )     Bathing 21  Maximal Assist      UB Dressing 21  Minimal Assist      LB Dressing 21  Dependent      Footwear 21  Mod/Max  Long shoe horn  Pt donned and doffed slip on shoes using long shoe horn. Assist to don fully over heels due to edema in B feet    Toileting 21  Dependent  AE    Homemaking 21  TBA        Functional Transfers / Balance:   Date Status DME  Comments   Sit Balance 21  Supervision      Stand Balance 21  Min A  ww    [] Tub  [] Shower   Transfer 21  TBA     Commode   Transfer 21  Max A  BSC placed over commode     Functional   Mobility 21  Mod A  ww A few feet in pt's room    Other:sit to stand from w/c to ww       Bed mobility supine to sit  21 Mod A/Max A         Min A  ww Assist with sit to stand at ww level. Levels vary depending on height of seat surface      Functional Exercises / Activity:   pt engaged in static standing at high/low table with pt being able to tolerate ~>1 minute of standing with pt unable to complete dynamic task removing UE hand support from high/low table.  Pt completed static standing focusing on standing tolerance to increase independence with transfers/mobility and ADL tasks    Pt completed B UE ex's focusing on UE strength/endurance to increase independence with transfers/mobility and ADL tasks;   MIN resistant can do bar 2 x 15 reps;   3# selam shoulder ladder up/down 2 x 10 reps; Arm bike 5 mins for 3 reps       Sensory / Neuromuscular Re-Education:      Cognitive Skills:   Status Comments   Problem   Solving good     Memory good     Sequencing good     Safety fair       Visual Perception:    Education:  Pt was educated hand placement during sit to stand and stand to sit transfers at ww level     [] Family teach completed on:    Pain Level: 8/10 RLE upon standing     Additional Notes:       Patient has made fair  progress during treatment sessions toward set goals. Therapy emphasis to obtain goals:Current Treatment Recommendations: Strengthening, Gait Training, Patient/Caregiver Education & Training, Home Management Training, ROM, Equipment Evaluation, Education, & procurement, Balance Training, Functional Mobility Training, Endurance Training, Safety Education & Training, Self-Care / ADL    [x] Continue with current OT Plan of care.   [] Prepare for Discharge    Long term goals  Time Frame for Long term goals : 2- 3 weeks to address above problema reas  Long term goal 1: Pt demo Mod I to eat all meals  Long term goal 2: Pt demo Mod I grooming seated or standing  Long term goal 3: Pt demo SBA-CGA bathing @ sink level or shower level with AE as needed  Long term goal 4: Pt demo Mod I to don LSO brace & shirt & Mod I to don underwear,, pants, socks & shoes using AE as needed  Long term goal 5: Pt demo Mod I commode trf with appropriate DME to ensure pt safety  Long term goals 6: Pt demo SBA walk in shower trf wtih appropriate DME to ensure pt safety  Long term goal 7: Pt demo Mod I light homemaking activity & demo a G awareness of spinal precautions  Long term goal 8: Pt demo G- endurance for a 20 minute functional activity  Long term goal 9: Pt will state & adhere to spinal precautions during ADLs, transfesr & mobitliy with a 100% accuracy       DISCHARGE RECOMMENDATIONS  Recommended DME:    Post Discharge Care:   []Home Independently  []Home with 24hr Care / Supervision [x]Home with Partial Supervision []Home with Home Health OT []Home with Out Pt OT []Other: ___   Comments:         Time in Time out Tx Time Breakdown  Variance:   First Session  0915  1000  [x] Individual Tx- 45   [] Concurrent Tx -  [] Co-Tx -   [] Group Tx -   [] Time Missed -     Second Session [id number]    [x] Individual Tx-45  [] Concurrent Tx   [] Co-Tx -   [] Group Tx -   [] Time Missed                  Third Session    [] Individual Tx-   [] Concurrent Tx -  [] Co-Tx -   [] Group Tx -   [] Time Missed -         Total Tx Time: 90 mins          Kathryn RICE/L [address] OTR/L [id number]

## 2021-05-22 LAB
METER GLUCOSE: 109 MG/DL (ref 74–99)
METER GLUCOSE: 115 MG/DL (ref 74–99)
METER GLUCOSE: 122 MG/DL (ref 74–99)
METER GLUCOSE: 124 MG/DL (ref 74–99)
ORGANISM: ABNORMAL
URINE CULTURE, ROUTINE: ABNORMAL
URINE CULTURE, ROUTINE: ABNORMAL

## 2021-05-22 PROCEDURE — 6370000000 HC RX 637 (ALT 250 FOR IP): Performed by: PHYSICAL MEDICINE & REHABILITATION

## 2021-05-22 PROCEDURE — 6360000002 HC RX W HCPCS: Performed by: PHYSICIAN ASSISTANT

## 2021-05-22 PROCEDURE — 1280000000 HC REHAB R&B

## 2021-05-22 PROCEDURE — 94640 AIRWAY INHALATION TREATMENT: CPT

## 2021-05-22 PROCEDURE — 6370000000 HC RX 637 (ALT 250 FOR IP): Performed by: PHYSICIAN ASSISTANT

## 2021-05-22 PROCEDURE — 51798 US URINE CAPACITY MEASURE: CPT

## 2021-05-22 PROCEDURE — 2580000003 HC RX 258: Performed by: PHYSICIAN ASSISTANT

## 2021-05-22 PROCEDURE — 97530 THERAPEUTIC ACTIVITIES: CPT

## 2021-05-22 PROCEDURE — 99232 SBSQ HOSP IP/OBS MODERATE 35: CPT | Performed by: PHYSICAL MEDICINE & REHABILITATION

## 2021-05-22 PROCEDURE — 82962 GLUCOSE BLOOD TEST: CPT

## 2021-05-22 RX ORDER — LEVOFLOXACIN 750 MG/1
750 TABLET ORAL DAILY
Status: DISCONTINUED | OUTPATIENT
Start: 2021-05-22 | End: 2021-05-26

## 2021-05-22 RX ADMIN — SENNOSIDES AND DOCUSATE SODIUM 1 TABLET: 8.6; 5 TABLET ORAL at 21:40

## 2021-05-22 RX ADMIN — LEVOFLOXACIN 750 MG: 750 TABLET, FILM COATED ORAL at 15:00

## 2021-05-22 RX ADMIN — DIAZEPAM 5 MG: 5 TABLET ORAL at 22:13

## 2021-05-22 RX ADMIN — ROPINIROLE HYDROCHLORIDE 2 MG: 2 TABLET, FILM COATED ORAL at 21:40

## 2021-05-22 RX ADMIN — Medication 10 ML: at 09:00

## 2021-05-22 RX ADMIN — IPRATROPIUM BROMIDE AND ALBUTEROL SULFATE 1 AMPULE: 2.5; .5 SOLUTION RESPIRATORY (INHALATION) at 21:29

## 2021-05-22 RX ADMIN — BACLOFEN 10 MG: 10 TABLET ORAL at 21:39

## 2021-05-22 RX ADMIN — ENOXAPARIN SODIUM 40 MG: 40 INJECTION SUBCUTANEOUS at 21:38

## 2021-05-22 RX ADMIN — GABAPENTIN 600 MG: 300 CAPSULE ORAL at 13:40

## 2021-05-22 RX ADMIN — Medication 10 ML: at 21:14

## 2021-05-22 RX ADMIN — IPRATROPIUM BROMIDE AND ALBUTEROL SULFATE 1 AMPULE: 2.5; .5 SOLUTION RESPIRATORY (INHALATION) at 08:55

## 2021-05-22 RX ADMIN — BUMETANIDE 2 MG: 1 TABLET ORAL at 09:02

## 2021-05-22 RX ADMIN — IPRATROPIUM BROMIDE AND ALBUTEROL SULFATE 1 AMPULE: 2.5; .5 SOLUTION RESPIRATORY (INHALATION) at 12:40

## 2021-05-22 RX ADMIN — BUPROPION HYDROCHLORIDE 150 MG: 150 TABLET, EXTENDED RELEASE ORAL at 18:04

## 2021-05-22 RX ADMIN — POLYETHYLENE GLYCOL 3350 17 G: 17 POWDER, FOR SOLUTION ORAL at 09:00

## 2021-05-22 RX ADMIN — BISACODYL 5 MG: 5 TABLET, COATED ORAL at 09:01

## 2021-05-22 RX ADMIN — BACLOFEN 10 MG: 10 TABLET ORAL at 09:01

## 2021-05-22 RX ADMIN — GABAPENTIN 600 MG: 300 CAPSULE ORAL at 21:39

## 2021-05-22 RX ADMIN — IPRATROPIUM BROMIDE AND ALBUTEROL SULFATE 1 AMPULE: 2.5; .5 SOLUTION RESPIRATORY (INHALATION) at 15:54

## 2021-05-22 RX ADMIN — BACLOFEN 10 MG: 10 TABLET ORAL at 13:41

## 2021-05-22 RX ADMIN — DIAZEPAM 5 MG: 5 TABLET ORAL at 08:59

## 2021-05-22 RX ADMIN — ROPINIROLE HYDROCHLORIDE 2 MG: 2 TABLET, FILM COATED ORAL at 11:43

## 2021-05-22 RX ADMIN — OXYCODONE 5 MG: 5 TABLET ORAL at 00:49

## 2021-05-22 RX ADMIN — ROPINIROLE HYDROCHLORIDE 2 MG: 2 TABLET, FILM COATED ORAL at 09:01

## 2021-05-22 RX ADMIN — POTASSIUM CHLORIDE 20 MEQ: 1500 TABLET, EXTENDED RELEASE ORAL at 08:59

## 2021-05-22 RX ADMIN — GABAPENTIN 600 MG: 300 CAPSULE ORAL at 09:01

## 2021-05-22 RX ADMIN — SENNOSIDES AND DOCUSATE SODIUM 1 TABLET: 8.6; 5 TABLET ORAL at 08:59

## 2021-05-22 RX ADMIN — ROPINIROLE HYDROCHLORIDE 2 MG: 2 TABLET, FILM COATED ORAL at 16:40

## 2021-05-22 ASSESSMENT — PAIN DESCRIPTION - PROGRESSION
CLINICAL_PROGRESSION: NOT CHANGED

## 2021-05-22 ASSESSMENT — PAIN SCALES - GENERAL
PAINLEVEL_OUTOF10: 5
PAINLEVEL_OUTOF10: 8
PAINLEVEL_OUTOF10: 10
PAINLEVEL_OUTOF10: 0

## 2021-05-22 ASSESSMENT — PAIN DESCRIPTION - DESCRIPTORS: DESCRIPTORS: CONSTANT;DISCOMFORT;ACHING

## 2021-05-22 ASSESSMENT — PAIN DESCRIPTION - PAIN TYPE: TYPE: CHRONIC PAIN

## 2021-05-22 ASSESSMENT — PAIN DESCRIPTION - FREQUENCY: FREQUENCY: CONTINUOUS

## 2021-05-22 ASSESSMENT — PAIN DESCRIPTION - LOCATION: LOCATION: BACK

## 2021-05-22 ASSESSMENT — PAIN DESCRIPTION - ONSET: ONSET: GRADUAL

## 2021-05-22 NOTE — PROGRESS NOTES
Physical Therapy    Facility/Department: [address] REHAB  Treatment Note    NAME: Kirk Robles  : 1955  MRN: [id number]    Date of Service: 2021  Evaluating Therapist: Cory Castellanos P.T.    ROOM: [facility]  DIAGNOSIS: Lumbar spondylolisthesis  PRECAUTIONS: Fall risk, spinal with LSO   PROCEDURE(S):  L3/L4 PLIF  HPI: The pt was admitted on  for elective back surgery secondary to spinal stenosis with LBP and associated RLE radicular symptoms    Social:  Pt lives with her sister who is in poor health in a 2 floor plan with 1st floor set up with 2 steps and 2 rails to enter. Prior to admission pt ambulated with a Rolator WW for B knee pain and back pain for the 4 months before admission and was Independent. Initial Evaluation  Date: 21 AM     PM    Short Term Goals Long Term Goals    Was pt agreeable to Eval/treatment? Yes Yes NA     Does pt have pain? 8/10 low back pain at rest, increases with standing Pt reported 9/10 back and RLE pain. RN aware. Bed Mobility  Rolling: NT  Supine to sit: Max A  Sit to supine: Max A  Scooting: NT NT, patient up in WC  SBA Modified Independent   Transfers Sit to stand: Max A  Stand to sit: Max A  Stand pivot:  Max A    5xSTS: TBD Sit to stand: Min A  Stand to sit: Min A  Stand pivot: Min A with [employer]  Min A Modified Independent  5xSTS: TBD   Ambulation    10 feet with Bariatric [employer] with Mod A x2 (WC follow)  10mWT: TBD  6mWT: TBD 25 feet with Bariatric WW with Min A  25 feet with AAD with Min A >50 feet with AAD with Modified [place]    10mWT: TBD  6mWT: TBD   Walking 10 feet on uneven surface NT NT  10 feet with AAD with Min A >10 feet with AAD with Modified [place]   Wheel Chair Mobility NT NT      Car Transfers NT NT  Min A Modified Independent   Stair negotiation: ascended and descended  NT 1 step with 2 rails 2x with Min A  Ascend fwd, descend retro  2 steps with 2 rails with Min A >4 steps with 2 rails with Modified [place]   Curb Step:   ascended and descended NT NT  2 inch step with AAD and Mod A 4 inch step with AAD and Modified [place]   BLE ROM WFL, R hip flexion limited by weakness       BLE Strength RLE is grossly 2/5 at the hip, 3+/5 at the knee, and 4-/5 at the ankle  LLE is grossly 4/5       Balance  Seated: Supervision  Standing: Mod A    BBS: TBD  FGA: TBD Seated: Supervision  Standing: Min A     BBS: TBD  FGA: TBD   Date Family Teach Completed No family present at eval       Is additional Family Teaching Needed? Y or N Yes       Hindering Progress Weakness, pain Weakness, decreased endurance, pain      PT recommended ELOS 2 weeks       Team's Discharge Plan        Therapist at Team Meeting          Therapeutic Exercise:   AM:   Sit<>stand x10   Ambulation: 25 feet x 2 with bariatric  Stand pivot transfer x4 with bariatric WW   PM:   NT    Additional Comments: Patient found seated in WC and reported fatigue and pain. Patient reported not wanting to take too many pain medications. Patient educated on proper hand placement with STS transfers as she would pull self up to walker. Patient required increased time and effort to push up from the [facility] and reported not feeling safe. WC arm rests reversed to provide higher surface and patient able to perform STS with less effort. Patient demonstrated forward flexed posture, downward gaze, and laborious step to gait pattern. LLE lag noted with gait. Patient educated on posture and forward gaze but unable to carryover due to pain. Patient reported increased knee pain and not wanting to perform full set of stairs and performed 1 step twice as noted, and unable to continue due to the pain. Patient voiced concern with not doing as well functionally as previous session. Patient/family education  Pt/family educated on hand placement with transfers, posture with ambulation.      Patient/family response to education:   Pt/family verbalized understanding Pt/family demonstrated skill Pt/family requires further education in this area   Yes Yes with cues and assist Yes     AM  Time in: 1055  Time out: 1125    PM  Time in: NA  Time out: NA    Pt is making fair progress toward established Physical Therapy goals. Continue with physical therapy current plan of care.     Judy Kelly, PT, DPT  License [id number]

## 2021-05-22 NOTE — PROGRESS NOTES
Occupational Therapy  OCCUPATIONAL THERAPY DAILY NOTE    Date:2021  Patient Name: Jaqui Rodriguez  MRN: [id number]  : 1955  [facility]     Diagnosis: lumbar spiondylolisthesis,m spinal stenosis- s/p L3-4 PLIF  Additional Pertinent Hx: OA, hx  CVA, HX covid  2021, PVD, low back pain, hx gastric bypass , hx catarct & hx cellulitis & peripherl vision loss    Restrictions: Fall Risk, LSO, spinal precautions & RLE numb & tingling @ her knees to her thigh      Functional Assessment:   Date Status AE  Comments   Feeding 21  Set up      Grooming 21  Set up  Pt washed face & hands along with applying deodorant while seated on EOB after set up. Oral Care 21  Supervision (seated )  Oral care seated on EOB   Bathing 21  UB- SBA  LB- mod/max A  Pt able to wash UB while seated on EOB following back precautions. LB skills required mod/max A to reach legs/feet however patient did wash B thighs and completed partial maria isabel care seated on EOB. UB Dressing 21  SBA  To perla tank top & blouse and able to perla/doff back brace needing time. LB Dressing 21  Mod/max  A Reacher  To perla briefs and pants seated on EOB needing cues using reacher. Max assist with clothing mgmt standing at w. Walker level    Footwear 21  SBA- sock aid   Max A- shoes  Long shoe horn  . Pt donned socks using sock aid including threading garment on device at SBA. Max assist with shoes due to swelling in BLE's and feet,    Toileting 21  Dependent  AE 21 Pettit present  Unable to assess toileting since patient used commode earlier to move bowels.     Homemaking 21  TBA        Functional Transfers / Balance:   Date Status DME  Comments   Sit Balance 21  Supervision   Sitting balance on EOB during ADL;s along with up in w/c    Stand Balance 21  Min A  ww Standing balance performed during clothing mgmt at w.walker level then EOB to w/c transfer.    [] Tub  [] Shower Transfer 5/19/21  TBA     Commode   Transfer 5/20/21  Max A  BSC placed over commode     Functional   Mobility 5/21/21  Mod A  ww    Other:sit to stand from EOB to ww    Ww>w/c       Supine to sit  5/22/21 5/22/21 5/22/21 Min A x2      Min A       SBA ww            Bed rail Pt completed sit to stand transfers from EOB to walker with bed height adjusted higher per patient request.  X2 assist for patient safety during sit to stand and over to w/c using w. Walker. Pt able to perform log rolling for supine to EOB transfers      Functional Exercises / Activity:  Pt completed am ADL's to increase independence with dressing, grooming, bathing and transfers. See above for details. Pt needing x2 assist for safety during sit to stand transfers from EOB to w. Walker along with ww>w/c task. Sensory / Neuromuscular Re-Education:      Cognitive Skills:   Status Comments   Problem   Solving good     Memory good     Sequencing good     Safety fair       Visual Perception:    Education:  Pt  Educated using A.E to increase LB dressing skills to improve ADL's while following back precautions. Pt educated with proper hand placement during sit to stand transfers to increase safety. [] Family teach completed on:    Pain Level: 7/10 RLE during  standing     Additional Notes:       Patient has made fair + progress during treatment sessions toward set goals. Therapy emphasis to obtain goals:Current Treatment Recommendations: Strengthening, Gait Training, Patient/Caregiver Education & Training, Home Management Training, ROM, Equipment Evaluation, Education, & procurement, Balance Training, Functional Mobility Training, Endurance Training, Safety Education & Training, Self-Care / ADL    [x] Continue with current OT Plan of care.   [] Prepare for Discharge    Long term goals  Time Frame for Long term goals : 2- 3 weeks to address above problema reas  Long term goal 1: Pt demo Mod I to eat all meals  Long term goal 2: Pt demo Mod I grooming seated or standing  Long term goal 3: Pt demo SBA-CGA bathing @ sink level or shower level with AE as needed  Long term goal 4: Pt demo Mod I to don LSO brace & shirt & Mod I to don underwear,, pants, socks & shoes using AE as needed  Long term goal 5: Pt demo Mod I commode trf with appropriate DME to ensure pt safety  Long term goals 6: Pt demo SBA walk in shower trf wtih appropriate DME to ensure pt safety  Long term goal 7: Pt demo Mod I light homemaking activity & demo a G awareness of spinal precautions  Long term goal 8: Pt demo G- endurance for a 20 minute functional activity  Long term goal 9: Pt will state & adhere to spinal precautions during ADLs, transfesr & mobitliy with a 100% accuracy       DISCHARGE RECOMMENDATIONS  Recommended DME:    Post Discharge Care:   []Home Independently  []Home with 24hr Care / Supervision [x]Home with Partial Supervision []Home with Home Health OT []Home with Out Pt OT []Other: ___   Comments:         Time in Time out Tx Time Breakdown  Variance:   First Session  8:25am 9:20am [x] Individual Tx- 55  [] Concurrent Tx -  [] Co-Tx -   [] Group Tx -   [] Time Missed -     Second Session   [] Individual Tx-  [] Concurrent Tx   [] Co-Tx -   [] Group Tx -   [] Time Missed                  Third Session    [] Individual Tx-   [] Concurrent Tx -  [] Co-Tx -   [] Group Tx -   [] Time Missed -         Total Tx Time: 55 mins     Kaela V[facility]quezAdventHealth [facility] Parkway

## 2021-05-22 NOTE — PROGRESS NOTES
[facility]  Comprehensive Progress Note    Subjective:      Joya Rivera is a 60 y.o. female admitted to inpatient rehabilitation for impairments and acitivities limitations in ADLs and mobility secondary to L3-4 spondylolisthesis, now s/p L3-L4 PLIF. No acute events overnight. Patient feeling better today. No CP, SOB, no cough, no N/V. Chest x-ray was negative for acute process. Labs are stable. The patient's medical records have been reviewed. Scheduled Meds:levoFLOXacin, 750 mg, Daily  bisacodyl, 5 mg, Daily  polyethylene glycol, 17 g, Daily  sennosides-docusate sodium, 1 tablet, BID  sodium chloride flush, 5-40 mL, 2 times per day  baclofen, 10 mg, TID  bumetanide, 2 mg, Daily  Buprenorphine HCl, 450 mcg, BID  buPROPion, 150 mg, QPM  enoxaparin, 40 mg, Daily  insulin lispro, 0-12 Units, TID WC  insulin lispro, 0-6 Units, Nightly  ipratropium-albuterol, 1 ampule, Q4H WA  potassium chloride, 20 mEq, Daily with breakfast  rOPINIRole, 2 mg, 4x daily  gabapentin, 600 mg, TID      Continuous Infusions:  PRN Meds:oxyCODONE, 5 mg, Q6H PRN  glucagon (rDNA), 1 mg, PRN  glucose, 15 g, PRN  acetaminophen, 650 mg, Q4H PRN  ammonium lactate, , Q2H PRN  benzocaine-menthol, 1 lozenge, Q2H PRN  diazePAM, 5 mg, Q6H PRN  ondansetron, 4 mg, Q6H PRN  polyethylene glycol, 17 g, Daily PRN         Objective:      Vitals:    05/21/21 1526 05/21/21 2005 05/22/21 0030 05/22/21 0836   BP: 134/70 (!) 142/71 (!) 140/73 128/74   Pulse: 79 91 90 100   Resp: 20 16 16 18   Temp: 98.9 °F (37.2 °C) 98.3 °F (36.8 °C) 98.2 °F (36.8 °C) 98.9 °F (37.2 °C)   TempSrc: Temporal Oral Oral Oral   SpO2:  93% 93%    Weight:       Height:         General appearance: alert, appears stated age and cooperative. Resting in bed.    Lungs: CTA b/l   Heart: RRR, S1, S2  Abdomen: soft, obese abdomen, NT, normal BS   Extremities: extremities normal, atraumatic, 2+ BLE edema unchanged  Musculoskeletal: Range of motion impaired   Neurologic: Alert, oriented. SILT. Motor 5/5 throughout bilateral upper extremities, 2/5 bilateral hip flexors; 3/5 b/l KE; 4+/5 b/l ankle DF/PF.         Laboratory:    Lab Results   Component Value Date    WBC 10.8 05/21/2021    HGB 9.8 (L) 05/21/2021    HCT 36.4 05/21/2021    MCV 82.7 05/21/2021     05/21/2021     Lab Results   Component Value Date     05/21/2021    K 3.8 05/21/2021    CL 98 05/21/2021    CO2 32 05/21/2021    BUN 23 05/21/2021    CREATININE 0.7 05/21/2021    GLUCOSE 129 05/21/2021    CALCIUM 8.7 05/21/2021      Lab Results   Component Value Date    ALT 19 05/10/2021    AST 18 05/10/2021    ALKPHOS 113 (H) 05/10/2021    BILITOT 0.2 05/10/2021       Lab Results   Component Value Date    COLORU Yellow 05/20/2021    NITRU POSITIVE 05/20/2021    GLUCOSEU Negative 05/20/2021    KETUA Negative 05/20/2021    UROBILINOGEN 1.0 05/20/2021    BILIRUBINUR Negative 05/20/2021     Lab Results   Component Value Date    LABA1C 5.3 03/10/2021         Functional Status:   Physical Therapy:  Bed mobility: Max A  Transfers: Max A  Ambulation: 10 ft bariatric [employer] Mod A x2     Occupational Therapy:  Feeding: Setup  Grooming: Supervision   UB dressing: Dependent   LB dressing: Dependent          Assessment/Plan:       60 y.o. female admitted to inpatient rehabilitation for impairments and acitivities limitations in ADLs and mobility secondary to L3-4 spondylolisthesis, now s/p L3-L4 PLIF        -L3-4 spondylolisthesis: s/p L3-L4 PLIF on 5/13/2021. Spine precautions. Pain control. Begin Acute Rehab evaluations.   -Acute post operative pain: Continue home Butrans patch. PRN oxycodone. PRN tylenol. Will increase gabapentin to 600mg TID. Continue Baclofen. PRN Valium. Wean narcotics and Valium as tolerated. -UTI: Continue Levaquin.  -Post operative urinary retention. Pettit replaced. Void trial when more mobile.    -DVT prophylaxis: Lovenox         UTI - urine culture shows E coli, sensitive to Levaquin. Will change antibiotic to Levaquin to complete course.     Electronically signed by Valerie Castillo MD on 5/22/2021 at 2:50 PM

## 2021-05-22 NOTE — PLAN OF CARE
Problem: Falls - Risk of:  Goal: Will remain free from falls  Description: Will remain free from falls  5/22/2021 1545 by Rahul Arias RN  Outcome: Ongoing     Problem: Falls - Risk of:  Goal: Absence of physical injury  Description: Absence of physical injury  5/22/2021 1545 by Rahul Arias RN  Outcome: Ongoing

## 2021-05-22 NOTE — PLAN OF CARE
Problem: Pain:  Goal: Pain level will decrease  Description: Pain level will decrease  Outcome: Met This Shift  Goal: Control of acute pain  Description: Control of acute pain  Outcome: Met This Shift  Goal: Control of chronic pain  Description: Control of chronic pain  Outcome: Met This Shift  Goal: Patient's pain/discomfort is manageable  Description: Patient's pain/discomfort is manageable  Outcome: Met This Shift     Problem: Falls - Risk of:  Goal: Will remain free from falls  Description: Will remain free from falls  Outcome: Met This Shift  Goal: Absence of physical injury  Description: Absence of physical injury  Outcome: Met This Shift     Problem: Skin Integrity:  Goal: Will show no infection signs and symptoms  Description: Will show no infection signs and symptoms  5/22/2021 0547 by Rhonda Walters RN  Outcome: Met This Shift  5/21/2021 1802 by Samina Alfaro RN  Outcome: Ongoing  Goal: Absence of new skin breakdown  Description: Absence of new skin breakdown  5/22/2021 0547 by Rhonda Walters RN  Outcome: Met This Shift  5/21/2021 1802 by Samina Alfaro RN  Outcome: Ongoing     Problem: Infection:  Goal: Will remain free from infection  Description: Will remain free from infection  Outcome: Met This Shift     Problem: Safety:  Goal: Free from accidental physical injury  Description: Free from accidental physical injury  Outcome: Met This Shift  Goal: Free from intentional harm  Description: Free from intentional harm  Outcome: Met This Shift     Problem: Daily Care:  Goal: Daily care needs are met  Description: Daily care needs are met  Outcome: Met This Shift     Problem: Skin Integrity:  Goal: Skin integrity will stabilize  Description: Skin integrity will stabilize  Outcome: Met This Shift     Problem: Discharge Planning:  Goal: Patients continuum of care needs are met  Description: Patients continuum of care needs are met  Outcome: Met This Shift     Problem: ABCDS Injury Assessment  Goal: Absence of physical injury  Outcome: Met This Shift

## 2021-05-23 LAB
METER GLUCOSE: 104 MG/DL (ref 74–99)
METER GLUCOSE: 139 MG/DL (ref 74–99)
METER GLUCOSE: 168 MG/DL (ref 74–99)
METER GLUCOSE: 256 MG/DL (ref 74–99)

## 2021-05-23 PROCEDURE — 6370000000 HC RX 637 (ALT 250 FOR IP): Performed by: PHYSICIAN ASSISTANT

## 2021-05-23 PROCEDURE — 94640 AIRWAY INHALATION TREATMENT: CPT

## 2021-05-23 PROCEDURE — 82962 GLUCOSE BLOOD TEST: CPT

## 2021-05-23 PROCEDURE — 6370000000 HC RX 637 (ALT 250 FOR IP): Performed by: PHYSICAL MEDICINE & REHABILITATION

## 2021-05-23 PROCEDURE — 1280000000 HC REHAB R&B

## 2021-05-23 PROCEDURE — 6360000002 HC RX W HCPCS: Performed by: PHYSICIAN ASSISTANT

## 2021-05-23 PROCEDURE — 2580000003 HC RX 258: Performed by: PHYSICIAN ASSISTANT

## 2021-05-23 PROCEDURE — 97110 THERAPEUTIC EXERCISES: CPT

## 2021-05-23 RX ADMIN — BACLOFEN 10 MG: 10 TABLET ORAL at 20:22

## 2021-05-23 RX ADMIN — ROPINIROLE HYDROCHLORIDE 2 MG: 2 TABLET, FILM COATED ORAL at 11:19

## 2021-05-23 RX ADMIN — ROPINIROLE HYDROCHLORIDE 2 MG: 2 TABLET, FILM COATED ORAL at 20:22

## 2021-05-23 RX ADMIN — DIAZEPAM 5 MG: 5 TABLET ORAL at 20:23

## 2021-05-23 RX ADMIN — ROPINIROLE HYDROCHLORIDE 2 MG: 2 TABLET, FILM COATED ORAL at 08:35

## 2021-05-23 RX ADMIN — BUPROPION HYDROCHLORIDE 150 MG: 150 TABLET, EXTENDED RELEASE ORAL at 17:09

## 2021-05-23 RX ADMIN — SENNOSIDES AND DOCUSATE SODIUM 1 TABLET: 8.6; 5 TABLET ORAL at 08:34

## 2021-05-23 RX ADMIN — ROPINIROLE HYDROCHLORIDE 2 MG: 2 TABLET, FILM COATED ORAL at 16:30

## 2021-05-23 RX ADMIN — INSULIN LISPRO 2 UNITS: 100 INJECTION, SOLUTION INTRAVENOUS; SUBCUTANEOUS at 17:10

## 2021-05-23 RX ADMIN — SENNOSIDES AND DOCUSATE SODIUM 1 TABLET: 8.6; 5 TABLET ORAL at 20:22

## 2021-05-23 RX ADMIN — GABAPENTIN 600 MG: 300 CAPSULE ORAL at 12:59

## 2021-05-23 RX ADMIN — IPRATROPIUM BROMIDE AND ALBUTEROL SULFATE 1 AMPULE: 2.5; .5 SOLUTION RESPIRATORY (INHALATION) at 15:40

## 2021-05-23 RX ADMIN — BACLOFEN 10 MG: 10 TABLET ORAL at 08:36

## 2021-05-23 RX ADMIN — DIAZEPAM 5 MG: 5 TABLET ORAL at 08:34

## 2021-05-23 RX ADMIN — Medication 10 ML: at 08:33

## 2021-05-23 RX ADMIN — GABAPENTIN 600 MG: 300 CAPSULE ORAL at 08:34

## 2021-05-23 RX ADMIN — ENOXAPARIN SODIUM 40 MG: 40 INJECTION SUBCUTANEOUS at 20:21

## 2021-05-23 RX ADMIN — INSULIN LISPRO 3 UNITS: 100 INJECTION, SOLUTION INTRAVENOUS; SUBCUTANEOUS at 20:20

## 2021-05-23 RX ADMIN — IPRATROPIUM BROMIDE AND ALBUTEROL SULFATE 1 AMPULE: 2.5; .5 SOLUTION RESPIRATORY (INHALATION) at 12:03

## 2021-05-23 RX ADMIN — GABAPENTIN 600 MG: 300 CAPSULE ORAL at 20:22

## 2021-05-23 RX ADMIN — BACLOFEN 10 MG: 10 TABLET ORAL at 12:59

## 2021-05-23 RX ADMIN — POTASSIUM CHLORIDE 20 MEQ: 1500 TABLET, EXTENDED RELEASE ORAL at 08:35

## 2021-05-23 RX ADMIN — BUMETANIDE 2 MG: 1 TABLET ORAL at 08:36

## 2021-05-23 RX ADMIN — POLYETHYLENE GLYCOL 3350 17 G: 17 POWDER, FOR SOLUTION ORAL at 08:33

## 2021-05-23 RX ADMIN — Medication 10 ML: at 20:21

## 2021-05-23 RX ADMIN — BISACODYL 5 MG: 5 TABLET, COATED ORAL at 08:35

## 2021-05-23 RX ADMIN — IPRATROPIUM BROMIDE AND ALBUTEROL SULFATE 1 AMPULE: 2.5; .5 SOLUTION RESPIRATORY (INHALATION) at 19:38

## 2021-05-23 RX ADMIN — LEVOFLOXACIN 750 MG: 750 TABLET, FILM COATED ORAL at 08:36

## 2021-05-23 RX ADMIN — IPRATROPIUM BROMIDE AND ALBUTEROL SULFATE 1 AMPULE: 2.5; .5 SOLUTION RESPIRATORY (INHALATION) at 08:26

## 2021-05-23 ASSESSMENT — PAIN SCALES - GENERAL: PAINLEVEL_OUTOF10: 0

## 2021-05-23 ASSESSMENT — PAIN DESCRIPTION - PROGRESSION
CLINICAL_PROGRESSION: NOT CHANGED
CLINICAL_PROGRESSION: NOT CHANGED

## 2021-05-23 NOTE — PROGRESS NOTES
level     [] Family teach completed on:    Pain Level: 8/10 RLE upon standing     Additional Notes:       Patient has made fair  progress during treatment sessions toward set goals. Therapy emphasis to obtain goals:Current Treatment Recommendations: Strengthening, Gait Training, Patient/Caregiver Education & Training, Home Management Training, ROM, Equipment Evaluation, Education, & procurement, Balance Training, Functional Mobility Training, Endurance Training, Safety Education & Training, Self-Care / ADL    [x] Continue with current OT Plan of care.   [] Prepare for Discharge    Long term goals  Time Frame for Long term goals : 2- 3 weeks to address above problema reas  Long term goal 1: Pt demo Mod I to eat all meals  Long term goal 2: Pt demo Mod I grooming seated or standing  Long term goal 3: Pt demo SBA-CGA bathing @ sink level or shower level with AE as needed  Long term goal 4: Pt demo Mod I to don LSO brace & shirt & Mod I to don underwear,, pants, socks & shoes using AE as needed  Long term goal 5: Pt demo Mod I commode trf with appropriate DME to ensure pt safety  Long term goals 6: Pt demo SBA walk in shower trf wtih appropriate DME to ensure pt safety  Long term goal 7: Pt demo Mod I light homemaking activity & demo a G awareness of spinal precautions  Long term goal 8: Pt demo G- endurance for a 20 minute functional activity  Long term goal 9: Pt will state & adhere to spinal precautions during ADLs, transfesr & mobitliy with a 100% accuracy       DISCHARGE RECOMMENDATIONS  Recommended DME:    Post Discharge Care:   []Home Independently  []Home with 24hr Care / Supervision [x]Home with Partial Supervision []Home with Home Health OT []Home with Out Pt OT []Other: ___   Comments:         Time in Time out Tx Time Breakdown  Variance:   First Session  1000 1030 [x] Individual Tx- 30 min  [] Concurrent Tx -  [] Co-Tx -   [] Group Tx -   [] Time Missed -     Second Session   [] Individual Tx-  [] Concurrent Tx   [] Co-Tx -   [] Group Tx -   [] Time Missed                  Third Session    [] Individual Tx-   [] Concurrent Tx -  [] Co-Tx -   [] Group Tx -   [] Time Missed -         Total Tx Time: 30 mins          [address] [facility] [id number]

## 2021-05-24 DIAGNOSIS — Z98.1 STATUS POST LUMBAR SPINAL FUSION: Primary | ICD-10-CM

## 2021-05-24 LAB
METER GLUCOSE: 116 MG/DL (ref 74–99)
METER GLUCOSE: 126 MG/DL (ref 74–99)

## 2021-05-24 PROCEDURE — 97535 SELF CARE MNGMENT TRAINING: CPT

## 2021-05-24 PROCEDURE — 82962 GLUCOSE BLOOD TEST: CPT

## 2021-05-24 PROCEDURE — 6370000000 HC RX 637 (ALT 250 FOR IP): Performed by: PHYSICIAN ASSISTANT

## 2021-05-24 PROCEDURE — 97110 THERAPEUTIC EXERCISES: CPT

## 2021-05-24 PROCEDURE — 97530 THERAPEUTIC ACTIVITIES: CPT

## 2021-05-24 PROCEDURE — 1280000000 HC REHAB R&B

## 2021-05-24 PROCEDURE — 94640 AIRWAY INHALATION TREATMENT: CPT

## 2021-05-24 PROCEDURE — 6370000000 HC RX 637 (ALT 250 FOR IP): Performed by: PHYSICAL MEDICINE & REHABILITATION

## 2021-05-24 PROCEDURE — 6360000002 HC RX W HCPCS: Performed by: PHYSICIAN ASSISTANT

## 2021-05-24 PROCEDURE — 2580000003 HC RX 258: Performed by: PHYSICIAN ASSISTANT

## 2021-05-24 PROCEDURE — 99232 SBSQ HOSP IP/OBS MODERATE 35: CPT | Performed by: PHYSICAL MEDICINE & REHABILITATION

## 2021-05-24 RX ADMIN — GABAPENTIN 600 MG: 300 CAPSULE ORAL at 21:33

## 2021-05-24 RX ADMIN — ROPINIROLE HYDROCHLORIDE 2 MG: 2 TABLET, FILM COATED ORAL at 15:41

## 2021-05-24 RX ADMIN — IPRATROPIUM BROMIDE AND ALBUTEROL SULFATE 1 AMPULE: 2.5; .5 SOLUTION RESPIRATORY (INHALATION) at 13:16

## 2021-05-24 RX ADMIN — LEVOFLOXACIN 750 MG: 750 TABLET, FILM COATED ORAL at 08:23

## 2021-05-24 RX ADMIN — POTASSIUM CHLORIDE 20 MEQ: 1500 TABLET, EXTENDED RELEASE ORAL at 08:22

## 2021-05-24 RX ADMIN — BUMETANIDE 2 MG: 1 TABLET ORAL at 08:23

## 2021-05-24 RX ADMIN — BACLOFEN 10 MG: 10 TABLET ORAL at 21:32

## 2021-05-24 RX ADMIN — POLYETHYLENE GLYCOL 3350 17 G: 17 POWDER, FOR SOLUTION ORAL at 08:22

## 2021-05-24 RX ADMIN — ENOXAPARIN SODIUM 40 MG: 40 INJECTION SUBCUTANEOUS at 21:31

## 2021-05-24 RX ADMIN — GABAPENTIN 600 MG: 300 CAPSULE ORAL at 08:22

## 2021-05-24 RX ADMIN — BUPROPION HYDROCHLORIDE 150 MG: 150 TABLET, EXTENDED RELEASE ORAL at 21:32

## 2021-05-24 RX ADMIN — IPRATROPIUM BROMIDE AND ALBUTEROL SULFATE 1 AMPULE: 2.5; .5 SOLUTION RESPIRATORY (INHALATION) at 21:29

## 2021-05-24 RX ADMIN — Medication 10 ML: at 21:31

## 2021-05-24 RX ADMIN — SENNOSIDES AND DOCUSATE SODIUM 1 TABLET: 8.6; 5 TABLET ORAL at 08:22

## 2021-05-24 RX ADMIN — BISACODYL 5 MG: 5 TABLET, COATED ORAL at 08:22

## 2021-05-24 RX ADMIN — SENNOSIDES AND DOCUSATE SODIUM 1 TABLET: 8.6; 5 TABLET ORAL at 21:32

## 2021-05-24 RX ADMIN — BACLOFEN 10 MG: 10 TABLET ORAL at 13:26

## 2021-05-24 RX ADMIN — BACLOFEN 10 MG: 10 TABLET ORAL at 08:23

## 2021-05-24 RX ADMIN — ROPINIROLE HYDROCHLORIDE 2 MG: 2 TABLET, FILM COATED ORAL at 08:22

## 2021-05-24 RX ADMIN — Medication 10 ML: at 08:35

## 2021-05-24 RX ADMIN — ROPINIROLE HYDROCHLORIDE 2 MG: 2 TABLET, FILM COATED ORAL at 21:33

## 2021-05-24 RX ADMIN — IPRATROPIUM BROMIDE AND ALBUTEROL SULFATE 1 AMPULE: 2.5; .5 SOLUTION RESPIRATORY (INHALATION) at 16:39

## 2021-05-24 RX ADMIN — ROPINIROLE HYDROCHLORIDE 2 MG: 2 TABLET, FILM COATED ORAL at 11:23

## 2021-05-24 RX ADMIN — GABAPENTIN 600 MG: 300 CAPSULE ORAL at 13:26

## 2021-05-24 ASSESSMENT — PAIN DESCRIPTION - PROGRESSION
CLINICAL_PROGRESSION: NOT CHANGED
CLINICAL_PROGRESSION: NOT CHANGED

## 2021-05-24 ASSESSMENT — PAIN SCALES - GENERAL
PAINLEVEL_OUTOF10: 6
PAINLEVEL_OUTOF10: 1
PAINLEVEL_OUTOF10: 0
PAINLEVEL_OUTOF10: 0
PAINLEVEL_OUTOF10: 8

## 2021-05-24 ASSESSMENT — PAIN DESCRIPTION - ONSET: ONSET: ON-GOING

## 2021-05-24 ASSESSMENT — PAIN DESCRIPTION - PAIN TYPE: TYPE: CHRONIC PAIN

## 2021-05-24 ASSESSMENT — PAIN DESCRIPTION - DESCRIPTORS: DESCRIPTORS: ACHING;CONSTANT;DISCOMFORT

## 2021-05-24 ASSESSMENT — PAIN DESCRIPTION - FREQUENCY: FREQUENCY: CONTINUOUS

## 2021-05-24 ASSESSMENT — PAIN DESCRIPTION - LOCATION: LOCATION: BACK

## 2021-05-24 NOTE — PLAN OF CARE
Problem: Pain:  Description: Pain management should include both nonpharmacologic and pharmacologic interventions.   Goal: Pain level will decrease  Description: Pain level will decrease  5/24/2021 1254 by Benny Cobb RN  Outcome: Met This Shift  5/24/2021 0623 by Cindy Méndez RN  Outcome: Met This Shift  Goal: Control of acute pain  Description: Control of acute pain  5/24/2021 1254 by Benny Cobb RN  Outcome: Met This Shift  5/24/2021 0623 by Cindy Méndez RN  Outcome: Met This Shift  Goal: Control of chronic pain  Description: Control of chronic pain  5/24/2021 1254 by Benny Cobb RN  Outcome: Met This Shift  5/24/2021 0623 by Cindy Méndez RN  Outcome: Met This Shift  Goal: Patient's pain/discomfort is manageable  Description: Patient's pain/discomfort is manageable  5/24/2021 1254 by Benny Cobb RN  Outcome: Met This Shift  5/24/2021 0623 by Cindy Méndez RN  Outcome: Met This Shift     Problem: Falls - Risk of:  Goal: Will remain free from falls  Description: Will remain free from falls  5/24/2021 1254 by Benny Cobb RN  Outcome: Met This Shift  5/24/2021 0623 by Cindy Méndez RN  Outcome: Met This Shift  Goal: Absence of physical injury  Description: Absence of physical injury  5/24/2021 1254 by Benny Cobb RN  Outcome: Met This Shift  5/24/2021 0623 by Cindy Méndez RN  Outcome: Met This Shift     Problem: Skin Integrity:  Goal: Will show no infection signs and symptoms  Description: Will show no infection signs and symptoms  5/24/2021 1254 by Benny Cobb RN  Outcome: Met This Shift  5/24/2021 0623 by Cindy Méndez RN  Outcome: Met This Shift  Goal: Absence of new skin breakdown  Description: Absence of new skin breakdown  5/24/2021 1254 by Benny Cobb RN  Outcome: Met This Shift  5/24/2021 0623 by Cindy Méndez RN  Outcome: Met This Shift     Problem: Infection:  Goal: Will remain free from infection  Description: Will remain free from infection  5/24/2021 1254 by Benny Cobb RN  Outcome: Met This Shift  5/24/2021 0310 by Michele Resendez RN  Outcome: Met This Shift     Problem: Safety:  Goal: Free from accidental physical injury  Description: Free from accidental physical injury  5/24/2021 1254 by Tono Walters RN  Outcome: Met This Shift  5/24/2021 0623 by Michele Resendez RN  Outcome: Met This Shift  Goal: Free from intentional harm  Description: Free from intentional harm  5/24/2021 1254 by Tono Walters RN  Outcome: Met This Shift  5/24/2021 0623 by Michele Resendez RN  Outcome: Met This Shift     Problem: Daily Care:  Goal: Daily care needs are met  Description: Daily care needs are met  5/24/2021 1254 by Tono Walters RN  Outcome: Met This Shift  5/24/2021 0623 by Michele Resendez RN  Outcome: Met This Shift     Problem: Skin Integrity:  Goal: Skin integrity will stabilize  Description: Skin integrity will stabilize  5/24/2021 1254 by Tono Walters RN  Outcome: Met This Shift  5/24/2021 0623 by Michele Resendez RN  Outcome: Met This Shift     Problem: Discharge Planning:  Goal: Patients continuum of care needs are met  Description: Patients continuum of care needs are met  5/24/2021 1254 by Tono Walters RN  Outcome: Met This Shift  5/24/2021 0623 by Michele Resendez RN  Outcome: Ongoing     Problem: ABCDS Injury Assessment  Goal: Absence of physical injury  5/24/2021 1254 by Tono Walters RN  Outcome: Met This Shift  5/24/2021 0623 by Michele Resendez RN  Outcome: Met This Shift

## 2021-05-24 NOTE — PROGRESS NOTES
Occupational Therapy  OCCUPATIONAL THERAPY DAILY NOTE    Date:2021  Patient Name: Alona Franklin  MRN: [id number]  : 1955  Room: [address] [place], [place] [address]     Diagnosis: lumbar spiondylolisthesis,m spinal stenosis- s/p L3-4 PLIF  Additional Pertinent Hx: OA, hx  CVA, HX covid  2021, PVD, low back pain, hx gastric bypass , hx catarct & hx cellulitis & peripherl vision loss    Restrictions: Fall Risk, LSO, spinal precautions & RLE numb & tingling @ her knees to her thigh      Functional Assessment:   Date Status AE  Comments   Feeding 21 Independent      Grooming 21  Setup            Oral Care 21  Setup  (seated )     Bathing 21  UB SBA  LB Mod/Max A      UB Dressing 21  SUP   Assist to adjust LSO brace    LB Dressing 21  Mod /Max A     Footwear 21  Socks -SBA with sock aid  Shoes-Mod A  Sock aid, long shoe horn      Toileting 21  Dependent  AE    Homemaking 21  TBA        Functional Transfers / Balance:   Date Status DME  Comments   Sit Balance 21   Supervision      Stand Balance 21  CGA  ww    [] Tub  [] Shower   Transfer 21  TBA     Commode   Transfer 21   Min A  BSC placed over commode     Functional   Mobility 21  Min A /CGA  ww Throughout therapy apt setting over tile and carpet floor surfaces with ww    Other:sit to stand from w/c to ww       On/off firm recliner with pillows added to increase surface height  21  Min A         Min A  ww        ww      Functional Exercises / Activity:  BUE strength exercises: arm bike 5 mins for 3 reps                                            Green can do bar 3 sets 15 reps   B hand strength with 7 # digi flex 3 sets 15 reps   Standing balance/endurance at table top level at CGA/SBA while engaging RUE in tx activity.  Pt able to stand 3-4 mins before needing a seated rest break       Sensory / Neuromuscular Re-Education:      Cognitive Skills:   Status Comments   Problem

## 2021-05-24 NOTE — PROGRESS NOTES
ERIC MILO [facility] Physical Medicine and Rehabilitation  Comprehensive Progress Note    Subjective:      Bernabe Campbell is a 60 y.o. female admitted to inpatient rehabilitation for impairments and acitivities limitations in ADLs and mobility secondary to L3-4 spondylolisthesis, now s/p L3-L4 PLIF. No acute events overnight. No CP, SOB, no cough, no N/V. Afebrile. Tolerating therapy and making better progress. The patient's medical records have been reviewed. Scheduled Meds:levoFLOXacin, 750 mg, Daily  bisacodyl, 5 mg, Daily  polyethylene glycol, 17 g, Daily  sennosides-docusate sodium, 1 tablet, BID  sodium chloride flush, 5-40 mL, 2 times per day  baclofen, 10 mg, TID  bumetanide, 2 mg, Daily  Buprenorphine HCl, 450 mcg, BID  buPROPion, 150 mg, QPM  enoxaparin, 40 mg, Daily  insulin lispro, 0-12 Units, TID WC  insulin lispro, 0-6 Units, Nightly  ipratropium-albuterol, 1 ampule, Q4H WA  potassium chloride, 20 mEq, Daily with breakfast  rOPINIRole, 2 mg, 4x daily  gabapentin, 600 mg, TID      Continuous Infusions:  PRN Meds:oxyCODONE, 5 mg, Q6H PRN  glucagon (rDNA), 1 mg, PRN  glucose, 15 g, PRN  acetaminophen, 650 mg, Q4H PRN  ammonium lactate, , Q2H PRN  benzocaine-menthol, 1 lozenge, Q2H PRN  diazePAM, 5 mg, Q6H PRN  ondansetron, 4 mg, Q6H PRN  polyethylene glycol, 17 g, Daily PRN         Objective:      Vitals:    05/23/21 2130 05/24/21 0815 05/24/21 1115 05/24/21 1126   BP: 137/80 (!) 142/68     Pulse: 100 88  66   Resp: 18 18     Temp: 97.9 °F (36.6 °C)  98.7 °F (37.1 °C)    TempSrc: Temporal  Oral    SpO2:       Weight:       Height:         General appearance: alert, appears stated age and cooperative. Up in chair. Lungs: CTA b/l   Heart: RRR, S1, S2  Abdomen: soft, obese abdomen, NT, normal BS   Extremities: extremities normal, atraumatic, 2+ BLE edema unchanged  Musculoskeletal: Range of motion impaired   Neurologic: Alert, oriented. SILT.  Motor 5/5 throughout bilateral upper extremities, 2/5 bilateral hip flexors; 3/5 b/l KE; 4+/5 b/l ankle DF/PF.         Laboratory:    Lab Results   Component Value Date    WBC 10.8 05/21/2021    HGB 9.8 (L) 05/21/2021    HCT 36.4 05/21/2021    MCV 82.7 05/21/2021     05/21/2021     Lab Results   Component Value Date     05/21/2021    K 3.8 05/21/2021    CL 98 05/21/2021    CO2 32 05/21/2021    BUN 23 05/21/2021    CREATININE 0.7 05/21/2021    GLUCOSE 129 05/21/2021    CALCIUM 8.7 05/21/2021      Lab Results   Component Value Date    ALT 19 05/10/2021    AST 18 05/10/2021    ALKPHOS 113 (H) 05/10/2021    BILITOT 0.2 05/10/2021       Lab Results   Component Value Date    COLORU Yellow 05/20/2021    NITRU POSITIVE 05/20/2021    GLUCOSEU Negative 05/20/2021    KETUA Negative 05/20/2021    UROBILINOGEN 1.0 05/20/2021    BILIRUBINUR Negative 05/20/2021     Lab Results   Component Value Date    LABA1C 5.3 03/10/2021         Functional Status:   Physical Therapy:  Bed mobility: Max A  Transfers: Min A  Ambulation: 25 ft bariatric [employer] Min A     Occupational Therapy:  Feeding: Setup  Grooming: Supervision   UB dressing: Dependent   LB dressing: Dependent          Assessment/Plan:       60 y.o. female admitted to inpatient rehabilitation for impairments and acitivities limitations in ADLs and mobility secondary to L3-4 spondylolisthesis, now s/p L3-L4 PLIF        -L3-4 spondylolisthesis: s/p L3-L4 PLIF on 5/13/2021. Spine precautions. Pain control. Begin Acute Rehab evaluations.   -Acute post operative pain: Continue home Butrans patch. PRN oxycodone. PRN tylenol. Will increase gabapentin to 600mg TID. Continue Baclofen. PRN Valium. Wean narcotics and Valium as tolerated. -UTI: Continue Levaquin, sensitive per culture.   -Post operative urinary retention. Pettit replaced. Void trial when more mobile. -DVT prophylaxis: Lovenox      Feeling better and much better progress in therapy the last 2 days.  Continue Acute Rehab program      Electronically signed by [employer] Nicky Villagran MD on 5/24/2021 at 2:39 PM

## 2021-05-24 NOTE — PROGRESS NOTES
Physical Therapy    Facility/Department: [address] REHAB  Treatment Note    NAME: Jaqui Rodriguez  : 1955  MRN: [id number]    Date of Service: 2021  Evaluating Therapist: Sung Cooper P.T.    ROOM: [facility]  DIAGNOSIS: Lumbar spondylolisthesis  PRECAUTIONS: Fall risk, spinal with LSO   PROCEDURE(S):  L3/L4 PLIF  HPI: The pt was admitted on  for elective back surgery secondary to spinal stenosis with LBP and associated RLE radicular symptoms    Social:  Pt lives with her sister who is in poor health in a 2 floor plan with 1st floor set up with 2 steps and 2 rails to enter. Prior to admission pt ambulated with a Rolator WW for B knee pain and back pain for the 4 months before admission and was Independent. Initial Evaluation  Date: 21 AM     PM    Short Term Goals Long Term Goals    Was pt agreeable to Eval/treatment? Yes Yes Yes     Does pt have pain? 8/10 low back pain at rest, increases with standing Pt reports pain continues to improve, frustrated with catheter 8/10 back pain with activity     Bed Mobility  Rolling: NT  Supine to sit: Max A  Sit to supine: Max A  Scooting: NT Supervision with all aspects    (simulated rail) Rolling: SBA  Supine<>sit: SBA  Scooting: SBA  (simulated rail, used a leg  at queen bed) SBA Modified Independent   Transfers Sit to stand: Max A  Stand to sit: Max A  Stand pivot:  Max A    5xSTS: TBD Sit<>stand: Supervision  Stand pivot: Supervision with [employer] Sit to stand: Min A  Stand to sit: Supervision  Stand pivot: Supervision with [employer] Min A Modified Independent  5xSTS: TBD   Ambulation    10 feet with Bariatric WW with Mod A x2 (WC follow)  10mWT: TBD  6mWT:  feet in Bariatric WW with  feet in Bariatric WW with SBA 25 feet with AAD with Min A >50 feet with AAD with Modified [place]    10mWT: TBD  6mWT: TBD   Walking 10 feet on uneven surface NT NT NT 10 feet with AAD with Min A >10 feet with AAD with Modified [place]   Wheel Chair Mobility NT NT NT     Car Transfers NT SBA NT Min A Modified Independent   Stair negotiation: ascended and descended  NT 4 steps with 2 rails with SBA NT 2 steps with 2 rails with Min A >4 steps with 2 rails with Modified [place]   Curb Step:   ascended and descended NT NT NT 2 inch step with AAD and Mod A 4 inch step with AAD and Modified [place]   BLE ROM WFL, R hip flexion limited by weakness  WFL, R hip flexion limited by weakness     BLE Strength RLE is grossly 2/5 at the hip, 3+/5 at the knee, and 4-/5 at the ankle  LLE is grossly 4/5  RLE is grossly 2/5 at the hip, 3+/5 at the knee, and 4-/5 at the ankle  LLE is grossly 4/5     Balance  Seated: Supervision  Standing: Mod A    BBS: TBD  FGA: TBD  Seated: Independent  Standing: SBA/Supervision with AD    BBS: TBD  FGA: TBD   Date Family Teach Completed No family present at [facility]  No family present to this date     Is additional Family Teaching Needed? Y or N Yes  TBD     Hindering Progress Weakness, pain  Weakness, pain, limited support     PT recommended ELOS 2 weeks       Team's Discharge Plan        Therapist at Team Meeting          Therapeutic Exercise:   AM:   Ambulation: 2x75 feet, 4x20 feet with bariatric WW with Supervision  Seated alternating BLE nerve glides  Sit<>stand x5  PM:   Sit<>stand at couch with Min A to stand  Sit<>stand x5 at [facility] with Supervision  Sit<>stand at low recliner with SBA  Sit<>stand 2x5 at [facility] with pruple band resisting B hip abduction    Additional Comments: The pt was able to stand with no assistance, she is able to push with one hand on the chair to stand but this causes R thigh and LBP pain, able to stand with both hands on walker with no pain. The pt required more assistance in the afternoon compared to the morning to stand when standing from a low seat in the functional living area. Reviewed bed mobility techniques in functional living area as well and the pt benefited from using a leg . Patient/family education  Pt/family educated on transfer technique, daily mobility to improve strength, gradual improvements. Patient/family response to education:   Pt/family verbalized understanding Pt/family demonstrated skill Pt/family requires further education in this area   Yes Yes Reinforce     AM  Time in: 0830  Time out: 0915    PM  Time in: 1430  Time out: 1515    Pt is making good progress toward established Physical Therapy goals. Continue with physical therapy current plan of care. Hawk Lowry.  [place], [place]  License Number: [id number]

## 2021-05-25 ENCOUNTER — APPOINTMENT (OUTPATIENT)
Dept: GENERAL RADIOLOGY | Age: 66
DRG: 560 | End: 2021-05-25
Attending: PHYSICAL MEDICINE & REHABILITATION
Payer: COMMERCIAL

## 2021-05-25 LAB
ANION GAP SERPL CALCULATED.3IONS-SCNC: 8 MMOL/L (ref 7–16)
ANISOCYTOSIS: ABNORMAL
BASOPHILIC STIPPLING: ABNORMAL
BASOPHILS ABSOLUTE: 0.02 E9/L (ref 0–0.2)
BASOPHILS RELATIVE PERCENT: 0.2 % (ref 0–2)
BUN BLDV-MCNC: 20 MG/DL (ref 6–23)
CALCIUM SERPL-MCNC: 8.2 MG/DL (ref 8.6–10.2)
CHLORIDE BLD-SCNC: 100 MMOL/L (ref 98–107)
CO2: 29 MMOL/L (ref 22–29)
CREAT SERPL-MCNC: 0.7 MG/DL (ref 0.5–1)
EOSINOPHILS ABSOLUTE: 0.15 E9/L (ref 0.05–0.5)
EOSINOPHILS RELATIVE PERCENT: 1.9 % (ref 0–6)
GFR AFRICAN AMERICAN: >60
GFR NON-AFRICAN AMERICAN: >60 ML/MIN/1.73
GLUCOSE BLD-MCNC: 132 MG/DL (ref 74–99)
HCT VFR BLD CALC: 33.6 % (ref 34–48)
HEMOGLOBIN: 9.3 G/DL (ref 11.5–15.5)
HYPOCHROMIA: ABNORMAL
IMMATURE GRANULOCYTES #: 0.07 E9/L
IMMATURE GRANULOCYTES %: 0.9 % (ref 0–5)
LACTIC ACID, SEPSIS: 1.4 MMOL/L (ref 0.5–1.9)
LYMPHOCYTES ABSOLUTE: 1.08 E9/L (ref 1.5–4)
LYMPHOCYTES RELATIVE PERCENT: 13.4 % (ref 20–42)
MCH RBC QN AUTO: 23.4 PG (ref 26–35)
MCHC RBC AUTO-ENTMCNC: 27.7 % (ref 32–34.5)
MCV RBC AUTO: 84.6 FL (ref 80–99.9)
MONOCYTES ABSOLUTE: 0.64 E9/L (ref 0.1–0.95)
MONOCYTES RELATIVE PERCENT: 7.9 % (ref 2–12)
NEUTROPHILS ABSOLUTE: 6.1 E9/L (ref 1.8–7.3)
NEUTROPHILS RELATIVE PERCENT: 75.7 % (ref 43–80)
OVALOCYTES: ABNORMAL
PDW BLD-RTO: 30.7 FL (ref 11.5–15)
PLATELET # BLD: 167 E9/L (ref 130–450)
PMV BLD AUTO: ABNORMAL FL (ref 7–12)
POIKILOCYTES: ABNORMAL
POLYCHROMASIA: ABNORMAL
POTASSIUM REFLEX MAGNESIUM: 3.9 MMOL/L (ref 3.5–5)
RBC # BLD: 3.97 E12/L (ref 3.5–5.5)
SARS-COV-2, NAAT: NOT DETECTED
SODIUM BLD-SCNC: 137 MMOL/L (ref 132–146)
TEAR DROP CELLS: ABNORMAL
WBC # BLD: 8.1 E9/L (ref 4.5–11.5)

## 2021-05-25 PROCEDURE — 97530 THERAPEUTIC ACTIVITIES: CPT

## 2021-05-25 PROCEDURE — 83605 ASSAY OF LACTIC ACID: CPT

## 2021-05-25 PROCEDURE — 1280000000 HC REHAB R&B

## 2021-05-25 PROCEDURE — 6370000000 HC RX 637 (ALT 250 FOR IP): Performed by: PHYSICIAN ASSISTANT

## 2021-05-25 PROCEDURE — 97110 THERAPEUTIC EXERCISES: CPT

## 2021-05-25 PROCEDURE — 81001 URINALYSIS AUTO W/SCOPE: CPT

## 2021-05-25 PROCEDURE — 36415 COLL VENOUS BLD VENIPUNCTURE: CPT

## 2021-05-25 PROCEDURE — 87088 URINE BACTERIA CULTURE: CPT

## 2021-05-25 PROCEDURE — 87040 BLOOD CULTURE FOR BACTERIA: CPT

## 2021-05-25 PROCEDURE — 99232 SBSQ HOSP IP/OBS MODERATE 35: CPT | Performed by: PHYSICAL MEDICINE & REHABILITATION

## 2021-05-25 PROCEDURE — 6360000002 HC RX W HCPCS: Performed by: PHYSICIAN ASSISTANT

## 2021-05-25 PROCEDURE — 87635 SARS-COV-2 COVID-19 AMP PRB: CPT

## 2021-05-25 PROCEDURE — 85025 COMPLETE CBC W/AUTO DIFF WBC: CPT

## 2021-05-25 PROCEDURE — 94640 AIRWAY INHALATION TREATMENT: CPT

## 2021-05-25 PROCEDURE — 71045 X-RAY EXAM CHEST 1 VIEW: CPT

## 2021-05-25 PROCEDURE — 80048 BASIC METABOLIC PNL TOTAL CA: CPT

## 2021-05-25 PROCEDURE — 6370000000 HC RX 637 (ALT 250 FOR IP): Performed by: PHYSICAL MEDICINE & REHABILITATION

## 2021-05-25 PROCEDURE — 2580000003 HC RX 258: Performed by: PHYSICIAN ASSISTANT

## 2021-05-25 PROCEDURE — 97535 SELF CARE MNGMENT TRAINING: CPT

## 2021-05-25 RX ORDER — DIAZEPAM 5 MG/1
2.5 TABLET ORAL EVERY 6 HOURS PRN
Status: DISCONTINUED | OUTPATIENT
Start: 2021-05-25 | End: 2021-06-02

## 2021-05-25 RX ORDER — SODIUM CHLORIDE 9 MG/ML
INJECTION, SOLUTION INTRAVENOUS CONTINUOUS
Status: DISCONTINUED | OUTPATIENT
Start: 2021-05-25 | End: 2021-05-25

## 2021-05-25 RX ADMIN — ACETAMINOPHEN 650 MG: 325 TABLET ORAL at 18:34

## 2021-05-25 RX ADMIN — ROPINIROLE HYDROCHLORIDE 2 MG: 2 TABLET, FILM COATED ORAL at 21:12

## 2021-05-25 RX ADMIN — GABAPENTIN 600 MG: 300 CAPSULE ORAL at 13:18

## 2021-05-25 RX ADMIN — ROPINIROLE HYDROCHLORIDE 2 MG: 2 TABLET, FILM COATED ORAL at 16:30

## 2021-05-25 RX ADMIN — GABAPENTIN 600 MG: 300 CAPSULE ORAL at 07:53

## 2021-05-25 RX ADMIN — SENNOSIDES AND DOCUSATE SODIUM 1 TABLET: 8.6; 5 TABLET ORAL at 21:12

## 2021-05-25 RX ADMIN — BUPROPION HYDROCHLORIDE 150 MG: 150 TABLET, EXTENDED RELEASE ORAL at 21:12

## 2021-05-25 RX ADMIN — ROPINIROLE HYDROCHLORIDE 2 MG: 2 TABLET, FILM COATED ORAL at 07:55

## 2021-05-25 RX ADMIN — LEVOFLOXACIN 750 MG: 750 TABLET, FILM COATED ORAL at 11:49

## 2021-05-25 RX ADMIN — POLYETHYLENE GLYCOL 3350 17 G: 17 POWDER, FOR SOLUTION ORAL at 07:54

## 2021-05-25 RX ADMIN — Medication 10 ML: at 07:56

## 2021-05-25 RX ADMIN — BACLOFEN 10 MG: 10 TABLET ORAL at 21:12

## 2021-05-25 RX ADMIN — SENNOSIDES AND DOCUSATE SODIUM 1 TABLET: 8.6; 5 TABLET ORAL at 07:54

## 2021-05-25 RX ADMIN — ROPINIROLE HYDROCHLORIDE 2 MG: 2 TABLET, FILM COATED ORAL at 11:48

## 2021-05-25 RX ADMIN — IPRATROPIUM BROMIDE AND ALBUTEROL SULFATE 1 AMPULE: 2.5; .5 SOLUTION RESPIRATORY (INHALATION) at 21:00

## 2021-05-25 RX ADMIN — BACLOFEN 10 MG: 10 TABLET ORAL at 07:54

## 2021-05-25 RX ADMIN — BUMETANIDE 2 MG: 1 TABLET ORAL at 07:54

## 2021-05-25 RX ADMIN — DIAZEPAM 5 MG: 5 TABLET ORAL at 07:54

## 2021-05-25 RX ADMIN — POTASSIUM CHLORIDE 20 MEQ: 1500 TABLET, EXTENDED RELEASE ORAL at 07:54

## 2021-05-25 RX ADMIN — GABAPENTIN 600 MG: 300 CAPSULE ORAL at 21:12

## 2021-05-25 RX ADMIN — BISACODYL 5 MG: 5 TABLET, COATED ORAL at 07:54

## 2021-05-25 RX ADMIN — ENOXAPARIN SODIUM 40 MG: 40 INJECTION SUBCUTANEOUS at 21:13

## 2021-05-25 RX ADMIN — BACLOFEN 10 MG: 10 TABLET ORAL at 13:18

## 2021-05-25 RX ADMIN — IPRATROPIUM BROMIDE AND ALBUTEROL SULFATE 1 AMPULE: 2.5; .5 SOLUTION RESPIRATORY (INHALATION) at 16:50

## 2021-05-25 RX ADMIN — Medication 10 ML: at 21:13

## 2021-05-25 ASSESSMENT — PAIN DESCRIPTION - PROGRESSION
CLINICAL_PROGRESSION: NOT CHANGED

## 2021-05-25 ASSESSMENT — PAIN DESCRIPTION - PAIN TYPE
TYPE: CHRONIC PAIN
TYPE: CHRONIC PAIN;ACUTE PAIN

## 2021-05-25 ASSESSMENT — PAIN SCALES - GENERAL
PAINLEVEL_OUTOF10: 0
PAINLEVEL_OUTOF10: 6
PAINLEVEL_OUTOF10: 7
PAINLEVEL_OUTOF10: 6

## 2021-05-25 ASSESSMENT — PAIN DESCRIPTION - LOCATION
LOCATION: HEAD
LOCATION: BACK

## 2021-05-25 ASSESSMENT — PAIN - FUNCTIONAL ASSESSMENT: PAIN_FUNCTIONAL_ASSESSMENT: PREVENTS OR INTERFERES SOME ACTIVE ACTIVITIES AND ADLS

## 2021-05-25 ASSESSMENT — PAIN DESCRIPTION - ONSET
ONSET: ON-GOING
ONSET: ON-GOING

## 2021-05-25 ASSESSMENT — PAIN DESCRIPTION - DESCRIPTORS
DESCRIPTORS: CONSTANT;DISCOMFORT;ACHING;SORE
DESCRIPTORS: DISCOMFORT;ACHING

## 2021-05-25 ASSESSMENT — PAIN DESCRIPTION - FREQUENCY
FREQUENCY: INTERMITTENT
FREQUENCY: CONTINUOUS

## 2021-05-25 ASSESSMENT — PAIN DESCRIPTION - ORIENTATION: ORIENTATION: RIGHT;LEFT;ANTERIOR

## 2021-05-25 NOTE — PROGRESS NOTES
Occupational Therapy  OCCUPATIONAL THERAPY DAILY NOTE    Date:2021  Patient Name: Magda Sellers  MRN: [id number]  : 1955  Room: [address] [place], [place] [address]     Diagnosis: lumbar spiondylolisthesis,m spinal stenosis- s/p L3-4 PLIF  Additional Pertinent Hx: OA, hx  CVA, HX covid  2021, PVD, low back pain, hx gastric bypass , hx catarct & hx cellulitis & peripherl vision loss    Restrictions: Fall Risk, LSO, spinal precautions & RLE numb & tingling @ her knees to her thigh      Functional Assessment:   Date Status AE  Comments   Feeding 21 Independent      Grooming 21  Setup            Oral Care 21  Setup  (seated )     Bathing 21  UB SBA  LB Mod/Max A      UB Dressing 21  SUP      LB Dressing 21  Mod /Max A     Footwear 21  Socks -SBA with sock aid  Shoes-Mod A  Sock aid, long shoe horn      Toileting 21  Dependent  AE    Homemaking 21  Min A (laundry )  Pt completed laundry folding standing at table top level      Functional Transfers / Balance:   Date Status DME  Comments   Sit Balance 21   Supervision      Stand Balance 21   CGA /Min A  ww    [] Tub  [] Shower   Transfer 21  TBA     Commode   Transfer 21   Min A  BSC placed over commode     Functional   Mobility 21  Min A /CGA  ww    Other:sit to stand from w/c to ww       On/off firm recliner with pillows added to increase surface height  21  Min A         Min A  ww        ww      Functional Exercises / Activity:  BUE strength exercises: 2 # dowel selam 3 sets 10 reps in all planes    Pt participated in therapeutic activity with focus on UB ROM, strength , sitting balance/endurance and problem solving. Pt completed activity at supervision level. Pt needed some assist with  problem solving and was noted to repeat the same stories over several times. Standing balance/endurance activity at table top level folding laundry at [address] [facility] for balance.  Pt stood 2 mins x 3 ailyn with a 100% accuracy       DISCHARGE RECOMMENDATIONS  Recommended DME:    Post Discharge Care:   []Home Independently  []Home with 24hr Care / Supervision [x]Home with Partial Supervision []Home with Home Health OT []Home with Out Pt OT []Other: ___   Comments:         Time in Time out Tx Time Breakdown  Variance:   First Session  0915  1000  [x] Individual Tx- 45  [] Concurrent Tx -  [] Co-Tx -   [] Group Tx -   [] Time Missed -     Second Session [id number]  [x] Individual Tx-45  [] Concurrent Tx   [] Co-Tx -   [] Group Tx -   [] Time Missed                  Third Session    [] Individual Tx-   [] Concurrent Tx -  [] Co-Tx -   [] Group Tx -   [] Time Missed -         Total Tx Time: 90 mins      Brad Martinez OTR/L [id number]

## 2021-05-25 NOTE — PROGRESS NOTES
[address] [facility] Physical Medicine and Rehabilitation  Comprehensive Progress Note    Subjective:      Rozina Tolliver is a 60 y.o. female admitted to inpatient rehabilitation for impairments and acitivities limitations in ADLs and mobility secondary to L3-4 spondylolisthesis, now s/p L3-L4 PLIF. No acute events overnight. No cp, sob, n/v. Pain is adequately controlled. Progressing well in therapy. Patient denies complaints. The patient's medical records have been reviewed. Scheduled Meds:levoFLOXacin, 750 mg, Daily  bisacodyl, 5 mg, Daily  polyethylene glycol, 17 g, Daily  sennosides-docusate sodium, 1 tablet, BID  sodium chloride flush, 5-40 mL, 2 times per day  baclofen, 10 mg, TID  bumetanide, 2 mg, Daily  Buprenorphine HCl, 450 mcg, BID  buPROPion, 150 mg, QPM  enoxaparin, 40 mg, Daily  ipratropium-albuterol, 1 ampule, Q4H WA  potassium chloride, 20 mEq, Daily with breakfast  rOPINIRole, 2 mg, 4x daily  gabapentin, 600 mg, TID      Continuous Infusions:  PRN Meds:oxyCODONE, 5 mg, Q6H PRN  acetaminophen, 650 mg, Q4H PRN  ammonium lactate, , Q2H PRN  benzocaine-menthol, 1 lozenge, Q2H PRN  diazePAM, 5 mg, Q6H PRN  ondansetron, 4 mg, Q6H PRN  polyethylene glycol, 17 g, Daily PRN         Objective:      Vitals:    05/24/21 0815 05/24/21 1115 05/24/21 1126 05/25/21 0751   BP: (!) 142/68   137/82   Pulse: 88  66 102   Resp: 18   18   Temp:  98.7 °F (37.1 °C)  98.6 °F (37 °C)   TempSrc:  Oral  Temporal   SpO2:       Weight:       Height:         General appearance: alert, appears stated age and cooperative. Up in chair. Lungs: CTA b/l   Heart: RRR, S1, S2  Abdomen: soft, obese abdomen, NT, normal BS   Extremities: extremities normal, atraumatic, 2+ BLE edema unchanged  Musculoskeletal: Range of motion impaired   Skin: surgical incision c/d/i   Neurologic: Alert, oriented. SILT. Motor 5/5 throughout bilateral upper extremities, 2/5 bilateral hip flexors; 4-/5 b/l KE; 4+/5 b/l ankle DF/PF.          Laboratory:    Lab Results   Component Value Date    WBC 10.8 05/21/2021    HGB 9.8 (L) 05/21/2021    HCT 36.4 05/21/2021    MCV 82.7 05/21/2021     05/21/2021     Lab Results   Component Value Date     05/21/2021    K 3.8 05/21/2021    CL 98 05/21/2021    CO2 32 05/21/2021    BUN 23 05/21/2021    CREATININE 0.7 05/21/2021    GLUCOSE 129 05/21/2021    CALCIUM 8.7 05/21/2021      Lab Results   Component Value Date    ALT 19 05/10/2021    AST 18 05/10/2021    ALKPHOS 113 (H) 05/10/2021    BILITOT 0.2 05/10/2021       Lab Results   Component Value Date    COLORU Yellow 05/20/2021    NITRU POSITIVE 05/20/2021    GLUCOSEU Negative 05/20/2021    KETUA Negative 05/20/2021    UROBILINOGEN 1.0 05/20/2021    BILIRUBINUR Negative 05/20/2021     Lab Results   Component Value Date    LABA1C 5.3 03/10/2021       Functional Status:   Physical Therapy:  Bed mobility: SBA-Min A  Transfers: Min A  Ambulation: 75 ft bariatric [employer] SBA     Occupational Therapy:  Feeding: Independent   Grooming: Setup  UB dressing: Supervision   LB dressing: Mod-Max A        Assessment/Plan:       60 y.o. female admitted to inpatient rehabilitation for impairments and acitivities limitations in ADLs and mobility secondary to L3-4 spondylolisthesis, now s/p L3-L4 PLIF        -L3-4 spondylolisthesis: s/p L3-L4 PLIF on 5/13/2021. Spine precautions. Pain control. Continue Acute Rehab program.   -Acute post operative pain: Continue home Butrans patch. PRN oxycodone. PRN tylenol. Continue gabapentin. Continue Baclofen. PRN Valium. Wean narcotics and Valium as tolerated.   -UTI: Continue Levaquin, sensitive per culture.   -Post operative urinary retention. Pettit replaced. Void trial when more mobile.   -DVT prophylaxis: Lovenox      Mobility improving, plan for void trial in near future.      Decrease valium to 2.6mg q6 PRN    Continue acute rehab program, progressing well with therapy    Electronically signed by Carissa Huitron MD on 5/25/2021 at 10:32 AM

## 2021-05-25 NOTE — PLAN OF CARE
physiological needs. Etiology: ***  Signs/Symptoms: ***  Goal: Food and/or Nutrient Delivery  Description: Individualized approach for food/nutrient provision. 5/25/2021 1147 by Ailyn Birmingham RD, LD  Outcome: Met This Shift     Problem: Pain:  Description: Pain management should include both nonpharmacologic and pharmacologic interventions.   Goal: Pain level will decrease  Description: Pain level will decrease  5/25/2021 1319 by Arvind Viera RN  Outcome: Met This Shift  5/25/2021 1317 by Arvind Viera RN  Outcome: Met This Shift  Goal: Control of acute pain  Description: Control of acute pain  5/25/2021 1319 by Arvind Viera RN  Outcome: Met This Shift  5/25/2021 1317 by Arvind Viera RN  Outcome: Met This Shift  Goal: Control of chronic pain  Description: Control of chronic pain  5/25/2021 1319 by Arvind Viera RN  Outcome: Met This Shift  5/25/2021 1317 by Arvind Viera RN  Outcome: Met This Shift  Goal: Patient's pain/discomfort is manageable  Description: Patient's pain/discomfort is manageable  5/25/2021 1319 by Arvind Viera RN  Outcome: Met This Shift  5/25/2021 1317 by Arvind Viera RN  Outcome: Met This Shift     Problem: Falls - Risk of:  Goal: Will remain free from falls  Description: Will remain free from falls  5/25/2021 1319 by Arvind Viera RN  Outcome: Met This Shift  5/25/2021 1317 by Arvind Viera RN  Outcome: Met This Shift  Goal: Absence of physical injury  Description: Absence of physical injury  5/25/2021 1319 by Arvind Viera RN  Outcome: Met This Shift  5/25/2021 1317 by Arvind Viera RN  Outcome: Met This Shift     Problem: Skin Integrity:  Goal: Will show no infection signs and symptoms  Description: Will show no infection signs and symptoms  5/25/2021 1319 by Arvind Viera RN  Outcome: Met This Shift  5/25/2021 1317 by Arvind Viera RN  Outcome: Met This Shift  Goal: Absence of new skin breakdown  Description: Absence of new skin breakdown  5/25/2021 1319 by Jeffrie Perch, RN  Outcome: Met This Shift  5/25/2021 1317 by Freddie Alvarez RN  Outcome: Met This Shift     Problem: Skin Integrity:  Goal: Will show no infection signs and symptoms  Description: Will show no infection signs and symptoms  5/25/2021 1319 by Freddie Alvarez RN  Outcome: Met This Shift  5/25/2021 1317 by Freddie Alvarez RN  Outcome: Met This Shift  Goal: Absence of new skin breakdown  Description: Absence of new skin breakdown  5/25/2021 1319 by Freddie Alvarez RN  Outcome: Met This Shift  5/25/2021 1317 by Freddie Alvarez RN  Outcome: Met This Shift     Problem: Infection:  Goal: Will remain free from infection  Description: Will remain free from infection  5/25/2021 1319 by Freddie Alvarez RN  Outcome: Met This Shift  5/25/2021 1317 by Freddie Alvarez RN  Outcome: Met This Shift     Problem: Safety:  Goal: Free from accidental physical injury  Description: Free from accidental physical injury  5/25/2021 1319 by Freddie Alvarez RN  Outcome: Met This Shift  5/25/2021 1317 by Freddie Alvarez RN  Outcome: Met This Shift  Goal: Free from intentional harm  Description: Free from intentional harm  5/25/2021 1319 by Freddie Alvarez RN  Outcome: Met This Shift  5/25/2021 1317 by Freddie Alvarez RN  Outcome: Met This Shift     Problem: Safety:  Goal: Free from accidental physical injury  Description: Free from accidental physical injury  5/25/2021 1319 by Freddie Alvarez RN  Outcome: Met This Shift  5/25/2021 1317 by Freddie Alvarez RN  Outcome: Met This Shift  Goal: Free from intentional harm  Description: Free from intentional harm  5/25/2021 1319 by Freddie Alvarez RN  Outcome: Met This Shift  5/25/2021 1317 by Freddie Alvarez RN  Outcome: Met This Shift     Problem: Daily Care:  Goal: Daily care needs are met  Description: Daily care needs are met  5/25/2021 1319 by Freddie Alvarez RN  Outcome: Met This Shift  5/25/2021 1317 by Freddie Alvarez RN  Outcome: Met This Shift     Problem: Skin Integrity:  Goal: Skin integrity will stabilize  Description: Skin integrity will stabilize  5/25/2021 1319 by Freddie Alvarez RN  Outcome: Met This Shift  5/25/2021 1317 by Iliana Bashir RN  Outcome: Met This Shift     Problem: Discharge Planning:  Goal: Patients continuum of care needs are met  Description: Patients continuum of care needs are met  5/25/2021 1319 by Iliana Bashir RN  Outcome: Met This Shift  5/25/2021 1317 by Iliana Bashir RN  Outcome: Met This Shift     Problem: ABCDS Injury Assessment  Goal: Absence of physical injury  5/25/2021 1319 by Iliana Bashir RN  Outcome: Met This Shift  5/25/2021 1317 by Iliana Bashir RN  Outcome: Met This Shift     Problem: Increased nutrient needs (NI-5.1)  Description: Increased need for a specific nutrient compared to established reference standards or recommendations based on physiological needs. Etiology: ***  Signs/Symptoms: ***  Goal: Food and/or Nutrient Delivery  Description: Individualized approach for food/nutrient provision.   5/25/2021 1147 by Merly Howard RD, LD  Outcome: Met This Shift

## 2021-05-25 NOTE — PROGRESS NOTES
LPN notified this RN of new onset of sx of pt, cool, clammy, febrile 102 Tylenol given per order, decreasing fever to 100.6, urine flowing from jean cath, no odor, no sediments observed by this nurse, pt alert, able to answer questions, and give feedback of sx, skin color appropriate, phy notified of sx's, new orders given and placed in computer

## 2021-05-25 NOTE — PROGRESS NOTES
Physical Therapy    Facility/Department: [address] REHAB  Treatment Note    NAME: Bernabe Campbell  : 1955  MRN: [id number]    Date of Service: 2021  Evaluating Therapist: Enedina Caballero. Cnorad Shipley P.T.    ROOM: [facility]  DIAGNOSIS: Lumbar spondylolisthesis  PRECAUTIONS: Fall risk, spinal with LSO   PROCEDURE(S):  L3/L4 PLIF  HPI: The pt was admitted on  for elective back surgery secondary to spinal stenosis with LBP and associated RLE radicular symptoms    Social:  Pt lives with her sister who is in poor health in a 2 floor plan with 1st floor set up with 2 steps and 2 rails to enter. Prior to admission pt ambulated with a Rolator WW for B knee pain and back pain for the 4 months before admission and was Independent. Initial Evaluation  Date: 21 AM     PM    Short Term Goals Long Term Goals    Was pt agreeable to Eval/treatment? Yes Yes Yes     Does pt have pain? 8/10 low back pain at rest, increases with standing Pt reports pain continues to improve, frustrated with catheter 8/10 back pain with activity     Bed Mobility  Rolling: NT  Supine to sit: Max A  Sit to supine: Max A  Scooting: NT Supervision with all aspects    (simulated rail) Rolling: SBA  Sit to supine: Min A  Scooting: SBA  (hospital bed) SBA Modified Independent   Transfers Sit to stand: Max A  Stand to sit: Max A  Stand pivot:  Max A    5xSTS: TBD Sit<>stand: Supervision  Stand pivot: Supervision with [employer] Sit to stand: Min A  Stand to sit: Min A  Stand pivot: Min A with [employer] Min A Modified Independent  5xSTS: TBD   Ambulation    10 feet with Bariatric WW with Mod A x2 (WC follow)  10mWT: TBD  6mWT: TBD 75 feet in Bariatric WW with SBA 4 feet in parallel bars with Min A + WC follow 25 feet with AAD with Min A >50 feet with AAD with Modified [place]    10mWT: TBD  6mWT: TBD   Walking 10 feet on uneven surface NT NT NT 10 feet with AAD with Min A >10 feet with AAD with Modified [place]   Wheel Chair Mobility NT NT NT     Car Transfers NT NT NT Min A Modified Independent   Stair negotiation: ascended and descended  NT NT NT 2 steps with 2 rails with Min A >4 steps with 2 rails with Modified [place]   Curb Step:   ascended and descended NT NT NT 2 inch step with AAD and Mod A 4 inch step with AAD and Modified [place]   BLE ROM WFL, R hip flexion limited by weakness  WFL, R hip flexion limited by weakness     BLE Strength RLE is grossly 2/5 at the hip, 3+/5 at the knee, and 4-/5 at the ankle  LLE is grossly 4/5  RLE is grossly 2/5 at the hip, 3+/5 at the knee, and 4-/5 at the ankle  LLE is grossly 4/5     Balance  Seated: Supervision  Standing: Mod A    BBS: TBD  FGA: TBD  Seated: Independent  Standing: SBA/Supervision with AD    BBS: TBD  FGA: TBD   Date Family Teach Completed No family present at [facility]  No family present to this date     Is additional Family Teaching Needed? Y or N Yes  Yes     Hindering Progress Weakness, pain  Weakness, pain, limited support     PT recommended ELOS 2 weeks       Team's Discharge Plan        Therapist at Team Meeting          Therapeutic Exercise:   AM:   Ambulation: x45 feet, 2x20 feet with bariatric WW with SBA  Sit<>stand x3 at [facility] with SBA  Sit<>stand 3x2 at elevated mat table that was lowered between repetitions with Supervision  Standing hip flexion x5 each LE at step with BUE support on rails with CGA  PM:   Sit<>stand x2 with Min A    Additional Comments: The pt reported increased R hip pain that radiated from the back with standing and ambulating this morning, she was unable to ambulate as great a distance secondary to her R hip pain. The pt was able to lay down and sit up with no leg  when performing bed mobility to and from the R side rather than the L as she had been doing previously.  The pt reported feeling more fatigued this afternoon than she had in the morning, discussed medications with nursing who reported medication changes have already been made and are aware of the pt's increased lethargy. The pt attempted to stand multiple times from her [facility] and was unable, each time stating that she wanted to try again and that she was frustrated because she didn't know what was wrong with her. The pt was reassured that there is sometimes regression during recovery, the pt was able to stand and ambulate for a very short distance in the parallel bars and required a chair be brought for her. The pt was assisted to standing from her [facility] after more attempts and was assisted with a transfer to her hospital bed as well as assisted with positioning once in the bed. Patient/family education  Pt/family educated on bed mobility, hand placement with transfers. Patient/family response to education:   Pt/family verbalized understanding Pt/family demonstrated skill Pt/family requires further education in this area   Yes Yes Reinforce     AM  Time in: 0830  Time out: 0915    PM  Time in: 1430  Time out: 1515    Pt is making good progress toward established Physical Therapy goals. Continue with physical therapy current plan of care. Angel Starr.  Cherie Nevarez, [address] [place] [address]  License Number: [id number]

## 2021-05-25 NOTE — PROGRESS NOTES
Comprehensive Nutrition Assessment    Type and Reason for Visit:  Initial, RD Nutrition Re-Screen/LOS    Nutrition Recommendations/Plan: Continue Diet. Will Start Ensure High Pro ONS BID and [facility] ONS BID. Nutrition Assessment:  Pt adm to [facility] ([facility] Rehab s/p L3-L4 Lumbar Fusion on 5/13 2/2 Spondylolithiasis of Lumbar Region per EMR review. PMHx Gastric Bypass (00'), DM, lymphedema, recent COVID19 (4/2021) noted. Pt at risk d/t increased needs for post-op wound healing. Will Start ONS to aid in PO intake and healing. Malnutrition Assessment:  Malnutrition Status: At risk for malnutrition (Comment)    Context:  Acute Illness     Findings of the 6 clinical characteristics of malnutrition:  Energy Intake:  No significant decrease in energy intake  Weight Loss:  Unable to assess (2/2 poor EMR wt hx at this time)     Body Fat Loss:  No significant body fat loss     Muscle Mass Loss:  No significant muscle mass loss    Fluid Accumulation:  Unable to assess (2/2 lymphedema hx)     Strength:  Not Performed    Estimated Daily Nutrient Needs:  Energy (kcal):  6427-3350 (MSJx1. 2SF); Weight Used for Energy Requirements:  Current     Protein (g):  100-115 (2.0-2.3 gm/kg); Weight Used for Protein Requirements:  Ideal        Fluid (ml/day):  6315-4123; Method Used for Fluid Requirements:  1 ml/kcal      Nutrition Related Findings:  A&O, upper dentures, Abd/BS WDL, +2 BLE edema, -I/O's      Wounds:  Surgical Incision       Current Nutrition Therapies:    DIET GENERAL;     Anthropometric Measures:  · Height: 5' 2\" (157.5 cm)  · Current Body Weight: 270 lb (122.5 kg) (stated 5/18)   · Admission Body Weight: 270 lb (122.5 kg) (stated 5/18)    · Usual Body Weight: 280 lb (127 kg) (actual 4/23/21 per EMR, noted wt fluctuations in wt hx likely 2/2 fluid shifts w/ h/o lymphedema)     · Ideal Body Weight: 110 lbs; % Ideal Body Weight     · BMI: 49.4  · Adjusted Body Weight:  ; No Adjustment   · Adjusted BMI:

## 2021-05-26 LAB
AMORPHOUS: ABNORMAL
BACTERIA: ABNORMAL /HPF
BILIRUBIN URINE: NEGATIVE
BLOOD, URINE: ABNORMAL
CLARITY: ABNORMAL
COLOR: YELLOW
EPITHELIAL CELLS, UA: ABNORMAL /HPF
GLUCOSE URINE: NEGATIVE MG/DL
KETONES, URINE: NEGATIVE MG/DL
LEUKOCYTE ESTERASE, URINE: NEGATIVE
NITRITE, URINE: NEGATIVE
PH UA: 6 (ref 5–9)
PROTEIN UA: 30 MG/DL
RBC UA: ABNORMAL /HPF (ref 0–2)
SPECIFIC GRAVITY UA: >=1.03 (ref 1–1.03)
UROBILINOGEN, URINE: 0.2 E.U./DL
WBC UA: ABNORMAL /HPF (ref 0–5)
YEAST: PRESENT /HPF

## 2021-05-26 PROCEDURE — 97535 SELF CARE MNGMENT TRAINING: CPT

## 2021-05-26 PROCEDURE — 97110 THERAPEUTIC EXERCISES: CPT

## 2021-05-26 PROCEDURE — 1280000000 HC REHAB R&B

## 2021-05-26 PROCEDURE — 2580000003 HC RX 258: Performed by: SPECIALIST

## 2021-05-26 PROCEDURE — 97530 THERAPEUTIC ACTIVITIES: CPT

## 2021-05-26 PROCEDURE — 2580000003 HC RX 258: Performed by: PHYSICIAN ASSISTANT

## 2021-05-26 PROCEDURE — 6370000000 HC RX 637 (ALT 250 FOR IP): Performed by: SPECIALIST

## 2021-05-26 PROCEDURE — 99233 SBSQ HOSP IP/OBS HIGH 50: CPT | Performed by: PHYSICAL MEDICINE & REHABILITATION

## 2021-05-26 PROCEDURE — 94640 AIRWAY INHALATION TREATMENT: CPT

## 2021-05-26 PROCEDURE — 6360000002 HC RX W HCPCS: Performed by: SPECIALIST

## 2021-05-26 PROCEDURE — 6370000000 HC RX 637 (ALT 250 FOR IP): Performed by: PHYSICIAN ASSISTANT

## 2021-05-26 PROCEDURE — 6370000000 HC RX 637 (ALT 250 FOR IP): Performed by: PHYSICAL MEDICINE & REHABILITATION

## 2021-05-26 PROCEDURE — 6360000002 HC RX W HCPCS: Performed by: PHYSICIAN ASSISTANT

## 2021-05-26 RX ORDER — FLUCONAZOLE 100 MG/1
200 TABLET ORAL DAILY
Status: DISCONTINUED | OUTPATIENT
Start: 2021-05-26 | End: 2021-06-04 | Stop reason: HOSPADM

## 2021-05-26 RX ADMIN — SENNOSIDES AND DOCUSATE SODIUM 1 TABLET: 8.6; 5 TABLET ORAL at 20:57

## 2021-05-26 RX ADMIN — ROPINIROLE HYDROCHLORIDE 2 MG: 2 TABLET, FILM COATED ORAL at 16:24

## 2021-05-26 RX ADMIN — BACLOFEN 10 MG: 10 TABLET ORAL at 09:22

## 2021-05-26 RX ADMIN — FLUCONAZOLE 200 MG: 100 TABLET ORAL at 14:25

## 2021-05-26 RX ADMIN — ROPINIROLE HYDROCHLORIDE 2 MG: 2 TABLET, FILM COATED ORAL at 20:57

## 2021-05-26 RX ADMIN — BACLOFEN 10 MG: 10 TABLET ORAL at 20:57

## 2021-05-26 RX ADMIN — BISACODYL 5 MG: 5 TABLET, COATED ORAL at 09:22

## 2021-05-26 RX ADMIN — IPRATROPIUM BROMIDE AND ALBUTEROL SULFATE 1 AMPULE: 2.5; .5 SOLUTION RESPIRATORY (INHALATION) at 22:00

## 2021-05-26 RX ADMIN — ACETAMINOPHEN 650 MG: 325 TABLET ORAL at 13:59

## 2021-05-26 RX ADMIN — BUMETANIDE 2 MG: 1 TABLET ORAL at 09:23

## 2021-05-26 RX ADMIN — POLYETHYLENE GLYCOL 3350 17 G: 17 POWDER, FOR SOLUTION ORAL at 09:25

## 2021-05-26 RX ADMIN — POTASSIUM CHLORIDE 20 MEQ: 1500 TABLET, EXTENDED RELEASE ORAL at 09:22

## 2021-05-26 RX ADMIN — IPRATROPIUM BROMIDE AND ALBUTEROL SULFATE 1 AMPULE: 2.5; .5 SOLUTION RESPIRATORY (INHALATION) at 16:30

## 2021-05-26 RX ADMIN — ENOXAPARIN SODIUM 40 MG: 40 INJECTION SUBCUTANEOUS at 20:59

## 2021-05-26 RX ADMIN — IPRATROPIUM BROMIDE AND ALBUTEROL SULFATE 1 AMPULE: 2.5; .5 SOLUTION RESPIRATORY (INHALATION) at 14:08

## 2021-05-26 RX ADMIN — PIPERACILLIN AND TAZOBACTAM 3375 MG: 3; .375 INJECTION, POWDER, LYOPHILIZED, FOR SOLUTION INTRAVENOUS at 23:15

## 2021-05-26 RX ADMIN — BUPROPION HYDROCHLORIDE 150 MG: 150 TABLET, EXTENDED RELEASE ORAL at 20:57

## 2021-05-26 RX ADMIN — GABAPENTIN 600 MG: 300 CAPSULE ORAL at 09:21

## 2021-05-26 RX ADMIN — ROPINIROLE HYDROCHLORIDE 2 MG: 2 TABLET, FILM COATED ORAL at 09:23

## 2021-05-26 RX ADMIN — GABAPENTIN 600 MG: 300 CAPSULE ORAL at 20:57

## 2021-05-26 RX ADMIN — PIPERACILLIN AND TAZOBACTAM 3375 MG: 3; .375 INJECTION, POWDER, LYOPHILIZED, FOR SOLUTION INTRAVENOUS at 14:15

## 2021-05-26 RX ADMIN — Medication 10 ML: at 21:32

## 2021-05-26 RX ADMIN — LEVOFLOXACIN 750 MG: 750 TABLET, FILM COATED ORAL at 09:24

## 2021-05-26 RX ADMIN — GABAPENTIN 600 MG: 300 CAPSULE ORAL at 14:05

## 2021-05-26 RX ADMIN — ROPINIROLE HYDROCHLORIDE 2 MG: 2 TABLET, FILM COATED ORAL at 12:15

## 2021-05-26 RX ADMIN — SENNOSIDES AND DOCUSATE SODIUM 1 TABLET: 8.6; 5 TABLET ORAL at 09:22

## 2021-05-26 RX ADMIN — BACLOFEN 10 MG: 10 TABLET ORAL at 14:05

## 2021-05-26 RX ADMIN — Medication 10 ML: at 09:24

## 2021-05-26 ASSESSMENT — PAIN SCALES - GENERAL
PAINLEVEL_OUTOF10: 1
PAINLEVEL_OUTOF10: 0

## 2021-05-26 NOTE — PROGRESS NOTES
Pt stated to this nurse that she is not feeling well and does not want to participate in afternoon rehab, oral temp was taken 99.2, pt is laying in bed, this nurse suggested to pt to take a nap, pt obliged

## 2021-05-26 NOTE — PROGRESS NOTES
[facility] Physical Medicine and Rehabilitation  Comprehensive Progress Note    Subjective:      Jaqui Rodriguez is a 60 y.o. female admitted to inpatient rehabilitation for impairments and acitivities limitations in ADLs and mobility secondary to L3-4 spondylolisthesis, now s/p L3-L4 PLIF. Patient with fevers and malaise. Patient has been on Levaquin for the last few days due to UTI. Original urine culture shows E. coli sensitive to Levaquin. She had been improving clinically on the antibiotics and was feeling better the last 2 days, until last evening when she spiked a high fever. Repeat urine culture preliminary shows no growth so far. Chest x-ray was negative for acute process. Rapid Covid was negative. She continues to have fevers, malaise, and was not able to really participate with therapy today due to feeling ill. Internal medicine and ID have been consulted. The patient's medical records have been reviewed.     Scheduled Meds:fluconazole, 200 mg, Daily  sodium chloride, , Q8H   And  piperacillin-tazobactam, 3,375 mg, Q8H  bisacodyl, 5 mg, Daily  polyethylene glycol, 17 g, Daily  sennosides-docusate sodium, 1 tablet, BID  sodium chloride flush, 5-40 mL, 2 times per day  baclofen, 10 mg, TID  bumetanide, 2 mg, Daily  Buprenorphine HCl, 450 mcg, BID  buPROPion, 150 mg, QPM  enoxaparin, 40 mg, Daily  ipratropium-albuterol, 1 ampule, Q4H WA  potassium chloride, 20 mEq, Daily with breakfast  rOPINIRole, 2 mg, 4x daily  gabapentin, 600 mg, TID      Continuous Infusions:  PRN Meds:diazePAM, 2.5 mg, Q6H PRN  oxyCODONE, 5 mg, Q6H PRN  acetaminophen, 650 mg, Q4H PRN  ammonium lactate, , Q2H PRN  benzocaine-menthol, 1 lozenge, Q2H PRN  ondansetron, 4 mg, Q6H PRN  polyethylene glycol, 17 g, Daily PRN         Objective:      Vitals:    05/26/21 0545 05/26/21 0945 05/26/21 1115 05/26/21 1133   BP:  110/60 100/68 131/79   Pulse:  100 100 99   Resp:  16 16    Temp: 98.2 °F (36.8 °C) 98.9 °F (37.2 °C) 100.6 °F (38.1 °C)    TempSrc: Oral Oral Oral    SpO2:   97%    Weight:       Height:         General appearance: alert, appears stated age and cooperative.  Resting in bed. Lungs: CTA b/l   Heart: RRR, S1, S2  Abdomen: soft, obese abdomen, NT, normal BS   Extremities: extremities normal, atraumatic, 2+ BLE edema unchanged  Musculoskeletal: Range of motion impaired   Skin: surgical incision c/d/i   Neurologic: Alert, oriented. SILT. Motor 5/5 throughout bilateral upper extremities, 2/5 bilateral hip flexors; 4-/5 b/l KE; 4+/5 b/l ankle DF/PF.          Laboratory:    Lab Results   Component Value Date    WBC 8.1 05/25/2021    HGB 9.3 (L) 05/25/2021    HCT 33.6 (L) 05/25/2021    MCV 84.6 05/25/2021     05/25/2021     Lab Results   Component Value Date     05/25/2021    K 3.9 05/25/2021     05/25/2021    CO2 29 05/25/2021    BUN 20 05/25/2021    CREATININE 0.7 05/25/2021    GLUCOSE 132 05/25/2021    CALCIUM 8.2 05/25/2021      Lab Results   Component Value Date    ALT 19 05/10/2021    AST 18 05/10/2021    ALKPHOS 113 (H) 05/10/2021    BILITOT 0.2 05/10/2021       Lab Results   Component Value Date    COLORU Yellow 05/25/2021    NITRU Negative 05/25/2021    GLUCOSEU Negative 05/25/2021    KETUA Negative 05/25/2021    UROBILINOGEN 0.2 05/25/2021    BILIRUBINUR Negative 05/25/2021     Lab Results   Component Value Date    LABA1C 5.3 03/10/2021       Functional Status:   Physical Therapy:  Bed mobility: SBA-Min A  Transfers: Min A  Ambulation: 75 ft bariatric [employer] SBA     Occupational Therapy:  Feeding: Independent   Grooming: Setup  UB dressing: Supervision   LB dressing: Mod-Max A        Assessment/Plan:       60 y.o. female admitted to inpatient rehabilitation for impairments and acitivities limitations in ADLs and mobility secondary to L3-4 spondylolisthesis, now s/p L3-L4 PLIF        -L3-4 spondylolisthesis: s/p L3-L4 PLIF on 5/13/2021. Spine precautions. Pain control.  Continue Acute Rehab program. -Acute post operative pain: Continue home Butrans patch. PRN oxycodone. PRN tylenol. Continue gabapentin. Continue Baclofen. PRN Valium. Wean narcotics and Valium as tolerated.   -UTI: Treated with Levaquin, sensitive per culture.   -Post operative urinary retention. Pettit replaced. Void trial when more mobile.   -DVT prophylaxis: Lovenox      Acute febrile illness, despite having been on antibiotics for UTI. Appreciate consultation from Dr. Cayden Kolb and Dr. Maritza Salgado. Patient is now on Zosyn and Diflucan per ID. Follow blood/urine cultures.        Electronically signed by Zeenat Royal MD on 5/26/2021 at 1:05 PM

## 2021-05-26 NOTE — PROGRESS NOTES
Physical Therapy    Facility/Department: [address] REHAB  Treatment Note    NAME: Rozina Tolliver  : 1955  MRN: [id number]    Date of Service: 2021  Evaluating Therapist: Alona Buenrostro P.T.    ROOM: [facility]  DIAGNOSIS: Lumbar spondylolisthesis  PRECAUTIONS: Fall risk, spinal with LSO   PROCEDURE(S):  L3/L4 PLIF  HPI: The pt was admitted on  for elective back surgery secondary to spinal stenosis with LBP and associated RLE radicular symptoms    Social:  Pt lives with her sister who is in poor health in a 2 floor plan with 1st floor set up with 2 steps and 2 rails to enter. Prior to admission pt ambulated with a Rolator WW for B knee pain and back pain for the 4 months before admission and was Independent. Initial Evaluation  Date: 21 AM     PM    Short Term Goals Long Term Goals    Was pt agreeable to Eval/treatment? Yes Yes Yes     Does pt have pain? 8/10 low back pain at rest, increases with standing No c/o pain, stated her R hip feels better today 8/10 back pain with activity     Bed Mobility  Rolling: NT  Supine to sit: Max A  Sit to supine: Max A  Scooting: NT NT Rolling/scooting: Supervision  Supine to sit: Supervision SBA Modified Independent   Transfers Sit to stand: Max A  Stand to sit: Max A  Stand pivot:  Max A    5xSTS: TBD Sit<>stand: Supervision  Stand pivot: Supervision with [employer] Sit<>stand: Supervision  Stand pivot: Supervision with [employer] Min A Modified Independent  5xSTS: TBD   Ambulation    10 feet with Bariatric WW with Mod A x2 (WC follow)  10mWT: TBD  6mWT:  feet in Bariatric WW with  feet in Bariatric WW with SBA 25 feet with AAD with Min A >50 feet with AAD with Modified [place]    10mWT: TBD  6mWT: TBD   Walking 10 feet on uneven surface NT NT NT 10 feet with AAD with Min A >10 feet with AAD with Modified [place]   Wheel Chair Mobility NT NT NT     Car Transfers NT SBA NT Min A Modified Independent   Stair negotiation: ascended and descended NT 4 steps with 2 rails with SBA (nonreciprocal) 4 steps with 2 rails with SBA (nonreciprocal) 2 steps with 2 rails with Min A >4 steps with 2 rails with Modified [place]   Curb Step:   ascended and descended NT NT NT 2 inch step with AAD and Mod A 4 inch step with AAD and Modified [place]   BLE ROM WFL, R hip flexion limited by weakness  WFL, R hip flexion limited by weakness     BLE Strength RLE is grossly 2/5 at the hip, 3+/5 at the knee, and 4-/5 at the ankle  LLE is grossly 4/5  RLE is grossly 2/5 at the hip, 3+/5 at the knee, and 4-/5 at the ankle  LLE is grossly 4/5     Balance  Seated: Supervision  Standing: Mod A    BBS: TBD  FGA: TBD  Seated: Independent  Standing: SBA/Supervision with AD    BBS: TBD  FGA: TBD   Date Family Teach Completed No family present at [facility]  No family present to this date     Is additional Family Teaching Needed? Y or N Yes  Yes     Hindering Progress Weakness, pain  Weakness, pain, limited support     PT recommended ELOS 2 weeks       Team's Discharge Plan        Therapist at Team Meeting          Therapeutic Exercise:   AM:   Ambulation: 2x75 feet, 1x50 feet, 2x25 feet with bariatric WW with Supervision  Sit<>stand 2x3 with Supervision  Stand pivot transfer x4 with bariatric WW with Supervision  PM:   Ambulation: 4x50 feet with WW with SBA/Supervision  Sit<>stand x5 at [facility] (lowered fully) with Supervision  Stand pivot transfer x4 with [employer] with Supervision    Additional Comments: The pt was able to ambulate greater distances and required no physical assistance to complete activities this morning. The pt has a nonproductive cough that is severe and this was communicated to the pt's nurse. The pt was most limited by her feelings of fatigue and her cough. The pt had no remaining cough whatsoever in the afternoon, she was able to stand from multiple low surfaces to include a mat table that was fully lowered and a recliner.  The pt reported she felt better than she had earlier

## 2021-05-26 NOTE — PLAN OF CARE
Call to answ [facility] for inf disease. They informed that dr Meseret Mathis is o/c for tonite. Message sent re the new consult. Aware that dr Adele Conner is also listed on care team but no progress not indicating he was notif yet today.

## 2021-05-26 NOTE — CONSULTS
[address] [place] [address] [facility]    Consultation Note     Admit Date: 5/18/2021  2:50 PM    Reason for Consult:   Fever    Attending Physician:  Robi Warren MD     Chief Complaint: Fever generalized malaise for 2 in spite of being on antibiotics for UTI    HISTORY OF PRESENT ILLNESS:   The patient is a 60 y.o.  woman known to the Infectious Diseases service. The patient is in rehab since 5/18/2021 after 5/13/2021 bilateral segmental arthrodesis. Her white count was normal on admission her BUN and creatinine were normal.  Patient was in rehab and started having low-grade fever. On 5/25/2021 patient had a UA with hematuria, 5-10 white blood cells per high-power field cultures are growing E. coli and apparently on the microscopic of the UA there were yeast as well ID has been asked to evaluate. Over the last few days she has been having a headache which she normally never gets generalized malaise, fatigue and fever. She has had trouble with urinary retention post surgery and has had Pettit catheters in and now currently there is one in place. Chest radiograph is nondiagnostic. Urine culture obtained on 5/20/2020 grew some nonhemolytic strep and E. coli currently her urine clean-catch has no growth; Covid testing was negative. In spite of being on Levaquin since 5/22/2021 patient is still having symptoms and symptoms above. Pulmonary is 97% and ranges to 100%      5/13/2021 patient underwent surgery for bilateral segmental arthrodesis and fusion of L3 and 4 including allografts and pedicle screws. Fasciectomy was performed and subsequently she went to rehab on 5/18/2021. March 2021-cellulitis lower extremity associated with lymphedema; treated with cefazolin and clindamycin and compression dressings.           Past Medical History:        Diagnosis Date    Anemia     Arthritis     Cellulitis 05/2015    left leg    Chronic ulcer of leg with fat layer exposed ([address]  Buprenorphine HCl  450 mcg Oral BID    buPROPion  150 mg Oral QPM    enoxaparin  40 mg Subcutaneous Daily    ipratropium-albuterol  1 ampule Inhalation Q4H WA    potassium chloride  20 mEq Oral Daily with breakfast    rOPINIRole  2 mg Oral 4x daily    gabapentin  600 mg Oral TID     Continuous Infusions:  PRN Meds:diazePAM, oxyCODONE **OR** [DISCONTINUED] oxyCODONE, acetaminophen, ammonium lactate, benzocaine-menthol, ondansetron, polyethylene glycol    Allergies:  Ferritin    Social History:   Social History     Socioeconomic History    Marital status:      Spouse name: Not on file    Number of children: Not on file    Years of education: Not on file    Highest education level: Not on file   Occupational History    Not on file   Tobacco Use    Smoking status: Former Smoker     Packs/day: 0.25     Years: 38.00     Pack years: 9.50     Types: Cigarettes     Quit date: 3/11/2021     Years since quittin.2    Smokeless tobacco: Never Used    Tobacco comment: Pt states she quit [employer]"   Vaping Use    Vaping Use: Never used   Substance and Sexual Activity    Alcohol use: No    Drug use: No    Sexual activity: Not Currently   Other Topics Concern    Not on file   Social History Narrative        Chronic Diagnosis: Iron deficiency anemia, Depressive disorder, Benign essential hypertension, Mixed    hyperlipidemia.     HTN    OBESITY    LIPID    OA    SMOKER    CVA L FIELD VISION    DEPRESSION    GASTRIC BYPASS 01    BREAST REDUCTION--    Cristina PIEDRA 1955 Page #2    ANEMIA==IRON AND B-12    Admitted 2019 with cellulitis left lower extremity then readmitted with chest pain with negative stress test     Social Determinants of Health     Financial Resource Strain: Low Risk     Difficulty of Paying Living Expenses: Not very hard   Food Insecurity: No Food Insecurity    Worried About Running Out of Food in the Last Year: Never true    [place] of Food in the Last Year: Never true   Transportation Needs: No Transportation Needs    Lack of Transportation (Medical): No    Lack of Transportation (Non-Medical): No   Physical Activity:     Days of Exercise per Week:     Minutes of Exercise per Session:    Stress: No Stress Concern Present    Feeling of Stress : Only a little   Social Connections: Unknown    Frequency of Communication with Friends and Family: More than three times a week    Frequency of Social Gatherings with Friends and Family: Not on file    Attends Roman [employer] Services: Not on file    Active Member of Clubs or Organizations: Not on file    Attends Club or Organization Meetings: Not on file    Marital Status: Not on file   Intimate Partner Violence:     Fear of Current or Ex-Partner:     Emotionally Abused:     Physically Abused:     Sexually Abused:      Family History:       Problem Relation Age of Onset    Breast Cancer Mother     Stroke Father     Hypertension Sister     Hypertension Brother    . Otherwise non-pertinent to the chief complaint. REVIEW OF SYSTEMS:    CONSTITUTIONAL: Positive chills, positive   EYES:  No double vision or drainage from eyes, ears or throat. HEENT:  No neck stiffness. No dysphagia. No drainage from eyes, ears or throat  RESPIRATORY: Positive cough, negative productive sputum or hemoptysis. CARDIOVASCULAR:  No chest pain, palpitations, orthopnea or dyspnea on exertion. GASTROINTESTINAL:  No nausea, vomiting, diarrhea or constipation or hematochezia   GENITOURINARY: See history of present illness  INTEGUMENT/BREAST:  No rash or breast masses. HEMATOLOGIC/LYMPHATIC:  No lymphadenopathy or blood dyscrasics. ALLERGIC/IMMUNOLOGIC:  No anaphylaxis. ENDOCRINE:  No polyuria or polydipsia or temperature intolerance. MUSCULOSKELETAL:  No myalgia or arthralgia. Recent lumbar. NEUROLOGICAL:  No focal motor sensory deficit. BEHAVIOR/PSYCH:  No psychosis.      PHYSICAL EXAM:    Vitals:    /79   Pulse 99   Temp 100.6 °F (38.1 °C) (Oral)   Resp 16   Ht 5' 2\" (1.575 m)   Wt 270 lb (122.5 kg)   SpO2 97%   BMI 49.38 kg/m²   Constitutional: The patient is awake, alert, and oriented. Skin: Warm and dry. No rashes were noted. No jaundice. HEENT: Eyes show round, and reactive pupils. Moist mucous membranes, no ulcerations, no thrush. Neck: Supple to movements. No lymphadenopathy. Chest: No use of accessory muscles to breathe. Symmetrical expansion. Auscultation reveals no wheezing, crackles, or rhonchi. Cardiovascular: S1 and S2 are rhythmic and regular. No murmurs appreciated. Abdomen: Positive bowel sounds to auscultation. Benign to palpation. No masses felt. No hepatosplenomegaly. Genitourinary: Pettit  Extremities: No clubbing, no cyanosis, no edema.   Musculoskeletal: Equal and symmetrical  Neurological: No focal weakness lower extremities  Lines: peripheral      CBC+dif:  Recent Labs     05/25/21 2003   WBC 8.1   HGB 9.3*   HCT 33.6*   MCV 84.6      NEUTROABS 6.10     Lab Results   Component Value Date    CRP 6.5 (H) 05/26/2015    CRP 2.6 (H) 02/28/2014     No results found for: CRP  Lab Results   Component Value Date    SEDRATE 26 (H) 05/26/2015    SEDRATE 33 (H) 02/28/2014     Lab Results   Component Value Date    ALT 19 05/10/2021    AST 18 05/10/2021    ALKPHOS 113 (H) 05/10/2021    BILITOT 0.2 05/10/2021     Lab Results   Component Value Date     05/25/2021    K 3.9 05/25/2021     05/25/2021    CO2 29 05/25/2021    BUN 20 05/25/2021    CREATININE 0.7 05/25/2021    GFRAA >60 05/25/2021    LABGLOM >60 05/25/2021    GLUCOSE 132 05/25/2021    PROT 6.9 05/10/2021    LABALBU 3.5 05/10/2021    CALCIUM 8.2 05/25/2021    BILITOT 0.2 05/10/2021    ALKPHOS 113 05/10/2021    AST 18 05/10/2021    ALT 19 05/10/2021       Lab Results   Component Value Date    PROTIME 11.1 05/10/2021    INR 1.0 05/10/2021       Lab Results   Component Value Date    TSH 0.988 03/10/2021       Lab Results Component Value Date    COLORU Yellow 05/25/2021    PHUR 6.0 05/25/2021    WBCUA 5-10 05/25/2021    RBCUA 5-10 05/25/2021    YEAST Present 05/25/2021    BACTERIA MANY 05/25/2021    CLARITYU CLOUDY 05/25/2021    SPECGRAV >=1.030 05/25/2021    LEUKOCYTESUR Negative 05/25/2021    UROBILINOGEN 0.2 05/25/2021    BILIRUBINUR Negative 05/25/2021    BLOODU MODERATE 05/25/2021    GLUCOSEU Negative 05/25/2021    AMORPHOUS MANY 05/25/2021       No results found for: YVY6NHJ, BEART, G7DGVKGG, PHART, THGBART, CRX5JXV, PO2ART, BPI8KXY  Radiology:  XR CHEST PORTABLE   Final Result   Cardiomegaly with no evidence of failure or acute infiltrates. XR CHEST PORTABLE   Final Result   No acute process. Microbiology:  Pending  No results for input(s): BC in the last 72 hours. No results for input(s): ORG in the last 72 hours. No results for input(s): Angela Bustard in the last 72 hours. No results for input(s): STREPNEUMAGU in the last 72 hours. No results for input(s): LP1UAG in the last 72 hours. No results for input(s): ASO in the last 72 hours. No results for input(s): CULTRESP in the last 72 hours. Assessment:  · Fevers chills generalized malaise with after manipulation of Pettit after urinary retention problems. Patient was placed on Levaquin on 5/22/2021 still having symptoms. One can only assume that the patient is either having a reaction to Levaquin or that were missing some of the organisms. Cultures right now have a Enterococcus most likely involved and the UA had budding yeast so I think would manipulate the antibiotics at this point. The E. coli probably has a cAMP resistance problem as well. Plan:    · Cont Zosyn plus Diflucan  · Check cultures  · Baseline ESR, CRP  · Monitor labs  · Will follow with you    Thank you for having us see this patient in consultation. I will be discussing this case with the treating physicians.       Electronically signed by Nikita Morgan MD on 5/26/2021 at

## 2021-05-26 NOTE — PLAN OF CARE
Problem: Pain:  Description: Pain management should include both nonpharmacologic and pharmacologic interventions.   Goal: Pain level will decrease  Description: Pain level will decrease  5/26/2021 1855 by aJss Roberson RN  Outcome: Met This Shift  5/26/2021 1855 by Jass Roberson RN  Outcome: Met This Shift  Goal: Control of acute pain  Description: Control of acute pain  5/26/2021 1855 by Jass Roberson RN  Outcome: Met This Shift  5/26/2021 1855 by Jass Roberson RN  Outcome: Met This Shift  Goal: Control of chronic pain  Description: Control of chronic pain  5/26/2021 1855 by Jass Roberson RN  Outcome: Met This Shift  5/26/2021 1855 by Jass Roberson RN  Outcome: Met This Shift  Goal: Patient's pain/discomfort is manageable  Description: Patient's pain/discomfort is manageable  5/26/2021 1855 by Jass Roberson RN  Outcome: Met This Shift  5/26/2021 1855 by Jass Roberson RN  Outcome: Met This Shift     Problem: Falls - Risk of:  Goal: Will remain free from falls  Description: Will remain free from falls  5/26/2021 1855 by Jass Roberson RN  Outcome: Met This Shift  5/26/2021 1855 by Jass Roberson RN  Outcome: Met This Shift  Goal: Absence of physical injury  Description: Absence of physical injury  5/26/2021 1855 by Jass Roberson RN  Outcome: Met This Shift  5/26/2021 1855 by Jass Roberson RN  Outcome: Met This Shift     Problem: Skin Integrity:  Goal: Will show no infection signs and symptoms  Description: Will show no infection signs and symptoms  5/26/2021 1855 by Jass Roberson RN  Outcome: Met This Shift  5/26/2021 1855 by Jass Roberson RN  Outcome: Met This Shift  Goal: Absence of new skin breakdown  Description: Absence of new skin breakdown  5/26/2021 1855 by Jass Roberson RN  Outcome: Met This Shift  5/26/2021 1855 by Jass Roberson RN  Outcome: Met This Shift     Problem: Infection:  Goal: Will remain free from infection  Description: Will remain free from infection  5/26/2021 1855 by Jass Roberson RN  Outcome: Met This Shift  5/26/2021 1855 by Iliana Bashir RN  Outcome: Met This Shift     Problem: Safety:  Goal: Free from accidental physical injury  Description: Free from accidental physical injury  5/26/2021 1855 by Iliana Bashir RN  Outcome: Met This Shift  5/26/2021 1855 by Iliana Bashir RN  Outcome: Met This Shift  Goal: Free from intentional harm  Description: Free from intentional harm  5/26/2021 1855 by Iliana Bashir RN  Outcome: Met This Shift  5/26/2021 1855 by Iliana Bashir RN  Outcome: Met This Shift     Problem: Daily Care:  Goal: Daily care needs are met  Description: Daily care needs are met  5/26/2021 1855 by Iliana Bashir RN  Outcome: Met This Shift  5/26/2021 1855 by Iliana Bashir RN  Outcome: Met This Shift     Problem: Skin Integrity:  Goal: Skin integrity will stabilize  Description: Skin integrity will stabilize  5/26/2021 1855 by Iliana Bashir RN  Outcome: Met This Shift  5/26/2021 1855 by Iliana Bashir RN  Outcome: Met This Shift     Problem: Discharge Planning:  Goal: Patients continuum of care needs are met  Description: Patients continuum of care needs are met  5/26/2021 1855 by Iliana Bashir RN  Outcome: Met This Shift  5/26/2021 1855 by Iliana Bashir RN  Outcome: Met This Shift     Problem: ABCDS Injury Assessment  Goal: Absence of physical injury  5/26/2021 1855 by Iliana Bashir RN  Outcome: Met This Shift  5/26/2021 1855 by Iliana Bashir RN  Outcome: Met This Shift

## 2021-05-26 NOTE — CONSULTS
[address]                  [address] [place],  [facility]                                  CONSULTATION    PATIENT NAME: Reno Wilson                  :        1955  MED REC NO:   [id number]                            ROOM:       55  ACCOUNT NO:   [de-identified]                           ADMIT DATE: 2021  PROVIDER:     Chata Alejo DO    CONSULT DATE:  2021    MEDICAL CONSULTATION    ADMITTING PROVIDER:  Dr. Emigdio Beard. PRIMARY CARE PROVIDER:  Antonio Pearson DO    CHIEF COMPLAINT:  Fever. HISTORY OF PRESENT ILLNESS:  The patient is a 81-year-old   female, who was admitted to acute rehab on 2021 after undergoing  posterior lumbar interbody fusion per Dr. Steve Smith on 2021. The  patient was started on Levaquin for presumed UTI a few days prior to  this consultation. She developed high fever on 2021. She does  have an indwelling urinary catheter secondary to urinary retention. PAST MEDICAL HISTORY:  Chronic pain, restless legs syndrome, morbid  obesity, chronic lymphedema of lower extremities, osteoarthritis, CVA  with peripheral vision loss. PAST SURGICAL HISTORY:  Posterior lumbar interbody fusion on 2021,  , gastric bypass, abdominoplasty, bilateral eye cataract  surgery, hernia repair, breast reduction surgery. SOCIAL HISTORY:  The patient quit tobacco a few months ago. Denies  alcohol. REVIEW OF SYSTEMS:  Remarkable for above-stated chief complaint plus  allergy to FERRITIN. CURRENT MEDICATIONS:  Zosyn, Diflucan, Valium p.r.n., Roxicodone p.r.n.,  GlycoLax p.r.n., Neurontin, Zofran p.r.n., Requip, potassium chloride,  DuoNeb nebulizer, Lovenox, Wellbutrin XL, buprenorphine, Bumex, Cepacol  p.r.n., baclofen, Lac-Hydrin topical, Tylenol p.r.n., Senokot, Dulcolax.     PHYSICAL EXAMINATION:  GENERAL APPEARANCE:  Physical exam reveals a 81-year-old   female, who is alert and oriented x3, cooperative and a good historian. VITAL SIGNS:  Temperature 99.2, pulse 99, respirations 16, blood  pressure 131/79. HEENT:  Head:  Normocephalic, atraumatic. Eyes:  Pupils equal and  reactive to light. Extraocular muscles intact. Fundi not well  visualized. Nose:  No obstruction, polyp or discharge noted. Mouth:   Mucosa without lesion. Teeth, edentulous. Pharynx:  Noninjected  without exudate. NECK:  Supple. No JVD. No thyromegaly. No carotid bruits. HEART:  Regular rate and rhythm without murmur. LUNGS:  With minimal upper airway rhonchi. ABDOMEN:  Positive bowel sounds, soft, nontender. No rebound or  guarding. No hepatosplenomegaly. No masses. BACK:  With minimal increased thoracic kyphosis. EXTREMITIES:  With 1+ pitting edema in the bilateral lower extremities. LYMPH NODES:  No adenopathy noted. SKIN:  Without rash or lesion. IMPRESSION:  Acute febrile illness, status post recent posterior lumbar  interbody fusion; urinary retention, status post insertion of indwelling  urinary catheter; chronic pain; restless legs syndrome; morbid obesity;  chronic lymphedema of lower extremities; osteoarthritis; history of CVA. PLAN:  Blood and urine cultures. ID to see. Empiric antibiotics  initiated. Continue postop care as per Physical Medicine and Rehab. Discharge plan home when stable.         Rosendo Ta DO    D: 05/26/2021 14:08:34       T: 05/26/2021 14:16:19     [email or web address]  Job#: [id number]     Doc#: [id number]    CC:

## 2021-05-26 NOTE — PROGRESS NOTES
Occupational Therapy  OCCUPATIONAL THERAPY DAILY NOTE    Date:2021  Patient Name: Lupillo French  MRN: [id number]  : 1955  Room: [address] [place], [place] [address]     Diagnosis: lumbar spiondylolisthesis,m spinal stenosis- s/p L3-4 PLIF  Additional Pertinent Hx: OA, hx  CVA, HX covid  2021, PVD, low back pain, hx gastric bypass , hx catarct & hx cellulitis & peripherl vision loss    Restrictions: Fall Risk, LSO, spinal precautions & RLE numb & tingling @ her knees to her thigh      Functional Assessment:   Date Status AE  Comments   Feeding 21 Independent      Grooming 21  Set up   Pt washed face and brushed hair          Oral Care 21  Setup  (seated )  Pt brushed teeth    Bathing 21  UB SBA  LB Min A   Sponge bath completed. Pt needed assist to wash lower legs and feet due to spinal precautions    UB Dressing 21  SUP   Pt donned pull over shirt and needed some assist to pull LSO brace tighter    LB Dressing 21  Min A   Pt needed some assist to thread depends and pants on RLE.  Assist to stand and pull up pants    Footwear 21  Set up  Sock aid, long shoe horn  Pt donned socks with sock aide and used long shoe horn to don slip on shoes    Toileting 21  Dependent  AE    Homemaking 21  Min A (laundry )       Functional Transfers / Balance:   Date Status DME  Comments   Sit Balance 21   Supervision      Stand Balance 21   CGA /Min A  ww Standing to pull up pants    [] Tub  [] Shower   Transfer 21  TBA     Commode   Transfer 21   Min A  BSC placed over commode     Functional   Mobility 21   Min A /CGA  ww Short distance in pt's room    Other:sit to stand from w/c to ww       On/off firm recliner with pillows added to increase surface height  21  Min A         Min A  ww        ww      Functional Exercises / Activity:   BUE strength exercises : 2 # dumbbell weights 3 sets 15 reps in all planes Ludin box with 8 # wt on table top surface 3 sets 10 reps in all planes   B hand strength with 5 # digi flex 3 sets 15 reps                                              Sensory / Neuromuscular Re-Education:      Cognitive Skills:   Status Comments   Problem   Solving good     Memory good     Sequencing good     Safety fair       Visual Perception:    Education:  Pt was educated on AE for LB dressing     [] Family teach completed on:    Pain Level: 5/10 back and RLE      Additional Notes:       Patient has made fair  progress during treatment sessions toward set goals. Therapy emphasis to obtain goals:Current Treatment Recommendations: Strengthening, Gait Training, Patient/Caregiver Education & Training, Home Management Training, ROM, Equipment Evaluation, Education, & procurement, Balance Training, Functional Mobility Training, Endurance Training, Safety Education & Training, Self-Care / ADL    [x] Continue with current OT Plan of care.   [] Prepare for Discharge    Long term goals  Time Frame for Long term goals : 2- 3 weeks to address above problema reas  Long term goal 1: Pt demo Mod I to eat all meals  Long term goal 2: Pt demo Mod I grooming seated or standing  Long term goal 3: Pt demo SBA-CGA bathing @ sink level or shower level with AE as needed  Long term goal 4: Pt demo Mod I to don LSO brace & shirt & Mod I to don underwear,, pants, socks & shoes using AE as needed  Long term goal 5: Pt demo Mod I commode trf with appropriate DME to ensure pt safety  Long term goals 6: Pt demo SBA walk in shower trf wtih appropriate DME to ensure pt safety  Long term goal 7: Pt demo Mod I light homemaking activity & demo a G awareness of spinal precautions  Long term goal 8: Pt demo G- endurance for a 20 minute functional activity  Long term goal 9: Pt will state & adhere to spinal precautions during ADLs, transfesr & mobitliy with a 100% accuracy       DISCHARGE RECOMMENDATIONS  Recommended DME:    Post Discharge Care:   []Home Independently  []Home with 24hr Care / Supervision [x]Home with Partial Supervision []Home with Home Health OT []Home with Out Pt OT []Other: ___   Comments:         Time in Time out Tx Time Breakdown  Variance:   First Session  0745 0830  [x] Individual Tx- 45  [] Concurrent Tx -  [] Co-Tx -   [] Group Tx -   [] Time Missed -     Second Session 0915  1000  [x] Individual Tx-45  [] Concurrent Tx   [] Co-Tx -   [] Group Tx -   [] Time Missed                  Third Session    [] Individual Tx-   [] Concurrent Tx -  [] Co-Tx -   [] Group Tx -   [] Time Missed -         Total Tx Time: 90 mins      Chelsey Saldaña OTR/L [id number]

## 2021-05-27 LAB
ANION GAP SERPL CALCULATED.3IONS-SCNC: 6 MMOL/L (ref 7–16)
ANISOCYTOSIS: ABNORMAL
BASOPHILS ABSOLUTE: 0.02 E9/L (ref 0–0.2)
BASOPHILS RELATIVE PERCENT: 0.3 % (ref 0–2)
BUN BLDV-MCNC: 17 MG/DL (ref 6–23)
CALCIUM SERPL-MCNC: 8.6 MG/DL (ref 8.6–10.2)
CHLORIDE BLD-SCNC: 103 MMOL/L (ref 98–107)
CO2: 32 MMOL/L (ref 22–29)
CREAT SERPL-MCNC: 0.6 MG/DL (ref 0.5–1)
EOSINOPHILS ABSOLUTE: 0.17 E9/L (ref 0.05–0.5)
EOSINOPHILS RELATIVE PERCENT: 2.8 % (ref 0–6)
GFR AFRICAN AMERICAN: >60
GFR NON-AFRICAN AMERICAN: >60 ML/MIN/1.73
GLUCOSE BLD-MCNC: 118 MG/DL (ref 74–99)
HCT VFR BLD CALC: 33.7 % (ref 34–48)
HEMOGLOBIN: 9.4 G/DL (ref 11.5–15.5)
HYPOCHROMIA: ABNORMAL
IMMATURE GRANULOCYTES #: 0.05 E9/L
IMMATURE GRANULOCYTES %: 0.8 % (ref 0–5)
LYMPHOCYTES ABSOLUTE: 0.87 E9/L (ref 1.5–4)
LYMPHOCYTES RELATIVE PERCENT: 14.6 % (ref 20–42)
MCH RBC QN AUTO: 23.5 PG (ref 26–35)
MCHC RBC AUTO-ENTMCNC: 27.9 % (ref 32–34.5)
MCV RBC AUTO: 84.3 FL (ref 80–99.9)
MONOCYTES ABSOLUTE: 0.52 E9/L (ref 0.1–0.95)
MONOCYTES RELATIVE PERCENT: 8.7 % (ref 2–12)
NEUTROPHILS ABSOLUTE: 4.34 E9/L (ref 1.8–7.3)
NEUTROPHILS RELATIVE PERCENT: 72.8 % (ref 43–80)
PDW BLD-RTO: 30.1 FL (ref 11.5–15)
PLATELET # BLD: 181 E9/L (ref 130–450)
PMV BLD AUTO: 10.8 FL (ref 7–12)
POLYCHROMASIA: ABNORMAL
POTASSIUM REFLEX MAGNESIUM: 4.3 MMOL/L (ref 3.5–5)
RBC # BLD: 4 E12/L (ref 3.5–5.5)
SODIUM BLD-SCNC: 141 MMOL/L (ref 132–146)
WBC # BLD: 6 E9/L (ref 4.5–11.5)

## 2021-05-27 PROCEDURE — 97110 THERAPEUTIC EXERCISES: CPT

## 2021-05-27 PROCEDURE — 99233 SBSQ HOSP IP/OBS HIGH 50: CPT | Performed by: PHYSICAL MEDICINE & REHABILITATION

## 2021-05-27 PROCEDURE — 6360000002 HC RX W HCPCS: Performed by: PHYSICIAN ASSISTANT

## 2021-05-27 PROCEDURE — 36415 COLL VENOUS BLD VENIPUNCTURE: CPT

## 2021-05-27 PROCEDURE — 6370000000 HC RX 637 (ALT 250 FOR IP): Performed by: PHYSICAL MEDICINE & REHABILITATION

## 2021-05-27 PROCEDURE — 51798 US URINE CAPACITY MEASURE: CPT

## 2021-05-27 PROCEDURE — 1280000000 HC REHAB R&B

## 2021-05-27 PROCEDURE — 6370000000 HC RX 637 (ALT 250 FOR IP): Performed by: SPECIALIST

## 2021-05-27 PROCEDURE — 2580000003 HC RX 258: Performed by: PHYSICIAN ASSISTANT

## 2021-05-27 PROCEDURE — 2580000003 HC RX 258: Performed by: SPECIALIST

## 2021-05-27 PROCEDURE — 85025 COMPLETE CBC W/AUTO DIFF WBC: CPT

## 2021-05-27 PROCEDURE — 6370000000 HC RX 637 (ALT 250 FOR IP): Performed by: PHYSICIAN ASSISTANT

## 2021-05-27 PROCEDURE — 97530 THERAPEUTIC ACTIVITIES: CPT

## 2021-05-27 PROCEDURE — 6360000002 HC RX W HCPCS: Performed by: SPECIALIST

## 2021-05-27 PROCEDURE — 80048 BASIC METABOLIC PNL TOTAL CA: CPT

## 2021-05-27 PROCEDURE — 94640 AIRWAY INHALATION TREATMENT: CPT

## 2021-05-27 PROCEDURE — 97535 SELF CARE MNGMENT TRAINING: CPT

## 2021-05-27 RX ADMIN — SENNOSIDES AND DOCUSATE SODIUM 1 TABLET: 8.6; 5 TABLET ORAL at 20:52

## 2021-05-27 RX ADMIN — GABAPENTIN 600 MG: 300 CAPSULE ORAL at 15:40

## 2021-05-27 RX ADMIN — ENOXAPARIN SODIUM 40 MG: 40 INJECTION SUBCUTANEOUS at 20:53

## 2021-05-27 RX ADMIN — Medication 10 ML: at 20:53

## 2021-05-27 RX ADMIN — BACLOFEN 10 MG: 10 TABLET ORAL at 20:52

## 2021-05-27 RX ADMIN — PIPERACILLIN AND TAZOBACTAM 3375 MG: 3; .375 INJECTION, POWDER, LYOPHILIZED, FOR SOLUTION INTRAVENOUS at 23:32

## 2021-05-27 RX ADMIN — BUMETANIDE 2 MG: 1 TABLET ORAL at 08:57

## 2021-05-27 RX ADMIN — GABAPENTIN 600 MG: 300 CAPSULE ORAL at 09:11

## 2021-05-27 RX ADMIN — Medication 10 ML: at 09:10

## 2021-05-27 RX ADMIN — POTASSIUM CHLORIDE 20 MEQ: 1500 TABLET, EXTENDED RELEASE ORAL at 09:09

## 2021-05-27 RX ADMIN — POLYETHYLENE GLYCOL 3350 17 G: 17 POWDER, FOR SOLUTION ORAL at 09:10

## 2021-05-27 RX ADMIN — ROPINIROLE HYDROCHLORIDE 2 MG: 2 TABLET, FILM COATED ORAL at 08:58

## 2021-05-27 RX ADMIN — FLUCONAZOLE 200 MG: 100 TABLET ORAL at 08:56

## 2021-05-27 RX ADMIN — BACLOFEN 10 MG: 10 TABLET ORAL at 08:56

## 2021-05-27 RX ADMIN — GABAPENTIN 600 MG: 300 CAPSULE ORAL at 20:52

## 2021-05-27 RX ADMIN — IPRATROPIUM BROMIDE AND ALBUTEROL SULFATE 1 AMPULE: 2.5; .5 SOLUTION RESPIRATORY (INHALATION) at 14:26

## 2021-05-27 RX ADMIN — SODIUM CHLORIDE: 9 INJECTION, SOLUTION INTRAVENOUS at 03:14

## 2021-05-27 RX ADMIN — ROPINIROLE HYDROCHLORIDE 2 MG: 2 TABLET, FILM COATED ORAL at 15:40

## 2021-05-27 RX ADMIN — ROPINIROLE HYDROCHLORIDE 2 MG: 2 TABLET, FILM COATED ORAL at 12:00

## 2021-05-27 RX ADMIN — SENNOSIDES AND DOCUSATE SODIUM 1 TABLET: 8.6; 5 TABLET ORAL at 09:10

## 2021-05-27 RX ADMIN — IPRATROPIUM BROMIDE AND ALBUTEROL SULFATE 1 AMPULE: 2.5; .5 SOLUTION RESPIRATORY (INHALATION) at 16:31

## 2021-05-27 RX ADMIN — BISACODYL 5 MG: 5 TABLET, COATED ORAL at 09:10

## 2021-05-27 RX ADMIN — Medication 10 ML: at 15:32

## 2021-05-27 RX ADMIN — PIPERACILLIN AND TAZOBACTAM 3375 MG: 3; .375 INJECTION, POWDER, LYOPHILIZED, FOR SOLUTION INTRAVENOUS at 06:12

## 2021-05-27 RX ADMIN — BACLOFEN 10 MG: 10 TABLET ORAL at 15:40

## 2021-05-27 RX ADMIN — PIPERACILLIN AND TAZOBACTAM 3375 MG: 3; .375 INJECTION, POWDER, LYOPHILIZED, FOR SOLUTION INTRAVENOUS at 15:35

## 2021-05-27 RX ADMIN — BUPROPION HYDROCHLORIDE 150 MG: 150 TABLET, EXTENDED RELEASE ORAL at 20:53

## 2021-05-27 RX ADMIN — ROPINIROLE HYDROCHLORIDE 2 MG: 2 TABLET, FILM COATED ORAL at 20:53

## 2021-05-27 ASSESSMENT — PAIN SCALES - GENERAL
PAINLEVEL_OUTOF10: 0

## 2021-05-27 ASSESSMENT — PAIN DESCRIPTION - PROGRESSION: CLINICAL_PROGRESSION: NOT CHANGED

## 2021-05-27 NOTE — PROGRESS NOTES
spinal precautions during ADLs, transfesr & mobitliy with a 100% accuracy       DISCHARGE RECOMMENDATIONS  Recommended DME:  LB dressing equipment   Post Discharge Care:   []Home Independently  []Home with 24hr Care / Supervision [x]Home with Partial Supervision []Home with Home Health OT []Home with Out Pt OT []Other: ___   Comments:         Time in Time out Tx Time Breakdown  Variance:   First Session  2311  1000  [x] Individual Tx- 35  [] Concurrent Tx -  [] Co-Tx -   [] Group Tx -   [x] Time Missed -10           Pt having catheter removed    Second Session [id number] [x] Individual Tx-45  [] Concurrent Tx   [] Co-Tx -   [] Group Tx -   [] Time Missed                  Third Session    [] Individual Tx-   [] Concurrent Tx -  [] Co-Tx -   [] Group Tx -   [] Time Missed -         Total Tx Time: 80 mins      Lucero Givens OTR/L [id number]

## 2021-05-27 NOTE — PROGRESS NOTES
This RN was notified that pt had an episode of large incontinence, and also 200 mL in the hat. Pt was bladder scanned for 399 mL, 250 mL, and 169 mL. Dr. Roopa Bowers was notified. No new orders at this time. Will continue with plan of care.

## 2021-05-27 NOTE — PROGRESS NOTES
[facility] Physical Medicine and Rehabilitation  Comprehensive Progress Note    Subjective:      Edgar Carter is a 60 y.o. female admitted to inpatient rehabilitation for impairments and acitivities limitations in ADLs and mobility secondary to L3-4 spondylolisthesis, now s/p L3-L4 PLIF. Patient is feeling much better today. She is afebrile. Her Pettit is causing her significant discomfort. She is ambulatory with  ft now. The patient's medical records have been reviewed. Scheduled Meds:fluconazole, 200 mg, Daily  sodium chloride, , Q8H   And  piperacillin-tazobactam, 3,375 mg, Q8H  bisacodyl, 5 mg, Daily  polyethylene glycol, 17 g, Daily  sennosides-docusate sodium, 1 tablet, BID  sodium chloride flush, 5-40 mL, 2 times per day  baclofen, 10 mg, TID  bumetanide, 2 mg, Daily  Buprenorphine HCl, 450 mcg, BID  buPROPion, 150 mg, QPM  enoxaparin, 40 mg, Daily  ipratropium-albuterol, 1 ampule, Q4H WA  potassium chloride, 20 mEq, Daily with breakfast  rOPINIRole, 2 mg, 4x daily  gabapentin, 600 mg, TID      Continuous Infusions:  PRN Meds:diazePAM, 2.5 mg, Q6H PRN  oxyCODONE, 5 mg, Q6H PRN  acetaminophen, 650 mg, Q4H PRN  ammonium lactate, , Q2H PRN  benzocaine-menthol, 1 lozenge, Q2H PRN  ondansetron, 4 mg, Q6H PRN  polyethylene glycol, 17 g, Daily PRN         Objective:      Vitals:    05/26/21 1845 05/26/21 2200 05/27/21 0400 05/27/21 0747   BP:   129/65 133/76   Pulse:   78 83   Resp:   18 18   Temp: 98.9 °F (37.2 °C)  97.5 °F (36.4 °C) 97.9 °F (36.6 °C)   TempSrc: Axillary  Temporal Temporal   SpO2:  93%  93%   Weight:       Height:         General appearance: alert, appears stated age and cooperative.  Up in chair. Lungs: CTA b/l   Heart: RRR, S1, S2  Abdomen: soft, obese abdomen, NT, normal BS   Extremities: extremities normal, atraumatic, 2+ BLE edema unchanged  Musculoskeletal: Range of motion impaired   Skin: surgical incision c/d/i   Neurologic: Alert, oriented. SILT.  Motor 5/5 throughout bilateral upper extremities, 2/5 bilateral hip flexors; 4-/5 b/l KE; 4+/5 b/l ankle DF/PF.          Laboratory:    Lab Results   Component Value Date    WBC 6.0 05/27/2021    HGB 9.4 (L) 05/27/2021    HCT 33.7 (L) 05/27/2021    MCV 84.3 05/27/2021     05/27/2021     Lab Results   Component Value Date     05/27/2021    K 4.3 05/27/2021     05/27/2021    CO2 32 05/27/2021    BUN 17 05/27/2021    CREATININE 0.6 05/27/2021    GLUCOSE 118 05/27/2021    CALCIUM 8.6 05/27/2021      Lab Results   Component Value Date    ALT 19 05/10/2021    AST 18 05/10/2021    ALKPHOS 113 (H) 05/10/2021    BILITOT 0.2 05/10/2021       Lab Results   Component Value Date    COLORU Yellow 05/25/2021    NITRU Negative 05/25/2021    GLUCOSEU Negative 05/25/2021    KETUA Negative 05/25/2021    UROBILINOGEN 0.2 05/25/2021    BILIRUBINUR Negative 05/25/2021     Lab Results   Component Value Date    LABA1C 5.3 03/10/2021       Functional Status:   Physical Therapy:  Bed mobility: Supervision   Transfers: Supervision   Ambulation: 225 ft bariatric [employer] SBA/Supervision     Occupational Therapy:  Feeding: Independent   Grooming: SBA  UB dressing: Supervision   LB dressing: Min A          Assessment/Plan:       60 y.o. female admitted to inpatient rehabilitation for impairments and acitivities limitations in ADLs and mobility secondary to L3-4 spondylolisthesis, now s/p L3-L4 PLIF        -L3-4 spondylolisthesis: s/p L3-L4 PLIF on 5/13/2021. Spine precautions. Pain control. Continue Acute Rehab program.   -Acute post operative pain: Continue home Butrans patch. PRN oxycodone. PRN tylenol. Continue gabapentin. Continue Baclofen. PRN Valium. Wean narcotics and Valium as tolerated.   -UTI: E.coli sensitive to levaquin per original culture, fevers despite levaquin, now on Zosyn/diflucan. Repeat urine culture showing candida.   -Post operative urinary retention. Pettit replaced.  Void trial when more mobile.   -DVT prophylaxis:

## 2021-05-27 NOTE — PROGRESS NOTES
Physical Therapy    Facility/Department: [facility] 5SE REHAB  Weekly Note    NAME: Bernabe Campbell  : 1955  MRN: [id number]    Date of Service: 2021  Evaluating Therapist: Enedina Caballero. Conrad Shipley P.T.    ROOM: [facility]  DIAGNOSIS: Lumbar spondylolisthesis  PRECAUTIONS: Fall risk, spinal with LSO   PROCEDURE(S):  L3/L4 PLIF  HPI: The pt was admitted on  for elective back surgery secondary to spinal stenosis with LBP and associated RLE radicular symptoms    Social:  Pt lives with her sister who is in poor health in a 2 floor plan with 1st floor set up with 2 steps and 2 rails to enter. Prior to admission pt ambulated with a Rolator WW for B knee pain and back pain for the 4 months before admission and was Independent. Initial Evaluation  Date: 21 Comments Short Term Goals Long Term Goals    Was pt agreeable to Eval/treatment? Yes Yes Yes      Does pt have pain? 8/10 low back pain at rest, increases with standing Pain with standing and advancing AD, reports improved from yesterday Some R thigh pain, catheter pain      Bed Mobility  Rolling: NT  Supine to sit: Max A  Sit to supine: Max A  Scooting: NT Rolling: Min A  Supine to sit: Min A  Scooting: Min A  (hospital bed) Supervision for all aspects with simulated rail at [facility] The pt owns a bed with rails SBA Modified Independent   Transfers Sit to stand: Max A  Stand to sit: Max A  Stand pivot: Max A    5xSTS: TBD Sit to stand: Max A  Stand to sit: Max A  Stand pivot:  Max A with [employer] Sit<>stand: Supervision  Stand pivot: Supervision with bariatric [employer] Pt's performance is variable due to recent illness Min A Modified Independent  5xSTS: TBD   Ambulation    10 feet with Bariatric WW with Mod A x2 (WC follow)  10mWT: TBD  6mWT: TBD 20 feet in Bariatric [employer] with Mod A + WC follow 225 feet with bariatric WW with SBA/Supervision R thigh pain emerges with prolonged walking 25 feet with AAD with Min A > 400 feet with AAD with Modified [place]    10mWT: TBD  6mWT: TBD   Wheel Chair Mobility NT NT NT      Car Transfers NT NT SBA Cues for hand placement Min A Modified Independent   Stair negotiation: ascended and descended  NT NT 12 steps with 2 rails with SBA Descends backward 2 steps with 2 rails with Min A > 12 steps with 2 rails with Modified [place]   BLE ROM WFL, R hip flexion limited by weakness WFL, R hip flexion limited by weakness WFL, R hip flexion limited by weakness      BLE Strength RLE is grossly 2/5 at the hip, 3+/5 at the knee, and 4-/5 at the ankle  LLE is grossly 4/5 RLE is grossly 2/5 at the hip, 3+/5 at the knee, and 4-/5 at the ankle  LLE is grossly 4/5 RLE is grossly 2/5 at the hip, 3+/5 at the knee, and 4-/5 at the ankle  LLE is grossly 4/5      Balance  Seated: Supervision  Standing: Mod A    BBS: TBD  FGA: TBD Seated: Supervision  Standing: Mod A Seated: Independent  Standing: SBA     BBS: TBD  FGA: TBD   Date Family Teach Completed No family present at [facility] No family present to this date Daughter present 5/27      Is additional Family Teaching Needed? Y or N Yes Yes No      Hindering Progress Weakness, pain Weakness, pain, limited support Limited support at home      PT recommended ELOS 2 weeks 3 weeks 5 days      Team's Discharge Plan  3 weeks Discharge Friday 6/4/21      Therapist at Team Meeting  Yumi Brush PT, DPT [id number]   Yumi Brush PT, DPT [id number]          Date:  5/21/21  Supporting factors: Motivated to improve  Barriers to discharge:  Lack of social support, poor PLOF, difficulty with voiding  Additional comments: The pt is motivated to improve and once she is able to stand she does well, however the pt's back pain and B knee pain make it difficult for her to perform all activities that are asked of her. DME:  TBD, likely [facility]  After Care: TBD, likely HHPT    Date:  5/28/21  Supporting factors:   Motivated to improve  Barriers to discharge:  Lack of social support, poor PLOF, difficulty with catheter/voiding  Additional comments: The pt has progressed well despite recent fever and constant coughing. The pt is motivated and receptive to education, the pt's daughter has been present and is helping the pt, able to don the pt's brace for her with no assistance from staff. DME:  Bariatric WW  After Care:  Paul Victor, [address]  number:  [phone] [phone]

## 2021-05-27 NOTE — PROGRESS NOTES
Physical Therapy    Facility/Department: [address] REHAB  Treatment Note    NAME: Dee Rodas  : 1955  MRN: [id number]    Date of Service: 2021  Evaluating Therapist: Cristina Byrnes P.T.    ROOM: [facility]  DIAGNOSIS: Lumbar spondylolisthesis  PRECAUTIONS: Fall risk, spinal with LSO   PROCEDURE(S):  L3/L4 PLIF  HPI: The pt was admitted on  for elective back surgery secondary to spinal stenosis with LBP and associated RLE radicular symptoms    Social:  Pt lives with her sister who is in poor health in a 2 floor plan with 1st floor set up with 2 steps and 2 rails to enter. Prior to admission pt ambulated with a Rolator WW for B knee pain and back pain for the 4 months before admission and was Independent. Initial Evaluation  Date: 21 AM     PM    Short Term Goals Long Term Goals    Was pt agreeable to Eval/treatment? Yes Yes Yes     Does pt have pain? 8/10 low back pain at rest, increases with standing No c/o pain, stated her R hip feels better today 8/10 back pain with activity     Bed Mobility  Rolling: NT  Supine to sit: Max A  Sit to supine: Max A  Scooting: NT Supervision for all aspects with simulated rail  (queen bed) Rolling/scooting: Supervision  Supine to sit: Supervision SBA Modified Independent   Transfers Sit to stand: Max A  Stand to sit: Max A  Stand pivot:  Max A    5xSTS: TBD Sit<>stand: Supervision  Stand pivot: Supervision with [employer] Sit<>stand: Supervision  Stand pivot: Supervision with [employer] Min A Modified Independent  5xSTS: TBD   Ambulation    10 feet with Bariatric WW with Mod A x2 (WC follow)  10mWT: TBD  6mWT:  feet in Bariatric WW with Supervision 200 feet in Bariatric WW with SBA 25 feet with AAD with Min A >50 feet with AAD with Modified [place]    10mWT: TBD  6mWT: TBD   Walking 10 feet on uneven surface NT NT NT 10 feet with AAD with Min A >10 feet with AAD with Modified [place]   Wheel Chair Mobility NT NT NT     Car Transfers NT SBA NT Min A Modified Independent   Stair negotiation: ascended and descended  NT 4 steps with 2 rails with SBA (nonreciprocal) 12 steps with 2 rails with SBA (nonreciprocal) 2 steps with 2 rails with Min A >4 steps with 2 rails with Modified [place]   Curb Step:   ascended and descended NT NT NT 2 inch step with AAD and Mod A 4 inch step with AAD and Modified [place]   BLE ROM WFL, R hip flexion limited by weakness  WFL, R hip flexion limited by weakness     BLE Strength RLE is grossly 2/5 at the hip, 3+/5 at the knee, and 4-/5 at the ankle  LLE is grossly 4/5  RLE is grossly 2/5 at the hip, 3+/5 at the knee, and 4-/5 at the ankle  LLE is grossly 4/5     Balance  Seated: Supervision  Standing: Mod A    BBS: TBD  FGA: TBD  Seated: Independent  Standing: SBA/Supervision with AD    BBS: TBD  FGA: TBD   Date Family Teach Completed No family present at [facility]  Pt's daughter present 5/27     Is additional Family Teaching Needed? Y or N Yes  No     Hindering Progress Weakness, pain  Weakness, pain, limited support     PT recommended ELOS 2 weeks       Team's Discharge Plan        Therapist at Team Meeting          Therapeutic Exercise:   AM:   Ambulation: 75 feet with bariatric WW with Supervision  PM:   Ambulation: 4x10 feet in parallel bars with single UE support with SBA  Ambulation: 2x50 feet with bariatric WW with Supervision  Step-ups onto 2 inch box in parallel bars x4 with the LLE with LUE support with SBA  Step-ups onto 4 inch box in parallel bars x6 each LE with BUE support with SBA (unable to perform with single UE support)    Additional Comments: The pt's daughter attended the morning PT session and was able to observe her mother perform all functional mobility noted above. The pt's daughter was reported to don the pt's LSO this morning and the brace was donned appropriately. The pt reports she is feeling better but has catheter site pain.  The pt had her catheter removed between her morning and afternoon session and was able to ambulate with no complaints of pain during the afternoon session. The pt was encouraged by her progress. Patient/family education  Pt/family educated on bed mobility strategies, proper car transfer technique, need for 2 rails on stairs. Patient/family response to education:   Pt/family verbalized understanding Pt/family demonstrated skill Pt/family requires further education in this area   Yes Yes Reinforce      AM  Time in: 0830  Time out: 0915    PM  Time in: 1430  Time out: 1515    Pt is making good progress toward established Physical Therapy goals. Continue with physical therapy current plan of care. Anthony Sheppard.  [place], [place]  License Number: [id number]

## 2021-05-28 LAB
ORGANISM: ABNORMAL
URINE CULTURE, ROUTINE: ABNORMAL

## 2021-05-28 PROCEDURE — 6370000000 HC RX 637 (ALT 250 FOR IP): Performed by: PHYSICIAN ASSISTANT

## 2021-05-28 PROCEDURE — 97110 THERAPEUTIC EXERCISES: CPT

## 2021-05-28 PROCEDURE — 51701 INSERT BLADDER CATHETER: CPT

## 2021-05-28 PROCEDURE — 97530 THERAPEUTIC ACTIVITIES: CPT

## 2021-05-28 PROCEDURE — 6370000000 HC RX 637 (ALT 250 FOR IP): Performed by: PHYSICAL MEDICINE & REHABILITATION

## 2021-05-28 PROCEDURE — 2580000003 HC RX 258: Performed by: SPECIALIST

## 2021-05-28 PROCEDURE — 99232 SBSQ HOSP IP/OBS MODERATE 35: CPT | Performed by: PHYSICAL MEDICINE & REHABILITATION

## 2021-05-28 PROCEDURE — 6360000002 HC RX W HCPCS: Performed by: PHYSICIAN ASSISTANT

## 2021-05-28 PROCEDURE — 1280000000 HC REHAB R&B

## 2021-05-28 PROCEDURE — 97535 SELF CARE MNGMENT TRAINING: CPT

## 2021-05-28 PROCEDURE — 6370000000 HC RX 637 (ALT 250 FOR IP): Performed by: SPECIALIST

## 2021-05-28 PROCEDURE — 51798 US URINE CAPACITY MEASURE: CPT

## 2021-05-28 PROCEDURE — 2580000003 HC RX 258: Performed by: PHYSICIAN ASSISTANT

## 2021-05-28 PROCEDURE — 94640 AIRWAY INHALATION TREATMENT: CPT

## 2021-05-28 PROCEDURE — 6370000000 HC RX 637 (ALT 250 FOR IP): Performed by: NURSE PRACTITIONER

## 2021-05-28 PROCEDURE — 6360000002 HC RX W HCPCS: Performed by: SPECIALIST

## 2021-05-28 RX ORDER — SULFAMETHOXAZOLE AND TRIMETHOPRIM 800; 160 MG/1; MG/1
1 TABLET ORAL EVERY 12 HOURS SCHEDULED
Status: DISCONTINUED | OUTPATIENT
Start: 2021-05-28 | End: 2021-06-04 | Stop reason: HOSPADM

## 2021-05-28 RX ADMIN — SODIUM CHLORIDE: 9 INJECTION, SOLUTION INTRAVENOUS at 03:03

## 2021-05-28 RX ADMIN — BACLOFEN 10 MG: 10 TABLET ORAL at 09:51

## 2021-05-28 RX ADMIN — IPRATROPIUM BROMIDE AND ALBUTEROL SULFATE 1 AMPULE: 2.5; .5 SOLUTION RESPIRATORY (INHALATION) at 16:20

## 2021-05-28 RX ADMIN — ENOXAPARIN SODIUM 40 MG: 40 INJECTION SUBCUTANEOUS at 22:00

## 2021-05-28 RX ADMIN — GABAPENTIN 600 MG: 300 CAPSULE ORAL at 22:00

## 2021-05-28 RX ADMIN — GABAPENTIN 600 MG: 300 CAPSULE ORAL at 09:52

## 2021-05-28 RX ADMIN — SODIUM CHLORIDE: 9 INJECTION, SOLUTION INTRAVENOUS at 11:59

## 2021-05-28 RX ADMIN — IPRATROPIUM BROMIDE AND ALBUTEROL SULFATE 1 AMPULE: 2.5; .5 SOLUTION RESPIRATORY (INHALATION) at 11:55

## 2021-05-28 RX ADMIN — ROPINIROLE HYDROCHLORIDE 2 MG: 2 TABLET, FILM COATED ORAL at 11:59

## 2021-05-28 RX ADMIN — SENNOSIDES AND DOCUSATE SODIUM 1 TABLET: 8.6; 5 TABLET ORAL at 09:51

## 2021-05-28 RX ADMIN — BUMETANIDE 2 MG: 1 TABLET ORAL at 09:52

## 2021-05-28 RX ADMIN — ROPINIROLE HYDROCHLORIDE 2 MG: 2 TABLET, FILM COATED ORAL at 07:42

## 2021-05-28 RX ADMIN — SENNOSIDES AND DOCUSATE SODIUM 1 TABLET: 8.6; 5 TABLET ORAL at 22:00

## 2021-05-28 RX ADMIN — Medication 10 ML: at 07:41

## 2021-05-28 RX ADMIN — PIPERACILLIN AND TAZOBACTAM 3375 MG: 3; .375 INJECTION, POWDER, LYOPHILIZED, FOR SOLUTION INTRAVENOUS at 07:42

## 2021-05-28 RX ADMIN — SULFAMETHOXAZOLE AND TRIMETHOPRIM 1 TABLET: 800; 160 TABLET ORAL at 21:51

## 2021-05-28 RX ADMIN — BISACODYL 5 MG: 5 TABLET, COATED ORAL at 09:51

## 2021-05-28 RX ADMIN — ROPINIROLE HYDROCHLORIDE 2 MG: 2 TABLET, FILM COATED ORAL at 16:16

## 2021-05-28 RX ADMIN — ROPINIROLE HYDROCHLORIDE 2 MG: 2 TABLET, FILM COATED ORAL at 21:51

## 2021-05-28 RX ADMIN — BACLOFEN 10 MG: 10 TABLET ORAL at 21:52

## 2021-05-28 RX ADMIN — POLYETHYLENE GLYCOL 3350 17 G: 17 POWDER, FOR SOLUTION ORAL at 09:52

## 2021-05-28 RX ADMIN — GABAPENTIN 600 MG: 300 CAPSULE ORAL at 14:22

## 2021-05-28 RX ADMIN — BUPROPION HYDROCHLORIDE 150 MG: 150 TABLET, EXTENDED RELEASE ORAL at 21:52

## 2021-05-28 RX ADMIN — FLUCONAZOLE 200 MG: 100 TABLET ORAL at 09:51

## 2021-05-28 RX ADMIN — IPRATROPIUM BROMIDE AND ALBUTEROL SULFATE 1 AMPULE: 2.5; .5 SOLUTION RESPIRATORY (INHALATION) at 08:20

## 2021-05-28 RX ADMIN — BACLOFEN 10 MG: 10 TABLET ORAL at 14:22

## 2021-05-28 RX ADMIN — POTASSIUM CHLORIDE 20 MEQ: 1500 TABLET, EXTENDED RELEASE ORAL at 07:42

## 2021-05-28 ASSESSMENT — PAIN SCALES - GENERAL
PAINLEVEL_OUTOF10: 0
PAINLEVEL_OUTOF10: 5
PAINLEVEL_OUTOF10: 8
PAINLEVEL_OUTOF10: 5
PAINLEVEL_OUTOF10: 0

## 2021-05-28 ASSESSMENT — PAIN DESCRIPTION - ONSET: ONSET: ON-GOING

## 2021-05-28 ASSESSMENT — PAIN DESCRIPTION - FREQUENCY: FREQUENCY: INTERMITTENT

## 2021-05-28 ASSESSMENT — PAIN DESCRIPTION - PAIN TYPE: TYPE: CHRONIC PAIN

## 2021-05-28 ASSESSMENT — PAIN DESCRIPTION - PROGRESSION
CLINICAL_PROGRESSION: NOT CHANGED

## 2021-05-28 ASSESSMENT — PAIN DESCRIPTION - ORIENTATION: ORIENTATION: RIGHT;LEFT

## 2021-05-28 ASSESSMENT — PAIN DESCRIPTION - LOCATION: LOCATION: BACK;KNEE;SHOULDER

## 2021-05-28 ASSESSMENT — PAIN DESCRIPTION - DESCRIPTORS: DESCRIPTORS: ACHING;DISCOMFORT

## 2021-05-28 NOTE — PROGRESS NOTES
[facility] Physical Medicine and Rehabilitation  Comprehensive Progress Note    Subjective:      Daniel Kelly is a 60 y.o. female admitted to inpatient rehabilitation for impairments and acitivities limitations in ADLs and mobility secondary to L3-4 spondylolisthesis, now s/p L3-L4 PLIF. Pettit is out and patient is voiding. Still with high residuals and has been straight cathed. No cp, sob, n/v. No fever/chills. Progressing in therapy. The patient's medical records have been reviewed. Scheduled Meds:fluconazole, 200 mg, Daily  sodium chloride, , Q8H   And  piperacillin-tazobactam, 3,375 mg, Q8H  bisacodyl, 5 mg, Daily  polyethylene glycol, 17 g, Daily  sennosides-docusate sodium, 1 tablet, BID  sodium chloride flush, 5-40 mL, 2 times per day  baclofen, 10 mg, TID  bumetanide, 2 mg, Daily  Buprenorphine HCl, 450 mcg, BID  buPROPion, 150 mg, QPM  enoxaparin, 40 mg, Daily  ipratropium-albuterol, 1 ampule, Q4H WA  potassium chloride, 20 mEq, Daily with breakfast  rOPINIRole, 2 mg, 4x daily  gabapentin, 600 mg, TID      Continuous Infusions:  PRN Meds:diazePAM, 2.5 mg, Q6H PRN  oxyCODONE, 5 mg, Q6H PRN  acetaminophen, 650 mg, Q4H PRN  ammonium lactate, , Q2H PRN  benzocaine-menthol, 1 lozenge, Q2H PRN  ondansetron, 4 mg, Q6H PRN  polyethylene glycol, 17 g, Daily PRN         Objective:      Vitals:    05/27/21 2300 05/28/21 0820 05/28/21 1015 05/28/21 1155   BP: (!) 124/58  132/65    Pulse: 90  97    Resp: 16  18    Temp: 98.1 °F (36.7 °C)  98.1 °F (36.7 °C)    TempSrc: Temporal  Temporal    SpO2: 92% 93% 94% 94%   Weight:       Height:         General appearance: alert, appears stated age and cooperative.  Up in chair. Lungs: CTA b/l   Heart: RRR, S1, S2  Abdomen: soft, obese abdomen, NT, normal BS   Extremities: extremities normal, atraumatic, 2+ BLE edema unchanged  Musculoskeletal: Range of motion impaired   Skin: surgical incision c/d/i   Neurologic: Alert, oriented. SILT.  Motor 5/5 throughout bilateral upper extremities, 2/5 bilateral hip flexors; 4-/5 b/l KE; 4+/5 b/l ankle DF/PF.          Laboratory:    Lab Results   Component Value Date    WBC 6.0 05/27/2021    HGB 9.4 (L) 05/27/2021    HCT 33.7 (L) 05/27/2021    MCV 84.3 05/27/2021     05/27/2021     Lab Results   Component Value Date     05/27/2021    K 4.3 05/27/2021     05/27/2021    CO2 32 05/27/2021    BUN 17 05/27/2021    CREATININE 0.6 05/27/2021    GLUCOSE 118 05/27/2021    CALCIUM 8.6 05/27/2021      Lab Results   Component Value Date    ALT 19 05/10/2021    AST 18 05/10/2021    ALKPHOS 113 (H) 05/10/2021    BILITOT 0.2 05/10/2021       Lab Results   Component Value Date    COLORU Yellow 05/25/2021    NITRU Negative 05/25/2021    GLUCOSEU Negative 05/25/2021    KETUA Negative 05/25/2021    UROBILINOGEN 0.2 05/25/2021    BILIRUBINUR Negative 05/25/2021     Lab Results   Component Value Date    LABA1C 5.3 03/10/2021       Functional Status:   Physical Therapy:  Bed mobility: Supervision   Transfers: Supervision   Ambulation: 225 ft bariatric [employer] SBA/Supervision     Occupational Therapy:  Feeding: Independent   Grooming: SBA  UB dressing: Supervision   LB dressing: Min A          Assessment/Plan:       60 y.o. female admitted to inpatient rehabilitation for impairments and acitivities limitations in ADLs and mobility secondary to L3-4 spondylolisthesis, now s/p L3-L4 PLIF        -L3-4 spondylolisthesis: s/p L3-L4 PLIF on 5/13/2021. Spine precautions. Pain control. Continue Acute Rehab program.   -Acute post operative pain: Continue home Butrans patch. PRN oxycodone. PRN tylenol. Continue gabapentin. Continue Baclofen. PRN Valium. Wean narcotics and Valium as tolerated.   -UTI: E.coli sensitive to levaquin per original culture, fevers despite levaquin, now on Zosyn/diflucan. Repeat urine culture showing candida.   -Post operative urinary retention requiring jean.  Jean out, voiding, high residuals  -DVT prophylaxis: Lovenox      Continue antibiotics per ID  Urology to see for urinary retention      Electronically signed by Jeniffer Miranda MD on 5/28/2021 at 12:33 PM

## 2021-05-28 NOTE — FLOWSHEET NOTE
05/27/21 2330   Output (mL)   Urine 200 mL   Urine Assessment   Incontinence No   Urine Color Yellow/straw   Urine Appearance Clear   Urine Odor No odor   Bladder Scan Volume (mL) 549 mL   $ Bladder scan $ Yes   patient requesting to wait to try to go again before being straight cath.

## 2021-05-28 NOTE — CONSULTS
2021 6:21 PM  Service: Urology  Group: [facility] (Justin/Azra/Angi)    Yenni Ferreira  [id number]     Chief Complaint:    Incomplete bladder emptying    History of Present Illness: The patient is a 60 y.o. female patient who is currently admitted in acute rehab after undergoing lumbar fusion. She has had a Jean catheter inserted on 2 separate occasions during this admission. Jean catheter was most recently removed yesterday. Since that time she has had PVRs of 549ml, 382ml, and 414ml. She has been straight cathed at least twice since removal. She does report baseline urinary symptoms of stress incontinence. She feels she is voiding comfortably. She was uncomfortable with the jean and prefers this be left out if possible.      Past Medical History:   Diagnosis Date    Anemia     Arthritis     Cellulitis 2015    left leg    Chronic ulcer of leg with fat layer exposed ([address] 2015    Chronic ulcer of right leg, limited to breakdown of skin ([address] 2016    COVID-19 2021    postive test no symptoms    Depression     Full dentures     Low back pain     Lymphedema of both lower extremities 2015    Obesity     280 #    Osteoarthritis     Peripheral vision loss     Stroke ([address]     Type 2 diabetes mellitus without complication, without long-term current use of insulin ([address] 2019    Ulcer of left lower leg, limited to breakdown of skin ([address] 06/10/2019    Ulcer of left lower leg, limited to breakdown of skin ([address] 06/10/2019    UTI (urinary tract infection) 2021         Past Surgical History:   Procedure Laterality Date    ABDOMINOPLASTY  2002    BREAST REDUCTION SURGERY      reduction    CATARACT REMOVAL      bilateral     SECTION      x2    COLONOSCOPY  2012    and egd    COLONOSCOPY  2021    polyps; marginal prep--jessica    COLONOSCOPY N/A 2021    COLONOSCOPY WITH BIOPSY performed by Pam Reddy MD at [address]  ECHOCARDIOGRAM COMPLETE 2D W DOPPLER W COLOR  2012         GASTRIC BYPASS SURGERY  2000    NO NASTOGASTRIC TUBE    HERNIA REPAIR          LUMBAR SPINE SURGERY N/A 2021    L3-L4  POSTERIOR LUMBAR INTERBODY  FUSION--OARM, JESSI, YAJAIRA TABLE, CELL SAVER, PLATELET GEL, AUDIOLOGY, CAGES, PLATES, SCREWS -- GLOBUS performed by Kameron Kicnaid MD at [facility] [facility]  2021    minimal pouch gastritis--jessica    UPPER GASTROINTESTINAL ENDOSCOPY N/A 2021    EGD ESOPHAGOGASTRODUODENOSCOPY performed by Chichi Chen MD at [facility]       Medications Prior to Admission:    Medications Prior to Admission: BELBUCA 450 MCG FILM, Take 450 mcg by mouth every 12 hours for 30 days. [] oxyCODONE (ROXICODONE) 5 MG immediate release tablet, Take 1 tablet by mouth every 4 hours as needed for Pain for up to 7 days. [] diazePAM (VALIUM) 5 MG tablet, Take 1 tablet by mouth every 6 hours as needed (spasm) for up to 10 days. gabapentin (NEURONTIN) 300 MG capsule, TAKE 1 CAPSULE BY MOUTH THREE TIMES DAILY FOR 30 DAYS  bumetanide (BUMEX) 2 MG tablet, Take 1 tablet by mouth 2 times daily (Patient taking differently: Take 2 mg by mouth daily as needed )  potassium chloride (KLOR-CON M) 20 MEQ extended release tablet, Take 1 tablet by mouth 2 times daily (with meals) (Patient taking differently: Take 20 mEq by mouth daily as needed )  buPROPion (WELLBUTRIN XL) 150 MG extended release tablet, Take 1 tablet by mouth every morning (Patient taking differently: Take 150 mg by mouth every evening )  rOPINIRole (REQUIP) 2 MG tablet, Take 1 tablet by mouth 4 times daily  ammonium lactate (LAC-HYDRIN) 12 % cream, Apply topically as needed.   Cream Base (VERSAPRO) CREA, APPLY ONE (1) GRAM (ONE PUMP) EXTERNALLY FOUR TIMES A DAY TO EACH KNEE  Liraglutide (VICTOZA SC), Inject 1.8 mg into the skin daily    Allergies:    Ferritin    Social History:    reports that she quit smoking about 2 months ago. Her smoking use included cigarettes. She has a 9.50 pack-year smoking history. She has never used smokeless tobacco. She reports that she does not drink alcohol and does not use drugs. Family History:   Non-contributory to this Urological problem  family history includes Breast Cancer in her mother; Hypertension in her brother and sister; Stroke in her father. Review of Systems:  Constitutional: No fever or chills   Respiratory: negative for cough and hemoptysis  Cardiovascular: negative for chest pain and dyspnea  Gastrointestinal: negative for abdominal pain, diarrhea, nausea and vomiting   Derm: negative for rash and skin lesion(s)  Neurological: negative for seizures and tremors  Musculoskeletal: Back pain   Psychiatric: Negative   : As above in the HPI, otherwise negative  All other reviews are negative    Physical Exam:     Vitals:  /65   Pulse 97   Temp 98.1 °F (36.7 °C) (Temporal)   Resp 18   Ht 5' 2\" (1.575 m)   Wt 270 lb (122.5 kg)   SpO2 94%   BMI 49.38 kg/m²     General:  Awake, alert, oriented X 3. No apparent distress. HEENT:  Normocephalic, atraumatic. Lungs:  Respirations symmetric and non-labored. Abdomen:  soft, nontender, no masses  Extremities:  No clubbing, cyanosis, or edema  Skin:  Warm and dry, no open lesions or rashes  Neuro: There are no motor or sensory deficits in the 4 quadrant extremities   Rectal: deferred  Genitourinary:  No jean     Labs:     Recent Labs     05/25/21 2003 05/27/21  0754   WBC 8.1 6.0   RBC 3.97 4.00   HGB 9.3* 9.4*   HCT 33.6* 33.7*   MCV 84.6 84.3   MCH 23.4* 23.5*   MCHC 27.7* 27.9*   RDW 30.7* 30.1*    181   MPV NOT CALC 10.8         Recent Labs     05/25/21 2003 05/27/21  0754   CREATININE 0.7 0.6         Assessment/plan:  Incomplete bladder emptying    Urine culture from 5/20 +E. Coli, on Bactrim   Urine culture from 5/25 +Yeast, on Diflucan   Continue antibiotics per primary   Monitor PVRs, will maintain without jean and withotu straight cath as long as the patient is comfortable and PVRs are not above 500ml  Will follow peripherally       Electronically signed by HOLLY Mario CNP on 5/28/2021 at 6:21 PM     I agree with the nurse practitioner assessment and plan. I personally evaluated the patient and made any changes to reflect my impression and plan. Only straight cath if over 500 or if uncomfortable. Recommend aggressive bowel regimen  Will see as outpatient. Call with questions.      Loreatha Phalen, MD

## 2021-05-28 NOTE — FLOWSHEET NOTE
05/28/21 0535   Urine Assessment   Incontinence Yes   Urine Color Yellow/straw   Urine Appearance Clear   Urine Odor No odor   Bladder Scan Volume (mL) 382 mL   $ Bladder scan $ Yes   Unmeasured Output   Urine Occurrence 1

## 2021-05-28 NOTE — PROGRESS NOTES
Occupational Therapy  OCCUPATIONAL THERAPY DAILY NOTE    Date:2021  Patient Name: Dee Hand  MRN: [id number]  : 1955  Room: [address] [place], [place] [address]     Diagnosis: lumbar spiondylolisthesis,m spinal stenosis- s/p L3-4 PLIF  Additional Pertinent Hx: OA, hx  CVA, HX covid  2021, PVD, low back pain, hx gastric bypass , hx catarct & hx cellulitis & peripherl vision loss    Restrictions: Fall Risk, LSO, spinal precautions & RLE numb & tingling @ her knees to her thigh      Functional Assessment:   Date Status AE  Comments   Feeding 21 Independent      Grooming 21  SBA  ww          Oral Care 21  Setup  (seated )     Bathing 21  UB SBA  LB Min A      UB Dressing 21  SUP      LB Dressing 21  Min A      Footwear 21  Set up  Sock aid, long shoe horn      Toileting 21  Mod A  AE    Homemaking 21  CGA Counter  W/c level   Pt made grilled cheese sandwich including retrieving items from fridge, dish drainer both seat & standing levels. Pt sat to wash dishes up in w/c due to Bilateral knee issues. Functional Transfers / Balance:   Date Status DME  Comments   Sit Balance 21   Supervision  W/c  Sitting balance during hmkg/kitchen tasks due to BLE knee pain when standing. Stand Balance 21   CGA  countertop Standing balance demo during grilled cheese prepping at counter.    [] Tub  [x] Shower   Transfer 21  CGA  Ww, grab rail and shower seat    Commode   Transfer 21   CGA  BSC placed over commode     Functional   Mobility 21  CGA  ww Transfer in and out of bathroom     Other:sit to stand from w/c to countertop      On/off soft  recliner with pillows added to increase surface height     Bed mobility( supine to sit edge of bed) 21  CGA         Min A         SBA  countertop        ww Sit to stand completed during hmkg task.               Functional Exercises / Activity:  AM:  Pt completed grilled cheese sandwich while standing at kitchen counter to increase hmkg skills along with standing balance and strength along with tolerance for functional activities. Pt washed & rinsed dishes/utensils while seated in w/c due to knee pain issues while following back precautions. BUE strength exercises: green can do bar 3 sets 15 reps                                      B Hand strength with 5 # digi flex 3 sets 10 reps                                           Standing balance/endurance activity at table top level engaging RUE in tx activity. Pt stood 3-4 mins at SBA before needing a seated rest break                                           Sensory / Neuromuscular Re-Education:        Cognitive Skills:   Status Comments   Problem   Solving good     Memory good     Sequencing good     Safety fair +      Visual Perception:    Education:  Pt  educated on back precautions during kitchen/hmkg skills to increase awareness. .     [] Family teach completed on:    Pain Level: 9/10 both knees      Additional Notes:       Patient has made fair  progress during treatment sessions toward set goals. Therapy emphasis to obtain goals:Current Treatment Recommendations: Strengthening, Gait Training, Patient/Caregiver Education & Training, Home Management Training, ROM, Equipment Evaluation, Education, & procurement, Balance Training, Functional Mobility Training, Endurance Training, Safety Education & Training, Self-Care / ADL    [x] Continue with current OT Plan of care.   [] Prepare for Discharge    Long term goals  Time Frame for Long term goals : 2- 3 weeks to address above problema reas  Long term goal 1: Pt demo Mod I to eat all meals  Long term goal 2: Pt demo Mod I grooming seated or standing  Long term goal 3: Pt demo SBA-CGA bathing @ sink level or shower level with AE as needed  Long term goal 4: Pt demo Mod I to don LSO brace & shirt & Mod I to don underwear,, pants, socks & shoes using AE as needed  Long term goal 5: Pt demo Mod I commode trf with appropriate DME to ensure pt safety  Long term goals 6: Pt demo SBA walk in shower trf wtih appropriate DME to ensure pt safety  Long term goal 7: Pt demo Mod I light homemaking activity & demo a G awareness of spinal precautions  Long term goal 8: Pt demo G- endurance for a 20 minute functional activity  Long term goal 9: Pt will state & adhere to spinal precautions during ADLs, transfesr & mobitliy with a 100% accuracy       DISCHARGE RECOMMENDATIONS  Recommended DME:  LB dressing equipment   Post Discharge Care:   []Home Independently  []Home with 24hr Care / Supervision [x]Home with Partial Supervision []Home with Home Health OT []Home with Out Pt OT []Other: ___   Comments:         Time in Time out Tx Time Breakdown  Variance:   First Session  9:15am 10:00am [x] Individual Tx- 45  [] Concurrent Tx -  [] Co-Tx -   [] Group Tx -   [] Time Missed -              Second Session [phone] [] Individual Tx-  [x] Concurrent Tx-45   [] Co-Tx -   [] Group Tx -   [] Time Missed                  Third Session    [] Individual Tx-   [] Concurrent Tx -  [] Co-Tx -   [] Group Tx -   [] Time Missed -         Total Tx Time: 90 mins      Josue Kearney, [address] [facility] OTR/L [id number]

## 2021-05-28 NOTE — PATIENT CARE CONFERENCE
[address] [place] [address]  ACUTE REHABILITATION  TEAM CONFERENCE NOTE/PATIENT PLAN OF CARE    Date: 2021  Admission date: 2021  Patient Name: Tika Milner        MRN: [id number]    : 1955  (63 y.o.)  Gender: female   Rehab diagnosis/surgery with date:  Spondylolisthesis of lumbar region with PLIF 21  Impairment Group Code:  8.9      MEDICAL/FUNCTIONAL HISTORY/STATUS:  Had high fevers, maliase and found to have Ecoli UTI. Infectious workup completed and negative other than UTI.    Urine with yeast also and started on Diflucan  Pettit for retention - discontinued 21 and voiding but high residuals - consulted urolgy    Consultations/Labs/X-rays: Infectious disease consulted - zosyn and diflucan  Urology consulted and pending      MEDICATION UPDATE:  On IV zosyn and diflucan for UTI      NURSING :    Bowel:   Always Continent  [x]   Occasionally incontinent  []   Frequently incontinent  []   Always incontinent  []   Not occurred  []     Bladder:   Always Continent  []    Incontinent less than daily[x]   Incontinent  daily []   Always incontinent  []   No urine output    []   Indwelling catheter []       Toilet Hygiene:   Current level : Minimum assistance   Short term Toilet hygiene goal: Stand by Assist   Long term toilet hygiene goal:  Stand by Assist     Skin integrity: back incision with dry dressing  Pain: headache, back pain rated 8-0/10 controlled with oxycodone    NUTRITION    Diet  general  Liquid consistency   thin    SOCIAL INFORMATION:  Lives with: sister has health problems  Prior community services:  HH with [facility] in the past  Home Architecture:  2 story 2 steps 2 rails walk in shower with bench  Prior Level of function:  Independent except for back pain/Retired RN  DME:  juhi    FAMILY / PATIENT EDUCATION:  Daughter helping pt with brace    PHYSICAL THERAPY    Bed mobility:   Current level: Supervision   Short term bed mobility goal: met  Long term bed mobility motiviated  Factors hindering goal achievement:  pain          Discharge Plan   Estimated Length of Stay: 6/4/21           Destination: home  Services at Discharge: home PT, OT, aide, nurse  Equipment at Discharge: bariatric wheeled walker, shower seat with back has Adaptive equipment      INTERDISCIPLINARY TEAM/PHYSICIAN [id number] PLAN OF CARE:  Continue to work toward goals and increase strength        I approve the established interdisciplinary plan of care as documented within the medical record of Layman Neighbors. Electronically signed by Veronica Banerjee RN on 5/28/2021 at 9:55 AM  The following interdisciplinary team members were present:  CLARE Tellez, LSW  Miguel Mcdermott, DPT  Mary Martinez OTR

## 2021-05-28 NOTE — PROGRESS NOTES
[address] [place] [address] [facility]  Progress Note      CC: Low-grade fever    SUBJECTIVE:  Patient is tolerating medications. No reported adverse drug reactions. No nausea, vomiting, diarrhea. Up in chair. Had . Has straight cath order        Review of systems:  As stated above in the chief complaint, otherwise negative. Medications:  Scheduled Meds:   fluconazole  200 mg Oral Daily    sodium chloride   Intravenous Q8H    And    piperacillin-tazobactam  3,375 mg Intravenous Q8H    bisacodyl  5 mg Oral Daily    polyethylene glycol  17 g Oral Daily    sennosides-docusate sodium  1 tablet Oral BID    sodium chloride flush  5-40 mL Intravenous 2 times per day    baclofen  10 mg Oral TID    bumetanide  2 mg Oral Daily    Buprenorphine HCl  450 mcg Oral BID    buPROPion  150 mg Oral QPM    enoxaparin  40 mg Subcutaneous Daily    ipratropium-albuterol  1 ampule Inhalation Q4H WA    potassium chloride  20 mEq Oral Daily with breakfast    rOPINIRole  2 mg Oral 4x daily    gabapentin  600 mg Oral TID     Continuous Infusions:   PRN Meds:diazePAM, oxyCODONE **OR** [DISCONTINUED] oxyCODONE, acetaminophen, ammonium lactate, benzocaine-menthol, ondansetron, polyethylene glycol    OBJECTIVE:  BP (!) 124/58   Pulse 90   Temp 98.1 °F (36.7 °C) (Temporal)   Resp 16   Ht 5' 2\" (1.575 m)   Wt 270 lb (122.5 kg)   SpO2 92% Comment: used incentive spirometer   BMI 49.38 kg/m²   Temp  Av.6 °F (37 °C)  Min: 98.1 °F (36.7 °C)  Max: 99 °F (37.2 °C)  Constitutional: The patient is awake, alert, and oriented. Skin: Warm and dry. No rashes were noted. HEENT: Round and reactive pupils. Moist mucous membranes. No ulcerations or thrush. Neck: Supple to movements. Chest: No use of accessory muscles to breathe. Symmetrical expansion. No wheezing, crackles or rhonchi. Cardiovascular: S1 and S2 are rhythmic and regular. No murmurs appreciated. Abdomen: Positive bowel sounds to auscultation.  Benign to palpation. No masses felt. No hepatosplenomegaly. Genitourinary: female  Extremities: No clubbing, no cyanosis, no edema.   Lines: peripheral    Laboratory and Tests Review:  Lab Results   Component Value Date    WBC 6.0 05/27/2021    WBC 8.1 05/25/2021    WBC 10.8 05/21/2021    HGB 9.4 (L) 05/27/2021    HCT 33.7 (L) 05/27/2021    MCV 84.3 05/27/2021     05/27/2021     Lab Results   Component Value Date    NEUTROABS 4.34 05/27/2021    NEUTROABS 6.10 05/25/2021    NEUTROABS 8.96 (H) 05/21/2021     No results found for: [facility]  Lab Results   Component Value Date    ALT 19 05/10/2021    AST 18 05/10/2021    ALKPHOS 113 (H) 05/10/2021    BILITOT 0.2 05/10/2021     Lab Results   Component Value Date     05/27/2021    K 4.3 05/27/2021     05/27/2021    CO2 32 05/27/2021    BUN 17 05/27/2021    CREATININE 0.6 05/27/2021    CREATININE 0.7 05/25/2021    CREATININE 0.7 05/21/2021    GFRAA >60 05/27/2021    LABGLOM >60 05/27/2021    GLUCOSE 118 05/27/2021    PROT 6.9 05/10/2021    LABALBU 3.5 05/10/2021    CALCIUM 8.6 05/27/2021    BILITOT 0.2 05/10/2021    ALKPHOS 113 05/10/2021    AST 18 05/10/2021    ALT 19 05/10/2021     Lab Results   Component Value Date    CRP 6.5 (H) 05/26/2015    CRP 2.6 (H) 02/28/2014     Lab Results   Component Value Date    SEDRATE 26 (H) 05/26/2015    SEDRATE 33 (H) 02/28/2014     Radiology:  Reviewed    Microbiology:   Lab Results   Component Value Date    BC 24 Hours no growth 05/25/2021    BC 5 Days no growth 03/23/2021    BC 5 Days- no growth 06/13/2019    ORG Candida species 05/25/2021    ORG Escherichia coli 05/20/2021    ORG Klebsiella pneumoniae ssp pneumoniae 04/23/2021     Lab Results   Component Value Date    BLOODCULT2 24 Hours no growth 05/25/2021    BLOODCULT2 5 Days no growth 03/23/2021    BLOODCULT2 5 Days- no growth 06/13/2019    ORG Candida species 05/25/2021    ORG Escherichia coli 05/20/2021    ORG Klebsiella pneumoniae ssp pneumoniae 04/23/2021     No results found for: WNDABS  No results found for: RESPSMEAR  No results found for: MPNEUMO, CLAMYDCU, LABLEGI, AFBCX, FUNGSM, LABFUNG  No results found for: CULTRESP  No results found for: CXCATHTIP  No results found for: BFCS  Culture Surgical   Date Value Ref Range Status   07/20/2015 Moderate growth  Final   06/22/2015 Heavy growth  Final   06/22/2015   Final    Light growth  Susceptibility testing of this isolate is not routinely  indicated to guide therapy. Beta Lactamase POSITIVE       Urine Culture, Routine   Date Value Ref Range Status   05/25/2021 Growth not present, incubation continues (A)  Preliminary   05/25/2021 >100,000 CFU/ml  Preliminary   05/20/2021 (A)  Final    <10,000 CFU/mL  Nonhemolytic Strep species  Staph species     05/20/2021 >100,000 CFU/ml  Final     MRSA Culture Only   Date Value Ref Range Status   04/23/2021 Methicillin resistant Staph aureus not isolated  Final   06/05/2019 Methicillin resistant Staph aureus not isolated  Final     Assessment:  Fevers chills generalized malaise with after manipulation of Pettit after urinary retention problems. Patient was placed on Levaquin on 5/22/2021 still having symptoms. One can only assume that the patient is either having a reaction to Levaquin or that were missing some of the organisms. Cultures right now have a Enterococcus most likely involved and the UA had budding yeast so I think would manipulate the antibiotics at this point. The E. coli probably has a cAMP resistance problem as well. Urinary retention     Plan:    Cont Zosyn switch to bactrim plus Diflucan day 3/7  Check cultures  Monitor labs  Will follow with you  Recommend urology evaluation     Thank you for having us see this patient in consultation    Electronically signed by HOLLY Man CNP on 5/28/2021 at 8:14 AM   Pt seen and examined. Above discussed agree with advanced practice nurse. Labs, cultures, and radiographs reviewed. Face to Face encounter occurred. Changes made as necessary.      Familia Monzon MD

## 2021-05-28 NOTE — PROGRESS NOTES
Call to room patient reported/note  IV site leaking. IV HL removed without incident, no c/o Pain, no redness, swelling or leaking at at site drsg. Applied, is dry and intact. IV medication changed to P. O. no further IV fluids ordered at this time.

## 2021-05-28 NOTE — PROGRESS NOTES
Physical Therapy    Facility/Department: [facility] 5SE REHAB  Treatment Note    NAME: Sindhu Kim  : 1955  MRN: [id number]    Date of Service: 2021  Evaluating Therapist: Kimani Caldwell P.T.    ROOM: [facility]  DIAGNOSIS: Lumbar spondylolisthesis  PRECAUTIONS: Fall risk, spinal with LSO   PROCEDURE(S):  L3/L4 PLIF  HPI: The pt was admitted on  for elective back surgery secondary to spinal stenosis with LBP and associated RLE radicular symptoms    Social:  Pt lives with her sister who is in poor health in a 2 floor plan with 1st floor set up with 2 steps and 2 rails to enter. Prior to admission pt ambulated with a Rolator WW for B knee pain and back pain for the 4 months before admission and was Independent. Initial Evaluation  Date: 21 AM     PM    Short Term Goals Long Term Goals    Was pt agreeable to Eval/treatment? Yes Yes Yes     Does pt have pain? 8/10 low back pain at rest, increases with standing No c/o pain 8/10 back pain with activity     Bed Mobility  Rolling: NT  Supine to sit: Max A  Sit to supine: Max A  Scooting: NT NT Rolling/scooting: SBA  Supine to sit: Supervision SBA Modified Independent   Transfers Sit to stand: Max A  Stand to sit: Max A  Stand pivot:  Max A    5xSTS: TBD Sit<>stand: Supervision  Stand pivot: Supervision with [facility] Sit<>stand: SBA  Stand pivot: Supervision with [facility] [place] Min A Modified Independent  5xSTS: TBD   Ambulation    10 feet with Bariatric WW with Mod A x2 (WC follow)  10mWT: TBD  6mWT:  feet in Bariatric WW with Supervision 150 feet in [facility] [place] with Supervision 25 feet with AAD with Min A >50 feet with AAD with Modified [place]    10mWT: TBD  6mWT: TBD   Walking 10 feet on uneven surface NT NT NT 10 feet with AAD with Min A >10 feet with AAD with Modified [place]   Wheel Chair Mobility NT NT NT     Car Transfers NT NT NT Min A Modified Independent   Stair negotiation: ascended and descended  NT 12 steps with 2 rails with SBA (nonreciprocal, descended backward) NT 2 steps with 2 rails with Min A >4 steps with 2 rails with Modified [place]   Curb Step:   ascended and descended NT NT NT 2 inch step with AAD and Mod A 4 inch step with AAD and Modified [place]   BLE ROM WFL, R hip flexion limited by weakness  WFL, R hip flexion limited by weakness     BLE Strength RLE is grossly 2/5 at the hip, 3+/5 at the knee, and 4-/5 at the ankle  LLE is grossly 4/5  RLE is grossly 2/5 at the hip, 3+/5 at the knee, and 4-/5 at the ankle  LLE is grossly 4/5     Balance  Seated: Supervision  Standing: Mod A    BBS: TBD  FGA: TBD  Seated: Independent  Standing: Supervision with AD    BBS: TBD  FGA: TBD   Date Family Teach Completed No family present at [facility]  Pt's daughter present 5/27     Is additional Family Teaching Needed? Y or N Yes  No     Hindering Progress Weakness, pain  Weakness, pain, limited support     PT recommended ELOS 2 weeks       Team's Discharge Plan        Therapist at Team Meeting          Therapeutic Exercise:   AM:   Sit<>stand 3x5 with Supervision<>SBA (emphasis on hand placement and standing from lowered heights)  Ambulation: 2x100 feet, x50 feet with bariatric WW with Supervision  PM:   Sit<>stand x4 at various chairs in functional living area with Supervision that became SBA with lower surfaces  Ambulation: 150 feet with bariatric WW with Supervision  Bed mobility in queen bed x2 (x1 from the R and x1 from the L) each time with a simulated rail with SBA      Additional Comments: The pt had an IV in the afternoon that she was focused on due to difficulty with IV access the previous night. The pt was able to perform transfers with hand placement on the R [address] armrests and emphasized this with multiple repetitions of standing with the hands placed on the armrests with both single UE support and BUE support from the chair.  Repeated bed mobility in the functional living area in the afternoon, pt required no physical assistance but required the use of a simulated rail and multiple attempts, reinforced the need for a stool to step on prior to lying down in the bed. The pt also trailed a [employer] rather than a bariatric [employer] and reported she liked the [employer] better, advised the pt that a bariatric walker was recommended but the pt insisted that she preferred to use the [employer] and would be using the [employer] moving forward. Patient/family education  Pt/family educated on hand placement with transfers, bringing the walker back to the chair prior to sitting. Patient/family response to education:   Pt/family verbalized understanding Pt/family demonstrated skill Pt/family requires further education in this area   Yes Yes Reinforce      AM  Time in: 0830  Time out: 0915    PM  Time in: 1430  Time out: 1515    Pt is making good progress toward established Physical Therapy goals. Continue with physical therapy current plan of care. Bhaskar Miller.  Natacha Dubois, [address] [place] [address]  License Number: [id number]

## 2021-05-29 PROCEDURE — 97535 SELF CARE MNGMENT TRAINING: CPT

## 2021-05-29 PROCEDURE — 6370000000 HC RX 637 (ALT 250 FOR IP): Performed by: NURSE PRACTITIONER

## 2021-05-29 PROCEDURE — 6370000000 HC RX 637 (ALT 250 FOR IP): Performed by: PHYSICIAN ASSISTANT

## 2021-05-29 PROCEDURE — 6370000000 HC RX 637 (ALT 250 FOR IP): Performed by: PHYSICAL MEDICINE & REHABILITATION

## 2021-05-29 PROCEDURE — 97530 THERAPEUTIC ACTIVITIES: CPT

## 2021-05-29 PROCEDURE — 1280000000 HC REHAB R&B

## 2021-05-29 PROCEDURE — 94640 AIRWAY INHALATION TREATMENT: CPT

## 2021-05-29 PROCEDURE — 6360000002 HC RX W HCPCS: Performed by: PHYSICIAN ASSISTANT

## 2021-05-29 PROCEDURE — 6370000000 HC RX 637 (ALT 250 FOR IP): Performed by: SPECIALIST

## 2021-05-29 RX ADMIN — GABAPENTIN 600 MG: 300 CAPSULE ORAL at 20:49

## 2021-05-29 RX ADMIN — FLUCONAZOLE 200 MG: 100 TABLET ORAL at 09:30

## 2021-05-29 RX ADMIN — GABAPENTIN 600 MG: 300 CAPSULE ORAL at 09:29

## 2021-05-29 RX ADMIN — SENNOSIDES AND DOCUSATE SODIUM 1 TABLET: 8.6; 5 TABLET ORAL at 20:49

## 2021-05-29 RX ADMIN — BUPROPION HYDROCHLORIDE 150 MG: 150 TABLET, EXTENDED RELEASE ORAL at 20:49

## 2021-05-29 RX ADMIN — ROPINIROLE HYDROCHLORIDE 2 MG: 2 TABLET, FILM COATED ORAL at 16:46

## 2021-05-29 RX ADMIN — ROPINIROLE HYDROCHLORIDE 2 MG: 2 TABLET, FILM COATED ORAL at 11:58

## 2021-05-29 RX ADMIN — POLYETHYLENE GLYCOL 3350 17 G: 17 POWDER, FOR SOLUTION ORAL at 09:29

## 2021-05-29 RX ADMIN — ENOXAPARIN SODIUM 40 MG: 40 INJECTION SUBCUTANEOUS at 20:49

## 2021-05-29 RX ADMIN — IPRATROPIUM BROMIDE AND ALBUTEROL SULFATE 1 AMPULE: 2.5; .5 SOLUTION RESPIRATORY (INHALATION) at 10:59

## 2021-05-29 RX ADMIN — SULFAMETHOXAZOLE AND TRIMETHOPRIM 1 TABLET: 800; 160 TABLET ORAL at 09:31

## 2021-05-29 RX ADMIN — GABAPENTIN 600 MG: 300 CAPSULE ORAL at 13:54

## 2021-05-29 RX ADMIN — BUMETANIDE 2 MG: 1 TABLET ORAL at 09:29

## 2021-05-29 RX ADMIN — BACLOFEN 10 MG: 10 TABLET ORAL at 20:49

## 2021-05-29 RX ADMIN — BACLOFEN 10 MG: 10 TABLET ORAL at 13:54

## 2021-05-29 RX ADMIN — BISACODYL 5 MG: 5 TABLET, COATED ORAL at 09:30

## 2021-05-29 RX ADMIN — POTASSIUM CHLORIDE 20 MEQ: 1500 TABLET, EXTENDED RELEASE ORAL at 07:53

## 2021-05-29 RX ADMIN — ROPINIROLE HYDROCHLORIDE 2 MG: 2 TABLET, FILM COATED ORAL at 07:53

## 2021-05-29 RX ADMIN — BACLOFEN 10 MG: 10 TABLET ORAL at 09:29

## 2021-05-29 RX ADMIN — SENNOSIDES AND DOCUSATE SODIUM 1 TABLET: 8.6; 5 TABLET ORAL at 09:30

## 2021-05-29 RX ADMIN — SULFAMETHOXAZOLE AND TRIMETHOPRIM 1 TABLET: 800; 160 TABLET ORAL at 20:49

## 2021-05-29 RX ADMIN — IPRATROPIUM BROMIDE AND ALBUTEROL SULFATE 1 AMPULE: 2.5; .5 SOLUTION RESPIRATORY (INHALATION) at 14:38

## 2021-05-29 RX ADMIN — ROPINIROLE HYDROCHLORIDE 2 MG: 2 TABLET, FILM COATED ORAL at 20:48

## 2021-05-29 ASSESSMENT — PAIN DESCRIPTION - FREQUENCY: FREQUENCY: CONTINUOUS

## 2021-05-29 ASSESSMENT — PAIN SCALES - GENERAL
PAINLEVEL_OUTOF10: 7
PAINLEVEL_OUTOF10: 5
PAINLEVEL_OUTOF10: 7

## 2021-05-29 ASSESSMENT — PAIN DESCRIPTION - ORIENTATION: ORIENTATION: RIGHT;LEFT

## 2021-05-29 ASSESSMENT — PAIN DESCRIPTION - PAIN TYPE
TYPE: CHRONIC PAIN
TYPE: CHRONIC PAIN

## 2021-05-29 ASSESSMENT — PAIN DESCRIPTION - LOCATION: LOCATION: KNEE;BACK;SHOULDER

## 2021-05-29 ASSESSMENT — PAIN DESCRIPTION - DESCRIPTORS: DESCRIPTORS: ACHING;DISCOMFORT

## 2021-05-29 ASSESSMENT — PAIN - FUNCTIONAL ASSESSMENT: PAIN_FUNCTIONAL_ASSESSMENT: PREVENTS OR INTERFERES SOME ACTIVE ACTIVITIES AND ADLS

## 2021-05-29 ASSESSMENT — PAIN DESCRIPTION - PROGRESSION: CLINICAL_PROGRESSION: NOT CHANGED

## 2021-05-29 ASSESSMENT — PAIN DESCRIPTION - ONSET: ONSET: PROGRESSIVE

## 2021-05-29 NOTE — PROGRESS NOTES
Component Value Date    WBC 6.0 05/27/2021    WBC 8.1 05/25/2021    WBC 10.8 05/21/2021    HGB 9.4 (L) 05/27/2021    HCT 33.7 (L) 05/27/2021    MCV 84.3 05/27/2021     05/27/2021     Lab Results   Component Value Date    NEUTROABS 4.34 05/27/2021    NEUTROABS 6.10 05/25/2021    NEUTROABS 8.96 (H) 05/21/2021     No results found for: [facility]  Lab Results   Component Value Date    ALT 19 05/10/2021    AST 18 05/10/2021    ALKPHOS 113 (H) 05/10/2021    BILITOT 0.2 05/10/2021     Lab Results   Component Value Date     05/27/2021    K 4.3 05/27/2021     05/27/2021    CO2 32 05/27/2021    BUN 17 05/27/2021    CREATININE 0.6 05/27/2021    CREATININE 0.7 05/25/2021    CREATININE 0.7 05/21/2021    GFRAA >60 05/27/2021    LABGLOM >60 05/27/2021    GLUCOSE 118 05/27/2021    PROT 6.9 05/10/2021    LABALBU 3.5 05/10/2021    CALCIUM 8.6 05/27/2021    BILITOT 0.2 05/10/2021    ALKPHOS 113 05/10/2021    AST 18 05/10/2021    ALT 19 05/10/2021     Lab Results   Component Value Date    CRP 6.5 (H) 05/26/2015    CRP 2.6 (H) 02/28/2014     Lab Results   Component Value Date    SEDRATE 26 (H) 05/26/2015    SEDRATE 33 (H) 02/28/2014     Radiology:  Reviewed    Microbiology:   Lab Results   Component Value Date    BC 24 Hours no growth 05/25/2021    BC 5 Days no growth 03/23/2021    BC 5 Days- no growth 06/13/2019    ORG Candida species 05/25/2021    ORG Escherichia coli 05/20/2021    ORG Klebsiella pneumoniae ssp pneumoniae 04/23/2021     Lab Results   Component Value Date    BLOODCULT2 24 Hours no growth 05/25/2021    BLOODCULT2 5 Days no growth 03/23/2021    BLOODCULT2 5 Days- no growth 06/13/2019    ORG Candida species 05/25/2021    ORG Escherichia coli 05/20/2021    ORG Klebsiella pneumoniae ssp pneumoniae 04/23/2021     No results found for: WNDABS  No results found for: RESPSMEAR  No results found for: MPNEUMO, CLAMYDCU, LABLEGI, AFBCX, FUNGSM, LABFUNG  No results found for: CULTRESP  No results found for: CXCATHTIP  No results found for: BFCS  Culture Surgical   Date Value Ref Range Status   07/20/2015 Moderate growth  Final   06/22/2015 Heavy growth  Final   06/22/2015   Final    Light growth  Susceptibility testing of this isolate is not routinely  indicated to guide therapy. Beta Lactamase POSITIVE       Urine Culture, Routine   Date Value Ref Range Status   05/25/2021 >100,000 CFU/ml  Final   05/20/2021 (A)  Final    <10,000 CFU/mL  Nonhemolytic Strep species  Staph species     05/20/2021 >100,000 CFU/ml  Final     MRSA Culture Only   Date Value Ref Range Status   04/23/2021 Methicillin resistant Staph aureus not isolated  Final   06/05/2019 Methicillin resistant Staph aureus not isolated  Final     Assessment:  · Fevers chills generalized malaise with after manipulation of Pettit after urinary retention problems.    · UTI  · Urinary retention     Plan:    · Oral  bactrim plus Diflucan day 4 /7  · Check cultures      Electronically signed by Jolly Lewis MD on 5/29/2021 at 1:11 PM

## 2021-05-29 NOTE — PROGRESS NOTES
Occupational Therapy  OCCUPATIONAL THERAPY DAILY NOTE    Date:2021  Patient Name: Charlene Mckeon  MRN: [id number]  : 1955  [facility]     Diagnosis: lumbar spiondylolisthesis,m spinal stenosis- s/p L3-4 PLIF  Additional Pertinent Hx: OA, hx  CVA, HX covid  2021, PVD, low back pain, hx gastric bypass , hx catarct & hx cellulitis & peripherl vision loss    Restrictions: Fall Risk, LSO, spinal precautions & RLE numb & tingling @ her knees to her thigh      Functional Assessment:   Date Status AE  Comments   Feeding 21 Independent      Grooming 21  Set up seated Pt did face & hand washing during shower. Pt applied deodorant seated on EOB and styled hair/blow dry hair up in w/c along with applying make up. Oral Care 21  Setup  (seated )     Bathing 21  UB Set up  LB CGA/min A Shower  Pt required set up for UB bathing (shower) including washed hair. LB skills needed assist to reach below knees/feet at CGA/min A due to back precautions. UB Dressing 21  Mod I/Set up  Pt able to perla gown, back brace, tank top and shirt following spinal precautions   LB Dressing 21  CGA/min A reacher To perla briefs and pants using reacher while on EOB. Footwear 21  Set up  Sock aid, long shoe horn  Pt used sock aid and LH shoe horn for socks and slip on shoes performed seated on EOB. Toileting 21  SBA/CGA  Pt able to perform maria isabel care after urinating seated on toilet. Homemaking 21  CGA Counter  W/c level         Functional Transfers / Balance:   Date Status DME  Comments   Sit Balance 21   Supervision  W/c  Sitting balance during ADL/shower following spinal precautions. Stand Balance 21   SBA ww Standing balance demo during transfers, clothing mgmt and fxl mobility using w.w alker for balance. [] Tub  [x] Shower   Transfer 21  SBA/CGA  Ww, grab rail and shower seat Pt completed shower stall transfers at w.  Walker level with one cue for hand placement on grab bar to increase safety. Commode   Transfer 5/29/21   SBA  BSC placed over commode  Pt able to complete commode transfers using grab bar and w.w for balance. Functional   Mobility 5/29/21  SBA ww Transfer in and out of bathroom using w. Walker. Other:sit to stand from w/c to countertop      On/off soft  recliner with pillows added to increase surface height     Bed mobility( supine to sit edge of bed) 5/28/21 5/26/21 5/27/21  CGA         Min A         SBA  countertop        ww Sit to stand completed during hmkg task. Functional Exercises / Activity:  Pt engaged in am ADL's including shower/washing hair to increase independence with dressing, bathing, grooming and transfers. Aqua guard utilized over back incision site prior to shower. Pt utilized A.E for LB dressing task in order to follow back precautions with good carry over noted. Pt ambulated in/out of bathroom using w. Walker at [facility]. SBA on/off commode at w. Walker while SBA/CGA to safely enter/exit shower stall with one cue for hand placement on grab bar. Sensory / Neuromuscular Re-Education:        Cognitive Skills:   Status Comments   Problem   Solving good     Memory good     Sequencing good     Safety Good -      Visual Perception:    Education:  Pt  educated on back precautions while performing shower and ADL's along with A.E training. [] Family teach completed on:    Pain Level: 4/10 knees    Additional Notes:       Patient has made good  progress during treatment sessions toward set goals.  Therapy emphasis to obtain goals:Current Treatment Recommendations: Strengthening, Gait Training, Patient/Caregiver Education & Training, Home Management Training, ROM, Equipment Evaluation, Education, & procurement, Balance Training, Functional Mobility Training, Endurance Training, Safety Education & Training, Self-Care / ADL    [x] Continue with current OT Plan of care.   [] Prepare for Discharge    Long term goals  Time Frame for Long term goals : 2- 3 weeks to address above problema reas  Long term goal 1: Pt demo Mod I to eat all meals  Long term goal 2: Pt demo Mod I grooming seated or standing  Long term goal 3: Pt demo SBA-CGA bathing @ sink level or shower level with AE as needed  Long term goal 4: Pt demo Mod I to don LSO brace & shirt & Mod I to don underwear,, pants, socks & shoes using AE as needed  Long term goal 5: Pt demo Mod I commode trf with appropriate DME to ensure pt safety  Long term goals 6: Pt demo SBA walk in shower trf wtih appropriate DME to ensure pt safety  Long term goal 7: Pt demo Mod I light homemaking activity & demo a G awareness of spinal precautions  Long term goal 8: Pt demo G- endurance for a 20 minute functional activity  Long term goal 9: Pt will state & adhere to spinal precautions during ADLs, transfesr & mobitliy with a 100% accuracy       DISCHARGE RECOMMENDATIONS  Recommended DME:  LB dressing equipment   Post Discharge Care:   []Home Independently  []Home with 24hr Care / Supervision [x]Home with Partial Supervision []Home with Home Health OT []Home with Out Pt OT []Other: ___   Comments:         Time in Time out Tx Time Breakdown  Variance:   First Session  8:10am 9:10am [x] Individual Tx- 60mins  [] Concurrent Tx -  [] Co-Tx -   [] Group Tx -   [] Time Missed -              Second Session   [] Individual Tx-  [] Concurrent Tx-   [] Co-Tx -   [] Group Tx -   [] Time Missed                  Third Session    [] Individual Tx-   [] Concurrent Tx -  [] Co-Tx -   [] Group Tx -   [] Time Missed -         Total Tx Time: 60 mins      Celeste Connell, [facility]

## 2021-05-29 NOTE — PROGRESS NOTES
Patient voided 100 ml in hat and she missed and some went into the toilet.   ml when bladder scanned

## 2021-05-29 NOTE — PROGRESS NOTES
Progress Note - covering Dr. Magno Martines    Subjective/   60y.o. year old female on the rehab unit for lumbar spondylolisthesis. She continues to have difficulty with her bladder. Post void residual was 700+ cc. She did void another 200 cc after that. She would like to avoid having a catheter replaced. Nursing did communicate these results to urology. She has no other new complaints. She does report a cough with ambulation. She states she is a smoker and stopped just prior to her surgery. Objective/   VITALS:  /71   Pulse 94   Temp 97.9 °F (36.6 °C) (Temporal)   Resp 18   Ht 5' 2\" (1.575 m)   Wt 270 lb (122.5 kg)   SpO2 94%   BMI 49.38 kg/m²   24HR INTAKE/OUTPUT:      Intake/Output Summary (Last 24 hours) at 5/29/2021 1453  Last data filed at 5/29/2021 1430  Gross per 24 hour   Intake 600 ml   Output 1933 ml   Net -1333 ml     Constitutional:  Alert, awake, no apparent distress   Cardiovascular:  S1, S2 without m/r/g   Respiratory:  CTA B without w/r/r, no retractions. No accessory muscle use. Decreased breath sounds throughout  Abdomen: Protuberant, positive bowel sounds  Ext: Moderate bilateral LE edema with a history of lymphedema. No palpable cords. Negative Homans' sign. Neuro: Awake, alert and oriented x3. Good sitting balance. No tremor. Functional Level      Date   Status   AE    Comments     Feeding   5/24/21   Independent              Grooming   5/29/21    Set up   seated   Pt did face & hand washing during shower. Pt applied deodorant seated on EOB and styled hair/blow dry hair up in w/c along with applying make up. Oral Care   5/26/21    Setup  (seated )             Bathing   5/29/21    UB Set up    LB CGA/min A Shower    Pt required set up for UB bathing (shower) including washed hair. LB skills needed assist to reach below knees/feet at CGA/min A due to back precautions.       UB Dressing   5/29/21    Mod I/Set up       Pt able to perla gown, back brace, tank top and shirt following spinal precautions     LB Dressing   5/29/21    CGA/min A   reacher   To perla briefs and pants using reacher while on EOB. Footwear   5/29/21    Set up    Sock aid, long shoe horn    Pt used sock aid and LH shoe horn for socks and slip on shoes performed seated on EOB. Toileting   5/29/21    SBA/CGA       Pt able to perform maria isabel care after urinating seated on toilet. Homemaking   5/28/21    CGA   Counter    W/c level                Functional Transfers / Balance:      Date Status DME  Comments   Sit Balance 5/29/21   Supervision  W/c  Sitting balance during ADL/shower following spinal precautions. Stand Balance 5/29/21   SBA ww Standing balance demo during transfers, clothing mgmt and fxl mobility using w.w alker for balance. []? Tub  [x]? Shower   Transfer 5/29/21  SBA/CGA  Ww, grab rail and shower seat Pt completed shower stall transfers at w. Walker level with one cue for hand placement on grab bar to increase safety. Commode   Transfer 5/29/21   SBA  BSC placed over commode  Pt able to complete commode transfers using grab bar and w.w for balance. Functional   Mobility 5/29/21  SBA ww Transfer in and out of bathroom using w. Walker. Other:sit to stand from w/c to countertop        On/off soft  recliner with pillows added to increase surface height      Bed mobility( supine to sit edge of bed) 5/28/21 5/26/21 5/27/21  CGA            Min A            SBA  countertop           ww Sit to stand completed during hmkg task.                   Functional Exercises / Activity:  Pt engaged in am ADL's including shower/washing hair to increase independence with dressing, bathing, grooming and transfers. Aqua guard utilized over back incision site prior to shower. Pt utilized A.E for LB dressing task in order to follow back precautions with good carry over noted. Pt ambulated in/out of bathroom using w. Walker at [facility].      SBA on/off commode at w. Walker while SBA/CGA to safely enter/exit shower stall with one cue for hand placement on grab bar.                                        Sensory / Neuromuscular Re-Education:           Cognitive Skills:    Status Comments   Problem   Solving good      Memory good      Sequencing good      Safety Good -            Scheduled Meds:   sulfamethoxazole-trimethoprim  1 tablet Oral 2 times per day    fluconazole  200 mg Oral Daily    bisacodyl  5 mg Oral Daily    polyethylene glycol  17 g Oral Daily    sennosides-docusate sodium  1 tablet Oral BID    sodium chloride flush  5-40 mL Intravenous 2 times per day    baclofen  10 mg Oral TID    bumetanide  2 mg Oral Daily    Buprenorphine HCl  450 mcg Oral BID    buPROPion  150 mg Oral QPM    enoxaparin  40 mg Subcutaneous Daily    ipratropium-albuterol  1 ampule Inhalation Q4H WA    potassium chloride  20 mEq Oral Daily with breakfast    rOPINIRole  2 mg Oral 4x daily    gabapentin  600 mg Oral TID     Continuous Infusions:  PRN Meds:diazePAM, oxyCODONE **OR** [DISCONTINUED] oxyCODONE, acetaminophen, ammonium lactate, benzocaine-menthol, ondansetron, polyethylene glycol  I/O last 3 completed shifts: In: 420 [P.O.:420]  Out: 690 [Urine:690]  I/O this shift:  In: 600 [P.O.:600]  Out: 1243 [Urine:1243]    Labs reviewed  CBC:   Recent Labs     05/27/21  0754   WBC 6.0   HGB 9.4*        BMP:    Recent Labs     05/27/21  0754      K 4.3      CO2 32*   BUN 17   CREATININE 0.6   GLUCOSE 118*     Hepatic: No results for input(s): AST, ALT, ALB, BILITOT, ALKPHOS in the last 72 hours. BNP: No results for input(s): BNP in the last 72 hours. Lipids: No results for input(s): CHOL, HDL in the last 72 hours. Invalid input(s): LDLCALCU  INR: No results for input(s): INR in the last 72 hours.     Assessment/  Patient Active Problem List:     Osteoarthritis of left knee     Osteoarthritis of right knee     BMI 45.0-49.9, adult ([address]

## 2021-05-29 NOTE — PROGRESS NOTES
Call placed to Dr. Antoine Palmer with bladder scan result of 776, patient voided 200ml immediately before and after bladder scan. Dr. Marianne Mckeon ask patient if she wants jean catheter reinserted for a few days until seen by urology or continue with voiding and QS bladder scan call greater than 500ml. Patient wishes to leave jean out at this time. Patient stated, no pain at this time, just filling full.

## 2021-05-30 LAB
BLOOD CULTURE, ROUTINE: NORMAL
CULTURE, BLOOD 2: NORMAL

## 2021-05-30 PROCEDURE — 6370000000 HC RX 637 (ALT 250 FOR IP): Performed by: PHYSICIAN ASSISTANT

## 2021-05-30 PROCEDURE — 6360000002 HC RX W HCPCS: Performed by: PHYSICIAN ASSISTANT

## 2021-05-30 PROCEDURE — 6370000000 HC RX 637 (ALT 250 FOR IP): Performed by: NURSE PRACTITIONER

## 2021-05-30 PROCEDURE — 6370000000 HC RX 637 (ALT 250 FOR IP): Performed by: PHYSICAL MEDICINE & REHABILITATION

## 2021-05-30 PROCEDURE — 94640 AIRWAY INHALATION TREATMENT: CPT

## 2021-05-30 PROCEDURE — 51798 US URINE CAPACITY MEASURE: CPT

## 2021-05-30 PROCEDURE — 6370000000 HC RX 637 (ALT 250 FOR IP): Performed by: INTERNAL MEDICINE

## 2021-05-30 PROCEDURE — 6370000000 HC RX 637 (ALT 250 FOR IP): Performed by: SPECIALIST

## 2021-05-30 PROCEDURE — 97530 THERAPEUTIC ACTIVITIES: CPT

## 2021-05-30 PROCEDURE — 1280000000 HC REHAB R&B

## 2021-05-30 RX ORDER — LACTOBACILLUS RHAMNOSUS GG 10B CELL
1 CAPSULE ORAL DAILY
Status: DISCONTINUED | OUTPATIENT
Start: 2021-05-30 | End: 2021-06-04 | Stop reason: HOSPADM

## 2021-05-30 RX ADMIN — POTASSIUM CHLORIDE 20 MEQ: 1500 TABLET, EXTENDED RELEASE ORAL at 08:04

## 2021-05-30 RX ADMIN — GABAPENTIN 600 MG: 300 CAPSULE ORAL at 21:03

## 2021-05-30 RX ADMIN — ENOXAPARIN SODIUM 40 MG: 40 INJECTION SUBCUTANEOUS at 20:56

## 2021-05-30 RX ADMIN — IPRATROPIUM BROMIDE AND ALBUTEROL SULFATE 1 AMPULE: 2.5; .5 SOLUTION RESPIRATORY (INHALATION) at 20:17

## 2021-05-30 RX ADMIN — Medication 1 CAPSULE: at 14:53

## 2021-05-30 RX ADMIN — BUPROPION HYDROCHLORIDE 150 MG: 150 TABLET, EXTENDED RELEASE ORAL at 20:54

## 2021-05-30 RX ADMIN — SENNOSIDES AND DOCUSATE SODIUM 1 TABLET: 8.6; 5 TABLET ORAL at 08:05

## 2021-05-30 RX ADMIN — ROPINIROLE HYDROCHLORIDE 2 MG: 2 TABLET, FILM COATED ORAL at 08:15

## 2021-05-30 RX ADMIN — IPRATROPIUM BROMIDE AND ALBUTEROL SULFATE 1 AMPULE: 2.5; .5 SOLUTION RESPIRATORY (INHALATION) at 09:14

## 2021-05-30 RX ADMIN — GABAPENTIN 600 MG: 300 CAPSULE ORAL at 14:54

## 2021-05-30 RX ADMIN — BACLOFEN 10 MG: 10 TABLET ORAL at 08:05

## 2021-05-30 RX ADMIN — ROPINIROLE HYDROCHLORIDE 2 MG: 2 TABLET, FILM COATED ORAL at 20:54

## 2021-05-30 RX ADMIN — SULFAMETHOXAZOLE AND TRIMETHOPRIM 1 TABLET: 800; 160 TABLET ORAL at 08:05

## 2021-05-30 RX ADMIN — ROPINIROLE HYDROCHLORIDE 2 MG: 2 TABLET, FILM COATED ORAL at 17:05

## 2021-05-30 RX ADMIN — BACLOFEN 10 MG: 10 TABLET ORAL at 14:54

## 2021-05-30 RX ADMIN — BUMETANIDE 2 MG: 1 TABLET ORAL at 08:04

## 2021-05-30 RX ADMIN — GABAPENTIN 600 MG: 300 CAPSULE ORAL at 08:05

## 2021-05-30 RX ADMIN — ROPINIROLE HYDROCHLORIDE 2 MG: 2 TABLET, FILM COATED ORAL at 11:21

## 2021-05-30 RX ADMIN — BISACODYL 5 MG: 5 TABLET, COATED ORAL at 08:05

## 2021-05-30 RX ADMIN — BACLOFEN 10 MG: 10 TABLET ORAL at 20:54

## 2021-05-30 RX ADMIN — IPRATROPIUM BROMIDE AND ALBUTEROL SULFATE 1 AMPULE: 2.5; .5 SOLUTION RESPIRATORY (INHALATION) at 12:55

## 2021-05-30 RX ADMIN — FLUCONAZOLE 200 MG: 100 TABLET ORAL at 08:04

## 2021-05-30 RX ADMIN — SULFAMETHOXAZOLE AND TRIMETHOPRIM 1 TABLET: 800; 160 TABLET ORAL at 20:54

## 2021-05-30 RX ADMIN — IPRATROPIUM BROMIDE AND ALBUTEROL SULFATE 1 AMPULE: 2.5; .5 SOLUTION RESPIRATORY (INHALATION) at 16:24

## 2021-05-30 RX ADMIN — POLYETHYLENE GLYCOL 3350 17 G: 17 POWDER, FOR SOLUTION ORAL at 08:04

## 2021-05-30 ASSESSMENT — PAIN - FUNCTIONAL ASSESSMENT: PAIN_FUNCTIONAL_ASSESSMENT: PREVENTS OR INTERFERES SOME ACTIVE ACTIVITIES AND ADLS

## 2021-05-30 ASSESSMENT — PAIN DESCRIPTION - PROGRESSION
CLINICAL_PROGRESSION: NOT CHANGED
CLINICAL_PROGRESSION: NOT CHANGED

## 2021-05-30 ASSESSMENT — PAIN SCALES - GENERAL
PAINLEVEL_OUTOF10: 7
PAINLEVEL_OUTOF10: 5
PAINLEVEL_OUTOF10: 0

## 2021-05-30 ASSESSMENT — PAIN DESCRIPTION - DESCRIPTORS: DESCRIPTORS: ACHING;DISCOMFORT

## 2021-05-30 ASSESSMENT — PAIN DESCRIPTION - PAIN TYPE: TYPE: CHRONIC PAIN

## 2021-05-30 ASSESSMENT — PAIN DESCRIPTION - LOCATION: LOCATION: BACK;GENERALIZED;KNEE

## 2021-05-30 ASSESSMENT — PAIN DESCRIPTION - ORIENTATION: ORIENTATION: RIGHT;LEFT

## 2021-05-30 ASSESSMENT — PAIN DESCRIPTION - ONSET: ONSET: ON-GOING

## 2021-05-30 ASSESSMENT — PAIN DESCRIPTION - FREQUENCY: FREQUENCY: CONTINUOUS

## 2021-05-30 NOTE — PROGRESS NOTES
Physical Therapy    Facility/Department: [address] REHAB  Treatment Note    NAME: Rozina Tolliver  : 1955  MRN: [id number]    Date of Service: 2021  Evaluating Therapist: Alona Buenrostro P.T.    ROOM: [facility]  DIAGNOSIS: Lumbar spondylolisthesis  PRECAUTIONS: Fall risk, spinal with LSO   PROCEDURE(S):  L3/L4 PLIF  HPI: The pt was admitted on  for elective back surgery secondary to spinal stenosis with LBP and associated RLE radicular symptoms    Social:  Pt lives with her sister who is in poor health in a 2 floor plan with 1st floor set up with 2 steps and 2 rails to enter. Prior to admission pt ambulated with a Rolator WW for B knee pain and back pain for the 4 months before admission and was Independent. Initial Evaluation  Date: 21 AM         Short Term Goals Long Term Goals    Was pt agreeable to Eval/treatment? Yes Yes      Does pt have pain? 8/10 low back pain at rest, increases with standing No c/o pain      Bed Mobility  Rolling: NT  Supine to sit: Max A  Sit to supine: Max A  Scooting: NT Rolling/scooting: Supervision  Supine to sit: Supervision  SBA Modified Independent   Transfers Sit to stand: Max A  Stand to sit: Max A  Stand pivot:  Max A    5xSTS: TBD Sit<>stand: Supervision  Stand pivot: Supervision with [employer]  Min A Modified Independent  5xSTS: TBD   Ambulation    10 feet with Bariatric WW with Mod A x2 (WC follow)  10mWT: TBD  6mWT:  feet with WW with Supervision  25 feet with AAD with Min A >50 feet with AAD with Modified [place]    10mWT: TBD  6mWT: TBD   Walking 10 feet on uneven surface NT NT  10 feet with AAD with Min A >10 feet with AAD with Modified [place]   Wheel Chair Mobility NT NT      Car Transfers NT SBA  Min A Modified Independent   Stair negotiation: ascended and descended  NT 12 steps with 2 rails with SBA (nonreciprocal, descended backward)  2 steps with 2 rails with Min A >4 steps with 2 rails with Modified [place]   Curb Step: ascended and descended NT 4 inch step with WW with SBA  2 inch step with AAD and Mod A 4 inch step with AAD and Modified [place]   BLE ROM WFL, R hip flexion limited by weakness WFL, R hip flexion limited by weakness      BLE Strength RLE is grossly 2/5 at the hip, 3+/5 at the knee, and 4-/5 at the ankle  LLE is grossly 4/5 RLE is grossly 2/5 at the hip, 3+/5 at the knee, and 4-/5 at the ankle  LLE is grossly 4/5      Balance  Seated: Supervision  Standing: Mod A    BBS: TBD  FGA: TBD Seated: Independent  Standing: Supervision with AD     BBS: TBD  FGA: TBD   Date Family Teach Completed No family present at [facility] Pt's daughter present 5/27      Is additional Family Teaching Needed? Y or N Yes No      Hindering Progress Weakness, pain Weakness, pain, limited support      PT recommended ELOS 2 weeks       Team's Discharge Plan        Therapist at Team Meeting          Therapeutic Exercise:   AM:   Curb step negotiation: 4 inch step x2 with [employer] with SBA    Additional Comments: The pt reported feeling fatigued but was able to ambulate with no assistance required, short B step length noted and heavy reliance on the [employer]. The pt reported she liked ambulating with the [employer] rather than the bariatric WW but required cues to stay within the base of the walker when turning and this increased as the pt fatigued when ambulating around turns. Patient/family education  Pt/family educated on curb step negotiation technique. Patient/family response to education:   Pt/family verbalized understanding Pt/family demonstrated skill Pt/family requires further education in this area   Yes Yes Reinforce as needed     AM  Time in: 0830  Time out: 0900    Pt is making good progress toward established Physical Therapy goals. Continue with physical therapy current plan of care. Oly Gonzalez.  [place], [place]  License Number: [id number]

## 2021-05-30 NOTE — PROGRESS NOTES
Dr. Sb Hart notified of bladder scan results  572 ml and patients wishes not to have  jean catheter inserted at this time. Ok to leave jean out at this time and only bladder scan PRN for pain or discomfort.

## 2021-05-30 NOTE — PROGRESS NOTES
[address] [place] [address] [facility]  Progress Note      CC: UTI     SUBJECTIVE:  Denies fever and chills  Reports loose stool   Denies abdomen pain   Afebrile         Review of systems:  As stated above in the chief complaint, otherwise negative. Medications:  Scheduled Meds:   sulfamethoxazole-trimethoprim  1 tablet Oral 2 times per day    fluconazole  200 mg Oral Daily    bisacodyl  5 mg Oral Daily    polyethylene glycol  17 g Oral Daily    sennosides-docusate sodium  1 tablet Oral BID    sodium chloride flush  5-40 mL Intravenous 2 times per day    baclofen  10 mg Oral TID    bumetanide  2 mg Oral Daily    Buprenorphine HCl  450 mcg Oral BID    buPROPion  150 mg Oral QPM    enoxaparin  40 mg Subcutaneous Daily    ipratropium-albuterol  1 ampule Inhalation Q4H WA    potassium chloride  20 mEq Oral Daily with breakfast    rOPINIRole  2 mg Oral 4x daily    gabapentin  600 mg Oral TID     Continuous Infusions:  PRN Meds:diazePAM, oxyCODONE **OR** [DISCONTINUED] oxyCODONE, acetaminophen, ammonium lactate, benzocaine-menthol, ondansetron, polyethylene glycol    OBJECTIVE:  /73   Pulse 88   Temp 97.7 °F (36.5 °C) (Oral)   Resp 18   Ht 5' 2\" (1.575 m)   Wt 270 lb (122.5 kg)   SpO2 96%   BMI 49.38 kg/m²   Temp  Av.7 °F (36.5 °C)  Min: 97.7 °F (36.5 °C)  Max: 97.7 °F (36.5 °C)  Constitutional: The patient is awake, alert, and oriented. Skin: Warm and dry. No rashes were noted. HEENT: Round and reactive pupils. Moist mucous membranes. No ulcerations or thrush. Neck: Supple to movements. Chest: No use of accessory muscles to breathe. Symmetrical expansion. No wheezing, crackles or rhonchi. Cardiovascular: S1 and S2 are rhythmic and regular. No murmurs appreciated. Abdomen: Soft , non tender .   Genitourinary: female  Extremities: +++ edema     Laboratory and Tests Review:  Lab Results   Component Value Date    WBC 6.0 2021    WBC 8.1 2021    WBC Status   07/20/2015 Moderate growth  Final   06/22/2015 Heavy growth  Final   06/22/2015   Final    Light growth  Susceptibility testing of this isolate is not routinely  indicated to guide therapy. Beta Lactamase POSITIVE       Urine Culture, Routine   Date Value Ref Range Status   05/25/2021 >100,000 CFU/ml  Final   05/20/2021 (A)  Final    <10,000 CFU/mL  Nonhemolytic Strep species  Staph species     05/20/2021 >100,000 CFU/ml  Final     MRSA Culture Only   Date Value Ref Range Status   04/23/2021 Methicillin resistant Staph aureus not isolated  Final   06/05/2019 Methicillin resistant Staph aureus not isolated  Final     Assessment:  · Fevers chills generalized malaise with after manipulation of [place] after urinary retention problems.    · UTI  · Urinary retention     Plan:    · Oral  bactrim plus Diflucan day 5 /7  · Probiotics       Electronically signed by Melvina Jones MD on 5/30/2021 at 10:55 AM

## 2021-05-31 PROCEDURE — 6360000002 HC RX W HCPCS: Performed by: PHYSICIAN ASSISTANT

## 2021-05-31 PROCEDURE — 6370000000 HC RX 637 (ALT 250 FOR IP): Performed by: PHYSICAL MEDICINE & REHABILITATION

## 2021-05-31 PROCEDURE — 6370000000 HC RX 637 (ALT 250 FOR IP): Performed by: NURSE PRACTITIONER

## 2021-05-31 PROCEDURE — 1280000000 HC REHAB R&B

## 2021-05-31 PROCEDURE — 6370000000 HC RX 637 (ALT 250 FOR IP): Performed by: PHYSICIAN ASSISTANT

## 2021-05-31 PROCEDURE — 97110 THERAPEUTIC EXERCISES: CPT

## 2021-05-31 PROCEDURE — 6370000000 HC RX 637 (ALT 250 FOR IP): Performed by: SPECIALIST

## 2021-05-31 PROCEDURE — 94640 AIRWAY INHALATION TREATMENT: CPT

## 2021-05-31 PROCEDURE — 6370000000 HC RX 637 (ALT 250 FOR IP): Performed by: INTERNAL MEDICINE

## 2021-05-31 RX ADMIN — ROPINIROLE HYDROCHLORIDE 2 MG: 2 TABLET, FILM COATED ORAL at 15:31

## 2021-05-31 RX ADMIN — ROPINIROLE HYDROCHLORIDE 2 MG: 2 TABLET, FILM COATED ORAL at 21:40

## 2021-05-31 RX ADMIN — ROPINIROLE HYDROCHLORIDE 2 MG: 2 TABLET, FILM COATED ORAL at 08:42

## 2021-05-31 RX ADMIN — BISACODYL 5 MG: 5 TABLET, COATED ORAL at 08:43

## 2021-05-31 RX ADMIN — IPRATROPIUM BROMIDE AND ALBUTEROL SULFATE 1 AMPULE: 2.5; .5 SOLUTION RESPIRATORY (INHALATION) at 17:57

## 2021-05-31 RX ADMIN — BACLOFEN 10 MG: 10 TABLET ORAL at 21:40

## 2021-05-31 RX ADMIN — ROPINIROLE HYDROCHLORIDE 2 MG: 2 TABLET, FILM COATED ORAL at 11:38

## 2021-05-31 RX ADMIN — FLUCONAZOLE 200 MG: 100 TABLET ORAL at 08:44

## 2021-05-31 RX ADMIN — BACLOFEN 10 MG: 10 TABLET ORAL at 08:44

## 2021-05-31 RX ADMIN — GABAPENTIN 600 MG: 300 CAPSULE ORAL at 21:40

## 2021-05-31 RX ADMIN — ENOXAPARIN SODIUM 40 MG: 40 INJECTION SUBCUTANEOUS at 21:41

## 2021-05-31 RX ADMIN — IPRATROPIUM BROMIDE AND ALBUTEROL SULFATE 1 AMPULE: 2.5; .5 SOLUTION RESPIRATORY (INHALATION) at 21:28

## 2021-05-31 RX ADMIN — BUMETANIDE 2 MG: 1 TABLET ORAL at 08:43

## 2021-05-31 RX ADMIN — IPRATROPIUM BROMIDE AND ALBUTEROL SULFATE 1 AMPULE: 2.5; .5 SOLUTION RESPIRATORY (INHALATION) at 09:17

## 2021-05-31 RX ADMIN — POTASSIUM CHLORIDE 20 MEQ: 1500 TABLET, EXTENDED RELEASE ORAL at 08:43

## 2021-05-31 RX ADMIN — SULFAMETHOXAZOLE AND TRIMETHOPRIM 1 TABLET: 800; 160 TABLET ORAL at 21:39

## 2021-05-31 RX ADMIN — BACLOFEN 10 MG: 10 TABLET ORAL at 13:21

## 2021-05-31 RX ADMIN — SULFAMETHOXAZOLE AND TRIMETHOPRIM 1 TABLET: 800; 160 TABLET ORAL at 08:43

## 2021-05-31 RX ADMIN — Medication 1 CAPSULE: at 08:43

## 2021-05-31 RX ADMIN — GABAPENTIN 600 MG: 300 CAPSULE ORAL at 08:43

## 2021-05-31 RX ADMIN — GABAPENTIN 600 MG: 300 CAPSULE ORAL at 13:21

## 2021-05-31 RX ADMIN — BUPROPION HYDROCHLORIDE 150 MG: 150 TABLET, EXTENDED RELEASE ORAL at 21:40

## 2021-05-31 ASSESSMENT — PAIN SCALES - GENERAL
PAINLEVEL_OUTOF10: 7
PAINLEVEL_OUTOF10: 0
PAINLEVEL_OUTOF10: 6
PAINLEVEL_OUTOF10: 0
PAINLEVEL_OUTOF10: 8

## 2021-05-31 ASSESSMENT — PAIN DESCRIPTION - ONSET: ONSET: ON-GOING

## 2021-05-31 ASSESSMENT — PAIN DESCRIPTION - PAIN TYPE: TYPE: CHRONIC PAIN

## 2021-05-31 ASSESSMENT — PAIN DESCRIPTION - PROGRESSION: CLINICAL_PROGRESSION: NOT CHANGED

## 2021-05-31 ASSESSMENT — PAIN DESCRIPTION - LOCATION: LOCATION: BACK

## 2021-05-31 ASSESSMENT — PAIN DESCRIPTION - DESCRIPTORS: DESCRIPTORS: ACHING;SORE;CONSTANT

## 2021-05-31 ASSESSMENT — PAIN DESCRIPTION - ORIENTATION: ORIENTATION: MID;LOWER

## 2021-05-31 ASSESSMENT — PAIN DESCRIPTION - FREQUENCY: FREQUENCY: CONTINUOUS

## 2021-05-31 NOTE — PROGRESS NOTES
Occupational Therapy  OCCUPATIONAL THERAPY DAILY NOTE    Date:2021  Patient Name: Zulema Mayfield  MRN: [id number]  : 1955  [facility]     Diagnosis: lumbar spiondylolisthesis,m spinal stenosis- s/p L3-4 PLIF  Additional Pertinent Hx: OA, hx  CVA, HX covid  2021, PVD, low back pain, hx gastric bypass , hx catarct & hx cellulitis & peripherl vision loss    Restrictions: Fall Risk, LSO, spinal precautions & RLE numb & tingling @ her knees to her thigh      Functional Assessment:   Date Status AE  Comments   Feeding 21 Independent      Grooming 21  Set up seated          Oral Care 21  Setup  (seated )     Bathing 21  UB Set up  LB CGA/min A Shower     UB Dressing 21  Mod I/Set up     LB Dressing 21  CGA/min A reacher    Footwear 21  Set up  Sock aid, long shoe horn     Toileting 21  SBA/CGA     Homemaking 21  CGA Counter  W/c level         Functional Transfers / Balance:   Date Status DME  Comments   Sit Balance 21   Supervision  W/c     Stand Balance 21   SBA ww Demo'd during transfer/mobility   [] Tub  [x] Shower   Transfer 21  SBA/CGA  Ww, grab rail and shower seat    Commode   Transfer 21   SBA  BSC placed over commode     Functional   Mobility 21  SBA ww Pt coming out of bathroom with HCA upon therapist arrival Functional mobility completed throughout pt's room with fair ability to maneuver safely around environmental obstacles. SBA required for dynamic balance/safety d/t unsteadiness. Other:sit to stand       On/off soft  recliner with pillows added to increase surface height     Bed mobility( supine to sit edge of bed) 21  SBA        Min A         SBA  w/w        ww      Functional Exercises / Activity:  -Pt requested B UE strength/endurance activities this date to increase endurance/strength for functional activities.  Pt completed UBE seated 2x5 minutes with fair+ tolerance. Medium resistance putty/wheel exercise completed with good tolerance. Sensory / Neuromuscular Re-Education:        Cognitive Skills:   Status Comments   Problem   Solving good     Memory good     Sequencing good     Safety Good -      Visual Perception:    Education:  Pt  educated on safety when maneuvering in crowded areas and around environmental obstacles. Pt verbalized/demonstrated fair+ understanding, recommend continued education. [] Family teach completed on:    Pain Level: No c/o pain this date. Additional Notes:       Patient has made good  progress during treatment sessions toward set goals. Therapy emphasis to obtain goals:Current Treatment Recommendations: Strengthening, Gait Training, Patient/Caregiver Education & Training, Home Management Training, ROM, Equipment Evaluation, Education, & procurement, Balance Training, Functional Mobility Training, Endurance Training, Safety Education & Training, Self-Care / ADL    [x] Continue with current OT Plan of care.   [] Prepare for Discharge    Long term goals  Time Frame for Long term goals : 2- 3 weeks to address above problema reas  Long term goal 1: Pt demo Mod I to eat all meals  Long term goal 2: Pt demo Mod I grooming seated or standing  Long term goal 3: Pt demo SBA-CGA bathing @ sink level or shower level with AE as needed  Long term goal 4: Pt demo Mod I to don LSO brace & shirt & Mod I to don underwear,, pants, socks & shoes using AE as needed  Long term goal 5: Pt demo Mod I commode trf with appropriate DME to ensure pt safety  Long term goals 6: Pt demo SBA walk in shower trf wtih appropriate DME to ensure pt safety  Long term goal 7: Pt demo Mod I light homemaking activity & demo a G awareness of spinal precautions  Long term goal 8: Pt demo G- endurance for a 20 minute functional activity  Long term goal 9: Pt will state & adhere to spinal precautions during ADLs, transfesr & mobitliy with a 100% accuracy       DISCHARGE RECOMMENDATIONS  Recommended DME:  LB dressing equipment   Post Discharge Care:   []Home Independently  []Home with 24hr Care / Supervision [x]Home with Partial Supervision []Home with Home Health OT []Home with Out Pt OT []Other: ___   Comments:         Time in Time out Tx Time Breakdown  Variance:   First Session  [id number] [x] Individual Tx- 30 Mins  [] Concurrent Tx -  [] Co-Tx -   [] Group Tx -   [] Time Missed -              Second Session   [] Individual Tx-  [] Concurrent Tx-   [] Co-Tx -   [] Group Tx -   [] Time Missed                  Third Session    [] Individual Tx-   [] Concurrent Tx -  [] Co-Tx -   [] Group Tx -   [] Time Missed -         Total Tx Time: 30 Mins      Maricruz Sykes RICE/L [id number]  I have read the above note and agree with the documentation.   Ankit Robles OTR/L [id number]

## 2021-05-31 NOTE — PROGRESS NOTES
[address] [place] [address] [facility]  Progress Note      CC: UTI     SUBJECTIVE:  Denies fever and chills  Reports loose stool -resolved after meds for constipation stopped  Denies abdomen pain   Afebrile         Review of systems:  As stated above in the chief complaint, otherwise negative. Medications:  Scheduled Meds:   lactobacillus  1 capsule Oral Daily    sulfamethoxazole-trimethoprim  1 tablet Oral 2 times per day    fluconazole  200 mg Oral Daily    bisacodyl  5 mg Oral Daily    sodium chloride flush  5-40 mL Intravenous 2 times per day    baclofen  10 mg Oral TID    bumetanide  2 mg Oral Daily    Buprenorphine HCl  450 mcg Oral BID    buPROPion  150 mg Oral QPM    enoxaparin  40 mg Subcutaneous Daily    ipratropium-albuterol  1 ampule Inhalation Q4H WA    potassium chloride  20 mEq Oral Daily with breakfast    rOPINIRole  2 mg Oral 4x daily    gabapentin  600 mg Oral TID     Continuous Infusions:  PRN Meds:diazePAM, oxyCODONE **OR** [DISCONTINUED] oxyCODONE, acetaminophen, ammonium lactate, benzocaine-menthol, ondansetron, polyethylene glycol    OBJECTIVE:  /74   Pulse 81   Temp 98.4 °F (36.9 °C) (Temporal)   Resp 18   Ht 5' 2\" (1.575 m)   Wt 270 lb (122.5 kg)   SpO2 96%   BMI 49.38 kg/m²   Temp  Av.4 °F (36.9 °C)  Min: 98.4 °F (36.9 °C)  Max: 98.4 °F (36.9 °C)  Constitutional: The patient is awake, alert, and oriented. Skin: Warm and dry. No rashes were noted. HEENT: Round and reactive pupils. Moist mucous membranes. No ulcerations or thrush. Neck: Supple to movements. Chest: No use of accessory muscles to breathe. Symmetrical expansion. No wheezing, crackles or rhonchi. Cardiovascular: S1 and S2 are rhythmic and regular. No murmurs appreciated. Abdomen: Soft , non tender .   Genitourinary: female  Extremities: +++ edema     Laboratory and Tests Review:  Lab Results   Component Value Date    WBC 6.0 2021    WBC 8.1 2021    WBC 10.8 05/21/2021    HGB 9.4 (L) 05/27/2021    HCT 33.7 (L) 05/27/2021    MCV 84.3 05/27/2021     05/27/2021     Lab Results   Component Value Date    NEUTROABS 4.34 05/27/2021    NEUTROABS 6.10 05/25/2021    NEUTROABS 8.96 (H) 05/21/2021     No results found for: CRPHS  Lab Results   Component Value Date    ALT 19 05/10/2021    AST 18 05/10/2021    ALKPHOS 113 (H) 05/10/2021    BILITOT 0.2 05/10/2021     Lab Results   Component Value Date     05/27/2021    K 4.3 05/27/2021     05/27/2021    CO2 32 05/27/2021    BUN 17 05/27/2021    CREATININE 0.6 05/27/2021    CREATININE 0.7 05/25/2021    CREATININE 0.7 05/21/2021    GFRAA >60 05/27/2021    LABGLOM >60 05/27/2021    GLUCOSE 118 05/27/2021    PROT 6.9 05/10/2021    LABALBU 3.5 05/10/2021    CALCIUM 8.6 05/27/2021    BILITOT 0.2 05/10/2021    ALKPHOS 113 05/10/2021    AST 18 05/10/2021    ALT 19 05/10/2021     Lab Results   Component Value Date    CRP 6.5 (H) 05/26/2015    CRP 2.6 (H) 02/28/2014     Lab Results   Component Value Date    SEDRATE 26 (H) 05/26/2015    SEDRATE 33 (H) 02/28/2014     Radiology:  Reviewed    Microbiology:   Lab Results   Component Value Date    BC 5 Days no growth 05/25/2021    BC 5 Days no growth 03/23/2021    BC 5 Days- no growth 06/13/2019    ORG Candida species 05/25/2021    ORG Escherichia coli 05/20/2021    ORG Klebsiella pneumoniae ssp pneumoniae 04/23/2021     Lab Results   Component Value Date    BLOODCULT2 5 Days no growth 05/25/2021    BLOODCULT2 5 Days no growth 03/23/2021    BLOODCULT2 5 Days- no growth 06/13/2019    ORG Candida species 05/25/2021    ORG Escherichia coli 05/20/2021    ORG Klebsiella pneumoniae ssp pneumoniae 04/23/2021     No results found for: WNDABS  No results found for: RESPSMEAR  No results found for: MPNEUMO, CLAMYDCU, LABLEGI, AFBCX, FUNGSM, LABFUNG  No results found for: CULTRESP  No results found for: CXCATHTIP  No results found for: BFCS  Culture Surgical   Date Value Ref Range Status 07/20/2015 Moderate growth  Final   06/22/2015 Heavy growth  Final   06/22/2015   Final    Light growth  Susceptibility testing of this isolate is not routinely  indicated to guide therapy. Beta Lactamase POSITIVE       Urine Culture, Routine   Date Value Ref Range Status   05/25/2021 >100,000 CFU/ml  Final   05/20/2021 (A)  Final    <10,000 CFU/mL  Nonhemolytic Strep species  Staph species     05/20/2021 >100,000 CFU/ml  Final     MRSA Culture Only   Date Value Ref Range Status   04/23/2021 Methicillin resistant Staph aureus not isolated  Final   06/05/2019 Methicillin resistant Staph aureus not isolated  Final     Assessment:  · Fevers chills generalized malaise with after manipulation of Pettit after urinary retention problems. · UTI  · Urinary retention     Plan:    · Oral  bactrim plus Diflucan day 6 /7  · Probiotics       Electronically signed by HOLLY Art CNP on 5/31/2021 at 8:42 AM     Pt seen and examined. Above discussed agree with advanced practice nurse. Labs, cultures, and radiographs reviewed. Face to Face encounter occurred. Changes made as necessary.      Mario Clemente MD

## 2021-05-31 NOTE — PROGRESS NOTES
Progress Note - covering Dr. Hemant Gill    Subjective/   60y.o. year old female on the rehab unit for lumbar spondylolisthesis. She is complaining of increased swelling in her lower extremities. She feels this is because she is not wearing her JOSE hose or getting her legs wrapped. They are becoming more sore. She is concerned that this will become more problematic. She also reports a cough when she lays down. No shortness of breath. No chest pain or palpitations. No hemoptysis. Objective/   VITALS:  /74   Pulse 81   Temp 98.4 °F (36.9 °C) (Temporal)   Resp 18   Ht 5' 2\" (1.575 m)   Wt 270 lb (122.5 kg)   SpO2 96%   BMI 49.38 kg/m²   24HR INTAKE/OUTPUT:      Intake/Output Summary (Last 24 hours) at 5/31/2021 1414  Last data filed at 5/31/2021 1349  Gross per 24 hour   Intake 780 ml   Output 1100 ml   Net -320 ml     Constitutional:  Alert, awake, no apparent distress   Cardiovascular:  S1, S2 without m/r/g, distant   Respiratory:  CTA B without w/r/r. No retractions. No accessory muscle use  Abdomen: Protuberant, positive bowel sounds  Ext: 3-4+ bilateral LE edema, diffuse tenderness. No palpable cords  Skin: Both lower extremities with thickening of the skin. There is some redness at present. No open areas noted. Neuro: Awake, alert and oriented x3. No weakness in the upper extremities. Functional Level    Initial Evaluation  Date: 5/19/21 AM         Short Term Goals Long Term Goals    Was pt agreeable to Eval/treatment? Yes Yes         Does pt have pain? 8/10 low back pain at rest, increases with standing No c/o pain         Bed Mobility  Rolling: NT  Supine to sit: Max A  Sit to supine: Max A  Scooting: NT Rolling/scooting: Supervision  Supine to sit: Supervision   SBA Modified Independent   Transfers Sit to stand: Max A  Stand to sit: Max A  Stand pivot:  Max A     5xSTS: TBD Sit<>stand: Supervision  Stand pivot: Supervision with [employer]   Min A Modified Independent  5xSTS: TBD   Ambulation    10 feet with Bariatric WW with Mod A x2 (WC follow)  10mWT: TBD  6mWT:  feet with WW with Supervision   25 feet with AAD with Min A >50 feet with AAD with Modified Brandon     10mWT: TBD  6mWT: TBD   Walking 10 feet on uneven surface NT NT   10 feet with AAD with Min A >10 feet with AAD with Modified Brandon   Wheel Chair Mobility NT NT         Car Transfers NT SBA   Min A Modified Independent   Stair negotiation: ascended and descended  NT 12 steps with 2 rails with SBA (nonreciprocal, descended backward)   2 steps with 2 rails with Min A >4 steps with 2 rails with Modified Brandon   Curb Step:   ascended and descended NT 4 inch step with WW with SBA   2 inch step with AAD and Mod A 4 inch step with AAD and Modified Brandon   BLE ROM WFL, R hip flexion limited by weakness WFL, R hip flexion limited by weakness         BLE Strength RLE is grossly 2/5 at the hip, 3+/5 at the knee, and 4-/5 at the ankle  LLE is grossly 4/5 RLE is grossly 2/5 at the hip, 3+/5 at the knee, and 4-/5 at the ankle  LLE is grossly 4/5         Balance  Seated: Supervision  Standing:  Mod A     BBS: TBD  FGA: TBD Seated: Independent  Standing: Supervision with AD        BBS: TBAYLIN  FGA: TBD         Scheduled Meds:   lactobacillus  1 capsule Oral Daily    sulfamethoxazole-trimethoprim  1 tablet Oral 2 times per day    fluconazole  200 mg Oral Daily    bisacodyl  5 mg Oral Daily    sodium chloride flush  5-40 mL Intravenous 2 times per day    baclofen  10 mg Oral TID    bumetanide  2 mg Oral Daily    Buprenorphine HCl  450 mcg Oral BID    buPROPion  150 mg Oral QPM    enoxaparin  40 mg Subcutaneous Daily    ipratropium-albuterol  1 ampule Inhalation Q4H WA    potassium chloride  20 mEq Oral Daily with breakfast    rOPINIRole  2 mg Oral 4x daily    gabapentin  600 mg Oral TID     Continuous Infusions:  PRN Meds:diazePAM, oxyCODONE **OR** [DISCONTINUED] oxyCODONE, acetaminophen, ammonium lactate, benzocaine-menthol, ondansetron, polyethylene glycol  I/O last 3 completed shifts: In: 960 [P.O.:960]  Out: 700 [Urine:700]  I/O this shift: In: 540 [P.O.:540]  Out: 500 [Urine:500]    Labs reviewed  CBC: No results for input(s): WBC, HGB, PLT in the last 72 hours. BMP:  No results for input(s): NA, K, CL, CO2, BUN, CREATININE, GLUCOSE in the last 72 hours. Hepatic: No results for input(s): AST, ALT, ALB, BILITOT, ALKPHOS in the last 72 hours. BNP: No results for input(s): BNP in the last 72 hours. Lipids: No results for input(s): CHOL, HDL in the last 72 hours. Invalid input(s): LDLCALCU  INR: No results for input(s): INR in the last 72 hours. Assessment/  Patient Active Problem List:     Osteoarthritis of left knee     Osteoarthritis of right knee     BMI 45.0-49.9, adult (HCC)     Lymphedema of both lower extremities     Cellulitis of both lower extremities     Microcytic anemia     Morbid obesity ([address]     Tobacco abuse     SOB (shortness of breath)     Iron deficiency anemia     Primary osteoarthritis involving multiple joints     Lymphedema     Intervertebral lumbar disc disorder     Reactive depression     Chronic pain syndrome     Primary osteoarthritis of both knees     Chronic right shoulder pain     Chronic right-sided low back pain without sciatica     Anxiety     Chronic fatigue     Tremor     Intractable back pain     Acute midline low back pain with right-sided sciatica     Spinal stenosis of lumbar region with neurogenic claudication     Osteoarthritis of spine with radiculopathy, lumbar region     Cellulitis     Anemia     Spondylolisthesis of lumbar region     Lumbar stenosis with neurogenic claudication      Plan/  1. I ordered Ace wrap for her lower extremities  2. Continue rehab program  3. Continue pain control  4. Continue DVT prophylaxis  5. Continue mobilize as able to reduce the risk of complications  6. ID note appreciated.           Scott Willis MD

## 2021-05-31 NOTE — PLAN OF CARE
Problem: Pain:  Goal: Pain level will decrease  Description: Pain level will decrease  Outcome: Ongoing     Problem: Pain:  Goal: Patient's pain/discomfort is manageable  Description: Patient's pain/discomfort is manageable  Outcome: Ongoing

## 2021-06-01 PROCEDURE — 6370000000 HC RX 637 (ALT 250 FOR IP): Performed by: PHYSICIAN ASSISTANT

## 2021-06-01 PROCEDURE — 94640 AIRWAY INHALATION TREATMENT: CPT

## 2021-06-01 PROCEDURE — 6370000000 HC RX 637 (ALT 250 FOR IP): Performed by: PHYSICAL MEDICINE & REHABILITATION

## 2021-06-01 PROCEDURE — 1280000000 HC REHAB R&B

## 2021-06-01 PROCEDURE — 97535 SELF CARE MNGMENT TRAINING: CPT

## 2021-06-01 PROCEDURE — 97110 THERAPEUTIC EXERCISES: CPT

## 2021-06-01 PROCEDURE — 97530 THERAPEUTIC ACTIVITIES: CPT

## 2021-06-01 PROCEDURE — 6370000000 HC RX 637 (ALT 250 FOR IP): Performed by: NURSE PRACTITIONER

## 2021-06-01 PROCEDURE — 94760 N-INVAS EAR/PLS OXIMETRY 1: CPT

## 2021-06-01 PROCEDURE — 6360000002 HC RX W HCPCS: Performed by: PHYSICIAN ASSISTANT

## 2021-06-01 PROCEDURE — 6370000000 HC RX 637 (ALT 250 FOR IP): Performed by: SPECIALIST

## 2021-06-01 PROCEDURE — 99232 SBSQ HOSP IP/OBS MODERATE 35: CPT | Performed by: PHYSICAL MEDICINE & REHABILITATION

## 2021-06-01 PROCEDURE — 6370000000 HC RX 637 (ALT 250 FOR IP): Performed by: INTERNAL MEDICINE

## 2021-06-01 RX ADMIN — BACLOFEN 10 MG: 10 TABLET ORAL at 08:24

## 2021-06-01 RX ADMIN — ROPINIROLE HYDROCHLORIDE 2 MG: 2 TABLET, FILM COATED ORAL at 17:08

## 2021-06-01 RX ADMIN — SULFAMETHOXAZOLE AND TRIMETHOPRIM 1 TABLET: 800; 160 TABLET ORAL at 08:23

## 2021-06-01 RX ADMIN — SULFAMETHOXAZOLE AND TRIMETHOPRIM 1 TABLET: 800; 160 TABLET ORAL at 22:30

## 2021-06-01 RX ADMIN — IPRATROPIUM BROMIDE AND ALBUTEROL SULFATE 1 AMPULE: 2.5; .5 SOLUTION RESPIRATORY (INHALATION) at 21:17

## 2021-06-01 RX ADMIN — ROPINIROLE HYDROCHLORIDE 2 MG: 2 TABLET, FILM COATED ORAL at 08:23

## 2021-06-01 RX ADMIN — GABAPENTIN 600 MG: 300 CAPSULE ORAL at 13:16

## 2021-06-01 RX ADMIN — BACLOFEN 10 MG: 10 TABLET ORAL at 23:11

## 2021-06-01 RX ADMIN — BUMETANIDE 2 MG: 1 TABLET ORAL at 08:23

## 2021-06-01 RX ADMIN — FLUCONAZOLE 200 MG: 100 TABLET ORAL at 08:24

## 2021-06-01 RX ADMIN — ROPINIROLE HYDROCHLORIDE 2 MG: 2 TABLET, FILM COATED ORAL at 11:44

## 2021-06-01 RX ADMIN — IPRATROPIUM BROMIDE AND ALBUTEROL SULFATE 1 AMPULE: 2.5; .5 SOLUTION RESPIRATORY (INHALATION) at 11:27

## 2021-06-01 RX ADMIN — IPRATROPIUM BROMIDE AND ALBUTEROL SULFATE 1 AMPULE: 2.5; .5 SOLUTION RESPIRATORY (INHALATION) at 17:48

## 2021-06-01 RX ADMIN — GABAPENTIN 600 MG: 300 CAPSULE ORAL at 08:23

## 2021-06-01 RX ADMIN — GABAPENTIN 600 MG: 300 CAPSULE ORAL at 22:30

## 2021-06-01 RX ADMIN — BUPROPION HYDROCHLORIDE 150 MG: 150 TABLET, EXTENDED RELEASE ORAL at 22:30

## 2021-06-01 RX ADMIN — BISACODYL 5 MG: 5 TABLET, COATED ORAL at 08:23

## 2021-06-01 RX ADMIN — POTASSIUM CHLORIDE 20 MEQ: 1500 TABLET, EXTENDED RELEASE ORAL at 08:23

## 2021-06-01 RX ADMIN — ROPINIROLE HYDROCHLORIDE 2 MG: 2 TABLET, FILM COATED ORAL at 22:30

## 2021-06-01 RX ADMIN — ENOXAPARIN SODIUM 40 MG: 40 INJECTION SUBCUTANEOUS at 22:30

## 2021-06-01 RX ADMIN — Medication 1 CAPSULE: at 08:23

## 2021-06-01 RX ADMIN — BACLOFEN 10 MG: 10 TABLET ORAL at 13:17

## 2021-06-01 ASSESSMENT — PAIN SCALES - GENERAL
PAINLEVEL_OUTOF10: 5
PAINLEVEL_OUTOF10: 8
PAINLEVEL_OUTOF10: 0

## 2021-06-01 ASSESSMENT — PAIN DESCRIPTION - LOCATION: LOCATION: BACK

## 2021-06-01 ASSESSMENT — PAIN DESCRIPTION - ONSET: ONSET: ON-GOING

## 2021-06-01 ASSESSMENT — PAIN DESCRIPTION - FREQUENCY: FREQUENCY: CONTINUOUS

## 2021-06-01 ASSESSMENT — PAIN DESCRIPTION - PAIN TYPE: TYPE: CHRONIC PAIN

## 2021-06-01 ASSESSMENT — PAIN DESCRIPTION - ORIENTATION: ORIENTATION: MID;LOWER

## 2021-06-01 ASSESSMENT — PAIN DESCRIPTION - DESCRIPTORS: DESCRIPTORS: CONSTANT;DISCOMFORT;DULL

## 2021-06-01 ASSESSMENT — PAIN DESCRIPTION - PROGRESSION: CLINICAL_PROGRESSION: NOT CHANGED

## 2021-06-01 NOTE — PROGRESS NOTES
Occupational Therapy  OCCUPATIONAL THERAPY DAILY NOTE    Date:2021  Patient Name: Shaheen Rothman  MRN: [id number]  : 1955  Room: [address] [place], [place] [address]     Diagnosis: lumbar spiondylolisthesis,m spinal stenosis- s/p L3-4 PLIF  Additional Pertinent Hx: OA, hx  CVA, HX covid  2021, PVD, low back pain, hx gastric bypass , hx catarct & hx cellulitis & peripherl vision loss    Restrictions: Fall Risk, LSO, spinal precautions & RLE numb & tingling @ her knees to her thigh      Functional Assessment:   Date Status AE  Comments   Feeding 21 Independent      Grooming 21  Set up seated          Oral Care 21  Setup  (seated )     Bathing 21  UB Set up  LB CGA/min A Shower     UB Dressing 21  Mod I/Set up     LB Dressing 21  CGA/min A reacher    Footwear 21  Set up  Sock aid, long shoe horn     Toileting 21 SBA (voiding)  Min A ( BM)   Pt able to complete clothing management and hygiene following voiding. Pt needs assist with hygiene following BM. Pt issued toilet hygiene assist and states she has not tried to use them yet    Homemaking 21  CGA Counter  W/c level         Functional Transfers / Balance:   Date Status DME  Comments   Sit Balance 21   Supervision  W/c     Stand Balance 21   SBA ww    [] Tub  [x] Shower   Transfer 21  SBA/CGA  Ww, grab rail and shower seat    Commode   Transfer 21   SBA  ww   Standard commode    Functional   Mobility 21  SBA ww Back and forth to the bathroom    Other:sit to stand       On/off soft  recliner with pillows added to increase surface height     Bed mobility( supine to sit edge of bed) 21  SBA        Min A         SBA  w/w        ww      Functional Exercises / Activity:  Pt participated in therapeutic activity with focus on UB ROM/strength, standing balance/endurance at ww level. Pt completed activity at CGA  BUE strength exercises: 2 # dowel selam 3 sets 10 reps in all planes Over the door pulleys with pt wearing 1 # wrist weights 3 sets for 3 mins                                             Green can do bar 3 sets 15 reps     Sensory / Neuromuscular Re-Education:        Cognitive Skills:   Status Comments   Problem   Solving good     Memory good     Sequencing good     Safety Good -      Visual Perception:    Education:   pt was educated on pacing and energy conservation techniques to utilize during functional activities     [] Family teach completed on:    Pain Level:  B  Knees 5/10     Additional Notes:       Patient has made good  progress during treatment sessions toward set goals. Therapy emphasis to obtain goals:Current Treatment Recommendations: Strengthening, Gait Training, Patient/Caregiver Education & Training, Home Management Training, ROM, Equipment Evaluation, Education, & procurement, Balance Training, Functional Mobility Training, Endurance Training, Safety Education & Training, Self-Care / ADL    [x] Continue with current OT Plan of care.   [] Prepare for Discharge    Long term goals  Time Frame for Long term goals : 2- 3 weeks to address above problema reas  Long term goal 1: Pt demo Mod I to eat all meals  Long term goal 2: Pt demo Mod I grooming seated or standing  Long term goal 3: Pt demo SBA-CGA bathing @ sink level or shower level with AE as needed  Long term goal 4: Pt demo Mod I to don LSO brace & shirt & Mod I to don underwear,, pants, socks & shoes using AE as needed  Long term goal 5: Pt demo Mod I commode trf with appropriate DME to ensure pt safety  Long term goals 6: Pt demo SBA walk in shower trf wtih appropriate DME to ensure pt safety  Long term goal 7: Pt demo Mod I light homemaking activity & demo a G awareness of spinal precautions  Long term goal 8: Pt demo G- endurance for a 20 minute functional activity  Long term goal 9: Pt will state & adhere to spinal precautions during ADLs, transfesr & mobitliy with a 100% accuracy       DISCHARGE RECOMMENDATIONS  Recommended DME:  LB dressing equipment   Post Discharge Care:   []Home Independently  []Home with 24hr Care / Supervision [x]Home with Partial Supervision []Home with Home Health OT []Home with Out Pt OT []Other: ___   Comments:         Time in Time out Tx Time Breakdown  Variance:   First Session  [id number] [] Individual Tx-   [x] Concurrent Tx -45  [] Co-Tx -   [] Group Tx -   [] Time Missed -              Second Session [id number] [x] Individual Tx-30   [] Concurrent Tx-   [] Co-Tx -   [] Group Tx -   [x] Time Missed-15            pt using the bathroom        Third Session    [] Individual Tx-   [] Concurrent Tx -  [] Co-Tx -   [] Group Tx -   [] Time Missed -         Total Tx Time:  75 mins      Lucero Givens OTR/L [id number]

## 2021-06-01 NOTE — PROGRESS NOTES
[address] [place] [address] [facility]  Progress Note      CC: UTI     SUBJECTIVE:  Denies fever and chills  Denies abdomen pain   Urinary frequency  Afebrile         Review of systems:  As stated above in the chief complaint, otherwise negative. Medications:  Scheduled Meds:   lactobacillus  1 capsule Oral Daily    sulfamethoxazole-trimethoprim  1 tablet Oral 2 times per day    fluconazole  200 mg Oral Daily    bisacodyl  5 mg Oral Daily    sodium chloride flush  5-40 mL Intravenous 2 times per day    baclofen  10 mg Oral TID    bumetanide  2 mg Oral Daily    Buprenorphine HCl  450 mcg Oral BID    buPROPion  150 mg Oral QPM    enoxaparin  40 mg Subcutaneous Daily    ipratropium-albuterol  1 ampule Inhalation Q4H WA    potassium chloride  20 mEq Oral Daily with breakfast    rOPINIRole  2 mg Oral 4x daily    gabapentin  600 mg Oral TID     Continuous Infusions:  PRN Meds:diazePAM, oxyCODONE **OR** [DISCONTINUED] oxyCODONE, acetaminophen, ammonium lactate, benzocaine-menthol, ondansetron, polyethylene glycol    OBJECTIVE:  /78   Pulse 78   Temp 98.6 °F (37 °C) (Temporal)   Resp 18   Ht 5' 2\" (1.575 m)   Wt 295 lb 14.4 oz (134.2 kg)   SpO2 (!) 89%   BMI 54.12 kg/m²   Temp  Av.6 °F (37 °C)  Min: 98.6 °F (37 °C)  Max: 98.6 °F (37 °C)  Constitutional: The patient is awake, alert, and oriented. Skin: Warm and dry. No rashes were noted. HEENT: Round and reactive pupils. Moist mucous membranes. No ulcerations or thrush. Neck: Supple to movements. Chest: No use of accessory muscles to breathe. Symmetrical expansion. No wheezing, crackles or rhonchi. Cardiovascular: S1 and S2 are rhythmic and regular. No murmurs appreciated. Abdomen: Soft , non tender .   Genitourinary: female  Extremities: +++ edema     Laboratory and Tests Review:  Lab Results   Component Value Date    WBC 6.0 2021    WBC 8.1 2021    WBC 10.8 2021    HGB 9.4 (L) 2021    HCT 33.7 (L) 05/27/2021    MCV 84.3 05/27/2021     05/27/2021     Lab Results   Component Value Date    NEUTROABS 4.34 05/27/2021    NEUTROABS 6.10 05/25/2021    NEUTROABS 8.96 (H) 05/21/2021     No results found for: CRP  Lab Results   Component Value Date    ALT 19 05/10/2021    AST 18 05/10/2021    ALKPHOS 113 (H) 05/10/2021    BILITOT 0.2 05/10/2021     Lab Results   Component Value Date     05/27/2021    K 4.3 05/27/2021     05/27/2021    CO2 32 05/27/2021    BUN 17 05/27/2021    CREATININE 0.6 05/27/2021    CREATININE 0.7 05/25/2021    CREATININE 0.7 05/21/2021    GFRAA >60 05/27/2021    LABGLOM >60 05/27/2021    GLUCOSE 118 05/27/2021    PROT 6.9 05/10/2021    LABALBU 3.5 05/10/2021    CALCIUM 8.6 05/27/2021    BILITOT 0.2 05/10/2021    ALKPHOS 113 05/10/2021    AST 18 05/10/2021    ALT 19 05/10/2021     Lab Results   Component Value Date    CRP 6.5 (H) 05/26/2015    CRP 2.6 (H) 02/28/2014     Lab Results   Component Value Date    SEDRATE 26 (H) 05/26/2015    SEDRATE 33 (H) 02/28/2014     Radiology:  Reviewed    Microbiology:   Lab Results   Component Value Date    BC 5 Days no growth 05/25/2021    BC 5 Days no growth 03/23/2021    BC 5 Days- no growth 06/13/2019    ORG Candida species 05/25/2021    ORG Escherichia coli 05/20/2021    ORG Klebsiella pneumoniae ssp pneumoniae 04/23/2021     Lab Results   Component Value Date    BLOODCULT2 5 Days no growth 05/25/2021    BLOODCULT2 5 Days no growth 03/23/2021    BLOODCULT2 5 Days- no growth 06/13/2019    ORG Candida species 05/25/2021    ORG Escherichia coli 05/20/2021    ORG Klebsiella pneumoniae ssp pneumoniae 04/23/2021     No results found for: WNDABS  No results found for: RESPSMEAR  No results found for: MPNEUMO, CLAMYDCU, LABLEGI, AFBCX, FUNGSM, LABFUNG  No results found for: CULTRESP  No results found for: CXCATHTIP  No results found for: BFCS  Culture Surgical   Date Value Ref Range Status   07/20/2015 Moderate growth  Final   06/22/2015 Heavy growth  Final   06/22/2015   Final    Light growth  Susceptibility testing of this isolate is not routinely  indicated to guide therapy. Beta Lactamase POSITIVE       Urine Culture, Routine   Date Value Ref Range Status   05/25/2021 >100,000 CFU/ml  Final   05/20/2021 (A)  Final    <10,000 CFU/mL  Nonhemolytic Strep species  Staph species     05/20/2021 >100,000 CFU/ml  Final     MRSA Culture Only   Date Value Ref Range Status   04/23/2021 Methicillin resistant Staph aureus not isolated  Final   06/05/2019 Methicillin resistant Staph aureus not isolated  Final     Assessment:  · Fevers chills generalized malaise with after manipulation of Pettit after urinary retention problems. · UTI  · Urinary retention     Plan:    · Oral  bactrim plus Diflucan day 7 /7  · Probiotics       Electronically signed by HOLLY Gillette CNP on 6/1/2021 at 11:27 AM   Pt seen and examined. Above discussed agree with advanced practice nurse. Labs, cultures, and radiographs reviewed. Face to Face encounter occurred. Changes made as necessary.      Tequila Olsen MD

## 2021-06-01 NOTE — PROGRESS NOTES
[id number] [facility] Physical Medicine and Rehabilitation  Comprehensive Progress Note    Subjective:      Elvia Romero is a 60 y.o. female admitted to inpatient rehabilitation for impairments and acitivities limitations in ADLs and mobility secondary to L3-4 spondylolisthesis, now s/p L3-L4 PLIF. No acute events overnight. No cp, sob, n/v. Tolerating therapy. No complaints. Pain controlled. The patient's medical records have been reviewed. Scheduled Meds:lactobacillus, 1 capsule, Daily  sulfamethoxazole-trimethoprim, 1 tablet, 2 times per day  fluconazole, 200 mg, Daily  bisacodyl, 5 mg, Daily  sodium chloride flush, 5-40 mL, 2 times per day  baclofen, 10 mg, TID  bumetanide, 2 mg, Daily  Buprenorphine HCl, 450 mcg, BID  buPROPion, 150 mg, QPM  enoxaparin, 40 mg, Daily  ipratropium-albuterol, 1 ampule, Q4H WA  potassium chloride, 20 mEq, Daily with breakfast  rOPINIRole, 2 mg, 4x daily  gabapentin, 600 mg, TID      Continuous Infusions:  PRN Meds:diazePAM, 2.5 mg, Q6H PRN  oxyCODONE, 5 mg, Q6H PRN  acetaminophen, 650 mg, Q4H PRN  ammonium lactate, , Q2H PRN  benzocaine-menthol, 1 lozenge, Q2H PRN  ondansetron, 4 mg, Q6H PRN  polyethylene glycol, 17 g, Daily PRN         Objective:      Vitals:    05/31/21 2128 06/01/21 0630 06/01/21 0823 06/01/21 1128   BP:   129/78    Pulse:   78    Resp: 18  18    Temp:   98.6 °F (37 °C)    TempSrc:   Temporal    SpO2: (!) 89%   96%   Weight:  295 lb 14.4 oz (134.2 kg)     Height:         General appearance: alert, appears stated age and cooperative.  Up in chair. Lungs: CTA b/l   Heart: RRR, S1, S2  Abdomen: soft, obese abdomen, NT, normal BS   Extremities: extremities normal, atraumatic, 2+ BLE edema unchanged  Musculoskeletal: Range of motion impaired   Skin: surgical incision c/d/i   Neurologic: Alert, oriented. SILT. Motor 5/5 throughout bilateral upper extremities, 2/5 bilateral hip flexors; 4-/5 b/l KE; 4+/5 b/l ankle DF/PF.           Laboratory:    Lab Results Component Value Date    WBC 6.0 05/27/2021    HGB 9.4 (L) 05/27/2021    HCT 33.7 (L) 05/27/2021    MCV 84.3 05/27/2021     05/27/2021     Lab Results   Component Value Date     05/27/2021    K 4.3 05/27/2021     05/27/2021    CO2 32 05/27/2021    BUN 17 05/27/2021    CREATININE 0.6 05/27/2021    GLUCOSE 118 05/27/2021    CALCIUM 8.6 05/27/2021      Lab Results   Component Value Date    ALT 19 05/10/2021    AST 18 05/10/2021    ALKPHOS 113 (H) 05/10/2021    BILITOT 0.2 05/10/2021       Lab Results   Component Value Date    COLORU Yellow 05/25/2021    NITRU Negative 05/25/2021    GLUCOSEU Negative 05/25/2021    KETUA Negative 05/25/2021    UROBILINOGEN 0.2 05/25/2021    BILIRUBINUR Negative 05/25/2021     Lab Results   Component Value Date    LABA1C 5.3 03/10/2021       Functional Status:   Physical Therapy:  Bed mobility: Supervision   Transfers: Supervision   Ambulation: 225 ft bariatric [address] SBA/Supervision     Occupational Therapy:  Feeding: Independent   Grooming: Setup   UB dressing: Mod I - Setup  LB dressing: CGA - Min A          Assessment/Plan:       60 y.o. female admitted to inpatient rehabilitation for impairments and acitivities limitations in ADLs and mobility secondary to L3-4 spondylolisthesis, now s/p L3-L4 PLIF        -L3-4 spondylolisthesis: s/p L3-L4 PLIF on 5/13/2021. Spine precautions. Pain control. Continue Acute Rehab program.   -Acute post operative pain: Continue home Butrans patch. PRN oxycodone. PRN tylenol. Continue gabapentin. Continue Baclofen. PRN Valium. Wean narcotics and Valium as tolerated.   -UTI: E.coli sensitive to levaquin per original culture, fevers despite levaquin, switched to Zosyn/diflucan per ID. Repeat urine culture showing candida. Now on Bactrim and diflucan per ID. Clinically improved. -Post operative urinary retention requiring jean. Jean out, voiding. Appreciate Urology recommendations.    -DVT prophylaxis: Lovenox          Electronically signed by Rhiannon Carroll MD on 6/1/2021 at 11:59 AM

## 2021-06-01 NOTE — PROGRESS NOTES
06/01/21 1243   Attendance   Activity Games  ([employer])   Participation Active participation   [place] Demonstrates ability to complete social goals   Social Skills Interacts independently in social activity   Leisure Education Demonstrates knowledge of benefits of leisure involvement  Priti Nelson identified standing, arm strength & balance .)   Time Spent With Patient   Minutes 40

## 2021-06-01 NOTE — PROGRESS NOTES
negotiation: ascended and descended  NT 8 steps with 2 rails with SBA (nonreciprocal, descended backward) NT 2 steps with 2 rails with Min A >4 steps with 2 rails with Modified [place]   Curb Step:   ascended and descended NT 4 inch step with WW with SBA NT 2 inch step with AAD and Mod A 4 inch step with AAD and Modified [place]   BLE ROM WFL, R hip flexion limited by weakness  WFL, R hip flexion limited by weakness     BLE Strength RLE is grossly 2/5 at the hip, 3+/5 at the knee, and 4-/5 at the ankle  LLE is grossly 4/5  RLE is grossly 2/5 at the hip, 3+/5 at the knee, and 4-/5 at the ankle  LLE is grossly 4/5     Balance  Seated: Supervision  Standing: Mod A    BBS: TBD  FGA: TBD  Seated: Independent  Standing: Supervision with AD    BBS: TBD  FGA: TBD   Date Family Teach Completed No family present at [facility]  Pt's daughter present 5/27     Is additional Family Teaching Needed? Y or N Yes  No     Hindering Progress Weakness, pain  Weakness, pain, limited support     PT recommended ELOS 2 weeks       Team's Discharge Plan        Therapist at Team Meeting          Therapeutic Exercise:   AM:   Ambulation: 2x80 feet with WW with Supervision that became SBA as the pt fatigued through the session  Sit<>stand x3 at [facility] with Modified [place]  Sit<>stand x5 at low chair with Supervision, VCs for hand placement  PM:   Sit<>stand x10 at elevated mat table that was lowered to its lowest session between sets  Ambulation: 75 feet with WW with Supervision  Sit<>stand 2x3 at [facility] with Supervision  Standing hip flexion x5 each LE  Standing hip extension with BUE support on rail at step with x5 RLE and x2 LLE with SBA  Sit<>stand x14, x17 with green TB resisting B hip abduction with Supervision    Additional Comments: The pt required cues for hand placement to stand with safe technique.  The pt reported feeling more weak this morning and attributed it to not walking through the weekend but did state that she was able to ambulate with her daughter during the weekend. As the pt ambulated with fatigue she began to push her walker out in front of her making it more difficult to stand upright, educated the pt on importance of proper positioning with the walker and her posture did improve although she began to lean into walls due to reported leg weakness and fatigue. Much of her current diminished functional level is thought to be her baseline. The pt began performing hip extensions at the step but reported increased L knee pain and poor tolerance to activity, regressed to a BLE activity and the pt tolerated well, increased repetitions to improve intensity of the strength training to promote safe transfers and ambulation. Patient/family education  Pt/family educated on posture within [employer]. Patient/family response to education:   Pt/family verbalized understanding Pt/family demonstrated skill Pt/family requires further education in this area   Yes With cues Yes     AM  Time in: 0830  Time out: 0915    PM  Time in: 1430  Time out: 1515    Pt is making good progress toward established Physical Therapy goals. Continue with physical therapy current plan of care. Bhaskar Miller.  Natacha Dubois, [address] [place] [address]  License Number: [id number]

## 2021-06-02 LAB
ALBUMIN SERPL-MCNC: 3.7 G/DL (ref 3.5–5.2)
ALP BLD-CCNC: 122 U/L (ref 35–104)
ALT SERPL-CCNC: 16 U/L (ref 0–32)
ANION GAP SERPL CALCULATED.3IONS-SCNC: 10 MMOL/L (ref 7–16)
ANISOCYTOSIS: ABNORMAL
AST SERPL-CCNC: 20 U/L (ref 0–31)
BASOPHILS ABSOLUTE: 0.08 E9/L (ref 0–0.2)
BASOPHILS RELATIVE PERCENT: 0.9 % (ref 0–2)
BILIRUB SERPL-MCNC: <0.2 MG/DL (ref 0–1.2)
BUN BLDV-MCNC: 22 MG/DL (ref 6–23)
CALCIUM SERPL-MCNC: 9.1 MG/DL (ref 8.6–10.2)
CHLORIDE BLD-SCNC: 95 MMOL/L (ref 98–107)
CO2: 30 MMOL/L (ref 22–29)
CREAT SERPL-MCNC: 1.1 MG/DL (ref 0.5–1)
EOSINOPHILS ABSOLUTE: 0.33 E9/L (ref 0.05–0.5)
EOSINOPHILS RELATIVE PERCENT: 3.5 % (ref 0–6)
GFR AFRICAN AMERICAN: >60
GFR NON-AFRICAN AMERICAN: 50 ML/MIN/1.73
GLUCOSE BLD-MCNC: 109 MG/DL (ref 74–99)
HCT VFR BLD CALC: 38.7 % (ref 34–48)
HEMOGLOBIN: 10.6 G/DL (ref 11.5–15.5)
HYPOCHROMIA: ABNORMAL
LYMPHOCYTES ABSOLUTE: 1.67 E9/L (ref 1.5–4)
LYMPHOCYTES RELATIVE PERCENT: 17.5 % (ref 20–42)
MCH RBC QN AUTO: 23.2 PG (ref 26–35)
MCHC RBC AUTO-ENTMCNC: 27.4 % (ref 32–34.5)
MCV RBC AUTO: 84.9 FL (ref 80–99.9)
MONOCYTES ABSOLUTE: 0.09 E9/L (ref 0.1–0.95)
MONOCYTES RELATIVE PERCENT: 0.9 % (ref 2–12)
NEUTROPHILS ABSOLUTE: 7.16 E9/L (ref 1.8–7.3)
NEUTROPHILS RELATIVE PERCENT: 77.2 % (ref 43–80)
NUCLEATED RED BLOOD CELLS: 0.9 /100 WBC
OVALOCYTES: ABNORMAL
PDW BLD-RTO: 28.3 FL (ref 11.5–15)
PLATELET # BLD: 277 E9/L (ref 130–450)
PMV BLD AUTO: 10.1 FL (ref 7–12)
POLYCHROMASIA: ABNORMAL
POTASSIUM SERPL-SCNC: 4.8 MMOL/L (ref 3.5–5)
RBC # BLD: 4.56 E12/L (ref 3.5–5.5)
SODIUM BLD-SCNC: 135 MMOL/L (ref 132–146)
STOMATOCYTES: ABNORMAL
TOTAL PROTEIN: 7.2 G/DL (ref 6.4–8.3)
WBC # BLD: 9.3 E9/L (ref 4.5–11.5)

## 2021-06-02 PROCEDURE — 6370000000 HC RX 637 (ALT 250 FOR IP): Performed by: PHYSICAL MEDICINE & REHABILITATION

## 2021-06-02 PROCEDURE — 97530 THERAPEUTIC ACTIVITIES: CPT

## 2021-06-02 PROCEDURE — 6370000000 HC RX 637 (ALT 250 FOR IP): Performed by: NURSE PRACTITIONER

## 2021-06-02 PROCEDURE — 80053 COMPREHEN METABOLIC PANEL: CPT

## 2021-06-02 PROCEDURE — 51798 US URINE CAPACITY MEASURE: CPT

## 2021-06-02 PROCEDURE — 1280000000 HC REHAB R&B

## 2021-06-02 PROCEDURE — 97535 SELF CARE MNGMENT TRAINING: CPT

## 2021-06-02 PROCEDURE — 97110 THERAPEUTIC EXERCISES: CPT

## 2021-06-02 PROCEDURE — 94640 AIRWAY INHALATION TREATMENT: CPT

## 2021-06-02 PROCEDURE — 6370000000 HC RX 637 (ALT 250 FOR IP): Performed by: PHYSICIAN ASSISTANT

## 2021-06-02 PROCEDURE — 85025 COMPLETE CBC W/AUTO DIFF WBC: CPT

## 2021-06-02 PROCEDURE — 99232 SBSQ HOSP IP/OBS MODERATE 35: CPT | Performed by: PHYSICAL MEDICINE & REHABILITATION

## 2021-06-02 PROCEDURE — 6370000000 HC RX 637 (ALT 250 FOR IP): Performed by: INTERNAL MEDICINE

## 2021-06-02 PROCEDURE — 36415 COLL VENOUS BLD VENIPUNCTURE: CPT

## 2021-06-02 PROCEDURE — 6370000000 HC RX 637 (ALT 250 FOR IP): Performed by: SPECIALIST

## 2021-06-02 PROCEDURE — 6360000002 HC RX W HCPCS: Performed by: PHYSICIAN ASSISTANT

## 2021-06-02 RX ORDER — BACLOFEN 10 MG/1
5 TABLET ORAL 3 TIMES DAILY
Status: DISCONTINUED | OUTPATIENT
Start: 2021-06-02 | End: 2021-06-04 | Stop reason: HOSPADM

## 2021-06-02 RX ORDER — GABAPENTIN 300 MG/1
300 CAPSULE ORAL 3 TIMES DAILY
Status: DISCONTINUED | OUTPATIENT
Start: 2021-06-02 | End: 2021-06-04 | Stop reason: HOSPADM

## 2021-06-02 RX ADMIN — SULFAMETHOXAZOLE AND TRIMETHOPRIM 1 TABLET: 800; 160 TABLET ORAL at 08:16

## 2021-06-02 RX ADMIN — BUMETANIDE 2 MG: 1 TABLET ORAL at 08:15

## 2021-06-02 RX ADMIN — BISACODYL 5 MG: 5 TABLET, COATED ORAL at 08:15

## 2021-06-02 RX ADMIN — IPRATROPIUM BROMIDE AND ALBUTEROL SULFATE 1 AMPULE: 2.5; .5 SOLUTION RESPIRATORY (INHALATION) at 19:27

## 2021-06-02 RX ADMIN — GABAPENTIN 300 MG: 300 CAPSULE ORAL at 20:39

## 2021-06-02 RX ADMIN — POTASSIUM CHLORIDE 20 MEQ: 1500 TABLET, EXTENDED RELEASE ORAL at 08:23

## 2021-06-02 RX ADMIN — GABAPENTIN 600 MG: 300 CAPSULE ORAL at 08:15

## 2021-06-02 RX ADMIN — BACLOFEN 5 MG: 10 TABLET ORAL at 20:39

## 2021-06-02 RX ADMIN — FLUCONAZOLE 200 MG: 100 TABLET ORAL at 08:14

## 2021-06-02 RX ADMIN — BUPROPION HYDROCHLORIDE 150 MG: 150 TABLET, EXTENDED RELEASE ORAL at 20:39

## 2021-06-02 RX ADMIN — SULFAMETHOXAZOLE AND TRIMETHOPRIM 1 TABLET: 800; 160 TABLET ORAL at 20:50

## 2021-06-02 RX ADMIN — ROPINIROLE HYDROCHLORIDE 2 MG: 2 TABLET, FILM COATED ORAL at 20:39

## 2021-06-02 RX ADMIN — Medication 1 CAPSULE: at 08:16

## 2021-06-02 RX ADMIN — ROPINIROLE HYDROCHLORIDE 2 MG: 2 TABLET, FILM COATED ORAL at 08:23

## 2021-06-02 RX ADMIN — ROPINIROLE HYDROCHLORIDE 2 MG: 2 TABLET, FILM COATED ORAL at 12:19

## 2021-06-02 RX ADMIN — BACLOFEN 10 MG: 10 TABLET ORAL at 08:16

## 2021-06-02 RX ADMIN — ENOXAPARIN SODIUM 40 MG: 40 INJECTION SUBCUTANEOUS at 20:38

## 2021-06-02 RX ADMIN — IPRATROPIUM BROMIDE AND ALBUTEROL SULFATE 1 AMPULE: 2.5; .5 SOLUTION RESPIRATORY (INHALATION) at 14:10

## 2021-06-02 ASSESSMENT — PAIN DESCRIPTION - PROGRESSION: CLINICAL_PROGRESSION: NOT CHANGED

## 2021-06-02 NOTE — PROGRESS NOTES
Occupational Therapy  OCCUPATIONAL THERAPY DAILY NOTE    Date:2021  Patient Name: Zulema Mayfield  MRN: [id number]  : 1955  Room: [address] [place], [place] [address]     Diagnosis: lumbar spiondylolisthesis,m spinal stenosis- s/p L3-4 PLIF  Additional Pertinent Hx: OA, hx  CVA, HX covid  2021, PVD, low back pain, hx gastric bypass , hx catarct & hx cellulitis & peripherl vision loss    Restrictions: Fall Risk, LSO, spinal precautions & RLE numb & tingling @ her knees to her thigh      Functional Assessment:   Date Status AE  Comments   Feeding 21 Independent      Grooming 21 Mod I  ww Pt stood and washed hands at sink at  ww level          Oral Care 21  Setup  (seated )     Bathing 21  UB Set up  LB CGA/min A Shower     UB Dressing 21  Mod I/Set up     LB Dressing 21  CGA/min A reacher    Footwear 21  Set up  Sock aid, long shoe horn     Toileting 21  SBA (voiding)  Min A ( BM)      Homemaking 21  CGA Counter  W/c level         Functional Transfers / Balance:   Date Status DME  Comments   Sit Balance 21   Supervision  W/c     Stand Balance 21  Mod I  ww    [] Tub  [x] Shower   Transfer 21  SBA/CGA  Ww, grab rail and shower seat    Commode   Transfer 21  SBA  ww   Standard commode    Functional   Mobility 21  SBA ww Back and forth to the bathroom and throughout therapy apt setting at ww level    Other:sit to stand       On/off kitchen chair with arm rests      21           Mod I         SBA  w/w        ww      Functional Exercises / Activity:  BUE strength exercises: 2 # dowel selam 3 sets 10 reps in all planes                                           Wheel and medium resistive theraputty on table top surface                                             Arm bike 5 mins for 3 reps                                            Green can do bar 3 sets 15 reps   Standing balance/endurance activity at table top and ww levels. Pt at SBA/Mod I depending on level of fatigue. Pt encouraged to take frequent rest breaks as needed  during standing activities. Sensory / Neuromuscular Re-Education:        Cognitive Skills:   Status Comments   Problem   Solving good     Memory good     Sequencing good     Safety Good -      Visual Perception:    Education:   pt was educated on safe functional transfers  on/off various surface heights at ww level     [] Family teach completed on:    Pain Level:  B  Knees 5/10     Additional Notes:       Patient has made good  progress during treatment sessions toward set goals. Therapy emphasis to obtain goals:Current Treatment Recommendations: Strengthening, Gait Training, Patient/Caregiver Education & Training, Home Management Training, ROM, Equipment Evaluation, Education, & procurement, Balance Training, Functional Mobility Training, Endurance Training, Safety Education & Training, Self-Care / ADL    [x] Continue with current OT Plan of care.   [] Prepare for Discharge    Long term goals  Time Frame for Long term goals : 2- 3 weeks to address above problema reas  Long term goal 1: Pt demo Mod I to eat all meals  Long term goal 2: Pt demo Mod I grooming seated or standing  Long term goal 3: Pt demo SBA-CGA bathing @ sink level or shower level with AE as needed  Long term goal 4: Pt demo Mod I to don LSO brace & shirt & Mod I to don underwear,, pants, socks & shoes using AE as needed  Long term goal 5: Pt demo Mod I commode trf with appropriate DME to ensure pt safety  Long term goals 6: Pt demo SBA walk in shower trf wtih appropriate DME to ensure pt safety  Long term goal 7: Pt demo Mod I light homemaking activity & demo a G awareness of spinal precautions  Long term goal 8: Pt demo G- endurance for a 20 minute functional activity  Long term goal 9: Pt will state & adhere to spinal precautions during ADLs, transfesr & mobitliy with a 100% accuracy       DISCHARGE RECOMMENDATIONS  Recommended DME:  LB dressing equipment Post Discharge Care:   []Home Independently  []Home with 24hr Care / Supervision [x]Home with Partial Supervision []Home with Home Health OT []Home with Out Pt OT []Other: ___   Comments:         Time in Time out Tx Time Breakdown  Variance:   First Session  0915   1000  [x] Individual Tx-45   [] Concurrent Tx -  [] Co-Tx -   [] Group Tx -   [] Time Missed -              Second Session [id number] [] Individual Tx-   [x] Concurrent Tx- 45  [] Co-Tx -   [] Group Tx -   [] Time Missed-                   Third Session    [] Individual Tx-   [] Concurrent Tx -  [] Co-Tx -   [] Group Tx -   [] Time Missed -         Total Tx Time:  90 mins      Gale Lazo OTR/L [id number]

## 2021-06-02 NOTE — PROGRESS NOTES
[address] [place] [address] [facility]  Progress Note      CC: UTI     SUBJECTIVE:  Denies fever and chills  Denies abdomen pain   Urinary frequency  Afebrile         Review of systems:  As stated above in the chief complaint, otherwise negative. Medications:  Scheduled Meds:   baclofen  5 mg Oral TID    gabapentin  300 mg Oral TID    lactobacillus  1 capsule Oral Daily    sulfamethoxazole-trimethoprim  1 tablet Oral 2 times per day    fluconazole  200 mg Oral Daily    bisacodyl  5 mg Oral Daily    sodium chloride flush  5-40 mL Intravenous 2 times per day    bumetanide  2 mg Oral Daily    Buprenorphine HCl  450 mcg Oral BID    buPROPion  150 mg Oral QPM    enoxaparin  40 mg Subcutaneous Daily    ipratropium-albuterol  1 ampule Inhalation Q4H WA    potassium chloride  20 mEq Oral Daily with breakfast    rOPINIRole  2 mg Oral 4x daily     Continuous Infusions:  PRN Meds:oxyCODONE **OR** [DISCONTINUED] oxyCODONE, acetaminophen, ammonium lactate, benzocaine-menthol, ondansetron, polyethylene glycol    OBJECTIVE:  BP (!) 155/84   Pulse 78   Temp 96.4 °F (35.8 °C) (Temporal)   Resp 18   Ht 5' 2\" (1.575 m)   Wt 295 lb 14.4 oz (134.2 kg)   SpO2 96%   BMI 54.12 kg/m²   Temp  Av.1 °F (36.2 °C)  Min: 96.4 °F (35.8 °C)  Max: 97.8 °F (36.6 °C)  Constitutional: The patient is awake, alert, and oriented. Skin: Warm and dry. No rashes were noted. HEENT: Round and reactive pupils. Moist mucous membranes. No ulcerations or thrush. Neck: Supple to movements. Chest: No use of accessory muscles to breathe. Symmetrical expansion. No wheezing, crackles or rhonchi. Cardiovascular: S1 and S2 are rhythmic and regular. No murmurs appreciated. Abdomen: Soft , non tender .   Genitourinary: female  Extremities: +++ edema     Laboratory and Tests Review:  Lab Results   Component Value Date    WBC 6.0 2021    WBC 8.1 2021    WBC 10.8 2021    HGB 9.4 (L) 2021    HCT 33.7 (L) 05/27/2021    MCV 84.3 05/27/2021     05/27/2021     Lab Results   Component Value Date    NEUTROABS 4.34 05/27/2021    NEUTROABS 6.10 05/25/2021    NEUTROABS 8.96 (H) 05/21/2021     No results found for: CRP  Lab Results   Component Value Date    ALT 19 05/10/2021    AST 18 05/10/2021    ALKPHOS 113 (H) 05/10/2021    BILITOT 0.2 05/10/2021     Lab Results   Component Value Date     05/27/2021    K 4.3 05/27/2021     05/27/2021    CO2 32 05/27/2021    BUN 17 05/27/2021    CREATININE 0.6 05/27/2021    CREATININE 0.7 05/25/2021    CREATININE 0.7 05/21/2021    GFRAA >60 05/27/2021    LABGLOM >60 05/27/2021    GLUCOSE 118 05/27/2021    PROT 6.9 05/10/2021    LABALBU 3.5 05/10/2021    CALCIUM 8.6 05/27/2021    BILITOT 0.2 05/10/2021    ALKPHOS 113 05/10/2021    AST 18 05/10/2021    ALT 19 05/10/2021     Lab Results   Component Value Date    CRP 6.5 (H) 05/26/2015    CRP 2.6 (H) 02/28/2014     Lab Results   Component Value Date    SEDRATE 26 (H) 05/26/2015    SEDRATE 33 (H) 02/28/2014     Radiology:  Reviewed    Microbiology:   Lab Results   Component Value Date    BC 5 Days no growth 05/25/2021    BC 5 Days no growth 03/23/2021    BC 5 Days- no growth 06/13/2019    ORG Candida species 05/25/2021    ORG Escherichia coli 05/20/2021    ORG Klebsiella pneumoniae ssp pneumoniae 04/23/2021     Lab Results   Component Value Date    BLOODCULT2 5 Days no growth 05/25/2021    BLOODCULT2 5 Days no growth 03/23/2021    BLOODCULT2 5 Days- no growth 06/13/2019    ORG Candida species 05/25/2021    ORG Escherichia coli 05/20/2021    ORG Klebsiella pneumoniae ssp pneumoniae 04/23/2021     No results found for: WNDABS  No results found for: RESPSMEAR  No results found for: MPNEUMO, CLAMYDCU, LABLEGI, AFBCX, FUNGSM, LABFUNG  No results found for: CULTRESP  No results found for: CXCATHTIP  No results found for: BFCS  Culture Surgical   Date Value Ref Range Status   07/20/2015 Moderate growth  Final   06/22/2015 Heavy growth  Final   06/22/2015   Final    Light growth  Susceptibility testing of this isolate is not routinely  indicated to guide therapy. Beta Lactamase POSITIVE       Urine Culture, Routine   Date Value Ref Range Status   05/25/2021 >100,000 CFU/ml  Final   05/20/2021 (A)  Final    <10,000 CFU/mL  Nonhemolytic Strep species  Staph species     05/20/2021 >100,000 CFU/ml  Final     MRSA Culture Only   Date Value Ref Range Status   04/23/2021 Methicillin resistant Staph aureus not isolated  Final   06/05/2019 Methicillin resistant Staph aureus not isolated  Final     Assessment:  · Fevers chills generalized malaise with after manipulation of [place] after urinary retention problems.    · UTI  · Urinary retention     Plan:    · Oral  bactrim plus Diflucan day 7 /10  · Probiotics       Electronically signed by Spencer Nicole MD on 6/2/2021 at 5:13 PM

## 2021-06-02 NOTE — PROGRESS NOTES
DISCHARGE SUMMARY    Group interaction skills/socialization:  Worth Dubin displayed social interaction skills at the complete independence. Leisure participation/awareness:  Worth Dubin participated in 2 therapeutic recreation interventions identifying 6 benefits to leisure participation.     Other:    Outcomes: goals achieved      Electronically signed by Aury Garcia on 6/2/2021 at 1:48 PM

## 2021-06-02 NOTE — PROGRESS NOTES
Physical Therapy    Facility/Department: [facility]  Treatment Note    NAME: Rozina Tolliver  : 1955  MRN: [id number]    Date of Service: 2021  Evaluating Therapist: Alona Buenrostro P.T.    ROOM: [facility]  DIAGNOSIS: Lumbar spondylolisthesis  PRECAUTIONS: Fall risk, spinal with LSO   PROCEDURE(S):  L3/L4 PLIF  HPI: The pt was admitted on  for elective back surgery secondary to spinal stenosis with LBP and associated RLE radicular symptoms    Social:  Pt lives with her sister who is in poor health in a 2 floor plan with 1st floor set up with 2 steps and 2 rails to enter. Prior to admission pt ambulated with a Rolator WW for B knee pain and back pain for the 4 months before admission and was Independent. Initial Evaluation  Date: 21 AM     PM    Short Term Goals Long Term Goals    Was pt agreeable to Eval/treatment? Yes Yes Yes     Does pt have pain? 8/10 low back pain at rest, increases with standing No c/o pain No c/o pain     Bed Mobility  Rolling: NT  Supine to sit: Max A  Sit to supine: Max A  Scooting: NT NT Rolling/Scooting: Supervision  Supine<>sit: Supervision SBA Modified Independent   Transfers Sit to stand: Max A  Stand to sit: Max A  Stand pivot:  Max A    5xSTS: TBD Sit<>stand: Supervision  Stand pivot: Supervision with [employer] Sit<>stand: SBA  Stand pivot: Supervision with [employer] Min A Modified Independent  5xSTS: TBD   Ambulation    10 feet with Bariatric WW with Mod A x2 (WC follow)  10mWT: TBD  6mWT: TBD 75 feet with WW with Supervision 150 feet with wide WW with Supervision 25 feet with AAD with Min A >50 feet with AAD with Modified [place]    10mWT: TBD  6mWT: TBD   Walking 10 feet on uneven surface NT NT NT 10 feet with AAD with Min A >10 feet with AAD with Modified [place]   Wheel Chair Mobility NT NT NT     Car Transfers NT NT NT Min A Modified Independent   Stair negotiation: ascended and descended  NT 8 steps with 2 rails with Supervision (nonreciprocal) 4 steps with 2 rails with Supervision (nonreciprocal) 2 steps with 2 rails with Min A >4 steps with 2 rails with Modified [place]   Curb Step:   ascended and descended NT NT 4 inch step with wide WW with SBA 2 inch step with AAD and Mod A 4 inch step with AAD and Modified [place]   BLE ROM WFL, R hip flexion limited by weakness  WFL, R hip flexion limited by weakness     BLE Strength RLE is grossly 2/5 at the hip, 3+/5 at the knee, and 4-/5 at the ankle  LLE is grossly 4/5  RLE is grossly 2/5 at the hip, 3+/5 at the knee, and 4-/5 at the ankle  LLE is grossly 4/5     Balance  Seated: Supervision  Standing: Mod A    BBS: TBD  FGA: TBD  Seated: Independent  Standing: Supervision with AD    BBS: TBD  FGA: TBD   Date Family Teach Completed No family present at [facility]  Pt's daughter present 5/27     Is additional Family Teaching Needed? Y or N Yes  No     Hindering Progress Weakness, pain  Weakness, pain, limited support     PT recommended ELOS 2 weeks       Team's Discharge Plan        Therapist at Team Meeting          Therapeutic Exercise:   AM:   Stair negotiation: 4 steps with 2 rails with Supervision  Ambulation: 2x50 feet, 6x75 feet with WW with Supervision  PM:   Ambulation: 2x50 feet, 2x75 feet with wide WW with Supervision  Ambulation: 150 feet with [employer] with Supervision/SBA      Additional Comments: Raised the height of the walker to allow the pt to stand closer and still maintain proper posture, pt was able to ambulate with good positioning within the walker when the distance was limited to 75 feet, will attempt to progress distance as the pt is able to tolerate with good posture/positioning. Noted that the pt was kicking the walker during ambulation and obtained a wide WW to improve the pt's ability to turn and stay within the base of the walker and ambulate with improved posture. The pt was able to ambulate closer to the walker and began to increase her confidence with gait to include turning to sit. The pt was concerned about being drowsy but demonstrated no increased lethargy during her afternoon PT session. Patient/family education  Pt/family educated on positioning within [employer] during ambulation. Patient/family response to education:   Pt/family verbalized understanding Pt/family demonstrated skill Pt/family requires further education in this area   Yes Yes Reinforce      AM  Time in: 0830  Time out: 0915    PM  Time in: 1430  Time out: 1515    Pt is making good progress toward established Physical Therapy goals. Continue with physical therapy current plan of care. Dylan Hammond.  [facility], [address] [facility]  License Number: [id number]

## 2021-06-02 NOTE — PROGRESS NOTES
Comprehensive Nutrition Assessment    Type and Reason for Visit:  Reassess    Nutrition Recommendations/Plan: Continue current diet, Continue Ensure HP BID    Nutrition Assessment:  Pt stable from a nutritional standpoint w/ % PO intakes noted. Pt admit to ARU w/ lumbar spondylolithiasis s/p PLIF. Remote hx RYGB noted. Will continue current diet and Ensure HP ONS and monitor. Malnutrition Assessment:  Malnutrition Status:  No malnutrition    Context:  Acute Illness     Findings of the 6 clinical characteristics of malnutrition:  Energy Intake:  No significant decrease in energy intake  Weight Loss:  Unable to assess (d/t fluids)     Body Fat Loss:  No significant body fat loss     Muscle Mass Loss:  No significant muscle mass loss    Fluid Accumulation:  No significant fluid accumulation     Strength:  Not Performed    Estimated Daily Nutrient Needs:  Energy (kcal):  5387-2507 (MSJx1. 2SF); Weight Used for Energy Requirements:  Current     Protein (g):  100-115 (2.0-2.3 gm/kg);  Weight Used for Protein Requirements:  Ideal        Fluid (ml/day):  1871-1965; Method Used for Fluid Requirements:  1 ml/kcal      Nutrition Related Findings:  pt alert, active BS, bloating noted, +2 edema, -I/Os      Wounds:  Surgical Incision       Current Nutrition Therapies:    DIET GENERAL;  Dietary Nutrition Supplements: Wound Healing Oral Supplement  Dietary Nutrition Supplements: Low Calorie High Protein Supplement    Anthropometric Measures:  · Height: 5' 2\" (157.5 cm)  · Current Body Weight: 295 lb (133.8 kg) (6/1 standing scale)   · Admission Body Weight: 270 lb (122.5 kg) (stated 5/18)    · Usual Body Weight: 280 lb (127 kg) (actual 4/23/21 per EMR, noted wt fluctuations in wt hx likely 2/2 fluid shifts w/ h/o lymphedema)     · Ideal Body Weight: 110 lbs; % Ideal Body Weight 268.2 %   · BMI: 53.9  · BMI Categories: Obese Class 3 (BMI 40.0 or greater)       Nutrition Diagnosis:   · Increased nutrient needs related to increase demand for energy/nutrients as evidenced by wounds    Nutrition Interventions:   Food and/or Nutrient Delivery:  Continue Current Diet, Continue Oral Nutrition Supplement (d/c paulino BID)  Nutrition Education/Counseling:  Education not indicated   Coordination of Nutrition Care:  Continue to monitor while inpatient    Goals:  PO intake >75% of meals/ONS.        Nutrition Monitoring and Evaluation:   Food/Nutrient Intake Outcomes:  Food and Nutrient Intake, Supplement Intake  Physical Signs/Symptoms Outcomes:  Biochemical Data, GI Status, Fluid Status or Edema, Nutrition Focused Physical Findings, Skin, Weight     Discharge Planning:    No discharge needs at this time     Electronically signed by Justin Noriega, MS, RD, LD on 6/2/21 at 11:44 AM EDT    Contact: [phone]

## 2021-06-02 NOTE — PROGRESS NOTES
[facility] Physical Medicine and Rehabilitation  Comprehensive Progress Note    Subjective:      Marlene Bethea is a 60 y.o. female admitted to inpatient rehabilitation for impairments and acitivities limitations in ADLs and mobility secondary to L3-4 spondylolisthesis, now s/p L3-L4 PLIF. No acute events overnight. No cp, sob, n/v. Tolerating therapy. Patient is drowsy after medications. VSS. The patient's medical records have been reviewed. Scheduled Meds:baclofen, 5 mg, TID  gabapentin, 300 mg, TID  lactobacillus, 1 capsule, Daily  sulfamethoxazole-trimethoprim, 1 tablet, 2 times per day  fluconazole, 200 mg, Daily  bisacodyl, 5 mg, Daily  sodium chloride flush, 5-40 mL, 2 times per day  bumetanide, 2 mg, Daily  Buprenorphine HCl, 450 mcg, BID  buPROPion, 150 mg, QPM  enoxaparin, 40 mg, Daily  ipratropium-albuterol, 1 ampule, Q4H WA  potassium chloride, 20 mEq, Daily with breakfast  rOPINIRole, 2 mg, 4x daily      Continuous Infusions:  PRN Meds:oxyCODONE, 5 mg, Q6H PRN  acetaminophen, 650 mg, Q4H PRN  ammonium lactate, , Q2H PRN  benzocaine-menthol, 1 lozenge, Q2H PRN  ondansetron, 4 mg, Q6H PRN  polyethylene glycol, 17 g, Daily PRN         Objective:      Vitals:    06/01/21 1128 06/02/21 0000 06/02/21 0800 06/02/21 1141   BP:  121/69 (!) 155/84    Pulse:  86 78    Resp:  18 18    Temp:  97.8 °F (36.6 °C) 96.4 °F (35.8 °C)    TempSrc:  Oral Temporal    SpO2: 96% 95% 96%    Weight:       Height:    5' 2\" (1.575 m)     General appearance: alert, appears stated age and cooperative.  Up in chair. Lungs: CTA b/l   Heart: RRR, S1, S2  Abdomen: soft, obese abdomen, NT, normal BS   Extremities: extremities normal, atraumatic, 2+ BLE edema unchanged  Musculoskeletal: Range of motion impaired   Skin: surgical incision c/d/i   Neurologic: Alert, oriented. SILT. Motor 5/5 throughout bilateral upper extremities, 2/5 bilateral hip flexors; 4-/5 b/l KE; 4+/5 b/l ankle DF/PF.         Exam stable    Laboratory:    Lab Results   Component Value Date    WBC 6.0 05/27/2021    HGB 9.4 (L) 05/27/2021    HCT 33.7 (L) 05/27/2021    MCV 84.3 05/27/2021     05/27/2021     Lab Results   Component Value Date     05/27/2021    K 4.3 05/27/2021     05/27/2021    CO2 32 05/27/2021    BUN 17 05/27/2021    CREATININE 0.6 05/27/2021    GLUCOSE 118 05/27/2021    CALCIUM 8.6 05/27/2021      Lab Results   Component Value Date    ALT 19 05/10/2021    AST 18 05/10/2021    ALKPHOS 113 (H) 05/10/2021    BILITOT 0.2 05/10/2021       Lab Results   Component Value Date    COLORU Yellow 05/25/2021    NITRU Negative 05/25/2021    GLUCOSEU Negative 05/25/2021    KETUA Negative 05/25/2021    UROBILINOGEN 0.2 05/25/2021    BILIRUBINUR Negative 05/25/2021     Lab Results   Component Value Date    LABA1C 5.3 03/10/2021       Functional Status:   Physical Therapy:  Bed mobility: Mod I  Transfers: Mod I  Ambulation: 300 ft bariatric [employer] Mod I     Occupational Therapy:  Feeding: Independent   Grooming: Mod I  UB dressing: Mod I  LB dressing: Mod I          Assessment/Plan:       60 y.o. female admitted to inpatient rehabilitation for impairments and acitivities limitations in ADLs and mobility secondary to L3-4 spondylolisthesis, now s/p L3-L4 PLIF        -L3-4 spondylolisthesis: s/p L3-L4 PLIF on 5/13/2021. Spine precautions. Pain control. Continue Acute Rehab program.   -Acute post operative pain: Continue home Butrans patch. PRN oxycodone. PRN tylenol. Continue gabapentin. Continue Baclofen. PRN Valium. Wean narcotics and Valium as tolerated.   -UTI: E.coli sensitive to levaquin per original culture, fevers despite levaquin, switched to Zosyn/diflucan per ID. Repeat urine culture showing candida. Now on Bactrim and diflucan per ID. Clinically improved. -Post operative urinary retention requiring jean. Jean out, voiding. Appreciate Urology recommendations.    -DVT prophylaxis: Lovenox      DC PRN Valium  Decrease

## 2021-06-03 ENCOUNTER — CARE COORDINATION (OUTPATIENT)
Dept: OTHER | Facility: CLINIC | Age: 66
End: 2021-06-03

## 2021-06-03 PROCEDURE — 97535 SELF CARE MNGMENT TRAINING: CPT

## 2021-06-03 PROCEDURE — 1280000000 HC REHAB R&B

## 2021-06-03 PROCEDURE — 97530 THERAPEUTIC ACTIVITIES: CPT

## 2021-06-03 PROCEDURE — 99232 SBSQ HOSP IP/OBS MODERATE 35: CPT | Performed by: PHYSICAL MEDICINE & REHABILITATION

## 2021-06-03 PROCEDURE — 94640 AIRWAY INHALATION TREATMENT: CPT

## 2021-06-03 PROCEDURE — 6370000000 HC RX 637 (ALT 250 FOR IP): Performed by: SPECIALIST

## 2021-06-03 PROCEDURE — 6370000000 HC RX 637 (ALT 250 FOR IP): Performed by: PHYSICAL MEDICINE & REHABILITATION

## 2021-06-03 PROCEDURE — 6360000002 HC RX W HCPCS: Performed by: PHYSICIAN ASSISTANT

## 2021-06-03 PROCEDURE — 97110 THERAPEUTIC EXERCISES: CPT

## 2021-06-03 PROCEDURE — 6370000000 HC RX 637 (ALT 250 FOR IP): Performed by: INTERNAL MEDICINE

## 2021-06-03 PROCEDURE — 6370000000 HC RX 637 (ALT 250 FOR IP): Performed by: PHYSICIAN ASSISTANT

## 2021-06-03 RX ORDER — LACTULOSE 10 G/15ML
20 SOLUTION ORAL DAILY PRN
Status: DISCONTINUED | OUTPATIENT
Start: 2021-06-03 | End: 2021-06-04 | Stop reason: HOSPADM

## 2021-06-03 RX ADMIN — ROPINIROLE HYDROCHLORIDE 2 MG: 2 TABLET, FILM COATED ORAL at 15:27

## 2021-06-03 RX ADMIN — BACLOFEN 5 MG: 10 TABLET ORAL at 08:42

## 2021-06-03 RX ADMIN — IPRATROPIUM BROMIDE AND ALBUTEROL SULFATE 1 AMPULE: 2.5; .5 SOLUTION RESPIRATORY (INHALATION) at 18:53

## 2021-06-03 RX ADMIN — BACLOFEN 5 MG: 10 TABLET ORAL at 22:25

## 2021-06-03 RX ADMIN — IPRATROPIUM BROMIDE AND ALBUTEROL SULFATE 1 AMPULE: 2.5; .5 SOLUTION RESPIRATORY (INHALATION) at 07:59

## 2021-06-03 RX ADMIN — ENOXAPARIN SODIUM 40 MG: 40 INJECTION SUBCUTANEOUS at 08:41

## 2021-06-03 RX ADMIN — SULFAMETHOXAZOLE AND TRIMETHOPRIM 1 TABLET: 800; 160 TABLET ORAL at 22:25

## 2021-06-03 RX ADMIN — ROPINIROLE HYDROCHLORIDE 2 MG: 2 TABLET, FILM COATED ORAL at 22:25

## 2021-06-03 RX ADMIN — BUMETANIDE 2 MG: 1 TABLET ORAL at 08:41

## 2021-06-03 RX ADMIN — POTASSIUM CHLORIDE 20 MEQ: 1500 TABLET, EXTENDED RELEASE ORAL at 08:42

## 2021-06-03 RX ADMIN — GABAPENTIN 300 MG: 300 CAPSULE ORAL at 08:42

## 2021-06-03 RX ADMIN — GABAPENTIN 300 MG: 300 CAPSULE ORAL at 22:26

## 2021-06-03 RX ADMIN — GABAPENTIN 300 MG: 300 CAPSULE ORAL at 15:27

## 2021-06-03 RX ADMIN — BUPROPION HYDROCHLORIDE 150 MG: 150 TABLET, EXTENDED RELEASE ORAL at 22:24

## 2021-06-03 RX ADMIN — Medication 1 CAPSULE: at 08:43

## 2021-06-03 RX ADMIN — BISACODYL 5 MG: 5 TABLET, COATED ORAL at 15:27

## 2021-06-03 RX ADMIN — IPRATROPIUM BROMIDE AND ALBUTEROL SULFATE 1 AMPULE: 2.5; .5 SOLUTION RESPIRATORY (INHALATION) at 12:00

## 2021-06-03 RX ADMIN — BISACODYL 5 MG: 5 TABLET, COATED ORAL at 08:42

## 2021-06-03 RX ADMIN — SULFAMETHOXAZOLE AND TRIMETHOPRIM 1 TABLET: 800; 160 TABLET ORAL at 08:42

## 2021-06-03 RX ADMIN — BACLOFEN 5 MG: 10 TABLET ORAL at 15:28

## 2021-06-03 RX ADMIN — FLUCONAZOLE 200 MG: 100 TABLET ORAL at 08:42

## 2021-06-03 RX ADMIN — ROPINIROLE HYDROCHLORIDE 2 MG: 2 TABLET, FILM COATED ORAL at 08:42

## 2021-06-03 ASSESSMENT — PAIN SCALES - GENERAL
PAINLEVEL_OUTOF10: 0
PAINLEVEL_OUTOF10: 0

## 2021-06-03 NOTE — PROGRESS NOTES
[facility] Physical Medicine and Rehabilitation  Comprehensive Progress Note    Subjective:      Joyce Cassidy is a 60 y.o. female admitted to inpatient rehabilitation for impairments and acitivities limitations in ADLs and mobility secondary to L3-4 spondylolisthesis, now s/p L3-L4 PLIF. No acute events overnight. No cp, sob, n/v. Lethargy resolved after medication changes, patient feeling much better today. Tolerating therapy, progressing. The patient's medical records have been reviewed. Scheduled Meds:baclofen, 5 mg, TID  gabapentin, 300 mg, TID  lactobacillus, 1 capsule, Daily  sulfamethoxazole-trimethoprim, 1 tablet, 2 times per day  fluconazole, 200 mg, Daily  bisacodyl, 5 mg, Daily  sodium chloride flush, 5-40 mL, 2 times per day  bumetanide, 2 mg, Daily  Buprenorphine HCl, 450 mcg, BID  buPROPion, 150 mg, QPM  enoxaparin, 40 mg, Daily  ipratropium-albuterol, 1 ampule, Q4H WA  potassium chloride, 20 mEq, Daily with breakfast  rOPINIRole, 2 mg, 4x daily      Continuous Infusions:  PRN Meds:magnesium hydroxide, 30 mL, Daily PRN  lactulose, 20 g, Daily PRN  oxyCODONE, 5 mg, Q6H PRN  acetaminophen, 650 mg, Q4H PRN  ammonium lactate, , Q2H PRN  benzocaine-menthol, 1 lozenge, Q2H PRN  ondansetron, 4 mg, Q6H PRN  polyethylene glycol, 17 g, Daily PRN         Objective:      Vitals:    06/02/21 0800 06/02/21 1141 06/02/21 2330 06/03/21 0800   BP: (!) 155/84  (!) 143/71 112/62   Pulse: 78  80 90   Resp: 18  18 18   Temp: 96.4 °F (35.8 °C)  97.6 °F (36.4 °C) 98.5 °F (36.9 °C)   TempSrc: Temporal  Oral Oral   SpO2: 96%  95% 94%   Weight:       Height:  5' 2\" (1.575 m)         General appearance: alert, appears stated age and cooperative.  Up in chair.    Lungs: CTA b/l   Heart: RRR, S1, S2  Abdomen: soft, obese abdomen, NT, normal BS   Extremities: extremities normal, atraumatic, 2+ BLE edema unchanged  Musculoskeletal: Range of motion impaired   Skin: surgical incision c/d/i   Neurologic: Alert, oriented. SILT. Motor 5/5 throughout bilateral upper extremities, 3/5 bilateral hip flexors; 4/5 b/l KE; 4+/5 b/l ankle DF/PF.       Laboratory:    Lab Results   Component Value Date    WBC 9.3 06/02/2021    HGB 10.6 (L) 06/02/2021    HCT 38.7 06/02/2021    MCV 84.9 06/02/2021     06/02/2021     Lab Results   Component Value Date     06/02/2021    K 4.8 06/02/2021    K 4.3 05/27/2021    CL 95 06/02/2021    CO2 30 06/02/2021    BUN 22 06/02/2021    CREATININE 1.1 06/02/2021    GLUCOSE 109 06/02/2021    CALCIUM 9.1 06/02/2021      Lab Results   Component Value Date    ALT 16 06/02/2021    AST 20 06/02/2021    ALKPHOS 122 (H) 06/02/2021    BILITOT <0.2 06/02/2021       Lab Results   Component Value Date    COLORU Yellow 05/25/2021    NITRU Negative 05/25/2021    GLUCOSEU Negative 05/25/2021    KETUA Negative 05/25/2021    UROBILINOGEN 0.2 05/25/2021    BILIRUBINUR Negative 05/25/2021     Lab Results   Component Value Date    LABA1C 5.3 03/10/2021       Functional Status:   Physical Therapy:  Bed mobility: Mod I  Transfers: Mod I  Ambulation: 300 ft bariatric [employer] Mod I     Occupational Therapy:  Feeding: Independent   Grooming: Mod I  UB dressing: Mod I  LB dressing: Mod I     Assessment/Plan:       60 y.o. female admitted to inpatient rehabilitation for impairments and acitivities limitations in ADLs and mobility secondary to L3-4 spondylolisthesis, now s/p L3-L4 PLIF        -L3-4 spondylolisthesis: s/p L3-L4 PLIF on 5/13/2021. Spine precautions. Pain control. Continue Acute Rehab program.   -Acute post operative pain: Continue home Butrans patch. PRN tylenol. Continue gabapentin. Continue Baclofen. Patient no longer taking oxycodone. -UTI: E.coli sensitive to levaquin per original culture, fevers despite levaquin, switched to Zosyn/diflucan per ID. Repeat urine culture showing candida. Now on Bactrim and diflucan per ID. Clinically improved. -Post operative urinary retention requiring jean.  Jean out, voiding. Appreciate Urology recommendations. -DVT prophylaxis: Lovenox     DC oxycodone, patient has not taken in several days. She reports pain is controlled.   Team conference tomorrow  Anticipate discharge tomorrow     Electronically signed by Sara Ramos MD on 6/3/2021 at 1:33 PM

## 2021-06-03 NOTE — PROGRESS NOTES
Physical Therapy    Facility/Department: [facility]SE REHAB  Weekly Note    NAME: Jose E Tyson  : 1955  MRN: [id number]    Date of Service: 6/3/2021  Evaluating Therapist: Henrietta Kaiser P.T.    ROOM: [facility]  DIAGNOSIS: Lumbar spondylolisthesis  PRECAUTIONS: Fall risk, spinal with LSO   PROCEDURE(S):  L3/L4 PLIF  HPI: The pt was admitted on  for elective back surgery secondary to spinal stenosis with LBP and associated RLE radicular symptoms    Social:  Pt lives with her sister who is in poor health in a 2 floor plan with 1st floor set up with 2 steps and 2 rails to enter. Prior to admission pt ambulated with a Rolator WW for B knee pain and back pain for the 4 months before admission and was Independent. Initial Evaluation  Date: 5/19/21 5/20/21 5/27/21 6/3/21 Comments Short Term Goals Long Term Goals    Was pt agreeable to Eval/treatment? Yes Yes Yes Yes      Does pt have pain? 8/10 low back pain at rest, increases with standing Pain with standing and advancing AD, reports improved from yesterday Some R thigh pain, catheter pain No c/o pain      Bed Mobility  Rolling: NT  Supine to sit: Max A  Sit to supine: Max A  Scooting: NT Rolling: Min A  Supine to sit: Min A  Scooting: Min A  (hospital bed) Supervision for all aspects with simulated rail at queen bed Modified Independent (with simulated rail) Pt owns a bed with rails SBA Modified Independent   Transfers Sit to stand: Max A  Stand to sit: Max A  Stand pivot: Max A    5xSTS: TBD Sit to stand: Max A  Stand to sit: Max A  Stand pivot:  Max A with [employer] Sit<>stand: Supervision  Stand pivot: Supervision with bariatric [employer] Modified Independent From a variety of surfaces and heights Min A Modified Independent  5xSTS: TBD   Ambulation    10 feet with Bariatric WW with Mod A x2 (WC follow)  10mWT: TBD  6mWT: TBD 20 feet in Bariatric WW with Mod A + WC follow 225 feet with bariatric WW with SBA/Supervision 200 feet with wide WW with Modified [place] Able to move the wide [employer] well, strikes feet on walker legs without a wide [employer] 25 feet with AAD with Min A > 400 feet with AAD with Modified [place]    10mWT: TBD  6mWT: TBD   Wheel Chair Mobility NT NT NT NT      Car Transfers NT NT SBA Modified Independent  Min A Modified Independent   Stair negotiation: ascended and descended  NT NT 12 steps with 2 rails with SBA 8 steps with 2 rails with Modified [place] Able to perform more steps but fatigue makes her SBA 2 steps with 2 rails with Min A > 12 steps with 2 rails with Modified [place]   BLE ROM WFL, R hip flexion limited by weakness WFL, R hip flexion limited by weakness WFL, R hip flexion limited by weakness WFL, R hip flexion limited by weakness      BLE Strength RLE is grossly 2/5 at the hip, 3+/5 at the knee, and 4-/5 at the ankle  LLE is grossly 4/5 RLE is grossly 2/5 at the hip, 3+/5 at the knee, and 4-/5 at the ankle  LLE is grossly 4/5 RLE is grossly 2/5 at the hip, 3+/5 at the knee, and 4-/5 at the ankle  LLE is grossly 4/5 RLE is grossly 2/5 at the hip, 3+/5 at the knee, and 4-/5 at the ankle  LLE is grossly 4/5      Balance  Seated: Supervision  Standing: Mod A    BBS: TBD  FGA: TBD Seated: Supervision  Standing: Mod A Seated: Independent  Standing: SBA Seated: Independent  Standing: Modified Independent     BBS: TBD  FGA: TBD   Date Family Teach Completed No family present at [facility] No family present to this date Daughter present 5/27 Daughter present 5/27      Is additional Family Teaching Needed?   Y or N Yes Yes No No      Hindering Progress Weakness, pain Weakness, pain, limited support Limited support at home Limited support at home      PT recommended ELOS 2 weeks 3 weeks 5 days Ready for discharge      Team's Discharge Plan  3 weeks Discharge Friday 6/4/21 Discharge today 6/4/21      Therapist at [facility] [place], [place], DPT [id number]   Marilu Lamb, PT, DPT [id number]   Marilu Lamb PT, DPT [id number] Date:  5/21/21  Supporting factors: Motivated to improve  Barriers to discharge:  Lack of social support, poor PLOF, difficulty with voiding  Additional comments: The pt is motivated to improve and once she is able to stand she does well, however the pt's back pain and B knee pain make it difficult for her to perform all activities that are asked of her. DME:  TBD, likely [employer]  After Care: TBD, likely HHPT    Date:  5/28/21  Supporting factors: Motivated to improve  Barriers to discharge:  Lack of social support, poor PLOF, difficulty with catheter/voiding  Additional comments: The pt has progressed well despite recent fever and constant coughing. The pt is motivated and receptive to education, the pt's daughter has been present and is helping the pt, able to don the pt's brace for her with no assistance from staff. DME:  Bariatric WW  After Care:  HHPT    Date:  6/4/21  Supporting factors:  Good progress, now a green dot  Barriers to discharge:  Lack of support at home although the pt states her daughter will be staying with her for 2 weeks after discharge, incontinence  Additional comments: The pt's limited activity prior to admission is making her PLOF difficult to ascertain, she is able to ambulate without a walker and reports she was doing this prior to her surgery but was having difficulty and falling frequently. The pt continues to be a high fall risk but has reached an Independent level for basic functional mobility. DME:  [employer]ide [employer] (pt's daughter is reported to have purchased a walker, was asked to bring it in for final PT session before discharge)  After Care:  Twila Moran, [address] [place], [place] [address]brandon Cartagena  number:  [phone] [phone]

## 2021-06-03 NOTE — PROGRESS NOTES
Physical Therapy    Facility/Department: [address] REHAB  Treatment Note    NAME: Shaheen Rothman  : 1955  MRN: [id number]    Date of Service: 6/3/2021  Evaluating Therapist: Barb Mitchell. Angela Davenport P.T.    ROOM: [facility]  DIAGNOSIS: Lumbar spondylolisthesis  PRECAUTIONS: Fall risk, spinal with LSO   PROCEDURE(S):  L3/L4 PLIF  HPI: The pt was admitted on  for elective back surgery secondary to spinal stenosis with LBP and associated RLE radicular symptoms    Social:  Pt lives with her sister who is in poor health in a 2 floor plan with 1st floor set up with 2 steps and 2 rails to enter. Prior to admission pt ambulated with a Rolator WW for B knee pain and back pain for the 4 months before admission and was Independent. Initial Evaluation  Date: 21 AM     PM    Short Term Goals Long Term Goals    Was pt agreeable to Eval/treatment? Yes Yes Yes     Does pt have pain? 8/10 low back pain at rest, increases with standing No c/o pain No c/o pain     Bed Mobility  Rolling: NT  Supine to sit: Max A  Sit to supine: Max A  Scooting: NT Modified Independent for all aspects NT SBA Modified Independent   Transfers Sit to stand: Max A  Stand to sit: Max A  Stand pivot:  Max A    5xSTS: TBD Sit<>stand: Modified Independent  Stand pivot: Modified Independent with [employer] Sit<>stand: Modified Independent  Stand pivot: Modified Independent with wide [employer] Min A Modified Independent  5xSTS: TBD   Ambulation    10 feet with Bariatric WW with Mod A x2 (WC follow)  10mWT: TBD  6mWT: TBD 75 feet with wide WW with Modified [place] 200 feet with wide WW with Modified [place] 25 feet with AAD with Min A >50 feet with AAD with Modified [place]    10mWT: TBD  6mWT: TBD   Walking 10 feet on uneven surface NT 10 feet with wide WW with Modified [place] NT 10 feet with AAD with Min A >10 feet with AAD with Modified [place]   Wheel Chair Mobility NT NT NT     Car Transfers NT Modified Independent NT Min A Modified

## 2021-06-03 NOTE — PROGRESS NOTES
[address] [place] [address] [facility]  Progress Note      CC: UTI     SUBJECTIVE:  Denies fever and chills  Denies abdomen pain   Urinary frequency and stress incontinence  Afebrile   Up in chiar      Review of systems:  As stated above in the chief complaint, otherwise negative. Medications:  Scheduled Meds:   baclofen  5 mg Oral TID    gabapentin  300 mg Oral TID    lactobacillus  1 capsule Oral Daily    sulfamethoxazole-trimethoprim  1 tablet Oral 2 times per day    fluconazole  200 mg Oral Daily    bisacodyl  5 mg Oral Daily    sodium chloride flush  5-40 mL Intravenous 2 times per day    bumetanide  2 mg Oral Daily    Buprenorphine HCl  450 mcg Oral BID    buPROPion  150 mg Oral QPM    enoxaparin  40 mg Subcutaneous Daily    ipratropium-albuterol  1 ampule Inhalation Q4H WA    potassium chloride  20 mEq Oral Daily with breakfast    rOPINIRole  2 mg Oral 4x daily     Continuous Infusions:   PRN Meds:oxyCODONE **OR** [DISCONTINUED] oxyCODONE, acetaminophen, ammonium lactate, benzocaine-menthol, ondansetron, polyethylene glycol    OBJECTIVE:  /62   Pulse 90   Temp 98.5 °F (36.9 °C) (Oral)   Resp 18   Ht 5' 2\" (1.575 m)   Wt 295 lb 14.4 oz (134.2 kg)   SpO2 94%   BMI 54.12 kg/m²   Temp  Av.1 °F (36.7 °C)  Min: 97.6 °F (36.4 °C)  Max: 98.5 °F (36.9 °C)  Constitutional: The patient is awake, alert, and oriented. Skin: Warm and dry. No rashes were noted. HEENT: Round and reactive pupils. Moist mucous membranes. No ulcerations or thrush. Neck: Supple to movements. Chest: No use of accessory muscles to breathe. Symmetrical expansion. No wheezing, crackles or rhonchi. Cardiovascular: S1 and S2 are rhythmic and regular. No murmurs appreciated. Abdomen: Soft , non tender .   Genitourinary: female  Extremities: +++ edema     Laboratory and Tests Review:  Lab Results   Component Value Date    WBC 9.3 2021    WBC 6.0 2021    WBC 8.1 2021    HGB 10.6 (L) 06/02/2021    HCT 38.7 06/02/2021    MCV 84.9 06/02/2021     06/02/2021     Lab Results   Component Value Date    NEUTROABS 7.16 06/02/2021    NEUTROABS 4.34 05/27/2021    NEUTROABS 6.10 05/25/2021     No results found for: CRPHS  Lab Results   Component Value Date    ALT 16 06/02/2021    AST 20 06/02/2021    ALKPHOS 122 (H) 06/02/2021    BILITOT <0.2 06/02/2021     Lab Results   Component Value Date     06/02/2021    K 4.8 06/02/2021    K 4.3 05/27/2021    CL 95 06/02/2021    CO2 30 06/02/2021    BUN 22 06/02/2021    CREATININE 1.1 06/02/2021    CREATININE 0.6 05/27/2021    CREATININE 0.7 05/25/2021    GFRAA >60 06/02/2021    LABGLOM 50 06/02/2021    GLUCOSE 109 06/02/2021    PROT 7.2 06/02/2021    LABALBU 3.7 06/02/2021    CALCIUM 9.1 06/02/2021    BILITOT <0.2 06/02/2021    ALKPHOS 122 06/02/2021    AST 20 06/02/2021    ALT 16 06/02/2021     Lab Results   Component Value Date    CRP 6.5 (H) 05/26/2015    CRP 2.6 (H) 02/28/2014     Lab Results   Component Value Date    SEDRATE 26 (H) 05/26/2015    SEDRATE 33 (H) 02/28/2014     Radiology:  Reviewed    Microbiology:   Lab Results   Component Value Date    BC 5 Days no growth 05/25/2021    BC 5 Days no growth 03/23/2021    BC 5 Days- no growth 06/13/2019    ORG Candida species 05/25/2021    ORG Escherichia coli 05/20/2021    ORG Klebsiella pneumoniae ssp pneumoniae 04/23/2021     Lab Results   Component Value Date    BLOODCULT2 5 Days no growth 05/25/2021    BLOODCULT2 5 Days no growth 03/23/2021    BLOODCULT2 5 Days- no growth 06/13/2019    ORG Candida species 05/25/2021    ORG Escherichia coli 05/20/2021    ORG Klebsiella pneumoniae ssp pneumoniae 04/23/2021     No results found for: WNDABS  No results found for: RESPSMEAR  No results found for: MPNEUMO, CLAMYDCU, LABLEGI, AFBCX, FUNGSM, LABFUNG  No results found for: CULTRESP  No results found for: CXCATHTIP  No results found for: BFCS  Culture Surgical   Date Value Ref Range Status   07/20/2015 Moderate growth  Final   06/22/2015 Heavy growth  Final   06/22/2015   Final    Light growth  Susceptibility testing of this isolate is not routinely  indicated to guide therapy. Beta Lactamase POSITIVE       Urine Culture, Routine   Date Value Ref Range Status   05/25/2021 >100,000 CFU/ml  Final   05/20/2021 (A)  Final    <10,000 CFU/mL  Nonhemolytic Strep species  Staph species     05/20/2021 >100,000 CFU/ml  Final     MRSA Culture Only   Date Value Ref Range Status   04/23/2021 Methicillin resistant Staph aureus not isolated  Final   06/05/2019 Methicillin resistant Staph aureus not isolated  Final     Assessment:  Fevers chills generalized malaise with after manipulation of P[place] after urinary retention problems. UTI  Urinary retention     Plan:    Oral  bactrim plus Diflucan day 8 /10  Probiotics       Electronically signed by HOLLY Maynard CNP on 6/3/2021 at 10:07 AM   Pt seen and examined. Above discussed agree with advanced practice nurse. Labs, cultures, and radiographs reviewed. Face to Face encounter occurred. Changes made as necessary.      Dawit Faustin MD

## 2021-06-03 NOTE — PROGRESS NOTES
Comments:         Time in Time out Tx Time Breakdown  Variance:   First Session  [id number]  [x] Individual Tx- 50  mins   [] Concurrent Tx -  [] Co-Tx -   [] Group Tx -   [] Time Missed -              Second Session [id number] [] Individual Tx-   [x] Concurrent Tx-30    [] Co-Tx -   [] Group Tx -   [] Time Missed-                   Third Session    [] Individual Tx-   [] Concurrent Tx -  [] Co-Tx -   [] Group Tx -   [] Time Missed -         Total Tx Time:   80 mins      Nancy Casey OTR/L [id number]

## 2021-06-04 VITALS
SYSTOLIC BLOOD PRESSURE: 143 MMHG | TEMPERATURE: 98.4 F | HEART RATE: 84 BPM | WEIGHT: 293 LBS | BODY MASS INDEX: 53.92 KG/M2 | DIASTOLIC BLOOD PRESSURE: 68 MMHG | OXYGEN SATURATION: 95 % | HEIGHT: 62 IN | RESPIRATION RATE: 18 BRPM

## 2021-06-04 LAB
ANION GAP SERPL CALCULATED.3IONS-SCNC: 7 MMOL/L (ref 7–16)
BUN BLDV-MCNC: 21 MG/DL (ref 6–23)
CALCIUM SERPL-MCNC: 9 MG/DL (ref 8.6–10.2)
CHLORIDE BLD-SCNC: 100 MMOL/L (ref 98–107)
CO2: 29 MMOL/L (ref 22–29)
CREAT SERPL-MCNC: 1 MG/DL (ref 0.5–1)
GFR AFRICAN AMERICAN: >60
GFR NON-AFRICAN AMERICAN: 56 ML/MIN/1.73
GLUCOSE BLD-MCNC: 96 MG/DL (ref 74–99)
POTASSIUM SERPL-SCNC: 4.9 MMOL/L (ref 3.5–5)
SODIUM BLD-SCNC: 136 MMOL/L (ref 132–146)

## 2021-06-04 PROCEDURE — 6370000000 HC RX 637 (ALT 250 FOR IP): Performed by: SPECIALIST

## 2021-06-04 PROCEDURE — 36415 COLL VENOUS BLD VENIPUNCTURE: CPT

## 2021-06-04 PROCEDURE — 80048 BASIC METABOLIC PNL TOTAL CA: CPT

## 2021-06-04 PROCEDURE — 97530 THERAPEUTIC ACTIVITIES: CPT

## 2021-06-04 PROCEDURE — 6370000000 HC RX 637 (ALT 250 FOR IP): Performed by: INTERNAL MEDICINE

## 2021-06-04 PROCEDURE — 97110 THERAPEUTIC EXERCISES: CPT

## 2021-06-04 PROCEDURE — 6370000000 HC RX 637 (ALT 250 FOR IP): Performed by: PHYSICAL MEDICINE & REHABILITATION

## 2021-06-04 PROCEDURE — 6360000002 HC RX W HCPCS: Performed by: PHYSICIAN ASSISTANT

## 2021-06-04 PROCEDURE — 6370000000 HC RX 637 (ALT 250 FOR IP): Performed by: PHYSICIAN ASSISTANT

## 2021-06-04 PROCEDURE — 99239 HOSP IP/OBS DSCHRG MGMT >30: CPT | Performed by: PHYSICAL MEDICINE & REHABILITATION

## 2021-06-04 PROCEDURE — 94640 AIRWAY INHALATION TREATMENT: CPT

## 2021-06-04 RX ORDER — BACLOFEN 5 MG/1
5 TABLET ORAL 3 TIMES DAILY
Qty: 90 TABLET | Refills: 0 | Status: SHIPPED | OUTPATIENT
Start: 2021-06-04 | End: 2022-02-23 | Stop reason: SDUPTHER

## 2021-06-04 RX ORDER — LACTOBACILLUS RHAMNOSUS GG 10B CELL
1 CAPSULE ORAL DAILY
Qty: 30 CAPSULE | Refills: 0 | Status: SHIPPED | OUTPATIENT
Start: 2021-06-05 | End: 2022-06-29

## 2021-06-04 RX ADMIN — ROPINIROLE HYDROCHLORIDE 2 MG: 2 TABLET, FILM COATED ORAL at 08:24

## 2021-06-04 RX ADMIN — BACLOFEN 5 MG: 10 TABLET ORAL at 13:33

## 2021-06-04 RX ADMIN — ROPINIROLE HYDROCHLORIDE 2 MG: 2 TABLET, FILM COATED ORAL at 13:33

## 2021-06-04 RX ADMIN — SULFAMETHOXAZOLE AND TRIMETHOPRIM 1 TABLET: 800; 160 TABLET ORAL at 08:23

## 2021-06-04 RX ADMIN — BUMETANIDE 2 MG: 1 TABLET ORAL at 08:22

## 2021-06-04 RX ADMIN — Medication 1 CAPSULE: at 08:24

## 2021-06-04 RX ADMIN — ENOXAPARIN SODIUM 40 MG: 40 INJECTION SUBCUTANEOUS at 08:21

## 2021-06-04 RX ADMIN — FLUCONAZOLE 200 MG: 100 TABLET ORAL at 08:24

## 2021-06-04 RX ADMIN — IPRATROPIUM BROMIDE AND ALBUTEROL SULFATE 1 AMPULE: 2.5; .5 SOLUTION RESPIRATORY (INHALATION) at 12:18

## 2021-06-04 RX ADMIN — GABAPENTIN 300 MG: 300 CAPSULE ORAL at 08:22

## 2021-06-04 RX ADMIN — GABAPENTIN 300 MG: 300 CAPSULE ORAL at 13:33

## 2021-06-04 RX ADMIN — POTASSIUM CHLORIDE 20 MEQ: 1500 TABLET, EXTENDED RELEASE ORAL at 08:40

## 2021-06-04 RX ADMIN — BACLOFEN 5 MG: 10 TABLET ORAL at 08:23

## 2021-06-04 RX ADMIN — BUPROPION HYDROCHLORIDE 150 MG: 150 TABLET, EXTENDED RELEASE ORAL at 08:25

## 2021-06-04 RX ADMIN — BISACODYL 5 MG: 5 TABLET, COATED ORAL at 08:22

## 2021-06-04 RX ADMIN — IPRATROPIUM BROMIDE AND ALBUTEROL SULFATE 1 AMPULE: 2.5; .5 SOLUTION RESPIRATORY (INHALATION) at 08:15

## 2021-06-04 ASSESSMENT — PAIN SCALES - GENERAL: PAINLEVEL_OUTOF10: 0

## 2021-06-04 ASSESSMENT — PAIN DESCRIPTION - PROGRESSION: CLINICAL_PROGRESSION: NOT CHANGED

## 2021-06-04 NOTE — PLAN OF CARE
Problem: Pain:  Goal: Pain level will decrease  Description: Pain level will decrease  Outcome: Ongoing  Goal: Control of acute pain  Description: Control of acute pain  Outcome: Ongoing  Goal: Control of chronic pain  Description: Control of chronic pain  Outcome: Ongoing  Goal: Patient's pain/discomfort is manageable  Description: Patient's pain/discomfort is manageable  Outcome: Ongoing     Problem: Falls - Risk of:  Goal: Will remain free from falls  Description: Will remain free from falls  Outcome: Ongoing  Goal: Absence of physical injury  Description: Absence of physical injury  Outcome: Ongoing     Problem: Skin Integrity:  Goal: Will show no infection signs and symptoms  Description: Will show no infection signs and symptoms  Outcome: Ongoing  Goal: Absence of new skin breakdown  Description: Absence of new skin breakdown  Outcome: Ongoing     Problem: Infection:  Goal: Will remain free from infection  Description: Will remain free from infection  Outcome: Ongoing     Problem: Safety:  Goal: Free from accidental physical injury  Description: Free from accidental physical injury  Outcome: Ongoing  Goal: Free from intentional harm  Description: Free from intentional harm  Outcome: Ongoing     Problem: Daily Care:  Goal: Daily care needs are met  Description: Daily care needs are met  Outcome: Ongoing     Problem: Skin Integrity:  Goal: Skin integrity will stabilize  Description: Skin integrity will stabilize  Outcome: Ongoing     Problem: Discharge Planning:  Goal: Patients continuum of care needs are met  Description: Patients continuum of care needs are met  Outcome: Ongoing     Problem: ABCDS Injury Assessment  Goal: Absence of physical injury  Outcome: Ongoing

## 2021-06-04 NOTE — PROGRESS NOTES
[address] [place] [address] [facility]  Progress Note      CC: UTI     SUBJECTIVE:  Denies fever and chills  Denies abdomen pain   Urinary frequency and stress incontinence  Afebrile   Up in chiar      Review of systems:  As stated above in the chief complaint, otherwise negative. Medications:  Scheduled Meds:   baclofen  5 mg Oral TID    gabapentin  300 mg Oral TID    lactobacillus  1 capsule Oral Daily    sulfamethoxazole-trimethoprim  1 tablet Oral 2 times per day    fluconazole  200 mg Oral Daily    bisacodyl  5 mg Oral Daily    sodium chloride flush  5-40 mL Intravenous 2 times per day    bumetanide  2 mg Oral Daily    Buprenorphine HCl  450 mcg Oral BID    buPROPion  150 mg Oral QPM    enoxaparin  40 mg Subcutaneous Daily    ipratropium-albuterol  1 ampule Inhalation Q4H WA    potassium chloride  20 mEq Oral Daily with breakfast    rOPINIRole  2 mg Oral 4x daily     Continuous Infusions:   PRN Meds:magnesium hydroxide, lactulose, bisacodyl, oxyCODONE **OR** [DISCONTINUED] oxyCODONE, acetaminophen, ammonium lactate, benzocaine-menthol, ondansetron, polyethylene glycol    OBJECTIVE:  BP (!) 143/68   Pulse 84   Temp 98.4 °F (36.9 °C) (Temporal)   Resp 18   Ht 5' 2\" (1.575 m)   Wt 295 lb 14.4 oz (134.2 kg)   SpO2 95%   BMI 54.12 kg/m²   Temp  Av.1 °F (36.7 °C)  Min: 97.8 °F (36.6 °C)  Max: 98.4 °F (36.9 °C)  Constitutional: The patient is awake, alert, and oriented. Skin: Warm and dry. No rashes were noted. HEENT: Round and reactive pupils. Moist mucous membranes. No ulcerations or thrush. Neck: Supple to movements. Chest: No use of accessory muscles to breathe. Symmetrical expansion. No wheezing, crackles or rhonchi. Cardiovascular: S1 and S2 are rhythmic and regular. No murmurs appreciated. Abdomen: Soft , non tender .   Genitourinary: female  Extremities: +++ edema     Laboratory and Tests Review:  Lab Results   Component Value Date    WBC 9.3 2021    WBC 6.0 05/27/2021    WBC 8.1 05/25/2021    HGB 10.6 (L) 06/02/2021    HCT 38.7 06/02/2021    MCV 84.9 06/02/2021     06/02/2021     Lab Results   Component Value Date    NEUTROABS 7.16 06/02/2021    NEUTROABS 4.34 05/27/2021    NEUTROABS 6.10 05/25/2021     No results found for: CRPHS  Lab Results   Component Value Date    ALT 16 06/02/2021    AST 20 06/02/2021    ALKPHOS 122 (H) 06/02/2021    BILITOT <0.2 06/02/2021     Lab Results   Component Value Date     06/04/2021    K 4.9 06/04/2021    K 4.3 05/27/2021     06/04/2021    CO2 29 06/04/2021    BUN 21 06/04/2021    CREATININE 1.0 06/04/2021    CREATININE 1.1 06/02/2021    CREATININE 0.6 05/27/2021    GFRAA >60 06/04/2021    LABGLOM 56 06/04/2021    GLUCOSE 96 06/04/2021    PROT 7.2 06/02/2021    LABALBU 3.7 06/02/2021    CALCIUM 9.0 06/04/2021    BILITOT <0.2 06/02/2021    ALKPHOS 122 06/02/2021    AST 20 06/02/2021    ALT 16 06/02/2021     Lab Results   Component Value Date    CRP 6.5 (H) 05/26/2015    CRP 2.6 (H) 02/28/2014     Lab Results   Component Value Date    SEDRATE 26 (H) 05/26/2015    SEDRATE 33 (H) 02/28/2014     Radiology:  Reviewed    Microbiology:   Lab Results   Component Value Date    BC 5 Days no growth 05/25/2021    BC 5 Days no growth 03/23/2021    BC 5 Days- no growth 06/13/2019    ORG Candida species 05/25/2021    ORG Escherichia coli 05/20/2021    ORG Klebsiella pneumoniae ssp pneumoniae 04/23/2021     Lab Results   Component Value Date    BLOODCULT2 5 Days no growth 05/25/2021    BLOODCULT2 5 Days no growth 03/23/2021    BLOODCULT2 5 Days- no growth 06/13/2019    ORG Candida species 05/25/2021    ORG Escherichia coli 05/20/2021    ORG Klebsiella pneumoniae ssp pneumoniae 04/23/2021     No results found for: WNDABS  No results found for: RESPSMEAR  No results found for: MPNEUMO, CLAMYDCU, LABLEGI, AFBCX, FUNGSM, LABFUNG  No results found for: CULTRESP  No results found for: CXCATHTIP  No results found for: [facility]  Culture Surgical Date Value Ref Range Status   07/20/2015 Moderate growth  Final   06/22/2015 Heavy growth  Final   06/22/2015   Final    Light growth  Susceptibility testing of this isolate is not routinely  indicated to guide therapy. Beta Lactamase POSITIVE       Urine Culture, Routine   Date Value Ref Range Status   05/25/2021 >100,000 CFU/ml  Final   05/20/2021 (A)  Final    <10,000 CFU/mL  Nonhemolytic Strep species  Staph species     05/20/2021 >100,000 CFU/ml  Final     MRSA Culture Only   Date Value Ref Range Status   04/23/2021 Methicillin resistant Staph aureus not isolated  Final   06/05/2019 Methicillin resistant Staph aureus not isolated  Final     Assessment:  Fevers chills generalized malaise with after manipulation of P[place]it after urinary retention problems. UTI  Urinary retention     Plan:    Oral  bactrim plus Diflucan day 10  Probiotics   Home today  No antibiotic needed at discarhge      Electronically signed by HOLLY Borja CNP on 6/4/2021 at 10:24 AM   Pt seen and examined. Above discussed agree with advanced practice nurse. Labs, cultures, and radiographs reviewed. Face to Face encounter occurred. Changes made as necessary.      Alisia Campos MD

## 2021-06-04 NOTE — PROGRESS NOTES
Physical Therapy    Facility/Department: [facility] 5SE REHAB  Discharge Summary    NAME: Chelsey Barrera  : 1955  MRN: [id number]    Date of Service: 2021  Evaluating Therapist: Oly Kerr P.T.    ROOM: [facility]  DIAGNOSIS: Lumbar spondylolisthesis  PRECAUTIONS: Fall risk, spinal with LSO   PROCEDURE(S):  L3/L4 PLIF  HPI: The pt was admitted on  for elective back surgery secondary to spinal stenosis with LBP and associated RLE radicular symptoms    Social:  Pt lives with her sister who is in poor health in a 2 floor plan with 1st floor set up with 2 steps and 2 rails to enter. Prior to admission pt ambulated with a Rolator WW for B knee pain and back pain for the 4 months before admission and was Independent. Initial Evaluation  Date: 21 Discharge  Date: 21  Short Term Goals Long Term Goals    Bed Mobility  Rolling: NT  Supine to sit: Max A  Sit to supine: Max A  Scooting: NT Modified Independent for all aspects SBA Modified Independent   Transfers Sit to stand: Max A  Stand to sit: Max A  Stand pivot:  Max A    5xSTS: TBD Sit<>stand: Modified Independent  Stand pivot: Modified Independent with [employer] Min A Modified Independent  5xSTS: TBD   Ambulation    10 feet with Bariatric WW with Mod A x2 (WC follow)  10mWT: TBD  6mWT:  feet with wide WW with Modified [place] 25 feet with AAD with Min A >50 feet with AAD with Modified [place]    10mWT: TBD  6mWT: TBD   Walking 10 feet on uneven surface NT 10 feet with wide WW with Modified [place] 10 feet with AAD with Min A >10 feet with AAD with Modified [place]   Wheel Chair Mobility NT NT     Car Transfers NT Modified Independent Min A Modified Independent   Stair negotiation: ascended and descended  NT 12 steps with 2 rails with Modified [place] (nonreciprocal) 2 steps with 2 rails with Min A >4 steps with 2 rails with Modified [place]   Curb Step:   ascended and descended NT 4 inch step with wide WW with Modified [place] 2 inch step with AAD and Mod A 4 inch step with AAD and Modified [place]   BLE ROM WFL, R hip flexion limited by weakness WFL, R hip flexion limited by weakness     BLE Strength RLE is grossly 2/5 at the hip, 3+/5 at the knee, and 4-/5 at the ankle  LLE is grossly 4/5 RLE is grossly 2/5 at the hip, 3+/5 at the knee, and 4-/5 at the ankle  LLE is grossly 4/5     Balance  Seated: Supervision  Standing: Mod A    BBS: TBD  FGA: TBD Seated: Independent  Standing: Modified Independent with AD    BBS: TBD  FGA: TBD   Date Family Teach Completed No family present at [facility] Pt's daughter present 5/27     Hindering Progress Weakness, pain Weakness, pain, limited support       Additional Comments: The pt made good progress during her time in acute rehab meeting all established long-term PT goals. Recommended the pt use a wide WW for ambulation moving forward, the walker that was obtained for her is not a wide [employer], measurements were taken and correct specifications were recommended. Robert Melendez.  Giancarlo Victor, [address]  number:  PT [id number]

## 2021-06-04 NOTE — PROGRESS NOTES
Physical Therapy    Facility/Department: [facility] 5SE REHAB  Treatment Note    NAME: Charlene Mckeon  : 1955  MRN: [id number]    Date of Service: 2021  Evaluating Therapist: Gemma Escalera. Melody Chacon P.T.    ROOM: 1290-  DIAGNOSIS: Lumbar spondylolisthesis  PRECAUTIONS: Fall risk, spinal with LSO   PROCEDURE(S):  L3/L4 PLIF  HPI: The pt was admitted on  for elective back surgery secondary to spinal stenosis with LBP and associated RLE radicular symptoms    Social:  Pt lives with her sister who is in poor health in a 2 floor plan with 1st floor set up with 2 steps and 2 rails to enter. Prior to admission pt ambulated with a Rolator WW for B knee pain and back pain for the 4 months before admission and was Independent. Initial Evaluation  Date: 21 AM         Short Term Goals Long Term Goals    Was pt agreeable to Eval/treatment? Yes Yes Discharged     Does pt have pain? 8/10 low back pain at rest, increases with standing No c/o pain      Bed Mobility  Rolling: NT  Supine to sit: Max A  Sit to supine: Max A  Scooting: NT Modified Independent for all aspects  SBA Modified Independent   Transfers Sit to stand: Max A  Stand to sit: Max A  Stand pivot:  Max A    5xSTS: TBD Sit<>stand: Modified Independent  Stand pivot: Modified Independent with [employer]  Min A Modified Independent  5xSTS: TBD   Ambulation    10 feet with Bariatric WW with Mod A x2 (WC follow)  10mWT: TBD  6mWT:  feet with wide WW with Modified [place]  25 feet with AAD with Min A >50 feet with AAD with Modified [place]    10mWT: TBD  6mWT: TBD   Walking 10 feet on uneven surface NT NT  10 feet with AAD with Min A >10 feet with AAD with Modified [place]   Wheel Chair Mobility NT NT      Car Transfers NT NT  Min A Modified Independent   Stair negotiation: ascended and descended  NT 12 steps with 2 rails with Modified [place] (nonreciprocal)  2 steps with 2 rails with Min A >4 steps with 2 rails with Modified [place]   Curb Step:   ascended and descended NT NT  2 inch step with AAD and Mod A 4 inch step with AAD and Modified [place]   BLE ROM WFL, R hip flexion limited by weakness WFL, R hip flexion limited by weakness      BLE Strength RLE is grossly 2/5 at the hip, 3+/5 at the knee, and 4-/5 at the ankle  LLE is grossly 4/5 RLE is grossly 2/5 at the hip, 3+/5 at the knee, and 4-/5 at the ankle  LLE is grossly 4/5      Balance  Seated: Supervision  Standing: Mod A    BBS: TBD  FGA: TBD Seated: Independent  Standing: Modified Independent with AD     BBS: TBD  FGA: TBD   Date Family Teach Completed No family present at [facility] Pt's daughter present 5/27      Is additional Family Teaching Needed? Y or N Yes No      Hindering Progress Weakness, pain Weakness, pain, limited support      PT recommended ELOS 2 weeks       Team's Discharge Plan        Therapist at Team Meeting          Therapeutic Exercise:   AM:   Sit<>stand x4 to and from chairs of various heights in functional living environment with Modified [place]  Ambulation: 2x50 feet with wide WW with Modified [place]  Ambulation: 50 feet with WW with Modified [place]    Additional Comments: The pt was seen in her room, she was able to use the wide WW to ambulate easily around her room and navigate the functional living area. The pt's walker was evaluated and it was recommended that the pt not use the walker that was brought in for her as it is too narrow and short. The wide WW the pt has been using in the hospital was measured and written for the pt in order to order one on her own that would match the specifications. Patient/family education  Pt/family educated on proper walker for safe mobility in the home.      Patient/family response to education:   Pt/family verbalized understanding Pt/family demonstrated skill Pt/family requires further education in this area   Yes Yes [employer]     AM  Time in: 0830  Time out: 0915    Pt is making good progress toward established Physical Therapy goals. Continue with physical therapy current plan of care. Cherelle Meyer.  [place], [place]  License Number: [id number]

## 2021-06-04 NOTE — PROGRESS NOTES
Occupational Therapy  OCCUPATIONAL THERAPY DAILY NOTE/DISCHARGE SUMMARY    Date:2021  Patient Name: Magdalena Salinas  MRN: [id number]  : 1955  Room: [address] [place] [address]     Diagnosis: lumbar spiondylolisthesis,m spinal stenosis- s/p L3-4 PLIF  Additional Pertinent Hx: OA, hx  CVA, HX covid  2021, PVD, low back pain, hx gastric bypass , hx catarct & hx cellulitis & peripherl vision loss    Restrictions: Fall Risk, LSO, spinal precautions & RLE numb & tingling @ her knees to her thigh      Functional Assessment:   Date Status AE  Comments   Feeding 6/3/21  Independent      Grooming 6/3/21  Mod I  ww          Oral Care 6/3/21  Mod I  ww    Bathing 6/3/21  Mod I  Shower     UB Dressing 6/3/21  Mod I      LB Dressing 6/3/21 Mod I  reacher    Footwear 6/3/21 Mod I  Sock aid, long shoe horn     Toileting 6/3/21  SBA (voiding)  Min A ( BM)      Homemaking 21  CGA Counter  W/c level         Functional Transfers / Balance:   Date Status DME  Comments   Sit Balance 21   Mod I  W/c  Demo during table top  game while wearing 1# wts on BUE';s    Stand Balance 21  Mod I  ww    [] Tub  [x] Shower   Transfer 6/3/21  SBA Ww, grab rail and shower seat    Commode   Transfer 6/3/21  Mod I  ww      Functional   Mobility 6/3/21  Mod I  ww     Other:sit to stand from commode and w/c       On/off kitchen chair with arm rests      6/3/21         6/2/21           Mod I         SBA  w/w        ww      Functional Exercises / Activity:   BUE strength exercises :tolerated during  game wearing 1# wrist wts seated up in w/c to increase overall endurance and strength for ADL's, transfers and fxl mobility. Sensory / Neuromuscular Re-Education:    Cognitive Skills:   Status Comments   Problem   Solving good     Memory good     Sequencing good     Safety Good -      Visual Perception:    Education:  Pt educated with back precaution safety to increase awareness.       [] Family teach completed on:    Pain Mod I to eat all meals  Long term goal 2: Pt demo Mod I grooming seated or standing  Long term goal 3: Pt demo SBA-CGA bathing @ sink level or shower level with AE as needed  Long term goal 4: Pt demo Mod I to don LSO brace & shirt & Mod I to don underwear,, pants, socks & shoes using AE as needed  Long term goal 5: Pt demo Mod I commode trf with appropriate DME to ensure pt safety  Long term goals 6: Pt demo SBA walk in shower trf wtih appropriate DME to ensure pt safety  Long term goal 7: Pt demo Mod I light homemaking activity & demo a G awareness of spinal precautions  Long term goal 8: Pt demo G- endurance for a 20 minute functional activity  Long term goal 9: Pt will state & adhere to spinal precautions during ADLs, transfesr & mobitliy with a 100% accuracy    The Patient has received education on:  [x]Transfer Safety [x]Compensatory tech for ADLs [x]AE training [x]DME training []Energy Conservation [x]Home / Kitchen Safety  [x]Fall Prevention  [x]Other: Spinal precautions. Family training was completed: Yes on 5/27/21 with daughter Willian Pizano    Recommendations:  Pt recommended for [employer] [facility] intervention for RN, HHA, PT & OT. Recommended DME: none ordered. LB dressing equipment issued.     Post Discharge Care:   []Home Independently  []Home with 24hr Care / Supervision [x]Home with Partial Supervision [x]Home with Home Health OT []Home with Out Pt OT []Other: ___   Comments:       Time in Time out Tx Time Breakdown  Variance:   First Session  10:00am 10:45am [] Individual Tx- mins   [x] Concurrent Tx 45  [] Co-Tx -   [] Group Tx -   [] Time Missed -              Second Session   [] Individual Tx-   [] Concurrent Tx-   [] Co-Tx -   [] Group Tx -   [] Time Missed-                   Third Session    [] Individual Tx-   [] Concurrent Tx -  [] Co-Tx -   [] Group Tx -   [] Time Missed -         Total Tx Time:  [facility],Third Floor, [facility]  I have read the above note and agree with the documentation.   Arcadio Ryan OTR/L [id number]

## 2021-06-04 NOTE — DISCHARGE SUMMARY
Walking 10 feet on uneven surface NT 10 feet with wide [employer] with Modified [place]   Wheel Chair Mobility NT NT   Car Transfers NT Modified Independent   Stair negotiation: ascended and descended  NT 12 steps with 2 rails with Modified [place] (nonreciprocal)   Curb Step:   ascended and descended NT 4 inch step with wide WW with Modified [place]   Balance  Seated: Supervision  Standing: Mod A     BBS: TBD  FGA: TBD Seated: Independent  Standing: Modified Independent with AD   Date Family Teach Completed No family present at al Pt's daughter present 5/27         Functional Assessment:      Date   Status   AE    Comments     Feeding   6/3/21    Independent              Grooming   6/3/21    Mod I    ww               Oral Care   6/3/21    Mod I    ww         Bathing   6/3/21    Mod I    Shower          UB Dressing   6/3/21    Mod I              LB Dressing   6/3/21   Mod I    reacher         Footwear   6/3/21   Mod I    Sock aid, long shoe horn          Toileting   6/3/21    SBA (voiding)    Min A ( BM)            Homemaking   5/28/21    CGA   Counter    W/c level              Discharge Physical Examination  General appearance: alert, NAD  Lungs: CTA b/l   Heart: RRR, S1, S2  Abdomen: soft, obese abdomen, NT, normal BS   Extremities: extremities normal, atraumatic, 2+ BLE edema unchanged. No calf tenderness   Musculoskeletal: Range of motion impaired   Skin: surgical incision c/d/i   Neurologic: Alert, oriented. SILT. Motor 5/5 throughout bilateral upper extremities, 3/5 bilateral hip flexors; 4/5 b/l KE; 4+/5 b/l ankle DF/PF. [facility] Course   Functional and mobility deficits secondary to L3-4 spondylolisthesis: s/p L3-L4 PLIF :  The patient completed an intensive inpatient rehabilitation program including PT, OT,  RT and achieved improvement in function as documented above. Recommended continued therapies are documented below. -L3-4 spondylolisthesis: s/p L3-L4 PLIF on 5/13/2021.  Spine known as: Requip  Take 1 tablet by mouth 4 times daily     VersaPro Crea  APPLY ONE (1) GRAM (ONE PUMP) EXTERNALLY FOUR TIMES A DAY TO EACH KNEE     VICTOZA SC        STOP taking these medications    diazePAM 5 MG tablet  Commonly known as: VALIUM     oxyCODONE 5 MG immediate release tablet  Commonly known as: Eusebio Ansari           Where to Get Your Medications      These medications were sent to [facility], [address] [address]    Phone: [phone]   · Baclofen 5 MG tablet  · lactobacillus Caps capsule         Allergies  Ferritin      Recommended Therapies at Discharge:  Home healthcare for, PT, OT, nursing and Aide      Follow-Up  -[facility]: Dr. Gaynel Kayser  [facility]: Tamanna Brown PA-C  -Urology : Dr. Gema Dior provided to the patient and appropriate family members regarding discharge medications and follow up     More than 30 minutes was spent in preparation of this patient's discharge including, but not limited to, examination, preparation of documents, prescription preparation, counseling and coordination.         Electronically signed by Mian Garrido MD on 6/4/2021 at 2:46 PM

## 2021-06-04 NOTE — PATIENT CARE CONFERENCE
[address] [place] [address]  ACUTE REHABILITATION  TEAM CONFERENCE NOTE/PATIENT PLAN OF CARE    Date: 2021  Admission date: 2021  Patient Name: Joyce Cassidy        MRN: [id number]    : 1955  (63 y.o.)  Gender: female   Rehab diagnosis/surgery with date:  Spondylolisthesis of lumbar region with PLIF 21  Impairment Group Code:  8.9    MEDICAL/FUNCTIONAL HISTORY/STATUS:  still incontinent with high PVRs, though not requiring IC. Patient to follow up with Urology as outpatient     Consultations/Labs/X-rays: Results for Jaylin Lowery (MRN [id number]) as of 2021 11:08   Ref.  Range 2021 05:38   Sodium Latest Ref Range: 132 - 146 mmol/L 136   Potassium Latest Ref Range: 3.5 - 5.0 mmol/L 4.9   Chloride Latest Ref Range: 98 - 107 mmol/L 100   CO2 Latest Ref Range: 22 - 29 mmol/L 29   BUN Latest Ref Range: 6 - 23 mg/dL 21   Creatinine Latest Ref Range: 0.5 - 1.0 mg/dL 1.0   Anion Gap Latest Ref Range: 7 - 16 mmol/L 7   GFR Non- Latest Ref Range: >=60 mL/min/1.73 56   GFR  Unknown >60   Glucose Latest Ref Range: 74 - 99 mg/dL 96   Calcium Latest Ref Range: 8.6 - 10.2 mg/dL 9.0         MEDICATION UPDATE:  bactrim and diflcuan by mouth, decreased gabapentin      NURSING :    Bowel:   Always Continent  [x]   Occasionally incontinent  []   Frequently incontinent  []   Always incontinent  []   Not occurred  []     Bladder:   Always Continent  []    Incontinent less than daily[]   Incontinent  daily [x]   Always incontinent  []   No urine output    []   Indwelling catheter []       Toilet Hygiene:   Current level : Modified independent  Short term Toilet hygiene goal: Met  Long term toilet hygiene goal:  Modified independent    Skin integrity: intact, surgical incision to back  Pain: none    NUTRITION    Diet  general  Liquid consistency   thin    SOCIAL INFORMATION:  Lives with: sister has health problems  Prior community services:   with [facility] in the past  Home Architecture:  2 story 2 steps 2 rails walk in shower with bench  Prior Level of function:  Independent except for back pain/Retired RN  DME:  rollator     FAMILY / PATIENT EDUCATION:  Daughter helping pt with brace  PHYSICAL THERAPY    Bed mobility:   Current level: Modified independent  Short term bed mobility goal: met  Long term bed mobility goal: Modified independent    Chair/bed transfers:  Current level: Modified independent  Short term Chair/bed transfers goal: met  Long term Chair/bed transfers goal: Modified independent      Ambulation:   Current level: 200 ft wide wheeled walker at Modified independent  Short term ambulation goal: met  Long term ambulation goal: 400 ft at Modified independent      Car transfers:   Current level: Modified independent  Short term car transfers goal: met  Long term car transfers goal:Modified independent    Stairs:   Current level : 8 steps with 2 rails at Modified independent  Short term stairs goal: met  Long term stairs goal: 12 steps at Modified independent        Other comments: Daughter has done family ed      OCCUPATIONAL THERAPY:      Tub/shower:   Current level: standby assist to walk in shower  Short term tub/shower goal: met  Long term tub/shower goal: met      Feeding:  Current level: independent  Short term feeding goal: met  Long term feeding goal: met    Grooming:   Current level: Modified independent  Short term grooming goal: met  Long term grooming goal: Modified independent    Bathing:  Current level: Modified independent at shower level  Short term bathing goal: Contact guard assist  Long term bathing goal: Contact guard assist    Homemaking:   Current level: standby assist  Short term homemaking goal: met  Long term homemaking goal: Modified independent    Upper body dressing:  Current level: Modified independent  Short term upper body dressing goal: met  Long term upper body dressing goal: Modified independent    Lower body dressing: Current level: Modified independent         Short term lower body dressing goal: met         Long term lower body dressing goal: Modified independent            Footwear  Current level: Modified independent  Short term goal: met  Long term goal:Modified independent      Toilet transfer:   Current level: Modified independent  Short term toilet transfer goal: met  Long term toilet transfer goal: Modified independent            Patient/family's personal goals: back to independent  Factors supporting goal achievement:  motiviated  Factors hindering goal achievement:  pain        Discharge Plan   Estimated Length of Stay: 6/4/2021           Destination: Home  Services at Discharge: Home healthcare for, PT, OT, nursing and Aide  Equipment at Discharge: daughter bought a walker on her own      INTERDISCIPLINARY TEAM/PHYSICIAN RECOMMENDATION AND/OR REVISIONS OF PLAN OF CARE:  Discharge planning has been discussed with patient and daughter, they are in agreement with discharge plan. I approve the established interdisciplinary plan of care as documented within the medical record of [facility]. Electronically signed by Michael Boyer RN on 6/4/2021 at 10:13 AM  The following interdisciplinary team members were present:  CLARE Caban LSW Richard S. Loreda Morocco, DPT  MARLEEN Velasco

## 2021-06-04 NOTE — PLAN OF CARE
Problem: Pain:  Goal: Pain level will decrease  Description: Pain level will decrease  6/4/2021 1453 by Aravind Marc RN  Outcome: Met This Shift  6/4/2021 1453 by Aravind Marc RN  Outcome: Met This Shift  6/4/2021 0608 by Melissa Mix RN  Outcome: Ongoing  Goal: Control of acute pain  Description: Control of acute pain  6/4/2021 1453 by Aravind Marc RN  Outcome: Met This Shift  6/4/2021 1453 by Aravind Marc RN  Outcome: Met This Shift  6/4/2021 0608 by Melissa Mix RN  Outcome: Ongoing  Goal: Control of chronic pain  Description: Control of chronic pain  6/4/2021 1453 by Aravind Marc RN  Outcome: Met This Shift  6/4/2021 1453 by Aravind Marc RN  Outcome: Met This Shift  6/4/2021 0608 by Melissa Mix RN  Outcome: Ongoing  Goal: Patient's pain/discomfort is manageable  Description: Patient's pain/discomfort is manageable  6/4/2021 1453 by Aravind Marc RN  Outcome: Met This Shift  6/4/2021 1453 by Aravind Marc RN  Outcome: Met This Shift  6/4/2021 0608 by Melissa Mix RN  Outcome: Ongoing     Problem: Falls - Risk of:  Goal: Will remain free from falls  Description: Will remain free from falls  6/4/2021 1453 by Aravind Marc RN  Outcome: Met This Shift  6/4/2021 1453 by Aravind Marc RN  Outcome: Met This Shift  6/4/2021 0608 by Melissa Mix RN  Outcome: Ongoing  Goal: Absence of physical injury  Description: Absence of physical injury  6/4/2021 1453 by Aravind Marc RN  Outcome: Met This Shift  6/4/2021 1453 by Aravind Marc RN  Outcome: Met This Shift  6/4/2021 0608 by Melissa Mix RN  Outcome: Ongoing     Problem: Skin Integrity:  Goal: Will show no infection signs and symptoms  Description: Will show no infection signs and symptoms  6/4/2021 1453 by Aravind Marc RN  Outcome: Met This Shift  6/4/2021 1453 by Primus Loft, RN  Outcome: Met This Shift  6/4/2021 0608 by Melissa Mix, RN  Outcome: Ongoing  Goal: Absence of new skin breakdown  Description: Absence of new skin breakdown  6/4/2021 1453 by Cheyanne Mtz RN  Outcome: Met This Shift  6/4/2021 1453 by Cheyanne Mtz RN  Outcome: Met This Shift  6/4/2021 0608 by Andrés Almaguer RN  Outcome: Ongoing     Problem: Infection:  Goal: Will remain free from infection  Description: Will remain free from infection  6/4/2021 1453 by Cheyanne Mtz RN  Outcome: Met This Shift  6/4/2021 1453 by Cheyanne Mtz RN  Outcome: Met This Shift  6/4/2021 0608 by Andrés Almaguer RN  Outcome: Ongoing     Problem: Safety:  Goal: Free from accidental physical injury  Description: Free from accidental physical injury  6/4/2021 1453 by Cheyanne Mtz RN  Outcome: Met This Shift  6/4/2021 1453 by Cheyanne Mtz RN  Outcome: Met This Shift  6/4/2021 0608 by Andrés Almaguer RN  Outcome: Ongoing  Goal: Free from intentional harm  Description: Free from intentional harm  6/4/2021 1453 by Cheyanne Mtz RN  Outcome: Met This Shift  6/4/2021 1453 by Cheyanne Mtz RN  Outcome: Met This Shift  6/4/2021 0608 by Andrés Almaguer RN  Outcome: Ongoing     Problem: Daily Care:  Goal: Daily care needs are met  Description: Daily care needs are met  6/4/2021 1453 by Cheyanne Mtz RN  Outcome: Met This Shift  6/4/2021 1453 by Cheyanne Mtz RN  Outcome: Met This Shift  6/4/2021 0608 by Andrés Almaguer RN  Outcome: Ongoing     Problem: Skin Integrity:  Goal: Skin integrity will stabilize  Description: Skin integrity will stabilize  6/4/2021 1453 by Cheyanne Mtz RN  Outcome: Met This Shift  6/4/2021 1453 by Cheyanne Mtz RN  Outcome: Met This Shift  6/4/2021 0608 by Andrés Almaguer RN  Outcome: Ongoing     Problem: Discharge Planning:  Goal: Patients continuum of care needs are met  Description: Patients continuum of care needs are met  6/4/2021 1453 by Cheyanne Mtz RN  Outcome: Met This Shift  6/4/2021 1453 by Edward Day RN  Outcome: Met This Shift  6/4/2021 0608 by Marilyn Magana RN  Outcome: Ongoing     Problem: ABCDS Injury Assessment  Goal: Absence of physical injury  6/4/2021 1453 by Edward Day RN  Outcome: Met This Shift  6/4/2021 1453 by Edward Day RN  Outcome: Met This Shift  6/4/2021 0608 by Marilyn Magana RN  Outcome: Ongoing

## 2021-06-04 NOTE — CARE COORDINATION
Per Team:  Plan dc 6/4/21. Updated the pt. And she chose to update her family. LTG: Stand- By Assistance to Independent. DME: shower seat- pt. Reports she does not need. She has a new built in bench. She will need a bariatric (due to girth) WW. Referral to [facility] DME per pt choice. Per Rachele Daniels [facility] DME) a HD Rollator was dispensed in 3/2021. SW discussed with the pt. And she plans to purchase her own [address]. No DME ordered      Aftercare:  Sherwin Mishra- RN, HHA, PT & OT.  Pt. Is currently active with [facility] and she chose to remain with [facility].      FT- 5/27 with daughter- Simon Dewitt     The Plan for Transition of Care is related to the following treatment goals: Home with Agata Blackwell     The Patient and/or patient representative Ashley Benavides was provided with a choice of provider and agrees with the discharge plan. [x]? Yes []? No     Freedom of choice list was provided with basic dialogue that supports the patient's individualized plan of care/goals, treatment preferences and shares the quality data associated with the providers. [x]? Yes []?  No        Paras Hahn  5/28/2021

## 2021-06-04 NOTE — PROGRESS NOTES
Patient was questioning paper work regarding discharge, call of to dr. Debora Lo, left message with call back number.

## 2021-06-07 ENCOUNTER — HOSPITAL ENCOUNTER (OUTPATIENT)
Dept: INFUSION THERAPY | Age: 66
Discharge: HOME | End: 2021-06-07
Payer: COMMERCIAL

## 2021-06-07 ENCOUNTER — TELEPHONE (OUTPATIENT)
Dept: PAIN MANAGEMENT | Age: 66
End: 2021-06-07

## 2021-06-07 DIAGNOSIS — D50.8 OTHER IRON DEFICIENCY ANEMIA: Primary | ICD-10-CM

## 2021-06-07 PROCEDURE — 96372 THER/PROPH/DIAG INJ SC/IM: CPT

## 2021-06-07 PROCEDURE — 6360000002 HC RX W HCPCS: Performed by: INTERNAL MEDICINE

## 2021-06-07 RX ORDER — CYANOCOBALAMIN 1000 UG/ML
1000 INJECTION INTRAMUSCULAR; SUBCUTANEOUS ONCE
Status: CANCELLED
Start: 2021-07-05

## 2021-06-07 RX ORDER — CYANOCOBALAMIN 1000 UG/ML
1000 INJECTION INTRAMUSCULAR; SUBCUTANEOUS ONCE
Status: COMPLETED
Start: 2021-06-07 | End: 2021-06-07

## 2021-06-07 RX ADMIN — CYANOCOBALAMIN 1000 MCG: 1000 INJECTION, SOLUTION INTRAMUSCULAR; SUBCUTANEOUS at 14:00

## 2021-06-07 NOTE — TELEPHONE ENCOUNTER
Pt called, she is scheduled to have an appointment with you Wednesday at 10:15. She said she just got back surgery and was instructed not to drive so she is wondering if her appointment could be changed to a virtual appointment instead  of in office.

## 2021-06-09 ENCOUNTER — VIRTUAL VISIT (OUTPATIENT)
Dept: PAIN MANAGEMENT | Age: 66
End: 2021-06-09
Payer: COMMERCIAL

## 2021-06-09 DIAGNOSIS — G89.4 CHRONIC PAIN SYNDROME: ICD-10-CM

## 2021-06-09 DIAGNOSIS — M54.50 CHRONIC RIGHT-SIDED LOW BACK PAIN WITHOUT SCIATICA: ICD-10-CM

## 2021-06-09 DIAGNOSIS — M25.511 CHRONIC RIGHT SHOULDER PAIN: ICD-10-CM

## 2021-06-09 DIAGNOSIS — M17.0 PRIMARY OSTEOARTHRITIS OF BOTH KNEES: Primary | ICD-10-CM

## 2021-06-09 DIAGNOSIS — G89.29 CHRONIC RIGHT SHOULDER PAIN: ICD-10-CM

## 2021-06-09 DIAGNOSIS — G89.29 CHRONIC RIGHT-SIDED LOW BACK PAIN WITHOUT SCIATICA: ICD-10-CM

## 2021-06-09 PROCEDURE — 99214 OFFICE O/P EST MOD 30 MIN: CPT | Performed by: PHYSICIAN ASSISTANT

## 2021-06-09 RX ORDER — BUPRENORPHINE HYDROCHLORIDE 450 UG/1
450 FILM, SOLUBLE BUCCAL EVERY 12 HOURS
Qty: 60 EACH | Refills: 1 | Status: SHIPPED
Start: 2021-06-09 | End: 2021-08-04 | Stop reason: SDUPTHER

## 2021-06-09 NOTE — PROGRESS NOTES
[address]  [facility]    Tele health follow up Note      Sindhu Kim     Date of Visit:  2021    CC:  Tele health follow up     Consent:  The patient and/or health care decision maker is aware that he/she may receive a bill for this tele-health service [facility], depending on his insurance coverage, and has provided verbal consent to proceed: Yes  I have considered the risks of abuse, dependence, addiction and diversion. My patient is aware that they will need a follow-up visit (in-person or virtually) at the appropriate time indicated for continued medications. Further, my patient is aware that when this acute crisis has lifted, they will be expected to return for an in-person visit and all elements of standard local and hospital guidelines in order to continue this medication. Patient Location:Home   Provider Location:  Office    HPI:    Pain is better to her lower back since lumbar surgery. States that knee pain is returning. Had about 8 weeks of relief from her injections. Appropriate analgesia with current medications regimen: yes. Change in quality of symptoms:no. Medication side effects:none. Recent diagnostic testing:none. Recent interventional procedures:  04/15/2021 PROCEDURE PERFORMED:  Bilateral knee injection under ultrasound guidance - good relief x 8 weeks. She has not been on anticoagulation medications to include ASA, NSAIDS, Plavix, heparin, LMW heparin and warfarin and has not been on herbal supplements.  She is diabetic.     Imagin/27/2019 Lumbar Spine X-ray     Alignment of the vertebral bodies appears to be near-anatomic   No fracture or foreign body is identified. The disc spaces demonstrate moderate degenerative changes. There is mild loss of the vertebral body height   There are moderate degenerative changes involving the facets. There is grade 1 anterolisthesis of L3 on L4. Flexion-extension views demonstrates movement of the anterolisthesis   of L3 on L4.            Impression   Grade 1 anterolisthesis of L3 on L4 which does move with extension. Findings consistent with moderate degenerative disc disease and   degenerative facet disease.       The exam has been dictated and signed by Mani Sol. HOLLY Bell-PEDRO LUIS   and Evan Gregorio MD, reviewed and concurred with these findings.            9/3/2019 left knee x-ray     The bones appear to be in anatomic alignment. No foreign body is identified   No fracture is identified     There is moderate tricompartmental joint space loss greatest long   patellofemoral and medial femoral tibial joint   The are moderate degenerative changes present along the patellofemoral   and medial femoral tibial compartment           Impression   Moderate degenerative changes as described above          9/3/2019 right knee x-ray     The bones appear to be in anatomic alignment. No foreign body is identified   No fracture is identified. Marginal bone spurs are again seen along the inferior margins of the   patella. There is interval worsening of moderate tricompartmental joint space   loss    The are interval worsening of moderate degenerative tricompartmental   changes present            Impression   Moderate tricompartmental degenerative changes.  This has   worsened in the interim                                               Potential Aberrant Drug-Related Behavior:  None     Urine Drug Screenin2019 Consistent for buprenorphine and metabolites  2020 Consistent for buprenorphine  2020 Consistent   2020 Consistent     OARRS report:  10/2019 Consistent to 2021 Consistent     Opioid Agreement:  2020                                         Past Medical History: Reviewed    Past Surgical History: Reviewed     Home Medications: Reviewed    Allergies: Reviewed     Social History: Reviewed     REVIEW OF SYSTEMS:     Rajan Eagle denies fever/chills, chest pain, shortness of breath, new bowel or bladder complaints. All other review of systems was negative. PHYSICAL EXAMINATION:      General:       A & O x3  Build: Morbidly obese    HEENT:    Head:normocephalic and atraumatic  Pupils:regular, round and equal.  Sclera: icterus absent,     Lungs:    Breathing:Normal expansion. No wheezing. .    Lumbar spine:    Range of motion:abnormal mildly Lateral bending, flexion, extension rotation bilateral and is not painful. Extremities:    Tremors:None bilaterally upper and lower  Range of motion:Generally normal shoulders, pain with internal rotation of hips not done. Intact:Yes      Neurological:     Motor:          No apparent weakness per patient       Gait: not observed. Dermatology:    Skin:no unusual rashes, no skin lesions, no palpable subcutaneous nodules and good skin turgor    Impression:    B/L Knee Pain, LBP, right shoulder pain  Knee x-rays:  Moderate DJD.    Hx of pain management with [facility] pain clinic.  Was on fentanyl 25 mg and norco 5/325 TID.  Did not help and caused drowsiness.  Now on Belbuca 450 mcg BID (increased from 300 mg BID) which is working very well without side effects. Transferred care to [facility] ([facility]) due to cost.    Hx of LESI by Dr. 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  Bilateral segmental arthrodesis and fusion at L3-L4 with the use of  bilateral L3 and L4 pedicle screws with use of locally harvested  autograft plus allograft in the form of BioZorb strips for  posterolateral fusion at L3-L4.   2.  360-degree fusion with posterior lumbar interbody fusion at L3-L4  with use of bilateral Globus Rise expandable cages packed with locally  harvested autograft. 3.  Bilateral L3 and L4 laminectomy, bilateral L3-L4 medial facetectomy,  bilateral L3 and L4 foraminotomy, and bilateral L3-L4 diskectomy. 4.  Use of O-arm assisted navigation with placement of pedicle screws. 5.  Use of free-running EMG to test pedicle screw heads. 6.  Use of intraoperative fluoroscopy interpreted by myself, the  surgeon. 7.  22 modifier for degree of difficulty for the patient being morbidly  obese with a BMI of 51.21.  8.  AS modifier for Edna Conde [facility], who assisted with primary  exposure, primary closure, and retraction of neural elements on 05/13/2021 Dr. Angi Ann. OARRS reviewed 06/2021  Continue Belbuca 450 mcg BID   B/L knee CSIs on 04/15/2021 with good relief x 8 weeks. Controlled Substance Monitoring:    Acute and Chronic Pain Monitoring:   RX Monitoring 6/9/2021   Periodic Controlled Substance Monitoring Possible medication side effects, risk of tolerance/dependence & alternative treatments discussed. ;No signs of potential drug abuse or diversion identified. ;Assessed functional status. ;Obtaining appropriate analgesic effect of treatment. We discussed with the patient that combining opioids, benzodiazepines, alcohol, illicit drugs or sleep aids increases the risk of respiratory depression including death. We discussed that these medications may cause drowsiness, sedation or dizziness and have counseled the patient not to drive or operate machinery. We have discussed that these medications will be prescribed only by one provider. We have discussed with the patient about age related risk factors and have thoroughly discussed the importance of taking these medications as prescribed. The patient verbalizes understanding. Patient advised regarding steps to help prevent the spread of COVID-19   SOURCE - https://josie-stephanie.info/. html     1-Stay home except to get medical care  2-Clean your hands often for atleast 20 seconds, avoid touching: Avoid touching your eyes, nose, and mouth with unwashed hands. 3-Seek medical attention: Seek prompt medical attention if your illness is worsening (e.g., difficulty breathing). Call you doctor first.  3-Wear a facemask if you are sick   4-Cover your coughs and sneezes           I affirm this is a Patient Initiated Episode with an established Patient who has not had a related appointment within my department in the past 7 days or scheduled within the next 24 hours.     Total Time:  15 minutes    Cc: Referring physician

## 2021-06-09 NOTE — PROGRESS NOTES
Leilani Guy was read the following message We want to confirm that, for purposes of billing, this is a virtual visit with your provider for which we will submit a claim for reimbursement with your insurance company. You will be responsible for any copays, coinsurance amounts or other amounts not covered by your insurance company. If you do not accept this, unfortunately we will not be able to schedule or proceed with a virtual visit with the provider. Do you accept? Esme Guerra responded Yes .

## 2021-06-11 ENCOUNTER — HOSPITAL ENCOUNTER (OUTPATIENT)
Age: 66
Discharge: HOME | End: 2021-06-13
Payer: COMMERCIAL

## 2021-06-11 ENCOUNTER — HOSPITAL ENCOUNTER (OUTPATIENT)
Dept: GENERAL RADIOLOGY | Age: 66
Discharge: HOME | End: 2021-06-13
Payer: COMMERCIAL

## 2021-06-11 ENCOUNTER — OFFICE VISIT (OUTPATIENT)
Dept: NEUROSURGERY | Age: 66
End: 2021-06-11
Payer: COMMERCIAL

## 2021-06-11 VITALS
BODY MASS INDEX: 52.44 KG/M2 | WEIGHT: 285 LBS | HEIGHT: 62 IN | SYSTOLIC BLOOD PRESSURE: 157 MMHG | DIASTOLIC BLOOD PRESSURE: 92 MMHG

## 2021-06-11 DIAGNOSIS — M43.16 SPONDYLOLISTHESIS OF LUMBAR REGION: Primary | ICD-10-CM

## 2021-06-11 DIAGNOSIS — M54.16 LUMBAR RADICULOPATHY: ICD-10-CM

## 2021-06-11 DIAGNOSIS — Z98.1 STATUS POST LUMBAR SPINAL FUSION: ICD-10-CM

## 2021-06-11 PROCEDURE — 99024 POSTOP FOLLOW-UP VISIT: CPT | Performed by: PHYSICIAN ASSISTANT

## 2021-06-11 PROCEDURE — 72100 X-RAY EXAM L-S SPINE 2/3 VWS: CPT

## 2021-06-15 LAB
Lab: NORMAL
REPORT: NORMAL
THIS TEST SENT TO: NORMAL

## 2021-07-07 RX ORDER — NITROFURANTOIN 25; 75 MG/1; MG/1
100 CAPSULE ORAL 2 TIMES DAILY
Qty: 14 CAPSULE | Refills: 0 | Status: ON HOLD
Start: 2021-07-07 | End: 2022-02-03 | Stop reason: HOSPADM

## 2021-07-13 ENCOUNTER — TELEPHONE (OUTPATIENT)
Dept: ADMINISTRATIVE | Age: 66
End: 2021-07-13

## 2021-07-16 ENCOUNTER — PROCEDURE VISIT (OUTPATIENT)
Dept: PAIN MANAGEMENT | Age: 66
End: 2021-07-16
Payer: COMMERCIAL

## 2021-07-16 VITALS
SYSTOLIC BLOOD PRESSURE: 124 MMHG | HEIGHT: 62 IN | RESPIRATION RATE: 16 BRPM | OXYGEN SATURATION: 99 % | BODY MASS INDEX: 52.44 KG/M2 | DIASTOLIC BLOOD PRESSURE: 62 MMHG | TEMPERATURE: 97.8 F | WEIGHT: 285 LBS | HEART RATE: 78 BPM

## 2021-07-16 DIAGNOSIS — G89.4 CHRONIC PAIN SYNDROME: Chronic | ICD-10-CM

## 2021-07-16 DIAGNOSIS — M17.0 PRIMARY OSTEOARTHRITIS OF BOTH KNEES: Primary | ICD-10-CM

## 2021-07-16 PROCEDURE — 20611 DRAIN/INJ JOINT/BURSA W/US: CPT | Performed by: PAIN MEDICINE

## 2021-07-16 PROCEDURE — 99213 OFFICE O/P EST LOW 20 MIN: CPT | Performed by: PAIN MEDICINE

## 2021-07-16 RX ORDER — BUPIVACAINE HYDROCHLORIDE 2.5 MG/ML
3 INJECTION, SOLUTION EPIDURAL; INFILTRATION; INTRACAUDAL ONCE
Status: COMPLETED | OUTPATIENT
Start: 2021-07-16 | End: 2021-07-16

## 2021-07-16 RX ORDER — BUPRENORPHINE HYDROCHLORIDE 450 UG/1
FILM, SOLUBLE BUCCAL
Status: ON HOLD | COMMUNITY
Start: 2021-07-09 | End: 2022-02-03 | Stop reason: HOSPADM

## 2021-07-16 RX ORDER — METHYLPREDNISOLONE ACETATE 40 MG/ML
40 INJECTION, SUSPENSION INTRA-ARTICULAR; INTRALESIONAL; INTRAMUSCULAR; SOFT TISSUE ONCE
Status: COMPLETED | OUTPATIENT
Start: 2021-07-16 | End: 2021-07-16

## 2021-07-16 RX ADMIN — BUPIVACAINE HYDROCHLORIDE 3 ML: 2.5 INJECTION, SOLUTION EPIDURAL; INFILTRATION; INTRACAUDAL at 12:53

## 2021-07-16 RX ADMIN — METHYLPREDNISOLONE ACETATE 40 MG: 40 INJECTION, SUSPENSION INTRA-ARTICULAR; INTRALESIONAL; INTRAMUSCULAR; SOFT TISSUE at 12:56

## 2021-07-16 RX ADMIN — BUPIVACAINE HYDROCHLORIDE 3 ML: 2.5 INJECTION, SOLUTION EPIDURAL; INFILTRATION; INTRACAUDAL at 12:54

## 2021-07-16 RX ADMIN — METHYLPREDNISOLONE ACETATE 40 MG: 40 INJECTION, SUSPENSION INTRA-ARTICULAR; INTRALESIONAL; INTRAMUSCULAR; SOFT TISSUE at 12:55

## 2021-07-16 NOTE — PROGRESS NOTES
2021    Patient: Neela Muro  :  1955  Age:  60 y.o. Sex:  female      PRE-OPERATIVE DIAGNOSIS: Bilateral  Knee pain, osteoarthritis. POST-OPERATIVE DIAGNOSIS: Same. PROCEDURE PERFORMED:  Bilateral knee injection under ultrasound guidance. SURGEON:   ANALILIA Tomlinson. ANESTHESIA: local    ESTIMATED BLOOD LOSS: None. BRIEF HISTORY: Neela Muro comes in today for Bilateral Knee steroid injection under ultrasound guidance. After discussing the potential risks and benefits of the procedure with the patient  Abisai Estes did request that we proceed. A complete History & Physical was reviewed and it is unchanged. DESCRIPTION OF PROCEDURE:   The patient was placed in a seated position. The area of the Bilateral knee was prepped with chloraprep and draped in a sterile manner. The overlying skin and subcutaneous tissues were anesthetized with 0.5% Lidocaine. Then, under ultrasound guidance, a 25 gauge 1 1/2 inch needle was inserted into the suprapatellar bursa under direct visualization. A solution of 3 cc's of 0.25% bupivacaine and 40 mg of DepoMedrol was injected easily, in each knee, with appropriate spread of medicatioons without complications. The needle was then removed and Band-Aid applied. Disposition the patient tolerated the procedure well and there were no complications . Abisai Estes will follow up in our [facility] as scheduled. She was encouraged to call with questions, concerns or if worsening of symptoms occurs.     Jennifer Tyson,     2021

## 2021-07-16 NOTE — PROGRESS NOTES
Do you currently have any of the following:    Fever: No  Headache:  No  Cough: No  Shortness of breath: No  Exposed to anyone with these symptoms: Candi         Concepcion Crum presents to the [facility] on 7/16/2021. Jaylen Rangel is complaining of pain B/L knees The pain is constant. The pain is described as aching and throbbing. Pain is rated on her best day at a 6, on her worst day at a 10, and on average at a 8 on the VAS scale. She took her last dose of  Belbuca    Any procedures since your last visit:     Pacemaker or defibrillator: No managed by     She is not on NSAIDS and is not on anticoagulation medications to include none and is managed by      Medication Contract and Consent for Opioid Use Documents Filed     Patient Documents       Type of Document Status Date Received Received By Description     Medication Contract Signed 9/6/2019 12:04 PM Tash Black med contract     Medication Contract Received 11/17/2020 10:57 AM Zoe MORRISON med contract                /62   Pulse 78   Resp 16   Ht 5' 2\" (1.575 m)   Wt 285 lb (129.3 kg)   SpO2 99%   BMI 52.13 kg/m²      No LMP recorded.  Patient is postmenopausal.

## 2021-07-26 RX ORDER — NITROFURANTOIN 25; 75 MG/1; MG/1
100 CAPSULE ORAL 2 TIMES DAILY
Qty: 14 CAPSULE | Refills: 3 | Status: ON HOLD
Start: 2021-07-26 | End: 2022-02-03 | Stop reason: HOSPADM

## 2021-07-27 ENCOUNTER — OFFICE VISIT (OUTPATIENT)
Dept: PRIMARY CARE CLINIC | Age: 66
End: 2021-07-27
Payer: COMMERCIAL

## 2021-07-27 VITALS
HEART RATE: 100 BPM | HEIGHT: 62 IN | BODY MASS INDEX: 53.37 KG/M2 | OXYGEN SATURATION: 90 % | WEIGHT: 290 LBS | TEMPERATURE: 96.6 F

## 2021-07-27 DIAGNOSIS — N30.00 ACUTE CYSTITIS WITHOUT HEMATURIA: ICD-10-CM

## 2021-07-27 DIAGNOSIS — F32.9 REACTIVE DEPRESSION: ICD-10-CM

## 2021-07-27 DIAGNOSIS — H65.192 OTHER NON-RECURRENT ACUTE NONSUPPURATIVE OTITIS MEDIA OF LEFT EAR: Primary | ICD-10-CM

## 2021-07-27 PROCEDURE — 99213 OFFICE O/P EST LOW 20 MIN: CPT | Performed by: FAMILY MEDICINE

## 2021-07-27 RX ORDER — PREDNISONE 1 MG/1
5 TABLET ORAL 2 TIMES DAILY
Qty: 10 TABLET | Refills: 0 | Status: SHIPPED | OUTPATIENT
Start: 2021-07-27 | End: 2021-08-01

## 2021-07-27 RX ORDER — CEFDINIR 300 MG/1
300 CAPSULE ORAL 2 TIMES DAILY
Qty: 20 CAPSULE | Refills: 0 | Status: SHIPPED | OUTPATIENT
Start: 2021-07-27 | End: 2021-08-06

## 2021-07-27 RX ORDER — BUPROPION HYDROCHLORIDE 150 MG/1
150 TABLET ORAL EVERY MORNING
Qty: 90 TABLET | Refills: 3 | Status: SHIPPED
Start: 2021-07-27 | End: 2022-06-29

## 2021-07-27 ASSESSMENT — ENCOUNTER SYMPTOMS
ALLERGIC/IMMUNOLOGIC NEGATIVE: 1
SHORTNESS OF BREATH: 0
EYES NEGATIVE: 1
GASTROINTESTINAL NEGATIVE: 1
RESPIRATORY NEGATIVE: 1

## 2021-07-27 NOTE — PROGRESS NOTES
daily (with meals) (Patient taking differently: Take 20 mEq by mouth daily as needed ), Disp: 60 tablet, Rfl: 3    buPROPion (WELLBUTRIN XL) 150 MG extended release tablet, Take 1 tablet by mouth every morning (Patient taking differently: Take 150 mg by mouth every evening ), Disp: 90 tablet, Rfl: 3    rOPINIRole (REQUIP) 2 MG tablet, Take 1 tablet by mouth 4 times daily, Disp: 360 tablet, Rfl: 12    ammonium lactate (LAC-HYDRIN) 12 % cream, Apply topically as needed. , Disp: 385 g, Rfl: 2    Cream Base (VERSAPRO) CREA, APPLY ONE (1) GRAM (ONE PUMP) EXTERNALLY FOUR TIMES A DAY TO EACH KNEE, Disp: 200 g, Rfl: 1  Allergies   Allergen Reactions    Ferritin Shortness Of Breath and Other (See Comments)     Flushed,  Severe back pain     Social History     Socioeconomic History    Marital status:      Spouse name: Not on file    Number of children: Not on file    Years of education: Not on file    Highest education level: Not on file   Occupational History    Not on file   Tobacco Use    Smoking status: Former Smoker     Packs/day: 0.25     Years: 38.00     Pack years: 9.50     Types: Cigarettes     Quit date: 3/11/2021     Years since quittin.3    Smokeless tobacco: Never Used    Tobacco comment: Pt states she quit [employer]"   Vaping Use    Vaping Use: Never used   Substance and Sexual Activity    Alcohol use: No    Drug use: No    Sexual activity: Not Currently   Other Topics Concern    Not on file   Social History Narrative        Chronic Diagnosis: Iron deficiency anemia, Depressive disorder, Benign essential hypertension, Mixed    hyperlipidemia.     HTN    OBESITY    LIPID    OA    SMOKER    CVA L FIELD VISION    DEPRESSION    GASTRIC BYPASS 01    BREAST REDUCTION--    Ariel Gonsales  1955 Page #2    ANEMIA==IRON AND B-12    Admitted 2019 with cellulitis left lower extremity then readmitted with chest pain with negative stress test     Social Determinants of Health     Financial Resource Strain: Low Risk     Difficulty of Paying Living Expenses: Not very hard   Food Insecurity: No Food Insecurity    Worried About Running Out of Food in the Last Year: Never true    Ran Out of Food in the Last Year: Never true   Transportation Needs: No Transportation Needs    Lack of Transportation (Medical): No    Lack of Transportation (Non-Medical): No   Physical Activity:     Days of Exercise per Week:     Minutes of Exercise per Session:    Stress: No Stress Concern Present    Feeling of Stress :  Only a little   Social Connections: Unknown    Frequency of Communication with Friends and Family: More than three times a week    Frequency of Social Gatherings with Friends and Family: Not on file    Attends Sabianism Services: Not on file    Active Member of [employer] or Organizations: Not on file    Attends Club or Organization Meetings: Not on file    Marital Status: Not on file   Intimate Partner Violence:     Fear of Current or Ex-Partner:     Emotionally Abused:     Physically Abused:     Sexually Abused:       Past Medical History:   Diagnosis Date    Anemia     Arthritis     Cellulitis 05/2015    left leg    Chronic ulcer of leg with fat layer exposed ([address] 06/22/2015    Chronic ulcer of right leg, limited to breakdown of skin ([address] 09/28/2016    COVID-19 04/23/2021    postive test no symptoms    Depression     Full dentures     Low back pain     Lymphedema of both lower extremities 06/22/2015    Obesity     280 #    Osteoarthritis     Peripheral vision loss     Stroke ([address]     Type 2 diabetes mellitus without complication, without long-term current use of insulin ([address] 06/21/2019    Ulcer of left lower leg, limited to breakdown of skin ([address] 06/10/2019    Ulcer of left lower leg, limited to breakdown of skin ([address] 06/10/2019    UTI (urinary tract infection) 04/2021     Family History   Problem Relation Age of Onset    Breast Cancer Mother    Corrine Robertson patient's room. More than half of that time was spent face to face with the patient in counseling and coordinating care. Follow Up: Return for Reg Appt.      Seen by:  Monse Anand, DO

## 2021-08-04 ENCOUNTER — OFFICE VISIT (OUTPATIENT)
Dept: PAIN MANAGEMENT | Age: 66
End: 2021-08-04
Payer: COMMERCIAL

## 2021-08-04 ENCOUNTER — HOSPITAL ENCOUNTER (OUTPATIENT)
Age: 66
Discharge: HOME | End: 2021-08-06
Payer: COMMERCIAL

## 2021-08-04 ENCOUNTER — HOSPITAL ENCOUNTER (OUTPATIENT)
Dept: GENERAL RADIOLOGY | Age: 66
Discharge: HOME | End: 2021-08-06
Payer: COMMERCIAL

## 2021-08-04 ENCOUNTER — OFFICE VISIT (OUTPATIENT)
Dept: NEUROSURGERY | Age: 66
End: 2021-08-04
Payer: COMMERCIAL

## 2021-08-04 VITALS
RESPIRATION RATE: 16 BRPM | OXYGEN SATURATION: 93 % | SYSTOLIC BLOOD PRESSURE: 142 MMHG | WEIGHT: 290 LBS | HEART RATE: 85 BPM | BODY MASS INDEX: 53.37 KG/M2 | DIASTOLIC BLOOD PRESSURE: 80 MMHG | HEIGHT: 62 IN | TEMPERATURE: 96.4 F

## 2021-08-04 VITALS
WEIGHT: 290 LBS | DIASTOLIC BLOOD PRESSURE: 102 MMHG | HEART RATE: 87 BPM | HEIGHT: 62 IN | TEMPERATURE: 98 F | OXYGEN SATURATION: 95 % | RESPIRATION RATE: 18 BRPM | BODY MASS INDEX: 53.37 KG/M2 | SYSTOLIC BLOOD PRESSURE: 161 MMHG

## 2021-08-04 DIAGNOSIS — Z98.1 STATUS POST LUMBAR SPINAL FUSION: ICD-10-CM

## 2021-08-04 DIAGNOSIS — G89.29 CHRONIC RIGHT-SIDED LOW BACK PAIN WITHOUT SCIATICA: ICD-10-CM

## 2021-08-04 DIAGNOSIS — M54.50 CHRONIC RIGHT-SIDED LOW BACK PAIN WITHOUT SCIATICA: ICD-10-CM

## 2021-08-04 DIAGNOSIS — M54.16 LUMBAR RADICULOPATHY: ICD-10-CM

## 2021-08-04 DIAGNOSIS — M25.511 CHRONIC RIGHT SHOULDER PAIN: ICD-10-CM

## 2021-08-04 DIAGNOSIS — G89.4 CHRONIC PAIN SYNDROME: Primary | ICD-10-CM

## 2021-08-04 DIAGNOSIS — M43.16 SPONDYLOLISTHESIS OF LUMBAR REGION: Primary | ICD-10-CM

## 2021-08-04 DIAGNOSIS — M17.0 PRIMARY OSTEOARTHRITIS OF BOTH KNEES: ICD-10-CM

## 2021-08-04 DIAGNOSIS — M43.16 SPONDYLOLISTHESIS OF LUMBAR REGION: ICD-10-CM

## 2021-08-04 DIAGNOSIS — G89.29 CHRONIC RIGHT SHOULDER PAIN: ICD-10-CM

## 2021-08-04 PROCEDURE — 99213 OFFICE O/P EST LOW 20 MIN: CPT | Performed by: PHYSICIAN ASSISTANT

## 2021-08-04 PROCEDURE — 72100 X-RAY EXAM L-S SPINE 2/3 VWS: CPT

## 2021-08-04 PROCEDURE — 99024 POSTOP FOLLOW-UP VISIT: CPT | Performed by: PHYSICIAN ASSISTANT

## 2021-08-04 RX ORDER — BUPRENORPHINE HYDROCHLORIDE 450 UG/1
450 FILM, SOLUBLE BUCCAL EVERY 12 HOURS
Qty: 60 EACH | Refills: 1 | Status: SHIPPED
Start: 2021-08-08 | End: 2021-09-29 | Stop reason: SDUPTHER

## 2021-08-04 NOTE — PROGRESS NOTES
Post Operative Follow-up     This is a 60year old female who presents to the office for a three month follow-up s/p L3-L4 PLIF     Subjective: Patient states she has been doing well as far as her back. She is having bilateral knee pain. Had an injection with minimal relief. Denies new back pain, weakness, numbness, tingling, or issues with incision site. Lumbar x-rays reviewed.      Physical Exam:              WDWN, no apparent distress   Presents in a wheelchair              Non-labored breathing               Vitals Stable              Alert and oriented x3              CN 3-12 intact              PERRL              EOMI              Lymphedema noted bilateral LE   Moving all extremities well              Sensation to LT intact bilaterally   Incision site healing well with no signs of infection                Imagin21 lumbar x-rays: stable alignment, stable fusion. Final report pending.      Assessment: This is a 60 y.o.  female presenting for a three month follow-up s/p L3-L4 PLIF. Stable.      Plan:  -No restrictions. Okay to d/c LSO  -Physical therapy referral made for aqua therapy  -OARRS report reviewed  -Follow-up in neurosurgery clinic in 9 months for 1 year follow up with repeat lumbar x-rays  -Call or return to neurosurgery office sooner if symptoms worsen or if new issues arise in the interim.     Electronically signed by David Villa PA-C on 2021 at 1:33 PM

## 2021-08-04 NOTE — PROGRESS NOTES
disc protrusion or osteophyte toward the left measuring 3-4   mm causing moderate to severe narrowing of the left neural foramen.  The   thecal sac is mildly stenotic measuring 9.5 mm.  Disc and osteophyte narrows   the right neural foramen. The left neural foramen is narrowed greater than   right. L5-S1:  There is mild facet and ligamentum flavum hypertrophy. The thecal sac   and neural foramina are intact.       Impression   Disc extrusion at L3-L4 measuring 4-5 mm and extending superiorly behind the   inferior endplate of P7-J3 on the right causing severe narrowing of the right   neural foramen.  There is mild stenosis of the thecal sac at L3-L4 and L4-L5   as discussed above. 12/27/2019 Lumbar Spine X-ray    Alignment of the vertebral bodies appears to be near-anatomic   No fracture or foreign body is identified. The disc spaces demonstrate moderate degenerative changes. There is mild loss of the vertebral body height   There are moderate degenerative changes involving the facets. There is grade 1 anterolisthesis of L3 on L4. Flexion-extension views demonstrates movement of the anterolisthesis   of L3 on L4.            Impression   Grade 1 anterolisthesis of L3 on L4 which does move with extension. Findings consistent with moderate degenerative disc disease and   degenerative facet disease.       The exam has been dictated and signed by Julissa Chatterjee. HOLLY Mazariegos-PEDRO LUIS   and Hugo Lucas MD, reviewed and concurred with these findings.          9/3/2019 left knee x-ray     The bones appear to be in anatomic alignment.     No foreign body is identified   No fracture is identified     There is moderate tricompartmental joint space loss greatest long   patellofemoral and medial femoral tibial joint   The are moderate degenerative changes present along the patellofemoral   and medial femoral tibial compartment           Impression   Moderate degenerative changes as described above      ECHOCARDIOGRAM COMPLETE 2D W DOPPLER W COLOR  01/06/2012         GASTRIC BYPASS SURGERY  2000    NO NASTOGASTRIC TUBE    HERNIA REPAIR      2002    LUMBAR SPINE SURGERY N/A 5/13/2021    L3-L4  POSTERIOR LUMBAR INTERBODY  FUSION--OARM, JESSI, YAJAIRA TABLE, CELL SAVER, PLATELET GEL, AUDIOLOGY, CAGES, PLATES, SCREWS -- GLOBUS performed by Urban Sands MD at [facility]  03/27/2021    minimal pouch gastritis--jessica    UPPER GASTROINTESTINAL ENDOSCOPY N/A 03/27/2021    EGD ESOPHAGOGASTRODUODENOSCOPY performed by Milagros Beckham MD at [address]       Prior to Admission medications    Medication Sig Start Date End Date Taking? Authorizing Provider   BELBUCA 450 MCG FILM Take 450 mcg by mouth every 12 hours for 30 days.  8/8/21 9/7/21 Yes CHERYL Sanford   cefdinir (OMNICEF) 300 MG capsule Take 1 capsule by mouth 2 times daily for 10 days 7/27/21 8/6/21 Yes Gautam Razo DO   buPROPion (WELLBUTRIN XL) 150 MG extended release tablet Take 1 tablet by mouth every morning 7/27/21  Yes Gautam Razo DO   BELBUCA 450 MCG FILM DISSOLVE 1 FILM UNDER THE TONGUE EVERY 12 HOURS FOR 30 DAYS 7/9/21  Yes Historical Provider, MD   nitrofurantoin, macrocrystal-monohydrate, (MACROBID) 100 MG capsule Take 1 capsule by mouth 2 times daily 7/7/21  Yes Gautam Razo DO   lactobacillus (CULTURELLE) CAPS capsule Take 1 capsule by mouth daily 6/5/21  Yes Massiel Russo MD   baclofen (LIORESAL) 5 MG tablet Take 1 tablet by mouth 3 times daily 6/4/21  Yes Massiel Russo MD   gabapentin (NEURONTIN) 300 MG capsule TAKE 1 CAPSULE BY MOUTH THREE TIMES DAILY FOR 30 DAYS 4/26/21 8/4/21 Yes Tyra Kang PA-C   Liraglutide (VICTOZA SC) Inject 1.8 mg into the skin daily   Yes Historical Provider, MD   bumetanide (BUMEX) 2 MG tablet Take 1 tablet by mouth 2 times daily  Patient taking differently: Take 2 mg by mouth daily as needed  3/30/21  Yes Katharine Kitchen MD   potassium chloride (KLOR-CON M) 20 MEQ extended release tablet Take 1 tablet by mouth 2 times daily (with meals)  Patient taking differently: Take 20 mEq by mouth daily as needed  3/30/21  Yes Trever Goins MD   rOPINIRole (REQUIP) 2 MG tablet Take 1 tablet by mouth 4 times daily 20  Yes Gautam Razo DO   ammonium lactate (LAC-HYDRIN) 12 % cream Apply topically as needed. 10/8/20  Yes Merly Pura, DPM   Cream Base (VERSAPRO) CREA APPLY ONE (1) GRAM (ONE PUMP) EXTERNALLY FOUR TIMES A DAY TO EACH KNEE 3/23/20  Yes Leela Campos PA-C   nitrofurantoin, macrocrystal-monohydrate, (MACROBID) 100 MG capsule Take 1 capsule by mouth 2 times daily  Patient not taking: Reported on 2021   Geremias Razo DO       Allergies   Allergen Reactions    Ferritin Shortness Of Breath and Other (See Comments)     Flushed,  Severe back pain       Social History     Socioeconomic History    Marital status:      Spouse name: Not on file    Number of children: Not on file    Years of education: Not on file    Highest education level: Not on file   Occupational History    Not on file   Tobacco Use    Smoking status: Former Smoker     Packs/day: 0.25     Years: 38.00     Pack years: 9.50     Types: Cigarettes     Quit date: 3/11/2021     Years since quittin.4    Smokeless tobacco: Never Used    Tobacco comment: Pt states she quit [employer]"   Vaping Use    Vaping Use: Never used   Substance and Sexual Activity    Alcohol use: No    Drug use: No    Sexual activity: Not Currently   Other Topics Concern    Not on file   Social History Narrative        Chronic Diagnosis: Iron deficiency anemia, Depressive disorder, Benign essential hypertension, Mixed    hyperlipidemia.     HTN    OBESITY    LIPID    OA    SMOKER    CVA L FIELD VISION    DEPRESSION    GASTRIC BYPASS 01    BREAST REDUCTION--6-    [employer]  1955 Page #2    ANEMIA==IRON AND B-12    Admitted 2019 with cellulitis left lower extremity then readmitted with chest pain with negative stress test     Social Determinants of Health     Financial Resource Strain: Low Risk     Difficulty of Paying Living Expenses: Not very hard   Food Insecurity: No Food Insecurity    Worried About Running Out of Food in the Last Year: Never true    [place] of Food in the Last Year: Never true   Transportation Needs: No Transportation Needs    Lack of Transportation (Medical): No    Lack of Transportation (Non-Medical): No   Physical Activity:     Days of Exercise per Week:     Minutes of Exercise per Session:    Stress: No Stress Concern Present    Feeling of Stress : Only a little   Social Connections: Unknown    Frequency of Communication with Friends and Family: More than three times a week    Frequency of Social Gatherings with Friends and Family: Not on file    Attends Sabianism Services: Not on file    Active Member of Clubs or Organizations: Not on file    Attends Club or Organization Meetings: Not on file    Marital Status: Not on file   Intimate Partner Violence:     Fear of Current or Ex-Partner:     Emotionally Abused:     Physically Abused:     Sexually Abused:        Family History   Problem Relation Age of Onset    Breast Cancer Mother     Stroke Father     Hypertension Sister     Hypertension Brother        REVIEW OF SYSTEMS:     Jennifer Quick denies fever/chills, chest pain, shortness of breath, new bowel or bladder complaints. All other review of systems was negative. PHYSICAL EXAMINATION:      BP (!) 142/80   Pulse 85   Temp 96.4 °F (35.8 °C) (Infrared)   Resp 16   Ht 5' 2\" (1.575 m)   Wt 290 lb (131.5 kg)   SpO2 93%   BMI 53.04 kg/m²     General:       General appearance: awake, alert, oriented, in no acute distress, well developed, well nourished and in no acute distress   pleasant and well-hydrated.    , in no distress and A & O x3  Build:Obese  Function:Rises from a seated position with difficulty     Head:     Head:normocephalic and atraumatic  Pupils:regular, round and equal.  Sclera: icterus absent,   EOM:full and intact.     Lungs:     Breathing:Normal expansion. No wheezing.     Abdomen:     Shape:non-distended and normal    Lumbar spine:     Spine inspection:normal   CVA tenderness:No   Range of motion:abnormal mildly Lateral bending, flexion, extension rotation bilateral and is not painful.        Extremities:     Tremors:None bilaterally upper and lower  Range of motion:Generally limited extension shoulder - right, pain with internal rotation of hips not done. Intact:Yes  Varicose veins: Not assessed. Cyanosis:none    Neurological:     Cranial nerves:normal  Motor:   Coordination:normal  Gait:antalgic     Dermatology:     Skin: No skin changes.       Impression:    B/L Knee Pain, LBP, right shoulder pain  Knee x-rays:  Moderate DJD.    Hx of pain management with [facility] pain clinic.  Was on fentanyl 25 mg and norco 5/325 TID.  Did not help and caused drowsiness.  Now on Belbuca 450 mcg BID (increased from 300 mg BID) which is working very well without side effects. Transferred care to [facility]) due to cost.    Hx of LESI by Dr. 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  Bilateral segmental arthrodesis and fusion at L3-L4 with the use of  bilateral L3 and L4 pedicle screws with use of locally harvested  autograft plus allograft in the form of BioZorb strips for  posterolateral fusion at L3-L4. 2.  360-degree fusion with posterior lumbar interbody fusion at L3-L4  with use of bilateral Globus Rise expandable cages packed with locally  harvested autograft.   3.  Bilateral L3 and L4 laminectomy, bilateral L3-L4 medial facetectomy,  bilateral L3 and L4 foraminotomy, and bilateral L3-L4 diskectomy. 4.  Use of O-arm assisted navigation with placement of pedicle screws. 5.  Use of free-running EMG to test pedicle screw heads. 6.  Use of intraoperative fluoroscopy interpreted by myself, the  surgeon. 7.  22 modifier for degree of difficulty for the patient being morbidly  obese with a BMI of 51.21.  8.  AS modifier for Aury Gregorio [facility], who assisted with primary  exposure, primary closure, and retraction of neural elements on 05/13/2021 Dr. Shankar Perez. OARRS reviewed 08/2021  Continue Belbuca 450 mcg BID   B/L knee CSIs on 07/16/2021 with good relief to the right side and no relief to the left side. Patient is going to f/u with Dr. Sherry Johnson for consideration of TKA.           Controlled Substance Monitoring:    Acute and Chronic Pain Monitoring:   RX Monitoring 8/4/2021   Periodic Controlled Substance Monitoring Possible medication side effects, risk of tolerance/dependence & alternative treatments discussed. ;No signs of potential drug abuse or diversion identified. ;Assessed functional status. ;Obtaining appropriate analgesic effect of treatment. We discussed with the patient that combining opioids, benzodiazepines, alcohol, illicit drugs or sleep aids increases the risk of respiratory depression including death. We discussed that these medications may cause drowsiness, sedation or dizziness and have counseled the patient not to drive or operate machinery. We have discussed that these medications will be prescribed only by one provider. We have discussed with the patient about age related risk factors and have thoroughly discussed the importance of taking these medications as prescribed. The patient verbalizes understanding.     ccreferring physic

## 2021-08-04 NOTE — PROGRESS NOTES
Do you currently have any of the following:    Fever: No  Headache:  No  Cough: No  Shortness of breath: No  Exposed to anyone with these symptoms: No         Lexi Cao presents to the [facility] on 8/4/2021. Sho Weller is complaining of pain lower back and both knees. The pain is constant. The pain is described as aching and sharp. Pain is rated on her best day at a 5, on her worst day at a 10, and on average at a 7 on the VAS scale. She took her last dose of Neurontin and belbuca . Any procedures since your last visit: No, with  % relief. Pacemaker or defibrillator: No managed by . She is not on NSAIDS and is not on anticoagulation medications to include none and is managed by . Medication Contract and Consent for Opioid Use Documents Filed     Patient Documents       Type of Document Status Date Received Received By Description     Medication Contract Signed 9/6/2019 12:04 PM Juanito Hong med contract     Medication Contract Received 11/17/2020 10:57 AM Clark MORRISON med contract     Medication Contract Signed 8/4/2021 11:40 AM Jeremiah KIRBY belinda [facility] EXPIRES [phone]                BP (!) 142/80   Pulse 85   Temp 96.4 °F (35.8 °C) (Infrared)   Resp 16   Ht 5' 2\" (1.575 m)   Wt 290 lb (131.5 kg)   SpO2 93%   BMI 53.04 kg/m²      No LMP recorded.  Patient is postmenopausal.

## 2021-08-16 ENCOUNTER — HOSPITAL ENCOUNTER (OUTPATIENT)
Dept: PHYSICAL THERAPY | Age: 66
Setting detail: THERAPIES SERIES
Discharge: HOME | End: 2021-08-16
Payer: COMMERCIAL

## 2021-08-16 PROCEDURE — 97161 PT EVAL LOW COMPLEX 20 MIN: CPT | Performed by: PHYSICAL THERAPIST

## 2021-08-16 ASSESSMENT — PAIN DESCRIPTION - PAIN TYPE: TYPE: SURGICAL PAIN

## 2021-08-16 ASSESSMENT — PAIN DESCRIPTION - ORIENTATION: ORIENTATION: RIGHT;LEFT

## 2021-08-16 ASSESSMENT — PAIN DESCRIPTION - FREQUENCY: FREQUENCY: CONTINUOUS

## 2021-08-16 ASSESSMENT — PAIN SCALES - GENERAL: PAINLEVEL_OUTOF10: 6

## 2021-08-16 ASSESSMENT — PAIN DESCRIPTION - LOCATION: LOCATION: BACK;KNEE

## 2021-08-16 ASSESSMENT — PAIN DESCRIPTION - DESCRIPTORS: DESCRIPTORS: ACHING

## 2021-08-16 NOTE — PROGRESS NOTES
Physical Therapy  Initial Assessment  Date: 2021  Patient Name: Tacho Patterson  MRN: [id number]  : 1955          Restrictions       Subjective   General  Chart Reviewed: Yes  Referring Practitioner: Rahul Nunes  Diagnosis: M43.16 (ICD-10-CM) - Spondylolisthesis of lumbar [id number] (ICD-10-CM) - Lumbar [id number] (ICD-10-CM) - Status post lumbar spinal fusion  Follows Commands: Within Functional Limits  PT Visit Information  PT Insurance Information: [employer] [place]  Total # of Visits to Date: 1  Subjective  Subjective: Patient presents to PT s/p PLIF L3-4 from 21. Patient recently had 3 month checkup. No restrictions at this time. Patient ambulates with FWW at community level however does not require AD at household level. She reports back pain has improved since surgery. She cont to have knee pain. Difficulty with stair ambulation. She cont to have some residual numbness across RLE (knee region). She denies any ROM loss across trunk. Generalized strength loss reported. She denies any falls.   Pain Screening  Patient Currently in Pain: Yes  Pain Assessment  Pain Assessment: 0-10  Pain Level: 6  Pain Type: Surgical pain  Pain Location: Back;Knee  Pain Orientation: Right;Left  Pain Descriptors: Aching  Pain Frequency: Continuous  Vital Signs  Patient Currently in Pain: Yes    Objective     Observation/Palpation  Posture: Fair (rounded shoulder posturing)  Palpation: pain with palpation across B knees; no pain across surgical site  Scar: incision site: closed with no redness/drainage    AROM RLE (degrees)  RLE AROM: WFL  AROM LLE (degrees)  LLE AROM : WFL  Spine  Lumbar: WFL    Strength RLE  Comment: R hip flex/knee flexion- grossly 3+/5; all othe motions are grossly 4/5  Strength LLE  Comment: L knee flexion- 3+/5; all other motios are grossly 4/5  Strength Other  Other: Trunk- grossly 3+/5        Sensation  Overall Sensation Status: WFL (pt c/o occasional numbness sensation across right Lorie Rae, PT

## 2021-08-19 NOTE — PROGRESS NOTES
[address] [place] [address] [facility]  Progress Note      CC: Low-grade fever    SUBJECTIVE:  Patient is tolerating medications. No reported adverse drug reactions. No nausea, vomiting, diarrhea. Up in chair. Feeling better today with jean out. Temperatures down since antibiotics started          Review of systems:  As stated above in the chief complaint, otherwise negative. Medications:  Scheduled Meds:   fluconazole  200 mg Oral Daily    sodium chloride   Intravenous Q8H    And    piperacillin-tazobactam  3,375 mg Intravenous Q8H    bisacodyl  5 mg Oral Daily    polyethylene glycol  17 g Oral Daily    sennosides-docusate sodium  1 tablet Oral BID    sodium chloride flush  5-40 mL Intravenous 2 times per day    baclofen  10 mg Oral TID    bumetanide  2 mg Oral Daily    Buprenorphine HCl  450 mcg Oral BID    buPROPion  150 mg Oral QPM    enoxaparin  40 mg Subcutaneous Daily    ipratropium-albuterol  1 ampule Inhalation Q4H WA    potassium chloride  20 mEq Oral Daily with breakfast    rOPINIRole  2 mg Oral 4x daily    gabapentin  600 mg Oral TID     Continuous Infusions:  PRN Meds:diazePAM, oxyCODONE **OR** [DISCONTINUED] oxyCODONE, acetaminophen, ammonium lactate, benzocaine-menthol, ondansetron, polyethylene glycol    OBJECTIVE:  /76   Pulse 83   Temp 97.9 °F (36.6 °C) (Temporal)   Resp 18   Ht 5' 2\" (1.575 m)   Wt 270 lb (122.5 kg)   SpO2 93%   BMI 49.38 kg/m²   Temp  Av.4 °F (36.9 °C)  Min: 97.5 °F (36.4 °C)  Max: 99.2 °F (37.3 °C)  Constitutional: The patient is awake, alert, and oriented. Skin: Warm and dry. No rashes were noted. HEENT: Round and reactive pupils. Moist mucous membranes. No ulcerations or thrush. Neck: Supple to movements. Chest: No use of accessory muscles to breathe. Symmetrical expansion. No wheezing, crackles or rhonchi. Cardiovascular: S1 and S2 are rhythmic and regular. No murmurs appreciated.    Abdomen: Positive bowel sounds to auscultation. Benign to palpation. No masses felt. No hepatosplenomegaly. Genitourinary: female  Extremities: No clubbing, no cyanosis, no edema.   Lines: peripheral    Laboratory and Tests Review:  Lab Results   Component Value Date    WBC 6.0 05/27/2021    WBC 8.1 05/25/2021    WBC 10.8 05/21/2021    HGB 9.4 (L) 05/27/2021    HCT 33.7 (L) 05/27/2021    MCV 84.3 05/27/2021     05/27/2021     Lab Results   Component Value Date    NEUTROABS 4.34 05/27/2021    NEUTROABS 6.10 05/25/2021    NEUTROABS 8.96 (H) 05/21/2021     No results found for: [facility]  Lab Results   Component Value Date    ALT 19 05/10/2021    AST 18 05/10/2021    ALKPHOS 113 (H) 05/10/2021    BILITOT 0.2 05/10/2021     Lab Results   Component Value Date     05/27/2021    K 4.3 05/27/2021     05/27/2021    CO2 32 05/27/2021    BUN 17 05/27/2021    CREATININE 0.6 05/27/2021    CREATININE 0.7 05/25/2021    CREATININE 0.7 05/21/2021    GFRAA >60 05/27/2021    LABGLOM >60 05/27/2021    GLUCOSE 118 05/27/2021    PROT 6.9 05/10/2021    LABALBU 3.5 05/10/2021    CALCIUM 8.6 05/27/2021    BILITOT 0.2 05/10/2021    ALKPHOS 113 05/10/2021    AST 18 05/10/2021    ALT 19 05/10/2021     Lab Results   Component Value Date    CRP 6.5 (H) 05/26/2015    CRP 2.6 (H) 02/28/2014     Lab Results   Component Value Date    SEDRATE 26 (H) 05/26/2015    SEDRATE 33 (H) 02/28/2014     Radiology:  Reviewed    Microbiology:   Lab Results   Component Value Date    BC 24 Hours no growth 05/25/2021    BC 5 Days no growth 03/23/2021    BC 5 Days- no growth 06/13/2019    ORG Escherichia coli 05/20/2021    ORG Klebsiella pneumoniae ssp pneumoniae 04/23/2021    ORG Coagulase Negative Staphylococci 07/20/2015     Lab Results   Component Value Date    BLOODCULT2 24 Hours no growth 05/25/2021    BLOODCULT2 5 Days no growth 03/23/2021    BLOODCULT2 5 Days- no growth 06/13/2019    ORG Escherichia coli 05/20/2021    ORG Klebsiella pneumoniae ssp pneumoniae 04/23/2021    ORG Coagulase Negative Staphylococci 07/20/2015     No results found for: WNDABS  No results found for: RESPSMEAR  No results found for: MPNEUMO, CLAMYDCU, LABLEGI, AFBCX, FUNGSM, LABFUNG  No results found for: CULTRESP  No results found for: CXCATHTIP  No results found for: BFCS  Culture Surgical   Date Value Ref Range Status   07/20/2015 Moderate growth  Final   06/22/2015 Heavy growth  Final   06/22/2015   Final    Light growth  Susceptibility testing of this isolate is not routinely  indicated to guide therapy. Beta Lactamase POSITIVE       Urine Culture, Routine   Date Value Ref Range Status   05/25/2021 Growth not present, incubation continues  Preliminary   05/20/2021 (A)  Final    <10,000 CFU/mL  Nonhemolytic Strep species  Staph species     05/20/2021 >100,000 CFU/ml  Final     MRSA Culture Only   Date Value Ref Range Status   04/23/2021 Methicillin resistant Staph aureus not isolated  Final   06/05/2019 Methicillin resistant Staph aureus not isolated  Final     Assessment:  · Fevers chills generalized malaise with after manipulation of Pettit after urinary retention problems. Patient was placed on Levaquin on 5/22/2021 still having symptoms. One can only assume that the patient is either having a reaction to Levaquin or that were missing some of the organisms. Cultures right now have a Enterococcus most likely involved and the UA had budding yeast so I think would manipulate the antibiotics at this point. The E. coli probably has a cAMP resistance problem as well.     Plan:    · Cont Zosyn plus Diflucan  · Check cultures  · Monitor labs  · Will follow with you     Thank you for having us see this patient in consultation    Electronically signed by HOLLY Morris CNP on 5/27/2021 at 11:15 AM     Pt seen and examined. Above discussed agree with advanced practice nurse. Labs, cultures, and radiographs reviewed. Face to Face encounter occurred. Changes made as necessary.      Litzy Gonzalez Mark Garcia MD No

## 2021-09-13 RX ORDER — GABAPENTIN 300 MG/1
CAPSULE ORAL
Qty: 270 CAPSULE | Refills: 5 | Status: SHIPPED
Start: 2021-09-13 | End: 2022-06-29

## 2021-09-29 ENCOUNTER — OFFICE VISIT (OUTPATIENT)
Dept: PAIN MANAGEMENT | Age: 66
End: 2021-09-29
Payer: COMMERCIAL

## 2021-09-29 VITALS
SYSTOLIC BLOOD PRESSURE: 120 MMHG | RESPIRATION RATE: 16 BRPM | HEART RATE: 83 BPM | DIASTOLIC BLOOD PRESSURE: 74 MMHG | TEMPERATURE: 97.2 F | OXYGEN SATURATION: 96 %

## 2021-09-29 DIAGNOSIS — G89.29 CHRONIC RIGHT-SIDED LOW BACK PAIN WITHOUT SCIATICA: ICD-10-CM

## 2021-09-29 DIAGNOSIS — M25.511 CHRONIC RIGHT SHOULDER PAIN: ICD-10-CM

## 2021-09-29 DIAGNOSIS — M17.0 PRIMARY OSTEOARTHRITIS OF BOTH KNEES: ICD-10-CM

## 2021-09-29 DIAGNOSIS — M54.50 CHRONIC RIGHT-SIDED LOW BACK PAIN WITHOUT SCIATICA: ICD-10-CM

## 2021-09-29 DIAGNOSIS — G89.29 CHRONIC RIGHT SHOULDER PAIN: ICD-10-CM

## 2021-09-29 DIAGNOSIS — G89.4 CHRONIC PAIN SYNDROME: Chronic | ICD-10-CM

## 2021-09-29 PROCEDURE — 99213 OFFICE O/P EST LOW 20 MIN: CPT | Performed by: PHYSICIAN ASSISTANT

## 2021-09-29 PROCEDURE — 99213 OFFICE O/P EST LOW 20 MIN: CPT

## 2021-09-29 RX ORDER — BUPRENORPHINE HYDROCHLORIDE 450 UG/1
450 FILM, SOLUBLE BUCCAL EVERY 12 HOURS
Qty: 60 EACH | Refills: 1 | Status: SHIPPED
Start: 2021-09-29 | End: 2021-11-29 | Stop reason: SDUPTHER

## 2021-09-29 NOTE — PROGRESS NOTES
Do you currently have any of the following:    Fever: No  Headache:  No  Cough: No  Shortness of breath: No  Exposed to anyone with these symptoms: No         Kelsi Marion presents to the [facility] on 9/29/2021. Henrietta Whitfield is complaining of pain in her back and knees. The pain is constant. The pain is described as nagging. Pain is rated on her best day at a 5, on her worst day at a 10, and on average at a 6 on the VAS scale. She took her last dose of Belbuca today. Any procedures since your last visit: No.    Pacemaker or defibrillator: No managed by N/A. She is not on NSAIDS and is not on anticoagulation medications. Medication Contract and Consent for Opioid Use Documents Filed     Patient Documents       Type of Document Status Date Received Received By Description     Medication Contract Signed 9/6/2019 12:04 PM Kimberli Peña med contract     Medication Contract Received 11/17/2020 10:57 AM Dajuan MORRISON med contract     Medication Contract Signed 8/4/2021 11:40 AM [facility] 08.03.2022                /74   Pulse 83   Temp 97.2 °F (36.2 °C) (Infrared)   Resp 16   SpO2 96%      No LMP recorded.  Patient is postmenopausal.

## 2021-09-29 NOTE — PROGRESS NOTES
[facility]  [address]  [facility], [facility]    Follow up Note      King Grimm     Date of Visit:  2021    CC:  Patient presents for follow up   Chief Complaint   Patient presents with    Follow-up    Back Pain    Knee Pain       HPI:    Pain is unchanged. No new issues. Knee injections scheduled for this week. Appropriate analgesia with current medications regimen: yes   Change in quality of symptoms:no. Medication side effects:none. Recent diagnostic testing: None. Recent interventional procedures: None. She has not been on anticoagulation medications to include ASA, NSAIDS, Plavix, heparin, LMW heparin and warfarin and has not been on herbal supplements. She is diabetic. Imagin2021 MRI Lumbar Spine    FINDINGS:   BONES/ALIGNMENT: There is normal alignment of the spine. The vertebral body   heights are maintained. The bone marrow signal appears unremarkable.  There   is degenerative disc disease with disc space narrowing and osteophytes at   L3-L4, L4-L5, and L5-S1.  The bony spinal canal overall is congenitally small. SPINAL CORD: The conus terminates normally. SOFT TISSUES: No paraspinal mass identified. L1-L2:  The thecal sac and neural foramina are intact. L2-L3: There is a minimum disc protrusion measuring 2-3 mm.  Disc and/or   osteophytes cause minimal narrowing of the neural foramina bilaterally.  The   thecal sac is not stenotic. L3-L4: There is a disc extrusion measuring 4-5 mm toward the right extending   superiorly behind the inferior endplate of L3 causing severe narrowing of the   right neural foramen.  The thecal sac is mildly stenotic measuring 9 mm. There is minimal narrowing of the left neural foramen from disc and   osteophyte.    L4-L5: There is disc protrusion or osteophyte toward the left measuring 3-4   mm causing moderate to severe narrowing of the left neural foramen.  The   thecal sac is mildly stenotic measuring 9.5 mm.  Disc and osteophyte narrows   the right neural foramen. The left neural foramen is narrowed greater than   right. L5-S1:  There is mild facet and ligamentum flavum hypertrophy. The thecal sac   and neural foramina are intact.       Impression   Disc extrusion at L3-L4 measuring 4-5 mm and extending superiorly behind the   inferior endplate of L8-A8 on the right causing severe narrowing of the right   neural foramen.  There is mild stenosis of the thecal sac at L3-L4 and L4-L5   as discussed above. 12/27/2019 Lumbar Spine X-ray    Alignment of the vertebral bodies appears to be near-anatomic   No fracture or foreign body is identified. The disc spaces demonstrate moderate degenerative changes. There is mild loss of the vertebral body height   There are moderate degenerative changes involving the facets. There is grade 1 anterolisthesis of L3 on L4. Flexion-extension views demonstrates movement of the anterolisthesis   of L3 on L4.            Impression   Grade 1 anterolisthesis of L3 on L4 which does move with extension. Findings consistent with moderate degenerative disc disease and   degenerative facet disease.       The exam has been dictated and signed by Manpreet Will. HOLLY Gage-PEDRO LUIS   and Carie Medina MD, reviewed and concurred with these findings.          9/3/2019 left knee x-ray     The bones appear to be in anatomic alignment. No foreign body is identified   No fracture is identified     There is moderate tricompartmental joint space loss greatest long   patellofemoral and medial femoral tibial joint   The are moderate degenerative changes present along the patellofemoral   and medial femoral tibial compartment           Impression   Moderate degenerative changes as described above          9/3/2019 right knee x-ray     The bones appear to be in anatomic alignment. No foreign body is identified   No fracture is identified.    Marginal bone spurs are again seen along the inferior margins of the   patella. There is interval worsening of moderate tricompartmental joint space   loss    The are interval worsening of moderate degenerative tricompartmental   changes present            Impression   Moderate tricompartmental degenerative changes.  This has   worsened in the interim     Urine Drug Screenin2019 Consistent for buprenorphine and metabolites  2020 Consistent for buprenorphine  2020 Consistent   2020 Consistent     OARRS report:  10/2019 Consistent to 2021 Consistent     Opioid Agreement:  2020    Past Medical History:   Diagnosis Date    Anemia     Arthritis     Cellulitis 2015    left leg    Chronic ulcer of leg with fat layer exposed ([address] 2015    Chronic ulcer of right leg, limited to breakdown of skin ([address] 2016    COVID-19 2021    postive test no symptoms    Depression     Full dentures     Low back pain     Lymphedema of both lower extremities 2015    Obesity     280 #    Osteoarthritis     Peripheral vision loss     Stroke ([address]     Type 2 diabetes mellitus without complication, without long-term current use of insulin ([address] 2019    Ulcer of left lower leg, limited to breakdown of skin ([address] 06/10/2019    Ulcer of left lower leg, limited to breakdown of skin ([address] 06/10/2019    UTI (urinary tract infection) 2021       Past Surgical History:   Procedure Laterality Date    ABDOMINOPLASTY  2002    BREAST REDUCTION SURGERY      reduction    CATARACT REMOVAL      bilateral     SECTION      x2    COLONOSCOPY  2012    and egd    COLONOSCOPY  2021    polyps; marginal prep--jessica    COLONOSCOPY N/A 2021    COLONOSCOPY WITH BIOPSY performed by Sachin Wu MD at [address] ECHOCARDIOGRAM COMPLETE 2D W DOPPLER W COLOR  2012         GASTRIC BYPASS SURGERY  2000    NO NASTOGASTRIC TUBE    HERNIA REPAIR          LUMBAR SPINE SURGERY N/A 5/13/2021    L3-L4  POSTERIOR LUMBAR INTERBODY  FUSION--OARM, JESSI, YAJAIRA TABLE, CELL SAVER, PLATELET GEL, AUDIOLOGY, CAGES, PLATES, SCREWS -- GLOBUS performed by Emily Boykin MD at [address]  03/27/2021    minimal pouch gastritis--jessica    UPPER GASTROINTESTINAL ENDOSCOPY N/A 03/27/2021    EGD ESOPHAGOGASTRODUODENOSCOPY performed by Merline Hernandez MD at [address]       Prior to Admission medications    Medication Sig Start Date End Date Taking? Authorizing Provider   BELBUCA 450 MCG FILM Take 450 mcg by mouth every 12 hours for 30 days. 9/29/21 10/29/21 Yes CHERYL Espinal   gabapentin (NEURONTIN) 300 MG capsule TAKE 1 CAPSULE BY MOUTH THREE TIMES DAILY FOR 30 DAYS 9/13/21 9/13/22 Yes Gautam Razo DO   buPROPion (WELLBUTRIN XL) 150 MG extended release tablet Take 1 tablet by mouth every morning 7/27/21  Yes Gautam Razo DO   BELBUCA 450 MCG FILM DISSOLVE 1 FILM UNDER THE TONGUE EVERY 12 HOURS FOR 30 DAYS 7/9/21  Yes Historical Provider, MD   lactobacillus (CULTURELLE) CAPS capsule Take 1 capsule by mouth daily 6/5/21  Yes Candelario Lawrence MD   baclofen (LIORESAL) 5 MG tablet Take 1 tablet by mouth 3 times daily 6/4/21  Yes Candelario Lawrence MD   Liraglutide (VICTOZA SC) Inject 1.8 mg into the skin daily   Yes Historical Provider, MD   bumetanide (BUMEX) 2 MG tablet Take 1 tablet by mouth 2 times daily  Patient taking differently: Take 2 mg by mouth daily as needed  3/30/21  Yes Deb Butler MD   potassium chloride (KLOR-CON M) 20 MEQ extended release tablet Take 1 tablet by mouth 2 times daily (with meals)  Patient taking differently: Take 20 mEq by mouth daily as needed  3/30/21  Yes Deb Butler MD   rOPINIRole (REQUIP) 2 MG tablet Take 1 tablet by mouth 4 times daily 11/2/20  Yes Gautam Razo DO   ammonium lactate (LAC-HYDRIN) 12 % cream Apply topically as needed.  10/8/20  Yes Alvera Knife, DPM   [employer] [facility]) CREA APPLY ONE (1) GRAM (ONE PUMP) EXTERNALLY FOUR TIMES A DAY TO EACH KNEE 3/23/20  Yes Eli Butcher PA-C   nitrofurantoin, macrocrystal-monohydrate, (MACROBID) 100 MG capsule Take 1 capsule by mouth 2 times daily  Patient not taking: Reported on 2021   Gautam Razo DO   nitrofurantoin, macrocrystal-monohydrate, (MACROBID) 100 MG capsule Take 1 capsule by mouth 2 times daily  Patient not taking: Reported on 2021   Jessy Overcast JONES Razo DO       Allergies   Allergen Reactions    Ferritin Shortness Of Breath and Other (See Comments)     Flushed,  Severe back pain       Social History     Socioeconomic History    Marital status:      Spouse name: Not on file    Number of children: Not on file    Years of education: Not on file    Highest education level: Not on file   Occupational History    Not on file   Tobacco Use    Smoking status: Former Smoker     Packs/day: 0.25     Years: 38.00     Pack years: 9.50     Types: Cigarettes     Quit date: 3/11/2021     Years since quittin.5    Smokeless tobacco: Never Used    Tobacco comment: Pt states she quit [employer]"   Vaping Use    Vaping Use: Never used   Substance and Sexual Activity    Alcohol use: No    Drug use: No    Sexual activity: Not Currently   Other Topics Concern    Not on file   Social History Narrative        Chronic Diagnosis: Iron deficiency anemia, Depressive disorder, Benign essential hypertension, Mixed    hyperlipidemia.     HTN    OBESITY    LIPID    OA    SMOKER    CVA L FIELD VISION    DEPRESSION    GASTRIC BYPASS 01    BREAST REDUCTION--    Cory Raymundo  1955 Page #2    ANEMIA==IRON AND B-12    Admitted 2019 with cellulitis left lower extremity then readmitted with chest pain with negative stress test     Social Determinants of Health     Financial Resource Strain: Low Risk     Difficulty of Paying Living Expenses: Not very hard   Food Insecurity: No Food Insecurity  Worried About Running Out of Food in the Last Year: Never true    [place] of Food in the Last Year: Never true   Transportation Needs: No Transportation Needs    Lack of Transportation (Medical): No    Lack of Transportation (Non-Medical): No   Physical Activity:     Days of Exercise per Week:     Minutes of Exercise per Session:    Stress: No Stress Concern Present    Feeling of Stress : Only a little   Social Connections: Unknown    Frequency of Communication with Friends and Family: More than three times a week    Frequency of Social Gatherings with Friends and Family: Not on file    Attends Jew Services: Not on file    Active Member of Clubs or Organizations: Not on file    Attends Club or Organization Meetings: Not on file    Marital Status: Not on file   Intimate Partner Violence:     Fear of Current or Ex-Partner:     Emotionally Abused:     Physically Abused:     Sexually Abused:        Family History   Problem Relation Age of Onset    Breast Cancer Mother     Stroke Father     Hypertension Sister     Hypertension Brother        REVIEW OF SYSTEMS:     Oriana Avila denies fever/chills, chest pain, shortness of breath, new bowel or bladder complaints. All other review of systems was negative. PHYSICAL EXAMINATION:      /74   Pulse 83   Temp 97.2 °F (36.2 °C) (Infrared)   Resp 16   SpO2 96%     General:       General appearance: awake, alert, oriented, in no acute distress, well developed, well nourished and in no acute distress   pleasant and well-hydrated. , in no distress and A & O x3  Build:Obese  Function:Rises from a seated position with difficulty     Head:     Head:normocephalic and atraumatic  Pupils:regular, round and equal.  Sclera: icterus absent,   EOM:full and intact.     Lungs:     Breathing:Normal expansion.    No wheezing.     Abdomen:     Shape:non-distended and normal    Lumbar spine:     Spine inspection:normal   CVA tenderness:No   Range of motion:abnormal mildly Lateral bending, flexion, extension rotation bilateral and is not painful.        Extremities:     Tremors:None bilaterally upper and lower  Range of motion:Generally limited extension shoulder - right, pain with internal rotation of hips not done. Intact:Yes  Varicose veins: Not assessed. Cyanosis:none    Neurological:     Cranial nerves:normal  Motor:   Coordination:normal  Gait:antalgic     Dermatology:     Skin: No skin changes.       Impression:    B/L Knee Pain, LBP, right shoulder pain  Knee x-rays:  Moderate DJD.    Hx of pain management with [facility] pain clinic.  Was on fentanyl 25 mg and norco 5/325 TID.  Did not help and caused drowsiness.  Now on Belbuca 450 mcg BID (increased from 300 mg BID) which is working very well without side effects. Transferred care to [facility]) due to cost.    Hx of LESI by Dr. 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  Bilateral segmental arthrodesis and fusion at L3-L4 with the use of  bilateral L3 and L4 pedicle screws with use of locally harvested  autograft plus allograft in the form of BioZorb strips for  posterolateral fusion at L3-L4. 2.  360-degree fusion with posterior lumbar interbody fusion at L3-L4  with use of bilateral Globus Rise expandable cages packed with locally  harvested autograft. 3.  Bilateral L3 and L4 laminectomy, bilateral L3-L4 medial facetectomy,  bilateral L3 and L4 foraminotomy, and bilateral L3-L4 diskectomy. 4.  Use of O-arm assisted navigation with placement of pedicle screws. 5.  Use of free-running EMG to test pedicle screw heads. 6.  Use of intraoperative fluoroscopy interpreted by myself, the  surgeon.   7.  22 modifier for degree of

## 2021-10-01 ENCOUNTER — PROCEDURE VISIT (OUTPATIENT)
Dept: PAIN MANAGEMENT | Age: 66
End: 2021-10-01
Payer: MEDICARE

## 2021-10-01 VITALS
OXYGEN SATURATION: 98 % | RESPIRATION RATE: 16 BRPM | SYSTOLIC BLOOD PRESSURE: 132 MMHG | HEART RATE: 85 BPM | TEMPERATURE: 98.1 F | DIASTOLIC BLOOD PRESSURE: 80 MMHG

## 2021-10-01 DIAGNOSIS — M17.0 PRIMARY OSTEOARTHRITIS OF BOTH KNEES: Primary | ICD-10-CM

## 2021-10-01 PROCEDURE — 20611 DRAIN/INJ JOINT/BURSA W/US: CPT | Performed by: PAIN MEDICINE

## 2021-10-01 RX ORDER — TRIAMCINOLONE ACETONIDE 40 MG/ML
40 INJECTION, SUSPENSION INTRA-ARTICULAR; INTRAMUSCULAR ONCE
Status: COMPLETED | OUTPATIENT
Start: 2021-10-01 | End: 2021-10-01

## 2021-10-01 RX ORDER — LIDOCAINE HYDROCHLORIDE 20 MG/ML
6 INJECTION, SOLUTION INFILTRATION; PERINEURAL ONCE
Status: COMPLETED | OUTPATIENT
Start: 2021-10-01 | End: 2021-10-01

## 2021-10-01 RX ORDER — BUPIVACAINE HYDROCHLORIDE 2.5 MG/ML
3 INJECTION, SOLUTION EPIDURAL; INFILTRATION; INTRACAUDAL ONCE
Status: COMPLETED | OUTPATIENT
Start: 2021-10-01 | End: 2021-10-01

## 2021-10-01 RX ADMIN — TRIAMCINOLONE ACETONIDE 40 MG: 40 INJECTION, SUSPENSION INTRA-ARTICULAR; INTRAMUSCULAR at 12:23

## 2021-10-01 RX ADMIN — TRIAMCINOLONE ACETONIDE 40 MG: 40 INJECTION, SUSPENSION INTRA-ARTICULAR; INTRAMUSCULAR at 12:21

## 2021-10-01 RX ADMIN — BUPIVACAINE HYDROCHLORIDE 7.5 MG: 2.5 INJECTION, SOLUTION EPIDURAL; INFILTRATION; INTRACAUDAL at 12:12

## 2021-10-01 RX ADMIN — BUPIVACAINE HYDROCHLORIDE 7.5 MG: 2.5 INJECTION, SOLUTION EPIDURAL; INFILTRATION; INTRACAUDAL at 12:14

## 2021-10-01 RX ADMIN — LIDOCAINE HYDROCHLORIDE 6 ML: 20 INJECTION, SOLUTION INFILTRATION; PERINEURAL at 12:20

## 2021-10-01 NOTE — PROGRESS NOTES
10/1/2021    Patient: Kerri Rockwell  :  1955  Age:  60 y.o. Sex:  female      PRE-OPERATIVE DIAGNOSIS: Bilateral  Knee pain, osteoarthritis. POST-OPERATIVE DIAGNOSIS: Same. PROCEDURE PERFORMED:  Bilateral knee injection under ultrasound guidance. SURGEON:   ANALILIA Boyd. ANESTHESIA: local    ESTIMATED BLOOD LOSS: None. BRIEF HISTORY: Kerri Rockwell comes in today for Bilateral Knee steroid injection under ultrasound guidance. After discussing the potential risks and benefits of the procedure with the patient  Flex Alejo did request that we proceed. A complete History & Physical was reviewed and it is unchanged. DESCRIPTION OF PROCEDURE:   The patient was placed in a seated position. The area of the Bilateral knee was prepped with chloraprep and draped in a sterile manner. The overlying skin and subcutaneous tissues were anesthetized with 0.5% Lidocaine. Then, under ultrasound guidance, a 25 gauge 1 1/2 inch needle was inserted into the suprapatellar bursa under direct visualization. A solution of 3 cc's of 0.25% bupivacaine, 3 cc's of 2% lidocaine, and 40 mg of Kenalog was injected easily, in each knee, with appropriate spread of medicatioons without complications. The needle was then removed and Band-Aid applied. Disposition the patient tolerated the procedure well and there were no complications . Flex Alejo will follow up in our comprehensive [facility] as scheduled. She was encouraged to call with questions, concerns or if worsening of symptoms occurs. Mary Salazar DO    10/1/2021    Patient: Kerri Rockwell  :  1955  Age:  60 y.o. Sex:  female     PRE-OPERATIVE DIAGNOSIS: Right   Shoulder pain, osteoarthritis. POST-OPERATIVE DIAGNOSIS: Same. PROCEDURE PERFORMED: Right  Shoulder steroid injection. SURGEON:   ANALILIA Boyd. ANESTHESIA: local    ESTIMATED BLOOD LOSS: None.     BRIEF HISTORY: Kerri Rockwell comes in today for Right Shoulder steroid injection. After discussing the potential risks and benefits of the procedure with the patient. Kaci Vargas did request that we proceed. A complete History & Physical was reviewed and it is unchanged. DESCRIPTION OF PROCEDURE:   The patient was placed in a seated position. The area of the Right  shoulder was prepped with chloraprep and draped in a sterile manner. The overlying skin and subcutaneous tissues were anesthetized with 0.5% Lidocaine. Using the posterior approach, a 25 gauge 1 1/2 inch  needle was advanced  In anterolateral projection into the joint capsule. After negative aspiration a solution of 0.25 % marcaine 3 cc's, 3 cc's 2% lidocaine and 40 mg Kenalog was injected easily without complications. The needle was then removed and Band-Aid applied. Disposition the patient tolerated the procedure well and there were no complications . Kaci Vargas will follow up in our comprehensive Pain Management Center as scheduled. She was encouraged to call with questions, concerns or if worsening of symptoms occurs.     Noy Kahn, DO

## 2021-10-01 NOTE — PROGRESS NOTES
10/1/2021    Patient Active Problem List   Diagnosis    Osteoarthritis of left knee    Osteoarthritis of right knee    BMI 45.0-49.9, adult ([facility] [address]    Lymphedema of both lower extremities    Cellulitis of both lower extremities    Microcytic anemia    Morbid obesity ([address]    Tobacco abuse    SOB (shortness of breath)    Iron deficiency anemia    Primary osteoarthritis involving multiple joints    Lymphedema    Intervertebral lumbar disc disorder    Reactive depression    Chronic pain syndrome    Primary osteoarthritis of both knees    Chronic right shoulder pain    Chronic right-sided low back pain without sciatica    Anxiety    Chronic fatigue    Tremor    Intractable back pain    Acute midline low back pain with right-sided sciatica    Spinal stenosis of lumbar region with neurogenic claudication    Osteoarthritis of spine with radiculopathy, lumbar region    Cellulitis    Anemia    Spondylolisthesis of lumbar region    Lumbar stenosis with neurogenic claudication       Allergies as of 10/01/2021 - Fully Reviewed 10/01/2021   Allergen Reaction Noted    Ferritin Shortness Of Breath and Other (See Comments) 05/13/2021        Procedure: bilateral knee injection     y consent signed y procedure verified with patient  y allergies noted n/a pt confirms not pregnant    Patient rates pain a 6/10 on the VAS scale. Skin Prep:  ChloraPrep    Anesthetic:  none    Medication:  Marcaine 0.25 % (3ml each knee) Lidocaine 2% (3ml each knee) and Kenalog 40 mg each knee    Vitals:    10/01/21 1130   BP: 132/80   Pulse: 85   Resp: 16   Temp: 98.1 °F (36.7 °C)   TempSrc: Infrared   SpO2: 98%       I witnessed patient signing consent to Medical Procedure and Treatment form.      Nate Bolivar RN

## 2021-10-22 ENCOUNTER — CARE COORDINATION (OUTPATIENT)
Dept: CARE COORDINATION | Age: 66
End: 2021-10-22

## 2021-10-22 NOTE — CARE COORDINATION
-ACM spoke to patient to offer enrollment into Care Coordination program.  -Introduced self, reason for call, and explanation of program.   -Patient accepted. -Pt unable to complete enrollment today.  -Plan: ACM will reach out to patient at an agreed upon date and time for Care Coordination enrollment.

## 2021-10-25 ENCOUNTER — CARE COORDINATION (OUTPATIENT)
Dept: CARE COORDINATION | Age: 66
End: 2021-10-25

## 2021-10-25 SDOH — HEALTH STABILITY: PHYSICAL HEALTH: ON AVERAGE, HOW MANY DAYS PER WEEK DO YOU ENGAGE IN MODERATE TO STRENUOUS EXERCISE (LIKE A BRISK WALK)?: 0 DAYS

## 2021-10-25 SDOH — HEALTH STABILITY: PHYSICAL HEALTH: ON AVERAGE, HOW MANY MINUTES DO YOU ENGAGE IN EXERCISE AT THIS LEVEL?: 0 MIN

## 2021-10-25 ASSESSMENT — SOCIAL DETERMINANTS OF HEALTH (SDOH)
DO YOU BELONG TO ANY CLUBS OR ORGANIZATIONS SUCH AS CHURCH GROUPS UNIONS, FRATERNAL OR ATHLETIC GROUPS, OR SCHOOL GROUPS?: YES
HOW OFTEN DO YOU GET TOGETHER WITH FRIENDS OR RELATIVES?: MORE THAN THREE TIMES A WEEK
IN A TYPICAL WEEK, HOW MANY TIMES DO YOU TALK ON THE PHONE WITH FAMILY, FRIENDS, OR NEIGHBORS?: MORE THAN THREE TIMES A WEEK
HOW OFTEN DO YOU ATTEND CHURCH OR RELIGIOUS SERVICES?: MORE THAN 4 TIMES PER YEAR
HOW OFTEN DO YOU ATTENT MEETINGS OF THE CLUB OR ORGANIZATION YOU BELONG TO?: MORE THAN 4 TIMES PER YEAR

## 2021-10-25 ASSESSMENT — LIFESTYLE VARIABLES: HOW OFTEN DO YOU HAVE A DRINK CONTAINING ALCOHOL: NEVER

## 2021-10-25 NOTE — CARE COORDINATION
Ambulatory Care Coordination Note  10/25/2021  CM Risk Score: 9  Charlson 10 Year Mortality Risk Score: 10%     ACC: Mc Lang, RN    Summary Note:   [facility] contacted patient to complete Care Coordination enrollment regarding PAP for Belbuca, bilateral knee discomfort, Lymphedema.  -Specialists noted: Dr Caroline Carr (Ortho), Dr. Stephan Melchor (Pain Mgnt),  Dr. Peggy Harper (NeuroSurg), Dr. Manav Jose (Urology), Dr Fernanda Osuna (Oncology). -Pt lives with her sister in a 2 story home with two steps to enter, bed, bath and laundry on the 1st level.    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   -Pt has a BP monitor, weight scale, pulse ox monitor and a thermometer.   -Pt has a walker, rollator and shower bench built into the shower.   -Pt reports she has 2 children and both live locally. Her daughter is a .   -Pt reports she has a  about once a month for dusting and floor scrubbing.   -Pt drives, transportation is not an issue.   -Pt does not exercise mainly because she doesn't want to. Discussed chair yoga, Pt is not interested. Pt enjoys crafting. She is currently making [address] wreaths.   -Pt is active with [employer].  -Pt reports she has a LW  & POA.   -Pt reports she had the Diez Peter vaccine x2. She plans to schedule for Gar[place] booster the first week of November.   -Pt reports she is not diabetic. She said she was on Victoza for weight loss. -Offered dietitian, Social Work and pharmacy, Pt declined. Bilateral knee pain  -Pt reports she needs bilateral knee replacements. Pt said she is to let Ortho know when she is ready to schedule.   -However, Pt reports on 10-1-2021 she had bilateral knee injections by Pain Management. Pt said the injections only give minimal relief. Pt said she has to wait 6 month before a knee replacement can be preformed due to the steroid injections.    -Pt walks around the house independently.   However if she plans to walk a distance, she uses a walker. In the grocery store she pushes a cart for minimal shopping and uses the [employer] large shopping trip. Resources  -Pt reports she is on Belbuca for bilateral knee pain. Pt said this medication is expensive. Offered PAP, Pt accepted, referral made. PLAN:  -Continue Care Coordination  -Referral PAP > Belbuca  -F/u bilateral knee pain  -Pain Management appt 11-.    -Next anticipated outreach by [address]  Team: one week. Ambulatory Care Coordination Assessment    Care Coordination Protocol  Program Enrollment: Complex Care  Referral from Primary Care Provider: No  Week 1 - Initial Assessment     Do you have all of your prescriptions and are they filled?: Yes  Barriers to medication adherence: None  Are you able to afford your medications?: No  How often do you have trouble taking your medications the way you have been told to take them?: I always take them as prescribed. Do you have Home O2 Therapy?: No      Ability to seek help/take action for Emergent Urgent situations i.e. fire, crime, inclement weather or health crisis. : Independent  Ability to ambulate to restroom: Independent  Ability handle personal hygeine needs (bathing/dressing/grooming): Independent  Ability to manage Medications: Independent  Ability to prepare Food Preparation: Independent  Ability to maintain home (clean home, laundry): Independent  Ability to drive and/or has transportation: Independent  Ability to do shopping: Independent  Ability to manage finances:  Independent  Is patient able to live independently?: Yes     Current Housing: Private Residence  For whom are you the caregiver?: SisterReyes  Does the person that you care for see a [facility] PCP?: Yes        Per the Fall Risk Screening, did the patient have 2 or more falls or 1 fall with injury in the past year?: No     Frequent urination at night?: No  Do you use rails/bars?: Yes  Do you have a non-slip tub mat?: Yes     Are you experiencing loss of meaning?: No  Are you experiencing loss of hope and peace?: No     Thinking about your patient's physical health needs, are there any symptoms or problems (risk indicators) you are unsure about that require further investigation?: No identified areas of uncertainly or problems already being investigated   Are the patients physical health problems impacting on their mental well-being?: Mild impact on mental well-being e.g. \"\"feeling fed-up\"\", \"\"reduced enjoyment\"\"   Are there any problems with your patients lifestyle behaviors (alcohol, drugs, diet, exercise) that are impacting on physical or mental well-being?: Some mild concern of potential negative impact on well-being   Do you have any other concerns about your patients mental well-being? How would you rate their severity and impact on the patient?: No identified areas of concern   How would you rate their home environment in terms of safety and stability (including domestic violence, insecure housing, neighbor harassment)?: Consistently safe, supportive, stable, no identified problems   How do daily activities impact on the patient's well-being? (include current or anticipated unemployment, work, caregiving, access to transportation or other): No identified problems or perceived positive benefits   How would you rate their social network (family, work, friends)?: Good participation with social networks   How would you rate their financial resources (including ability to afford all required medical care)?: Financially secure, some resource challenges   How wells does the patient now understand their health and well-being (symptoms, signs or risk factors) and what they need to do to manage their health?: Reasonable to good understanding and already engages in managing health or is willing to undertake better management   How well do you think your patient can engage in healthcare discussions?  (Barriers include language, deafness, aphasia, alcohol or drug problems, learning difficulties, concentration): Clear and open communication, no identified barriers   Do other services need to be involved to help this patient?: Other care/services not in place and required   Are current services involved with this patient well-coordinated? (Include coordination with other services you are now recommendation): All required care/services in place and well-coordinated   Suggested Interventions and Community Resources   Medication Assistance Program: Completed   Pharmacist: Declined   Registered Dietician: Declined   Social Work: Declined                  Prior to Admission medications    Medication Sig Start Date End Date Taking? Authorizing Provider   BELBUCA 450 MCG FILM Take 450 mcg by mouth every 12 hours for 30 days. 9/29/21 10/29/21 Yes CHERYL Watson   gabapentin (NEURONTIN) 300 MG capsule TAKE 1 CAPSULE BY MOUTH THREE TIMES DAILY FOR 30 DAYS 9/13/21 9/13/22 Yes Gautam Razo DO   buPROPion (WELLBUTRIN XL) 150 MG extended release tablet Take 1 tablet by mouth every morning 7/27/21  Yes Gautam Razo DO   lactobacillus (CULTURELLE) CAPS capsule Take 1 capsule by mouth daily 6/5/21  Yes Bhavya Russell MD   bumetanide (BUMEX) 2 MG tablet Take 1 tablet by mouth 2 times daily  Patient taking differently: Take 2 mg by mouth daily as needed  3/30/21  Yes Lexi Payne MD   potassium chloride (KLOR-CON M) 20 MEQ extended release tablet Take 1 tablet by mouth 2 times daily (with meals)  Patient taking differently: Take 20 mEq by mouth daily as needed  3/30/21  Yes Lexi Payne MD   ammonium lactate (LAC-HYDRIN) 12 % cream Apply topically as needed.  10/8/20  Yes Aga Moody DPM   Cream Base (VERSAPRO) CREA APPLY ONE (1) GRAM (ONE PUMP) EXTERNALLY FOUR TIMES A DAY TO EACH KNEE 3/23/20  Yes Rahel Champion PA-C   nitrofurantoin, macrocrystal-monohydrate, (MACROBID) 100 MG capsule Take 1 capsule by mouth 2 times daily  Patient not taking: Reported on 10/25/2021 7/26/21   Gautam Razo DO   BELBUCA 450 MCG FILM DISSOLVE 1 FILM UNDER THE TONGUE EVERY 12 HOURS FOR 30 DAYS 7/9/21   Historical Provider, MD   nitrofurantoin, macrocrystal-monohydrate, (MACROBID) 100 MG capsule Take 1 capsule by mouth 2 times daily  Patient not taking: Reported on 10/25/2021 7/7/21   Tarik Razo DO   baclofen (LIORESAL) 5 MG tablet Take 1 tablet by mouth 3 times daily 6/4/21   Barber Schuster MD   Liraglutide (VICTOZA SC) Inject 1.8 mg into the skin daily  Patient not taking: Reported on 10/25/2021    Historical Provider, MD   rOPINIRole (REQUIP) 2 MG tablet Take 1 tablet by mouth 4 times daily 11/2/20   Gay Garza DO       Future Appointments   Date Time Provider Sydni More   11/29/2021  1:30 PM CHERYL Salamanca BDM [facility]

## 2021-10-25 NOTE — PLAN OF CARE
Problem: Pain:  Goal: Pain level will decrease  Outcome: Ongoing  Goal: Control of acute pain  Outcome: Ongoing  Goal: Control of chronic pain  Outcome: Ongoing standing/walking

## 2021-11-01 ENCOUNTER — CARE COORDINATION (OUTPATIENT)
Dept: CARE COORDINATION | Age: 66
End: 2021-11-01

## 2021-11-01 NOTE — CARE COORDINATION
Ambulatory Care Coordination Note  11/1/2021  CM Risk Score: 9  Charlson 10 Year Mortality Risk Score: 10%     ACC: Minor Betters, RN    Summary Note:   ACM contacted patient to follow up on PAP (Belbuca) and  bilateral knee pain status. Bilateral knee pain  -Pt reports she needs bilateral knee replacements. Pt said she is to let Ortho know when she is ready to schedule.   -Last bilateral knee injection 10-1-2021 by Pain Management. Pt would need to wait 6 month before a knee replacement can be preformed due to the steroid injections.    -Pt reports knees feel terrible. She said the damp cold weather enhances the pain. -Next Pain Management appt 11-. Pt said a new pain medication will need to be ordered due to Pt unable to afford Belbuca. Pt said the cost of Belbuca is $900 for 30 day supply and with Pt's [employer] prescription insurance the cost is $450. She said she is unable to afford that cost.     Resources  -Updated Pt that PAP said there is assistance with commercial insurance otherwise there is a assistance for [employer]. Emailed PAP to see if there maybe some type of assistance the first of the year. Waiting response. Gave Pt PAP phone number and to call PAP the first of the year. Gave Pt phone number of [phone] (P # [phone]) to see if there is any assistance there. Pt said she will call. PLAN:  -Continue Care Coordination  -Referral PAP > Belbuca  -F/u bilateral knee pain  -Pain Management appt 11-.  -F/u Covid booster     -Next anticipated outreach by [address]  Team: one week.         Care Coordination Interventions    Program Enrollment: Complex Care  Referral from Primary Care Provider: No  Suggested Interventions and Community Resources  Medication Assistance Program: Completed (Comment: 10/25/21 Referral for Belbuca.)  Pharmacist: Declined  Registered Dietician: Declined  Social Work: Declined         Goals Addressed                 This Visit's Progress     Medication Management   No change     I will discuss with PAP for a more cost effective plan for the medication Belbuca. Barriers: financial and lack of support  Plan for overcoming my barriers: Care Coordination, PAP referral  Confidence: 7/10  Anticipated Goal Completion Date: 2- 11-1-2021 PAP replied there is no assistive program for Belbuca at this time. The only assistive program is for [employer]. Prior to Admission medications    Medication Sig Start Date End Date Taking?  Authorizing Provider   gabapentin (NEURONTIN) 300 MG capsule TAKE 1 CAPSULE BY MOUTH THREE TIMES DAILY FOR 30 DAYS 9/13/21 9/13/22  Gautam Razo DO   buPROPion (WELLBUTRIN XL) 150 MG extended release tablet Take 1 tablet by mouth every morning 7/27/21   Gautam Razo DO   nitrofurantoin, macrocrystal-monohydrate, (MACROBID) 100 MG capsule Take 1 capsule by mouth 2 times daily  Patient not taking: Reported on 10/25/2021 7/26/21   Guatam Razo DO   BELBUCA 450 MCG FILM DISSOLVE 1 FILM UNDER THE TONGUE EVERY 12 HOURS FOR 30 DAYS 7/9/21   Historical Provider, MD   nitrofurantoin, macrocrystal-monohydrate, (MACROBID) 100 MG capsule Take 1 capsule by mouth 2 times daily  Patient not taking: Reported on 10/25/2021 7/7/21   Machelle Razo DO   lactobacillus (CULTURELLE) CAPS capsule Take 1 capsule by mouth daily 6/5/21   Latisha Irene MD   baclofen (LIORESAL) 5 MG tablet Take 1 tablet by mouth 3 times daily 6/4/21   Latisha Irene MD   Liraglutide (VICTOZA SC) Inject 1.8 mg into the skin daily  Patient not taking: Reported on 10/25/2021    Historical Provider, MD   bumetanide (BUMEX) 2 MG tablet Take 1 tablet by mouth 2 times daily  Patient taking differently: Take 2 mg by mouth daily as needed  3/30/21   Pita Edouard MD   potassium chloride (KLOR-CON M) 20 MEQ extended release tablet Take 1 tablet by mouth 2 times daily (with meals)  Patient taking differently: Take 20 mEq by mouth daily as needed  3/30/21   Oskar Frederick MD   rOPINIRole (REQUIP) 2 MG tablet Take 1 tablet by mouth 4 times daily 11/2/20   Gautam Razo DO   ammonium lactate (LAC-HYDRIN) 12 % cream Apply topically as needed.  10/8/20   Ruthy Early, DPM   Cream Base (VERSAPRO) CREA APPLY ONE (1) GRAM (ONE PUMP) EXTERNALLY FOUR TIMES A DAY TO [facility] KNEE 3/23/20   Jazmyn Aviles PA-C       Future Appointments   Date Time Provider Sydni More   11/29/2021  1:30 PM CHERYL Massey BDM [facility]

## 2021-11-05 ENCOUNTER — CARE COORDINATION (OUTPATIENT)
Dept: CARE COORDINATION | Age: 66
End: 2021-11-05

## 2021-11-23 ENCOUNTER — IMMUNIZATION (OUTPATIENT)
Dept: PRIMARY CARE CLINIC | Age: 66
End: 2021-11-23
Payer: MEDICARE

## 2021-11-23 PROCEDURE — 91300 COVID-19, PFIZER VACCINE 30MCG/0.3ML DOSE: CPT | Performed by: NURSE PRACTITIONER

## 2021-11-23 PROCEDURE — 0004A COVID-19, PFIZER VACCINE 30MCG/0.3ML DOSE: CPT | Performed by: NURSE PRACTITIONER

## 2021-11-29 ENCOUNTER — OFFICE VISIT (OUTPATIENT)
Dept: PAIN MANAGEMENT | Age: 66
End: 2021-11-29
Payer: MEDICARE

## 2021-11-29 VITALS
TEMPERATURE: 97.3 F | RESPIRATION RATE: 16 BRPM | BODY MASS INDEX: 52.44 KG/M2 | DIASTOLIC BLOOD PRESSURE: 82 MMHG | WEIGHT: 285 LBS | SYSTOLIC BLOOD PRESSURE: 128 MMHG | HEART RATE: 92 BPM | OXYGEN SATURATION: 97 % | HEIGHT: 62 IN

## 2021-11-29 DIAGNOSIS — M54.50 CHRONIC RIGHT-SIDED LOW BACK PAIN WITHOUT SCIATICA: ICD-10-CM

## 2021-11-29 DIAGNOSIS — G89.4 CHRONIC PAIN SYNDROME: ICD-10-CM

## 2021-11-29 DIAGNOSIS — G89.29 CHRONIC RIGHT SHOULDER PAIN: ICD-10-CM

## 2021-11-29 DIAGNOSIS — G89.29 CHRONIC RIGHT-SIDED LOW BACK PAIN WITHOUT SCIATICA: ICD-10-CM

## 2021-11-29 DIAGNOSIS — M17.0 PRIMARY OSTEOARTHRITIS OF BOTH KNEES: Primary | ICD-10-CM

## 2021-11-29 DIAGNOSIS — M25.511 CHRONIC RIGHT SHOULDER PAIN: ICD-10-CM

## 2021-11-29 PROCEDURE — G8484 FLU IMMUNIZE NO ADMIN: HCPCS | Performed by: PHYSICIAN ASSISTANT

## 2021-11-29 PROCEDURE — 1123F ACP DISCUSS/DSCN MKR DOCD: CPT | Performed by: PHYSICIAN ASSISTANT

## 2021-11-29 PROCEDURE — G8427 DOCREV CUR MEDS BY ELIG CLIN: HCPCS | Performed by: PHYSICIAN ASSISTANT

## 2021-11-29 PROCEDURE — 4040F PNEUMOC VAC/ADMIN/RCVD: CPT | Performed by: PHYSICIAN ASSISTANT

## 2021-11-29 PROCEDURE — 99213 OFFICE O/P EST LOW 20 MIN: CPT | Performed by: PHYSICIAN ASSISTANT

## 2021-11-29 PROCEDURE — G8400 PT W/DXA NO RESULTS DOC: HCPCS | Performed by: PHYSICIAN ASSISTANT

## 2021-11-29 PROCEDURE — 1036F TOBACCO NON-USER: CPT | Performed by: PHYSICIAN ASSISTANT

## 2021-11-29 PROCEDURE — 1090F PRES/ABSN URINE INCON ASSESS: CPT | Performed by: PHYSICIAN ASSISTANT

## 2021-11-29 PROCEDURE — 3017F COLORECTAL CA SCREEN DOC REV: CPT | Performed by: PHYSICIAN ASSISTANT

## 2021-11-29 PROCEDURE — G8417 CALC BMI ABV UP PARAM F/U: HCPCS | Performed by: PHYSICIAN ASSISTANT

## 2021-11-29 RX ORDER — BUPRENORPHINE HYDROCHLORIDE 450 UG/1
450 FILM, SOLUBLE BUCCAL EVERY 12 HOURS
Qty: 60 EACH | Refills: 1 | Status: SHIPPED
Start: 2021-11-29 | End: 2022-01-28 | Stop reason: SDUPTHER

## 2021-11-29 NOTE — PROGRESS NOTES
[address]  [address]  [facility]    Follow up Note      Darci Mcclain     Date of Visit:  2021    CC:  Patient presents for follow up   Chief Complaint   Patient presents with    Follow-up    Back Pain    Shoulder Pain    Knee Pain       HPI:    Pain is unchanged. Knees are painful. Appropriate analgesia with current medications regimen: yes   Change in quality of symptoms:no. Medication side effects:none. Recent diagnostic testing: None. Recent interventional procedures: 10/01/2021 PROCEDURE PERFORMED:  Bilateral knee injection under ultrasound guidance - good relief x 1 month. She has not been on anticoagulation medications to include ASA, NSAIDS, Plavix, heparin, LMW heparin and warfarin and has not been on herbal supplements. She is diabetic. Imagin2021 MRI Lumbar Spine    FINDINGS:   BONES/ALIGNMENT: There is normal alignment of the spine. The vertebral body   heights are maintained. The bone marrow signal appears unremarkable.  There   is degenerative disc disease with disc space narrowing and osteophytes at   L3-L4, L4-L5, and L5-S1.  The bony spinal canal overall is congenitally small. SPINAL CORD: The conus terminates normally. SOFT TISSUES: No paraspinal mass identified. L1-L2:  The thecal sac and neural foramina are intact. L2-L3: There is a minimum disc protrusion measuring 2-3 mm.  Disc and/or   osteophytes cause minimal narrowing of the neural foramina bilaterally.  The   thecal sac is not stenotic. L3-L4: There is a disc extrusion measuring 4-5 mm toward the right extending   superiorly behind the inferior endplate of L3 causing severe narrowing of the   right neural foramen.  The thecal sac is mildly stenotic measuring 9 mm. There is minimal narrowing of the left neural foramen from disc and   osteophyte.    L4-L5: There is disc protrusion or osteophyte toward the left measuring 3-4   mm causing moderate to severe narrowing of the left neural foramen.  The   thecal sac is mildly stenotic measuring 9.5 mm.  Disc and osteophyte narrows   the right neural foramen. The left neural foramen is narrowed greater than   right. L5-S1:  There is mild facet and ligamentum flavum hypertrophy. The thecal sac   and neural foramina are intact.       Impression   Disc extrusion at L3-L4 measuring 4-5 mm and extending superiorly behind the   inferior endplate of V6-M3 on the right causing severe narrowing of the right   neural foramen.  There is mild stenosis of the thecal sac at L3-L4 and L4-L5   as discussed above. 12/27/2019 Lumbar Spine X-ray    Alignment of the vertebral bodies appears to be near-anatomic   No fracture or foreign body is identified. The disc spaces demonstrate moderate degenerative changes. There is mild loss of the vertebral body height   There are moderate degenerative changes involving the facets. There is grade 1 anterolisthesis of L3 on L4. Flexion-extension views demonstrates movement of the anterolisthesis   of L3 on L4.            Impression   Grade 1 anterolisthesis of L3 on L4 which does move with extension. Findings consistent with moderate degenerative disc disease and   degenerative facet disease.       The exam has been dictated and signed by Brittney Medel. HOLLY Caicedo-PEDRO LUIS   and Juana So MD, reviewed and concurred with these findings.          9/3/2019 left knee x-ray     The bones appear to be in anatomic alignment. No foreign body is identified   No fracture is identified     There is moderate tricompartmental joint space loss greatest long   patellofemoral and medial femoral tibial joint   The are moderate degenerative changes present along the patellofemoral   and medial femoral tibial compartment           Impression   Moderate degenerative changes as described above          9/3/2019 right knee x-ray     The bones appear to be in anatomic alignment.     No foreign body is identified   No fracture is identified. Marginal bone spurs are again seen along the inferior margins of the   patella. There is interval worsening of moderate tricompartmental joint space   loss    The are interval worsening of moderate degenerative tricompartmental   changes present            Impression   Moderate tricompartmental degenerative changes.  This has   worsened in the interim     Urine Drug Screenin2019 Consistent for buprenorphine and metabolites  2020 Consistent for buprenorphine  2020 Consistent   2020 Consistent  2021 Consistent     OARRS report:  10/2019 Consistent to 2021 Consistent     Opioid Agreement:  2021    Past Medical History:   Diagnosis Date    Anemia     Arthritis     Cellulitis 2015    left leg    Chronic ulcer of leg with fat layer exposed ([address] 2015    Chronic ulcer of right leg, limited to breakdown of skin ([address] 2016    COVID-19 2021    postive test no symptoms    Depression     Full dentures     Low back pain     Lymphedema of both lower extremities 2015    Obesity     280 #    Osteoarthritis     Peripheral vision loss     Stroke ([address]     Type 2 diabetes mellitus without complication, without long-term current use of insulin ([address] 2019    Ulcer of left lower leg, limited to breakdown of skin ([address] 06/10/2019    Ulcer of left lower leg, limited to breakdown of skin ([address] 06/10/2019    UTI (urinary tract infection) 2021       Past Surgical History:   Procedure Laterality Date    ABDOMINOPLASTY  2002    BREAST REDUCTION SURGERY      reduction    CATARACT REMOVAL      bilateral     SECTION      x2    COLONOSCOPY  2012    and egd    COLONOSCOPY  2021    polyps; marginal prep--jessica    COLONOSCOPY N/A 2021    COLONOSCOPY WITH BIOPSY performed by Sharif Auguste MD at [address] [facility] ECHOCARDIOGRAM COMPLETE 2D W DOPPLER W COLOR  2012         GASTRIC BYPASS SURGERY  2000    NO NASTOGASTRIC TUBE    HERNIA REPAIR      2002    LUMBAR SPINE SURGERY N/A 5/13/2021    L3-L4  POSTERIOR LUMBAR INTERBODY  FUSION--OARM, JESSI, YAJAIRA TABLE, CELL SAVER, PLATELET GEL, AUDIOLOGY, CAGES, PLATES, SCREWS -- GLOBUS performed by Lesia Botello MD at [facility]  03/27/2021    minimal pouch gastritis--jessica    UPPER GASTROINTESTINAL ENDOSCOPY N/A 03/27/2021    EGD ESOPHAGOGASTRODUODENOSCOPY performed by Maggi Crain MD at [address] [facility]       Prior to Admission medications    Medication Sig Start Date End Date Taking? Authorizing Provider   BELBUCA 450 MCG FILM Take 450 mcg by mouth every 12 hours for 30 days.  11/29/21 12/29/21 Yes CHERYL Wilkes   gabapentin (NEURONTIN) 300 MG capsule TAKE 1 CAPSULE BY MOUTH THREE TIMES DAILY FOR 30 DAYS 9/13/21 9/13/22 Yes Gautam Razo DO   buPROPion (WELLBUTRIN XL) 150 MG extended release tablet Take 1 tablet by mouth every morning 7/27/21  Yes Gautam Razo DO   nitrofurantoin, macrocrystal-monohydrate, (MACROBID) 100 MG capsule Take 1 capsule by mouth 2 times daily 7/26/21  Yes Gautam Razo DO   BELBUCA 450 MCG FILM DISSOLVE 1 FILM UNDER THE TONGUE EVERY 12 HOURS FOR 30 DAYS 7/9/21  Yes Historical Provider, MD   nitrofurantoin, macrocrystal-monohydrate, (MACROBID) 100 MG capsule Take 1 capsule by mouth 2 times daily 7/7/21  Yes Gautam Razo DO   lactobacillus (CULTURELLE) CAPS capsule Take 1 capsule by mouth daily 6/5/21  Yes Trever Bhandari MD   baclofen (LIORESAL) 5 MG tablet Take 1 tablet by mouth 3 times daily 6/4/21  Yes Trever Bhandari MD   Liraglutide (VICTOZA SC) Inject 1.8 mg into the skin daily    Yes Historical Provider, MD   bumetanide (BUMEX) 2 MG tablet Take 1 tablet by mouth 2 times daily  Patient taking differently: Take 2 mg by mouth daily as needed  3/30/21  Yes Edgar Negro MD   potassium chloride (KLOR-CON M) 20 MEQ extended release tablet Take 1 tablet by mouth 2 times daily (with meals)  Patient taking differently: Take 20 mEq by mouth daily as needed  3/30/21  Yes Geeta Carlin MD   rOPINIRole (REQUIP) 2 MG tablet Take 1 tablet by mouth 4 times daily 20  Yes Gautam Razo DO   ammonium lactate (LAC-HYDRIN) 12 % cream Apply topically as needed. 10/8/20  Yes Abner Hsieh DPM   Cream Base (VERSAPRO) CREA APPLY ONE (1) GRAM (ONE PUMP) EXTERNALLY FOUR TIMES A DAY TO EACH KNEE 3/23/20  Yes Chanda Hough PA-C       Allergies   Allergen Reactions    Ferritin Shortness Of Breath and Other (See Comments)     Flushed,  Severe back pain       Social History     Socioeconomic History    Marital status:      Spouse name: Not on file    Number of children: 2    Years of education: 15    Highest education level: Associate degree: academic program   Occupational History    Not on file   Tobacco Use    Smoking status: Former Smoker     Packs/day: 0.25     Years: 38.00     Pack years: 9.50     Types: Cigarettes     Quit date: 3/11/2021     Years since quittin.7    Smokeless tobacco: Never Used    Tobacco comment: Pt states she quit [employer]"   Vaping Use    Vaping Use: Never used   Substance and Sexual Activity    Alcohol use: No    Drug use: No    Sexual activity: Not Currently   Other Topics Concern    Not on file   Social History Narrative        Chronic Diagnosis: Iron deficiency anemia, Depressive disorder, Benign essential hypertension, Mixed    hyperlipidemia.     HTN    OBESITY    LIPID    OA    SMOKER    CVA L FIELD VISION    DEPRESSION    GASTRIC BYPASS 01    BREAST REDUCTION--6-04    Mita Álvarez  1955 Page #2    ANEMIA==IRON AND B-12    Admitted 2019 with cellulitis left lower extremity then readmitted with chest pain with negative stress test     Social Determinants of Health     Financial Resource Strain: Low Risk     Difficulty of Paying Living Expenses: Not very hard   Food Insecurity: No Food Insecurity    Worried About Running Out of Food in the Last Year: Never true    Ran Out of Food in the Last Year: Never true   Transportation Needs: No Transportation Needs    Lack of Transportation (Medical): No    Lack of Transportation (Non-Medical): No   Physical Activity: Inactive    Days of Exercise per Week: 0 days    Minutes of Exercise per Session: 0 min   Stress: No Stress Concern Present    Feeling of Stress : Only a little   Social Connections: Moderately Integrated    Frequency of Communication with Friends and Family: More than three times a week    Frequency of Social Gatherings with Friends and Family: More than three times a week    Attends Yazidism Services: More than 4 times per year    Active Member of [address] [place] [address] [employer] or Organizations: Yes    Attends Club or Organization Meetings: More than 4 times per year    Marital Status:    Intimate Partner Violence:     Fear of Current or Ex-Partner: Not on file    Emotionally Abused: Not on file    Physically Abused: Not on file    Sexually Abused: Not on file   Housing Stability:     Unable to Pay for Housing in the Last Year: Not on file    Number of [place] in the Last Year: Not on file    Unstable Housing in the Last Year: Not on file       Family History   Problem Relation Age of Onset    Breast Cancer Mother     Stroke Father     Hypertension Sister     Hypertension Brother        REVIEW OF SYSTEMS:     Sonal Palma denies fever/chills, chest pain, shortness of breath, new bowel or bladder complaints. All other review of systems was negative. PHYSICAL EXAMINATION:      /82   Pulse 92   Temp 97.3 °F (36.3 °C) (Infrared)   Resp 16   Ht 5' 2\" (1.575 m)   Wt 285 lb (129.3 kg)   SpO2 97%   BMI 52.13 kg/m²     General:       General appearance: awake, alert, oriented, in no acute distress, well developed, well nourished and in no acute distress   pleasant and well-hydrated.    , in no distress and A & O x3  Build:Obese  Function:Rises from a seated position with difficulty     Head:     Head:normocephalic and atraumatic  Pupils:regular, round and equal.  Sclera: icterus absent,   EOM:full and intact.     Lungs:     Breathing:Normal expansion. No wheezing.     Abdomen:     Shape:non-distended and normal    Lumbar spine:     Spine inspection:normal   CVA tenderness:No   Range of motion:abnormal mildly Lateral bending, flexion, extension rotation bilateral and is not painful.        Extremities:     Tremors:None bilaterally upper and lower  Range of motion:Generally limited extension shoulder - right, pain with internal rotation of hips not done. Intact:Yes  Varicose veins: Not assessed. Cyanosis:none    Neurological:     Cranial nerves:normal  Motor:   Coordination:normal  Gait:antalgic     Dermatology:     Skin: No skin changes.       Impression:    B/L Knee Pain, LBP, right shoulder pain  Knee x-rays:  Moderate DJD.    Hx of pain management with [facility] pain clinic.  Was on fentanyl 25 mg and norco 5/325 TID.  Did not help and caused drowsiness.  Now on Belbuca 450 mcg BID (increased from 300 mg BID) which is working very well without side effects. Transferred care to [facility] due to cost.    Hx of LESI by Dr. Marlene Han and 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  Bilateral segmental arthrodesis and fusion at L3-L4 with the use of  bilateral L3 and L4 pedicle screws with use of locally harvested  autograft plus allograft in the form of BioZorb strips for  posterolateral fusion at L3-L4.   2.  360-degree fusion with posterior lumbar interbody fusion at L3-L4  with use of bilateral Globus Rise expandable cages packed with locally  harvested autograft. 3.  Bilateral L3 and L4 laminectomy, bilateral L3-L4 medial facetectomy,  bilateral L3 and L4 foraminotomy, and bilateral L3-L4 diskectomy. 4.  Use of O-arm assisted navigation with placement of pedicle screws. 5.  Use of free-running EMG to test pedicle screw heads. 6.  Use of intraoperative fluoroscopy interpreted by myself, the  surgeon. 7.  22 modifier for degree of difficulty for the patient being morbidly  obese with a BMI of 51.21.  8.  AS modifier for Sherren Matte, [facility], who assisted with primary  exposure, primary closure, and retraction of neural elements on 05/13/2021 Dr. Eusebio Lozada. OARRS reviewed 11/2021  Continue Belbuca 450 mcg BID. Having difficulty paying for it. Will discuss with Dr. Jason Diaz regarding alternatives. B/L knee CSIs on 10/01/2021 with   UDS reviewed and is consistent    When ordering knee injections:  Requests the same dosing as ortho.  1 ml Kenalog (40mg), 3ml PF 0.25% marcaine and 3ml 1% PF Lidocaine for knee injections this week. Controlled Substance Monitoring:    Acute and Chronic Pain Monitoring:   RX Monitoring 11/29/2021   Periodic Controlled Substance Monitoring Possible medication side effects, risk of tolerance/dependence & alternative treatments discussed. ;No signs of potential drug abuse or diversion identified. ;Assessed functional status. ;Obtaining appropriate analgesic effect of treatment.              We discussed with the patient that combining opioids, benzodiazepines, alcohol, illicit drugs or sleep aids increases the risk of respiratory depression including death. We discussed that these medications may cause drowsiness, sedation or dizziness and have counseled the patient not to drive or operate machinery. We have discussed that these medications will be prescribed only by one provider. We have discussed with the patient about age related risk factors and have thoroughly discussed the importance of taking these medications as prescribed. The patient verbalizes understanding.     ccreferring physic

## 2021-11-29 NOTE — PROGRESS NOTES
Do you currently have any of the following:    Fever: No  Headache:  No  Cough: No  Shortness of breath: No  Exposed to anyone with these symptoms: No         Haider Crate presents to the [address] [place] [address] on 11/29/2021. Tricia Liao is complaining of pain shoulders back and knees. The pain is constant. The pain is described as aching, throbbing, shooting, stabbing, dull, sharp and numb. Pain is rated on her best day at a 6, on her worst day at a 10, and on average at a 7 on the VAS scale. She took her last dose of belbuca film, gabapentin . Any procedures since your last visit: No,     Pacemaker or defibrillator: No      She is not on NSAIDS and is not on anticoagulation medications to include none     Medication Contract and Consent for Opioid Use Documents Filed     Patient Documents     Type of Document Status Date Received Received By Description    Medication Contract Signed 9/6/2019 12:04 PM Kassie Robin med contract    Medication Contract Received 11/17/2020 10:57 AM Sinai MORRISON [facility] med contract    Medication Contract Signed 8/4/2021 11:40 AM Jeremiah KIRBY [facility] EXPIRES 08.03.2022                /82   Pulse 92   Temp 97.3 °F (36.3 °C) (Infrared)   Resp 16   Ht 5' 2\" (1.575 m)   Wt 285 lb (129.3 kg)   SpO2 97%   BMI 52.13 kg/m²      No LMP recorded.  Patient is postmenopausal.

## 2021-12-21 ENCOUNTER — PROCEDURE VISIT (OUTPATIENT)
Dept: PODIATRY | Age: 66
End: 2021-12-21
Payer: MEDICARE

## 2021-12-21 ENCOUNTER — OFFICE VISIT (OUTPATIENT)
Dept: PRIMARY CARE CLINIC | Age: 66
End: 2021-12-21
Payer: MEDICARE

## 2021-12-21 VITALS
WEIGHT: 285 LBS | HEART RATE: 126 BPM | TEMPERATURE: 97 F | DIASTOLIC BLOOD PRESSURE: 82 MMHG | SYSTOLIC BLOOD PRESSURE: 142 MMHG | BODY MASS INDEX: 52.13 KG/M2

## 2021-12-21 VITALS
DIASTOLIC BLOOD PRESSURE: 78 MMHG | TEMPERATURE: 98.2 F | WEIGHT: 285 LBS | SYSTOLIC BLOOD PRESSURE: 136 MMHG | BODY MASS INDEX: 52.13 KG/M2

## 2021-12-21 DIAGNOSIS — L84 CORN OR CALLUS: ICD-10-CM

## 2021-12-21 DIAGNOSIS — M20.42 HAMMER TOES OF BOTH FEET: ICD-10-CM

## 2021-12-21 DIAGNOSIS — Z23 NEED FOR INFLUENZA VACCINATION: ICD-10-CM

## 2021-12-21 DIAGNOSIS — E11.9 DIET-CONTROLLED DIABETES MELLITUS (HCC): ICD-10-CM

## 2021-12-21 DIAGNOSIS — I73.9 PERIPHERAL VASCULAR DISEASE, UNSPECIFIED (HCC): ICD-10-CM

## 2021-12-21 DIAGNOSIS — R26.2 DIFFICULTY WALKING: ICD-10-CM

## 2021-12-21 DIAGNOSIS — M20.41 HAMMER TOES OF BOTH FEET: ICD-10-CM

## 2021-12-21 DIAGNOSIS — E03.9 ACQUIRED HYPOTHYROIDISM: ICD-10-CM

## 2021-12-21 DIAGNOSIS — I10 ESSENTIAL HYPERTENSION: ICD-10-CM

## 2021-12-21 DIAGNOSIS — B35.1 TINEA UNGUIUM: Primary | ICD-10-CM

## 2021-12-21 DIAGNOSIS — M79.675 PAIN IN LEFT TOE(S): ICD-10-CM

## 2021-12-21 DIAGNOSIS — M48.062 SPINAL STENOSIS OF LUMBAR REGION WITH NEUROGENIC CLAUDICATION: Chronic | ICD-10-CM

## 2021-12-21 DIAGNOSIS — M15.9 PRIMARY OSTEOARTHRITIS INVOLVING MULTIPLE JOINTS: Chronic | ICD-10-CM

## 2021-12-21 DIAGNOSIS — M17.0 PRIMARY OSTEOARTHRITIS OF BOTH KNEES: Primary | Chronic | ICD-10-CM

## 2021-12-21 DIAGNOSIS — D51.8 VITAMIN B12 DEFICIENCY (DIETARY) ANEMIA: ICD-10-CM

## 2021-12-21 DIAGNOSIS — M81.0 AGE-RELATED OSTEOPOROSIS WITHOUT CURRENT PATHOLOGICAL FRACTURE: ICD-10-CM

## 2021-12-21 DIAGNOSIS — I89.0 LYMPHEDEMA OF BOTH LOWER EXTREMITIES: Chronic | ICD-10-CM

## 2021-12-21 DIAGNOSIS — M79.674 PAIN IN TOE OF RIGHT FOOT: ICD-10-CM

## 2021-12-21 PROCEDURE — G8400 PT W/DXA NO RESULTS DOC: HCPCS | Performed by: FAMILY MEDICINE

## 2021-12-21 PROCEDURE — G8417 CALC BMI ABV UP PARAM F/U: HCPCS | Performed by: FAMILY MEDICINE

## 2021-12-21 PROCEDURE — G8484 FLU IMMUNIZE NO ADMIN: HCPCS | Performed by: FAMILY MEDICINE

## 2021-12-21 PROCEDURE — 1036F TOBACCO NON-USER: CPT | Performed by: FAMILY MEDICINE

## 2021-12-21 PROCEDURE — G0008 ADMIN INFLUENZA VIRUS VAC: HCPCS | Performed by: FAMILY MEDICINE

## 2021-12-21 PROCEDURE — 99214 OFFICE O/P EST MOD 30 MIN: CPT | Performed by: FAMILY MEDICINE

## 2021-12-21 PROCEDURE — 1123F ACP DISCUSS/DSCN MKR DOCD: CPT | Performed by: FAMILY MEDICINE

## 2021-12-21 PROCEDURE — 4040F PNEUMOC VAC/ADMIN/RCVD: CPT | Performed by: FAMILY MEDICINE

## 2021-12-21 PROCEDURE — 11721 DEBRIDE NAIL 6 OR MORE: CPT | Performed by: PODIATRIST

## 2021-12-21 PROCEDURE — 1090F PRES/ABSN URINE INCON ASSESS: CPT | Performed by: FAMILY MEDICINE

## 2021-12-21 PROCEDURE — G8427 DOCREV CUR MEDS BY ELIG CLIN: HCPCS | Performed by: FAMILY MEDICINE

## 2021-12-21 PROCEDURE — 2022F DILAT RTA XM EVC RTNOPTHY: CPT | Performed by: FAMILY MEDICINE

## 2021-12-21 PROCEDURE — 90694 VACC AIIV4 NO PRSRV 0.5ML IM: CPT | Performed by: FAMILY MEDICINE

## 2021-12-21 PROCEDURE — 3017F COLORECTAL CA SCREEN DOC REV: CPT | Performed by: FAMILY MEDICINE

## 2021-12-21 ASSESSMENT — ENCOUNTER SYMPTOMS
RESPIRATORY NEGATIVE: 1
GASTROINTESTINAL NEGATIVE: 1
ALLERGIC/IMMUNOLOGIC NEGATIVE: 1
EYES NEGATIVE: 1
BACK PAIN: 1

## 2021-12-21 ASSESSMENT — PATIENT HEALTH QUESTIONNAIRE - PHQ9
SUM OF ALL RESPONSES TO PHQ QUESTIONS 1-9: 0
SUM OF ALL RESPONSES TO PHQ9 QUESTIONS 1 & 2: 0
2. FEELING DOWN, DEPRESSED OR HOPELESS: 0
1. LITTLE INTEREST OR PLEASURE IN DOING THINGS: 0

## 2021-12-21 NOTE — PROGRESS NOTES
[facility] PODIATRY  [address]  [place] [place] [address]  Dept: [phone]  Dept Fax: [phone]     PATIENT PROGRESS NOTE  Date of patient's visit: 12/21/2021  Patient's Name:  Heidi Prasad YOB: 1955            Patient Care Team:  Sherree Schaumann, DO as PCP - General (Family Medicine)  Sherree Schaumann, DO as PCP - [facility]  Carolina Henderson MD as Consulting Physician (Neurosurgery)  Ruthy Randhawa DPM as Consulting Physician (Podiatry)        Chief Complaint   Patient presents with    Ingrown Toenail     pt was last seen in 2020 for nail care saw pcp Dr. Jennifer Melton on 11/2/2021    Hammer Toe    Toe Pain       HPI  HPI:   Heidi Prasad is a 73 y.o. female who presents to the office today complaining of painful corns calluses and nails. .  Symptoms began 11 year(s) ago. Patient relates pain is Present. Pain is rated 5 out of 10 and is described as moderate. Treatments prior to today's visit include: debridement. Currently denies F/C/N/V.  Pt's primary care physician is Charlee Razo DO last seen     Allergies   Allergen Reactions    Ferritin Shortness Of Breath and Other (See Comments)     Flushed,  Severe back pain       Past Medical History:   Diagnosis Date    Anemia     Arthritis     Cellulitis 05/2015    left leg    Chronic ulcer of leg with fat layer exposed ([address] 06/22/2015    Chronic ulcer of right leg, limited to breakdown of skin ([address] 09/28/2016    COVID-19 04/23/2021    postive test no symptoms    Depression     Full dentures     Low back pain     Lymphedema of both lower extremities 06/22/2015    Obesity     280 #    Osteoarthritis     Peripheral vision loss     Stroke ([address]     Type 2 diabetes mellitus without complication, without long-term current use of insulin ([address] 06/21/2019    Ulcer of left lower leg, limited to breakdown of skin ([address] 06/10/2019    Ulcer of left lower EXTERNALLY FOUR TIMES A DAY TO EACH KNEE 3/23/20  Yes Jeannie Cox, BRENDA       Past Surgical History:   Procedure Laterality Date    ABDOMINOPLASTY      BREAST REDUCTION SURGERY      reduction    CATARACT REMOVAL      bilateral     SECTION      x2    COLONOSCOPY  2012    and egd    COLONOSCOPY  2021    polyps; marginal prep--jessica    COLONOSCOPY N/A 2021    COLONOSCOPY WITH BIOPSY performed by Arline Mendoza MD at [address] ECHOCARDIOGRAM COMPLETE 2D W DOPPLER W COLOR  2012         GASTRIC BYPASS SURGERY  2000    NO NASTOGASTRIC TUBE    HERNIA REPAIR          LUMBAR SPINE SURGERY N/A 2021    L3-L4  POSTERIOR LUMBAR INTERBODY  FUSION--OARM, JESSI, YAJAIRA TABLE, CELL SAVER, PLATELET GEL, AUDIOLOGY, CAGES, PLATES, SCREWS -- GLOBUS performed by Shashi Chino MD at [place] [address]  2021    minimal pouch gastritis--jessica    UPPER GASTROINTESTINAL ENDOSCOPY N/A 2021    EGD ESOPHAGOGASTRODUODENOSCOPY performed by Arline Mendoza MD at [place] [address] History   Problem Relation Age of Onset    Breast Cancer Mother     Stroke Father     Hypertension Sister     Hypertension Brother        Social History     Tobacco Use    Smoking status: Former Smoker     Packs/day: 0.25     Years: 38.00     Pack years: 9.50     Types: Cigarettes     Quit date: 3/11/2021     Years since quittin.7    Smokeless tobacco: Never Used    Tobacco comment: Pt states she quit [employer]"   Substance Use Topics    Alcohol use: No       Review of Systems    Review of Systems:   History obtained from chart review and the patient  General ROS: negative for - chills, fatigue, fever, night sweats or weight gain  Constitutional: Negative for chills, diaphoresis, fatigue, fever and unexpected weight change. Musculoskeletal: Positive for .   Neurological ROS: negative for - behavioral changes, confusion, headaches or seizures. Negative for weakness and numbness. Dermatological ROS: negative for - mole changes, rash  Cardiovascular: Negative for leg swelling. Gastrointestinal: Negative for constipation, diarrhea, nausea and vomiting.                Lower Extremity Physical Examination:   Vitals:   Vitals:    12/21/21 1329   BP: 136/78   Temp: 98.2 °F (36.8 °C)   Toenail Description  Sites of Onychomycosis Involvement (Check affected area)  [x] [x] [x] [x] [x] [x] [x] [x] [x] [x]  5 4 3 2 1 1 2 3 4 5                          Right                                        Left    Thickness  [x] [x] [x] [x] [x] [x] [x] [x] [x] [x]  5 4 3 2 1 1 2 3 4 5                         Right                                        Left    Dystrophic Changes   [x] [x] [x] [x] [x] [x] [x] [x] [x] [x]  5 4 3 2 1 1 2 3 4 5                         Right                                        Left    discolored   [x] [x] [x] [x] [x] [x] [x] [x] [x] [x]  5 4 3 2 1 1 2 3 4 5                          Right                                        Left    Incurvation/Ingrowin   [] [] [] [] [] [] [] [] [] []  5 4 3 2 1 1 2 3 4 5                         Right                                        Left    Inflammation/Pain   [x] [x] [x] [x] [x] [x] [x] [x] [x] [x]  5 4 3 2 1 1 2 3 4 5                         Right                                        Left       Foot Exam    General  General Appearance: appears stated age and healthy   Orientation: alert and oriented to person, place, and time       Right Foot/Ankle     Inspection and Palpation  Ecchymosis: none  Tenderness: bony tenderness   Hammertoes: fifth toe  Skin Exam: corn;     Neurovascular  Dorsalis pedis: 2+  Posterior tibial: absent  Saphenous nerve sensation: normal  Tibial nerve sensation: normal  Superficial peroneal nerve sensation: normal  Deep peroneal nerve sensation: normal  Sural nerve sensation: normal  Achilles reflex: 2+  Babinski reflex: 2+      Left Foot/Ankle      Inspection and Palpation  Ecchymosis: none  Tenderness: bony tenderness   Hammertoes: fifth toe  Skin Exam: corn, skin changes and abnormal color; Neurovascular  Dorsalis pedis: 1+  Posterior tibial: absent  Saphenous nerve sensation: normal  Tibial nerve sensation: normal  Superficial peroneal nerve sensation: normal  Deep peroneal nerve sensation: normal  Sural nerve sensation: normal  Achilles reflex: 2+  Babinski reflex: 2+    Muscle Strength  Ankle dorsiflexion: 5  Ankle plantar flexion: 5  Ankle inversion: 5  Ankle eversion: 5  Great toe extension: 5  Great toe flexion: 5          Left Ankle Exam     Muscle Strength   The patient has normal left ankle strength. Dorsiflexion:  5/5   Plantar flexion:  5/5             General: AAO x 3 in NAD. Dermatologic Exam:  Skin lesion/ulceration Absent . Skin No rashes or nodules noted. .   Musculoskeletal: There are hammertoes noted fifth digit bilaterally there is a corn noted dorsal aspect fifth digit bilateral.  Bilateral heels have thick eschar medial aspect noninfected. Vascular: The pulses DP bilaterally are palpable PT on the right side is absent there is a lack of hair growth to lower extremity the nails are thick pitting mycotic there is atrophic skin changes which include skin texture has changed skin color is discolored    Radiographs:  3 views  foot/ankle:     Asessment: Patient is a 73 y.o. female with:    Diagnosis Orders   1. Tinea unguium     2. Pain in left toe(s)     3. Pain in toe of right foot     4. Peripheral vascular disease, unspecified ([address]     5. Difficulty walking     6. Corn or callus     7. Hammer toes of both feet         Plan: Patient examined and evaluated. Debridement of all painful nails 1-5 maximiliano was performed with both manual and electrical debridement to prevent infection and/or ulceration. Patient tolerated the debridement well, without any complaints.   Patient was asymptomatic after debridement  Current condition and treatment options

## 2021-12-21 NOTE — PROGRESS NOTES
21  Name: Reyes Norma    : 1955    Sex: female    Age: 73 y.o. Subjective:  Chief Complaint: Patient is here for  Ck  Re  bp  Swelling  Right  Era  Left  fingers     Seeing  Dr ramos are today and he asked me to se pt      Needs flu shto right era ache  Left fingers tingle for a week  urien incotn bad  She takes   buemx  Rare  Still  Not smoking      Review of Systems   Constitutional: Negative. HENT: Negative. Eyes: Negative. Respiratory: Negative. Cardiovascular: Positive for leg swelling (edema about the same). Gastrointestinal: Negative. Endocrine: Negative. Genitourinary:        Urine incont   Musculoskeletal: Positive for back pain. Skin: Negative. Allergic/Immunologic: Negative. Neurological: Negative. Hematological: Negative. Psychiatric/Behavioral: Negative. Current Outpatient Medications:     BELBUCA 450 MCG FILM, Take 450 mcg by mouth every 12 hours for 30 days. , Disp: 60 each, Rfl: 1    gabapentin (NEURONTIN) 300 MG capsule, TAKE 1 CAPSULE BY MOUTH THREE TIMES DAILY FOR 30 DAYS, Disp: 270 capsule, Rfl: 5    buPROPion (WELLBUTRIN XL) 150 MG extended release tablet, Take 1 tablet by mouth every morning, Disp: 90 tablet, Rfl: 3    nitrofurantoin, macrocrystal-monohydrate, (MACROBID) 100 MG capsule, Take 1 capsule by mouth 2 times daily, Disp: 14 capsule, Rfl: 3    BELBUCA 450 MCG FILM, DISSOLVE 1 FILM UNDER THE TONGUE EVERY 12 HOURS FOR 30 DAYS, Disp: , Rfl:     nitrofurantoin, macrocrystal-monohydrate, (MACROBID) 100 MG capsule, Take 1 capsule by mouth 2 times daily, Disp: 14 capsule, Rfl: 0    lactobacillus (CULTURELLE) CAPS capsule, Take 1 capsule by mouth daily, Disp: 30 capsule, Rfl: 0    baclofen (LIORESAL) 5 MG tablet, Take 1 tablet by mouth 3 times daily, Disp: 90 tablet, Rfl: 0    Liraglutide (VICTOZA SC), Inject 1.8 mg into the skin daily , Disp: , Rfl:     bumetanide (BUMEX) 2 MG tablet, Take 1 tablet by mouth 2 times daily (Patient taking differently: Take 2 mg by mouth daily as needed ), Disp: 30 tablet, Rfl: 3    potassium chloride (KLOR-CON M) 20 MEQ extended release tablet, Take 1 tablet by mouth 2 times daily (with meals) (Patient taking differently: Take 20 mEq by mouth daily as needed ), Disp: 60 tablet, Rfl: 3    rOPINIRole (REQUIP) 2 MG tablet, Take 1 tablet by mouth 4 times daily, Disp: 360 tablet, Rfl: 12    ammonium lactate (LAC-HYDRIN) 12 % cream, Apply topically as needed. , Disp: 385 g, Rfl: 2    Cream Base (VERSAPRO) CREA, APPLY ONE (1) GRAM (ONE PUMP) EXTERNALLY FOUR TIMES A DAY TO EACH KNEE, Disp: 200 g, Rfl: 1  Allergies   Allergen Reactions    Ferritin Shortness Of Breath and Other (See Comments)     Flushed,  Severe back pain     Social History     Socioeconomic History    Marital status:      Spouse name: Not on file    Number of children: 2    Years of education: 15    Highest education level: Associate degree: academic program   Occupational History    Not on file   Tobacco Use    Smoking status: Former Smoker     Packs/day: 0.25     Years: 38.00     Pack years: 9.50     Types: Cigarettes     Quit date: 3/11/2021     Years since quittin.7    Smokeless tobacco: Never Used    Tobacco comment: Pt states she quit [employer]"   Vaping Use    Vaping Use: Never used   Substance and Sexual Activity    Alcohol use: No    Drug use: No    Sexual activity: Not Currently   Other Topics Concern    Not on file   Social History Narrative        Chronic Diagnosis: Iron deficiency anemia, Depressive disorder, Benign essential hypertension, Mixed    hyperlipidemia.     HTN    OBESITY    LIPID    OA    SMOKER---quit   summer  2021    CVA L FIELD VISION    DEPRESSION    GASTRIC BYPASS 01    BREAST REDUCTION--    Barrera PIEDRA 1955 Page #2    ANEMIA==IRON AND B-12    Admitted 2019 with cellulitis left lower extremity then readmitted with chest pain with negative stress test    L--3-4   lumbar surgeyr dr yAana Frederick     Social Determinants of Health     Financial Resource Strain: Low Risk     Difficulty of Paying Living Expenses: Not very hard   Food Insecurity: No Food Insecurity    Worried About Running Out of Food in the Last Year: Never true    [place] of Food in the Last Year: Never true   Transportation Needs: No Transportation Needs    Lack of Transportation (Medical): No    Lack of Transportation (Non-Medical): No   Physical Activity: Inactive    Days of Exercise per Week: 0 days    Minutes of Exercise per Session: 0 min   Stress: No Stress Concern Present    Feeling of Stress : Only a little   Social Connections: Moderately Integrated    Frequency of Communication with Friends and Family: More than three times a week    Frequency of Social Gatherings with Friends and Family: More than three times a week    Attends [employer] Services: More than 4 times per year    Active Member of [address] [place] [address] or Organizations:  Yes    Attends Club or Organization Meetings: More than 4 times per year    Marital Status:    Intimate Partner Violence:     Fear of Current or Ex-Partner: Not on file    Emotionally Abused: Not on file    Physically Abused: Not on file    Sexually Abused: Not on file   Housing Stability:     Unable to Pay for Housing in the Last Year: Not on file    Number of [place] in the Last Year: Not on file    Unstable Housing in the Last Year: Not on file      Past Medical History:   Diagnosis Date    Anemia     Arthritis     Cellulitis 05/2015    left leg    Chronic ulcer of leg with fat layer exposed ([address] 06/22/2015    Chronic ulcer of right leg, limited to breakdown of skin ([address] 09/28/2016    COVID-19 04/23/2021    postive test no symptoms    Depression     Full dentures     Low back pain     Lymphedema of both lower extremities 06/22/2015    Obesity     280 #    Osteoarthritis     Peripheral vision loss     Stroke ([address]     Type 2 diabetes mellitus without complication, without long-term current use of insulin ([facility] 2019    Ulcer of left lower leg, limited to breakdown of skin ([facility] 06/10/2019    Ulcer of left lower leg, limited to breakdown of skin ([facility] 06/10/2019    UTI (urinary tract infection) 2021     Family History   Problem Relation Age of Onset    Breast Cancer Mother     Stroke Father     Hypertension Sister     Hypertension Brother       Past Surgical History:   Procedure Laterality Date    ABDOMINOPLASTY      BREAST REDUCTION SURGERY      reduction    CATARACT REMOVAL      bilateral     SECTION      x2    COLONOSCOPY  2012    and egd    COLONOSCOPY  2021    polyps; marginal prep--jessica    COLONOSCOPY N/A 2021    COLONOSCOPY WITH BIOPSY performed by Arline Mendoza MD at [address] [facility] ECHOCARDIOGRAM COMPLETE 2D W DOPPLER W COLOR  2012         GASTRIC BYPASS SURGERY  2000    NO NASTOGASTRIC TUBE    HERNIA REPAIR          LUMBAR SPINE SURGERY N/A 2021    L3-L4  POSTERIOR LUMBAR INTERBODY  FUSION--OARM, JESSI, YAJAIRA TABLE, CELL SAVER, PLATELET GEL, AUDIOLOGY, CAGES, PLATES, SCREWS -- GLOBUS performed by Shashi Chino MD at [address]  2021    minimal pouch gastritis--jessica    UPPER GASTROINTESTINAL ENDOSCOPY N/A 2021    EGD ESOPHAGOGASTRODUODENOSCOPY performed by Arline Mendoza MD at [address]:    21 1358   BP: (!) 142/82   Pulse: 126   Temp: 97 °F (36.1 °C)   TempSrc: Temporal   Weight: 285 lb (129.3 kg)       Objective:    Physical Exam  Vitals reviewed. Constitutional:       Appearance: She is well-developed. She is obese. HENT:      Head: Normocephalic. Eyes:      Pupils: Pupils are equal, round, and reactive to light. Cardiovascular:      Rate and Rhythm: Normal rate and regular rhythm. Comments: Edema lower extremities slight nonpitting.   No change  Pulmonary:      Effort: Pulmonary effort is normal.      Breath sounds: Normal breath sounds. Abdominal:      Palpations: Abdomen is soft. Musculoskeletal:         General: Normal range of motion. Cervical back: Normal range of motion. Skin:     General: Skin is warm. Neurological:      Mental Status: She is alert and oriented to person, place, and time. Psychiatric:         Behavior: Behavior normal.         Maron Klinefelter was seen today for otalgia. Diagnoses and all orders for this visit:    Primary osteoarthritis of both knees    Need for influenza vaccination  -     INFLUENZA, QUADV, ADJUVANTED, 72 YRS =, IM, PF, PREFILL SYR, 0.5ML (FLUAD)    Primary osteoarthritis involving multiple joints    Lymphedema of both lower extremities    Spinal stenosis of lumbar region with neurogenic claudication        Comments: Diet exercise. Elevate legs. Support hose. Full laboratory studies soon and see me afterwards. Low back pain is stable at this point. Range of motion exercises taught for arthritis. Flu shot      Check blood pressure at home twice a day. Low-salt low caffeine diet. Call if systolic blood pressure is above 368 and diastolic blood pressures above 85. Only use a upper arm digital cuff. I educated the patient about all medications. Make sure they were correct and educated  on the risk associated with missing meds or taking them incorrectly. A list of medications is being sent home with patient today. I informed patient about the risk associated with noncompliance of medication and taking medications incorrectly. Appropriate follow-up with myself and all specialist.  Encourage family members to take active role in assisting with medications and medical care. If any confusion should develop to notify my office immediately to avoid risk of worsening medical condition    A great deal of time spent reviewing medications, diet, exercise, social issues.  Also reviewing the chart before entering the room with patient and finishing charting after leaving patient's room. More than half of that time was spent face to face with the patient in counseling and coordinating care. Follow Up: No follow-ups on file.      Seen by:  Laura Bishop DO

## 2021-12-29 RX ORDER — CEFDINIR 300 MG/1
300 CAPSULE ORAL 2 TIMES DAILY
Qty: 20 CAPSULE | Refills: 0 | Status: SHIPPED | OUTPATIENT
Start: 2021-12-29 | End: 2022-01-08

## 2022-01-27 NOTE — PROGRESS NOTES
[address]  [address]  [facility]    Follow up Note      Darling Love     Date of Visit:  2022    CC:  Patient presents for follow up   Chief Complaint   Patient presents with    Follow-up     knees/back        HPI:    Pain is worse to b/l knees. Patient also reports that she has had numbness and tingling to her left hand all fingers for 1 month that is very bothersome. Appropriate analgesia with current medications regimen: yes   Change in quality of symptoms:no. Medication side effects:none. Recent diagnostic testing: None. Recent interventional procedures: None. She has not been on anticoagulation medications to include ASA, NSAIDS, Plavix, heparin, LMW heparin and warfarin and has not been on herbal supplements. She is diabetic. Imagin2021 MRI Lumbar Spine    FINDINGS:   BONES/ALIGNMENT: There is normal alignment of the spine. The vertebral body   heights are maintained. The bone marrow signal appears unremarkable.  There   is degenerative disc disease with disc space narrowing and osteophytes at   L3-L4, L4-L5, and L5-S1.  The bony spinal canal overall is congenitally small. SPINAL CORD: The conus terminates normally. SOFT TISSUES: No paraspinal mass identified. L1-L2:  The thecal sac and neural foramina are intact. L2-L3: There is a minimum disc protrusion measuring 2-3 mm.  Disc and/or   osteophytes cause minimal narrowing of the neural foramina bilaterally.  The   thecal sac is not stenotic. L3-L4: There is a disc extrusion measuring 4-5 mm toward the right extending   superiorly behind the inferior endplate of L3 causing severe narrowing of the   right neural foramen.  The thecal sac is mildly stenotic measuring 9 mm. There is minimal narrowing of the left neural foramen from disc and   osteophyte.    L4-L5: There is disc protrusion or osteophyte toward the left measuring 3-4   mm causing moderate to severe narrowing of the left neural foramen.  The   thecal sac is mildly stenotic measuring 9.5 mm.  Disc and osteophyte narrows   the right neural foramen. The left neural foramen is narrowed greater than   right. L5-S1:  There is mild facet and ligamentum flavum hypertrophy. The thecal sac   and neural foramina are intact.       Impression   Disc extrusion at L3-L4 measuring 4-5 mm and extending superiorly behind the   inferior endplate of R5-M9 on the right causing severe narrowing of the right   neural foramen.  There is mild stenosis of the thecal sac at L3-L4 and L4-L5   as discussed above. 12/27/2019 Lumbar Spine X-ray    Alignment of the vertebral bodies appears to be near-anatomic   No fracture or foreign body is identified. The disc spaces demonstrate moderate degenerative changes. There is mild loss of the vertebral body height   There are moderate degenerative changes involving the facets. There is grade 1 anterolisthesis of L3 on L4. Flexion-extension views demonstrates movement of the anterolisthesis   of L3 on L4.            Impression   Grade 1 anterolisthesis of L3 on L4 which does move with extension. Findings consistent with moderate degenerative disc disease and   degenerative facet disease.       The exam has been dictated and signed by Eusebio Gary. HOLLY Mcgregor-PEDRO LUIS   and Lindsay Paget MD, reviewed and concurred with these findings.          9/3/2019 left knee x-ray     The bones appear to be in anatomic alignment. No foreign body is identified   No fracture is identified     There is moderate tricompartmental joint space loss greatest long   patellofemoral and medial femoral tibial joint   The are moderate degenerative changes present along the patellofemoral   and medial femoral tibial compartment           Impression   Moderate degenerative changes as described above          9/3/2019 right knee x-ray     The bones appear to be in anatomic alignment.     No foreign body is identified No fracture is identified. Marginal bone spurs are again seen along the inferior margins of the   patella. There is interval worsening of moderate tricompartmental joint space   loss    The are interval worsening of moderate degenerative tricompartmental   changes present            Impression   Moderate tricompartmental degenerative changes.  This has   worsened in the interim     Urine Drug Screenin2019 Consistent for buprenorphine and metabolites  2020 Consistent for buprenorphine  2020 Consistent   2020 Consistent  2021 Consistent     OARRS report:  10/2019 Consistent to 2022 Consistent     Opioid Agreement:  2021    Past Medical History:   Diagnosis Date    Anemia     Arthritis     Cellulitis 2015    left leg    Chronic ulcer of leg with fat layer exposed ([address] 2015    Chronic ulcer of right leg, limited to breakdown of skin ([address] 2016    COVID-19 2021    postive test no symptoms    Depression     Full dentures     Low back pain     Lymphedema of both lower extremities 2015    Obesity     280 #    Osteoarthritis     Peripheral vision loss     Stroke ([address]     Type 2 diabetes mellitus without complication, without long-term current use of insulin ([address] 2019    Ulcer of left lower leg, limited to breakdown of skin ([address] 06/10/2019    Ulcer of left lower leg, limited to breakdown of skin ([address] 06/10/2019    UTI (urinary tract infection) 2021       Past Surgical History:   Procedure Laterality Date    ABDOMINOPLASTY  2002    BREAST REDUCTION SURGERY      reduction    CATARACT REMOVAL      bilateral     SECTION      x2    COLONOSCOPY  2012    and egd    COLONOSCOPY  2021    polyps; marginal prep--jessica    COLONOSCOPY N/A 2021    COLONOSCOPY WITH BIOPSY performed by Bridgette Cheung MD at [address] [facility] ECHOCARDIOGRAM COMPLETE 2D W DOPPLER W COLOR  2012         GASTRIC BYPASS SURGERY [id number] HERNIA REPAIR      2002    LUMBAR SPINE SURGERY N/A 5/13/2021    L3-L4  POSTERIOR LUMBAR INTERBODY  FUSION--OARM, JESSI, YAJAIRA TABLE, CELL SAVER, PLATELET GEL, AUDIOLOGY, CAGES, PLATES, SCREWS -- GLOBUS performed by Rebecca Rodarte MD at [address]  03/27/2021    minimal pouch gastritis--jessica    UPPER GASTROINTESTINAL ENDOSCOPY N/A 03/27/2021    EGD ESOPHAGOGASTRODUODENOSCOPY performed by Atul Gabriel MD at [address]       Prior to Admission medications    Medication Sig Start Date End Date Taking?  Authorizing Provider   gabapentin (NEURONTIN) 300 MG capsule TAKE 1 CAPSULE BY MOUTH THREE TIMES DAILY FOR 30 DAYS 9/13/21 9/13/22 Yes Gautam Razo DO   buPROPion (WELLBUTRIN XL) 150 MG extended release tablet Take 1 tablet by mouth every morning 7/27/21  Yes Gautam Razo DO   nitrofurantoin, macrocrystal-monohydrate, (MACROBID) 100 MG capsule Take 1 capsule by mouth 2 times daily 7/26/21  Yes Gautam Razo DO   BELBUCA 450 MCG FILM DISSOLVE 1 FILM UNDER THE TONGUE EVERY 12 HOURS FOR 30 DAYS 7/9/21  Yes Historical Provider, MD   nitrofurantoin, macrocrystal-monohydrate, (MACROBID) 100 MG capsule Take 1 capsule by mouth 2 times daily 7/7/21  Yes Gautam Razo DO   lactobacillus (CULTURELLE) CAPS capsule Take 1 capsule by mouth daily 6/5/21  Yes Tika Bennett MD   baclofen (LIORESAL) 5 MG tablet Take 1 tablet by mouth 3 times daily 6/4/21  Yes Tika Bennett MD   Liraglutide (VICTOZA SC) Inject 1.8 mg into the skin daily    Yes Historical Provider, MD   bumetanide (BUMEX) 2 MG tablet Take 1 tablet by mouth 2 times daily  Patient taking differently: Take 2 mg by mouth daily as needed  3/30/21  Yes Aurelio Lazo MD   potassium chloride (KLOR-CON M) 20 MEQ extended release tablet Take 1 tablet by mouth 2 times daily (with meals)  Patient taking differently: Take 20 mEq by mouth daily as needed  3/30/21  Yes Sánchez MAHMOOD Stephanie Segura MD   rOPINIRole (REQUIP) 2 MG tablet Take 1 tablet by mouth 4 times daily 20  Yes Gautam Razo DO   ammonium lactate (LAC-HYDRIN) 12 % cream Apply topically as needed. 10/8/20  Yes Sarah Phan DPM   Cream Base (VERSAPRO) CREA APPLY ONE (1) GRAM (ONE PUMP) EXTERNALLY FOUR TIMES A DAY TO EACH KNEE 3/23/20  Yes Brenda Orantes PA-C       Allergies   Allergen Reactions    Ferritin Shortness Of Breath and Other (See Comments)     Flushed,  Severe back pain       Social History     Socioeconomic History    Marital status:      Spouse name: Not on file    Number of children: 2    Years of education: 15    Highest education level: Associate degree: academic program   Occupational History    Not on file   Tobacco Use    Smoking status: Former Smoker     Packs/day: 0.25     Years: 38.00     Pack years: 9.50     Types: Cigarettes     Quit date: 3/11/2021     Years since quittin.8    Smokeless tobacco: Never Used    Tobacco comment: Pt states she quit [employer]"   Vaping Use    Vaping Use: Never used   Substance and Sexual Activity    Alcohol use: No    Drug use: No    Sexual activity: Not Currently   Other Topics Concern    Not on file   Social History Narrative        Chronic Diagnosis: Iron deficiency anemia, Depressive disorder, Benign essential hypertension, Mixed    hyperlipidemia.     HTN    OBESITY    LIPID    OA    SMOKER---quit   summer  2021    CVA L FIELD VISION    DEPRESSION    GASTRIC BYPASS 01    BREAST REDUCTION--6-04    Luxoragladys Haro  1955 Page #2    ANEMIA==IRON AND B-12    Admitted 2019 with cellulitis left lower extremity then readmitted with chest pain with negative stress test    L--3-4   lumbar surgeyr dr Crow Para     Social Determinants of Health     Financial Resource Strain: Low Risk     Difficulty of Paying Living Expenses: Not very hard   Food Insecurity: No Food Insecurity    Worried About Running Out of Food in the Last Year: Never true    Ran Out of Food in the Last Year: Never true   Transportation Needs: No Transportation Needs    Lack of Transportation (Medical): No    Lack of Transportation (Non-Medical): No   Physical Activity: Inactive    Days of Exercise per Week: 0 days    Minutes of Exercise per Session: 0 min   Stress: No Stress Concern Present    Feeling of Stress : Only a little   Social Connections: Moderately Integrated    Frequency of Communication with Friends and Family: More than three times a week    Frequency of Social Gatherings with Friends and Family: More than three times a week    Attends [employer] Services: More than 4 times per year    Active Member of [address] [place] [address] [employer] or Organizations: Yes    Attends Club or Organization Meetings: More than 4 times per year    Marital Status:    Intimate Partner Violence:     Fear of Current or Ex-Partner: Not on file    Emotionally Abused: Not on file    Physically Abused: Not on file    Sexually Abused: Not on file   Housing Stability:     Unable to Pay for Housing in the Last Year: Not on file    Number of [place] in the Last Year: Not on file    Unstable Housing in the Last Year: Not on file       Family History   Problem Relation Age of Onset    Breast Cancer Mother     Stroke Father     Hypertension Sister     Hypertension Brother        REVIEW OF SYSTEMS:     Esme Guerra denies fever/chills, chest pain, shortness of breath, new bowel or bladder complaints. All other review of systems was negative. PHYSICAL EXAMINATION:      /88   Pulse 92   Temp 97.8 °F (36.6 °C) (Infrared)   Resp 16   Ht 5' 2\" (1.575 m)   Wt 285 lb (129.3 kg)   SpO2 98%   BMI 52.13 kg/m²     General:       General appearance: awake, alert, oriented, in no acute distress, well developed, well nourished and in no acute distress   pleasant and well-hydrated.    , in no distress and A & O x3  Build:Obese  Function:Rises from a seated position with difficulty     Head:     Head:normocephalic and atraumatic  Pupils:regular, round and equal.  Sclera: icterus absent,   EOM:full and intact.     Lungs:     Breathing:Normal expansion. No wheezing.     Abdomen:     Shape:non-distended and normal    Lumbar spine:     Spine inspection:normal   CVA tenderness:No   Range of motion:abnormal mildly Lateral bending, flexion, extension rotation bilateral and is not painful.        Extremities:     Tremors:None bilaterally upper and lower  Range of motion:Generally limited extension shoulder - right, pain with internal rotation of hips not done. Intact:Yes  Varicose veins: Not assessed. Cyanosis:none    Left hand:  Negative Tinel's sign  No thenar wasting  Decreased sensation to all fingers left hand as compared to right hand. Neurological:     Cranial nerves:normal  Motor:   Coordination:normal  Gait:antalgic     Dermatology:     Skin: No skin changes.       Impression:    B/L Knee Pain, LBP, right shoulder pain  Knee x-rays:  Moderate DJD.    Hx of pain management with [facility] pain clinic.  Was on fentanyl 25 mg and norco 5/325 TID.  Did not help and caused drowsiness.  Now on Belbuca 450 mcg BID (increased from 300 mg BID) which is working very well without side effects. Transferred care to [facility]) due to cost.    Hx of LESI by Dr. 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  Bilateral segmental arthrodesis and fusion at L3-L4 with the use of  bilateral L3 and L4 pedicle screws with use of locally harvested  autograft plus allograft in the form of BioZorb strips for  posterolateral fusion at L3-L4.   2.  360-degree fusion with posterior lumbar interbody fusion at L3-L4  with use of bilateral Globus Rise expandable cages packed with locally  harvested autograft. 3.  Bilateral L3 and L4 laminectomy, bilateral L3-L4 medial facetectomy,  bilateral L3 and L4 foraminotomy, and bilateral L3-L4 diskectomy. 4.  Use of O-arm assisted navigation with placement of pedicle screws. 5.  Use of free-running EMG to test pedicle screw heads. 6.  Use of intraoperative fluoroscopy interpreted by myself, the  surgeon. 7.  22 modifier for degree of difficulty for the patient being morbidly  obese with a BMI of 51.21.  8.  AS modifier for Helena Friedman, [facility], who assisted with primary  exposure, primary closure, and retraction of neural elements on 05/13/2021 Dr. Jennifer Miranda. OARRS reviewed 01/2022  Continue Belbuca 450 mcg BID. B/L knee CSIs on 10/01/2021 with good relief for about 6 weeks. Repeat ordered. Patient also seen today for left hand numbness x 1 month that is very bothersome. EMG UEs ordered. When ordering knee injections:  Requests the same dosing as ortho.  1 ml Kenalog (40mg), 3ml PF 0.25% marcaine and 3ml 1% PF Lidocaine for knee injections this week. Controlled Substance Monitoring:    Acute and Chronic Pain Monitoring:   RX Monitoring 1/28/2022   Periodic Controlled Substance Monitoring Possible medication side effects, risk of tolerance/dependence & alternative treatments discussed. ;No signs of potential drug abuse or diversion identified. ;Assessed functional status. ;Obtaining appropriate analgesic effect of treatment.              We discussed with the patient that combining opioids, benzodiazepines, alcohol, illicit drugs or sleep aids increases the risk of respiratory depression including death. We discussed that these medications may cause drowsiness, sedation or dizziness and have counseled the patient not to drive or operate machinery. We have discussed that these medications will be prescribed only by one provider.  We have discussed with the patient about age related risk factors and have thoroughly discussed the importance of taking these medications as prescribed. The patient verbalizes understanding.     ccreferring physic

## 2022-01-28 ENCOUNTER — OFFICE VISIT (OUTPATIENT)
Dept: PAIN MANAGEMENT | Age: 67
End: 2022-01-28
Payer: MEDICARE

## 2022-01-28 VITALS
HEIGHT: 62 IN | HEART RATE: 92 BPM | OXYGEN SATURATION: 98 % | SYSTOLIC BLOOD PRESSURE: 138 MMHG | RESPIRATION RATE: 16 BRPM | BODY MASS INDEX: 52.44 KG/M2 | DIASTOLIC BLOOD PRESSURE: 88 MMHG | TEMPERATURE: 97.8 F | WEIGHT: 285 LBS

## 2022-01-28 DIAGNOSIS — M54.50 CHRONIC RIGHT-SIDED LOW BACK PAIN WITHOUT SCIATICA: ICD-10-CM

## 2022-01-28 DIAGNOSIS — G89.29 CHRONIC RIGHT-SIDED LOW BACK PAIN WITHOUT SCIATICA: ICD-10-CM

## 2022-01-28 DIAGNOSIS — R20.0 NUMBNESS AND TINGLING IN LEFT HAND: ICD-10-CM

## 2022-01-28 DIAGNOSIS — M25.511 CHRONIC RIGHT SHOULDER PAIN: ICD-10-CM

## 2022-01-28 DIAGNOSIS — G89.4 CHRONIC PAIN SYNDROME: Chronic | ICD-10-CM

## 2022-01-28 DIAGNOSIS — M17.0 PRIMARY OSTEOARTHRITIS OF BOTH KNEES: Primary | ICD-10-CM

## 2022-01-28 DIAGNOSIS — R20.2 NUMBNESS AND TINGLING IN LEFT HAND: ICD-10-CM

## 2022-01-28 DIAGNOSIS — G89.29 CHRONIC RIGHT SHOULDER PAIN: ICD-10-CM

## 2022-01-28 PROCEDURE — 1036F TOBACCO NON-USER: CPT | Performed by: PHYSICIAN ASSISTANT

## 2022-01-28 PROCEDURE — 3017F COLORECTAL CA SCREEN DOC REV: CPT | Performed by: PHYSICIAN ASSISTANT

## 2022-01-28 PROCEDURE — 99213 OFFICE O/P EST LOW 20 MIN: CPT | Performed by: PHYSICIAN ASSISTANT

## 2022-01-28 PROCEDURE — 1090F PRES/ABSN URINE INCON ASSESS: CPT | Performed by: PHYSICIAN ASSISTANT

## 2022-01-28 PROCEDURE — G8417 CALC BMI ABV UP PARAM F/U: HCPCS | Performed by: PHYSICIAN ASSISTANT

## 2022-01-28 PROCEDURE — G8427 DOCREV CUR MEDS BY ELIG CLIN: HCPCS | Performed by: PHYSICIAN ASSISTANT

## 2022-01-28 PROCEDURE — G8484 FLU IMMUNIZE NO ADMIN: HCPCS | Performed by: PHYSICIAN ASSISTANT

## 2022-01-28 PROCEDURE — G8400 PT W/DXA NO RESULTS DOC: HCPCS | Performed by: PHYSICIAN ASSISTANT

## 2022-01-28 PROCEDURE — 4040F PNEUMOC VAC/ADMIN/RCVD: CPT | Performed by: PHYSICIAN ASSISTANT

## 2022-01-28 PROCEDURE — 99214 OFFICE O/P EST MOD 30 MIN: CPT | Performed by: PHYSICIAN ASSISTANT

## 2022-01-28 PROCEDURE — 1123F ACP DISCUSS/DSCN MKR DOCD: CPT | Performed by: PHYSICIAN ASSISTANT

## 2022-01-28 RX ORDER — BUPRENORPHINE HYDROCHLORIDE 450 UG/1
450 FILM, SOLUBLE BUCCAL EVERY 12 HOURS
Qty: 60 EACH | Refills: 1 | Status: SHIPPED
Start: 2022-01-28 | End: 2022-03-29 | Stop reason: SDUPTHER

## 2022-01-28 NOTE — PROGRESS NOTES
Do you currently have any of the following:    Fever: No  Headache:  No  Cough: No  Shortness of breath: No  Exposed to anyone with these symptoms: No         Claudetta Bonier presents to the [facility] on 1/28/2022. Sesar Rao is complaining of pain knees/back  The pain is constant. The pain is described as aching, throbbing, shooting and stabbing. Pain is rated on her best day at a 6, on her worst day at a 10, and on average at a 10 on the VAS scale. She took her last dose of Belbuca . Any procedures since your last visit:     Pacemaker or defibrillator: No managed by     She is not on NSAIDS and is  on anticoagulation medications to include none and is managed by     Medication Contract and Consent for Opioid Use Documents Filed     Patient Documents     Type of Document Status Date Received Received By Description    Medication Contract Signed 9/6/2019 12:04 PM Raúl Leo med contract    Medication Contract Received 11/17/2020 10:57 AM David MORRISON med contract    Medication Contract Signed 8/4/2021 11:40 AM Jeremiah KIRBY [facility] EXPIRES 08.03.2022                /88   Pulse 92   Temp 97.8 °F (36.6 °C) (Infrared)   Resp 16   Ht 5' 2\" (1.575 m)   Wt 285 lb (129.3 kg)   SpO2 98%   BMI 52.13 kg/m²      No LMP recorded.  Patient is postmenopausal.

## 2022-01-31 ENCOUNTER — APPOINTMENT (OUTPATIENT)
Dept: GENERAL RADIOLOGY | Age: 67
DRG: 392 | End: 2022-01-31
Payer: MEDICARE

## 2022-01-31 ENCOUNTER — HOSPITAL ENCOUNTER (INPATIENT)
Age: 67
LOS: 2 days | Discharge: HOME | DRG: 392 | End: 2022-02-03
Attending: STUDENT IN AN ORGANIZED HEALTH CARE EDUCATION/TRAINING PROGRAM | Admitting: INTERNAL MEDICINE
Payer: MEDICARE

## 2022-01-31 DIAGNOSIS — N30.00 ACUTE CYSTITIS WITHOUT HEMATURIA: ICD-10-CM

## 2022-01-31 DIAGNOSIS — R07.9 CHEST PAIN, UNSPECIFIED TYPE: Primary | ICD-10-CM

## 2022-01-31 LAB
ALBUMIN SERPL-MCNC: 3.6 G/DL (ref 3.5–5.2)
ALP BLD-CCNC: 164 U/L (ref 35–104)
ALT SERPL-CCNC: 16 U/L (ref 0–32)
ANION GAP SERPL CALCULATED.3IONS-SCNC: 7 MMOL/L (ref 7–16)
APTT: 27.7 SEC (ref 24.5–35.1)
AST SERPL-CCNC: 19 U/L (ref 0–31)
BASOPHILS ABSOLUTE: 0.02 E9/L (ref 0–0.2)
BASOPHILS RELATIVE PERCENT: 0.2 % (ref 0–2)
BILIRUB SERPL-MCNC: 0.4 MG/DL (ref 0–1.2)
BUN BLDV-MCNC: 14 MG/DL (ref 6–23)
CALCIUM SERPL-MCNC: 9.1 MG/DL (ref 8.6–10.2)
CHLORIDE BLD-SCNC: 100 MMOL/L (ref 98–107)
CO2: 30 MMOL/L (ref 22–29)
CREAT SERPL-MCNC: 0.6 MG/DL (ref 0.5–1)
EOSINOPHILS ABSOLUTE: 0.17 E9/L (ref 0.05–0.5)
EOSINOPHILS RELATIVE PERCENT: 1.9 % (ref 0–6)
GFR AFRICAN AMERICAN: >60
GFR NON-AFRICAN AMERICAN: >60 ML/MIN/1.73
GLUCOSE BLD-MCNC: 114 MG/DL (ref 74–99)
HCT VFR BLD CALC: 41.7 % (ref 34–48)
HEMOGLOBIN: 12.1 G/DL (ref 11.5–15.5)
IMMATURE GRANULOCYTES #: 0.04 E9/L
IMMATURE GRANULOCYTES %: 0.4 % (ref 0–5)
INR BLD: 1.1
LYMPHOCYTES ABSOLUTE: 1.51 E9/L (ref 1.5–4)
LYMPHOCYTES RELATIVE PERCENT: 16.5 % (ref 20–42)
MCH RBC QN AUTO: 22.8 PG (ref 26–35)
MCHC RBC AUTO-ENTMCNC: 29 % (ref 32–34.5)
MCV RBC AUTO: 78.5 FL (ref 80–99.9)
MONOCYTES ABSOLUTE: 0.72 E9/L (ref 0.1–0.95)
MONOCYTES RELATIVE PERCENT: 7.9 % (ref 2–12)
NEUTROPHILS ABSOLUTE: 6.68 E9/L (ref 1.8–7.3)
NEUTROPHILS RELATIVE PERCENT: 73.1 % (ref 43–80)
PDW BLD-RTO: 18.1 FL (ref 11.5–15)
PLATELET # BLD: 190 E9/L (ref 130–450)
PMV BLD AUTO: 10.9 FL (ref 7–12)
POTASSIUM REFLEX MAGNESIUM: 4.2 MMOL/L (ref 3.5–5)
PRO-BNP: 212 PG/ML (ref 0–125)
PROTHROMBIN TIME: 11.4 SEC (ref 9.3–12.4)
RBC # BLD: 5.31 E12/L (ref 3.5–5.5)
SODIUM BLD-SCNC: 137 MMOL/L (ref 132–146)
TOTAL PROTEIN: 7.2 G/DL (ref 6.4–8.3)
TROPONIN, HIGH SENSITIVITY: 15 NG/L (ref 0–9)
WBC # BLD: 9.1 E9/L (ref 4.5–11.5)

## 2022-01-31 PROCEDURE — 83880 ASSAY OF NATRIURETIC PEPTIDE: CPT

## 2022-01-31 PROCEDURE — 6370000000 HC RX 637 (ALT 250 FOR IP): Performed by: STUDENT IN AN ORGANIZED HEALTH CARE EDUCATION/TRAINING PROGRAM

## 2022-01-31 PROCEDURE — 80053 COMPREHEN METABOLIC PANEL: CPT

## 2022-01-31 PROCEDURE — 87635 SARS-COV-2 COVID-19 AMP PRB: CPT

## 2022-01-31 PROCEDURE — 83690 ASSAY OF LIPASE: CPT

## 2022-01-31 PROCEDURE — 85025 COMPLETE CBC W/AUTO DIFF WBC: CPT

## 2022-01-31 PROCEDURE — 93005 ELECTROCARDIOGRAM TRACING: CPT | Performed by: STUDENT IN AN ORGANIZED HEALTH CARE EDUCATION/TRAINING PROGRAM

## 2022-01-31 PROCEDURE — 85610 PROTHROMBIN TIME: CPT

## 2022-01-31 PROCEDURE — 71045 X-RAY EXAM CHEST 1 VIEW: CPT

## 2022-01-31 PROCEDURE — 84484 ASSAY OF TROPONIN QUANT: CPT

## 2022-01-31 PROCEDURE — 99285 EMERGENCY DEPT VISIT HI MDM: CPT

## 2022-01-31 PROCEDURE — 85730 THROMBOPLASTIN TIME PARTIAL: CPT

## 2022-01-31 PROCEDURE — 6360000002 HC RX W HCPCS: Performed by: STUDENT IN AN ORGANIZED HEALTH CARE EDUCATION/TRAINING PROGRAM

## 2022-01-31 PROCEDURE — 96375 TX/PRO/DX INJ NEW DRUG ADDON: CPT

## 2022-01-31 RX ORDER — FENTANYL CITRATE 50 UG/ML
50 INJECTION, SOLUTION INTRAMUSCULAR; INTRAVENOUS ONCE
Status: COMPLETED | OUTPATIENT
Start: 2022-01-31 | End: 2022-01-31

## 2022-01-31 RX ORDER — NITROGLYCERIN 0.4 MG/1
0.4 TABLET SUBLINGUAL ONCE
Status: COMPLETED | OUTPATIENT
Start: 2022-01-31 | End: 2022-01-31

## 2022-01-31 RX ADMIN — FENTANYL CITRATE 50 MCG: 0.05 INJECTION, SOLUTION INTRAMUSCULAR; INTRAVENOUS at 22:27

## 2022-01-31 RX ADMIN — NITROGLYCERIN 0.4 MG: 0.4 TABLET SUBLINGUAL at 22:26

## 2022-01-31 ASSESSMENT — PAIN SCALES - GENERAL
PAINLEVEL_OUTOF10: 10
PAINLEVEL_OUTOF10: 10

## 2022-01-31 ASSESSMENT — PAIN DESCRIPTION - FREQUENCY: FREQUENCY: CONTINUOUS

## 2022-01-31 ASSESSMENT — PAIN DESCRIPTION - DESCRIPTORS: DESCRIPTORS: PRESSURE;SHARP;STABBING

## 2022-01-31 ASSESSMENT — PAIN DESCRIPTION - LOCATION: LOCATION: CHEST

## 2022-01-31 ASSESSMENT — PAIN DESCRIPTION - PAIN TYPE: TYPE: ACUTE PAIN

## 2022-01-31 ASSESSMENT — PAIN DESCRIPTION - ONSET: ONSET: ON-GOING

## 2022-01-31 ASSESSMENT — PAIN DESCRIPTION - ORIENTATION: ORIENTATION: MID

## 2022-02-01 ENCOUNTER — APPOINTMENT (OUTPATIENT)
Dept: CT IMAGING | Age: 67
DRG: 392 | End: 2022-02-01
Payer: MEDICARE

## 2022-02-01 PROBLEM — R07.9 CHEST PAIN: Status: ACTIVE | Noted: 2022-02-01

## 2022-02-01 PROBLEM — R10.9 ABDOMINAL PAIN: Status: ACTIVE | Noted: 2022-02-01

## 2022-02-01 PROBLEM — G89.29 CHRONIC LOW BACK PAIN WITHOUT SCIATICA: Status: ACTIVE | Noted: 2022-02-01

## 2022-02-01 PROBLEM — K85.90 ACUTE PANCREATITIS WITHOUT NECROSIS OR INFECTION, UNSPECIFIED: Status: ACTIVE | Noted: 2022-02-01

## 2022-02-01 PROBLEM — M54.50 CHRONIC LOW BACK PAIN WITHOUT SCIATICA: Status: ACTIVE | Noted: 2022-02-01

## 2022-02-01 LAB
ALBUMIN SERPL-MCNC: 3.2 G/DL (ref 3.5–5.2)
ALP BLD-CCNC: 154 U/L (ref 35–104)
ALT SERPL-CCNC: 14 U/L (ref 0–32)
AMYLASE: 33 U/L (ref 20–100)
ANION GAP SERPL CALCULATED.3IONS-SCNC: 11 MMOL/L (ref 7–16)
AST SERPL-CCNC: 15 U/L (ref 0–31)
BACTERIA: ABNORMAL /HPF
BILIRUB SERPL-MCNC: 0.4 MG/DL (ref 0–1.2)
BILIRUBIN URINE: NEGATIVE
BLOOD, URINE: NEGATIVE
BUN BLDV-MCNC: 9 MG/DL (ref 6–23)
CALCIUM SERPL-MCNC: 8.7 MG/DL (ref 8.6–10.2)
CHLORIDE BLD-SCNC: 104 MMOL/L (ref 98–107)
CLARITY: CLEAR
CO2: 23 MMOL/L (ref 22–29)
COLOR: YELLOW
CREAT SERPL-MCNC: 0.5 MG/DL (ref 0.5–1)
D DIMER: 824 NG/ML DDU
EKG ATRIAL RATE: 93 BPM
EKG P AXIS: 62 DEGREES
EKG P-R INTERVAL: 188 MS
EKG Q-T INTERVAL: 346 MS
EKG QRS DURATION: 70 MS
EKG QTC CALCULATION (BAZETT): 430 MS
EKG R AXIS: 20 DEGREES
EKG T AXIS: 55 DEGREES
EKG VENTRICULAR RATE: 93 BPM
GFR AFRICAN AMERICAN: >60
GFR NON-AFRICAN AMERICAN: >60 ML/MIN/1.73
GLUCOSE BLD-MCNC: 101 MG/DL (ref 74–99)
GLUCOSE URINE: NEGATIVE MG/DL
HCT VFR BLD CALC: 41.6 % (ref 34–48)
HEMOGLOBIN: 12.1 G/DL (ref 11.5–15.5)
KETONES, URINE: NEGATIVE MG/DL
LEUKOCYTE ESTERASE, URINE: NEGATIVE
LIPASE: 35 U/L (ref 13–60)
LIPASE: 39 U/L (ref 13–60)
MCH RBC QN AUTO: 22.6 PG (ref 26–35)
MCHC RBC AUTO-ENTMCNC: 29.1 % (ref 32–34.5)
MCV RBC AUTO: 77.8 FL (ref 80–99.9)
NITRITE, URINE: POSITIVE
PDW BLD-RTO: 18.7 FL (ref 11.5–15)
PH UA: 7 (ref 5–9)
PLATELET # BLD: 158 E9/L (ref 130–450)
PMV BLD AUTO: 12 FL (ref 7–12)
POTASSIUM SERPL-SCNC: 4.2 MMOL/L (ref 3.5–5)
PROTEIN UA: NEGATIVE MG/DL
RBC # BLD: 5.35 E12/L (ref 3.5–5.5)
RBC UA: ABNORMAL /HPF (ref 0–2)
SARS-COV-2, NAAT: NOT DETECTED
SODIUM BLD-SCNC: 138 MMOL/L (ref 132–146)
SPECIFIC GRAVITY UA: 1.02 (ref 1–1.03)
TOTAL PROTEIN: 6.8 G/DL (ref 6.4–8.3)
TROPONIN, HIGH SENSITIVITY: 13 NG/L (ref 0–9)
UROBILINOGEN, URINE: 0.2 E.U./DL
WBC # BLD: 7 E9/L (ref 4.5–11.5)
WBC UA: ABNORMAL /HPF (ref 0–5)

## 2022-02-01 PROCEDURE — 85378 FIBRIN DEGRADE SEMIQUANT: CPT

## 2022-02-01 PROCEDURE — 96365 THER/PROPH/DIAG IV INF INIT: CPT

## 2022-02-01 PROCEDURE — 87186 SC STD MICRODIL/AGAR DIL: CPT

## 2022-02-01 PROCEDURE — 83690 ASSAY OF LIPASE: CPT

## 2022-02-01 PROCEDURE — 99222 1ST HOSP IP/OBS MODERATE 55: CPT | Performed by: NEUROLOGICAL SURGERY

## 2022-02-01 PROCEDURE — 82150 ASSAY OF AMYLASE: CPT

## 2022-02-01 PROCEDURE — 80053 COMPREHEN METABOLIC PANEL: CPT

## 2022-02-01 PROCEDURE — 71275 CT ANGIOGRAPHY CHEST: CPT

## 2022-02-01 PROCEDURE — 6360000002 HC RX W HCPCS: Performed by: INTERNAL MEDICINE

## 2022-02-01 PROCEDURE — C9113 INJ PANTOPRAZOLE SODIUM, VIA: HCPCS | Performed by: INTERNAL MEDICINE

## 2022-02-01 PROCEDURE — 6370000000 HC RX 637 (ALT 250 FOR IP): Performed by: INTERNAL MEDICINE

## 2022-02-01 PROCEDURE — 6370000000 HC RX 637 (ALT 250 FOR IP): Performed by: STUDENT IN AN ORGANIZED HEALTH CARE EDUCATION/TRAINING PROGRAM

## 2022-02-01 PROCEDURE — 6360000002 HC RX W HCPCS: Performed by: STUDENT IN AN ORGANIZED HEALTH CARE EDUCATION/TRAINING PROGRAM

## 2022-02-01 PROCEDURE — 6360000004 HC RX CONTRAST MEDICATION: Performed by: RADIOLOGY

## 2022-02-01 PROCEDURE — 81001 URINALYSIS AUTO W/SCOPE: CPT

## 2022-02-01 PROCEDURE — 2580000003 HC RX 258: Performed by: INTERNAL MEDICINE

## 2022-02-01 PROCEDURE — APPSS45 APP SPLIT SHARED TIME 31-45 MINUTES: Performed by: NURSE PRACTITIONER

## 2022-02-01 PROCEDURE — A4216 STERILE WATER/SALINE, 10 ML: HCPCS | Performed by: INTERNAL MEDICINE

## 2022-02-01 PROCEDURE — 36415 COLL VENOUS BLD VENIPUNCTURE: CPT

## 2022-02-01 PROCEDURE — 74174 CTA ABD&PLVS W/CONTRAST: CPT

## 2022-02-01 PROCEDURE — 96376 TX/PRO/DX INJ SAME DRUG ADON: CPT

## 2022-02-01 PROCEDURE — 93010 ELECTROCARDIOGRAM REPORT: CPT | Performed by: INTERNAL MEDICINE

## 2022-02-01 PROCEDURE — 1200000000 HC SEMI PRIVATE

## 2022-02-01 PROCEDURE — 85027 COMPLETE CBC AUTOMATED: CPT

## 2022-02-01 PROCEDURE — 87088 URINE BACTERIA CULTURE: CPT

## 2022-02-01 PROCEDURE — 2580000003 HC RX 258: Performed by: STUDENT IN AN ORGANIZED HEALTH CARE EDUCATION/TRAINING PROGRAM

## 2022-02-01 PROCEDURE — 99223 1ST HOSP IP/OBS HIGH 75: CPT | Performed by: INTERNAL MEDICINE

## 2022-02-01 RX ORDER — SODIUM CHLORIDE 0.9 % (FLUSH) 0.9 %
5-40 SYRINGE (ML) INJECTION PRN
Status: DISCONTINUED | OUTPATIENT
Start: 2022-02-01 | End: 2022-02-01 | Stop reason: SDUPTHER

## 2022-02-01 RX ORDER — ACETAMINOPHEN 650 MG/1
650 SUPPOSITORY RECTAL EVERY 6 HOURS PRN
Status: DISCONTINUED | OUTPATIENT
Start: 2022-02-01 | End: 2022-02-03 | Stop reason: HOSPADM

## 2022-02-01 RX ORDER — ROPINIROLE 2 MG/1
2 TABLET, FILM COATED ORAL 4 TIMES DAILY
Status: DISCONTINUED | OUTPATIENT
Start: 2022-02-01 | End: 2022-02-03 | Stop reason: HOSPADM

## 2022-02-01 RX ORDER — SODIUM CHLORIDE 0.9 % (FLUSH) 0.9 %
5-40 SYRINGE (ML) INJECTION EVERY 12 HOURS SCHEDULED
Status: DISCONTINUED | OUTPATIENT
Start: 2022-02-01 | End: 2022-02-01 | Stop reason: SDUPTHER

## 2022-02-01 RX ORDER — PANTOPRAZOLE SODIUM 40 MG/10ML
40 INJECTION, POWDER, LYOPHILIZED, FOR SOLUTION INTRAVENOUS DAILY
Status: DISCONTINUED | OUTPATIENT
Start: 2022-02-01 | End: 2022-02-02

## 2022-02-01 RX ORDER — ONDANSETRON 4 MG/1
4 TABLET, ORALLY DISINTEGRATING ORAL EVERY 8 HOURS PRN
Status: DISCONTINUED | OUTPATIENT
Start: 2022-02-01 | End: 2022-02-03 | Stop reason: HOSPADM

## 2022-02-01 RX ORDER — ONDANSETRON 2 MG/ML
4 INJECTION INTRAMUSCULAR; INTRAVENOUS EVERY 6 HOURS PRN
Status: DISCONTINUED | OUTPATIENT
Start: 2022-02-01 | End: 2022-02-03 | Stop reason: HOSPADM

## 2022-02-01 RX ORDER — POLYETHYLENE GLYCOL 3350 17 G/17G
17 POWDER, FOR SOLUTION ORAL DAILY PRN
Status: DISCONTINUED | OUTPATIENT
Start: 2022-02-01 | End: 2022-02-03 | Stop reason: HOSPADM

## 2022-02-01 RX ORDER — SODIUM CHLORIDE 0.9 % (FLUSH) 0.9 %
5-40 SYRINGE (ML) INJECTION EVERY 12 HOURS SCHEDULED
Status: DISCONTINUED | OUTPATIENT
Start: 2022-02-01 | End: 2022-02-03 | Stop reason: HOSPADM

## 2022-02-01 RX ORDER — BUPROPION HYDROCHLORIDE 150 MG/1
150 TABLET ORAL EVERY MORNING
Status: DISCONTINUED | OUTPATIENT
Start: 2022-02-01 | End: 2022-02-03 | Stop reason: HOSPADM

## 2022-02-01 RX ORDER — BACLOFEN 10 MG/1
5 TABLET ORAL 3 TIMES DAILY
Status: DISCONTINUED | OUTPATIENT
Start: 2022-02-01 | End: 2022-02-03 | Stop reason: HOSPADM

## 2022-02-01 RX ORDER — GABAPENTIN 300 MG/1
300 CAPSULE ORAL 3 TIMES DAILY
Status: DISCONTINUED | OUTPATIENT
Start: 2022-02-01 | End: 2022-02-03 | Stop reason: HOSPADM

## 2022-02-01 RX ORDER — SODIUM CHLORIDE 9 MG/ML
25 INJECTION, SOLUTION INTRAVENOUS PRN
Status: DISCONTINUED | OUTPATIENT
Start: 2022-02-01 | End: 2022-02-03 | Stop reason: HOSPADM

## 2022-02-01 RX ORDER — MORPHINE SULFATE 2 MG/ML
2 INJECTION, SOLUTION INTRAMUSCULAR; INTRAVENOUS EVERY 4 HOURS PRN
Status: DISCONTINUED | OUTPATIENT
Start: 2022-02-01 | End: 2022-02-03 | Stop reason: HOSPADM

## 2022-02-01 RX ORDER — SODIUM CHLORIDE 9 MG/ML
INJECTION, SOLUTION INTRAVENOUS CONTINUOUS
Status: DISCONTINUED | OUTPATIENT
Start: 2022-02-01 | End: 2022-02-02

## 2022-02-01 RX ORDER — SODIUM CHLORIDE 0.9 % (FLUSH) 0.9 %
5-40 SYRINGE (ML) INJECTION PRN
Status: DISCONTINUED | OUTPATIENT
Start: 2022-02-01 | End: 2022-02-03 | Stop reason: HOSPADM

## 2022-02-01 RX ORDER — ACETAMINOPHEN 325 MG/1
650 TABLET ORAL EVERY 4 HOURS PRN
Status: DISCONTINUED | OUTPATIENT
Start: 2022-02-01 | End: 2022-02-01 | Stop reason: SDUPTHER

## 2022-02-01 RX ORDER — ROPINIROLE 2 MG/1
2 TABLET, FILM COATED ORAL ONCE
Status: COMPLETED | OUTPATIENT
Start: 2022-02-01 | End: 2022-02-01

## 2022-02-01 RX ORDER — MORPHINE SULFATE 2 MG/ML
2 INJECTION, SOLUTION INTRAMUSCULAR; INTRAVENOUS
Status: DISCONTINUED | OUTPATIENT
Start: 2022-02-01 | End: 2022-02-01

## 2022-02-01 RX ORDER — 0.9 % SODIUM CHLORIDE 0.9 %
1000 INTRAVENOUS SOLUTION INTRAVENOUS ONCE
Status: COMPLETED | OUTPATIENT
Start: 2022-02-01 | End: 2022-02-01

## 2022-02-01 RX ORDER — FENTANYL CITRATE 50 UG/ML
50 INJECTION, SOLUTION INTRAMUSCULAR; INTRAVENOUS ONCE
Status: COMPLETED | OUTPATIENT
Start: 2022-02-01 | End: 2022-02-01

## 2022-02-01 RX ORDER — ACETAMINOPHEN 325 MG/1
650 TABLET ORAL EVERY 6 HOURS PRN
Status: DISCONTINUED | OUTPATIENT
Start: 2022-02-01 | End: 2022-02-03 | Stop reason: HOSPADM

## 2022-02-01 RX ORDER — LABETALOL 200 MG/1
200 TABLET, FILM COATED ORAL EVERY 12 HOURS SCHEDULED
Status: DISCONTINUED | OUTPATIENT
Start: 2022-02-01 | End: 2022-02-03 | Stop reason: HOSPADM

## 2022-02-01 RX ORDER — SODIUM CHLORIDE 9 MG/ML
10 INJECTION INTRAVENOUS DAILY
Status: DISCONTINUED | OUTPATIENT
Start: 2022-02-01 | End: 2022-02-03 | Stop reason: HOSPADM

## 2022-02-01 RX ORDER — SODIUM CHLORIDE 9 MG/ML
25 INJECTION, SOLUTION INTRAVENOUS PRN
Status: DISCONTINUED | OUTPATIENT
Start: 2022-02-01 | End: 2022-02-01 | Stop reason: SDUPTHER

## 2022-02-01 RX ADMIN — FENTANYL CITRATE 50 MCG: 0.05 INJECTION, SOLUTION INTRAMUSCULAR; INTRAVENOUS at 01:34

## 2022-02-01 RX ADMIN — ENOXAPARIN SODIUM 40 MG: 100 INJECTION SUBCUTANEOUS at 09:42

## 2022-02-01 RX ADMIN — LABETALOL HYDROCHLORIDE 200 MG: 200 TABLET, FILM COATED ORAL at 14:00

## 2022-02-01 RX ADMIN — SODIUM CHLORIDE: 9 INJECTION, SOLUTION INTRAVENOUS at 22:39

## 2022-02-01 RX ADMIN — LABETALOL HYDROCHLORIDE 200 MG: 200 TABLET, FILM COATED ORAL at 22:02

## 2022-02-01 RX ADMIN — SODIUM CHLORIDE 10 ML: 9 INJECTION INTRAMUSCULAR; INTRAVENOUS; SUBCUTANEOUS at 09:42

## 2022-02-01 RX ADMIN — GABAPENTIN 300 MG: 300 CAPSULE ORAL at 14:00

## 2022-02-01 RX ADMIN — ROPINIROLE HYDROCHLORIDE 2 MG: 2 TABLET, FILM COATED ORAL at 22:02

## 2022-02-01 RX ADMIN — GABAPENTIN 300 MG: 300 CAPSULE ORAL at 22:02

## 2022-02-01 RX ADMIN — PANTOPRAZOLE SODIUM 40 MG: 40 INJECTION, POWDER, FOR SOLUTION INTRAVENOUS at 09:38

## 2022-02-01 RX ADMIN — MORPHINE SULFATE 2 MG: 2 INJECTION, SOLUTION INTRAMUSCULAR; INTRAVENOUS at 09:36

## 2022-02-01 RX ADMIN — SODIUM CHLORIDE 1000 ML: 9 INJECTION, SOLUTION INTRAVENOUS at 02:27

## 2022-02-01 RX ADMIN — IOPAMIDOL 90 ML: 755 INJECTION, SOLUTION INTRAVENOUS at 00:26

## 2022-02-01 RX ADMIN — GABAPENTIN 300 MG: 300 CAPSULE ORAL at 09:42

## 2022-02-01 RX ADMIN — SODIUM CHLORIDE: 9 INJECTION, SOLUTION INTRAVENOUS at 13:09

## 2022-02-01 RX ADMIN — BACLOFEN 5 MG: 10 TABLET ORAL at 14:00

## 2022-02-01 RX ADMIN — MORPHINE SULFATE 2 MG: 2 INJECTION, SOLUTION INTRAMUSCULAR; INTRAVENOUS at 04:25

## 2022-02-01 RX ADMIN — BACLOFEN 5 MG: 10 TABLET ORAL at 22:02

## 2022-02-01 RX ADMIN — SODIUM CHLORIDE, PRESERVATIVE FREE 10 ML: 5 INJECTION INTRAVENOUS at 22:05

## 2022-02-01 RX ADMIN — BACLOFEN 5 MG: 10 TABLET ORAL at 09:42

## 2022-02-01 RX ADMIN — LIDOCAINE HYDROCHLORIDE: 20 SOLUTION ORAL; TOPICAL at 18:49

## 2022-02-01 RX ADMIN — MORPHINE SULFATE 2 MG: 2 INJECTION, SOLUTION INTRAMUSCULAR; INTRAVENOUS at 14:00

## 2022-02-01 RX ADMIN — BUPROPION HYDROCHLORIDE 150 MG: 150 TABLET, EXTENDED RELEASE ORAL at 09:42

## 2022-02-01 RX ADMIN — ROPINIROLE HYDROCHLORIDE 2 MG: 2 TABLET, FILM COATED ORAL at 03:26

## 2022-02-01 RX ADMIN — SODIUM CHLORIDE, PRESERVATIVE FREE 10 ML: 5 INJECTION INTRAVENOUS at 09:38

## 2022-02-01 RX ADMIN — CEFTRIAXONE 1000 MG: 1 INJECTION, POWDER, FOR SOLUTION INTRAMUSCULAR; INTRAVENOUS at 02:27

## 2022-02-01 ASSESSMENT — ENCOUNTER SYMPTOMS
EYE DISCHARGE: 0
SINUS PRESSURE: 0
EYE PAIN: 0
CHEST TIGHTNESS: 1
SHORTNESS OF BREATH: 0
NAUSEA: 0
BLOOD IN STOOL: 0
CONSTIPATION: 0
DIARRHEA: 0
ABDOMINAL DISTENTION: 0
COUGH: 0
WHEEZING: 0
VOMITING: 0
ANAL BLEEDING: 0
NAUSEA: 1
SORE THROAT: 0
EYE REDNESS: 0
BACK PAIN: 0
ABDOMINAL PAIN: 1

## 2022-02-01 ASSESSMENT — PAIN SCALES - GENERAL
PAINLEVEL_OUTOF10: 8
PAINLEVEL_OUTOF10: 3
PAINLEVEL_OUTOF10: 9
PAINLEVEL_OUTOF10: 6
PAINLEVEL_OUTOF10: 7
PAINLEVEL_OUTOF10: 9
PAINLEVEL_OUTOF10: 6
PAINLEVEL_OUTOF10: 7

## 2022-02-01 ASSESSMENT — PAIN DESCRIPTION - LOCATION: LOCATION: GENERALIZED

## 2022-02-01 ASSESSMENT — PAIN DESCRIPTION - PAIN TYPE: TYPE: CHRONIC PAIN

## 2022-02-01 NOTE — ED PROVIDER NOTES
[facility]  Department of Emergency Medicine     Written by: Sunitha Lora DO  Patient Name: Joyce Cassidy  Attending Provider: Megan Nichole MD  Admit Date: 2022  9:38 PM  MRN: [id number]                   : 1955        Chief Complaint   Patient presents with    Chest Pain     pressure stabbing radiates to back, no hx, pain 1010, leg swelling    - Chief complaint    Patient is a 70-year-old female past medical history of CVA, type 2 diabetes and depression. Patient presents with chief complaint of chest pain and epigastric abdominal pain. Patient stated symptoms went earlier this morning. States the pain is located in epigastric region. She describes the pain as a sharp pressure-like sensation. Currently rates pain 10 out of 10. Notes the pain radiates to her back. Patient denies any exacerbating relieving factors. Stated symptoms have been constant since onset. Patient denies any similar episodes in the past.  Patient denies any fevers, chills, nausea, vomiting, constipation or diarrhea. The history is provided by the patient. No  was used. Review of Systems   Constitutional: Negative for chills and fever. HENT: Negative for ear pain, sinus pressure and sore throat. Eyes: Negative for pain, discharge and redness. Respiratory: Negative for cough, shortness of breath and wheezing. Cardiovascular: Positive for chest pain. Gastrointestinal: Positive for abdominal pain. Negative for abdominal distention, diarrhea, nausea and vomiting. Genitourinary: Negative for dysuria and frequency. Musculoskeletal: Negative for arthralgias and back pain. Skin: Negative for rash and wound. Neurological: Negative for weakness and headaches. Hematological: Negative for adenopathy. All other systems reviewed and are negative. Physical Exam  Vitals and nursing note reviewed.    Constitutional:       General: She is not in acute distress. Appearance: Normal appearance. HENT:      Head: Normocephalic and atraumatic. Nose: No congestion or rhinorrhea. Mouth/Throat:      Mouth: Mucous membranes are moist.      Pharynx: Oropharynx is clear. Eyes:      Extraocular Movements: Extraocular movements intact. Pupils: Pupils are equal, round, and reactive to light. Cardiovascular:      Rate and Rhythm: Normal rate and regular rhythm. Heart sounds: No murmur heard. No gallop. Pulmonary:      Effort: Pulmonary effort is normal. No respiratory distress. Breath sounds: No wheezing, rhonchi or rales. Abdominal:      General: Abdomen is flat. Palpations: Abdomen is soft. There is no mass. Tenderness: There is abdominal tenderness (Epigastric). There is no guarding. Hernia: No hernia is present. Musculoskeletal:         General: No swelling, tenderness or signs of injury. Normal range of motion. Cervical back: Normal range of motion. No rigidity. No muscular tenderness. Skin:     General: Skin is warm and dry. Capillary Refill: Capillary refill takes less than 2 seconds. Neurological:      General: No focal deficit present. Mental Status: She is alert and oriented to person, place, and time. Mental status is at baseline. Psychiatric:         Mood and Affect: Mood normal.         Behavior: Behavior normal.          Procedures       MDM  Number of Diagnoses or Management Options  Acute cystitis without hematuria  Chest pain, unspecified type  Diagnosis management comments: Patient is a 71-year-old female past med history of CVA, type 2 diabetes and depression. Patient density complaint of chest pain as well as epigastric abdominal pain. Vital signs stable presentation except for hypertension. Physical exam heart regular rate and rhythm, lungs clear to auscultation bilaterally, abdomen soft with epigastric abdominal pain no rigidity rebound or guarding.   EKG obtained demonstrated normal sinus rhythm no acute ST segment changesLaboratory work obtained CBC unremarkable, CMP unremarkable, proBNP 212, troponin obtained initially 15 on repeat 13, lipase 39, COVID-19 test was obtained was negative, urinalysis obtained and was 7 days of infection with many bacteria and positive nitrate. APTT 27.7, INR 1.1. Due to chest pain radiating to the back as well as hypotension decision made to obtain CTA of the chest as well as abdomen pelvis. CTA negative. CT abdomen pelvis demonstrated possible pancreatitis of fluid surrounding the pancreas. Patient given fentanyl x2, nitro and Rocephin as well as IV fluids with some improvement in symptoms. Plan consistent with abdominal pain likely secondary to pancreatitis and UTI. Decision made to admit patient. Case discussed with PCP who agreed to admit patient. Plan of care discussed with patient include admission, all questions were answered, patient was in agreement plan of care and admitted to the hospital in stable condition.        Amount and/or Complexity of Data Reviewed  Clinical lab tests: ordered and reviewed  Tests in the radiology section of CPT®: ordered and reviewed  Decide to obtain previous medical records or to obtain history from someone other than the patient: yes    Risk of Complications, Morbidity, and/or Mortality  Presenting problems: moderate  Diagnostic procedures: moderate  Management options: moderate    Patient Progress  Patient progress: stable           --------------------------------------------- PAST HISTORY ---------------------------------------------  Past Medical History:  has a past medical history of Anemia, Arthritis, Cellulitis, Chronic ulcer of leg with fat layer exposed ([address] Chronic ulcer of right leg, limited to breakdown of skin ([address] COVID-19, Depression, Full dentures, Low back pain, Lymphedema of both lower extremities, Obesity, Osteoarthritis, Peripheral vision loss, Stroke ([address] Type 2 diabetes mellitus without complication, without long-term current use of insulin ([address] Ulcer of left lower leg, limited to breakdown of skin ([address] Ulcer of left lower leg, limited to breakdown of skin ([address] and UTI (urinary tract infection). Past Surgical History:  has a past surgical history that includes ECHO Complete 2D W Doppler W Color (2012);  section; Gastric bypass surgery (); Abdominoplasty (); Cataract removal; hernia repair; Colonoscopy (2012); Breast reduction surgery; Upper gastrointestinal endoscopy (2021); Colonoscopy (2021); Upper gastrointestinal endoscopy (N/A, 2021); Colonoscopy (N/A, 2021); and Lumbar spine surgery (N/A, 2021). Social History:  reports that she quit smoking about 10 months ago. Her smoking use included cigarettes. She has a 9.50 pack-year smoking history. She has never used smokeless tobacco. She reports that she does not drink alcohol and does not use drugs. Family History: family history includes Breast Cancer in her mother; Hypertension in her brother and sister; Stroke in her father. The patients home medications have been reviewed.     Allergies: Ferritin    -------------------------------------------------- RESULTS -------------------------------------------------    LABS:  Results for orders placed or performed during the hospital encounter of 22   COVID-19, Rapid    Specimen: Nasopharyngeal Swab   Result Value Ref Range    SARS-CoV-2, NAAT Not Detected Not Detected   CBC Auto Differential   Result Value Ref Range    WBC 9.1 4.5 - 11.5 E9/L    RBC 5.31 3.50 - 5.50 E12/L    Hemoglobin 12.1 11.5 - 15.5 g/dL    Hematocrit 41.7 34.0 - 48.0 %    MCV 78.5 (L) 80.0 - 99.9 fL    MCH 22.8 (L) 26.0 - 35.0 pg    MCHC 29.0 (L) 32.0 - 34.5 %    RDW 18.1 (H) 11.5 - 15.0 fL    Platelets 634 008 - 596 E9/L    MPV 10.9 7.0 - 12.0 fL    Neutrophils % 73.1 43.0 - 80.0 %    Immature Granulocytes % 0.4 0.0 - 5.0 %    Lymphocytes % 16.5 (L) 20.0 - 42.0 %    Monocytes % 7.9 2.0 - 12.0 %    Eosinophils % 1.9 0.0 - 6.0 %    Basophils % 0.2 0.0 - 2.0 %    Neutrophils Absolute 6.68 1.80 - 7.30 E9/L    Immature Granulocytes # 0.04 E9/L    Lymphocytes Absolute 1.51 1.50 - 4.00 E9/L    Monocytes Absolute 0.72 0.10 - 0.95 E9/L    Eosinophils Absolute 0.17 0.05 - 0.50 E9/L    Basophils Absolute 0.02 0.00 - 0.20 E9/L   Comprehensive Metabolic Panel w/ Reflex to MG   Result Value Ref Range    Sodium 137 132 - 146 mmol/L    Potassium reflex Magnesium 4.2 3.5 - 5.0 mmol/L    Chloride 100 98 - 107 mmol/L    CO2 30 (H) 22 - 29 mmol/L    Anion Gap 7 7 - 16 mmol/L    Glucose 114 (H) 74 - 99 mg/dL    BUN 14 6 - 23 mg/dL    CREATININE 0.6 0.5 - 1.0 mg/dL    GFR Non-African American >60 >=60 mL/min/1.73    GFR African American >60     Calcium 9.1 8.6 - 10.2 mg/dL    Total Protein 7.2 6.4 - 8.3 g/dL    Albumin 3.6 3.5 - 5.2 g/dL    Total Bilirubin 0.4 0.0 - 1.2 mg/dL    Alkaline Phosphatase 164 (H) 35 - 104 U/L    ALT 16 0 - 32 U/L    AST 19 0 - 31 U/L   Troponin   Result Value Ref Range    Troponin, High Sensitivity 15 (H) 0 - 9 ng/L   Brain Natriuretic Peptide   Result Value Ref Range    Pro- (H) 0 - 125 pg/mL   Protime-INR   Result Value Ref Range    Protime 11.4 9.3 - 12.4 sec    INR 1.1    APTT   Result Value Ref Range    aPTT 27.7 24.5 - 35.1 sec   Troponin   Result Value Ref Range    Troponin, High Sensitivity 13 (H) 0 - 9 ng/L   Lipase   Result Value Ref Range    Lipase 39 13 - 60 U/L   Urinalysis, reflex to microscopic   Result Value Ref Range    Color, UA Yellow Straw/Yellow    Clarity, UA Clear Clear    Glucose, Ur Negative Negative mg/dL    Bilirubin Urine Negative Negative    Ketones, Urine Negative Negative mg/dL    Specific Gravity, UA 1.020 1.005 - 1.030    Blood, Urine Negative Negative    pH, UA 7.0 5.0 - 9.0    Protein, UA Negative Negative mg/dL    Urobilinogen, Urine 0.2 <2.0 E.U./dL    Nitrite, Urine POSITIVE (A) Negative    Leukocyte Esterase, Urine Negative Negative   Microscopic Urinalysis   Result Value Ref Range    WBC, UA 1-3 0 - 5 /HPF    RBC, UA 0-1 0 - 2 /HPF    Bacteria, UA MANY (A) None Seen /HPF       RADIOLOGY:  CTA CHEST W CONTRAST   Final Result   No evidence of aortic aneurysm or dissection. Mild reticulation is noted surrounding the head of the pancreas which may be   concerning for underlying acute pancreatitis, recommend correlation with   clinical/laboratory analysis. Postsurgical changes are noted in the stomach and bowel, most likely related   to prior bariatric surgery. CTA ABDOMEN PELVIS W CONTRAST   Final Result   No evidence of aortic aneurysm or dissection. Mild reticulation is noted surrounding the head of the pancreas which may be   concerning for underlying acute pancreatitis, recommend correlation with   clinical/laboratory analysis. Postsurgical changes are noted in the stomach and bowel, most likely related   to prior bariatric surgery. XR CHEST PORTABLE   Final Result   No acute process. EKG:  This EKG is signed and interpreted by me. Rate: 93  Rhythm: Sinus  Interpretation: EKG obtained demonstrate normal sinus rhythm, rate 93, normal axis, , no acute ST segment changes. Comparison: stable as compared to patient's most recent EKG      ------------------------- NURSING NOTES AND VITALS REVIEWED ---------------------------  Date / Time Roomed:  1/31/2022  9:38 PM  ED Bed Assignment:  20/20    The nursing notes within the ED encounter and vital signs as below have been reviewed.      Patient Vitals for the past 24 hrs:   BP Pulse Resp SpO2 Weight   02/01/22 0327 (!) 179/96 83 19 94 %    02/01/22 0103 (!) 165/99 97 18 97 %    01/31/22 2301 (!) 151/80 91 16 93 %    01/31/22 2244 (!) 154/90 97 15 95 %    01/31/22 2237 (!) 165/82 94 15 94 %    01/31/22 2127 (!) 228/132    235 lb (106.6 kg)   01/31/22 2115  94 20 92 %        Oxygen Saturation Interpretation: Normal    ------------------------------------------ PROGRESS NOTES ------------------------------------------  Re-evaluation(s):  Time: 0200  Patients symptoms show no change  Repeat physical examination is not changed    Counseling:  I have spoken with the patient and discussed todays results, in addition to providing specific details for the plan of care and counseling regarding the diagnosis and prognosis. Their questions are answered at this time and they are agreeable with the plan of admission.    --------------------------------- ADDITIONAL PROVIDER NOTES ---------------------------------  Consultations:  Time: 9550. Spoke with Dr. Brina Flores. Discussed case. They will admit the patient. This patient's ED course included: a personal history and physicial examination and re-evaluation prior to disposition    This patient has remained hemodynamically stable during their ED course. Diagnosis:  1. Chest pain, unspecified type    2. Acute cystitis without hematuria        Disposition:  Patient's disposition: Admit to telemetry  Patient's condition is stable. Patient was seen and evaluated by myself and my attending Giacomo Parks MD. Assessment and Plan discussed with attending provider, please see attestation for final plan of care.      DO Jolene Valdes DO Resident  02/01/22 5438

## 2022-02-01 NOTE — CONSULTS
Inpatient Cardiology Consultation      Reason for Consult: Chest pain    Consulting Physician: Dr. Luisa Saba    Requesting Physician: Jm Veliz    Date of Consultation: 2/1/2022    HISTORY OF PRESENT ILLNESS:     This 29-year-old female is known to [facility] cardiology and is followed by Shani Young.  She has a history of left ventricular hypertrophy which is moderate, HFpEF. She tells me she had fried chicken on Sunday evening. The next day around 4 PM she developed chest pain in the center of her chest that was a pressure type pain which radiated to her back as a sharp pain. Was located in the center of her chest and radiated to her mid upper back. She was driving as it occurred and she noticed no change in the intensity of the pain with arm movements. She had no dyspnea. She went home to lie down. The pain continued for a few hours so she called 911. She states the pain was actually better with a deep breath. She had no vomiting abdominal pain or diarrhea. She presented to the emergency room with chest pain around 9 PM last evening. Blood pressure on admission was 228/132 and heart rate is 94 and she was afebrile (temperature done later) with no hypoxia on room. She has no history of hypertension. EKG on admission showed sinus rhythm with no acute ST-T wave changes. Chest x-ray showed no acute process. CTA of the pelvis showed possible underlying pancreatitis but no aortic aneurysm or dissection. CTA of the chest showed no evidence of thoracic aneurysm or dissection pulmonary embolism is not mention either positive or negative. I spoke with the radiologist in [facility] and a PE could not be ruled out as the CTA was not ordered to rule out a PE. Potassium was 4.2 with a BUN of 14 and a creatinine of 0.6, troponin 15-13,  and D-dimer not done, WBCs 9.1 and H&H 12.1 and 41.7, COVID-19 was negative, urinalysis showed a UTI. She was treated for urinary tract infection.   The swelling of her lower extremities is about the same. She has not been taking her Bumex. Past Medical History:    40 pack years  and smokes 1 pack every 3 days  BMI 55.2 on 3/23/2021/history of gastric bypass  Depression  Osteoarthritis  Chronic back, knees and R shoulder pain with history of injections  Lymphedema BLE  RLS on Requip  CVA late 1990's with loss of perpherial vision  2012 TTE Dr Esmer Blancas: EF 60-65%. NWM. No VHD. Normal RA/LA  2012 EGD/Colonoscopy ( for anemia): No significant findings to account for the anemia  10/2012 Hematology consult for anemia--> Iron deficiency anemia (reported patient did not tolerate PO Iron supplementation) IV Iron infusiions were discussed  6/2015 treated by Dr Torito Brown for LLE ulcers  6/2015 Normal ankle arm index with adequate perfusion of feet  6/4/2019-6/12/2019 Admission for LLE cellulitis and sepsis requiring ICU admission. Has had recurrent cellulitis  Admission 6/13/20219-6/15/2019 for CP negative troponin. Evaluated by Dr Trever Holloway  6/15/2019 Lexiscan MPS: non ischemic EF 79%. [facility]  1/2020 evaluation by [facility]-->Anxiety/Depression  2020 Declines lymphedema therapy and use of compression hose  Admission 2/18/2021-2/23/2021 for intractable back pain  2/23/2021 Epidural injection   3/2/2021 Outpatient with Dr Fang Steel shows an L3-L4 spondylolisthesis and a large right L3-L4 herniated disk. I am recommending an L3-L4 posterior lumbar interbody fusion as she has failed epidurals and oral narcotic pain medications, membrane stabilizers and NSAIDs. She will need medical clearance  R rotator cuff tear with limited ROM  Ambulates with [facility] admission March 24, 2021 for lumbar surgery and CHF versus volume overload  COVID-19 infection, asymptomatic March 2021.  2D echocardiogram March 23, 2021   Ejection fraction is visually estimated at 65%. No regional wall motion abnormalities seen. Moderate left ventricular concentric hypertrophy noted.    Normal right ventricle structure and function. Physiologic and/or trace mitral regurgitation is present. Physiologic and/or trace tricuspid regurgitation. Lumbar surgery May 13, 2021  Patient denies diabetes meds listed in epic since June 2019  UTI        Past Surgical History:    Breast reduction surgery, Desi-en-Y 2000 (failed), bilateral cataract surgery, hernia repair, abdominoplasty, excision of epidermal cyst 2010 , full dental extraction       Allergies:  Patient has no known allergies. Social History:    40 pack years  and smokes 1 pack of cigarettes every 3 days  EOTH: rarely  Denies Illicit drugs  Caffeine: Activity: Lives with sister and daughter in 2 story home with 2 steps to enter (everything on first floor). Ambulates with walker since last admission in 2/2021 (RLE numb)  Code Status: full Code        Family History: No family history of early CAD or SCD           Medications Prior to admit:  Prior to Admission medications    Medication Sig Start Date End Date Taking? Authorizing Provider   BELBUCA 450 MCG FILM Take 450 mcg by mouth every 12 hours for 30 days.  1/28/22 2/27/22  CHERYL Casarez   gabapentin (NEURONTIN) 300 MG capsule TAKE 1 CAPSULE BY MOUTH THREE TIMES DAILY FOR 30 DAYS 9/13/21 9/13/22  Gautam Razo DO   buPROPion (WELLBUTRIN XL) 150 MG extended release tablet Take 1 tablet by mouth every morning 7/27/21   Gautam Razo DO   nitrofurantoin, macrocrystal-monohydrate, (MACROBID) 100 MG capsule Take 1 capsule by mouth 2 times daily 7/26/21   Gautam Razo DO   BELBUCA 450 MCG FILM DISSOLVE 1 FILM UNDER THE TONGUE EVERY 12 HOURS FOR 30 DAYS 7/9/21   Historical Provider, MD   nitrofurantoin, macrocrystal-monohydrate, (MACROBID) 100 MG capsule Take 1 capsule by mouth 2 times daily 7/7/21   Gautam Razo DO   lactobacillus (CULTURELLE) CAPS capsule Take 1 capsule by mouth daily 6/5/21   Damián Main MD   baclofen (LIORESAL) 5 MG tablet Take 1 tablet by mouth 3 times daily 6/4/21   Dayana Dave MD   Liraglutide (VICTOZA SC) Inject 1.8 mg into the skin daily     Historical Provider, MD   bumetanide (BUMEX) 2 MG tablet Take 1 tablet by mouth 2 times daily  Patient taking differently: Take 2 mg by mouth daily as needed  3/30/21   Brian Camarena MD   potassium chloride (KLOR-CON M) 20 MEQ extended release tablet Take 1 tablet by mouth 2 times daily (with meals)  Patient taking differently: Take 20 mEq by mouth daily as needed  3/30/21   Brian Camarena MD   rOPINIRole (REQUIP) 2 MG tablet Take 1 tablet by mouth 4 times daily 11/2/20   Gautam Razo DO   ammonium lactate (LAC-HYDRIN) 12 % cream Apply topically as needed.  10/8/20   Vitaly Herrera DPM   Cream Base (VERSAPRO) CREA APPLY ONE (1) GRAM (ONE PUMP) EXTERNALLY FOUR TIMES A DAY TO [facility] KNEE 3/23/20   Tierra Haddad PA-C       Current Medications:    Current Facility-Administered Medications: ondansetron (ZOFRAN-ODT) disintegrating tablet 4 mg, 4 mg, Oral, Q8H PRN **OR** ondansetron (ZOFRAN) injection 4 mg, 4 mg, IntraVENous, Q6H PRN  morphine (PF) injection 2 mg, 2 mg, IntraVENous, Q4H PRN  baclofen (LIORESAL) tablet 5 mg, 5 mg, Oral, TID  buPROPion (WELLBUTRIN XL) extended release tablet 150 mg, 150 mg, Oral, QAM  gabapentin (NEURONTIN) capsule 300 mg, 300 mg, Oral, TID  rOPINIRole (REQUIP) tablet 2 mg, 2 mg, Oral, 4x daily  sodium chloride flush 0.9 % injection 5-40 mL, 5-40 mL, IntraVENous, 2 times per day  sodium chloride flush 0.9 % injection 5-40 mL, 5-40 mL, IntraVENous, PRN  0.9 % sodium chloride infusion, 25 mL, IntraVENous, PRN  enoxaparin (LOVENOX) injection 40 mg, 40 mg, SubCUTAneous, Daily  polyethylene glycol (GLYCOLAX) packet 17 g, 17 g, Oral, Daily PRN  acetaminophen (TYLENOL) tablet 650 mg, 650 mg, Oral, Q6H PRN **OR** acetaminophen (TYLENOL) suppository 650 mg, 650 mg, Rectal, Q6H PRN  0.9 % sodium chloride infusion, , IntraVENous, Continuous  pantoprazole (PROTONIX) injection 40 mg, 40 mg, IntraVENous, Daily **AND** sodium chloride (PF) 0.9 % injection 10 mL, 10 mL, IntraVENous, Daily    Allergies:  Ferritin    Social History:  40-pack-year history and smokes 1 pack/day every 3 days, no alcohol or illicit drugs and is       Family History:   Family History   Problem Relation Age of Onset    Breast Cancer Mother     Stroke Father     Hypertension Sister     Hypertension Brother        REVIEW OF SYSTEMS:     Constitutional: Denies fatigue, fevers, chills or night sweats  Eyes: Denies visual changes or drainage  ENT: Denies headaches or hearing loss. No mouth sores or sore throat. No epistaxis   Cardiovascular: Denies chest pain, pressure or palpitations. No lower extremity swelling. Respiratory: Denies PATEL, cough, orthopnea or PND. No hemoptysis   Gastrointestinal: Denies hematemesis or anorexia. No hematochezia or melena    Genitourinary: Denies urgency, dysuria or hematuria. Musculoskeletal: Denies gait disturbance, weakness or joint complaints  Integumentary: Denies rash, hives or pruritis   Neurological: Denies dizziness, headaches or seizures. No numbness or tingling  Psychiatric: Denies anxiety or depression. Endocrine: Denies temperature intolerance. No recent weight change. .  Hematologic/Lymphatic: Denies abnormal bruising or bleeding. No swollen lymph nodes    PHYSICAL EXAM:   BP (!) 170/110   Pulse 87   Temp 98 °F (36.7 °C)   Resp 20   Wt 235 lb (106.6 kg)   SpO2 95%   BMI 42.98 kg/m²   CONST:  Well developed,  morbidly obese who appears of stated age. Awake, alert and cooperative. Moderate distress due to pain. Parish López HEENT:   Head- Normocephalic, atraumatic   Eyes- Conjunctivae pink, anicteric  Throat- Oral mucosa pink and moist  Neck-  No stridor, trachea midline, no jugular venous distention. No carotid bruit. CHEST: Chest symmetrical and tender to palpation.  No accessory muscle use or intercostal retractions  RESPIRATORY: Lung sounds - clear throughout fields   CARDIOVASCULAR:     Heart Inspection- shows no noted pulsations  Heart Palpation- no heaves or thrills; PMI is non-displaced   Heart Ausculation- Regular rate and rhythm, no murmur. No s3, s4 or rub   PV: 2-3+ lower extremity edema with changes of lymphedema. No varicosities. Pedal pulses palpable, no clubbing or cyanosis   ABDOMEN: Soft, tender to light palpation. Bowel sounds present. No palpable masses no organomegaly; no abdominal bruit  MS: Good muscle strength and tone. No atrophy or abnormal movements. : Deferred  SKIN: Warm and dry no statis dermatitis or ulcers   NEURO / PSYCH: Oriented to person, place and time. Speech clear and appropriate. Follows all commands. Pleasant affect     DATA:    ECG / Tele strips:  not on telemetry  Diagnostic:    No intake or output data in the 24 hours ending 02/01/22 0833    Labs:   CBC:   Recent Labs     01/31/22 2216 02/01/22  0756   WBC 9.1 7.0   HGB 12.1 12.1   HCT 41.7 41.6    158     BMP:   Recent Labs     01/31/22 2216      K 4.2   CO2 30*   BUN 14   CREATININE 0.6   LABGLOM >60   CALCIUM 9.1     Mag: No results for input(s): MG in the last 72 hours. Phos: No results for input(s): PHOS in the last 72 hours.   TFT:   Lab Results   Component Value Date    TSH 0.988 03/10/2021    B5ULADI 144.40 02/09/2016    W5ETINI 6.9 03/10/2021      HgA1c:   Lab Results   Component Value Date    LABA1C 5.3 03/10/2021     No results found for: EAG  proBNP:   Recent Labs     01/31/22 2216   PROBNP 212*     PT/INR:   Recent Labs     01/31/22 2216   PROTIME 11.4   INR 1.1     APTT:  Recent Labs     01/31/22 2216   APTT 27.7     CARDIAC ENZYMES:  Recent Labs     01/31/22 2216 01/31/22 2325   TROPHS 15* 13*     FASTING LIPID PANEL:  Lab Results   Component Value Date    CHOL 163 03/10/2021    HDL 61 03/10/2021    LDLCALC 90 03/10/2021    TRIG 62 03/10/2021     LIVER PROFILE:  Recent Labs     01/31/22 2216   AST 19   ALT 16   LABALBU 3.6 consult. ASSESSMENT AND PLAN:  1. Chest pain/Abd pain:    Labs and EKG reviewed. Echo with normal LVEF and moderate LVH 3/24/2021 benign. Low risk stress 6/2019. CTA chest with no dissection or aneurysm and suggests possible pancreatitis. No mention of PE, requesting addendum in this regard. Consider pharm stress to risk stratify if GI evaluation unfruitful. 2. Abnormal CT: Suggestion of pancreatitis. Surgery to see. 3. HTN: Start labetalol. 4. Lymphedema/Volume overload: Follow volume. 5. Hx of CVA?: Consider ASA and statin. 6. RLS: On Requip     7. Hx of gastric bypass     8. DJD     9. Obesity     Gala Mahmood D.O.   Cardiologist  Cardiology, [facility]

## 2022-02-01 NOTE — ED NOTES
281 patient refused me to get her VS ; patient is mad that Dr would not order her another dose of fentanyl and refused options that the Dr gave her for pain     Brian Leiva RN  02/01/22 6265

## 2022-02-01 NOTE — CONSULTS
I independently performed an evaluation on the patient. I have reviewed the above documentation completed by the advance practitioner. Please see my additional contributions to the HPI, physical exam, assessment and medical decision making. Physical Exam   BP (!) 188/113   Pulse 84   Temp 97.9 °F (36.6 °C)   Resp 20   Wt 235 lb (106.6 kg)   SpO2 96%   BMI 42.98 kg/m²   Constitutional: Oriented to person, place, and time. Well-developed and well-nourished. No distress. Head: Normocephalic and atraumatic. Eyes: EOM are normal. Pupils are equal, round, and reactive to light. Neck: Normal range of motion. Neck supple. No hepatojugular reflux and no JVD present. Carotid bruit is not present. No tracheal deviation present. No thyromegaly present. Cardiovascular: Normal rate, regular rhythm, normal heart sounds and intact distal pulses. Exam reveals no gallop and no friction rub. No murmur heard. Pulmonary/Chest: Effort normal and breath sounds normal. No respiratory distress. No wheezes. No rales. No tenderness. Abdominal: Soft. Bowel sounds are normal. No distension and no mass. No tenderness. No rebound and no guarding. Musculoskeletal: Normal range of motion. No edema and no tenderness. Lymphadenopathy:   No cervical adenopathy. Neurological: Alert and oriented to person, place, and time. Skin: Skin is warm and dry. No rash noted. Not diaphoretic. No erythema. Psychiatric: Normal mood and affect. Behavior is normal.     Echo Summary 3/24/2021:   Ejection fraction is visually estimated at 65%.   No regional wall motion abnormalities seen.   Moderate left ventricular concentric hypertrophy noted.   Normal right ventricle structure and function.   Physiologic and/or trace mitral regurgitation is present.   Physiologic and/or trace tricuspid regurgitation. See accompanying documentation for full consult. ASSESSMENT AND PLAN:  1. Chest pain/Abd pain:    Labs and EKG reviewed.     Echo with normal LVEF and moderate LVH 3/24/2021 benign.     Low risk stress 6/2019. CTA chest with no dissection or aneurysm and suggests possible pancreatitis. No mention of PE, requesting addendum in this regard. Consider pharm stress to risk stratify if GI evaluation unfruitful.      2. Abnormal CT: Suggestion of pancreatitis. Surgery to see. 3. HTN: Start labetalol. 4. Lymphedema/Volume overload: Follow volume. 5. Hx of CVA?: Consider ASA and statin.     6. RLS: On Requip     7. Hx of gastric bypass     8. DJD     9. Obesity     Adelfo Goldsmith D.O.   Cardiologist  Cardiology, [id number]

## 2022-02-01 NOTE — ED NOTES
Patient given 1 SL NTG and 50 mcg fentanyl for chest pain 10; pain decreased to 6 post administration     Yarelis Woo, RN  01/31/22 5538

## 2022-02-01 NOTE — H&P
228/132, repeat 165/82. HEENT:  Head:  Normocephalic, atraumatic. Eyes:  Pupils are equal and  reactive to light. Extraocular muscles intact. Fundi not well  visualized. Nose:  No obstruction, polyp or discharge noted. Mouth:   Mucosa without lesion. Teeth:  Edentulous. Pharynx:  Noninjected  without exudate. NECK:  Supple. No JVD. No thyromegaly. No carotid bruits. HEART:  Regular rate and rhythm without murmur. LUNGS:  Clear to auscultation bilaterally. ABDOMEN:  Positive bowel sounds. Soft. There is tenderness to  palpation in the epigastrium. No rebound or guarding. No  hepatosplenomegaly. No masses. BACK:  With minimal increased thoracic kyphosis. EXTREMITIES:  With 2+ pitting edema in the bilateral lower extremities  with bilateral lower extremity erythema. LYMPH NODES:  No adenopathy noted. SKIN:  With erythema in the bilateral lower extremities. IMPRESSION:  Chest pain/abdominal pain; chronic lymphedema, lower  extremities; chronic pain; morbid obesity; osteoarthritis; restless legs  syndrome; history of CVA; status post gastric bypass. PLAN:  Admit.  NPO. IV fluids. Surgery and Cardiology to see. Proton  pump inhibitor. Pain control. Serial lab. Discharge plan home when  stable.         Fatoumata Medina DO    D: 02/01/2022 8:39:43       T: 02/01/2022 8:42:49     [id number]  Job#: [id number]     Doc#: [id number]    CC:

## 2022-02-01 NOTE — ED NOTES
Pt. Refused this RN to draw labs, upset she did not receive fentanyl for her pain.      Ted Maloney RN  02/01/22 4942

## 2022-02-01 NOTE — CONSULTS
GENERAL SURGERY  CONSULT NOTE  2022    Physician Consulted: Dr. Christopher Barber  Reason for Consult: Abdominal pain  Referring Physician: Dr. Nicky CHAVEZ  Lien Hess is a 73 y.o. female who presents to the general surgery service for evaluation of chest and upper abdominal pain. The patient states that she began to experience sharp pain around 4 PM yesterday. She has never experienced this pain before. She has not noticed anything to make this pain better or worse. She reports fever, chills, nausea with onset of the pain; however these have since resolved. No vomiting or diarrhea. She continues to pass gas and last had a bowel movement yesterday. She has been tolerating a regular diet. She smokes 1/2 pack of cigarettes a day but denies alcohol or other recreational drug use. CT scan in the emergency department showed inflammation of the pancreatic head. Labs including lipase are within normal limits. ECG did not show evidence of acute ischemia. The patient states that she had a normal stress test a few months ago. Abdominal surgical history is significant for remote gastric bypass, hernia repair, and  x2. Last EGD and colonoscopy were done in 2021 by Dr. Junior Mahmood, and showed mild gastritis and a single colonic polyp.       Past Medical History:   Diagnosis Date    Anemia     Arthritis     Cellulitis 2015    left leg    Chronic ulcer of leg with fat layer exposed ([address] 2015    Chronic ulcer of right leg, limited to breakdown of skin ([address] 2016    COVID-19 2021    postive test no symptoms    Depression     Full dentures     Low back pain     Lymphedema of both lower extremities 2015    Obesity     280 #    Osteoarthritis     Peripheral vision loss     Stroke ([address]     Type 2 diabetes mellitus without complication, without long-term current use of insulin ([address] 2019    Ulcer of left lower leg, limited to breakdown of skin ([address] 06/10/2019    Ulcer of left lower leg, limited to breakdown of skin ([address] 06/10/2019    UTI (urinary tract infection) 2021       Past Surgical History:   Procedure Laterality Date    ABDOMINOPLASTY      BREAST REDUCTION SURGERY      reduction    CATARACT REMOVAL      bilateral     SECTION      x2    COLONOSCOPY  2012    and egd    COLONOSCOPY  2021    polyps; marginal prep--jessica    COLONOSCOPY N/A 2021    COLONOSCOPY WITH BIOPSY performed by Scott Spring MD at [address] ECHOCARDIOGRAM COMPLETE 2D W DOPPLER W COLOR  2012         GASTRIC BYPASS SURGERY      NO NASTOGASTRIC TUBE    HERNIA REPAIR          LUMBAR SPINE SURGERY N/A 2021    L3-L4  POSTERIOR LUMBAR INTERBODY  FUSION--OARM, JESSI, YAJAIRA TABLE, CELL SAVER, PLATELET GEL, AUDIOLOGY, CAGES, PLATES, SCREWS -- GLOBUS performed by Que Gilliland MD at [facility]  2021    minimal pouch gastritis--jessica    UPPER GASTROINTESTINAL ENDOSCOPY N/A 2021    EGD ESOPHAGOGASTRODUODENOSCOPY performed by Scott Spring MD at [address] [place] [address]       Medications Prior to Admission:    Prior to Admission medications    Medication Sig Start Date End Date Taking? Authorizing Provider   BELBUCA 450 MCG FILM Take 450 mcg by mouth every 12 hours for 30 days.  22 Yes CHERYL Esteves   gabapentin (NEURONTIN) 300 MG capsule TAKE 1 CAPSULE BY MOUTH THREE TIMES DAILY FOR 30 DAYS 21 Yes Gautam Razo DO   buPROPion (WELLBUTRIN XL) 150 MG extended release tablet Take 1 tablet by mouth every morning 21  Yes Gautam Razo DO   BELBUCA 450 MCG FILM DISSOLVE 1 FILM UNDER THE TONGUE EVERY 12 HOURS FOR 30 DAYS 21  Yes Historical Provider, MD   lactobacillus (CULTURELLE) CAPS capsule Take 1 capsule by mouth daily 21  Yes Lisa Zavala MD   baclofen (LIORESAL) 5 MG tablet Take 1 tablet by mouth 3 times daily 21  Yes Lisa Zavala MD Gastrointestinal: Positive for abdominal pain and nausea. Negative for abdominal distention, anal bleeding, blood in stool, constipation and vomiting. Genitourinary: Negative for difficulty urinating and dysuria. Musculoskeletal: Positive for arthralgias. Skin: Positive for rash and wound. Psychiatric/Behavioral: Negative for agitation and confusion. PHYSICAL EXAM:    Vitals:    02/01/22 0922   BP: (!) 188/113   Pulse: 84   Resp: 20   Temp: 97.9 °F (36.6 °C)   SpO2: 96%       General Appearance:  awake, alert, oriented, in no acute distress  Skin:  Skin color, texture, turgor normal. No rashes or lesions. Head:  NCAT. No scleral icterus or conjunctival pallor  Lungs/Chest:  Normal expansion. No chest wall tenderness  Cardiovascular:  Warm throughout. Mild chest pain, not worsened with palpation  Abdomen:  Soft, mild epigastric tenderness, obese. No guarding. Extremities: Extremities warm to touch. Bilateral lower extremities are swollen and edematous with signs of venous stasis      LABS:    CBC  Recent Labs     02/01/22  0756   WBC 7.0   HGB 12.1   HCT 41.6        BMP  Recent Labs     02/01/22  0756      K 4.2      CO2 23   BUN 9   CREATININE 0.5   CALCIUM 8.7     Liver Function  Recent Labs     02/01/22  0756   AMYLASE 33   LIPASE 35   BILITOT 0.4   AST 15   ALT 14   ALKPHOS 154*   PROT 6.8   LABALBU 3.2*     No results for input(s): LACTATE in the last 72 hours. Recent Labs     01/31/22  2216   INR 1.1       RADIOLOGY    XR CHEST PORTABLE    Result Date: 1/31/2022  EXAMINATION: ONE XRAY VIEW OF THE CHEST 1/31/2022 9:58 pm COMPARISON: 05/25/2021 HISTORY: ORDERING SYSTEM PROVIDED HISTORY: chest pain TECHNOLOGIST PROVIDED HISTORY: Reason for exam:->chest pain What reading provider will be dictating this exam?->CRC FINDINGS: The lungs are without acute focal process. There is no effusion or pneumothorax. The cardiomediastinal silhouette is without acute process.  The osseous structures are without acute process. No acute process. CTA CHEST W CONTRAST    Result Date: 2/1/2022  EXAMINATION: CTA OF THE CHEST; CTA OF THE ABDOMEN AND PELVIS WITH CONTRAST 2/1/2022 12:08 am: TECHNIQUE: CTA of the chest was performed after the administration of intravenous contrast.  Multiplanar reformatted images are provided for review. MIP images are provided for review. Dose modulation, iterative reconstruction, and/or weight based adjustment of the mA/kV was utilized to reduce the radiation dose to as low as reasonably achievable.; CTA of the abdomen and pelvis was performed with the administration of intravenous contrast. Multiplanar reformatted images are provided for review. MIP images are provided for review. Dose modulation, iterative reconstruction, and/or weight based adjustment of the mA/kV was utilized to reduce the radiation dose to as low as reasonably achievable. COMPARISON: None. HISTORY: ORDERING SYSTEM PROVIDED HISTORY: chest pain, HTN r/o dissection TECHNOLOGIST PROVIDED HISTORY: Reason for exam:->chest pain, HTN r/o dissection Decision Support Exception - unselect if not a suspected or confirmed emergency medical condition->Emergency Medical Condition (MA) What reading provider will be dictating this exam?->CRC; ORDERING SYSTEM PROVIDED HISTORY: chest pain, htn r/o dissection TECHNOLOGIST PROVIDED HISTORY: Reason for exam:->chest pain, htn r/o dissection Decision Support Exception - unselect if not a suspected or confirmed emergency medical condition->Emergency Medical Condition (MA) What reading provider will be dictating this exam?->CRC FINDINGS: CTA CHEST: Thoracic aorta: No evidence of thoracic aortic aneurysm or dissection. No acute abnormality of the aorta. Mediastinum: No evidence of mediastinal lymphadenopathy. The heart and pericardium demonstrate no acute abnormality. Lungs/Pleura: The lungs are without acute process. No focal consolidation or pulmonary edema.   No evidence of pleural effusion or pneumothorax. Soft Tissues/Bones: No acute bone or soft tissue abnormality. There is a 2 mm hypodense nodule in the left lobe of the thyroid gland. CTA ABDOMEN: Aorta and abdominal arteries: No evidence of abdominal aortic aneurysm or dissection. The mesenteric arteries demonstrate appropriate caliber and patency including the celiac, SMA, and JONATHAN. The bilateral renal arteries demonstrate appropriate caliber and patency. No evidence of arterial injury or hemodynamically significant stenosis. Organs: Mild reticulation is noted surrounding the head of the pancreas which may be concerning for underlying acute pancreatitis, recommend correlation with clinical/laboratory analysis. The liver, gallbladder, spleen, and bilateral adrenal glands are within normal limits. No renal cysts or masses are present. No hydronephrosis or hydroureter. GI/Bowel: Postsurgical changes are noted in the stomach and bowel, most likely related to prior bariatric surgery. Peritoneum/Retroperitoneum: No free fluid or free gas in the abdomen or pelvis. Bones/Soft Tissues: Postsurgical changes related to posterior laminectomy at the L3-L4 with intervertebral hardware fusion and bilateral transpedicular hardware fixation. No evidence of an acute fracture or dislocation. No pathologic bone lesion. Visualized soft tissues are within normal limits. CTA PELVIS: Pelvic arteries: The pelvic arteries demonstrate normal caliber and appearance. No evidence of arterial injury or hemodynamically significant stenosis. Pelvic structures: The urinary bladder is partially filled and is unremarkable in appearance. No evidence of aortic aneurysm or dissection. Mild reticulation is noted surrounding the head of the pancreas which may be concerning for underlying acute pancreatitis, recommend correlation with clinical/laboratory analysis.  Postsurgical changes are noted in the stomach and bowel, most likely related to prior bariatric surgery. CTA ABDOMEN PELVIS W CONTRAST    Result Date: 2/1/2022  EXAMINATION: CTA OF THE CHEST; CTA OF THE ABDOMEN AND PELVIS WITH CONTRAST 2/1/2022 12:08 am: TECHNIQUE: CTA of the chest was performed after the administration of intravenous contrast.  Multiplanar reformatted images are provided for review. MIP images are provided for review. Dose modulation, iterative reconstruction, and/or weight based adjustment of the mA/kV was utilized to reduce the radiation dose to as low as reasonably achievable.; CTA of the abdomen and pelvis was performed with the administration of intravenous contrast. Multiplanar reformatted images are provided for review. MIP images are provided for review. Dose modulation, iterative reconstruction, and/or weight based adjustment of the mA/kV was utilized to reduce the radiation dose to as low as reasonably achievable. COMPARISON: None. HISTORY: ORDERING SYSTEM PROVIDED HISTORY: chest pain, HTN r/o dissection TECHNOLOGIST PROVIDED HISTORY: Reason for exam:->chest pain, HTN r/o dissection Decision Support Exception - unselect if not a suspected or confirmed emergency medical condition->Emergency Medical Condition (MA) What reading provider will be dictating this exam?->CRC; ORDERING SYSTEM PROVIDED HISTORY: chest pain, htn r/o dissection TECHNOLOGIST PROVIDED HISTORY: Reason for exam:->chest pain, htn r/o dissection Decision Support Exception - unselect if not a suspected or confirmed emergency medical condition->Emergency Medical Condition (MA) What reading provider will be dictating this exam?->CRC FINDINGS: CTA CHEST: Thoracic aorta: No evidence of thoracic aortic aneurysm or dissection. No acute abnormality of the aorta. Mediastinum: No evidence of mediastinal lymphadenopathy. The heart and pericardium demonstrate no acute abnormality. Lungs/Pleura: The lungs are without acute process. No focal consolidation or pulmonary edema.   No evidence of pleural effusion or pneumothorax. Soft Tissues/Bones: No acute bone or soft tissue abnormality. There is a 2 mm hypodense nodule in the left lobe of the thyroid gland. CTA ABDOMEN: Aorta and abdominal arteries: No evidence of abdominal aortic aneurysm or dissection. The mesenteric arteries demonstrate appropriate caliber and patency including the celiac, SMA, and JONATHAN. The bilateral renal arteries demonstrate appropriate caliber and patency. No evidence of arterial injury or hemodynamically significant stenosis. Organs: Mild reticulation is noted surrounding the head of the pancreas which may be concerning for underlying acute pancreatitis, recommend correlation with clinical/laboratory analysis. The liver, gallbladder, spleen, and bilateral adrenal glands are within normal limits. No renal cysts or masses are present. No hydronephrosis or hydroureter. GI/Bowel: Postsurgical changes are noted in the stomach and bowel, most likely related to prior bariatric surgery. Peritoneum/Retroperitoneum: No free fluid or free gas in the abdomen or pelvis. Bones/Soft Tissues: Postsurgical changes related to posterior laminectomy at the L3-L4 with intervertebral hardware fusion and bilateral transpedicular hardware fixation. No evidence of an acute fracture or dislocation. No pathologic bone lesion. Visualized soft tissues are within normal limits. CTA PELVIS: Pelvic arteries: The pelvic arteries demonstrate normal caliber and appearance. No evidence of arterial injury or hemodynamically significant stenosis. Pelvic structures: The urinary bladder is partially filled and is unremarkable in appearance. No evidence of aortic aneurysm or dissection. Mild reticulation is noted surrounding the head of the pancreas which may be concerning for underlying acute pancreatitis, recommend correlation with clinical/laboratory analysis. Postsurgical changes are noted in the stomach and bowel, most likely related to prior bariatric surgery. I have personally reviewed all relevant labs and imaging. ASSESSMENT:  73 y.o. female with 1 day history of chest and upper abdominal pain. CT imaging showing inflammation around the pancreatic head.   Patient smokes 1/2 pack of cigarettes per day but denies other substance abuse    PLAN:  - trial low fat diabetic diet  - GI cocktail  - monitor abdominal exam, will discuss ultrasound imaging if pain does not improve  - recommend tobacco cessation    Discussed with Dr. Mickey Serrano    Electronically signed by Roberto Friends,  on 2/1/22 at 4:14 PM EST

## 2022-02-01 NOTE — ED PROVIDER NOTES
No recent trauma. ROS  A complete review of systems was performed and pertinent positives and negatives are stated within HPI, all other systems reviewed and are negative.    --------------------------------------------- PAST HISTORY ---------------------------------------------  Past Medical History:  has a past medical history of Anemia, Arthritis, Cellulitis, Chronic ulcer of leg with fat layer exposed ([address] Chronic ulcer of right leg, limited to breakdown of skin ([address] COVID-19, Depression, Full dentures, Low back pain, Lymphedema of both lower extremities, Obesity, Osteoarthritis, Peripheral vision loss, Stroke ([address] Type 2 diabetes mellitus without complication, without long-term current use of insulin ([address] Ulcer of left lower leg, limited to breakdown of skin ([address] Ulcer of left lower leg, limited to breakdown of skin ([address] and UTI (urinary tract infection). Past Surgical History:  has a past surgical history that includes ECHO Complete 2D W Doppler W Color (2012);  section; Gastric bypass surgery (); Abdominoplasty (); Cataract removal; hernia repair; Colonoscopy (2012); Breast reduction surgery; Upper gastrointestinal endoscopy (2021); Colonoscopy (2021); Upper gastrointestinal endoscopy (N/A, 2021); Colonoscopy (N/A, 2021); and Lumbar spine surgery (N/A, 2021). Social History:  reports that she quit smoking about 10 months ago. Her smoking use included cigarettes. She has a 9.50 pack-year smoking history. She has never used smokeless tobacco. She reports that she does not drink alcohol and does not use drugs. Family History: family history includes Breast Cancer in her mother; Hypertension in her brother and sister; Stroke in her father. Unless otherwise noted, family history is non contributory    The patients home medications have been reviewed.     Allergies: Ferritin    Physical Exam  General: The patient is conversational and lying comfortably in bed. Head: Normocephalic and atraumatic. Eyes: Sclera non-icteric and no conjunctival injection  ENT: Nasal and oral membranes minimally dry t  Neck: Trachea midline. No JVD  Respiratory: Crackles at the lung bases left greater than right. Patient is mildly tachypneic. Patient speaking in full sentences. Cardiovascular: Regular rate. No pedal edema. Gastrointestinal:  Abdomen is soft and nondistended. Bowel sounds present. There is no tenderness. There is no guarding or rebound. Musculoskeletal: No deformity. No tenderness of the midline spine. No step-off or deformities. Skin: Skin warm and dry. No rashes. Neurologic: No gross motor deficits. No aphasia. Able to wiggle her toes bilaterally. Psychiatric: Normal affect. MDM  This is a 73 y.o. female presenting to the ED for chest pain. On initial presentation, the patient's vital signs are interpreted as significant hypertensive, and mildly hypoxic. Based on history and physical exam, we have a broad differential.  With the patient's presentation we are concerned for ACS, arrhythmia, pneumonia, heart failure exacerbation, covid, flash pulmonary edema, and cannot exclude dissection or PE. The patient denies history of trauma. Chest x-ray, EKG, laboratory studies and CTA obtained. A 12-lead EKG was performed and interpreted by myself. The rate is 93 with normal sinus rhythm and normal axis. There is no ectopy. The MT interval is 188, QRS interval is 70, and QTC interval is 430. T wave inversion in V3. No acute ST elevation or depression. Good R wave progression. This is sinus rhythm with nonspecific abnormality. Patient will be administered fentanyl and nitroglycerin for symptomatic relief. Laboratory studies show electrolytes within normal limits. Bicarbonate mildly elevated at 30. No anion gap. . Troponin XV will be repeated. Alkaline phosphatase mildly elevated. No leukocytosis or anemia. Coags within normal limits. Chest x-ray shows no acute process. This is interpreted by radiology. Patient's blood pressure has significantly improved. She is saturating 93 to 95% on room air. CTA shows no aneurysm or dissection. Mild reticulation noted in the head of the pancreas which could be underlying acute pancreatitis. Postsurgical changes of the stomach and bowel related to prior bariatric surgery. Covid negative. Repeat troponin is 13. We did add a lipase which is not elevated as well. Urinalysis shows evidence of urinary tract infection. On reevaluation, patient has persistent pain. She appears uncomfortable. She is given fentanyl. With her mild hypoxia, urinary tract infection and continued chest pain, I do feel she requires admission. Resident physician spoke with the medicine team who is agreeable. I directly supervised any procedures performed by the resident and was present for the procedure including all critical portions of the procedure    The cardiac monitor revealed sinus rhythm with a heart rate in the 90s as interpreted by me. The cardiac monitor was ordered secondary to the patient's chest pain and to monitor the patient for dysrhythmia. CPT [id number]    I, Dr. Ashley Wilson, am the primary provider of record    Impression  1. Chest pain, unspecified type    2.  Acute cystitis without hematuria              Ashley Wilson MD  02/01/22 [id number]

## 2022-02-01 NOTE — ED NOTES
Patient did not want lab work     [employer], [address] [place] [address]  02/01/22 7451

## 2022-02-02 ENCOUNTER — APPOINTMENT (OUTPATIENT)
Dept: ULTRASOUND IMAGING | Age: 67
DRG: 392 | End: 2022-02-02
Payer: MEDICARE

## 2022-02-02 ENCOUNTER — APPOINTMENT (OUTPATIENT)
Dept: CT IMAGING | Age: 67
DRG: 392 | End: 2022-02-02
Payer: MEDICARE

## 2022-02-02 PROCEDURE — 72131 CT LUMBAR SPINE W/O DYE: CPT

## 2022-02-02 PROCEDURE — 6370000000 HC RX 637 (ALT 250 FOR IP): Performed by: INTERNAL MEDICINE

## 2022-02-02 PROCEDURE — 2580000003 HC RX 258: Performed by: INTERNAL MEDICINE

## 2022-02-02 PROCEDURE — 1200000000 HC SEMI PRIVATE

## 2022-02-02 PROCEDURE — 99232 SBSQ HOSP IP/OBS MODERATE 35: CPT | Performed by: NEUROLOGICAL SURGERY

## 2022-02-02 PROCEDURE — 93971 EXTREMITY STUDY: CPT | Performed by: RADIOLOGY

## 2022-02-02 PROCEDURE — 99233 SBSQ HOSP IP/OBS HIGH 50: CPT | Performed by: INTERNAL MEDICINE

## 2022-02-02 PROCEDURE — 99223 1ST HOSP IP/OBS HIGH 75: CPT | Performed by: SURGERY

## 2022-02-02 PROCEDURE — 6360000002 HC RX W HCPCS: Performed by: INTERNAL MEDICINE

## 2022-02-02 PROCEDURE — 93970 EXTREMITY STUDY: CPT

## 2022-02-02 RX ORDER — PANTOPRAZOLE SODIUM 40 MG/1
40 TABLET, DELAYED RELEASE ORAL
Status: DISCONTINUED | OUTPATIENT
Start: 2022-02-02 | End: 2022-02-03 | Stop reason: HOSPADM

## 2022-02-02 RX ORDER — POTASSIUM CHLORIDE 20 MEQ/1
20 TABLET, EXTENDED RELEASE ORAL 2 TIMES DAILY WITH MEALS
Status: DISCONTINUED | OUTPATIENT
Start: 2022-02-02 | End: 2022-02-03 | Stop reason: HOSPADM

## 2022-02-02 RX ORDER — BUMETANIDE 1 MG/1
2 TABLET ORAL 2 TIMES DAILY
Status: DISCONTINUED | OUTPATIENT
Start: 2022-02-02 | End: 2022-02-03 | Stop reason: HOSPADM

## 2022-02-02 RX ADMIN — BACLOFEN 5 MG: 10 TABLET ORAL at 20:40

## 2022-02-02 RX ADMIN — BUPROPION HYDROCHLORIDE 150 MG: 150 TABLET, EXTENDED RELEASE ORAL at 10:36

## 2022-02-02 RX ADMIN — BACLOFEN 5 MG: 10 TABLET ORAL at 10:36

## 2022-02-02 RX ADMIN — GABAPENTIN 300 MG: 300 CAPSULE ORAL at 10:36

## 2022-02-02 RX ADMIN — LABETALOL HYDROCHLORIDE 200 MG: 200 TABLET, FILM COATED ORAL at 20:40

## 2022-02-02 RX ADMIN — ROPINIROLE HYDROCHLORIDE 2 MG: 2 TABLET, FILM COATED ORAL at 10:36

## 2022-02-02 RX ADMIN — LABETALOL HYDROCHLORIDE 200 MG: 200 TABLET, FILM COATED ORAL at 10:35

## 2022-02-02 RX ADMIN — SODIUM CHLORIDE, PRESERVATIVE FREE 10 ML: 5 INJECTION INTRAVENOUS at 20:45

## 2022-02-02 RX ADMIN — BACLOFEN 5 MG: 10 TABLET ORAL at 14:47

## 2022-02-02 RX ADMIN — ROPINIROLE HYDROCHLORIDE 2 MG: 2 TABLET, FILM COATED ORAL at 20:40

## 2022-02-02 RX ADMIN — GABAPENTIN 300 MG: 300 CAPSULE ORAL at 14:47

## 2022-02-02 RX ADMIN — SODIUM CHLORIDE, PRESERVATIVE FREE 10 ML: 5 INJECTION INTRAVENOUS at 20:40

## 2022-02-02 RX ADMIN — ENOXAPARIN SODIUM 40 MG: 100 INJECTION SUBCUTANEOUS at 10:36

## 2022-02-02 RX ADMIN — GABAPENTIN 300 MG: 300 CAPSULE ORAL at 20:40

## 2022-02-02 RX ADMIN — SODIUM CHLORIDE, PRESERVATIVE FREE 10 ML: 5 INJECTION INTRAVENOUS at 10:36

## 2022-02-02 RX ADMIN — PANTOPRAZOLE SODIUM 40 MG: 40 TABLET, DELAYED RELEASE ORAL at 10:37

## 2022-02-02 RX ADMIN — MORPHINE SULFATE 2 MG: 2 INJECTION, SOLUTION INTRAMUSCULAR; INTRAVENOUS at 20:45

## 2022-02-02 RX ADMIN — BUMETANIDE 2 MG: 1 TABLET ORAL at 18:10

## 2022-02-02 RX ADMIN — POTASSIUM CHLORIDE 20 MEQ: 1500 TABLET, EXTENDED RELEASE ORAL at 18:10

## 2022-02-02 ASSESSMENT — PAIN SCALES - GENERAL
PAINLEVEL_OUTOF10: 4
PAINLEVEL_OUTOF10: 0
PAINLEVEL_OUTOF10: 9
PAINLEVEL_OUTOF10: 9

## 2022-02-02 ASSESSMENT — PAIN DESCRIPTION - PROGRESSION: CLINICAL_PROGRESSION: GRADUALLY WORSENING

## 2022-02-02 ASSESSMENT — PAIN DESCRIPTION - DESCRIPTORS: DESCRIPTORS: ACHING;CONSTANT

## 2022-02-02 ASSESSMENT — PAIN DESCRIPTION - LOCATION: LOCATION: GENERALIZED

## 2022-02-02 ASSESSMENT — PAIN DESCRIPTION - FREQUENCY: FREQUENCY: INTERMITTENT

## 2022-02-02 ASSESSMENT — PAIN DESCRIPTION - PAIN TYPE: TYPE: CHRONIC PAIN

## 2022-02-02 NOTE — CONSULTS
[address]                  [address] [place],  [address]                                  CONSULTATION    PATIENT NAME: Diogenes Jimenez                  :        1955  MED REC NO:   [id number]                            ROOM:       [id number]  ACCOUNT NO:   [de-identified]                           ADMIT DATE: 2022  PROVIDER:     Arabella Mohamud MD    CONSULT DATE:  2022    REASON FOR CONSULT:  Back pain. HISTORY OF PRESENT ILLNESS:  The patient is a 52-year-old lady who is  well known to my service. She underwent lumbar fusion a year ago. She  had been doing well. She has had multiple postoperative visits;  however, she states that more recently she has had worsening back pain. Yesterday, she was driving and felt like she was having some chest pain  and upper thoracic spine pain. She was ultimately brought to [facility] and was admitted. Due to her worsening back pain,  neurosurgery service was consulted. She denies any new numbness,  tingling, weakness, or loss of control of bowel or bladder function. The pain is nonradicular in nature, it is purely axial back pain. PAST MEDICAL HISTORY:  Positive for anemia, arthritis, cellulitis,  diabetes, stroke. PAST SURGICAL HISTORY:  Positive for abdominoplasty, gastric bypass  surgery, lumbar fusion. FAMILY HISTORY:  Positive for hypertension in her sister and brother. SOCIAL HISTORY:  Negative for tobacco use and she does not consume any  alcoholic beverages. REVIEW OF SYSTEMS:  HEENT:  Negative for headache, double vision, blurry  vision. CARDIOVASCULAR:  Positive for chest pain. RESPIRATORY:   Negative for shortness of breath, asthma, bronchitis, or pneumonia. GASTROINTESTINAL:  Positive for nausea. GENITOURINARY:  Negative for  hematuria, dysuria. HEMATOLOGIC:  Positive for easy bruising. INFECTIOUS:  Negative for any recent infection.   MUSCULOSKELETAL: Positive for back pain. PSYCHIATRIC:  Negative for anxiety or  depression. NEUROLOGIC:  Negative for seizure or stroke. ENDOCRINE:   Positive for diabetes. PHYSICAL EXAMINATION:  VITAL SIGNS:  She is currently afebrile with a T-current of 36.7 degrees  Celsius, pulse 77, blood pressure is 116/55. GENERAL:  She is resting in bed, appears to be in mild distress  secondary to pain, appears her stated age. HEENT:  Her head is normocephalic and atraumatic. Pupils are 3 to 2 mm  and reactive. She has no drainage out of her eyes, ears, nose, or  throat. SKIN:  Warm and dry. MUSCULOSKELETAL:  She has got good range of motion in her bilateral  upper and lower extremities. ABDOMEN:  Soft, nontender, nondistended. RESPIRATORY:  She is not using any accessory muscles of respiration. NEUROLOGIC:  On rest of her neurologic exam, she is awake, alert, and  oriented x3. Cranial nerves II through XII are intact bilaterally. Motor exam reveals 5/5 strength in her bilateral upper and lower  extremities. Sensation is grossly intact to light touch. Reflexes are  1+ and symmetric. Toes are going down. LAB VALUES:  Sodium 138, potassium 4.2, BUN is 9, creatinine 0.5. ASSESSMENT AND PLAN:  The patient is a 80-year-old lady with worsening  back pain after lumbar fusion in 2021. The plan is to obtain a CT scan  of her lumbar spine to evaluate the fusion.         Dafne Perez MD    D: 02/01/2022 21:02:55       T: 02/01/2022 21:05:16     [email or web address]  Job#: [id number]     Doc#: [id number]    CC:

## 2022-02-02 NOTE — PROGRESS NOTES
Hospital Medicine    Subjective:  Pt states abd pain resolved predominant symptom is chronic low back pain and l hand numbness      Current Facility-Administered Medications:     pantoprazole (PROTONIX) tablet 40 mg, 40 mg, Oral, QAM AC, [place] Brinks Malmer, DO    ondansetron (ZOFRAN-ODT) disintegrating tablet 4 mg, 4 mg, Oral, Q8H PRN **OR** ondansetron (ZOFRAN) injection 4 mg, 4 mg, IntraVENous, Q6H PRN, Daphney Copas, DO    morphine (PF) injection 2 mg, 2 mg, IntraVENous, Q4H PRN, [place] Brinks Malmer, DO, 2 mg at 02/01/22 1400    baclofen (LIORESAL) tablet 5 mg, 5 mg, Oral, TID, [place] Brinks Malmer, DO, 5 mg at 02/01/22 2202    buPROPion (WELLBUTRIN XL) extended release tablet 150 mg, 150 mg, Oral, QAM, [place] Brinks Malmer, DO, 150 mg at 02/01/22 2766    gabapentin (NEURONTIN) capsule 300 mg, 300 mg, Oral, TID, [place] Brinks Malmer, DO, 300 mg at 02/01/22 2202    rOPINIRole (REQUIP) tablet 2 mg, 2 mg, Oral, 4x daily, [place] Brinks Malmer, DO, 2 mg at 02/01/22 2202    sodium chloride flush 0.9 % injection 5-40 mL, 5-40 mL, IntraVENous, 2 times per day, Garlan Marrow, DO, 10 mL at 02/01/22 2205    sodium chloride flush 0.9 % injection 5-40 mL, 5-40 mL, IntraVENous, PRN, [place] Brinks Malmer, DO    0.9 % sodium chloride infusion, 25 mL, IntraVENous, PRN, [place] Brinks Malmer, DO    enoxaparin (LOVENOX) injection 40 mg, 40 mg, SubCUTAneous, Daily, [place] Brinks Malmer, DO, 40 mg at 02/01/22 7731    polyethylene glycol (GLYCOLAX) packet 17 g, 17 g, Oral, Daily PRN, [place] Brinks Malmer, DO    acetaminophen (TYLENOL) tablet 650 mg, 650 mg, Oral, Q6H PRN **OR** acetaminophen (TYLENOL) suppository 650 mg, 650 mg, Rectal, Q6H PRN, Trin[place] Dent DO    [DISCONTINUED] pantoprazole (PROTONIX) injection 40 mg, 40 mg, IntraVENous, Daily, 40 mg at 02/01/22 0938 **AND** sodium chloride (PF) 0.9 % injection 10 mL, 10 mL, IntraVENous, Daily, Trin[place] Dent DO, 10 mL at 02/01/22 0942    labetalol (NORMODYNE) tablet 200 mg, 200 mg, Oral, 2 times per day, Blaine Lynch DO, 200 mg at 02/01/22 2202    Buprenorphine HCl FILM 450 mcg, 450 mcg, SubLINGual, BIDAshish Gaspars OsvaldoDO marlin, 450 mcg at 02/02/22 0601    Objective:    /86   Pulse 86   Temp 97.9 °F (36.6 °C) (Oral)   Resp 20   Ht 5' 2\" (1.575 m)   Wt 235 lb (106.6 kg)   SpO2 93%   BMI 42.98 kg/m²     Heart:  reg  Lungs:  ctab  Abd: + bs soft nontender  Extrem:  Edema legs    CBC with Differential:    Lab Results   Component Value Date    WBC 7.0 02/01/2022    RBC 5.35 02/01/2022    HGB 12.1 02/01/2022    HCT 41.6 02/01/2022     02/01/2022    MCV 77.8 02/01/2022    MCH 22.6 02/01/2022    MCHC 29.1 02/01/2022    RDW 18.7 02/01/2022    NRBC 0.9 06/02/2021    SEGSPCT 72 02/28/2014    LYMPHOPCT 16.5 01/31/2022    MONOPCT 7.9 01/31/2022    MYELOPCT 0.9 05/21/2021    BASOPCT 0.2 01/31/2022    MONOSABS 0.72 01/31/2022    LYMPHSABS 1.51 01/31/2022    EOSABS 0.17 01/31/2022    BASOSABS 0.02 01/31/2022     CMP:    Lab Results   Component Value Date     02/01/2022    K 4.2 02/01/2022    K 4.2 01/31/2022     02/01/2022    CO2 23 02/01/2022    BUN 9 02/01/2022    CREATININE 0.5 02/01/2022    GFRAA >60 02/01/2022    LABGLOM >60 02/01/2022    GLUCOSE 101 02/01/2022    PROT 6.8 02/01/2022    LABALBU 3.2 02/01/2022    CALCIUM 8.7 02/01/2022    BILITOT 0.4 02/01/2022    ALKPHOS 154 02/01/2022    AST 15 02/01/2022    ALT 14 02/01/2022     Warfarin PT/INR:    Lab Results   Component Value Date    INR 1.1 01/31/2022    INR 1.0 05/10/2021    INR 1.1 04/23/2021    PROTIME 11.4 01/31/2022    PROTIME 11.1 05/10/2021    PROTIME 11.7 04/23/2021       Assessment:    Principal Problem:    Abdominal pain  Active Problems:    Lymphedema of both lower extremities    Morbid obesity (HCC)    Chronic pain    Chest pain    Chronic low back pain without sciatica  Resolved Problems:    * No resolved hospital problems.  *      Plan:  Ct l spine us legs dc ivf change to po protonix pulm to see re elevated ray Cao DO  7:21 AM  2/2/2022

## 2022-02-02 NOTE — PROGRESS NOTES
Patient is known to Óscar Arnold and Martin Frank, will change consult.   Electronically signed by HOLLY Quinn CNP on 2/2/2022 at 5:29 AM

## 2022-02-02 NOTE — CARE COORDINATION
Patient admitted to [facility] after presenting to ED with c/o abd and chest pain. Imaging showed possible pancreatitis . Cardiology and surgery consulted. H/O lumbar fusion last year. Neurosurgery consulted and for CT of Lumbar Spine today. AYLIN Greer4 and US of ble ordered. Met with Patient at bedside to discuss transition of care. Patient lives with her sister in a 2 story home and set up on first level. Patient uses a walker for long distances. Her PCP is Dr. Akshat Kaiser and she uses [facility] in [place]. She has a h/o [facility] and would use again, if indicated. At this time no discharge needs anticipated. Patient has her car in parking lot and will drive  home when medically cleared. Cm/sw  Will continue to follow.

## 2022-02-02 NOTE — PROGRESS NOTES
Department of Neurosurgery  Progress Note    CHIEF COMPLAINT: back pain, hx lumbar fusion     SUBJECTIVE:  C/o generalized pain. Continued back pain. No new issues overnight. REVIEW OF SYSTEMS :  Constitutional: Negative for chills and fever. Neurological: Negative for dizziness, tremors and speech change.      OBJECTIVE:   VITALS:  /72   Pulse 74   Temp 97.5 °F (36.4 °C)   Resp 20   Ht 5' 2\" (1.575 m)   Wt 235 lb (106.6 kg)   SpO2 95%   BMI 42.98 kg/m²     PHYSICAL:  Neurologic: Alert and oriented x3; PERRL  Motor Exam:  Motor exam is symmetrical 5 out of 5 all extremities bilaterally  Sensory:  Sensory intact      DATA:  CBC:   Lab Results   Component Value Date    WBC 7.0 02/01/2022    RBC 5.35 02/01/2022    HGB 12.1 02/01/2022    HCT 41.6 02/01/2022    MCV 77.8 02/01/2022    MCH 22.6 02/01/2022    MCHC 29.1 02/01/2022    RDW 18.7 02/01/2022     02/01/2022    MPV 12.0 02/01/2022     BMP:    Lab Results   Component Value Date     02/01/2022    K 4.2 02/01/2022    K 4.2 01/31/2022     02/01/2022    CO2 23 02/01/2022    BUN 9 02/01/2022    LABALBU 3.2 02/01/2022    CREATININE 0.5 02/01/2022    CALCIUM 8.7 02/01/2022    GFRAA >60 02/01/2022    LABGLOM >60 02/01/2022    GLUCOSE 101 02/01/2022     PT/INR:    Lab Results   Component Value Date    PROTIME 11.4 01/31/2022    INR 1.1 01/31/2022     PTT:    Lab Results   Component Value Date    APTT 27.7 01/31/2022   [APTT}    Current Inpatient Medications  Current Facility-Administered Medications: pantoprazole (PROTONIX) tablet 40 mg, 40 mg, Oral, QAM AC  ondansetron (ZOFRAN-ODT) disintegrating tablet 4 mg, 4 mg, Oral, Q8H PRN **OR** ondansetron (ZOFRAN) injection 4 mg, 4 mg, IntraVENous, Q6H PRN  morphine (PF) injection 2 mg, 2 mg, IntraVENous, Q4H PRN  baclofen (LIORESAL) tablet 5 mg, 5 mg, Oral, TID  buPROPion (WELLBUTRIN XL) extended release tablet 150 mg, 150 mg, Oral, QAM  gabapentin (NEURONTIN) capsule 300 mg, 300 mg, Oral, TID  rOPINIRole (REQUIP) tablet 2 mg, 2 mg, Oral, 4x daily  sodium chloride flush 0.9 % injection 5-40 mL, 5-40 mL, IntraVENous, 2 times per day  sodium chloride flush 0.9 % injection 5-40 mL, 5-40 mL, IntraVENous, PRN  0.9 % sodium chloride infusion, 25 mL, IntraVENous, PRN  enoxaparin (LOVENOX) injection 40 mg, 40 mg, SubCUTAneous, Daily  polyethylene glycol (GLYCOLAX) packet 17 g, 17 g, Oral, Daily PRN  acetaminophen (TYLENOL) tablet 650 mg, 650 mg, Oral, Q6H PRN **OR** acetaminophen (TYLENOL) suppository 650 mg, 650 mg, Rectal, Q6H PRN  [DISCONTINUED] pantoprazole (PROTONIX) injection 40 mg, 40 mg, IntraVENous, Daily **AND** sodium chloride (PF) 0.9 % injection 10 mL, 10 mL, IntraVENous, Daily  labetalol (NORMODYNE) tablet 200 mg, 200 mg, Oral, 2 times per day  Buprenorphine HCl FILM 450 mcg, 450 mcg, SubLINGual, BID    ASSESSMENT:   Back pain  Hx L3-L4 PLIF 2021    PLAN:  -No surgical intervention needed.   -Hardware and fusion stable on CT scan  -Recommend continuing conservative treatment including pain clinic and PT as outpatient  -Follow up in neurosurgery clinic PRN      Electronically signed by Marion Spencer PA-C on 2/2/2022 at 10:50 AM     Nsx Attending:    Patient was seen and examined by me with the team.  I personally reviewed all pertinent radiological images. I concur with Miss Kang's clinical assessment and plan. C/o LBP and intermittent right LE radiculopathy. Neurologically non-focal for me on exam.  CT reviewed and stable. Plan as outlined above. Thank you so much for allowing us to participate in the care of this patient. I certify that I spent more than half of the total time on this encounter. NOTE: This report was transcribed using voice recognition software.  Every effort was made to ensure accuracy; however, inadvertent computerized transcription errors may be present

## 2022-02-02 NOTE — PROGRESS NOTES
tablet 5 mg  5 mg Oral TID Molly Dent, DO   5 mg at 02/01/22 2202    buPROPion (WELLBUTRIN XL) extended release tablet 150 mg  150 mg Oral QAM Denzel Joann, DO   150 mg at 02/01/22 6748    gabapentin (NEURONTIN) capsule 300 mg  300 mg Oral TID Molly Dent, DO   300 mg at 02/01/22 2202    rOPINIRole (REQUIP) tablet 2 mg  2 mg Oral 4x daily Molly Dent, DO   2 mg at 02/01/22 2202    sodium chloride flush 0.9 % injection 5-40 mL  5-40 mL IntraVENous 2 times per day Molly Dent, DO   10 mL at 02/01/22 2205    sodium chloride flush 0.9 % injection 5-40 mL  5-40 mL IntraVENous PRN Molly Dent, DO        0.9 % sodium chloride infusion  25 mL IntraVENous PRN Molly Dent DO        enoxaparin (LOVENOX) injection 40 mg  40 mg SubCUTAneous Daily Molly Dent, DO   40 mg at 02/01/22 4623    polyethylene glycol (GLYCOLAX) packet 17 g  17 g Oral Daily PRN Molly Dent, DO        acetaminophen (TYLENOL) tablet 650 mg  650 mg Oral Q6H PRN Molly Dent DO        Or    acetaminophen (TYLENOL) suppository 650 mg  650 mg Rectal Q6H PRN Molly Dent, DO        sodium chloride (PF) 0.9 % injection 10 mL  10 mL IntraVENous Daily Molly Dent DO   10 mL at 02/01/22 5595    labetalol (NORMODYNE) tablet 200 mg  200 mg Oral 2 times per day Perico Lujan, DO   200 mg at 02/01/22 2202    Buprenorphine HCl FILM 450 mcg  450 mcg SubLINGual BID Molly Dent, DO   450 mcg at 02/02/22 8608       Review of systems:   Heart: as above   Lungs: as above   Eyes: denies changes in vision or discharge. Ears: denies changes in hearing or pain. Nose: denies epistaxis or masses   Throat: denies sore throat or trouble swallowing. Neuro: denies numbness, tingling, tremors. Skin: denies rashes or itching. : denies hematuria, dysuria   GI: denies vomiting, diarrhea   Psych: denies mood changed, anxiety, depression.     Physical Exam   /86   Pulse 86   Temp 97.9 °F (36.6 °C) (Oral) Resp 20   Ht 5' 2\" (1.575 m)   Wt 235 lb (106.6 kg)   SpO2 93%   BMI 42.98 kg/m²   Constitutional: Oriented to person, place, and time. No distress. Well developed. Head: Normocephalic and atraumatic. Neck: Neck supple. No hepatojugular reflux. No JVD present. Carotid bruit is not present. No tracheal deviation present. No thyromegaly present. Cardiovascular: Normal rate, regular rhythm, normal heart sounds. intact distal pulses. No gallop and no friction rub. No murmur heard. Pulmonary: Breath sounds normal. No respiratory distress. No wheezes. No rales. Chest: Effort normal. No tenderness. Abdominal: Soft. Bowel sounds are normal. No distension or mass. No tenderness, rebound or guarding. Musculoskeletal: . No tenderness. No clubbing or cyanosis. Extremitites: Intact distal pulses. No edema  Neurological: Alert and oriented to person, place, and time. Skin: Skin is warm and dry. No rash noted. Not diaphoretic. No erythema. Psychiatric: Normal mood and affect. Behavior is normal.   Lymphadenopathy: No cervical adenopathy. No groin adenopathy.     CBC:   Lab Results   Component Value Date    WBC 7.0 02/01/2022    RBC 5.35 02/01/2022    HGB 12.1 02/01/2022    HCT 41.6 02/01/2022    MCV 77.8 02/01/2022    MCH 22.6 02/01/2022    MCHC 29.1 02/01/2022    RDW 18.7 02/01/2022     02/01/2022    MPV 12.0 02/01/2022     BMP:   Lab Results   Component Value Date     02/01/2022    K 4.2 02/01/2022    K 4.2 01/31/2022     02/01/2022    CO2 23 02/01/2022    BUN 9 02/01/2022    LABALBU 3.2 02/01/2022    CREATININE 0.5 02/01/2022    CALCIUM 8.7 02/01/2022    GFRAA >60 02/01/2022    LABGLOM >60 02/01/2022     Magnesium:    Lab Results   Component Value Date    MG 2.3 03/25/2021     Cardiac Enzymes:   Lab Results   Component Value Date    TROPHS 13 (H) 01/31/2022    TROPHS 15 (H) 01/31/2022      PT/INR:    Lab Results   Component Value Date    PROTIME 11.4 01/31/2022    INR 1.1 01/31/2022 TSH:    Lab Results   Component Value Date    TSH 0.988 03/10/2021     Rhythm Strip: normal sinus rhythm. Echo Summary 3/24/2021:   Ejection fraction is visually estimated at 65%. No regional wall motion abnormalities seen. Moderate left ventricular concentric hypertrophy noted. Normal right ventricle structure and function. Physiologic and/or trace mitral regurgitation is present. Physiologic and/or trace tricuspid regurgitation. ASSESSMENT & PLAN:    Patient Active Problem List   Diagnosis    Osteoarthritis of left knee    Osteoarthritis of right knee    BMI 45.0-49.9, adult (HCC)    Lymphedema of both lower extremities    Cellulitis of both lower extremities    Microcytic anemia    Morbid obesity (HCC)    Tobacco abuse    SOB (shortness of breath)    Iron deficiency anemia    Primary osteoarthritis involving multiple joints    Lymphedema    Intervertebral lumbar disc disorder    Reactive depression    Chronic pain syndrome    Primary osteoarthritis of both knees    Chronic pain    Chronic right-sided low back pain without sciatica    Anxiety    Chronic fatigue    Tremor    Intractable back pain    Acute midline low back pain with right-sided sciatica    Spinal stenosis of lumbar region with neurogenic claudication    Osteoarthritis of spine with radiculopathy, lumbar region    Cellulitis    Anemia    Spondylolisthesis of lumbar region    Lumbar stenosis with neurogenic claudication    Type 2 diabetes mellitus    Acute pancreatitis without necrosis or infection, unspecified    Abdominal pain    Chest pain    Chronic low back pain without sciatica     1. Chest pain/Abd pain:    Labs and EKG reviewed. Echo with normal LVEF and moderate LVH 3/24/2021 benign. Low risk stress 6/2019. CTA chest with no dissection or aneurysm and suggests possible pancreatitis. No mention of PE, requesting addendum in this regard. Observe. 2. Abnormal CT: Suggestion of pancreatitis.  Surgery following. 3. HTN: Observe. 4. Lymphedema/Volume overload: Follow volume. 5. Hx of CVA?: Consider ASA and statin. 6. RLS: On Requip     7. Hx of gastric bypass     8. DJD     9. Back pain     Mason Blank D.O.   Cardiologist  Cardiology, [address] [facility]

## 2022-02-02 NOTE — ACP (ADVANCE CARE PLANNING)
Advance Care Planning   Healthcare Decision Maker:  Primary  Sarah Álvarez Daughter cell [address] [address] [phone]   [facility]   home    AS per conversation with patient at bedside.

## 2022-02-02 NOTE — PROGRESS NOTES
Attending Attestation   Patient seen and examined, agree with resident note except for changes made by me, for remaining HP/Consult details please see resident HP/Consult note. General Surgery Progress Note:    CC: abd pain     S: had episode upper abd bandlike pressure/pain radiating to back on both sides denies n/v obstipation. Sx resolved spontaneously, tyler diet, has had endoscopy with Dr Mason Goldsmith in recent hx no significant findings per pt. Objective:  @/72   Pulse 74   Temp 97.5 °F (36.4 °C) (Oral)   Resp 20   Ht 5' 2\" (1.575 m)   Wt 235 lb (106.6 kg)   SpO2 95%   BMI 42.98 kg/m² @    Physical -     Gen: NAD  HEENT PERRL conj pink   Resp: Breathing Comfortably, no resp distress clear b/l   CV: Reg Rate no extra heart sounds   Abd: obese soft non tender   EXT b/l le edema with trophic changes. CT reviewed no obvious internal hernia no clear abdominal etiology     Assessment/Plan: abd pain     Sx resolved CT not concerning for acute process,   Ok for diet   Ok for d/c home     FU in office as out pt.           Mack Lugo MD FACS     1:21 PM

## 2022-02-03 ENCOUNTER — APPOINTMENT (OUTPATIENT)
Dept: CT IMAGING | Age: 67
DRG: 392 | End: 2022-02-03
Payer: MEDICARE

## 2022-02-03 VITALS
HEART RATE: 84 BPM | HEIGHT: 62 IN | DIASTOLIC BLOOD PRESSURE: 81 MMHG | TEMPERATURE: 98.2 F | OXYGEN SATURATION: 97 % | BODY MASS INDEX: 43.24 KG/M2 | SYSTOLIC BLOOD PRESSURE: 146 MMHG | WEIGHT: 235 LBS | RESPIRATION RATE: 20 BRPM

## 2022-02-03 LAB
ORGANISM: ABNORMAL
URINE CULTURE, ROUTINE: ABNORMAL

## 2022-02-03 PROCEDURE — 99233 SBSQ HOSP IP/OBS HIGH 50: CPT | Performed by: INTERNAL MEDICINE

## 2022-02-03 PROCEDURE — 99223 1ST HOSP IP/OBS HIGH 75: CPT | Performed by: INTERNAL MEDICINE

## 2022-02-03 PROCEDURE — 6370000000 HC RX 637 (ALT 250 FOR IP): Performed by: INTERNAL MEDICINE

## 2022-02-03 PROCEDURE — 6360000004 HC RX CONTRAST MEDICATION: Performed by: RADIOLOGY

## 2022-02-03 PROCEDURE — 2580000003 HC RX 258: Performed by: INTERNAL MEDICINE

## 2022-02-03 PROCEDURE — 6360000002 HC RX W HCPCS: Performed by: INTERNAL MEDICINE

## 2022-02-03 PROCEDURE — 71275 CT ANGIOGRAPHY CHEST: CPT

## 2022-02-03 RX ORDER — PANTOPRAZOLE SODIUM 40 MG/1
40 TABLET, DELAYED RELEASE ORAL
Qty: 30 TABLET | Refills: 0 | Status: SHIPPED | OUTPATIENT
Start: 2022-02-04 | End: 2022-06-29

## 2022-02-03 RX ORDER — LABETALOL 200 MG/1
200 TABLET, FILM COATED ORAL EVERY 12 HOURS SCHEDULED
Qty: 60 TABLET | Refills: 0 | Status: SHIPPED | OUTPATIENT
Start: 2022-02-03 | End: 2022-06-29

## 2022-02-03 RX ADMIN — SODIUM CHLORIDE, PRESERVATIVE FREE 10 ML: 5 INJECTION INTRAVENOUS at 10:02

## 2022-02-03 RX ADMIN — LABETALOL HYDROCHLORIDE 200 MG: 200 TABLET, FILM COATED ORAL at 10:01

## 2022-02-03 RX ADMIN — BUPROPION HYDROCHLORIDE 150 MG: 150 TABLET, EXTENDED RELEASE ORAL at 10:02

## 2022-02-03 RX ADMIN — BACLOFEN 5 MG: 10 TABLET ORAL at 10:01

## 2022-02-03 RX ADMIN — IOPAMIDOL 75 ML: 755 INJECTION, SOLUTION INTRAVENOUS at 07:30

## 2022-02-03 RX ADMIN — ROPINIROLE HYDROCHLORIDE 2 MG: 2 TABLET, FILM COATED ORAL at 10:01

## 2022-02-03 RX ADMIN — BACLOFEN 5 MG: 10 TABLET ORAL at 14:31

## 2022-02-03 RX ADMIN — GABAPENTIN 300 MG: 300 CAPSULE ORAL at 14:31

## 2022-02-03 RX ADMIN — POTASSIUM CHLORIDE 20 MEQ: 1500 TABLET, EXTENDED RELEASE ORAL at 10:01

## 2022-02-03 RX ADMIN — PANTOPRAZOLE SODIUM 40 MG: 40 TABLET, DELAYED RELEASE ORAL at 06:40

## 2022-02-03 RX ADMIN — ENOXAPARIN SODIUM 40 MG: 100 INJECTION SUBCUTANEOUS at 10:01

## 2022-02-03 RX ADMIN — GABAPENTIN 300 MG: 300 CAPSULE ORAL at 10:02

## 2022-02-03 RX ADMIN — BUMETANIDE 2 MG: 1 TABLET ORAL at 10:01

## 2022-02-03 ASSESSMENT — PAIN SCALES - GENERAL: PAINLEVEL_OUTOF10: 0

## 2022-02-03 NOTE — PROGRESS NOTES
Spoke with nurse - patient for possible discharge. Will try to complete EMG 2/4/22 if still admitted. EMG may be scheduled as outpatient if discharged.   Maria Alejandra Kwon 2/3/2022

## 2022-02-03 NOTE — PROGRESS NOTES
[facility] Cardiology Inpatient Progress Note    Patient is a 73 y.o. female of Tim Tavarez DO seen in hospital follow up. Chief complaint: CP/Abd pain    HPI: No CP or SOB.  Some epigastric discomfort with breakfast.    Patient Active Problem List   Diagnosis    Osteoarthritis of left knee    Osteoarthritis of right knee    BMI 45.0-49.9, adult ([address]    Lymphedema of both lower extremities    Cellulitis of both lower extremities    Microcytic anemia    Morbid obesity ([address]    Tobacco abuse    SOB (shortness of breath)    Iron deficiency anemia    Primary osteoarthritis involving multiple joints    Lymphedema    Intervertebral lumbar disc disorder    Reactive depression    Chronic pain syndrome    Primary osteoarthritis of both knees    Chronic pain    Chronic right-sided low back pain without sciatica    Anxiety    Chronic fatigue    Tremor    Intractable back pain    Acute midline low back pain with right-sided sciatica    Spinal stenosis of lumbar region with neurogenic claudication    Osteoarthritis of spine with radiculopathy, lumbar region    Cellulitis    Anemia    Spondylolisthesis of lumbar region    Lumbar stenosis with neurogenic claudication    Type 2 diabetes mellitus    Acute pancreatitis without necrosis or infection, unspecified    Abdominal pain    Chest pain    Chronic low back pain without sciatica       Allergies   Allergen Reactions    Ferritin Shortness Of Breath and Other (See Comments)     Flushed,  Severe back pain       Current Facility-Administered Medications   Medication Dose Route Frequency Provider Last Rate Last Admin    pantoprazole (PROTONIX) tablet 40 mg  40 mg Oral QAM AC Facundo Dent, DO   40 mg at 02/03/22 0640    bumetanide (BUMEX) tablet 2 mg  2 mg Oral BID Fabiola Dent, DO   2 mg at 02/02/22 1810    potassium chloride (KLOR-CON M) extended release tablet 20 mEq  20 mEq Oral BID  Fabiola Dent, DO   20 mEq at 02/02/22 1810    ondansetron (ZOFRAN-ODT) disintegrating tablet 4 mg  4 mg Oral Q8H PRN Daphney Copas, DO        Or    ondansetron [facility]) injection 4 mg  4 mg IntraVENous Q6H PRN Daphney Copas, DO        morphine (PF) injection 2 mg  2 mg IntraVENous Q4H PRN Trinidad Brinks Malmer, DO   2 mg at 02/02/22 2045    baclofen (LIORESAL) tablet 5 mg  5 mg Oral TID Trinidad Brinks Malmer, DO   5 mg at 02/02/22 2040    buPROPion (WELLBUTRIN XL) extended release tablet 150 mg  150 mg Oral QAM Garlan Marrow, DO   150 mg at 02/02/22 1036    gabapentin (NEURONTIN) capsule 300 mg  300 mg Oral TID [place] Brinks Malmer, DO   300 mg at 02/02/22 2040    rOPINIRole (REQUIP) tablet 2 mg  2 mg Oral 4x daily [place] Brinks Malmer, DO   2 mg at 02/02/22 2040    sodium chloride flush 0.9 % injection 5-40 mL  5-40 mL IntraVENous 2 times per day Trin[place] Brinks Malmer, DO   10 mL at 02/02/22 2040    sodium chloride flush 0.9 % injection 5-40 mL  5-40 mL IntraVENous PRN [place] Brinks Malmer, DO   10 mL at 02/02/22 2045    0.9 % sodium chloride infusion  25 mL IntraVENous PRN [place] Brinks Malmer, DO        enoxaparin (LOVENOX) injection 40 mg  40 mg SubCUTAneous Daily [place] Brinks Malmer, DO   40 mg at 02/02/22 1036    polyethylene glycol (GLYCOLAX) packet 17 g  17 g Oral Daily PRN [place] Brinks Malmer, DO        acetaminophen (TYLENOL) tablet 650 mg  650 mg Oral Q6H PRN [place] Brinks Malmer, DO        Or    acetaminophen (TYLENOL) suppository 650 mg  650 mg Rectal Q6H PRN [place] Brinks Malmer, DO        sodium chloride (PF) 0.9 % injection 10 mL  10 mL IntraVENous Daily [place] Brinks Malmer, DO   10 mL at 02/01/22 0100    labetalol (NORMODYNE) tablet 200 mg  200 mg Oral 2 times per day Sharlene Manjarrez    200 mg at 02/02/22 2040    Buprenorphine HCl FILM 450 mcg  450 mcg SubLINGual BID [place] Dent, DO   450 mcg at 02/03/22 3760       Review of systems:   Heart: as above   Lungs: as above   Eyes: denies changes in vision or discharge.    Ears: denies changes in hearing or pain. Nose: denies epistaxis or masses   Throat: denies sore throat or trouble swallowing. Neuro: denies numbness, tingling, tremors. Skin: denies rashes or itching. : denies hematuria, dysuria   GI: denies vomiting, diarrhea   Psych: denies mood changed, anxiety, depression. Physical Exam   BP (!) 146/81   Pulse 84   Temp 98.2 °F (36.8 °C)   Resp 18   Ht 5' 2\" (1.575 m)   Wt 235 lb (106.6 kg)   SpO2 97%   BMI 42.98 kg/m²   Constitutional: Oriented to person, place, and time. No distress. Well developed. Head: Normocephalic and atraumatic. Neck: Neck supple. No hepatojugular reflux. No JVD present. Carotid bruit is not present. No tracheal deviation present. No thyromegaly present. Cardiovascular: Normal rate, regular rhythm, normal heart sounds. intact distal pulses. No gallop and no friction rub. No murmur heard. Pulmonary: Breath sounds normal. No respiratory distress. No wheezes. No rales. Chest: Effort normal. No tenderness. Abdominal: Soft. Bowel sounds are normal. No distension or mass. No tenderness, rebound or guarding. Musculoskeletal: . No tenderness. No clubbing or cyanosis. Extremitites: Intact distal pulses. No edema  Neurological: Alert and oriented to person, place, and time. Skin: Skin is warm and dry. No rash noted. Not diaphoretic. No erythema. Psychiatric: Normal mood and affect. Behavior is normal.   Lymphadenopathy: No cervical adenopathy. No groin adenopathy.     CBC:   Lab Results   Component Value Date    WBC 7.0 02/01/2022    RBC 5.35 02/01/2022    HGB 12.1 02/01/2022    HCT 41.6 02/01/2022    MCV 77.8 02/01/2022    MCH 22.6 02/01/2022    MCHC 29.1 02/01/2022    RDW 18.7 02/01/2022     02/01/2022    MPV 12.0 02/01/2022     BMP:   Lab Results   Component Value Date     02/01/2022    K 4.2 02/01/2022    K 4.2 01/31/2022     02/01/2022    CO2 23 02/01/2022    BUN 9 02/01/2022    LABALBU 3.2 02/01/2022    CREATININE 0.5 02/01/2022    CALCIUM 8.7 02/01/2022    GFRAA >60 02/01/2022    LABGLOM >60 02/01/2022     Magnesium:    Lab Results   Component Value Date    MG 2.3 03/25/2021     Cardiac Enzymes:   Lab Results   Component Value Date    TROPHS 13 (H) 01/31/2022    TROPHS 15 (H) 01/31/2022      PT/INR:    Lab Results   Component Value Date    PROTIME 11.4 01/31/2022    INR 1.1 01/31/2022     TSH:    Lab Results   Component Value Date    TSH 0.988 03/10/2021     Rhythm Strip: normal sinus rhythm. Echo Summary 3/24/2021:   Ejection fraction is visually estimated at 65%. No regional wall motion abnormalities seen. Moderate left ventricular concentric hypertrophy noted. Normal right ventricle structure and function. Physiologic and/or trace mitral regurgitation is present. Physiologic and/or trace tricuspid regurgitation.     ASSESSMENT & PLAN:    Patient Active Problem List   Diagnosis    Osteoarthritis of left knee    Osteoarthritis of right knee    BMI 45.0-49.9, adult ([address]    Lymphedema of both lower extremities    Cellulitis of both lower extremities    Microcytic anemia    Morbid obesity ([address]    Tobacco abuse    SOB (shortness of breath)    Iron deficiency anemia    Primary osteoarthritis involving multiple joints    Lymphedema    Intervertebral lumbar disc disorder    Reactive depression    Chronic pain syndrome    Primary osteoarthritis of both knees    Chronic pain    Chronic right-sided low back pain without sciatica    Anxiety    Chronic fatigue    Tremor    Intractable back pain    Acute midline low back pain with right-sided sciatica    Spinal stenosis of lumbar region with neurogenic claudication    Osteoarthritis of spine with radiculopathy, lumbar region    Cellulitis    Anemia    Spondylolisthesis of lumbar region    Lumbar stenosis with neurogenic claudication    Type 2 diabetes mellitus    Acute pancreatitis without necrosis or infection, unspecified    Abdominal pain    Chest pain    Chronic low back pain without sciatica     1. Chest pain/Abd pain:    Labs and EKG reviewed. Echo with normal LVEF and moderate LVH 3/24/2021 benign. Low risk stress 6/2019. CTA chest with no dissection, aneurysm or PE. Suggests possible pancreatitis. Observe. 2. Abnormal CT: Suggestion of pancreatitis. Surgery following. 3. HTN: Observe. 4. Lymphedema/Volume overload: Follow volume. 5. Hx of CVA?: Consider ASA and statin. 6. RLS: On Requip     7. Hx of gastric bypass     8. DJD     9. Back pain     Romana Schwalbe, D.O.   Cardiologist  Cardiology, [id number] [facility]

## 2022-02-03 NOTE — CONSULTS
Pulmonary [address] [facility]                             Pulmonary Consult/Progress Note :          Patient: Rj Diallo  MRN: [id number]  : 1955      Date of Admission: .2022  9:38 PM    Consulting Physician:Dr Zach Toribio         Reason for Consultation:,elevated d dimer  CC : SOB   HPI:   Rj Diallo is a 73y.o. year old with 30 PPY smoking history ,she also has  left ventricular hypertrophy which is moderate, HFpEF.     She had fried chicken on  evening. The next day around 4 PM she developed chest pain in the center of her chest that was a pressure type pain which radiated to her back as a sharp pain. Was located in the center of her chest and radiated to her mid upper back. She was driving as it occurred and she noticed no change in the intensity of the pain with arm movements. She had no dyspnea. She went home to lie down. The pain continued for a few hours so she called 911. She states the pain was actually better with a deep breath.   She had no vomiting abdominal pain or diarrhea.     Had CTA chest with No PE   She feels much better now    PAST MEDICAL HISTORY:   Past Medical History:   Diagnosis Date    Anemia     Arthritis     Cellulitis 2015    left leg    Chronic ulcer of leg with fat layer exposed ([address] 2015    Chronic ulcer of right leg, limited to breakdown of skin ([address] 2016    COVID-19 2021    postive test no symptoms    Depression     Full dentures     Low back pain     Lymphedema of both lower extremities 2015    Obesity     280 #    Osteoarthritis     Peripheral vision loss     Stroke ([address]     Type 2 diabetes mellitus without complication, without long-term current use of insulin ([address] 2019    Ulcer of left lower leg, limited to breakdown of skin ([address] 06/10/2019    Ulcer of left lower leg, limited to breakdown of skin ([address] 06/10/2019    UTI (urinary tract infection) 2021       PAST SURGICAL HISTORY:   Past Surgical History:   Procedure Laterality Date    ABDOMINOPLASTY  2002    BREAST REDUCTION SURGERY      reduction    CATARACT REMOVAL      bilateral     SECTION      x2    COLONOSCOPY  2012    and egd    COLONOSCOPY  2021    polyps; marginal prep--jessica    COLONOSCOPY N/A 2021    COLONOSCOPY WITH BIOPSY performed by Alonzo Machado MD at [address] ECHOCARDIOGRAM COMPLETE 2D W DOPPLER W COLOR  2012         GASTRIC BYPASS SURGERY  2000    NO NASTOGASTRIC TUBE    HERNIA REPAIR          LUMBAR SPINE SURGERY N/A 2021    L3-L4  POSTERIOR LUMBAR INTERBODY  FUSION--OARM, JESSI, YAJAIRA TABLE, CELL SAVER, PLATELET GEL, AUDIOLOGY, CAGES, PLATES, SCREWS -- GLOBUS performed by Jose Alfredo Walker MD at [address]  2021    minimal pouch gastritis--jessica    UPPER GASTROINTESTINAL ENDOSCOPY N/A 2021    EGD ESOPHAGOGASTRODUODENOSCOPY performed by Alonzo Machado MD at [address]:   Family History   Problem Relation Age of Onset    Breast Cancer Mother     Stroke Father     Hypertension Sister     Hypertension Brother        SOCIAL HISTORY:   Social History     Socioeconomic History    Marital status:      Spouse name: Not on file    Number of children: 2    Years of education: 15    Highest education level: Associate degree: academic program   Occupational History    Not on file   Tobacco Use    Smoking status: Former Smoker     Packs/day: 0.25     Years: 38.00     Pack years: 9.50     Types: Cigarettes     Quit date: 3/11/2021     Years since quittin.9    Smokeless tobacco: Never Used    Tobacco comment: Pt states she quit [employer]"   Vaping Use    Vaping Use: Never used   Substance and Sexual Activity    Alcohol use: No    Drug use: No    Sexual activity: Not Currently   Other Topics Concern    Not on file   Social History Narrative Chronic Diagnosis: Iron deficiency anemia, Depressive disorder, Benign essential hypertension, Mixed    hyperlipidemia. HTN    OBESITY    LIPID    OA    SMOKER---quit   summer  2021    CVA L FIELD VISION    DEPRESSION    GASTRIC BYPASS 01    BREAST REDUCTION--    Jagruti Marmolejo  1955 Page #2    ANEMIA==IRON AND B-12    Admitted 2019 with cellulitis left lower extremity then readmitted with chest pain with negative stress test    L--3-4   lumbar surgeyr dr Ester Isaacs     Social Determinants of Health     Financial Resource Strain: Low Risk     Difficulty of Paying Living Expenses: Not very hard   Food Insecurity: No Food Insecurity    Worried About Running Out of Food in the Last Year: Never true    [place] of Food in the Last Year: Never true   Transportation Needs: No Transportation Needs    Lack of Transportation (Medical): No    Lack of Transportation (Non-Medical): No   Physical Activity: Inactive    Days of Exercise per Week: 0 days    Minutes of Exercise per Session: 0 min   Stress: No Stress Concern Present    Feeling of Stress : Only a little   Social Connections: Moderately Integrated    Frequency of Communication with Friends and Family: More than three times a week    Frequency of Social Gatherings with Friends and Family: More than three times a week    Attends Mosque Services: More than 4 times per year    Active Member of [address] [place] [address] [employer] or Organizations:  Yes    Attends Club or Organization Meetings: More than 4 times per year    Marital Status:    Intimate Partner Violence:     Fear of Current or Ex-Partner: Not on file    Emotionally Abused: Not on file    Physically Abused: Not on file    Sexually Abused: Not on file   Housing Stability:     Unable to Pay for Housing in the Last Year: Not on file    Number of [place] in the Last Year: Not on file    Unstable Housing in the Last Year: Not on file     Social History     Tobacco Use   Smoking Status Former Smoker    Packs/day: 0.25    Years: 38.00    Pack years: 9.50    Types: Cigarettes    Quit date: 3/11/2021    Years since quittin.9   Smokeless Tobacco Never Used   Tobacco Comment    Pt states she quit [employer]"     Social History     Substance and Sexual Activity   Alcohol Use No     Social History     Substance and Sexual Activity   Drug Use No       OCCUPATIONAL HISTORY:            HOME MEDICATIONS:  Prior to Admission medications    Medication Sig Start Date End Date Taking? Authorizing Provider   BELBUCA 450 MCG FILM Take 450 mcg by mouth every 12 hours for 30 days.  22 Yes CHERYL Rubin   gabapentin (NEURONTIN) 300 MG capsule TAKE 1 CAPSULE BY MOUTH THREE TIMES DAILY FOR 30 DAYS 21 Yes Gautam Razo DO   buPROPion (WELLBUTRIN XL) 150 MG extended release tablet Take 1 tablet by mouth every morning 21  Yes Gautam Razo DO   BELBUCA 450 MCG FILM DISSOLVE 1 FILM UNDER THE TONGUE EVERY 12 HOURS FOR 30 DAYS 21  Yes Historical Provider, MD   lactobacillus (CULTURELLE) CAPS capsule Take 1 capsule by mouth daily 21  Yes Rahel Carnes MD   baclofen (LIORESAL) 5 MG tablet Take 1 tablet by mouth 3 times daily 21  Yes Rahel Carnes MD   bumetanide (BUMEX) 2 MG tablet Take 1 tablet by mouth 2 times daily  Patient taking differently: Take 2 mg by mouth daily as needed  3/30/21  Yes Chata Mehta MD   potassium chloride (KLOR-CON M) 20 MEQ extended release tablet Take 1 tablet by mouth 2 times daily (with meals)  Patient taking differently: Take 20 mEq by mouth daily as needed  3/30/21  Yes Chata Mehta MD   rOPINIRole (REQUIP) 2 MG tablet Take 1 tablet by mouth 4 times daily  Patient taking differently: Take 2 mg by mouth 2 times daily 1mg in AM and 2mg PM. 20  Yes Gautam Razo DO   nitrofurantoin, macrocrystal-monohydrate, (MACROBID) 100 MG capsule Take 1 capsule by mouth 2 times daily 21   Amira Rodriguez JONES Razo DO   nitrofurantoin, macrocrystal-monohydrate, (MACROBID) 100 MG capsule Take 1 capsule by mouth 2 times daily 7/7/21   Gautam Razo DO   ammonium lactate (LAC-HYDRIN) 12 % cream Apply topically as needed.  10/8/20   Samia Ratel, DPM   Cream Base (VERSAPRO) CREA APPLY ONE (1) GRAM (ONE PUMP) EXTERNALLY FOUR TIMES A DAY TO [facility] KNEE 3/23/20   Tierra Haddad PA-C       CURRENT MEDICATIONS:  Current Facility-Administered Medications: pantoprazole (PROTONIX) tablet 40 mg, 40 mg, Oral, QAM AC  bumetanide (BUMEX) tablet 2 mg, 2 mg, Oral, BID  potassium chloride (KLOR-CON M) extended release tablet 20 mEq, 20 mEq, Oral, BID WC  ondansetron (ZOFRAN-ODT) disintegrating tablet 4 mg, 4 mg, Oral, Q8H PRN **OR** ondansetron (ZOFRAN) injection 4 mg, 4 mg, IntraVENous, Q6H PRN  morphine (PF) injection 2 mg, 2 mg, IntraVENous, Q4H PRN  baclofen (LIORESAL) tablet 5 mg, 5 mg, Oral, TID  buPROPion (WELLBUTRIN XL) extended release tablet 150 mg, 150 mg, Oral, QAM  gabapentin (NEURONTIN) capsule 300 mg, 300 mg, Oral, TID  rOPINIRole (REQUIP) tablet 2 mg, 2 mg, Oral, 4x daily  sodium chloride flush 0.9 % injection 5-40 mL, 5-40 mL, IntraVENous, 2 times per day  sodium chloride flush 0.9 % injection 5-40 mL, 5-40 mL, IntraVENous, PRN  0.9 % sodium chloride infusion, 25 mL, IntraVENous, PRN  enoxaparin (LOVENOX) injection 40 mg, 40 mg, SubCUTAneous, Daily  polyethylene glycol (GLYCOLAX) packet 17 g, 17 g, Oral, Daily PRN  acetaminophen (TYLENOL) tablet 650 mg, 650 mg, Oral, Q6H PRN **OR** acetaminophen (TYLENOL) suppository 650 mg, 650 mg, Rectal, Q6H PRN  [DISCONTINUED] pantoprazole (PROTONIX) injection 40 mg, 40 mg, IntraVENous, Daily **AND** sodium chloride (PF) 0.9 % injection 10 mL, 10 mL, IntraVENous, Daily  labetalol (NORMODYNE) tablet 200 mg, 200 mg, Oral, 2 times per day  Buprenorphine HCl FILM 450 mcg, 450 mcg, SubLINGual, BID    IV MEDICATIONS:   sodium chloride         ALLERGIES:  Allergies   Allergen Reactions    Ferritin Shortness Of Breath and Other (See Comments)     Flushed,  Severe back pain       REVIEW OF SYSTEMS:  General ROS:  No weight loss ,no fatigue     ENT ROS:   No Sore throat ,no lymphoadenopathy,no nasal stuffiness     Hematological and Lymphatic ROS:   No ecchymosis ,no tendency to bleed  Respiratory ROS:    SOB  Cardiovascular ROS:   No CP,No Palpitation   Gastrointestinal ROS:   No Gi bleed,no nausea or vomiting      - Musculoskeletal ROS:      - no joint swelling ,no joint pain   Neurological ROS:     -no weakness or numbness    Dermatological ROS:   No skin rash ,no urticaria     PHYSICAL EXAMINATION:     VITAL SIGNS:  BP (!) 146/81   Pulse 84   Temp 98.2 °F (36.8 °C)   Resp 18   Ht 5' 2\" (1.575 m)   Wt 235 lb (106.6 kg)   SpO2 97%   BMI 42.98 kg/m²   Wt Readings from Last 3 Encounters:   02/01/22 235 lb (106.6 kg)   01/28/22 285 lb (129.3 kg)   12/21/21 285 lb (129.3 kg)     Temp Readings from Last 3 Encounters:   02/03/22 98.2 °F (36.8 °C)   01/28/22 97.8 °F (36.6 °C) (Infrared)   12/21/21 97 °F (36.1 °C) (Temporal)     TMAX:  BP Readings from Last 3 Encounters:   02/03/22 (!) 146/81   01/28/22 138/88   12/21/21 (!) 142/82     Pulse Readings from Last 3 Encounters:   02/03/22 84   01/28/22 92   12/21/21 126           INTAKE/OUTPUTS:  I/O last 3 completed shifts:   In: 480 [P.O.:480]  Out: -     Intake/Output Summary (Last 24 hours) at 2/3/2022 1146  Last data filed at 2/3/2022 9702  Gross per 24 hour   Intake 480 ml   Output    Net 480 ml       General Appearance: alert and oriented to person, place and time, well-developed and   well-nourished, in no acute distress   Eyes: pupils equal, round, and reactive to light, extraocular eye movements intact, conjunctivae normal and sclera anicteric   Neck: neck supple and non tender without mass, no thyromegaly, no thyroid nodules and no cervical adenopathy   Pulmonary/Chest:chest tightness  Cardiovascular: normal rate, regular rhythm, normal S1 and S2, no murmurs, rubs, clicks or gallops, distal pulses intact, no carotid bruits, no murmurs, no gallops, no carotid bruits and no JVD   Abdomen: obese, soft, non-tender, non-distended, normal bowel sounds, no masses or organomegaly   Extremities:no edema   Musculoskeletal: normal range of motion, no joint swelling, deformity or tenderness   Neurologic: reflexes normal and symmetric, no cranial nerve deficit noted    LABS/IMAGING:    CBC:  Lab Results   Component Value Date    WBC 7.0 02/01/2022    HGB 12.1 02/01/2022    HCT 41.6 02/01/2022    MCV 77.8 (L) 02/01/2022     02/01/2022    LYMPHOPCT 16.5 (L) 01/31/2022    RBC 5.35 02/01/2022    MCH 22.6 (L) 02/01/2022    MCHC 29.1 (L) 02/01/2022    RDW 18.7 (H) 02/01/2022    NEUTOPHILPCT 73.1 01/31/2022    MONOPCT 7.9 01/31/2022    BASOPCT 0.2 01/31/2022    NEUTROABS 6.68 01/31/2022    LYMPHSABS 1.51 01/31/2022    MONOSABS 0.72 01/31/2022    EOSABS 0.17 01/31/2022    BASOSABS 0.02 01/31/2022       Recent Labs     02/01/22  0756 01/31/22 2216   WBC 7.0 9.1   HGB 12.1 12.1   HCT 41.6 41.7   MCV 77.8* 78.5*    190       BMP:   Recent Labs     01/31/22  2216 02/01/22  0756    138   K 4.2 4.2    104   CO2 30* 23   BUN 14 9   CREATININE 0.6 0.5       MG:   Lab Results   Component Value Date    MG 2.3 03/25/2021     Ca/Phos:   Lab Results   Component Value Date    CALCIUM 8.7 02/01/2022    PHOS 2.8 06/08/2019     Amylase:   Lab Results   Component Value Date    AMYLASE 33 02/01/2022     Lipase:   Lab Results   Component Value Date    LIPASE 35 02/01/2022     LIVER PROFILE:   Recent Labs     01/31/22 2216 01/31/22 2325 02/01/22  0756   AST 19  --  15   ALT 16  --  14   LIPASE  --  39 35   BILITOT 0.4  --  0.4   ALKPHOS 164*  --  154*       PT/INR:   Recent Labs     01/31/22 2216   PROTIME 11.4   INR 1.1     APTT:   Recent Labs     01/31/22 2216   APTT 27.7       Cardiac Enzymes:  Lab Results   Component Value Date    TROPONINI <0.01 03/23/2021               MICROBIOLOGY:      CXR:    PFTs:    2D Echocardiogram:    CT Chest:    PROBLEM LIST:  Patient Active Problem List   Diagnosis    Osteoarthritis of left knee    Osteoarthritis of right knee    BMI 45.0-49.9, adult ([address]    Lymphedema of both lower extremities    Cellulitis of both lower extremities    Microcytic anemia    Morbid obesity (HCC)    Tobacco abuse    SOB (shortness of breath)    Iron deficiency anemia    Primary osteoarthritis involving multiple joints    Lymphedema    Intervertebral lumbar disc disorder    Reactive depression    Chronic pain syndrome    Primary osteoarthritis of both knees    Chronic pain    Chronic right-sided low back pain without sciatica    Anxiety    Chronic fatigue    Tremor    Intractable back pain    Acute midline low back pain with right-sided sciatica    Spinal stenosis of lumbar region with neurogenic claudication    Osteoarthritis of spine with radiculopathy, lumbar region    Cellulitis    Anemia    Spondylolisthesis of lumbar region    Lumbar stenosis with neurogenic claudication    Type 2 diabetes mellitus    Acute pancreatitis without necrosis or infection, unspecified    Abdominal pain    Chest pain    Chronic low back pain without sciatica               ASSESSMENT:  1.) Chest tightness  2.)Possible COPD   3.)Possible TOBIAS  4.)Possible pancreatitis         PLAN:  *-she has low probablity of PE and D dimer is minimal elevated ,with normal CTA and US lower ext , I doubt any PE    *-defer further work up for Cardiology  *-GS was consulted for possible pancreatitis   *-PFT as OP   *_sleep study as OP  Ambulate and check o2 on ambulation           Thank you very much for allowing me to participate in the care of this pleasant patient , should you have any questions ,please do not hesitate to contact me      Liliana Garibay MD,[facility]  Pulmonary&Critical Care Medicine   Director of [address] [place] [address] Director of Respiratory Service [facility]    Herbie Sidhu    NOTE: This report was transcribed using voice recognition software. Every effort was made to ensure accuracy; however, inadvertent computerized transcription errors may be present.

## 2022-02-03 NOTE — CARE COORDINATION
2/3/22 Update CM Note; Patient remains on telemetry . She is post CTA pulmonaries which is negative. She is pending a pulm consult. N/S consult completed and will follow outpatient. Surgery consult complete and ok for discharge. Will await completion of pulm consult for possible discharge home.  Electronically signed by Megan Barreto RN CM on 2/3/2022 at 12:57 PM

## 2022-02-03 NOTE — PLAN OF CARE
Problem: Falls - Risk of:  Goal: Will remain free from falls  2/2/2022 2212 by Prakash Elmore RN  Outcome: Met This Shift  2/2/2022 1125 by Sachin Hook RN  Outcome: Met This Shift  Goal: Absence of physical injury  2/2/2022 2212 by Prakash Elmore RN  Outcome: Met This Shift  2/2/2022 1125 by Sachin Hook RN  Outcome: Met This Shift     Problem: Pain:  Goal: Pain level will decrease  Outcome: Met This Shift  Goal: Control of acute pain  Outcome: Met This Shift

## 2022-02-03 NOTE — PROGRESS NOTES
Physician Progress Note      PATIENT:               Tabby Cavazos  CSN #:                  [id number]  :                       1955  ADMIT DATE:       2022 9:38 PM  [address] DATE:        2/3/2022 2:48 PM  RESPONDING  PROVIDER #:        Nathan Glasgow DO          QUERY TEXT:    Pt admitted with Chest Pain. Pt noted to have chest pain, abdominal and   chronic low back pain. If possible, please document in progress notes and   discharge summary if you are evaluating and/or treating any of the following: The medical record reflects the following:  Risk Factors: Chronic Illness; Gastric bypass surgery  Clinical Indicators: Per ED provider Chest pain that radiates to back. She   associated shortness of breath:  ED concern: Chest pain; ACute Cystitis   without hematuria  Per H&P: presents to ED complaining of Acute onset of abdominal pain, chest   pain, nausea. The patient is noncompliant with Bumex due to urinary   incontinence. Impression: Chest/abdominal pain; chronic lymphedema, lower   extremities, chronic pain, morbid obesity. Plan: NPO, IV fluids, Surgery and   cardiology to see. Proton Pump inhibitor. Pain control. serial labs  Cardiology consult note: Chest/abdominal pain; CTA chest: possible   pancreatitis. Consider pharm stress to risk stratify if GI evaluation   unfruitful. Abnormal CT suggestion of pancreatitis. Surgery to see  General Surgery consult:  1 day history of chest and upper abdominal pain. CT   imaging showing inflammation around the pancreatic head. plan: trial low fat   diabetic diet. GI cocktail, monitor abdominal exam, will discuss ultrasound   imaging if pain does not improve. Neurosurgery consult: Patient underwent a lumbar fusion a year ago. Most   recent visit she has had worsening back pain. Plan is to obtain CT scan of   lumbar spine to evaluated fusion. 22: Progress note internal medicine: Pt states abdominal pain resolved.     Predominant symptoms chronic low back pain and hand numbness  2/2/22: Neurosurgery progress note: No surgical intervention needed. Hardware   and fusion stable on CT scan. Recommend continuing conservative treatment   including pain clinic and PT as outpatient  2/2/22: General Surgery progress note: Sx resolved CT not concerning for acute   process. Yesenia Payer for diet, Yesenia Payer for d/c home  2/3/22: Internal Medicine progress note: abdominal pain; chronic pain; chest   pain, chronic low back pain without sciatica. Plan: continue bumex, kcl,   protonix, order CTA chest  1/31/22: Chest X-ray: no acute process;  ; troponin 15 and repeat 13  CTA CHEST/Abdomen/Pelvis: Mild reticulations is noted surrounding the head of   the pancreas which may be concerning for underlying acute pancreatitis,   recommend correlation with clinical/laboratory analysis. Postsurgical changes   are noted in the stomach and bowel, most likely related to prior bariatric   surgery  2/1/22: CT Lumbar: Postoperative and degenerative changes l4-L5 and l5-S1. No   hardware failure identified;  Urine : Positive nitrates many bacteria, WBC   1-3  Treatment: Cardiology, general surgery, neurosurgery consult;   Initially NPO   with advancement of diet per consults;  IV fluid bolusl  IV pain medications;   Protonix IV daily;  IV fluids; GI cocktail; Laboratory monitoring; telemetry  US duplex lower extremities bilaterally: No DVT  CTA Pulmonary: No PE    Thank You,  Nuria GARCIAN, R.N  Clinical Documentation Improvement  -[id number]  Options provided:  -- Chest pain due to GERD  -- Chest pain due to costochondritis  -- Chest pain due to pleurisy  -- Chest pain due to Pancreatitis  -- Chest pain due to Chronic Back Pain  -- Chest pain due to CAD with unstable angina  -- Other - I will add my own diagnosis  -- Disagree - Not applicable / Not valid  -- Disagree - Clinically unable to determine / Unknown  -- Refer to Clinical Documentation Reviewer    PROVIDER RESPONSE TEXT:    This patient has chest pain due to GERD.     Query created by: Tabatha Shah on 2/3/2022 2:46 PM      Electronically signed by:  Vivienne Cardozo DO 2/3/2022 2:52 PM

## 2022-02-03 NOTE — CARE COORDINATION
2/3/22 CM Note; Dr Malena Dexter notified via perfect serve of pending pulmonary consult from 2/1/22.  Electronically signed by Megan Barreto RN CM on 2/3/2022 at 1:00 PM

## 2022-02-07 ENCOUNTER — TELEPHONE (OUTPATIENT)
Dept: CARDIOLOGY CLINIC | Age: 67
End: 2022-02-07

## 2022-02-08 ENCOUNTER — PROCEDURE VISIT (OUTPATIENT)
Dept: PAIN MANAGEMENT | Age: 67
End: 2022-02-08
Payer: MEDICARE

## 2022-02-08 VITALS
BODY MASS INDEX: 43.24 KG/M2 | OXYGEN SATURATION: 98 % | HEART RATE: 98 BPM | TEMPERATURE: 95.5 F | RESPIRATION RATE: 16 BRPM | WEIGHT: 235 LBS | DIASTOLIC BLOOD PRESSURE: 60 MMHG | SYSTOLIC BLOOD PRESSURE: 130 MMHG | HEIGHT: 62 IN

## 2022-02-08 DIAGNOSIS — M17.0 PRIMARY OSTEOARTHRITIS OF BOTH KNEES: Chronic | ICD-10-CM

## 2022-02-08 PROCEDURE — 20611 DRAIN/INJ JOINT/BURSA W/US: CPT | Performed by: PAIN MEDICINE

## 2022-02-08 PROCEDURE — 99213 OFFICE O/P EST LOW 20 MIN: CPT | Performed by: PAIN MEDICINE

## 2022-02-08 RX ORDER — LIDOCAINE HYDROCHLORIDE 20 MG/ML
3 INJECTION, SOLUTION INFILTRATION; PERINEURAL ONCE
Status: COMPLETED | OUTPATIENT
Start: 2022-02-08 | End: 2022-02-08

## 2022-02-08 RX ORDER — TRIAMCINOLONE ACETONIDE 40 MG/ML
40 INJECTION, SUSPENSION INTRA-ARTICULAR; INTRAMUSCULAR ONCE
Status: COMPLETED | OUTPATIENT
Start: 2022-02-08 | End: 2022-02-08

## 2022-02-08 RX ORDER — BUPIVACAINE HYDROCHLORIDE 2.5 MG/ML
3 INJECTION, SOLUTION EPIDURAL; INFILTRATION; INTRACAUDAL ONCE
Status: COMPLETED | OUTPATIENT
Start: 2022-02-08 | End: 2022-02-08

## 2022-02-08 RX ADMIN — TRIAMCINOLONE ACETONIDE 40 MG: 40 INJECTION, SUSPENSION INTRA-ARTICULAR; INTRAMUSCULAR at 10:15

## 2022-02-08 RX ADMIN — TRIAMCINOLONE ACETONIDE 40 MG: 40 INJECTION, SUSPENSION INTRA-ARTICULAR; INTRAMUSCULAR at 10:14

## 2022-02-08 RX ADMIN — LIDOCAINE HYDROCHLORIDE 3 ML: 20 INJECTION, SOLUTION INFILTRATION; PERINEURAL at 10:13

## 2022-02-08 RX ADMIN — LIDOCAINE HYDROCHLORIDE 3 ML: 20 INJECTION, SOLUTION INFILTRATION; PERINEURAL at 10:12

## 2022-02-08 RX ADMIN — BUPIVACAINE HYDROCHLORIDE 3 ML: 2.5 INJECTION, SOLUTION EPIDURAL; INFILTRATION; INTRACAUDAL at 10:11

## 2022-02-08 RX ADMIN — BUPIVACAINE HYDROCHLORIDE 3 ML: 2.5 INJECTION, SOLUTION EPIDURAL; INFILTRATION; INTRACAUDAL at 10:10

## 2022-02-08 NOTE — PROGRESS NOTES
Do you currently have any of the following:    Fever: No  Headache:  No  Cough: No  Shortness of breath: No  Exposed to anyone with these symptoms: Candi Bethea [facility] on [facility]. Fabián Holloway is complaining of pain B/L knees The pain is constant. The pain is described as aching, throbbing, shooting and stabbing. Pain is rated on her best day at a 6, on her worst day at a 10, and on average at a 7 on the VAS scale. She took her last dose of Belbuca this am.      Any procedures since your last visit:     Pacemaker or defibrillator: No managed by     She is not on NSAIDS and is not on anticoagulation medications to include none and is managed by     Medication Contract and Consent for Opioid Use Documents Filed     Patient Documents     Type of Document Status Date Received Received By Description    Medication Contract Signed 9/6/2019 12:04 PM Josh Chavis med contract    Medication Contract Received 11/17/2020 10:57 AM Timoteo MORRISON med contract    Medication Contract Signed 8/4/2021 11:40 AM Jeremiah KIRBY [facility] EXPIRES 08.03.2022                /60   Pulse 98   Temp 95.5 °F (35.3 °C) (Infrared)   Resp 16   Ht 5' 2\" (1.575 m)   Wt 235 lb (106.6 kg)   SpO2 98%   BMI 42.98 kg/m²      No LMP recorded.  Patient is postmenopausal.

## 2022-02-08 NOTE — PROGRESS NOTES
2022    Patient: Barbara Marrero  :  1955  Age:  73 y.o. Sex:  female      PRE-OPERATIVE DIAGNOSIS: Bilateral  Knee pain, osteoarthritis. POST-OPERATIVE DIAGNOSIS: Same. PROCEDURE PERFORMED:  Bilateral knee injection under ultrasound guidance. SURGEON:   ANALILIA Trevizo. ANESTHESIA: local    ESTIMATED BLOOD LOSS: None. BRIEF HISTORY: Barbara Marrero comes in today for Bilateral Knee steroid injection under ultrasound guidance. After discussing the potential risks and benefits of the procedure with the patient  Manuel Mckeon did request that we proceed. A complete History & Physical was reviewed and it is unchanged. DESCRIPTION OF PROCEDURE:   The patient was placed in a seated position. The area of the Bilateral knee was prepped with chloraprep and draped in a sterile manner. The overlying skin and subcutaneous tissues were anesthetized with 0.5% Lidocaine. Then, under ultrasound guidance, a 25 gauge 1 1/2 inch needle was inserted into the suprapatellar bursa under direct visualization. A solution of 3 cc's of 0.25% bupivacaine, 3 cc's of 2% lidocaine, and 40 mg of Kenalog was injected easily, in each knee, with appropriate spread of medicatioons without complications. The needle was then removed and Band-Aid applied. Disposition the patient tolerated the procedure well and there were no complications . Manuel Mckeon will follow up in our [facility] as scheduled. She was encouraged to call with questions, concerns or if worsening of symptoms occurs. Andressa Black DO    2022    Patient: Barbara Marrero  :  1955  Age:  73 y.o. Sex:  female     PRE-OPERATIVE DIAGNOSIS: Right   Shoulder pain, osteoarthritis. POST-OPERATIVE DIAGNOSIS: Same. PROCEDURE PERFORMED: Right  Shoulder steroid injection. SURGEON:   ANALILIA Trevizo. ANESTHESIA: local    ESTIMATED BLOOD LOSS: None.     BRIEF HISTORY: Barbara Marrero comes in today for Right  Shoulder steroid injection. After discussing the potential risks and benefits of the procedure with the patient. Sesar Rao did request that we proceed. A complete History & Physical was reviewed and it is unchanged. DESCRIPTION OF PROCEDURE:   The patient was placed in a seated position. The area of the Right  shoulder was prepped with chloraprep and draped in a sterile manner. The overlying skin and subcutaneous tissues were anesthetized with 0.5% Lidocaine. Using the posterior approach, a 25 gauge 1 1/2 inch  needle was advanced  In anterolateral projection into the joint capsule. After negative aspiration a solution of 0.25 % marcaine 3 cc's, 3 cc's 2% lidocaine and 40 mg Kenalog was injected easily without complications. The needle was then removed and Band-Aid applied. Disposition the patient tolerated the procedure well and there were no complications . Sesar Rao will follow up in our comprehensive [facility] as scheduled. She was encouraged to call with questions, concerns or if worsening of symptoms occurs.     Chanda Jacobs, DO

## 2022-02-22 DIAGNOSIS — G89.29 CHRONIC RIGHT-SIDED LOW BACK PAIN WITHOUT SCIATICA: ICD-10-CM

## 2022-02-22 DIAGNOSIS — M25.511 CHRONIC RIGHT SHOULDER PAIN: ICD-10-CM

## 2022-02-22 DIAGNOSIS — M17.0 PRIMARY OSTEOARTHRITIS OF BOTH KNEES: ICD-10-CM

## 2022-02-22 DIAGNOSIS — G89.4 CHRONIC PAIN SYNDROME: Chronic | ICD-10-CM

## 2022-02-22 DIAGNOSIS — I89.0 LYMPHEDEMA OF BOTH LOWER EXTREMITIES: Chronic | ICD-10-CM

## 2022-02-22 DIAGNOSIS — G89.29 CHRONIC RIGHT SHOULDER PAIN: ICD-10-CM

## 2022-02-22 DIAGNOSIS — M54.50 CHRONIC RIGHT-SIDED LOW BACK PAIN WITHOUT SCIATICA: ICD-10-CM

## 2022-02-22 DIAGNOSIS — G25.81 RESTLESS LEG SYNDROME: ICD-10-CM

## 2022-02-23 RX ORDER — ROPINIROLE 2 MG/1
1 TABLET, FILM COATED ORAL 3 TIMES DAILY
Qty: 360 TABLET | Refills: 12 | Status: SHIPPED
Start: 2022-02-23 | End: 2022-08-18 | Stop reason: SDUPTHER

## 2022-02-23 RX ORDER — BACLOFEN 5 MG/1
5 TABLET ORAL 3 TIMES DAILY
Qty: 90 TABLET | Refills: 0 | Status: SHIPPED
Start: 2022-02-23 | End: 2022-06-14 | Stop reason: SDUPTHER

## 2022-02-25 RX ORDER — BUPRENORPHINE HYDROCHLORIDE 450 UG/1
FILM, SOLUBLE BUCCAL
OUTPATIENT
Start: 2022-02-25

## 2022-03-08 ENCOUNTER — PROCEDURE VISIT (OUTPATIENT)
Dept: PODIATRY | Age: 67
End: 2022-03-08
Payer: MEDICARE

## 2022-03-08 VITALS
SYSTOLIC BLOOD PRESSURE: 148 MMHG | DIASTOLIC BLOOD PRESSURE: 92 MMHG | WEIGHT: 235 LBS | BODY MASS INDEX: 42.98 KG/M2 | TEMPERATURE: 97.6 F

## 2022-03-08 DIAGNOSIS — B35.1 TINEA UNGUIUM: Primary | ICD-10-CM

## 2022-03-08 DIAGNOSIS — L84 CORN OR CALLUS: ICD-10-CM

## 2022-03-08 DIAGNOSIS — R26.2 DIFFICULTY WALKING: ICD-10-CM

## 2022-03-08 DIAGNOSIS — I73.9 PERIPHERAL VASCULAR DISEASE, UNSPECIFIED (HCC): ICD-10-CM

## 2022-03-08 DIAGNOSIS — M79.674 PAIN IN TOE OF RIGHT FOOT: ICD-10-CM

## 2022-03-08 DIAGNOSIS — M79.675 PAIN IN LEFT TOE(S): ICD-10-CM

## 2022-03-08 PROCEDURE — 11055 PARING/CUTG B9 HYPRKER LES 1: CPT | Performed by: PODIATRIST

## 2022-03-08 PROCEDURE — 11721 DEBRIDE NAIL 6 OR MORE: CPT | Performed by: PODIATRIST

## 2022-03-08 NOTE — PROGRESS NOTES
[facility] PODIATRY  [address] [facility] [address]   Dept: [phone]  Dept Fax: [phone]     PATIENT PROGRESS NOTE  Date of patient's visit: 3/8/2022  Patient's Name:  Urszula Gamboa YOB: 1955            Patient Care Team:  Juanita Quijano DO as PCP - General (Family Medicine)  Juanita Quijano DO as PCP - [facility]  Sandrita Nagel MD as Consulting Physician (Neurosurgery)  Jourdan Ryan DPM as Consulting Physician (Podiatry)        Chief Complaint   Patient presents with    Toe Pain     saw pcp Dr. Capri Reardon 2/8/22       Toe Pain       HPI:   Urszula Gamboa is a 73 y.o. female who presents to the office today complaining of painful corns calluses and nails. .  Symptoms began 11 year(s) ago. Patient relates pain is Present. Pain is rated 5 out of 10 and is described as moderate. Treatments prior to today's visit include: debridement. Currently denies F/C/N/V.  Pt's primary care physician is Juan Jose Razo DO last seen     Allergies   Allergen Reactions    Ferritin Shortness Of Breath and Other (See Comments)     Flushed,  Severe back pain       Past Medical History:   Diagnosis Date    Anemia     Arthritis     Cellulitis 05/2015    left leg    Chronic ulcer of leg with fat layer exposed ([address] 06/22/2015    Chronic ulcer of right leg, limited to breakdown of skin ([address] 09/28/2016    COVID-19 04/23/2021    postive test no symptoms    Depression     Full dentures     Low back pain     Lymphedema of both lower extremities 06/22/2015    Obesity     280 #    Osteoarthritis     Peripheral vision loss     Stroke ([address]     Type 2 diabetes mellitus without complication, without long-term current use of insulin ([address] 06/21/2019    Ulcer of left lower leg, limited to breakdown of skin ([address] 06/10/2019    Ulcer of left lower leg, limited to breakdown of skin ([address] 06/10/2019    UTI (urinary tract infection) 2021       Prior to Admission medications    Medication Sig Start Date End Date Taking? Authorizing Provider   rOPINIRole (REQUIP) 2 MG tablet Take 0.5 tablets by mouth in the morning, at noon, and at bedtime 22  Yes Gautam Razo DO   Baclofen (LIORESAL) 5 MG tablet Take 1 tablet by mouth 3 times daily 22  Yes Gautam Razo DO   labetalol (NORMODYNE) 200 MG tablet Take 1 tablet by mouth every 12 hours 2/3/22  Yes Daiana Mccauley,    pantoprazole (PROTONIX) 40 MG tablet Take 1 tablet by mouth every morning (before breakfast) 22  Yes Debby Dent,    gabapentin (NEURONTIN) 300 MG capsule TAKE 1 CAPSULE BY MOUTH THREE TIMES DAILY FOR 30 DAYS 21 Yes Gautam Razo DO   buPROPion (WELLBUTRIN XL) 150 MG extended release tablet Take 1 tablet by mouth every morning 21  Yes Gautam Razo DO   lactobacillus (CULTURELLE) CAPS capsule Take 1 capsule by mouth daily 21  Yes Noni Weaver MD   bumetanide (BUMEX) 2 MG tablet Take 1 tablet by mouth 2 times daily  Patient taking differently: Take 2 mg by mouth daily as needed  3/30/21  Yes Stu Morales MD   potassium chloride (KLOR-CON M) 20 MEQ extended release tablet Take 1 tablet by mouth 2 times daily (with meals)  Patient taking differently: Take 20 mEq by mouth daily as needed  3/30/21  Yes Stu Morales MD   ammonium lactate (LAC-HYDRIN) 12 % cream Apply topically as needed.  10/8/20  Yes Dillon Primrose, DPM   Cream Base (VERSAPRO) CREA APPLY ONE (1) GRAM (ONE PUMP) EXTERNALLY FOUR TIMES A DAY TO [facility] KNEE 3/23/20  Yes Chasidy Huitron PA-C       Past Surgical History:   Procedure Laterality Date    ABDOMINOPLASTY  2002    BREAST REDUCTION SURGERY      reduction    CATARACT REMOVAL      bilateral     SECTION      x2    COLONOSCOPY  2012    and egd    COLONOSCOPY  2021    polyps; marginal prep--jessica    COLONOSCOPY N/A 2021    COLONOSCOPY WITH BIOPSY performed by Elke Pabon MD at [address] ECHOCARDIOGRAM COMPLETE 2D W DOPPLER W COLOR  2012         GASTRIC BYPASS SURGERY  2000    NO NASTOGASTRIC TUBE    HERNIA REPAIR          LUMBAR SPINE SURGERY N/A 2021    L3-L4  POSTERIOR LUMBAR INTERBODY  FUSION--OARM, JESSI, YAJAIRA TABLE, CELL SAVER, PLATELET GEL, AUDIOLOGY, CAGES, PLATES, SCREWS -- GLOBUS performed by Reny Victor MD at [address]  2021    minimal pouch gastritis--jessica    UPPER GASTROINTESTINAL ENDOSCOPY N/A 2021    EGD ESOPHAGOGASTRODUODENOSCOPY performed by Elke [facility]abon [facility] at [facility] History   Problem Relation Age of Onset    Breast Cancer Mother     Stroke Father     Hypertension Sister     Hypertension Brother        Social History     Tobacco Use    Smoking status: Former Smoker     Packs/day: 0.25     Years: 38.00     Pack years: 9.50     Types: Cigarettes     Quit date: 3/11/2021     Years since quittin.9    Smokeless tobacco: Never Used    Tobacco comment: Pt states she quit [employer]"   Substance Use Topics    Alcohol use: No       Review of Systems    Review of Systems:   History obtained from chart review and the patient  General ROS: negative for - chills, fatigue, fever, night sweats or weight gain  Constitutional: Negative for chills, diaphoresis, fatigue, fever and unexpected weight change. Musculoskeletal: Positive for . Neurological ROS: negative for - behavioral changes, confusion, headaches or seizures. Negative for weakness and numbness. Dermatological ROS: negative for - mole changes, rash  Cardiovascular: Negative for leg swelling. Gastrointestinal: Negative for constipation, diarrhea, nausea and vomiting.                Lower Extremity Physical Examination:   Vitals:   Vitals:    22 0925   BP: (!) 148/92   Temp: 97.6 °F (36.4 °C)   Toenail Description  Sites of Onychomycosis Involvement (Check affected area)  [x] [x] [x] [x] [x] [x] [x] [x] [x] [x]  5 4 3 2 1 1 2 3 4 5                          Right                                        Left    Thickness  [x] [x] [x] [x] [x] [x] [x] [x] [x] [x]  5 4 3 2 1 1 2 3 4 5                         Right                                        Left    Dystrophic Changes   [x] [x] [x] [x] [x] [x] [x] [x] [x] [x]  5 4 3 2 1 1 2 3 4 5                         Right                                        Left    discolored   [x] [x] [x] [x] [x] [x] [x] [x] [x] [x]  5 4 3 2 1 1 2 3 4 5                          Right                                        Left    Incurvation/Ingrowin   [] [] [] [] [] [] [] [] [] []  5 4 3 2 1 1 2 3 4 5                         Right                                        Left    Inflammation/Pain   [x] [x] [x] [x] [x] [x] [x] [x] [x] [x]  5 4 3 2 1 1 2 3 4 5                         Right                                        Left       Foot Exam    General  General Appearance: appears stated age and healthy   Orientation: alert and oriented to person, place, and time       Right Foot/Ankle     Inspection and Palpation  Ecchymosis: none  Tenderness: bony tenderness   Hammertoes: fifth toe  Skin Exam: corn;     Neurovascular  Dorsalis pedis: 2+  Posterior tibial: absent  Saphenous nerve sensation: normal  Tibial nerve sensation: normal  Superficial peroneal nerve sensation: normal  Deep peroneal nerve sensation: normal  Sural nerve sensation: normal  Achilles reflex: 2+  Babinski reflex: 2+      Left Foot/Ankle      Inspection and Palpation  Ecchymosis: none  Tenderness: bony tenderness   Hammertoes: fifth toe  Skin Exam: callus (callus sub 5 left foot ), corn, skin changes and abnormal color;      Neurovascular  Dorsalis pedis: 1+  Posterior tibial: absent  Saphenous nerve sensation: normal  Tibial nerve sensation: normal  Superficial peroneal nerve sensation: normal  Deep peroneal nerve sensation: normal  Sural nerve sensation: normal  Achilles reflex: 2+  Babinski reflex: 2+    Muscle Strength  Ankle dorsiflexion: 5  Ankle plantar flexion: 5  Ankle inversion: 5  Ankle eversion: 5  Great toe extension: 5  Great toe flexion: 5          Left Ankle Exam     Muscle Strength   The patient has normal left ankle strength. Dorsiflexion:  5/5   Plantar flexion:  5/5             General: AAO x 3 in NAD. Dermatologic Exam:  Skin lesion/ulceration Absent . Skin No rashes or nodules noted. .   Musculoskeletal: There are hammertoes noted fifth digit bilaterally there is a corn noted dorsal aspect fifth digit bilateral.  Bilateral heels have thick eschar medial aspect noninfected. Vascular: The pulses DP bilaterally are palpable PT on the right side is absent there is a lack of hair growth to lower extremity the nails are thick pitting mycotic there is atrophic skin changes which include skin texture has changed skin color is discolored    Radiographs:  3 views  foot/ankle:     Asessment: Patient is a 73 y.o. female with:    Diagnosis Orders   1. Tinea unguium     2. Corn or callus     3. Pain in left toe(s)     4. Pain in toe of right foot     5. Peripheral vascular disease, unspecified ([address]     6. Difficulty walking         Plan: Patient examined and evaluated. Debridement of all painful nails 1-5 maximiliano was performed with both manual and electrical debridement to prevent infection and/or ulceration. Patient tolerated the debridement well, without any complaints. Patient was asymptomatic after debridement  Current condition and treatment options discussed in detail. Discussed conservative and surgical options with the patient. Treatment options today consisted of verbal and written instructions given to patient. Contact office with any questions/problems/concerns. CALLUS DEBRIDEMENT x1  Verbal informed content was obtained from the patient. The callus lesion on the left foot was debrided with a sterile 15 blade to sub q.  It was then debrided,  padded, and an antibiotic ointment was applied to the treated area. Patient tolerated the procedure well with no complications.       3/8/2022    Electronically signed by Manisha Ivey DPM on 3/8/2022 at 10:04 AM  3/8/2022

## 2022-03-08 NOTE — DISCHARGE SUMMARY
[address]                  [address] [facility],  [facility]                               DISCHARGE SUMMARY    PATIENT NAME: Wily Prescott                  :        1955  MED REC NO:   [id number]                            ROOM:       [id number]  ACCOUNT NO:   [de-identified]                           ADMIT DATE: 2022  PROVIDER:     Pablo Beard DO                  DISCHARGE DATE:  2022    DISCHARGE DIAGNOSES:  1. Abdominal pain. 2.  Chest pain. 3.  Chronic pain. 4.  Morbid obesity. 5.  Osteoarthritis. 6.  Restless legs syndrome. 7.  History of CVA. 8.  Status post gastric bypass. 9.  GERD. 10.  Chronic lymphedema, legs. 11.  Tobacco abuse. HOSPITAL COURSE:  The patient is a 29-year-old  female who  presented to the emergency room complaining of abdominal pain, chest  pain, back pain. She had increased leg edema. She was noncompliant  with her Bumex at home. She was admitted to the hospital.  She was seen  by Neurosurgery, Surgery, Cardiology, Pulmonary. CTA of chest on  2022 revealed no PE.  CT of lumbar spine on 2022 revealed  postop and degenerative changes. Ultrasound of lower extremities, no  DVT. CTA of abdomen/pelvis revealed mild reticulation surrounding the  head of the pancreas, postsurgical changes noted in the stomach and  bowel. The patient was evaluated by Surgery. No further diagnostic or  therapeutic intervention recommended. The patient's abdominal pain  resolved during the hospital stay with bowel movement. The patient was  discharged to home in stable condition on 2022. DISCHARGE MEDICATIONS:  As per discharge med rec which include,  1. Labetalol 200 mg b.i.d.  2.  Protonix 40 mg daily. 3.  Bumex 2 mg b.i.d.  4.  Potassium chloride 20 mEq b.i.d.  5.  Lac-Hydrin topical p.r.n.  6.  Wellbutrin XL. 7.  Gabapentin. 8.  Lactobacillus.   9.  Belbuca as directed by prescribing physician. DISCHARGE INSTRUCTION:  The patient instructed to follow up with Dr. Manisha Mendoza, call office for appointment.         Aquiles Palacios DO    D: 03/08/2022 9:17:24       T: 03/08/2022 9:19:38     [email or web address]  Job#: [id number]     Doc#: [id number]    CC:

## 2022-03-29 ENCOUNTER — OFFICE VISIT (OUTPATIENT)
Dept: PAIN MANAGEMENT | Age: 67
End: 2022-03-29
Payer: MEDICARE

## 2022-03-29 VITALS
WEIGHT: 285 LBS | RESPIRATION RATE: 16 BRPM | TEMPERATURE: 94 F | HEART RATE: 95 BPM | BODY MASS INDEX: 52.44 KG/M2 | DIASTOLIC BLOOD PRESSURE: 103 MMHG | SYSTOLIC BLOOD PRESSURE: 186 MMHG | OXYGEN SATURATION: 92 % | HEIGHT: 62 IN

## 2022-03-29 DIAGNOSIS — M17.0 PRIMARY OSTEOARTHRITIS OF BOTH KNEES: ICD-10-CM

## 2022-03-29 DIAGNOSIS — G89.29 CHRONIC RIGHT-SIDED LOW BACK PAIN WITHOUT SCIATICA: ICD-10-CM

## 2022-03-29 DIAGNOSIS — G89.4 CHRONIC PAIN SYNDROME: Primary | ICD-10-CM

## 2022-03-29 DIAGNOSIS — M54.50 CHRONIC RIGHT-SIDED LOW BACK PAIN WITHOUT SCIATICA: ICD-10-CM

## 2022-03-29 DIAGNOSIS — G89.29 CHRONIC RIGHT SHOULDER PAIN: ICD-10-CM

## 2022-03-29 DIAGNOSIS — M25.511 CHRONIC RIGHT SHOULDER PAIN: ICD-10-CM

## 2022-03-29 PROCEDURE — 99213 OFFICE O/P EST LOW 20 MIN: CPT | Performed by: PHYSICIAN ASSISTANT

## 2022-03-29 PROCEDURE — G8400 PT W/DXA NO RESULTS DOC: HCPCS | Performed by: PHYSICIAN ASSISTANT

## 2022-03-29 PROCEDURE — 1090F PRES/ABSN URINE INCON ASSESS: CPT | Performed by: PHYSICIAN ASSISTANT

## 2022-03-29 PROCEDURE — 1123F ACP DISCUSS/DSCN MKR DOCD: CPT | Performed by: PHYSICIAN ASSISTANT

## 2022-03-29 PROCEDURE — G8484 FLU IMMUNIZE NO ADMIN: HCPCS | Performed by: PHYSICIAN ASSISTANT

## 2022-03-29 PROCEDURE — G8427 DOCREV CUR MEDS BY ELIG CLIN: HCPCS | Performed by: PHYSICIAN ASSISTANT

## 2022-03-29 PROCEDURE — G8417 CALC BMI ABV UP PARAM F/U: HCPCS | Performed by: PHYSICIAN ASSISTANT

## 2022-03-29 PROCEDURE — 4040F PNEUMOC VAC/ADMIN/RCVD: CPT | Performed by: PHYSICIAN ASSISTANT

## 2022-03-29 PROCEDURE — 1036F TOBACCO NON-USER: CPT | Performed by: PHYSICIAN ASSISTANT

## 2022-03-29 PROCEDURE — 3017F COLORECTAL CA SCREEN DOC REV: CPT | Performed by: PHYSICIAN ASSISTANT

## 2022-03-29 RX ORDER — BUPRENORPHINE HYDROCHLORIDE 450 UG/1
FILM, SOLUBLE BUCCAL
COMMUNITY
Start: 2022-02-27 | End: 2022-05-03

## 2022-03-29 RX ORDER — BUPRENORPHINE HYDROCHLORIDE 450 UG/1
450 FILM, SOLUBLE BUCCAL EVERY 12 HOURS
Qty: 60 EACH | Refills: 1 | Status: SHIPPED | OUTPATIENT
Start: 2022-03-29 | End: 2022-04-28

## 2022-03-29 NOTE — PROGRESS NOTES
[facility]  [address]  [facility]    Follow up Note      Israel Ernandez     Date of Visit:  3/29/2022    CC:  Patient presents for follow up   Chief Complaint   Patient presents with    Follow-up    Back Pain    Knee Pain     both knees    Shoulder Pain     right shoulder       HPI:    Pain is worse to stomach when eating. States that she is going to see PCP. Bilateral knee pain. Injections helped x 1 month. Appropriate analgesia with current medications regimen: yes   Change in quality of symptoms:no. Medication side effects:none. Recent diagnostic testing: None. Recent interventional procedures: 2022    PROCEDURE PERFORMED:  Bilateral knee injection under ultrasound guidance - 4 weeks relief. She has not been on anticoagulation medications to include ASA, NSAIDS, Plavix, heparin, LMW heparin and warfarin and has not been on herbal supplements. She is diabetic. Imagin2021 MRI Lumbar Spine    FINDINGS:   BONES/ALIGNMENT: There is normal alignment of the spine. The vertebral body   heights are maintained. The bone marrow signal appears unremarkable.  There   is degenerative disc disease with disc space narrowing and osteophytes at   L3-L4, L4-L5, and L5-S1.  The bony spinal canal overall is congenitally small. SPINAL CORD: The conus terminates normally. SOFT TISSUES: No paraspinal mass identified. L1-L2:  The thecal sac and neural foramina are intact. L2-L3: There is a minimum disc protrusion measuring 2-3 mm.  Disc and/or   osteophytes cause minimal narrowing of the neural foramina bilaterally.  The   thecal sac is not stenotic. L3-L4: There is a disc extrusion measuring 4-5 mm toward the right extending   superiorly behind the inferior endplate of L3 causing severe narrowing of the   right neural foramen.  The thecal sac is mildly stenotic measuring 9 mm.    There is minimal narrowing of the left neural foramen from disc and   osteophyte. L4-L5: There is disc protrusion or osteophyte toward the left measuring 3-4   mm causing moderate to severe narrowing of the left neural foramen.  The   thecal sac is mildly stenotic measuring 9.5 mm.  Disc and osteophyte narrows   the right neural foramen. The left neural foramen is narrowed greater than   right. L5-S1:  There is mild facet and ligamentum flavum hypertrophy. The thecal sac   and neural foramina are intact.       Impression   Disc extrusion at L3-L4 measuring 4-5 mm and extending superiorly behind the   inferior endplate of Y2-M4 on the right causing severe narrowing of the right   neural foramen.  There is mild stenosis of the thecal sac at L3-L4 and L4-L5   as discussed above. 12/27/2019 Lumbar Spine X-ray    Alignment of the vertebral bodies appears to be near-anatomic   No fracture or foreign body is identified. The disc spaces demonstrate moderate degenerative changes. There is mild loss of the vertebral body height   There are moderate degenerative changes involving the facets. There is grade 1 anterolisthesis of L3 on L4. Flexion-extension views demonstrates movement of the anterolisthesis   of L3 on L4.            Impression   Grade 1 anterolisthesis of L3 on L4 which does move with extension. Findings consistent with moderate degenerative disc disease and   degenerative facet disease.       The exam has been dictated and signed by Lucio Bearden. RIMA Castellanos   and Reyna Hastings MD, reviewed and concurred with these findings.          9/3/2019 left knee x-ray     The bones appear to be in anatomic alignment.     No foreign body is identified   No fracture is identified     There is moderate tricompartmental joint space loss greatest long   patellofemoral and medial femoral tibial joint   The are moderate degenerative changes present along the patellofemoral   and medial femoral tibial compartment           Impression   Moderate degenerative changes as described above          9/3/2019 right knee x-ray     The bones appear to be in anatomic alignment. No foreign body is identified   No fracture is identified. Marginal bone spurs are again seen along the inferior margins of the   patella. There is interval worsening of moderate tricompartmental joint space   loss    The are interval worsening of moderate degenerative tricompartmental   changes present            Impression   Moderate tricompartmental degenerative changes.  This has   worsened in the interim     Urine Drug Screenin2019 Consistent for buprenorphine and metabolites  2020 Consistent for buprenorphine  2020 Consistent   2020 Consistent  2021 Consistent     OARRS report:  10/2019 Consistent to 2022 Consistent     Opioid Agreement:  2021    Past Medical History:   Diagnosis Date    Anemia     Arthritis     Cellulitis 2015    left leg    Chronic ulcer of leg with fat layer exposed ([address] 2015    Chronic ulcer of right leg, limited to breakdown of skin ([address] 2016    COVID-19 2021    postive test no symptoms    Depression     Full dentures     Low back pain     Lymphedema of both lower extremities 2015    Obesity     280 #    Osteoarthritis     Peripheral vision loss     Stroke ([address]     Type 2 diabetes mellitus without complication, without long-term current use of insulin ([address] 2019    Ulcer of left lower leg, limited to breakdown of skin ([address] 06/10/2019    Ulcer of left lower leg, limited to breakdown of skin ([address] 06/10/2019    UTI (urinary tract infection) 2021       Past Surgical History:   Procedure Laterality Date    ABDOMINOPLASTY  2002    BREAST REDUCTION SURGERY      reduction    CATARACT REMOVAL      bilateral     SECTION      x2    COLONOSCOPY  2012    and egd    COLONOSCOPY  2021    polyps; marginal prep--jessica    COLONOSCOPY N/A 2021    COLONOSCOPY WITH BIOPSY performed by Shameka Dunn MD at [address] ECHOCARDIOGRAM COMPLETE 2D W DOPPLER W COLOR  01/06/2012         GASTRIC BYPASS SURGERY  2000    NO NASTOGASTRIC TUBE    HERNIA REPAIR      2002    LUMBAR SPINE SURGERY N/A 5/13/2021    L3-L4  POSTERIOR LUMBAR INTERBODY  FUSION--OARM, JESSI, YAJAIRA TABLE, CELL SAVER, PLATELET GEL, AUDIOLOGY, CAGES, PLATES, SCREWS -- GLOBUS performed by Myriam Connell MD at [address] [facility] [address]  03/27/2021    minimal pouch gastritis--jessica    UPPER GASTROINTESTINAL ENDOSCOPY N/A 03/27/2021    EGD ESOPHAGOGASTRODUODENOSCOPY performed by Shameka Dunn MD at [address] [facility]       Prior to Admission medications    Medication Sig Start Date End Date Taking?  Authorizing Provider   BELBUCA 450 MCG FILM DISSOLVE 1 FILM UNDER THE TONGUE EVERY 12 HOURS FOR 30 DAYS 2/27/22  Yes Historical Provider, MD   rOPINIRole (REQUIP) 2 MG tablet Take 0.5 tablets by mouth in the morning, at noon, and at bedtime 2/23/22  Yes Gautam Razo, DO   Baclofen (LIORESAL) 5 MG tablet Take 1 tablet by mouth 3 times daily 2/23/22  Yes Gautam Razo DO   pantoprazole (PROTONIX) 40 MG tablet Take 1 tablet by mouth every morning (before breakfast) 2/4/22  Yes Bonita Dent, DO   gabapentin (NEURONTIN) 300 MG capsule TAKE 1 CAPSULE BY MOUTH THREE TIMES DAILY FOR 30 DAYS 9/13/21 9/13/22 Yes Gautam Razo DO   buPROPion (WELLBUTRIN XL) 150 MG extended release tablet Take 1 tablet by mouth every morning 7/27/21  Yes Gautam Razo DO   lactobacillus (CULTURELLE) CAPS capsule Take 1 capsule by mouth daily 6/5/21  Yes Angelica Burciaga MD   bumetanide (BUMEX) 2 MG tablet Take 1 tablet by mouth 2 times daily  Patient taking differently: Take 2 mg by mouth daily as needed  3/30/21  Yes Ashleigh Buenrostro MD   potassium chloride (KLOR-CON M) 20 MEQ extended release tablet Take 1 tablet by mouth 2 times daily (with meals)  Patient taking differently: Take 20 mEq by mouth daily as needed  3/30/21  Yes Stu Morales MD   ammonium lactate (LAC-HYDRIN) 12 % cream Apply topically as needed. 10/8/20  Yes Dillon Primrose, DPM   Cream Base (VERSAPRO) CREA APPLY ONE (1) GRAM (ONE PUMP) EXTERNALLY FOUR TIMES A DAY TO EACH KNEE 3/23/20  Yes Eleno Mcnamara PA-C   labetalol (NORMODYNE) 200 MG tablet Take 1 tablet by mouth every 12 hours  Patient not taking: Reported on 3/29/2022 2/3/22   Debby Dent DO       Allergies   Allergen Reactions    Ferritin Shortness Of Breath and Other (See Comments)     Flushed,  Severe back pain       Social History     Socioeconomic History    Marital status:      Spouse name: Not on file    Number of children: 2    Years of education: 15    Highest education level: Associate degree: academic program   Occupational History    Not on file   Tobacco Use    Smoking status: Former Smoker     Packs/day: 0.25     Years: 38.00     Pack years: 9.50     Types: Cigarettes     Quit date: 3/11/2021     Years since quittin.0    Smokeless tobacco: Never Used    Tobacco comment: Pt states she quit [employer]"   Vaping Use    Vaping Use: Never used   Substance and Sexual Activity    Alcohol use: No    Drug use: No    Sexual activity: Not Currently   Other Topics Concern    Not on file   Social History Narrative        Chronic Diagnosis: Iron deficiency anemia, Depressive disorder, Benign essential hypertension, Mixed    hyperlipidemia.     HTN    OBESITY    LIPID    OA    SMOKER---quit   summer  2021    CVA L FIELD VISION    DEPRESSION    GASTRIC BYPASS 01    BREAST REDUCTION--6-04    Giovana Velazquez  1955 Page #2    ANEMIA==IRON AND B-12    Admitted 2019 with cellulitis left lower extremity then readmitted with chest pain with negative stress test    L--3-4   lumbar surgeyr dr Melvi Gonzalez     Social Determinants of Health     Financial Resource Strain:     Difficulty of Paying Living Expenses: Not on file   Food Insecurity:  Worried About Running Out of Food in the Last Year: Not on file    [place] of Food in the Last Year: Not on file   Transportation Needs:     Lack of Transportation (Medical): Not on file    Lack of Transportation (Non-Medical): Not on file   Physical Activity: Inactive    Days of Exercise per Week: 0 days    Minutes of Exercise per Session: 0 min   Stress:     Feeling of Stress : Not on file   Social Connections: Moderately Integrated    Frequency of Communication with Friends and Family: More than three times a week    Frequency of Social Gatherings with Friends and Family: More than three times a week    Attends Worship Services: More than 4 times per year    Active Member of [address] [place] [address] [employer] or Organizations: Yes    Attends Club or Organization Meetings: More than 4 times per year    Marital Status:    Intimate Partner Violence:     Fear of Current or Ex-Partner: Not on file    Emotionally Abused: Not on file    Physically Abused: Not on file    Sexually Abused: Not on file   Housing Stability:     Unable to Pay for Housing in the Last Year: Not on file    Number of [place] in the Last Year: Not on file    Unstable Housing in the Last Year: Not on file       Family History   Problem Relation Age of Onset    Breast Cancer Mother     Stroke Father     Hypertension Sister     Hypertension Brother        REVIEW OF SYSTEMS:     Manuel Mckeon denies fever/chills, chest pain, shortness of breath, new bowel or bladder complaints. All other review of systems was negative. PHYSICAL EXAMINATION:      BP (!) 186/103   Pulse 95   Temp 94 °F (34.4 °C) (Infrared)   Resp 16   Ht 5' 2\" (1.575 m)   Wt 285 lb (129.3 kg)   SpO2 92%   BMI 52.13 kg/m²     General:       General appearance: awake, alert, oriented, in no acute distress, well developed, well nourished and in no acute distress   pleasant and well-hydrated.    , in no distress and A & O x3  Build:Obese  Function:Rises from a seated position with difficulty     Head:     Head:normocephalic and atraumatic  Pupils:regular, round and equal.  Sclera: icterus absent,   EOM:full and intact.     Lungs:     Breathing:Normal expansion. No wheezing.     Abdomen:     Shape:non-distended and normal    Lumbar spine:     Spine inspection:normal   CVA tenderness:No   Range of motion:abnormal mildly Lateral bending, flexion, extension rotation bilateral and is not painful.        Extremities:     Tremors:None bilaterally upper and lower  Range of motion:Generally limited extension shoulder - right, pain with internal rotation of hips not done. Intact:Yes  Varicose veins: Not assessed. Cyanosis:none      Neurological:     Cranial nerves:normal  Motor:   Coordination:normal  Gait:antalgic     Dermatology:     Skin: No skin changes.       Impression:    B/L Knee Pain, LBP, right shoulder pain  Knee x-rays:  Moderate DJD.    Hx of pain management with [facility] pain clinic.  Was on fentanyl 25 mg and norco 5/325 TID.  Did not help and caused drowsiness.  Now on Belbuca 450 mcg BID (increased from 300 mg BID) which is working very well without side effects. Transferred care to [facility]) due to cost.    Hx of LESI by Dr. 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  Bilateral segmental arthrodesis and fusion at L3-L4 with the use of  bilateral L3 and L4 pedicle screws with use of locally harvested  autograft plus allograft in the form of BioZorb strips for  posterolateral fusion at L3-L4. 2.  360-degree fusion with posterior lumbar interbody fusion at L3-L4  with use of bilateral Globus Rise expandable cages packed with locally  harvested autograft.   3.  Bilateral L3 and L4 laminectomy, bilateral L3-L4 medial facetectomy,  bilateral L3 and L4 foraminotomy, and bilateral L3-L4 diskectomy. 4.  Use of O-arm assisted navigation with placement of pedicle screws. 5.  Use of free-running EMG to test pedicle screw heads. 6.  Use of intraoperative fluoroscopy interpreted by myself, the  surgeon. 7.  22 modifier for degree of difficulty for the patient being morbidly  obese with a BMI of 51.21.  8.  AS modifier for Trinidad Arce [facility], who assisted with primary  exposure, primary closure, and retraction of neural elements on 05/13/2021 Dr. Bridgette Corbin. OARRS reviewed 03/2022  Continue Belbuca 450 mcg BID. B/L knee CSIs on 10/01/2021 with good relief for about 6 weeks. Repeat ordered. Patient also seen today for left hand numbness x 3 months that is very bothersome. EMG UEs ordered but was in the hospital.  She wants to hold off. When ordering knee injections:  Requests the same dosing as ortho.  1 ml Kenalog (40mg), 3ml PF 0.25% marcaine and 3ml 1% PF Lidocaine for knee injections this week.             Controlled Substance Monitoring:    Acute and Chronic Pain Monitoring:   RX Monitoring 3/29/2022   Periodic Controlled Substance Monitoring Possible medication side effects, risk of tolerance/dependence & alternative treatments discussed. ;No signs of potential drug abuse or diversion identified. ;Assessed functional status. ;Obtaining appropriate analgesic effect of treatment. We discussed with the patient that combining opioids, benzodiazepines, alcohol, illicit drugs or sleep aids increases the risk of respiratory depression including death. We discussed that these medications may cause drowsiness, sedation or dizziness and have counseled the patient not to drive or operate machinery. We have discussed that these medications will be prescribed only by one provider.  We have discussed with the patient about age related risk factors and have thoroughly discussed the importance of taking these medications as prescribed. The patient verbalizes understanding.     ccreferring physic

## 2022-03-29 NOTE — PROGRESS NOTES
Do you currently have any of the following:    Fever: No  Headache:  No  Cough: No  Shortness of breath: No  Exposed to anyone with these symptoms: No         Hao Day presents to the [facility] on 3/29/2022. Neeta Pérez is complaining of pain knees, right shoulder, and back. The pain is constant. The pain is described as aching and throbbing. Pain is rated on her best day at a 6, on her worst day at a 10, and on average at a 7 on the VAS scale. She took her last dose of Neurontin and baclofen belbuca this morning. Any procedures since your last visit: No, with  % relief. Pacemaker or defibrillator: No managed by . She is not on NSAIDS and is not on anticoagulation medications to include none and is managed by . Medication Contract and Consent for Opioid Use Documents Filed     Patient Documents     Type of Document Status Date Received Received By Description    Medication Contract Signed 9/6/2019 12:04 PM Aiden Barnard med contract    Medication Contract Received 11/17/2020 10:57 AM Eduin MORRISON med contract    Medication Contract Signed 8/4/2021 11:40 AM MIRTA, Jeremiah [facility] EXPIRES [phone]                BP (!) 186/103   Pulse 95   Temp 94 °F (34.4 °C) (Infrared)   Resp 16   Ht 5' 2\" (1.575 m)   Wt 285 lb (129.3 kg)   SpO2 92%   BMI 52.13 kg/m²      No LMP recorded.  Patient is postmenopausal.

## 2022-04-28 ENCOUNTER — TELEPHONE (OUTPATIENT)
Dept: PAIN MANAGEMENT | Age: 67
End: 2022-04-28

## 2022-04-29 ENCOUNTER — TELEPHONE (OUTPATIENT)
Dept: PAIN MANAGEMENT | Age: 67
End: 2022-04-29

## 2022-04-29 NOTE — TELEPHONE ENCOUNTER
Patient says she cannot get her Belbuca because they are no longer accepting the coupon for it. The pharmacist told her to ask you about the generic pill form of it called subutex, and, it would be a different dosage. She uses [facility] in [place].

## 2022-05-02 NOTE — TELEPHONE ENCOUNTER
I have spoken to the patient. She has been in contact with the insurance company and the drug company who said that they have done away with the coupons and will be doing something else in the future but do not have a time frame. She has not met her deductible yet so the cost is much higher now but after that it will be over $300 which is not affordable. I would recommend calling [facility] to see what we can do. She has enough medication for 3 more days.

## 2022-05-02 NOTE — TELEPHONE ENCOUNTER
Patient called in this am due to the belbuca films not being covered by her insurance and the coupon to reduce the price is  . She cant afford this medication now. I advised her I could check with Dr Therman Shone to prescribed something different and she hung up .

## 2022-05-02 NOTE — TELEPHONE ENCOUNTER
I have put in a request to try again to get this authorized. Please call the rep as well to see what we can do since the coupons are .

## 2022-05-02 NOTE — TELEPHONE ENCOUNTER
Does this mean we are going to actually use her insurance because she did not want us using that because of the high cost;  She was supposed to be paying out of pocket with the belbuca coupon. She did contact the office and when one of the MA's answered they were trying to help her and she hung up on them.

## 2022-05-03 ENCOUNTER — TELEPHONE (OUTPATIENT)
Dept: PAIN MANAGEMENT | Age: 67
End: 2022-05-03

## 2022-05-03 DIAGNOSIS — G89.4 CHRONIC PAIN SYNDROME: Primary | ICD-10-CM

## 2022-05-03 DIAGNOSIS — G89.4 CHRONIC PAIN SYNDROME: ICD-10-CM

## 2022-05-03 DIAGNOSIS — G89.29 CHRONIC RIGHT-SIDED LOW BACK PAIN WITHOUT SCIATICA: ICD-10-CM

## 2022-05-03 DIAGNOSIS — M54.50 CHRONIC RIGHT-SIDED LOW BACK PAIN WITHOUT SCIATICA: ICD-10-CM

## 2022-05-03 DIAGNOSIS — M17.0 PRIMARY OSTEOARTHRITIS OF BOTH KNEES: ICD-10-CM

## 2022-05-03 DIAGNOSIS — M25.511 CHRONIC RIGHT SHOULDER PAIN: ICD-10-CM

## 2022-05-03 DIAGNOSIS — G89.29 CHRONIC RIGHT SHOULDER PAIN: ICD-10-CM

## 2022-05-03 RX ORDER — HYDROCODONE BITARTRATE AND ACETAMINOPHEN 5; 325 MG/1; MG/1
1 TABLET ORAL 3 TIMES DAILY
Qty: 21 TABLET | Refills: 0 | Status: SHIPPED
Start: 2022-05-03 | End: 2022-05-06 | Stop reason: SDUPTHER

## 2022-05-03 NOTE — PROGRESS NOTES
Called patient to discuss plan. Discussed with Dr. Mera Olson. Jacki Prakash is too unaffordable (around $700). Instructed to go down to one Belbuca film per day until she runs out and then start norco 5/325 TID. I gave her a one week trial.  She will call and let us know how she is doing on it and will send in the rest of the script.

## 2022-05-03 NOTE — TELEPHONE ENCOUNTER
I spoke to the patient she asked to have it faxed to her pharmacy ([address] [place] [address]) which I did and she said she would be picking it up.  Thanks

## 2022-05-06 ENCOUNTER — TELEPHONE (OUTPATIENT)
Dept: PAIN MANAGEMENT | Age: 67
End: 2022-05-06

## 2022-05-06 DIAGNOSIS — G89.4 CHRONIC PAIN SYNDROME: ICD-10-CM

## 2022-05-06 DIAGNOSIS — G89.29 CHRONIC RIGHT-SIDED LOW BACK PAIN WITHOUT SCIATICA: ICD-10-CM

## 2022-05-06 DIAGNOSIS — M54.50 CHRONIC RIGHT-SIDED LOW BACK PAIN WITHOUT SCIATICA: ICD-10-CM

## 2022-05-06 DIAGNOSIS — M17.0 PRIMARY OSTEOARTHRITIS OF BOTH KNEES: ICD-10-CM

## 2022-05-06 RX ORDER — HYDROCODONE BITARTRATE AND ACETAMINOPHEN 5; 325 MG/1; MG/1
1 TABLET ORAL 4 TIMES DAILY
Qty: 48 TABLET | Refills: 0 | Status: SHIPPED
Start: 2022-05-08 | End: 2022-05-16

## 2022-05-06 NOTE — TELEPHONE ENCOUNTER
Discussed case with Dr. Alfonza Bamberger. Patient doing poorly on norco 5/325 TID. States lasting 4 hours and is up all night. Okay to increase to QID. I did call pharmacy to let them know this. Script send in to her next appointment.

## 2022-05-10 ENCOUNTER — TELEPHONE (OUTPATIENT)
Dept: PAIN MANAGEMENT | Age: 67
End: 2022-05-10

## 2022-05-10 NOTE — TELEPHONE ENCOUNTER
Patient called. Reports that the norco 5/325 QID is not helping. She reports being up all night. Will discuss with Dr. Tiffany Chamorro.

## 2022-05-11 DIAGNOSIS — M17.11 PRIMARY OSTEOARTHRITIS OF RIGHT KNEE: ICD-10-CM

## 2022-05-11 DIAGNOSIS — M17.12 PRIMARY OSTEOARTHRITIS OF LEFT KNEE: Primary | ICD-10-CM

## 2022-05-13 ENCOUNTER — TELEPHONE (OUTPATIENT)
Dept: PAIN MANAGEMENT | Age: 67
End: 2022-05-13

## 2022-05-13 NOTE — TELEPHONE ENCOUNTER
Called patient. Discussed case with Dr. Mera Olson. Will consider [facility]. She will call staff to coordinate wasting of current norco script. She will call on Monday.

## 2022-05-16 DIAGNOSIS — M54.50 CHRONIC RIGHT-SIDED LOW BACK PAIN WITHOUT SCIATICA: ICD-10-CM

## 2022-05-16 DIAGNOSIS — M17.0 PRIMARY OSTEOARTHRITIS OF BOTH KNEES: ICD-10-CM

## 2022-05-16 DIAGNOSIS — G89.29 CHRONIC RIGHT-SIDED LOW BACK PAIN WITHOUT SCIATICA: ICD-10-CM

## 2022-05-16 DIAGNOSIS — G89.4 CHRONIC PAIN SYNDROME: Primary | ICD-10-CM

## 2022-05-16 RX ORDER — OXYCODONE 9 MG/1
9 CAPSULE, EXTENDED RELEASE ORAL EVERY 12 HOURS
Qty: 60 EACH | Refills: 0 | Status: SHIPPED
Start: 2022-05-16 | End: 2022-05-16 | Stop reason: SDUPTHER

## 2022-05-16 RX ORDER — OXYCODONE 9 MG/1
9 CAPSULE, EXTENDED RELEASE ORAL EVERY 12 HOURS
Qty: 60 EACH | Refills: 0 | Status: SHIPPED
Start: 2022-05-16 | End: 2022-06-14

## 2022-05-17 ENCOUNTER — OFFICE VISIT (OUTPATIENT)
Dept: ORTHOPEDIC SURGERY | Age: 67
End: 2022-05-17
Payer: MEDICARE

## 2022-05-17 ENCOUNTER — PROCEDURE VISIT (OUTPATIENT)
Dept: PODIATRY | Age: 67
End: 2022-05-17
Payer: MEDICARE

## 2022-05-17 VITALS
BODY MASS INDEX: 52.13 KG/M2 | WEIGHT: 285 LBS | DIASTOLIC BLOOD PRESSURE: 84 MMHG | TEMPERATURE: 98.2 F | SYSTOLIC BLOOD PRESSURE: 144 MMHG

## 2022-05-17 DIAGNOSIS — B35.1 TINEA UNGUIUM: Primary | ICD-10-CM

## 2022-05-17 DIAGNOSIS — M79.674 PAIN IN TOE OF RIGHT FOOT: ICD-10-CM

## 2022-05-17 DIAGNOSIS — R26.2 DIFFICULTY WALKING: ICD-10-CM

## 2022-05-17 DIAGNOSIS — M17.11 PRIMARY OSTEOARTHRITIS OF RIGHT KNEE: ICD-10-CM

## 2022-05-17 DIAGNOSIS — M79.675 PAIN IN LEFT TOE(S): ICD-10-CM

## 2022-05-17 DIAGNOSIS — M17.12 PRIMARY OSTEOARTHRITIS OF LEFT KNEE: Primary | ICD-10-CM

## 2022-05-17 DIAGNOSIS — I73.9 PERIPHERAL VASCULAR DISEASE, UNSPECIFIED (HCC): ICD-10-CM

## 2022-05-17 DIAGNOSIS — L84 CORN OR CALLUS: ICD-10-CM

## 2022-05-17 PROCEDURE — 3017F COLORECTAL CA SCREEN DOC REV: CPT | Performed by: PHYSICIAN ASSISTANT

## 2022-05-17 PROCEDURE — 11055 PARING/CUTG B9 HYPRKER LES 1: CPT | Performed by: PODIATRIST

## 2022-05-17 PROCEDURE — G8417 CALC BMI ABV UP PARAM F/U: HCPCS | Performed by: PHYSICIAN ASSISTANT

## 2022-05-17 PROCEDURE — 1036F TOBACCO NON-USER: CPT | Performed by: PHYSICIAN ASSISTANT

## 2022-05-17 PROCEDURE — G8400 PT W/DXA NO RESULTS DOC: HCPCS | Performed by: PHYSICIAN ASSISTANT

## 2022-05-17 PROCEDURE — 99214 OFFICE O/P EST MOD 30 MIN: CPT | Performed by: PHYSICIAN ASSISTANT

## 2022-05-17 PROCEDURE — G8427 DOCREV CUR MEDS BY ELIG CLIN: HCPCS | Performed by: PHYSICIAN ASSISTANT

## 2022-05-17 PROCEDURE — 11721 DEBRIDE NAIL 6 OR MORE: CPT | Performed by: PODIATRIST

## 2022-05-17 PROCEDURE — 1123F ACP DISCUSS/DSCN MKR DOCD: CPT | Performed by: PHYSICIAN ASSISTANT

## 2022-05-17 PROCEDURE — 1090F PRES/ABSN URINE INCON ASSESS: CPT | Performed by: PHYSICIAN ASSISTANT

## 2022-05-17 PROCEDURE — 4040F PNEUMOC VAC/ADMIN/RCVD: CPT | Performed by: PHYSICIAN ASSISTANT

## 2022-05-17 NOTE — PROGRESS NOTES
[facility] PODIATRY  [address] [facility] [address]   Dept: [phone]  Dept Fax: [phone]     PATIENT PROGRESS NOTE  Date of patient's visit: 5/17/2022  Patient's Name:  Janet Elizabeth YOB: 1955            Patient Care Team:  Aleida Floyd DO as PCP - General (Family Medicine)  Aleida Floyd DO as PCP - [facility]  Bal Sumner MD as Consulting Physician (Neurosurgery)  Margareth Yu DPM as Consulting Physician (Podiatry)        Chief Complaint   Patient presents with    Toe Pain     saw pcp Dr. Lawrence Carranza 2/22/22       Toe Pain       HPI:   Janet Elizabeth is a 73 y.o. female who presents to the office today complaining of painful corns calluses and nails. .  Symptoms began 11 year(s) ago. Patient relates pain is Present. Pain is rated 5 out of 10 and is described as moderate. Treatments prior to today's visit include: debridement. Currently denies F/C/N/V.  Pt's primary care physician is Richard Razo DO      Allergies   Allergen Reactions    Ferritin Shortness Of Breath and Other (See Comments)     Flushed,  Severe back pain       Past Medical History:   Diagnosis Date    Anemia     Arthritis     Cellulitis 05/2015    left leg    Chronic ulcer of leg with fat layer exposed ([address] 06/22/2015    Chronic ulcer of right leg, limited to breakdown of skin ([address] 09/28/2016    COVID-19 04/23/2021    postive test no symptoms    Depression     Full dentures     Low back pain     Lymphedema of both lower extremities 06/22/2015    Obesity     280 #    Osteoarthritis     Peripheral vision loss     Stroke ([address]     Type 2 diabetes mellitus without complication, without long-term current use of insulin ([address] 06/21/2019    Ulcer of left lower leg, limited to breakdown of skin ([address] 06/10/2019    Ulcer of left lower leg, limited to breakdown of skin ([address] 06/10/2019    UTI (urinary tract infection) 2021       Prior to Admission medications    Medication Sig Start Date End Date Taking? Authorizing Provider   oxyCODONE ER (XTAMPZA ER) 9 MG C12A Take 9 mg by mouth every 12 hours for 30 days. 5/16/22 6/15/22 Yes CHERYL Reyes   rOPINIRole (REQUIP) 2 MG tablet Take 0.5 tablets by mouth in the morning, at noon, and at bedtime 22  Yes Gautam Razo DO   Baclofen (LIORESAL) 5 MG tablet Take 1 tablet by mouth 3 times daily 22  Yes Gautam Razo DO   labetalol (NORMODYNE) 200 MG tablet Take 1 tablet by mouth every 12 hours 2/3/22  Yes Ann Marie Man DO   pantoprazole (PROTONIX) 40 MG tablet Take 1 tablet by mouth every morning (before breakfast) 22  Yes Prince Dent DO   gabapentin (NEURONTIN) 300 MG capsule TAKE 1 CAPSULE BY MOUTH THREE TIMES DAILY FOR 30 DAYS 21 Yes Gautam Razo DO   buPROPion (WELLBUTRIN XL) 150 MG extended release tablet Take 1 tablet by mouth every morning 21  Yes Gautam Razo DO   lactobacillus (CULTURELLE) CAPS capsule Take 1 capsule by mouth daily 21  Yes Ferrell Mcburney, MD   bumetanide (BUMEX) 2 MG tablet Take 1 tablet by mouth 2 times daily  Patient taking differently: Take 2 mg by mouth daily as needed  3/30/21  Yes Lakeshia Morales MD   potassium chloride (KLOR-CON M) 20 MEQ extended release tablet Take 1 tablet by mouth 2 times daily (with meals)  Patient taking differently: Take 20 mEq by mouth daily as needed  3/30/21  Yes Lakeshia Morales MD   ammonium lactate (LAC-HYDRIN) 12 % cream Apply topically as needed.  10/8/20  Yes Luci Shell, DPM   Cream Base (VERSAPRO) CREA APPLY ONE (1) GRAM (ONE PUMP) EXTERNALLY FOUR TIMES A DAY TO [facility] KNEE 3/23/20  Yes Bowen Boggs PA-C       Past Surgical History:   Procedure Laterality Date    ABDOMINOPLASTY  2002    BREAST REDUCTION SURGERY      reduction    CATARACT REMOVAL      bilateral     SECTION      x2    COLONOSCOPY  2012 and egd    COLONOSCOPY  2021    polyps; marginal prep--jessica    COLONOSCOPY N/A 2021    COLONOSCOPY WITH BIOPSY performed by Stefanie Lopez MD at [address] ECHOCARDIOGRAM COMPLETE 2D W DOPPLER W COLOR  2012         GASTRIC BYPASS SURGERY  2000    NO NASTOGASTRIC TUBE    HERNIA REPAIR          LUMBAR SPINE SURGERY N/A 2021    L3-L4  POSTERIOR LUMBAR INTERBODY  FUSION--OARM, JESSI, YAJAIRA TABLE, CELL SAVER, PLATELET GEL, AUDIOLOGY, CAGES, PLATES, SCREWS -- GLOBUS performed by Bal Sumner MD at [facility]  2021    minimal pouch gastritis--jessica    UPPER GASTROINTESTINAL ENDOSCOPY N/A 2021    EGD ESOPHAGOGASTRODUODENOSCOPY performed by Stefanie Lopez MD at [facility] History   Problem Relation Age of Onset    Breast Cancer Mother     Stroke Father     Hypertension Sister     Hypertension Brother        Social History     Tobacco Use    Smoking status: Former Smoker     Packs/day: 0.25     Years: 38.00     Pack years: 9.50     Types: Cigarettes     Quit date: 3/11/2021     Years since quittin.1    Smokeless tobacco: Never Used    Tobacco comment: Pt states she quit [employer]"   Substance Use Topics    Alcohol use: No       Review of Systems    Review of Systems:   History obtained from chart review and the patient  General ROS: negative for - chills, fatigue, fever, night sweats or weight gain  Constitutional: Negative for chills, diaphoresis, fatigue, fever and unexpected weight change. Musculoskeletal: Positive for . Neurological ROS: negative for - behavioral changes, confusion, headaches or seizures. Negative for weakness and numbness. Dermatological ROS: negative for - mole changes, rash  Cardiovascular: Negative for leg swelling. Gastrointestinal: Negative for constipation, diarrhea, nausea and vomiting.                Lower Extremity Physical Examination:   Vitals:   Vitals:    22 0845   BP: (!) 144/84   Temp: 98.2 °F (36.8 °C)   Toenail Description  Sites of Onychomycosis Involvement (Check affected area)  [x] [x] [x] [x] [x] [x] [x] [x] [x] [x]  5 4 3 2 1 1 2 3 4 5                          Right                                        Left    Thickness  [x] [x] [x] [x] [x] [x] [x] [x] [x] [x]  5 4 3 2 1 1 2 3 4 5                         Right                                        Left    Dystrophic Changes   [x] [x] [x] [x] [x] [x] [x] [x] [x] [x]  5 4 3 2 1 1 2 3 4 5                         Right                                        Left    discolored   [x] [x] [x] [x] [x] [x] [x] [x] [x] [x]  5 4 3 2 1 1 2 3 4 5                          Right                                        Left    Incurvation/Ingrowin   [] [] [x] [x] [] [] [x] [x] [] []  5 4 3 2 1 1 2 3 4 5                         Right                                        Left    Inflammation/Pain   [x] [x] [x] [x] [x] [x] [x] [x] [x] [x]  5 4 3 2 1 1 2 3 4 5                         Right                                        Left       Foot Exam    General  General Appearance: appears stated age and healthy   Orientation: alert and oriented to person, place, and time       Right Foot/Ankle     Inspection and Palpation  Ecchymosis: none  Tenderness: bony tenderness   Hammertoes: fifth toe  Skin Exam: corn;     Neurovascular  Dorsalis pedis: 2+  Posterior tibial: absent  Saphenous nerve sensation: normal  Tibial nerve sensation: normal  Superficial peroneal nerve sensation: normal  Deep peroneal nerve sensation: normal  Sural nerve sensation: normal  Achilles reflex: 2+  Babinski reflex: 2+      Left Foot/Ankle      Inspection and Palpation  Ecchymosis: none  Tenderness: bony tenderness   Hammertoes: fifth toe  Skin Exam: callus (callus sub 5 left foot ), corn, skin changes and abnormal color;      Neurovascular  Dorsalis pedis: 1+  Posterior tibial: absent  Saphenous nerve sensation: normal  Tibial nerve sensation: normal  Superficial peroneal nerve sensation: normal  Deep peroneal nerve sensation: normal  Sural nerve sensation: normal  Achilles reflex: 2+  Babinski reflex: 2+    Muscle Strength  Ankle dorsiflexion: 5  Ankle plantar flexion: 5  Ankle inversion: 5  Ankle eversion: 5  Great toe extension: 5  Great toe flexion: 5          Left Ankle Exam     Muscle Strength   The patient has normal left ankle strength. Dorsiflexion:  5/5   Plantar flexion:  5/5             General: AAO x 3 in NAD. Dermatologic Exam:  Skin lesion/ulceration Absent . Skin No rashes or nodules noted. .   Musculoskeletal: There are hammertoes noted fifth digit bilaterally there is a corn noted dorsal aspect fifth digit bilateral.  Bilateral heels have thick eschar medial aspect noninfected. Vascular: The pulses DP bilaterally are palpable PT on the right side is absent there is a lack of hair growth to lower extremity the nails are thick pitting mycotic there is atrophic skin changes which include skin texture has changed skin color is discolored    Radiographs:  3 views  foot/ankle:     Asessment: Patient is a 73 y.o. female with:    Diagnosis Orders   1. Tinea unguium     2. Corn or callus     3. Pain in left toe(s)     4. Pain in toe of right foot     5. Peripheral vascular disease, unspecified ([address]     6. Difficulty walking         Plan: Patient examined and evaluated. Debridement of all painful nails 1-5 maximiliano was performed with both manual and electrical debridement to prevent infection and/or ulceration. Patient tolerated the debridement well, without any complaints. Patient was asymptomatic after debridement  Current condition and treatment options discussed in detail. Discussed conservative and surgical options with the patient. Treatment options today consisted of verbal and written instructions given to patient. Contact office with any questions/problems/concerns.   CALLUS DEBRIDEMENT x1  Verbal informed content was obtained from the patient. The callus lesion on the left foot was debrided with a sterile 15 blade to sub q. It was then debrided,  padded, and an antibiotic ointment was applied to the treated area. Patient tolerated the procedure well with no complications.       5/17/2022    Electronically signed by Evin De Paz DPM on 5/17/2022 at 9:45 AM  5/17/2022

## 2022-05-17 NOTE — PROGRESS NOTES
Chief Complaint   Patient presents with    New Patient     Bilateral knee pain         Subjective:  Simon Hu is following up due to her bilateral knee osteoarthritis. She does see pain management and has received bilateral knee corticosteroid injections by pain management in the past.  These have typically lasted about a month before gradually wearing off. She has also tried prescription physical therapy and states this actually worsened her knee pain. She has also tried over-the-counter bracing. States that she would like to consider total knee arthroplasty. He is weightbearing as tolerated bilateral lower extremities. Review of Systems -  all pertinent positives and negatives in HPI. Objective:    General: Alert and oriented X 3, normocephalic atraumatic, external ears and eye normal, sclera clear, no acute distress, respirations easy and unlabored with no audible wheezes, skin warm and dry, speech and dress appropriate for noted age, affect euthymic. Extremity:  Bilateral Lower Extremity  Skin clean dry and intact, without signs of infection  Compartments supple throughout thigh and leg  Calf supple and nontender  Demonstrates active knee flexion/extension, ankle plantar/dorsiflexion/great toe extension. Bilateral LE appear warm/perfused. XR:   Multiple views of bilateral knees demonstrating severe degenerative changes bilaterally. No significant change in alignment. No acute fractures or dislocations or any other osseus abnormality identified. Assessment:   Diagnosis Orders   1. Primary osteoarthritis of left knee     2. Primary osteoarthritis of right knee         Plan:   Reviewed x-rays with patient today in office    Weight-bear as tolerated bilateral lower extremities   Do not recommend TKA while BMI is above 40. Discussed goal weight based on her height for consideration of TKA in the future.   Discussed possible referral to help with weight management, more physical therapy, bracing, but patient states that nothing is helping her he is \"ready to leave\". States that she would like to talk with Dr. Zuly Rosenthal. I stated that there would be no problem, but before Dr. Zuly Rosenthal to peer in the room, patient walked out of clinic before more suggestions could be made. Patient stated she would receive her knee CSI from PM. Discussed with Dr. Zuly Rosenthal. Follow up PRN    Electronically signed by Lenin Arroyo PA-C on 5/17/2022 at 10:54 AM  Note: This report was completed using [employer] voiced recognition software. Every effort has been made to ensure accuracy; however, inadvertent computerized transcription errors may be present.

## 2022-05-17 NOTE — PROGRESS NOTES
Patient presents today for bilateral knee pain. She states the pain has been on and off for years. She see the Pain clinic for injections. Patient denies any falls or injuries.

## 2022-05-17 NOTE — PATIENT INSTRUCTIONS
Goal of BMI under 40 before considering total knee arthroplasty. Current BMI is 52. Current weight is 285. Based on your height, your goal weight is about 240.

## 2022-05-18 ENCOUNTER — PROCEDURE VISIT (OUTPATIENT)
Dept: PAIN MANAGEMENT | Age: 67
End: 2022-05-18
Payer: MEDICARE

## 2022-05-18 VITALS
OXYGEN SATURATION: 92 % | SYSTOLIC BLOOD PRESSURE: 190 MMHG | HEART RATE: 84 BPM | HEIGHT: 62 IN | TEMPERATURE: 97.6 F | WEIGHT: 285 LBS | BODY MASS INDEX: 52.44 KG/M2 | RESPIRATION RATE: 16 BRPM | DIASTOLIC BLOOD PRESSURE: 109 MMHG

## 2022-05-18 DIAGNOSIS — M17.0 PRIMARY OSTEOARTHRITIS OF BOTH KNEES: Primary | ICD-10-CM

## 2022-05-18 PROCEDURE — 20611 DRAIN/INJ JOINT/BURSA W/US: CPT | Performed by: PAIN MEDICINE

## 2022-05-18 PROCEDURE — 20610 DRAIN/INJ JOINT/BURSA W/O US: CPT | Performed by: PAIN MEDICINE

## 2022-05-18 RX ORDER — LIDOCAINE HYDROCHLORIDE 20 MG/ML
6 INJECTION, SOLUTION INFILTRATION; PERINEURAL ONCE
Status: COMPLETED | OUTPATIENT
Start: 2022-05-18 | End: 2022-05-18

## 2022-05-18 RX ORDER — BUPIVACAINE HYDROCHLORIDE 2.5 MG/ML
6 INJECTION, SOLUTION EPIDURAL; INFILTRATION; INTRACAUDAL ONCE
Status: COMPLETED | OUTPATIENT
Start: 2022-05-18 | End: 2022-05-18

## 2022-05-18 RX ORDER — TRIAMCINOLONE ACETONIDE 40 MG/ML
40 INJECTION, SUSPENSION INTRA-ARTICULAR; INTRAMUSCULAR ONCE
Status: COMPLETED | OUTPATIENT
Start: 2022-05-18 | End: 2022-05-18

## 2022-05-18 RX ADMIN — TRIAMCINOLONE ACETONIDE 40 MG: 40 INJECTION, SUSPENSION INTRA-ARTICULAR; INTRAMUSCULAR at 15:49

## 2022-05-18 RX ADMIN — TRIAMCINOLONE ACETONIDE 40 MG: 40 INJECTION, SUSPENSION INTRA-ARTICULAR; INTRAMUSCULAR at 15:48

## 2022-05-18 RX ADMIN — BUPIVACAINE HYDROCHLORIDE 6 ML: 2.5 INJECTION, SOLUTION EPIDURAL; INFILTRATION; INTRACAUDAL at 15:45

## 2022-05-18 RX ADMIN — LIDOCAINE HYDROCHLORIDE 6 ML: 20 INJECTION, SOLUTION INFILTRATION; PERINEURAL at 15:47

## 2022-05-18 NOTE — PROGRESS NOTES
Do you currently have any of the following:    Fever: No  Headache:  No  Cough: No  Shortness of breath: No  Exposed to anyone with these symptoms: No          Flex Hart presents to the [address] [place] [address] on 5/18/2022. Laurel Burnette is complaining of pain bilateral knees. The pain is constant. The pain is described as aching, throbbing, stabbing. Pain is rated on her best day at a 6, on her worst day at a 10, and on average at a 10 on the VAS scale. She took her last dose of xtampza at 6am today. Any procedures since your last visit: No.    Pacemaker or defibrillator: No.    She is not on NSAIDS and is not on anticoagulation medications. Medication Contract and Consent for Opioid Use Documents Filed     Patient Documents     Type of Document Status Date Received Received By Description    Medication Contract Signed 9/6/2019 12:04 PM Pushpa Knight med contract    Medication Contract Received 11/17/2020 10:57 AM Cullen MORRISON med contract    Medication Contract Signed 8/4/2021 11:40 AM Jeremiah KIRBY [facility] EXPIRES [phone]                Resp 16   Ht 5' 2\" (1.575 m)   Wt 285 lb (129.3 kg)   BMI 52.13 kg/m²      No LMP recorded.  Patient is postmenopausal.

## 2022-05-18 NOTE — PROGRESS NOTES
2022    Patient: Jaxon Way  :  1955  Age:  73 y.o. Sex:  female      PRE-OPERATIVE DIAGNOSIS: Bilateral  Knee pain, osteoarthritis. POST-OPERATIVE DIAGNOSIS: Same. PROCEDURE PERFORMED:  Bilateral knee injection under ultrasound guidance. SURGEON:   ANALILIA Vargas. ANESTHESIA: local    ESTIMATED BLOOD LOSS: None. BRIEF HISTORY: Jaxon Way comes in today for Bilateral Knee steroid injection under ultrasound guidance. After discussing the potential risks and benefits of the procedure with the patient  Oriana Avila did request that we proceed. A complete History & Physical was reviewed and it is unchanged. DESCRIPTION OF PROCEDURE:   The patient was placed in a seated position. The area of the Bilateral knee was prepped with chloraprep and draped in a sterile manner. The overlying skin and subcutaneous tissues were anesthetized with 0.5% Lidocaine. Then, under ultrasound guidance, a 25 gauge 1 1/2 inch needle was inserted into the suprapatellar bursa under direct visualization. A solution of 3 cc's of 0.25% bupivacaine, 3 cc's of 2% lidocaine, and 40 mg of Kenalog was injected easily, in each knee, with appropriate spread of medicatioons without complications. The needle was then removed and Band-Aid applied. Disposition the patient tolerated the procedure well and there were no complications . Oriana Avila will follow up in our comprehensive [facility] as scheduled. She was encouraged to call with questions, concerns or if worsening of symptoms occurs. Katelyn Schulz DO    2022    Patient: Jaxon Way  :  1955  Age:  73 y.o. Sex:  female     PRE-OPERATIVE DIAGNOSIS: Right   Shoulder pain, osteoarthritis. POST-OPERATIVE DIAGNOSIS: Same. PROCEDURE PERFORMED: Right  Shoulder steroid injection. SURGEON:   ANALILIA Vargas. ANESTHESIA: local    ESTIMATED BLOOD LOSS: None.     BRIEF HISTORY: Jaxon Way comes in today for Right Shoulder steroid injection. After discussing the potential risks and benefits of the procedure with the patient. Trent Velázquez did request that we proceed. A complete History & Physical was reviewed and it is unchanged. DESCRIPTION OF PROCEDURE:   The patient was placed in a seated position. The area of the Right  shoulder was prepped with chloraprep and draped in a sterile manner. Using the posterior approach, a 25 gauge 1 1/2 inch  needle was advanced  In anterolateral projection into the joint capsule. After negative aspiration a solution of 0.25 % marcaine 3 cc's, 3 cc's 2% lidocaine and 40 mg Kenalog was injected easily without complications. The needle was then removed and Band-Aid applied. Disposition the patient tolerated the procedure well and there were no complications . Trent Velázquez will follow up in our comprehensive Pain Management Center as scheduled. She was encouraged to call with questions, concerns or if worsening of symptoms occurs.     Shaila Hernandez, DO

## 2022-05-19 ENCOUNTER — TELEPHONE (OUTPATIENT)
Dept: PAIN MANAGEMENT | Age: 67
End: 2022-05-19

## 2022-05-19 NOTE — TELEPHONE ENCOUNTER
[address] [facility] ([phone]) a tier except for the patients medication of Belbuca 450mcg has been started with Trevin November. under - Ref #[id number].

## 2022-05-25 ENCOUNTER — TELEPHONE (OUTPATIENT)
Dept: PAIN MANAGEMENT | Age: 67
End: 2022-05-25

## 2022-05-31 ENCOUNTER — TELEPHONE (OUTPATIENT)
Dept: PAIN MANAGEMENT | Age: 67
End: 2022-05-31

## 2022-05-31 NOTE — TELEPHONE ENCOUNTER
Called patient. Discussed case with Dr. Mera Olson. She understands that we can not increase her current pain regimen any higher. She states that the [place]ma [place] somewhat helps but does not last long enough and is too expensive. Dr. Mera Olson recommends second opinion if she would like. She understands.

## 2022-06-13 ENCOUNTER — TELEPHONE (OUTPATIENT)
Dept: PAIN MANAGEMENT | Age: 67
End: 2022-06-13

## 2022-06-14 ENCOUNTER — OFFICE VISIT (OUTPATIENT)
Dept: PAIN MANAGEMENT | Age: 67
End: 2022-06-14
Payer: MEDICARE

## 2022-06-14 VITALS
WEIGHT: 290 LBS | SYSTOLIC BLOOD PRESSURE: 190 MMHG | TEMPERATURE: 96.4 F | DIASTOLIC BLOOD PRESSURE: 121 MMHG | RESPIRATION RATE: 16 BRPM | BODY MASS INDEX: 53.37 KG/M2 | OXYGEN SATURATION: 95 % | HEIGHT: 62 IN | HEART RATE: 89 BPM

## 2022-06-14 DIAGNOSIS — G89.29 CHRONIC RIGHT-SIDED LOW BACK PAIN WITHOUT SCIATICA: ICD-10-CM

## 2022-06-14 DIAGNOSIS — M17.0 PRIMARY OSTEOARTHRITIS OF BOTH KNEES: Primary | ICD-10-CM

## 2022-06-14 DIAGNOSIS — M17.0 PRIMARY OSTEOARTHRITIS OF BOTH KNEES: ICD-10-CM

## 2022-06-14 DIAGNOSIS — G89.4 CHRONIC PAIN SYNDROME: ICD-10-CM

## 2022-06-14 DIAGNOSIS — M25.511 CHRONIC RIGHT SHOULDER PAIN: ICD-10-CM

## 2022-06-14 DIAGNOSIS — M54.50 CHRONIC RIGHT-SIDED LOW BACK PAIN WITHOUT SCIATICA: ICD-10-CM

## 2022-06-14 DIAGNOSIS — G89.29 CHRONIC RIGHT SHOULDER PAIN: ICD-10-CM

## 2022-06-14 PROCEDURE — G8417 CALC BMI ABV UP PARAM F/U: HCPCS | Performed by: PHYSICIAN ASSISTANT

## 2022-06-14 PROCEDURE — 1123F ACP DISCUSS/DSCN MKR DOCD: CPT | Performed by: PHYSICIAN ASSISTANT

## 2022-06-14 PROCEDURE — G8400 PT W/DXA NO RESULTS DOC: HCPCS | Performed by: PHYSICIAN ASSISTANT

## 2022-06-14 PROCEDURE — 99213 OFFICE O/P EST LOW 20 MIN: CPT

## 2022-06-14 PROCEDURE — G8427 DOCREV CUR MEDS BY ELIG CLIN: HCPCS | Performed by: PHYSICIAN ASSISTANT

## 2022-06-14 PROCEDURE — 4004F PT TOBACCO SCREEN RCVD TLK: CPT | Performed by: PHYSICIAN ASSISTANT

## 2022-06-14 PROCEDURE — 1090F PRES/ABSN URINE INCON ASSESS: CPT | Performed by: PHYSICIAN ASSISTANT

## 2022-06-14 PROCEDURE — 99213 OFFICE O/P EST LOW 20 MIN: CPT | Performed by: PHYSICIAN ASSISTANT

## 2022-06-14 PROCEDURE — 99214 OFFICE O/P EST MOD 30 MIN: CPT | Performed by: PHYSICIAN ASSISTANT

## 2022-06-14 PROCEDURE — 3017F COLORECTAL CA SCREEN DOC REV: CPT | Performed by: PHYSICIAN ASSISTANT

## 2022-06-14 RX ORDER — BACLOFEN 5 MG/1
5 TABLET ORAL 2 TIMES DAILY PRN
Qty: 60 TABLET | Refills: 0 | Status: SHIPPED
Start: 2022-06-14 | End: 2022-08-16 | Stop reason: SDUPTHER

## 2022-06-14 RX ORDER — PREGABALIN 75 MG/1
CAPSULE ORAL
Qty: 49 CAPSULE | Refills: 0 | Status: SHIPPED
Start: 2022-06-14 | End: 2022-06-29

## 2022-06-14 RX ORDER — HYDROCODONE BITARTRATE AND ACETAMINOPHEN 5; 325 MG/1; MG/1
1 TABLET ORAL 4 TIMES DAILY
Qty: 120 TABLET | Refills: 0 | Status: SHIPPED
Start: 2022-06-15 | End: 2022-07-12

## 2022-06-14 NOTE — PROGRESS NOTES
Selena's blood pressure was elevated today. She was instructed to contact his primary care provider as soon as possible.

## 2022-06-14 NOTE — PROGRESS NOTES
Do you currently have any of the following:    Fever: No  Headache:  No  Cough: No  Shortness of breath: No  Exposed to anyone with these symptoms: No         Pablo Mitchell presents to the [facility] on 6/14/2022. Hernandez Braun is complaining of pain low back pain. The pain is constant. The pain is described as aching, throbbing, shooting, dull, sharp and nagging. Pain is rated on her best day at a 8, on her worst day at a 10, and on average at a 9 on the VAS scale. She took her last dose of Percocet today. Any procedures since your last visit: No    Pacemaker or defibrillator: No     She is not on NSAIDS and is not on anticoagulation medications. Medication Contract and Consent for Opioid Use Documents Filed     Patient Documents     Type of Document Status Date Received Received By Description    Medication Contract Signed 9/6/2019 12:04 PM Kaitlin Oreilly med contract    Medication Contract Received 11/17/2020 10:57 AM Galdino MORRISON med contract    Medication Contract Signed 8/4/2021 11:40 AM Jeremiah KIRBY [facility] EXPIRES [phone]                BP (!) 190/121   Pulse 89   Temp (!) 96.4 °F (35.8 °C) (Infrared)   Resp 16   Ht 5' 2\" (1.575 m)   Wt 290 lb (131.5 kg)   SpO2 95%   BMI 53.04 kg/m²      No LMP recorded.  Patient is postmenopausal.

## 2022-06-14 NOTE — PROGRESS NOTES
[address]  [address]  [facility]    Follow up Note      Deborra Lesch     Date of Visit:  2022    CC:  Patient presents for follow up   Chief Complaint   Patient presents with    Follow-up     low back pain        HPI:    Pain is worse to right lower back. Denies any injury. Overall, patient reports that her pain is not controlled on her current pain regimen. Appropriate analgesia with current medications regimen: yes   Change in quality of symptoms:no. Medication side effects:none. Recent diagnostic testing: None. Recent interventional procedures:     She has not been on anticoagulation medications to include ASA, NSAIDS, Plavix, heparin, LMW heparin and warfarin and has not been on herbal supplements. She is diabetic. Imagin2021 MRI Lumbar Spine    FINDINGS:   BONES/ALIGNMENT: There is normal alignment of the spine. The vertebral body   heights are maintained. The bone marrow signal appears unremarkable.  There   is degenerative disc disease with disc space narrowing and osteophytes at   L3-L4, L4-L5, and L5-S1.  The bony spinal canal overall is congenitally small. SPINAL CORD: The conus terminates normally. SOFT TISSUES: No paraspinal mass identified. L1-L2:  The thecal sac and neural foramina are intact. L2-L3: There is a minimum disc protrusion measuring 2-3 mm.  Disc and/or   osteophytes cause minimal narrowing of the neural foramina bilaterally.  The   thecal sac is not stenotic. L3-L4: There is a disc extrusion measuring 4-5 mm toward the right extending   superiorly behind the inferior endplate of L3 causing severe narrowing of the   right neural foramen.  The thecal sac is mildly stenotic measuring 9 mm. There is minimal narrowing of the left neural foramen from disc and   osteophyte.    L4-L5: There is disc protrusion or osteophyte toward the left measuring 3-4   mm causing moderate to severe narrowing of the left neural foramen.  The   thecal sac is mildly stenotic measuring 9.5 mm.  Disc and osteophyte narrows   the right neural foramen. The left neural foramen is narrowed greater than   right. L5-S1:  There is mild facet and ligamentum flavum hypertrophy. The thecal sac   and neural foramina are intact.       Impression   Disc extrusion at L3-L4 measuring 4-5 mm and extending superiorly behind the   inferior endplate of V1-G6 on the right causing severe narrowing of the right   neural foramen.  There is mild stenosis of the thecal sac at L3-L4 and L4-L5   as discussed above. 12/27/2019 Lumbar Spine X-ray    Alignment of the vertebral bodies appears to be near-anatomic   No fracture or foreign body is identified. The disc spaces demonstrate moderate degenerative changes. There is mild loss of the vertebral body height   There are moderate degenerative changes involving the facets. There is grade 1 anterolisthesis of L3 on L4. Flexion-extension views demonstrates movement of the anterolisthesis   of L3 on L4.            Impression   Grade 1 anterolisthesis of L3 on L4 which does move with extension. Findings consistent with moderate degenerative disc disease and   degenerative facet disease.       The exam has been dictated and signed by Tenisha Paige. HOLLY Dunbar-PEDRO LUIS   and Ricky Moreno MD, reviewed and concurred with these findings.          9/3/2019 left knee x-ray     The bones appear to be in anatomic alignment. No foreign body is identified   No fracture is identified     There is moderate tricompartmental joint space loss greatest long   patellofemoral and medial femoral tibial joint   The are moderate degenerative changes present along the patellofemoral   and medial femoral tibial compartment           Impression   Moderate degenerative changes as described above          9/3/2019 right knee x-ray     The bones appear to be in anatomic alignment.     No foreign body is identified   No fracture is identified. Marginal bone spurs are again seen along the inferior margins of the   patella. There is interval worsening of moderate tricompartmental joint space   loss    The are interval worsening of moderate degenerative tricompartmental   changes present            Impression   Moderate tricompartmental degenerative changes.  This has   worsened in the interim     Urine Drug Screenin2019 Consistent for buprenorphine and metabolites  2020 Consistent for buprenorphine  2020 Consistent   2020 Consistent  2021 Consistent     OARRS report:  10/2019 Consistent to 2022 Consistent     Opioid Agreement:  2021    Past Medical History:   Diagnosis Date    Anemia     Arthritis     Cellulitis 2015    left leg    Chronic ulcer of leg with fat layer exposed ([address] 2015    Chronic ulcer of right leg, limited to breakdown of skin ([address] 2016    COVID-19 2021    postive test no symptoms    Depression     Full dentures     Low back pain     Lymphedema of both lower extremities 2015    Obesity     280 #    Osteoarthritis     Peripheral vision loss     Stroke ([address]     Type 2 diabetes mellitus without complication, without long-term current use of insulin ([address] 2019    Ulcer of left lower leg, limited to breakdown of skin ([address] 06/10/2019    Ulcer of left lower leg, limited to breakdown of skin ([address] 06/10/2019    UTI (urinary tract infection) 2021       Past Surgical History:   Procedure Laterality Date    ABDOMINOPLASTY  2002    BREAST REDUCTION SURGERY      reduction    CATARACT REMOVAL      bilateral     SECTION      x2    COLONOSCOPY  2012    and egd    COLONOSCOPY  2021    polyps; marginal prep--jessica    COLONOSCOPY N/A 2021    COLONOSCOPY WITH BIOPSY performed by Elisabeth Guadalupe MD at [address] ECHOCARDIOGRAM COMPLETE 2D W DOPPLER W COLOR  2012         GASTRIC BYPASS SURGERY   NO NASTOGASTRIC TUBE    HERNIA REPAIR      2002    LUMBAR SPINE SURGERY N/A 5/13/2021    L3-L4  POSTERIOR LUMBAR INTERBODY  FUSION--OARM, JESSI, YAJAIRA TABLE, CELL SAVER, PLATELET GEL, AUDIOLOGY, CAGES, PLATES, SCREWS -- GLOBUS performed by Rosendo Daniels MD at [address]  03/27/2021    minimal pouch gastritis--jessica    UPPER GASTROINTESTINAL ENDOSCOPY N/A 03/27/2021    EGD ESOPHAGOGASTRODUODENOSCOPY performed by Sachin Wu MD at [address]       Prior to Admission medications    Medication Sig Start Date End Date Taking? Authorizing Provider   HYDROcodone-acetaminophen (NORCO) 5-325 MG per tablet Take 1 tablet by mouth 4 times daily for 30 days. Take lowest dose possible to manage pain 6/15/22 7/15/22 Yes CHERYL Randall   Baclofen (LIORESAL) 5 MG tablet Take 1 tablet by mouth 2 times daily as needed (spasms) 6/14/22 7/14/22 Yes CHERYL Randall   pregabalin (LYRICA) 75 MG capsule QD x 1 week and then BID thereafter as tolerated.  6/14/22 6/28/22 Yes CHERYL Randall   rOPINIRole (REQUIP) 2 MG tablet Take 0.5 tablets by mouth in the morning, at noon, and at bedtime 2/23/22  Yes Gautam Razo DO   labetalol (NORMODYNE) 200 MG tablet Take 1 tablet by mouth every 12 hours  Patient not taking: Reported on 6/14/2022 2/3/22   Jose G Dent DO   pantoprazole (PROTONIX) 40 MG tablet Take 1 tablet by mouth every morning (before breakfast)  Patient not taking: Reported on 6/14/2022 2/4/22   Jose G Dent DO   gabapentin (NEURONTIN) 300 MG capsule TAKE 1 CAPSULE BY MOUTH THREE TIMES DAILY FOR 30 DAYS  Patient not taking: Reported on 6/14/2022 9/13/21 9/13/22  Jessy Razo DO   buPROPion (WELLBUTRIN XL) 150 MG extended release tablet Take 1 tablet by mouth every morning  Patient not taking: Reported on 6/14/2022 7/27/21   Gautam Razo DO   lactobacillus (CULTURELLE) CAPS capsule Take 1 capsule by mouth daily  Patient not taking: Reported on 2022   Brian Alex MD   bumetanide (BUMEX) 2 MG tablet Take 1 tablet by mouth 2 times daily  Patient not taking: Reported on 2022 3/30/21   Rome George MD   potassium chloride (KLOR-CON M) 20 MEQ extended release tablet Take 1 tablet by mouth 2 times daily (with meals)  Patient not taking: Reported on 2022 3/30/21   Rome George MD   ammonium lactate (LAC-HYDRIN) 12 % cream Apply topically as needed. Patient not taking: Reported on 2022 10/8/20   Verdene Tasha, DPM   Cream Base (VERSAPRO) CREA APPLY ONE (1) GRAM (ONE PUMP) EXTERNALLY FOUR TIMES A DAY  [address] KNEE  Patient not taking: Reported on 2022 3/23/20   Ester Haddad PA-C       Allergies   Allergen Reactions    Ferritin Shortness Of Breath and Other (See Comments)     Flushed,  Severe back pain       Social History     Socioeconomic History    Marital status:      Spouse name: Not on file    Number of children: 2    Years of education: 15    Highest education level: Associate degree: academic program   Occupational History    Not on file   Tobacco Use    Smoking status: Current Every Day Smoker     Packs/day: 1.00     Years: 38.00     Pack years: 38.00     Types: Cigarettes     Last attempt to quit: 3/11/2021     Years since quittin.2    Smokeless tobacco: Never Used   Vaping Use    Vaping Use: Never used   Substance and Sexual Activity    Alcohol use: No    Drug use: No    Sexual activity: Not Currently   Other Topics Concern    Not on file   Social History Narrative        Chronic Diagnosis: Iron deficiency anemia, Depressive disorder, Benign essential hypertension, Mixed    hyperlipidemia.     HTN    OBESITY    LIPID    OA    SMOKER---quit   summer  2021    CVA L FIELD VISION    DEPRESSION    GASTRIC BYPASS 01    BREAST REDUCTION--    Herve PIEDRA 1955 Page #2    ANEMIA==IRON AND B-12    Admitted 2019 with cellulitis left lower extremity then readmitted with chest pain with negative stress test    L--3-4   lumbar surgeyr dr Mar Huffman     Social Determinants of Health     Financial Resource Strain:     Difficulty of Paying Living Expenses: Not on file   Food Insecurity:     Worried About Running Out of Food in the Last Year: Not on file    [place] of Food in the Last Year: Not on file   Transportation Needs:     Lack of Transportation (Medical): Not on file    Lack of Transportation (Non-Medical): Not on file   Physical Activity: Inactive    Days of Exercise per Week: 0 days    Minutes of Exercise per Session: 0 min   Stress:     Feeling of Stress : Not on file   Social Connections: Moderately Integrated    Frequency of Communication with Friends and Family: More than three times a week    Frequency of Social Gatherings with Friends and Family: More than three times a week    Attends Rastafarian Services: More than 4 times per year    Active Member of [address] [place] [address] or Organizations: Yes    Attends Club or Organization Meetings: More than 4 times per year    Marital Status:    Intimate Partner Violence:     Fear of Current or Ex-Partner: Not on file    Emotionally Abused: Not on file    Physically Abused: Not on file    Sexually Abused: Not on file   Housing Stability:     Unable to Pay for Housing in the Last Year: Not on file    Number of [place] in the Last Year: Not on file    Unstable Housing in the Last Year: Not on file       Family History   Problem Relation Age of Onset    Breast Cancer Mother     Stroke Father     Hypertension Sister     Hypertension Brother        REVIEW OF SYSTEMS:     Fred Willingham denies fever/chills, chest pain, shortness of breath, new bowel or bladder complaints. All other review of systems was negative.     PHYSICAL EXAMINATION:      BP (!) 190/121   Pulse 89   Temp (!) 96.4 °F (35.8 °C) (Infrared)   Resp 16   Ht 5' 2\" (1.575 m)   Wt 290 lb (131.5 kg)   SpO2 95%   BMI 53.04 kg/m²     General:    General appearance: awake, alert, oriented, in no acute distress, well developed, well nourished and in no acute distress   pleasant and well-hydrated. , in no distress and A & O x3  Build:Obese  Function:Rises from a seated position with difficulty     Head:     Head:normocephalic and atraumatic  Pupils:regular, round and equal.  Sclera: icterus absent,   EOM:full and intact.     Lungs:     Breathing:Normal expansion. No wheezing.     Abdomen:     Shape:non-distended and normal    Lumbar spine:     Spine inspection:normal   CVA tenderness:No   Right paraspinal TTP, negative midline TTP  Range of motion:abnormal mildly Lateral bending, flexion, extension rotation bilateral and is painful.        Extremities:     Tremors:None bilaterally upper and lower  Range of motion:, pain with internal rotation of hips not done. Intact:Yes  Varicose veins: Not assessed. Cyanosis:none      Neurological:     Cranial nerves:normal  Motor:   Coordination:normal  Gait:antalgic     Dermatology:     Skin: No skin changes.       Impression:    B/L Knee Pain, LBP, right shoulder pain  Knee x-rays:  Moderate DJD.    Hx of pain management with [facility] pain clinic.  Was on fentanyl 25 mg and norco 5/325 TID.  Did not help and caused drowsiness.  Now on Belbuca 450 mcg BID (increased from 300 mg BID) which is working very well without side effects.  Transferred care to [facility]) due to cost.    Hx of LESI by Dr. 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  Bilateral segmental arthrodesis and fusion at L3-L4 with the use of  bilateral L3 and L4 pedicle screws with use of locally harvested  autograft plus allograft in the form of BioZorb strips for  posterolateral fusion at L3-L4. 2.  360-degree fusion with posterior lumbar interbody fusion at L3-L4  with use of bilateral Globus Rise expandable cages packed with locally  harvested autograft. 3.  Bilateral L3 and L4 laminectomy, bilateral L3-L4 medial facetectomy,  bilateral L3 and L4 foraminotomy, and bilateral L3-L4 diskectomy. 4.  Use of O-arm assisted navigation with placement of pedicle screws. 5.  Use of free-running EMG to test pedicle screw heads. 6.  Use of intraoperative fluoroscopy interpreted by myself, the  surgeon. 7.  22 modifier for degree of difficulty for the patient being morbidly  obese with a BMI of 51.21.  8.  AS modifier for Adrianna Hayden [facility], who assisted with primary  exposure, primary closure, and retraction of neural elements on 05/13/2021 Dr. Chris Chou. Discussed need to see PCP. BP is high which patient attributes to pain. She has recently gone off multiple medications on her own. OARRS reviewed 06/2022  UDS ordered today  Discontinue Xtampza 9 mg BID  Restart norco 5/325 QID  Add pregabalin titration - discussed side effect profile. Discussed obtaining a 2nd opinion again if she wishes. B/L knee CSIs on 05/18/2022with good relief x several weeks. Now back to baseline   Left hand numbness has resolved. When ordering knee injections:  Requests the same dosing as ortho.  1 ml Kenalog (40mg), 3ml PF 0.25% marcaine and 3ml 1% PF Lidocaine for knee injections this week. Controlled Substance Monitoring:    Acute and Chronic Pain Monitoring:   RX Monitoring 6/14/2022   Periodic Controlled Substance Monitoring Possible medication side effects, risk of tolerance/dependence & alternative treatments discussed. ;No signs of potential drug abuse or diversion identified. ;Assessed functional status. ;Obtaining appropriate analgesic effect of treatment. ;Random urine drug screen sent today.      Greater than 30 minutes was spent on this case, of which 30 minutes were spent face to face counseling and educating the patient on her treatment plan as well as reviewing records.               We discussed with the patient that combining opioids, benzodiazepines, alcohol, illicit drugs or sleep aids increases the risk of respiratory depression including death. We discussed that these medications may cause drowsiness, sedation or dizziness and have counseled the patient not to drive or operate machinery. We have discussed that these medications will be prescribed only by one provider. We have discussed with the patient about age related risk factors and have thoroughly discussed the importance of taking these medications as prescribed. The patient verbalizes understanding.     ccreferring physic

## 2022-06-15 LAB
6-MONOACETYLMORPHINE, URINE: NOT DETECTED
ALCOHOL URINE: NOT DETECTED
AMPHETAMINE SCREEN, URINE: NOT DETECTED
BARBITURATE SCREEN URINE: NOT DETECTED
BENZODIAZEPINE SCREEN, URINE: NOT DETECTED
BUPRENORPHINE URINE: NOT DETECTED
CANNABINOID SCREEN URINE: NOT DETECTED
COCAINE METABOLITE SCREEN URINE: NOT DETECTED
FENTANYL SCREEN, URINE: NOT DETECTED
INTEGRITY CHECK, CREATININE, URINE: 115.8
INTEGRITY CHECK, OXIDANT, URINE: 84
INTEGRITY CHECK, PH, URINE: 5.7 (ref 4.5–9)
INTEGRITY CHECK, SPECIFIC GRAVITY, URINE: 1.02 (ref 1–1.03)
INTEGRITY CHECK, SPECIMEN INTEGRITY, URINE: ABNORMAL
Lab: ABNORMAL
METHADONE SCREEN, URINE: NOT DETECTED
OPIATE SCREEN URINE: NOT DETECTED
OXYCODONE URINE: POSITIVE
PHENCYCLIDINE SCREEN URINE: NOT DETECTED
TRAMADOL SCREEN URINE: NOT DETECTED

## 2022-06-16 LAB
6-MAM, QUANTITATIVE, URINE: <10
7-AMINOCLONAZEPAM, QUANTITATIVE, URINE: <50
ALPHA-HYDROXYALPRAZOLAM, QUANTITATIVE, URINE: <50
ALPHA-HYDROXYMIDAZOLAM, QUANTITATIVE, URINE: <50
ALPHA-HYDROXYTRIAZOLAM, QUANTITATIVE, URINE: <50
ALPRAZOLAM URINE QUANT: <50
CHLORDIAZEPOXIDE, QUANTITATIVE, URINE: <50
CLONAZEPAM, QUANTITATIVE, URINE: <50
CODEINE, QUANTITATIVE, URINE: <50
COMMENT: NORMAL
DIAZEPAM URINE QUANT: <50
FLUNITRAZEPAM, QUANTITATIVE, URINE: <50
FLURAZEPAM, QUANTITATIVE, URINE: <50
HYDROCODONE, QUANTITATIVE, URINE: <50
HYDROMORPHONE, QUANTITATIVE, URINE: <50
LORAZEPAM URINE QUANT: <50
MIDAZOLAM URINE QUANT: <50
MORPHINE, QUANTITATIVE, URINE: <50
NORDIAZEPAM URINE QUANT: <50
NORHYDROCODONE, QUANTITATIVE, URINE: <50
NOROXYCODONE, QUANTITATIVE, URINE: >1000
OXAZEPAM URINE QUANT: <50
OXYCODONE URINE, QUANTITATIVE: >1000
OXYMORPHONE, QUANTITATIVE, URINE: >1000
TEMAZEPAM, QUANTITATIVE, URINE: <50

## 2022-06-29 ENCOUNTER — OFFICE VISIT (OUTPATIENT)
Dept: PRIMARY CARE CLINIC | Age: 67
End: 2022-06-29
Payer: MEDICARE

## 2022-06-29 VITALS
DIASTOLIC BLOOD PRESSURE: 85 MMHG | TEMPERATURE: 98.7 F | BODY MASS INDEX: 53.92 KG/M2 | HEART RATE: 109 BPM | WEIGHT: 293 LBS | OXYGEN SATURATION: 97 % | SYSTOLIC BLOOD PRESSURE: 158 MMHG | HEIGHT: 62 IN

## 2022-06-29 DIAGNOSIS — M81.0 AGE-RELATED OSTEOPOROSIS WITHOUT CURRENT PATHOLOGICAL FRACTURE: ICD-10-CM

## 2022-06-29 DIAGNOSIS — N39.46 MIXED STRESS AND URGE URINARY INCONTINENCE: ICD-10-CM

## 2022-06-29 DIAGNOSIS — E11.9 DIET-CONTROLLED DIABETES MELLITUS (HCC): ICD-10-CM

## 2022-06-29 DIAGNOSIS — I89.0 LYMPHEDEMA OF BOTH LOWER EXTREMITIES: Chronic | ICD-10-CM

## 2022-06-29 DIAGNOSIS — I50.43 SYSTOLIC AND DIASTOLIC CHF, ACUTE ON CHRONIC (HCC): ICD-10-CM

## 2022-06-29 DIAGNOSIS — M15.9 PRIMARY OSTEOARTHRITIS INVOLVING MULTIPLE JOINTS: Chronic | ICD-10-CM

## 2022-06-29 DIAGNOSIS — I50.43 SYSTOLIC AND DIASTOLIC CHF, ACUTE ON CHRONIC (HCC): Primary | ICD-10-CM

## 2022-06-29 DIAGNOSIS — I10 ESSENTIAL HYPERTENSION: ICD-10-CM

## 2022-06-29 DIAGNOSIS — E03.9 ACQUIRED HYPOTHYROIDISM: ICD-10-CM

## 2022-06-29 DIAGNOSIS — M48.062 SPINAL STENOSIS OF LUMBAR REGION WITH NEUROGENIC CLAUDICATION: Chronic | ICD-10-CM

## 2022-06-29 LAB
ALBUMIN SERPL-MCNC: 4.1 G/DL (ref 3.5–5.2)
ALP BLD-CCNC: 164 U/L (ref 35–104)
ALT SERPL-CCNC: 15 U/L (ref 0–32)
ANION GAP SERPL CALCULATED.3IONS-SCNC: 18 MMOL/L (ref 7–16)
ANISOCYTOSIS: ABNORMAL
AST SERPL-CCNC: 20 U/L (ref 0–31)
BACTERIA: ABNORMAL /HPF
BASOPHILS ABSOLUTE: 0.02 E9/L (ref 0–0.2)
BASOPHILS RELATIVE PERCENT: 0.2 % (ref 0–2)
BILIRUB SERPL-MCNC: 0.4 MG/DL (ref 0–1.2)
BILIRUBIN URINE: NEGATIVE
BLOOD, URINE: NEGATIVE
BUN BLDV-MCNC: 13 MG/DL (ref 6–23)
CALCIUM SERPL-MCNC: 9.4 MG/DL (ref 8.6–10.2)
CHLORIDE BLD-SCNC: 103 MMOL/L (ref 98–107)
CHOLESTEROL, TOTAL: 203 MG/DL (ref 0–199)
CLARITY: CLEAR
CO2: 22 MMOL/L (ref 22–29)
COLOR: YELLOW
CREAT SERPL-MCNC: 0.6 MG/DL (ref 0.5–1)
EOSINOPHILS ABSOLUTE: 0.09 E9/L (ref 0.05–0.5)
EOSINOPHILS RELATIVE PERCENT: 1 % (ref 0–6)
GFR AFRICAN AMERICAN: >60
GFR NON-AFRICAN AMERICAN: >60 ML/MIN/1.73
GLUCOSE BLD-MCNC: 91 MG/DL (ref 74–99)
GLUCOSE URINE: NEGATIVE MG/DL
HBA1C MFR BLD: 6 % (ref 4–5.6)
HCT VFR BLD CALC: 46 % (ref 34–48)
HDLC SERPL-MCNC: 54 MG/DL
HEMOGLOBIN: 13.5 G/DL (ref 11.5–15.5)
HYPOCHROMIA: ABNORMAL
IMMATURE GRANULOCYTES #: 0.04 E9/L
IMMATURE GRANULOCYTES %: 0.4 % (ref 0–5)
KETONES, URINE: NEGATIVE MG/DL
LDL CHOLESTEROL CALCULATED: 124 MG/DL (ref 0–99)
LEUKOCYTE ESTERASE, URINE: ABNORMAL
LYMPHOCYTES ABSOLUTE: 1.83 E9/L (ref 1.5–4)
LYMPHOCYTES RELATIVE PERCENT: 20.1 % (ref 20–42)
MCH RBC QN AUTO: 23.6 PG (ref 26–35)
MCHC RBC AUTO-ENTMCNC: 29.3 % (ref 32–34.5)
MCV RBC AUTO: 80.6 FL (ref 80–99.9)
MONOCYTES ABSOLUTE: 0.68 E9/L (ref 0.1–0.95)
MONOCYTES RELATIVE PERCENT: 7.5 % (ref 2–12)
NEUTROPHILS ABSOLUTE: 6.46 E9/L (ref 1.8–7.3)
NEUTROPHILS RELATIVE PERCENT: 70.8 % (ref 43–80)
NITRITE, URINE: NEGATIVE
OVALOCYTES: ABNORMAL
PDW BLD-RTO: 20.4 FL (ref 11.5–15)
PH UA: 6.5 (ref 5–9)
PLATELET # BLD: 182 E9/L (ref 130–450)
PMV BLD AUTO: 11.2 FL (ref 7–12)
POIKILOCYTES: ABNORMAL
POLYCHROMASIA: ABNORMAL
POTASSIUM SERPL-SCNC: 4.4 MMOL/L (ref 3.5–5)
PRO-BNP: 204 PG/ML (ref 0–125)
PROTEIN UA: NEGATIVE MG/DL
RBC # BLD: 5.71 E12/L (ref 3.5–5.5)
RBC UA: ABNORMAL /HPF (ref 0–2)
SODIUM BLD-SCNC: 143 MMOL/L (ref 132–146)
SPECIFIC GRAVITY UA: 1.01 (ref 1–1.03)
T4 TOTAL: 9.8 MCG/DL (ref 4.5–11.7)
TOTAL PROTEIN: 7.7 G/DL (ref 6.4–8.3)
TRIGL SERPL-MCNC: 125 MG/DL (ref 0–149)
TSH SERPL DL<=0.05 MIU/L-ACNC: 0.89 UIU/ML (ref 0.27–4.2)
UROBILINOGEN, URINE: 0.2 E.U./DL
VITAMIN D 25-HYDROXY: 10 NG/ML (ref 30–100)
VLDLC SERPL CALC-MCNC: 25 MG/DL
WBC # BLD: 9.1 E9/L (ref 4.5–11.5)
WBC UA: ABNORMAL /HPF (ref 0–5)

## 2022-06-29 PROCEDURE — 2022F DILAT RTA XM EVC RTNOPTHY: CPT | Performed by: FAMILY MEDICINE

## 2022-06-29 PROCEDURE — G8417 CALC BMI ABV UP PARAM F/U: HCPCS | Performed by: FAMILY MEDICINE

## 2022-06-29 PROCEDURE — 99214 OFFICE O/P EST MOD 30 MIN: CPT | Performed by: FAMILY MEDICINE

## 2022-06-29 PROCEDURE — G8428 CUR MEDS NOT DOCUMENT: HCPCS | Performed by: FAMILY MEDICINE

## 2022-06-29 PROCEDURE — 1090F PRES/ABSN URINE INCON ASSESS: CPT | Performed by: FAMILY MEDICINE

## 2022-06-29 PROCEDURE — G8400 PT W/DXA NO RESULTS DOC: HCPCS | Performed by: FAMILY MEDICINE

## 2022-06-29 PROCEDURE — 1123F ACP DISCUSS/DSCN MKR DOCD: CPT | Performed by: FAMILY MEDICINE

## 2022-06-29 PROCEDURE — 0509F URINE INCON PLAN DOCD: CPT | Performed by: FAMILY MEDICINE

## 2022-06-29 PROCEDURE — 3046F HEMOGLOBIN A1C LEVEL >9.0%: CPT | Performed by: FAMILY MEDICINE

## 2022-06-29 PROCEDURE — 4004F PT TOBACCO SCREEN RCVD TLK: CPT | Performed by: FAMILY MEDICINE

## 2022-06-29 PROCEDURE — 3017F COLORECTAL CA SCREEN DOC REV: CPT | Performed by: FAMILY MEDICINE

## 2022-06-29 ASSESSMENT — PATIENT HEALTH QUESTIONNAIRE - PHQ9
1. LITTLE INTEREST OR PLEASURE IN DOING THINGS: 0
SUM OF ALL RESPONSES TO PHQ QUESTIONS 1-9: 0
SUM OF ALL RESPONSES TO PHQ QUESTIONS 1-9: 0
2. FEELING DOWN, DEPRESSED OR HOPELESS: 0
SUM OF ALL RESPONSES TO PHQ QUESTIONS 1-9: 0
SUM OF ALL RESPONSES TO PHQ QUESTIONS 1-9: 0
SUM OF ALL RESPONSES TO PHQ9 QUESTIONS 1 & 2: 0

## 2022-06-29 ASSESSMENT — ENCOUNTER SYMPTOMS
SHORTNESS OF BREATH: 1
BACK PAIN: 1
ALLERGIC/IMMUNOLOGIC NEGATIVE: 1
GASTROINTESTINAL NEGATIVE: 1
WHEEZING: 1
EYES NEGATIVE: 1

## 2022-06-29 NOTE — PROGRESS NOTES
22  Name: King Grimm    : 1955    Sex: female    Age: 73 y.o. Subjective:  Chief Complaint: Patient is here for swelling both feet   Pain   breting     Now on medicare     Her ins will not pay for balboca and norco not help her     Inc edema legs last few weeks  She blames on  lyrica and jsut told to dc it by pain doc  Wt up   Ten lbs  She stopped all meds escpet  requip and painmed      She says unalb to take bumex due to makes urinate so much    Has nto taken in long time   strss incont  Bad  Pt states  rendss lower legs no different then usual    She ask for referrla to [facility] pain     Dr Marlene Cardozo told her to get secodn opinion        Review of Systems   Constitutional: Negative. HENT: Negative. Eyes: Negative. Respiratory: Positive for shortness of breath and wheezing. Cardiovascular: Positive for leg swelling. Gastrointestinal: Negative. Endocrine: Negative. Genitourinary: Negative. Musculoskeletal: Positive for back pain. Skin: Negative. Allergic/Immunologic: Negative. Neurological: Negative. Hematological: Negative. Psychiatric/Behavioral: Negative. Current Outpatient Medications:     HYDROcodone-acetaminophen (NORCO) 5-325 MG per tablet, Take 1 tablet by mouth 4 times daily for 30 days.  Take lowest dose possible to manage pain, Disp: 120 tablet, Rfl: 0    Baclofen (LIORESAL) 5 MG tablet, Take 1 tablet by mouth 2 times daily as needed (spasms), Disp: 60 tablet, Rfl: 0    rOPINIRole (REQUIP) 2 MG tablet, Take 0.5 tablets by mouth in the morning, at noon, and at bedtime, Disp: 360 tablet, Rfl: 12  Allergies   Allergen Reactions    Ferritin Shortness Of Breath and Other (See Comments)     Flushed,  Severe back pain     Social History     Socioeconomic History    Marital status:      Spouse name: Not on file    Number of children: 2    Years of education: 12    Highest education level: Associate degree: academic program Occupational History    Not on file   Tobacco Use    Smoking status: Current Every Day Smoker     Packs/day: 1.00     Years: 38.00     Pack years: 38.00     Types: Cigarettes     Last attempt to quit: 3/11/2021     Years since quittin.3    Smokeless tobacco: Never Used   Vaping Use    Vaping Use: Never used   Substance and Sexual Activity    Alcohol use: No    Drug use: No    Sexual activity: Not Currently   Other Topics Concern    Not on file   Social History Narrative        Chronic Diagnosis: Iron deficiency anemia, Depressive disorder, Benign essential hypertension, Mixed    hyperlipidemia. HTN    OBESITY    LIPID    OA    SMOKER---quit   summer  2021    CVA L FIELD VISION    DEPRESSION    GASTRIC BYPASS 01    BREAST REDUCTION--    Lizzeth Snell  1955 Page #2    ANEMIA==IRON AND B-12    Admitted 2019 with cellulitis left lower extremity then readmitted with chest pain with negative stress test    L--3-4   lumbar surgeyr dr Lili Thornton          Copd  dr Vonda Rinaldi       Social Determinants of Health     Financial Resource Strain:     Difficulty of Paying Living Expenses: Not on file   Food Insecurity:     Worried About Running Out of Food in the Last Year: Not on file    [place]urel of Food in the Last Year: Not on file   Transportation Needs:     Lack of Transportation (Medical): Not on file    Lack of Transportation (Non-Medical): Not on file   Physical Activity: Inactive    Days of Exercise per Week: 0 days    Minutes of Exercise per Session: 0 min   Stress:     Feeling of Stress : Not on file   Social Connections: Moderately Integrated    Frequency of Communication with Friends and Family: More than three times a week    Frequency of Social Gatherings with Friends and Family: More than three times a week    Attends Jehovah's witness Services: More than 4 times per year    Active Member of [address] [place] [address] [employer] or Organizations:  Yes    Attends Club or Organization Meetings: More than 4 times per year    Marital Status:    Intimate Partner Violence:     Fear of Current or Ex-Partner: Not on file    Emotionally Abused: Not on file    Physically Abused: Not on file    Sexually Abused: Not on file   Housing Stability:     Unable to Pay for Housing in the Last Year: Not on file    Number of [place] in the Last Year: Not on file    Unstable Housing in the Last Year: Not on file      Past Medical History:   Diagnosis Date    Anemia     Arthritis     Cellulitis 2015    left leg    Chronic ulcer of leg with fat layer exposed ([address] 2015    Chronic ulcer of right leg, limited to breakdown of skin ([address] 2016    COVID-19 2021    postive test no symptoms    Depression     Full dentures     Low back pain     Lymphedema of both lower extremities 2015    Obesity     280 #    Osteoarthritis     Peripheral vision loss     Stroke ([address]     Type 2 diabetes mellitus without complication, without long-term current use of insulin ([address] 2019    Ulcer of left lower leg, limited to breakdown of skin ([address] 06/10/2019    Ulcer of left lower leg, limited to breakdown of skin ([address] 06/10/2019    UTI (urinary tract infection) 2021     Family History   Problem Relation Age of Onset    Breast Cancer Mother     Stroke Father     Hypertension Sister     Hypertension Brother       Past Surgical History:   Procedure Laterality Date    ABDOMINOPLASTY  2002    BREAST REDUCTION SURGERY      reduction    CATARACT REMOVAL      bilateral     SECTION      x2    COLONOSCOPY  2012    and egd    COLONOSCOPY  2021    polyps; marginal prep--jessica    COLONOSCOPY N/A 2021    COLONOSCOPY WITH BIOPSY performed by Ira Orr MD at [address] ECHOCARDIOGRAM COMPLETE 2D W DOPPLER W COLOR  2012         GASTRIC BYPASS SURGERY      NO NASTOGASTRIC TUBE    HERNIA REPAIR          LUMBAR SPINE SURGERY N/A 5/13/2021    L3-L4  POSTERIOR LUMBAR INTERBODY  FUSION--OARM, JESSI, YAJAIRA TABLE, CELL SAVER, PLATELET GEL, AUDIOLOGY, CAGES, PLATES, SCREWS -- GLOBUS performed by Ramirez Fernandez MD at [address] [facility]  03/27/2021    minimal pouch gastritis--jessica    UPPER GASTROINTESTINAL ENDOSCOPY N/A 03/27/2021    EGD ESOPHAGOGASTRODUODENOSCOPY performed by Jean Marie Swift MD at [address]:    06/29/22 0838   BP: (!) 158/85   Pulse: (!) 109   Temp: 98.7 °F (37.1 °C)   TempSrc: Oral   SpO2: 97%   Weight: 295 lb (133.8 kg)   Height: 5' 2\" (1.575 m)       Objective:    Physical Exam  Vitals reviewed. Constitutional:       Appearance: She is well-developed. HENT:      Head: Normocephalic. Eyes:      Pupils: Pupils are equal, round, and reactive to light. Cardiovascular:      Rate and Rhythm: Normal rate and regular rhythm. Comments: Two plus edema bilat lower extrem with stais chg  Pulmonary:      Effort: Pulmonary effort is normal. No respiratory distress. Breath sounds: Wheezing present. No rales. Abdominal:      Palpations: Abdomen is soft. Musculoskeletal:      Cervical back: Normal range of motion. Comments: Marked ded rom lumbar spine   Skin:     General: Skin is warm. Neurological:      Mental Status: She is alert and oriented to person, place, and time. Psychiatric:         Behavior: Behavior normal.         Alex Vega was seen today for foot swelling, shortness of breath and back pain. Diagnoses and all orders for this visit:    Systolic and diastolic CHF, acute on chronic (HCC)  -     Comprehensive Metabolic Panel; Future  -     Hemoglobin A1C; Future  -     CBC with Auto Differential; Future  -     Lipid Panel; Future  -     T4; Future  -     TSH; Future  -     Vitamin D 25 Hydroxy; Future  -     Urinalysis; Future  -     Brain Natriuretic Peptide;  Future    Lymphedema of both lower extremities    Diet-controlled diabetes mellitus ([address]  -     Hemoglobin A1C; Future  -     CBC with Auto Differential; Future  -     Lipid Panel; Future    Spinal stenosis of lumbar region with neurogenic claudication  -     External Referral To Pain Clinic    Primary osteoarthritis involving multiple joints    Mixed stress and urge urinary incontinence  -     External Referral To Urology    Age-related osteoporosis without current pathological fracture  -     Vitamin D 25 Hydroxy; Future    Essential hypertension  -     Comprehensive Metabolic Panel; Future  -     Hemoglobin A1C; Future  -     CBC with Auto Differential; Future  -     Lipid Panel; Future  -     T4; Future  -     TSH; Future  -     Vitamin D 25 Hydroxy; Future  -     Urinalysis; Future  -     Brain Natriuretic Peptide; Future    Acquired hypothyroidism  -     T4; Future  -     TSH; Future        Comments:  She smokes on eppd      adivsed dc it    appt  Gyn-urol----lab today    I beg her to take the bumex and she promsies to take  Advised fuwith dr Gema Price and    pulm mercy  She has buemx and k at home     Will   See meone week            I educated the patient about all medications. Make sure they were correct and educated  on the risk associated with missing meds or taking them incorrectly. A list of medications is being sent home with patient today. Check blood pressure at home twice a day. Low-salt low caffeine diet. Call if systolic blood pressure is above 305 and diastolic blood pressures above 85. Only use a upper arm digital cuff. Aggressive low-fat diet. Avoid red meats, greasy fried foods, dairy products. Avoid processed foods. Take cholesterol medications without food. I informed patient about the risk associated with noncompliance of medication and taking medications incorrectly. Appropriate follow-up with myself and all specialist.  Encourage family members to take active role in assisting with medications and medical care.   If any confusion should develop to notify my office immediately to avoid risk of worsening medical condition    A great deal of time spent reviewing medications, diet, exercise, social issues. Also reviewing the chart before entering the room with patient and finishing charting after leaving patient's room. More than half of that time was spent face to face with the patient in counseling and coordinating care.       Follow Up: Return for See Referral.     Seen by:  Lorena Sutton DO

## 2022-07-12 ENCOUNTER — OFFICE VISIT (OUTPATIENT)
Dept: PAIN MANAGEMENT | Age: 67
End: 2022-07-12
Payer: MEDICARE

## 2022-07-12 ENCOUNTER — PREP FOR PROCEDURE (OUTPATIENT)
Dept: PAIN MANAGEMENT | Age: 67
End: 2022-07-12

## 2022-07-12 VITALS
BODY MASS INDEX: 52.44 KG/M2 | RESPIRATION RATE: 16 BRPM | TEMPERATURE: 98.7 F | OXYGEN SATURATION: 94 % | HEART RATE: 103 BPM | SYSTOLIC BLOOD PRESSURE: 142 MMHG | HEIGHT: 62 IN | WEIGHT: 285 LBS | DIASTOLIC BLOOD PRESSURE: 88 MMHG

## 2022-07-12 DIAGNOSIS — G89.4 CHRONIC PAIN SYNDROME: Chronic | ICD-10-CM

## 2022-07-12 DIAGNOSIS — M54.50 CHRONIC RIGHT-SIDED LOW BACK PAIN WITHOUT SCIATICA: ICD-10-CM

## 2022-07-12 DIAGNOSIS — G89.29 CHRONIC RIGHT SHOULDER PAIN: ICD-10-CM

## 2022-07-12 DIAGNOSIS — G89.29 CHRONIC RIGHT-SIDED LOW BACK PAIN WITHOUT SCIATICA: ICD-10-CM

## 2022-07-12 DIAGNOSIS — M25.511 CHRONIC RIGHT SHOULDER PAIN: ICD-10-CM

## 2022-07-12 DIAGNOSIS — M17.0 PRIMARY OSTEOARTHRITIS OF BOTH KNEES: Primary | ICD-10-CM

## 2022-07-12 PROCEDURE — 3017F COLORECTAL CA SCREEN DOC REV: CPT | Performed by: PHYSICIAN ASSISTANT

## 2022-07-12 PROCEDURE — G8427 DOCREV CUR MEDS BY ELIG CLIN: HCPCS | Performed by: PHYSICIAN ASSISTANT

## 2022-07-12 PROCEDURE — 1123F ACP DISCUSS/DSCN MKR DOCD: CPT | Performed by: PHYSICIAN ASSISTANT

## 2022-07-12 PROCEDURE — 99213 OFFICE O/P EST LOW 20 MIN: CPT | Performed by: PHYSICIAN ASSISTANT

## 2022-07-12 PROCEDURE — 1090F PRES/ABSN URINE INCON ASSESS: CPT | Performed by: PHYSICIAN ASSISTANT

## 2022-07-12 PROCEDURE — G8400 PT W/DXA NO RESULTS DOC: HCPCS | Performed by: PHYSICIAN ASSISTANT

## 2022-07-12 PROCEDURE — 4004F PT TOBACCO SCREEN RCVD TLK: CPT | Performed by: PHYSICIAN ASSISTANT

## 2022-07-12 PROCEDURE — G8417 CALC BMI ABV UP PARAM F/U: HCPCS | Performed by: PHYSICIAN ASSISTANT

## 2022-07-12 RX ORDER — OXYBUTYNIN CHLORIDE 5 MG/1
5 TABLET, EXTENDED RELEASE ORAL DAILY
COMMUNITY
Start: 2022-07-10 | End: 2022-08-16

## 2022-07-12 RX ORDER — OXYCODONE HYDROCHLORIDE AND ACETAMINOPHEN 5; 325 MG/1; MG/1
1 TABLET ORAL 3 TIMES DAILY
Qty: 21 TABLET | Refills: 0 | Status: SHIPPED
Start: 2022-07-15 | End: 2022-07-19 | Stop reason: SDUPTHER

## 2022-07-12 NOTE — PROGRESS NOTES
[address]  [address]  [facility]    Follow up Note      Darci Mcclain     Date of Visit:  2022    CC:  Patient presents for follow up   Chief Complaint   Patient presents with    Follow-up     low back pain        HPI:    Pain is unchanged. Appropriate analgesia with current medications regimen: yes   Change in quality of symptoms:no. Medication side effects:none. Recent diagnostic testing: None. Recent interventional procedures:     She has not been on anticoagulation medications to include ASA, NSAIDS, Plavix, heparin, LMW heparin and warfarin and has not been on herbal supplements. She is diabetic. Imagin2021 MRI Lumbar Spine    FINDINGS:   BONES/ALIGNMENT: There is normal alignment of the spine. The vertebral body   heights are maintained. The bone marrow signal appears unremarkable.  There   is degenerative disc disease with disc space narrowing and osteophytes at   L3-L4, L4-L5, and L5-S1.  The bony spinal canal overall is congenitally small. SPINAL CORD: The conus terminates normally. SOFT TISSUES: No paraspinal mass identified. L1-L2:  The thecal sac and neural foramina are intact. L2-L3: There is a minimum disc protrusion measuring 2-3 mm.  Disc and/or   osteophytes cause minimal narrowing of the neural foramina bilaterally.  The   thecal sac is not stenotic. L3-L4: There is a disc extrusion measuring 4-5 mm toward the right extending   superiorly behind the inferior endplate of L3 causing severe narrowing of the   right neural foramen.  The thecal sac is mildly stenotic measuring 9 mm. There is minimal narrowing of the left neural foramen from disc and   osteophyte.    L4-L5: There is disc protrusion or osteophyte toward the left measuring 3-4   mm causing moderate to severe narrowing of the left neural foramen.  The   thecal sac is mildly stenotic measuring 9.5 mm.  Disc and osteophyte narrows   the right neural foramen. The left neural foramen is narrowed greater than   right. L5-S1:  There is mild facet and ligamentum flavum hypertrophy. The thecal sac   and neural foramina are intact.       Impression   Disc extrusion at L3-L4 measuring 4-5 mm and extending superiorly behind the   inferior endplate of W8-S2 on the right causing severe narrowing of the right   neural foramen.  There is mild stenosis of the thecal sac at L3-L4 and L4-L5   as discussed above. 12/27/2019 Lumbar Spine X-ray    Alignment of the vertebral bodies appears to be near-anatomic   No fracture or foreign body is identified. The disc spaces demonstrate moderate degenerative changes. There is mild loss of the vertebral body height   There are moderate degenerative changes involving the facets. There is grade 1 anterolisthesis of L3 on L4. Flexion-extension views demonstrates movement of the anterolisthesis   of L3 on L4.            Impression   Grade 1 anterolisthesis of L3 on L4 which does move with extension. Findings consistent with moderate degenerative disc disease and   degenerative facet disease.       The exam has been dictated and signed by Hoa Alonso. RIMA Corcoran   and Marissa Dubose MD, reviewed and concurred with these findings.          9/3/2019 left knee x-ray     The bones appear to be in anatomic alignment. No foreign body is identified   No fracture is identified     There is moderate tricompartmental joint space loss greatest long   patellofemoral and medial femoral tibial joint   The are moderate degenerative changes present along the patellofemoral   and medial femoral tibial compartment           Impression   Moderate degenerative changes as described above          9/3/2019 right knee x-ray     The bones appear to be in anatomic alignment. No foreign body is identified   No fracture is identified. Marginal bone spurs are again seen along the inferior margins of the   patella.    There is interval L3-L4  POSTERIOR LUMBAR INTERBODY  FUSION--OARM, JESSI, YAJAIRA TABLE, CELL SAVER, PLATELET GEL, AUDIOLOGY, CAGES, PLATES, SCREWS -- GLOBUS performed by Cathie Carcamo MD at [address]  2021    minimal pouch gastritis--jessica    UPPER GASTROINTESTINAL ENDOSCOPY N/A 2021    EGD ESOPHAGOGASTRODUODENOSCOPY performed by Felicia Beltran MD at [facility]       Prior to Admission medications    Medication Sig Start Date End Date Taking? Authorizing Provider   oxybutynin (DITROPAN-XL) 5 MG extended release tablet  7/10/22  Yes Historical Provider, MD   oxyCODONE-acetaminophen (PERCOCET) 5-325 MG per tablet Take 1 tablet by mouth 3 times daily for 7 days.  Take lowest dose possible to manage pain 7/15/22 7/22/22 Yes CHERYL Blackburn   Baclofen (LIORESAL) 5 MG tablet Take 1 tablet by mouth 2 times daily as needed (spasms) 22 Yes CHERYL Blackburn   rOPINIRole (REQUIP) 2 MG tablet Take 0.5 tablets by mouth in the morning, at noon, and at bedtime 22  Yes Gautam Razo DO       Allergies   Allergen Reactions    Ferritin Shortness Of Breath and Other (See Comments)     Flushed,  Severe back pain       Social History     Socioeconomic History    Marital status:      Spouse name: Not on file    Number of children: 2    Years of education: 15    Highest education level: Associate degree: academic program   Occupational History    Not on file   Tobacco Use    Smoking status: Current Every Day Smoker     Packs/day: 1.00     Years: 38.00     Pack years: 38.00     Types: Cigarettes     Last attempt to quit: 3/11/2021     Years since quittin.3    Smokeless tobacco: Never Used   Vaping Use    Vaping Use: Never used   Substance and Sexual Activity    Alcohol use: No    Drug use: No    Sexual activity: Not Currently   Other Topics Concern    Not on file   Social History Narrative        Chronic Diagnosis: Iron deficiency anemia, Depressive disorder, Benign essential hypertension, Mixed    hyperlipidemia. HTN    OBESITY    LIPID    OA    SMOKER---quit   summer  2021    CVA L FIELD VISION    DEPRESSION    GASTRIC BYPASS 01    BREAST REDUCTION--6-04    Alena Shoulder  1955 Page #2    ANEMIA==IRON AND B-12    Admitted 2019 with cellulitis left lower extremity then readmitted with chest pain with negative stress test    L--3-4   lumbar surgeyr dr Tiny Guo          Copd  dr Rhiannon Hartmann       Social Determinants of Health     Financial Resource Strain:     Difficulty of Paying Living Expenses: Not on file   Food Insecurity:     Worried About Running Out of Food in the Last Year: Not on file    [place] of Food in the Last Year: Not on file   Transportation Needs:     Lack of Transportation (Medical): Not on file    Lack of Transportation (Non-Medical): Not on file   Physical Activity: Inactive    Days of Exercise per Week: 0 days    Minutes of Exercise per Session: 0 min   Stress:     Feeling of Stress : Not on file   Social Connections: Moderately Integrated    Frequency of Communication with Friends and Family: More than three times a week    Frequency of Social Gatherings with Friends and Family: More than three times a week    Attends [employer] Services: More than 4 times per year    Active Member of [address] [place] [address] [employer] or Organizations:  Yes    Attends Club or Organization Meetings: More than 4 times per year    Marital Status:    Intimate Partner Violence:     Fear of Current or Ex-Partner: Not on file    Emotionally Abused: Not on file    Physically Abused: Not on file    Sexually Abused: Not on file   Housing Stability:     Unable to Pay for Housing in the Last Year: Not on file    Number of [place] in the Last Year: Not on file    Unstable Housing in the Last Year: Not on file       Family History   Problem Relation Age of Onset    Breast Cancer Mother     Stroke Father  Hypertension Sister     Hypertension Brother        REVIEW OF SYSTEMS:     Lalitha Saucedo denies fever/chills, chest pain, shortness of breath, new bowel or bladder complaints. All other review of systems was negative. PHYSICAL EXAMINATION:      BP (!) 142/88   Pulse (!) 103   Temp 98.7 °F (37.1 °C) (Oral)   Resp 16   Ht 5' 2\" (1.575 m)   Wt 285 lb (129.3 kg)   SpO2 94%   BMI 52.13 kg/m²     General:       General appearance: awake, alert, oriented, in no acute distress, well developed, well nourished and in no acute distress   pleasant and well-hydrated. , in no distress and A & O x3  Build:Obese  Function:Rises from a seated position with difficulty     Head:     Head:normocephalic and atraumatic  Pupils:regular, round and equal.  Sclera: icterus absent,   EOM:full and intact.     Lungs:     Breathing:Normal expansion. No wheezing.     Abdomen:     Shape:non-distended and normal    Lumbar spine:     Right SI joint TTP  Negative midline and paraspinal TTP  Range of motion:abnormal mildly Lateral bending, flexion, extension rotation bilateral and is painful.        Extremities:     Tremors:None bilaterally upper and lower  Range of motion:, pain with internal rotation of hips not done. Intact:Yes  Varicose veins: Not assessed. Cyanosis:none      Neurological:     Cranial nerves:normal  Motor:   Coordination:normal  Gait:antalgic     Dermatology:     Skin: No skin changes.       Impression:    B/L Knee Pain, LBP, right shoulder pain  Knee x-rays:  Moderate DJD.    Hx of pain management with [facility] pain clinic.  Was on fentanyl 25 mg and norco 5/325 TID.  Did not help and caused drowsiness.  Now on Belbuca 450 mcg BID (increased from 300 mg BID) which is working very well without side effects.  Transferred care to [facility]) due to cost.    Hx of LESI by Dr. 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  Bilateral segmental arthrodesis and fusion at L3-L4 with the use of  bilateral L3 and L4 pedicle screws with use of locally harvested  autograft plus allograft in the form of BioZorb strips for  posterolateral fusion at L3-L4. 2.  360-degree fusion with posterior lumbar interbody fusion at L3-L4  with use of bilateral Globus Rise expandable cages packed with locally  harvested autograft. 3.  Bilateral L3 and L4 laminectomy, bilateral L3-L4 medial facetectomy,  bilateral L3 and L4 foraminotomy, and bilateral L3-L4 diskectomy. 4.  Use of O-arm assisted navigation with placement of pedicle screws. 5.  Use of free-running EMG to test pedicle screw heads. 6.  Use of intraoperative fluoroscopy interpreted by myself, the  surgeon. 7.  22 modifier for degree of difficulty for the patient being morbidly  obese with a BMI of 51.21.  8.  AS modifier for Hawa Myles, [facility], who assisted with primary  exposure, primary closure, and retraction of neural elements on 05/13/2021 Dr. Howard Antonio. OARRS reviewed 07/2022  UDS reviewed and is consistent  Failed Xtampza 9 mg BID  Failed norco 5/325 QID  Failed pregabalin titration and had swelling  Start percocet 5/325 TID x 1 week. She will call and let me know if helping. Has tried around 5 years ago but wants to try again. Right SI joint injection ordered due to persistent pain. She understands that she has failed one of these injections before prior to her surgery. Will not repeat if no relief. Discussed obtaining a 2nd opinion again if she wishes. PCP put in referral for [facility]. Awaiting a call. B/L knee CSIs on 05/18/2022 with good relief x several weeks. Now back to baseline   Left hand numbness has resolved.        When ordering knee injections:  Requests the same dosing as ortho.  1 ml Kenalog (40mg), 3ml PF 0.25% marcaine and 3ml 1% PF Lidocaine for knee injections this week.         Controlled Substance Monitoring:    Acute and Chronic Pain Monitoring:   RX Monitoring 7/12/2022   Periodic Controlled Substance Monitoring Possible medication side effects, risk of tolerance/dependence & alternative treatments discussed. ;No signs of potential drug abuse or diversion identified. ;Assessed functional status. We discussed with the patient that combining opioids, benzodiazepines, alcohol, illicit drugs or sleep aids increases the risk of respiratory depression including death. We discussed that these medications may cause drowsiness, sedation or dizziness and have counseled the patient not to drive or operate machinery. We have discussed that these medications will be prescribed only by one provider. We have discussed with the patient about age related risk factors and have thoroughly discussed the importance of taking these medications as prescribed. The patient verbalizes understanding.     ccreferring physic

## 2022-07-12 NOTE — PROGRESS NOTES
Do you currently have any of the following:    Fever: No  Headache:  No  Cough: No  Shortness of breath: No  Exposed to anyone with these symptoms: No         Preston Quiles presents to the [address] [place] [address] on 7/12/2022. Fatemeh Russo is complaining of pain in her low back. The pain is constant. The pain is described as aching, dull, sharp and miserable. Pain is rated on her best day at a 8, on her worst day at a 10, and on average at a 9 on the VAS scale. She took her last dose of Norco today. Any procedures since your last visit: No  Pacemaker or defibrillator: No     She is not on NSAIDS and is not on anticoagulation medications. Medication Contract and Consent for Opioid Use Documents Filed     Patient Documents     Type of Document Status Date Received Received By Description    Medication Contract Signed 9/6/2019 12:04 PM [facility] med contract    Medication Contract Received 11/17/2020 10:57 AM Shellie MORRISON [facility] med contract    Medication Contract Signed 8/4/2021 11:40 AM Jeremiah KIRBY [facility] EXPIRES [phone]                BP (!) 142/88   Pulse (!) 103   Temp 98.7 °F (37.1 °C) (Oral)   Resp 16   Ht 5' 2\" (1.575 m)   Wt 285 lb (129.3 kg)   SpO2 94%   BMI 52.13 kg/m²      No LMP recorded.  Patient is postmenopausal.

## 2022-07-13 RX ORDER — BACLOFEN 5 MG/1
TABLET ORAL
Qty: 60 TABLET | Refills: 0 | OUTPATIENT
Start: 2022-07-13

## 2022-07-13 NOTE — PROGRESS NOTES
[facility]  INSTRUCTIONS  . .......................................................................................................................................... [x] Parking the day of Surgery is located in the [facility].   Upon entering the door, make immediate right into the surgery reception room    [x]  Bring photo ID and insurance card     [x] You may have a light breakfast day of procedure    [x]  Wear loose comfortable clothing    [x]  Please follow instructions for medications as given per Dr's office    [x] You can expect a call the business day prior to procedure to notify you of your arrival time     [x] Please arrange for     []  Other instructions

## 2022-07-14 ENCOUNTER — HOSPITAL ENCOUNTER (OUTPATIENT)
Dept: GENERAL RADIOLOGY | Age: 67
Discharge: HOME | End: 2022-07-16
Attending: PAIN MEDICINE
Payer: MEDICARE

## 2022-07-14 ENCOUNTER — HOSPITAL ENCOUNTER (OUTPATIENT)
Age: 67
Setting detail: OUTPATIENT SURGERY
Discharge: HOME | End: 2022-07-14
Attending: PAIN MEDICINE | Admitting: PAIN MEDICINE
Payer: MEDICARE

## 2022-07-14 VITALS
SYSTOLIC BLOOD PRESSURE: 164 MMHG | DIASTOLIC BLOOD PRESSURE: 78 MMHG | RESPIRATION RATE: 18 BRPM | HEIGHT: 62 IN | BODY MASS INDEX: 53.37 KG/M2 | HEART RATE: 82 BPM | TEMPERATURE: 97.8 F | WEIGHT: 290 LBS | OXYGEN SATURATION: 95 %

## 2022-07-14 DIAGNOSIS — R52 PAIN MANAGEMENT: ICD-10-CM

## 2022-07-14 PROBLEM — M53.3 DISORDER OF SACRUM: Status: ACTIVE | Noted: 2022-07-14

## 2022-07-14 PROCEDURE — 2709999900 HC NON-CHARGEABLE SUPPLY: Performed by: PAIN MEDICINE

## 2022-07-14 PROCEDURE — 6360000002 HC RX W HCPCS: Performed by: PAIN MEDICINE

## 2022-07-14 PROCEDURE — 2500000003 HC RX 250 WO HCPCS: Performed by: PAIN MEDICINE

## 2022-07-14 PROCEDURE — 3600000002 HC SURGERY LEVEL 2 BASE: Performed by: PAIN MEDICINE

## 2022-07-14 PROCEDURE — 27096 INJECT SACROILIAC JOINT: CPT | Performed by: PAIN MEDICINE

## 2022-07-14 PROCEDURE — 7100000011 HC PHASE II RECOVERY - ADDTL 15 MIN: Performed by: PAIN MEDICINE

## 2022-07-14 PROCEDURE — 6360000004 HC RX CONTRAST MEDICATION: Performed by: PAIN MEDICINE

## 2022-07-14 PROCEDURE — 7100000010 HC PHASE II RECOVERY - FIRST 15 MIN: Performed by: PAIN MEDICINE

## 2022-07-14 PROCEDURE — 3209999900 FLUORO FOR SURGICAL PROCEDURES

## 2022-07-14 RX ORDER — METHYLPREDNISOLONE ACETATE 40 MG/ML
INJECTION, SUSPENSION INTRA-ARTICULAR; INTRALESIONAL; INTRAMUSCULAR; SOFT TISSUE PRN
Status: DISCONTINUED | OUTPATIENT
Start: 2022-07-14 | End: 2022-07-14 | Stop reason: ALTCHOICE

## 2022-07-14 RX ORDER — HYDROCODONE BITARTRATE AND ACETAMINOPHEN 5; 325 MG/1; MG/1
1 TABLET ORAL EVERY 6 HOURS PRN
COMMUNITY
End: 2022-07-19

## 2022-07-14 RX ORDER — BUPIVACAINE HYDROCHLORIDE 2.5 MG/ML
INJECTION, SOLUTION EPIDURAL; INFILTRATION; INTRACAUDAL PRN
Status: DISCONTINUED | OUTPATIENT
Start: 2022-07-14 | End: 2022-07-14 | Stop reason: ALTCHOICE

## 2022-07-14 RX ORDER — LIDOCAINE HYDROCHLORIDE 5 MG/ML
INJECTION, SOLUTION INFILTRATION; INTRAVENOUS PRN
Status: DISCONTINUED | OUTPATIENT
Start: 2022-07-14 | End: 2022-07-14 | Stop reason: ALTCHOICE

## 2022-07-14 ASSESSMENT — PAIN - FUNCTIONAL ASSESSMENT
PAIN_FUNCTIONAL_ASSESSMENT: 0-10
PAIN_FUNCTIONAL_ASSESSMENT: ACTIVITIES ARE NOT PREVENTED

## 2022-07-14 ASSESSMENT — PAIN DESCRIPTION - ORIENTATION: ORIENTATION: LOWER;MID

## 2022-07-14 ASSESSMENT — PAIN DESCRIPTION - LOCATION: LOCATION: BACK

## 2022-07-14 ASSESSMENT — PAIN SCALES - GENERAL
PAINLEVEL_OUTOF10: 7
PAINLEVEL_OUTOF10: 7

## 2022-07-14 ASSESSMENT — PAIN DESCRIPTION - PAIN TYPE: TYPE: CHRONIC PAIN

## 2022-07-14 ASSESSMENT — PAIN DESCRIPTION - DESCRIPTORS: DESCRIPTORS: DISCOMFORT

## 2022-07-14 NOTE — OP NOTE
2022    Patient: Caio Flanagan  :  1955  Age:  73 y.o. Sex:  female     PRE-OPERATIVE DIAGNOSIS: Right   Sacroiliitis, somatic dysfunction of the lumbosacral spine. POST-OPERATIVE DIAGNOSIS: Same. PROCEDURE:  Fluoroscopic guided Right   sacroiliac joint injection with steroid (#1). SURGEON:  ANALILIA Hernandez. ANESTHESIA: local    ESTIMATED BLOOD LOSS: None.  __________________________________________________  BRIEF HISTORY:  Caio Flanagan comes in today for first Right sacroiliac joint injection under fluoroscopic guidance. The potential complications as well as the procedure in detail were explained to her today. She has elected to undergo the aforementioned procedure. Selena's complete History & Physical examination were reviewed in depth, a copy of which is in the chart. DESCRIPTION OF PROCEDURE:    After confirming written and informed consent, a time-out was performed and Dave name and date of birth, the procedure to be performed as well as the plan for the location of the needle insertion were confirmed. The patient was brought into the procedure room and placed in the prone position on the fluoroscopy table. Standard monitors were placed and vital signs were observed throughout the procedure. The low back and upper buttocks area was prepped with chloraprep and draped in a sterile manner. AP fluoroscopy was used to visualize the sacroiliac joint. The fluoroscopic beam was then obliqued until the anterior and posterior margins of the inferior aspect of the joint were aligned. The inferior margin of the joint was identified and marked. The skin and subcutaneous tissue about this identified point were anesthestized with 0.5% lidocaine. A 22 gauge 3 1/2 spinal needle was advanced toward the the identified point, under fluoroscopic guidance.  Once the targeted point was reached and the joint space was entered, negative aspiration was confirmed, and 0.5 cc of Isovue-M 300 240 was injected. The joint space was appropriately outlined. Then, after negative aspiration, a solution consisiting of 0.25% marcaine 2 cc and 40 mg DepoMedrol was easily injected. The needle was then removed and the needle insertion site was covered with Band-Aid. Disposition the patient tolerated the procedure well and there were no complications . Vital signs remained stable throughout the procedure. The patient was escorted to the recovery area where they remained until discharge and written discharge instructions for the procedure were given. Plan: Maron Klinefelter will return to our pain management center as scheduled.      Darek Mesa, DO

## 2022-07-14 NOTE — H&P
[facility]        [address] [facility], [address]  Dept: [phone]    Procedure History & Physical      Heidi Prasad     HPI:    Patient  is here for right LBP for right SIJ injection  Labs/imaging studies reviewed   All question and concerns addressed including R/B/A associated with the procedure    Past Medical History:   Diagnosis Date    Arthritis     Full dentures     Low back pain     Lymphedema of both lower extremities chronic and continues as of 22    Obesity     280 #    Osteoarthritis     Peripheral vision loss     Stroke [facility])        Past Surgical History:   Procedure Laterality Date    ABDOMINOPLASTY      BREAST REDUCTION SURGERY      reduction    CATARACT REMOVAL      bilateral     SECTION      x2    COLONOSCOPY  2012    and egd    COLONOSCOPY  2021    polyps; marginal prep--jessica    COLONOSCOPY N/A 2021    COLONOSCOPY WITH BIOPSY performed by Carmelita Mills MD at [address] ECHOCARDIOGRAM COMPLETE 2D W DOPPLER W COLOR  2012         GASTRIC BYPASS SURGERY  2000    NO [address]          LUMBAR SPINE SURGERY N/A 2021    L3-L4  POSTERIOR LUMBAR INTERBODY  FUSION--OARM, JESSI, YAJAIRA TABLE, CELL SAVER, PLATELET GEL, AUDIOLOGY, CAGES, PLATES, SCREWS -- GLOBUS performed by Carolina Henderson MD at [facility]  2021    minimal pouch gastritis--jessica    UPPER GASTROINTESTINAL ENDOSCOPY N/A 2021    EGD ESOPHAGOGASTRODUODENOSCOPY performed by Carmelita Mills MD at [facility]       Prior to Admission medications    Medication Sig Start Date End Date Taking? Authorizing Provider   HYDROcodone-acetaminophen (NORCO) 5-325 MG per tablet Take 1 tablet by mouth every 6 hours as needed for Pain.    Yes Historical Provider, MD   oxybutynin (DITROPAN-XL) 5 MG extended release tablet Take 5 mg by mouth daily  7/10/22   Historical Provider, MD oxyCODONE-acetaminophen (PERCOCET) 5-325 MG per tablet Take 1 tablet by mouth 3 times daily for 7 days. Take lowest dose possible to manage pain 7/15/22 7/22/22  CHERYL Alvarez   Baclofen (LIORESAL) 5 MG tablet Take 1 tablet by mouth 2 times daily as needed (spasms) 22  CHERYL Alvarez   rOPINIRole (REQUIP) 2 MG tablet Take 0.5 tablets by mouth in the morning, at noon, and at bedtime 22   Gautam Razo DO       Allergies   Allergen Reactions    Ferritin Shortness Of Breath and Other (See Comments)     Flushed,  Severe back pain       Social History     Socioeconomic History    Marital status:      Spouse name: Not on file    Number of children: 2    Years of education: 15    Highest education level: Associate degree: academic program   Occupational History    Not on file   Tobacco Use    Smoking status: Current Every Day Smoker     Packs/day: 1.00     Years: 38.00     Pack years: 38.00     Types: Cigarettes    Smokeless tobacco: Never Used    Tobacco comment: restarted 3/2022   Vaping Use    Vaping Use: Never used   Substance and Sexual Activity    Alcohol use: No    Drug use: No    Sexual activity: Not Currently   Other Topics Concern    Not on file   Social History Narrative        Chronic Diagnosis: Iron deficiency anemia, Depressive disorder, Benign essential hypertension, Mixed    hyperlipidemia.     HTN    OBESITY    LIPID    OA    SMOKER---quit   summer  2021    CVA L FIELD VISION    DEPRESSION    GASTRIC BYPASS 01    BREAST REDUCTION--6-04    Ric Burris  1955 Page #2    ANEMIA==IRON AND B-12    Admitted 2019 with cellulitis left lower extremity then readmitted with chest pain with negative stress test    L--3-4   lumbar surgeyr dr Gates Lips  dr Shimon Jain          Copd  dr Chace Glynn       Social Determinants of Health     Financial Resource Strain:     Difficulty of Paying Living Expenses: Not on file   Food concentration    All other systems negative      PHYSICAL EXAM:    VITALS:  BP (!) 169/79   Pulse 86   Temp 97.8 °F (36.6 °C) (Temporal)   Resp 20   Ht 5' 2\" (1.575 m)   Wt 290 lb (131.5 kg)   SpO2 96%   BMI 53.04 kg/m²     CONSTITUTIONAL:  awake, alert, cooperative, no apparent distress, and appears stated age    EYES: PERRLA, EOMI    LUNGS:  No increased work of breathing, no audible wheezing    CARDIOVASCULAR:  regular rate and rhythm    ABDOMEN:  Soft non tender non distended     EXTREMITIES: no signs of clubbing or cyanosis. MUSCULOSKELETAL: negative for flaccid muscle tone or spastic movements. SKIN: gross examination reveals no signs of rashes, or diaphoresis. NEURO: Cranial nerves II-XII grossly intact. No signs of agitated mood.        Assessment/Plan:    Right LB pain for right SIJ injection

## 2022-07-19 DIAGNOSIS — M54.50 CHRONIC RIGHT-SIDED LOW BACK PAIN WITHOUT SCIATICA: ICD-10-CM

## 2022-07-19 DIAGNOSIS — G89.29 CHRONIC RIGHT-SIDED LOW BACK PAIN WITHOUT SCIATICA: ICD-10-CM

## 2022-07-19 DIAGNOSIS — M17.0 PRIMARY OSTEOARTHRITIS OF BOTH KNEES: ICD-10-CM

## 2022-07-19 DIAGNOSIS — G89.4 CHRONIC PAIN SYNDROME: Chronic | ICD-10-CM

## 2022-07-19 RX ORDER — OXYCODONE HYDROCHLORIDE AND ACETAMINOPHEN 5; 325 MG/1; MG/1
1 TABLET ORAL 3 TIMES DAILY
Qty: 90 TABLET | Refills: 0 | Status: SHIPPED | OUTPATIENT
Start: 2022-07-19 | End: 2022-08-18

## 2022-08-05 ENCOUNTER — TELEPHONE (OUTPATIENT)
Dept: PRIMARY CARE CLINIC | Age: 67
End: 2022-08-05

## 2022-08-05 NOTE — TELEPHONE ENCOUNTER
----- Message from Asim Raymundo sent at 8/5/2022  8:08 AM EDT -----  Subject: Message to Provider    QUESTIONS  Information for Provider? PT wondering if Dr. Eddi Slaughter nurse could call   her back when she gets a chance, PT has a couple questions for Azul Burleson   ---------------------------------------------------------------------------  --------------  [address] [employer]  [phone]; OK to leave message on voicemail  ---------------------------------------------------------------------------  --------------  SCRIPT ANSWERS  Relationship to Patient?  [facility] [address] by [employer]  [address] [facility], [address] [facility]  Open M, W, F: 8AM - 5PM and T, Th: 8AM-6PM  Phone: [phone], press 4  $70 for Emergency Care  $60 for first routine care, then pay by sliding scale based upon income. [facility]  [address] [place]  Phone: [phone]    [employer]  [address] [facility], [address]  Open Monday - Friday 8AM - 4:30 PM  Phone: [address] [place] [address] [facility] [address] [facility], [address] [place] [address], [address] [place] [address] starting at 8:30 AM M-F  Phone: [phone], press 2  Fee: $150 per tooth (x-ray & extractions only)  Pediatrics Phone[de-identified] [phone], 8-5 M-F    [address] Dentistry, [address] [facility], [place] [address], 2nd Floor, [address] [place] [address] starting at 8:30 AM - 3 PM [address] [place] [address]  [address] [facility], [address] [facility]  Phone: [phone] or [phone]  Emergency Hours: 9:30AM - 11AM (extractions)  Simple tooth extraction $ per tooth. #75 for x-ray    [facility] Residents only, over the age of 25  Phone: [phone]. Leave message saying you need an appointment to register. Hours: Tuesday Evenings           Broken Tooth: Care Instructions  Your Care Instructions  A tooth can be chipped, broken, or knocked out during sports, an accident, or a bad fall. Your doctor may have fixed your tooth temporarily. You also may have been given pain medicine. If you had signs of infection, you may need to take antibiotics. You will need to see a dentist. If you have chipped a tooth, it may be jagged, which can irritate your mouth and tongue. The dentist may smooth the edges and fill in the part that chipped off.  A permanent tooth that has been knocked out can be put back in (reimplanted) if it is done quickly. The dentist may need to put a crown on a broken tooth to cover the tooth and hold it together. Prompt dental treatment can often prevent infection in the tooth. Follow-up care is a key part of your treatment and safety. Be sure to make and go to all appointments, and call your doctor if you are having problems. It's also a good idea to know your test results and keep a list of the medicines you take. How can you care for yourself at home? · If your tooth pulp is exposed, you can protect it by putting temporary filling material over the broken area. You can buy temporary filling mixes in drugstores. Follow the directions on the label. · To relieve pain and swelling, put ice or a cold cloth on the tooth's gum or cheek area, or suck on a piece of ice. But if the tooth's nerve or pulp is exposed, avoid putting anything too hot or cold near the tooth until you see your dentist.  · Ask your doctor if you can take an over-the-counter pain medicine, such as acetaminophen (Tylenol), ibuprofen (Advil, Motrin), or naproxen (Aleve). Be safe with medicines. Read and follow all instructions on the label. · If your doctor prescribed antibiotics, take them as directed. Do not stop taking them just because you feel better. You need to take the full course of antibiotics. · To help healing, rinse your mouth with warm salt water right after meals. To make a saltwater solution, mix 1 teaspoon of salt in 1 cup of warm water. · Eat soft foods that are easy to chew. · Avoid foods that might sting, such as salty or spicy foods, citrus fruits, and tomatoes. · Do not smoke or use spit tobacco. Tobacco can slow healing in your mouth. If you need help quitting, talk to your doctor about stop-smoking programs and medicines. These can increase your chances of quitting for good. · If your tooth is loose, be gentle when you brush or floss.  But be sure to brush your teeth at least two times a day, and floss at least once a day. When should you call for help? Call your doctor now or seek immediate medical care if:  ? · You have signs of infection, such as:  ¨ Increased pain, swelling, warmth, or redness. ¨ Red streaks leading from the area. ¨ Pus draining from the area. ¨ A fever. ? Watch closely for changes in your health, and be sure to contact your doctor if:  ? · You do not get better as expected. Where can you learn more? Go to http://teddy-merlin.info/. Enter W162 in the search box to learn more about \"Broken Tooth: Care Instructions. \"  Current as of: May 12, 2017  Content Version: 11.4  © 3248-5869 [employer]. Care instructions adapted under license by [employer] (which disclaims liability or warranty for this information). If you have questions about a medical condition or this instruction, always ask your healthcare professional. [place] [address] any warranty or liability for your use of this information. Elevated Blood Pressure: Care Instructions  Your Care Instructions    Blood pressure is a measure of how hard the blood pushes against the walls of your arteries. It's normal for blood pressure to go up and down throughout the day. But if it stays up over time, you have high blood pressure. Two numbers tell you your blood pressure. The first number is the systolic pressure. It shows how hard the blood pushes when your heart is pumping. The second number is the diastolic pressure. It shows how hard the blood pushes between heartbeats, when your heart is relaxed and filling with blood. An ideal blood pressure in adults is less than 120/80 (say \"120 over 80\"). High blood pressure is 140/90 or higher. You have high blood pressure if your top number is 140 or higher or your bottom number is 90 or higher, or both. The main test for high blood pressure is simple, fast, and painless.  To diagnose high blood pressure, your doctor will test your blood pressure at different times. After testing your blood pressure, your doctor may ask you to test it again when you are home. If you are diagnosed with high blood pressure, you can work with your doctor to make a long-term plan to manage it. Follow-up care is a key part of your treatment and safety. Be sure to make and go to all appointments, and call your doctor if you are having problems. It's also a good idea to know your test results and keep a list of the medicines you take. How can you care for yourself at home? · Do not smoke. Smoking increases your risk for heart attack and stroke. If you need help quitting, talk to your doctor about stop-smoking programs and medicines. These can increase your chances of quitting for good. · Stay at a healthy weight. · Try to limit how much sodium you eat to less than 2,300 milligrams (mg) a day. Your doctor may ask you to try to eat less than 1,500 mg a day. · Be physically active. Get at least 30 minutes of exercise on most days of the week. Walking is a good choice. You also may want to do other activities, such as running, swimming, cycling, or playing tennis or team sports. · Avoid or limit alcohol. Talk to your doctor about whether you can drink any alcohol. · Eat plenty of fruits, vegetables, and low-fat dairy products. Eat less saturated and total fats. · Learn how to check your blood pressure at home. When should you call for help? Call your doctor now or seek immediate medical care if:  ? · Your blood pressure is much higher than normal (such as 180/110 or higher). ? · You think high blood pressure is causing symptoms such as:  ¨ Severe headache. ¨ Blurry vision. ? Watch closely for changes in your health, and be sure to contact your doctor if:  ? · You do not get better as expected. Where can you learn more? Go to http://teddy-merlin.info/.   Enter I919 in the search box to learn more about \"Elevated Blood Pressure: Care Instructions. \"  Current as of: September 21, 2016  Content Version: 11.4  © 7196-3775 [employer]. Care instructions adapted under license by [employer] (which disclaims liability or warranty for this information). If you have questions about a medical condition or this instruction, always ask your healthcare professional. [address] any warranty or liability for your use of this information.

## 2022-08-15 NOTE — PROGRESS NOTES
moderate to severe narrowing of the left neural foramen. The   thecal sac is mildly stenotic measuring 9.5 mm. Disc and osteophyte narrows   the right neural foramen. The left neural foramen is narrowed greater than   right. L5-S1:  There is mild facet and ligamentum flavum hypertrophy. The thecal sac   and neural foramina are intact. Impression   Disc extrusion at L3-L4 measuring 4-5 mm and extending superiorly behind the   inferior endplate of P2-W7 on the right causing severe narrowing of the right   neural foramen. There is mild stenosis of the thecal sac at L3-L4 and L4-L5   as discussed above. 12/27/2019 Lumbar Spine X-ray    Alignment of the vertebral bodies appears to be near-anatomic   No fracture or foreign body is identified. The disc spaces demonstrate moderate degenerative changes. There is mild loss of the vertebral body height   There are moderate degenerative changes involving the facets. There is grade 1 anterolisthesis of L3 on L4. Flexion-extension views demonstrates movement of the anterolisthesis   of L3 on L4. Impression   Grade 1 anterolisthesis of L3 on L4 which does move with extension. Findings consistent with moderate degenerative disc disease and   degenerative facet disease. The exam has been dictated and signed by Julieta Orozco. HOLLY Paulino-PEDRO LUIS   and Asif Holloway MD, reviewed and concurred with these findings. 9/3/2019 left knee x-ray     The bones appear to be in anatomic alignment. No foreign body is identified   No fracture is identified     There is moderate tricompartmental joint space loss greatest long   patellofemoral and medial femoral tibial joint   The are moderate degenerative changes present along the patellofemoral   and medial femoral tibial compartment           Impression   Moderate degenerative changes as described above          9/3/2019 right knee x-ray     The bones appear to be in anatomic alignment. gastritis--jessica    UPPER GASTROINTESTINAL ENDOSCOPY N/A 03/27/2021    EGD ESOPHAGOGASTRODUODENOSCOPY performed by Izetta Carrel, MD at [address] [place] [address]       Prior to Admission medications    Medication Sig Start Date End Date Taking? Authorizing Provider   nitrofurantoin, macrocrystal-monohydrate, (MACROBID) 100 MG capsule  8/9/22  Yes Historical Provider, MD   Baclofen (LIORESAL) 5 MG tablet Take 1 tablet by mouth 2 times daily as needed (spasms) 8/16/22 9/15/22 Yes CHERYL Salamanca   oxyCODONE-acetaminophen (PERCOCET) 5-325 MG per tablet Take 1 tablet by mouth in the morning and 1 tablet at noon and 1 tablet before bedtime. Do all this for 30 days. Take lowest dose possible to manage pain. 7/19/22 8/18/22 Yes CHERYL Salamanca   rOPINIRole (REQUIP) 2 MG tablet Take 0.5 tablets by mouth in the morning, at noon, and at bedtime 2/23/22  Yes Gautam Razo, DO       Allergies   Allergen Reactions    Ferritin Shortness Of Breath and Other (See Comments)     Flushed,  Severe back pain       Social History     Socioeconomic History    Marital status:      Spouse name: Not on file    Number of children: 2    Years of education: 12    Highest education level: Associate degree: academic program   Occupational History    Not on file   Tobacco Use    Smoking status: Every Day     Packs/day: 1.00     Years: 38.00     Pack years: 38.00     Types: Cigarettes    Smokeless tobacco: Never    Tobacco comments:     restarted 3/2022   Vaping Use    Vaping Use: Never used   Substance and Sexual Activity    Alcohol use: No    Drug use: No    Sexual activity: Not Currently   Other Topics Concern    Not on file   Social History Narrative        Chronic Diagnosis: Iron deficiency anemia, Depressive disorder, Benign essential hypertension, Mixed    hyperlipidemia.     HTN    OBESITY    LIPID    OA    SMOKER---quit   summer  2021    CVA L FIELD VISION    DEPRESSION    GASTRIC BYPASS 12-12-01    BREAST REDUCTION--6-04 Alok Walsh  1955 Page #2    ANEMIA==IRON AND B-12    Admitted 2019 with cellulitis left lower extremity then readmitted with chest pain with negative stress test    L--3-4   lumbar surgeyr dr Madelyn Kevin          Copd  dr Franny Suárez       Social Determinants of Health     Financial Resource Strain: Not on file   Food Insecurity: Not on file   Transportation Needs: Not on file   Physical Activity: Inactive    Days of Exercise per Week: 0 days    Minutes of Exercise per Session: 0 min   Stress: Not on file   Social Connections: Moderately Integrated    Frequency of Communication with Friends and Family: More than three times a week    Frequency of Social Gatherings with Friends and Family: More than three times a week    Attends Hinduism Services: More than 4 times per year    Active Member of [address] [place] [address] [employer] or Organizations: Yes    Attends Club or Organization Meetings: More than 4 times per year    Marital Status:    Intimate Partner Violence: Not on file   Housing Stability: Not on file       Family History   Problem Relation Age of Onset    Breast Cancer Mother     Stroke Father     Hypertension Sister     Hypertension Brother        REVIEW OF SYSTEMS:     Maron Klinefelter denies fever/chills, chest pain, shortness of breath, new bowel or bladder complaints. All other review of systems was negative. PHYSICAL EXAMINATION:      BP (!) 178/126 (Site: Right Lower Arm, Position: Sitting, Cuff Size: Large Adult)   Pulse 94   Temp 97 °F (36.1 °C) (Infrared)   Resp 16   Ht 5' 2\" (1.575 m)   Wt 285 lb (129.3 kg)   SpO2 93%   BMI 52.13 kg/m²     General:       General appearance: awake, alert, oriented, in no acute distress, well developed, well nourished and in no acute distress   pleasant and well-hydrated.    , in no distress and A & O x3  Build:Obese  Function:Rises from a seated position with difficulty     Head:     Head:normocephalic and atraumatic  Pupils:regular, round and equal.  Sclera: icterus absent,   EOM:full and intact. Lungs:     Breathing:Normal expansion. No wheezing. Abdomen:     Shape:non-distended and normal    Lumbar spine:    Range of motion:abnormal mildly Lateral bending, flexion, extension rotation bilateral and is painful. Extremities:     Tremors:None bilaterally upper and lower  Range of motion:, pain with internal rotation of hips not done. Intact:Yes  Varicose veins: Not assessed. Cyanosis:none      Neurological:     Cranial nerves:normal  Motor:   Coordination:normal  Gait:antalgic     Dermatology:     Skin: No skin changes. Impression:    B/L Knee Pain, LBP, right shoulder pain  Knee x-rays: Moderate DJD. Hx of pain management with [facility] pain clinic. Was on fentanyl 25 mg and norco 5/325 TID. Did not help and caused drowsiness. Now on Belbuca 450 mcg BID (increased from 300 mg BID) which is working very well without side effects. Transferred care to [facility]) due to cost.    Hx of LESI by Dr. 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  Bilateral segmental arthrodesis and fusion at L3-L4 with the use of  bilateral L3 and L4 pedicle screws with use of locally harvested  autograft plus allograft in the form of BioZorb strips for  posterolateral fusion at L3-L4. 2.  360-degree fusion with posterior lumbar interbody fusion at L3-L4  with use of bilateral Globus Rise expandable cages packed with locally  harvested autograft. 3.  Bilateral L3 and L4 laminectomy, bilateral L3-L4 medial facetectomy,  bilateral L3 and L4 foraminotomy, and bilateral L3-L4 diskectomy. 4.  Use of O-arm assisted navigation with placement of pedicle screws.   5.  Use of free-running EMG to test pedicle screw heads. 6.  Use of intraoperative fluoroscopy interpreted by myself, the  surgeon. 7.  22 modifier for degree of difficulty for the patient being morbidly  obese with a BMI of 51.21.  8.  AS modifier for Jc Auburn, [address], who assisted with primary  exposure, primary closure, and retraction of neural elements on 2021 Dr. Corona Blankenship. Patient is having surgery next week for a bladder sling. Surgeon may freely prescribe during the post operative period. OARRS reviewed 2022  UDS reviewed and is consistent  Failed Xtampza 9 mg BID  Failed norco 5/325 QID  Failed pregabalin titration and had swelling  Failed percocet 5/325 TID. States that she took her last pill but not due until . Devon Crabtree is cost prohibitive but very helpful   Patch type medications fall off  Will discuss alternatives with Dr. Naresh Henry. Right SI joint injection with good relief x several days only and then returned to baseline. Discussed obtaining a 2nd opinion again if she wishes. PCP put in referral for [facility] but patient has decided not to pursue. B/L knee CSIs on 2022 - wants to wait until end of September but did schedule today. .   Discussed that she must f/u with PCP regarding her BP. Patient reports high findings are due to increased pain. Addendum: Discussed case with Dr. Naresh Henry x 2 who does not feel comfortable prescribing anything further as the patient has run out of the percocet and is not due until . Patient with passive suicidal ideations, however, does contract with me for safety multiple times. I did ask her if I could reach out to her PCP regarding helping her through this difficult time. She reports that she is okay with that. Medrol dose pack offered. Patient reports that she does not believe that it will help. I believe that she may be dealing with opioid-induced hyperalgesia which seems to be intensifying her pain.   Recommend f/u with NSG regarding her lower back to ensure no issues. Offered knee CSIs which she scheduled at her appointment today. Discussed she should she should consider presenting to the hospital for this intractable pain. When ordering knee injections:  Requests the same dosing as ortho. 1 ml Kenalog (40mg), 3ml PF 0.25% marcaine and 3ml 1% PF Lidocaine for knee injections this week. Controlled Substance Monitoring:    Acute and Chronic Pain Monitoring:   RX Monitoring 8/16/2022   Periodic Controlled Substance Monitoring Possible medication side effects, risk of tolerance/dependence & alternative treatments discussed. ;No signs of potential drug abuse or diversion identified. ;Assessed functional status. Greater than 30 minutes was spent on this case including counseling/educating patient on treatment plan and reviewing case with Dr. Tiago Vincent at length. We discussed with the patient that combining opioids, benzodiazepines, alcohol, illicit drugs or sleep aids increases the risk of respiratory depression including death. We discussed that these medications may cause drowsiness, sedation or dizziness and have counseled the patient not to drive or operate machinery. We have discussed that these medications will be prescribed only by one provider. We have discussed with the patient about age related risk factors and have thoroughly discussed the importance of taking these medications as prescribed. The patient verbalizes understanding.     ccreferring physic

## 2022-08-16 ENCOUNTER — OFFICE VISIT (OUTPATIENT)
Dept: PAIN MANAGEMENT | Age: 67
End: 2022-08-16
Payer: MEDICARE

## 2022-08-16 VITALS
HEART RATE: 94 BPM | WEIGHT: 285 LBS | HEIGHT: 62 IN | TEMPERATURE: 97 F | DIASTOLIC BLOOD PRESSURE: 126 MMHG | OXYGEN SATURATION: 93 % | SYSTOLIC BLOOD PRESSURE: 178 MMHG | BODY MASS INDEX: 52.44 KG/M2 | RESPIRATION RATE: 16 BRPM

## 2022-08-16 DIAGNOSIS — G89.4 CHRONIC PAIN SYNDROME: Primary | Chronic | ICD-10-CM

## 2022-08-16 DIAGNOSIS — M25.511 CHRONIC RIGHT SHOULDER PAIN: ICD-10-CM

## 2022-08-16 DIAGNOSIS — G89.29 CHRONIC RIGHT-SIDED LOW BACK PAIN WITHOUT SCIATICA: ICD-10-CM

## 2022-08-16 DIAGNOSIS — M54.50 CHRONIC RIGHT-SIDED LOW BACK PAIN WITHOUT SCIATICA: ICD-10-CM

## 2022-08-16 DIAGNOSIS — G89.29 CHRONIC RIGHT SHOULDER PAIN: ICD-10-CM

## 2022-08-16 DIAGNOSIS — M17.0 PRIMARY OSTEOARTHRITIS OF BOTH KNEES: ICD-10-CM

## 2022-08-16 PROCEDURE — G8400 PT W/DXA NO RESULTS DOC: HCPCS | Performed by: PHYSICIAN ASSISTANT

## 2022-08-16 PROCEDURE — 99214 OFFICE O/P EST MOD 30 MIN: CPT | Performed by: PHYSICIAN ASSISTANT

## 2022-08-16 PROCEDURE — 99213 OFFICE O/P EST LOW 20 MIN: CPT | Performed by: PHYSICIAN ASSISTANT

## 2022-08-16 PROCEDURE — 1090F PRES/ABSN URINE INCON ASSESS: CPT | Performed by: PHYSICIAN ASSISTANT

## 2022-08-16 PROCEDURE — G8417 CALC BMI ABV UP PARAM F/U: HCPCS | Performed by: PHYSICIAN ASSISTANT

## 2022-08-16 PROCEDURE — G8427 DOCREV CUR MEDS BY ELIG CLIN: HCPCS | Performed by: PHYSICIAN ASSISTANT

## 2022-08-16 PROCEDURE — 1123F ACP DISCUSS/DSCN MKR DOCD: CPT | Performed by: PHYSICIAN ASSISTANT

## 2022-08-16 PROCEDURE — 4004F PT TOBACCO SCREEN RCVD TLK: CPT | Performed by: PHYSICIAN ASSISTANT

## 2022-08-16 PROCEDURE — 3017F COLORECTAL CA SCREEN DOC REV: CPT | Performed by: PHYSICIAN ASSISTANT

## 2022-08-16 RX ORDER — NITROFURANTOIN 25; 75 MG/1; MG/1
CAPSULE ORAL
COMMUNITY
Start: 2022-08-09 | End: 2022-10-28

## 2022-08-16 RX ORDER — BACLOFEN 5 MG/1
5 TABLET ORAL 2 TIMES DAILY PRN
Qty: 60 TABLET | Refills: 0 | Status: SHIPPED
Start: 2022-08-16 | End: 2022-08-18 | Stop reason: SDUPTHER

## 2022-08-16 NOTE — PROGRESS NOTES
Do you currently have any of the following:    Fever: No  Headache:  No  Cough: No  Shortness of breath: No  Exposed to anyone with these symptoms: No         Kerri Rockwell presents to the [address] [place] [address] on 8/16/2022. Flex Alejo is complaining of pain in her low. The pain is constant. The pain is described as aching and miserable. Pain is rated on her best day at a 10, on her worst day at a 10, and on average at a 10 on the VAS scale. She took her last dose of Percocet today. Any procedures since your last visit: No    Pacemaker or defibrillator: No     She is not on NSAIDS and is not on anticoagulation medications. Medication Contract and Consent for Opioid Use Documents Filed       Patient Documents       Type of Document Status Date Received Received By Description    Medication Contract Signed 9/6/2019 12:04 PM Pratibha De La Paz med contract    Medication Contract Received 11/17/2020 10:57 AM Rigoberto MORRISON med contract    Medication Contract Signed 8/4/2021 11:40 AM Jeremiah KIRBY [facility] EXPIRES [phone]                    BP (!) 178/126 (Site: Right Lower Arm, Position: Sitting, Cuff Size: Large Adult)   Pulse 94   Temp 97 °F (36.1 °C) (Infrared)   Resp 16   Ht 5' 2\" (1.575 m)   Wt 285 lb (129.3 kg)   SpO2 93%   BMI 52.13 kg/m²      No LMP recorded.  Patient is postmenopausal.

## 2022-08-17 ENCOUNTER — TELEPHONE (OUTPATIENT)
Dept: PAIN MANAGEMENT | Age: 67
End: 2022-08-17

## 2022-08-17 NOTE — TELEPHONE ENCOUNTER
Patient notified this provider this morning that she was unable sleep last night and went out in her car and noted that she had put 10 percocet pills in there due to not wanting to carry her bottle with her so was not out of her medication early as she previously thought. I did discuss this case with Dr. Mann Morse. She does not feel comfortable continuing to prescribe opioid pain medication to her. She recommends obtaining a second opinion for pain management, which has been recommended previously. Recommend following up with orthopedics for her knee injections.

## 2022-08-18 DIAGNOSIS — G25.81 RESTLESS LEG SYNDROME: ICD-10-CM

## 2022-08-18 DIAGNOSIS — I89.0 LYMPHEDEMA OF BOTH LOWER EXTREMITIES: Chronic | ICD-10-CM

## 2022-08-18 RX ORDER — BACLOFEN 5 MG/1
5 TABLET ORAL 2 TIMES DAILY PRN
Qty: 180 TABLET | Refills: 5 | Status: SHIPPED
Start: 2022-08-18 | End: 2022-08-22 | Stop reason: SDUPTHER

## 2022-08-18 RX ORDER — ROPINIROLE 2 MG/1
1 TABLET, FILM COATED ORAL 3 TIMES DAILY
Qty: 360 TABLET | Refills: 12 | Status: SHIPPED
Start: 2022-08-18 | End: 2022-08-22 | Stop reason: SDUPTHER

## 2022-08-19 ENCOUNTER — TELEPHONE (OUTPATIENT)
Dept: ADMINISTRATIVE | Age: 67
End: 2022-08-19

## 2022-08-19 NOTE — TELEPHONE ENCOUNTER
Pt would like to schedule an appt with Dr Mavis Valdez for bilateral knee injections. She is wanting to get them done at the end of September.

## 2022-08-19 NOTE — TELEPHONE ENCOUNTER
Call placed to patient, appointment made at this time. Patient stated going on Vacation on Oct 1st and can only come in to the [facility] office the week of 9/26th. Explained to patient a different provider will be in that day. She verbalized understanding and requested to be seen on the 27th, will see whomever.

## 2022-08-22 DIAGNOSIS — I89.0 LYMPHEDEMA OF BOTH LOWER EXTREMITIES: Chronic | ICD-10-CM

## 2022-08-22 DIAGNOSIS — G25.81 RESTLESS LEG SYNDROME: ICD-10-CM

## 2022-08-22 RX ORDER — BACLOFEN 5 MG/1
5 TABLET ORAL 2 TIMES DAILY PRN
Qty: 180 TABLET | Refills: 5 | Status: SHIPPED | OUTPATIENT
Start: 2022-08-22 | End: 2022-09-21

## 2022-08-22 RX ORDER — ROPINIROLE 2 MG/1
1 TABLET, FILM COATED ORAL 3 TIMES DAILY
Qty: 360 TABLET | Refills: 12 | Status: SHIPPED | OUTPATIENT
Start: 2022-08-22

## 2022-09-07 ENCOUNTER — APPOINTMENT (OUTPATIENT)
Dept: ULTRASOUND IMAGING | Age: 67
End: 2022-09-07
Payer: MEDICARE

## 2022-09-07 ENCOUNTER — OFFICE VISIT (OUTPATIENT)
Dept: PRIMARY CARE CLINIC | Age: 67
End: 2022-09-07
Payer: MEDICARE

## 2022-09-07 ENCOUNTER — APPOINTMENT (OUTPATIENT)
Dept: GENERAL RADIOLOGY | Age: 67
End: 2022-09-07
Payer: MEDICARE

## 2022-09-07 ENCOUNTER — APPOINTMENT (OUTPATIENT)
Dept: CT IMAGING | Age: 67
End: 2022-09-07
Payer: MEDICARE

## 2022-09-07 ENCOUNTER — HOSPITAL ENCOUNTER (EMERGENCY)
Age: 67
Discharge: HOME | End: 2022-09-08
Attending: EMERGENCY MEDICINE
Payer: MEDICARE

## 2022-09-07 VITALS
HEIGHT: 62 IN | BODY MASS INDEX: 52.13 KG/M2 | SYSTOLIC BLOOD PRESSURE: 182 MMHG | TEMPERATURE: 98.3 F | DIASTOLIC BLOOD PRESSURE: 96 MMHG | HEART RATE: 94 BPM | OXYGEN SATURATION: 97 %

## 2022-09-07 DIAGNOSIS — L03.115 BILATERAL LOWER LEG CELLULITIS: Primary | ICD-10-CM

## 2022-09-07 DIAGNOSIS — I89.0 LYMPHEDEMA: ICD-10-CM

## 2022-09-07 DIAGNOSIS — M48.062 SPINAL STENOSIS OF LUMBAR REGION WITH NEUROGENIC CLAUDICATION: Chronic | ICD-10-CM

## 2022-09-07 DIAGNOSIS — R19.7 DIARRHEA, UNSPECIFIED TYPE: ICD-10-CM

## 2022-09-07 DIAGNOSIS — I50.43 SYSTOLIC AND DIASTOLIC CHF, ACUTE ON CHRONIC (HCC): ICD-10-CM

## 2022-09-07 DIAGNOSIS — J98.01 BRONCHOSPASM: Primary | ICD-10-CM

## 2022-09-07 DIAGNOSIS — N39.0 UTI (URINARY TRACT INFECTION), UNCOMPLICATED: ICD-10-CM

## 2022-09-07 DIAGNOSIS — I89.0 LYMPHEDEMA OF BOTH LOWER EXTREMITIES: Chronic | ICD-10-CM

## 2022-09-07 DIAGNOSIS — L03.116 BILATERAL LOWER LEG CELLULITIS: Primary | ICD-10-CM

## 2022-09-07 LAB
ALBUMIN SERPL-MCNC: 3.9 G/DL (ref 3.5–5.2)
ALP BLD-CCNC: 155 U/L (ref 35–104)
ALT SERPL-CCNC: 20 U/L (ref 0–32)
ANION GAP SERPL CALCULATED.3IONS-SCNC: 14 MMOL/L (ref 7–16)
AST SERPL-CCNC: 29 U/L (ref 0–31)
BACTERIA: ABNORMAL /HPF
BASOPHILS ABSOLUTE: 0.02 E9/L (ref 0–0.2)
BASOPHILS RELATIVE PERCENT: 0.2 % (ref 0–2)
BILIRUB SERPL-MCNC: 0.4 MG/DL (ref 0–1.2)
BILIRUBIN URINE: NEGATIVE
BLOOD, URINE: NEGATIVE
BUN BLDV-MCNC: 8 MG/DL (ref 6–23)
CALCIUM SERPL-MCNC: 9.2 MG/DL (ref 8.6–10.2)
CHLORIDE BLD-SCNC: 106 MMOL/L (ref 98–107)
CLARITY: ABNORMAL
CO2: 22 MMOL/L (ref 22–29)
COLOR: YELLOW
CREAT SERPL-MCNC: 0.6 MG/DL (ref 0.5–1)
EOSINOPHILS ABSOLUTE: 0.17 E9/L (ref 0.05–0.5)
EOSINOPHILS RELATIVE PERCENT: 2 % (ref 0–6)
EPITHELIAL CELLS, UA: ABNORMAL /HPF
GFR AFRICAN AMERICAN: >60
GFR NON-AFRICAN AMERICAN: >60 ML/MIN/1.73
GLUCOSE BLD-MCNC: 92 MG/DL (ref 74–99)
GLUCOSE URINE: NEGATIVE MG/DL
HCT VFR BLD CALC: 43.2 % (ref 34–48)
HEMOGLOBIN: 12.8 G/DL (ref 11.5–15.5)
IMMATURE GRANULOCYTES #: 0.04 E9/L
IMMATURE GRANULOCYTES %: 0.5 % (ref 0–5)
KETONES, URINE: NEGATIVE MG/DL
LACTIC ACID: 0.8 MMOL/L (ref 0.5–2.2)
LEUKOCYTE ESTERASE, URINE: ABNORMAL
LYMPHOCYTES ABSOLUTE: 1.71 E9/L (ref 1.5–4)
LYMPHOCYTES RELATIVE PERCENT: 20 % (ref 20–42)
MCH RBC QN AUTO: 23.4 PG (ref 26–35)
MCHC RBC AUTO-ENTMCNC: 29.6 % (ref 32–34.5)
MCV RBC AUTO: 79.1 FL (ref 80–99.9)
MONOCYTES ABSOLUTE: 0.51 E9/L (ref 0.1–0.95)
MONOCYTES RELATIVE PERCENT: 6 % (ref 2–12)
NEUTROPHILS ABSOLUTE: 6.09 E9/L (ref 1.8–7.3)
NEUTROPHILS RELATIVE PERCENT: 71.3 % (ref 43–80)
NITRITE, URINE: POSITIVE
PDW BLD-RTO: 18.6 FL (ref 11.5–15)
PH UA: 6 (ref 5–9)
PLATELET # BLD: 164 E9/L (ref 130–450)
PMV BLD AUTO: 11.6 FL (ref 7–12)
POTASSIUM SERPL-SCNC: 4.1 MMOL/L (ref 3.5–5)
PRO-BNP: 158 PG/ML (ref 0–125)
PROTEIN UA: NEGATIVE MG/DL
RBC # BLD: 5.46 E12/L (ref 3.5–5.5)
RBC UA: ABNORMAL /HPF (ref 0–2)
REASON FOR REJECTION: NORMAL
REJECTED TEST: NORMAL
SARS-COV-2, NAAT: NOT DETECTED
SODIUM BLD-SCNC: 142 MMOL/L (ref 132–146)
SPECIFIC GRAVITY UA: 1.02 (ref 1–1.03)
TOTAL PROTEIN: 7.2 G/DL (ref 6.4–8.3)
TROPONIN, HIGH SENSITIVITY: 13 NG/L (ref 0–9)
UROBILINOGEN, URINE: 0.2 E.U./DL
WBC # BLD: 8.5 E9/L (ref 4.5–11.5)
WBC UA: ABNORMAL /HPF (ref 0–5)

## 2022-09-07 PROCEDURE — G8400 PT W/DXA NO RESULTS DOC: HCPCS | Performed by: FAMILY MEDICINE

## 2022-09-07 PROCEDURE — G8428 CUR MEDS NOT DOCUMENT: HCPCS | Performed by: FAMILY MEDICINE

## 2022-09-07 PROCEDURE — 96374 THER/PROPH/DIAG INJ IV PUSH: CPT

## 2022-09-07 PROCEDURE — 87045 FECES CULTURE AEROBIC BACT: CPT

## 2022-09-07 PROCEDURE — 87425 ROTAVIRUS AG IA: CPT

## 2022-09-07 PROCEDURE — 80053 COMPREHEN METABOLIC PANEL: CPT

## 2022-09-07 PROCEDURE — 1090F PRES/ABSN URINE INCON ASSESS: CPT | Performed by: FAMILY MEDICINE

## 2022-09-07 PROCEDURE — 6370000000 HC RX 637 (ALT 250 FOR IP): Performed by: EMERGENCY MEDICINE

## 2022-09-07 PROCEDURE — 87329 GIARDIA AG IA: CPT

## 2022-09-07 PROCEDURE — 71275 CT ANGIOGRAPHY CHEST: CPT

## 2022-09-07 PROCEDURE — 1123F ACP DISCUSS/DSCN MKR DOCD: CPT | Performed by: FAMILY MEDICINE

## 2022-09-07 PROCEDURE — 93005 ELECTROCARDIOGRAM TRACING: CPT | Performed by: PHYSICIAN ASSISTANT

## 2022-09-07 PROCEDURE — 87449 NOS EACH ORGANISM AG IA: CPT

## 2022-09-07 PROCEDURE — 71046 X-RAY EXAM CHEST 2 VIEWS: CPT

## 2022-09-07 PROCEDURE — 99285 EMERGENCY DEPT VISIT HI MDM: CPT

## 2022-09-07 PROCEDURE — 6360000002 HC RX W HCPCS: Performed by: EMERGENCY MEDICINE

## 2022-09-07 PROCEDURE — 6360000004 HC RX CONTRAST MEDICATION: Performed by: RADIOLOGY

## 2022-09-07 PROCEDURE — 81001 URINALYSIS AUTO W/SCOPE: CPT

## 2022-09-07 PROCEDURE — 87324 CLOSTRIDIUM AG IA: CPT

## 2022-09-07 PROCEDURE — 84484 ASSAY OF TROPONIN QUANT: CPT

## 2022-09-07 PROCEDURE — G8417 CALC BMI ABV UP PARAM F/U: HCPCS | Performed by: FAMILY MEDICINE

## 2022-09-07 PROCEDURE — 83880 ASSAY OF NATRIURETIC PEPTIDE: CPT

## 2022-09-07 PROCEDURE — 87040 BLOOD CULTURE FOR BACTERIA: CPT

## 2022-09-07 PROCEDURE — 4004F PT TOBACCO SCREEN RCVD TLK: CPT | Performed by: FAMILY MEDICINE

## 2022-09-07 PROCEDURE — 72110 X-RAY EXAM L-2 SPINE 4/>VWS: CPT

## 2022-09-07 PROCEDURE — 87635 SARS-COV-2 COVID-19 AMP PRB: CPT

## 2022-09-07 PROCEDURE — 2580000003 HC RX 258: Performed by: EMERGENCY MEDICINE

## 2022-09-07 PROCEDURE — 93970 EXTREMITY STUDY: CPT

## 2022-09-07 PROCEDURE — 99214 OFFICE O/P EST MOD 30 MIN: CPT | Performed by: FAMILY MEDICINE

## 2022-09-07 PROCEDURE — 3017F COLORECTAL CA SCREEN DOC REV: CPT | Performed by: FAMILY MEDICINE

## 2022-09-07 PROCEDURE — 85025 COMPLETE CBC W/AUTO DIFF WBC: CPT

## 2022-09-07 PROCEDURE — 83605 ASSAY OF LACTIC ACID: CPT

## 2022-09-07 RX ORDER — MORPHINE SULFATE 2 MG/ML
2 INJECTION, SOLUTION INTRAMUSCULAR; INTRAVENOUS ONCE
Status: COMPLETED | OUTPATIENT
Start: 2022-09-07 | End: 2022-09-07

## 2022-09-07 RX ORDER — ROPINIROLE 2 MG/1
2 TABLET, FILM COATED ORAL ONCE
Status: COMPLETED | OUTPATIENT
Start: 2022-09-07 | End: 2022-09-07

## 2022-09-07 RX ORDER — ONDANSETRON 2 MG/ML
4 INJECTION INTRAMUSCULAR; INTRAVENOUS EVERY 6 HOURS PRN
Status: DISCONTINUED | OUTPATIENT
Start: 2022-09-07 | End: 2022-09-08 | Stop reason: HOSPADM

## 2022-09-07 RX ORDER — IPRATROPIUM BROMIDE AND ALBUTEROL SULFATE 2.5; .5 MG/3ML; MG/3ML
1 SOLUTION RESPIRATORY (INHALATION)
Status: DISPENSED | OUTPATIENT
Start: 2022-09-07 | End: 2022-09-07

## 2022-09-07 RX ADMIN — ROPINIROLE HYDROCHLORIDE 2 MG: 2 TABLET, FILM COATED ORAL at 22:56

## 2022-09-07 RX ADMIN — MORPHINE SULFATE 2 MG: 2 INJECTION, SOLUTION INTRAMUSCULAR; INTRAVENOUS at 22:25

## 2022-09-07 RX ADMIN — IOPAMIDOL 75 ML: 755 INJECTION, SOLUTION INTRAVENOUS at 23:48

## 2022-09-07 RX ADMIN — CEFTRIAXONE 1000 MG: 1 INJECTION, POWDER, FOR SOLUTION INTRAMUSCULAR; INTRAVENOUS at 22:00

## 2022-09-07 ASSESSMENT — PAIN DESCRIPTION - LOCATION: LOCATION: BACK;LEG

## 2022-09-07 ASSESSMENT — PAIN DESCRIPTION - PAIN TYPE: TYPE: CHRONIC PAIN

## 2022-09-07 ASSESSMENT — ENCOUNTER SYMPTOMS
GASTROINTESTINAL NEGATIVE: 1
ALLERGIC/IMMUNOLOGIC NEGATIVE: 1
EYES NEGATIVE: 1
SHORTNESS OF BREATH: 1

## 2022-09-07 ASSESSMENT — PATIENT HEALTH QUESTIONNAIRE - PHQ9
SUM OF ALL RESPONSES TO PHQ QUESTIONS 1-9: 0
2. FEELING DOWN, DEPRESSED OR HOPELESS: 0
SUM OF ALL RESPONSES TO PHQ9 QUESTIONS 1 & 2: 0
1. LITTLE INTEREST OR PLEASURE IN DOING THINGS: 0

## 2022-09-07 ASSESSMENT — PAIN SCALES - GENERAL
PAINLEVEL_OUTOF10: 7
PAINLEVEL_OUTOF10: 8

## 2022-09-07 ASSESSMENT — PAIN - FUNCTIONAL ASSESSMENT: PAIN_FUNCTIONAL_ASSESSMENT: 0-10

## 2022-09-07 ASSESSMENT — PAIN DESCRIPTION - ORIENTATION: ORIENTATION: RIGHT;LEFT

## 2022-09-07 ASSESSMENT — PAIN DESCRIPTION - DESCRIPTORS: DESCRIPTORS: ACHING

## 2022-09-07 NOTE — ED NOTES
FIRST PROVIDER CONTACT ASSESSMENT NOTE       Department of Emergency Medicine                 First Provider Note            22  3:31 PM EDT    Date of Encounter: No admission date for patient encounter. Patient Name: Cintia Tao  : 1955  MRN: [id number]    Chief Complaint: Edema and Shortness of Breath (Worsening BLE edema, redness x 3 days. +SOB. +generalized weakness. -CP. +n/-v/+d. )      History of Present Illness:   Cintia Tao is a 73 y.o. female who presents to the ED for 3 days of b/l lower legs swelling, redness, hx of lymphedema. Also c/o SOB, generalized weakness. Denies CP. Reports 1 week of diarrhea. Focused Physical Exam:  VS:    ED Triage Vitals [22 1528]   BP Temp Temp src Heart Rate Resp SpO2 Height Weight   -- 98.1 °F (36.7 °C) -- 92 18 92 % -- --        Physical Ex: Constitutional: Alert and non-toxic. Medical History:  has a past medical history of Arthritis, Full dentures, Low back pain, Lymphedema of both lower extremities, Obesity, Osteoarthritis, Peripheral vision loss, and Stroke ([address] Surgical History:  has a past surgical history that includes ECHO Complete 2D W Doppler W Color (2012);  section; Gastric bypass surgery (); Abdominoplasty (); Cataract removal; hernia repair; Colonoscopy (2012); Breast reduction surgery; Upper gastrointestinal endoscopy (2021); Colonoscopy (2021); Upper gastrointestinal endoscopy (N/A, 2021); Colonoscopy (N/A, 2021); Lumbar spine surgery (N/A, 2021); and Nerve Block (Right, 2022). Social History:  reports that she has been smoking cigarettes. She has a 38.00 pack-year smoking history. She has never used smokeless tobacco. She reports that she does not drink alcohol and does not use drugs. Family History: family history includes Breast Cancer in her mother; Hypertension in her brother and sister; Stroke in her father.     Allergies: Ferritin     Initial Plan of Care: Initiate Treatment-Testing, Proceed toTreatment Area When Bed Available for ED Attending/MLP to Continue Care      ---END OF FIRST PROVIDER CONTACT ASSESSMENT NOTE---  Electronically signed by CHERYL Garcia   DD: 9/7/22       CHERYL Garcia  09/07/22 [id number]

## 2022-09-07 NOTE — PROGRESS NOTES
22  Name: Maycol Neal    : 1955    Sex: female    Age: 73 y.o. Subjective:  Chief Complaint: Patient is here for edema both lower extrem   right era plugged   urine incont    s/p b;adder surgery   Weakness       She not taking diuretic cause it makes her urinate too much  Right ear feel splugged  this am  Legs red  last few days  Had bladder surgery and pt feels not help her  Stil smoking  Sees  dr Powell Dye  soon re   abck painr  Right leg  seak  [facility] pain doc  relaed her   and   saw  SW  and  may start meds soon  Off all pain meds for the  last threre days      Review of Systems   Constitutional: Negative. HENT: Negative. Right ear dec hearing  since    this am   Eyes: Negative. Respiratory:  Positive for shortness of breath. Cardiovascular:  Positive for leg swelling. Gastrointestinal: Negative. Endocrine: Negative. Genitourinary: Negative. Musculoskeletal: Negative. Skin: Negative. Allergic/Immunologic: Negative. Neurological: Negative. Hematological: Negative. Psychiatric/Behavioral: Negative.          Current Outpatient Medications:     rOPINIRole (REQUIP) 2 MG tablet, Take 0.5 tablets by mouth in the morning, at noon, and at bedtime, Disp: 360 tablet, Rfl: 12    Baclofen (LIORESAL) 5 MG tablet, Take 1 tablet by mouth 2 times daily as needed (spasms), Disp: 180 tablet, Rfl: 5    nitrofurantoin, macrocrystal-monohydrate, (MACROBID) 100 MG capsule, , Disp: , Rfl:   Allergies   Allergen Reactions    Ferritin Shortness Of Breath and Other (See Comments)     Flushed,  Severe back pain     Social History     Socioeconomic History    Marital status:      Spouse name: Not on file    Number of children: 2    Years of education: 12    Highest education level: Associate degree: academic program   Occupational History    Not on file   Tobacco Use    Smoking status: Every Day     Packs/day: 1.00     Years: 38.00     Pack years: 38.00     Types: Cigarettes    Smokeless tobacco: Never    Tobacco comments:     restarted 3/2022   Vaping Use    Vaping Use: Never used   Substance and Sexual Activity    Alcohol use: No    Drug use: No    Sexual activity: Not Currently   Other Topics Concern    Not on file   Social History Narrative        Chronic Diagnosis: Iron deficiency anemia, Depressive disorder, Benign essential hypertension, Mixed    hyperlipidemia. HTN    OBESITY    LIPID    OA    SMOKER---quit   summer  2021    CVA L FIELD VISION    DEPRESSION    GASTRIC BYPASS 01    BREAST REDUCTION--    [place]  1955 Page #2    ANEMIA==IRON AND B-12    Admitted 2019 with cellulitis left lower extremity then readmitted with chest pain with negative stress test    L--3-4   lumbar surgeyr dr Elda Horan          Copd  dr Florencia Grande       Social Determinants of Health     Financial Resource Strain: Not on file   Food Insecurity: Not on file   Transportation Needs: Not on file   Physical Activity: Inactive    Days of Exercise per Week: 0 days    Minutes of Exercise per Session: 0 min   Stress: Not on file   Social Connections:  Moderately Integrated    Frequency of Communication with Friends and Family: More than three times a week    Frequency of Social Gatherings with Friends and Family: More than three times a week    Attends Christian Services: More than 4 times per year    Active Member of [address] [place] [address] "[employer]" or Organizations: Yes    Attends Club or Organization Meetings: More than 4 times per year    Marital Status:    Intimate Partner Violence: Not on file   Housing Stability: Not on file      Past Medical History:   Diagnosis Date    Arthritis     Full dentures     Low back pain     Lymphedema of both lower extremities chronic and continues as of 22    Obesity     280 #    Osteoarthritis     Peripheral vision loss     Stroke [facility])      Family History   Problem Relation Age of Onset    Breast Cancer Mother Stroke Father     Hypertension Sister     Hypertension Brother       Past Surgical History:   Procedure Laterality Date    ABDOMINOPLASTY      BREAST REDUCTION SURGERY      reduction    CATARACT REMOVAL      bilateral     SECTION      x2    COLONOSCOPY  2012    and egd    COLONOSCOPY  2021    polyps; marginal prep--jessica    COLONOSCOPY N/A 2021    COLONOSCOPY WITH BIOPSY performed by Francis Corea MD at [facility]    ECHOCARDIOGRAM COMPLETE 2D W DOPPLER W COLOR  2012         GASTRIC BYPASS SURGERY  2000    NO NASTOGASTRIC TUBE    HERNIA REPAIR          LUMBAR SPINE SURGERY N/A 2021    L3-L4  POSTERIOR LUMBAR INTERBODY  FUSION--OARM, JESSI, YAJAIRA TABLE, CELL SAVER, PLATELET GEL, AUDIOLOGY, CAGES, PLATES, SCREWS -- GLOBUS performed by Abdirahman Lowe MD at [address] Right 2022    RIGHT SACROILIAC JOINT INJECTION performed by Romeo Noonan DO at [facility]  2021    minimal pouch gastritis--jessica    UPPER GASTROINTESTINAL ENDOSCOPY N/A 2021    EGD ESOPHAGOGASTRODUODENOSCOPY performed by Francis Corea MD at [address]:    22 1428   BP: (!) 182/96   Pulse: 94   Temp: 98.3 °F (36.8 °C)   SpO2: 97%   Weight: Comment: UTD   Height: 5' 2\" (1.575 m)       Objective:    Physical Exam  Cardiovascular:      Rate and Rhythm: Regular rhythm. Heart sounds: Normal heart sounds. Comments: massive edema both lower extrem  Skin:     Comments: Marked  chasidy both lower extremities        Fatemeh Russo was seen today for other and diarrhea.     Diagnoses and all orders for this visit:    Bilateral lower leg cellulitis    Systolic and diastolic CHF, acute on chronic (HCC)    Spinal stenosis of lumbar region with neurogenic claudication    Lymphedema of both lower extremities      Comments: to er now   daughter   with er     Dc smoking     beg her to take the diuretic when disahgred      I educated the patient about all medications. Make sure they were correct and educated  on the risk associated with missing meds or taking them incorrectly. A list of medications is being sent home with patient today. Check blood pressure at home twice a day. Low-salt low caffeine diet. Call if systolic blood pressure is above 555 and diastolic blood pressures above 85. Only use a upper arm digital cuff. Aggressive low-fat diet. Avoid red meats, greasy fried foods, dairy products. Avoid processed foods. Take cholesterol medications without food. I informed patient about the risk associated with noncompliance of medication and taking medications incorrectly. Appropriate follow-up with myself and all specialist.  Encourage family members to take active role in assisting with medications and medical care. If any confusion should develop to notify my office immediately to avoid risk of worsening medical condition    A great deal of time spent reviewing medications, diet, exercise, social issues. Also reviewing the chart before entering the room with patient and finishing charting after leaving patient's room. More than half of that time was spent face to face with the patient in counseling and coordinating care. Follow Up: No follow-ups on file.      Seen by:  Bina Mohamud DO

## 2022-09-08 ENCOUNTER — APPOINTMENT (OUTPATIENT)
Dept: CT IMAGING | Age: 67
End: 2022-09-08
Payer: MEDICARE

## 2022-09-08 VITALS
DIASTOLIC BLOOD PRESSURE: 80 MMHG | SYSTOLIC BLOOD PRESSURE: 156 MMHG | RESPIRATION RATE: 18 BRPM | HEIGHT: 62 IN | BODY MASS INDEX: 51.53 KG/M2 | OXYGEN SATURATION: 95 % | HEART RATE: 98 BPM | TEMPERATURE: 98.4 F | WEIGHT: 280 LBS

## 2022-09-08 LAB
C DIFF TOXIN/ANTIGEN: NORMAL
EKG ATRIAL RATE: 86 BPM
EKG P-R INTERVAL: 182 MS
EKG Q-T INTERVAL: 350 MS
EKG QRS DURATION: 62 MS
EKG QTC CALCULATION (BAZETT): 418 MS
EKG R AXIS: -171 DEGREES
EKG T AXIS: 148 DEGREES
EKG VENTRICULAR RATE: 86 BPM
GIARDIA ANTIGEN STOOL: NORMAL
ROTAVIRUS ANTIGEN: NORMAL
TROPONIN, HIGH SENSITIVITY: 12 NG/L (ref 0–9)

## 2022-09-08 PROCEDURE — 94664 DEMO&/EVAL PT USE INHALER: CPT

## 2022-09-08 PROCEDURE — 94640 AIRWAY INHALATION TREATMENT: CPT

## 2022-09-08 PROCEDURE — 71275 CT ANGIOGRAPHY CHEST: CPT

## 2022-09-08 PROCEDURE — 84484 ASSAY OF TROPONIN QUANT: CPT

## 2022-09-08 PROCEDURE — 6370000000 HC RX 637 (ALT 250 FOR IP): Performed by: EMERGENCY MEDICINE

## 2022-09-08 RX ORDER — CEFDINIR 300 MG/1
300 CAPSULE ORAL 2 TIMES DAILY
Qty: 14 CAPSULE | Refills: 0 | Status: SHIPPED | OUTPATIENT
Start: 2022-09-08 | End: 2022-09-15

## 2022-09-08 RX ORDER — IPRATROPIUM BROMIDE AND ALBUTEROL SULFATE 2.5; .5 MG/3ML; MG/3ML
1 SOLUTION RESPIRATORY (INHALATION)
Status: COMPLETED | OUTPATIENT
Start: 2022-09-08 | End: 2022-09-08

## 2022-09-08 RX ORDER — ALBUTEROL SULFATE 90 UG/1
2 AEROSOL, METERED RESPIRATORY (INHALATION) EVERY 4 HOURS PRN
Qty: 18 G | Refills: 1 | Status: SHIPPED | OUTPATIENT
Start: 2022-09-08 | End: 2022-10-28

## 2022-09-08 RX ADMIN — IPRATROPIUM BROMIDE AND ALBUTEROL SULFATE 1 AMPULE: .5; 3 SOLUTION RESPIRATORY (INHALATION) at 01:27

## 2022-09-08 RX ADMIN — IPRATROPIUM BROMIDE AND ALBUTEROL SULFATE 1 AMPULE: .5; 3 SOLUTION RESPIRATORY (INHALATION) at 01:26

## 2022-09-08 RX ADMIN — IPRATROPIUM BROMIDE AND ALBUTEROL SULFATE 1 AMPULE: .5; 3 SOLUTION RESPIRATORY (INHALATION) at 01:28

## 2022-09-08 NOTE — ED PROVIDER NOTES
HPI:  22, Time: 8:09 PM EDT         Christine Cid is a 73 y.o. female presenting to the ED for swelling and shortness of breath, beginning dyas ago. The complaint has been persistent, moderate in severity, and worsened by nothing. Patient reports feeling weak and reporting diarrhea for over a week patient reporting she had a bladder sling done reports that she is been feeling weak having chills. Patient reporting no chest pain she does report shortness of breath she does have some cough but she does have history of smoking. Patient reporting history of lymphedema increased swelling to her lower extremities. Patient is having difficulty ambulating. Patient reporting lower back pain that she has had for some time but worsened over the past week she states it does radiate down her right leg. Patient reports she went to bend and reach for something and felt increased pain. Patient currently off her pain medications. Patient reporting no vomiting. She does report some crampy abdominal pain. ROS:   Pertinent positives and negatives are stated within HPI, all other systems reviewed and are negative.  --------------------------------------------- PAST HISTORY ---------------------------------------------  Past Medical History:  has a past medical history of Arthritis, Full dentures, Low back pain, Lymphedema of both lower extremities, Obesity, Osteoarthritis, Peripheral vision loss, and [facility] Past Surgical History:  has a past surgical history that includes ECHO Complete 2D W Doppler W Color (2012);  section; Gastric bypass surgery (); Abdominoplasty (); Cataract removal; hernia repair; Colonoscopy (2012); Breast reduction surgery; Upper gastrointestinal endoscopy (2021); Colonoscopy (2021); Upper gastrointestinal endoscopy (N/A, 2021); Colonoscopy (N/A, 2021);  Lumbar spine surgery (N/A, 2021); and Nerve Block (Right, 7/14/2022). Social History:  reports that she has been smoking cigarettes. She has a 38.00 pack-year smoking history. She has never used smokeless tobacco. She reports that she does not drink alcohol and does not use drugs. Family History: family history includes Breast Cancer in her mother; Hypertension in her brother and sister; Stroke in her father. The patients home medications have been reviewed. Allergies: Ferritin    ---------------------------------------------------PHYSICAL EXAM--------------------------------------    Constitutional/General: Alert and oriented x3,   Head: Normocephalic and atraumatic  Eyes: PERRL, EOMI  Mouth: Oropharynx clear, handling secretions, no trismus  Neck: Supple, full ROM, non tender to palpation in the midline, no stridor, no crepitus, no meningeal signs  Pulmonary: Lungs expiratory wheezes not in respiratory distress  Cardiovascular:  Regular rate. Regular rhythm. No murmurs, gallops, or rubs. 2+ distal pulses  Chest: no chest wall tenderness  Abdomen: Soft. Non tender. Non distended. +BS. No rebound, guarding, or rigidity. No pulsatile masses appreciated. Musculoskeletal: Moves all extremities x 4. Warm and well perfused, no clubbing, cyanosis, edema bilateral mild redness lower extremities. Patient has some tenderness right sacroiliac region  Skin: warm and dry. No rashes. Neurologic: GCS 15, CN 2-12 grossly intact, no focal deficits, symmetric strength 5/5 in the upper, moves lower extremities able to raise left leg without difficulty able to raise right leg but appears to be weaker on the right compared to left. Pulses are intact distally able to plantarflex and dorsiflex bilaterally  Psych: Normal Affect    -------------------------------------------------- RESULTS -------------------------------------------------  I have personally reviewed all laboratory and imaging results for this patient. Results are listed below.      LABS:  Results for orders placed or performed during the hospital encounter of 09/07/22   COVID-19, Rapid    Specimen: Nasopharyngeal Swab   Result Value Ref Range    SARS-CoV-2, NAAT Not Detected Not Detected   CBC with Auto Differential   Result Value Ref Range    WBC 8.5 4.5 - 11.5 E9/L    RBC 5.46 3.50 - 5.50 E12/L    Hemoglobin 12.8 11.5 - 15.5 g/dL    Hematocrit 43.2 34.0 - 48.0 %    MCV 79.1 (L) 80.0 - 99.9 fL    MCH 23.4 (L) 26.0 - 35.0 pg    MCHC 29.6 (L) 32.0 - 34.5 %    RDW 18.6 (H) 11.5 - 15.0 fL    Platelets 017 961 - 057 E9/L    MPV 11.6 7.0 - 12.0 fL    Neutrophils % 71.3 43.0 - 80.0 %    Immature Granulocytes % 0.5 0.0 - 5.0 %    Lymphocytes % 20.0 20.0 - 42.0 %    Monocytes % 6.0 2.0 - 12.0 %    Eosinophils % 2.0 0.0 - 6.0 %    Basophils % 0.2 0.0 - 2.0 %    Neutrophils Absolute 6.09 1.80 - 7.30 E9/L    Immature Granulocytes # 0.04 E9/L    Lymphocytes Absolute 1.71 1.50 - 4.00 E9/L    Monocytes Absolute 0.51 0.10 - 0.95 E9/L    Eosinophils Absolute 0.17 0.05 - 0.50 E9/L    Basophils Absolute 0.02 0.00 - 0.20 E9/L   Comprehensive Metabolic Panel   Result Value Ref Range    Sodium 142 132 - 146 mmol/L    Potassium 4.1 3.5 - 5.0 mmol/L    Chloride 106 98 - 107 mmol/L    CO2 22 22 - 29 mmol/L    Anion Gap 14 7 - 16 mmol/L    Glucose 92 74 - 99 mg/dL    BUN 8 6 - 23 mg/dL    Creatinine 0.6 0.5 - 1.0 mg/dL    GFR Non-African American >60 >=60 mL/min/1.73    GFR African American >60     Calcium 9.2 8.6 - 10.2 mg/dL    Total Protein 7.2 6.4 - 8.3 g/dL    Albumin 3.9 3.5 - 5.2 g/dL    Total Bilirubin 0.4 0.0 - 1.2 mg/dL    Alkaline Phosphatase 155 (H) 35 - 104 U/L    ALT 20 0 - 32 U/L    AST 29 0 - 31 U/L   Troponin   Result Value Ref Range    Troponin, High Sensitivity 13 (H) 0 - 9 ng/L   Brain Natriuretic Peptide   Result Value Ref Range    Pro- (H) 0 - 125 pg/mL   Urinalysis with Microscopic   Result Value Ref Range    Color, UA Yellow Straw/Yellow    Clarity, UA SL CLOUDY Clear    Glucose, Ur Negative Negative mg/dL Bilirubin Urine Negative Negative    Ketones, Urine Negative Negative mg/dL    Specific Gravity, UA 1.020 1.005 - 1.030    Blood, Urine Negative Negative    pH, UA 6.0 5.0 - 9.0    Protein, UA Negative Negative mg/dL    Urobilinogen, Urine 0.2 <2.0 E.U./dL    Nitrite, Urine POSITIVE (A) Negative    Leukocyte Esterase, Urine MODERATE (A) Negative    WBC, UA 10-20 (A) 0 - 5 /HPF    RBC, UA 0-1 0 - 2 /HPF    Epithelial Cells, UA MODERATE /HPF    Bacteria, UA MANY (A) None Seen /HPF   Lactic Acid   Result Value Ref Range    Lactic Acid 0.8 0.5 - 2.2 mmol/L   SPECIMEN REJECTION   Result Value Ref Range    Rejected Test trp5     Reason for Rejection see below    EKG 12 Lead   Result Value Ref Range    Ventricular Rate 86 BPM    Atrial Rate 86 BPM    P-R Interval 182 ms    QRS Duration 62 ms    Q-T Interval 350 ms    QTc Calculation (Bazett) 418 ms    R Axis -171 degrees    T Axis 148 degrees       RADIOLOGY:  Interpreted by Radiologist.  CTA CHEST W CONTRAST   Final Result   1. There are limitations on the present study by PICC sling of the images   and motion artifacts but no conspicuous acute pulmonary embolus could be   identified. 2.  There also limitation in the evaluation of the thoracic aorta but no   conspicuous dissection is observed. 3.  No acute pulmonary process. RECOMMENDATIONS:   Unavailable         US DUP LOWER EXTREMITIES BILATERAL VENOUS   Final Result   No evidence of DVT in either lower extremity. XR LUMBAR SPINE (MIN 4 VIEWS)   Final Result   No acute findings. Posterior spinal fixation hardware L3-L4 with interbody disc. Mild multilevel degenerative changes. XR CHEST (2 VW)   Final Result   No acute cardiopulmonary disease. No significant change. EKG:  This EKG is signed and interpreted by me.     Rate: 86  Rhythm: Sinus  Interpretation: non-specific EKG  Comparison: stable as compared to patient's most recent EKG         ------------------------- NURSING NOTES AND VITALS REVIEWED ---------------------------   The nursing notes within the ED encounter and vital signs as below have been reviewed by myself. BP (!) 152/68   Pulse 90   Temp 98.1 °F (36.7 °C)   Resp 18   Ht 5' 2\" (1.575 m)   Wt 280 lb (127 kg)   SpO2 92%   BMI 51.21 kg/m²   Oxygen Saturation Interpretation: Normal    The patients available past medical records and past encounters were reviewed. ------------------------------ ED COURSE/MEDICAL DECISION MAKING----------------------  Medications   ipratropium-albuterol (DUONEB) nebulizer solution 1 ampule (has no administration in time range)   ondansetron (ZOFRAN) injection 4 mg (has no administration in time range)   cefTRIAXone (ROCEPHIN) 1,000 mg in sterile water 10 mL IV syringe (1,000 mg IntraVENous Given 9/7/22 2200)   morphine (PF) injection 2 mg (2 mg IntraVENous Given 9/7/22 2225)   rOPINIRole (REQUIP) tablet 2 mg (2 mg Oral Given 9/7/22 2256)   iopamidol (ISOVUE-370) 76 % injection 75 mL (75 mLs IntraVENous Given 9/7/22 2348)   ipratropium-albuterol (DUONEB) nebulizer solution 1 ampule (1 ampule Inhalation Given 9/8/22 0128)             Medical Decision Making:   Patient presenting here because of multi complaints of which she states she is been having feeling weakness and having diarrhea. Patient reports diarrhea is been going on for the past week. Patient reporting feeling short of breath she reports some lower back pain going down her right leg. She does have chronic back pain but worsening over the past several days. Patient reporting no productive cough. Patient on exam does have wheezes on exam she has no edema noted to her lower extremities. Labs noted reviewed urine does show infection. Patient ordered IV antibiotics also ordered breathing treatments due to her wheezing. Patient will have further testing done. Re-Evaluations:             Re-evaluation.   Patients symptoms show no change  Patient rechecked multiple times and made aware of findings. Patient ordered breathing treatments as well as IV antibiotics. Patient's abdomen soft nontender patient no respiratory distress. Patient would prefer to go home. Patient vital signs stable she is afebrile. Patient will be discharged home with outpatient follow-up she is to return if symptoms worsen or persist.  Family is at bedside as well and comfortable with plan. Did recheck patient again a little after 2:50 AM.  Lungs are clear. Patient no respiratory distress reviewed vital signs will be obtained prior to discharge. Consultations:                 Critical Care: This patient's ED course included: a personal history and physicial eaxmination    This patient has been closely monitored during their ED course. Counseling: The emergency provider has spoken with the patient and discussed todays results, in addition to providing specific details for the plan of care and counseling regarding the diagnosis and prognosis. Questions are answered at this time and they are agreeable with the plan.       --------------------------------- IMPRESSION AND DISPOSITION ---------------------------------    IMPRESSION  1. Bronchospasm    2. UTI (urinary tract infection), uncomplicated    3. Diarrhea, unspecified type    4. Lymphedema        DISPOSITION  Disposition: Discharge home  Patient condition is stable        NOTE: This report was transcribed using voice recognition software.  Every effort was made to ensure accuracy; however, inadvertent computerized transcription errors may be present          Marbin Gutierrez MD  09/08/22 0128       Marbin Gutierrez MD  09/08/22 Leoncio Courtney MD  09/08/22 8847

## 2022-09-09 ENCOUNTER — OFFICE VISIT (OUTPATIENT)
Dept: PRIMARY CARE CLINIC | Age: 67
End: 2022-09-09
Payer: MEDICARE

## 2022-09-09 DIAGNOSIS — L03.115 CELLULITIS OF BOTH LOWER EXTREMITIES: Primary | ICD-10-CM

## 2022-09-09 DIAGNOSIS — M47.26 OSTEOARTHRITIS OF SPINE WITH RADICULOPATHY, LUMBAR REGION: ICD-10-CM

## 2022-09-09 DIAGNOSIS — H91.91 ACUTE HEARING LOSS OF RIGHT EAR: ICD-10-CM

## 2022-09-09 DIAGNOSIS — M48.062 SPINAL STENOSIS OF LUMBAR REGION WITH NEUROGENIC CLAUDICATION: ICD-10-CM

## 2022-09-09 DIAGNOSIS — R42 DIZZINESS: ICD-10-CM

## 2022-09-09 DIAGNOSIS — I89.0 LYMPHEDEMA: ICD-10-CM

## 2022-09-09 DIAGNOSIS — L03.116 CELLULITIS OF BOTH LOWER EXTREMITIES: Primary | ICD-10-CM

## 2022-09-09 LAB — CULTURE, STOOL: NORMAL

## 2022-09-09 PROCEDURE — 99214 OFFICE O/P EST MOD 30 MIN: CPT | Performed by: FAMILY MEDICINE

## 2022-09-09 PROCEDURE — 96372 THER/PROPH/DIAG INJ SC/IM: CPT | Performed by: FAMILY MEDICINE

## 2022-09-09 PROCEDURE — 1090F PRES/ABSN URINE INCON ASSESS: CPT | Performed by: FAMILY MEDICINE

## 2022-09-09 PROCEDURE — G8428 CUR MEDS NOT DOCUMENT: HCPCS | Performed by: FAMILY MEDICINE

## 2022-09-09 PROCEDURE — 3017F COLORECTAL CA SCREEN DOC REV: CPT | Performed by: FAMILY MEDICINE

## 2022-09-09 PROCEDURE — G8400 PT W/DXA NO RESULTS DOC: HCPCS | Performed by: FAMILY MEDICINE

## 2022-09-09 PROCEDURE — G8417 CALC BMI ABV UP PARAM F/U: HCPCS | Performed by: FAMILY MEDICINE

## 2022-09-09 PROCEDURE — 4004F PT TOBACCO SCREEN RCVD TLK: CPT | Performed by: FAMILY MEDICINE

## 2022-09-09 PROCEDURE — 1123F ACP DISCUSS/DSCN MKR DOCD: CPT | Performed by: FAMILY MEDICINE

## 2022-09-09 RX ORDER — MECLIZINE HCL 12.5 MG/1
12.5 TABLET ORAL 4 TIMES DAILY PRN
Qty: 60 TABLET | Refills: 3 | Status: SHIPPED
Start: 2022-09-09 | End: 2022-10-28

## 2022-09-09 RX ORDER — METHYLPREDNISOLONE ACETATE 40 MG/ML
40 INJECTION, SUSPENSION INTRA-ARTICULAR; INTRALESIONAL; INTRAMUSCULAR; SOFT TISSUE ONCE
Status: COMPLETED | OUTPATIENT
Start: 2022-09-09 | End: 2022-09-09

## 2022-09-09 RX ADMIN — METHYLPREDNISOLONE ACETATE 40 MG: 40 INJECTION, SUSPENSION INTRA-ARTICULAR; INTRALESIONAL; INTRAMUSCULAR; SOFT TISSUE at 14:07

## 2022-09-09 NOTE — PROGRESS NOTES
ant22  Name: Anne-Marie Quick    : 1955    Sex: female    Age: 73 y.o. Subjective:  Chief Complaint: Patient is here for ER follow-up, swelling in the legs, right ear loss of hearing. Right ear plugged. Dizziness. Cellulitis lower extremities. Patient was seen in the emergency room with significant swelling lower extremities and cellulitis. In reading the ER note to Dr. Ramonita Mc no swelling. This is an area. Does not mention erythema lower extremities but was given Rocephin and discharged with cefdinir. Her right ear feels plugged and she has dizziness when turns her head and she has she states complete loss of hearing in the right ear. I examined her the other day and it was fine and today again there is no redness or erythema or mass foreign body. She still is refusing to take a diuretic she said with be impossible to do that with the dizziness she is having. .  Low back pains ever since she has tried cutting her toenails a week and half ago. She is not happy with lymphedema therapy at [facility] wants to go to [facility]      Review of Systems   Constitutional: Negative. HENT: Negative. HPI   Eyes: Negative. Respiratory: Negative. Cardiovascular:  Positive for leg swelling. Gastrointestinal: Negative. Endocrine: Negative. Genitourinary: Negative. Musculoskeletal:  Positive for back pain. Skin: Negative. Allergic/Immunologic: Negative. Neurological: Negative. Hematological: Negative. Psychiatric/Behavioral: Negative.          Current Outpatient Medications:     meclizine (ANTIVERT) 12.5 MG tablet, Take 1 tablet by mouth 4 times daily as needed for Dizziness, Disp: 60 tablet, Rfl: 3    albuterol sulfate HFA (PROVENTIL HFA) 108 (90 Base) MCG/ACT inhaler, Inhale 2 puffs into the lungs every 4 hours as needed for Wheezing, Disp: 18 g, Rfl: 1    cefdinir (OMNICEF) 300 MG capsule, Take 1 capsule by mouth 2 times daily for 7 days, Disp: 14 capsule, Rfl: 0 rOPINIRole (REQUIP) 2 MG tablet, Take 0.5 tablets by mouth in the morning, at noon, and at bedtime, Disp: 360 tablet, Rfl: 12    Baclofen (LIORESAL) 5 MG tablet, Take 1 tablet by mouth 2 times daily as needed (spasms), Disp: 180 tablet, Rfl: 5    nitrofurantoin, macrocrystal-monohydrate, (MACROBID) 100 MG capsule, , Disp: , Rfl:   Allergies   Allergen Reactions    Ferritin Shortness Of Breath and Other (See Comments)     Flushed,  Severe back pain     Social History     Socioeconomic History    Marital status:      Spouse name: Not on file    Number of children: 2    Years of education: 12    Highest education level: Associate degree: academic program   Occupational History    Not on file   Tobacco Use    Smoking status: Every Day     Packs/day: 1.00     Years: 38.00     Pack years: 38.00     Types: Cigarettes    Smokeless tobacco: Never    Tobacco comments:     restarted 3/2022   Vaping Use    Vaping Use: Never used   Substance and Sexual Activity    Alcohol use: No    Drug use: No    Sexual activity: Not Currently   Other Topics Concern    Not on file   Social History Narrative        Chronic Diagnosis: Iron deficiency anemia, Depressive disorder, Benign essential hypertension, Mixed    hyperlipidemia.     HTN    OBESITY    LIPID    OA    SMOKER---quit   summer  2021    CVA L FIELD VISION    DEPRESSION    GASTRIC BYPASS 01    BREAST REDUCTION--    Barrera PIEDRA 1955 Page #2    ANEMIA==IRON AND B-12    Admitted 2019 with cellulitis left lower extremity then readmitted with chest pain with negative stress test    L--3-4   lumbar surgeyr dr pineda-----    Walford Love  dr King Lilly          Copd  dr Brando Wade       Social Determinants of Health     Financial Resource Strain: Not on file   Food Insecurity: Not on file   Transportation Needs: Not on file   Physical Activity: Inactive    Days of Exercise per Week: 0 days    Minutes of Exercise per Session: 0 min   Stress: Not on file Social Connections:  Moderately Integrated    Frequency of Communication with Friends and Family: More than three times a week    Frequency of Social Gatherings with Friends and Family: More than three times a week    Attends Hinduism Services: More than 4 times per year    Active Member of Clubs or Organizations: Yes    Attends Club or Organization Meetings: More than 4 times per year    Marital Status:    Intimate Partner Violence: Not on file   Housing Stability: Not on file      Past Medical History:   Diagnosis Date    Arthritis     Full dentures     Low back pain     Lymphedema of both lower extremities chronic and continues as of 22    Obesity     280 #    Osteoarthritis     Peripheral vision loss     Stroke ([facility]      Family History   Problem Relation Age of Onset    Breast Cancer Mother     Stroke Father     Hypertension Sister     Hypertension Brother       Past Surgical History:   Procedure Laterality Date    ABDOMINOPLASTY      BREAST REDUCTION SURGERY      reduction    CATARACT REMOVAL      bilateral     SECTION      x2    COLONOSCOPY  2012    and egd    COLONOSCOPY  2021    polyps; marginal prep--jessica    COLONOSCOPY N/A 2021    COLONOSCOPY WITH BIOPSY performed by Jacobo Oakley MD at [address] [place] [address]    ECHOCARDIOGRAM COMPLETE 2D W DOPPLER W COLOR  2012         GASTRIC BYPASS SURGERY  2000    NO [address] [facility]          LUMBAR SPINE SURGERY N/A 2021    L3-L4  POSTERIOR LUMBAR INTERBODY  FUSION--OARM, JESSI, YAJAIRA TABLE, CELL SAVER, PLATELET GEL, AUDIOLOGY, CAGES, PLATES, SCREWS -- GLOBUS performed by Yovana Barajas MD at . [address] Right 2022    RIGHT SACROILIAC JOINT INJECTION performed by Richardson Hernández DO at [address] [place] [address]  2021    minimal pouch gastritis--jessica    UPPER GASTROINTESTINAL ENDOSCOPY N/A 2021    EGD ESOPHAGOGASTRODUODENOSCOPY performed by Flori Peña Argenis Mireles MD at [address]:    09/09/22 1318   BP: (!) 145/82   Pulse: 86   Temp: 98.1 °F (36.7 °C)   SpO2: 94%   Weight: Comment: UTD   Height: 5' 2\" (1.575 m)       Objective:    Physical Exam  Vitals reviewed. Constitutional:       Appearance: Normal appearance. She is well-developed. HENT:      Head: Normocephalic. Right Ear: Tympanic membrane normal.      Left Ear: Tympanic membrane normal.      Ears:      Comments: Obstruction in either ear right tympanic membrane is clear. She states she has no hearing from it     Nose: Nose normal.      Mouth/Throat:      Mouth: Mucous membranes are moist.   Eyes:      Pupils: Pupils are equal, round, and reactive to light. Cardiovascular:      Rate and Rhythm: Normal rate and regular rhythm. Comments: Massive edema lower extremities  Pulmonary:      Effort: Pulmonary effort is normal.      Breath sounds: Normal breath sounds. Abdominal:      General: Bowel sounds are normal.      Palpations: Abdomen is soft. Musculoskeletal:         General: Normal range of motion. Cervical back: Normal range of motion. Skin:     General: Skin is warm. Comments: Significant erythema of both lower extremities. Mid calf distal   Neurological:      Mental Status: She is alert and oriented to person, place, and time. Psychiatric:         Behavior: Behavior normal.       Lalitha Saucedo was seen today for follow-up and hypertension. Diagnoses and all orders for this visit:    Cellulitis of both lower extremities    Dizziness  -     meclizine (ANTIVERT) 12.5 MG tablet;  Take 1 tablet by mouth 4 times daily as needed for Dizziness  -     Win Varner DO, Otolaryngology, [place]    Spinal stenosis of lumbar region with neurogenic claudication  -     methylPREDNISolone acetate (DEPO-MEDROL) injection 40 mg    Acute hearing loss of right ear  -     Maurice Sahni DO, Otolaryngology, [place]    Lymphedema  -     External Referral To Lymphedema Clinic    Osteoarthritis of spine with radiculopathy, lumbar region      Comments: Encouraged take diuretic but she declines. Depo-Medrol. Antivert for dizziness. Appointment ENT ASAP regarding right hearing loss. Appointment for physical therapy for lymphedema therapy. Follow-up with her neurosurgeon for back pain. Low-salt diet. Check blood pressure. Discontinue smoking. I educated the patient about all medications. Make sure they were correct and educated  on the risk associated with missing meds or taking them incorrectly. A list of medications is being sent home with patient today. Check blood pressure at home twice a day. Low-salt low caffeine diet. Call if systolic blood pressure is above 573 and diastolic blood pressures above 85. Only use a upper arm digital cuff. Aggressive low-fat diet. Avoid red meats, greasy fried foods, dairy products. Avoid processed foods. Take cholesterol medications without food. I informed patient about the risk associated with noncompliance of medication and taking medications incorrectly. Appropriate follow-up with myself and all specialist.  Encourage family members to take active role in assisting with medications and medical care. If any confusion should develop to notify my office immediately to avoid risk of worsening medical condition      A great deal of time spent reviewing medications, diet, exercise, social issues. Also reviewing the chart before entering the room with patient and finishing charting after leaving patient's room. More than half of that time was spent face to face with the patient in counseling and coordinating care. Follow Up: No follow-ups on file.      Seen by:  Jaylyn Mahmood,

## 2022-09-10 VITALS
SYSTOLIC BLOOD PRESSURE: 145 MMHG | TEMPERATURE: 98.1 F | HEART RATE: 86 BPM | HEIGHT: 62 IN | DIASTOLIC BLOOD PRESSURE: 82 MMHG | BODY MASS INDEX: 51.21 KG/M2 | OXYGEN SATURATION: 94 %

## 2022-09-10 PROBLEM — H91.91 ACUTE HEARING LOSS OF RIGHT EAR: Status: ACTIVE | Noted: 2022-09-10

## 2022-09-10 ASSESSMENT — ENCOUNTER SYMPTOMS
BACK PAIN: 1
ALLERGIC/IMMUNOLOGIC NEGATIVE: 1
EYES NEGATIVE: 1
RESPIRATORY NEGATIVE: 1
GASTROINTESTINAL NEGATIVE: 1

## 2022-09-12 ENCOUNTER — TELEPHONE (OUTPATIENT)
Dept: ADMINISTRATIVE | Age: 67
End: 2022-09-12

## 2022-09-12 LAB
BLOOD CULTURE, ROUTINE: NORMAL
CULTURE, BLOOD 2: NORMAL

## 2022-09-12 NOTE — TELEPHONE ENCOUNTER
Pt called in to see if she could be seen ASAP. She has an urgent referral in her chart. She said that she cant hear from her right ear and has severe dizziness.

## 2022-09-14 ENCOUNTER — OFFICE VISIT (OUTPATIENT)
Dept: ENT CLINIC | Age: 67
End: 2022-09-14
Payer: MEDICARE

## 2022-09-14 ENCOUNTER — TELEPHONE (OUTPATIENT)
Dept: ADMINISTRATIVE | Age: 67
End: 2022-09-14

## 2022-09-14 ENCOUNTER — PROCEDURE VISIT (OUTPATIENT)
Dept: AUDIOLOGY | Age: 67
End: 2022-09-14
Payer: MEDICARE

## 2022-09-14 VITALS
HEART RATE: 93 BPM | BODY MASS INDEX: 51.53 KG/M2 | WEIGHT: 280 LBS | HEIGHT: 62 IN | SYSTOLIC BLOOD PRESSURE: 172 MMHG | DIASTOLIC BLOOD PRESSURE: 109 MMHG

## 2022-09-14 DIAGNOSIS — R42 VERTIGO: ICD-10-CM

## 2022-09-14 DIAGNOSIS — H91.21 SUDDEN IDIOPATHIC HEARING LOSS OF RIGHT EAR WITH RESTRICTED HEARING OF LEFT EAR: Primary | ICD-10-CM

## 2022-09-14 DIAGNOSIS — H90.A22 SENSORINEURAL HEARING LOSS (SNHL) OF LEFT EAR WITH RESTRICTED HEARING OF RIGHT EAR: ICD-10-CM

## 2022-09-14 DIAGNOSIS — H90.A21 SENSORINEURAL HEARING LOSS (SNHL) OF RIGHT EAR WITH RESTRICTED HEARING OF LEFT EAR: Primary | ICD-10-CM

## 2022-09-14 PROCEDURE — 1123F ACP DISCUSS/DSCN MKR DOCD: CPT | Performed by: NURSE PRACTITIONER

## 2022-09-14 PROCEDURE — 4004F PT TOBACCO SCREEN RCVD TLK: CPT | Performed by: NURSE PRACTITIONER

## 2022-09-14 PROCEDURE — G8427 DOCREV CUR MEDS BY ELIG CLIN: HCPCS | Performed by: NURSE PRACTITIONER

## 2022-09-14 PROCEDURE — 99204 OFFICE O/P NEW MOD 45 MIN: CPT | Performed by: NURSE PRACTITIONER

## 2022-09-14 PROCEDURE — 3017F COLORECTAL CA SCREEN DOC REV: CPT | Performed by: NURSE PRACTITIONER

## 2022-09-14 PROCEDURE — G8417 CALC BMI ABV UP PARAM F/U: HCPCS | Performed by: NURSE PRACTITIONER

## 2022-09-14 PROCEDURE — G8400 PT W/DXA NO RESULTS DOC: HCPCS | Performed by: NURSE PRACTITIONER

## 2022-09-14 PROCEDURE — 1090F PRES/ABSN URINE INCON ASSESS: CPT | Performed by: NURSE PRACTITIONER

## 2022-09-14 PROCEDURE — 92567 TYMPANOMETRY: CPT | Performed by: AUDIOLOGIST

## 2022-09-14 PROCEDURE — 92557 COMPREHENSIVE HEARING TEST: CPT | Performed by: AUDIOLOGIST

## 2022-09-14 ASSESSMENT — ENCOUNTER SYMPTOMS
RESPIRATORY NEGATIVE: 1
SHORTNESS OF BREATH: 0
EYES NEGATIVE: 1
STRIDOR: 0

## 2022-09-14 NOTE — PROGRESS NOTES
This patient was referred for audiometric and tympanometric testing by RIMA Clay due to sudden onset hearing loss and vertigo. Patient reported hearing loss, on the right and room spinning vertigo, starting about a week ago. She reported tinnitus beginning at this time     Audiometry using pure tone air and bone conduction testing revealed a profound sensorineural hearing loss, in the right ear and hearing sensitivity within normal limits through 2000 Hz sloping to moderate  sensorineural hearing loss, in the left ear. Reliability was good. Speech reception thresholds were in good agreement with the pure tone averages, in the left ear and could not be measured, in the right ear. Speech discrimination scores were poor (0%), in the right ear and excellent, in the left ear. Tympanometry revealed normal middle ear peak pressure and compliance, bilaterally. The results were reviewed with the patient. Recommendations for follow up will be made pending physician consult.     Shira Oliver [facility]  [address] [facility] [id number]   Electronically signed by Shira Oliver on 9/14/2022 at 11:23 AM

## 2022-09-14 NOTE — PROGRESS NOTES
[facility] Otolaryngology  Dr. Mindy Henson. TRACI Soto Ms.Ed. New Consult       Patient Name:  Maycol Neal  :  1955     CHIEF C/O:    Chief Complaint   Patient presents with    New Patient     NP dizziness/RT hearing loss       HISTORY OBTAINED FROM:  patient    HISTORY OF PRESENT ILLNESS:       Marengo Ruben is a 73y.o. year old female, here today for sudden hearing loss in the right ear, dizziness.     Symptoms started 1 week ago  Sudden hearing loss of the right ear, unable to hear anything  Denies pain or pressure with onset of symptoms  No previous dx of hearing loss  Family hx of hearing loss - aunt  No persistent noise exposure  No hx of recurrent ear infections or previous ear surgeries  Has a hx of previous cerumen impactions  States dizziness started with symptoms of hearing loss  Room spinning with rapid movements, off balance when ambulating  Loud ringing in right ear, high frequency, non-pulsatile squealing sound  No known family hx of meniere's disease          Past Medical History:   Diagnosis Date    Arthritis     Full dentures     Low back pain     Lymphedema of both lower extremities chronic and continues as of 23    Obesity     280 #    Osteoarthritis     Peripheral vision loss     Stroke [facility])      Past Surgical History:   Procedure Laterality Date    ABDOMINOPLASTY  2002    BREAST REDUCTION SURGERY      reduction    CATARACT REMOVAL      bilateral     SECTION      x2    COLONOSCOPY  2012    and egd    COLONOSCOPY  2021    polyps; marginal prep--jessica    COLONOSCOPY N/A 2021    COLONOSCOPY WITH BIOPSY performed by Jose Raul Kearney MD at [address] [place] [address]    ECHOCARDIOGRAM COMPLETE 2D W DOPPLER W COLOR  2012         GASTRIC BYPASS SURGERY  2000    NO NASTOGASTRIC TUBE    HERNIA REPAIR          LUMBAR SPINE SURGERY N/A 2021    L3-L4  POSTERIOR LUMBAR INTERBODY  FUSION--OARM, JESSI, YAJAIRA TABLE, CELL SAVER, PLATELET GEL, AUDIOLOGY, CAGES, PLATES, SCREWS -- GLOBUS performed by Glorya Sicard, MD at . [address] Right 7/14/2022    RIGHT SACROILIAC JOINT INJECTION performed by Germán Colbert DO at [address]  03/27/2021    minimal pouch gastritis--jessica    UPPER GASTROINTESTINAL ENDOSCOPY N/A 03/27/2021    EGD ESOPHAGOGASTRODUODENOSCOPY performed by Danisha Quintana MD at [facility]       Current Outpatient Medications:     meclizine (ANTIVERT) 12.5 MG tablet, Take 1 tablet by mouth 4 times daily as needed for Dizziness, Disp: 60 tablet, Rfl: 3    albuterol sulfate HFA (PROVENTIL HFA) 108 (90 Base) MCG/ACT inhaler, Inhale 2 puffs into the lungs every 4 hours as needed for Wheezing, Disp: 18 g, Rfl: 1    cefdinir (OMNICEF) 300 MG capsule, Take 1 capsule by mouth 2 times daily for 7 days, Disp: 14 capsule, Rfl: 0    rOPINIRole (REQUIP) 2 MG tablet, Take 0.5 tablets by mouth in the morning, at noon, and at bedtime, Disp: 360 tablet, Rfl: 12    Baclofen (LIORESAL) 5 MG tablet, Take 1 tablet by mouth 2 times daily as needed (spasms), Disp: 180 tablet, Rfl: 5    nitrofurantoin, macrocrystal-monohydrate, (MACROBID) 100 MG capsule, , Disp: , Rfl:   Ferritin  Social History     Tobacco Use    Smoking status: Every Day     Packs/day: 1.00     Years: 38.00     Pack years: 38.00     Types: Cigarettes    Smokeless tobacco: Never    Tobacco comments:     restarted 3/2022   Vaping Use    Vaping Use: Never used   Substance Use Topics    Alcohol use: No    Drug use: No     Family History   Problem Relation Age of Onset    Breast Cancer Mother     Stroke Father     Hypertension Sister     Hypertension Brother        Review of Systems   Constitutional: Negative. Negative for activity change and appetite change. HENT:  Positive for hearing loss (Right ear) and tinnitus (Right ear). Negative for ear discharge and ear pain. Eyes: Negative. Respiratory: Negative. Negative for shortness of breath and stridor. Cardiovascular: Negative. Negative for chest pain and palpitations. Endocrine: Negative. Skin: Negative. Neurological: Negative. Negative for dizziness. Hematological: Negative. Psychiatric/Behavioral: Negative. BP (!) 172/109   Pulse 93   Ht 5' 2\" (1.575 m)   Wt 280 lb (127 kg)   BMI 51.21 kg/m²   Physical Exam  Constitutional:       Appearance: Normal appearance. HENT:      Head: Normocephalic. Right Ear: Tympanic membrane, ear canal and external ear normal. Decreased hearing noted. Left Ear: Tympanic membrane, ear canal and external ear normal. Decreased hearing noted. Ears:      Comments: Patient with no usable hearing in the right ear     Nose: No rhinorrhea. Right Turbinates: Not pale. Left Turbinates: Not pale. Mouth/Throat:      Lips: Pink. Mouth: Mucous membranes are moist.      Pharynx: Oropharynx is clear. Eyes:      Conjunctiva/sclera: Conjunctivae normal.      Pupils: Pupils are equal, round, and reactive to light. Cardiovascular:      Rate and Rhythm: Normal rate and regular rhythm. Pulses: Normal pulses. Pulmonary:      Effort: Pulmonary effort is normal. No respiratory distress. Breath sounds: No stridor. Musculoskeletal:         General: Normal range of motion. Cervical back: Normal range of motion. No rigidity. No muscular tenderness. Skin:     General: Skin is warm and dry. Neurological:      General: No focal deficit present. Mental Status: She is alert and oriented to person, place, and time. Psychiatric:         Mood and Affect: Mood normal.         Behavior: Behavior normal.         Thought Content: Thought content normal.         Judgment: Judgment normal.           Audiogram and tympanogram reviewed with patient. Audiogram reveals 100 dB hearing loss in the right ear with 0% discrimination at 95 dB, 25 dB of hearing loss in the left ear with 100% discrimination at 55 dB. Audiogram is asymmetrical on right.  Tympanogram reveals type A curve in the right ear, with type A curve in the left ear. IMPRESSION/PLAN:      Jill Mcelroy was seen today for new patient. Diagnoses and all orders for this visit:    Sudden idiopathic hearing loss of right ear with restricted hearing of left ear  -     MRI IAC POSTERIOR FOSSA W WO CONTRAST; Future    Sensorineural hearing loss (SNHL) of left ear with restricted hearing of right ear    Vertigo  -     MRI IAC POSTERIOR FOSSA W WO CONTRAST; Future    Patient seen and examined today for sudden hearing loss of the right ear. Upon review of her audiogram there is profound hearing loss noted in the right ear with no usable word discrimination and minimal recognition. At this time options are discussed with the patient including oral steroid therapy versus intratympanic steroid injection. Patient states that she does not tolerate oral steroids and would prefer intratympanic injection. This is arranged with the patient to be seen in 1 day by Dr. Rosy Orta for right intratympanic Decadron injection for her symptoms. An MRI of the IAC posterior fossa with and without contrast was also ordered at this time for further evaluation of the internal auditory canal in regards to her sudden hearing loss and dizziness symptoms. At this time she will follow-up based on the recommendation of Dr. Rosy Orta following her injection. She is instructed to call with any new or worsening symptoms prior to her next appointment.     Mirian Rainey, MSN, FNP-C  [facility], Nose and Throat    The information contained in this note has been dictated using drug and medical speech recognition software and may contain errors

## 2022-09-14 NOTE — PROGRESS NOTES
NEW PATIENT VISIT  Chief Complaint   Patient presents with    Follow-up     Right ear sudden hearing loss times 8 days. History of Present Illness:     Kerri Rockwell is a 73 y.o. female presenting with 1 week history of right sudden hearing loss with vertigo symptoms; she is unable to tolerate oral steroids and was sent for trial of transtympanic steroids to treat her sudden hearing loss. MRI IAC was ordered.  Patient of Denise Shin  She does not have a prior ear history and is most bothered by her tinnitus; states she is unsteady but otherwise has not fallen  She is a retired RN        TOBACCO  Social History     Tobacco Use   Smoking Status Every Day    Packs/day: 1.00    Years: 38.00    Pack years: 38.00    Types: Cigarettes   Smokeless Tobacco Never   Tobacco Comments    restarted 3/2022        ALCOHOL   Social History     Substance and Sexual Activity   Alcohol Use No        DRUGHX   Social History     Substance and Sexual Activity   Drug Use No        CURRENT OUTPATIENT MEDICATIONS:   Outpatient Medications Marked as Taking for the 9/15/22 encounter (Office Visit) with Angela Gustafson MD   Medication Sig Dispense Refill    meclizine (ANTIVERT) 12.5 MG tablet Take 1 tablet by mouth 4 times daily as needed for Dizziness 60 tablet 3    albuterol sulfate HFA (PROVENTIL HFA) 108 (90 Base) MCG/ACT inhaler Inhale 2 puffs into the lungs every 4 hours as needed for Wheezing 18 g 1    cefdinir (OMNICEF) 300 MG capsule Take 1 capsule by mouth 2 times daily for 7 days 14 capsule 0    rOPINIRole (REQUIP) 2 MG tablet Take 0.5 tablets by mouth in the morning, at noon, and at bedtime 360 tablet 12    Baclofen (LIORESAL) 5 MG tablet Take 1 tablet by mouth 2 times daily as needed (spasms) 180 tablet 5    nitrofurantoin, macrocrystal-monohydrate, (MACROBID) 100 MG capsule           ALLERGIES:   Allergies   Allergen Reactions    Ferritin Shortness Of Breath and Other (See Comments)     Flushed,  Severe back pain HGB 12.8 09/07/2022 03:43 PM    HCT 43.2 09/07/2022 03:43 PM     09/07/2022 03:43 PM    MCV 79.1 (L) 09/07/2022 03:43 PM    MCH 23.4 (L) 09/07/2022 03:43 PM    MCHC 29.6 (L) 09/07/2022 03:43 PM    RDW 18.6 (H) 09/07/2022 03:43 PM    NEUTOPHILPCT 71.3 09/07/2022 03:43 PM    LYMPHOPCT 20.0 09/07/2022 03:43 PM    MONOPCT 6.0 09/07/2022 03:43 PM    EOSRELPCT 2.0 09/07/2022 03:43 PM    BASOPCT 0.2 09/07/2022 03:43 PM    NEUTROABS 6.09 09/07/2022 03:43 PM    LYMPHSABS 1.71 09/07/2022 03:43 PM    EOSABS 0.17 09/07/2022 03:43 PM        Old records  Reviewed   Discussion with other providers    Done     On this date 9/15/2022 I have spent 10 minutes reviewing previous notes, test results and 30 min face to face with the patient discussing the diagnosis and importance of compliance with the treatment plan as well as documenting on the day of the visit. Procedure: right transtympanic steroid injection    Risks and benefits were discussed including infection, postoperative bleeding, persistent drainage, persistent perforation, need for further surgery, persistent hearing loss    Procedure: The right ear was visualized with the ear microscope and excessive cerumen was removed. An anteroinferior incision was made, the ear was suctioned and 1 ml of 24mcg/ml decadron was placed without difficulty. The patient was rolled to the left side and laid in that location for 15 minutes without swallowing. The patient tolerated the procedure without complication. Complications: none    EBL: minimal    A/P    Impression   Natalia Linn is a 73 y.o. female with Right unilateral profound sudden sensorineural hearing loss confirmed by Audiogram, who will benefit from Medical therapy with transtympanic steroids as she cannot tolerate oral steroids and Hearing loss evaluation.  The rest of the exam was unremarkable    Plan  Patient will benefit from MRI with IAC protocol (ordered by Ilda Junior)  Follow up in 2 weeks  She

## 2022-09-15 ENCOUNTER — HOSPITAL ENCOUNTER (EMERGENCY)
Age: 67
Discharge: HOME | End: 2022-09-15
Attending: EMERGENCY MEDICINE
Payer: MEDICARE

## 2022-09-15 ENCOUNTER — OFFICE VISIT (OUTPATIENT)
Dept: ENT CLINIC | Age: 67
End: 2022-09-15
Payer: MEDICARE

## 2022-09-15 ENCOUNTER — APPOINTMENT (OUTPATIENT)
Dept: GENERAL RADIOLOGY | Age: 67
End: 2022-09-15
Payer: MEDICARE

## 2022-09-15 ENCOUNTER — TELEPHONE (OUTPATIENT)
Dept: ENT CLINIC | Age: 67
End: 2022-09-15

## 2022-09-15 VITALS
HEIGHT: 62 IN | DIASTOLIC BLOOD PRESSURE: 88 MMHG | BODY MASS INDEX: 51.53 KG/M2 | OXYGEN SATURATION: 94 % | HEART RATE: 86 BPM | WEIGHT: 280 LBS | SYSTOLIC BLOOD PRESSURE: 157 MMHG | RESPIRATION RATE: 16 BRPM

## 2022-09-15 VITALS — WEIGHT: 280 LBS | HEIGHT: 62 IN | BODY MASS INDEX: 51.53 KG/M2

## 2022-09-15 DIAGNOSIS — H91.8X9 ASYMMETRICAL HEARING LOSS: ICD-10-CM

## 2022-09-15 DIAGNOSIS — H91.21 SUDDEN IDIOPATHIC HEARING LOSS OF RIGHT EAR WITH RESTRICTED HEARING OF LEFT EAR: Primary | ICD-10-CM

## 2022-09-15 DIAGNOSIS — H90.A22 SENSORINEURAL HEARING LOSS (SNHL) OF LEFT EAR WITH RESTRICTED HEARING OF RIGHT EAR: ICD-10-CM

## 2022-09-15 DIAGNOSIS — R42 VERTIGO: ICD-10-CM

## 2022-09-15 DIAGNOSIS — Z23 NEED FOR TETANUS BOOSTER: ICD-10-CM

## 2022-09-15 DIAGNOSIS — S81.811A LACERATION OF RIGHT LOWER EXTREMITY, INITIAL ENCOUNTER: Primary | ICD-10-CM

## 2022-09-15 LAB
ABO/RH: NORMAL
ALBUMIN SERPL-MCNC: 3.7 G/DL (ref 3.5–5.2)
ALP BLD-CCNC: 149 U/L (ref 35–104)
ALT SERPL-CCNC: 20 U/L (ref 0–32)
ANION GAP SERPL CALCULATED.3IONS-SCNC: 9 MMOL/L (ref 7–16)
ANTIBODY SCREEN: NORMAL
AST SERPL-CCNC: 22 U/L (ref 0–31)
BASOPHILS ABSOLUTE: 0.04 E9/L (ref 0–0.2)
BASOPHILS RELATIVE PERCENT: 0.5 % (ref 0–2)
BILIRUB SERPL-MCNC: 0.4 MG/DL (ref 0–1.2)
BUN BLDV-MCNC: 11 MG/DL (ref 6–23)
CALCIUM SERPL-MCNC: 8.7 MG/DL (ref 8.6–10.2)
CHLORIDE BLD-SCNC: 104 MMOL/L (ref 98–107)
CO2: 29 MMOL/L (ref 22–29)
CREAT SERPL-MCNC: 0.6 MG/DL (ref 0.5–1)
EOSINOPHILS ABSOLUTE: 0.12 E9/L (ref 0.05–0.5)
EOSINOPHILS RELATIVE PERCENT: 1.6 % (ref 0–6)
GFR AFRICAN AMERICAN: >60
GFR NON-AFRICAN AMERICAN: >60 ML/MIN/1.73
GLUCOSE BLD-MCNC: 117 MG/DL (ref 74–99)
HCT VFR BLD CALC: 39 % (ref 34–48)
HEMOGLOBIN: 11.7 G/DL (ref 11.5–15.5)
IMMATURE GRANULOCYTES #: 0.03 E9/L
IMMATURE GRANULOCYTES %: 0.4 % (ref 0–5)
LYMPHOCYTES ABSOLUTE: 1.15 E9/L (ref 1.5–4)
LYMPHOCYTES RELATIVE PERCENT: 15 % (ref 20–42)
MCH RBC QN AUTO: 24 PG (ref 26–35)
MCHC RBC AUTO-ENTMCNC: 30 % (ref 32–34.5)
MCV RBC AUTO: 80.1 FL (ref 80–99.9)
MONOCYTES ABSOLUTE: 0.48 E9/L (ref 0.1–0.95)
MONOCYTES RELATIVE PERCENT: 6.3 % (ref 2–12)
NEUTROPHILS ABSOLUTE: 5.85 E9/L (ref 1.8–7.3)
NEUTROPHILS RELATIVE PERCENT: 76.2 % (ref 43–80)
PDW BLD-RTO: 17.9 FL (ref 11.5–15)
PLATELET # BLD: 178 E9/L (ref 130–450)
PMV BLD AUTO: 11.7 FL (ref 7–12)
POTASSIUM SERPL-SCNC: 4 MMOL/L (ref 3.5–5)
RBC # BLD: 4.87 E12/L (ref 3.5–5.5)
SODIUM BLD-SCNC: 142 MMOL/L (ref 132–146)
TOTAL PROTEIN: 6.8 G/DL (ref 6.4–8.3)
WBC # BLD: 7.7 E9/L (ref 4.5–11.5)

## 2022-09-15 PROCEDURE — 1090F PRES/ABSN URINE INCON ASSESS: CPT | Performed by: OTOLARYNGOLOGY

## 2022-09-15 PROCEDURE — 86901 BLOOD TYPING SEROLOGIC RH(D): CPT

## 2022-09-15 PROCEDURE — 3017F COLORECTAL CA SCREEN DOC REV: CPT | Performed by: OTOLARYNGOLOGY

## 2022-09-15 PROCEDURE — 6360000002 HC RX W HCPCS: Performed by: PHYSICIAN ASSISTANT

## 2022-09-15 PROCEDURE — 2580000003 HC RX 258: Performed by: PHYSICIAN ASSISTANT

## 2022-09-15 PROCEDURE — 73590 X-RAY EXAM OF LOWER LEG: CPT

## 2022-09-15 PROCEDURE — G8427 DOCREV CUR MEDS BY ELIG CLIN: HCPCS | Performed by: OTOLARYNGOLOGY

## 2022-09-15 PROCEDURE — 6370000000 HC RX 637 (ALT 250 FOR IP): Performed by: PHYSICIAN ASSISTANT

## 2022-09-15 PROCEDURE — 99215 OFFICE O/P EST HI 40 MIN: CPT | Performed by: OTOLARYNGOLOGY

## 2022-09-15 PROCEDURE — G8417 CALC BMI ABV UP PARAM F/U: HCPCS | Performed by: OTOLARYNGOLOGY

## 2022-09-15 PROCEDURE — 4004F PT TOBACCO SCREEN RCVD TLK: CPT | Performed by: OTOLARYNGOLOGY

## 2022-09-15 PROCEDURE — 86900 BLOOD TYPING SEROLOGIC ABO: CPT

## 2022-09-15 PROCEDURE — 80053 COMPREHEN METABOLIC PANEL: CPT

## 2022-09-15 PROCEDURE — 86850 RBC ANTIBODY SCREEN: CPT

## 2022-09-15 PROCEDURE — 90714 TD VACC NO PRESV 7 YRS+ IM: CPT | Performed by: PHYSICIAN ASSISTANT

## 2022-09-15 PROCEDURE — 99284 EMERGENCY DEPT VISIT MOD MDM: CPT

## 2022-09-15 PROCEDURE — 90471 IMMUNIZATION ADMIN: CPT | Performed by: PHYSICIAN ASSISTANT

## 2022-09-15 PROCEDURE — 2500000003 HC RX 250 WO HCPCS: Performed by: PHYSICIAN ASSISTANT

## 2022-09-15 PROCEDURE — G8400 PT W/DXA NO RESULTS DOC: HCPCS | Performed by: OTOLARYNGOLOGY

## 2022-09-15 PROCEDURE — 69801 INCISE INNER EAR: CPT | Performed by: OTOLARYNGOLOGY

## 2022-09-15 PROCEDURE — 85025 COMPLETE CBC W/AUTO DIFF WBC: CPT

## 2022-09-15 PROCEDURE — 96374 THER/PROPH/DIAG INJ IV PUSH: CPT

## 2022-09-15 PROCEDURE — 12002 RPR S/N/AX/GEN/TRNK2.6-7.5CM: CPT

## 2022-09-15 PROCEDURE — 1123F ACP DISCUSS/DSCN MKR DOCD: CPT | Performed by: OTOLARYNGOLOGY

## 2022-09-15 PROCEDURE — 96375 TX/PRO/DX INJ NEW DRUG ADDON: CPT

## 2022-09-15 RX ORDER — LIDOCAINE HYDROCHLORIDE 10 MG/ML
INJECTION, SOLUTION EPIDURAL; INFILTRATION; INTRACAUDAL; PERINEURAL
Status: DISCONTINUED
Start: 2022-09-15 | End: 2022-09-15 | Stop reason: WASHOUT

## 2022-09-15 RX ORDER — LIDOCAINE HYDROCHLORIDE AND EPINEPHRINE 10; 10 MG/ML; UG/ML
20 INJECTION, SOLUTION INFILTRATION; PERINEURAL ONCE
Status: COMPLETED | OUTPATIENT
Start: 2022-09-15 | End: 2022-09-15

## 2022-09-15 RX ORDER — DOXYCYCLINE HYCLATE 100 MG
100 TABLET ORAL 2 TIMES DAILY
Qty: 20 TABLET | Refills: 0 | Status: SHIPPED | OUTPATIENT
Start: 2022-09-15 | End: 2022-09-25

## 2022-09-15 RX ORDER — TETANUS AND DIPHTHERIA TOXOIDS ADSORBED 2; 2 [LF]/.5ML; [LF]/.5ML
0.5 INJECTION INTRAMUSCULAR ONCE
Status: COMPLETED | OUTPATIENT
Start: 2022-09-15 | End: 2022-09-15

## 2022-09-15 RX ORDER — FENTANYL CITRATE 50 UG/ML
50 INJECTION, SOLUTION INTRAMUSCULAR; INTRAVENOUS ONCE
Status: COMPLETED | OUTPATIENT
Start: 2022-09-15 | End: 2022-09-15

## 2022-09-15 RX ORDER — OXYCODONE HYDROCHLORIDE AND ACETAMINOPHEN 5; 325 MG/1; MG/1
1 TABLET ORAL ONCE
Status: COMPLETED | OUTPATIENT
Start: 2022-09-15 | End: 2022-09-15

## 2022-09-15 RX ADMIN — TETANUS AND DIPHTHERIA TOXOIDS ADSORBED 0.5 ML: 2; 2 INJECTION INTRAMUSCULAR at 12:10

## 2022-09-15 RX ADMIN — FENTANYL CITRATE 50 MCG: 50 INJECTION, SOLUTION INTRAMUSCULAR; INTRAVENOUS at 13:07

## 2022-09-15 RX ADMIN — OXYCODONE AND ACETAMINOPHEN 1 TABLET: 5; 325 TABLET ORAL at 12:09

## 2022-09-15 RX ADMIN — CEFTRIAXONE 1000 MG: 1 INJECTION, POWDER, FOR SOLUTION INTRAMUSCULAR; INTRAVENOUS at 14:42

## 2022-09-15 RX ADMIN — LIDOCAINE HYDROCHLORIDE AND EPINEPHRINE 20 ML: 10; 10 INJECTION, SOLUTION INFILTRATION; PERINEURAL at 14:18

## 2022-09-15 ASSESSMENT — PAIN DESCRIPTION - LOCATION: LOCATION: LEG

## 2022-09-15 ASSESSMENT — PAIN DESCRIPTION - DESCRIPTORS: DESCRIPTORS: ACHING;BURNING

## 2022-09-15 ASSESSMENT — PAIN SCALES - GENERAL
PAINLEVEL_OUTOF10: 9
PAINLEVEL_OUTOF10: 9
PAINLEVEL_OUTOF10: 10

## 2022-09-15 ASSESSMENT — PAIN DESCRIPTION - PAIN TYPE: TYPE: ACUTE PAIN

## 2022-09-15 ASSESSMENT — PAIN DESCRIPTION - ORIENTATION: ORIENTATION: RIGHT

## 2022-09-15 ASSESSMENT — PAIN - FUNCTIONAL ASSESSMENT: PAIN_FUNCTIONAL_ASSESSMENT: 0-10

## 2022-09-15 NOTE — ED NOTES
Patient excessively bleeding through ace wrap and cain notified physician and Physician assistant       Woody Lowry RN  09/15/22 [phone]

## 2022-09-15 NOTE — Clinical Note
Tomás Baker was seen and treated in our emergency department on 9/15/2022. She may return to work on 09/19/2022. If you have any questions or concerns, please don't hesitate to call.       Tali Montelongo PA-C

## 2022-09-15 NOTE — ED PROVIDER NOTES
ATTENDING PROVIDER ATTESTATION:     Eduardo Saavedra presented to the emergency department for evaluation of Laceration (Lac to right leg from wheelchair)    I have reviewed and discussed the case, including pertinent history (medical, surgical, family and social) and exam findings with the Midlevel and the Nurse assigned to Eduardo Saavedra. I have personally performed and/or participated in the history, exam, medical decision making, and procedures and agree with all pertinent clinical information. Patient is a 28-year-old female presenting with laceration to her right lower extremity. On examination, she had a large laceration with bleeding controlled. No foreign body. She had good distal pulses with no evidence of acute limb ischemia or compartment syndrome. She was neurovascularly intact. Imaging showed no acute findings. Labs were stable. Wound was cleansed and repaired by the midlevel provider under my direct supervision. Supportive care measures and ED return precautions discussed. I have reviewed my findings and recommendations with Eduardo Mohitjessie and members of family present at the time of disposition. My findings/plan: The primary encounter diagnosis was Laceration of right lower extremity, initial encounter. A diagnosis of Need for tetanus booster was also pertinent to this visit.   Discharge Medication List as of 9/15/2022  2:37 PM        START taking these medications    Details   doxycycline hyclate (VIBRA-TABS) 100 MG tablet Take 1 tablet by mouth 2 times daily for 10 days, Disp-20 tablet, R-0Normal           Marcela López MD      ED Attending shared visit  CC: [id number]  Department of Emergency Medicine   ED  Encounter Note  Admit Date/RoomTime: 9/15/2022 11:51 AM  ED Room:     NAME: Eduardo Saavedra  : 1955  MRN: [id number]     Chief Complaint:  Laceration (Lac to right leg from wheelchair)    History of Present Illness Christine Cid is a 73 y.o. old female presenting to the emergency department by private vehicle, for a laceration to the right lateral lower leg, caused by metal edge, which occurred leaving doctors appointment approximately 1 hour(s) prior to arrival.  Symptoms have been moderate in severity, constant, no exacerbating or alleviating factors. No associated symptoms. No numbness or tingling. No other trauma. There is not a possibility of retained foreign body in the affected area. Bleeding is  controlled. She takes no blood thinning agents. There is pain at injury site. Tetanus Status:  unknown. ROS   Pertinent positives and negatives are stated within HPI, all other systems reviewed and are negative. Past Medical History:  has a past medical history of Arthritis, Full dentures, Low back pain, Lymphedema of both lower extremities, Obesity, Osteoarthritis, Peripheral vision loss, and Stroke ([facility] [address] Surgical History:  has a past surgical history that includes ECHO Complete 2D W Doppler W Color (2012);  section; Gastric bypass surgery (); Abdominoplasty (); Cataract removal; hernia repair; Colonoscopy (2012); Breast reduction surgery; Upper gastrointestinal endoscopy (2021); Colonoscopy (2021); Upper gastrointestinal endoscopy (N/A, 2021); Colonoscopy (N/A, 2021); Lumbar spine surgery (N/A, 2021); and Nerve Block (Right, 2022). Social History:  reports that she has been smoking cigarettes. She has a 38.00 pack-year smoking history. She has never used smokeless tobacco. She reports that she does not drink alcohol and does not use drugs. Family History: family history includes Breast Cancer in her mother; Hypertension in her brother and sister; Stroke in her father.      Allergies: Ferritin    Physical Exam  Physical Exam   Oxygen Saturation Interpretation: Normal.        ED Triage Vitals   BP Temp Temp src Heart Rate Resp SpO2 Height Weight   09/15/22 1149 -- -- 09/15/22 1141 09/15/22 1149 09/15/22 1141 09/15/22 1149 09/15/22 1149   (!) 199/108   (!) 112 16 95 % 5' 2\" (1.575 m) 280 lb (127 kg)         Constitutional:  Alert, development consistent with age. Neck:  Normal ROM. Supple. Extremity(s):  Right: lateral shin. Tenderness:  mild. Swelling: Moderate. Calf:  No evidence of DVT seen on physical exam. Negative Yung's sign. No cords or calf tenderness. .            Deformity: No.               ROM: diminished range of motion. Skin: 5 cm laceration noted to right lateral calf. Severe lymphedema noted. This is patient's norm. No evidence of cellulitis. Fat exposure noted no tendon injury. Patient does have flexion and extension in a good DP/PT pulse. Neurovascular: Motor deficit: none. Sensory deficit: none. Pulse deficit: none. Capillary refill: normal.  Gait:  limp due to affected limb. Lymphatics: No lymphangitis or adenopathy noted. Neurological:  Oriented. Motor functions intact. Informed consent. The patient has given verbal consent to have photos taken of their leg and inserted into their ED provider note as part of their permanent medical record. The patient did view the photo  and deemed it appropriate prior to inclusion in their chart.     Lab / Imaging Results   (All laboratory and radiology results have been personally reviewed by myself)  Labs:  Results for orders placed or performed during the hospital encounter of 09/15/22   CBC with Auto Differential   Result Value Ref Range    WBC 7.7 4.5 - 11.5 E9/L    RBC 4.87 3.50 - 5.50 E12/L    Hemoglobin 11.7 11.5 - 15.5 g/dL    Hematocrit 39.0 34.0 - 48.0 %    MCV 80.1 80.0 - 99.9 fL    MCH 24.0 (L) 26.0 - 35.0 pg    MCHC 30.0 (L) 32.0 - 34.5 %    RDW 17.9 (H) 11.5 - 15.0 fL    Platelets 368 574 - 133 E9/L    MPV 11.7 7.0 - 12.0 fL    Neutrophils % 76.2 43.0 - 80.0 %    Immature Granulocytes % 0.4 0.0 - 5.0 %    Lymphocytes % 15.0 (L) 20.0 - 42.0 %    Monocytes % 6.3 2.0 - 12.0 %    Eosinophils % 1.6 0.0 - 6.0 %    Basophils % 0.5 0.0 - 2.0 %    Neutrophils Absolute 5.85 1.80 - 7.30 E9/L    Immature Granulocytes # 0.03 E9/L    Lymphocytes Absolute 1.15 (L) 1.50 - 4.00 E9/L    Monocytes Absolute 0.48 0.10 - 0.95 E9/L    Eosinophils Absolute 0.12 0.05 - 0.50 E9/L    Basophils Absolute 0.04 0.00 - 0.20 E9/L   Comprehensive Metabolic Panel   Result Value Ref Range    Sodium 142 132 - 146 mmol/L    Potassium 4.0 3.5 - 5.0 mmol/L    Chloride 104 98 - 107 mmol/L    CO2 29 22 - 29 mmol/L    Anion Gap 9 7 - 16 mmol/L    Glucose 117 (H) 74 - 99 mg/dL    BUN 11 6 - 23 mg/dL    Creatinine 0.6 0.5 - 1.0 mg/dL    GFR Non-African American >60 >=60 mL/min/1.73    GFR African American >60     Calcium 8.7 8.6 - 10.2 mg/dL    Total Protein 6.8 6.4 - 8.3 g/dL    Albumin 3.7 3.5 - 5.2 g/dL    Total Bilirubin 0.4 0.0 - 1.2 mg/dL    Alkaline Phosphatase 149 (H) 35 - 104 U/L    ALT 20 0 - 32 U/L    AST 22 0 - 31 U/L   TYPE AND SCREEN   Result Value Ref Range    ABO/Rh A POS     Antibody Screen NEG      Imaging: All Radiology results interpreted by Radiologist unless otherwise noted. XR TIBIA FIBULA RIGHT (2 VIEWS)   Final Result   No acute osseous abnormality. No radiopaque foreign bodies.            ED Course / Medical Decision Making     Medications   cefTRIAXone (ROCEPHIN) 1,000 mg in sterile water 10 mL IV syringe (has no administration in time range)   oxyCODONE-acetaminophen (PERCOCET) 5-325 MG per tablet 1 tablet (1 tablet Oral Given 9/15/22 1209)   diptheria-tetanus toxoids (DECAVAC) 2-2 LF/0.5ML injection 0.5 mL (0.5 mLs IntraMUSCular Given 9/15/22 1210)   lidocaine-EPINEPHrine 1 %-1:284861 injection 20 mL (20 mLs IntraDERmal Given 9/15/22 1418)   fentaNYL (SUBLIMAZE) injection 50 mcg (50 mcg IntraVENous Given 9/15/22 1307)        Re-examination:  9/15/22       Time: Patient was seen and evaluated by my attending and she agrees with the plan and management of suturing at bedside. Wound thoroughly irrigated multiple times Betadine used as well anesthetization achieved. Antibiotics initiated. Tetanus updated. Due to amount of blood loss CBC type and screen and BMP ordered. Patient only lost 1 g therefore hemoglobin stable. Vitals ordered to be repeated    Consult(s):   None    Procedure(s):   PROCEDURE NOTE  9/15/22       Time: 1400    Extensive  LACERATION REPAIR  Risks, benefits and alternatives (for applicable procedures below) described. Performed By: Trevor Sánchez PA-C. Informed consent: Written consent obtained. The patient was counseled regarding the procedure in person, it's indications, risks, potential complications and alternatives and any questions were answered. Consent was obtained. .    Laceration #: 1. Location: Right lateral calf  Length: 5 cm. The wound area was irrigated with sterile saline, cleansed with povidone iodine, cleansed with povidone iodine scrub and draped in a sterile fashion. Local Anesthesia:  obtained with Lidocaine 1% with epinephrine. The wound was explored with the following results: Thickness: partial thickness. no foreign body or tendon injury seen. Debridement: None. Undermining: None. Wound Margins Revised: None. Flaps Aligned: No.  The wound was closed in single layer closure with #14  3-0 Ethilon using interrupted suture(s). Dressing:  bacitracin, a sterile dressing, a bandage, and compression dressing. There were no additional wounds requiring formal closure. MDM:   Patient is a 29-year-old female that is coming to the emergency department with laceration to right lateral calf. This occurred on a metal edge of a wheelchair leaving a doctor's appointment. Large amount of bleeding noted from area. Compression dressing applied. Lidocaine with epinephrine used.   Imaging was obtained based on moderate suspicion for tetanus booster      Plan   Discharged home. Patient condition is stable    New Medications     New Prescriptions    DOXYCYCLINE HYCLATE (VIBRA-TABS) 100 MG TABLET    Take 1 tablet by mouth 2 times daily for 10 days     Electronically signed by Renee Boles PA-C   DD: 9/15/22  **This report was transcribed using voice recognition software. Every effort was made to ensure accuracy; however, inadvertent computerized transcription errors may be present.   END OF ED PROVIDER NOTE      Renee Boles PA-C  09/15/22 2230       Marcela López MD  09/15/22 [id number]

## 2022-09-15 NOTE — DISCHARGE INSTRUCTIONS
Please take antibiotics in full. Prescription sent. Use pain medication at home. Use ice 20 minutes on 20 minutes off. Please change the dressings daily. If you have any worsening signs of infection such as redness, swelling, pus return back to the emergency department.   I hope you feel better soon, it was a pleasure taking care of you!!

## 2022-09-15 NOTE — TELEPHONE ENCOUNTER
Patient is seen in clinic today and dicussed the need for open MRI. Exaplained to patient that she will need to get disc before leaving scan.  Patient stated understanding

## 2022-09-15 NOTE — TELEPHONE ENCOUNTER
Called [facility] to have injection delivered to [facility] 9/29/22 before 9:30am for second injection. Spoke with Jose at [address] to confirm delivery.

## 2022-09-15 NOTE — Clinical Note
Anup Christianson was seen and treated in our emergency department on 9/15/2022. She may return to work on 09/19/2022. If you have any questions or concerns, please don't hesitate to call.       Omayra Mueller PA-C

## 2022-09-20 ENCOUNTER — TELEPHONE (OUTPATIENT)
Dept: PRIMARY CARE CLINIC | Age: 67
End: 2022-09-20

## 2022-09-27 ENCOUNTER — APPOINTMENT (OUTPATIENT)
Dept: CT IMAGING | Age: 67
End: 2022-09-27
Payer: MEDICARE

## 2022-09-27 ENCOUNTER — APPOINTMENT (OUTPATIENT)
Dept: GENERAL RADIOLOGY | Age: 67
End: 2022-09-27
Payer: MEDICARE

## 2022-09-27 ENCOUNTER — TELEPHONE (OUTPATIENT)
Dept: ADMINISTRATIVE | Age: 67
End: 2022-09-27

## 2022-09-27 ENCOUNTER — HOSPITAL ENCOUNTER (EMERGENCY)
Age: 67
Discharge: HOME | End: 2022-09-27
Attending: EMERGENCY MEDICINE
Payer: MEDICARE

## 2022-09-27 VITALS
RESPIRATION RATE: 22 BRPM | DIASTOLIC BLOOD PRESSURE: 76 MMHG | BODY MASS INDEX: 51.53 KG/M2 | OXYGEN SATURATION: 93 % | SYSTOLIC BLOOD PRESSURE: 124 MMHG | WEIGHT: 280 LBS | TEMPERATURE: 97.8 F | HEART RATE: 93 BPM | HEIGHT: 62 IN

## 2022-09-27 DIAGNOSIS — S09.90XA CLOSED HEAD INJURY, INITIAL ENCOUNTER: ICD-10-CM
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
## 2022-09-27 LAB
ALBUMIN SERPL-MCNC: 3.2 G/DL (ref 3.5–5.2)
ALP BLD-CCNC: 133 U/L (ref 35–104)
ALT SERPL-CCNC: 11 U/L (ref 0–32)
ANION GAP SERPL CALCULATED.3IONS-SCNC: 11 MMOL/L (ref 7–16)
AST SERPL-CCNC: 12 U/L (ref 0–31)
BACTERIA: ABNORMAL /HPF
BASOPHILS ABSOLUTE: 0.02 E9/L (ref 0–0.2)
BASOPHILS RELATIVE PERCENT: 0.3 % (ref 0–2)
BILIRUB SERPL-MCNC: 0.2 MG/DL (ref 0–1.2)
BILIRUBIN URINE: NEGATIVE
BLOOD, URINE: NEGATIVE
BUN BLDV-MCNC: 14 MG/DL (ref 6–23)
CALCIUM SERPL-MCNC: 8.6 MG/DL (ref 8.6–10.2)
CHLORIDE BLD-SCNC: 101 MMOL/L (ref 98–107)
CLARITY: CLEAR
CO2: 28 MMOL/L (ref 22–29)
COLOR: YELLOW
CREAT SERPL-MCNC: 0.8 MG/DL (ref 0.5–1)
EOSINOPHILS ABSOLUTE: 0.08 E9/L (ref 0.05–0.5)
EOSINOPHILS RELATIVE PERCENT: 1.3 % (ref 0–6)
GFR AFRICAN AMERICAN: >60
GFR NON-AFRICAN AMERICAN: >60 ML/MIN/1.73
GLUCOSE BLD-MCNC: 154 MG/DL (ref 74–99)
GLUCOSE URINE: NEGATIVE MG/DL
HCT VFR BLD CALC: 34.1 % (ref 34–48)
HEMOGLOBIN: 9.9 G/DL (ref 11.5–15.5)
IMMATURE GRANULOCYTES #: 0.07 E9/L
IMMATURE GRANULOCYTES %: 1.2 % (ref 0–5)
KETONES, URINE: NEGATIVE MG/DL
LEUKOCYTE ESTERASE, URINE: ABNORMAL
LYMPHOCYTES ABSOLUTE: 0.73 E9/L (ref 1.5–4)
LYMPHOCYTES RELATIVE PERCENT: 12 % (ref 20–42)
MCH RBC QN AUTO: 23.2 PG (ref 26–35)
MCHC RBC AUTO-ENTMCNC: 29 % (ref 32–34.5)
MCV RBC AUTO: 79.9 FL (ref 80–99.9)
MONOCYTES ABSOLUTE: 0.69 E9/L (ref 0.1–0.95)
MONOCYTES RELATIVE PERCENT: 11.4 % (ref 2–12)
NEUTROPHILS ABSOLUTE: 4.47 E9/L (ref 1.8–7.3)
NEUTROPHILS RELATIVE PERCENT: 73.8 % (ref 43–80)
NITRITE, URINE: NEGATIVE
PDW BLD-RTO: 18.1 FL (ref 11.5–15)
PH UA: 6.5 (ref 5–9)
PLATELET # BLD: 167 E9/L (ref 130–450)
PMV BLD AUTO: 11.8 FL (ref 7–12)
POTASSIUM REFLEX MAGNESIUM: 3.6 MMOL/L (ref 3.5–5)
PRO-BNP: 225 PG/ML (ref 0–125)
PROTEIN UA: NEGATIVE MG/DL
RBC # BLD: 4.27 E12/L (ref 3.5–5.5)
RBC UA: ABNORMAL /HPF (ref 0–2)
RENAL EPITHELIAL, UA: ABNORMAL /HPF
SARS-COV-2, NAAT: NOT DETECTED
SODIUM BLD-SCNC: 140 MMOL/L (ref 132–146)
SPECIFIC GRAVITY UA: 1.02 (ref 1–1.03)
TOTAL PROTEIN: 6.2 G/DL (ref 6.4–8.3)
TROPONIN, HIGH SENSITIVITY: 21 NG/L (ref 0–9)
TROPONIN, HIGH SENSITIVITY: 24 NG/L (ref 0–9)
UROBILINOGEN, URINE: 0.2 E.U./DL
WBC # BLD: 6.1 E9/L (ref 4.5–11.5)
WBC UA: ABNORMAL /HPF (ref 0–5)

## 2022-09-27 PROCEDURE — 93005 ELECTROCARDIOGRAM TRACING: CPT | Performed by: STUDENT IN AN ORGANIZED HEALTH CARE EDUCATION/TRAINING PROGRAM

## 2022-09-27 PROCEDURE — 73564 X-RAY EXAM KNEE 4 OR MORE: CPT

## 2022-09-27 PROCEDURE — 6370000000 HC RX 637 (ALT 250 FOR IP): Performed by: EMERGENCY MEDICINE

## 2022-09-27 PROCEDURE — 99285 EMERGENCY DEPT VISIT HI MDM: CPT

## 2022-09-27 PROCEDURE — 70450 CT HEAD/BRAIN W/O DYE: CPT

## 2022-09-27 PROCEDURE — 85025 COMPLETE CBC W/AUTO DIFF WBC: CPT

## 2022-09-27 PROCEDURE — 83880 ASSAY OF NATRIURETIC PEPTIDE: CPT

## 2022-09-27 PROCEDURE — 72125 CT NECK SPINE W/O DYE: CPT

## 2022-09-27 PROCEDURE — 84484 ASSAY OF TROPONIN QUANT: CPT

## 2022-09-27 PROCEDURE — 81001 URINALYSIS AUTO W/SCOPE: CPT

## 2022-09-27 PROCEDURE — 72170 X-RAY EXAM OF PELVIS: CPT

## 2022-09-27 PROCEDURE — 71045 X-RAY EXAM CHEST 1 VIEW: CPT

## 2022-09-27 PROCEDURE — 87635 SARS-COV-2 COVID-19 AMP PRB: CPT

## 2022-09-27 PROCEDURE — 80053 COMPREHEN METABOLIC PANEL: CPT

## 2022-09-27 RX ORDER — ROPINIROLE 0.5 MG/1
0.5 TABLET, FILM COATED ORAL ONCE
Status: COMPLETED | OUTPATIENT
Start: 2022-09-27 | End: 2022-09-27

## 2022-09-27 RX ADMIN — ROPINIROLE HYDROCHLORIDE 0.5 MG: 0.5 TABLET, FILM COATED ORAL at 11:33

## 2022-09-27 ASSESSMENT — LIFESTYLE VARIABLES: HOW OFTEN DO YOU HAVE A DRINK CONTAINING ALCOHOL: NEVER

## 2022-09-27 ASSESSMENT — PAIN DESCRIPTION - LOCATION: LOCATION: HEAD

## 2022-09-27 ASSESSMENT — PAIN - FUNCTIONAL ASSESSMENT: PAIN_FUNCTIONAL_ASSESSMENT: 0-10

## 2022-09-27 ASSESSMENT — PAIN DESCRIPTION - DESCRIPTORS: DESCRIPTORS: ACHING

## 2022-09-27 ASSESSMENT — PAIN SCALES - GENERAL: PAINLEVEL_OUTOF10: 7

## 2022-09-27 NOTE — ED NOTES
Patient pox decreased to 88% when sleeping. Patient does not wear oxygen at home. Patient placed on 2 liter n/c, okay per verbal order from dr Caroline Bence, pox up to 94%.       Inga Diez RN  09/27/22 0192

## 2022-09-27 NOTE — ED PROVIDER NOTES
[facility] [address]  Department of Emergency Medicine     Written by: Saintclair Saddler, DO  Patient Name: Josh Enriquez  Attending Provider: Selma Sol DO  Admit Date: 2022  6:08 AM  MRN: [id number]                   : 1955        Chief Complaint   Patient presents with    Fall     She states that she was sitting in her garage this am at about 0400 to 0430. When her left leg went numb and she went to stand up and her leg gave out she fell and hit her head on the concrete. No loc, no thinners complains of headache and has edema to the left eye and a small laceration to near the left eyebrow     - Chief complaint    Patient is a 81-year-old female past medical history of lymphedema. Patient presents with chief complaint of fall. Patient states that she was sitting on the ground when her left leg fell asleep. Patient states that she went to stand lost her balance and fell. Patient did strike the left side of her head. Patient denies any loss of consciousness. Patient currently complains of neck as well as chronic right-sided knee pain secondary to previous fall. Patient describes the pain as a dull aching sensation she currently rates pain 7 out of 10. Patient denies any exacerbating relieving factors. States that symptoms have been moderate in severity and constant since onset. Patient denies any loss of consciousness patient not currently on any blood thinners. Patient denies any chest pain or shortness of breath prior to fall. Patient denies any fevers, chills, nausea, vomiting, abdominal pain, constipation or diarrhea. The history is provided by the patient. No  was used. Review of Systems   Constitutional:  Negative for chills and fever. HENT:  Negative for ear pain, sinus pressure and sore throat. Eyes:  Negative for pain, discharge and redness. Respiratory:  Negative for cough, shortness of breath and wheezing. Cardiovascular:  Negative for chest pain. Gastrointestinal:  Negative for abdominal distention, diarrhea, nausea and vomiting. Genitourinary:  Negative for dysuria and frequency. Musculoskeletal:  Positive for arthralgias. Negative for back pain. Skin:  Negative for rash and wound. Neurological:  Positive for headaches. Negative for weakness. Hematological:  Negative for adenopathy. All other systems reviewed and are negative. Physical Exam  Vitals and nursing note reviewed. Constitutional:       General: She is not in acute distress. Appearance: Normal appearance. HENT:      Head: Normocephalic and atraumatic. Nose: No congestion or rhinorrhea. Mouth/Throat:      Mouth: Mucous membranes are moist.      Pharynx: Oropharynx is clear. Eyes:      Extraocular Movements: Extraocular movements intact. Pupils: Pupils are equal, round, and reactive to light. Cardiovascular:      Rate and Rhythm: Normal rate and regular rhythm. Heart sounds: No murmur heard. No gallop. Pulmonary:      Effort: Pulmonary effort is normal. No respiratory distress. Breath sounds: No wheezing, rhonchi or rales. Abdominal:      General: Abdomen is flat. Palpations: Abdomen is soft. There is no mass. Tenderness: There is no abdominal tenderness. There is no guarding. Hernia: No hernia is present. Musculoskeletal:         General: Tenderness present. No swelling or signs of injury. Normal range of motion. Cervical back: Normal range of motion. No rigidity. No muscular tenderness. Comments: Mild ecchymosis and tenderness noted to the right knee, normal range of motion. No crepitus or deformities noted. Skin:     General: Skin is warm and dry. Capillary Refill: Capillary refill takes less than 2 seconds. Comments: Small abrasion noted above left eyebrow   Neurological:      General: No focal deficit present.       Mental Status: She is alert and oriented to person, place, and time. Mental status is at baseline. Comments: On neurological exam no focal deficits, pupils equal round reactive to light, extraocular movements are intact, muscle strength 5 out of 5 to bilateral upper and lower extremities, sensation intact to light touch. Psychiatric:         Mood and Affect: Mood normal.         Behavior: Behavior normal.        Procedures   EKG #1:  Interpreted by emergency department physician unless otherwise noted. Time:  0621    Rate: 93  Rhythm: Sinus. Interpretation: EKG obtained demonstrated normal sinus rhythm, rate 93, normal axis, , no acute ST segment changes. .  Comparison: stable as compared to patient's most recent EKG. MDM  Number of Diagnoses or Management Options  Closed head injury, initial encounter  Fall, initial encounter  Diagnosis management comments: Patient is a 71-year-old female past med history of lymphedema. Patient presents with chief complaint of headache and left-sided facial pain after fall. Vital signs stable presentation. Physical exam heart regular rate and rhythm, lungs clear to auscultation bilaterally, abdomen soft nontender no rebound or guarding. There is abrasion and ecchymosis noted to the left forehead, there is also mild tenderness to right knee after previous fall. Patient denies any chest pain or shortness of breath however given age basic laboratory work and work-up was performed. EKG obtained demonstrate no acute ST segment changes, laboratory work obtained CBC demonstrate mild anemia hemoglobin 9.9, CMP demonstrate no catarrhalis, urinalysis not indicative infection, proBNP 225, troponin 24 and repeat 21. COVID test was obtained was negative. CT scan of the head and neck demonstrate no acute abnormalities, chest and pelvic x-rays demonstrate no acute maladies, right knee x-rays demonstrate arthritis no acute fractures. Patient was reported to be hypoxic while sleeping at 88%.   However when patient ambulated oxygen saturation did not drop some difficulty obtaining pulse ox secondary to nail polish do not suspect true hypoxia at this time. Patient had missed orthopedic appointment today for right knee pain attempts were made to have orthopedic follow-up performed here in the ER today however orthopedic surgery currently in the OR. Patient is ambulated on her own she is not hypoxic she denies any chest pain or shortness of breath. Patient states that she will follow-up with orthopedic surgery as an outpatient. Findings insistent with fall patient is overall well-appearing laboratory work and imaging reassuring decision made to discharge patient. Patient instructed to follow-up with primary care doctor as well as orthopedic surgeon. In addition patient notes any new or worrisome symptoms, patient to return to emerge department for evaluation. Plan of care discussed with patient including discharge, all questions were answered, patient was agreement with plan of care and discharged home in stable condition. Amount and/or Complexity of Data Reviewed  Clinical lab tests: ordered and reviewed  Tests in the radiology section of CPT®: ordered and reviewed  Decide to obtain previous medical records or to obtain history from someone other than the patient: yes    Risk of Complications, Morbidity, and/or Mortality  Presenting problems: moderate  Diagnostic procedures: moderate  Management options: moderate    Patient Progress  Patient progress: stable           --------------------------------------------- PAST HISTORY ---------------------------------------------  Past Medical History:  has a past medical history of Arthritis, Full dentures, Low back pain, Lymphedema of both lower extremities, Obesity, Osteoarthritis, Peripheral vision loss, and Stroke ([address] Past Surgical History:  has a past surgical history that includes ECHO Complete 2D W Doppler W Color (2012);   section; Gastric bypass surgery (2000); Abdominoplasty (2002); Cataract removal; hernia repair; Colonoscopy (08/30/2012); Breast reduction surgery; Upper gastrointestinal endoscopy (03/27/2021); Colonoscopy (03/27/2021); Upper gastrointestinal endoscopy (N/A, 03/27/2021); Colonoscopy (N/A, 03/27/2021); Lumbar spine surgery (N/A, 5/13/2021); and Nerve Block (Right, 7/14/2022). Social History:  reports that she has been smoking cigarettes. She has a 38.00 pack-year smoking history. She has never used smokeless tobacco. She reports that she does not drink alcohol and does not use drugs. Family History: family history includes Breast Cancer in her mother; Hypertension in her brother and sister; Stroke in her father. The patients home medications have been reviewed.     Allergies: Ferritin    -------------------------------------------------- RESULTS -------------------------------------------------  Labs:  Results for orders placed or performed during the hospital encounter of 09/27/22   COVID-19, Rapid    Specimen: Michael   Result Value Ref Range    SARS-CoV-2, NAAT Not Detected Not Detected   CBC with Auto Differential   Result Value Ref Range    WBC 6.1 4.5 - 11.5 E9/L    RBC 4.27 3.50 - 5.50 E12/L    Hemoglobin 9.9 (L) 11.5 - 15.5 g/dL    Hematocrit 34.1 34.0 - 48.0 %    MCV 79.9 (L) 80.0 - 99.9 fL    MCH 23.2 (L) 26.0 - 35.0 pg    MCHC 29.0 (L) 32.0 - 34.5 %    RDW 18.1 (H) 11.5 - 15.0 fL    Platelets 402 454 - 413 E9/L    MPV 11.8 7.0 - 12.0 fL    Neutrophils % 73.8 43.0 - 80.0 %    Immature Granulocytes % 1.2 0.0 - 5.0 %    Lymphocytes % 12.0 (L) 20.0 - 42.0 %    Monocytes % 11.4 2.0 - 12.0 %    Eosinophils % 1.3 0.0 - 6.0 %    Basophils % 0.3 0.0 - 2.0 %    Neutrophils Absolute 4.47 1.80 - 7.30 E9/L    Immature Granulocytes # 0.07 E9/L    Lymphocytes Absolute 0.73 (L) 1.50 - 4.00 E9/L    Monocytes Absolute 0.69 0.10 - 0.95 E9/L    Eosinophils Absolute 0.08 0.05 - 0.50 E9/L    Basophils Absolute 0.02 0.00 - 0.20 E9/L   Comprehensive Metabolic Panel w/ Reflex to MG   Result Value Ref Range    Sodium 140 132 - 146 mmol/L    Potassium reflex Magnesium 3.6 3.5 - 5.0 mmol/L    Chloride 101 98 - 107 mmol/L    CO2 28 22 - 29 mmol/L    Anion Gap 11 7 - 16 mmol/L    Glucose 154 (H) 74 - 99 mg/dL    BUN 14 6 - 23 mg/dL    Creatinine 0.8 0.5 - 1.0 mg/dL    GFR Non-African American >60 >=60 mL/min/1.73    GFR African American >60     Calcium 8.6 8.6 - 10.2 mg/dL    Total Protein 6.2 (L) 6.4 - 8.3 g/dL    Albumin 3.2 (L) 3.5 - 5.2 g/dL    Total Bilirubin 0.2 0.0 - 1.2 mg/dL    Alkaline Phosphatase 133 (H) 35 - 104 U/L    ALT 11 0 - 32 U/L    AST 12 0 - 31 U/L   Brain Natriuretic Peptide   Result Value Ref Range    Pro- (H) 0 - 125 pg/mL   Troponin   Result Value Ref Range    Troponin, High Sensitivity 24 (H) 0 - 9 ng/L   Urinalysis with Microscopic   Result Value Ref Range    Color, UA Yellow Straw/Yellow    Clarity, UA Clear Clear    Glucose, Ur Negative Negative mg/dL    Bilirubin Urine Negative Negative    Ketones, Urine Negative Negative mg/dL    Specific Gravity, UA 1.020 1.005 - 1.030    Blood, Urine Negative Negative    pH, UA 6.5 5.0 - 9.0    Protein, UA Negative Negative mg/dL    Urobilinogen, Urine 0.2 <2.0 E.U./dL    Nitrite, Urine Negative Negative    Leukocyte Esterase, Urine SMALL (A) Negative    WBC, UA 2-5 0 - 5 /HPF    RBC, UA NONE 0 - 2 /HPF    Renal Epithelial, UA FEW /HPF    Bacteria, UA NONE SEEN None Seen /HPF   Troponin   Result Value Ref Range    Troponin, High Sensitivity 21 (H) 0 - 9 ng/L   EKG 12 Lead   Result Value Ref Range    Ventricular Rate 93 BPM    Atrial Rate 93 BPM    P-R Interval 172 ms    QRS Duration 86 ms    Q-T Interval 346 ms    QTc Calculation (Bazett) 430 ms    P Axis 62 degrees    R Axis 20 degrees    T Axis 56 degrees       Radiology:  XR KNEE RIGHT (MIN 4 VIEWS)   Final Result   1. There is no fracture dislocation of the right knee   2.  Tricompartmental degenerative changes the right knee.         CT Head WO Contrast   Final Result   1. There is no acute intracranial abnormality. Specifically, there is no   intracranial hemorrhage. 2. Atrophy and periventricular leukomalacia,   3. Left supraorbital soft tissue contusion         CT CERVICAL SPINE WO CONTRAST   Final Result   1. There is no acute compression fracture or subluxation of the cervical   spine. 2. Multilevel degenerative disc and degenerative joint disease. XR PELVIS (1-2 VIEWS)   Final Result   1. There is no pelvic fracture   2. Degenerative changes the right left hips more prominent on the right         XR CHEST PORTABLE   Final Result   No acute process. ------------------------- NURSING NOTES AND VITALS REVIEWED ---------------------------  Date / Time Roomed:  9/27/2022  6:08 AM  ED Bed Assignment:  16/16    The nursing notes within the ED encounter and vital signs as below have been reviewed. /76   Pulse 93   Temp 97.8 °F (36.6 °C) (Oral)   Resp 22   Ht 5' 2\" (1.575 m)   Wt 280 lb (127 kg)   SpO2 93%   BMI 51.21 kg/m²   Oxygen Saturation Interpretation: Normal      ------------------------------------------ PROGRESS NOTES ------------------------------------------  6:42 AM EDT  I have spoken with the patient and discussed todays results, in addition to providing specific details for the plan of care and counseling regarding the diagnosis and prognosis. Their questions are answered at this time and they are agreeable with the plan. I discussed at length with them reasons for immediate return here for re evaluation. They will followup with their primary care physician and orthopedic physician by calling their office tomorrow. --------------------------------- ADDITIONAL PROVIDER NOTES ---------------------------------  At this time the patient is without objective evidence of an acute process requiring hospitalization or inpatient management.   They have remained hemodynamically stable throughout their entire ED visit and are stable for discharge with outpatient follow-up. The plan has been discussed in detail and they are aware of the specific conditions for emergent return, as well as the importance of follow-up. Discharge Medication List as of 9/27/2022  1:34 PM          Diagnosis:  1. Fall, initial encounter    2. Closed head injury, initial encounter        Disposition:  Patient's disposition: Discharge to home  Patient's condition is stable. Patient was seen and evaluated by myself and my attending Zena Ellis DO. Assessment and Plan discussed with attending provider, please see attestation for final plan of care.      DO Bebe Tee DO  Resident  09/28/22 [id number]

## 2022-09-27 NOTE — TELEPHONE ENCOUNTER
Pt called to check the status of her rescheduling. She wanted me to add that she will be leaving for vacation on Saturday and would like to be seen prior to leaving.

## 2022-09-27 NOTE — TELEPHONE ENCOUNTER
Patient currently admitted to [address] [place] [address] ED and in room 16. She currently was a scheduled for appointment at 0900 today in [place]. Please advise for reschedule.  Willing to go to  Ortho this afternoon if possible

## 2022-09-27 NOTE — DISCHARGE INSTRUCTIONS
Continue all medications as prescribed  Follow-up primary care doctor  Follow-up with orthopedic surgeon  If you notice any new or worrisome symptoms please return to emergency department for evaluation

## 2022-09-27 NOTE — ED NOTES
Report obtained, assuming care of pt at this time.       Anthony Nelson RN  09/27/22 9341 Spontaneous, unlabored and symmetrical

## 2022-09-27 NOTE — ED NOTES
Patient arrived in room, via squad, with complaints of head and leg pain, post fall. Patient states she had been sitting and her left leg went numb, like to sleep, and \"when I got up to walk out to the garage, it just gave out on me and I fell and hit my head on the cement garage floor\". Patient states \"and my left leg is my good leg\". Patient states she was not going to come to the ER and feels fine. Patient alert and oriented x 3, denies loc, denies n/v, no blood thinners, able to move, slowly from squad cart to ed cart, but needs help lifting legs onto our cart per bilateral lower extremity lymphodema.  Patient placed on crm and cont pox, vitals stable, nad, advised of physicians arrival.      Lazarus Singleton, RN  09/27/22 [id number]

## 2022-09-28 LAB
EKG ATRIAL RATE: 93 BPM
EKG P AXIS: 62 DEGREES
EKG P-R INTERVAL: 172 MS
EKG Q-T INTERVAL: 346 MS
EKG QRS DURATION: 86 MS
EKG QTC CALCULATION (BAZETT): 430 MS
EKG R AXIS: 20 DEGREES
EKG T AXIS: 56 DEGREES
EKG VENTRICULAR RATE: 93 BPM

## 2022-09-28 ASSESSMENT — ENCOUNTER SYMPTOMS
VOMITING: 0
EYE DISCHARGE: 0
SORE THROAT: 0
NAUSEA: 0
EYE PAIN: 0
DIARRHEA: 0
SHORTNESS OF BREATH: 0
SINUS PRESSURE: 0
COUGH: 0
BACK PAIN: 0
EYE REDNESS: 0
WHEEZING: 0
ABDOMINAL DISTENTION: 0

## 2022-09-28 NOTE — PROGRESS NOTES
CC:   Chief Complaint   Patient presents with    Follow-up     2nd injection suddenhearing loss. Tinnitus     Pt states the ringing in ears our bothering her      Kerri Rockwell is a 73 y.o. female presenting with 3 week history of right sudden hearing loss with vertigo symptoms, she is 2 weeks s/p transtympanic injection #1; she is unable to tolerate oral steroids. MRI IAC was ordered (to be scheduled).  She has had 2 falls and leg laceration recently; she states her dizzy symptoms are improved, but her hearing is unchanged, also by audio           PAST MEDICAL HISTORY:   Past Medical History:   Diagnosis Date    Arthritis     Full dentures     Low back pain     Lymphedema of both lower extremities chronic and continues as of 22    Obesity     280 #    Osteoarthritis     Peripheral vision loss     Stroke [facility])         PAST SURGICAL HISTORY:   Past Surgical History:   Procedure Laterality Date    ABDOMINOPLASTY      BREAST REDUCTION SURGERY      reduction    CATARACT REMOVAL      bilateral     SECTION      x2    COLONOSCOPY  2012    and egd    COLONOSCOPY  2021    polyps; marginal prep--jessica    COLONOSCOPY N/A 2021    COLONOSCOPY WITH BIOPSY performed by Mian Bonilla MD at [address] [place] [address]    ECHOCARDIOGRAM COMPLETE 2D W DOPPLER W COLOR  2012         GASTRIC BYPASS SURGERY  2000    NO NASTOGASTRIC TUBE    HERNIA REPAIR          LUMBAR SPINE SURGERY N/A 2021    L3-L4  POSTERIOR LUMBAR INTERBODY  FUSION--OARM, JESSI, YAJAIRA TABLE, CELL SAVER, PLATELET GEL, AUDIOLOGY, CAGES, PLATES, SCREWS -- GLOBUS performed by Jg Zelaya MD at [facility] 21 Right 2022    RIGHT SACROILIAC JOINT INJECTION performed by Mary Salazar DO at [facility]  2021    minimal pouch gastritis--jessica    UPPER GASTROINTESTINAL ENDOSCOPY N/A 2021    EGD ESOPHAGOGASTRODUODENOSCOPY performed by Mian Bonilla MD at [address] [place] [address] SOCIAL HISTORY:   Social History     Socioeconomic History    Marital status:      Spouse name: Not on file    Number of children: 2    Years of education: 12    Highest education level: Associate degree: academic program   Occupational History    Not on file   Tobacco Use    Smoking status: Every Day     Packs/day: 1.00     Years: 38.00     Pack years: 38.00     Types: Cigarettes    Smokeless tobacco: Never    Tobacco comments:     restarted 3/2022   Vaping Use    Vaping Use: Never used   Substance and Sexual Activity    Alcohol use: No    Drug use: No    Sexual activity: Not Currently   Other Topics Concern    Not on file   Social History Narrative        Chronic Diagnosis: Iron deficiency anemia, Depressive disorder, Benign essential hypertension, Mixed    hyperlipidemia. HTN    OBESITY    LIPID    OA    SMOKER---quit   summer  2021    CVA L FIELD VISION    DEPRESSION    GASTRIC BYPASS 01    BREAST REDUCTION--    Mita PIEDRA 1955 Page #2    ANEMIA==IRON AND B-12    Admitted 2019 with cellulitis left lower extremity then readmitted with chest pain with negative stress test    L--3-4   lumbar surgeyr dr pineda-----    Cheryl Saldaña          Copd  dr Margarett Nova Judson [place]avan       Social Determinants of Health     Financial Resource Strain: Not on file   Food Insecurity: Not on file   Transportation Needs: Not on file   Physical Activity: Inactive    Days of Exercise per Week: 0 days    Minutes of Exercise per Session: 0 min   Stress: Not on file   Social Connections:  Moderately Integrated    Frequency of Communication with Friends and Family: More than three times a week    Frequency of Social Gatherings with Friends and Family: More than three times a week    Attends Buddhism Services: More than 4 times per year    Active Member of [address] [place] [address] [employer] or Organizations: Yes    Attends Club or Organization Meetings: More than 4 times per year    Marital Status:    Intimate Value Date/Time    WBC 6.1 09/27/2022 06:51 AM    HGB 9.9 (L) 09/27/2022 06:51 AM    HCT 34.1 09/27/2022 06:51 AM     09/27/2022 06:51 AM    MCV 79.9 (L) 09/27/2022 06:51 AM    MCH 23.2 (L) 09/27/2022 06:51 AM    MCHC 29.0 (L) 09/27/2022 06:51 AM    RDW 18.1 (H) 09/27/2022 06:51 AM    NEUTOPHILPCT 73.8 09/27/2022 06:51 AM    LYMPHOPCT 12.0 (L) 09/27/2022 06:51 AM    MONOPCT 11.4 09/27/2022 06:51 AM    EOSRELPCT 1.3 09/27/2022 06:51 AM    BASOPCT 0.3 09/27/2022 06:51 AM    NEUTROABS 4.47 09/27/2022 06:51 AM    LYMPHSABS 0.73 (L) 09/27/2022 06:51 AM    EOSABS 0.08 09/27/2022 06:51 AM       On this date 9/29/2022 I have spent 10 minutes reviewing previous notes, test results and  20 min face to face with the patient discussing the diagnosis and importance of compliance with the treatment plan as well as documenting on the day of the visit. Procedure: right transtympanic steroid injection    Risks and benefits were discussed including infection, postoperative bleeding, persistent drainage, persistent perforation, need for further surgery, persistent hearing loss    Procedure: The right ear was visualized with the ear microscope and excessive cerumen was removed. Topical phenol was applied to site of microperforation. An anteroinferior incision was made, the ear was suctioned and 1 ml of 24mcg/ml decadron was placed without difficulty. The patient was rolled to the left side and laid in that location for 15 minutes without swallowing. The patient tolerated the procedure without complication.     Complications: none    EBL: minimal    A/P:    Ac George is a 73 y.o. female with right sudden hearing loss, s/p transtympanic steroid injection #1, and today #2  No change in hearing, improved dizziness  MRI poor quality but no large lesions  Keep ear dry and return in 2 weeks for possible injection #3    Andrew Canchola MD 9/28/22 3:14 PM EDT

## 2022-09-29 ENCOUNTER — PROCEDURE VISIT (OUTPATIENT)
Dept: AUDIOLOGY | Age: 67
End: 2022-09-29
Payer: MEDICARE

## 2022-09-29 ENCOUNTER — OFFICE VISIT (OUTPATIENT)
Dept: ENT CLINIC | Age: 67
End: 2022-09-29
Payer: MEDICARE

## 2022-09-29 ENCOUNTER — OFFICE VISIT (OUTPATIENT)
Dept: ORTHOPEDIC SURGERY | Age: 67
End: 2022-09-29
Payer: MEDICARE

## 2022-09-29 VITALS — HEIGHT: 62 IN | BODY MASS INDEX: 51.53 KG/M2 | WEIGHT: 280 LBS

## 2022-09-29 VITALS — BODY MASS INDEX: 51.53 KG/M2 | WEIGHT: 280 LBS | RESPIRATION RATE: 22 BRPM | HEIGHT: 62 IN

## 2022-09-29 DIAGNOSIS — H90.A21 SENSORINEURAL HEARING LOSS (SNHL) OF RIGHT EAR WITH RESTRICTED HEARING OF LEFT EAR: Primary | ICD-10-CM

## 2022-09-29 DIAGNOSIS — H91.21 SUDDEN IDIOPATHIC HEARING LOSS OF RIGHT EAR WITH RESTRICTED HEARING OF LEFT EAR: Primary | ICD-10-CM

## 2022-09-29 DIAGNOSIS — H91.8X9 ASYMMETRICAL HEARING LOSS: ICD-10-CM

## 2022-09-29 DIAGNOSIS — M17.11 PRIMARY OSTEOARTHRITIS OF RIGHT KNEE: ICD-10-CM

## 2022-09-29 DIAGNOSIS — H90.A22 SENSORINEURAL HEARING LOSS (SNHL) OF LEFT EAR WITH RESTRICTED HEARING OF RIGHT EAR: ICD-10-CM

## 2022-09-29 DIAGNOSIS — M17.12 PRIMARY OSTEOARTHRITIS OF LEFT KNEE: Primary | ICD-10-CM

## 2022-09-29 PROCEDURE — 3017F COLORECTAL CA SCREEN DOC REV: CPT | Performed by: OTOLARYNGOLOGY

## 2022-09-29 PROCEDURE — 99214 OFFICE O/P EST MOD 30 MIN: CPT | Performed by: OTOLARYNGOLOGY

## 2022-09-29 PROCEDURE — G8427 DOCREV CUR MEDS BY ELIG CLIN: HCPCS | Performed by: OTOLARYNGOLOGY

## 2022-09-29 PROCEDURE — G8400 PT W/DXA NO RESULTS DOC: HCPCS | Performed by: OTOLARYNGOLOGY

## 2022-09-29 PROCEDURE — 69801 INCISE INNER EAR: CPT | Performed by: OTOLARYNGOLOGY

## 2022-09-29 PROCEDURE — 92553 AUDIOMETRY AIR & BONE: CPT | Performed by: AUDIOLOGIST

## 2022-09-29 PROCEDURE — G8417 CALC BMI ABV UP PARAM F/U: HCPCS | Performed by: OTOLARYNGOLOGY

## 2022-09-29 PROCEDURE — 20610 DRAIN/INJ JOINT/BURSA W/O US: CPT | Performed by: PHYSICIAN ASSISTANT

## 2022-09-29 PROCEDURE — 2500000003 HC RX 250 WO HCPCS

## 2022-09-29 PROCEDURE — 1123F ACP DISCUSS/DSCN MKR DOCD: CPT | Performed by: OTOLARYNGOLOGY

## 2022-09-29 PROCEDURE — 4004F PT TOBACCO SCREEN RCVD TLK: CPT | Performed by: OTOLARYNGOLOGY

## 2022-09-29 PROCEDURE — 1090F PRES/ABSN URINE INCON ASSESS: CPT | Performed by: OTOLARYNGOLOGY

## 2022-09-29 PROCEDURE — 6360000002 HC RX W HCPCS

## 2022-09-29 RX ORDER — TRIAMCINOLONE ACETONIDE 40 MG/ML
40 INJECTION, SUSPENSION INTRA-ARTICULAR; INTRAMUSCULAR ONCE
Status: COMPLETED | OUTPATIENT
Start: 2022-09-29 | End: 2022-09-29

## 2022-09-29 RX ORDER — BUPIVACAINE HYDROCHLORIDE 2.5 MG/ML
3 INJECTION, SOLUTION EPIDURAL; INFILTRATION; INTRACAUDAL ONCE
Status: COMPLETED | OUTPATIENT
Start: 2022-09-29 | End: 2022-09-29

## 2022-09-29 RX ORDER — BUPRENORPHINE HYDROCHLORIDE 450 UG/1
450 FILM, SOLUBLE BUCCAL 2 TIMES DAILY
COMMUNITY
Start: 2022-09-15

## 2022-09-29 RX ORDER — LIDOCAINE HYDROCHLORIDE 10 MG/ML
3 INJECTION, SOLUTION INFILTRATION; PERINEURAL ONCE
Status: COMPLETED | OUTPATIENT
Start: 2022-09-29 | End: 2022-09-29

## 2022-09-29 RX ADMIN — TRIAMCINOLONE ACETONIDE 40 MG: 40 INJECTION, SUSPENSION INTRA-ARTICULAR; INTRAMUSCULAR at 13:40

## 2022-09-29 RX ADMIN — LIDOCAINE HYDROCHLORIDE 3 ML: 10 INJECTION, SOLUTION INFILTRATION; PERINEURAL at 13:39

## 2022-09-29 RX ADMIN — BUPIVACAINE HYDROCHLORIDE 7.5 MG: 2.5 INJECTION, SOLUTION EPIDURAL; INFILTRATION; INTRACAUDAL at 13:39

## 2022-09-29 NOTE — PROGRESS NOTES
Chief Complaint   Patient presents with    Knee Pain     Bilateral knee pain, pt requesting injections        SUBJECTIVE: Patient is following up regarding osteoarthritis of their Bilateral knee. They last received a corticosteroid injection approximately 6 months ago which gave them significant pain relief before gradually wearing off after about 6 weeks. No complications from previous injection. Would like another injection today to Bilateral knee. Presents in wheelchair today after suffering a mechanical fall at home when her L leg gave out on her about 2 days ago. She presented to the ED and was discharged, but still has ecchymosis to b/l LE. R knee XR taken 9/27 WNL without fracture/dislocation. No other complaints today. Review of Systems -   Negative except otherwise stated in HPI. OBJECTIVE:   Alert and oriented X 3, no acute distress, respirations easy and unlabored with no audible wheezes, skin warm and dry, speech and dress appropriate for noted age, affect euthymic.    bilateral Knee Exam  Skin C/D/I. No erythema/induration/fluctuence at knee. No effusion noted  Ecchymosis noted in various patches to both LE  mild crepitus with flexion and extension of the knee  both Medial and Lateral joint line TTP  Active range of motion 0-90 ° with body habitus limited further flexion   Compartments soft and compressible throughout leg  Calf soft and nontender with palpation    Sensation intact to light touch sural, deep peroneal, superficial peroneal, saphenous, posterior tibial  nerve distributions to foot/ankle. Bilateral LE warm and well perfused       Resp 22   Ht 5' 2\" (1.575 m)   Wt 280 lb (127 kg)   BMI 51.21 kg/m²     ASSESSMENT:     Diagnosis Orders   1.  Primary osteoarthritis of left knee  OR ARTHROCENTESIS ASPIR&/INJ MAJOR JT/BURSA W/O US    lidocaine 1 % injection 3 mL    bupivacaine (PF) (MARCAINE) 0.25 % injection 7.5 mg    triamcinolone acetonide (KENALOG-40) injection 40 mg 2. Primary osteoarthritis of right knee  MS ARTHROCENTESIS ASPIR&/INJ MAJOR JT/BURSA W/O US    triamcinolone acetonide (KENALOG-40) injection 40 mg    lidocaine 1 % injection 3 mL    bupivacaine (PF) (MARCAINE) 0.25 % injection 7.5 mg          DISCUSSION:   I discussed the natural course and history of knee arthritis with the patient as well as treatment options including NSAIDs, weight loss, activity modification, and injections as well as surgery. The patient would like to proceed with bilateral knee CSI. Discussed risks and benefits of CSI including risk of infection at the joint, bleeding or bruising at the injection site and elevation of blood sugar levels and cartilage degradation with repetitive use. Patient expressed understanding of these risks and elected to move forward with corticosteroid injection today in the office. Knee  Injection Procedure Note  With the patient's written and verbal permission, Bilateral  knee was prepped in standard sterile fashion with betadine and alcohol. Skin of the knee was anesthetized with ethyl chloride spray prior to injection. The knee was then injected with 1 ml Kenalog (40mg), 3 ml PF 0.25% marcaine and 3 ml 1% PF Lidocaine were injected using the lateral inferior approach without difficulty. The patient tolerated this well. A band-aid was applied. The patient ambulated out of clinic on their own accord without difficulty. PLAN:  CSI as above, post injection care instructed  Anticipatory guidance regarding when to seek follow up care  Goal is at least 6-8 weeks of pain relief   Able to repeat CSI in 3 months if needed - recommend longer interval between injections if still providing adequate pain relief       Follow up 3 months or longer if injection given today is still providing pain relief. Can also follow up with PM for future injections.      Electronically signed by Kings Blankenship PA-C on 9/29/2022 at 1:32 PM  Note: This report was completed using computerize voiced recognition software. Every effort has been made to ensure accuracy; however, inadvertent computerized transcription errors may be present.

## 2022-09-29 NOTE — PROGRESS NOTES
This patient was referred for audiometric testing by Dr. Nicole Burnette due to sudden unilateral hearing loss, after medical management. Audiometry using pure tone air and bone conduction testing revealed a profound sensorineural hearing loss, in the right and hearing sensitivity within normal limits through 3000 Hz sloping to an essentially mild  sensorineural hearing loss, in the left ear. Reliability was good. The results were reviewed with the patient. Recommendations for follow up will be made pending physician consult.     Shira Puente [facility]  [address] [facility] [id number]   Electronically signed by Shira Puente on 9/29/2022 at 12:03 PM

## 2022-09-29 NOTE — TELEPHONE ENCOUNTER
Checked w/ PA Malachi Naylor d/t pt going out of town on vacation/ he indicated to bring pt in today by 1 pm.    Call to pt scheduled appt. 1:15 pm d/t visit availability.  Advised pt to be here by 1 pm.  Future Appointments   Date Time Provider Sydni More   9/29/2022  1:15 PM DO Ariadna Rodriguez [facility]   9/30/2022  9:15 AM DO LC Yoder [facility]   10/13/2022  8:45 AM Georgia Peres MD [facility]

## 2022-09-30 ENCOUNTER — OFFICE VISIT (OUTPATIENT)
Dept: PRIMARY CARE CLINIC | Age: 67
End: 2022-09-30
Payer: MEDICARE

## 2022-09-30 VITALS
DIASTOLIC BLOOD PRESSURE: 82 MMHG | WEIGHT: 293 LBS | TEMPERATURE: 98.4 F | BODY MASS INDEX: 53.92 KG/M2 | HEIGHT: 62 IN | SYSTOLIC BLOOD PRESSURE: 135 MMHG

## 2022-09-30 DIAGNOSIS — I89.0 LYMPHEDEMA OF BOTH LOWER EXTREMITIES: Primary | Chronic | ICD-10-CM

## 2022-09-30 DIAGNOSIS — S00.83XD CONTUSION OF FACE, SUBSEQUENT ENCOUNTER: ICD-10-CM

## 2022-09-30 DIAGNOSIS — Z48.02 ENCOUNTER FOR REMOVAL OF SUTURES: ICD-10-CM

## 2022-09-30 DIAGNOSIS — M17.0 PRIMARY OSTEOARTHRITIS OF BOTH KNEES: Chronic | ICD-10-CM

## 2022-09-30 PROCEDURE — G8417 CALC BMI ABV UP PARAM F/U: HCPCS | Performed by: FAMILY MEDICINE

## 2022-09-30 PROCEDURE — G8428 CUR MEDS NOT DOCUMENT: HCPCS | Performed by: FAMILY MEDICINE

## 2022-09-30 PROCEDURE — 1123F ACP DISCUSS/DSCN MKR DOCD: CPT | Performed by: FAMILY MEDICINE

## 2022-09-30 PROCEDURE — 99214 OFFICE O/P EST MOD 30 MIN: CPT | Performed by: FAMILY MEDICINE

## 2022-09-30 PROCEDURE — G8400 PT W/DXA NO RESULTS DOC: HCPCS | Performed by: FAMILY MEDICINE

## 2022-09-30 PROCEDURE — 4004F PT TOBACCO SCREEN RCVD TLK: CPT | Performed by: FAMILY MEDICINE

## 2022-09-30 PROCEDURE — 3017F COLORECTAL CA SCREEN DOC REV: CPT | Performed by: FAMILY MEDICINE

## 2022-09-30 PROCEDURE — 1090F PRES/ABSN URINE INCON ASSESS: CPT | Performed by: FAMILY MEDICINE

## 2022-09-30 ASSESSMENT — ENCOUNTER SYMPTOMS
ALLERGIC/IMMUNOLOGIC NEGATIVE: 1
GASTROINTESTINAL NEGATIVE: 1
EYES NEGATIVE: 1
RESPIRATORY NEGATIVE: 1

## 2022-09-30 NOTE — PROGRESS NOTES
22  Name: Eduardo Saavedra    : 1955    Sex: female    Age: 73 y.o. Subjective:  Chief Complaint: Patient is here for urgency room follow-up regarding 2 falls. Lymphedema, suture removal, osteoarthritis of the knee, breathing, hearing loss. She presents accompanied by daughter. .  After last visit they we did an urgent referral to ear nose and throat and she did see a specialist who did a shot x2 in the right ear and is getting further tested had an MRI which patient states normal but I do not see the report yet. May need cochlear implant. Had a fall leaving the ear nose and throat doctors office within the wheelchair with the daughter when she got up she scratched her leg and wound got a decent laceration went to the hospital had 15 sutures put in. She had a fall in the garage striking the left side of her head and face went back to the emergency room on 927. ER doctor they removed half of the sutures in the leg since it was the same person. She had no loss of consciousness no headache no nausea no no no blurred vision. She is taking her diuretic more regularly she is breathing little bit better. Had 2 shots in the knees yesterday she is leaving tonight for the [place]      Review of Systems   Constitutional: Negative. HENT: Negative. Eyes: Negative. Respiratory: Negative. Cardiovascular:  Positive for leg swelling. Gastrointestinal: Negative. Endocrine: Negative. Genitourinary: Negative. Musculoskeletal: Negative. Hpi   Skin: Negative. Allergic/Immunologic: Negative. Neurological: Negative. Hematological: Negative. Psychiatric/Behavioral: Negative. Current Outpatient Medications:     BELBUCA 450 MCG FILM, Take 450 mcg by mouth in the morning and at bedtime. , Disp: , Rfl:     meclizine (ANTIVERT) 12.5 MG tablet, Take 1 tablet by mouth 4 times daily as needed for Dizziness, Disp: 60 tablet, Rfl: 3    albuterol sulfate HFA both knees with injections bilateral      Social Determinants of Health     Financial Resource Strain: Not on file   Food Insecurity: Not on file   Transportation Needs: Not on file   Physical Activity: Inactive    Days of Exercise per Week: 0 days    Minutes of Exercise per Session: 0 min   Stress: Not on file   Social Connections:  Moderately Integrated    Frequency of Communication with Friends and Family: More than three times a week    Frequency of Social Gatherings with Friends and Family: More than three times a week    Attends Sikh Services: More than 4 times per year    Active Member of Clubs or Organizations: Yes    Attends Club or Organization Meetings: More than 4 times per year    Marital Status:    Intimate Partner Violence: Not on file   Housing Stability: Not on file      Past Medical History:   Diagnosis Date    Arthritis     Full dentures     Low back pain     Lymphedema of both lower extremities chronic and continues as of 22    Obesity     280 #    Osteoarthritis     Peripheral vision loss     Stroke ([facility] [address]      Family History   Problem Relation Age of Onset    Breast Cancer Mother     Stroke Father     Hypertension Sister     Hypertension Brother       Past Surgical History:   Procedure Laterality Date    ABDOMINOPLASTY  2002    BREAST REDUCTION SURGERY      reduction    CATARACT REMOVAL      bilateral     SECTION      x2    COLONOSCOPY  2012    and egd    COLONOSCOPY  2021    polyps; marginal prep--jessica    COLONOSCOPY N/A 2021    COLONOSCOPY WITH BIOPSY performed by Kaylie Prescott MD at [address] [place] [address]    ECHOCARDIOGRAM COMPLETE 2D W DOPPLER W COLOR  2012         GASTRIC BYPASS SURGERY  2000    NO NASTOGASTRIC TUBE    HERNIA REPAIR          LUMBAR SPINE SURGERY N/A 2021    L3-L4  POSTERIOR LUMBAR INTERBODY  FUSION--OARM, JESSI, YAJAIRA TABLE, CELL SAVER, PLATELET GEL, AUDIOLOGY, CAGES, PLATES, SCREWS -- GLOBUS performed by [employer], MD at . [address] Right 7/14/2022    RIGHT SACROILIAC JOINT INJECTION performed by Germán Colbert DO at [address]  03/27/2021    minimal pouch gastritis--jessica    UPPER GASTROINTESTINAL ENDOSCOPY N/A 03/27/2021    EGD ESOPHAGOGASTRODUODENOSCOPY performed by Danisha Quintana MD at [address]:    09/30/22 0925   BP: 135/82   Temp: 98.4 °F (36.9 °C)   Weight: (!) 303 lb 3.2 oz (137.5 kg)   Height: 5' 2\" (1.575 m)       Objective:    Physical Exam  Vitals reviewed. Constitutional:       Appearance: Normal appearance. She is well-developed. She is obese. HENT:      Head: Normocephalic. Right Ear: Tympanic membrane normal.      Left Ear: Tympanic membrane normal.      Nose: Nose normal.      Mouth/Throat:      Mouth: Mucous membranes are moist.   Eyes:      Pupils: Pupils are equal, round, and reactive to light. Cardiovascular:      Rate and Rhythm: Normal rate and regular rhythm. Comments: Massive  edema both lower extremities  Pulmonary:      Effort: Pulmonary effort is normal.      Breath sounds: Normal breath sounds. Abdominal:      General: Bowel sounds are normal.      Palpations: Abdomen is soft. Musculoskeletal:         General: Normal range of motion. Cervical back: Normal range of motion. Comments: Decreased range of motion both knees   Skin:     General: Skin is warm. Comments: Ecchymosis below the left eye and cheek. No tenderness with palpation. Right lateral mid leg with right lateral mid leg with each sutures easily removed with fair approximation the center has slight open area but scabbed. No discharge with healing laceration   Neurological:      Mental Status: She is alert and oriented to person, place, and time. Psychiatric:         Behavior: Behavior normal.       Jasmin Reeder was seen today for follow-up.     Diagnoses and all orders for this visit:    Lymphedema of both lower extremities    Encounter for removal of sutures    Contusion of face, subsequent encounter    Primary osteoarthritis of both knees      Comments:   8 of the remaining sutures removed From the right lateral leg   Fair approximation. No discharge. Elevate legs. Do not miss diuretic. Warned against traveling. See back in 3 weeks. She is lymphedema clinic soon. Follows up with your doctor soon        I educated the patient about all medications. Make sure they were correct and educated  on the risk associated with missing meds or taking them incorrectly. A list of medications is being sent home with patient today. Check blood pressure at home twice a day. Low-salt low caffeine diet. Call if systolic blood pressure is above 249 and diastolic blood pressures above 85. Only use a upper arm digital cuff. Aggressive low-fat diet. Avoid red meats, greasy fried foods, dairy products. Avoid processed foods. Take cholesterol medications without food. I informed patient about the risk associated with noncompliance of medication and taking medications incorrectly. Appropriate follow-up with myself and all specialist.  Encourage family members to take active role in assisting with medications and medical care. If any confusion should develop to notify my office immediately to avoid risk of worsening medical condition      A great deal of time spent reviewing medications, diet, exercise, social issues. Also reviewing the chart before entering the room with patient and finishing charting after leaving patient's room. More than half of that time was spent face to face with the patient in counseling and coordinating care. Follow Up: Return in about 3 weeks (around 10/21/2022).      Seen by:  Leticia El DO

## 2022-10-03 ENCOUNTER — TELEPHONE (OUTPATIENT)
Dept: ENT CLINIC | Age: 67
End: 2022-10-03

## 2022-10-05 ENCOUNTER — TELEPHONE (OUTPATIENT)
Dept: ENT CLINIC | Age: 67
End: 2022-10-05

## 2022-10-05 NOTE — TELEPHONE ENCOUNTER
Called  Compounding to have injection delivered to [facility] 10- before 10-13 2022 Thursday 8:30am appt for third injection.  [facility] Beth Luciano [facility] [address] [facility] to confirm delivery

## 2022-10-06 DIAGNOSIS — H91.21 SUDDEN IDIOPATHIC HEARING LOSS OF RIGHT EAR WITH RESTRICTED HEARING OF LEFT EAR: ICD-10-CM

## 2022-10-06 DIAGNOSIS — R42 VERTIGO: ICD-10-CM

## 2022-10-11 NOTE — PROGRESS NOTES
CC:   Chief Complaint   Patient presents with    Follow-up     Patient is present today for third round of tympanic injection due to sudden hearing loss. Lasha Riddle is a 78 y.o. female presenting with 3 week history of right sudden hearing loss with vertigo symptoms, she is 2 weeks s/p transtympanic injection #2; she is unable to tolerate oral steroids. MRI IAC was done at [place] and read as normal, significant motion artefact. No benefit with her 2 transtympanic injections and noted to have significant tinnitus.  PPSV done 2020              PAST MEDICAL HISTORY:   Past Medical History:   Diagnosis Date    Arthritis     Full dentures     Low back pain     Lymphedema of both lower extremities chronic and continues as of 22    Obesity     280 #    Osteoarthritis     Peripheral vision loss     Stroke [facility])         PAST SURGICAL HISTORY:   Past Surgical History:   Procedure Laterality Date    ABDOMINOPLASTY      BREAST REDUCTION SURGERY      reduction    CATARACT REMOVAL      bilateral     SECTION      x2    COLONOSCOPY  2012    and egd    COLONOSCOPY  2021    polyps; marginal prep--jessica    COLONOSCOPY N/A 2021    COLONOSCOPY WITH BIOPSY performed by Julia Rg MD at [address]    ECHOCARDIOGRAM COMPLETE 2D W DOPPLER W COLOR  2012         GASTRIC BYPASS SURGERY  2000    NO NASTOGASTRIC TUBE    HERNIA REPAIR          LUMBAR SPINE SURGERY N/A 2021    L3-L4  POSTERIOR LUMBAR INTERBODY  FUSION--OARM, JESSI, YAJAIRA TABLE, CELL SAVER, PLATELET GEL, AUDIOLOGY, CAGES, PLATES, SCREWS -- GLOBUS performed by Jennifer Landin MD at [address] Right 2022    RIGHT SACROILIAC JOINT INJECTION performed by Son Dyer DO at [address] [facility]  2021    minimal pouch gastritis--jessica    UPPER GASTROINTESTINAL ENDOSCOPY N/A 2021    EGD ESOPHAGOGASTRODUODENOSCOPY performed by Julia Rg MD at [address] SOCIAL HISTORY:   Social History     Socioeconomic History    Marital status:      Spouse name: Not on file    Number of children: 2    Years of education: 12    Highest education level: Associate degree: academic program   Occupational History    Not on file   Tobacco Use    Smoking status: Every Day     Packs/day: 1.00     Years: 38.00     Pack years: 38.00     Types: Cigarettes    Smokeless tobacco: Never    Tobacco comments:     restarted 3/2022   Vaping Use    Vaping Use: Never used   Substance and Sexual Activity    Alcohol use: No    Drug use: No    Sexual activity: Not Currently   Other Topics Concern    Not on file   Social History Narrative        Chronic Diagnosis: Iron deficiency anemia, Depressive disorder, Benign essential hypertension, Mixed    hyperlipidemia. HTN    OBESITY    LIPID    OA    SMOKER---quit   summer  2021    CVA L FIELD VISION    DEPRESSION    GASTRIC BYPASS 01    BREAST REDUCTION--    Bartholome Reveal  1955 Page #2    ANEMIA==IRON AND B-12    Admitted 2019 with cellulitis left lower extremity then readmitted with chest pain with negative stress test    L--3-4   lumbar surgeyr dr pineda-----    Josie Velarde  Counts include [facility] beds [facility] the [facility]      Hearing loss right ear  seen by ENT injections in the right ear MRI done possible cochlear implant    Fall on 915 with laceration right leg 15 sutures and fall again on 927 with contusion left face    Start lymphedema therapy at [facility]     OA both knees with injections bilateral      Social Determinants of Health     Financial Resource Strain: Not on file   Food Insecurity: Not on file   Transportation Needs: Not on file   Physical Activity: Inactive    Days of Exercise per Week: 0 days    Minutes of Exercise per Session: 0 min   Stress: Not on file   Social Connections:  Moderately Integrated    Frequency of Communication with Friends and Family: More than three times a week Frequency of Social Gatherings with Friends and Family: More than three times a week    Attends Bahai Services: More than 4 times per year    Active Member of [employer] or Organizations: Yes    Attends Club or Organization Meetings: More than 4 times per year    Marital Status:    Intimate Partner Violence: Not on file   Housing Stability: Not on file        TOBACCO  Social History     Tobacco Use   Smoking Status Every Day    Packs/day: 1.00    Years: 38.00    Pack years: 38.00    Types: Cigarettes   Smokeless Tobacco Never   Tobacco Comments    restarted 3/2022        ALCOHOL   Social History     Substance and Sexual Activity   Alcohol Use No        [address]   Social History     Substance and Sexual Activity   Drug Use No         CURRENT OUTPATIENT MEDICATIONS:   Outpatient Medications Marked as Taking for the 10/13/22 encounter (Office Visit) with Carmella Kaminski MD   Medication Sig Dispense Refill    BELBUCA 450 MCG FILM Take 450 mcg by mouth in the morning and at bedtime. meclizine (ANTIVERT) 12.5 MG tablet Take 1 tablet by mouth 4 times daily as needed for Dizziness 60 tablet 3    albuterol sulfate HFA (PROVENTIL HFA) 108 (90 Base) MCG/ACT inhaler Inhale 2 puffs into the lungs every 4 hours as needed for Wheezing 18 g 1    rOPINIRole (REQUIP) 2 MG tablet Take 0.5 tablets by mouth in the morning, at noon, and at bedtime 360 tablet 12    nitrofurantoin, macrocrystal-monohydrate, (MACROBID) 100 MG capsule           ALLERGIES:   Allergies   Allergen Reactions    Ferritin Shortness Of Breath and Other (See Comments)     Flushed,  Severe back pain       ROS: I have reviewed the patient's medical history in detail; there are no changes to the history as noted in the electronic medical record. Exam: Ht 5' 2\" (1.575 m)   Wt (!) 303 lb (137.4 kg)   BMI 55.42 kg/m²   Shani Sims is a 78 y.o. female  appears well, in no apparent distress. Alert and oriented times three, pleasant and cooperative. Vital signs are as documented in vital signs section. Breathing comfortably, without stertor or stridor  Ear exam: External ears normal. Canals clear. TM's normal (right with healed perforation)  No nasal lesions, no erythema  No oral lesions. There is no palpable adenopathy or tenderness    Lab Results   Component Value Date/Time    WBC 6.1 09/27/2022 06:51 AM    HGB 9.9 (L) 09/27/2022 06:51 AM    HCT 34.1 09/27/2022 06:51 AM     09/27/2022 06:51 AM    MCV 79.9 (L) 09/27/2022 06:51 AM    MCH 23.2 (L) 09/27/2022 06:51 AM    MCHC 29.0 (L) 09/27/2022 06:51 AM    RDW 18.1 (H) 09/27/2022 06:51 AM    NEUTOPHILPCT 73.8 09/27/2022 06:51 AM    LYMPHOPCT 12.0 (L) 09/27/2022 06:51 AM    MONOPCT 11.4 09/27/2022 06:51 AM    EOSRELPCT 1.3 09/27/2022 06:51 AM    BASOPCT 0.3 09/27/2022 06:51 AM    NEUTROABS 4.47 09/27/2022 06:51 AM    LYMPHSABS 0.73 (L) 09/27/2022 06:51 AM    EOSABS 0.08 09/27/2022 06:51 AM       On this date 10/13/2022 I have spent 10 minutes reviewing previous notes, test results and 30 min face to face with the patient discussing the diagnosis and importance of compliance with the treatment plan as well as documenting on the day of the visit.     A/P:    Juanita Manning is a 78 y.o. female with right sudden profound sensorineural hearing loss, no improvement with treatment and significant tinnitus  Plan for right cochlear implant  Device selection and speech evaluation today  Risks and benefits discussed, consent and registry signed  PPSV 11/2020    Camacho Callahan MD 10/10/22 8:49 PM EDT

## 2022-10-13 ENCOUNTER — OFFICE VISIT (OUTPATIENT)
Dept: ENT CLINIC | Age: 67
End: 2022-10-13
Payer: MEDICARE

## 2022-10-13 ENCOUNTER — EVALUATION (OUTPATIENT)
Dept: SPEECH THERAPY | Age: 67
End: 2022-10-13
Payer: MEDICARE

## 2022-10-13 ENCOUNTER — PROCEDURE VISIT (OUTPATIENT)
Dept: AUDIOLOGY | Age: 67
End: 2022-10-13
Payer: MEDICARE

## 2022-10-13 VITALS — BODY MASS INDEX: 53.92 KG/M2 | HEIGHT: 62 IN | WEIGHT: 293 LBS

## 2022-10-13 DIAGNOSIS — H91.21 SUDDEN IDIOPATHIC HEARING LOSS OF RIGHT EAR WITH RESTRICTED HEARING OF LEFT EAR: Primary | ICD-10-CM

## 2022-10-13 DIAGNOSIS — R42 VERTIGO: ICD-10-CM

## 2022-10-13 DIAGNOSIS — H90.A22 SENSORINEURAL HEARING LOSS (SNHL) OF LEFT EAR WITH RESTRICTED HEARING OF RIGHT EAR: ICD-10-CM

## 2022-10-13 DIAGNOSIS — R47.89 OTHER SPEECH DISTURBANCES: ICD-10-CM

## 2022-10-13 DIAGNOSIS — H90.A21 SENSORINEURAL HEARING LOSS (SNHL) OF RIGHT EAR WITH RESTRICTED HEARING OF LEFT EAR: Primary | ICD-10-CM

## 2022-10-13 PROCEDURE — G8400 PT W/DXA NO RESULTS DOC: HCPCS | Performed by: OTOLARYNGOLOGY

## 2022-10-13 PROCEDURE — 92553 AUDIOMETRY AIR & BONE: CPT | Performed by: AUDIOLOGIST

## 2022-10-13 PROCEDURE — 4004F PT TOBACCO SCREEN RCVD TLK: CPT | Performed by: OTOLARYNGOLOGY

## 2022-10-13 PROCEDURE — 99215 OFFICE O/P EST HI 40 MIN: CPT | Performed by: OTOLARYNGOLOGY

## 2022-10-13 PROCEDURE — 1123F ACP DISCUSS/DSCN MKR DOCD: CPT | Performed by: OTOLARYNGOLOGY

## 2022-10-13 PROCEDURE — G8484 FLU IMMUNIZE NO ADMIN: HCPCS | Performed by: OTOLARYNGOLOGY

## 2022-10-13 PROCEDURE — 92626 EVAL AUD FUNCJ 1ST HOUR: CPT | Performed by: AUDIOLOGIST

## 2022-10-13 PROCEDURE — G8417 CALC BMI ABV UP PARAM F/U: HCPCS | Performed by: OTOLARYNGOLOGY

## 2022-10-13 PROCEDURE — 92523 SPEECH SOUND LANG COMPREHEN: CPT | Performed by: SPEECH-LANGUAGE PATHOLOGIST

## 2022-10-13 PROCEDURE — 3017F COLORECTAL CA SCREEN DOC REV: CPT | Performed by: OTOLARYNGOLOGY

## 2022-10-13 PROCEDURE — G8427 DOCREV CUR MEDS BY ELIG CLIN: HCPCS | Performed by: OTOLARYNGOLOGY

## 2022-10-13 PROCEDURE — 1090F PRES/ABSN URINE INCON ASSESS: CPT | Performed by: OTOLARYNGOLOGY

## 2022-10-13 NOTE — PROGRESS NOTES
1 [facility]  Speech Therapy  Phone: [phone]  SPEECH/LANGUAGE PATHOLOGY  SPEECH/LANGUAGE/COGNITIVE EVALUATION   and PLAN OF CARE  PATIENT NAME:  Eleuterio Patton  (female)     MRN:  [id number]    :  1955  (78 y.o.)  STATUS:  Outpatient clinic   TODAY'S DATE:  10/13/2022  REFERRING PROVIDER:    Dr. Huerta Cambridge: SLP eval and treat  Date of order:  10/13/\2022  REASON FOR REFERRAL:  assess speech perception  EVALUATING THERAPIST: AKANKSHA Thakkar  CERTIFICATION/RECERTIFICATION PERIOD: 10/13/2022 to 23  REFERRING DIAGNOSIS: No admission diagnoses are documented for this encounter. MEDICAL HISTORY: per documentation in chart. Eleuterio Patton is a 78 y.o. female with right sudden profound sensorineural hearing loss, no improvement with treatment and significant tinnitus  Plan for right cochlear implant      SPEECH THERAPY  PLAN OF CARE   The speech therapy  POC is established based on physician order, speech pathology diagnosis and results of clinical assessment     SPEECH PATHOLOGY DIAGNOSIS:    speech perception deficit      Outpatient Speech Pathology intervention is recommended 1 time  per week to begin following cochlear implant placement      SHORT/LONG TERM GOALS    Detect all Ling-6 sounds at 3 ft., 6 ft., and 9 ft.     2. Discriminate between two words that differ in syllable length achieving greater than 90% accuracy. 3. Identify the correct word given a set of 5-10 familiar words achieving greater than 90% accuracy. 4. Answer questions or follow directions presented auditorily regarding a known topic achieving greater than 90% accuracy. 5.  Identify the correct monosyllabic word given a set of 4-6 familiar words achieving greater than 90% accuracy. 6. Discriminate between words in which the initial consonant differ only in voicing (minimal pairs) achieving greater than 90% accuracy.      7. Follow instructions presented auditorily only containing two critical elements achieving greater than 90% accuracy. 8. Identify the correct phrase or sentence from a set of 4-8 achieving greater than 90% accuracy. 9. Read aloud directions changing one word- have patient determine the word that was changed in a common phrase achieving greater than 90% accuracy. 10.  Minimal pairs in sentences- have the patient discriminate between the two words achieving greater than 90% accuracy. 11. Identify words correctly in a set of 5-8 with the same syllable structure/number achieving greater than 90% accuracy. 12.  Correctly imitate a 3-5 word phrase presented auditorily achieving greater than 90% accuracy. 13. Identify the topic of a short paragraph read aloud achieving greater than 90% accuracy. Patient stated goals: Agreed with above,     Rehabilitation Potential/Prognosis: good      PPVT-4 (Pe[place]ody Picture Vocabulary Test, fourth edition)    Ages 2-6 through 90+ years. This test was used for an alternate purpose other than understanding vocabulary. Percentages are based on speech perception of the vocabulary terms with a focus of lip reading + hearing vs. just hearing. Results indicate how much the patient is relying on lip reading to understand speech. Hearing Aids/Devices if worn are kept on during this evaluation to assess how well patient is perceiving speech with an aid. This assessment is to show the need for a cochlear implant device as aids are not beneficial enough for adequate perception of speech and language. % With Lip Reading % Without Lip Reading   25/25 23/25   100% 92%         Minimal Pairs Assessment    The Minimal Pairs Assessment assesses the ability to identify words that are differing by 1 sound, which could be in any position of the word (initial or final). In a field of 2 picture cards, it is to be determined which word is being verbalized.  Percentages are based on speech perception of the words with a focus of lip reading + hearing vs. just hearing. Results indicate how much the patient is relying on lip reading to understand speech. Hearing Aids/Devices if worn are kept on during this evaluation to assess how well patient is perceiving speech with an aid. This assessment is to show the need for a cochlear implant device as aids are not beneficial enough for adequate perception of speech and language. % With Lip Reading % Without Lip Reading   Didn't look up/40 29/40   % 73%     Cognitive Assessment    SLUMS ASSESSMENT:  Assesses cognitive ability in areas of orientation, immediate/ STM recall, divergent naming, functional calculation, abstraction, mental manipulation, clock draw, and story comprehension. Pt received a score of 20/30, which per scoring criteria falls under mild neurocognitive disorder. EDUCATION:   The Speech Language Pathologist (SLP) completed education regarding results of evaluation and that intervention is warranted at this time. Learner: Patient and Family  Education: Reviewed results and recommendations of this evaluation  Evaluation of Education:  Kiara Rockwell understanding    This plan may be re-evaluated and revised as warranted. Treatment goals discussed with Patient   The Patient understand(s) the diagnosis, prognosis and plan of care     Evaluation Time includes thorough review of current medical information, gathering information on past medical history/social history and prior level of function, completion of standardized testing/informal observation of tasks, assessment of data and education on plan of care and goals. CPT Codes    Evaluation: 17387 Evaluation of Speech Sound Language Comprehension     60 Minutes     : The admitting diagnosis and active problem list, as listed below have been reviewed prior to initiation of this evaluation.         ACTIVE PROBLEM LIST:   Patient Active Problem List   Diagnosis    Primary osteoarthritis of left knee    Osteoarthritis of right knee    BMI 45.0-49.9, adult ([facility])    Lymphedema of both lower extremities    Cellulitis of both lower extremities    Microcytic anemia    Morbid obesity (HCC)    Tobacco abuse    SOB (shortness of breath)    Iron deficiency anemia    Primary osteoarthritis involving multiple joints    Lymphedema    Intervertebral lumbar disc disorder    Reactive depression    Chronic pain syndrome    Primary osteoarthritis of both knees    Chronic pain    Chronic right-sided low back pain without sciatica    Anxiety    Chronic fatigue    Tremor    Intractable back pain    Acute midline low back pain with right-sided sciatica    Spinal stenosis of lumbar region with neurogenic claudication    Osteoarthritis of spine with radiculopathy, lumbar region    Cellulitis    Anemia    Spondylolisthesis of lumbar region    Lumbar stenosis with neurogenic claudication    Diet-controlled diabetes mellitus (HCC)    Acute pancreatitis without necrosis or infection, unspecified    Abdominal pain    Chest pain    Chronic low back pain without sciatica    Systolic and diastolic CHF, acute on chronic ([facility])    Mixed stress and urge urinary incontinence    Disorder of sacrum    Acute hearing loss of right ear

## 2022-10-13 NOTE — PROGRESS NOTES
This patient was referred for audiometric testing by Dr. Ana Maria Talavera due to  sudden hearing loss after medical management . Patient perceived no change in hearing. She also denied any significant change in dizziness. Audiometry using pure tone air and bone conduction testing revealed a profound sensorineural hearing loss, in the right ear and hearing sensitivity within normal limits through 2000 Hz sloping to a moderrate sensorineural hearing loss, in the left ear. Reliability was good. Clinic owned Xceed/Zircon hearing aids were programmed to using the NAL-NL2 fitting rationale. Hearing aids were verified and found to be appropriate for patient's hearing loss. Results    RIGHT    CNC Words 78%   AzBio quiet, left masked 32%   AzBio + 5 50%     LEFT    CNC Words 96%   AzBio +5 76%     BILATERAL    CNC Words 90%   AzBio +5 79%       The results were reviewed with the patient. Patient chose AB, pink. Will also order Iraida Tyrone and extra batteries. LIU Rebollar. Audiology Intern (4th Year)      Maciej Bolanos, [address] [place], [place] [id number]   Electronically signed by Shira Regan on 10/13/2022 at 9:30 AM      Note: At least 35 minutes spent face to face with patient collecting case history, performing tests, and reviewing results.

## 2022-10-13 NOTE — PATIENT INSTRUCTIONS
CONSENT COCHLEAR: Mauricio Connell  [id number] 10/13/2022  CONSENT TO OPERATION  1. I authorize Dr. Yudelka Coats, and any other practitioners or persons as are needed to assist her, to treat or diagnose the following condition(s): severe to profound sensorineural hearing loss and I authorize my doctor to utilize Analgesia/sedation/ topical/ local anesthesia as deemed necessary. 2. The doctor recommends the following operation/procedure as treatment of my condition:cochlear implantation    3. The doctor has explained the procedure(s) to me and I understand the nature and the purpose of the procedure to be:  Surgically place a cochlear implant     4. My doctor has explained the risks and consequences that are associated with the procedure(s) described above. I understand that all surgery involves risks and consequences such as failure of the procedure to achieve the desired result, loss of blood, infection, and/or heart stoppage. Some of the other risks my doctor  discussed with me are: The risks associated with cochlear implants can be divided into four categories. Those arising directly from implantation of the internal component:    A small, but non-disfiguring lump may be felt behind the ear. This is the internal device which can be felt through the skin. Persons with thin, delicate skin or those who are extremely thin may notice the lump more than others. The lump may become more noticeable over time. Possible irritation, inflammation, or breakdown of the skin in the area over the implanted device might occur. Such complications may require additional medical treatment, or in rare cases, additional surgery. The internal device may fail. Failure rates of cochlear implants are extremely low. However, it is a man-made device and can malfunction. In such cases, the failed device can be removed and a new device put in its place.   There is a good possibility that you will lose any hearing which you have in the implanted ear. The long-term effects of electrical stimulation like that produced by the implant are not known. Those arising from cochlear implant/mastoid surgery:    Numbness and stiffness surrounding the surgical site which may persist for several months. In rare instances these effects may be permanent. Possible neck pain or stiffness resulting from positioning of the head during surgery may be present for several weeks following surgery. There is a possibility that facial paralysis may occur as a result of surgery. Should this occur, the eye on the side of the surgery would not close, and the mouth would pull toward the side opposite of the surgery. This is a very rare occurrence, and in most instances, such paralysis resolves on its own within several weeks. Rarely, further treatment is required. Taste disturbance and mouth dryness care common for a few weeks following surgery. In approximately 5% of patients the taste disturbance may be prolonged or permanent. In rare instances, perilymphatic fluid from the inner ear may leak into the middle ear. This may require surgical repair and if left untreated, may lead to meningitis. Dizziness and some unsteadiness may be experienced after surgery. This is very rarely permanent. Ear infection, with drainage, swelling and pain, may persist following surgery, or on rare occasions, may develop following surgery due to poor healing of the ear tissue. Were this to be the case, additional surgery might be necessary to control the infection. Tinnitus (head noise) may result or if present prior to surgery may be more pronounced. A cerebrospinal fluid leak (leak of fluid surrounding the brain) is a very rare complication. Additional surgery may be necessary to stop the leak. Intracranial (brain) complications such as meningitis or brain abscess, even paralysis, were common in cases of chronic otitis media prior to the antibiotic era.   Fortunately, these now are extremely rare complications. Those arising from surgery in general:    Blood or fluid may collect at this site of the surgery and may require drainage. There is a risk of unforeseen chest, heart, or brain complications from anesthesia. You may discuss these with the anesthesiologist.  Death as a result of this procedure is possible. Those involving post-operative limitations imposed by the device:    Current cochlear implants are all MRI compatible. Older cochlear implant patients receiving a cochlear implant should not undergo Magnetic Resonance Imaging (MRI), or be in the room where an MRI scanner is located. Treatment with electroconvulsive therapy should not be performed. A significant blow to the head can damage the internal device. Activities which present significant risks of falls (i.e. roller bladding, bicycling, and contact sports), should be entered into with adequate precaution, including the use of protective headgear. Stimulation of the facial nerve may occur when certain electrode channels are activated depending on the location of the electrode in relation to nerve fibers. It is generally possible to eliminate this through programming, which could result in reduced performance. To maintain the best quality of sound, it will be necessary to return to the Implant Center to have the device programmed on occasion. We recommend once every three months for young children and every six months to yearly for adults. The audiologist does not make-up your map. He or she only records your responses. Your involvement is critical.  Appropriate fit to the magnet is important. Increasing the magnetic pull by screwing the magnet in closer to the head can put pressure on the implant site that can lead to skin breakdown. If tenderness or pain is noted under the /headpiece, contact the Implant audiologist at once. The speech processor is a man-made computerized device.   It can breakdown just like any thing else. It does have a warranty, but just like your car or television set, once the warranty expires, repair or replacement costs will be the responsibility of the patient. Millions of dollars have been spent on research and development by manufacturers of cochlear implants, and these companies are in business out of the goodness of their hearts. Activities in which static electricity may be generated (i.e. sliding down plastic slides, playing in plastic ball pits, and contact with computer monitors or dry cold weather) have on rare occasions caused damage to the internal portion of certain devices. Any activity that alerts users of medical devices to the presence of electromagnetic devices should be avoided. Internal cochlear implant components do not generally set off metal detectors. Airport security will not damage the internal device. However, it is best to remove the external device and pass it around the security checkpoint. There is a chance that the program in your processor could be corrupted. Carry your CI Identification Card provided to you at the time of your surgery. As a cochlear implant recipient, you should also have a MedicAlert bracelet or necklace identifying yourself as an individual with a cochlear implant in the event you cannot speak for yourself. All multi-channel implant systems are designed so that there is minimal risk to the patient. The internal device has no power source. Removing the external device will immediately result in the shutdown of the internal device activity. If there is any doubt about the device function, remove the external receive/headpiece from the head and contact the CI Team.    Vaccinations    A small percentage of deaf individuals have abnormal inner ears which predispose them to bacterial meningitis with or without a cochlear implant.   Bacterial meningitis is also one of the leading causes of deafness in children and those children are at increased risk of beba the disease again. Due to these reasons, and others, cochlear implant users (particularly children) are at increased risk for beba bacterial meningitis. The likelihood of a cochlear implant user beba bacterial meningitis is still extremely low, far less than 1%, and this risk may be substantially reduced through vaccination. You should talk to your pediatrician, primary care physician, or other health professional about vaccination with Prenar ® (7-valent pneumococcal conjugate vaccine), Pneumovovax ® 23 or Pnulmune ® 23 (23-valent pneumococcal polysaccharide vaccine), and Haemophilus Influenzae Type B (hib) vaccine. In [place] [id number] already recommends vaccination against bacterial meningitis as part of the routine vaccination programs for all children. Most cochlear implant surgeons now specifically require candidates be vaccinated against bacterial meningitis prior to implantation. Benefits from the Cochlear Implant    The cochlear implant system, when used in conjunction with speech reading, should                                                            improve your communication abilities. With the cochlear implant you should be able to hear environmental sounds which you were unable to hear with your hearing aid. Many, but not all, implant users can understand great deal of speech with no visual cues, and many, but not all, implant users can understand speech over the telephone   Given the appropriate follow-up children can develop normal speech and language using a cochlear implant. Many factors, including parental involvement, school placement, intelligence, age at implantation, previous experience with sound, and other things we may not understand can affect the child's ability to use the input from the cochlear implant.     REMEMBER:  1.  COCHLEAR IMPLANTS DO NOT RESTORE NORMAL HEARING  2. IT MAY TAKE SEVERAL MONTHS OR LONGER TO ACHIEVE MAXIMUM BENEFITS FROM THE COCHLEAR IMPLAN  3. COCHLEAR IMPLANTS CAN BREAK DOWN  4. YOUR EXPERIENCE WILL NOT BE IDENTICAL TO THAT OF ANY OTHER IMPLANT USER  5.NEITHER THE PHYSICIAN NOR THE IMPLANT AUDIOLOGIST CAN ANTICIPATE   EVERYTHING THAT YOU MIGHT EXPERIENCE  6. COCHLEAR IMPLANTS DO NOT RESTORE HEARING       Alternatives  You may continue the use of power hearing aids. You may use a vibro-tactile device which will alert you to sound using vibrations. You may attend speech reading classes and learn to more effectively communicate visually. You may choose to learn sign language and make use of sign language interpreters. All parents or guardians of deaf children should be aware of Deaf culture. Information about the Deaf Community is best obtained from culturally deaf persons. The Cochlear Implant Team will assist any interested persons in obtaining information on the deaf community. 10. I consent to the presence of medical students, residents, and other health care providers or student health care providers and observers in the operating room in accordance with the usual practices of the facility. I also agree that still pictures and video tapes may be made for diagnostic or educational purposes unless I state otherwise. 11. I understand and agree that any tissues or parts removed from me may be tested for Infectious diseases, retained, preserved, photographed, and used for scientific or teaching purposes, or may be disposed of by the Hospital in accordance with its usual practice. 12. For Patients with Do Not Resuscitate Orders: I understand my treating physician or anesthesiologist or other qualified practitioner will discuss with me the risks and benefits of continuing a Do Not Resuscitate order during this procedure. 13. I have read this Consent and understand it.  I have received all the information I desire concerning the procedure(s), and all of my questions have been answered to my satisfaction. ____________________________________________________________________________  Signature of Physician                        Date/Time     Signature of Patient                          Date/Time    ____________________________________________________________________________  Witness                                    Date/Time    Signature of Legally Authorized Representative Date/Time                ____________________________________________________________________________   Reason Patient unable to consentRelationship to Patient (If Patient unable to consent)    SURGERY:_____/_____/_____    Nothing to eat or drink after midnight the night before surgery unless surgery is at [facility] or otherwise instructed by the hospital.    DO NOT TAKE ANY ASPIRIN PRODUCTS 7 days prior to surgery. Tylenol only. No Advil, Motrin, Aleve, or Ibuprofen. IF YOU ARE ON BLOOD THINNERS (PLAVIX, COUMADIN, ELIQUIS ETC) THESE WILL ALSO NEED TO BE HELD. Any illegal drugs in your system (including Marijuana even if legally prescribed) will result in your surgery being cancelled. Please be sure to check with our office or the hospital on time frame for the drugs to be out of your system. SHOULD YOUR INSURANCE CHANGE AT ANY TIME YOU MUST CONTACT OUR OFFICE. FAILURE TO DO SO MAY RESULT IN YOUR SURGERY BEING RESCHEDULED OR YOU MAY BE CHARGED AS SELF-PAY. Due to the high demand for surgery at our practice, if you cancel or reschedule your surgery two (2) times we may not reschedule you. If you need FMLA or Short Term Disability paperwork completed for your surgery, please complete your portion, ensure your name and date of birth are on them and fax them to [phone] asap. Paperwork can take up to 2 weeks to be completed.     If you have any questions or concerns regarding your surgery, please contact the Surgery SchedulerMmedhat Cavazos at [phone] option 2.    If you need medical clearance, you are responsible to contact your physician(s) to schedule an appointment for clearance. If clearance is not completed within 30 days of your surgery it may be cancelled. Our office will fax the appropriate forms that need to be completed to your physician(s). The location of your surgery will call you the day prior to your surgery date to let you know what time you have to be there and any other details. (they usually don't call until late afternoon- early evening.)- IF YOU HAVE QUESTIONS REGARDING THE TIME OF YOUR SURGERY, PLEASE CALL THE FACILITY YOU ARE SCHEDULED AT. [address] [facility], [address] [facility] will call you a couple days prior to surgery and give you further instructions, if you have any questions, you can reach them at [phone] (per Pre-Admission testing, EKG is required for all patients age 53+, have a diagnosis of hypertension, diabetes, or on dialysis). [address] [facility] will [facility] you a couple days prior to surgery and give you further instructions, if you have any questions, you can reach them at [phone] (per Pre-Admission testing, EKG is required for all patients age 53+, have a diagnosis of hypertension, diabetes, or on dialysis). [address] [facility] [facility] will call you a couple days prior to surgery and give you further instructions, if you have any questions, you can reach them at [phone] (per Pre-Admission testing, EKG is required for all patients age 53+, have a diagnosis of hypertension, diabetes, or on dialysis).           [facility]), [address],  [place], [address] [facility] will call you a couple days prior to surgery and give you further instructions, if you have any questions, you can reach them at [phone]      Pre-Surgery/Anesthesia [facility] CHILDRENS ONLY)  Located on [address] [facility]:  1. Scroll over Health Information  2. Select Audio and Video  3. Select [employer]  4. Select Your child and Anesthesia  5.  Select Pre surgery [facility]    FOOD RESTRICTIONS--[facility] ONLY  Solid Food/Milk Products --------- Stop 8 hours prior to Surgery  Formula --------- Stop 6 hours prior to Surgery  Breast Milk ------- Stop 4 hours prior to Surgery  Clear liquids (water, Gatorade, Pedialyte) - Stop 2 hours prior to Surgery

## 2022-10-19 ENCOUNTER — PREP FOR PROCEDURE (OUTPATIENT)
Dept: ENT CLINIC | Age: 67
End: 2022-10-19

## 2022-10-19 ENCOUNTER — TELEPHONE (OUTPATIENT)
Dept: ENT CLINIC | Age: 67
End: 2022-10-19

## 2022-10-19 DIAGNOSIS — H90.A22 SENSORINEURAL HEARING LOSS (SNHL) OF LEFT EAR WITH RESTRICTED HEARING OF RIGHT EAR: Primary | ICD-10-CM

## 2022-10-19 NOTE — TELEPHONE ENCOUNTER
Prior Authorization Form:      DEMOGRAPHICS:                     Patient Name:  Mauricio Jarvis  Patient :  1955            Insurance:  Payor: MEDICARE / Plan: MEDICARE PART A AND B / Product Type: *No Product type* /   Insurance ID Number:    Payer/Plan Subscr  Sex Relation Sub. Ins. ID Effective Group Num   1. Jarrell GOLDMAN 1955 Female Self [id number] 10/1/21 [id number]                                   [address] [address]   2. [address] [place] [address] 1955 Female Self [id number] 10/1/21 PLAN G                                   [address]         DIAGNOSIS & PROCEDURE:                       Procedure/Operation:Rt cochlear implant with nerve integrity monitor           CPT Code: 26750,    Diagnosis:  Right Profound sensory neuro hearing loss     ICD10 Code: H90. A22    Location:  [facility]    Surgeon:  Dr Bella Zacarias INFORMATION:                          Date: 10-19/22    Time: na             Anesthesia: General                                                         Status:  Outpatient        Special Comments:  See all chart notes        Electronically signed by Micha Mckeon LPN on  at 10:39 AM

## 2022-10-19 NOTE — TELEPHONE ENCOUNTER
Called Pt and confirmed 11-7-22 surgery date informed pt to have medical clearance faxed this Friday at PCP appointment Electronically signed by Jalil Barahona LPN on 87/20/1581 at 10:31 AM

## 2022-10-21 ENCOUNTER — OFFICE VISIT (OUTPATIENT)
Dept: PRIMARY CARE CLINIC | Age: 67
End: 2022-10-21
Payer: MEDICARE

## 2022-10-21 VITALS
SYSTOLIC BLOOD PRESSURE: 142 MMHG | BODY MASS INDEX: 53.92 KG/M2 | DIASTOLIC BLOOD PRESSURE: 82 MMHG | WEIGHT: 293 LBS | HEIGHT: 62 IN

## 2022-10-21 DIAGNOSIS — M54.50 CHRONIC BILATERAL LOW BACK PAIN WITHOUT SCIATICA: ICD-10-CM

## 2022-10-21 DIAGNOSIS — L03.115 CELLULITIS OF RIGHT LOWER EXTREMITY: ICD-10-CM

## 2022-10-21 DIAGNOSIS — S81.801A OPEN WOUND OF RIGHT LOWER LEG, INITIAL ENCOUNTER: ICD-10-CM

## 2022-10-21 DIAGNOSIS — I50.43 SYSTOLIC AND DIASTOLIC CHF, ACUTE ON CHRONIC (HCC): ICD-10-CM

## 2022-10-21 DIAGNOSIS — G89.29 CHRONIC BILATERAL LOW BACK PAIN WITHOUT SCIATICA: ICD-10-CM

## 2022-10-21 DIAGNOSIS — Z01.818 PRE-OPERATIVE EXAMINATION: Primary | ICD-10-CM

## 2022-10-21 PROCEDURE — 1123F ACP DISCUSS/DSCN MKR DOCD: CPT | Performed by: FAMILY MEDICINE

## 2022-10-21 PROCEDURE — G8428 CUR MEDS NOT DOCUMENT: HCPCS | Performed by: FAMILY MEDICINE

## 2022-10-21 PROCEDURE — 3017F COLORECTAL CA SCREEN DOC REV: CPT | Performed by: FAMILY MEDICINE

## 2022-10-21 PROCEDURE — G8484 FLU IMMUNIZE NO ADMIN: HCPCS | Performed by: FAMILY MEDICINE

## 2022-10-21 PROCEDURE — 1090F PRES/ABSN URINE INCON ASSESS: CPT | Performed by: FAMILY MEDICINE

## 2022-10-21 PROCEDURE — G8400 PT W/DXA NO RESULTS DOC: HCPCS | Performed by: FAMILY MEDICINE

## 2022-10-21 PROCEDURE — 4004F PT TOBACCO SCREEN RCVD TLK: CPT | Performed by: FAMILY MEDICINE

## 2022-10-21 PROCEDURE — 99214 OFFICE O/P EST MOD 30 MIN: CPT | Performed by: FAMILY MEDICINE

## 2022-10-21 PROCEDURE — G8417 CALC BMI ABV UP PARAM F/U: HCPCS | Performed by: FAMILY MEDICINE

## 2022-10-21 RX ORDER — CEPHALEXIN 500 MG/1
500 CAPSULE ORAL 3 TIMES DAILY
Qty: 21 CAPSULE | Refills: 1 | Status: SHIPPED | OUTPATIENT
Start: 2022-10-21 | End: 2022-10-28

## 2022-10-21 NOTE — PROGRESS NOTES
10/21/22  Name: Wendy Sims    : 1955    Sex: female    Age: 78 y.o. Subjective:  Chief Complaint: Patient is here for preoperative clearance for cochlear implant. Edema. Lymphedema. Osteoarthritis. Breathing. Right lower extremity cellulitis. Patient presents for preoperative clearance for cochlear implant but not seen by cardiology for some time. She also states that her right lower extremity where the sutures were removed a month ago has some redness and erythema and slightly open at the proximal edge. She thinks it was from the JOSE hose she was getting and lymphedema therapy. She misses her diuretics 6 out of 7 days of the week. Her daughter is very upset with her who was in the room today. She states she does not want to urinate at night but cannot get her to understand that the massive edema in her lower extremities will never get better unless she takes a diuretic. She has no chest pain. She is easily dyspneic on exertion but no change and no worse. Still unable to hear from her right ear      Review of Systems   Constitutional: Negative. HENT: Negative. Eyes: Negative. Respiratory:  Positive for shortness of breath. Cardiovascular: Negative. Gastrointestinal: Negative. Endocrine: Negative. Genitourinary: Negative. Musculoskeletal: Negative. Skin:         hpi   Allergic/Immunologic: Negative. Neurological: Negative. Hematological: Negative. Psychiatric/Behavioral: Negative. Current Outpatient Medications:     cephALEXin (KEFLEX) 500 MG capsule, Take 1 capsule by mouth 3 times daily for 7 days, Disp: 21 capsule, Rfl: 1    BELBUCA 450 MCG FILM, Take 450 mcg by mouth in the morning and at bedtime. , Disp: , Rfl:     meclizine (ANTIVERT) 12.5 MG tablet, Take 1 tablet by mouth 4 times daily as needed for Dizziness, Disp: 60 tablet, Rfl: 3    albuterol sulfate HFA (PROVENTIL HFA) 108 (90 Base) MCG/ACT inhaler, Inhale 2 puffs into the lungs every 4 hours as needed for Wheezing, Disp: 18 g, Rfl: 1    rOPINIRole (REQUIP) 2 MG tablet, Take 0.5 tablets by mouth in the morning, at noon, and at bedtime, Disp: 360 tablet, Rfl: 12    nitrofurantoin, macrocrystal-monohydrate, (MACROBID) 100 MG capsule, , Disp: , Rfl:   Allergies   Allergen Reactions    Ferritin Shortness Of Breath and Other (See Comments)     Flushed,  Severe back pain     Social History     Socioeconomic History    Marital status:      Spouse name: Not on file    Number of children: 2    Years of education: 12    Highest education level: Associate degree: academic program   Occupational History    Not on file   Tobacco Use    Smoking status: Every Day     Packs/day: 1.00     Years: 38.00     Pack years: 38.00     Types: Cigarettes    Smokeless tobacco: Never    Tobacco comments:     restarted 3/2022   Vaping Use    Vaping Use: Never used   Substance and Sexual Activity    Alcohol use: No    Drug use: No    Sexual activity: Not Currently   Other Topics Concern    Not on file   Social History Narrative        Chronic Diagnosis: Iron deficiency anemia, Depressive disorder, Benign essential hypertension, Mixed    hyperlipidemia.     HTN    OBESITY    LIPID    OA    SMOKER---quit   summer  2021    CVA L FIELD VISION    DEPRESSION    GASTRIC BYPASS 01    BREAST REDUCTION--    Sofya Platt  1955 Page #2    ANEMIA==IRON AND B-12    Admitted 2019 with cellulitis left lower extremity then readmitted with chest pain with negative stress test    L--3-4   lumbar surgeyr dr pineda-----    Ramesh Thompson      Hearing loss right ear  seen by ENT injections in the right ear MRI done possible cochlear implant    Fall on 915 with laceration right leg 15 sutures and fall again on 927 with contusion left face    Start lymphedema therapy at [facility]     OA both knees with injections bilateral      Social Determinants of Health     Financial Resource Strain: Not on file   Food Insecurity: Not on file   Transportation Needs: Not on file   Physical Activity: Inactive    Days of Exercise per Week: 0 days    Minutes of Exercise per Session: 0 min   Stress: Not on file   Social Connections:  Moderately Integrated    Frequency of Communication with Friends and Family: More than three times a week    Frequency of Social Gatherings with Friends and Family: More than three times a week    Attends Jain Services: More than 4 times per year    Active Member of Clubs or Organizations: Yes    Attends Club or Organization Meetings: More than 4 times per year    Marital Status:    Intimate Partner Violence: Not on file   Housing Stability: Not on file      Past Medical History:   Diagnosis Date    Arthritis     Full dentures     Low back pain     Lymphedema of both lower extremities chronic and continues as of 22    Obesity     280 #    Osteoarthritis     Peripheral vision loss     Stroke ([facility] [address]      Family History   Problem Relation Age of Onset    Breast Cancer Mother     Stroke Father     Hypertension Sister     Hypertension Brother       Past Surgical History:   Procedure Laterality Date    ABDOMINOPLASTY      BREAST REDUCTION SURGERY      reduction    CATARACT REMOVAL      bilateral     SECTION      x2    COLONOSCOPY  2012    and egd    COLONOSCOPY  2021    polyps; marginal prep--jessica    COLONOSCOPY N/A 2021    COLONOSCOPY WITH BIOPSY performed by Lien Schmitz MD at [address]    ECHOCARDIOGRAM COMPLETE 2D W DOPPLER W COLOR  2012         GASTRIC BYPASS SURGERY      NO NASTOGASTRIC TUBE    HERNIA REPAIR          LUMBAR SPINE SURGERY N/A 2021    L3-L4  POSTERIOR LUMBAR INTERBODY  FUSION--OARM, JESSI, YAJAIRA TABLE, CELL SAVER, PLATELET GEL, AUDIOLOGY, CAGES, PLATES, SCREWS -- GLOBUS performed by Roosevelt Reynaga MD [address] Right 2022    RIGHT SACROILIAC JOINT INJECTION performed by Kvng Galvan DO at [address]  03/27/2021    minimal pouch gastritis--jessica    UPPER GASTROINTESTINAL ENDOSCOPY N/A 03/27/2021    EGD ESOPHAGOGASTRODUODENOSCOPY performed by Chris Valdes MD at [address]:    10/21/22 0744   BP: (!) 142/82   Weight: 298 lb 1.6 oz (135.2 kg)   Height: 5' 2\" (1.575 m)       Objective:    Physical Exam  Vitals reviewed. Constitutional:       Appearance: Normal appearance. She is well-developed. She is obese. HENT:      Head: Normocephalic. Right Ear: Tympanic membrane normal.      Left Ear: Tympanic membrane normal.      Nose: Nose normal.      Mouth/Throat:      Mouth: Mucous membranes are moist.   Eyes:      Pupils: Pupils are equal, round, and reactive to light. Cardiovascular:      Rate and Rhythm: Normal rate and regular rhythm. Comments: Significant edema both lower extremities. Pulmonary:      Effort: Pulmonary effort is normal.      Breath sounds: Normal breath sounds. Abdominal:      General: Bowel sounds are normal.      Palpations: Abdomen is soft. Musculoskeletal:         General: Normal range of motion. Cervical back: Normal range of motion. Skin:     General: Skin is warm. Comments: Erythema right lower extremity with open area from previous incision approximately 1 cm open to centimeter incision. Erythema over 45 inches around that area. No discharge   Neurological:      Mental Status: She is alert and oriented to person, place, and time. Psychiatric:         Behavior: Behavior normal.       Leslie Escalera was seen today for follow-up. Diagnoses and all orders for this visit:    Pre-operative examination  -     [facility]    Cellulitis of right lower extremity  -     cephALEXin (KEFLEX) 500 MG capsule;  Take 1 capsule by mouth 3 times daily for 7 days  -     [address]    Systolic and diastolic CHF, acute on chronic [facility])  -     [address] [facility]    Open wound of right lower leg, initial encounter  -     [address]    Chronic bilateral low back pain without sciatica      Comments: I meant cardiologist as possible for preoperative clearance. Appointment wound care. Start Keflex. Advised take diuretic every day. Follow-up with lymphedema clinic she is going there today. Elevate legs. Call if any chest pain shortness of breath well-developed. Elevate lower extremities. I educated the patient about all medications. Make sure they were correct and educated  on the risk associated with missing meds or taking them incorrectly. A list of medications is being sent home with patient today. Check blood pressure at home twice a day. Low-salt low caffeine diet. Call if systolic blood pressure is above 254 and diastolic blood pressures above 85. Only use a upper arm digital cuff. Aggressive low-fat diet. Avoid red meats, greasy fried foods, dairy products. Avoid processed foods. Take cholesterol medications without food. I informed patient about the risk associated with noncompliance of medication and taking medications incorrectly. Appropriate follow-up with myself and all specialist.  Encourage family members to take active role in assisting with medications and medical care. If any confusion should develop to notify my office immediately to avoid risk of worsening medical condition      A great deal of time spent reviewing medications, diet, exercise, social issues. Also reviewing the chart before entering the room with patient and finishing charting after leaving patient's room. More than half of that time was spent face to face with the patient in counseling and coordinating care.       Follow Up: Return in about 2 weeks (around 11/4/2022) for Reg Appt, See Referral.     Seen by:  Miranda Palomino DO

## 2022-10-22 PROBLEM — R07.9 CHEST PAIN: Status: RESOLVED | Noted: 2022-02-01 | Resolved: 2022-10-22

## 2022-10-22 PROBLEM — S81.801A OPEN WOUND OF RIGHT LOWER LEG: Status: ACTIVE | Noted: 2022-10-22

## 2022-10-22 ASSESSMENT — ENCOUNTER SYMPTOMS
ROS SKIN COMMENTS: HPI
GASTROINTESTINAL NEGATIVE: 1
EYES NEGATIVE: 1
ALLERGIC/IMMUNOLOGIC NEGATIVE: 1
SHORTNESS OF BREATH: 1

## 2022-10-25 ENCOUNTER — TELEPHONE (OUTPATIENT)
Dept: CARDIOLOGY CLINIC | Age: 67
End: 2022-10-25

## 2022-10-25 NOTE — TELEPHONE ENCOUNTER
Last known physician pt seen was [facility], [address] during [address] 01/2022. New URGENT referral placed by Clarence Quigley for pre-op clearance and Systolic and diastolic CHF, acute on chronic ([facility] [address] Please advise whom pt needs to schedule with.

## 2022-10-28 ENCOUNTER — OFFICE VISIT (OUTPATIENT)
Dept: CARDIOLOGY CLINIC | Age: 67
End: 2022-10-28
Payer: MEDICARE

## 2022-10-28 VITALS
WEIGHT: 270.8 LBS | SYSTOLIC BLOOD PRESSURE: 122 MMHG | DIASTOLIC BLOOD PRESSURE: 60 MMHG | HEIGHT: 62 IN | BODY MASS INDEX: 49.83 KG/M2 | HEART RATE: 80 BPM | RESPIRATION RATE: 18 BRPM

## 2022-10-28 DIAGNOSIS — Z01.818 PREOP EXAMINATION: Primary | ICD-10-CM

## 2022-10-28 PROCEDURE — 1123F ACP DISCUSS/DSCN MKR DOCD: CPT | Performed by: INTERNAL MEDICINE

## 2022-10-28 PROCEDURE — G8484 FLU IMMUNIZE NO ADMIN: HCPCS | Performed by: INTERNAL MEDICINE

## 2022-10-28 PROCEDURE — 99214 OFFICE O/P EST MOD 30 MIN: CPT | Performed by: INTERNAL MEDICINE

## 2022-10-28 PROCEDURE — 93000 ELECTROCARDIOGRAM COMPLETE: CPT | Performed by: INTERNAL MEDICINE

## 2022-10-28 PROCEDURE — G8417 CALC BMI ABV UP PARAM F/U: HCPCS | Performed by: INTERNAL MEDICINE

## 2022-10-28 PROCEDURE — 3017F COLORECTAL CA SCREEN DOC REV: CPT | Performed by: INTERNAL MEDICINE

## 2022-10-28 PROCEDURE — G8400 PT W/DXA NO RESULTS DOC: HCPCS | Performed by: INTERNAL MEDICINE

## 2022-10-28 PROCEDURE — 1090F PRES/ABSN URINE INCON ASSESS: CPT | Performed by: INTERNAL MEDICINE

## 2022-10-28 PROCEDURE — 4004F PT TOBACCO SCREEN RCVD TLK: CPT | Performed by: INTERNAL MEDICINE

## 2022-10-28 PROCEDURE — G8427 DOCREV CUR MEDS BY ELIG CLIN: HCPCS | Performed by: INTERNAL MEDICINE

## 2022-10-28 RX ORDER — BUMETANIDE 2 MG/1
2 TABLET ORAL 2 TIMES DAILY
COMMUNITY

## 2022-10-28 RX ORDER — BACLOFEN 5 MG/1
10 TABLET ORAL 3 TIMES DAILY
COMMUNITY

## 2022-10-28 NOTE — PROGRESS NOTES
[facility] Cardiology Progress Note    Patient is a 78 y.o. female of Patsy Caro DO seen in follow up. Chief complaint: Cardiac clearance. HPI: No CP or SOB.      Patient Active Problem List   Diagnosis    Primary osteoarthritis of left knee    Osteoarthritis of right knee    BMI 45.0-49.9, adult (HCC)    Lymphedema of both lower extremities    Cellulitis of both lower extremities    Microcytic anemia    Morbid obesity ([address]    Tobacco abuse    SOB (shortness of breath)    Iron deficiency anemia    Primary osteoarthritis involving multiple joints    Lymphedema    Intervertebral lumbar disc disorder    Reactive depression    Chronic pain syndrome    Primary osteoarthritis of both knees    Chronic pain    Chronic right-sided low back pain without sciatica    Anxiety    Chronic fatigue    Tremor    Intractable back pain    Acute midline low back pain with right-sided sciatica    Spinal stenosis of lumbar region with neurogenic claudication    Osteoarthritis of spine with radiculopathy, lumbar region    Cellulitis    Anemia    Spondylolisthesis of lumbar region    Lumbar stenosis with neurogenic claudication    Diet-controlled diabetes mellitus (HCC)    Acute pancreatitis without necrosis or infection, unspecified    Abdominal pain    Chronic low back pain without sciatica    Systolic and diastolic CHF, acute on chronic (HCC)    Mixed stress and urge urinary incontinence    Disorder of sacrum    Acute hearing loss of right ear    Other speech disturbances    Open wound of right lower leg       Allergies   Allergen Reactions    Ferritin Shortness Of Breath and Other (See Comments)     Flushed,  Severe back pain       Current Outpatient Medications   Medication Sig Dispense Refill    bumetanide (BUMEX) 2 MG tablet Take 2 mg by mouth 2 times daily      Baclofen (LIORESAL) 5 MG tablet Take 10 mg by mouth 3 times daily      cephALEXin (KEFLEX) 500 MG capsule Take 1 capsule by mouth 3 times daily for 7 days 21 capsule 1    BELBUCA 450 MCG FILM Take 450 mcg by mouth in the morning and at bedtime. rOPINIRole (REQUIP) 2 MG tablet Take 0.5 tablets by mouth in the morning, at noon, and at bedtime 360 tablet 12    meclizine (ANTIVERT) 12.5 MG tablet Take 1 tablet by mouth 4 times daily as needed for Dizziness (Patient not taking: Reported on 10/28/2022) 60 tablet 3    albuterol sulfate HFA (PROVENTIL HFA) 108 (90 Base) MCG/ACT inhaler Inhale 2 puffs into the lungs every 4 hours as needed for Wheezing (Patient not taking: Reported on 10/28/2022) 18 g 1    nitrofurantoin, macrocrystal-monohydrate, (MACROBID) 100 MG capsule  (Patient not taking: Reported on 10/28/2022)       No current facility-administered medications for this visit. Review of systems:   Heart: as above   Lungs: as above   Eyes: denies changes in vision or discharge. Ears: denies changes in hearing or pain. Nose: denies epistaxis or masses   Throat: denies sore throat or trouble swallowing. Neuro: denies numbness, tingling, tremors. Skin: denies rashes or itching. : denies hematuria, dysuria   GI: denies vomiting, diarrhea   Psych: denies mood changed, anxiety, depression. Physical Exam   /60   Pulse 80   Resp 18   Ht 5' 2\" (1.575 m)   Wt 270 lb 12.8 oz (122.8 kg)   BMI 49.53 kg/m²   Constitutional: Oriented to person, place, and time. No distress. Well developed. Head: Normocephalic and atraumatic. Neck: Neck supple. No hepatojugular reflux. No JVD present. Carotid bruit is not present. No tracheal deviation present. No thyromegaly present. Cardiovascular: Normal rate, regular rhythm, normal heart sounds. intact distal pulses. No gallop and no friction rub. No murmur heard. Pulmonary: Breath sounds normal. No respiratory distress. No wheezes. No rales. Chest: Effort normal. No tenderness. Abdominal: Soft. Bowel sounds are normal. No distension or mass. No tenderness, rebound or guarding. Musculoskeletal: .  No tenderness. No clubbing or cyanosis. Extremitites: Intact distal pulses. No edema  Neurological: Alert and oriented to person, place, and time. Skin: Skin is warm and dry. No rash noted. Not diaphoretic. No erythema. Psychiatric: Normal mood and affect. Behavior is normal.   Lymphadenopathy: No cervical adenopathy. No groin adenopathy. EKG: normal sinus rhythm. Non-specific changes. Echo Summary 3/24/2021:   Ejection fraction is visually estimated at 65%. No regional wall motion abnormalities seen. Moderate left ventricular concentric hypertrophy noted. Normal right ventricle structure and function. Physiologic and/or trace mitral regurgitation is present. Physiologic and/or trace tricuspid regurgitation.     ASSESSMENT & PLAN:    Patient Active Problem List   Diagnosis    Primary osteoarthritis of left knee    Osteoarthritis of right knee    BMI 45.0-49.9, adult (HCC)    Lymphedema of both lower extremities    Cellulitis of both lower extremities    Microcytic anemia    Morbid obesity (HCC)    Tobacco abuse    SOB (shortness of breath)    Iron deficiency anemia    Primary osteoarthritis involving multiple joints    Lymphedema    Intervertebral lumbar disc disorder    Reactive depression    Chronic pain syndrome    Primary osteoarthritis of both knees    Chronic pain    Chronic right-sided low back pain without sciatica    Anxiety    Chronic fatigue    Tremor    Intractable back pain    Acute midline low back pain with right-sided sciatica    Spinal stenosis of lumbar region with neurogenic claudication    Osteoarthritis of spine with radiculopathy, lumbar region    Cellulitis    Anemia    Spondylolisthesis of lumbar region    Lumbar stenosis with neurogenic claudication    Diet-controlled diabetes mellitus (HCC)    Acute pancreatitis without necrosis or infection, unspecified    Abdominal pain    Chronic low back pain without sciatica    Systolic and diastolic CHF, acute on chronic ([address] Mixed stress and urge urinary incontinence    Disorder of sacrum    Acute hearing loss of right ear    Other speech disturbances    Open wound of right lower leg     1. Pre-op clearance:    Echo with normal LVEF and moderate LVH 3/24/2021 benign. Low risk stress 6/2019. Patient is an acceptable risk to proceed with surgery. 2. HTN: Observe. 3. DM: Per PCP. 4. Lymphedema: Follow volume. 5. Hx of CVA: Consider ASA and statin. 6. RLS: On Requip     7. Hx of gastric bypass     8. Anemia    9. ANGELICA Reynoso D.O.   Cardiologist  Cardiology, [facility]

## 2022-11-01 ENCOUNTER — HOSPITAL ENCOUNTER (OUTPATIENT)
Dept: WOUND CARE | Age: 67
Discharge: HOME | End: 2022-11-01
Payer: MEDICARE

## 2022-11-01 VITALS
TEMPERATURE: 97.5 F | RESPIRATION RATE: 20 BRPM | HEART RATE: 88 BPM | SYSTOLIC BLOOD PRESSURE: 138 MMHG | DIASTOLIC BLOOD PRESSURE: 84 MMHG

## 2022-11-01 DIAGNOSIS — R09.89 DECREASED DORSALIS PEDIS PULSE: ICD-10-CM

## 2022-11-01 DIAGNOSIS — S81.801A OPEN WOUND OF RIGHT LOWER LEG, INITIAL ENCOUNTER: Primary | ICD-10-CM

## 2022-11-01 DIAGNOSIS — I89.0 LYMPHEDEMA OF BOTH LOWER EXTREMITIES: Chronic | ICD-10-CM

## 2022-11-01 DIAGNOSIS — B35.9 DERMATOPHYTOSIS: ICD-10-CM

## 2022-11-01 DIAGNOSIS — M79.89 LEG SWELLING: ICD-10-CM

## 2022-11-01 DIAGNOSIS — L97.212 ULCER OF CALF WITH FAT LAYER EXPOSED, RIGHT (HCC): ICD-10-CM

## 2022-11-01 PROBLEM — L03.90 CELLULITIS: Status: RESOLVED | Noted: 2021-03-23 | Resolved: 2022-11-01

## 2022-11-01 PROBLEM — L03.116 CELLULITIS OF BOTH LOWER EXTREMITIES: Status: RESOLVED | Noted: 2019-06-08 | Resolved: 2022-11-01

## 2022-11-01 PROBLEM — L03.115 CELLULITIS OF BOTH LOWER EXTREMITIES: Status: RESOLVED | Noted: 2019-06-08 | Resolved: 2022-11-01

## 2022-11-01 PROBLEM — I50.43 SYSTOLIC AND DIASTOLIC CHF, ACUTE ON CHRONIC (HCC): Status: RESOLVED | Noted: 2022-06-29 | Resolved: 2022-11-01

## 2022-11-01 PROCEDURE — 11042 DBRDMT SUBQ TIS 1ST 20SQCM/<: CPT

## 2022-11-01 PROCEDURE — 99204 OFFICE O/P NEW MOD 45 MIN: CPT | Performed by: SURGERY

## 2022-11-01 PROCEDURE — 99213 OFFICE O/P EST LOW 20 MIN: CPT

## 2022-11-01 PROCEDURE — 11042 DBRDMT SUBQ TIS 1ST 20SQCM/<: CPT | Performed by: SURGERY

## 2022-11-01 RX ORDER — GENTAMICIN SULFATE 1 MG/G
OINTMENT TOPICAL ONCE
OUTPATIENT
Start: 2022-11-01 | End: 2022-11-01

## 2022-11-01 RX ORDER — LIDOCAINE HYDROCHLORIDE 20 MG/ML
JELLY TOPICAL ONCE
OUTPATIENT
Start: 2022-11-01 | End: 2022-11-01

## 2022-11-01 RX ORDER — TERBINAFINE HYDROCHLORIDE 250 MG/1
250 TABLET ORAL DAILY
Qty: 15 TABLET | Refills: 0 | Status: SHIPPED | OUTPATIENT
Start: 2022-11-01 | End: 2022-11-16

## 2022-11-01 RX ORDER — LIDOCAINE HYDROCHLORIDE 40 MG/ML
SOLUTION TOPICAL ONCE
OUTPATIENT
Start: 2022-11-01 | End: 2022-11-01

## 2022-11-01 RX ORDER — LIDOCAINE 40 MG/G
CREAM TOPICAL ONCE
OUTPATIENT
Start: 2022-11-01 | End: 2022-11-01

## 2022-11-01 RX ORDER — LIDOCAINE 50 MG/G
OINTMENT TOPICAL ONCE
OUTPATIENT
Start: 2022-11-01 | End: 2022-11-01

## 2022-11-01 RX ORDER — BACITRACIN ZINC AND POLYMYXIN B SULFATE 500; 1000 [USP'U]/G; [USP'U]/G
OINTMENT TOPICAL ONCE
OUTPATIENT
Start: 2022-11-01 | End: 2022-11-01

## 2022-11-01 RX ORDER — BETAMETHASONE DIPROPIONATE 0.05 %
OINTMENT (GRAM) TOPICAL ONCE
OUTPATIENT
Start: 2022-11-01 | End: 2022-11-01

## 2022-11-01 RX ORDER — CEPHALEXIN 500 MG/1
500 CAPSULE ORAL 4 TIMES DAILY
COMMUNITY
End: 2022-11-02 | Stop reason: ALTCHOICE

## 2022-11-01 RX ORDER — GINSENG 100 MG
CAPSULE ORAL ONCE
OUTPATIENT
Start: 2022-11-01 | End: 2022-11-01

## 2022-11-01 RX ORDER — BACITRACIN, NEOMYCIN, POLYMYXIN B 400; 3.5; 5 [USP'U]/G; MG/G; [USP'U]/G
OINTMENT TOPICAL ONCE
OUTPATIENT
Start: 2022-11-01 | End: 2022-11-01

## 2022-11-01 RX ORDER — CLOBETASOL PROPIONATE 0.5 MG/G
OINTMENT TOPICAL ONCE
OUTPATIENT
Start: 2022-11-01 | End: 2022-11-01

## 2022-11-01 ASSESSMENT — PAIN DESCRIPTION - DESCRIPTORS: DESCRIPTORS: ACHING

## 2022-11-01 ASSESSMENT — PAIN DESCRIPTION - ORIENTATION: ORIENTATION: RIGHT

## 2022-11-01 ASSESSMENT — PAIN DESCRIPTION - PAIN TYPE: TYPE: CHRONIC PAIN

## 2022-11-01 ASSESSMENT — PAIN DESCRIPTION - LOCATION: LOCATION: LEG

## 2022-11-01 ASSESSMENT — PAIN SCALES - GENERAL: PAINLEVEL_OUTOF10: 3

## 2022-11-01 NOTE — DISCHARGE INSTRUCTIONS
Visit Discharge/Physician Orders    Discharge condition: Stable  Assessment of pain at discharge: minimal   Anesthetic used: Lidocaine 4%  Discharge to: Home  Left via:Private automobile  Accompanied by: accompanied by self  ECF/HHA: Cal Gonzalez  p:[phone]     Dressing Orders: Cleanse wound to Right lower leg with normal saline, apply Alginate AG to wound bed and cover with ABD and kerlix, change daily    Apply Spandigrip to both legs- from base of toes to ABOVE knees - on in morning and off at night. Treatment Orders:  11/1 Prescription cream ordered- please  from pharmacy and use as prescribed   11/1 Lamisil by mouth ordered to pharmacy   11/1 NAMRATA to be scheduled  EAT DIET WITH PROTEINS AND VITAMIN C  TAKE A MULTIVITAMIN DAILY IF NOT CONTRAINDICATED       [address] [place] [address],[address] followup visit ________1 week _________  (Please note your next appointment above and if you are unable to keep, kindly give a 24 hour notice. Thank you.)    Physician signature:__________________________      If you experience any of the following, please call the [address] during business hours:    * Increase in Pain  * Temperature over 101  * Increase in drainage from your wound  * Drainage with a foul odor  * Bleeding  * Increase in swelling  * Need for compression bandage changes due to slippage, breakthrough drainage. If you need medical attention outside of the business hours of the [address] please contact your PCP or go to the nearest emergency room.

## 2022-11-01 NOTE — PROGRESS NOTES
[address] [facility]:     [facility] [address] [facility] Melani Wills  H:[phone] f: [address]:     [address]  [address]  [id number] [place] [address] Dept: [id number] [facility] [phone]    Patient Information:      Mc Nayak  [address] [facility]  [place] [address]   [phone]   : 1955  AGE: 78 y.o. GENDER: female   EPISODE DATE: 2022    Insurance:      PRIMARY INSURANCE:  Plan: MEDICARE PART A AND B  Coverage: MEDICARE  Effective Date: 10/1/2021  Group Number: [unfilled]  Subscriber Number: [id number] - (Medicare)    Payer/Plan Subscr  Sex Relation Sub. Ins. ID Effective Group Num   1. Jarrell L 1955 Female Self [id number] 10/1/21 [id number]                                    BOX    2.  [id number]ni  1955 Female Self [id number] 10/1/21 PLAN G                                   [address]       Patient Wound Information:      Problem List Items Addressed This Visit          Other    Ulcer of calf with fat layer exposed, right ([address]       WOUNDS REQUIRING DRESSING SUPPLIES:     Wound 06/22/15 Venous ulcer Leg Left;Lateral;Lower wound #2 left lateral calf , acquired 5-25-15, venous (Active)   Number of days: 2688       Wound 22 Leg Right;Lateral #1 (Active)   Wound Image   22 0825   Wound Etiology Venous 22 0839   Wound Length (cm) 1 cm 22 0825   Wound Width (cm) 0.7 cm 22 0825   Wound Depth (cm) 0.1 cm 22 0825   Wound Surface Area (cm^2) 0.7 cm^2 22 0825   Wound Volume (cm^3) 0.07 cm^3 22 0825   Post-Procedure Length (cm) 1.2 cm 22   Post-Procedure Width (cm) 0.8 cm 22   Post-Procedure Depth (cm) 0.2 cm 22   Post-Procedure Surface Area (cm^2) 0.96 cm^2 22   Post-Procedure Volume (cm^3) 0.192 cm^3 22   Wound Assessment Fibrin;Pink/red 11/01/22 0825   Drainage Amount Small 11/01/22 0825   Drainage Description Serosanguinous 11/01/22 0825   Odor None 11/01/22 0825   Paula-wound Assessment Intact 11/01/22 0825   Wound Thickness Description not for Pressure Injury Full thickness 11/01/22 0839   Number of days: 0     Incision 05/13/21 Back Medial (Active)   Number of days: 879       Supplies Requested :      WOUND #: 1   PRIMARY DRESSING:  Alginate with silver pad   Cover and Secure with: ABD pad and kerlix      FREQUENCY OF DRESSING CHANGES:  Daily       ADDITIONAL ITEMS:  [] Gloves Small  [x] Gloves Medium [] Gloves Large [] Gloves XLarge  [] Tape 1\" [x] Tape 2\" [] Tape 3\"  [x] Medipore Tape  [x] Saline  [] Skin Prep   [] Adhesive Remover   [] Cotton Tip Applicators   [] Other:    Patient Wound(s) Debrided: [x] Yes if yes please add date 11/1/22   [] No    Debribement Type: Excisional/Sharp    Is the patient currently on an antibiotic for their Wound(s): [] Yes if yes please add name and dose Keflex 500 mg BID for 10 days  [] No    Patient currently being seen by Home Health: [] Yes   [x] No    Duration for needed supplies:  []15  [x]30  []60  []90 Days    Electronically signed by Kaelyn Black RN on 11/1/2022 at 8:45 AM     Provider Information:      PROVIDER:    [facility] NPI : [id number]

## 2022-11-01 NOTE — PROGRESS NOTES
Wound Healing Center /Hyperbarics   History and Physical/Consultation  Vascular    Referring Physician : Akanksha Zhang DO  Raissa Short  MEDICAL RECORD NUMBER:  [id number]  AGE: 78 y.o. GENDER: female  : 1955  EPISODE DATE:  2022  Subjective:     Chief Complaint   Patient presents with    Wound Check     Right leg         HISTORY of PRESENT ILLNESS HPI     Raissa Short is a 78 y.o. female who presents today for wound/ulcer evaluation. History of Wound Context:  The patient has had a wound of right calf which was first noted approximately few weeks ago, caused by scratching. This has been treated by the patient and her PCP, currently taking antibiotics with Keflex. On their initial visit to the wound healing center, 22, the patient has noted that the wound has not been improving. The patient has had similar previous wounds in the past.      Patient has history of longstanding lymphedema swelling of both legs, aggravated by her weight, undergoing lymphedema therapy currently has Velcro straps and compression devices for the calfs    Pt is currently on abx.   Keflex      Wound/Ulcer Pain Timing/Severity: constant  Quality of pain: dull, aching  Severity:  3 / 10   Modifying Factors: None  Associated Signs/Symptoms: edema, drainage, and pain    Ulcer Identification:  Ulcer Type: non-healing/non-surgical  Contributing Factors: lymphedema and smoking patient was found to have elevated blood glucose levels recently, tells me that they were elevated because of being on steroids    Diabetic/Pressure/Non Pressure Ulcers only:  Ulcer: N/A    If patient has diabetic lower extremity wounds  Galindo Classification of diabetic lower extremity wounds:    Grade Description   []  0 No open wound   []  1 Superficial ulcer involving the full skin thickness   []  2 Deep ulcer involves ligament, tendon, joint capsule, or fascia  No bone involvement or abscess presence   []  3 Deep Ulcer with abcess formation and/or osteomyelitis   []  4 Localized gangrene   []  5 Extensive gangrene of the foot     Wound: Patient has a wound over the lateral aspect upper calf, with minimal exudate, fairly clean with minimal drainage, no obvious cellulitis with underlying dermatophytosis      Other pertinent information:    Patient did undergo multiple venous ultrasound studies, revealed no evidence of deep vein thrombosis, did undergo a few CTA of the chest, no pulmonary embolus  Last lab work was reviewed, CMP revealed elevated blood glucose level of 154, albumin of 3.2 CBC mild anemia with a hemoglobin of 9.9 g      2022  Discussed the patient regarding all options, risks benefits, counseled stop smoking completely, because of underlying dermatophytosis, recommend topical miconazole cream twice a day for 1 month along with short-term oral Lamisil therapy, dress the wound with Aquacel Ag and Spandage , continue lymphedema therapy      PAST MEDICAL HISTORY      Diagnosis Date    Arthritis     Decreased dorsalis pedis pulse 2022    Dermatophytosis 2022    Full dentures     Leg swelling 2022    Low back pain     Lymphedema of both lower extremities chronic and continues as of 22    Obesity     280 #    Osteoarthritis     Peripheral vision loss     Stroke ([address]     Ulcer of calf with fat layer exposed, right ([address] 2022     Past Surgical History:   Procedure Laterality Date    ABDOMINOPLASTY  2002    BREAST REDUCTION SURGERY      reduction    CATARACT REMOVAL      bilateral     SECTION      x2    COLONOSCOPY  2012    and egd    COLONOSCOPY  2021    polyps; marginal prep--jessica    COLONOSCOPY N/A 2021    COLONOSCOPY WITH BIOPSY performed by Corrine Regalado MD at [address] [place] [address]    ECHOCARDIOGRAM COMPLETE 2D W DOPPLER W COLOR  2012         GASTRIC BYPASS SURGERY  2000    NO NASTOGASTRIC TUBE    HERNIA REPAIR          LUMBAR SPINE SURGERY N/A 2021    L3-L4 POSTERIOR LUMBAR INTERBODY  FUSION--OARM, JESSI, YAJAIRA TABLE, CELL SAVER, PLATELET GEL, AUDIOLOGY, CAGES, PLATES, SCREWS -- GLOBUS performed by Rolanda Mobley MD at [address] [facility] [address] Right 7/14/2022    RIGHT SACROILIAC JOINT INJECTION performed by Mercedes Ackerman DO at [address]  03/27/2021    minimal pouch gastritis--jessica    UPPER GASTROINTESTINAL ENDOSCOPY N/A 03/27/2021    EGD ESOPHAGOGASTRODUODENOSCOPY performed by Maribel Green MD at [place] [address] History   Problem Relation Age of Onset    Breast Cancer Mother     Stroke Father     Hypertension Sister     Hypertension Brother      Social History     Tobacco Use    Smoking status: Every Day     Packs/day: 1.00     Years: 38.00     Pack years: 38.00     Types: Cigarettes    Smokeless tobacco: Never    Tobacco comments:     restarted 3/2022   Vaping Use    Vaping Use: Never used   Substance Use Topics    Alcohol use: No    Drug use: No     Allergies   Allergen Reactions    Ferritin Shortness Of Breath and Other (See Comments)     Flushed,  Severe back pain     Current Outpatient Medications on File Prior to Encounter   Medication Sig Dispense Refill    cephALEXin (KEFLEX) 500 MG capsule Take 500 mg by mouth 4 times daily      bumetanide (BUMEX) 2 MG tablet Take 2 mg by mouth 2 times daily      Baclofen (LIORESAL) 5 MG tablet Take 10 mg by mouth 3 times daily      BELBUCA 450 MCG FILM Take 450 mcg by mouth in the morning and at bedtime. rOPINIRole (REQUIP) 2 MG tablet Take 0.5 tablets by mouth in the morning, at noon, and at bedtime 360 tablet 12     No current facility-administered medications on file prior to encounter.        REVIEW OF SYSTEMS   ROS : All others Negative if blank [], Positive if [x]  General Urinary   [] Fevers [] Hematuria   [] Chills [] Dysuria   [] Weight Loss Vascular   Skin [] Claudication   [x] Tissue Loss [] Rest Pain   Eyes Neurologic   [] Wears Glasses/Contacts [] Stroke/TIA   [] Vision Changes [] Focal weakness   Respiratory [] Slurred Speech    [] Shortness of breath ENT   Cardiovascular [] Difficulty swallowing   [] Chest Pain Gastrointestinal   [] Shortness of breath with exertion [] Abdominal Pain   Patient has lymphedema with underlying dermatophytosis, generalized osteoarthritis history of acute on chronic congestive heart failure, morbid obesity, trying to lose weight, tobacco use, trying to quit smoking, history of depression, history of hearing loss acute on the right ear, undergoing cochlear implant [] Melena       [] Hematochezia               Objective:    /84   Pulse 88   Temp 97.5 °F (36.4 °C) (Temporal)   Resp 20   Wt Readings from Last 3 Encounters:   10/28/22 270 lb 12.8 oz (122.8 kg)   10/21/22 298 lb 1.6 oz (135.2 kg)   10/13/22 (!) 303 lb (137.4 kg)       PHYSICAL EXAM  CONSTITUTIONAL:   Awake, alert, cooperative  PSYCHIATRIC :  Oriented to time, place and person      normal insight to disease process  ENT:  External ears and nose without lesions    Hearing deficits is not noted  NECK: Supple, symmetrical, trachea midline    Thyroid goiter not appreciated    Carotid bruit is not noted bilaterally  LUNGS:  No increased work of breathing                  Clear to auscultation bilaterally   CARDIOVASCULAR:  regular rate and rhythm   ABDOMEN:  soft, non-distended, non-tender    Hernias is not noted   Aorta is not palpable   Lymphatics : Cervical lymphadenopathy is not noted     Femoral lymphadenopathy is not noted  SKIN:   Skin color is normal   Texture and turgor is  normal   Induration is  noted  EXTREMITIES:   R UE Edema is not noted  L UE Edema is not noted  R LE Edema is  noted  L LE Edema is  noted  R femoral 2 L femoral 2   R dorsalis pedis 2 L dorsalis pedis 2   R posterior tibial 1 L posterior tibial 1     Assessment:     Problem List Items Addressed This Visit          High    Ulcer of calf with fat layer exposed, right (HCC)       Medium    Lymphedema of both lower extremities (Chronic)    Decreased dorsalis pedis pulse    Relevant Orders    VL NAMRATA BILATERAL LIMITED 1-2 LEVELS    Dermatophytosis    Relevant Medications    terbinafine (LAMISIL) 250 MG tablet    Leg swelling    Open wound of right lower leg - Primary     Procedure Note  Indications:  Based on my examination of this patient's wound(s)/ulcer(s) today, debridement is required to promote healing and evaluate the wound base. Performed by: Juliann Garza MD    Consent obtained:  Yes    Time out taken:  Yes    Pain Control: Anesthetic  Anesthetic: 4% Lidocaine Liquid Topical     Debridement:Excisional Debridement    Using curette the wound(s)/ulcer(s) was/were sharply debrided down through and including the removal of epidermis, dermis, and subcutaneous tissue.         Devitalized Tissue Debrided:  fibrin and slough    Pre Debridement Measurements:  Are located in the [facility]  Documentation Flow Sheet    Wound/Ulcer #: 1    Post Debridement Measurements:  Wound/Ulcer Descriptions are Pre Debridement except measurements:    Wound 06/22/15 Venous ulcer Leg Left;Lateral;Lower wound #2 left lateral calf , acquired 5-25-15, venous (Active)   Number of days: 2688       Wound 11/01/22 Leg Right;Lateral #1 (Active)   Wound Image   11/01/22 0825   Wound Etiology Venous 11/01/22 0839   Wound Length (cm) 1 cm 11/01/22 0825   Wound Width (cm) 0.7 cm 11/01/22 0825   Wound Depth (cm) 0.1 cm 11/01/22 0825   Wound Surface Area (cm^2) 0.7 cm^2 11/01/22 0825   Wound Volume (cm^3) 0.07 cm^3 11/01/22 0825   Post-Procedure Length (cm) 1.2 cm 11/01/22 0839   Post-Procedure Width (cm) 0.8 cm 11/01/22 0839   Post-Procedure Depth (cm) 0.2 cm 11/01/22 0839   Post-Procedure Surface Area (cm^2) 0.96 cm^2 11/01/22 0839   Post-Procedure Volume (cm^3) 0.192 cm^3 11/01/22 0839   Wound Assessment Fibrin;Pink/red 11/01/22 0825   Drainage Amount Small 11/01/22 0825   Drainage Description Serosanguinous 11/01/22 0825   Odor None 11/01/22 0825   Paula-wound Assessment Intact 11/01/22 0825   Wound Thickness Description not for Pressure Injury Full thickness 11/01/22 0839   Number of days: 0       Percent of Wound/Ulcer Debrided: 100%    Total Surface Area Debrided:  0.96 sq cm     Estimated Blood Loss:  Minimal    Hemostasis Achieved:  by pressure    Procedural Pain:  4  / 10     Post Procedural Pain:  3 / 10     Response to treatment:  Well tolerated by patient. A culture was not  done. Plan:     Pt is a smoker   - Discussed relationship of smoking and negative affects on wound healing   - Emphasized importance of tobacco avoidace/cessation   -  In my professional opinion and based off the information that is available at this time this patient has appropriate indication for HBO Therapy: No    Treatment Note please see attached Discharge Instructions    Written patient dismissal instructions given to patient and signed by patient or POA. Discharge Instructions         Visit Discharge/Physician Orders    Discharge condition: Stable  Assessment of pain at discharge: minimal   Anesthetic used: Lidocaine 4%  Discharge to: Home  Left via:Private automobile  Accompanied by: accompanied by self  ECF/HHA: Pablo Barksdale  p:[phone]     Dressing Orders: Cleanse wound to Right lower leg with normal saline, apply Alginate AG to wound bed and cover with ABD and kerlix, change daily    Apply Spandigrip to both legs- from base of toes to ABOVE knees - on in morning and off at night. Treatment Orders:  11/1 Prescription cream ordered- please  from pharmacy and use as prescribed   11/1 Lamisil by mouth ordered to pharmacy   11/1 NAMRATA to be scheduled  EAT DIET WITH PROTEINS AND VITAMIN C  TAKE A MULTIVITAMIN DAILY IF NOT CONTRAINDICATED       [facility] followup visit ________1 week _________  (Please note your next appointment above and if you are unable to keep, kindly give a 24 hour notice.  Thank you.)    Physician signature:__________________________      If you experience any of the following, please call the [address] [place] [address] during business hours:    * Increase in Pain  * Temperature over 101  * Increase in drainage from your wound  * Drainage with a foul odor  * Bleeding  * Increase in swelling  * Need for compression bandage changes due to slippage, breakthrough drainage. If you need medical attention outside of the business hours of the [address] [place] [address] please contact your PCP or go to the nearest emergency room.          Electronically signed by Evaristo Gaona MD on 11/1/2022 at 8:49 AM

## 2022-11-02 NOTE — PROGRESS NOTES
[facility] PRE-ADMISSION TESTING INSTRUCTIONS    The Preadmission Testing patient is instructed accordingly using the following criteria (check applicable):    ARRIVAL INSTRUCTIONS:  [x] Parking the day of Surgery is located in the Main Entrance lot. Upon entering the door, make an immediate right to the surgery reception desk    [x] Bring photo ID and insurance card    [] Bring in a copy of Living will or Durable Power of  papers. [x] Please be sure to arrange transportation to and from the hospital    [x] Please arrange for someone to be with you the remainder of the day due to having anesthesia      GENERAL INSTRUCTIONS:    [x] Nothing by mouth after midnight, including gum, candy, mints or water    [x] You may brush your teeth, but do not swallow any water    [x] Take medications as instructed with 1-2 oz of water    [] Stop herbal supplements and vitamins 5 days prior to procedure    [] Follow preop dosing of blood thinners per physician instructions    [] Do not take insulin or oral diabetic medications    [] If diabetic and have low blood sugar or feel symptomatic, take 1-2oz apple juice or glucose tablets    [] Bring inhalers day of surgery    [] Bring C-PAP/ Bi-Pap day of surgery    [] Bring urine specimen day of surgery    [x] Antibacterial Soap shower or bath AM of Surgery, no lotion, powders or creams to surgical site    [] Follow bowel prep as instructed per surgeon    [x] No tobacco products within 24 hours of surgery     [x] No alcohol or illegal drug use within 24 hours of surgery.     [x] Jewelry, body piercing's, eyeglasses, contact lenses and dentures are not permitted into surgery (bring cases)      [] Please do not wear any nail polish or make up on the day of surgery    [] If not already done, you can expect a call from registration    [x] If surgeon requests a time change you will be notified the day prior to surgery    [] If you receive a survey after surgery we would greatly appreciate your comments    [] Parent/guardian of a minor must accompany their child and remain on the premises  the entire time they are under our care     [] Pediatric patients may bring favorite toy, blanket or comfort item with them    [] A caregiver or family member must remain with the patient during their stay if they are mentally handicapped, have dementia, disoriented or unable to use a call light or would be a safety concern if left unattended    [x] Please notify surgeon if you develop any illness between now and time of surgery (cold, cough, sore throat, fever, nausea, vomiting) or any signs of infections  including skin, wounds, and dental.    [] Other instructions    EDUCATIONAL MATERIALS PROVIDED:    [] PAT Preoperative Education Packet/Booklet     [] Medication List    [] Fluoroscopy Information Pamphlet    [] Transfusion bracelet applied with instructions    [] Joint replacement video reviewed    [] Shower with antibacterial soap and use CHG wipes provided the evening before surgery as instructed

## 2022-11-03 ENCOUNTER — TELEPHONE (OUTPATIENT)
Dept: PRIMARY CARE CLINIC | Age: 67
End: 2022-11-03

## 2022-11-05 ENCOUNTER — ANESTHESIA EVENT (OUTPATIENT)
Dept: OPERATING ROOM | Age: 67
End: 2022-11-05
Payer: MEDICARE

## 2022-11-06 NOTE — H&P
Edin Lantigua is a 78 y.o. female presenting with 3 week history of right sudden hearing loss with vertigo symptoms, she is 2 weeks s/p transtympanic injection #2; she is unable to tolerate oral steroids. MRI IAC was done at [place] and read as normal, significant motion artefact. No benefit with her 2 transtympanic injections and noted to have significant tinnitus.  PPSV done 2020     PAST MEDICAL HISTORY:   Past Medical History        Past Medical History:   Diagnosis Date    Arthritis     Full dentures     Low back pain     Lymphedema of both lower extremities chronic and continues as of 22    Obesity     280 #    Osteoarthritis     Peripheral vision loss     Stroke [facility])    PAST SURGICAL HISTORY:   Past Surgical History         Past Surgical History:   Procedure Laterality Date    ABDOMINOPLASTY      BREAST REDUCTION SURGERY      reduction    CATARACT REMOVAL      bilateral     SECTION      x2    COLONOSCOPY  2012    and egd    COLONOSCOPY  2021    polyps; marginal prep--jessica    COLONOSCOPY N/A 2021    COLONOSCOPY WITH BIOPSY performed by Peggy Goodwin MD at [address]    ECHOCARDIOGRAM COMPLETE 2D W DOPPLER W COLOR  2012        GASTRIC BYPASS SURGERY  2000    NO NASTOGASTRIC TUBE    HERNIA REPAIR          LUMBAR SPINE SURGERY N/A 2021    L3-L4 POSTERIOR LUMBAR INTERBODY FUSION--OARM, JESSI, YAJAIAR TABLE, CELL SAVER, PLATELET GEL, AUDIOLOGY, CAGES, PLATES, SCREWS -- GLOBUS performed by Kp Lang MD at [address] Right 2022    RIGHT SACROILIAC JOINT INJECTION performed by Víctor Reyes DO at [facility]  2021    minimal pouch gastritis--jessica    UPPER GASTROINTESTINAL ENDOSCOPY N/A 2021    EGD ESOPHAGOGASTRODUODENOSCOPY performed by Peggy Goodwin MD at [address]:   Social History         Socioeconomic History    Marital status:      Spouse name: Not on file Number of children: 2    Years of education: 12    Highest education level: Associate degree: academic program   Occupational History    Not on file   Tobacco Use    Smoking status: Every Day     Packs/day: 1.00     Years: 38.00     Pack years: 38.00     Types: Cigarettes    Smokeless tobacco: Never    Tobacco comments:     restarted 3/2022   Vaping Use    Vaping Use: Never used   Substance and Sexual Activity    Alcohol use: No    Drug use: No    Sexual activity: Not Currently   Other Topics Concern    Not on file   Social History Narrative        Chronic Diagnosis: Iron deficiency anemia, Depressive disorder, Benign essential hypertension, Mixed    hyperlipidemia. HTN    OBESITY    LIPID    OA    SMOKER---quit summer 2021    CVA L FIELD VISION    DEPRESSION    GASTRIC BYPASS 01    BREAST REDUCTION--    Nadine Rocio  1955 Page #2    ANEMIA==IRON AND B-12    Admitted 2019 with cellulitis left lower extremity then readmitted with chest pain with negative stress test    L--3-4 lumbar surgeyr dr pineda-----    D[facility] Palac[facility]     Hearing loss right ear  seen by ENT injections in the right ear MRI done possible cochlear implant    Fall on 915 with laceration right leg 15 sutures and fall again on 927 with contusion left face    Start lymphedema therapy at [facility]     OA both knees with injections bilateral    Social Determinants of Health         Financial Resource Strain: Not on file    Food Insecurity: Not on file    Transportation Needs: Not on file    Physical Activity: Inactive     Days of Exercise per Week: 0 days     Minutes of Exercise per Session: 0 min    Stress: Not on file    Social Connections:  Moderately Integrated     Frequency of Communication with Friends and Family: More than three times a week     Frequency of Social Gatherings with Friends and Family: More than three times a week     Attends Yazidism Services: More than 4 stridor   Ear exam: External ears normal. Canals clear. TM's normal (right with healed perforation)   No nasal lesions, no erythema   No oral lesions. There is no palpable adenopathy or tenderness         Lab Results   Component Value Date/Time    WBC 6.1 09/27/2022 06:51 AM    HGB 9.9 (L) 09/27/2022 06:51 AM    HCT 34.1 09/27/2022 06:51 AM     09/27/2022 06:51 AM    MCV 79.9 (L) 09/27/2022 06:51 AM    MCH 23.2 (L) 09/27/2022 06:51 AM    MCHC 29.0 (L) 09/27/2022 06:51 AM    RDW 18.1 (H) 09/27/2022 06:51 AM    NEUTOPHILPCT 73.8 09/27/2022 06:51 AM    LYMPHOPCT 12.0 (L) 09/27/2022 06:51 AM    MONOPCT 11.4 09/27/2022 06:51 AM    EOSRELPCT 1.3 09/27/2022 06:51 AM    BASOPCT 0.3 09/27/2022 06:51 AM    NEUTROABS 4.47 09/27/2022 06:51 AM    LYMPHSABS 0.73 (L) 09/27/2022 06:51 AM    EOSABS 0.08 09/27/2022 06:51 AM     On this date 10/13/2022 I have spent 10 minutes reviewing previous notes, test results and 30 min face to face with the patient discussing the diagnosis and importance of compliance with the treatment plan as well as documenting on the day of the visit.    A/P:   Yadira Leggett is a 78 y.o. female with right sudden profound sensorineural hearing loss, no improvement with treatment and significant tinnitus   Plan for right cochlear implant   Device selection and speech evaluation today   Risks and benefits discussed, consent and registry signed   PPSV 11/2020   Heaven Arias MD 10/10/22 8:49 PM EDT

## 2022-11-07 ENCOUNTER — ANESTHESIA (OUTPATIENT)
Dept: OPERATING ROOM | Age: 67
End: 2022-11-07
Payer: MEDICARE

## 2022-11-07 ENCOUNTER — HOSPITAL ENCOUNTER (OUTPATIENT)
Age: 67
Setting detail: OUTPATIENT SURGERY
Discharge: HOME | End: 2022-11-07
Attending: OTOLARYNGOLOGY | Admitting: OTOLARYNGOLOGY
Payer: MEDICARE

## 2022-11-07 VITALS
RESPIRATION RATE: 16 BRPM | SYSTOLIC BLOOD PRESSURE: 145 MMHG | WEIGHT: 290 LBS | HEIGHT: 62 IN | TEMPERATURE: 98 F | OXYGEN SATURATION: 98 % | HEART RATE: 78 BPM | DIASTOLIC BLOOD PRESSURE: 92 MMHG | BODY MASS INDEX: 53.37 KG/M2

## 2022-11-07 DIAGNOSIS — G89.18 POST-OP PAIN: Primary | ICD-10-CM

## 2022-11-07 PROCEDURE — 2500000003 HC RX 250 WO HCPCS

## 2022-11-07 PROCEDURE — 2500000003 HC RX 250 WO HCPCS: Performed by: NURSE ANESTHETIST, CERTIFIED REGISTERED

## 2022-11-07 PROCEDURE — 6360000002 HC RX W HCPCS

## 2022-11-07 PROCEDURE — 6360000002 HC RX W HCPCS: Performed by: ANESTHESIOLOGY

## 2022-11-07 PROCEDURE — 2500000003 HC RX 250 WO HCPCS: Performed by: OTOLARYNGOLOGY

## 2022-11-07 PROCEDURE — 2580000003 HC RX 258: Performed by: ANESTHESIOLOGY

## 2022-11-07 PROCEDURE — 6360000002 HC RX W HCPCS: Performed by: OTOLARYNGOLOGY

## 2022-11-07 PROCEDURE — 2500000003 HC RX 250 WO HCPCS: Performed by: ANESTHESIOLOGY

## 2022-11-07 PROCEDURE — 2580000003 HC RX 258

## 2022-11-07 PROCEDURE — 6370000000 HC RX 637 (ALT 250 FOR IP): Performed by: ANESTHESIOLOGY

## 2022-11-07 PROCEDURE — 3700000001 HC ADD 15 MINUTES (ANESTHESIA): Performed by: OTOLARYNGOLOGY

## 2022-11-07 PROCEDURE — 2580000003 HC RX 258: Performed by: STUDENT IN AN ORGANIZED HEALTH CARE EDUCATION/TRAINING PROGRAM

## 2022-11-07 PROCEDURE — 7100000001 HC PACU RECOVERY - ADDTL 15 MIN: Performed by: OTOLARYNGOLOGY

## 2022-11-07 PROCEDURE — 6360000002 HC RX W HCPCS: Performed by: NURSE ANESTHETIST, CERTIFIED REGISTERED

## 2022-11-07 PROCEDURE — 6370000000 HC RX 637 (ALT 250 FOR IP): Performed by: OTOLARYNGOLOGY

## 2022-11-07 PROCEDURE — A4216 STERILE WATER/SALINE, 10 ML: HCPCS | Performed by: ANESTHESIOLOGY

## 2022-11-07 PROCEDURE — 2709999900 HC NON-CHARGEABLE SUPPLY: Performed by: OTOLARYNGOLOGY

## 2022-11-07 PROCEDURE — 7100000000 HC PACU RECOVERY - FIRST 15 MIN: Performed by: OTOLARYNGOLOGY

## 2022-11-07 PROCEDURE — 69930 IMPLANT COCHLEAR DEVICE: CPT | Performed by: OTOLARYNGOLOGY

## 2022-11-07 PROCEDURE — L8614 COCHLEAR DEVICE: HCPCS | Performed by: OTOLARYNGOLOGY

## 2022-11-07 PROCEDURE — 3700000000 HC ANESTHESIA ATTENDED CARE: Performed by: OTOLARYNGOLOGY

## 2022-11-07 PROCEDURE — 20922 REMOVAL OF FASCIA FOR GRAFT: CPT | Performed by: OTOLARYNGOLOGY

## 2022-11-07 PROCEDURE — 7100000010 HC PHASE II RECOVERY - FIRST 15 MIN: Performed by: OTOLARYNGOLOGY

## 2022-11-07 PROCEDURE — 3600000014 HC SURGERY LEVEL 4 ADDTL 15MIN: Performed by: OTOLARYNGOLOGY

## 2022-11-07 PROCEDURE — 92652 AEP THRSHLD EST MLT FREQ I&R: CPT | Performed by: AUDIOLOGIST

## 2022-11-07 PROCEDURE — 3600000004 HC SURGERY LEVEL 4 BASE: Performed by: OTOLARYNGOLOGY

## 2022-11-07 PROCEDURE — 6360000002 HC RX W HCPCS: Performed by: STUDENT IN AN ORGANIZED HEALTH CARE EDUCATION/TRAINING PROGRAM

## 2022-11-07 PROCEDURE — 7100000011 HC PHASE II RECOVERY - ADDTL 15 MIN: Performed by: OTOLARYNGOLOGY

## 2022-11-07 PROCEDURE — 2720000010 HC SURG SUPPLY STERILE: Performed by: OTOLARYNGOLOGY

## 2022-11-07 DEVICE — IMPLANTABLE DEVICE
Type: IMPLANTABLE DEVICE | Site: COCHLEA | Status: FUNCTIONAL
Brand: HIRES™ ULTRA 3D CI HIFOCUS™ SLIMJ ELECTRODE

## 2022-11-07 RX ORDER — LABETALOL HYDROCHLORIDE 5 MG/ML
5 INJECTION, SOLUTION INTRAVENOUS
Status: DISCONTINUED | OUTPATIENT
Start: 2022-11-07 | End: 2022-11-07 | Stop reason: HOSPADM

## 2022-11-07 RX ORDER — DIPHENHYDRAMINE HYDROCHLORIDE 50 MG/ML
12.5 INJECTION INTRAMUSCULAR; INTRAVENOUS
Status: DISCONTINUED | OUTPATIENT
Start: 2022-11-07 | End: 2022-11-07 | Stop reason: HOSPADM

## 2022-11-07 RX ORDER — LABETALOL HYDROCHLORIDE 5 MG/ML
INJECTION, SOLUTION INTRAVENOUS PRN
Status: DISCONTINUED | OUTPATIENT
Start: 2022-11-07 | End: 2022-11-07 | Stop reason: SDUPTHER

## 2022-11-07 RX ORDER — EPINEPHRINE 1 MG/ML
INJECTION, SOLUTION, CONCENTRATE INTRAVENOUS PRN
Status: DISCONTINUED | OUTPATIENT
Start: 2022-11-07 | End: 2022-11-07 | Stop reason: ALTCHOICE

## 2022-11-07 RX ORDER — SODIUM CHLORIDE, SODIUM LACTATE, POTASSIUM CHLORIDE, CALCIUM CHLORIDE 600; 310; 30; 20 MG/100ML; MG/100ML; MG/100ML; MG/100ML
INJECTION, SOLUTION INTRAVENOUS CONTINUOUS PRN
Status: DISCONTINUED | OUTPATIENT
Start: 2022-11-07 | End: 2022-11-07 | Stop reason: SDUPTHER

## 2022-11-07 RX ORDER — BACITRACIN ZINC 500 [USP'U]/G
OINTMENT TOPICAL PRN
Status: DISCONTINUED | OUTPATIENT
Start: 2022-11-07 | End: 2022-11-07 | Stop reason: ALTCHOICE

## 2022-11-07 RX ORDER — ONDANSETRON 2 MG/ML
INJECTION INTRAMUSCULAR; INTRAVENOUS PRN
Status: DISCONTINUED | OUTPATIENT
Start: 2022-11-07 | End: 2022-11-07 | Stop reason: SDUPTHER

## 2022-11-07 RX ORDER — TRAMADOL HYDROCHLORIDE 50 MG/1
50 TABLET ORAL EVERY 4 HOURS PRN
Qty: 30 TABLET | Refills: 0 | Status: SHIPPED | OUTPATIENT
Start: 2022-11-07 | End: 2022-11-07 | Stop reason: HOSPADM

## 2022-11-07 RX ORDER — LIDOCAINE HYDROCHLORIDE 20 MG/ML
INJECTION, SOLUTION EPIDURAL; INFILTRATION; INTRACAUDAL; PERINEURAL PRN
Status: DISCONTINUED | OUTPATIENT
Start: 2022-11-07 | End: 2022-11-07 | Stop reason: SDUPTHER

## 2022-11-07 RX ORDER — SODIUM CHLORIDE 0.9 % (FLUSH) 0.9 %
5-40 SYRINGE (ML) INJECTION EVERY 12 HOURS SCHEDULED
Status: DISCONTINUED | OUTPATIENT
Start: 2022-11-07 | End: 2022-11-07 | Stop reason: HOSPADM

## 2022-11-07 RX ORDER — SODIUM CHLORIDE 0.9 % (FLUSH) 0.9 %
5-40 SYRINGE (ML) INJECTION PRN
Status: DISCONTINUED | OUTPATIENT
Start: 2022-11-07 | End: 2022-11-07 | Stop reason: HOSPADM

## 2022-11-07 RX ORDER — ONDANSETRON 4 MG/1
4 TABLET, ORALLY DISINTEGRATING ORAL EVERY 8 HOURS PRN
Qty: 15 TABLET | Refills: 0 | Status: SHIPPED | OUTPATIENT
Start: 2022-11-07

## 2022-11-07 RX ORDER — KETAMINE HYDROCHLORIDE 10 MG/ML
INJECTION, SOLUTION INTRAMUSCULAR; INTRAVENOUS PRN
Status: DISCONTINUED | OUTPATIENT
Start: 2022-11-07 | End: 2022-11-07 | Stop reason: SDUPTHER

## 2022-11-07 RX ORDER — MEPERIDINE HYDROCHLORIDE 25 MG/ML
12.5 INJECTION INTRAMUSCULAR; INTRAVENOUS; SUBCUTANEOUS ONCE AS NEEDED
Status: DISCONTINUED | OUTPATIENT
Start: 2022-11-07 | End: 2022-11-07 | Stop reason: HOSPADM

## 2022-11-07 RX ORDER — PROPOFOL 10 MG/ML
INJECTION, EMULSION INTRAVENOUS PRN
Status: DISCONTINUED | OUTPATIENT
Start: 2022-11-07 | End: 2022-11-07 | Stop reason: SDUPTHER

## 2022-11-07 RX ORDER — GLYCOPYRROLATE 0.2 MG/ML
INJECTION INTRAMUSCULAR; INTRAVENOUS PRN
Status: DISCONTINUED | OUTPATIENT
Start: 2022-11-07 | End: 2022-11-07 | Stop reason: SDUPTHER

## 2022-11-07 RX ORDER — MIDAZOLAM HYDROCHLORIDE 1 MG/ML
INJECTION INTRAMUSCULAR; INTRAVENOUS PRN
Status: DISCONTINUED | OUTPATIENT
Start: 2022-11-07 | End: 2022-11-07 | Stop reason: SDUPTHER

## 2022-11-07 RX ORDER — SODIUM CHLORIDE 9 MG/ML
INJECTION, SOLUTION INTRAVENOUS PRN
Status: DISCONTINUED | OUTPATIENT
Start: 2022-11-07 | End: 2022-11-07 | Stop reason: HOSPADM

## 2022-11-07 RX ORDER — METOCLOPRAMIDE HYDROCHLORIDE 5 MG/ML
10 INJECTION INTRAMUSCULAR; INTRAVENOUS ONCE
Status: COMPLETED | OUTPATIENT
Start: 2022-11-07 | End: 2022-11-07

## 2022-11-07 RX ORDER — LIDOCAINE HYDROCHLORIDE AND EPINEPHRINE 10; 10 MG/ML; UG/ML
INJECTION, SOLUTION INFILTRATION; PERINEURAL PRN
Status: DISCONTINUED | OUTPATIENT
Start: 2022-11-07 | End: 2022-11-07 | Stop reason: ALTCHOICE

## 2022-11-07 RX ORDER — PROCHLORPERAZINE EDISYLATE 5 MG/ML
5 INJECTION INTRAMUSCULAR; INTRAVENOUS
Status: DISCONTINUED | OUTPATIENT
Start: 2022-11-07 | End: 2022-11-07 | Stop reason: HOSPADM

## 2022-11-07 RX ORDER — CEPHALEXIN 500 MG/1
500 CAPSULE ORAL 3 TIMES DAILY
Qty: 21 CAPSULE | Refills: 0 | Status: SHIPPED | OUTPATIENT
Start: 2022-11-07 | End: 2022-11-14

## 2022-11-07 RX ORDER — DEXAMETHASONE SODIUM PHOSPHATE 4 MG/ML
INJECTION, SOLUTION INTRA-ARTICULAR; INTRALESIONAL; INTRAMUSCULAR; INTRAVENOUS; SOFT TISSUE PRN
Status: DISCONTINUED | OUTPATIENT
Start: 2022-11-07 | End: 2022-11-07 | Stop reason: SDUPTHER

## 2022-11-07 RX ORDER — FENTANYL CITRATE 50 UG/ML
25 INJECTION, SOLUTION INTRAMUSCULAR; INTRAVENOUS EVERY 5 MIN PRN
Status: DISCONTINUED | OUTPATIENT
Start: 2022-11-07 | End: 2022-11-07 | Stop reason: HOSPADM

## 2022-11-07 RX ORDER — ACETAMINOPHEN 500 MG
1000 TABLET ORAL ONCE
Status: COMPLETED | OUTPATIENT
Start: 2022-11-07 | End: 2022-11-07

## 2022-11-07 RX ORDER — FENTANYL CITRATE 50 UG/ML
INJECTION, SOLUTION INTRAMUSCULAR; INTRAVENOUS PRN
Status: DISCONTINUED | OUTPATIENT
Start: 2022-11-07 | End: 2022-11-07 | Stop reason: SDUPTHER

## 2022-11-07 RX ORDER — HYDRALAZINE HYDROCHLORIDE 20 MG/ML
5 INJECTION INTRAMUSCULAR; INTRAVENOUS
Status: DISCONTINUED | OUTPATIENT
Start: 2022-11-07 | End: 2022-11-07 | Stop reason: HOSPADM

## 2022-11-07 RX ORDER — SUCCINYLCHOLINE/SOD CL,ISO/PF 200MG/10ML
SYRINGE (ML) INTRAVENOUS PRN
Status: DISCONTINUED | OUTPATIENT
Start: 2022-11-07 | End: 2022-11-07 | Stop reason: SDUPTHER

## 2022-11-07 RX ORDER — ROCURONIUM BROMIDE 10 MG/ML
INJECTION, SOLUTION INTRAVENOUS PRN
Status: DISCONTINUED | OUTPATIENT
Start: 2022-11-07 | End: 2022-11-07 | Stop reason: SDUPTHER

## 2022-11-07 RX ADMIN — FENTANYL CITRATE 100 MCG: 50 INJECTION, SOLUTION INTRAMUSCULAR; INTRAVENOUS at 08:45

## 2022-11-07 RX ADMIN — MIDAZOLAM 2 MG: 1 INJECTION INTRAMUSCULAR; INTRAVENOUS at 08:32

## 2022-11-07 RX ADMIN — DEXAMETHASONE SODIUM PHOSPHATE 10 MG: 4 INJECTION, SOLUTION INTRAMUSCULAR; INTRAVENOUS at 09:04

## 2022-11-07 RX ADMIN — ONDANSETRON 4 MG: 2 INJECTION INTRAMUSCULAR; INTRAVENOUS at 10:28

## 2022-11-07 RX ADMIN — FAMOTIDINE 20 MG: 10 INJECTION INTRAVENOUS at 07:50

## 2022-11-07 RX ADMIN — GLYCOPYRROLATE 0.2 MG: 0.2 INJECTION, SOLUTION INTRAMUSCULAR; INTRAVENOUS at 08:48

## 2022-11-07 RX ADMIN — LABETALOL HYDROCHLORIDE 10 MG: 5 INJECTION INTRAVENOUS at 10:53

## 2022-11-07 RX ADMIN — HYDROMORPHONE HYDROCHLORIDE 0.5 MG: 0.5 INJECTION, SOLUTION INTRAMUSCULAR; INTRAVENOUS; SUBCUTANEOUS at 11:15

## 2022-11-07 RX ADMIN — ACETAMINOPHEN 1000 MG: 500 TABLET ORAL at 07:50

## 2022-11-07 RX ADMIN — LABETALOL HYDROCHLORIDE 5 MG: 5 INJECTION INTRAVENOUS at 12:23

## 2022-11-07 RX ADMIN — ROCURONIUM BROMIDE 5 MG: 10 INJECTION, SOLUTION INTRAVENOUS at 08:45

## 2022-11-07 RX ADMIN — LIDOCAINE HYDROCHLORIDE 100 MG: 20 INJECTION, SOLUTION EPIDURAL; INFILTRATION; INTRACAUDAL; PERINEURAL at 08:45

## 2022-11-07 RX ADMIN — PROPOFOL 80 MG: 10 INJECTION, EMULSION INTRAVENOUS at 08:45

## 2022-11-07 RX ADMIN — KETAMINE HYDROCHLORIDE 20 MG: 10 INJECTION INTRAMUSCULAR; INTRAVENOUS at 08:45

## 2022-11-07 RX ADMIN — SODIUM CHLORIDE, POTASSIUM CHLORIDE, SODIUM LACTATE AND CALCIUM CHLORIDE: 600; 310; 30; 20 INJECTION, SOLUTION INTRAVENOUS at 08:33

## 2022-11-07 RX ADMIN — METOCLOPRAMIDE 10 MG: 5 INJECTION, SOLUTION INTRAMUSCULAR; INTRAVENOUS at 07:51

## 2022-11-07 RX ADMIN — CEFAZOLIN 3000 MG: 10 INJECTION, POWDER, FOR SOLUTION INTRAVENOUS at 08:48

## 2022-11-07 RX ADMIN — HYDROMORPHONE HYDROCHLORIDE 0.5 MG: 0.5 INJECTION, SOLUTION INTRAMUSCULAR; INTRAVENOUS; SUBCUTANEOUS at 11:25

## 2022-11-07 RX ADMIN — Medication 160 MG: at 08:45

## 2022-11-07 ASSESSMENT — PAIN DESCRIPTION - LOCATION
LOCATION: BACK
LOCATION: EAR
LOCATION: BACK
LOCATION: BACK
LOCATION: EAR
LOCATION: EAR
LOCATION: BACK

## 2022-11-07 ASSESSMENT — PAIN DESCRIPTION - DESCRIPTORS
DESCRIPTORS: NAGGING;DISCOMFORT
DESCRIPTORS: DISCOMFORT
DESCRIPTORS: DISCOMFORT;NAGGING
DESCRIPTORS: DISCOMFORT
DESCRIPTORS: DISCOMFORT;NAGGING

## 2022-11-07 ASSESSMENT — PAIN DESCRIPTION - PAIN TYPE
TYPE: CHRONIC PAIN
TYPE: ACUTE PAIN;SURGICAL PAIN
TYPE: CHRONIC PAIN

## 2022-11-07 ASSESSMENT — PAIN DESCRIPTION - FREQUENCY
FREQUENCY: CONTINUOUS
FREQUENCY: INTERMITTENT
FREQUENCY: CONTINUOUS
FREQUENCY: CONTINUOUS

## 2022-11-07 ASSESSMENT — PAIN DESCRIPTION - ORIENTATION
ORIENTATION: RIGHT

## 2022-11-07 ASSESSMENT — PAIN SCALES - GENERAL
PAINLEVEL_OUTOF10: 4
PAINLEVEL_OUTOF10: 3
PAINLEVEL_OUTOF10: 7
PAINLEVEL_OUTOF10: 7
PAINLEVEL_OUTOF10: 4
PAINLEVEL_OUTOF10: 4
PAINLEVEL_OUTOF10: 7
PAINLEVEL_OUTOF10: 3
PAINLEVEL_OUTOF10: 4

## 2022-11-07 ASSESSMENT — PAIN - FUNCTIONAL ASSESSMENT: PAIN_FUNCTIONAL_ASSESSMENT: NONE - DENIES PAIN

## 2022-11-07 ASSESSMENT — COPD QUESTIONNAIRES: CAT_SEVERITY: NO INTERVAL CHANGE

## 2022-11-07 ASSESSMENT — LIFESTYLE VARIABLES: SMOKING_STATUS: 1

## 2022-11-07 ASSESSMENT — ENCOUNTER SYMPTOMS: DYSPNEA ACTIVITY LEVEL: NO INTERVAL CHANGE

## 2022-11-07 NOTE — H&P
Eleuterio Patton was seen and re-examined preoperatively today, November 7, 2022. There was no substantial change in her physical and medical status. Patient is fit for the proposed surgical procedure. All questions were appropriately addressed and had no further questions regarding the risks, benefits, and alternatives of the procedure. Eleuterio Patton and family wished to proceed.     Lenny Stevens DO  Resident Physician  [facility])  Otolaryngology Residency  11/7/2022  7:45 AM

## 2022-11-07 NOTE — OP NOTE
Patient ID:  Date of Procedure: 11/7/2022  Patient name: Raissa Short  YOB: 1955  Medical record number: [id number]    Surgeon: Dr. Le Browning    Assistant: Dr. Alda Barrera op: bilateral profound SNHL    Post op: same    Procedure:  right cochlear implant, free tissue graft, facial nerve monitoring, intraoperative neural response testing    HPI: Raissa Short is a 78 y.o. female with right sudden profound hearing loss, unresponsive to steroids with significant tinnitus    I discussed the risks and benefits of the cochlear implantation which includes persistent hearing loss, failure to improve communication, facial nerve weakness or injury, CSF leak, infection, ear numbness, dizziness, ringing of the ears, device failure, poor hearing. I emphasized the need for regular appointments after implanantation surgery to make sure that programming of the device was optimal, for optimal benefit from the device. Procedure: Patient was brought to the OR and induced under general anesthesia, without nitrous or paralysis with an ETT. The right site was verified, and marked with the templates. The scans were loaded in the operating room for easy visualization. The facial nerve monitor was placed and tested and found to be working appropriately. The ear was cleaned and examined under the microscope to verify that it was healthy. The table was turned 180 degrees. The ear was prepped and drapped in a sterile fashion. The postauricular area was injected with 10ml of 1% lidocaine with epinephrine. A postauricular incision was made, and the ear was elevated anteriorly. A T-shaped periosteal incision was made and the ear was retracted anteriorly. A tight periosteal pocket was created to tightly fit the device    A small temporalis facial graft was harvested and preserved in saline. A simple cortical mastoidectomy was performed. The landmarks were noted.  A facial recess approach was started, with care being taken to protect the facial nerve. The chorda tympani was preserved. The incudostapedial joint was noted, and the basal turn of the cochlea was identified, as was the round window. The overhang of the round window was taken down and the round window could be seen in its entirety. A round window opening was created with the Miranda needle. Healon was used to fill the round window site and prevent entry of bone dust and blood within the cochlea. The cochlear device (AB MyWeb[employer] Slim J) was verified and loaded onto the field. The Device was secured in the well under the periosteum in a tight pocket a 3.0 monocryl suture. The electrode array was placed into the round window site, without resistance, achieving a complete insertion. The round window site was packed with the temporalis facial graft. The incision was irrigated, and then closed in layers. The periosteum was closed with 3.0 monocryl. 4.0 monocryl was used for deep dermal, and 5.0 fast absorbing gut was used for skin. The incision was cleaned. Intraoperative testing was done with good impedences and responses across the nerve. A mastoid dressing was applied. The patient was handed to anesthesia for wake up without complication.     Complications: none    EBL: minimal    Findings: complete insertion of electrodes, preserved chorda tympani and facial nerves    Device used: AB Hires Slim J    Follow up: 2 weeks

## 2022-11-07 NOTE — ANESTHESIA PRE PROCEDURE
Department of Anesthesiology  Preprocedure Note       Name:  Mauricio Jarvis   Age:  78 y.o.  :  1955                                          MRN:  [id number]         Date:  2022      Surgeon: Wallace Naylor):  Garrett Byers MD    Procedure: Procedure(s):  RIGHT COCHLEAR IMPLANT INSERTION WITH NERVE INTEGRITY MONITOR  ++ADVANCED BIONICS++    (OFFICE REQ. THIS TO BE FIRST CASE)    Medications prior to admission:   Prior to Admission medications    Medication Sig Start Date End Date Taking? Authorizing Provider   terbinafine (LAMISIL) 250 MG tablet Take 1 tablet by mouth daily for 15 days 22  Cristino Floyd MD   miconazole nitrate 2 % OINT Apply topically 2 times daily  Patient taking differently: Apply topically 2 times daily To feet and legs 22   Cristino Floyd MD   bumetanide (BUMEX) 2 MG tablet Take 2 mg by mouth 2 times daily    Historical Provider, MD   Baclofen (LIORESAL) 5 MG tablet Take 10 mg by mouth 3 times daily    Historical Provider, MD   BELBUCA 450 MCG FILM Take 450 mcg by mouth in the morning and at bedtime. 9/15/22   Historical Provider, MD   rOPINIRole (REQUIP) 2 MG tablet Take 0.5 tablets by mouth in the morning, at noon, and at bedtime 22   Gautam Razo DO       Current medications:    No current facility-administered medications for this encounter. Current Outpatient Medications   Medication Sig Dispense Refill    terbinafine (LAMISIL) 250 MG tablet Take 1 tablet by mouth daily for 15 days 15 tablet 0    miconazole nitrate 2 % OINT Apply topically 2 times daily (Patient taking differently: Apply topically 2 times daily To feet and legs) 1 each 10    bumetanide (BUMEX) 2 MG tablet Take 2 mg by mouth 2 times daily      Baclofen (LIORESAL) 5 MG tablet Take 10 mg by mouth 3 times daily      BELBUCA 450 MCG FILM Take 450 mcg by mouth in the morning and at bedtime.       rOPINIRole (REQUIP) 2 MG tablet Take 0.5 tablets by mouth in the morning, at noon, and at bedtime 360 tablet 12       Allergies:     Allergies   Allergen Reactions    Ferritin Shortness Of Breath and Other (See Comments)     Flushed,  Severe back pain       Problem List:    Patient Active Problem List   Diagnosis Code    Primary osteoarthritis of left knee M17.12    Osteoarthritis of right knee M17.11    BMI 45.0-49.9, adult ([facility]) Z68.42    Lymphedema of both lower extremities I89.0    Microcytic anemia D50.9    Morbid obesity ([facility] [address] E66.01    Tobacco abuse Z72.0    Iron deficiency anemia D50.9    Primary osteoarthritis involving multiple joints M15.9    Lymphedema I89.0    Intervertebral lumbar disc disorder M51.9    Reactive depression F32.9    Chronic pain syndrome G89.4    Primary osteoarthritis of both knees M17.0    Chronic pain G89.29    Chronic right-sided low back pain without sciatica M54.50, G89.29    Anxiety F41.9    Chronic fatigue R53.82    Tremor R25.1    Intractable back pain M54.9    Acute midline low back pain with right-sided sciatica M54.41    Spinal stenosis of lumbar region with neurogenic claudication M48.062    Osteoarthritis of spine with radiculopathy, lumbar region M47.26    Anemia D64.9    Spondylolisthesis of lumbar region M43.16    Lumbar stenosis with neurogenic claudication M48.062    Diet-controlled diabetes mellitus ([facility]) E11.9    Acute pancreatitis without necrosis or infection, unspecified K85.90    Abdominal pain R10.9    Chronic low back pain without sciatica M54.50, G89.29    Mixed stress and urge urinary incontinence N39.46    Disorder of sacrum M53.3    Acute hearing loss of right ear H91.91    Other speech disturbances R47.89    Open wound of right lower leg S81.801A    Ulcer of calf with fat layer exposed, right ([facility]) L97.212    Leg swelling M79.89    Dermatophytosis B35.9    Decreased dorsalis pedis pulse R09.89       Past Medical History:        Diagnosis Date    Arthritis     Decreased dorsalis pedis pulse 2022    Dermatophytosis 2022    Full dentures     Leg swelling 2022    Low back pain     Lymphedema of both lower extremities chronic and continues as of 22    Obesity     280 #    Osteoarthritis     Peripheral vision loss     Stroke ([address]     Ulcer of calf with fat layer exposed, right ([address] 2022    W[facility] DR Deedee Meckel       Past Surgical History:        Procedure Laterality Date    ABDOMINOPLASTY      BREAST REDUCTION SURGERY      reduction    CATARACT REMOVAL      bilateral     SECTION      x2    COLONOSCOPY  2012    and egd    COLONOSCOPY  2021    polyps; marginal prep--jessica    COLONOSCOPY N/A 2021    COLONOSCOPY WITH BIOPSY performed by Justin Turner MD at [address] ECHOCARDIOGRAM COMPLETE 2D W DOPPLER W COLOR  2012         GASTRIC BYPASS SURGERY      NO NASTOGASTRIC TUBE    HERNIA REPAIR          LUMBAR SPINE SURGERY N/A 2021    L3-L4  POSTERIOR LUMBAR INTERBODY  FUSION--OARM, JESSI, YAJAIRA TABLE, CELL SAVER, PLATELET GEL, AUDIOLOGY, CAGES, PLATES, SCREWS -- GLOBUS performed by Savage Campbell MD at [address] Right 2022    RIGHT SACROILIAC JOINT INJECTION performed by Pura Landeros DO at [facility] [address]  2021    minimal pouch gastritis--jessica    UPPER GASTROINTESTINAL ENDOSCOPY N/A 2021    EGD ESOPHAGOGASTRODUODENOSCOPY performed by Justin Turner MD at [facility] History:    Social History     Tobacco Use    Smoking status: Every Day     Packs/day: 1.00     Years: 38.00     Pack years: 38.00     Types: Cigarettes    Smokeless tobacco: Never    Tobacco comments:     restarted 3/2022   Substance Use Topics    Alcohol use:  No                                Ready to quit: Not Answered  Counseling given: Not Answered  Tobacco comments: restarted 3/2022      Vital Signs (Current):   Vitals:    22 1439 Weight: 290 lb (131.5 kg)   Height: 5' 2\" (1.575 m)                                              BP Readings from Last 3 Encounters:   11/01/22 138/84   10/28/22 122/60   10/21/22 (!) 142/82       NPO Status:                                                                                 BMI:   Wt Readings from Last 3 Encounters:   10/28/22 270 lb 12.8 oz (122.8 kg)   10/21/22 298 lb 1.6 oz (135.2 kg)   10/13/22 (!) 303 lb (137.4 kg)     Body mass index is 53.04 kg/m². CBC:   Lab Results   Component Value Date/Time    WBC 6.1 09/27/2022 06:51 AM    RBC 4.27 09/27/2022 06:51 AM    HGB 9.9 09/27/2022 06:51 AM    HCT 34.1 09/27/2022 06:51 AM    MCV 79.9 09/27/2022 06:51 AM    RDW 18.1 09/27/2022 06:51 AM     09/27/2022 06:51 AM       CMP:   Lab Results   Component Value Date/Time     09/27/2022 06:51 AM    K 3.6 09/27/2022 06:51 AM     09/27/2022 06:51 AM    CO2 28 09/27/2022 06:51 AM    BUN 14 09/27/2022 06:51 AM    CREATININE 0.8 09/27/2022 06:51 AM    GFRAA >60 09/27/2022 06:51 AM    LABGLOM >60 09/27/2022 06:51 AM    GLUCOSE 154 09/27/2022 06:51 AM    PROT 6.2 09/27/2022 06:51 AM    CALCIUM 8.6 09/27/2022 06:51 AM    BILITOT 0.2 09/27/2022 06:51 AM    ALKPHOS 133 09/27/2022 06:51 AM    AST 12 09/27/2022 06:51 AM    ALT 11 09/27/2022 06:51 AM       POC Tests: No results for input(s): POCGLU, POCNA, POCK, POCCL, POCBUN, POCHEMO, POCHCT in the last 72 hours.     Coags:   Lab Results   Component Value Date/Time    PROTIME 11.4 01/31/2022 10:16 PM    INR 1.1 01/31/2022 10:16 PM    APTT 27.7 01/31/2022 10:16 PM       HCG (If Applicable): No results found for: PREGTESTUR, PREGSERUM, HCG, HCGQUANT     ABGs: No results found for: PHART, PO2ART, GND7SSA, ACU9GZK, BEART, H2EIDSVN     Type & Screen (If Applicable):  No results found for: LABABO, LABRH    Drug/Infectious Status (If Applicable):  No results found for: HIV, HEPCAB    COVID-19 Screening (If Applicable):   Lab Results   Component Value Date/Time    COVID19 Not Detected 09/27/2022 07:30 AM    COVID19 Not Detected 05/07/2021 09:08 AM    COVID19 DETECTED 04/23/2021 10:00 AM           Anesthesia Evaluation  Patient summary reviewed and Nursing notes reviewed no history of anesthetic complications:   Airway: Mallampati: III  TM distance: >3 FB   Neck ROM: limited  Mouth opening: > = 3 FB   Dental:    (+) upper dentures and lower dentures      Pulmonary:   (+) COPD (Probalble underlying COPD given smoking history - oxygen saturation 92% on RA): no interval change,  decreased breath sounds: bilateral current smoker    (-) sleep apnea          Patient did not smoke on day of surgery. Cardiovascular:  Exercise tolerance: poor (<4 METS),   (+) hypertension: moderate, no interval change and severe, PATEL: no interval change, murmur: Grade 1,     (-) past MI, CAD, CABG/stent, dysrhythmias and  angina    ECG reviewed  Rhythm: regular  Rate: normal  Echocardiogram reviewed         Beta Blocker:  Not on Beta Blocker      ROS comment: EKG:  Sinus  Rhythm   Low voltage in precordial leads. Anterior infarct -age undetermined. ABNORMAL     ECHO 2021:   Ejection fraction is visually estimated at 65%. No regional wall motion abnormalities seen. Moderate left ventricular concentric hypertrophy noted. Normal right ventricle structure and function. Physiologic and/or trace mitral regurgitation is present. Physiologic and/or trace tricuspid regurgitation.      Neuro/Psych:   (+) CVA:, neuromuscular disease:, psychiatric history:             ROS comment: Primary osteoarthritis involving multiple joints  Lymphedema  Intervertebral lumbar disc disorder  Reactive depression  Chronic pain syndrome  Primary osteoarthritis of both knees  Chronic pain  Chronic right-sided low back pain without sciatica  Anxiety  Chronic fatigue  Tremor  Intractable back pain  Acute midline low back pain with right-sided sciatica  Spinal stenosis of lumbar region with neurogenic claudication  Osteoarthritis of spine with radiculopathy, lumbar region  Anemia  Spondylolisthesis of lumbar region  Lumbar stenosis with neurogenic claudication  Acute hearing loss right ear  Ambulatory dysfunction - needs a seated walker GI/Hepatic/Renal:   (+) morbid obesity (SMO with a BMI of 53.0)     (-) hepatitis and no renal disease      ROS comment: History of acute pancreatitis  Incontinence  History of gastric bypass. Endo/Other:    (+) Diabetes (A1C 6.0%)Type II DM, well controlled, , blood dyscrasia (Hbg 9.9 mg/dL): anemia, arthritis: OA., . Pt had no PAT visit        ROS comment: RLS Abdominal:   (+) obese,           Vascular:   + PVD, aortic or cerebral, .  - DVT and PE. Other Findings:           Anesthesia Plan      general     ASA 3     (Modified RSI with HOB at 30 degrees RT  Pre-oxygenation x 3 minutes  20mg Ketamine  Succinylcholine  PONV prophylaxis)  Induction: intravenous. MIPS: Postoperative opioids intended and Prophylactic antiemetics administered. Anesthetic plan and risks discussed with patient. Plan discussed with EULOGIO.                 Kim Arriola DO   11/7/2022    Agree with above assessment/plan    David Pearson CRNA

## 2022-11-07 NOTE — PROGRESS NOTES
Patient was here for cochlear implantation on the right, with [id number] [address] array ([id number]). No extracochlear electrodes. Impedance values were within normal limits across the electrode array. NRI was completed and responses were present on all electrodes. Results discussed with Dr. Al Montes De Oca. The initial activation is scheduled for 12/1/22.     Shira Rico CCC-A  [address] [facility] [id number]  Electronically signed by Shira Rico on 11/7/2022 at 8:59 AM

## 2022-11-07 NOTE — DISCHARGE INSTRUCTIONS
POSTOP EAR SURGERY INSTRUCTIONS  Regular diet  No strenuous activity for 2 weeks  Alternate OTC tylenol with ibuprofen for pain control  Prescription if provided for breakthrough pain control   She can return to school/work in 3-5 days  No sports or recess at school  She can gently wash Her hair but strict dry ear precautions for the inside of the ear  (Can use ear plugs or cotton ball with vaseline to keep moisture out of the ear)  Topical antibiotic ointment to any visible incision 2X/day for 2 weeks  Keep follow up appointment

## 2022-11-07 NOTE — ANESTHESIA POSTPROCEDURE EVALUATION
Department of Anesthesiology  Postprocedure Note    Patient: Giancarlo Phelan  MRN: [id number]  YOB: 1955  Date of evaluation: 11/7/2022      Procedure Summary     Date: 11/07/22 Room / Location: [facility] OR 02 / [facility]    Anesthesia Start: 6482 Anesthesia Stop: 6882    Procedure: RIGHT COCHLEAR IMPLANT INSERTION WITH NERVE INTEGRITY MONITOR  ++ADVANCED BIONICS++    (OFFICE REQ. THIS TO BE FIRST CASE) (Right: Ear) Diagnosis:       Profound hearing loss      (Profound hearing loss [H91.90])    Surgeons: Alexis Estrella MD Responsible Provider: Zack Schwab DO    Anesthesia Type: General ASA Status: 3          Anesthesia Type: General    Izzy Phase I: Izzy Score: 9    Izzy Phase II:        Anesthesia Post Evaluation    Patient location during evaluation: bedside  Patient participation: complete - patient participated  Level of consciousness: awake  Pain score: 3  Airway patency: patent  Nausea & Vomiting: no vomiting and no nausea  Complications: no  Cardiovascular status: hemodynamically stable  Respiratory status: acceptable  Hydration status: stable  Comments: Seen and examined. Progressing as expected. No questions or concerns.   Multimodal analgesia pain management approach

## 2022-11-09 ENCOUNTER — TELEPHONE (OUTPATIENT)
Dept: ENT CLINIC | Age: 67
End: 2022-11-09

## 2022-11-22 RX ORDER — NITROFURANTOIN 25; 75 MG/1; MG/1
100 CAPSULE ORAL 2 TIMES DAILY
Qty: 14 CAPSULE | Refills: 0 | Status: ON HOLD
Start: 2022-11-22 | End: 2022-12-20 | Stop reason: HOSPADM

## 2022-11-23 DIAGNOSIS — R29.898 WEAKNESS OF BOTH LOWER EXTREMITIES: ICD-10-CM

## 2022-11-23 DIAGNOSIS — R26.9 GAIT ABNORMALITY: Primary | ICD-10-CM

## 2022-11-30 NOTE — PROGRESS NOTES
CC:   Chief Complaint   Patient presents with    Post-Op Check     P/O CI activation     Anisha Cody is a 78 y.o. female is s/p right CI with AB HiRes Slim J on 22 with an uneventful postoperative course; OR Findings: complete insertion of electrodes, preserved chorda tympani and facial nerves    PMH: history of right sudden hearing loss with vertigo symptoms; she is unable to tolerate oral steroids. [facility] and read as normal, significant motion artefact. No benefit with her 2 transtympanic injections and noted to have significant tinnitus.  PPSV done 2020      PAST MEDICAL HISTORY:   Past Medical History:   Diagnosis Date    Arthritis     Decreased dorsalis pedis pulse 2022    Dermatophytosis 2022    Full dentures     Leg swelling 2022    Low back pain     Lymphedema of both lower extremities chronic and continues as of 22    Obesity     280 #    Osteoarthritis     Peripheral vision loss     Stroke [facility])     Ulcer of calf with fat layer exposed, right ([address] 2022    WOUND CLINIC DR Eusebio Clements        PAST SURGICAL HISTORY:   Past Surgical History:   Procedure Laterality Date    ABDOMINOPLASTY  2002    BREAST REDUCTION SURGERY      reduction    CATARACT REMOVAL      bilateral     SECTION      x2    COCHLEAR IMPLANT Right 2022    RIGHT COCHLEAR IMPLANT INSERTION WITH NERVE INTEGRITY MONITOR performed by Eusebio Nichols MD at [address]  2012    and egd    COLONOSCOPY  2021    polyps; marginal prep--jessica    COLONOSCOPY N/A 2021    COLONOSCOPY WITH BIOPSY performed by No Morse MD at [facility]    ECHOCARDIOGRAM COMPLETE 2D W DOPPLER W COLOR  2012         GASTRIC BYPASS SURGERY  2000    NO NASTOGASTRIC TUBE    HERNIA REPAIR          LUMBAR SPINE SURGERY N/A 2021    L3-L4  POSTERIOR LUMBAR INTERBODY  FUSION--OARM, JESSI, YAJAIRA TABLE, CELL SAVER, PLATELET GEL, AUDIOLOGY, CAGES, PLATES, SCREWS -- GLOBUS performed by Merline Odell MD at [address] Right 2022    RIGHT SACROILIAC JOINT INJECTION performed by Esmer Del Rio DO at [address]  2021    minimal pouch gastritis--jessica    UPPER GASTROINTESTINAL ENDOSCOPY N/A 2021    EGD ESOPHAGOGASTRODUODENOSCOPY performed by Tequila Pappas MD at [address]:   Social History     Socioeconomic History    Marital status:      Spouse name: Not on file    Number of children: 2    Years of education: 12    Highest education level: Associate degree: academic program   Occupational History    Not on file   Tobacco Use    Smoking status: Every Day     Packs/day: 1.00     Years: 38.00     Pack years: 38.00     Types: Cigarettes    Smokeless tobacco: Never    Tobacco comments:     restarted 3/2022   Vaping Use    Vaping Use: Never used   Substance and Sexual Activity    Alcohol use: No    Drug use: No    Sexual activity: Not Currently   Other Topics Concern    Not on file   Social History Narrative        Chronic Diagnosis: Iron deficiency anemia, Depressive disorder, Benign essential hypertension, Mixed    hyperlipidemia.     HTN    OBESITY    LIPID    OA    SMOKER---quit   summer  2021    CVA L FIELD VISION    DEPRESSION    GASTRIC BYPASS 01    BREAST REDUCTION--6    Paola Vásquez  1955 Page #2    ANEMIA==IRON AND B-12    Admitted 2019 with cellulitis left lower extremity then readmitted with chest pain with negative stress test    L--3-4   lumbar surgeyr dr pineda-----    Nettie Closs dr Marsha Manson dr Ivan [facility]      Hearing loss right ear  seen by ENT injections in the right ear MRI done possible cochlear implant    Fall on 915 with laceration right leg 15 sutures and fall again on 927 with contusion left face    Start lymphedema therapy at [facility]     OA both knees with injections bilateral      Social Determinants of Health Financial Resource Strain: Not on file   Food Insecurity: Not on file   Transportation Needs: Not on file   Physical Activity: Not on file   Stress: Not on file   Social Connections: Not on file   Intimate Partner Violence: Not on file   Housing Stability: Not on file        TOBACCO  Social History     Tobacco Use   Smoking Status Every Day    Packs/day: 1.00    Years: 38.00    Pack years: 38.00    Types: Cigarettes   Smokeless Tobacco Never   Tobacco Comments    restarted 3/2022        ALCOHOL   Social History     Substance and Sexual Activity   Alcohol Use No        DRUGHX   Social History     Substance and Sexual Activity   Drug Use No         CURRENT OUTPATIENT MEDICATIONS:   Outpatient Medications Marked as Taking for the 12/1/22 encounter (Office Visit) with Garrett Byers MD   Medication Sig Dispense Refill    nitrofurantoin, macrocrystal-monohydrate, (MACROBID) 100 MG capsule Take 1 capsule by mouth 2 times daily 14 capsule 0    ondansetron (ZOFRAN ODT) 4 MG disintegrating tablet Take 1 tablet by mouth every 8 hours as needed for Nausea or Vomiting 15 tablet 0    miconazole nitrate 2 % OINT Apply topically 2 times daily (Patient taking differently: Apply topically 2 times daily To feet and legs) 1 each 10    bumetanide (BUMEX) 2 MG tablet Take 2 mg by mouth 2 times daily      Baclofen (LIORESAL) 5 MG tablet Take 10 mg by mouth 3 times daily      BELBUCA 450 MCG FILM Take 450 mcg by mouth in the morning and at bedtime. rOPINIRole (REQUIP) 2 MG tablet Take 0.5 tablets by mouth in the morning, at noon, and at bedtime 360 tablet 12        ALLERGIES:   Allergies   Allergen Reactions    Ferritin Shortness Of Breath and Other (See Comments)     Flushed,  Severe back pain       ROS: I have reviewed the patient's medical history in detail; there are no changes to the history as noted in the electronic medical record.     Exam: Ht 5' 2\" (1.575 m)   Wt 290 lb (131.5 kg)   BMI 53.04 kg/m²   Mauricio Jarvis is a 78 y.o. female  appears well, in no apparent distress. Alert and oriented times three, pleasant and cooperative. Vital signs are as documented in vital signs section. Breathing comfortably, without stertor or stridor  Ear exam: well healed postauricular incision; External ears normal. Canal left clear. TM left normal. Right canal with debris/drainage, cleaned, small TM perforation  No nasal lesions, no erythema   No oral lesions. There is no palpable adenopathy or tenderness    Lab Results   Component Value Date/Time    WBC 6.1 09/27/2022 06:51 AM    HGB 9.9 (L) 09/27/2022 06:51 AM    HCT 34.1 09/27/2022 06:51 AM     09/27/2022 06:51 AM    MCV 79.9 (L) 09/27/2022 06:51 AM    MCH 23.2 (L) 09/27/2022 06:51 AM    MCHC 29.0 (L) 09/27/2022 06:51 AM    RDW 18.1 (H) 09/27/2022 06:51 AM    NEUTOPHILPCT 73.8 09/27/2022 06:51 AM    LYMPHOPCT 12.0 (L) 09/27/2022 06:51 AM    MONOPCT 11.4 09/27/2022 06:51 AM    EOSRELPCT 1.3 09/27/2022 06:51 AM    BASOPCT 0.3 09/27/2022 06:51 AM    NEUTROABS 4.47 09/27/2022 06:51 AM    LYMPHSABS 0.73 (L) 09/27/2022 06:51 AM    EOSABS 0.08 09/27/2022 06:51 AM     Procedure: EUA and cleaning of the right ear    Risks and benefits including bleeding, persistent hearing loss, persistent drainage    Procedure: The right ear was visualized with the ear microscope and excessive cerumen was removed with a curette and suction.  The drum was noted to have a small pinhole perforation, no purulence observed    Complications: none    EBL: minimal    A/P:     Jaynee Mohs is a 78 y.o. female is s/p right CI with AB HiRes Slim J on 11/7/22 with an uneventful postoperative course   Activation today  Wear CI all waking hours  Start speech therapy in 2 weeks, start homework today (streaming to right ear for at least 1 hour a day)  Ciprodex for right ear for 1 week  Follow up in 3 months  Otherwise resume all activity without restriction    Duyen Hassan MD 11/30/22 3:03 PM EST  Director Otology and Cochlear Implant Programs

## 2022-12-01 ENCOUNTER — PROCEDURE VISIT (OUTPATIENT)
Dept: AUDIOLOGY | Age: 67
End: 2022-12-01
Payer: MEDICARE

## 2022-12-01 ENCOUNTER — OFFICE VISIT (OUTPATIENT)
Dept: ENT CLINIC | Age: 67
End: 2022-12-01

## 2022-12-01 VITALS — WEIGHT: 290 LBS | HEIGHT: 62 IN | BODY MASS INDEX: 53.37 KG/M2

## 2022-12-01 DIAGNOSIS — Z96.21 HISTORY OF COCHLEAR IMPLANT: ICD-10-CM

## 2022-12-01 DIAGNOSIS — H91.21 SUDDEN IDIOPATHIC HEARING LOSS OF RIGHT EAR WITH RESTRICTED HEARING OF LEFT EAR: ICD-10-CM

## 2022-12-01 DIAGNOSIS — H90.A21 SENSORINEURAL HEARING LOSS (SNHL) OF RIGHT EAR WITH RESTRICTED HEARING OF LEFT EAR: Primary | ICD-10-CM

## 2022-12-01 DIAGNOSIS — H90.A22 SENSORINEURAL HEARING LOSS (SNHL) OF LEFT EAR WITH RESTRICTED HEARING OF RIGHT EAR: Primary | ICD-10-CM

## 2022-12-01 DIAGNOSIS — H91.8X9 ASYMMETRICAL HEARING LOSS: ICD-10-CM

## 2022-12-01 PROCEDURE — 92603 COCHLEAR IMPLT F/UP EXAM 7/>: CPT | Performed by: AUDIOLOGIST

## 2022-12-01 RX ORDER — OFLOXACIN 3 MG/ML
5 SOLUTION AURICULAR (OTIC) 2 TIMES DAILY
Qty: 10 ML | Refills: 0 | Status: SHIPPED | OUTPATIENT
Start: 2022-12-01 | End: 2022-12-08

## 2022-12-01 NOTE — PROGRESS NOTES
Patient was seen for CI activation, after medical clearance. SN: [id number], magnet strength: 3. Impedances were good and conditioning was performed. Throughout activation, hearing aid masker was used for better hearing ear. Before perceiving sound through processor, patient described continuous tinnitus. As patient began to perceive sound, she described a change and decrease in ringing. Loudness balanced. Patient was satisfied with loudness and comfort. Reviewed progressive levels/MAPs. Reviewed battery usage and charging. Practiced putting processor on/taking processor off. Processor connected to patient's daughter's phone at this appointment. Patient to connected phone to processor at home. Gave patient AB remote application handouts, expectations sheet, and aural rehab information. Patient will follow up 12/15/22.     Shira Nichols [facility]  [address] [facility] [id number]   Electronically signed by Shira Nichols on 12/1/2022 at 4:14 PM

## 2022-12-13 ENCOUNTER — APPOINTMENT (OUTPATIENT)
Dept: GENERAL RADIOLOGY | Age: 67
DRG: 177 | End: 2022-12-13
Payer: MEDICARE

## 2022-12-13 ENCOUNTER — TELEPHONE (OUTPATIENT)
Dept: ENT CLINIC | Age: 67
End: 2022-12-13

## 2022-12-13 ENCOUNTER — OFFICE VISIT (OUTPATIENT)
Dept: PRIMARY CARE CLINIC | Age: 67
End: 2022-12-13
Payer: MEDICARE

## 2022-12-13 ENCOUNTER — HOSPITAL ENCOUNTER (INPATIENT)
Age: 67
LOS: 7 days | Discharge: HOME | DRG: 177 | End: 2022-12-21
Attending: EMERGENCY MEDICINE | Admitting: INTERNAL MEDICINE
Payer: MEDICARE

## 2022-12-13 ENCOUNTER — APPOINTMENT (OUTPATIENT)
Dept: CT IMAGING | Age: 67
DRG: 177 | End: 2022-12-13
Payer: MEDICARE

## 2022-12-13 VITALS
HEART RATE: 116 BPM | HEIGHT: 62 IN | DIASTOLIC BLOOD PRESSURE: 83 MMHG | OXYGEN SATURATION: 80 % | SYSTOLIC BLOOD PRESSURE: 138 MMHG | TEMPERATURE: 98.8 F | BODY MASS INDEX: 52.96 KG/M2 | WEIGHT: 287.8 LBS

## 2022-12-13 DIAGNOSIS — J44.1 COPD EXACERBATION (HCC): ICD-10-CM

## 2022-12-13 DIAGNOSIS — J20.8 ACUTE BRONCHITIS DUE TO OTHER SPECIFIED ORGANISMS: Primary | ICD-10-CM

## 2022-12-13 DIAGNOSIS — U07.1 COVID-19 VIRUS INFECTION: Primary | ICD-10-CM

## 2022-12-13 DIAGNOSIS — U07.1 COVID-19: ICD-10-CM

## 2022-12-13 DIAGNOSIS — J96.01 ACUTE RESPIRATORY FAILURE WITH HYPOXIA (HCC): ICD-10-CM

## 2022-12-13 LAB
ALBUMIN SERPL-MCNC: 3.5 G/DL (ref 3.5–5.2)
ALP BLD-CCNC: 137 U/L (ref 35–104)
ALT SERPL-CCNC: 12 U/L (ref 0–32)
ANION GAP SERPL CALCULATED.3IONS-SCNC: 11 MMOL/L (ref 7–16)
ANISOCYTOSIS: ABNORMAL
AST SERPL-CCNC: 17 U/L (ref 0–31)
BASOPHILIC STIPPLING: ABNORMAL
BASOPHILS ABSOLUTE: 0.01 E9/L (ref 0–0.2)
BASOPHILS RELATIVE PERCENT: 0.2 % (ref 0–2)
BILIRUB SERPL-MCNC: 0.4 MG/DL (ref 0–1.2)
BUN BLDV-MCNC: 9 MG/DL (ref 6–23)
CALCIUM SERPL-MCNC: 8.8 MG/DL (ref 8.6–10.2)
CHLORIDE BLD-SCNC: 101 MMOL/L (ref 98–107)
CO2: 25 MMOL/L (ref 22–29)
CREAT SERPL-MCNC: 0.6 MG/DL (ref 0.5–1)
EOSINOPHILS ABSOLUTE: 0.01 E9/L (ref 0.05–0.5)
EOSINOPHILS RELATIVE PERCENT: 0.2 % (ref 0–6)
GFR SERPL CREATININE-BSD FRML MDRD: >60 ML/MIN/1.73
GLUCOSE BLD-MCNC: 101 MG/DL (ref 74–99)
HCT VFR BLD CALC: 40 % (ref 34–48)
HEMOGLOBIN: 11.1 G/DL (ref 11.5–15.5)
HYPOCHROMIA: ABNORMAL
IMMATURE GRANULOCYTES #: 0.02 E9/L
IMMATURE GRANULOCYTES %: 0.3 % (ref 0–5)
LYMPHOCYTES ABSOLUTE: 0.56 E9/L (ref 1.5–4)
LYMPHOCYTES RELATIVE PERCENT: 9 % (ref 20–42)
MCH RBC QN AUTO: 20.3 PG (ref 26–35)
MCHC RBC AUTO-ENTMCNC: 27.8 % (ref 32–34.5)
MCV RBC AUTO: 73 FL (ref 80–99.9)
MONOCYTES ABSOLUTE: 0.58 E9/L (ref 0.1–0.95)
MONOCYTES RELATIVE PERCENT: 9.3 % (ref 2–12)
NEUTROPHILS ABSOLUTE: 5.07 E9/L (ref 1.8–7.3)
NEUTROPHILS RELATIVE PERCENT: 81 % (ref 43–80)
OVALOCYTES: ABNORMAL
PDW BLD-RTO: 21 FL (ref 11.5–15)
PLATELET # BLD: 156 E9/L (ref 130–450)
PMV BLD AUTO: ABNORMAL FL (ref 7–12)
POIKILOCYTES: ABNORMAL
POLYCHROMASIA: ABNORMAL
POTASSIUM SERPL-SCNC: 4.4 MMOL/L (ref 3.5–5)
RBC # BLD: 5.48 E12/L (ref 3.5–5.5)
SODIUM BLD-SCNC: 137 MMOL/L (ref 132–146)
TARGET CELLS: ABNORMAL
TEAR DROP CELLS: ABNORMAL
TOTAL PROTEIN: 7 G/DL (ref 6.4–8.3)
TROPONIN, HIGH SENSITIVITY: 24 NG/L (ref 0–9)
WBC # BLD: 6.3 E9/L (ref 4.5–11.5)

## 2022-12-13 PROCEDURE — 4004F PT TOBACCO SCREEN RCVD TLK: CPT | Performed by: FAMILY MEDICINE

## 2022-12-13 PROCEDURE — 84484 ASSAY OF TROPONIN QUANT: CPT

## 2022-12-13 PROCEDURE — 99213 OFFICE O/P EST LOW 20 MIN: CPT | Performed by: FAMILY MEDICINE

## 2022-12-13 PROCEDURE — 6370000000 HC RX 637 (ALT 250 FOR IP)

## 2022-12-13 PROCEDURE — G8400 PT W/DXA NO RESULTS DOC: HCPCS | Performed by: FAMILY MEDICINE

## 2022-12-13 PROCEDURE — 1090F PRES/ABSN URINE INCON ASSESS: CPT | Performed by: FAMILY MEDICINE

## 2022-12-13 PROCEDURE — 2580000003 HC RX 258

## 2022-12-13 PROCEDURE — 6370000000 HC RX 637 (ALT 250 FOR IP): Performed by: PHYSICIAN ASSISTANT

## 2022-12-13 PROCEDURE — 80053 COMPREHEN METABOLIC PANEL: CPT

## 2022-12-13 PROCEDURE — G8484 FLU IMMUNIZE NO ADMIN: HCPCS | Performed by: FAMILY MEDICINE

## 2022-12-13 PROCEDURE — 99285 EMERGENCY DEPT VISIT HI MDM: CPT

## 2022-12-13 PROCEDURE — 71275 CT ANGIOGRAPHY CHEST: CPT

## 2022-12-13 PROCEDURE — 94640 AIRWAY INHALATION TREATMENT: CPT

## 2022-12-13 PROCEDURE — 1123F ACP DISCUSS/DSCN MKR DOCD: CPT | Performed by: FAMILY MEDICINE

## 2022-12-13 PROCEDURE — 93005 ELECTROCARDIOGRAM TRACING: CPT | Performed by: PHYSICIAN ASSISTANT

## 2022-12-13 PROCEDURE — G8417 CALC BMI ABV UP PARAM F/U: HCPCS | Performed by: FAMILY MEDICINE

## 2022-12-13 PROCEDURE — 6360000004 HC RX CONTRAST MEDICATION: Performed by: RADIOLOGY

## 2022-12-13 PROCEDURE — 85025 COMPLETE CBC W/AUTO DIFF WBC: CPT

## 2022-12-13 PROCEDURE — 3017F COLORECTAL CA SCREEN DOC REV: CPT | Performed by: FAMILY MEDICINE

## 2022-12-13 PROCEDURE — 0202U NFCT DS 22 TRGT SARS-COV-2: CPT

## 2022-12-13 PROCEDURE — G8428 CUR MEDS NOT DOCUMENT: HCPCS | Performed by: FAMILY MEDICINE

## 2022-12-13 PROCEDURE — 71046 X-RAY EXAM CHEST 2 VIEWS: CPT

## 2022-12-13 RX ORDER — 0.9 % SODIUM CHLORIDE 0.9 %
1000 INTRAVENOUS SOLUTION INTRAVENOUS ONCE
Status: COMPLETED | OUTPATIENT
Start: 2022-12-13 | End: 2022-12-13

## 2022-12-13 RX ORDER — ACETAMINOPHEN 500 MG
1000 TABLET ORAL ONCE
Status: COMPLETED | OUTPATIENT
Start: 2022-12-13 | End: 2022-12-13

## 2022-12-13 RX ORDER — IPRATROPIUM BROMIDE AND ALBUTEROL SULFATE 2.5; .5 MG/3ML; MG/3ML
3 SOLUTION RESPIRATORY (INHALATION) ONCE
Status: COMPLETED | OUTPATIENT
Start: 2022-12-13 | End: 2022-12-13

## 2022-12-13 RX ADMIN — IOPAMIDOL 75 ML: 755 INJECTION, SOLUTION INTRAVENOUS at 23:59

## 2022-12-13 RX ADMIN — ACETAMINOPHEN 1000 MG: 500 TABLET ORAL at 16:33

## 2022-12-13 RX ADMIN — SODIUM CHLORIDE 1000 ML: 9 INJECTION, SOLUTION INTRAVENOUS at 22:25

## 2022-12-13 RX ADMIN — IPRATROPIUM BROMIDE AND ALBUTEROL SULFATE 3 AMPULE: .5; 2.5 SOLUTION RESPIRATORY (INHALATION) at 22:42

## 2022-12-13 ASSESSMENT — PATIENT HEALTH QUESTIONNAIRE - PHQ9
SUM OF ALL RESPONSES TO PHQ QUESTIONS 1-9: 0
1. LITTLE INTEREST OR PLEASURE IN DOING THINGS: 0
SUM OF ALL RESPONSES TO PHQ9 QUESTIONS 1 & 2: 0
SUM OF ALL RESPONSES TO PHQ QUESTIONS 1-9: 0
SUM OF ALL RESPONSES TO PHQ QUESTIONS 1-9: 0
2. FEELING DOWN, DEPRESSED OR HOPELESS: 0
SUM OF ALL RESPONSES TO PHQ QUESTIONS 1-9: 0

## 2022-12-13 ASSESSMENT — ENCOUNTER SYMPTOMS
SHORTNESS OF BREATH: 1
COUGH: 1
EYES NEGATIVE: 1
ALLERGIC/IMMUNOLOGIC NEGATIVE: 1
GASTROINTESTINAL NEGATIVE: 1
WHEEZING: 1

## 2022-12-13 NOTE — ED NOTES
Department of Emergency Medicine  FIRST PROVIDER TRIAGE NOTE             Independent MLP           12/13/22  4:00 PM EST    Date of Encounter: 12/13/22   MRN: [id number]      HPI: Mauricio Jarvis is a 78 y.o. female who presents to the ED for No chief complaint on file. Pt presenting with sob, covid +. Sent by PCP for 80% on ra with ambulation. ROS: Negative for cp or abd pain. PE: Gen Appearance/Constitutional: alert  HEENT: NC/NT. PERRLA,  Airway patent. Initial Plan of Care: All treatment areas with department are currently occupied. Plan to order/Initiate the following while awaiting opening in ED: labs, EKG, and imaging studies.   Initiate Treatment-Testing, Proceed toTreatment Area When Bed Available for ED Attending/MLP to Continue Care    Electronically signed by BRENDA Lancaster   DD: 12/13/22      BRENDA Lancaster  12/13/22 [id number]

## 2022-12-13 NOTE — PROGRESS NOTES
22  Name: Allen Winter    : 1955    Sex: female    Age: 78 y.o. Subjective:  Chief Complaint: Patient is here for cough congestion wheezing shortness of breath positive COVID    Since yesterday morning her symptoms today came in a wheelchair with extreme weakness fatigue shortness of breath oxygen level 80% 3 when she was walking and increased to 85% when sitting in the wheelchair. The positive for COVID yesterday      Review of Systems   Constitutional:  Positive for chills, fatigue and fever. Negative for diaphoresis. HENT: Negative. Eyes: Negative. Respiratory:  Positive for cough, shortness of breath and wheezing. Cardiovascular: Negative. Gastrointestinal: Negative. Endocrine: Negative. Genitourinary: Negative. Musculoskeletal: Negative. Skin: Negative. Allergic/Immunologic: Negative. Neurological: Negative. Hematological: Negative. Psychiatric/Behavioral: Negative. Current Outpatient Medications:     nitrofurantoin, macrocrystal-monohydrate, (MACROBID) 100 MG capsule, Take 1 capsule by mouth 2 times daily, Disp: 14 capsule, Rfl: 0    ondansetron (ZOFRAN ODT) 4 MG disintegrating tablet, Take 1 tablet by mouth every 8 hours as needed for Nausea or Vomiting, Disp: 15 tablet, Rfl: 0    miconazole nitrate 2 % OINT, Apply topically 2 times daily (Patient taking differently: Apply topically 2 times daily To feet and legs), Disp: 1 each, Rfl: 10    bumetanide (BUMEX) 2 MG tablet, Take 2 mg by mouth 2 times daily, Disp: , Rfl:     Baclofen (LIORESAL) 5 MG tablet, Take 10 mg by mouth 3 times daily, Disp: , Rfl:     BELBUCA 450 MCG FILM, Take 450 mcg by mouth in the morning and at bedtime. , Disp: , Rfl:     rOPINIRole (REQUIP) 2 MG tablet, Take 0.5 tablets by mouth in the morning, at noon, and at bedtime, Disp: 360 tablet, Rfl: 12  Allergies   Allergen Reactions    Ferritin Shortness Of Breath and Other (See Comments)     Flushed,  Severe back pain 2022    Full dentures     Leg swelling 2022    Low back pain     Lymphedema of both lower extremities chronic and continues as of 22    Obesity     280 #    Osteoarthritis     Peripheral vision loss     Stroke [facility])     Ulcer of calf with fat layer exposed, right ([address] 2022    WOUND CLINIC DR Tamara Grewal     Family History   Problem Relation Age of Onset    Breast Cancer Mother     Stroke Father     Hypertension Sister     Hypertension Brother       Past Surgical History:   Procedure Laterality Date    ABDOMINOPLASTY      BREAST REDUCTION SURGERY      reduction    CATARACT REMOVAL      bilateral     SECTION      x2    COCHLEAR IMPLANT Right 2022    RIGHT COCHLEAR IMPLANT INSERTION WITH NERVE INTEGRITY MONITOR performed by Maria Luisa Elizondo MD at [place] [address]  2012    and egd    COLONOSCOPY  2021    polyps; marginal prep--jessica    COLONOSCOPY N/A 2021    COLONOSCOPY WITH BIOPSY performed by Zara Patten MD at [address] [place] [address]    ECHOCARDIOGRAM COMPLETE 2D W DOPPLER W COLOR  2012         GASTRIC BYPASS SURGERY  2000    NO NASTOGASTRIC TUBE    HERNIA REPAIR          LUMBAR SPINE SURGERY N/A 2021    L3-L4  POSTERIOR LUMBAR INTERBODY  FUSION--OARM, JESSI, YAJAIRA TABLE, CELL SAVER, PLATELET GEL, AUDIOLOGY, CAGES, PLATES, SCREWS -- GLOBUS performed by Ha Rizo MD at [place] [address] Right 2022    RIGHT SACROILIAC JOINT INJECTION performed by Beverly Lanes, DO at [address] [place] [address]  2021    minimal pouch gastritis--jessica    UPPER GASTROINTESTINAL ENDOSCOPY N/A 2021    EGD ESOPHAGOGASTRODUODENOSCOPY performed by Zara Patten MD at [facility]:    22 1411   BP: 138/83   Pulse: (!) 116   Temp: 98.8 °F (37.1 °C)   SpO2: (!) 80%   Weight: 287 lb 12.8 oz (130.5 kg)   Height: 5' 2\" (1.575 m)       Objective:    Physical Exam  Vitals reviewed.    Constitutional:       Appearance: Normal appearance. She is well-developed. She is obese. HENT:      Head: Normocephalic. Right Ear: Tympanic membrane normal.      Left Ear: Tympanic membrane normal.      Nose: Nose normal.      Mouth/Throat:      Mouth: Mucous membranes are moist.   Eyes:      Pupils: Pupils are equal, round, and reactive to light. Cardiovascular:      Rate and Rhythm: Normal rate and regular rhythm. Pulmonary:      Effort: Pulmonary effort is normal. No respiratory distress. Breath sounds: Wheezing present. Abdominal:      General: Bowel sounds are normal.      Palpations: Abdomen is soft. Musculoskeletal:         General: Normal range of motion. Cervical back: Normal range of motion. Skin:     General: Skin is warm. Neurological:      Mental Status: She is alert and oriented to person, place, and time. Psychiatric:         Behavior: Behavior normal.       Tal Jones was seen today for shortness of breath and other. Diagnoses and all orders for this visit:    Acute bronchitis due to other specified organisms    COVID-19      Comments: Emergency room now. Daughter here with her and stable to transfer    I educated the patient about all medications. Make sure they were correct and educated  on the risk associated with missing meds or taking them incorrectly. A list of medications is being sent home with patient today. I informed patient about the risk associated with noncompliance of medication and taking medications incorrectly. Appropriate follow-up with myself and all specialist.  Encourage family members to take active role in assisting with medications and medical care. If any confusion should develop to notify my office immediately to avoid risk of worsening medical condition    A great deal of time spent reviewing medications, diet, exercise, social issues. Also reviewing the chart before entering the room with patient and finishing charting after leaving patient's room.  More than half of that time was spent face to face with the patient in counseling and coordinating care. Follow Up: Return for to  er now.      Seen by:  Saman Quintana DO

## 2022-12-14 PROBLEM — J96.01 ACUTE RESPIRATORY FAILURE WITH HYPOXIA (HCC): Status: ACTIVE | Noted: 2022-12-14

## 2022-12-14 LAB
ADENOVIRUS BY PCR: NOT DETECTED
B.E.: 0.8 MMOL/L (ref -3–3)
BORDETELLA PARAPERTUSSIS BY PCR: NOT DETECTED
BORDETELLA PERTUSSIS BY PCR: NOT DETECTED
CHLAMYDOPHILIA PNEUMONIAE BY PCR: NOT DETECTED
COHB: 1.3 % (ref 0–1.5)
CORONAVIRUS 229E BY PCR: NOT DETECTED
CORONAVIRUS HKU1 BY PCR: NOT DETECTED
CORONAVIRUS NL63 BY PCR: NOT DETECTED
CORONAVIRUS OC43 BY PCR: NOT DETECTED
CRITICAL: ABNORMAL
DATE ANALYZED: ABNORMAL
DATE OF COLLECTION: ABNORMAL
HCO3: 26.8 MMOL/L (ref 22–26)
HHB: 2.6 % (ref 0–5)
HUMAN METAPNEUMOVIRUS BY PCR: NOT DETECTED
HUMAN RHINOVIRUS/ENTEROVIRUS BY PCR: NOT DETECTED
INFLUENZA A BY PCR: NOT DETECTED
INFLUENZA B BY PCR: NOT DETECTED
LAB: ABNORMAL
Lab: ABNORMAL
METHB: 0.1 % (ref 0–1.5)
MODE: ABNORMAL
MYCOPLASMA PNEUMONIAE BY PCR: NOT DETECTED
O2 CONTENT: 15.7 ML/DL
O2 SATURATION: 97.4 % (ref 92–98.5)
O2HB: 96 % (ref 94–97)
OPERATOR ID: 1926
PARAINFLUENZA VIRUS 1 BY PCR: NOT DETECTED
PARAINFLUENZA VIRUS 2 BY PCR: NOT DETECTED
PARAINFLUENZA VIRUS 3 BY PCR: NOT DETECTED
PARAINFLUENZA VIRUS 4 BY PCR: NOT DETECTED
PATIENT TEMP: 37 C
PCO2: 49.1 MMHG (ref 35–45)
PH BLOOD GAS: 7.36 (ref 7.35–7.45)
PO2: 104.1 MMHG (ref 75–100)
RESPIRATORY SYNCYTIAL VIRUS BY PCR: NOT DETECTED
SARS-COV-2, PCR: DETECTED
SOURCE, BLOOD GAS: ABNORMAL
THB: 11.5 G/DL (ref 11.5–16.5)
TIME ANALYZED: 1140

## 2022-12-14 PROCEDURE — 2580000003 HC RX 258: Performed by: INTERNAL MEDICINE

## 2022-12-14 PROCEDURE — 6360000002 HC RX W HCPCS: Performed by: INTERNAL MEDICINE

## 2022-12-14 PROCEDURE — 2700000000 HC OXYGEN THERAPY PER DAY

## 2022-12-14 PROCEDURE — 2060000000 HC ICU INTERMEDIATE R&B

## 2022-12-14 PROCEDURE — 6370000000 HC RX 637 (ALT 250 FOR IP): Performed by: INTERNAL MEDICINE

## 2022-12-14 PROCEDURE — 6360000002 HC RX W HCPCS: Performed by: EMERGENCY MEDICINE

## 2022-12-14 PROCEDURE — 94640 AIRWAY INHALATION TREATMENT: CPT

## 2022-12-14 PROCEDURE — 99222 1ST HOSP IP/OBS MODERATE 55: CPT | Performed by: INTERNAL MEDICINE

## 2022-12-14 PROCEDURE — 82805 BLOOD GASES W/O2 SATURATION: CPT

## 2022-12-14 RX ORDER — METHYLPREDNISOLONE SODIUM SUCCINATE 125 MG/2ML
60 INJECTION, POWDER, LYOPHILIZED, FOR SOLUTION INTRAMUSCULAR; INTRAVENOUS EVERY 6 HOURS
Status: DISCONTINUED | OUTPATIENT
Start: 2022-12-14 | End: 2022-12-17

## 2022-12-14 RX ORDER — ONDANSETRON 2 MG/ML
4 INJECTION INTRAMUSCULAR; INTRAVENOUS EVERY 6 HOURS PRN
Status: DISCONTINUED | OUTPATIENT
Start: 2022-12-14 | End: 2022-12-21 | Stop reason: HOSPADM

## 2022-12-14 RX ORDER — ARFORMOTEROL TARTRATE 15 UG/2ML
15 SOLUTION RESPIRATORY (INHALATION) 2 TIMES DAILY
Status: DISCONTINUED | OUTPATIENT
Start: 2022-12-14 | End: 2022-12-21 | Stop reason: HOSPADM

## 2022-12-14 RX ORDER — SODIUM CHLORIDE 0.9 % (FLUSH) 0.9 %
5-40 SYRINGE (ML) INJECTION EVERY 12 HOURS SCHEDULED
Status: DISCONTINUED | OUTPATIENT
Start: 2022-12-14 | End: 2022-12-15 | Stop reason: SDUPTHER

## 2022-12-14 RX ORDER — SODIUM CHLORIDE 0.9 % (FLUSH) 0.9 %
5-40 SYRINGE (ML) INJECTION EVERY 12 HOURS SCHEDULED
Status: DISCONTINUED | OUTPATIENT
Start: 2022-12-14 | End: 2022-12-21 | Stop reason: HOSPADM

## 2022-12-14 RX ORDER — SODIUM CHLORIDE 0.9 % (FLUSH) 0.9 %
5-40 SYRINGE (ML) INJECTION PRN
Status: DISCONTINUED | OUTPATIENT
Start: 2022-12-14 | End: 2022-12-15 | Stop reason: SDUPTHER

## 2022-12-14 RX ORDER — SODIUM CHLORIDE 9 MG/ML
INJECTION, SOLUTION INTRAVENOUS PRN
Status: DISCONTINUED | OUTPATIENT
Start: 2022-12-14 | End: 2022-12-21 | Stop reason: HOSPADM

## 2022-12-14 RX ORDER — ACETAMINOPHEN 650 MG/1
650 SUPPOSITORY RECTAL EVERY 6 HOURS PRN
Status: DISCONTINUED | OUTPATIENT
Start: 2022-12-14 | End: 2022-12-21 | Stop reason: HOSPADM

## 2022-12-14 RX ORDER — IPRATROPIUM BROMIDE AND ALBUTEROL SULFATE 2.5; .5 MG/3ML; MG/3ML
1 SOLUTION RESPIRATORY (INHALATION)
Status: DISCONTINUED | OUTPATIENT
Start: 2022-12-14 | End: 2022-12-21 | Stop reason: HOSPADM

## 2022-12-14 RX ORDER — ACETAMINOPHEN 325 MG/1
650 TABLET ORAL EVERY 4 HOURS PRN
Status: DISCONTINUED | OUTPATIENT
Start: 2022-12-14 | End: 2022-12-14

## 2022-12-14 RX ORDER — ONDANSETRON 4 MG/1
4 TABLET, ORALLY DISINTEGRATING ORAL EVERY 8 HOURS PRN
Status: DISCONTINUED | OUTPATIENT
Start: 2022-12-14 | End: 2022-12-21 | Stop reason: HOSPADM

## 2022-12-14 RX ORDER — ENOXAPARIN SODIUM 100 MG/ML
40 INJECTION SUBCUTANEOUS DAILY
Status: DISCONTINUED | OUTPATIENT
Start: 2022-12-14 | End: 2022-12-15

## 2022-12-14 RX ORDER — BUMETANIDE 1 MG/1
2 TABLET ORAL 2 TIMES DAILY
Status: DISCONTINUED | OUTPATIENT
Start: 2022-12-14 | End: 2022-12-21 | Stop reason: HOSPADM

## 2022-12-14 RX ORDER — BUDESONIDE 0.5 MG/2ML
0.5 INHALANT ORAL 2 TIMES DAILY
Status: DISCONTINUED | OUTPATIENT
Start: 2022-12-14 | End: 2022-12-21 | Stop reason: HOSPADM

## 2022-12-14 RX ORDER — SODIUM CHLORIDE 9 MG/ML
INJECTION, SOLUTION INTRAVENOUS PRN
Status: DISCONTINUED | OUTPATIENT
Start: 2022-12-14 | End: 2022-12-15 | Stop reason: SDUPTHER

## 2022-12-14 RX ORDER — BACLOFEN 10 MG/1
10 TABLET ORAL 3 TIMES DAILY
Status: DISCONTINUED | OUTPATIENT
Start: 2022-12-14 | End: 2022-12-21 | Stop reason: HOSPADM

## 2022-12-14 RX ORDER — ACETAMINOPHEN 325 MG/1
650 TABLET ORAL EVERY 6 HOURS PRN
Status: DISCONTINUED | OUTPATIENT
Start: 2022-12-14 | End: 2022-12-21 | Stop reason: HOSPADM

## 2022-12-14 RX ORDER — ROPINIROLE 1 MG/1
1 TABLET, FILM COATED ORAL 3 TIMES DAILY
Status: DISCONTINUED | OUTPATIENT
Start: 2022-12-14 | End: 2022-12-21 | Stop reason: HOSPADM

## 2022-12-14 RX ORDER — SODIUM CHLORIDE 0.9 % (FLUSH) 0.9 %
5-40 SYRINGE (ML) INJECTION PRN
Status: DISCONTINUED | OUTPATIENT
Start: 2022-12-14 | End: 2022-12-21 | Stop reason: HOSPADM

## 2022-12-14 RX ORDER — DEXAMETHASONE SODIUM PHOSPHATE 10 MG/ML
6 INJECTION INTRAMUSCULAR; INTRAVENOUS DAILY
Status: DISCONTINUED | OUTPATIENT
Start: 2022-12-14 | End: 2022-12-14

## 2022-12-14 RX ORDER — METHYLPREDNISOLONE SODIUM SUCCINATE 125 MG/2ML
125 INJECTION, POWDER, LYOPHILIZED, FOR SOLUTION INTRAMUSCULAR; INTRAVENOUS ONCE
Status: COMPLETED | OUTPATIENT
Start: 2022-12-14 | End: 2022-12-14

## 2022-12-14 RX ORDER — POLYETHYLENE GLYCOL 3350 17 G/17G
17 POWDER, FOR SOLUTION ORAL DAILY PRN
Status: DISCONTINUED | OUTPATIENT
Start: 2022-12-14 | End: 2022-12-21 | Stop reason: HOSPADM

## 2022-12-14 RX ADMIN — SODIUM CHLORIDE, PRESERVATIVE FREE 10 ML: 5 INJECTION INTRAVENOUS at 21:59

## 2022-12-14 RX ADMIN — Medication 10 ML: at 22:00

## 2022-12-14 RX ADMIN — BACLOFEN 10 MG: 10 TABLET ORAL at 14:19

## 2022-12-14 RX ADMIN — IPRATROPIUM BROMIDE AND ALBUTEROL SULFATE 1 AMPULE: 2.5; .5 SOLUTION RESPIRATORY (INHALATION) at 18:04

## 2022-12-14 RX ADMIN — BUMETANIDE 2 MG: 1 TABLET ORAL at 21:58

## 2022-12-14 RX ADMIN — BUDESONIDE 500 MCG: 0.5 SUSPENSION RESPIRATORY (INHALATION) at 20:13

## 2022-12-14 RX ADMIN — METHYLPREDNISOLONE SODIUM SUCCINATE 125 MG: 125 INJECTION, POWDER, FOR SOLUTION INTRAMUSCULAR; INTRAVENOUS at 00:32

## 2022-12-14 RX ADMIN — Medication 10 ML: at 08:02

## 2022-12-14 RX ADMIN — IPRATROPIUM BROMIDE AND ALBUTEROL SULFATE 1 AMPULE: 2.5; .5 SOLUTION RESPIRATORY (INHALATION) at 10:57

## 2022-12-14 RX ADMIN — IPRATROPIUM BROMIDE AND ALBUTEROL SULFATE 1 AMPULE: 2.5; .5 SOLUTION RESPIRATORY (INHALATION) at 20:13

## 2022-12-14 RX ADMIN — METHYLPREDNISOLONE SODIUM SUCCINATE 60 MG: 125 INJECTION, POWDER, FOR SOLUTION INTRAMUSCULAR; INTRAVENOUS at 18:55

## 2022-12-14 RX ADMIN — ROPINIROLE HYDROCHLORIDE 1 MG: 1 TABLET, FILM COATED ORAL at 21:58

## 2022-12-14 RX ADMIN — ONDANSETRON 4 MG: 2 INJECTION INTRAMUSCULAR; INTRAVENOUS at 18:51

## 2022-12-14 RX ADMIN — BUMETANIDE 2 MG: 1 TABLET ORAL at 14:19

## 2022-12-14 RX ADMIN — METHYLPREDNISOLONE SODIUM SUCCINATE 60 MG: 125 INJECTION, POWDER, FOR SOLUTION INTRAMUSCULAR; INTRAVENOUS at 11:46

## 2022-12-14 RX ADMIN — BACLOFEN 10 MG: 10 TABLET ORAL at 21:58

## 2022-12-14 RX ADMIN — ARFORMOTEROL TARTRATE 15 MCG: 15 SOLUTION RESPIRATORY (INHALATION) at 20:13

## 2022-12-14 RX ADMIN — ENOXAPARIN SODIUM 40 MG: 100 INJECTION SUBCUTANEOUS at 07:58

## 2022-12-14 RX ADMIN — METHYLPREDNISOLONE SODIUM SUCCINATE 60 MG: 125 INJECTION, POWDER, FOR SOLUTION INTRAMUSCULAR; INTRAVENOUS at 21:58

## 2022-12-14 ASSESSMENT — LIFESTYLE VARIABLES: HOW OFTEN DO YOU HAVE A DRINK CONTAINING ALCOHOL: NEVER

## 2022-12-14 NOTE — H&P
[address]                  [address] [facility]  [facility]                              HISTORY AND PHYSICAL    PATIENT NAME: Hilary Mays                  :        1955  MED REC NO:   [id number]                            ROOM:       11  ACCOUNT NO:   [de-identified]                           ADMIT DATE: 2022  PROVIDER:     Russell Mackey DO    CHIEF COMPLAINT:  Shortness of breath, cough. HISTORY OF PRESENT ILLNESS:  The patient is a 28-year-old   female who presented to the emergency room complaining of several-day  history of cough, chest congestion. She was diagnosed with COVID  infection one day ago. She is still smoking. She has COPD. She was  seen in the emergency room. She was hypoxic. She was admitted for  further evaluation and treatment. PAST MEDICAL HISTORY:  Tobacco abuse, chronic pain, restless legs  syndrome, morbid obesity, chronic lymphedema lower extremities,  osteoarthritis, CVA with peripheral vision loss. PAST SURGICAL HISTORY:  Cochlear implant, , gastric bypass,  abdominoplasty, bilateral eye cataract surgery, hernia repair, breast  reduction surgery, back surgery. SOCIAL HISTORY:  The patient smokes one-half to one pack of cigarettes a  day. Denies alcohol. REVIEW OF SYSTEMS:  Remarkable for above-stated chief complaint. ALLERGIES:  FERRITIN. PRIMARY CARE PHYSICIAN:  Mary Hoff DO    PHYSICAL EXAMINATION:  GENERAL APPEARANCE:  Reveals a 28-year-old  female who is alert  and oriented x3, cooperative and a good historian. VITAL SIGNS:  On admission, temperature 101.8, pulse 97, respirations  22, blood pressure 152/107. Pulse ox on room air 80%. HEENT:  Head:  Normocephalic, atraumatic. Eyes:  Pupils equal and  reactive to light. Extraocular muscles intact. Fundi not well  visualized. Nose:  No obstruction, polyp or discharge noted.   Mouth  mucosa without lesion. Teeth edentulous. Pharynx noninjected without  exudate. NECK:  Supple. No JVD or thyromegaly. No carotid bruits. HEART:  Regular rate and rhythm without murmur. LUNGS:  With diffuse wheeze and rhonchi. ABDOMEN:  Positive bowel sounds. Soft, nontender. No rebound or  guarding or hepatosplenomegaly. No masses. BACK:  With increased thoracic kyphosis. EXTREMITIES:  With bilateral lower extremity pitting edema. LYMPH NODES:  No adenopathy noted. SKIN:  With minimal erythema in the bilateral lower extremities. IMPRESSION:  Acute hypoxic respiratory failure, COVID-19 infection,  COPD, tobacco abuse, chronic lymphedema lower extremities, chronic pain,  morbid obesity, osteoarthritis, restless legs syndrome, history of CVA,  status post gastric bypass. PLAN:  Admit. O2, aerosols. Pulmonary to see. DISCHARGE PLAN:  Home when stable. Tobacco cessation.         Yashira Nunez,     D: 12/14/2022 11:57:56       T: 12/14/2022 12:01:07     [email or web address]  Job#: [id number]     Doc#: [id number]    CC:

## 2022-12-14 NOTE — ED PROVIDER NOTES
HPI:  22, Time: 10:07 PM GERBER Navarro is a 78 y.o. female presenting to the ED for hx of positive COVID, beginning 1 day ago. The complaint has been persistent, moderate in severity, and worsened by nothing. Patient presenting here because of persistent cough and also having fever as well as shortness of breath. Patient reports coughing up productive sputum. Patient does still smoke. Patient was seen by her family doctor reportedly tested positive for COVID yesterday. Patient was noted to be hypoxic in her doctor's office here. Patient reporting no chest pain she reports no abdominal pain or vomiting diarrhea. Per report her for members also tested positive for COVID. Patient reporting no headache she reports no dizziness. ROS:   Pertinent positives and negatives are stated within HPI, all other systems reviewed and are negative.  --------------------------------------------- PAST HISTORY ---------------------------------------------  Past Medical History:  has a past medical history of Arthritis, Decreased dorsalis pedis pulse, Dermatophytosis, Full dentures, Leg swelling, Low back pain, Lymphedema of both lower extremities, Obesity, Osteoarthritis, Peripheral vision loss, Stroke ([address] and Ulcer of calf with fat layer exposed, right ([address] Past Surgical History:  has a past surgical history that includes ECHO Complete 2D W Doppler W Color (2012);  section; Gastric bypass surgery (); Abdominoplasty (); Cataract removal; hernia repair; Colonoscopy (2012); Breast reduction surgery; Upper gastrointestinal endoscopy (2021); Colonoscopy (2021); Upper gastrointestinal endoscopy (N/A, 2021); Colonoscopy (N/A, 2021); Lumbar spine surgery (N/A, 2021); Nerve Block (Right, 2022); and Cochlear implant (Right, 2022). Social History:  reports that she has been smoking cigarettes. She has a 38.00 pack-year smoking history. She has never used smokeless tobacco. She reports that she does not drink alcohol and does not use drugs. Family History: family history includes Breast Cancer in her mother; Hypertension in her brother and sister; Stroke in her father. The patients home medications have been reviewed. Allergies: Ferritin    ---------------------------------------------------PHYSICAL EXAM--------------------------------------    Constitutional/General: Alert and oriented x3, well appearing, non toxic in NAD  Head: Normocephalic and atraumatic  Eyes: PERRL, EOMI  Mouth: Oropharynx clear, handling secretions, no trismus  Neck: Supple, full ROM, non tender to palpation in the midline, no stridor, no crepitus, no meningeal signs  Pulmonary: Lungs wheezes bilateral. Not in respiratory distress  Cardiovascular:  Regular rate. Regular rhythm. No murmurs, gallops, or rubs. 2+ distal pulses  Chest: no chest wall tenderness  Abdomen: Soft. Non tender. Non distended. +BS. No rebound, guarding, or rigidity. No pulsatile masses appreciated. Musculoskeletal: Moves all extremities x 4. Warm and well perfused, no clubbing, cyanosis, or edema. Capillary refill <3 seconds  Skin: warm and dry. No rashes. Neurologic: GCS 15, CN 2-12 grossly intact, no focal deficits, symmetric strength 5/5 in the upper and lower extremities bilaterally  Psych: Normal Affect    -------------------------------------------------- RESULTS -------------------------------------------------  I have personally reviewed all laboratory and imaging results for this patient. Results are listed below.      LABS:  Results for orders placed or performed during the hospital encounter of 12/13/22   Respiratory Panel, Molecular, with COVID-19 (Restricted: peds pts or suitable admitted adults)    Specimen: Nasopharyngeal   Result Value Ref Range    Adenovirus by PCR Not Detected Not Detected    Bordetella parapertussis by PCR Not Detected Not Detected    Bordetella pertussis by PCR Not Detected Not Detected    Chlamydophilia pneumoniae by PCR Not Detected Not Detected    Coronavirus 229E by PCR Not Detected Not Detected    Coronavirus HKU1 by PCR Not Detected Not Detected    Coronavirus NL63 by PCR Not Detected Not Detected    Coronavirus OC43 by PCR Not Detected Not Detected    SARS-CoV-2, PCR DETECTED (A) Not Detected    Human Metapneumovirus by PCR Not Detected Not Detected    Human Rhinovirus/Enterovirus by PCR Not Detected Not Detected    Influenza A by PCR Not Detected Not Detected    Influenza B by PCR Not Detected Not Detected    Mycoplasma pneumoniae by PCR Not Detected Not Detected    Parainfluenza Virus 1 by PCR Not Detected Not Detected    Parainfluenza Virus 2 by PCR Not Detected Not Detected    Parainfluenza Virus 3 by PCR Not Detected Not Detected    Parainfluenza Virus 4 by PCR Not Detected Not Detected    Respiratory Syncytial Virus by PCR Not Detected Not Detected   CBC with Auto Differential   Result Value Ref Range    WBC 6.3 4.5 - 11.5 E9/L    RBC 5.48 3.50 - 5.50 E12/L    Hemoglobin 11.1 (L) 11.5 - 15.5 g/dL    Hematocrit 40.0 34.0 - 48.0 %    MCV 73.0 (L) 80.0 - 99.9 fL    MCH 20.3 (L) 26.0 - 35.0 pg    MCHC 27.8 (L) 32.0 - 34.5 %    RDW 21.0 (H) 11.5 - 15.0 fL    Platelets 334 115 - 219 E9/L    MPV NOT CALC 7.0 - 12.0 fL    Neutrophils % 81.0 (H) 43.0 - 80.0 %    Immature Granulocytes % 0.3 0.0 - 5.0 %    Lymphocytes % 9.0 (L) 20.0 - 42.0 %    Monocytes % 9.3 2.0 - 12.0 %    Eosinophils % 0.2 0.0 - 6.0 %    Basophils % 0.2 0.0 - 2.0 %    Neutrophils Absolute 5.07 1.80 - 7.30 E9/L    Immature Granulocytes # 0.02 E9/L    Lymphocytes Absolute 0.56 (L) 1.50 - 4.00 E9/L    Monocytes Absolute 0.58 0.10 - 0.95 E9/L    Eosinophils Absolute 0.01 (L) 0.05 - 0.50 E9/L    Basophils Absolute 0.01 0.00 - 0.20 E9/L    Anisocytosis 2+     Polychromasia 1+     Hypochromia 1+     Poikilocytes 2+     Ovalocytes 2+     Target Cells 1+     Tear Drop Cells 1+     Basophilic Stippling 1+    Comprehensive Metabolic Panel   Result Value Ref Range    Sodium 137 132 - 146 mmol/L    Potassium 4.4 3.5 - 5.0 mmol/L    Chloride 101 98 - 107 mmol/L    CO2 25 22 - 29 mmol/L    Anion Gap 11 7 - 16 mmol/L    Glucose 101 (H) 74 - 99 mg/dL    BUN 9 6 - 23 mg/dL    Creatinine 0.6 0.5 - 1.0 mg/dL    Est, Glom Filt Rate >60 >=60 mL/min/1.73    Calcium 8.8 8.6 - 10.2 mg/dL    Total Protein 7.0 6.4 - 8.3 g/dL    Albumin 3.5 3.5 - 5.2 g/dL    Total Bilirubin 0.4 0.0 - 1.2 mg/dL    Alkaline Phosphatase 137 (H) 35 - 104 U/L    ALT 12 0 - 32 U/L    AST 17 0 - 31 U/L   Troponin   Result Value Ref Range    Troponin, High Sensitivity 24 (H) 0 - 9 ng/L   EKG 12 Lead   Result Value Ref Range    Ventricular Rate 97 BPM    Atrial Rate 97 BPM    P-R Interval 164 ms    QRS Duration 74 ms    Q-T Interval 332 ms    QTc Calculation (Bazett) 421 ms    P Axis 62 degrees    R Axis 45 degrees    T Axis 70 degrees       RADIOLOGY:  Interpreted by Radiologist.  CTA PULMONARY W CONTRAST   Final Result   No evidence of pulmonary embolism. Emphysematous changes identified in both lungs. XR CHEST (2 VW)   Final Result   No acute process. EKG:  This EKG is signed and interpreted by me. Rate: 97  Rhythm: Sinus  Interpretation: no acute changes  Comparison: stable as compared to patient's most recent EKG        ------------------------- NURSING NOTES AND VITALS REVIEWED ---------------------------   The nursing notes within the ED encounter and vital signs as below have been reviewed by myself. BP (!) 159/89   Pulse 93   Temp 98.8 °F (37.1 °C)   Resp 18   SpO2 94%   Oxygen Saturation Interpretation: Normal    The patients available past medical records and past encounters were reviewed.         ------------------------------ ED COURSE/MEDICAL DECISION MAKING----------------------  Medications   sodium chloride flush 0.9 % injection 5-40 mL (has no administration in time range)   sodium chloride flush 0.9 % injection 5-40 mL (has no administration in time range)   0.9 % sodium chloride infusion (has no administration in time range)   enoxaparin (LOVENOX) injection 40 mg (has no administration in time range)   acetaminophen (TYLENOL) tablet 650 mg (has no administration in time range)   ondansetron (ZOFRAN-ODT) disintegrating tablet 4 mg (has no administration in time range)     Or   ondansetron (ZOFRAN) injection 4 mg (has no administration in time range)   acetaminophen (TYLENOL) tablet 1,000 mg (1,000 mg Oral Given 12/13/22 1633)   0.9 % sodium chloride bolus (0 mLs IntraVENous Stopped 12/13/22 2340)   ipratropium-albuterol (DUONEB) nebulizer solution 3 ampule (3 ampules Inhalation Given 12/13/22 2242)   iopamidol (ISOVUE-370) 76 % injection 75 mL (75 mLs IntraVENous Given 12/13/22 8089)   methylPREDNISolone sodium (SOLU-MEDROL) injection 125 mg (125 mg IntraVENous Given 12/14/22 0032)             Medical Decision Making:   Patient with history of COVID. Patient reports symptoms started the day before. Family is also tested positive for COVID. Patient reporting productive cough. Patient was seen by her family doctor she was hypoxic at office Per report here in the emergency room patient's pulse ox did drop down into the 70s. Patient does not wear oxygen she does have wheezes on exam here. Patient does report productive cough with sputum. Patient reporting no chest pain. Patient was placed on supplemental oxygen here in the emergency department. Patient also given breathing treatments as well as Solu-Medrol. Patient labs noted reviewed EKG sinus rhythm with no acute ST elevation. Patient rechecked here still has some expiratory wheezes but significantly improved. Patient was given IV Solu-Medrol patient also had CT of her chest to rule out PE due to her hypoxia chest x-ray was negative. Did show emphysema but no PE or obvious infiltrate.   Patient was made aware of findings family at bedside patient will be admitted to monitored bed. I did speak to Dr. Gregoria Goodwin who covers for patient's family doctor he will admit to monitored bed       Re-Evaluations:             Reevaluate several times here in the emergency department. Patient initially given breathing treatments duo nebs as well as Solu-Medrol afterwards. Patient rechecked still has some expiratory wheezes patient does show improvement she is in no respiratory distress at present time. Patient pulse ox stable with supplemental oxygen. Patient reporting no chest pain. Patient will be admitted family and patient were made aware of findings and plan. Consultations:               Dr. Yong Daniels:   Please note that the withdrawal or failure to initiate urgent interventions for this patient would likely result in a life threatening deterioration or permanent disability. Accordingly this patient received 30 minutes of critical care time, excluding separately billable procedures. This patient's ED course included: a personal history and physicial eaxmination    This patient has been closely monitored during their ED course. Counseling: The emergency provider has spoken with the patient and discussed todays results, in addition to providing specific details for the plan of care and counseling regarding the diagnosis and prognosis. Questions are answered at this time and they are agreeable with the plan.       --------------------------------- IMPRESSION AND DISPOSITION ---------------------------------    IMPRESSION  1. COVID-19 virus infection    2. Acute respiratory failure with hypoxia (HCC)    3. COPD exacerbation ([facility]        DISPOSITION  Disposition: Admit to telemetry  Patient condition is stable        NOTE: This report was transcribed using voice recognition software.  Every effort was made to ensure accuracy; however, inadvertent computerized transcription errors may be present          Roseann Melara MD  12/14/22 [address] [facility] [address]

## 2022-12-14 NOTE — ED NOTES
Upon arrival to room, patient is 80% on room air. Placed on 3L NC patient now 98%.       Wanda Morrell RN  12/13/22 1391

## 2022-12-14 NOTE — ED NOTES
Nurse to nurse called to [facility] RN. SBAR faxed. Patient in transport to go to the unit.       Indio Vee RN  12/14/22 5187

## 2022-12-14 NOTE — CONSULTS
[facility] [place], Pulmonary Critical Care Medicine      PULMONARY CRITICAL CARE CONSULTATION NOTE.    12/14/22    CONSULTING  PHYSICIAN: Dr. Tory Alejandra:  COVID 19 PNA       Assessment  Acute on chronic hypoxic  respiratory  insufficieny secondary to COVID-19 PNA no  ARDS physiology - Last P/F ratio- 346  COVID related complications - none identified   Superimposed infections- none identified     Acute exacerbation of COPD sec to COVID PNA     Underlying Co-morbidities- Morbid obesity , OA. Possible TOBIAS/OHS     Vacinnation status - vaccinated and  boosted ( X 3 doses)     Plan  - Admit to COVID floor   - Continue Enhanced airborne and droplet isolation /precautions  - D dimer, ferritin, CRP, LDH  - Continue supportive care and supplemental o2 , wean as tolerated   - IS, FV as tolerated   - PT/OT , Out of bed to chair , ambulate as/when  tolerated   - CXR  - follow Q72 hrs or with clinical deterioration   - Respiratory viral panel negative   - Covid specific therapies - start  Dexamethasone    - Pulmicort and perforomist nebulizers vs LABA+ ICS combo  - Duonebs q 4 PRN   - Check procalcitonin   - Check urine antigens       History of Present Illness    Ms. Yusuf Rivera  is a 78 y.o.  current daily smoker  female  with over 36 pkyr H/o smoking admitted this time with increasing PATEL, cough , sputum production and wheezing . She is known to have COPD. She has had no recent exacerbations or hospital visits . She contracted COVID in April 2021, December 2021 and has tested positive again during this hospital visit. Baseline Exercise tolerance/MMRC score( Bold )     MMRC Dyspnea Scale  Grade Description of Breathlessness   0 I only get breathless with strenuous exercise.    1 I get short of breath when hurrying on level ground or walking up a slight hill.   2 On level ground, I walk slower than people of the same age because of breathlessness, or have to stop for breath when walking at my own pace. 3 I stop for breath after walking about 100 yards or after a few minutes on level ground. 4 I am too breathless to leave the house or I am breathless when dressing. Smoking history- current daily smoker with over 40 pkyr history     Occupational exposure/ Pet/bird -  No history of exposure to any occupational Pneumotoxins. Age appropriate Cancer screening-   Patient is up to date on age appropriate cancer screening and immunizations.       Past Medical History   Past Medical History:   Diagnosis Date    Arthritis     Decreased dorsalis pedis pulse 2022    Dermatophytosis 2022    Full dentures     Leg swelling 2022    Low back pain     Lymphedema of both lower extremities chronic and continues as of 22    Obesity     280 #    Osteoarthritis     Peripheral vision loss     Stroke [facility])     Ulcer of calf with fat layer exposed, right ([address] 2022    WOUND CLINIC DR Luis Camargo       Past Surgical History  Past Surgical History:   Procedure Laterality Date    ABDOMINOPLASTY  2002    BREAST REDUCTION SURGERY      reduction    CATARACT REMOVAL      bilateral     SECTION      x2    COCHLEAR IMPLANT Right 2022    RIGHT COCHLEAR IMPLANT INSERTION WITH NERVE INTEGRITY MONITOR performed by Joyce Najera MD at [address] [place] [address]  2012    and egd    COLONOSCOPY  2021    polyps; marginal prep--jessica    COLONOSCOPY N/A 2021    COLONOSCOPY WITH BIOPSY performed by Te Guerrero MD at [address] [place] [address]    ECHOCARDIOGRAM COMPLETE 2D W DOPPLER W COLOR  2012         GASTRIC BYPASS SURGERY  2000    NO NASTOGASTRIC TUBE    HERNIA REPAIR          LUMBAR SPINE SURGERY N/A 2021    L3-L4  POSTERIOR LUMBAR INTERBODY  FUSION--OARM, JESSI, YAJAIRA TABLE, CELL SAVER, PLATELET GEL, AUDIOLOGY, CAGES, PLATES, SCREWS -- GLOBUS performed by Jeannine Turner MD at . [address] Right 2022    RIGHT SACROILIAC JOINT INJECTION performed by Lorelei Sheridan DO at [address] [facility]  03/27/2021    minimal pouch gastritis--jessica    UPPER GASTROINTESTINAL ENDOSCOPY N/A 03/27/2021    EGD ESOPHAGOGASTRODUODENOSCOPY performed by Shanice Carlisle MD at [address]   Allergen Reactions    Ferritin Shortness Of Breath and Other (See Comments)     Flushed,  Severe back pain       Medications  Current Facility-Administered Medications   Medication Dose Route Frequency Provider Last Rate Last Admin    sodium chloride flush 0.9 % injection 5-40 mL  5-40 mL IntraVENous 2 times per day Annette Branch DO   10 mL at 12/14/22 0802    sodium chloride flush 0.9 % injection 5-40 mL  5-40 mL IntraVENous PRN Lyric Dent DO        0.9 % sodium chloride infusion   IntraVENous PRN Lyric Dent DO        enoxaparin (LOVENOX) injection 40 mg  40 mg SubCUTAneous Daily Lyric Dent DO   40 mg at 12/14/22 5136    acetaminophen (TYLENOL) tablet 650 mg  650 mg Oral Q4H PRN Lyric Dent DO        ondansetron (ZOFRAN-ODT) disintegrating tablet 4 mg  4 mg Oral Q8H PRN Lyric Dent DO        Or    ondansetron [facility]) injection 4 mg  4 mg IntraVENous Q6H PRN Annette Branch DO        ipratropium-albuterol (DUONEB) nebulizer solution 1 ampule  1 ampule Inhalation Q4H WA Lyric Dent DO   1 ampule at 12/14/22 1057     Current Outpatient Medications   Medication Sig Dispense Refill    nitrofurantoin, macrocrystal-monohydrate, (MACROBID) 100 MG capsule Take 1 capsule by mouth 2 times daily (Patient not taking: Reported on 12/14/2022) 14 capsule 0    ondansetron (ZOFRAN ODT) 4 MG disintegrating tablet Take 1 tablet by mouth every 8 hours as needed for Nausea or Vomiting (Patient not taking: Reported on 12/14/2022) 15 tablet 0    miconazole nitrate 2 % OINT Apply topically 2 times daily (Patient taking differently: Apply topically 2 times daily To feet and legs) 1 each 10    bumetanide (BUMEX) 2 MG tablet Take 2 mg by mouth 2 times daily      Baclofen (LIORESAL) 5 MG tablet Take 10 mg by mouth 3 times daily      BELBUCA 450 MCG FILM Take 450 mcg by mouth in the morning and at bedtime. (Patient not taking: Reported on 12/14/2022)      rOPINIRole (REQUIP) 2 MG tablet Take 0.5 tablets by mouth in the morning, at noon, and at bedtime 360 tablet 12       Social History  Social History     Tobacco Use    Smoking status: Every Day     Packs/day: 1.00     Years: 38.00     Pack years: 38.00     Types: Cigarettes    Smokeless tobacco: Never    Tobacco comments:     restarted 3/2022   Substance Use Topics    Alcohol use: No       Family History  Family History   Problem Relation Age of Onset    Breast Cancer Mother     Stroke Father     Hypertension Sister     Hypertension Brother        Review of Systems      Review of Systems   Constitutional: Negative for fatigue, fever and unexpected weight change. HENT: Negative for trouble swallowing and voice change. Eyes: Negative for photophobia and visual disturbance. Respiratory: Negative for chest tightness,  Positive for shortness of breath, wheezing and Negative for stridor. Cardiovascular: Negative for chest pain, palpitations and leg swelling. Gastrointestinal: Negative for abdominal pain, constipation, diarrhea and vomiting. Endocrine: Negative for cold intolerance, heat intolerance, polydipsia, polyphagia and polyuria. Genitourinary: Negative for dysuria and frequency. Musculoskeletal:Negative for myalgias, neck pain and neck stiffness. Skin: Negative for rash and wound. Allergic/Immunologic: Negative for food allergies and immunocompromised state. Neurological: Negative for tremors, weakness and numbness. Psychiatric/Behavioral: Negative for behavioral problems, confusion and sleep disturbance.      Physical Exam    Vitals:    12/14/22 0100 12/14/22 0200 12/14/22 0300 12/14/22 0400   BP: (!) 148/90 (!) 155/82 (!) 145/77 (!) 173/90 Pulse: 91 83 84 81   Resp: 20 20 19 21   Temp:       TempSrc:       SpO2: 95% 95% 93% 93%       General appearance: Not ill appearing, alert, no converstional dyspnea  Head: Normocephalic, without obvious abnormality, atraumatic   Eyes:Pupils bilateral equal and reactive, EOM intact, conjunctiva - no icterus , no injection   Throat: Clear, no lesions, Mallampti =3, no tonsillar eythema or edema  Neck: Supple, symmetrical, trachea midline, no lymphadenopathy, no JVD, no carotid bruits, no thyromegaly   Lungs: Bilateral  Air movement, decreased in bases, normal femitus, resonant to percussion  Heart: RRR, S1, S2 normal, no murmur, click, rub or gallop   Abdomen: soft, non-tender, nondistended. Bowel sounds normal, no hepatomeagly  Extremities: extremities normal, atraumatic, no cyanosis, edema  Musculoskeletal - No deformities   Skin: Skin color, texture, turgor normal. No rashes or lesions   Neurological: No focal deficits, cranial nerves grossly intact, sensations intact   Psychiatric : Mood and effect normal , alert and oriented times 4    LABS and Studies: Reviewed     Objective:  Vital signs: (most recent): Blood pressure (!) 173/90, pulse 81, temperature 98.8 °F (37.1 °C), resp. rate 21, SpO2 93 %.       CBC:   Recent Labs     12/13/22  1627   WBC 6.3   HGB 11.1*   HCT 40.0        BMP:    Recent Labs     12/13/22  1627      K 4.4      CO2 25   BUN 9   CREATININE 0.6   GLUCOSE 101*   CALCIUM 8.8     HEPATIC:   Recent Labs     12/13/22  1627   AST 17   ALT 12   BILITOT 0.4   ALKPHOS 137*       Radiology:  CT-scan of the chest (personally reviewed)    chest X-ray  (personally reviewed)          Reta Santana MD

## 2022-12-15 ENCOUNTER — TELEPHONE (OUTPATIENT)
Dept: SPEECH THERAPY | Age: 67
End: 2022-12-15

## 2022-12-15 LAB
EKG ATRIAL RATE: 97 BPM
EKG P AXIS: 62 DEGREES
EKG P-R INTERVAL: 164 MS
EKG Q-T INTERVAL: 332 MS
EKG QRS DURATION: 74 MS
EKG QTC CALCULATION (BAZETT): 421 MS
EKG R AXIS: 45 DEGREES
EKG T AXIS: 70 DEGREES
EKG VENTRICULAR RATE: 97 BPM

## 2022-12-15 PROCEDURE — 2060000000 HC ICU INTERMEDIATE R&B

## 2022-12-15 PROCEDURE — 94640 AIRWAY INHALATION TREATMENT: CPT

## 2022-12-15 PROCEDURE — 2580000003 HC RX 258: Performed by: INTERNAL MEDICINE

## 2022-12-15 PROCEDURE — 2700000000 HC OXYGEN THERAPY PER DAY

## 2022-12-15 PROCEDURE — 6360000002 HC RX W HCPCS: Performed by: EMERGENCY MEDICINE

## 2022-12-15 PROCEDURE — 6370000000 HC RX 637 (ALT 250 FOR IP): Performed by: INTERNAL MEDICINE

## 2022-12-15 PROCEDURE — 93010 ELECTROCARDIOGRAM REPORT: CPT | Performed by: INTERNAL MEDICINE

## 2022-12-15 PROCEDURE — 6360000002 HC RX W HCPCS: Performed by: INTERNAL MEDICINE

## 2022-12-15 RX ORDER — GUAIFENESIN/DEXTROMETHORPHAN 100-10MG/5
5 SYRUP ORAL EVERY 4 HOURS PRN
Status: DISCONTINUED | OUTPATIENT
Start: 2022-12-15 | End: 2022-12-21 | Stop reason: HOSPADM

## 2022-12-15 RX ORDER — DOCUSATE SODIUM 100 MG/1
100 CAPSULE, LIQUID FILLED ORAL DAILY
Status: DISCONTINUED | OUTPATIENT
Start: 2022-12-15 | End: 2022-12-21 | Stop reason: HOSPADM

## 2022-12-15 RX ORDER — ENOXAPARIN SODIUM 100 MG/ML
30 INJECTION SUBCUTANEOUS 2 TIMES DAILY
Status: DISCONTINUED | OUTPATIENT
Start: 2022-12-15 | End: 2022-12-21 | Stop reason: HOSPADM

## 2022-12-15 RX ADMIN — SODIUM CHLORIDE, PRESERVATIVE FREE 10 ML: 5 INJECTION INTRAVENOUS at 08:48

## 2022-12-15 RX ADMIN — METHYLPREDNISOLONE SODIUM SUCCINATE 60 MG: 125 INJECTION, POWDER, FOR SOLUTION INTRAMUSCULAR; INTRAVENOUS at 17:16

## 2022-12-15 RX ADMIN — IPRATROPIUM BROMIDE AND ALBUTEROL SULFATE 1 AMPULE: 2.5; .5 SOLUTION RESPIRATORY (INHALATION) at 11:05

## 2022-12-15 RX ADMIN — DOCUSATE SODIUM 100 MG: 100 CAPSULE, LIQUID FILLED ORAL at 14:25

## 2022-12-15 RX ADMIN — IPRATROPIUM BROMIDE AND ALBUTEROL SULFATE 1 AMPULE: 2.5; .5 SOLUTION RESPIRATORY (INHALATION) at 20:04

## 2022-12-15 RX ADMIN — ENOXAPARIN SODIUM 40 MG: 100 INJECTION SUBCUTANEOUS at 08:47

## 2022-12-15 RX ADMIN — ARFORMOTEROL TARTRATE 15 MCG: 15 SOLUTION RESPIRATORY (INHALATION) at 20:04

## 2022-12-15 RX ADMIN — BACLOFEN 10 MG: 10 TABLET ORAL at 14:25

## 2022-12-15 RX ADMIN — METHYLPREDNISOLONE SODIUM SUCCINATE 60 MG: 125 INJECTION, POWDER, FOR SOLUTION INTRAMUSCULAR; INTRAVENOUS at 11:23

## 2022-12-15 RX ADMIN — BUMETANIDE 2 MG: 1 TABLET ORAL at 18:37

## 2022-12-15 RX ADMIN — SODIUM CHLORIDE, PRESERVATIVE FREE 10 ML: 5 INJECTION INTRAVENOUS at 20:01

## 2022-12-15 RX ADMIN — ROPINIROLE HYDROCHLORIDE 1 MG: 1 TABLET, FILM COATED ORAL at 14:26

## 2022-12-15 RX ADMIN — METHYLPREDNISOLONE SODIUM SUCCINATE 60 MG: 125 INJECTION, POWDER, FOR SOLUTION INTRAMUSCULAR; INTRAVENOUS at 04:49

## 2022-12-15 RX ADMIN — BUDESONIDE 500 MCG: 0.5 SUSPENSION RESPIRATORY (INHALATION) at 11:06

## 2022-12-15 RX ADMIN — ROPINIROLE HYDROCHLORIDE 1 MG: 1 TABLET, FILM COATED ORAL at 20:00

## 2022-12-15 RX ADMIN — ARFORMOTEROL TARTRATE 15 MCG: 15 SOLUTION RESPIRATORY (INHALATION) at 11:05

## 2022-12-15 RX ADMIN — GUAIFENESIN SYRUP AND DEXTROMETHORPHAN 5 ML: 100; 10 SYRUP ORAL at 22:20

## 2022-12-15 RX ADMIN — BACLOFEN 10 MG: 10 TABLET ORAL at 20:00

## 2022-12-15 RX ADMIN — BACLOFEN 10 MG: 10 TABLET ORAL at 08:48

## 2022-12-15 RX ADMIN — ENOXAPARIN SODIUM 30 MG: 100 INJECTION SUBCUTANEOUS at 20:00

## 2022-12-15 RX ADMIN — GUAIFENESIN SYRUP AND DEXTROMETHORPHAN 5 ML: 100; 10 SYRUP ORAL at 14:25

## 2022-12-15 RX ADMIN — BUMETANIDE 2 MG: 1 TABLET ORAL at 08:48

## 2022-12-15 RX ADMIN — IPRATROPIUM BROMIDE AND ALBUTEROL SULFATE 1 AMPULE: 2.5; .5 SOLUTION RESPIRATORY (INHALATION) at 16:14

## 2022-12-15 RX ADMIN — METHYLPREDNISOLONE SODIUM SUCCINATE 60 MG: 125 INJECTION, POWDER, FOR SOLUTION INTRAMUSCULAR; INTRAVENOUS at 22:21

## 2022-12-15 RX ADMIN — BUDESONIDE 500 MCG: 0.5 SUSPENSION RESPIRATORY (INHALATION) at 20:05

## 2022-12-15 RX ADMIN — ROPINIROLE HYDROCHLORIDE 1 MG: 1 TABLET, FILM COATED ORAL at 08:48

## 2022-12-15 ASSESSMENT — PAIN SCALES - GENERAL
PAINLEVEL_OUTOF10: 0
PAINLEVEL_OUTOF10: 0

## 2022-12-15 NOTE — CARE COORDINATION
Spoke with pt via room phone (d/t + covid) to discuss discharge / transition of care plan. Pt reports from home with sister, and daughter; two steps to enter with one rail, ranch style home; DME as follows, walker; did request sliders for assistance for JOSE hose donning (will ask OT when on unit), denies further DME needs however, should pt require home going oxygen, choiced for [facility], currently on 4 liters via nasal cannula with sat of 97%, bedside RN to wean as tolerated; verified PCP and pharmacy; pt also reports active with lymphedema clinic O/P, audiology and speech for recent cochlear implants; discharge plan is to return home, pt drove self here and will drive self home. The Plan for Transition of Care is related to the following treatment goals: medical stability    The Patient was provided with a choice of provider and agrees   with the discharge plan. [x] Yes [] No    Freedom of choice list was provided with basic dialogue that supports the patient's individualized plan of care/goals, treatment preferences and shares the quality data associated with the providers.  [x] Yes [] No

## 2022-12-15 NOTE — PROGRESS NOTES
Hospital Medicine    Subjective:  pt seen earlier today alert conversive daughter at bedside c/o cough chest congestion      Current Facility-Administered Medications:     enoxaparin Sodium (LOVENOX) injection 30 mg, 30 mg, SubCUTAneous, BID, Roopa Fong MD    guaiFENesin-dextromethorphan (ROBITUSSIN DM) 100-10 MG/5ML syrup 5 mL, 5 mL, Oral, Q4H PRN, Anne-Marie Dent, DO, 5 mL at 12/15/22 1425    docusate sodium (COLACE) capsule 100 mg, 100 mg, Oral, Daily, Anne-Marie Dent, DO, 100 mg at 12/15/22 1425    ondansetron (ZOFRAN-ODT) disintegrating tablet 4 mg, 4 mg, Oral, Q8H PRN **OR** ondansetron (ZOFRAN) injection 4 mg, 4 mg, IntraVENous, Q6H PRN, Anne-Marie Dent, DO, 4 mg at 12/14/22 1851    baclofen (LIORESAL) tablet 10 mg, 10 mg, Oral, TID, Anne-Marie Dent, DO, 10 mg at 12/15/22 1425    bumetanide (BUMEX) tablet 2 mg, 2 mg, Oral, BID, Anne-Marie Dent, DO, 2 mg at 12/15/22 0848    rOPINIRole (REQUIP) tablet 1 mg, 1 mg, Oral, TID, Anne-Marie Dent, DO, 1 mg at 12/15/22 1426    sodium chloride flush 0.9 % injection 5-40 mL, 5-40 mL, IntraVENous, 2 times per day, Virgen Freeman DO, 10 mL at 12/15/22 0848    sodium chloride flush 0.9 % injection 5-40 mL, 5-40 mL, IntraVENous, PRN, Anne-Marie Dent, DO    0.9 % sodium chloride infusion, , IntraVENous, PRN, Anne-Marie Dent, DO    polyethylene glycol (GLYCOLAX) packet 17 g, 17 g, Oral, Daily PRN, Longonconner Rileymer, DO    acetaminophen (TYLENOL) tablet 650 mg, 650 mg, Oral, Q6H PRN **OR** acetaminophen (TYLENOL) suppository 650 mg, 650 mg, Rectal, Q6H PRN, Anne-Marie Dent DO    ipratropium-albuterol (DUONEB) nebulizer solution 1 ampule, 1 ampule, Inhalation, Q4H WA, Anne-Marie Dent DO, 1 ampule at 12/15/22 1614    budesonide (PULMICORT) nebulizer suspension 500 mcg, 0.5 mg, Nebulization, BID, Jose Mendez MD, 500 mcg at 12/15/22 1106    Arformoterol Tartrate (BROVANA) nebulizer solution 15 mcg, 15 mcg, Nebulization, BID, Jose Mendez MD, 15 mcg at 12/15/22 1105    methylPREDNISolone sodium (SOLU-MEDROL) injection 60 mg, 60 mg, IntraVENous, Q6H, Corinne Bullock MD, 60 mg at 12/15/22 1716    Objective:    BP (!) 158/97   Pulse 65   Temp 98 °F (36.7 °C) (Temporal)   Resp 16   Ht 5' 2\" (1.575 m)   Wt 288 lb 8 oz (130.9 kg)   SpO2 93%   BMI 52.77 kg/m²     Heart:  reg  Lungs:  wheeze/rhonchi  Abd: + bs soft nontender  Extrem:  edema legs    CBC with Differential:    Lab Results   Component Value Date/Time    WBC 6.3 12/13/2022 04:27 PM    RBC 5.48 12/13/2022 04:27 PM    HGB 11.1 12/13/2022 04:27 PM    HCT 40.0 12/13/2022 04:27 PM     12/13/2022 04:27 PM    MCV 73.0 12/13/2022 04:27 PM    MCH 20.3 12/13/2022 04:27 PM    MCHC 27.8 12/13/2022 04:27 PM    RDW 21.0 12/13/2022 04:27 PM    NRBC 0.9 06/02/2021 05:29 PM    SEGSPCT 72 02/28/2014 09:00 AM    LYMPHOPCT 9.0 12/13/2022 04:27 PM    MONOPCT 9.3 12/13/2022 04:27 PM    MYELOPCT 0.9 05/21/2021 01:23 PM    BASOPCT 0.2 12/13/2022 04:27 PM    MONOSABS 0.58 12/13/2022 04:27 PM    LYMPHSABS 0.56 12/13/2022 04:27 PM    EOSABS 0.01 12/13/2022 04:27 PM    BASOSABS 0.01 12/13/2022 04:27 PM     CMP:    Lab Results   Component Value Date/Time     12/13/2022 04:27 PM    K 4.4 12/13/2022 04:27 PM    K 3.6 09/27/2022 06:51 AM     12/13/2022 04:27 PM    CO2 25 12/13/2022 04:27 PM    BUN 9 12/13/2022 04:27 PM    CREATININE 0.6 12/13/2022 04:27 PM    GFRAA >60 09/27/2022 06:51 AM    LABGLOM >60 12/13/2022 04:27 PM    GLUCOSE 101 12/13/2022 04:27 PM    PROT 7.0 12/13/2022 04:27 PM    LABALBU 3.5 12/13/2022 04:27 PM    CALCIUM 8.8 12/13/2022 04:27 PM    BILITOT 0.4 12/13/2022 04:27 PM    ALKPHOS 137 12/13/2022 04:27 PM    AST 17 12/13/2022 04:27 PM    ALT 12 12/13/2022 04:27 PM     Warfarin PT/INR:    Lab Results   Component Value Date    INR 1.1 01/31/2022    INR 1.0 05/10/2021    INR 1.1 04/23/2021    PROTIME 11.4 01/31/2022    PROTIME 11.1 05/10/2021    PROTIME 11.7 04/23/2021       Assessment:    Principal Problem:    Acute respiratory failure with hypoxia (HCC)  Resolved Problems:    * No resolved hospital problems.  *  Covid 19 infection  Copd exac  Tob abuse    Plan:  Cont aerosols steroids O2 / mauricio hose / increase activity        Thuan James DO  6:35 PM  12/15/2022

## 2022-12-15 NOTE — TELEPHONE ENCOUNTER
Speech-Language Pathology  Cancellation/No Show Note        PATIENT NAME:  Doug Jackson      :  1955          TODAY'S DATE:  12/15/2022       For today's appointment patient:    [x]  Cancelled                  []  Rescheduled appointment    []  No show       []  Therapist cancelled             Reason given by patient:  []  No reason given  []  Conflicting appointment  []  No transportation  []  Conflict with work  [x]  Illness - COVID  []   [address] weather   []  Insurance related issues  []  Other           Comments: Therapy is expected to resume on patient regular weekly scheduled appointment day/time ( at 2:45PM) --- Next appointment 2022 at 2:45PM.          Continue as per established Kathy Morel.  Paty Cortez M.A.. [id number]  12/15/2022

## 2022-12-15 NOTE — PROGRESS NOTES
DVT Prophylaxis Adjustment Policy (DVT Prophylaxis)     This patient is on DVT Prophylaxis medication that requires a dose adjustment      Date Body Weight IBW  Adjusted BW SCr  CrCl Dialysis status   12/15/2022 288 lb 8 oz (130.9 kg) Ideal body weight: 50.1 kg (110 lb 7.2 oz)  Adjusted ideal body weight: 82.4 kg (181 lb 10.7 oz) Serum creatinine: 0.6 mg/dL 12/13/22 1627  Estimated creatinine clearance: 120 mL/min N/a       Pharmacy has dose-adjusted the DVT Prophylaxis regimen to match   the recommendations from the following table        Ordered Medication:Lovenox 40mg daily    Order Changed/converted to: Lovenox 30mg BID      These changes were made per protocol according to the [facility] Pharmacist   Review for Appropriate Use and Automatic Dose Adjustments of   Subcutaneous Anticoagulants Policy     *Please note this dose may need readjusted if patient's condition changes. Please contact pharmacy with any questions regarding these changes.     WALE Vasquez [facility]  12/15/2022  9:10 AM

## 2022-12-15 NOTE — PROGRESS NOTES
Patient's spo2 dropping into low to mid 80's on room air. 2 liters applied patient's current saturation 96%. Will continue to monitor and wean.

## 2022-12-16 PROCEDURE — 6370000000 HC RX 637 (ALT 250 FOR IP): Performed by: INTERNAL MEDICINE

## 2022-12-16 PROCEDURE — 94640 AIRWAY INHALATION TREATMENT: CPT

## 2022-12-16 PROCEDURE — 97165 OT EVAL LOW COMPLEX 30 MIN: CPT

## 2022-12-16 PROCEDURE — 2580000003 HC RX 258: Performed by: INTERNAL MEDICINE

## 2022-12-16 PROCEDURE — 6360000002 HC RX W HCPCS: Performed by: EMERGENCY MEDICINE

## 2022-12-16 PROCEDURE — 2700000000 HC OXYGEN THERAPY PER DAY

## 2022-12-16 PROCEDURE — 2060000000 HC ICU INTERMEDIATE R&B

## 2022-12-16 PROCEDURE — 97530 THERAPEUTIC ACTIVITIES: CPT

## 2022-12-16 PROCEDURE — 97535 SELF CARE MNGMENT TRAINING: CPT

## 2022-12-16 PROCEDURE — 6360000002 HC RX W HCPCS: Performed by: INTERNAL MEDICINE

## 2022-12-16 RX ORDER — HYDRALAZINE HYDROCHLORIDE 25 MG/1
25 TABLET, FILM COATED ORAL EVERY 6 HOURS PRN
Status: DISCONTINUED | OUTPATIENT
Start: 2022-12-16 | End: 2022-12-21 | Stop reason: HOSPADM

## 2022-12-16 RX ADMIN — IPRATROPIUM BROMIDE AND ALBUTEROL SULFATE 1 AMPULE: 2.5; .5 SOLUTION RESPIRATORY (INHALATION) at 11:44

## 2022-12-16 RX ADMIN — BACLOFEN 10 MG: 10 TABLET ORAL at 14:55

## 2022-12-16 RX ADMIN — GUAIFENESIN SYRUP AND DEXTROMETHORPHAN 5 ML: 100; 10 SYRUP ORAL at 08:48

## 2022-12-16 RX ADMIN — BACLOFEN 10 MG: 10 TABLET ORAL at 20:04

## 2022-12-16 RX ADMIN — ENOXAPARIN SODIUM 30 MG: 100 INJECTION SUBCUTANEOUS at 20:04

## 2022-12-16 RX ADMIN — GUAIFENESIN SYRUP AND DEXTROMETHORPHAN 5 ML: 100; 10 SYRUP ORAL at 17:28

## 2022-12-16 RX ADMIN — ROPINIROLE HYDROCHLORIDE 1 MG: 1 TABLET, FILM COATED ORAL at 08:48

## 2022-12-16 RX ADMIN — BUMETANIDE 2 MG: 1 TABLET ORAL at 17:29

## 2022-12-16 RX ADMIN — HYDRALAZINE HYDROCHLORIDE 25 MG: 25 TABLET, FILM COATED ORAL at 23:53

## 2022-12-16 RX ADMIN — DOCUSATE SODIUM 100 MG: 100 CAPSULE, LIQUID FILLED ORAL at 08:48

## 2022-12-16 RX ADMIN — ARFORMOTEROL TARTRATE 15 MCG: 15 SOLUTION RESPIRATORY (INHALATION) at 20:34

## 2022-12-16 RX ADMIN — BUDESONIDE 500 MCG: 0.5 SUSPENSION RESPIRATORY (INHALATION) at 20:34

## 2022-12-16 RX ADMIN — ENOXAPARIN SODIUM 30 MG: 100 INJECTION SUBCUTANEOUS at 08:47

## 2022-12-16 RX ADMIN — SODIUM CHLORIDE, PRESERVATIVE FREE 10 ML: 5 INJECTION INTRAVENOUS at 08:47

## 2022-12-16 RX ADMIN — ARFORMOTEROL TARTRATE 15 MCG: 15 SOLUTION RESPIRATORY (INHALATION) at 08:56

## 2022-12-16 RX ADMIN — ROPINIROLE HYDROCHLORIDE 1 MG: 1 TABLET, FILM COATED ORAL at 17:29

## 2022-12-16 RX ADMIN — BACLOFEN 10 MG: 10 TABLET ORAL at 08:48

## 2022-12-16 RX ADMIN — METHYLPREDNISOLONE SODIUM SUCCINATE 60 MG: 125 INJECTION, POWDER, FOR SOLUTION INTRAMUSCULAR; INTRAVENOUS at 11:55

## 2022-12-16 RX ADMIN — METHYLPREDNISOLONE SODIUM SUCCINATE 60 MG: 125 INJECTION, POWDER, FOR SOLUTION INTRAMUSCULAR; INTRAVENOUS at 04:57

## 2022-12-16 RX ADMIN — IPRATROPIUM BROMIDE AND ALBUTEROL SULFATE 1 AMPULE: 2.5; .5 SOLUTION RESPIRATORY (INHALATION) at 08:56

## 2022-12-16 RX ADMIN — BUMETANIDE 2 MG: 1 TABLET ORAL at 08:48

## 2022-12-16 RX ADMIN — METHYLPREDNISOLONE SODIUM SUCCINATE 60 MG: 125 INJECTION, POWDER, FOR SOLUTION INTRAMUSCULAR; INTRAVENOUS at 17:29

## 2022-12-16 RX ADMIN — IPRATROPIUM BROMIDE AND ALBUTEROL SULFATE 1 AMPULE: 2.5; .5 SOLUTION RESPIRATORY (INHALATION) at 20:34

## 2022-12-16 RX ADMIN — BUDESONIDE 500 MCG: 0.5 SUSPENSION RESPIRATORY (INHALATION) at 08:56

## 2022-12-16 RX ADMIN — METHYLPREDNISOLONE SODIUM SUCCINATE 60 MG: 125 INJECTION, POWDER, FOR SOLUTION INTRAMUSCULAR; INTRAVENOUS at 23:52

## 2022-12-16 RX ADMIN — IPRATROPIUM BROMIDE AND ALBUTEROL SULFATE 1 AMPULE: 2.5; .5 SOLUTION RESPIRATORY (INHALATION) at 16:55

## 2022-12-16 RX ADMIN — ROPINIROLE HYDROCHLORIDE 1 MG: 1 TABLET, FILM COATED ORAL at 20:05

## 2022-12-16 RX ADMIN — SODIUM CHLORIDE, PRESERVATIVE FREE 10 ML: 5 INJECTION INTRAVENOUS at 20:04

## 2022-12-16 ASSESSMENT — PAIN SCALES - GENERAL
PAINLEVEL_OUTOF10: 6
PAINLEVEL_OUTOF10: 0

## 2022-12-16 ASSESSMENT — PAIN DESCRIPTION - LOCATION: LOCATION: KNEE

## 2022-12-16 NOTE — PROGRESS NOTES
[facility] Medicine    Subjective:  pt alert conversive c/o cough      Current Facility-Administered Medications:     enoxaparin Sodium (LOVENOX) injection 30 mg, 30 mg, SubCUTAneous, BID, Oriana Stein MD, 30 mg at 12/16/22 0847    guaiFENesin-dextromethorphan (ROBITUSSIN DM) 100-10 MG/5ML syrup 5 mL, 5 mL, Oral, Q4H PRN, Queenie Deep Malmer, DO, 5 mL at 12/16/22 0848    docusate sodium (COLACE) capsule 100 mg, 100 mg, Oral, Daily, Queenie Deep Malmer, DO, 100 mg at 12/16/22 0848    ondansetron (ZOFRAN-ODT) disintegrating tablet 4 mg, 4 mg, Oral, Q8H PRN **OR** ondansetron (ZOFRAN) injection 4 mg, 4 mg, IntraVENous, Q6H PRN, Queenie Deep Malmer, DO, 4 mg at 12/14/22 1851    baclofen (LIORESAL) tablet 10 mg, 10 mg, Oral, TID, Queenie Deep Malmer, DO, 10 mg at 12/16/22 0848    bumetanide (BUMEX) tablet 2 mg, 2 mg, Oral, BID, Queenie Deep Malmer, DO, 2 mg at 12/16/22 0848    rOPINIRole (REQUIP) tablet 1 mg, 1 mg, Oral, TID, Queenie Deep Malmer, DO, 1 mg at 12/16/22 0848    sodium chloride flush 0.9 % injection 5-40 mL, 5-40 mL, IntraVENous, 2 times per day, Nickola Gist, DO, 10 mL at 12/16/22 0847    sodium chloride flush 0.9 % injection 5-40 mL, 5-40 mL, IntraVENous, PRN, Queenie Deep Malmer, DO    0.9 % sodium chloride infusion, , IntraVENous, PRN, Queenie Deep Malmer, DO    polyethylene glycol (GLYCOLAX) packet 17 g, 17 g, Oral, Daily PRN, Queenie Deep Malmer, DO    acetaminophen (TYLENOL) tablet 650 mg, 650 mg, Oral, Q6H PRN **OR** acetaminophen (TYLENOL) suppository 650 mg, 650 mg, Rectal, Q6H PRN, Queenie Deep Malmer, DO    ipratropium-albuterol (DUONEB) nebulizer solution 1 ampule, 1 ampule, Inhalation, Q4H Queenie MCNEIL DO, 1 ampule at 12/16/22 1144    budesonide (PULMICORT) nebulizer suspension 500 mcg, 0.5 mg, Nebulization, BID, Clare Pool MD, 500 mcg at 12/16/22 0893    Arformoterol Tartrate (BROVANA) nebulizer solution 15 mcg, 15 mcg, Nebulization, BID, Clare Pool MD, 15 mcg at 12/16/22 1721 methylPREDNISolone sodium (SOLU-MEDROL) injection 60 mg, 60 mg, IntraVENous, Q6H, Scotty Runner, MD, 60 mg at 12/16/22 1155    Objective:    BP (!) 144/71   Pulse 83   Temp 97.6 °F (36.4 °C) (Temporal)   Resp 20   Ht 5' 2\" (1.575 m)   Wt 288 lb 8 oz (130.9 kg)   SpO2 92%   BMI 52.77 kg/m²     Heart:  reg  Lungs:  rhonchi  Abd: + bs soft nontender  Extrem:  edema legs    CBC with Differential:    Lab Results   Component Value Date/Time    WBC 6.3 12/13/2022 04:27 PM    RBC 5.48 12/13/2022 04:27 PM    HGB 11.1 12/13/2022 04:27 PM    HCT 40.0 12/13/2022 04:27 PM     12/13/2022 04:27 PM    MCV 73.0 12/13/2022 04:27 PM    MCH 20.3 12/13/2022 04:27 PM    MCHC 27.8 12/13/2022 04:27 PM    RDW 21.0 12/13/2022 04:27 PM    NRBC 0.9 06/02/2021 05:29 PM    SEGSPCT 72 02/28/2014 09:00 AM    LYMPHOPCT 9.0 12/13/2022 04:27 PM    MONOPCT 9.3 12/13/2022 04:27 PM    MYELOPCT 0.9 05/21/2021 01:23 PM    BASOPCT 0.2 12/13/2022 04:27 PM    MONOSABS 0.58 12/13/2022 04:27 PM    LYMPHSABS 0.56 12/13/2022 04:27 PM    EOSABS 0.01 12/13/2022 04:27 PM    BASOSABS 0.01 12/13/2022 04:27 PM     CMP:    Lab Results   Component Value Date/Time     12/13/2022 04:27 PM    K 4.4 12/13/2022 04:27 PM    K 3.6 09/27/2022 06:51 AM     12/13/2022 04:27 PM    CO2 25 12/13/2022 04:27 PM    BUN 9 12/13/2022 04:27 PM    CREATININE 0.6 12/13/2022 04:27 PM    GFRAA >60 09/27/2022 06:51 AM    LABGLOM >60 12/13/2022 04:27 PM    GLUCOSE 101 12/13/2022 04:27 PM    PROT 7.0 12/13/2022 04:27 PM    LABALBU 3.5 12/13/2022 04:27 PM    CALCIUM 8.8 12/13/2022 04:27 PM    BILITOT 0.4 12/13/2022 04:27 PM    ALKPHOS 137 12/13/2022 04:27 PM    AST 17 12/13/2022 04:27 PM    ALT 12 12/13/2022 04:27 PM     Warfarin PT/INR:    Lab Results   Component Value Date    INR 1.1 01/31/2022    INR 1.0 05/10/2021    INR 1.1 04/23/2021    PROTIME 11.4 01/31/2022    PROTIME 11.1 05/10/2021    PROTIME 11.7 04/23/2021       Assessment:    Principal Problem:    Acute respiratory failure with hypoxia (HCC)  Resolved Problems:    * No resolved hospital problems.  *      Plan:  Cont steroids aerosols        Venancio Garcia DO  2:37 PM  12/16/2022

## 2022-12-16 NOTE — PROGRESS NOTES
Occupational Therapy  OCCUPATIONAL THERAPY INITIAL EVALUATION    [address] [address] [address]      OVUF:                                                Patient Name: Doug Jackson  MRN: [id number]  : 1955  Room: [address] [place] [address]    Evaluating OT: Caroline Florian OTR/L #[id number]     Referring Provider: Jyotsna Mcmillan DO  Specific Provider Orders/Date: OT eval and treat 12/15/22    Diagnosis: COPD exacerbation ([address] [J44.1]  Acute respiratory failure with hypoxia ([address] [J96.01]  COVID-19 virus infection [U07.1]   Pt admitted to hospital with SOB. COVID +     Pertinent Medical History:  has a past medical history of Arthritis, Decreased dorsalis pedis pulse, Dermatophytosis, Full dentures, Leg swelling, Low back pain, Lymphedema of both lower extremities, Obesity, Osteoarthritis, Peripheral vision loss, Stroke ([address] and Ulcer of calf with fat layer exposed, right ([address]        Precautions:  Fall Risk, O2, continuous pulse ox, Droplet plus (COVID-19)    Assessment of current deficits    [x] Functional mobility  [x]ADLs  [x] Strength               []Cognition    [x] Functional transfers   [x] IADLs         [x] Safety Awareness   [x]Endurance    [] Fine Coordination              [x] Balance      [] Vision/perception   []Sensation     []Gross Motor Coordination  [] ROM  [] Delirium                   [] Motor Control     OT PLAN OF CARE   OT POC based on physician orders, patient diagnosis and results of clinical assessment    Frequency/Duration 1-3 days/wk for 2 weeks PRN   Specific OT Treatment Interventions to include:   * Instruction/training on adapted ADL techniques and AE recommendations to increase functional independence within precautions       * Training on energy conservation strategies, correct breathing pattern and techniques to improve independence/tolerance for self-care routine  * Functional transfer/mobility training/DME recommendations for increased independence, safety, and fall prevention  * Patient/Family education to increase follow through with safety techniques and functional independence  * Recommendation of environmental modifications for increased safety with functional transfers/mobility and ADLs  * Therapeutic exercise to improve motor endurance, ROM, and functional strength for ADLs/functional transfers  * Therapeutic activities to facilitate/challenge dynamic balance, stand tolerance for increased safety and independence with ADLs    Recommended Adaptive Equipment: issued reacher, sock aide and easy slide 12/16/22    Home Living: Pt lives with sister and daughter in a 2 story home with 2 ASIYA and B hand rails .  1st floor set-up   Bathroom setup: walk-in shower    Equipment owned: shower chair, w/w, rollator     Prior Level of Function: mod I with ADLs , mod I with IADLs; ambulated with w/w   Driving: yes   Occupation: retired nurse    Pain Level: Pt denies pain this session    Cognition: A&O: 4/4; Follows 2 step directions   Memory:  good   Sequencing:  good   Problem solving:  good   Judgement/safety:  fair     Functional Assessment:  AM-PAC Daily Activity Raw Score: 20/24   Initial Eval Status  Date: 12/16/22 Treatment Status  Date: STGs = LTGs  Time frame: 10-14 days   Feeding Independent      Grooming Stand by Assist     Standing   Modified Bow    UB Dressing Independent      LB Dressing Minimal Assist   Modified Bow    Bathing Minimal Assist  Modified Bow    Toileting Stand by Assist   Modified Bow    Bed Mobility  Supine to sit: NT   Sit to supine: NT   Supine to sit: Modified Bow   Sit to supine: Modified Bow    Functional Transfers Stand by Assist   Modified Bow    Functional Mobility Stand by Assist     Ambulated in room with ww: O2 sat 90-94%  Modified Bow    Balance Sitting:     Static:  indep    Dynamic:indep  Standing: SBA     Activity Tolerance F  G   Visual/  Perceptual Glasses: yes  wfl                  Vitals: O2  Rest: 97%  Activity: 90-94%  Rest: 97%    Hand Dominance right   Strength ROM Additional Info:    RUE   4/5 wfl good  and wfl FMC/dexterity noted during ADL tasks     LUE 4/5 wfl good  and wfl FMC/dexterity noted during ADL tasks     Hearing: wfl  Sensation:wfl  Tone: wfl  Edema:none noted     Comments: Upon arrival patient seated and agreeable to OT session - daughter present during session. Therapist educated pt on role of OT. At end of session, patient seated at EOB with call light and phone within reach, all lines and tubes intact. Overall patient demonstrated decreased independence and safety during completion of ADL/functional transfer/mobility tasks. Pt would benefit from continued skilled OT to increase safety and independence with completion of ADL/IADL tasks for functional independence and quality of life. Treatment: OT treatment provided this date includes: Facilitation of functional transfers (various surfaces), standing tolerance tasks (addressing posture, balance and activity tolerance while incorporating light functional reaching; impacting ADLs and functional activity) and functional ambulation tasks with w/w (including to/ from bathroom and in preparation for item retrieval tasks; cuing on posture, w/w management and safety) - skilled cuing on hand placement, posture, body mechanics, energy conservation techniques and safety. Therapist facilitated self-care retraining: UB/LB self-care tasks (socks, adjustment of compression stockings), toileting task and standing grooming tasks at sink while educating pt on modified techniques, posture, safety and energy conservation techniques. Therapist educated on and facilitated use of reacher, sock aide and Easy Slide impacting LB dressing and energy conservation; F+ understanding.  Skilled monitoring of HR, O2 sats and pts response to treatment (see above; cuing on pursed lip breathing techniques and energy conservation techniques). Rehab Potential: Good  for established goals     Patient / Family Goal: return home      Patient and/or family were instructed on functional diagnosis, prognosis/goals and OT plan of care. Demonstrated fair+ understanding. Eval Complexity: Low    Time In: 1420  Time Out: 1500  Total Treatment Time: 25 minutes    Min Units   OT Eval Low 97165  x  1   OT Eval Medium 19858      OT Eval High [id number]      OT Re-Eval [id number]       Therapeutic Ex [id number]       Therapeutic Activities 79235  10  1   ADL/Self Care 57858  15  1   Orthotic Management 11436       Manual [id number]     Neuro Re-Ed [id number]       Non-Billable Time          Evaluation Time additionally includes thorough review of current medical information, gathering information on past medical history/social history and prior level of function, interpretation of standardized testing/informal observation of tasks, assessment of data and development of plan of care and goals.           Finn Vela OTR/L #[id number]

## 2022-12-16 NOTE — CARE COORDINATION
Care Coordination  The patient was admitted  + covid with acute respiratory failure with hypoxia. Ot [facility] 20/24 await pt ot see. The patient is now down to 2 liters n/c . She will need an ambulatory pulse ox check on the day of discharge. She chose [facility] or her home dme if needed. Her plan is to return home once medically stable.  Will follow

## 2022-12-16 NOTE — PROGRESS NOTES
SpO2 at rest on room air 80%  SpO2 ambulation room air 78%  Spo2 at recovered on 2 liters nasal cannula 96%

## 2022-12-17 LAB
ANION GAP SERPL CALCULATED.3IONS-SCNC: 10 MMOL/L (ref 7–16)
BUN BLDV-MCNC: 30 MG/DL (ref 6–23)
CALCIUM SERPL-MCNC: 9 MG/DL (ref 8.6–10.2)
CHLORIDE BLD-SCNC: 97 MMOL/L (ref 98–107)
CO2: 36 MMOL/L (ref 22–29)
CREAT SERPL-MCNC: 0.6 MG/DL (ref 0.5–1)
GFR SERPL CREATININE-BSD FRML MDRD: >60 ML/MIN/1.73
GLUCOSE BLD-MCNC: 246 MG/DL (ref 74–99)
HCT VFR BLD CALC: 39.4 % (ref 34–48)
HEMOGLOBIN: 11.3 G/DL (ref 11.5–15.5)
MCH RBC QN AUTO: 20 PG (ref 26–35)
MCHC RBC AUTO-ENTMCNC: 28.7 % (ref 32–34.5)
MCV RBC AUTO: 69.7 FL (ref 80–99.9)
PDW BLD-RTO: 20.6 FL (ref 11.5–15)
PLATELET # BLD: 189 E9/L (ref 130–450)
PMV BLD AUTO: ABNORMAL FL (ref 7–12)
POTASSIUM SERPL-SCNC: 3.9 MMOL/L (ref 3.5–5)
RBC # BLD: 5.65 E12/L (ref 3.5–5.5)
SODIUM BLD-SCNC: 143 MMOL/L (ref 132–146)
WBC # BLD: 8.8 E9/L (ref 4.5–11.5)

## 2022-12-17 PROCEDURE — 2060000000 HC ICU INTERMEDIATE R&B

## 2022-12-17 PROCEDURE — 36415 COLL VENOUS BLD VENIPUNCTURE: CPT

## 2022-12-17 PROCEDURE — 6370000000 HC RX 637 (ALT 250 FOR IP): Performed by: INTERNAL MEDICINE

## 2022-12-17 PROCEDURE — 80048 BASIC METABOLIC PNL TOTAL CA: CPT

## 2022-12-17 PROCEDURE — 94640 AIRWAY INHALATION TREATMENT: CPT

## 2022-12-17 PROCEDURE — 85027 COMPLETE CBC AUTOMATED: CPT

## 2022-12-17 PROCEDURE — 2700000000 HC OXYGEN THERAPY PER DAY

## 2022-12-17 PROCEDURE — 6360000002 HC RX W HCPCS: Performed by: EMERGENCY MEDICINE

## 2022-12-17 PROCEDURE — 2580000003 HC RX 258: Performed by: INTERNAL MEDICINE

## 2022-12-17 PROCEDURE — 6360000002 HC RX W HCPCS: Performed by: INTERNAL MEDICINE

## 2022-12-17 RX ORDER — METHYLPREDNISOLONE SODIUM SUCCINATE 125 MG/2ML
60 INJECTION, POWDER, LYOPHILIZED, FOR SOLUTION INTRAMUSCULAR; INTRAVENOUS EVERY 8 HOURS
Status: DISCONTINUED | OUTPATIENT
Start: 2022-12-17 | End: 2022-12-18

## 2022-12-17 RX ADMIN — SODIUM CHLORIDE, PRESERVATIVE FREE 10 ML: 5 INJECTION INTRAVENOUS at 20:35

## 2022-12-17 RX ADMIN — BACLOFEN 10 MG: 10 TABLET ORAL at 09:10

## 2022-12-17 RX ADMIN — ROPINIROLE HYDROCHLORIDE 1 MG: 1 TABLET, FILM COATED ORAL at 20:41

## 2022-12-17 RX ADMIN — ARFORMOTEROL TARTRATE 15 MCG: 15 SOLUTION RESPIRATORY (INHALATION) at 20:57

## 2022-12-17 RX ADMIN — IPRATROPIUM BROMIDE AND ALBUTEROL SULFATE 1 AMPULE: 2.5; .5 SOLUTION RESPIRATORY (INHALATION) at 16:13

## 2022-12-17 RX ADMIN — BUDESONIDE 500 MCG: 0.5 SUSPENSION RESPIRATORY (INHALATION) at 08:08

## 2022-12-17 RX ADMIN — ROPINIROLE HYDROCHLORIDE 1 MG: 1 TABLET, FILM COATED ORAL at 14:24

## 2022-12-17 RX ADMIN — HYDRALAZINE HYDROCHLORIDE 25 MG: 25 TABLET, FILM COATED ORAL at 14:52

## 2022-12-17 RX ADMIN — IPRATROPIUM BROMIDE AND ALBUTEROL SULFATE 1 AMPULE: 2.5; .5 SOLUTION RESPIRATORY (INHALATION) at 08:08

## 2022-12-17 RX ADMIN — BUMETANIDE 2 MG: 1 TABLET ORAL at 09:09

## 2022-12-17 RX ADMIN — ENOXAPARIN SODIUM 30 MG: 100 INJECTION SUBCUTANEOUS at 09:09

## 2022-12-17 RX ADMIN — BACLOFEN 10 MG: 10 TABLET ORAL at 14:24

## 2022-12-17 RX ADMIN — IPRATROPIUM BROMIDE AND ALBUTEROL SULFATE 1 AMPULE: 2.5; .5 SOLUTION RESPIRATORY (INHALATION) at 12:27

## 2022-12-17 RX ADMIN — BUDESONIDE 500 MCG: 0.5 SUSPENSION RESPIRATORY (INHALATION) at 20:57

## 2022-12-17 RX ADMIN — METHYLPREDNISOLONE SODIUM SUCCINATE 60 MG: 125 INJECTION, POWDER, FOR SOLUTION INTRAMUSCULAR; INTRAVENOUS at 12:17

## 2022-12-17 RX ADMIN — BUMETANIDE 2 MG: 1 TABLET ORAL at 17:47

## 2022-12-17 RX ADMIN — ENOXAPARIN SODIUM 30 MG: 100 INJECTION SUBCUTANEOUS at 20:35

## 2022-12-17 RX ADMIN — ARFORMOTEROL TARTRATE 15 MCG: 15 SOLUTION RESPIRATORY (INHALATION) at 08:08

## 2022-12-17 RX ADMIN — ROPINIROLE HYDROCHLORIDE 1 MG: 1 TABLET, FILM COATED ORAL at 09:09

## 2022-12-17 RX ADMIN — METHYLPREDNISOLONE SODIUM SUCCINATE 60 MG: 125 INJECTION, POWDER, FOR SOLUTION INTRAMUSCULAR; INTRAVENOUS at 20:35

## 2022-12-17 RX ADMIN — BACLOFEN 10 MG: 10 TABLET ORAL at 20:35

## 2022-12-17 RX ADMIN — METHYLPREDNISOLONE SODIUM SUCCINATE 60 MG: 125 INJECTION, POWDER, FOR SOLUTION INTRAMUSCULAR; INTRAVENOUS at 06:06

## 2022-12-17 RX ADMIN — SODIUM CHLORIDE, PRESERVATIVE FREE 10 ML: 5 INJECTION INTRAVENOUS at 09:11

## 2022-12-17 RX ADMIN — IPRATROPIUM BROMIDE AND ALBUTEROL SULFATE 1 AMPULE: 2.5; .5 SOLUTION RESPIRATORY (INHALATION) at 20:57

## 2022-12-17 RX ADMIN — DOCUSATE SODIUM 100 MG: 100 CAPSULE, LIQUID FILLED ORAL at 09:09

## 2022-12-17 NOTE — PROGRESS NOTES
Hospital Medicine    Subjective: Feeling better slowly  Appetite is improving      Current Facility-Administered Medications:     hydrALAZINE (APRESOLINE) tablet 25 mg, 25 mg, Oral, Q6H PRN, Kulwinder Bliss MD, 25 mg at 12/16/22 2353    enoxaparin Sodium (LOVENOX) injection 30 mg, 30 mg, SubCUTAneous, BID, Jak Sam MD, 30 mg at 12/17/22 0909    guaiFENesin-dextromethorphan (ROBITUSSIN DM) 100-10 MG/5ML syrup 5 mL, 5 mL, Oral, Q4H PRN, Tarry Ayush Malmer, DO, 5 mL at 12/16/22 1728    docusate sodium (COLACE) capsule 100 mg, 100 mg, Oral, Daily, Taralethea Peacock Malmer, DO, 100 mg at 12/17/22 0909    ondansetron (ZOFRAN-ODT) disintegrating tablet 4 mg, 4 mg, Oral, Q8H PRN **OR** ondansetron (ZOFRAN) injection 4 mg, 4 mg, IntraVENous, Q6H PRN, Taralethea Peacock Malmer, DO, 4 mg at 12/14/22 1851    baclofen (LIORESAL) tablet 10 mg, 10 mg, Oral, TID, Taralethea Peacock Malmer, DO, 10 mg at 12/17/22 0910    bumetanide (BUMEX) tablet 2 mg, 2 mg, Oral, BID, Taralethea Peacock Malmer, DO, 2 mg at 12/17/22 4835    rOPINIRole (REQUIP) tablet 1 mg, 1 mg, Oral, TID, Taralethea Peacock Malmer, DO, 1 mg at 12/17/22 0784    sodium chloride flush 0.9 % injection 5-40 mL, 5-40 mL, IntraVENous, 2 times per day, Carrie Noon, DO, 10 mL at 12/17/22 7395    sodium chloride flush 0.9 % injection 5-40 mL, 5-40 mL, IntraVENous, PRN, Akin Dent, DO    0.9 % sodium chloride infusion, , IntraVENous, PRN, Taralethea Peacock Malmer, DO    polyethylene glycol (GLYCOLAX) packet 17 g, 17 g, Oral, Daily PRN, Taralethea Dent, DO    acetaminophen (TYLENOL) tablet 650 mg, 650 mg, Oral, Q6H PRN **OR** acetaminophen (TYLENOL) suppository 650 mg, 650 mg, Rectal, Q6H PRN, Akin Dent DO    ipratropium-albuterol (DUONEB) nebulizer solution 1 ampule, 1 ampule, Inhalation, Q4H WA, Akin Dent DO, 1 ampule at 12/17/22 1227    budesonide (PULMICORT) nebulizer suspension 500 mcg, 0.5 mg, Nebulization, BID, Corinne Bullock MD, 500 mcg at 12/17/22 0808    Arformoterol Tartrate [facility]) nebulizer solution 15 mcg, 15 mcg, Nebulization, BID, Marivel Haines MD, 15 mcg at 12/17/22 0808    methylPREDNISolone sodium (SOLU-MEDROL) injection 60 mg, 60 mg, IntraVENous, Q6H, Marivel Haines MD, 60 mg at 12/17/22 1217    Objective:    BP (!) 178/84   Pulse 67   Temp 96.8 °F (36 °C) (Temporal)   Resp 14   Ht 5' 2\" (1.575 m)   Wt 288 lb 8 oz (130.9 kg)   SpO2 96%   BMI 52.77 kg/m²     Heart:  reg  Lungs:  rhonchi  Abd: + bs soft nontender  Extrem:  edema legs    CBC with Differential:    Lab Results   Component Value Date/Time    WBC 8.8 12/17/2022 08:13 AM    RBC 5.65 12/17/2022 08:13 AM    HGB 11.3 12/17/2022 08:13 AM    HCT 39.4 12/17/2022 08:13 AM     12/17/2022 08:13 AM    MCV 69.7 12/17/2022 08:13 AM    MCH 20.0 12/17/2022 08:13 AM    MCHC 28.7 12/17/2022 08:13 AM    RDW 20.6 12/17/2022 08:13 AM    NRBC 0.9 06/02/2021 05:29 PM    SEGSPCT 72 02/28/2014 09:00 AM    LYMPHOPCT 9.0 12/13/2022 04:27 PM    MONOPCT 9.3 12/13/2022 04:27 PM    MYELOPCT 0.9 05/21/2021 01:23 PM    BASOPCT 0.2 12/13/2022 04:27 PM    MONOSABS 0.58 12/13/2022 04:27 PM    LYMPHSABS 0.56 12/13/2022 04:27 PM    EOSABS 0.01 12/13/2022 04:27 PM    BASOSABS 0.01 12/13/2022 04:27 PM     CMP:    Lab Results   Component Value Date/Time     12/17/2022 08:13 AM    K 3.9 12/17/2022 08:13 AM    K 3.6 09/27/2022 06:51 AM    CL 97 12/17/2022 08:13 AM    CO2 36 12/17/2022 08:13 AM    BUN 30 12/17/2022 08:13 AM    CREATININE 0.6 12/17/2022 08:13 AM    GFRAA >60 09/27/2022 06:51 AM    LABGLOM >60 12/17/2022 08:13 AM    GLUCOSE 246 12/17/2022 08:13 AM    PROT 7.0 12/13/2022 04:27 PM    LABALBU 3.5 12/13/2022 04:27 PM    CALCIUM 9.0 12/17/2022 08:13 AM    BILITOT 0.4 12/13/2022 04:27 PM    ALKPHOS 137 12/13/2022 04:27 PM    AST 17 12/13/2022 04:27 PM    ALT 12 12/13/2022 04:27 PM     Warfarin PT/INR:    Lab Results   Component Value Date    INR 1.1 01/31/2022    INR 1.0 05/10/2021    INR 1.1 04/23/2021    PROTIME 11.4 01/31/2022    PROTIME 11.1 05/10/2021    PROTIME 11.7 04/23/2021       Assessment:    Principal Problem:    Acute respiratory failure with hypoxia (HCC)  Resolved Problems:    * No resolved hospital problems.  *  COPD exacerbation  Morbid obesity    Plan:    Currently on Solu-Medrol 60 mg 6 hours  Continue with breathing treatments  Bronchospasm improving  Wean steroids  Possible discharge in 1 to 2 days based on clinical improvement        Rocío Lozada MD  1:46 PM  12/17/2022

## 2022-12-17 NOTE — PLAN OF CARE
Problem: Safety - Adult  Goal: Free from fall injury  12/17/2022 0351 by Elliot Sutton RN  Outcome: Progressing  Flowsheets (Taken 12/17/2022 0155)  Free From Fall Injury: Instruct family/caregiver on patient safety     Problem: Pain  Goal: Verbalizes/displays adequate comfort level or baseline comfort level  12/17/2022 0351 by Elliot Sutton, RN  Outcome: Progressing

## 2022-12-17 NOTE — PROGRESS NOTES
Patient's oxygen saturation sitting    95% on room air   Patient's oxygen saturation standing   92 % on room air   Patient's oxygen saturation ambulating in fernandez    73%  on room air,   Patient placed on 2L nasal cannula oxygen saturation recovered 96%       Corina Bateman RN, BSN

## 2022-12-17 NOTE — PLAN OF CARE
Problem: Discharge Planning  Goal: Discharge to home or other facility with appropriate resources  Outcome: Progressing     Problem: Safety - Adult  Goal: Free from fall injury  12/17/2022 0920 by Joey Loomis RN  Outcome: Progressing  12/17/2022 0351 by Sol Vargas RN  Outcome: Progressing  Flowsheets (Taken 12/17/2022 0155)  Free From Fall Injury: Instruct family/caregiver on patient safety     Problem: Chronic Conditions and Co-morbidities  Goal: Patient's chronic conditions and co-morbidity symptoms are monitored and maintained or improved  Outcome: Progressing     Problem: Pain  Goal: Verbalizes/displays adequate comfort level or baseline comfort level  12/17/2022 0920 by Joey Loomis RN  Outcome: Progressing  12/17/2022 0351 by Sol Vargas RN  Outcome: Progressing

## 2022-12-18 PROCEDURE — 6370000000 HC RX 637 (ALT 250 FOR IP): Performed by: INTERNAL MEDICINE

## 2022-12-18 PROCEDURE — 2700000000 HC OXYGEN THERAPY PER DAY

## 2022-12-18 PROCEDURE — 2580000003 HC RX 258: Performed by: INTERNAL MEDICINE

## 2022-12-18 PROCEDURE — 6360000002 HC RX W HCPCS: Performed by: EMERGENCY MEDICINE

## 2022-12-18 PROCEDURE — 6360000002 HC RX W HCPCS: Performed by: INTERNAL MEDICINE

## 2022-12-18 PROCEDURE — 2060000000 HC ICU INTERMEDIATE R&B

## 2022-12-18 PROCEDURE — 94640 AIRWAY INHALATION TREATMENT: CPT

## 2022-12-18 RX ORDER — CLONIDINE HYDROCHLORIDE 0.1 MG/1
0.1 TABLET ORAL ONCE
Status: COMPLETED | OUTPATIENT
Start: 2022-12-18 | End: 2022-12-18

## 2022-12-18 RX ORDER — METHYLPREDNISOLONE SODIUM SUCCINATE 125 MG/2ML
60 INJECTION, POWDER, LYOPHILIZED, FOR SOLUTION INTRAMUSCULAR; INTRAVENOUS EVERY 12 HOURS
Status: DISCONTINUED | OUTPATIENT
Start: 2022-12-19 | End: 2022-12-19

## 2022-12-18 RX ADMIN — IPRATROPIUM BROMIDE AND ALBUTEROL SULFATE 1 AMPULE: 2.5; .5 SOLUTION RESPIRATORY (INHALATION) at 13:04

## 2022-12-18 RX ADMIN — ENOXAPARIN SODIUM 30 MG: 100 INJECTION SUBCUTANEOUS at 09:15

## 2022-12-18 RX ADMIN — IPRATROPIUM BROMIDE AND ALBUTEROL SULFATE 1 AMPULE: 2.5; .5 SOLUTION RESPIRATORY (INHALATION) at 16:16

## 2022-12-18 RX ADMIN — BACLOFEN 10 MG: 10 TABLET ORAL at 15:02

## 2022-12-18 RX ADMIN — CLONIDINE HYDROCHLORIDE 0.1 MG: 0.1 TABLET ORAL at 23:17

## 2022-12-18 RX ADMIN — ROPINIROLE HYDROCHLORIDE 1 MG: 1 TABLET, FILM COATED ORAL at 09:15

## 2022-12-18 RX ADMIN — HYDRALAZINE HYDROCHLORIDE 25 MG: 25 TABLET, FILM COATED ORAL at 21:44

## 2022-12-18 RX ADMIN — BACLOFEN 10 MG: 10 TABLET ORAL at 09:15

## 2022-12-18 RX ADMIN — METHYLPREDNISOLONE SODIUM SUCCINATE 60 MG: 125 INJECTION, POWDER, FOR SOLUTION INTRAMUSCULAR; INTRAVENOUS at 05:15

## 2022-12-18 RX ADMIN — ONDANSETRON 4 MG: 4 TABLET, ORALLY DISINTEGRATING ORAL at 17:15

## 2022-12-18 RX ADMIN — ROPINIROLE HYDROCHLORIDE 1 MG: 1 TABLET, FILM COATED ORAL at 15:02

## 2022-12-18 RX ADMIN — SODIUM CHLORIDE, PRESERVATIVE FREE 10 ML: 5 INJECTION INTRAVENOUS at 09:17

## 2022-12-18 RX ADMIN — ENOXAPARIN SODIUM 30 MG: 100 INJECTION SUBCUTANEOUS at 22:51

## 2022-12-18 RX ADMIN — BUDESONIDE 500 MCG: 0.5 SUSPENSION RESPIRATORY (INHALATION) at 09:30

## 2022-12-18 RX ADMIN — SODIUM CHLORIDE, PRESERVATIVE FREE 10 ML: 5 INJECTION INTRAVENOUS at 22:51

## 2022-12-18 RX ADMIN — ROPINIROLE HYDROCHLORIDE 1 MG: 1 TABLET, FILM COATED ORAL at 22:51

## 2022-12-18 RX ADMIN — ARFORMOTEROL TARTRATE 15 MCG: 15 SOLUTION RESPIRATORY (INHALATION) at 09:30

## 2022-12-18 RX ADMIN — IPRATROPIUM BROMIDE AND ALBUTEROL SULFATE 1 AMPULE: 2.5; .5 SOLUTION RESPIRATORY (INHALATION) at 09:30

## 2022-12-18 RX ADMIN — BUMETANIDE 2 MG: 1 TABLET ORAL at 17:15

## 2022-12-18 RX ADMIN — TRIMETHOBENZAMIDE HYDROCHLORIDE 200 MG: 100 INJECTION INTRAMUSCULAR at 21:11

## 2022-12-18 RX ADMIN — BACLOFEN 10 MG: 10 TABLET ORAL at 22:51

## 2022-12-18 RX ADMIN — DOCUSATE SODIUM 100 MG: 100 CAPSULE, LIQUID FILLED ORAL at 09:15

## 2022-12-18 RX ADMIN — BUMETANIDE 2 MG: 1 TABLET ORAL at 09:16

## 2022-12-18 ASSESSMENT — PAIN SCALES - GENERAL: PAINLEVEL_OUTOF10: 0

## 2022-12-18 NOTE — PROGRESS NOTES
Hospital Medicine    Subjective:   Breathing is okay  Wheezing ongoing    Current Facility-Administered Medications:     [START ON 12/19/2022] methylPREDNISolone sodium (SOLU-MEDROL) injection 60 mg, 60 mg, IntraVENous, Q12H, Sánchez Nelson MD    hydrALAZINE (APRESOLINE) tablet 25 mg, 25 mg, Oral, Q6H PRN, Charu Hassan MD, 25 mg at 12/17/22 1452    enoxaparin Sodium (LOVENOX) injection 30 mg, 30 mg, SubCUTAneous, BID, Juanita Selby MD, 30 mg at 12/18/22 0915    guaiFENesin-dextromethorphan (ROBITUSSIN DM) 100-10 MG/5ML syrup 5 mL, 5 mL, Oral, Q4H PRN, Genesis Dent, DO, 5 mL at 12/16/22 1728    docusate sodium (COLACE) capsule 100 mg, 100 mg, Oral, Daily, Genesis Dent, DO, 100 mg at 12/18/22 0915    ondansetron (ZOFRAN-ODT) disintegrating tablet 4 mg, 4 mg, Oral, Q8H PRN **OR** ondansetron (ZOFRAN) injection 4 mg, 4 mg, IntraVENous, Q6H PRN, Genesis Camacho Malmer, DO, 4 mg at 12/14/22 1851    baclofen (LIORESAL) tablet 10 mg, 10 mg, Oral, TID, Genesis Janice Malmer, DO, 10 mg at 12/18/22 0915    bumetanide (BUMEX) tablet 2 mg, 2 mg, Oral, BID, Genesis Davisa Malmer, DO, 2 mg at 12/18/22 5812    rOPINIRole (REQUIP) tablet 1 mg, 1 mg, Oral, TID, Genesis Camacho Malmer, DO, 1 mg at 12/18/22 0915    sodium chloride flush 0.9 % injection 5-40 mL, 5-40 mL, IntraVENous, 2 times per day, Michelle Katz DO, 10 mL at 12/18/22 8336    sodium chloride flush 0.9 % injection 5-40 mL, 5-40 mL, IntraVENous, PRN, Genesis Dent, DO    0.9 % sodium chloride infusion, , IntraVENous, PRN, Genesis Dent DO    polyethylene glycol (GLYCOLAX) packet 17 g, 17 g, Oral, Daily PRN, Genesis Dent DO    acetaminophen (TYLENOL) tablet 650 mg, 650 mg, Oral, Q6H PRN **OR** acetaminophen (TYLENOL) suppository 650 mg, 650 mg, Rectal, Q6H PRN, Genesis Dent DO    ipratropium-albuterol (DUONEB) nebulizer solution 1 ampule, 1 ampule, Inhalation, Q4H WA, Genesis Dent DO, 1 ampule at 12/18/22 0930    budesonide (PULMICORT) nebulizer suspension 500 mcg, 0.5 mg, Nebulization, BID, Donny MD Jose Alfredo, 500 mcg at 12/18/22 0930    Arformoterol Tartrate (BROVANA) nebulizer solution 15 mcg, 15 mcg, Nebulization, BID, Donny MD Jose Alfredo, 15 mcg at 12/18/22 0930    Objective:    BP (!) 190/93   Pulse 78   Temp 97.9 °F (36.6 °C) (Temporal)   Resp 22   Ht 5' 2\" (1.575 m)   Wt 288 lb 8 oz (130.9 kg)   SpO2 98%   BMI 52.77 kg/m²     Heart:  reg  Lungs:  rhonchi  Abd: + bs soft nontender  Extrem:  edema legs    CBC with Differential:    Lab Results   Component Value Date/Time    WBC 8.8 12/17/2022 08:13 AM    RBC 5.65 12/17/2022 08:13 AM    HGB 11.3 12/17/2022 08:13 AM    HCT 39.4 12/17/2022 08:13 AM     12/17/2022 08:13 AM    MCV 69.7 12/17/2022 08:13 AM    MCH 20.0 12/17/2022 08:13 AM    MCHC 28.7 12/17/2022 08:13 AM    RDW 20.6 12/17/2022 08:13 AM    NRBC 0.9 06/02/2021 05:29 PM    SEGSPCT 72 02/28/2014 09:00 AM    LYMPHOPCT 9.0 12/13/2022 04:27 PM    MONOPCT 9.3 12/13/2022 04:27 PM    MYELOPCT 0.9 05/21/2021 01:23 PM    BASOPCT 0.2 12/13/2022 04:27 PM    MONOSABS 0.58 12/13/2022 04:27 PM    LYMPHSABS 0.56 12/13/2022 04:27 PM    EOSABS 0.01 12/13/2022 04:27 PM    BASOSABS 0.01 12/13/2022 04:27 PM     CMP:    Lab Results   Component Value Date/Time     12/17/2022 08:13 AM    K 3.9 12/17/2022 08:13 AM    K 3.6 09/27/2022 06:51 AM    CL 97 12/17/2022 08:13 AM    CO2 36 12/17/2022 08:13 AM    BUN 30 12/17/2022 08:13 AM    CREATININE 0.6 12/17/2022 08:13 AM    GFRAA >60 09/27/2022 06:51 AM    LABGLOM >60 12/17/2022 08:13 AM    GLUCOSE 246 12/17/2022 08:13 AM    PROT 7.0 12/13/2022 04:27 PM    LABALBU 3.5 12/13/2022 04:27 PM    CALCIUM 9.0 12/17/2022 08:13 AM    BILITOT 0.4 12/13/2022 04:27 PM    ALKPHOS 137 12/13/2022 04:27 PM    AST 17 12/13/2022 04:27 PM    ALT 12 12/13/2022 04:27 PM     Warfarin PT/INR:    Lab Results   Component Value Date    INR 1.1 01/31/2022    INR 1.0 05/10/2021    INR 1.1 04/23/2021    PROTIME 11.4 01/31/2022    PROTIME 11.1 05/10/2021    PROTIME 11.7 04/23/2021       Assessment:    Principal Problem:    Acute respiratory failure with hypoxia (HCC)  Resolved Problems:    * No resolved hospital problems.  *  COPD exacerbation  Morbid obesity    Plan:  Decrease Solu-Medrol to 60 mg every 12  Increase activity as able  Wean oxygen as able    Jai Ennis MD  12:32 PM  12/18/2022

## 2022-12-18 NOTE — PLAN OF CARE
Problem: Discharge Planning  Goal: Discharge to home or other facility with appropriate resources  12/18/2022 1606 by Yuko Allen RN  Outcome: Progressing  12/18/2022 1606 by Yuko Allen RN  Outcome: Progressing     Problem: Safety - Adult  Goal: Free from fall injury  12/18/2022 1606 by Yuko Allen RN  Outcome: Progressing  12/18/2022 1606 by Yuko Allen RN  Outcome: Progressing  12/18/2022 0313 by Crystal [place] RN  Outcome: Progressing     Problem: Chronic Conditions and Co-morbidities  Goal: Patient's chronic conditions and co-morbidity symptoms are monitored and maintained or improved  12/18/2022 1606 by Yuko Allen RN  Outcome: Progressing  12/18/2022 1606 by Yuko Allen RN  Outcome: Progressing     Problem: Pain  Goal: Verbalizes/displays adequate comfort level or baseline comfort level  12/18/2022 1606 by Yuko Allen RN  Outcome: Progressing  12/18/2022 1606 by Yuko Allen RN  Outcome: Progressing  12/18/2022 0313 by Crystal [place] RN  Outcome: Progressing     Problem: ABCDS Injury Assessment  Goal: Absence of physical injury  12/18/2022 1606 by Yuko Allen RN  Outcome: Progressing  12/18/2022 1606 by Yuko Allen RN  Outcome: Progressing  12/18/2022 0313 by Crystal [place] RN  Outcome: Progressing

## 2022-12-19 PROCEDURE — 6370000000 HC RX 637 (ALT 250 FOR IP): Performed by: INTERNAL MEDICINE

## 2022-12-19 PROCEDURE — 2580000003 HC RX 258: Performed by: INTERNAL MEDICINE

## 2022-12-19 PROCEDURE — 2700000000 HC OXYGEN THERAPY PER DAY

## 2022-12-19 PROCEDURE — 6360000002 HC RX W HCPCS: Performed by: EMERGENCY MEDICINE

## 2022-12-19 PROCEDURE — 94640 AIRWAY INHALATION TREATMENT: CPT

## 2022-12-19 PROCEDURE — 2060000000 HC ICU INTERMEDIATE R&B

## 2022-12-19 PROCEDURE — 6360000002 HC RX W HCPCS: Performed by: INTERNAL MEDICINE

## 2022-12-19 RX ORDER — METHYLPREDNISOLONE SODIUM SUCCINATE 125 MG/2ML
60 INJECTION, POWDER, LYOPHILIZED, FOR SOLUTION INTRAMUSCULAR; INTRAVENOUS DAILY
Status: DISCONTINUED | OUTPATIENT
Start: 2022-12-20 | End: 2022-12-21 | Stop reason: HOSPADM

## 2022-12-19 RX ADMIN — SODIUM CHLORIDE, PRESERVATIVE FREE 10 ML: 5 INJECTION INTRAVENOUS at 22:16

## 2022-12-19 RX ADMIN — BACLOFEN 10 MG: 10 TABLET ORAL at 22:16

## 2022-12-19 RX ADMIN — IPRATROPIUM BROMIDE AND ALBUTEROL SULFATE 1 AMPULE: 2.5; .5 SOLUTION RESPIRATORY (INHALATION) at 15:37

## 2022-12-19 RX ADMIN — DOCUSATE SODIUM 100 MG: 100 CAPSULE, LIQUID FILLED ORAL at 10:29

## 2022-12-19 RX ADMIN — ENOXAPARIN SODIUM 30 MG: 100 INJECTION SUBCUTANEOUS at 10:30

## 2022-12-19 RX ADMIN — METHYLPREDNISOLONE SODIUM SUCCINATE 60 MG: 125 INJECTION, POWDER, FOR SOLUTION INTRAMUSCULAR; INTRAVENOUS at 00:20

## 2022-12-19 RX ADMIN — IPRATROPIUM BROMIDE AND ALBUTEROL SULFATE 1 AMPULE: 2.5; .5 SOLUTION RESPIRATORY (INHALATION) at 19:57

## 2022-12-19 RX ADMIN — BUMETANIDE 2 MG: 1 TABLET ORAL at 10:29

## 2022-12-19 RX ADMIN — ARFORMOTEROL TARTRATE 15 MCG: 15 SOLUTION RESPIRATORY (INHALATION) at 19:57

## 2022-12-19 RX ADMIN — BUDESONIDE 500 MCG: 0.5 SUSPENSION RESPIRATORY (INHALATION) at 19:57

## 2022-12-19 RX ADMIN — ARFORMOTEROL TARTRATE 15 MCG: 15 SOLUTION RESPIRATORY (INHALATION) at 08:37

## 2022-12-19 RX ADMIN — BACLOFEN 10 MG: 10 TABLET ORAL at 10:29

## 2022-12-19 RX ADMIN — IPRATROPIUM BROMIDE AND ALBUTEROL SULFATE 1 AMPULE: 2.5; .5 SOLUTION RESPIRATORY (INHALATION) at 12:08

## 2022-12-19 RX ADMIN — BUMETANIDE 2 MG: 1 TABLET ORAL at 16:29

## 2022-12-19 RX ADMIN — ROPINIROLE HYDROCHLORIDE 1 MG: 1 TABLET, FILM COATED ORAL at 10:30

## 2022-12-19 RX ADMIN — ENOXAPARIN SODIUM 30 MG: 100 INJECTION SUBCUTANEOUS at 22:15

## 2022-12-19 RX ADMIN — ROPINIROLE HYDROCHLORIDE 1 MG: 1 TABLET, FILM COATED ORAL at 22:15

## 2022-12-19 RX ADMIN — BUDESONIDE 500 MCG: 0.5 SUSPENSION RESPIRATORY (INHALATION) at 08:37

## 2022-12-19 RX ADMIN — BACLOFEN 10 MG: 10 TABLET ORAL at 13:18

## 2022-12-19 RX ADMIN — ROPINIROLE HYDROCHLORIDE 1 MG: 1 TABLET, FILM COATED ORAL at 16:29

## 2022-12-19 RX ADMIN — IPRATROPIUM BROMIDE AND ALBUTEROL SULFATE 1 AMPULE: 2.5; .5 SOLUTION RESPIRATORY (INHALATION) at 08:37

## 2022-12-19 ASSESSMENT — PAIN SCALES - GENERAL
PAINLEVEL_OUTOF10: 0
PAINLEVEL_OUTOF10: 0

## 2022-12-19 NOTE — PROGRESS NOTES
B/P 197/103 P-70, continues to complain of dizziness, some nausea, headache remains. Dr Olu Blevins notified. Order received for Catapress 0.1 mg PO once.

## 2022-12-19 NOTE — PROGRESS NOTES
Hospital Medicine    Subjective:  pt alert conversive breathing better      Current Facility-Administered Medications:     [START ON 12/20/2022] methylPREDNISolone sodium (SOLU-MEDROL) injection 60 mg, 60 mg, IntraVENous, Daily, Constance Dent DO    trimethobenzamide Alphonsa Rathke) injection 200 mg, 200 mg, IntraMUSCular, Q6H PRN, Constance Dent DO, 200 mg at 12/18/22 2111    hydrALAZINE (APRESOLINE) tablet 25 mg, 25 mg, Oral, Q6H PRN, Theodora aCmacho MD, 25 mg at 12/18/22 2144    enoxaparin Sodium (LOVENOX) injection 30 mg, 30 mg, SubCUTAneous, BID, Grayson Roche MD, 30 mg at 12/18/22 2251    guaiFENesin-dextromethorphan (ROBITUSSIN DM) 100-10 MG/5ML syrup 5 mL, 5 mL, Oral, Q4H PRN, Constance Dent DO, 5 mL at 12/16/22 1728    docusate sodium (COLACE) capsule 100 mg, 100 mg, Oral, Daily, Constance Dent DO, 100 mg at 12/18/22 0915    ondansetron (ZOFRAN-ODT) disintegrating tablet 4 mg, 4 mg, Oral, Q8H PRN, 4 mg at 12/18/22 1715 **OR** ondansetron (ZOFRAN) injection 4 mg, 4 mg, IntraVENous, Q6H PRN, Constance Dent DO, 4 mg at 12/14/22 1851    baclofen (LIORESAL) tablet 10 mg, 10 mg, Oral, TID, Constance Dent DO, 10 mg at 12/18/22 2251    bumetanide (BUMEX) tablet 2 mg, 2 mg, Oral, BID, Constance Dent DO, 2 mg at 12/18/22 1715    rOPINIRole (REQUIP) tablet 1 mg, 1 mg, Oral, TID, Constance Dent DO, 1 mg at 12/18/22 2251    sodium chloride flush 0.9 % injection 5-40 mL, 5-40 mL, IntraVENous, 2 times per day, Pam Olivarez DO, 10 mL at 12/18/22 3929    sodium chloride flush 0.9 % injection 5-40 mL, 5-40 mL, IntraVENous, PRN, Constance Dent DO    0.9 % sodium chloride infusion, , IntraVENous, PRN, Constance Dent DO    polyethylene glycol (GLYCOLAX) packet 17 g, 17 g, Oral, Daily PRN, Constance Dent DO    acetaminophen (TYLENOL) tablet 650 mg, 650 mg, Oral, Q6H PRN **OR** acetaminophen (TYLENOL) suppository 650 mg, 650 mg, Rectal, Q6H PRN, Constance Dent DO ipratropium-albuterol (DUONEB) nebulizer solution 1 ampule, 1 ampule, Inhalation, Q4H Darlys Vera Dent, DO, 1 ampule at 12/19/22 0837    budesonide (PULMICORT) nebulizer suspension 500 mcg, 0.5 mg, Nebulization, BID, Angélica Maher MD, 500 mcg at 12/19/22 0837    Arformoterol Tartrate (BROVANA) nebulizer solution 15 mcg, 15 mcg, Nebulization, BID, Angélica Maher MD, 15 mcg at 12/19/22 0837    Objective:    /70   Pulse 82   Temp 97.5 °F (36.4 °C) (Temporal)   Resp 17   Ht 5' 2\" (1.575 m)   Wt 288 lb 8 oz (130.9 kg)   SpO2 99%   BMI 52.77 kg/m²     Heart:  reg  Lungs:  ctab  Abd: + bs soft nontender  Extrem:  edema legs    CBC with Differential:    Lab Results   Component Value Date/Time    WBC 8.8 12/17/2022 08:13 AM    RBC 5.65 12/17/2022 08:13 AM    HGB 11.3 12/17/2022 08:13 AM    HCT 39.4 12/17/2022 08:13 AM     12/17/2022 08:13 AM    MCV 69.7 12/17/2022 08:13 AM    MCH 20.0 12/17/2022 08:13 AM    MCHC 28.7 12/17/2022 08:13 AM    RDW 20.6 12/17/2022 08:13 AM    NRBC 0.9 06/02/2021 05:29 PM    SEGSPCT 72 02/28/2014 09:00 AM    LYMPHOPCT 9.0 12/13/2022 04:27 PM    MONOPCT 9.3 12/13/2022 04:27 PM    MYELOPCT 0.9 05/21/2021 01:23 PM    BASOPCT 0.2 12/13/2022 04:27 PM    MONOSABS 0.58 12/13/2022 04:27 PM    LYMPHSABS 0.56 12/13/2022 04:27 PM    EOSABS 0.01 12/13/2022 04:27 PM    BASOSABS 0.01 12/13/2022 04:27 PM     CMP:    Lab Results   Component Value Date/Time     12/17/2022 08:13 AM    K 3.9 12/17/2022 08:13 AM    K 3.6 09/27/2022 06:51 AM    CL 97 12/17/2022 08:13 AM    CO2 36 12/17/2022 08:13 AM    BUN 30 12/17/2022 08:13 AM    CREATININE 0.6 12/17/2022 08:13 AM    GFRAA >60 09/27/2022 06:51 AM    LABGLOM >60 12/17/2022 08:13 AM    GLUCOSE 246 12/17/2022 08:13 AM    PROT 7.0 12/13/2022 04:27 PM    LABALBU 3.5 12/13/2022 04:27 PM    CALCIUM 9.0 12/17/2022 08:13 AM    BILITOT 0.4 12/13/2022 04:27 PM    ALKPHOS 137 12/13/2022 04:27 PM    AST 17 12/13/2022 04:27 PM    ALT 12 12/13/2022 04:27 PM     Warfarin PT/INR:    Lab Results   Component Value Date    INR 1.1 01/31/2022    INR 1.0 05/10/2021    INR 1.1 04/23/2021    PROTIME 11.4 01/31/2022    PROTIME 11.1 05/10/2021    PROTIME 11.7 04/23/2021       Assessment:    Principal Problem:    Acute respiratory failure with hypoxia (HCC)  Resolved Problems:    * No resolved hospital problems.  *      Plan:  Wean steroids check pulsox on rm air        Arcelia Smith DO  9:00 AM  12/19/2022

## 2022-12-19 NOTE — PROGRESS NOTES
Notified Dr Hays Prom of pt complaining of nausea with small emisis and elevated B/P of 196/92. Pt refusing to take PO meds at this time. Order received for Tigan 200 mg IM Once.

## 2022-12-19 NOTE — PROGRESS NOTES
Hydralizine PO given at this time. Nausea has lessened. Complains of dizziness with headache.  B/P 196/92 P-75

## 2022-12-19 NOTE — PROGRESS NOTES
Pulse Ox RA sitting 96%  Pulse Ox RA ambulating 91%  Pulse Ox RA recovering 95%    Patient has dropped into the 70s while sleeping.

## 2022-12-20 PROCEDURE — 2060000000 HC ICU INTERMEDIATE R&B

## 2022-12-20 PROCEDURE — 94640 AIRWAY INHALATION TREATMENT: CPT

## 2022-12-20 PROCEDURE — 97535 SELF CARE MNGMENT TRAINING: CPT

## 2022-12-20 PROCEDURE — 6370000000 HC RX 637 (ALT 250 FOR IP): Performed by: INTERNAL MEDICINE

## 2022-12-20 PROCEDURE — 6360000002 HC RX W HCPCS: Performed by: EMERGENCY MEDICINE

## 2022-12-20 PROCEDURE — 6360000002 HC RX W HCPCS: Performed by: INTERNAL MEDICINE

## 2022-12-20 PROCEDURE — 2580000003 HC RX 258: Performed by: INTERNAL MEDICINE

## 2022-12-20 RX ORDER — IPRATROPIUM BROMIDE AND ALBUTEROL SULFATE 2.5; .5 MG/3ML; MG/3ML
3 SOLUTION RESPIRATORY (INHALATION) EVERY 6 HOURS PRN
Qty: 360 ML | Refills: 0 | Status: SHIPPED | OUTPATIENT
Start: 2022-12-20

## 2022-12-20 RX ORDER — GUAIFENESIN/DEXTROMETHORPHAN 100-10MG/5
10 SYRUP ORAL 4 TIMES DAILY PRN
Qty: 120 ML | Refills: 0 | COMMUNITY
Start: 2022-12-20 | End: 2022-12-30

## 2022-12-20 RX ORDER — PREDNISONE 10 MG/1
TABLET ORAL
Qty: 30 TABLET | Refills: 0 | Status: SHIPPED | OUTPATIENT
Start: 2022-12-20

## 2022-12-20 RX ADMIN — BACLOFEN 10 MG: 10 TABLET ORAL at 08:46

## 2022-12-20 RX ADMIN — IPRATROPIUM BROMIDE AND ALBUTEROL SULFATE 1 AMPULE: 2.5; .5 SOLUTION RESPIRATORY (INHALATION) at 13:26

## 2022-12-20 RX ADMIN — ROPINIROLE HYDROCHLORIDE 1 MG: 1 TABLET, FILM COATED ORAL at 21:07

## 2022-12-20 RX ADMIN — BUDESONIDE 500 MCG: 0.5 SUSPENSION RESPIRATORY (INHALATION) at 09:06

## 2022-12-20 RX ADMIN — SODIUM CHLORIDE, PRESERVATIVE FREE 10 ML: 5 INJECTION INTRAVENOUS at 08:46

## 2022-12-20 RX ADMIN — ARFORMOTEROL TARTRATE 15 MCG: 15 SOLUTION RESPIRATORY (INHALATION) at 20:14

## 2022-12-20 RX ADMIN — IPRATROPIUM BROMIDE AND ALBUTEROL SULFATE 1 AMPULE: 2.5; .5 SOLUTION RESPIRATORY (INHALATION) at 20:14

## 2022-12-20 RX ADMIN — BUDESONIDE 500 MCG: 0.5 SUSPENSION RESPIRATORY (INHALATION) at 20:14

## 2022-12-20 RX ADMIN — SODIUM CHLORIDE, PRESERVATIVE FREE 10 ML: 5 INJECTION INTRAVENOUS at 21:07

## 2022-12-20 RX ADMIN — IPRATROPIUM BROMIDE AND ALBUTEROL SULFATE 1 AMPULE: 2.5; .5 SOLUTION RESPIRATORY (INHALATION) at 09:06

## 2022-12-20 RX ADMIN — ENOXAPARIN SODIUM 30 MG: 100 INJECTION SUBCUTANEOUS at 08:46

## 2022-12-20 RX ADMIN — IPRATROPIUM BROMIDE AND ALBUTEROL SULFATE 1 AMPULE: 2.5; .5 SOLUTION RESPIRATORY (INHALATION) at 16:25

## 2022-12-20 RX ADMIN — DOCUSATE SODIUM 100 MG: 100 CAPSULE, LIQUID FILLED ORAL at 08:46

## 2022-12-20 RX ADMIN — ENOXAPARIN SODIUM 30 MG: 100 INJECTION SUBCUTANEOUS at 21:07

## 2022-12-20 RX ADMIN — METHYLPREDNISOLONE SODIUM SUCCINATE 60 MG: 125 INJECTION, POWDER, FOR SOLUTION INTRAMUSCULAR; INTRAVENOUS at 08:46

## 2022-12-20 RX ADMIN — BUMETANIDE 2 MG: 1 TABLET ORAL at 08:46

## 2022-12-20 RX ADMIN — BACLOFEN 10 MG: 10 TABLET ORAL at 16:57

## 2022-12-20 RX ADMIN — ROPINIROLE HYDROCHLORIDE 1 MG: 1 TABLET, FILM COATED ORAL at 16:57

## 2022-12-20 RX ADMIN — BACLOFEN 10 MG: 10 TABLET ORAL at 21:07

## 2022-12-20 RX ADMIN — ARFORMOTEROL TARTRATE 15 MCG: 15 SOLUTION RESPIRATORY (INHALATION) at 09:06

## 2022-12-20 RX ADMIN — ROPINIROLE HYDROCHLORIDE 1 MG: 1 TABLET, FILM COATED ORAL at 08:46

## 2022-12-20 ASSESSMENT — PAIN SCALES - GENERAL: PAINLEVEL_OUTOF10: 0

## 2022-12-20 NOTE — PROGRESS NOTES
Message sent to Dr. Bernabe Haines  who is covering for Dr. Damien Ceballos via perfect serve to see if the patient could discharge per pulmonary. Per bedside nurse the patients oxygen dropped to 73% on Room air while sleeping.

## 2022-12-20 NOTE — CARE COORDINATION
Pt does not have a nebulizer at home. Will need an outpatient order for home nebulizer and medication orders. Informed Dr. Pamela Mera. Pt's choice for DME is [facility]. Liaison Parth Law can have nebulizer delivered tomorrow if ordered today and can do meds. Waiting for Pulmonary to see to determine if home oxygen is needed at night d/t oxygen saturation dropping into the 70's while she is sleeping. Discharge Plan is to return home with her sister. Pt's care is in parking lot and will drive home at discharge. SW/FORD to follow for discharge needs.    Francy Pepe, L.S.W.  [phone]

## 2022-12-20 NOTE — PROGRESS NOTES
[facility] Medicine    Subjective:  pt alert conversive c/o cough      Current Facility-Administered Medications:     methylPREDNISolone sodium (SOLU-MEDROL) injection 60 mg, 60 mg, IntraVENous, Daily, Fredi Dent, DO, 60 mg at 12/20/22 9421    trimethobenzamide (TIGAN) injection 200 mg, 200 mg, IntraMUSCular, Q6H PRN, Fredi Rileymer, DO, 200 mg at 12/18/22 2111    hydrALAZINE (APRESOLINE) tablet 25 mg, 25 mg, Oral, Q6H PRN, Erlin Calle MD, 25 mg at 12/18/22 2144    enoxaparin Sodium (LOVENOX) injection 30 mg, 30 mg, SubCUTAneous, BID, Roseann Melara MD, 30 mg at 12/20/22 0846    guaiFENesin-dextromethorphan (ROBITUSSIN DM) 100-10 MG/5ML syrup 5 mL, 5 mL, Oral, Q4H PRN, Fredi Dent, DO, 5 mL at 12/16/22 1728    docusate sodium (COLACE) capsule 100 mg, 100 mg, Oral, Daily, Fredi Dent, DO, 100 mg at 12/20/22 0846    ondansetron (ZOFRAN-ODT) disintegrating tablet 4 mg, 4 mg, Oral, Q8H PRN, 4 mg at 12/18/22 1715 **OR** ondansetron (ZOFRAN) injection 4 mg, 4 mg, IntraVENous, Q6H PRN, Fredi Dent, DO, 4 mg at 12/14/22 1851    baclofen (LIORESAL) tablet 10 mg, 10 mg, Oral, TID, Fredi Dent, DO, 10 mg at 12/20/22 0846    bumetanide (BUMEX) tablet 2 mg, 2 mg, Oral, BID, Fredi Dent, DO, 2 mg at 12/20/22 0846    rOPINIRole (REQUIP) tablet 1 mg, 1 mg, Oral, TID, Fredi Dent, DO, 1 mg at 12/20/22 3381    sodium chloride flush 0.9 % injection 5-40 mL, 5-40 mL, IntraVENous, 2 times per day, Henna Carter DO, 10 mL at 12/20/22 0846    sodium chloride flush 0.9 % injection 5-40 mL, 5-40 mL, IntraVENous, PRN, Fredi Dent DO    0.9 % sodium chloride infusion, , IntraVENous, PRN, Fredi Dent DO    polyethylene glycol (GLYCOLAX) packet 17 g, 17 g, Oral, Daily PRN, Fredi Dent DO    acetaminophen (TYLENOL) tablet 650 mg, 650 mg, Oral, Q6H PRN **OR** acetaminophen (TYLENOL) suppository 650 mg, 650 mg, Rectal, Q6H PRN, Fredi Dent DO    ipratropium-albuterol (DUONEB) nebulizer solution 1 ampule, 1 ampule, Inhalation, Q4H Kp Dent, , 1 ampule at 12/20/22 1326    budesonide (PULMICORT) nebulizer suspension 500 mcg, 0.5 mg, Nebulization, BID, Jayda Chavez MD, 500 mcg at 12/20/22 0906    Arformoterol Tartrate (BROVANA) nebulizer solution 15 mcg, 15 mcg, Nebulization, BID, Jayda Chavez MD, 15 mcg at 12/20/22 0906    Objective:    /73   Pulse (!) 105   Temp 98 °F (36.7 °C) (Temporal)   Resp 16   Ht 5' 2\" (1.575 m)   Wt 288 lb 8 oz (130.9 kg)   SpO2 93%   BMI 52.77 kg/m²     Heart:  reg  Lungs:  min rhonchi  Abd: + bs soft nontender  Extrem:  edema legs    CBC with Differential:    Lab Results   Component Value Date/Time    WBC 8.8 12/17/2022 08:13 AM    RBC 5.65 12/17/2022 08:13 AM    HGB 11.3 12/17/2022 08:13 AM    HCT 39.4 12/17/2022 08:13 AM     12/17/2022 08:13 AM    MCV 69.7 12/17/2022 08:13 AM    MCH 20.0 12/17/2022 08:13 AM    MCHC 28.7 12/17/2022 08:13 AM    RDW 20.6 12/17/2022 08:13 AM    NRBC 0.9 06/02/2021 05:29 PM    SEGSPCT 72 02/28/2014 09:00 AM    LYMPHOPCT 9.0 12/13/2022 04:27 PM    MONOPCT 9.3 12/13/2022 04:27 PM    MYELOPCT 0.9 05/21/2021 01:23 PM    BASOPCT 0.2 12/13/2022 04:27 PM    MONOSABS 0.58 12/13/2022 04:27 PM    LYMPHSABS 0.56 12/13/2022 04:27 PM    EOSABS 0.01 12/13/2022 04:27 PM    BASOSABS 0.01 12/13/2022 04:27 PM     CMP:    Lab Results   Component Value Date/Time     12/17/2022 08:13 AM    K 3.9 12/17/2022 08:13 AM    K 3.6 09/27/2022 06:51 AM    CL 97 12/17/2022 08:13 AM    CO2 36 12/17/2022 08:13 AM    BUN 30 12/17/2022 08:13 AM    CREATININE 0.6 12/17/2022 08:13 AM    GFRAA >60 09/27/2022 06:51 AM    LABGLOM >60 12/17/2022 08:13 AM    GLUCOSE 246 12/17/2022 08:13 AM    PROT 7.0 12/13/2022 04:27 PM    LABALBU 3.5 12/13/2022 04:27 PM    CALCIUM 9.0 12/17/2022 08:13 AM    BILITOT 0.4 12/13/2022 04:27 PM    ALKPHOS 137 12/13/2022 04:27 PM    AST 17 12/13/2022 04:27 PM    ALT 12 12/13/2022 04:27 PM Warfarin PT/INR:    Lab Results   Component Value Date    INR 1.1 01/31/2022    INR 1.0 05/10/2021    INR 1.1 04/23/2021    PROTIME 11.4 01/31/2022    PROTIME 11.1 05/10/2021    PROTIME 11.7 04/23/2021       Assessment:    Principal Problem:    Acute respiratory failure with hypoxia (HCC)  Resolved Problems:    * No resolved hospital problems.  *  Covid infection    Plan:  Dc home when ok with tobias Dotson, DO  2:38 PM  12/20/2022

## 2022-12-20 NOTE — PROGRESS NOTES
Occupational Therapy  OT BEDSIDE TREATMENT NOTE   [facility] [address]  [address] [place] [address]      DAJY:  Patient Name: Margi Miles  MRN: [id number]  : 1955  Room: [address] [place], [place] [address]     Evaluating OT: Brennan Young OTR/L #[id number]      Referring Provider: Neil Lynn DO  Specific Provider Orders/Date: OT eval and treat 12/15/22     Diagnosis: COPD exacerbation ([address] [J44.1]  Acute respiratory failure with hypoxia ([address] [J96.01]  COVID-19 virus infection [U07.1]   Pt admitted to hospital with SOB. COVID +      Pertinent Medical History:  has a past medical history of Arthritis, Decreased dorsalis pedis pulse, Dermatophytosis, Full dentures, Leg swelling, Low back pain, Lymphedema of both lower extremities, Obesity, Osteoarthritis, Peripheral vision loss, Stroke ([address] and Ulcer of calf with fat layer exposed, right ([address]          Precautions:  Fall Risk, O2, continuous pulse ox, Droplet plus (COVID-19)     Assessment of current deficits    [x] Functional mobility          [x]ADLs           [x] Strength                  []Cognition    [x] Functional transfers        [x] IADLs         [x] Safety Awareness   [x]Endurance    [] Fine Coordination                        [x] Balance      [] Vision/perception   []Sensation      []Gross Motor Coordination            [] ROM           [] Delirium                   [] Motor Control      OT PLAN OF CARE   OT POC based on physician orders, patient diagnosis and results of clinical assessment     Frequency/Duration 1-3 days/wk for 2 weeks PRN   Specific OT Treatment Interventions to include:   * Instruction/training on adapted ADL techniques and AE recommendations to increase functional independence within precautions       * Training on energy conservation strategies, correct breathing pattern and techniques to improve independence/tolerance for self-care routine  * Functional transfer/mobility training/DME recommendations for increased independence, safety, and fall prevention  * Patient/Family education to increase follow through with safety techniques and functional independence  * Recommendation of environmental modifications for increased safety with functional transfers/mobility and ADLs  * Therapeutic exercise to improve motor endurance, ROM, and functional strength for ADLs/functional transfers  * Therapeutic activities to facilitate/challenge dynamic balance, stand tolerance for increased safety and independence with ADLs     Recommended Adaptive Equipment: issued reacher, sock aide and easy slide 12/16/22     Home Living: Pt lives with sister and daughter in a 2 story home with 2 ASIYA and B hand rails .  1st floor set-up   Bathroom setup: walk-in shower    Equipment owned: shower chair, w/w, rollator      Prior Level of Function: mod I with ADLs , mod I with IADLs; ambulated with w/w   Driving: yes   Occupation: retired nurse     Pain Level: Pt denies pain this session     Cognition: A&O: 4/4; Follows 2 step directions             Memory:  good             Sequencing:  good             Problem solving:  good             Judgement/safety:  fair               Functional Assessment:  AM-PAC Daily Activity Raw Score: 20/24    Initial Eval Status  Date: 12/16/22 Treatment Status  Date:12/20/22 STGs = LTGs  Time frame: 10-14 days   Feeding Independent  Independent      Grooming Stand by Assist      Standing  Supervision  Standing at sink  Modified [place]    UB Dressing Independent  Independent      LB Dressing Minimal Assist  Min A  For donning JOSE house  SBA  To dof/don socks using AE for energy conservation Modified [place]    Bathing Minimal Assist Min A   For bilat feet during simulated task, pt educated on use of LHS for increased indep Modified [place]    Toileting Stand by Assist  Supervision   For clothing management and hygiene, vc for energy conservation due to pt quick to fatigue Modified [place]    Bed Mobility  Supine to sit: NT   Sit to supine: NT  Supine to sit: NT   Sit to supine: NT   Supine to sit: Modified [place]   Sit to supine: Modified [place]    Functional Transfers Stand by Assist  Supervision  To/from various household surfaces  Modified [place]    Functional Mobility Stand by Assist      Ambulated in room with ww: O2 sat 90-94% Stand by Assist      Ambulated in room with ww: O2 sat 90-92% on RA. Education on pacing  Modified [place]    Balance Sitting:     Static:  indep    Dynamic:indep  Standing: SBA Sitting:     Static:  indep    Dynamic:indep  Standing: SBA      Activity Tolerance F F  G   Visual/  Perceptual Glasses: yes  wfl                               Hand Dominance right    Strength ROM Additional Info:    RUE   4/5 wfl good  and wfl FMC/dexterity noted during ADL tasks      LUE 4/5 wfl good  and wfl FMC/dexterity noted during ADL tasks      Hearing: wfl  Sensation:wfl  Tone: wfl  Edema:none noted       Comments: Upon arrival pt sitting up in chair. ADL retraining to increase safety and indep in dressing and toileting tasks, balance and trf training to increase participation in functional mobility and standing aspects of ADLs with increased safety. Pt educated on energy conservation techniques to increase independence and safety during ADL's, bed mobility, and functional transfers. At end of session pt left seated in bedside chair, call light within reach. Pt has made fair progress towards set goals.      Continue with current plan of care    Treatment Time In:9:43am            Treatment Time Out: 9:57am             Treatment Charges: Mins Units   Ther Ex  [id number]     Manual Therapy 90036     Thera Activities [id number]     ADL/Home Mgt [id number] 14 1   Neuro Re-ed 65209     Group Therapy      Orthotic manage/training  [id number]     Non-Billable Time     Total Timed Treatment 14 Kelsey 27 RICE/L [id number]

## 2022-12-20 NOTE — PROGRESS NOTES
Notified Respiratory of overnight pulse ox testing at this time.  Per Pulmonary the patient is not to be discharged tonight until above test results are documented and interpreted by pulmonogist.

## 2022-12-21 ENCOUNTER — TELEPHONE (OUTPATIENT)
Dept: CRITICAL CARE MEDICINE | Age: 67
End: 2022-12-21

## 2022-12-21 ENCOUNTER — COMMUNITY OUTREACH (OUTPATIENT)
Dept: PRIMARY CARE CLINIC | Age: 67
End: 2022-12-21

## 2022-12-21 VITALS
HEIGHT: 62 IN | DIASTOLIC BLOOD PRESSURE: 95 MMHG | WEIGHT: 288.5 LBS | BODY MASS INDEX: 53.09 KG/M2 | SYSTOLIC BLOOD PRESSURE: 129 MMHG | RESPIRATION RATE: 17 BRPM | OXYGEN SATURATION: 94 % | TEMPERATURE: 96 F | HEART RATE: 98 BPM

## 2022-12-21 DIAGNOSIS — J41.8 MIXED SIMPLE AND MUCOPURULENT CHRONIC BRONCHITIS (HCC): Primary | ICD-10-CM

## 2022-12-21 PROCEDURE — 6360000002 HC RX W HCPCS: Performed by: INTERNAL MEDICINE

## 2022-12-21 PROCEDURE — 94640 AIRWAY INHALATION TREATMENT: CPT

## 2022-12-21 PROCEDURE — 99233 SBSQ HOSP IP/OBS HIGH 50: CPT | Performed by: INTERNAL MEDICINE

## 2022-12-21 PROCEDURE — 6370000000 HC RX 637 (ALT 250 FOR IP): Performed by: INTERNAL MEDICINE

## 2022-12-21 PROCEDURE — 6360000002 HC RX W HCPCS: Performed by: EMERGENCY MEDICINE

## 2022-12-21 PROCEDURE — 2580000003 HC RX 258: Performed by: INTERNAL MEDICINE

## 2022-12-21 RX ORDER — BUDESONIDE 0.5 MG/2ML
0.5 INHALANT ORAL 2 TIMES DAILY
Qty: 60 EACH | Refills: 3 | Status: SHIPPED | OUTPATIENT
Start: 2022-12-21

## 2022-12-21 RX ORDER — ARFORMOTEROL TARTRATE 15 UG/2ML
15 SOLUTION RESPIRATORY (INHALATION) 2 TIMES DAILY
Qty: 120 ML | Refills: 3 | Status: SHIPPED | OUTPATIENT
Start: 2022-12-21

## 2022-12-21 RX ORDER — IPRATROPIUM BROMIDE AND ALBUTEROL SULFATE 2.5; .5 MG/3ML; MG/3ML
1 SOLUTION RESPIRATORY (INHALATION) EVERY 4 HOURS
Status: DISCONTINUED | OUTPATIENT
Start: 2022-12-21 | End: 2022-12-21 | Stop reason: SDUPTHER

## 2022-12-21 RX ORDER — BUDESONIDE 0.5 MG/2ML
500 INHALANT ORAL 2 TIMES DAILY
Qty: 60 EACH | Refills: 3 | Status: SHIPPED | OUTPATIENT
Start: 2022-12-21 | End: 2022-12-21 | Stop reason: HOSPADM

## 2022-12-21 RX ORDER — ARFORMOTEROL TARTRATE 15 UG/2ML
1 SOLUTION RESPIRATORY (INHALATION) 2 TIMES DAILY
Qty: 120 ML | Refills: 12 | Status: SHIPPED | OUTPATIENT
Start: 2022-12-21 | End: 2022-12-21 | Stop reason: HOSPADM

## 2022-12-21 RX ADMIN — BUMETANIDE 2 MG: 1 TABLET ORAL at 09:23

## 2022-12-21 RX ADMIN — ENOXAPARIN SODIUM 30 MG: 100 INJECTION SUBCUTANEOUS at 09:22

## 2022-12-21 RX ADMIN — METHYLPREDNISOLONE SODIUM SUCCINATE 60 MG: 125 INJECTION, POWDER, FOR SOLUTION INTRAMUSCULAR; INTRAVENOUS at 09:23

## 2022-12-21 RX ADMIN — BACLOFEN 10 MG: 10 TABLET ORAL at 09:24

## 2022-12-21 RX ADMIN — IPRATROPIUM BROMIDE AND ALBUTEROL SULFATE 1 AMPULE: 2.5; .5 SOLUTION RESPIRATORY (INHALATION) at 08:55

## 2022-12-21 RX ADMIN — BUDESONIDE 500 MCG: 0.5 SUSPENSION RESPIRATORY (INHALATION) at 08:55

## 2022-12-21 RX ADMIN — DOCUSATE SODIUM 100 MG: 100 CAPSULE, LIQUID FILLED ORAL at 09:23

## 2022-12-21 RX ADMIN — ARFORMOTEROL TARTRATE 15 MCG: 15 SOLUTION RESPIRATORY (INHALATION) at 08:55

## 2022-12-21 RX ADMIN — GUAIFENESIN SYRUP AND DEXTROMETHORPHAN 5 ML: 100; 10 SYRUP ORAL at 09:23

## 2022-12-21 RX ADMIN — SODIUM CHLORIDE, PRESERVATIVE FREE 10 ML: 5 INJECTION INTRAVENOUS at 09:22

## 2022-12-21 RX ADMIN — ROPINIROLE HYDROCHLORIDE 1 MG: 1 TABLET, FILM COATED ORAL at 09:23

## 2022-12-21 ASSESSMENT — PAIN SCALES - GENERAL: PAINLEVEL_OUTOF10: 0

## 2022-12-21 NOTE — CARE COORDINATION
Per RN, Pulmonary said Pt needs 3 L of oxygen. Requested RN check with Pulmonary about Nebulizer and medications for nebulizer - NEED ORDERS. No home nebulizer. Pt is using her Sister's Pulmonary supplies. Need Pulse Oxy Testing to see if Pt qualifies for oxygen and nebulizer with meds. Discharge Plan is to return to [address] [place] [address]. NAS/FORD to follow for discharge needs. Awaiting for written orders for DME  to be ordered. Pt drove self to hospital and will drive self home.    Daniel Houston, L.S.W.  [phone]

## 2022-12-21 NOTE — DISCHARGE INSTRUCTIONS
Your information:  Name: Ilene Daniel  : 1955    Your instructions:        What to do after you leave the hospital:    Recommended diet: regular diet    Recommended activity: activity as tolerated        The following personal items were collected during your admission and were returned to you:    Belongings  Dental Appliances: Uppers, Lowers, At bedside  Vision - Corrective Lenses: Eyeglasses, At bedside  Hearing Aid: Right hearing aid, At bedside  Clothing: Shirt, At bedside, Jacket/Coat, Pants, Footwear  Jewelry: Watch, At bedside, Earrings  Body Piercings Removed: No  Electronic Devices: Cell Phone, , At bedside  Weapons (Notify Protective Services/Security): None  Other Valuables: Petra Joseph, At bedside  Home Medications: None  Valuables Given To: Patient  Provide Name(s) of Who Valuable(s) Were Given To: [employer]  Responsible person(s) in the waiting room: n/a  Patient approves for provider to speak to responsible person post operatively: No    Information obtained by:  By signing below, I understand that if any problems occur once I leave the hospital I am to contact Dr. Aydee Arredondo. I understand and acknowledge receipt of the instructions indicated above.

## 2022-12-21 NOTE — PROGRESS NOTES
Hospital Medicine    Subjective:  pt alert conversive feels better today      Current Facility-Administered Medications:     methylPREDNISolone sodium (SOLU-MEDROL) injection 60 mg, 60 mg, IntraVENous, Daily, Taralethea Dent, DO, 60 mg at 12/20/22 5433    trimethobenzamide (TIGAN) injection 200 mg, 200 mg, IntraMUSCular, Q6H PRN, Taralethea eDnt, DO, 200 mg at 12/18/22 2111    hydrALAZINE (APRESOLINE) tablet 25 mg, 25 mg, Oral, Q6H PRN, Kulwinder Bliss MD, 25 mg at 12/18/22 2144    enoxaparin Sodium (LOVENOX) injection 30 mg, 30 mg, SubCUTAneous, BID, Jak Sam MD, 30 mg at 12/20/22 2107    guaiFENesin-dextromethorphan (ROBITUSSIN DM) 100-10 MG/5ML syrup 5 mL, 5 mL, Oral, Q4H PRN, Akin Dent, DO, 5 mL at 12/16/22 1728    docusate sodium (COLACE) capsule 100 mg, 100 mg, Oral, Daily, Akin Dent, DO, 100 mg at 12/20/22 0846    ondansetron (ZOFRAN-ODT) disintegrating tablet 4 mg, 4 mg, Oral, Q8H PRN, 4 mg at 12/18/22 1715 **OR** ondansetron (ZOFRAN) injection 4 mg, 4 mg, IntraVENous, Q6H PRN, Akin Dent, DO, 4 mg at 12/14/22 1851    baclofen (LIORESAL) tablet 10 mg, 10 mg, Oral, TID, Akin Dent, DO, 10 mg at 12/20/22 2107    bumetanide (BUMEX) tablet 2 mg, 2 mg, Oral, BID, Akin Dent, DO, 2 mg at 12/20/22 8632    rOPINIRole (REQUIP) tablet 1 mg, 1 mg, Oral, TID, Akin Dent, DO, 1 mg at 12/20/22 2107    sodium chloride flush 0.9 % injection 5-40 mL, 5-40 mL, IntraVENous, 2 times per day, Carrie Oliver DO, 10 mL at 12/20/22 2107    sodium chloride flush 0.9 % injection 5-40 mL, 5-40 mL, IntraVENous, PRN, Akin Dent, DO    0.9 % sodium chloride infusion, , IntraVENous, PRN, Akin Dent, DO    polyethylene glycol (GLYCOLAX) packet 17 g, 17 g, Oral, Daily PRN, Akin Dent, DO    acetaminophen (TYLENOL) tablet 650 mg, 650 mg, Oral, Q6H PRN **OR** acetaminophen (TYLENOL) suppository 650 mg, 650 mg, Rectal, Q6H PRN, Akin Dent, DO ipratropium-albuterol (DUONEB) nebulizer solution 1 ampule, 1 ampule, Inhalation, Q4H Ronal Dent DO, 1 ampule at 12/20/22 2014    budesonide (PULMICORT) nebulizer suspension 500 mcg, 0.5 mg, Nebulization, BID, Evon Salinas MD, 500 mcg at 12/20/22 2014    Arformoterol Tartrate (BROVANA) nebulizer solution 15 mcg, 15 mcg, Nebulization, BID, Evon Salinas MD, 15 mcg at 12/20/22 2014    Objective:    BP (!) 144/92   Pulse 98   Temp 96.9 °F (36.1 °C) (Temporal)   Resp 12   Ht 5' 2\" (1.575 m)   Wt 288 lb 8 oz (130.9 kg)   SpO2 91%   BMI 52.77 kg/m²     Heart:  reg  Lungs:  ctab  Abd: + bs soft nontender  Extrem:  edema legs    CBC with Differential:    Lab Results   Component Value Date/Time    WBC 8.8 12/17/2022 08:13 AM    RBC 5.65 12/17/2022 08:13 AM    HGB 11.3 12/17/2022 08:13 AM    HCT 39.4 12/17/2022 08:13 AM     12/17/2022 08:13 AM    MCV 69.7 12/17/2022 08:13 AM    MCH 20.0 12/17/2022 08:13 AM    MCHC 28.7 12/17/2022 08:13 AM    RDW 20.6 12/17/2022 08:13 AM    NRBC 0.9 06/02/2021 05:29 PM    SEGSPCT 72 02/28/2014 09:00 AM    LYMPHOPCT 9.0 12/13/2022 04:27 PM    MONOPCT 9.3 12/13/2022 04:27 PM    MYELOPCT 0.9 05/21/2021 01:23 PM    BASOPCT 0.2 12/13/2022 04:27 PM    MONOSABS 0.58 12/13/2022 04:27 PM    LYMPHSABS 0.56 12/13/2022 04:27 PM    EOSABS 0.01 12/13/2022 04:27 PM    BASOSABS 0.01 12/13/2022 04:27 PM     CMP:    Lab Results   Component Value Date/Time     12/17/2022 08:13 AM    K 3.9 12/17/2022 08:13 AM    K 3.6 09/27/2022 06:51 AM    CL 97 12/17/2022 08:13 AM    CO2 36 12/17/2022 08:13 AM    BUN 30 12/17/2022 08:13 AM    CREATININE 0.6 12/17/2022 08:13 AM    GFRAA >60 09/27/2022 06:51 AM    LABGLOM >60 12/17/2022 08:13 AM    GLUCOSE 246 12/17/2022 08:13 AM    PROT 7.0 12/13/2022 04:27 PM    LABALBU 3.5 12/13/2022 04:27 PM    CALCIUM 9.0 12/17/2022 08:13 AM    BILITOT 0.4 12/13/2022 04:27 PM    ALKPHOS 137 12/13/2022 04:27 PM    AST 17 12/13/2022 04:27 PM    ALT 12 12/13/2022 04:27 PM     Warfarin PT/INR:    Lab Results   Component Value Date    INR 1.1 01/31/2022    INR 1.0 05/10/2021    INR 1.1 04/23/2021    PROTIME 11.4 01/31/2022    PROTIME 11.1 05/10/2021    PROTIME 11.7 04/23/2021       Assessment:    Principal Problem:    Acute respiratory failure with hypoxia (HCC)  Resolved Problems:    * No resolved hospital problems.  *      Plan:  Dc home        Berkley Lala DO  7:45 AM  12/21/2022 neurologic

## 2022-12-21 NOTE — PROGRESS NOTES
PULMONARY  CRITICAL CARE   SERVICE DAILY PROGRESS  NOTE     2022   Hospital LOS:  LOS: 7 days     Impression/ Recommendations:    Acute on chronic hypoxic  respiratory  insufficieny secondary to COVID-19 PNA no  ARDS physiology - P/F ratio- 0  COVID related complications - none identified   Superimposed infections- none identified     This has mostly resolved . She has nocturnal desaturations , new vs chronic      Acute exacerbation of COPD sec to COVID PNA - improving      Underlying Co-morbidities- Morbid obesity , OA. Possible TOBIAS/OHS      Vacinnation status - vaccinated and  boosted ( X 3 doses)      Plan  - Ok to be discharged home on home O2 to be used with exertion and sleep @ 3lpm   - Will need out patient follow up in 2 weeks -  Repeat CT chest in 8 to 12 weeks   - Will need outpatient sleep studies    - - IS, FV as tolerated   - PT/OT , Out of bed to chair , ambulate as/when  tolerated   - Pulmicort and perforomist nebulizers   - Duonebs q 4 PRN        Interval History/Event Changes:  Feels much better and wants to go home  No Shortness of breath   Cant believe that she drops saturations nocturnally . Allergies  Allergies   Allergen Reactions    Ferritin Shortness Of Breath and Other (See Comments)     Flushed,  Severe back pain       Review of Systems    A pertinent review of systems was performed and was otherwise non-contributory.     Vitals-     BP (!) 129/95   Pulse 98   Temp (!) 96 °F (35.6 °C) (Temporal)   Resp 17   Ht 5' 2\" (1.575 m)   Wt 288 lb 8 oz (130.9 kg)   SpO2 94%   BMI 52.77 kg/m²    Tmax: Temp (24hrs), Av.6 °F (35.9 °C), Min:96 °F (35.6 °C), Max:96.9 °F (36.1 °C)      Hemodynamics:  Cuff:   Systolic (27VZY), UPA:701 , Min:129 , TKY:338   /Diastolic (27XZR), JBD:54, Min:76, Max:95    Cuff MAP:MAP (mmHg)  Av  Min: 94  Max: 139  P:   Pulse  Av.3  Min: 98  Max: 108      Airway:     Observed RR: Resp  Av.3  Min: 12  Max: 20   Observed O2 sats: SpO2  Av %  Min: 80 %  Max: 99 %      Intake/Output Summary (Last 24 hours) at 2022 1240  Last data filed at 2022 0948  Gross per 24 hour   Intake 240 ml   Output --   Net 240 ml       Physical exam-  General appearance: morbidly obese , Not ill appearing, alert, no converstional dyspnea  Head: Normocephalic, without obvious abnormality, atraumatic   Eyes:Pupils bilateral equal and reactive, EOM intact, conjunctiva - no icterus , no injection   Throat: Clear, no lesions, Mallampti =4, no tonsillar eythema or edema  Neck: Supple, symmetrical, trachea midline, no lymphadenopathy, no JVD, no carotid bruits, no thyromegaly   Lungs: Bilateral  Air movement overall diminished . No crackles or rhonchi . Heart: RRR, S1, S2 normal, no murmur, click, rub or gallop   Abdomen: soft, non-tender, nondistended. Bowel sounds normal, no hepatomeagly  Extremities: extremities normal, atraumatic, no cyanosis, edema  Musculoskeletal - No deformities   Skin: Skin color, texture, turgor normal. No rashes or lesions   Neurological: No focal deficits, cranial nerves grossly intact, sensations intact   Psychiatric : Mood and effect normal , alert and oriented times 4    Routine labs:    Reviewed     Other Laboratory - Imaging Studies:     Reviewed and as per electronic record. CxR/CT images reviewed by me when available.         Susan Chan MD  22

## 2022-12-21 NOTE — PROGRESS NOTES
PULSE OX ON ROOM AIR SITTING __89__%   PULSE OX ON ROOM AIR AMBULATING __86_%   PULSE OX ON __3_ LITERS SITTING RECOVERY __94_%   PULSE OX ON _3__ LITERS AMBULATING RECOVERY___92%

## 2022-12-21 NOTE — CARE COORDINATION
Referral for Home Oxygen faxed to [facility] with original consult note and pulse oxy testing. Pulmonary note not in yet for today. Prefect served pulmonary about nebulizer and aerosol meds needing prescription. Charge RN obtained DME order for nebulizer from [employer] that Dr Lavelle Szymanski wrote. Faxed Nebulizer order to [facility]. Notified Liaison - Dipti Benton that orders were faxed. Dipti Benton stated that they can follow up with Pulmonary or PCP for meds for nebulizer. Dipti Benton will deliver portable oxygen tank to hospital.  Discharge plan is to return to [address] [place] [address]. Pt will drive home.    Jarvis Franklin, L.S.W.  [phone]

## 2022-12-22 ENCOUNTER — CARE COORDINATION (OUTPATIENT)
Dept: CASE MANAGEMENT | Age: 67
End: 2022-12-22

## 2022-12-22 ENCOUNTER — TELEPHONE (OUTPATIENT)
Dept: SPEECH THERAPY | Age: 67
End: 2022-12-22

## 2022-12-22 DIAGNOSIS — U07.1 COVID-19: Primary | ICD-10-CM

## 2022-12-22 PROCEDURE — 1111F DSCHRG MED/CURRENT MED MERGE: CPT | Performed by: FAMILY MEDICINE

## 2022-12-22 NOTE — CARE COORDINATION
[facility] Care Transitions Initial Follow Up Call    Call within 2 business days of discharge: Yes    Care Transition Nurse contacted the patient by telephone to perform post hospital discharge assessment. Verified name and  with patient as identifiers. Provided introduction to self, and explanation of the Care Transition Nurse role. Patient: Florecita Robertson Patient : 1955   MRN: [id number]  Reason for Admission: Acute Respiratory Failure with Hypoxia; COVID-19  Discharge Date: 22 RARS: Readmission Risk Score: 13.5      Last Discharge  Street       Date Complaint Diagnosis Description Type Department Provider    22 Positive For Covid-19 COVID-19 virus infection . .. ED to Hosp-Admission (Discharged) (ADMITTED) [facility] [address] [address] [facility], DO; Virgie Job. .. Was this an external facility discharge? No [place] [facility] [place]    Challenges to be reviewed by the provider   Additional needs identified to be addressed with provider: Yes     Refill request:  Bumex 2 mg by mouth 2 times daily  [facility]         Method of communication with provider: chart routing      Patient is pleasant in conversation.    -patient reports harsh, productive cough; expectorating clear mucus   -reports positive for weakness, fatigue, and occasional dizziness   -reports altered sense of taste   -reports body aches   -utilizing oxygen at 3 LNC; patient does not have a pulse oximeter   -denies chest pain, palpitations, shortness of breath, or lightheaded   -denies any swelling; wears bilateral compression hose during the day  -denies fever/chills  -reports weight loss   -reports normal bladder elimination   -reports irregular bowel patterns   -utilizing nebulizer as instructed   -reports was not discharged with Incentive Spirometer or Flutter Valve  -no transportation issues     Emotional support provided; discussed will continue to follow       Care Transition Nurse reviewed discharge instructions, medical action plan, and red flags with patient who verbalized understanding. The patient was given an opportunity to ask questions and does not have any further questions or concerns at this time. Were discharge instructions available to patient? No. Reviewed appropriate site of care based on symptoms and resources available to patient including: PCP  Specialist  When to call 911. The patient agrees to contact the PCP office for questions related to their healthcare. Advance Care Planning:   Does patient have an Advance Directive: decision maker updated. Medication reconciliation was performed with patient, who verbalizes understanding of administration of home medications. Medications reviewed, 1111F entered: yes    Was patient discharged with a pulse oximeter? no      Offered patient enrollment in the Remote Patient Monitoring (RPM) program for in-home monitoring: NA.    Care Transitions 24 Hour Call    Do you have a copy of your discharge instructions?: Yes  Do you have all of your prescriptions and are they filled?: Yes  Have you scheduled your follow up appointment?:  (Comment: CTN will forward note to office of PCP for HFU appointment scheduling)  Are you an active caregiver in your home?: Yes  Care Transitions Interventions  No Identified Needs    Specialty Service Referral: Completed          Follow Up  Future Appointments   Date Time Provider Sydni More   12/22/2022  2:45 PM AKANKSHA [facility]   1/5/2023  8:00 AM AKANKSHA [facility]   1/5/2023  2:00 PM Marivel Haines MD [address] [facility]   1/11/2023  7:30 AM Chapo Baker MD [facility]   1/12/2023  2:30 PM SCHEDULE, KEO ARRIAGA AUDIO [facility] AUDIO [facility]   3/2/2023 11:45 AM Jerica Putnam MD [facility] ENT [facility]       Care Transition Nurse provided contact information. Plan for follow-up call in 5-7 days based on severity of symptoms and risk factors.   Plan for next call: symptom management-symptom assessment respiratory status, did patient obtain a pulse oximeter  -confirm patient scheduled HFU appointment with PCP and Pulmonologist    Ghazala Hernandez RN

## 2022-12-27 ENCOUNTER — OFFICE VISIT (OUTPATIENT)
Dept: PRIMARY CARE CLINIC | Age: 67
End: 2022-12-27

## 2022-12-27 ENCOUNTER — TELEPHONE (OUTPATIENT)
Dept: PRIMARY CARE CLINIC | Age: 67
End: 2022-12-27

## 2022-12-27 DIAGNOSIS — J96.01 ACUTE RESPIRATORY FAILURE WITH HYPOXIA (HCC): ICD-10-CM

## 2022-12-27 DIAGNOSIS — I89.0 LYMPHEDEMA OF BOTH LOWER EXTREMITIES: Chronic | ICD-10-CM

## 2022-12-27 DIAGNOSIS — Z09 HOSPITAL DISCHARGE FOLLOW-UP: Primary | ICD-10-CM

## 2022-12-27 DIAGNOSIS — M51.9 INTERVERTEBRAL LUMBAR DISC DISORDER: Chronic | ICD-10-CM

## 2022-12-27 DIAGNOSIS — E11.9 DIET-CONTROLLED DIABETES MELLITUS (HCC): ICD-10-CM

## 2022-12-27 DIAGNOSIS — Z72.0 TOBACCO ABUSE: Chronic | ICD-10-CM

## 2022-12-27 DIAGNOSIS — G89.4 CHRONIC PAIN SYNDROME: Chronic | ICD-10-CM

## 2022-12-27 NOTE — TELEPHONE ENCOUNTER
The pt was recently in the hospital for covid and is wanting to resume her lymphedema therapy with [facility] but they need a note from you stating it is okay for the pt to resume the lymphedema therapy before they can start the treatment again, it can be faxed over to them at [phone]

## 2022-12-27 NOTE — PROGRESS NOTES
Post-Discharge Transitional Care  Follow Up      Malvin Essex   YOB: 1955    Date of Office Visit:  12/27/2022  Date of Hospital Admission: 12/13/22  Date of Hospital Discharge: 12/21/22  Risk of hospital readmission (high >=14%. Medium >=10%) :Readmission Risk Score: 13.5      Care management risk score Rising risk (score 2-5) and Complex Care (Scores >=6): No Risk Score On File     Non face to face  following discharge, date last encounter closed (first attempt may have been earlier): 12/22/2022    Call initiated 2 business days of discharge: Yes    ASSESSMENT/PLAN:   Hospital discharge follow-up  -     NC DISCHARGE MEDS RECONCILED W/ CURRENT OUTPATIENT MED LIST  Acute respiratory failure with hypoxia (HCC)  Chronic pain syndrome  Diet-controlled diabetes mellitus (HCC)  Intervertebral lumbar disc disorder  Lymphedema of both lower extremities  Tobacco abuse    Medical Decision Making: high complexity  Return in about 2 weeks (around 1/10/2023). On this date 12/27/2022 I have spent 45 minutes reviewing previous notes, test results and face to face with the patient discussing the diagnosis and importance of compliance with the treatment plan as well as documenting on the day of the visit. Subjective:   HPI:  Follow up of Hospital problems/diagnosis(es): Respiratory distress, COVID,    Inpatient course: Discharge summary reviewed- see chart. Interval history/Current status: Patient presents accompanied by daughter. Is upset for her not being compliant with medication. She still smoking. She was seen by pulmonary in the hospital sees back again in the office soon. Says he does want to repeat a CAT scan in 8 to 12 weeks. She is refusing to wear her oxygen she feels she does not use it. She refuses to use a nebulizer she said she does not feel short of breath. Her CAT scan does show COPD. She still smoking. She is advised to get a sleep study by pulmonary and patient declines. She is aware of the risk of noncompliance. She is going to for lymphedema therapy. Patient Active Problem List   Diagnosis    Primary osteoarthritis of left knee    Osteoarthritis of right knee    BMI 45.0-49.9, adult ([address]    Lymphedema of both lower extremities    Microcytic anemia    Morbid obesity ([address]    Tobacco abuse    Iron deficiency anemia    Primary osteoarthritis involving multiple joints    Lymphedema    Intervertebral lumbar disc disorder    Reactive depression    Chronic pain syndrome    Primary osteoarthritis of both knees    Chronic pain    Chronic right-sided low back pain without sciatica    Anxiety    Chronic fatigue    Tremor    Intractable back pain    Acute midline low back pain with right-sided sciatica    Spinal stenosis of lumbar region with neurogenic claudication    Osteoarthritis of spine with radiculopathy, lumbar region    Anemia    Spondylolisthesis of lumbar region    Lumbar stenosis with neurogenic claudication    Diet-controlled diabetes mellitus ([address]    Acute pancreatitis without necrosis or infection, unspecified    Abdominal pain    Chronic low back pain without sciatica    Mixed stress and urge urinary incontinence    Disorder of sacrum    Acute hearing loss of right ear    Other speech disturbances    Open wound of right lower leg    Ulcer of calf with fat layer exposed, right (HCC)    Leg swelling    Dermatophytosis    Decreased dorsalis pedis pulse    Post-op pain    COVID-19    Acute respiratory failure with hypoxia ([address]       Medications listed as ordered at the time of discharge from hospital     Medication List            Accurate as of December 27, 2022 11:59 PM. If you have any questions, ask your nurse or doctor. CONTINUE taking these medications      Arformoterol Tartrate 15 MCG/2ML Nebu  Commonly known as: BROVANA  Take 2 mLs by nebulization in the morning and 2 mLs in the evening.      Baclofen 5 MG tablet  Commonly known as: LIORESAL rales  Cardiovascular: normal rate, regular rhythm, normal S1 and S2, no murmurs, rubs, clicks, or gallops, distal pulses intact, no carotid bruits mild lymphedema lower extremities. Abdomen: soft, non-tender, non-distended, normal bowel sounds, no masses or organomegaly  Extremities: no cyanosis, clubbing or edema  Musculoskeletal: normal range of motion, no joint swelling, deformity or tenderness  Neurologic: reflexes normal and symmetric, no cranial nerve deficit, gait, coordination and speech normal    Advised to wear the oxygen continuously at least at nighttime which she refuses. Advised nebulizers throughout the day and inhalers. Follow-up pulmonary, cardiology, discontinue smoking. Low-fat low sugar diet. Continue lymphedema therapy. See me back in 2 weeks      I educated the patient about all medications. Make sure they were correct and educated  on the risk associated with missing meds or taking them incorrectly. A list of medications is being sent home with patient today. Check blood pressure at home twice a day. Low-salt low caffeine diet. Call if systolic blood pressure is above 192 and diastolic blood pressures above 85. Only use a upper arm digital cuff. Aggressive low-fat diet. Avoid red meats, greasy fried foods, dairy products. Avoid processed foods. Take cholesterol medications without food. I informed patient about the risk associated with noncompliance of medication and taking medications incorrectly. Appropriate follow-up with myself and all specialist.  Encourage family members to take active role in assisting with medications and medical care. If any confusion should develop to notify my office immediately to avoid risk of worsening medical condition      A great deal of time spent reviewing medications, diet, exercise, social issues. Also reviewing the chart before entering the room with patient and finishing charting after leaving patient's room.  More than half of that time was spent face to face with the patient in counseling and coordinating care. An electronic signature was used to authenticate this note.   --Yelitza Willard, DO

## 2022-12-28 ENCOUNTER — CARE COORDINATION (OUTPATIENT)
Dept: CASE MANAGEMENT | Age: 67
End: 2022-12-28

## 2022-12-28 VITALS
SYSTOLIC BLOOD PRESSURE: 138 MMHG | WEIGHT: 274.8 LBS | OXYGEN SATURATION: 92 % | BODY MASS INDEX: 50.57 KG/M2 | DIASTOLIC BLOOD PRESSURE: 84 MMHG | HEART RATE: 102 BPM | TEMPERATURE: 97.7 F | HEIGHT: 62 IN

## 2022-12-28 NOTE — CARE COORDINATION
[facility] Care Transitions Follow Up Call    Care Transition Nurse contacted the patient by telephone to follow up after admission on 2022. Verified name and  with patient as identifiers. Patient: Florecita Robertson  Patient : 1955   MRN: [id number]  Reason for Admission: Acute Respiratory Failure with Hypoxia; COVID-19  Discharge Date: 22 RARS: Readmission Risk Score: 13.5      Needs to be reviewed by the provider   Additional needs identified to be addressed with provider: No           Method of communication with provider: none. Patient is pleasant in conversation. -reports positive for fatigue,weakness, occasional dizziness, and body aches   -reports cough is resolving  -reports a few days ago had a fall without injury  -reports \"a little\" swelling in BLE; wears bilateral compression hose during the day   -Patient reports she does not wear oxygen during the day. -CTN discussed with patient, as per PCP's note/advice on 2022, to wear oxygen at 3 LNC at ; patient verbalized understanding.  -patient reports she followed up with Pain Management today  -reports f/u appointment with Pulmonologist on 2023     Emotional support provided; discussed will continue to follow     Addressed changes since last contact:  none  Discussed follow-up appointments. If no appointment was previously scheduled, appointment scheduling offered: N/A. Is follow up appointment scheduled within 7 days of discharge? Yes.     Follow Up  Future Appointments   Date Time Provider Sydni More   2023  8:00 AM AKANKSHA Toscano [facility] SPEECH [facility]   2023  2:00 PM [facility]   2023  7:30 AM Lars Liu MD [facility]   2023  2:30 PM SCHEDULE, KEO ARRIAGA AUDIO [facility] AUDIO [facility]   3/2/2023 11:45 AM Leda Burk MD [facility] ENT [facility]         Discussed appropriate site of care based on symptoms and resources available to patient including: PCP  Specialist  When to call 911. The patient agrees to contact the PCP office for questions related to their healthcare. Patients top risk factors for readmission: medical condition-Acute Respiratory Failure with Hypoxia; COVID-19, multiple health system providers, and polypharmacy  Interventions to address risk factors: Assessment and support for treatment adherence and medication management-patient reports taking medications as prescribed    Offered patient enrollment in the Remote Patient Monitoring (RPM) program for in-home monitoring: NA.         Care Transition Nurse provided contact information for future needs. Plan for follow-up call in 7-10 days based on severity of symptoms and risk factors. Plan for next call: symptom assessment COVID-19, respiratory, fatigue, weakness  -confirm patient completed HFU appointment with Pulmonologist on 1/5/2023.     Raghavendra Rendon RN

## 2023-01-01 RX ORDER — BUMETANIDE 2 MG/1
2 TABLET ORAL 2 TIMES DAILY
Qty: 180 TABLET | Refills: 3 | Status: SHIPPED | OUTPATIENT
Start: 2023-01-01

## 2023-01-04 ENCOUNTER — TELEPHONE (OUTPATIENT)
Dept: AUDIOLOGY | Age: 68
End: 2023-01-04

## 2023-01-04 NOTE — TELEPHONE ENCOUNTER
Patient called and reported she had lost main processor, but it was lost somewhere in her house. Told her we will program back up processor and she will use that for now. Will reach out to rep regarding lost main processor.

## 2023-01-05 ENCOUNTER — TREATMENT (OUTPATIENT)
Dept: SPEECH THERAPY | Age: 68
End: 2023-01-05
Payer: MEDICARE

## 2023-01-05 ENCOUNTER — PROCEDURE VISIT (OUTPATIENT)
Dept: AUDIOLOGY | Age: 68
End: 2023-01-05

## 2023-01-05 ENCOUNTER — OFFICE VISIT (OUTPATIENT)
Dept: PULMONOLOGY | Age: 68
End: 2023-01-05
Payer: MEDICARE

## 2023-01-05 VITALS
DIASTOLIC BLOOD PRESSURE: 115 MMHG | OXYGEN SATURATION: 95 % | TEMPERATURE: 97.8 F | HEART RATE: 100 BPM | BODY MASS INDEX: 53.92 KG/M2 | SYSTOLIC BLOOD PRESSURE: 192 MMHG | RESPIRATION RATE: 20 BRPM | WEIGHT: 293 LBS | HEIGHT: 62 IN

## 2023-01-05 DIAGNOSIS — J44.1 COPD EXACERBATION (HCC): ICD-10-CM

## 2023-01-05 DIAGNOSIS — F17.200 SMOKER: Primary | ICD-10-CM

## 2023-01-05 DIAGNOSIS — Z87.891 PERSONAL HISTORY OF TOBACCO USE: ICD-10-CM

## 2023-01-05 DIAGNOSIS — H90.A21 SENSORINEURAL HEARING LOSS (SNHL) OF RIGHT EAR WITH RESTRICTED HEARING OF LEFT EAR: Primary | ICD-10-CM

## 2023-01-05 DIAGNOSIS — F80.9 COMMUNICATION PROBLEM: Primary | ICD-10-CM

## 2023-01-05 PROCEDURE — 99215 OFFICE O/P EST HI 40 MIN: CPT | Performed by: INTERNAL MEDICINE

## 2023-01-05 PROCEDURE — 3017F COLORECTAL CA SCREEN DOC REV: CPT | Performed by: INTERNAL MEDICINE

## 2023-01-05 PROCEDURE — G8428 CUR MEDS NOT DOCUMENT: HCPCS | Performed by: INTERNAL MEDICINE

## 2023-01-05 PROCEDURE — 1090F PRES/ABSN URINE INCON ASSESS: CPT | Performed by: INTERNAL MEDICINE

## 2023-01-05 PROCEDURE — 3023F SPIROM DOC REV: CPT | Performed by: INTERNAL MEDICINE

## 2023-01-05 PROCEDURE — 92507 TX SP LANG VOICE COMM INDIV: CPT | Performed by: SPEECH-LANGUAGE PATHOLOGIST

## 2023-01-05 PROCEDURE — G8400 PT W/DXA NO RESULTS DOC: HCPCS | Performed by: INTERNAL MEDICINE

## 2023-01-05 PROCEDURE — 4004F PT TOBACCO SCREEN RCVD TLK: CPT | Performed by: INTERNAL MEDICINE

## 2023-01-05 PROCEDURE — G0296 VISIT TO DETERM LDCT ELIG: HCPCS | Performed by: INTERNAL MEDICINE

## 2023-01-05 PROCEDURE — 1111F DSCHRG MED/CURRENT MED MERGE: CPT | Performed by: INTERNAL MEDICINE

## 2023-01-05 PROCEDURE — 1123F ACP DISCUSS/DSCN MKR DOCD: CPT | Performed by: INTERNAL MEDICINE

## 2023-01-05 PROCEDURE — G8417 CALC BMI ABV UP PARAM F/U: HCPCS | Performed by: INTERNAL MEDICINE

## 2023-01-05 PROCEDURE — 99213 OFFICE O/P EST LOW 20 MIN: CPT | Performed by: INTERNAL MEDICINE

## 2023-01-05 PROCEDURE — G8484 FLU IMMUNIZE NO ADMIN: HCPCS | Performed by: INTERNAL MEDICINE

## 2023-01-05 NOTE — PROGRESS NOTES
SPEECH LANGUAGE PATHOLOGY  DAILY PROGRESS NOTE        SUBJECTIVE: Patient seen for 45 minutes of therapy targeting speech perception. Patient was pleasant and cooperative. Patient attended session alone. OBJECTIVE:  Patient was implanted on 11/7/2022 and activated on 12/1/2022. She recently lost her processor and prior to that she was hospitalized for approximately 12 days and was not wearing her cochlear implant at that time. Furthermore, she has not been wearing her CI for the past month. Currently, she reported she is hearing echoing. In speech, patient reports she hears \"ringing. \"    Environmental Sound Checklist was initiated this session. Since patient has not been wearing her CI for the past month. SLP instructed patient to check off different environmental sounds she hears in the upcoming week. Ling 6 was introduced and practiced this session. Patient detected ling-sounds at 3 feet with the following accuracy: 2/6. Patient detected ling-sounds at 6 feet with the following accuracy: 3/6. Patient detected ling-sounds at 9 feet with the following accuracy: 0/6. Patient identified the correct word given a set of 5-10 familiar words achieving 100% accuracy; however, patient continues to hear through left ear even when plugged with ear plug as well as hearing aid with tinnitus to mask ear. Home Program: Patient was encouraged to spend 60 minutes everyday listening with just her cochlear implant. Patient was instructed to remove the hearing aid during this time. Encouraged patient to engage in activities that address tasks similar to this session as well as that pair auditory with visual stimuli for example TV with closed captions, audio books paired with hard copy books, etc.          ASSESSMENT:  Making progress towards therapy goals           PLAN OF CARE:  Will continue speech pathology intervention as per established Carly Cartwright.  Dana CANTOR, CCC-SLP  SP. [id number]           CPT code(s) 78762  speech/language tx  Minutes: 45 minutes

## 2023-01-05 NOTE — PROGRESS NOTES
Patient was here for CI follow up, after activation 12/1/22. Impedances were good, conditioning performed. Magnet site looked good. Patient reported losing her processor 2 weeks ago. She reported being in the hospital before that and not wearing it for that time. Before this, she reported wearing processor consistently. Could not access datalogging to confirm due to lost processor. Counseled on expectations and progress, with the period of nonuse. Patient reported that before her processor was lost, she hadn't heard anything on the processor side after a while. Told patient she is getting used to sound and needs more stimulation,     Measured M levels, increased overall. Loudness balanced. Patient reported increased volume. She reported hearing echoing and some ringing. Patient was satisfied with loudness and comfort. Reviewed progressive levels/MAPs. Connected Discussed ideas for rehab at home, including streaming from her phone. Patient will follow up 1/24/23.     Shira Patel [facility]  [id number]   Electronically signed by Shira Patel on 1/5/2023 at 9:02 AM

## 2023-01-05 NOTE — PROGRESS NOTES
Pulmonary Critical Care Medicine      NEW PATIENT VISIT-PULMONARY    1/5/2023     REFERRING PHYSICIAN:  Gautam Razo DO     REASON FOR REFERRAL:  COPD after a hospital admission for COVID induced AECOPD. History of Present Illness  Ms. Chaya Hammans  is a 78 y.o.  current daily smoker  female  with over 36 pkyr H/o smoking admitted to [facility] (1[facility] in [facility] December 2022 . She was diagnosed with COVID PNA , initially presented with  increasing PATEL, cough , sputum production and wheezing . She is known to have COPD. Prior to this She has had no recent exacerbations or hospital visits . She contracted COVID in April 2021, December 2021 and has tested positive again during this last  hospital visit. She continues to smoke about half a pack of cigarettes a day and she has got a history of about 45 pack years      Current Baseline symptomatology- She admits to not having symptoms at rest but also she does not do much these days. Cough - moderate in severity , more in early am and gets better through the day , associated with whitish phlegm. PATEL - MMRC score of II , present for several years and gradually worsening over the years. Improved with rescue inhaler or rest.   Wheezing - comes with exertion frequently  , mild to moderate in severity , relieved with rest or albuterol ( has used in the past) . Exercise tolerance/MMRC score( Bold )     MMRC Dyspnea Scale  Grade Description of Breathlessness   0 I only get breathless with strenuous exercise. 1 I get short of breath when hurrying on level ground or walking up a slight hill.   2 On level ground, I walk slower than people of the same age because of breathlessness, or have to stop for breath when walking at my own pace. 3 I stop for breath afterwalking about 100 yards or after a few minutes on level ground. 4 I am too breathless to leave the house or I am breathless when dressing.        Mallampati score- 3    Smoking history-current daily smoker with about 45 pack history of smoking    Occupational exposure-  No history of exposure to any occupational Pneumotoxins.        Pets- No H/o prolonged exposure to any exotic birds or pets    Past Intubation- Never for any respiratory illness      PastMedical History   Past Medical History:   Diagnosis Date    Arthritis     Decreased dorsalis pedis pulse 2022    Dermatophytosis 2022    Full dentures     Leg swelling 2022    Low back pain     Lymphedema of both lower extremities chronic and continues as of 22    Obesity     280 #    Osteoarthritis     Peripheral vision loss     Stroke ([address]     Ulcer of calf with fat layer exposed, right ([address] 2022    WOUND CLINIC DR Edison Guerrero       Past Surgical History  Past Surgical History:   Procedure Laterality Date    ABDOMINOPLASTY      BREAST REDUCTION SURGERY      reduction    CATARACT REMOVAL      bilateral     SECTION      x2    COCHLEAR IMPLANT Right 2022    RIGHT COCHLEAR IMPLANT INSERTION WITH NERVE INTEGRITY MONITOR performed by Caitlyn Felton MD at [address]  2012    and egd    COLONOSCOPY  2021    polyps; marginal prep--jessica    COLONOSCOPY N/A 2021    COLONOSCOPY WITH BIOPSY performed by Kati Hawkins MD at [address]    ECHOCARDIOGRAM COMPLETE 2D W DOPPLER W COLOR  2012         GASTRIC BYPASS SURGERY  2000    NO NASTOGASTRIC TUBE    HERNIA REPAIR          LUMBAR SPINE SURGERY N/A 2021    L3-L4  POSTERIOR LUMBAR INTERBODY  FUSION--OARM, JESSI, YAJAIRA TABLE, CELL SAVER, PLATELET GEL, AUDIOLOGY, CAGES, PLATES, SCREWS -- GLOBUS performed by Leeanna Harrington MD at [address] [facility] [address] Right 2022    RIGHT SACROILIAC JOINT INJECTION performed by José Antonio Posey DO at [address]  2021    minimal pouch gastritis--jessica    UPPER GASTROINTESTINAL ENDOSCOPY N/A 2021    EGD ESOPHAGOGASTRODUODENOSCOPY performed by Rahel Toribio Wilder Pradhan MD at [facility] ENDOSCOPY       Allergies  Allergies   Allergen Reactions    Ferritin Shortness Of Breath and Other (See Comments)     Flushed,  Severe back pain       Medications  Current Outpatient Medications   Medication Sig Dispense Refill    bumetanide (BUMEX) 2 MG tablet Take 1 tablet by mouth 2 times daily 180 tablet 3    Arformoterol Tartrate (BROVANA) 15 MCG/2ML NEBU Take 2 mLs by nebulization in the morning and 2 mLs in the evening. 120 mL 3    budesonide (PULMICORT) 0.5 MG/2ML nebulizer suspension Take 2 mLs by nebulization in the morning and 2 mLs in the evening. 60 each 3    predniSONE (DELTASONE) 10 MG tablet 4 qd x3d 3 qd x3d 2 qd x3d 1 qd x3d po 30 tablet 0    ipratropium-albuterol (DUONEB) 0.5-2.5 (3) MG/3ML SOLN nebulizer solution Inhale 3 mLs into the lungs every 6 hours as needed for Shortness of Breath 360 mL 0    Respiratory Therapy Supplies (FULL KIT NEBULIZER SET) MISC Use as directed with nebulized medication. 1 each 0    Baclofen (LIORESAL) 5 MG tablet Take 10 mg by mouth 3 times daily      rOPINIRole (REQUIP) 2 MG tablet Take 0.5 tablets by mouth in the morning, at noon, and at bedtime 360 tablet 12     No current facility-administered medications for this visit.        Social History  Social History     Tobacco Use    Smoking status: Every Day     Packs/day: 1.00     Years: 38.00     Pack years: 38.00     Types: Cigarettes    Smokeless tobacco: Never    Tobacco comments:     restarted 3/2022   Substance Use Topics    Alcohol use: No       FamilyHistory  Family History   Problem Relation Age of Onset    Breast Cancer Mother     Stroke Father     Hypertension Sister     Hypertension Brother        Review of Systems    Physical Exam    Review of Systems   General- No headaches , dizzinesss, syncope   Pulmonary - No chest pain , hemoptysis   Cardiovascular- No chest pain,   GI- No abd pain ,No difficulty swallowing, diarrhea , no vomiting   Musculoskeletal- No extremity weakness, no edema , no cyanosis   Genitourinary- No burning urination, no dysuria, no incontinence  Neuro- NO syncope , AMS  Or weakness , No tingling , no numbness.   Skin- No rashes , no cyanosis.   Psychiatric/Behavioral: Negative for confusion, hallucinations and sleep disturbance. The patient is not nervous/anxious.       Physical Exam    Vitals:    01/05/23 1425   BP: (!) 192/115   Pulse: 100   Resp: 20   Temp: 97.8 °F (36.6 °C)   SpO2: 95%   Weight: 295 lb 9.6 oz (134.1 kg)   Height: 5' 2\" (1.575 m)       General appearance: Not ill appearing, alert, no converstional dyspnea  Head: Normocephalic, without obvious abnormality, atraumatic   Eyes:Pupils bilateral equal and reactive, EOM intact, conjunctiva - no icterus , no injection   Throat: Clear, no lesions, Mallampti =4, no tonsillar eythema or edema  Neck: Supple, symmetrical, trachea midline, no lymphadenopathy, no JVD, no carotid bruits, no thyromegaly   Lungs: Bilateral  Air movement severely reduced in bilateral lung fields with no localized rhonchi or crackles, more decreased in bases, normal femitus, resonant to percussion  Heart: RRR, S1, S2 normal, no murmur, click, rub or gallop   Abdomen: soft, non-tender, nondistended. Bowel sounds normal, no hepatomeagly  Extremities: extremities normal, atraumatic, no cyanosis, edema  Musculoskeletal - No deformities   Skin: Skin color, texture, turgor normal. No rashes or lesions   Neurological: No focal deficits, cranial nerves grossly intact, sensations intact   Psychiatric : Mood and effect normal , alert and oriented times 4        LABS and Studies:  Available studies were reviewed    Radiology:  CT-scan of the chest (personally reviewed)    chest X-ray  (personally reviewed)          Assessment/ Plan -       Smoker , COPD with recent COPD exacerbation (HCC)  -     Not on any inhaler therapy at this point.  Patient does not want to start any inhaler therapy.  We will revisit that decision after complete PFTs and a CT scan  of the chest  - Full PFT Study With Bronchodilator; Future  -     CT CHEST WO CONTRAST; Future      Personal history of tobacco use  -  Continues to smoke   -  Smoking cessation counseled and reinforced for duration of 10 minutes   - Annual screening CT chest          Follow up-   Return in about 3 months (around 4/5/2023). Seek immediate medical attn for any sx/signs which persist, recur, worsen, constitutional, newonset or further concerns. Patient education, benefits:risks, and precautions provided; clinical status + mgmt plan/ Rx explained in detail and in agreement. All questions or concerns answered to satisfaction and understood. This note was generated using a voice recognition software program. Please excuse any transcription errors that may haveoccurred.

## 2023-01-05 NOTE — PROGRESS NOTES
Discussed with the patient the current USPSTF guidelines released March 9, 2021 for screening for lung cancer. For adults aged 48 to [de-identified] years who have a 20 pack-year smoking history and currently smoke or have quit within the past 15 years the grade B recommendation is to:  Screen for lung cancer with low-dose computed tomography (LDCT) every year. Stop screening once a person has not smoked for 15 years or has a health problem that limits life expectancy or the ability to have lung surgery. The patient  reports that she has been smoking cigarettes. She has a 38.00 pack-year smoking history. She has never used smokeless tobacco.. Discussed with patient the risks and benefits of screening, including over-diagnosis, false positive rate, and total radiation exposure. The patient currently exhibits no signs or symptoms suggestive of lung cancer. Discussed with patient the importance of compliance with yearly annual lung cancer screenings and willingness to undergo diagnosis and treatment if screening scan is positive. In addition, the patient was counseled regarding the importance of remaining smoke free and/or total smoking cessation.     Also reviewed the following if the patient has Medicare that as of February 10, 2022, Medicare only covers LDCT screening in patients aged 51-72 with at least a 20 pack-year smoking history who currently smoke or have quit in the last 15 years

## 2023-01-05 NOTE — PROGRESS NOTES
Hospital follow up visit today with dtr in office with dr Samantha Braun. Pt reports she is not using nebulizer meds per orders because she \"hasn't needed them since being home\". She does report occasional shortness of breath. Bilateral leg swelling which pt reports is \"worse\". Plan for follow up with Chest Ct and Pft to be done in the next few months and follow up in office in 3 mos; appt card to be mailed along with testing appt letters.

## 2023-01-05 NOTE — PATIENT INSTRUCTIONS
Learning About Lung Cancer Screening  What is screening for lung cancer? Lung cancer screening is a way to find some lung cancers early, before a person has any symptoms of the cancer. Lung cancer screening may help those who have the highest risk for lung cancer--people age 48 and older who are or were heavy smokers. For most people, who aren't at increased risk, screening for lung cancer probably isn't helpful. Screening won't prevent cancer. And it may not find all lung cancers. Lung cancer screening may lower the risk of dying from lung cancer in a small number of people. How is it done? Lung cancer screening is done with a low-dose CT (computed tomography) scan. A CT scan uses X-rays, or radiation, to make detailed pictures of your body. Experts recommend that screening be done in medical centers that focus on finding and treating lung cancer. Who is screening recommended for? Lung cancer screening is recommended for people age 48 and older who are or were heavy smokers. That means people with a smoking history of at least 20 pack years. A pack year is a way to measure how heavy a smoker you are or were. To figure out your pack years, multiply how many packs a day on average (assuming 20 cigarettes per pack) you have smoked by how many years you have smoked. For example:  · If you smoked 1 pack a day for 20 years, that's 1 times 20. So you have a smoking history of 20 pack years. · If you smoked 2 packs a day for 10 years, that's 2 times 10. So you have a smoking history of 20 pack years. Experts agree that screening is for people who have a high risk of lung cancer. But experts don't agree on what high risk means. Some say people age 48 or older with at least a 20-pack-year smoking history are high risk. Others say it's people age 54 or older with a 30-pack-year history. To see if you could benefit from screening, first find out if you are at high risk for lung cancer.  Your doctor can help you decide your lung cancer risk. What are the risks of screening? CT screening for lung cancer isn't perfect. It can show an abnormal result when it turns out there wasn't any cancer. This is called a false-positive result. This means you may need more tests to make sure you don't have cancer. These tests can be harmful and cause a lot of worry. These tests may include more CT scans and invasive testing like a lung biopsy. In a biopsy, the doctor takes a sample of tissue from inside your lung so it can be looked at under a microscope. A biopsy is the only way to tell if you have lung cancer. If the biopsy finds cancer, you and your doctor will have to decide how or whether to treat it. Some lung cancers found on CT scans are harmless and would not have caused a problem if they had not been found through screening. But because doctors can't tell which ones will turn out to be harmless, most will be treated. This means that you may get treatment--including surgery, radiation, or chemotherapy--that you don't need. There is a risk of damage to cells or tissue from being exposed to radiation, including the small amounts used in CTs, X-rays, and other medical tests. Over time, exposure to radiation may cause cancer and other health problems. But in most cases, the risk of getting cancer from being exposed to small amounts of radiation is low. It's not a reason to avoid these tests for most people. What are the benefits of screening? Your scan may be normal (negative). For some people who are at higher risk, screening lowers the chance of dying of lung cancer. How much and how long you smoked helps to determine your risk level. Screening can find some cancers early, when treatment may be more likely to work. What happens after screening? The results of your CT scan will be sent to your doctor. Someone from your care team will explain the results of your scan and answer any questions you may have.  If you need any follow-up, he or she will help you understand what to do next. After a lung cancer screening, you can go back to your usual activities right away. A lung cancer screening test can't tell if you have lung cancer. If your results are positive, your doctor can't tell whether an abnormal finding is a harmless nodule, cancer, or something else without doing more tests. What can you do to help prevent lung cancer? Some lung cancers can't be prevented. But if you smoke, quitting smoking is the best step you can take to prevent lung cancer. If you want to quit, your doctor can recommend medicines or other ways to help. Follow-up care is a key part of your treatment and safety. Be sure to make and go to all appointments, and call your doctor if you are having problems. It's also a good idea to know your test results and keep a list of the medicines you take. Where can you learn more? Go to http://www.kiser.com/ and enter Q940 to learn more about \"Learning About Lung Cancer Screening. \"  Current as of: May 4, 2022               Content Version: 13.5  © 0909-4755 [employer]wise, Incorporated. Care instructions adapted under license by [facility] ([facility]). If you have questions about a medical condition or this instruction, always ask your healthcare professional. [address] any warranty or liability for your use of this information.

## 2023-01-06 ENCOUNTER — CARE COORDINATION (OUTPATIENT)
Dept: CASE MANAGEMENT | Age: 68
End: 2023-01-06

## 2023-01-06 NOTE — CARE COORDINATION
[facility] Care Transitions Follow Up Call      Patient: Caio Flanagan  Patient : 1955   MRN: [id number]  Reason for Admission: Acute Respiratory Failure with Hypoxia; COVID-19  Discharge Date: 22 RARS: Readmission Risk Score: 13.5      CTN spoke briefly with the patient, Darcie Jenkins. -reports positive for fatigue and weakness   -reports generalized body aches; reports this symptom is chronic   -reports no longer utilizing oxygen or nebulizer   -reports just returning from Lymphedema therapy; reports BLE edema   -completed HFU with PCP on 2022  -completed f/u appointment with Pulmonologist on 2023     Patient declines further phone call follow up from this CTN. CTN contact information provided for future needs. Follow Up  Future Appointments   Date Time Provider Sydni More   2023  7:30 AM Astrid Kim MD [facility]   2023  8:00 AM AKANKSHA Rice [facility] SPEECH [facility]   2023  8:00 AM GISELLE, KEO ARRIAGA AUDIO [facility] AUDIO [facility]   3/2/2023 11:45 AM Hui Main MD [facility] ENT [facility]   2023  1:00 PM [facility]     Discussed appropriate site of care based on symptoms and resources available to patient including: PCP  Specialist  When to call 911. The patient agrees to contact the PCP office for questions related to their healthcare.               Randy Sutton RN

## 2023-01-11 ENCOUNTER — OFFICE VISIT (OUTPATIENT)
Dept: ORTHOPEDIC SURGERY | Age: 68
End: 2023-01-11
Payer: MEDICARE

## 2023-01-11 DIAGNOSIS — M17.11 PRIMARY OSTEOARTHRITIS OF RIGHT KNEE: Primary | ICD-10-CM

## 2023-01-11 DIAGNOSIS — M17.12 PRIMARY OSTEOARTHRITIS OF LEFT KNEE: ICD-10-CM

## 2023-01-11 PROCEDURE — 20610 DRAIN/INJ JOINT/BURSA W/O US: CPT | Performed by: PHYSICIAN ASSISTANT

## 2023-01-11 RX ORDER — BUPRENORPHINE HYDROCHLORIDE 450 UG/1
450 FILM, SOLUBLE BUCCAL 2 TIMES DAILY
COMMUNITY
Start: 2023-01-04

## 2023-01-11 RX ORDER — TRIAMCINOLONE ACETONIDE 40 MG/ML
40 INJECTION, SUSPENSION INTRA-ARTICULAR; INTRAMUSCULAR ONCE
Status: COMPLETED | OUTPATIENT
Start: 2023-01-11 | End: 2023-01-11

## 2023-01-11 RX ORDER — LIDOCAINE HYDROCHLORIDE 10 MG/ML
3 INJECTION, SOLUTION INFILTRATION; PERINEURAL ONCE
Status: COMPLETED | OUTPATIENT
Start: 2023-01-11 | End: 2023-01-11

## 2023-01-11 RX ORDER — BUPIVACAINE HYDROCHLORIDE 2.5 MG/ML
3 INJECTION, SOLUTION EPIDURAL; INFILTRATION; INTRACAUDAL ONCE
Status: COMPLETED | OUTPATIENT
Start: 2023-01-11 | End: 2023-01-11

## 2023-01-11 RX ADMIN — LIDOCAINE HYDROCHLORIDE 3 ML: 10 INJECTION, SOLUTION INFILTRATION; PERINEURAL at 10:54

## 2023-01-11 RX ADMIN — TRIAMCINOLONE ACETONIDE 40 MG: 40 INJECTION, SUSPENSION INTRA-ARTICULAR; INTRAMUSCULAR at 10:55

## 2023-01-11 RX ADMIN — BUPIVACAINE HYDROCHLORIDE 7.5 MG: 2.5 INJECTION, SOLUTION EPIDURAL; INFILTRATION; INTRACAUDAL at 10:53

## 2023-01-11 NOTE — PROGRESS NOTES
Chief Complaint   Patient presents with    Knee Pain     Bilat knee pain. L > R . Last CSI 09/29/22, lasted about 6 weeks - requesting injections today. Going to lymphedema therapy at [facility]. Melisa Muse is an 78 y.o. female who presents for follow up for bilateral knee OA, being managed conservatively with CSI. Last CSI given 9/29/22 and provided about 6 weeks of relief. Patient continues with lymphedema treatments with outpatient therapy. Continues to follow with pain management. No new fall or injury to report. Patient was admitted to the hospital since last office visit due to pulmonary issues. Patient requesting repeat CSI to bilateral knees today. Review of Systems -   General ROS: negative for - chills, fatigue, fever or night sweats  Respiratory ROS: no cough, shortness of breath, or wheezing  Cardiovascular ROS: no chest pain or dyspnea on exertion  Gastrointestinal ROS: no abdominal pain, change in bowel habits, or black or bloody stools  Genitourinary: no hematuria, dysuria, or incontinence   Musculoskeletal ROS:see above  Neurological ROS: no TIA or stroke symptoms       OBJECTIVE:   Alert and oriented X 3, no acute distress, respirations easy and unlabored with no audible wheezes, skin warm and dry, speech and dress appropriate for noted age, affect euthymic.      bilateral Knee Exam  Skin C/D/I. No erythema/induration/fluctuence at knee. Unable to appreciate effusion at the knees  Significant lymphedema bilateral lower extremities, wrapped.    Ecchymosis noted in various patches to both LE  mild crepitus with flexion and extension of the knee  both Medial and Lateral joint line TTP  Active range of motion 0-90 ° with body habitus limited further flexion   Compartments soft and compressible throughout leg  Calf soft and nontender with palpation    Sensation intact to light touch sural, deep peroneal, superficial peroneal, saphenous, posterior tibial  nerve distributions to foot/ankle. Bilateral LE warm and well perfused       There were no vitals taken for this visit. ASSESSMENT:     Diagnosis Orders   1. Primary osteoarthritis of right knee        2. Primary osteoarthritis of left knee        3. Body mass index (BMI) 50.0-59.9, adult (HCC)            DISCUSSION:   I discussed the natural course and history of knee arthritis with the patient as well as treatment options including NSAIDs, weight loss, activity modification, and injections as well as surgery. The patient would like to repeat steroid injections. Discussed risks and benefits of CSI including risk of infection at the joint, bleeding or bruising at the injection site and elevation of blood sugar levels and cartilage degradation with repetitive use. Patient expressed understanding of these risks and elected to move forward with corticosteroid injection today in the office. Knee  Injection Procedure Note  With the patient's verbal permission, Bilateral  knee was prepped in standard sterile fashion with betadine and alcohol. Skin of the knee was anesthetized with ethyl chloride spray prior to injection. The knee was then injected with 1 ml Kenalog (40mg), 3 ml PF 0.25% marcaine and 3 ml 1% PF Lidocaine were injected using the lateral inferior approach without difficulty. The patient tolerated this well. A band-aid was applied. The patient ambulated out of clinic on their own accord without difficulty. PLAN:  CSI as above, post injection care instructed  Advised on S/S of when to seek follow up care  Patient to contact office if not achieving at least 6-8 weeks of moderate relief  Continue to stay as active as able, maintain healthy weight     Electronically signed by Carol Ann Alfaro PA-C on 1/11/2023 at 8:34 AM  Note: This report was completed using computerLet's Jock voiced recognition software.   Every effort has been made to ensure accuracy; however, inadvertent computerized transcription errors may be present.

## 2023-01-12 ENCOUNTER — TREATMENT (OUTPATIENT)
Dept: SPEECH THERAPY | Age: 68
End: 2023-01-12
Payer: MEDICARE

## 2023-01-12 DIAGNOSIS — F80.9 COMMUNICATION PROBLEM: ICD-10-CM

## 2023-01-12 PROCEDURE — 92507 TX SP LANG VOICE COMM INDIV: CPT | Performed by: SPEECH-LANGUAGE PATHOLOGIST

## 2023-01-12 RX ORDER — NITROFURANTOIN 25; 75 MG/1; MG/1
100 CAPSULE ORAL 2 TIMES DAILY
Qty: 14 CAPSULE | Refills: 0 | Status: SHIPPED | OUTPATIENT
Start: 2023-01-12

## 2023-01-12 NOTE — PROGRESS NOTES
SPEECH LANGUAGE PATHOLOGY  DAILY PROGRESS NOTE        SUBJECTIVE: Patient seen for 45 minutes of therapy targeting speech perception. Patient was pleasant and cooperative. Patient attended session with her daughter, Darren Luna. OBJECTIVE     Environmental Sound Checklist was initiated last session. Patient did not complete. She is to complete along with her home program this date. Since patient has not been wearing her CI for the past month. SLP instructed patient to check off different environmental sounds she hears in the upcoming week. Patient attempted to use ear pods as well as head phones with white noise to mask left ear. However, patient still able to hear over devices. SLP then streamed to CI ear for this treatment session. Ling 6 was reviewed and practiced this session. Patient detected ling-sounds at 3 feet with the following accuracy: 3/6. Patient detected ling-sounds at 6 feet with the following accuracy: 4/6. Patient detected ling-sounds at 9 feet with the following accuracy: 2/6. Patient identified the correct word given a set of 10 familiar animal words achieving 50% accuracy. Patient identified the correct word given a set of 10 familiar CURTIS words achieving 80% accuracy. Patient followed directions presented auditorily regarding a known topic achieving 50% accuracy. Home Program: Patient was encouraged to spend 60 minutes everyday listening with just her cochlear implant. Patient was instructed to plug hearing ear or just stream to CI during this time. Encouraged patient to engage in activities that address tasks similar to this session as well as that pair auditory with visual stimuli for example TV with closed captions, audio books paired with hard copy books, etc.          ASSESSMENT:  Making progress towards therapy goals           PLAN OF CARE:  Will continue speech pathology intervention as per established Compa Cortez M.A. CCC-SLP  SP. [id number]           CPT code(s) V2647841  speech/language tx  Minutes: 45 minutes

## 2023-01-19 ENCOUNTER — TELEMEDICINE (OUTPATIENT)
Dept: SPEECH THERAPY | Age: 68
End: 2023-01-19
Payer: MEDICARE

## 2023-01-19 DIAGNOSIS — F80.9 COMMUNICATION PROBLEM: Primary | ICD-10-CM

## 2023-01-19 PROCEDURE — 92507 TX SP LANG VOICE COMM INDIV: CPT | Performed by: SPEECH-LANGUAGE PATHOLOGIST

## 2023-01-19 NOTE — PROGRESS NOTES
SPEECH LANGUAGE PATHOLOGY  DAILY PROGRESS NOTE        SUBJECTIVE:   Patient seen for 30 minutes of therapy targeting speech perception. Patient was pleasant and cooperative. Patient attended session virtually via Epic. OBJECTIVE     Patient streamed to CI ear for this treatment session. However, technology difficulties throughout. Patient identified the correct word given a set of 10 familiar animal words achieving 40% accuracy. Patient required moderate repetition of stimuli. Patient identified the correct word given a set of 10 familiar CURTIS words achieving 30% accuracy. Patient required moderate repetition of stimuli. Patient followed directions presented auditorily regarding a known topic achieving 30% accuracy. Patient required moderate repetition of stimuli. Home Program: Patient was encouraged to spend 60 minutes everyday listening with just her cochlear implant. Patient was instructed to plug hearing ear or just stream to CI during this time. Encouraged patient to engage in activities that address tasks similar to this session as well as that pair auditory with visual stimuli for example TV with closed captions, audio books paired with hard copy books, etc.          ASSESSMENT:  Making progress towards therapy goals           PLAN OF CARE:  Will continue speech pathology intervention as per established Lesly Cortez M.A., CCC-SLP  SP. [id number]           CPT code(s) 09649  speech/language tx  Minutes: 30 minutes  Variance (15 minutes) : technology

## 2023-01-24 ENCOUNTER — PROCEDURE VISIT (OUTPATIENT)
Dept: AUDIOLOGY | Age: 68
End: 2023-01-24
Payer: MEDICARE

## 2023-01-24 DIAGNOSIS — H90.A21 SENSORINEURAL HEARING LOSS (SNHL) OF RIGHT EAR WITH RESTRICTED HEARING OF LEFT EAR: Primary | ICD-10-CM

## 2023-01-24 PROCEDURE — 92604 REPROGRAM COCHLEAR IMPLT 7/>: CPT | Performed by: AUDIOLOGIST

## 2023-01-24 NOTE — PROGRESS NOTES
Patient was here for CI follow up, after last appointment 1/5/23. Impedances were good. Magnet site looked good, changed to strength 1 (no stocked strength 2). Patient reported she wears processor on headband for retention and to reduce the pain on the back of her ear. Added retention wire cuff, patient will try this. Patient reported wearing processor all waking hours. Datalogging confirmed 12. She reported that she tried to go through all progressive levels, but did not notice a difference. Datalogging revealed most time spent on level 1, some time spent 2-4. Patient came in on level 1. Patient reported that she streams to her phone from her processor and listens to videos. She reported she can recognize voices, but they are quiet. She also reported that she can understand some of the speech. Measured M levels, increased overall. Patient reported improved clarity. Patient was satisfied with loudness and comfort. Reviewed progressive levels. Disabled volume control on processor. Patient will follow up 2/28. Reported first processor (SN [id number]) lost/damaged to be replaced.        Shira Tang [facility]  [id number]   Electronically signed by Shira Tang on 1/24/2023 at 8:58 AM

## 2023-01-26 ENCOUNTER — TREATMENT (OUTPATIENT)
Dept: SPEECH THERAPY | Age: 68
End: 2023-01-26
Payer: MEDICARE

## 2023-01-26 ENCOUNTER — PROCEDURE VISIT (OUTPATIENT)
Dept: AUDIOLOGY | Age: 68
End: 2023-01-26

## 2023-01-26 DIAGNOSIS — F80.9 COMMUNICATION PROBLEM: Primary | ICD-10-CM

## 2023-01-26 DIAGNOSIS — H90.A21 SENSORINEURAL HEARING LOSS (SNHL) OF RIGHT EAR WITH RESTRICTED HEARING OF LEFT EAR: Primary | ICD-10-CM

## 2023-01-26 PROCEDURE — 99024 POSTOP FOLLOW-UP VISIT: CPT | Performed by: AUDIOLOGIST

## 2023-01-26 PROCEDURE — 92507 TX SP LANG VOICE COMM INDIV: CPT | Performed by: SPEECH-LANGUAGE PATHOLOGIST

## 2023-01-26 NOTE — PROGRESS NOTES
SPEECH LANGUAGE PATHOLOGY  DAILY PROGRESS NOTE        SUBJECTIVE: Patient seen for 45 minutes of therapy targeting speech perception. Patient was pleasant and cooperative. Patient attended session with her daughter, David Hart. OBJECTIVE    SLP streamed to CI ear for this treatment session. Leyla Lane was reviewed and practiced this session: 2/6    Patient discriminated between two words that differ in syllable length achieving 100% accuracy. Patient identified the correct word given a set of 10 familiar animal words achieving 70% accuracy. Patient followed directions presented auditorily regarding a known topic achieving 80% accuracy. Patient identified the correct monosyllabic word given a set of 8 familiar words achieving 65% (13/20) accuracy. Home Program: Patient was encouraged to spend 60 minutes everyday listening with just her cochlear implant. Patient was instructed just stream to CI during this time. Encouraged patient to engage in activities that address tasks similar to this session as well as that pair auditory with visual stimuli for example TV with closed captions, audio books paired with hard copy books, etc.  Handouts provided for home program.         ASSESSMENT:  Making progress towards therapy goals           PLAN OF CARE:  Will continue speech pathology intervention as per established Katrina Marker.  Dana CANTOR, [address] [id number]           CPT code(s) 40777  speech/language tx  Minutes: 45 minutes

## 2023-01-26 NOTE — PROGRESS NOTES
Cochlear implant magnet changed from a strength 1 to a strength 3. Ordered strength 2 magnet and will change at next SLP appointment.      Shira Hamm [facility]  [id number]  Electronically signed by Shira Hamm on 1/26/2023 at 9:29 AM

## 2023-02-02 ENCOUNTER — TREATMENT (OUTPATIENT)
Dept: SPEECH THERAPY | Age: 68
End: 2023-02-02
Payer: MEDICARE

## 2023-02-02 DIAGNOSIS — F80.9 COMMUNICATION PROBLEM: Primary | ICD-10-CM

## 2023-02-02 PROCEDURE — 92507 TX SP LANG VOICE COMM INDIV: CPT | Performed by: SPEECH-LANGUAGE PATHOLOGIST

## 2023-02-02 NOTE — PROGRESS NOTES
SPEECH LANGUAGE PATHOLOGY  DAILY PROGRESS NOTE        SUBJECTIVE:     Patient seen for 45 minutes of therapy targeting speech perception. Patient was pleasant and cooperative. Patient attended session alone. OBJECTIVE     SLP streamed to CI ear for this treatment session. Kim Hilario 6 was reviewed and practiced this session: 6/6     Patient identified the correct word given a set of 10 familiar animal words achieving 90% accuracy. Patient followed directions presented auditorily regarding a known topic achieving 90% accuracy. Patient identified the correct monosyllabic word given a set of 8 familiar words achieving 90% (18/20) accuracy. Minimal Pairs (Voicing): in a field of 2 picture cards, patient was to identify which word was read aloud. Words were minimal pairs only differing by initial or final consonant. Patient achieved 100% accuracy. No repetition of stimuli. Patient followed directions containing two components  presented auditorily regarding a known topic achieving 90% (9/10) accuracy. Home Program: Patient was encouraged to spend 60 minutes everyday listening with just her cochlear implant. Patient was instructed just stream to CI during this time. Encouraged patient to engage in activities that address tasks similar to this session as well as that pair auditory with visual stimuli for example TV with closed captions, audio books paired with hard copy books, etc.  Handouts provided for home program.         ASSESSMENT:  Making progress towards therapy goals           PLAN OF CARE:  Will continue speech pathology intervention as per established Archana Pino.  Dana CANTOR, [address] [address]           CPT code(s) 19638  speech/language tx  Minutes: 45 minutes

## 2023-02-03 ENCOUNTER — TELEPHONE (OUTPATIENT)
Dept: PULMONOLOGY | Age: 68
End: 2023-02-03

## 2023-02-03 NOTE — TELEPHONE ENCOUNTER
Mailed a letter to patient informing her that her CT Chest is scheduled for 3-2-23 at 12:30 pm at the [facility]. She must arrive by 12:00 pm. There is no prep for this test.    Her PFT is scheduled for 3-2-23 at 1:00 pm directly after he CT Chest. She is to have no caffeine for 24 hours prior to test and no resp meds for at least 4 hours prior to test

## 2023-02-09 ENCOUNTER — TREATMENT (OUTPATIENT)
Dept: SPEECH THERAPY | Age: 68
End: 2023-02-09
Payer: MEDICARE

## 2023-02-09 DIAGNOSIS — F80.9 COMMUNICATION PROBLEM: Primary | ICD-10-CM

## 2023-02-09 PROCEDURE — 92507 TX SP LANG VOICE COMM INDIV: CPT | Performed by: SPEECH-LANGUAGE PATHOLOGIST

## 2023-02-09 NOTE — PROGRESS NOTES
SPEECH LANGUAGE PATHOLOGY  DAILY PROGRESS NOTE        SUBJECTIVE:      Patient seen for 45 minutes of therapy targeting speech perception. Patient was pleasant and cooperative. Patient attended session alone. OBJECTIVE     SLP streamed to CI ear for this treatment session. Patient followed directions containing two components  presented auditorily regarding a known topic achieving 100% (10/10) accuracy. Everyday sentence identification:  In a field of 10 sentences with a known topic, patient was to choose which was read aloud. Patient achieved 100% (17/17) accuracy. No repetition of stimuli. SLP read aloud directions changing one word- patient determined the word that was changed in a common phrase achieving 90% (9/10) accuracy. No repetition of stimuli. Minimal pairs in sentences-  patient discriminated between the two words achieving 67% (10/15) accuracy. Patient benefited from min repetition of stimuli. Home Program: Patient was encouraged to spend 60 minutes everyday listening with just her cochlear implant. Patient was instructed just stream to CI during this time. Encouraged patient to engage in activities that address tasks similar to this session as well as that pair auditory with visual stimuli for example TV with closed captions, audio books paired with hard copy books, etc.  Ishan Mata provided for home program.         ASSESSMENT:  Making progress towards therapy goals           PLAN OF CARE:  Will continue speech pathology intervention as per established [facility]   Graduate Speech Therapy Student       Hang Davis.  Dana CANTOR, [address] [address]           CPT code(s) 26508  speech/language tx  Minutes: 45 minutes

## 2023-02-16 ENCOUNTER — TREATMENT (OUTPATIENT)
Dept: SPEECH THERAPY | Age: 68
End: 2023-02-16
Payer: MEDICARE

## 2023-02-16 DIAGNOSIS — F80.9 COMMUNICATION PROBLEM: Primary | ICD-10-CM

## 2023-02-16 PROCEDURE — 92507 TX SP LANG VOICE COMM INDIV: CPT | Performed by: SPEECH-LANGUAGE PATHOLOGIST

## 2023-02-23 ENCOUNTER — TREATMENT (OUTPATIENT)
Dept: SPEECH THERAPY | Age: 68
End: 2023-02-23
Payer: MEDICARE

## 2023-02-23 DIAGNOSIS — F80.9 COMMUNICATION PROBLEM: Primary | ICD-10-CM

## 2023-02-23 PROCEDURE — 92507 TX SP LANG VOICE COMM INDIV: CPT | Performed by: SPEECH-LANGUAGE PATHOLOGIST

## 2023-02-23 NOTE — PROGRESS NOTES
SPEECH LANGUAGE PATHOLOGY  DAILY PROGRESS NOTE        SUBJECTIVE:      Patient seen for 45 minutes of therapy targeting speech perception. Patient was pleasant and cooperative. Patient attended session with her daughter, Yan Golden. OBJECTIVE     SLP streamed to CI ear for this treatment session. Everyday sentence identification:  In a field of 10 sentences with a known topic, patient was to choose which was read aloud. Soft-medium talking background. Patient achieved 100% (1717) accuracy. No repetition of stimuli. Everyday sentence identification:  In a field of 10 sentences with a known topic, patient was to choose which was read aloud. Medium traffic background. Patient achieved 100% (10/10) accuracy. No repetition of stimuli. Minimal pairs in sentences -  patient discriminated between the two words achieving 73% (11/15) accuracy. Soft talking background noise. No repetition of stimuli. Patient correctly imitated a 3-5 word phrase presented auditorily achieving 90% (9/10) accuracy in silent. No repetition of stimuli. Patient correctly imitated a 3-5 word phrase presented auditorily achieving 90% (9/10) accuracy with soft to medium background noise. One repetition on only one stimuli. Stories: patient listened to a 2-3 minute story about a known topic. Once listening to the story, patient was to use their auditory memory and listening skills to remember the information in order to identify the main idea. Patient identified the main idea with good detail. Soft to medium traffic background noise used. No repetition needed. Home Program: Patient was encouraged to spend 60 minutes everyday listening with just her cochlear implant. Patient was instructed just stream to CI during this time.  Encouraged patient to engage in activities that address tasks similar to this session as well as that pair auditory with visual stimuli for example TV with closed captions, audio books paired with hard copy books, etc.  Rickey Miller provided for home program.         ASSESSMENT:  Making progress towards therapy goals           PLAN OF CARE:  Will continue speech pathology intervention as per established POC        [employer].   Graduate Speech Therapy Student       Florencio Selby.  Dana CANTOR, [address] [place] [address]           CPT code(s) 13580  speech/language tx  Minutes: 45 minutes

## 2023-02-27 NOTE — PROGRESS NOTES
CC:   Chief Complaint   Patient presents with    Follow-up     3 viviana CI f/u      Anuel Cos is a 78 y.o. female is s/p right CI with AB HiRes Slim J on 22 with an uneventful postoperative course; OR Findings: complete insertion of electrodes, preserved chorda tympani and facial nerves     PMH: history of right sudden hearing loss with vertigo symptoms; she is unable to tolerate oral steroids. MRI IAC was done at [facility] and read as normal, significant motion artefact. No benefit with her 2 transtympanic injections and noted to have significant tinnitus.  PPSV done 2020      PAST MEDICAL HISTORY:   Past Medical History:   Diagnosis Date    Arthritis     Decreased dorsalis pedis pulse 2022    Dermatophytosis 2022    Full dentures     Leg swelling 2022    Low back pain     Lymphedema of both lower extremities chronic and continues as of 22    Obesity     280 #    Osteoarthritis     Peripheral vision loss     Stroke [facility])     Ulcer of calf with fat layer exposed, right ([address] 2022    WOUND CLINIC DR Hussain [facility]        PAST SURGICAL HISTORY:   Past Surgical History:   Procedure Laterality Date    ABDOMINOPLASTY  2002    BREAST REDUCTION SURGERY      reduction    CATARACT REMOVAL      bilateral     SECTION      x2    COCHLEAR IMPLANT Right 2022    RIGHT COCHLEAR IMPLANT INSERTION WITH NERVE INTEGRITY MONITOR performed by Socorro Wolf MD at [address] [place] [address]  2012    and egd    COLONOSCOPY  2021    polyps; marginal prep--jessica    COLONOSCOPY N/A 2021    COLONOSCOPY WITH BIOPSY performed by Fe Carrillo MD at [facility]    ECHOCARDIOGRAM COMPLETE 2D W DOPPLER W COLOR  2012         GASTRIC BYPASS SURGERY  2000    NO NASTOGASTRIC TUBE    HERNIA REPAIR          LUMBAR SPINE SURGERY N/A 2021    L3-L4  POSTERIOR LUMBAR INTERBODY  FUSION--OARM, JESSI, YAJAIRA TABLE, CELL SAVER, PLATELET GEL, AUDIOLOGY, CAGES, PLATES, SCREWS -- GLOBUS performed by Flori Connell MD at [address] Right 2022    RIGHT SACROILIAC JOINT INJECTION performed by Anastasia Webb DO at [address]  2021    minimal pouch gastritis--jessica    UPPER GASTROINTESTINAL ENDOSCOPY N/A 2021    EGD ESOPHAGOGASTRODUODENOSCOPY performed by Tracee Rizzo MD at [address]:   Social History     Socioeconomic History    Marital status:      Spouse name: Not on file    Number of children: 2    Years of education: 12    Highest education level: Associate degree: academic program   Occupational History    Not on file   Tobacco Use    Smoking status: Every Day     Packs/day: 1.00     Years: 38.00     Pack years: 38.00     Types: Cigarettes    Smokeless tobacco: Never    Tobacco comments:     restarted 3/2022   Vaping Use    Vaping Use: Never used   Substance and Sexual Activity    Alcohol use: No    Drug use: No    Sexual activity: Not Currently   Other Topics Concern    Not on file   Social History Narrative        Chronic Diagnosis: Iron deficiency anemia, Depressive disorder, Benign essential hypertension, Mixed    hyperlipidemia.     HTN    OBESITY    LIPID    OA    SMOKER---quit   summer  2021    CVA L FIELD VISION    DEPRESSION    GASTRIC BYPASS 01    BREAST REDUCTION--    Juana Mayes  1955 Page #2    ANEMIA==IRON AND B-12    Admitted 2019 with cellulitis left lower extremity then readmitted with chest pain with negative stress test    L--3-4   lumbar surgeyr dr pineda-----    [facility]  dr Noni Rolle [facility]      Hearing loss right ear  seen by ENT injections in the right ear MRI done possible cochlear implant    Fall on 915 with laceration right leg 15 sutures and fall again on 927 with contusion left face    Start lymphedema therapy at [facility]     OA both knees with injections bilateral      Social Determinants of Health Financial Resource Strain: Not on file   Food Insecurity: Not on file   Transportation Needs: Not on file   Physical Activity: Not on file   Stress: Not on file   Social Connections: Not on file   Intimate Partner Violence: Not on file   Housing Stability: Not on file        TOBACCO  Social History     Tobacco Use   Smoking Status Every Day    Packs/day: 1.00    Years: 38.00    Pack years: 38.00    Types: Cigarettes   Smokeless Tobacco Never   Tobacco Comments    restarted 3/2022        ALCOHOL   Social History     Substance and Sexual Activity   Alcohol Use No        DRUGHX   Social History     Substance and Sexual Activity   Drug Use No         CURRENT OUTPATIENT MEDICATIONS:   Outpatient Medications Marked as Taking for the 3/2/23 encounter (Office Visit) with Matthew Sanchez MD   Medication Sig Dispense Refill    nitrofurantoin, macrocrystal-monohydrate, (MACROBID) 100 MG capsule Take 1 capsule by mouth 2 times daily 14 capsule 0    BELBUCA 450 MCG FILM Place 450 mcg under the tongue in the morning and at bedtime. bumetanide (BUMEX) 2 MG tablet Take 1 tablet by mouth 2 times daily 180 tablet 3    ipratropium-albuterol (DUONEB) 0.5-2.5 (3) MG/3ML SOLN nebulizer solution Inhale 3 mLs into the lungs every 6 hours as needed for Shortness of Breath 360 mL 0    Baclofen (LIORESAL) 5 MG tablet Take 10 mg by mouth 3 times daily      rOPINIRole (REQUIP) 2 MG tablet Take 0.5 tablets by mouth in the morning, at noon, and at bedtime 360 tablet 12        ALLERGIES:   Allergies   Allergen Reactions    Ferritin Shortness Of Breath and Other (See Comments)     Flushed,  Severe back pain       ROS: I have reviewed the patient's medical history in detail; there are no changes to the history as noted in the electronic medical record. Exam: Ht 5' 2\" (1.575 m)   Wt 295 lb (133.8 kg)   BMI 53.96 kg/m²   Alyssa Lackey is a 78 y.o. female  appears well, in no apparent distress.   Alert and oriented times three, pleasant and cooperative. Vital signs are as documented in vital signs section. Breathing comfortably, without stertor or stridor  Ear exam: Right CI site healthy; magnet site healthy; External ears normal. Canals clear. TM's normal.  No nasal lesions, no erythema  No oral lesions. There is no palpable adenopathy or tenderness    Lab Results   Component Value Date/Time    WBC 8.8 12/17/2022 08:13 AM    HGB 11.3 (L) 12/17/2022 08:13 AM    HCT 39.4 12/17/2022 08:13 AM     12/17/2022 08:13 AM    MCV 69.7 (L) 12/17/2022 08:13 AM    MCH 20.0 (L) 12/17/2022 08:13 AM    MCHC 28.7 (L) 12/17/2022 08:13 AM    RDW 20.6 (H) 12/17/2022 08:13 AM    NEUTOPHILPCT 81.0 (H) 12/13/2022 04:27 PM    LYMPHOPCT 9.0 (L) 12/13/2022 04:27 PM    MONOPCT 9.3 12/13/2022 04:27 PM    EOSRELPCT 0.2 12/13/2022 04:27 PM    BASOPCT 0.2 12/13/2022 04:27 PM    NEUTROABS 5.07 12/13/2022 04:27 PM    LYMPHSABS 0.56 (L) 12/13/2022 04:27 PM    EOSABS 0.01 (L) 12/13/2022 04:27 PM       On this date 3/2/2023 I have spent 10 minutes reviewing previous notes, test results and 20 min face to face with the patient discussing the diagnosis and importance of compliance with the treatment plan as well as documenting on the day of the visit.     A/P:    Ifeoma Chou is a 78 y.o. female is s/p right CI with AB HiRes Slim J on 11/7/22 with an uneventful postoperative course  Doing well  Completed speech therapy  Plan to continue home therapy with streaming  Follow up yearly    Sweta Hazel MD 2/27/23 5:54 PM EST  Director Otology and Cochlear Implant Programs

## 2023-02-28 ENCOUNTER — PROCEDURE VISIT (OUTPATIENT)
Dept: AUDIOLOGY | Age: 68
End: 2023-02-28
Payer: MEDICARE

## 2023-02-28 DIAGNOSIS — H90.A21 SENSORINEURAL HEARING LOSS (SNHL) OF RIGHT EAR WITH RESTRICTED HEARING OF LEFT EAR: Primary | ICD-10-CM

## 2023-02-28 DIAGNOSIS — H90.3 SENSORINEURAL HEARING LOSS (SNHL) OF BOTH EARS: ICD-10-CM

## 2023-02-28 PROCEDURE — 92604 REPROGRAM COCHLEAR IMPLT 7/>: CPT | Performed by: AUDIOLOGIST

## 2023-02-28 NOTE — PROGRESS NOTES
Patient was here for CI follow up, after last appointment 1/26/23. Impedances were good. Magnet site looked good. Patient reported continued pain behind her ear when wearing her processor, near the incision site. She reported this pain sometimes prevents her from wearing the processor. Gave patient off-ear processor clip, she will clip this on headband as an alternative. She was satisfied with this option. Patient reported wearing processor all waking hours. Datalogging confirmed 13.6 hours. Patient reported that she is doing well overall with understanding speech. She reported that when she streams phone calls, they still sound robotic, but when she streams television and the news, it does not sound robotic. Aided soundfield audiometry was completed, results revealed hearing in the normal-mild range. CI ONLY Left ear plugged   AzBio Quiet 48%     Counseled on test results. Patient reported the man's voice was harder to understand during speech testing. She also reported that some sentences were muffled. Counseled on continuing to practice by streaming to processor. Slightly increased CUs overall. Patient was satisfied with loudness and comfort. Patient will follow up 3/2 with ENT and 4/25 with Audiology.      Shira Martin [facility]  [address] [facility] [id number]   Electronically signed by Shira Martin on 2/28/2023 at 9:14 AM

## 2023-03-02 ENCOUNTER — TREATMENT (OUTPATIENT)
Dept: SPEECH THERAPY | Age: 68
End: 2023-03-02
Payer: MEDICARE

## 2023-03-02 ENCOUNTER — OFFICE VISIT (OUTPATIENT)
Dept: ENT CLINIC | Age: 68
End: 2023-03-02
Payer: MEDICARE

## 2023-03-02 VITALS — WEIGHT: 293 LBS | BODY MASS INDEX: 53.92 KG/M2 | HEIGHT: 62 IN

## 2023-03-02 DIAGNOSIS — F80.9 COMMUNICATION PROBLEM: Primary | ICD-10-CM

## 2023-03-02 DIAGNOSIS — H90.A22 SENSORINEURAL HEARING LOSS (SNHL) OF LEFT EAR WITH RESTRICTED HEARING OF RIGHT EAR: Primary | ICD-10-CM

## 2023-03-02 DIAGNOSIS — H91.8X9 ASYMMETRICAL HEARING LOSS: ICD-10-CM

## 2023-03-02 DIAGNOSIS — H91.21 SUDDEN IDIOPATHIC HEARING LOSS OF RIGHT EAR WITH RESTRICTED HEARING OF LEFT EAR: ICD-10-CM

## 2023-03-02 DIAGNOSIS — Z96.21 HISTORY OF COCHLEAR IMPLANT: ICD-10-CM

## 2023-03-02 PROCEDURE — 4004F PT TOBACCO SCREEN RCVD TLK: CPT | Performed by: OTOLARYNGOLOGY

## 2023-03-02 PROCEDURE — 1090F PRES/ABSN URINE INCON ASSESS: CPT | Performed by: OTOLARYNGOLOGY

## 2023-03-02 PROCEDURE — G8417 CALC BMI ABV UP PARAM F/U: HCPCS | Performed by: OTOLARYNGOLOGY

## 2023-03-02 PROCEDURE — G8484 FLU IMMUNIZE NO ADMIN: HCPCS | Performed by: OTOLARYNGOLOGY

## 2023-03-02 PROCEDURE — 92507 TX SP LANG VOICE COMM INDIV: CPT | Performed by: SPEECH-LANGUAGE PATHOLOGIST

## 2023-03-02 PROCEDURE — 99214 OFFICE O/P EST MOD 30 MIN: CPT | Performed by: OTOLARYNGOLOGY

## 2023-03-02 PROCEDURE — 1123F ACP DISCUSS/DSCN MKR DOCD: CPT | Performed by: OTOLARYNGOLOGY

## 2023-03-02 PROCEDURE — G8400 PT W/DXA NO RESULTS DOC: HCPCS | Performed by: OTOLARYNGOLOGY

## 2023-03-02 PROCEDURE — G8427 DOCREV CUR MEDS BY ELIG CLIN: HCPCS | Performed by: OTOLARYNGOLOGY

## 2023-03-02 PROCEDURE — 3017F COLORECTAL CA SCREEN DOC REV: CPT | Performed by: OTOLARYNGOLOGY

## 2023-03-02 NOTE — PROGRESS NOTES
SPEECH LANGUAGE PATHOLOGY  DAILY PROGRESS NOTE        SUBJECTIVE:      Patient seen for 45 minutes of therapy targeting speech perception. Patient was pleasant and cooperative. Patient attended session alone. OBJECTIVE     SLP streamed to CI ear for this treatment session. Minimal pairs in sentences -  patient discriminated between the two words achieving 93% (14/15) accuracy. Soft talking background noise. No repetition of stimuli. Patient correctly imitated a 3-5 word phrase presented auditorily achieving 90% (9/10) accuracy with soft to medium background noise. No repetition needed. Stories: patient listened to a 2-3 minute story about a known topic. Once listening to the story, patient was to use their auditory memory and listening skills to remember the information in order to identify the main idea. Patient identified the main ideas with good detail. Soft to medium traffic background noise used. No repetition needed. Home Program: Patient was encouraged to spend 60 minutes everyday listening with just her cochlear implant. Patient was instructed just stream to CI during this time. Encouraged patient to engage in activities that address tasks similar to this session as well as that pair auditory with visual stimuli for example TV with closed captions, audio books paired with hard copy books, etc.  Handouts provided for home program.         ASSESSMENT:  Met therapy goals           PLAN OF CARE:  Discharge from speech pathology intervention at this time. [facility]   Graduate Speech Therapy Student       Jhon Zuniga.  Dana CANTOR, [address] [address]           CPT code(s) 81124  speech/language tx  Minutes: 45 minutes

## 2023-03-02 NOTE — PROGRESS NOTES
[facility]   Outpatient Speech Therapy  Phone: [phone] Fax: [phone]    [facility] SUMMARY    Date of Report: 3/2/2023    Patient Name:Selena Ward  : 1955  MRN: [id number]    Diagnosis: H90. A22 (ICD-10-CM) - Sensorineural hearing loss (SNHL) of left ear with restricted hearing of right ear  Referring Physician: Stephanie Fuentes MD    SUBJECTIVE  Patient attended 9 out of 11 speech therapy sessions from 12/15/2023 to 3/2/2023 , with 1  cancellations and 1 no show. Focus of current treatment sessions has been on speech perception and auditory discrimination. OBJECTIVE / GOAL STATUS   (Status Key: GM = Goal Met, MP = Making Progress, BP = Beginning Progress, NI = Not Introduced, D/C = Discontinue Goal, NM = Not Met)    Detect all Ling-6 sounds at 3 ft., 6 ft., and 9 ft. GM     2. Discriminate between two words that differ in syllable length achieving greater than 90% accuracy. GM     3. Identify the correct word given a set of 5-10 familiar words achieving greater than 90% accuracy. GM     4. Answer questions or follow directions presented auditorily regarding a known topic achieving greater than 90% accuracy. GM     5. Identify the correct monosyllabic word given a set of 4-6 familiar words achieving greater than 90% accuracy. GM     6. Discriminate between words in which the initial consonant differ only in voicing (minimal pairs) achieving greater than 90% accuracy. GM     7. Follow instructions presented auditorily only containing two critical elements achieving greater than 90% accuracy. GM     8. Identify the correct phrase or sentence from a set of 4-8 achieving greater than 90% accuracy. GM     9. Read aloud directions changing one word- have patient determine the word that was changed in a common phrase achieving greater than 90% accuracy.  GM     10.  Minimal pairs in sentences- have the patient discriminate between the two words achieving greater than 90% accuracy. GM     11. Identify words correctly in a set of 5-8 with the same syllable structure/number achieving greater than 90% accuracy. GM     12.  Correctly imitate a 3-5 word phrase presented auditorily achieving greater than 90% accuracy. GM     13. Identify the topic of a short paragraph read aloud achieving greater than 90% accuracy. GM    ASSESSMENT / STANDARDIZED TEST RESULTS  Patient has made good progress over the past three months. Patient streamed directly to her cochlear implant and was able to hear with at least 90% accuracy in silent and background noise from sound level to conversation level. RECOMMENDATIONS  Patient is discharged at this time. Patient is able to continue independently practicing at home by streaming to her processor. Patient and/or caregiver aware of diagnosis? Yes    Patient and/or caregiver agree with POC? Yes       Thank you for this referral.     Jose Moreno.  Corazon Cortez M.A.. [id number]  3/2/2023

## 2023-03-02 NOTE — PROGRESS NOTES
[facility]   Outpatient Speech Therapy  Phone: [phone] Fax: [phone]    [facility] SUMMARY    Date of Report: 3/2/2023    Patient Name:Selena Ward  : 1955  MRN: [id number]    Diagnosis: H90. A22 (ICD-10-CM) - Sensorineural hearing loss (SNHL) of left ear with restricted hearing of right ear  Referring Physician: Augie Alegre MD    SUBJECTIVE  Patient attended 9 out of 11 speech therapy sessions from 12/15/2023 to 3/2/2023 , with 1  cancellations and 1 no show. Focus of current treatment sessions has been on speech perception and auditory discrimination. OBJECTIVE / GOAL STATUS   (Status Key: GM = Goal Met, MP = Making Progress, BP = Beginning Progress, NI = Not Introduced, D/C = Discontinue Goal, NM = Not Met)    Detect all Ling-6 sounds at 3 ft., 6 ft., and 9 ft. GM     2. Discriminate between two words that differ in syllable length achieving greater than 90% accuracy. GM     3. Identify the correct word given a set of 5-10 familiar words achieving greater than 90% accuracy. GM     4. Answer questions or follow directions presented auditorily regarding a known topic achieving greater than 90% accuracy. GM     5. Identify the correct monosyllabic word given a set of 4-6 familiar words achieving greater than 90% accuracy. GM     6. Discriminate between words in which the initial consonant differ only in voicing (minimal pairs) achieving greater than 90% accuracy. GM     7. Follow instructions presented auditorily only containing two critical elements achieving greater than 90% accuracy. GM     8. Identify the correct phrase or sentence from a set of 4-8 achieving greater than 90% accuracy. GM     9. Read aloud directions changing one word- have patient determine the word that was changed in a common phrase achieving greater than 90% accuracy.  GM     10.  Minimal pairs in sentences- have the patient discriminate between the two words achieving greater than 90% accuracy. GM     11. Identify words correctly in a set of 5-8 with the same syllable structure/number achieving greater than 90% accuracy. GM     12.  Correctly imitate a 3-5 word phrase presented auditorily achieving greater than 90% accuracy. GM     13. Identify the topic of a short paragraph read aloud achieving greater than 90% accuracy. GM    ASSESSMENT / STANDARDIZED TEST RESULTS  Patient has made good progress over the past three months. Patient streamed directly to her cochlear implant and was able to hear with at least 90% accuracy in silent and background noise from sound level to conversation level. RECOMMENDATIONS  Patient is discharged at this time. Patient is able to continue independently practicing at home by streaming to her processor. Patient and/or caregiver aware of diagnosis? Yes    Patient and/or caregiver agree with POC? Yes       Thank you for this referral.     Zulema Lyons.  Dana CANTOR, [place]. [id number]  3/2/2023

## 2023-03-25 ENCOUNTER — OFFICE VISIT (OUTPATIENT)
Dept: PRIMARY CARE CLINIC | Age: 68
End: 2023-03-25

## 2023-03-25 DIAGNOSIS — G20 PARKINSON'S DISEASE (HCC): ICD-10-CM

## 2023-03-25 DIAGNOSIS — G47.33 SLEEP APNEA, OBSTRUCTIVE: ICD-10-CM

## 2023-03-25 DIAGNOSIS — G89.4 CHRONIC PAIN SYNDROME: Chronic | ICD-10-CM

## 2023-03-25 DIAGNOSIS — E53.8 VITAMIN B 12 DEFICIENCY: ICD-10-CM

## 2023-03-25 DIAGNOSIS — E03.9 ACQUIRED HYPOTHYROIDISM: ICD-10-CM

## 2023-03-25 DIAGNOSIS — M81.0 AGE-RELATED OSTEOPOROSIS WITHOUT CURRENT PATHOLOGICAL FRACTURE: ICD-10-CM

## 2023-03-25 DIAGNOSIS — M15.9 PRIMARY OSTEOARTHRITIS INVOLVING MULTIPLE JOINTS: Chronic | ICD-10-CM

## 2023-03-25 DIAGNOSIS — E11.9 DIET-CONTROLLED DIABETES MELLITUS (HCC): ICD-10-CM

## 2023-03-25 DIAGNOSIS — I10 ESSENTIAL HYPERTENSION: ICD-10-CM

## 2023-03-25 DIAGNOSIS — N30.00 ACUTE CYSTITIS WITHOUT HEMATURIA: ICD-10-CM

## 2023-03-25 DIAGNOSIS — N39.46 MIXED STRESS AND URGE URINARY INCONTINENCE: ICD-10-CM

## 2023-03-25 DIAGNOSIS — M17.0 PRIMARY OSTEOARTHRITIS OF BOTH KNEES: Chronic | ICD-10-CM

## 2023-03-25 DIAGNOSIS — I89.0 LYMPHEDEMA OF BOTH LOWER EXTREMITIES: Primary | Chronic | ICD-10-CM

## 2023-03-25 NOTE — PROGRESS NOTES
and at bedtime, Disp: 360 tablet, Rfl: 12  Allergies   Allergen Reactions    Ferritin Shortness Of Breath and Other (See Comments)     Flushed,  Severe back pain     Social History     Socioeconomic History    Marital status:      Spouse name: Not on file    Number of children: 2    Years of education: 12    Highest education level: Associate degree: academic program   Occupational History    Not on file   Tobacco Use    Smoking status: Every Day     Packs/day: 1.00     Years: 38.00     Pack years: 38.00     Types: Cigarettes    Smokeless tobacco: Never    Tobacco comments:     restarted 3/2022   Vaping Use    Vaping Use: Never used   Substance and Sexual Activity    Alcohol use: No    Drug use: No    Sexual activity: Not Currently   Other Topics Concern    Not on file   Social History Narrative        Chronic Diagnosis: Iron deficiency anemia, Depressive disorder, Benign essential hypertension, Mixed    hyperlipidemia.     HTN    OBESITY    LIPID    OA    SMOKER---quit   summer  2021    CVA L FIELD VISION    DEPRESSION    GASTRIC BYPASS 01    BREAST REDUCTION--    Ajay Raymundo  1955 Page #2    ANEMIA==IRON AND B-12    Admitted 2019 with cellulitis left lower extremity then readmitted with chest pain with negative stress test    L--3-4   lumbar surgeyr dr [facility]ineda-----    [facility]      Hearing loss right ear  seen by ENT injections in the right ear MRI done possible cochlear implant    Fall on 915 with laceration right leg 15 sutures and fall again on 927 with contusion left face    Start lymphedema therapy at [facility]     OA both knees with injections bilateral      Social Determinants of Health     Financial Resource Strain: Not on file   Food Insecurity: Not on file   Transportation Needs: Not on file   Physical Activity: Not on file   Stress: Not on file   Social Connections: Not on file   Intimate Partner Violence: Not on

## 2023-03-26 VITALS
WEIGHT: 293 LBS | BODY MASS INDEX: 53.63 KG/M2 | TEMPERATURE: 98.4 F | DIASTOLIC BLOOD PRESSURE: 94 MMHG | SYSTOLIC BLOOD PRESSURE: 154 MMHG

## 2023-03-26 PROBLEM — G20.A1 PARKINSON'S DISEASE: Status: ACTIVE | Noted: 2023-03-26

## 2023-03-26 PROBLEM — G20 PARKINSON'S DISEASE (HCC): Status: ACTIVE | Noted: 2023-03-26

## 2023-03-26 PROBLEM — J44.1 COPD EXACERBATION (HCC): Status: RESOLVED | Noted: 2023-01-05 | Resolved: 2023-03-26

## 2023-03-26 PROBLEM — J96.01 ACUTE RESPIRATORY FAILURE WITH HYPOXIA (HCC): Status: RESOLVED | Noted: 2022-12-14 | Resolved: 2023-03-26

## 2023-03-26 PROBLEM — K85.90 ACUTE PANCREATITIS WITHOUT NECROSIS OR INFECTION, UNSPECIFIED: Status: RESOLVED | Noted: 2022-02-01 | Resolved: 2023-03-26

## 2023-03-26 PROBLEM — G47.33 SLEEP APNEA, OBSTRUCTIVE: Status: ACTIVE | Noted: 2023-03-26

## 2023-03-26 ASSESSMENT — ENCOUNTER SYMPTOMS
EYES NEGATIVE: 1
GASTROINTESTINAL NEGATIVE: 1
BACK PAIN: 1
ALLERGIC/IMMUNOLOGIC NEGATIVE: 1
SHORTNESS OF BREATH: 1

## 2023-03-26 ASSESSMENT — PATIENT HEALTH QUESTIONNAIRE - PHQ9
SUM OF ALL RESPONSES TO PHQ QUESTIONS 1-9: 1
SUM OF ALL RESPONSES TO PHQ QUESTIONS 1-9: 1
1. LITTLE INTEREST OR PLEASURE IN DOING THINGS: 1
SUM OF ALL RESPONSES TO PHQ QUESTIONS 1-9: 1
SUM OF ALL RESPONSES TO PHQ QUESTIONS 1-9: 1
2. FEELING DOWN, DEPRESSED OR HOPELESS: 0
SUM OF ALL RESPONSES TO PHQ9 QUESTIONS 1 & 2: 1

## 2023-03-27 ENCOUNTER — HOSPITAL ENCOUNTER (OUTPATIENT)
Age: 68
Discharge: HOME | End: 2023-03-27
Payer: MEDICARE

## 2023-03-27 DIAGNOSIS — E03.9 ACQUIRED HYPOTHYROIDISM: ICD-10-CM

## 2023-03-27 DIAGNOSIS — E11.9 DIET-CONTROLLED DIABETES MELLITUS (HCC): ICD-10-CM

## 2023-03-27 DIAGNOSIS — E53.8 VITAMIN B 12 DEFICIENCY: ICD-10-CM

## 2023-03-27 DIAGNOSIS — I10 ESSENTIAL HYPERTENSION: ICD-10-CM

## 2023-03-27 DIAGNOSIS — M81.0 AGE-RELATED OSTEOPOROSIS WITHOUT CURRENT PATHOLOGICAL FRACTURE: ICD-10-CM

## 2023-03-27 DIAGNOSIS — N30.00 ACUTE CYSTITIS WITHOUT HEMATURIA: ICD-10-CM

## 2023-03-27 LAB
ALBUMIN SERPL-MCNC: 3.2 G/DL (ref 3.5–5.2)
ALP SERPL-CCNC: 135 U/L (ref 35–104)
ALT SERPL-CCNC: 12 U/L (ref 0–32)
ANION GAP SERPL CALCULATED.3IONS-SCNC: 10 MMOL/L (ref 7–16)
ANISOCYTOSIS: ABNORMAL
AST SERPL-CCNC: 17 U/L (ref 0–31)
BACTERIA URNS QL MICRO: ABNORMAL /HPF
BASOPHILS # BLD: 0 E9/L (ref 0–0.2)
BASOPHILS NFR BLD: 0.3 % (ref 0–2)
BILIRUB SERPL-MCNC: 0.4 MG/DL (ref 0–1.2)
BILIRUB UR QL STRIP: NEGATIVE
BUN SERPL-MCNC: 10 MG/DL (ref 6–23)
BURR CELLS: ABNORMAL
CALCIUM SERPL-MCNC: 8.6 MG/DL (ref 8.6–10.2)
CHLORIDE SERPL-SCNC: 102 MMOL/L (ref 98–107)
CHOLESTEROL, TOTAL: 135 MG/DL (ref 0–199)
CLARITY UR: ABNORMAL
CO2 SERPL-SCNC: 27 MMOL/L (ref 22–29)
COLOR UR: YELLOW
CREAT SERPL-MCNC: 0.5 MG/DL (ref 0.5–1)
EOSINOPHIL # BLD: 0.13 E9/L (ref 0.05–0.5)
EOSINOPHIL NFR BLD: 1.7 % (ref 0–6)
EPI CELLS #/AREA URNS HPF: ABNORMAL /HPF
ERYTHROCYTE [DISTWIDTH] IN BLOOD BY AUTOMATED COUNT: 21.3 FL (ref 11.5–15)
GLUCOSE SERPL-MCNC: 92 MG/DL (ref 74–99)
GLUCOSE UR STRIP-MCNC: NEGATIVE MG/DL
HBA1C MFR BLD: 6 % (ref 4–5.6)
HCT VFR BLD AUTO: 40.6 % (ref 34–48)
HDLC SERPL-MCNC: 39 MG/DL
HGB BLD-MCNC: 11 G/DL (ref 11.5–15.5)
HGB UR QL STRIP: NEGATIVE
HYPOCHROMIA: ABNORMAL
KETONES UR STRIP-MCNC: NEGATIVE MG/DL
LDLC SERPL CALC-MCNC: 79 MG/DL (ref 0–99)
LEUKOCYTE ESTERASE UR QL STRIP: ABNORMAL
LYMPHOCYTES # BLD: 1.64 E9/L (ref 1.5–4)
LYMPHOCYTES NFR BLD: 20.9 % (ref 20–42)
MCH RBC QN AUTO: 19.9 PG (ref 26–35)
MCHC RBC AUTO-ENTMCNC: 27.1 % (ref 32–34.5)
MCV RBC AUTO: 73.4 FL (ref 80–99.9)
MONOCYTES # BLD: 0.47 E9/L (ref 0.1–0.95)
MONOCYTES NFR BLD: 6.1 % (ref 2–12)
NEUTROPHILS # BLD: 5.54 E9/L (ref 1.8–7.3)
NEUTS SEG NFR BLD: 71.3 % (ref 43–80)
NITRITE UR QL STRIP: POSITIVE
OVALOCYTES: ABNORMAL
PH UR STRIP: 5.5 [PH] (ref 5–9)
PLATELET # BLD AUTO: 198 E9/L (ref 130–450)
PMV BLD AUTO: ABNORMAL FL (ref 7–12)
POIKILOCYTES: ABNORMAL
POLYCHROMASIA: ABNORMAL
POTASSIUM SERPL-SCNC: 4.1 MMOL/L (ref 3.5–5)
PROT SERPL-MCNC: 6.4 G/DL (ref 6.4–8.3)
PROT UR STRIP-MCNC: NEGATIVE MG/DL
RBC # BLD AUTO: 5.53 E12/L (ref 3.5–5.5)
RBC #/AREA URNS HPF: ABNORMAL /HPF (ref 0–2)
SODIUM SERPL-SCNC: 139 MMOL/L (ref 132–146)
SP GR UR STRIP: 1.02 (ref 1–1.03)
T4 SERPL-MCNC: 8.7 MCG/DL (ref 4.5–11.7)
TRIGL SERPL-MCNC: 86 MG/DL (ref 0–149)
TSH SERPL-MCNC: 0.7 UIU/ML (ref 0.27–4.2)
UROBILINOGEN UR STRIP-ACNC: 2 E.U./DL
VIT B12 SERPL-MCNC: 244 PG/ML (ref 211–946)
VITAMIN D 25-HYDROXY: <6 NG/ML (ref 30–100)
VLDLC SERPL CALC-MCNC: 17 MG/DL
WBC # BLD: 7.8 E9/L (ref 4.5–11.5)
WBC #/AREA URNS HPF: ABNORMAL /HPF (ref 0–5)

## 2023-03-27 PROCEDURE — 82306 VITAMIN D 25 HYDROXY: CPT

## 2023-03-27 PROCEDURE — 87186 SC STD MICRODIL/AGAR DIL: CPT

## 2023-03-27 PROCEDURE — 87077 CULTURE AEROBIC IDENTIFY: CPT

## 2023-03-27 PROCEDURE — 82607 VITAMIN B-12: CPT

## 2023-03-27 PROCEDURE — 81001 URINALYSIS AUTO W/SCOPE: CPT

## 2023-03-27 PROCEDURE — 80061 LIPID PANEL: CPT

## 2023-03-27 PROCEDURE — 84436 ASSAY OF TOTAL THYROXINE: CPT

## 2023-03-27 PROCEDURE — 36415 COLL VENOUS BLD VENIPUNCTURE: CPT

## 2023-03-27 PROCEDURE — 83036 HEMOGLOBIN GLYCOSYLATED A1C: CPT

## 2023-03-27 PROCEDURE — 80053 COMPREHEN METABOLIC PANEL: CPT

## 2023-03-27 PROCEDURE — 87088 URINE BACTERIA CULTURE: CPT

## 2023-03-27 PROCEDURE — 84443 ASSAY THYROID STIM HORMONE: CPT

## 2023-03-27 PROCEDURE — 85025 COMPLETE CBC W/AUTO DIFF WBC: CPT

## 2023-03-28 ENCOUNTER — TELEPHONE (OUTPATIENT)
Dept: PRIMARY CARE CLINIC | Age: 68
End: 2023-03-28

## 2023-03-28 DIAGNOSIS — E11.9 DIET-CONTROLLED DIABETES MELLITUS (HCC): Primary | ICD-10-CM

## 2023-03-28 DIAGNOSIS — E66.01 MORBID (SEVERE) OBESITY DUE TO EXCESS CALORIES (HCC): ICD-10-CM

## 2023-03-29 ENCOUNTER — TELEPHONE (OUTPATIENT)
Dept: PRIMARY CARE CLINIC | Age: 68
End: 2023-03-29

## 2023-03-29 LAB
BACTERIA UR CULT: ABNORMAL
ORGANISM: ABNORMAL

## 2023-03-29 RX ORDER — SEMAGLUTIDE 1.34 MG/ML
0.25 INJECTION, SOLUTION SUBCUTANEOUS WEEKLY
Qty: 4 ADJUSTABLE DOSE PRE-FILLED PEN SYRINGE | Refills: 5 | Status: SHIPPED | OUTPATIENT
Start: 2023-03-29

## 2023-03-29 RX ORDER — NITROFURANTOIN 25; 75 MG/1; MG/1
100 CAPSULE ORAL 2 TIMES DAILY
Qty: 14 CAPSULE | Refills: 0 | Status: SHIPPED | OUTPATIENT
Start: 2023-03-29

## 2023-03-29 RX ORDER — ERGOCALCIFEROL 1.25 MG/1
50000 CAPSULE ORAL WEEKLY
Qty: 12 CAPSULE | Refills: 5 | Status: SHIPPED | OUTPATIENT
Start: 2023-03-29

## 2023-03-29 NOTE — TELEPHONE ENCOUNTER
Notify patient lab is good her vitamin D is too low to even measure. She needs to take vitamin D weekly. I sent the prescription in.   Her hemoglobin is still around 11 all else is stable

## 2023-04-18 ENCOUNTER — HOSPITAL ENCOUNTER (OUTPATIENT)
Dept: SLEEP CENTER | Age: 68
Discharge: HOME | End: 2023-04-18
Payer: MEDICARE

## 2023-04-18 DIAGNOSIS — G47.33 SLEEP APNEA, OBSTRUCTIVE: ICD-10-CM

## 2023-04-18 PROCEDURE — 95800 SLP STDY UNATTENDED: CPT

## 2023-04-25 ENCOUNTER — PROCEDURE VISIT (OUTPATIENT)
Dept: AUDIOLOGY | Age: 68
End: 2023-04-25
Payer: MEDICARE

## 2023-04-25 DIAGNOSIS — H90.A21 SENSORINEURAL HEARING LOSS (SNHL) OF RIGHT EAR WITH RESTRICTED HEARING OF LEFT EAR: Primary | ICD-10-CM

## 2023-04-25 PROCEDURE — 92604 REPROGRAM COCHLEAR IMPLT 7/>: CPT | Performed by: AUDIOLOGIST

## 2023-04-25 NOTE — PROGRESS NOTES
Patient was here for CI follow up, after last appointment 2/28. Impedances were good. Magnet site looked good, changed to strength 3. Patient reported wearing processor for reduced wear time from previously. Datalogging confirmed an average of 7 hours a day. She reported reduced wear time due to processor falling off. She reported that using the clip on her headband does work, but she does not want to wear a headband every day. Reviewed retention options, which should be in patient's bookbag. Patient did not bring bookbag to this appointment. Changed magnet to strength 3 due to this complaint. Counseled on looking at magnet site. Patient reported sound quality continues to sound fairly natural. She reported she still practices streaming to her processor. She reported continued trouble in situations with many people speaking. She reported taking processor off at a recent baby shower due to overwhelming sound. Counseled on turning volume down first, instead of immediately taking it off, counseled on binaural hearing in background noise. Aided soundfield testing performed. RIGHT CI ONLY LEFT PLUGGED   AzBio Quiet 46%   AzBio Quiet, after increasing low frequency gain 47%     BILATERAL    AzBio +5 85%     In CI only condition, patient reported more difficulty understanding male voices. Increased low frequency gain, performed AzBio again. No significant change in scores, but patient reported subjective improvement in understanding. Slight improvement noted in binaural condition, from pre-CI score. Discussed using Delmy Rhoades in challenging situations. Patient did not bring bookbag to appointment, but will try to pair at home. She will call office or AB if she needs assistance. Patient will follow up 10/24 or sooner if needed.     Shira Gallegos [facility]  [address] [facility] [id number]   Electronically signed by Shira Gallegos on 4/25/2023 at 10:46 AM

## 2023-05-12 DIAGNOSIS — G47.34 NOCTURNAL HYPOXEMIA: ICD-10-CM

## 2023-05-12 DIAGNOSIS — G47.33 OSA (OBSTRUCTIVE SLEEP APNEA): Primary | ICD-10-CM

## 2023-05-18 ENCOUNTER — OFFICE VISIT (OUTPATIENT)
Dept: PRIMARY CARE CLINIC | Age: 68
End: 2023-05-18

## 2023-05-18 VITALS
DIASTOLIC BLOOD PRESSURE: 85 MMHG | SYSTOLIC BLOOD PRESSURE: 148 MMHG | TEMPERATURE: 97.7 F | WEIGHT: 283.2 LBS | BODY MASS INDEX: 52.12 KG/M2 | HEIGHT: 62 IN

## 2023-05-18 DIAGNOSIS — I89.0 LYMPHEDEMA OF BOTH LOWER EXTREMITIES: Chronic | ICD-10-CM

## 2023-05-18 DIAGNOSIS — N30.00 ACUTE CYSTITIS WITHOUT HEMATURIA: Primary | ICD-10-CM

## 2023-05-18 DIAGNOSIS — N39.0 RECURRENT UTI: ICD-10-CM

## 2023-05-18 DIAGNOSIS — N30.00 ACUTE CYSTITIS WITHOUT HEMATURIA: ICD-10-CM

## 2023-05-18 DIAGNOSIS — R35.0 FREQUENCY OF MICTURITION: ICD-10-CM

## 2023-05-18 DIAGNOSIS — R33.9 URINE RETENTION: ICD-10-CM

## 2023-05-18 LAB
BILIRUBIN, POC: NEGATIVE
BLOOD URINE, POC: NEGATIVE
CLARITY, POC: CLEAR
COLOR, POC: YELLOW
GLUCOSE URINE, POC: NEGATIVE
KETONES, POC: NEGATIVE
LEUKOCYTE EST, POC: NORMAL
NITRITE, POC: NEGATIVE
PH, POC: 5.5
PROTEIN, POC: NEGATIVE
SPECIFIC GRAVITY, POC: 1.02
UROBILINOGEN, POC: 2

## 2023-05-18 RX ORDER — NITROFURANTOIN 25; 75 MG/1; MG/1
100 CAPSULE ORAL 2 TIMES DAILY
Qty: 14 CAPSULE | Refills: 0 | Status: SHIPPED | OUTPATIENT
Start: 2023-05-18

## 2023-05-18 ASSESSMENT — ENCOUNTER SYMPTOMS
EYES NEGATIVE: 1
ALLERGIC/IMMUNOLOGIC NEGATIVE: 1
GASTROINTESTINAL NEGATIVE: 1
RESPIRATORY NEGATIVE: 1

## 2023-05-18 ASSESSMENT — PATIENT HEALTH QUESTIONNAIRE - PHQ9
SUM OF ALL RESPONSES TO PHQ QUESTIONS 1-9: 0
2. FEELING DOWN, DEPRESSED OR HOPELESS: 0
SUM OF ALL RESPONSES TO PHQ9 QUESTIONS 1 & 2: 0
SUM OF ALL RESPONSES TO PHQ QUESTIONS 1-9: 0
1. LITTLE INTEREST OR PLEASURE IN DOING THINGS: 0
SUM OF ALL RESPONSES TO PHQ QUESTIONS 1-9: 0
SUM OF ALL RESPONSES TO PHQ QUESTIONS 1-9: 0

## 2023-05-18 NOTE — PROGRESS NOTES
Quinton Anthony [address]  Name: Lawyer Aj    : 1955    Sex: female    Age: 78 y.o. Subjective:  Chief Complaint: Patient is here for  dysrua   freq feels not empty well     No tmepc hilsl no abd apain no back pain      Review of Systems   Constitutional: Negative. HENT: Negative. Eyes: Negative. Respiratory: Negative. Cardiovascular: Negative. Gastrointestinal: Negative. Endocrine: Negative. Genitourinary:  Positive for dysuria and frequency. Skin: Negative. Allergic/Immunologic: Negative. Neurological: Negative. Hematological: Negative. Psychiatric/Behavioral: Negative. Current Outpatient Medications:     nitrofurantoin, macrocrystal-monohydrate, (MACROBID) 100 MG capsule, Take 1 capsule by mouth 2 times daily, Disp: 14 capsule, Rfl: 0    Semaglutide,0.25 or 0.5MG/DOS, (OZEMPIC, 0.25 OR 0.5 MG/DOSE,) 2 MG/1.5ML SOPN, Inject 0.25 mg into the skin once a week, Disp: 4 Adjustable Dose Pre-filled Pen Syringe, Rfl: 5    vitamin D (ERGOCALCIFEROL) 1.25 MG (42523 UT) CAPS capsule, Take 1 capsule by mouth once a week, Disp: 12 capsule, Rfl: 5    nitrofurantoin, macrocrystal-monohydrate, (MACROBID) 100 MG capsule, Take 1 capsule by mouth 2 times daily, Disp: 14 capsule, Rfl: 0    BELBUCA 450 MCG FILM, Place 450 mcg under the tongue in the morning and at bedtime. , Disp: , Rfl:     bumetanide (BUMEX) 2 MG tablet, Take 1 tablet by mouth 2 times daily, Disp: 180 tablet, Rfl: 3    ipratropium-albuterol (DUONEB) 0.5-2.5 (3) MG/3ML SOLN nebulizer solution, Inhale 3 mLs into the lungs every 6 hours as needed for Shortness of Breath, Disp: 360 mL, Rfl: 0    Baclofen (LIORESAL) 5 MG tablet, Take 2 tablets by mouth 3 times daily, Disp: , Rfl:     rOPINIRole (REQUIP) 2 MG tablet, Take 0.5 tablets by mouth in the morning, at noon, and at bedtime, Disp: 360 tablet, Rfl: 12  Allergies   Allergen Reactions    Ferritin Shortness Of Breath and Other (See Comments)     Flushed,
Speaking Coherently

## 2023-05-21 LAB
BACTERIA UR CULT: ABNORMAL
BACTERIA UR CULT: ABNORMAL
ORGANISM: ABNORMAL
ORGANISM: ABNORMAL

## 2023-05-22 LAB
BACTERIA UR CULT: ABNORMAL
BACTERIA UR CULT: ABNORMAL
ORGANISM: ABNORMAL
ORGANISM: ABNORMAL

## 2023-05-23 ENCOUNTER — TELEPHONE (OUTPATIENT)
Dept: PRIMARY CARE CLINIC | Age: 68
End: 2023-05-23

## 2023-05-23 NOTE — TELEPHONE ENCOUNTER
Patient spoke with her insurance and was advised to contact PCP to do a tier exception for Ozempic. They did not provide a fax number, but they did give a phone number.  Insurance told patient that Karen Echeverria would know where to send it\"  [phone]

## 2023-06-01 ENCOUNTER — TELEPHONE (OUTPATIENT)
Dept: PRIMARY CARE CLINIC | Age: 68
End: 2023-06-01

## 2023-06-01 ENCOUNTER — OFFICE VISIT (OUTPATIENT)
Dept: PRIMARY CARE CLINIC | Age: 68
End: 2023-06-01
Payer: MEDICARE

## 2023-06-01 VITALS
DIASTOLIC BLOOD PRESSURE: 82 MMHG | BODY MASS INDEX: 51.71 KG/M2 | OXYGEN SATURATION: 95 % | WEIGHT: 281 LBS | SYSTOLIC BLOOD PRESSURE: 142 MMHG | TEMPERATURE: 98 F | HEIGHT: 62 IN | HEART RATE: 92 BPM

## 2023-06-01 DIAGNOSIS — E66.01 MORBID (SEVERE) OBESITY DUE TO EXCESS CALORIES (HCC): ICD-10-CM

## 2023-06-01 DIAGNOSIS — M17.0 PRIMARY OSTEOARTHRITIS OF BOTH KNEES: Chronic | ICD-10-CM

## 2023-06-01 DIAGNOSIS — M53.3 SACRO-ILIAC PAIN: Primary | ICD-10-CM

## 2023-06-01 DIAGNOSIS — I89.0 LYMPHEDEMA: ICD-10-CM

## 2023-06-01 DIAGNOSIS — E11.9 DIET-CONTROLLED DIABETES MELLITUS (HCC): ICD-10-CM

## 2023-06-01 DIAGNOSIS — M47.26 OSTEOARTHRITIS OF SPINE WITH RADICULOPATHY, LUMBAR REGION: ICD-10-CM

## 2023-06-01 PROCEDURE — 96372 THER/PROPH/DIAG INJ SC/IM: CPT | Performed by: FAMILY MEDICINE

## 2023-06-01 PROCEDURE — 1090F PRES/ABSN URINE INCON ASSESS: CPT | Performed by: FAMILY MEDICINE

## 2023-06-01 PROCEDURE — G8428 CUR MEDS NOT DOCUMENT: HCPCS | Performed by: FAMILY MEDICINE

## 2023-06-01 PROCEDURE — 3044F HG A1C LEVEL LT 7.0%: CPT | Performed by: FAMILY MEDICINE

## 2023-06-01 PROCEDURE — 99214 OFFICE O/P EST MOD 30 MIN: CPT | Performed by: FAMILY MEDICINE

## 2023-06-01 PROCEDURE — 2022F DILAT RTA XM EVC RTNOPTHY: CPT | Performed by: FAMILY MEDICINE

## 2023-06-01 PROCEDURE — G8400 PT W/DXA NO RESULTS DOC: HCPCS | Performed by: FAMILY MEDICINE

## 2023-06-01 PROCEDURE — 3017F COLORECTAL CA SCREEN DOC REV: CPT | Performed by: FAMILY MEDICINE

## 2023-06-01 PROCEDURE — 1123F ACP DISCUSS/DSCN MKR DOCD: CPT | Performed by: FAMILY MEDICINE

## 2023-06-01 PROCEDURE — 4004F PT TOBACCO SCREEN RCVD TLK: CPT | Performed by: FAMILY MEDICINE

## 2023-06-01 PROCEDURE — G8417 CALC BMI ABV UP PARAM F/U: HCPCS | Performed by: FAMILY MEDICINE

## 2023-06-01 RX ORDER — CHLORZOXAZONE 500 MG/1
500 TABLET ORAL 4 TIMES DAILY PRN
Qty: 120 TABLET | Refills: 12 | Status: SHIPPED | OUTPATIENT
Start: 2023-06-01

## 2023-06-01 RX ORDER — METHYLPREDNISOLONE ACETATE 80 MG/ML
80 INJECTION, SUSPENSION INTRA-ARTICULAR; INTRALESIONAL; INTRAMUSCULAR; SOFT TISSUE ONCE
Status: COMPLETED | OUTPATIENT
Start: 2023-06-01 | End: 2023-06-01

## 2023-06-01 RX ORDER — PREDNISONE 10 MG/1
TABLET ORAL
Qty: 18 TABLET | Refills: 0 | Status: SHIPPED | OUTPATIENT
Start: 2023-06-01

## 2023-06-01 RX ORDER — SEMAGLUTIDE 1.34 MG/ML
0.5 INJECTION, SOLUTION SUBCUTANEOUS WEEKLY
Qty: 16 ADJUSTABLE DOSE PRE-FILLED PEN SYRINGE | Refills: 5 | Status: SHIPPED | OUTPATIENT
Start: 2023-06-01

## 2023-06-01 RX ADMIN — METHYLPREDNISOLONE ACETATE 80 MG: 80 INJECTION, SUSPENSION INTRA-ARTICULAR; INTRALESIONAL; INTRAMUSCULAR; SOFT TISSUE at 14:34

## 2023-06-01 ASSESSMENT — ENCOUNTER SYMPTOMS
GASTROINTESTINAL NEGATIVE: 1
RESPIRATORY NEGATIVE: 1
EYES NEGATIVE: 1
ALLERGIC/IMMUNOLOGIC NEGATIVE: 1
BACK PAIN: 1

## 2023-06-01 NOTE — PROGRESS NOTES
leg 15 sutures and fall again on 927 with contusion left face    Start lymphedema therapy at [facility]     OA both knees with injections bilateral     Bladder sling      dr Vicki Daigle      Social Determinants of Health     Financial Resource Strain: Not on file   Food Insecurity: Not on file   Transportation Needs: Not on file   Physical Activity: Not on file   Stress: Not on file   Social Connections: Not on file   Intimate Partner Violence: Not on file   Housing Stability: Not on file      Past Medical History:   Diagnosis Date    Acute pancreatitis without necrosis or infection, unspecified 2022    Acute respiratory failure with hypoxia ([address] 2022    Arthritis     COPD exacerbation ([address] 2023    Decreased dorsalis pedis pulse 2022    Dermatophytosis 2022    Full dentures     Leg swelling 2022    Low back pain     Lymphedema of both lower extremities chronic and continues as of 22    Obesity     280 #    Osteoarthritis     Peripheral vision loss     Stroke ([address]     Ulcer of calf with fat layer exposed, right ([address] 2022    WOUND CLINIC DR Ronny Saldana     Family History   Problem Relation Age of Onset    Breast Cancer Mother     Stroke Father     Hypertension Sister     Hypertension Brother       Past Surgical History:   Procedure Laterality Date    ABDOMINOPLASTY  2002    BREAST REDUCTION SURGERY      reduction    CATARACT REMOVAL      bilateral     SECTION      x2    COCHLEAR IMPLANT Right 2022    RIGHT COCHLEAR IMPLANT INSERTION WITH NERVE INTEGRITY MONITOR performed by Chantell Vance MD at [facility]  2012    and egd    COLONOSCOPY  2021    polyps; marginal prep--jessica    COLONOSCOPY N/A 2021    COLONOSCOPY WITH BIOPSY performed by Abram Cartwright MD at [address]    ECHOCARDIOGRAM COMPLETE 2D W DOPPLER W COLOR  2012         GASTRIC BYPASS SURGERY      NO NASTOGASTRIC TUBE    HERNIA REPAIR

## 2023-06-07 ENCOUNTER — TELEPHONE (OUTPATIENT)
Dept: PRIMARY CARE CLINIC | Age: 68
End: 2023-06-07

## 2023-06-07 NOTE — TELEPHONE ENCOUNTER
Pt left incomplete patient assistance forms in the office. Per request, mailed back to patient to complete her portion and attach required documentation. She will get it back to our office so Dr. Bambi Wood can sign and we can fax.

## 2023-07-12 ENCOUNTER — OFFICE VISIT (OUTPATIENT)
Dept: ORTHOPEDIC SURGERY | Age: 68
End: 2023-07-12
Payer: MEDICARE

## 2023-07-12 DIAGNOSIS — M17.12 PRIMARY OSTEOARTHRITIS OF LEFT KNEE: ICD-10-CM

## 2023-07-12 DIAGNOSIS — M17.11 PRIMARY OSTEOARTHRITIS OF RIGHT KNEE: Primary | ICD-10-CM

## 2023-07-12 PROCEDURE — 20610 DRAIN/INJ JOINT/BURSA W/O US: CPT | Performed by: PHYSICIAN ASSISTANT

## 2023-07-12 PROCEDURE — 99213 OFFICE O/P EST LOW 20 MIN: CPT

## 2023-07-12 RX ORDER — TRIAMCINOLONE ACETONIDE 40 MG/ML
80 INJECTION, SUSPENSION INTRA-ARTICULAR; INTRAMUSCULAR ONCE
Status: COMPLETED | OUTPATIENT
Start: 2023-07-12 | End: 2023-07-12

## 2023-07-12 RX ORDER — BUPIVACAINE HYDROCHLORIDE 2.5 MG/ML
6 INJECTION, SOLUTION INFILTRATION; PERINEURAL ONCE
Status: COMPLETED | OUTPATIENT
Start: 2023-07-12 | End: 2023-07-12

## 2023-07-12 RX ADMIN — TRIAMCINOLONE ACETONIDE 80 MG: 40 INJECTION, SUSPENSION INTRA-ARTICULAR; INTRAMUSCULAR at 08:26

## 2023-07-12 RX ADMIN — BUPIVACAINE HYDROCHLORIDE 15 MG: 2.5 INJECTION, SOLUTION INFILTRATION; PERINEURAL at 08:25

## 2023-07-12 NOTE — PROGRESS NOTES
Chief Complaint   Patient presents with    Injections     Bilateral knee pain  Last CSI 2023         Tung Rosenthal is a 78y.o. year old  who presents for follow up of bilateral knee osteoarthritis. Patients previous treatments have included multiple previous injections to bilateral knees. Her last injection was 3 months ago. She states this provided her 8 to 10 weeks of relief. Currently, the patient is still complaining of right greater than left pain. She states over the last couple weeks she has been using her narcotic pain medication that she is on from pain management which somewhat helps her bilateral knee pain. She does ambulate with a walker and uses a wheelchair. She has not found a brace that she likes to wear to her knees for comfort. She did previously have a compound cream to bilateral knees when her injections wore off but she has run out of this prescription. She denies any injury. She does not desire surgery at this time. Patient is happy with their response to cortisone previously and is requesting repeat injections today.        Past Medical History:   Diagnosis Date    Acute pancreatitis without necrosis or infection, unspecified 2022    Acute respiratory failure with hypoxia ([address]) 2022    Arthritis     COPD exacerbation ([address]) 2023    Decreased dorsalis pedis pulse 2022    Dermatophytosis 2022    Full dentures     Leg swelling 2022    Low back pain     Lymphedema of both lower extremities chronic and continues as of 22    Obesity     280 #    Osteoarthritis     Peripheral vision loss     Stroke [facility])     Ulcer of calf with fat layer exposed, right ([address]) 2022    WOUND CLINIC DR Librado Polanco     Past Surgical History:   Procedure Laterality Date    ABDOMINOPLASTY      BREAST REDUCTION SURGERY      reduction    CATARACT REMOVAL      bilateral     SECTION      x2    COCHLEAR IMPLANT Right 2022    RIGHT COCHLEAR IMPLANT

## 2023-07-12 NOTE — PATIENT INSTRUCTIONS
You were prescribed [facility]. You will receive a phone call from [facility]. Please answer the phone call to have your prescription filled. This will be from a 205 area code on your home phone or an [phone] number on your cell phone. If you do not receive a phone call in 48 hours, please call the pharmacy at [phone] and select option 1 to speak a patient advocate. To get the most out of your pain cream:  Wash affected area beforehand to moisturize the skin and remove any contaminants. After bathing is a good time to use the cream.  Apply enough cream to rub it in well. This is usually about a nickel sized dollop. Rub in well until fully absorbed. This is about 2 minutes. Wash hands after applying cream  DO NOT bathe or swim for an hour after application. The cream my tingle after application, this is normal.  Side effects may include rashes from contamination of the skin surface or allergies, please call us or the pharmacy with any concerns. Continue to maintain healthy lifestyle and weight  Activity as tolerated. You may be sore at the injection site for several days. OK to use over the counter Acetaminophen or Ibuprofen as needed for pain  You may apply ice to your injection site. Call the office if any unusual new swelling, pain or redness develops around your knee.     Steroid injections can be repeated every 3 months if needed  Goal for steroid injection is at least 8 weeks of relief

## 2023-07-12 NOTE — PROGRESS NOTES
Joesphine Bring L. Claudean Hensen a 78year old female presents today for bilateral CSI knee injections. Last 4- lasted about 8 to 10 weeks. No injury or fall since LOV.

## 2023-07-28 ENCOUNTER — TELEPHONE (OUTPATIENT)
Dept: PRIMARY CARE CLINIC | Age: 68
End: 2023-07-28

## 2023-07-28 NOTE — TELEPHONE ENCOUNTER
Patients Ozempic is here in the refrigerator, in [address]. Pt was notified and will pick it up today.

## 2023-08-31 ENCOUNTER — OFFICE VISIT (OUTPATIENT)
Dept: PRIMARY CARE CLINIC | Age: 68
End: 2023-08-31

## 2023-08-31 DIAGNOSIS — G89.4 CHRONIC PAIN SYNDROME: Chronic | ICD-10-CM

## 2023-08-31 DIAGNOSIS — M16.0 PRIMARY OSTEOARTHRITIS OF BOTH HIPS: ICD-10-CM

## 2023-08-31 DIAGNOSIS — E03.9 ACQUIRED HYPOTHYROIDISM: ICD-10-CM

## 2023-08-31 DIAGNOSIS — I50.43 SYSTOLIC AND DIASTOLIC CHF, ACUTE ON CHRONIC (HCC): ICD-10-CM

## 2023-08-31 DIAGNOSIS — M48.061 SPINAL STENOSIS OF LUMBAR REGION WITHOUT NEUROGENIC CLAUDICATION: Primary | ICD-10-CM

## 2023-08-31 DIAGNOSIS — G25.81 RESTLESS LEG SYNDROME: ICD-10-CM

## 2023-08-31 DIAGNOSIS — E66.01 MORBID (SEVERE) OBESITY DUE TO EXCESS CALORIES (HCC): ICD-10-CM

## 2023-08-31 DIAGNOSIS — D51.8 VITAMIN B12 DEFICIENCY (DIETARY) ANEMIA: ICD-10-CM

## 2023-08-31 DIAGNOSIS — M81.0 AGE-RELATED OSTEOPOROSIS WITHOUT CURRENT PATHOLOGICAL FRACTURE: ICD-10-CM

## 2023-08-31 DIAGNOSIS — E11.9 DIET-CONTROLLED DIABETES MELLITUS (HCC): ICD-10-CM

## 2023-08-31 DIAGNOSIS — I89.0 LYMPHEDEMA OF BOTH LOWER EXTREMITIES: Chronic | ICD-10-CM

## 2023-08-31 RX ORDER — PREDNISONE 10 MG/1
TABLET ORAL
Qty: 18 TABLET | Refills: 0 | Status: SHIPPED | OUTPATIENT
Start: 2023-08-31

## 2023-08-31 RX ORDER — METHYLPREDNISOLONE ACETATE 40 MG/ML
40 INJECTION, SUSPENSION INTRA-ARTICULAR; INTRALESIONAL; INTRAMUSCULAR; SOFT TISSUE ONCE
Status: COMPLETED | OUTPATIENT
Start: 2023-08-31 | End: 2023-08-31

## 2023-08-31 RX ADMIN — METHYLPREDNISOLONE ACETATE 40 MG: 40 INJECTION, SUSPENSION INTRA-ARTICULAR; INTRALESIONAL; INTRAMUSCULAR; SOFT TISSUE at 11:53

## 2023-08-31 NOTE — PROGRESS NOTES
today.    Check blood pressure at home twice a day. Low-salt low caffeine diet. Call if systolic blood pressure is above 144 and diastolic blood pressures above 85. Only use a upper arm digital cuff. Aggressive low-fat diet. Avoid red meats, greasy fried foods, dairy products. Avoid processed foods. Take cholesterol medications without food. I informed patient about the risk associated with noncompliance of medication and taking medications incorrectly. Appropriate follow-up with myself and all specialist.  Encourage family members to take active role in assisting with medications and medical care. If any confusion should develop to notify my office immediately to avoid risk of worsening medical condition      A great deal of time spent reviewing medications, diet, exercise, social issues. Also reviewing the chart before entering the room with patient and finishing charting after leaving patient's room. More than half of that time was spent face to face with the patient in counseling and coordinating care. Follow Up: Return in about 5 weeks (around 10/5/2023) for Lab Before.      Seen by:  Ivet Simmons,

## 2023-09-01 VITALS
TEMPERATURE: 97.4 F | WEIGHT: 260.06 LBS | HEIGHT: 62 IN | BODY MASS INDEX: 47.86 KG/M2 | SYSTOLIC BLOOD PRESSURE: 138 MMHG | DIASTOLIC BLOOD PRESSURE: 83 MMHG

## 2023-09-01 RX ORDER — ROPINIROLE 0.5 MG/1
0.5 TABLET, FILM COATED ORAL EVERY 4 HOURS PRN
Qty: 200 TABLET | Refills: 5 | Status: SHIPPED | OUTPATIENT
Start: 2023-09-01

## 2023-09-01 SDOH — ECONOMIC STABILITY: INCOME INSECURITY: HOW HARD IS IT FOR YOU TO PAY FOR THE VERY BASICS LIKE FOOD, HOUSING, MEDICAL CARE, AND HEATING?: NOT HARD AT ALL

## 2023-09-01 SDOH — ECONOMIC STABILITY: FOOD INSECURITY: WITHIN THE PAST 12 MONTHS, YOU WORRIED THAT YOUR FOOD WOULD RUN OUT BEFORE YOU GOT MONEY TO BUY MORE.: NEVER TRUE

## 2023-09-01 SDOH — ECONOMIC STABILITY: FOOD INSECURITY: WITHIN THE PAST 12 MONTHS, THE FOOD YOU BOUGHT JUST DIDN'T LAST AND YOU DIDN'T HAVE MONEY TO GET MORE.: NEVER TRUE

## 2023-09-01 SDOH — ECONOMIC STABILITY: HOUSING INSECURITY
IN THE LAST 12 MONTHS, WAS THERE A TIME WHEN YOU DID NOT HAVE A STEADY PLACE TO SLEEP OR SLEPT IN A SHELTER (INCLUDING NOW)?: NO

## 2023-09-07 NOTE — PROGRESS NOTES
Hospital Medicine    Subjective:  Pt alert conversive no new problems overnite      Current Facility-Administered Medications:     pantoprazole (PROTONIX) tablet 40 mg, 40 mg, Oral, QAM AC, Ifeanyi Dent, DO, 40 mg at 02/02/22 1037    bumetanide (BUMEX) tablet 2 mg, 2 mg, Oral, BID, Ifeanyi Dent, DO, 2 mg at 02/02/22 1810    potassium chloride (KLOR-CON M) extended release tablet 20 mEq, 20 mEq, Oral, BID WC, Ifeanyi Dent, DO, 20 mEq at 02/02/22 1810    ondansetron (ZOFRAN-ODT) disintegrating tablet 4 mg, 4 mg, Oral, Q8H PRN **OR** ondansetron (ZOFRAN) injection 4 mg, 4 mg, IntraVENous, Q6H PRN, Jesus Lard, DO    morphine (PF) injection 2 mg, 2 mg, IntraVENous, Q4H PRN, Ifeanyi Dent, DO, 2 mg at 02/02/22 2045    baclofen (LIORESAL) tablet 5 mg, 5 mg, Oral, TID, Ifeanyi Dent, DO, 5 mg at 02/02/22 2040    buPROPion (WELLBUTRIN XL) extended release tablet 150 mg, 150 mg, Oral, QAM, Ifeanyi Dent, DO, 150 mg at 02/02/22 1036    gabapentin (NEURONTIN) capsule 300 mg, 300 mg, Oral, TID, Ifeanyi Dent, DO, 300 mg at 02/02/22 2040    rOPINIRole (REQUIP) tablet 2 mg, 2 mg, Oral, 4x daily, Ifeanyi Dent, DO, 2 mg at 02/02/22 2040    sodium chloride flush 0.9 % injection 5-40 mL, 5-40 mL, IntraVENous, 2 times per day, Rimma Muro, DO, 10 mL at 02/02/22 2040    sodium chloride flush 0.9 % injection 5-40 mL, 5-40 mL, IntraVENous, PRN, Ifeanyi Dent, DO, 10 mL at 02/02/22 2045    0.9 % sodium chloride infusion, 25 mL, IntraVENous, PRN, Ifeanyi Dent DO    enoxaparin (LOVENOX) injection 40 mg, 40 mg, SubCUTAneous, Daily, Ifeanyi Dent DO, 40 mg at 02/02/22 1036    polyethylene glycol (GLYCOLAX) packet 17 g, 17 g, Oral, Daily PRN, Ifeanyi Dent DO    acetaminophen (TYLENOL) tablet 650 mg, 650 mg, Oral, Q6H PRN **OR** acetaminophen (TYLENOL) suppository 650 mg, 650 mg, Rectal, Q6H PRN, Ifeanyi Dent DO    [DISCONTINUED] pantoprazole (PROTONIX) injection 40 mg, 40 mg, IntraVENous, Daily, 40 mg at 02/01/22 7298 **AND** sodium chloride (PF) 0.9 % injection 10 mL, 10 mL, IntraVENous, Daily, Mona Guerrero DO Jailene, 10 mL at 02/01/22 5417    labetalol (NORMODYNE) tablet 200 mg, 200 mg, Oral, 2 times per day, Noreen Bullock DO, 200 mg at 02/02/22 2040    Buprenorphine HCl FILM 450 mcg, 450 mcg, SubLINGual, BIDCristal Najjarreno RileyDO marlin, 450 mcg at 02/02/22 1809    Objective:    BP (!) 152/93   Pulse 74   Temp 97.9 °F (36.6 °C) (Oral)   Resp 18   Ht 5' 2\" (1.575 m)   Wt 235 lb (106.6 kg)   SpO2 97%   BMI 42.98 kg/m²     Heart:  reg  Lungs:  ctab  Abd: + bs soft nontender  Extrem:  Edema legs    CBC with Differential:    Lab Results   Component Value Date    WBC 7.0 02/01/2022    RBC 5.35 02/01/2022    HGB 12.1 02/01/2022    HCT 41.6 02/01/2022     02/01/2022    MCV 77.8 02/01/2022    MCH 22.6 02/01/2022    MCHC 29.1 02/01/2022    RDW 18.7 02/01/2022    NRBC 0.9 06/02/2021    SEGSPCT 72 02/28/2014    LYMPHOPCT 16.5 01/31/2022    MONOPCT 7.9 01/31/2022    MYELOPCT 0.9 05/21/2021    BASOPCT 0.2 01/31/2022    MONOSABS 0.72 01/31/2022    LYMPHSABS 1.51 01/31/2022    EOSABS 0.17 01/31/2022    BASOSABS 0.02 01/31/2022     CMP:    Lab Results   Component Value Date     02/01/2022    K 4.2 02/01/2022    K 4.2 01/31/2022     02/01/2022    CO2 23 02/01/2022    BUN 9 02/01/2022    CREATININE 0.5 02/01/2022    GFRAA >60 02/01/2022    LABGLOM >60 02/01/2022    GLUCOSE 101 02/01/2022    PROT 6.8 02/01/2022    LABALBU 3.2 02/01/2022    CALCIUM 8.7 02/01/2022    BILITOT 0.4 02/01/2022    ALKPHOS 154 02/01/2022    AST 15 02/01/2022    ALT 14 02/01/2022     Warfarin PT/INR:    Lab Results   Component Value Date    INR 1.1 01/31/2022    INR 1.0 05/10/2021    INR 1.1 04/23/2021    PROTIME 11.4 01/31/2022    PROTIME 11.1 05/10/2021    PROTIME 11.7 04/23/2021       Assessment:    Principal Problem:    Abdominal pain  Active Problems:    Lymphedema of both lower extremities Morbid obesity (HCC)    Chronic pain    Chest pain    Chronic low back pain without sciatica  Resolved Problems:    * No resolved hospital problems.  *      Plan:  Cont bumex kcl protonix order cta chest dc planning        Scot Lemus DO  6:37 AM  2/3/2022 No change

## 2023-09-20 ENCOUNTER — OFFICE VISIT (OUTPATIENT)
Dept: PRIMARY CARE CLINIC | Age: 68
End: 2023-09-20
Payer: MEDICARE

## 2023-09-20 DIAGNOSIS — L03.115 CELLULITIS OF RIGHT LOWER EXTREMITY: Primary | ICD-10-CM

## 2023-09-20 DIAGNOSIS — M51.9 INTERVERTEBRAL LUMBAR DISC DISORDER: Chronic | ICD-10-CM

## 2023-09-20 DIAGNOSIS — I50.812 CHRONIC RIGHT-SIDED CONGESTIVE HEART FAILURE (HCC): ICD-10-CM

## 2023-09-20 DIAGNOSIS — I89.0 LYMPHEDEMA OF BOTH LOWER EXTREMITIES: Chronic | ICD-10-CM

## 2023-09-20 PROCEDURE — G8428 CUR MEDS NOT DOCUMENT: HCPCS | Performed by: FAMILY MEDICINE

## 2023-09-20 PROCEDURE — 1123F ACP DISCUSS/DSCN MKR DOCD: CPT | Performed by: FAMILY MEDICINE

## 2023-09-20 PROCEDURE — 4004F PT TOBACCO SCREEN RCVD TLK: CPT | Performed by: FAMILY MEDICINE

## 2023-09-20 PROCEDURE — G8400 PT W/DXA NO RESULTS DOC: HCPCS | Performed by: FAMILY MEDICINE

## 2023-09-20 PROCEDURE — G8417 CALC BMI ABV UP PARAM F/U: HCPCS | Performed by: FAMILY MEDICINE

## 2023-09-20 PROCEDURE — 3017F COLORECTAL CA SCREEN DOC REV: CPT | Performed by: FAMILY MEDICINE

## 2023-09-20 PROCEDURE — 99214 OFFICE O/P EST MOD 30 MIN: CPT | Performed by: FAMILY MEDICINE

## 2023-09-20 PROCEDURE — 1090F PRES/ABSN URINE INCON ASSESS: CPT | Performed by: FAMILY MEDICINE

## 2023-09-20 RX ORDER — CEPHALEXIN 500 MG/1
500 CAPSULE ORAL 3 TIMES DAILY
Qty: 30 CAPSULE | Refills: 0 | Status: SHIPPED | OUTPATIENT
Start: 2023-09-20

## 2023-09-20 NOTE — PROGRESS NOTES
23  Name: Kateryna Zamora    : 1955    Sex: female    Age: 78 y.o. Subjective:  Chief Complaint: Patient is here for check regarding neuropathy right lower extremity, right leg wound. Cellulitis, lymphedema, spinal stenosis, congestive heart failure,    Patient presents accompanied by daughter. Patient in wheelchair. I received a call yesterday from Dr. Tyrone Brush  Apparently Dr. Acosta Woods from pain management center there. EMG. While doing the study he apparently punctured the skin on the right lower leg. He called me and said he was putting her on Keflex. She has no chills or temperature. She has numbness chronic numbness of her legs. Chronic edema. She is on Keflex 254 times a day. She sees orthopedics and Dr. Candida murdock        Review of Systems   Constitutional: Negative. HENT: Negative. Eyes: Negative. Respiratory: Negative. Cardiovascular: Negative. Gastrointestinal: Negative. Endocrine: Negative. Genitourinary: Negative. Musculoskeletal:  Positive for arthralgias and back pain. Skin:         hpi   Allergic/Immunologic: Negative. Neurological: Negative. Hematological: Negative. Psychiatric/Behavioral: Negative.            Current Outpatient Medications:     cephALEXin (KEFLEX) 500 MG capsule, Take 1 capsule by mouth 3 times daily, Disp: 30 capsule, Rfl: 0    rOPINIRole (REQUIP) 0.5 MG tablet, Take 1 tablet by mouth every 4 hours as needed (restless legs), Disp: 200 tablet, Rfl: 5    Semaglutide, 1 MG/DOSE, 4 MG/3ML SOPN, Inject 1 mg into the skin once a week, Disp: 16 mL, Rfl: 5    predniSONE (DELTASONE) 10 MG tablet, ONE TID FOR THREE DAYS, ONE BID FOR THREE DAYS, ONE QD FOR THREE DAYS   FOOD, Disp: 18 tablet, Rfl: 0    Semaglutide,0.25 or 0.5MG/DOS, (OZEMPIC, 0.25 OR 0.5 MG/DOSE,) 2 MG/1.5ML SOPN, Inject 0.5 mg into the skin once a week, Disp: 16 Adjustable Dose Pre-filled Pen Syringe, Rfl: 5    chlorzoxazone (PARAFON FORTE) 500 MG tablet,

## 2023-09-21 VITALS
TEMPERATURE: 97.9 F | HEIGHT: 62 IN | SYSTOLIC BLOOD PRESSURE: 135 MMHG | WEIGHT: 256.8 LBS | DIASTOLIC BLOOD PRESSURE: 82 MMHG | BODY MASS INDEX: 47.26 KG/M2

## 2023-09-21 PROBLEM — I50.812 CHRONIC RIGHT-SIDED CONGESTIVE HEART FAILURE (HCC): Status: ACTIVE | Noted: 2023-09-21

## 2023-09-21 SDOH — ECONOMIC STABILITY: FOOD INSECURITY: WITHIN THE PAST 12 MONTHS, YOU WORRIED THAT YOUR FOOD WOULD RUN OUT BEFORE YOU GOT MONEY TO BUY MORE.: NEVER TRUE

## 2023-09-21 SDOH — ECONOMIC STABILITY: INCOME INSECURITY: HOW HARD IS IT FOR YOU TO PAY FOR THE VERY BASICS LIKE FOOD, HOUSING, MEDICAL CARE, AND HEATING?: NOT VERY HARD

## 2023-09-21 SDOH — ECONOMIC STABILITY: FOOD INSECURITY: WITHIN THE PAST 12 MONTHS, THE FOOD YOU BOUGHT JUST DIDN'T LAST AND YOU DIDN'T HAVE MONEY TO GET MORE.: NEVER TRUE

## 2023-09-21 ASSESSMENT — ENCOUNTER SYMPTOMS
ROS SKIN COMMENTS: HPI
EYES NEGATIVE: 1
RESPIRATORY NEGATIVE: 1
ALLERGIC/IMMUNOLOGIC NEGATIVE: 1
BACK PAIN: 1
GASTROINTESTINAL NEGATIVE: 1

## 2023-09-26 ENCOUNTER — OFFICE VISIT (OUTPATIENT)
Dept: NEUROSURGERY | Age: 68
End: 2023-09-26
Payer: MEDICARE

## 2023-09-26 VITALS
HEART RATE: 62 BPM | HEIGHT: 62 IN | SYSTOLIC BLOOD PRESSURE: 170 MMHG | DIASTOLIC BLOOD PRESSURE: 106 MMHG | RESPIRATION RATE: 16 BRPM | BODY MASS INDEX: 47.11 KG/M2 | WEIGHT: 256 LBS | OXYGEN SATURATION: 91 %

## 2023-09-26 DIAGNOSIS — Z98.1 S/P LUMBAR FUSION: ICD-10-CM

## 2023-09-26 DIAGNOSIS — G89.29 CHRONIC BILATERAL LOW BACK PAIN WITH LEFT-SIDED SCIATICA: Primary | ICD-10-CM

## 2023-09-26 DIAGNOSIS — M54.42 CHRONIC BILATERAL LOW BACK PAIN WITH LEFT-SIDED SCIATICA: Primary | ICD-10-CM

## 2023-09-26 DIAGNOSIS — M54.16 LUMBAR RADICULOPATHY: ICD-10-CM

## 2023-09-26 PROCEDURE — G8417 CALC BMI ABV UP PARAM F/U: HCPCS | Performed by: STUDENT IN AN ORGANIZED HEALTH CARE EDUCATION/TRAINING PROGRAM

## 2023-09-26 PROCEDURE — 1123F ACP DISCUSS/DSCN MKR DOCD: CPT | Performed by: STUDENT IN AN ORGANIZED HEALTH CARE EDUCATION/TRAINING PROGRAM

## 2023-09-26 PROCEDURE — 99202 OFFICE O/P NEW SF 15 MIN: CPT

## 2023-09-26 PROCEDURE — 1090F PRES/ABSN URINE INCON ASSESS: CPT | Performed by: STUDENT IN AN ORGANIZED HEALTH CARE EDUCATION/TRAINING PROGRAM

## 2023-09-26 PROCEDURE — G8427 DOCREV CUR MEDS BY ELIG CLIN: HCPCS | Performed by: STUDENT IN AN ORGANIZED HEALTH CARE EDUCATION/TRAINING PROGRAM

## 2023-09-26 PROCEDURE — 4004F PT TOBACCO SCREEN RCVD TLK: CPT | Performed by: STUDENT IN AN ORGANIZED HEALTH CARE EDUCATION/TRAINING PROGRAM

## 2023-09-26 PROCEDURE — 3017F COLORECTAL CA SCREEN DOC REV: CPT | Performed by: STUDENT IN AN ORGANIZED HEALTH CARE EDUCATION/TRAINING PROGRAM

## 2023-09-26 PROCEDURE — 99213 OFFICE O/P EST LOW 20 MIN: CPT | Performed by: STUDENT IN AN ORGANIZED HEALTH CARE EDUCATION/TRAINING PROGRAM

## 2023-09-26 PROCEDURE — G8400 PT W/DXA NO RESULTS DOC: HCPCS | Performed by: STUDENT IN AN ORGANIZED HEALTH CARE EDUCATION/TRAINING PROGRAM

## 2023-09-26 NOTE — PROGRESS NOTES
Problem Focused Office Visit     Subjective: Bud Leader is a 78 y.o.  female who is known to our services and had a previous L3-L4 PLIF by Dr. Ann Campbell on 2021. Patient had a stable post-operative period. Patient presents today for evaluation of low back pain. She states she has had this low back pain for a few months. She describes this as a constant pain that radiates into her right leg. She admits to associated numbness and weakness with this pain. She states the pain is worse with bending and walking. She has not tried PT or DOGUIE for this pain. She currently follows with [address]. She denies any bowel or bladder incontinence. She is a current smoker, smoking 1 ppd and denies any blood thinner usage. Physical Exam  HENT:      Head: Normocephalic. Eyes:      Pupils: Pupils are equal, round, and reactive to light. Cardiovascular:      Rate and Rhythm: Normal rate. Pulmonary:      Effort: Pulmonary effort is normal.   Abdominal:      General: There is no distension. Musculoskeletal:         General: Normal range of motion. Cervical back: Normal range of motion. Skin:     General: Skin is warm and dry. Neurological:      Mental Status: She is alert. Comments: A&Ox3  CN3-12 intact  In wheelchair  R KF 4/5 R KE 4/5 otherwise Motor Strength full   Sensation intact to light touch   Mild tenderness to palpation of lumbar spine   Psychiatric:         Thought Content: Thought content normal.         Imagin2023  CT [facility] Sp[facility] from [facility]   1. L2-3 moderate to severe spinal stenosis. 2. Left L4-5 lateral recess stenosis. 3. Intact fusion hardware at L3-4     Assessment: This is a 78 y.o.  female presenting for evaluation of low back pain with radiation in left leg. No recent PT or DOUGIE. CT as above.  Hx previous PLIF     Plan:  -Pain control and expectations discussed, patient does not wish to pursue any surgical measures   -IR procedure request for DOUGIE per

## 2023-09-27 ENCOUNTER — OFFICE VISIT (OUTPATIENT)
Dept: ORTHOPEDIC SURGERY | Age: 68
End: 2023-09-27
Payer: MEDICARE

## 2023-09-27 DIAGNOSIS — M17.0 BILATERAL PRIMARY OSTEOARTHRITIS OF KNEE: Primary | ICD-10-CM

## 2023-09-27 PROCEDURE — G8400 PT W/DXA NO RESULTS DOC: HCPCS | Performed by: PHYSICIAN ASSISTANT

## 2023-09-27 PROCEDURE — 4004F PT TOBACCO SCREEN RCVD TLK: CPT | Performed by: PHYSICIAN ASSISTANT

## 2023-09-27 PROCEDURE — 20610 DRAIN/INJ JOINT/BURSA W/O US: CPT | Performed by: PHYSICIAN ASSISTANT

## 2023-09-27 PROCEDURE — 1123F ACP DISCUSS/DSCN MKR DOCD: CPT | Performed by: PHYSICIAN ASSISTANT

## 2023-09-27 PROCEDURE — G8427 DOCREV CUR MEDS BY ELIG CLIN: HCPCS | Performed by: PHYSICIAN ASSISTANT

## 2023-09-27 PROCEDURE — 99213 OFFICE O/P EST LOW 20 MIN: CPT | Performed by: PHYSICIAN ASSISTANT

## 2023-09-27 PROCEDURE — G8417 CALC BMI ABV UP PARAM F/U: HCPCS | Performed by: PHYSICIAN ASSISTANT

## 2023-09-27 PROCEDURE — 3017F COLORECTAL CA SCREEN DOC REV: CPT | Performed by: PHYSICIAN ASSISTANT

## 2023-09-27 PROCEDURE — 1090F PRES/ABSN URINE INCON ASSESS: CPT | Performed by: PHYSICIAN ASSISTANT

## 2023-09-27 RX ORDER — BUPIVACAINE HYDROCHLORIDE 2.5 MG/ML
6 INJECTION, SOLUTION INFILTRATION; PERINEURAL ONCE
Status: SHIPPED | OUTPATIENT
Start: 2023-09-27

## 2023-09-27 RX ORDER — TRIAMCINOLONE ACETONIDE 40 MG/ML
80 INJECTION, SUSPENSION INTRA-ARTICULAR; INTRAMUSCULAR ONCE
Status: SHIPPED | OUTPATIENT
Start: 2023-09-27

## 2023-09-27 RX ORDER — LIDOCAINE HYDROCHLORIDE 10 MG/ML
6 INJECTION, SOLUTION INFILTRATION; PERINEURAL ONCE
Status: SHIPPED | OUTPATIENT
Start: 2023-09-27

## 2023-10-09 ENCOUNTER — OFFICE VISIT (OUTPATIENT)
Dept: PRIMARY CARE CLINIC | Age: 68
End: 2023-10-09
Payer: MEDICARE

## 2023-10-09 VITALS
HEIGHT: 62 IN | SYSTOLIC BLOOD PRESSURE: 142 MMHG | TEMPERATURE: 97 F | WEIGHT: 260.4 LBS | DIASTOLIC BLOOD PRESSURE: 82 MMHG | BODY MASS INDEX: 47.92 KG/M2

## 2023-10-09 DIAGNOSIS — M15.9 PRIMARY OSTEOARTHRITIS INVOLVING MULTIPLE JOINTS: Primary | Chronic | ICD-10-CM

## 2023-10-09 DIAGNOSIS — Z87.891 PERSONAL HISTORY OF TOBACCO USE: ICD-10-CM

## 2023-10-09 DIAGNOSIS — G47.33 SLEEP APNEA, OBSTRUCTIVE: ICD-10-CM

## 2023-10-09 DIAGNOSIS — E11.9 DIET-CONTROLLED DIABETES MELLITUS (HCC): ICD-10-CM

## 2023-10-09 DIAGNOSIS — E03.9 ACQUIRED HYPOTHYROIDISM: ICD-10-CM

## 2023-10-09 DIAGNOSIS — M48.062 SPINAL STENOSIS OF LUMBAR REGION WITH NEUROGENIC CLAUDICATION: Chronic | ICD-10-CM

## 2023-10-09 DIAGNOSIS — M81.0 AGE-RELATED OSTEOPOROSIS WITHOUT CURRENT PATHOLOGICAL FRACTURE: ICD-10-CM

## 2023-10-09 DIAGNOSIS — I89.0 LYMPHEDEMA OF BOTH LOWER EXTREMITIES: Chronic | ICD-10-CM

## 2023-10-09 DIAGNOSIS — I50.43 SYSTOLIC AND DIASTOLIC CHF, ACUTE ON CHRONIC (HCC): ICD-10-CM

## 2023-10-09 DIAGNOSIS — D51.8 VITAMIN B12 DEFICIENCY (DIETARY) ANEMIA: ICD-10-CM

## 2023-10-09 DIAGNOSIS — Z00.00 INITIAL MEDICARE ANNUAL WELLNESS VISIT: ICD-10-CM

## 2023-10-09 PROBLEM — S81.801A OPEN WOUND OF RIGHT LOWER LEG: Status: RESOLVED | Noted: 2022-10-22 | Resolved: 2023-10-09

## 2023-10-09 PROBLEM — F80.9 COMMUNICATION PROBLEM: Status: RESOLVED | Noted: 2023-01-12 | Resolved: 2023-10-09

## 2023-10-09 PROBLEM — R47.89 OTHER SPEECH DISTURBANCES: Status: RESOLVED | Noted: 2022-10-13 | Resolved: 2023-10-09

## 2023-10-09 LAB
ALBUMIN SERPL-MCNC: 3.6 G/DL (ref 3.5–5.2)
ALP BLD-CCNC: 111 U/L (ref 35–104)
ALT SERPL-CCNC: 11 U/L (ref 0–32)
ANION GAP SERPL CALCULATED.3IONS-SCNC: 14 MMOL/L (ref 7–16)
AST SERPL-CCNC: 11 U/L (ref 0–31)
BACTERIA: ABNORMAL
BASOPHILS ABSOLUTE: 0 K/UL (ref 0–0.2)
BASOPHILS RELATIVE PERCENT: 0 % (ref 0–2)
BILIRUB SERPL-MCNC: 0.5 MG/DL (ref 0–1.2)
BILIRUBIN URINE: NEGATIVE
BUN BLDV-MCNC: 11 MG/DL (ref 6–23)
CALCIUM SERPL-MCNC: 9 MG/DL (ref 8.6–10.2)
CHLORIDE BLD-SCNC: 104 MMOL/L (ref 98–107)
CHOLESTEROL: 164 MG/DL
CO2: 25 MMOL/L (ref 22–29)
COLOR: YELLOW
CREAT SERPL-MCNC: 0.6 MG/DL (ref 0.5–1)
EOSINOPHILS ABSOLUTE: 0.11 K/UL (ref 0.05–0.5)
EOSINOPHILS RELATIVE PERCENT: 1 % (ref 0–6)
GFR SERPL CREATININE-BSD FRML MDRD: >60 ML/MIN/1.73M2
GLUCOSE BLD-MCNC: 93 MG/DL (ref 74–99)
GLUCOSE URINE: NEGATIVE MG/DL
HBA1C MFR BLD: 5.9 % (ref 4–5.6)
HCT VFR BLD CALC: 42.1 % (ref 34–48)
HDLC SERPL-MCNC: 44 MG/DL
HEMOGLOBIN: 12.4 G/DL (ref 11.5–15.5)
KETONES, URINE: NEGATIVE MG/DL
LDL CHOLESTEROL: 102 MG/DL
LEUKOCYTE ESTERASE, URINE: NEGATIVE
LYMPHOCYTES ABSOLUTE: 0.95 K/UL (ref 1.5–4)
LYMPHOCYTES RELATIVE PERCENT: 8 % (ref 20–42)
MCH RBC QN AUTO: 23.9 PG (ref 26–35)
MCHC RBC AUTO-ENTMCNC: 29.5 G/DL (ref 32–34.5)
MCV RBC AUTO: 81.1 FL (ref 80–99.9)
MONOCYTES ABSOLUTE: 0.11 K/UL (ref 0.1–0.95)
MONOCYTES RELATIVE PERCENT: 1 % (ref 2–12)
NEUTROPHILS ABSOLUTE: 11.04 K/UL (ref 1.8–7.3)
NEUTROPHILS RELATIVE PERCENT: 91 % (ref 43–80)
NITRITE, URINE: POSITIVE
NUCLEATED RED BLOOD CELLS: 1 PER 100 WBC
PDW BLD-RTO: 22.2 % (ref 11.5–15)
PH UA: 6 (ref 5–9)
PLATELET, FLUORESCENCE: 162 K/UL (ref 130–450)
PMV BLD AUTO: ABNORMAL FL (ref 7–12)
POTASSIUM SERPL-SCNC: 4 MMOL/L (ref 3.5–5)
PRO-BNP: 267 PG/ML (ref 0–125)
PROTEIN UA: NEGATIVE MG/DL
RBC # BLD: 5.19 M/UL (ref 3.5–5.5)
RBC # BLD: ABNORMAL 10*6/UL
RBC UA: ABNORMAL /HPF
SODIUM BLD-SCNC: 143 MMOL/L (ref 132–146)
SPECIFIC GRAVITY UA: 1.01 (ref 1–1.03)
T4 TOTAL: 8.2 UG/DL (ref 4.5–11.7)
TOTAL PROTEIN: 6.5 G/DL (ref 6.4–8.3)
TRIGL SERPL-MCNC: 89 MG/DL
TSH SERPL DL<=0.05 MIU/L-ACNC: 0.51 UIU/ML (ref 0.27–4.2)
TURBIDITY: CLEAR
URINE HGB: NEGATIVE
UROBILINOGEN, URINE: 2 EU/DL (ref 0–1)
VITAMIN B-12: <150 PG/ML (ref 211–946)
VLDLC SERPL CALC-MCNC: 18 MG/DL
WBC # BLD: 12.2 K/UL (ref 4.5–11.5)
WBC UA: ABNORMAL /HPF

## 2023-10-09 PROCEDURE — 2022F DILAT RTA XM EVC RTNOPTHY: CPT | Performed by: FAMILY MEDICINE

## 2023-10-09 PROCEDURE — G0296 VISIT TO DETERM LDCT ELIG: HCPCS | Performed by: FAMILY MEDICINE

## 2023-10-09 PROCEDURE — G8417 CALC BMI ABV UP PARAM F/U: HCPCS | Performed by: FAMILY MEDICINE

## 2023-10-09 PROCEDURE — G8428 CUR MEDS NOT DOCUMENT: HCPCS | Performed by: FAMILY MEDICINE

## 2023-10-09 PROCEDURE — G8400 PT W/DXA NO RESULTS DOC: HCPCS | Performed by: FAMILY MEDICINE

## 2023-10-09 PROCEDURE — 99214 OFFICE O/P EST MOD 30 MIN: CPT | Performed by: FAMILY MEDICINE

## 2023-10-09 PROCEDURE — 1090F PRES/ABSN URINE INCON ASSESS: CPT | Performed by: FAMILY MEDICINE

## 2023-10-09 PROCEDURE — G8484 FLU IMMUNIZE NO ADMIN: HCPCS | Performed by: FAMILY MEDICINE

## 2023-10-09 PROCEDURE — 1123F ACP DISCUSS/DSCN MKR DOCD: CPT | Performed by: FAMILY MEDICINE

## 2023-10-09 PROCEDURE — 3017F COLORECTAL CA SCREEN DOC REV: CPT | Performed by: FAMILY MEDICINE

## 2023-10-09 PROCEDURE — 4004F PT TOBACCO SCREEN RCVD TLK: CPT | Performed by: FAMILY MEDICINE

## 2023-10-09 PROCEDURE — 3044F HG A1C LEVEL LT 7.0%: CPT | Performed by: FAMILY MEDICINE

## 2023-10-09 ASSESSMENT — PATIENT HEALTH QUESTIONNAIRE - PHQ9
SUM OF ALL RESPONSES TO PHQ QUESTIONS 1-9: 0
8. MOVING OR SPEAKING SO SLOWLY THAT OTHER PEOPLE COULD HAVE NOTICED. OR THE OPPOSITE, BEING SO FIGETY OR RESTLESS THAT YOU HAVE BEEN MOVING AROUND A LOT MORE THAN USUAL: 0
10. IF YOU CHECKED OFF ANY PROBLEMS, HOW DIFFICULT HAVE THESE PROBLEMS MADE IT FOR YOU TO DO YOUR WORK, TAKE CARE OF THINGS AT HOME, OR GET ALONG WITH OTHER PEOPLE: 0
9. THOUGHTS THAT YOU WOULD BE BETTER OFF DEAD, OR OF HURTING YOURSELF: 0
3. TROUBLE FALLING OR STAYING ASLEEP: 0
6. FEELING BAD ABOUT YOURSELF - OR THAT YOU ARE A FAILURE OR HAVE LET YOURSELF OR YOUR FAMILY DOWN: 0
2. FEELING DOWN, DEPRESSED OR HOPELESS: 0
SUM OF ALL RESPONSES TO PHQ QUESTIONS 1-9: 0
5. POOR APPETITE OR OVEREATING: 0
7. TROUBLE CONCENTRATING ON THINGS, SUCH AS READING THE NEWSPAPER OR WATCHING TELEVISION: 0
4. FEELING TIRED OR HAVING LITTLE ENERGY: 0

## 2023-10-09 ASSESSMENT — LIFESTYLE VARIABLES: HOW MANY STANDARD DRINKS CONTAINING ALCOHOL DO YOU HAVE ON A TYPICAL DAY: PATIENT DOES NOT DRINK

## 2023-10-09 ASSESSMENT — ENCOUNTER SYMPTOMS
ALLERGIC/IMMUNOLOGIC NEGATIVE: 1
GASTROINTESTINAL NEGATIVE: 1
RESPIRATORY NEGATIVE: 1
BACK PAIN: 1
EYES NEGATIVE: 1
SHORTNESS OF BREATH: 0

## 2023-10-09 NOTE — PROGRESS NOTES
10/9/23  Name: Karon Palomares    : 1955    Sex: female    Age: 80 y.o. Subjective:  Chief Complaint: Patient is here for  back pain  neuropathy  right lo er leg   righ tleg wound  cellulitis  pain   spianl stneosis   chf     Feel ok in wheelchiar    righ tleg wound helaeid   saw  dr Jethro Ga and  for   injetuon soon in back  at hosp  Did not  take a diueritc  for at least  two weeks    was stephanie hinds  last week          Review of Systems   Constitutional: Negative. HENT: Negative. Eyes: Negative. Respiratory: Negative. Negative for shortness of breath. Cardiovascular:  Positive for leg swelling. Negative for chest pain. Gastrointestinal: Negative. Endocrine: Negative. Genitourinary: Negative. Musculoskeletal:  Positive for back pain. Skin: Negative. Allergic/Immunologic: Negative. Neurological: Negative. Hematological: Negative. Psychiatric/Behavioral: Negative. Current Outpatient Medications:     rOPINIRole (REQUIP) 0.5 MG tablet, Take 1 tablet by mouth every 4 hours as needed (restless legs), Disp: 200 tablet, Rfl: 5    Semaglutide, 1 MG/DOSE, 4 MG/3ML SOPN, Inject 1 mg into the skin once a week, Disp: 16 mL, Rfl: 5    Semaglutide,0.25 or 0.5MG/DOS, (OZEMPIC, 0.25 OR 0.5 MG/DOSE,) 2 MG/1.5ML SOPN, Inject 0.5 mg into the skin once a week, Disp: 16 Adjustable Dose Pre-filled Pen Syringe, Rfl: 5    chlorzoxazone (PARAFON FORTE) 500 MG tablet, Take 1 tablet by mouth 4 times daily as needed for Muscle spasms Tired  do not drive, Disp: 192 tablet, Rfl: 12    vitamin D (ERGOCALCIFEROL) 1.25 MG (38412 UT) CAPS capsule, Take 1 capsule by mouth once a week, Disp: 12 capsule, Rfl: 5    BELBUCA 450 MCG FILM, Place 450 mcg under the tongue in the morning and at bedtime. , Disp: , Rfl:     bumetanide (BUMEX) 2 MG tablet, Take 1 tablet by mouth 2 times daily, Disp: 180 tablet, Rfl: 3    ipratropium-albuterol (DUONEB) 0.5-2.5 (3) MG/3ML SOLN nebulizer solution, Inhale

## 2023-10-09 NOTE — PROGRESS NOTES
Medicare Annual Wellness Visit    Chris Leblanc is here for Medicare AWV    Assessment & Plan   Primary osteoarthritis involving multiple joints  Lymphedema of both lower extremities  Spinal stenosis of lumbar region with neurogenic claudication  Diet-controlled diabetes mellitus ([address])  Sleep apnea, obstructive  Personal history of tobacco use  -     [place] VISIT TO DISCUSS LUNG CA SCREEN W LDCT  -     CT Lung Screen (Initial/Annual/Baseline); Future  Initial Medicare annual wellness visit    Recommendations for Preventive Services Due: see orders and patient instructions/AVS.  Recommended screening schedule for the next 5-10 years is provided to the patient in written form: see Patient Instructions/AVS.     Return for Medicare Annual Wellness Visit in 1 year. Subjective         Chief Complaint: Patient is here for  back pain  neuropathy  right lo er leg   righ tleg wound  cellulitis  pain   spianl stneosis   chf     Feel ok in wheelchiar    righ tleg wound helaeid   saw  dr Shi Chang and  for   injetuon soon in back  at hosp  Did not  take a diueritc  for at least  two weeks    was stephanie hinds  last week    Patient's complete Health Risk Assessment and screening values have been reviewed and are found in [address] [place] [address] Rd. The following problems were reviewed today and where indicated follow up appointments were made and/or referrals ordered. Positive Risk Factor Screenings with Interventions:                Opioid Risk: (High risk score ?55) Opioid risk score:  The patient doesn't have any registry metric data available    Last PDMP Gigi Cowden as Reviewed:  Review User Review Instant Review Result   Del Newell 9/26/2023  5:40 PM @   Reviewed PDMP [1]     Last Controlled Substance Monitoring Documentation      [facility] Office Visit from 8/16/2022 in [facility]   Periodic Controlled Substance Monitoring Possible medication side effects, risk of tolerance/dependence & alternative treatments

## 2023-10-10 ENCOUNTER — TELEPHONE (OUTPATIENT)
Dept: PRIMARY CARE CLINIC | Age: 68
End: 2023-10-10

## 2023-10-10 LAB — VITAMIN D 25-HYDROXY: 17.2 NG/ML (ref 30–100)

## 2023-10-10 RX ORDER — NITROFURANTOIN 25; 75 MG/1; MG/1
100 CAPSULE ORAL 2 TIMES DAILY
Qty: 14 CAPSULE | Refills: 0 | Status: SHIPPED | OUTPATIENT
Start: 2023-10-10

## 2023-10-10 NOTE — TELEPHONE ENCOUNTER
Tell her urine looks like that a lot but if she has any symptoms  go head and start antibitoic     I sent it in

## 2023-10-10 NOTE — TELEPHONE ENCOUNTER
Notify patient most of her lab is good. Vitamin D is better. B12 is too high. Hold it for 2 weeks then reduce the dose in half. Hemoglobin A1c is slightly better. Fluid overload test is stable.    Everything else is the same

## 2023-10-17 DIAGNOSIS — M54.41 ACUTE MIDLINE LOW BACK PAIN WITH RIGHT-SIDED SCIATICA: Primary | ICD-10-CM

## 2023-10-18 ENCOUNTER — HOSPITAL ENCOUNTER (OUTPATIENT)
Dept: INTERVENTIONAL RADIOLOGY/VASCULAR | Age: 68
Discharge: HOME | End: 2023-10-20
Payer: MEDICARE

## 2023-10-18 VITALS
TEMPERATURE: 97.6 F | OXYGEN SATURATION: 96 % | RESPIRATION RATE: 20 BRPM | HEART RATE: 81 BPM | DIASTOLIC BLOOD PRESSURE: 86 MMHG | SYSTOLIC BLOOD PRESSURE: 205 MMHG

## 2023-10-18 DIAGNOSIS — G89.29 CHRONIC BILATERAL LOW BACK PAIN WITH LEFT-SIDED SCIATICA: ICD-10-CM

## 2023-10-18 DIAGNOSIS — M54.41 ACUTE MIDLINE LOW BACK PAIN WITH RIGHT-SIDED SCIATICA: ICD-10-CM

## 2023-10-18 DIAGNOSIS — M54.16 LUMBAR RADICULOPATHY: ICD-10-CM

## 2023-10-18 DIAGNOSIS — Z98.1 S/P LUMBAR FUSION: ICD-10-CM

## 2023-10-18 DIAGNOSIS — M54.42 CHRONIC BILATERAL LOW BACK PAIN WITH LEFT-SIDED SCIATICA: ICD-10-CM

## 2023-10-18 LAB
INR PPP: 1.1
PROTHROMBIN TIME: 11.9 SEC (ref 9.3–12.4)

## 2023-10-18 PROCEDURE — 6360000002 HC RX W HCPCS: Performed by: RADIOLOGY

## 2023-10-18 PROCEDURE — 85610 PROTHROMBIN TIME: CPT

## 2023-10-18 PROCEDURE — 6360000004 HC RX CONTRAST MEDICATION: Performed by: RADIOLOGY

## 2023-10-18 PROCEDURE — 64451 NJX AA&/STRD NRV NRVTG SI JT: CPT

## 2023-10-18 PROCEDURE — 36415 COLL VENOUS BLD VENIPUNCTURE: CPT

## 2023-10-18 PROCEDURE — 2500000003 HC RX 250 WO HCPCS: Performed by: RADIOLOGY

## 2023-10-18 PROCEDURE — 64483 NJX AA&/STRD TFRM EPI L/S 1: CPT

## 2023-10-18 PROCEDURE — 2709999900 IR GUIDED INJ LUMB/SAC TRANSFORAMINAL EPI SINGLE LEVEL

## 2023-10-18 RX ORDER — LIDOCAINE HYDROCHLORIDE 20 MG/ML
INJECTION, SOLUTION INFILTRATION; PERINEURAL PRN
Status: COMPLETED | OUTPATIENT
Start: 2023-10-18 | End: 2023-10-18

## 2023-10-18 RX ORDER — BUPIVACAINE HYDROCHLORIDE 2.5 MG/ML
INJECTION, SOLUTION EPIDURAL; INFILTRATION; INTRACAUDAL PRN
Status: COMPLETED | OUTPATIENT
Start: 2023-10-18 | End: 2023-10-18

## 2023-10-18 RX ORDER — TRIAMCINOLONE ACETONIDE 40 MG/ML
INJECTION, SUSPENSION INTRA-ARTICULAR; INTRAMUSCULAR PRN
Status: COMPLETED | OUTPATIENT
Start: 2023-10-18 | End: 2023-10-18

## 2023-10-18 RX ORDER — LIDOCAINE HYDROCHLORIDE AND EPINEPHRINE BITARTRATE 20; .01 MG/ML; MG/ML
INJECTION, SOLUTION SUBCUTANEOUS PRN
Status: COMPLETED | OUTPATIENT
Start: 2023-10-18 | End: 2023-10-18

## 2023-10-18 RX ADMIN — BUPIVACAINE HYDROCHLORIDE 4 ML: 2.5 INJECTION, SOLUTION EPIDURAL; INFILTRATION; INTRACAUDAL; PERINEURAL at 10:03

## 2023-10-18 RX ADMIN — LIDOCAINE HYDROCHLORIDE,EPINEPHRINE BITARTRATE 4 ML: 20; .01 INJECTION, SOLUTION INFILTRATION; PERINEURAL at 09:48

## 2023-10-18 RX ADMIN — LIDOCAINE HYDROCHLORIDE 4 ML: 20 INJECTION, SOLUTION INFILTRATION; PERINEURAL at 09:48

## 2023-10-18 RX ADMIN — LIDOCAINE HYDROCHLORIDE,EPINEPHRINE BITARTRATE 6 ML: 20; .01 INJECTION, SOLUTION INFILTRATION; PERINEURAL at 09:58

## 2023-10-18 RX ADMIN — IOPAMIDOL 3 ML: 755 INJECTION, SOLUTION INTRAVENOUS at 09:43

## 2023-10-18 RX ADMIN — TRIAMCINOLONE ACETONIDE 80 MG: 40 INJECTION, SUSPENSION INTRA-ARTICULAR; INTRAMUSCULAR at 10:04

## 2023-10-18 RX ADMIN — LIDOCAINE HYDROCHLORIDE 6 ML: 20 INJECTION, SOLUTION INFILTRATION; PERINEURAL at 09:55

## 2023-10-18 NOTE — BRIEF OP NOTE
Brief Postoperative Note      Patient: Cory Gill  YOB: 1955  MRN: [id number]    Date of Procedure: 10/18/2023    Left lumbar radiculopathy  Left S2, S3 PERIPHERAL NERVE BLOCK    Post-Op Diagnosis: Same       Nerve blocks    Chetna Johnson    Assistant:  * No surgical staff found *    Anesthesia: * No anesthesia type entered *    Estimated Blood Loss (mL): Minimal    Complications: None    Specimens:   none    Implants:  * No implants in log *      Drains: * No LDAs found *    Findings: VAS 0/10, decreased pain and increased mobility of the low back and sacrum      Electronically signed by Crissy Fernandez MD on 10/18/2023 at 10:07 AM

## 2023-10-24 ENCOUNTER — PROCEDURE VISIT (OUTPATIENT)
Dept: AUDIOLOGY | Age: 68
End: 2023-10-24
Payer: MEDICARE

## 2023-10-24 ENCOUNTER — TELEPHONE (OUTPATIENT)
Dept: PRIMARY CARE CLINIC | Age: 68
End: 2023-10-24

## 2023-10-24 DIAGNOSIS — H90.A21 SENSORINEURAL HEARING LOSS (SNHL) OF RIGHT EAR WITH RESTRICTED HEARING OF LEFT EAR: Primary | ICD-10-CM

## 2023-10-24 PROCEDURE — 92604 REPROGRAM COCHLEAR IMPLT 7/>: CPT | Performed by: AUDIOLOGIST

## 2023-10-24 NOTE — TELEPHONE ENCOUNTER
Pt notified, Mona Acevedo has arrived and put into the lab fridge. She is sending her daughter down to pick it up. She is on her consent form.

## 2023-10-25 NOTE — PROGRESS NOTES
Patient was here for CI follow up, after last appointment 4/2023. Impedances were good. Magnet site looked good. Patient reported wearing processor infrequently. Datalogging confirmed 1.4 hours a day. Patient reported her reasons for significant decrease in wear time: pain on top of ear, processor gets caught on things and falls off, doesn't like how it sounds on the phone. Counseled extensively on expectations with low wear time. Told patient that it makes sense things no longer sound as good or as clear, because the brain is not getting that exposure, stimulation. Patient reported she understood. Offered options for retention. Patient reported she did not like the huggies, etc. Counseled that clip on patient's magnet probably makes it stick out more, so may get caught on more things. Also makes it heavier. Offered to get longer cable and off ear clip. These were ordered, clip will be sent to patient's home and cable to office. Measured M levels. Gain decreased significantly overall. Counseled on this. Patient reported improved volume after adjustments. Patient was satisfied with loudness and comfort. Patient will follow up when cable comes in.       Shira Cuellar [facility]  [address] [place], [place] [id number]   Electronically signed by Shira Cuellar on 10/25/2023 at 3:03 PM

## 2023-11-03 ENCOUNTER — TELEPHONE (OUTPATIENT)
Dept: PRIMARY CARE CLINIC | Age: 68
End: 2023-11-03

## 2023-11-03 NOTE — TELEPHONE ENCOUNTER
PT notified that her Leda Normanman has arrived from patient assistance and is in our refrigerator. She says she will pick it up when she's able.

## 2023-11-15 ENCOUNTER — TELEPHONE (OUTPATIENT)
Dept: ORTHOPEDIC SURGERY | Age: 68
End: 2023-11-15

## 2023-11-15 NOTE — TELEPHONE ENCOUNTER
Attempted to reach patient in regards to 12/27/2023 appointment. Left patient voicemail inquiring if she was okay to see the APCs for her bilateral knee injections, as Shankar Wang MD will not be in. Instructed patient to callback to confirm if okay to see APC or if she would like to reschedule to see TTS the following week. Await callback from patient.

## 2023-11-21 NOTE — TELEPHONE ENCOUNTER
Attempted to reach patient again at this time.  Spoke to patient and she is okay to see APC on 12/27/2023 and to appointment to [facility].

## 2023-12-27 ENCOUNTER — OFFICE VISIT (OUTPATIENT)
Dept: ORTHOPEDIC SURGERY | Age: 68
End: 2023-12-27
Payer: MEDICARE

## 2023-12-27 DIAGNOSIS — M17.0 BILATERAL PRIMARY OSTEOARTHRITIS OF KNEE: Primary | ICD-10-CM

## 2023-12-27 PROCEDURE — 20610 DRAIN/INJ JOINT/BURSA W/O US: CPT | Performed by: PHYSICIAN ASSISTANT

## 2023-12-27 PROCEDURE — 99213 OFFICE O/P EST LOW 20 MIN: CPT

## 2023-12-27 RX ORDER — TRIAMCINOLONE ACETONIDE 40 MG/ML
80 INJECTION, SUSPENSION INTRA-ARTICULAR; INTRAMUSCULAR ONCE
Status: COMPLETED | OUTPATIENT
Start: 2023-12-27 | End: 2023-12-27

## 2023-12-27 RX ORDER — LIDOCAINE HYDROCHLORIDE 10 MG/ML
6 INJECTION, SOLUTION INFILTRATION; PERINEURAL ONCE
Status: COMPLETED | OUTPATIENT
Start: 2023-12-27 | End: 2023-12-27

## 2023-12-27 RX ORDER — BUPIVACAINE HYDROCHLORIDE 2.5 MG/ML
6 INJECTION, SOLUTION INFILTRATION; PERINEURAL ONCE
Status: COMPLETED | OUTPATIENT
Start: 2023-12-27 | End: 2023-12-27

## 2023-12-27 RX ADMIN — LIDOCAINE HYDROCHLORIDE 6 ML: 10 INJECTION, SOLUTION INFILTRATION; PERINEURAL at 09:30

## 2023-12-27 RX ADMIN — BUPIVACAINE HYDROCHLORIDE 15 MG: 2.5 INJECTION, SOLUTION INFILTRATION; PERINEURAL at 09:30

## 2023-12-27 RX ADMIN — TRIAMCINOLONE ACETONIDE 80 MG: 40 INJECTION, SUSPENSION INTRA-ARTICULAR; INTRAMUSCULAR at 09:30

## 2023-12-27 NOTE — PROGRESS NOTES
provider will be dictating this exam?->CRC     FINDINGS:  There is tricompartmental osteoarthritis of the right and left knee with  severe narrowing of the medial compartment the left knee and moderate to  severe narrowing of the medial and lateral compartment of the right knee. No  evidence of knee joint effusion. There is bone on bone configuration of the  medial compartment of the left knee. Slight interval decrease in joint space  of the medial compartment of the right knee compared to 09/27/2022. No acute  fractures. IMPRESSION:  1. Severe medial compartment osteoarthritis of the left knee similar to  09/04/2020.     2.  Moderate to severe medial and lateral knee joint osteoarthritis of the  right knee, minimally worsened compared to 09/27/2022    Impression:   Encounter Diagnoses   Name Primary? Primary osteoarthritis of right knee Yes    Primary osteoarthritis of left knee        Plan:   Discussed findings with the patient at today's visit. Discussed conservative options for the patients bilateral knee pain including rest, ice, antiinflammatories, Voltaren gel, tylenol, bracing, supervised physician home exercise program, OT/PT, and cortisone injections. No further imagining is necessary at this time. Discussed healthy lifestyle. Patient does not desire surgical management at this time. Patient is happy with their response to cortisone previously. Patient wished to proceed with injections. Repeat injections provided to bilateral knees. Patient consented and this was tolerated well. All questions answered. Knee  Injection Procedure Note  With the patient's written and verbal permission, Bilateral  knee was prepped in standard sterile fashion with betadine and alcohol. Skin of the knee was anesthetized with ethyl chloride spray prior to injection. The knee was then injected with 2 ml Kenalog (80mg), 3 ml PF 0.25% marcaine and 3ml of 1% lidocaine. The patient tolerated this well.  A band-aid was

## 2023-12-29 ENCOUNTER — APPOINTMENT (OUTPATIENT)
Dept: GENERAL RADIOLOGY | Age: 68
End: 2023-12-29
Payer: MEDICARE

## 2023-12-29 ENCOUNTER — APPOINTMENT (OUTPATIENT)
Dept: CT IMAGING | Age: 68
End: 2023-12-29
Payer: MEDICARE

## 2023-12-29 ENCOUNTER — HOSPITAL ENCOUNTER (INPATIENT)
Age: 68
LOS: 7 days | Discharge: HOME | End: 2024-01-06
Attending: EMERGENCY MEDICINE | Admitting: INTERNAL MEDICINE
Payer: MEDICARE

## 2023-12-29 DIAGNOSIS — J10.1 INFLUENZA A: ICD-10-CM

## 2023-12-29 DIAGNOSIS — B33.8 RESPIRATORY SYNCYTIAL VIRUS (RSV): ICD-10-CM

## 2023-12-29 DIAGNOSIS — J96.01 ACUTE RESPIRATORY FAILURE WITH HYPOXIA (HCC): Primary | ICD-10-CM

## 2023-12-29 LAB
ALBUMIN SERPL-MCNC: 3.6 G/DL (ref 3.5–5.2)
ALP SERPL-CCNC: 124 U/L (ref 35–104)
ALT SERPL-CCNC: 13 U/L (ref 0–32)
ANION GAP SERPL CALCULATED.3IONS-SCNC: 15 MMOL/L (ref 7–16)
AST SERPL-CCNC: 24 U/L (ref 0–31)
B PARAP IS1001 DNA NPH QL NAA+NON-PROBE: NOT DETECTED
B PERT DNA SPEC QL NAA+PROBE: NOT DETECTED
B.E.: 0.4 MMOL/L (ref -3–3)
BASOPHILS # BLD: 0.03 K/UL (ref 0–0.2)
BASOPHILS NFR BLD: 0 % (ref 0–2)
BILIRUB SERPL-MCNC: 0.3 MG/DL (ref 0–1.2)
BNP SERPL-MCNC: 352 PG/ML (ref 0–125)
BUN SERPL-MCNC: 12 MG/DL (ref 6–23)
C PNEUM DNA NPH QL NAA+NON-PROBE: NOT DETECTED
CALCIUM SERPL-MCNC: 8.4 MG/DL (ref 8.6–10.2)
CHLORIDE SERPL-SCNC: 98 MMOL/L (ref 98–107)
CO2 SERPL-SCNC: 24 MMOL/L (ref 22–29)
COHB: 2.6 % (ref 0–1.5)
COMMENT: ABNORMAL
CREAT SERPL-MCNC: 0.5 MG/DL (ref 0.5–1)
CRITICAL: ABNORMAL
DATE ANALYZED: ABNORMAL
DATE OF COLLECTION: ABNORMAL
EOSINOPHIL # BLD: 0.01 K/UL (ref 0.05–0.5)
EOSINOPHILS RELATIVE PERCENT: 0 % (ref 0–6)
ERYTHROCYTE [DISTWIDTH] IN BLOOD BY AUTOMATED COUNT: 18.4 % (ref 11.5–15)
FLUAV H1 2009 PAN RNA NPH NAA+NON-PROBE: DETECTED
FLUAV RNA NPH QL NAA+NON-PROBE: DETECTED
FLUBV RNA NPH QL NAA+NON-PROBE: NOT DETECTED
GFR SERPL CREATININE-BSD FRML MDRD: >60 ML/MIN/1.73M2
GLUCOSE SERPL-MCNC: 109 MG/DL (ref 74–99)
HADV DNA NPH QL NAA+NON-PROBE: NOT DETECTED
HCO3: 28.2 MMOL/L (ref 22–26)
HCOV 229E RNA NPH QL NAA+NON-PROBE: NOT DETECTED
HCOV HKU1 RNA NPH QL NAA+NON-PROBE: NOT DETECTED
HCOV NL63 RNA NPH QL NAA+NON-PROBE: NOT DETECTED
HCOV OC43 RNA NPH QL NAA+NON-PROBE: NOT DETECTED
HCT VFR BLD AUTO: 42 % (ref 34–48)
HGB BLD-MCNC: 12.6 G/DL (ref 11.5–15.5)
HHB: 21.3 % (ref 0–5)
HMPV RNA NPH QL NAA+NON-PROBE: NOT DETECTED
HPIV1 RNA NPH QL NAA+NON-PROBE: NOT DETECTED
HPIV2 RNA NPH QL NAA+NON-PROBE: NOT DETECTED
HPIV3 RNA NPH QL NAA+NON-PROBE: NOT DETECTED
HPIV4 RNA NPH QL NAA+NON-PROBE: NOT DETECTED
IMM GRANULOCYTES # BLD AUTO: 0.09 K/UL (ref 0–0.58)
IMM GRANULOCYTES NFR BLD: 1 % (ref 0–5)
INR PPP: 1.2
LAB: ABNORMAL
LACTATE BLDV-SCNC: 1.5 MMOL/L (ref 0.5–2.2)
LYMPHOCYTES NFR BLD: 0.75 K/UL (ref 1.5–4)
LYMPHOCYTES RELATIVE PERCENT: 5 % (ref 20–42)
Lab: 2059
M PNEUMO DNA NPH QL NAA+NON-PROBE: NOT DETECTED
MAGNESIUM SERPL-MCNC: 1.9 MG/DL (ref 1.6–2.6)
MCH RBC QN AUTO: 23.9 PG (ref 26–35)
MCHC RBC AUTO-ENTMCNC: 30 G/DL (ref 32–34.5)
MCV RBC AUTO: 79.7 FL (ref 80–99.9)
METHB: 0.2 % (ref 0–1.5)
MODE: ABNORMAL
MONOCYTES NFR BLD: 1.12 K/UL (ref 0.1–0.95)
MONOCYTES NFR BLD: 8 % (ref 2–12)
NEUTROPHILS NFR BLD: 86 % (ref 43–80)
NEUTS SEG NFR BLD: 12.58 K/UL (ref 1.8–7.3)
O2 CONTENT: 14.4 ML/DL
O2 SATURATION: 78.1 % (ref 92–98.5)
O2HB: 75.9 % (ref 94–97)
OPERATOR ID: ABNORMAL
PATIENT TEMP: 37 C
PCO2: 59.4 MMHG (ref 35–45)
PH BLOOD GAS: 7.29 (ref 7.35–7.45)
PLATELET # BLD AUTO: 215 K/UL (ref 130–450)
PMV BLD AUTO: 11 FL (ref 7–12)
PO2: 47 MMHG (ref 75–100)
POTASSIUM SERPL-SCNC: 4.5 MMOL/L (ref 3.5–5)
PROT SERPL-MCNC: 5.9 G/DL (ref 6.4–8.3)
PROTHROMBIN TIME: 12.6 SEC (ref 9.3–12.4)
RBC # BLD AUTO: 5.27 M/UL (ref 3.5–5.5)
RSV RNA NPH QL NAA+NON-PROBE: DETECTED
RV+EV RNA NPH QL NAA+NON-PROBE: NOT DETECTED
SARS-COV-2 RNA NPH QL NAA+NON-PROBE: NOT DETECTED
SODIUM SERPL-SCNC: 137 MMOL/L (ref 132–146)
SOURCE, BLOOD GAS: ABNORMAL
SPECIMEN DESCRIPTION: ABNORMAL
THB: 13.5 G/DL (ref 11.5–16.5)
TIME ANALYZED: 2101
TROPONIN I SERPL HS-MCNC: 19 NG/L (ref 0–9)
TROPONIN I SERPL HS-MCNC: 24 NG/L (ref 0–9)
WBC OTHER # BLD: 14.6 K/UL (ref 4.5–11.5)

## 2023-12-29 PROCEDURE — 2580000003 HC RX 258: Performed by: RADIOLOGY

## 2023-12-29 PROCEDURE — 82805 BLOOD GASES W/O2 SATURATION: CPT

## 2023-12-29 PROCEDURE — 99285 EMERGENCY DEPT VISIT HI MDM: CPT

## 2023-12-29 PROCEDURE — 83735 ASSAY OF MAGNESIUM: CPT

## 2023-12-29 PROCEDURE — 93005 ELECTROCARDIOGRAM TRACING: CPT

## 2023-12-29 PROCEDURE — 94660 CPAP INITIATION&MGMT: CPT

## 2023-12-29 PROCEDURE — 5A09457 ASSISTANCE WITH RESPIRATORY VENTILATION, 24-96 CONSECUTIVE HOURS, CONTINUOUS POSITIVE AIRWAY PRESSURE: ICD-10-PCS | Performed by: INTERNAL MEDICINE

## 2023-12-29 PROCEDURE — 85025 COMPLETE CBC W/AUTO DIFF WBC: CPT

## 2023-12-29 PROCEDURE — 6370000000 HC RX 637 (ALT 250 FOR IP): Performed by: EMERGENCY MEDICINE

## 2023-12-29 PROCEDURE — 71045 X-RAY EXAM CHEST 1 VIEW: CPT

## 2023-12-29 PROCEDURE — 80053 COMPREHEN METABOLIC PANEL: CPT

## 2023-12-29 PROCEDURE — 85610 PROTHROMBIN TIME: CPT

## 2023-12-29 PROCEDURE — 83605 ASSAY OF LACTIC ACID: CPT

## 2023-12-29 PROCEDURE — 0202U NFCT DS 22 TRGT SARS-COV-2: CPT

## 2023-12-29 PROCEDURE — 71275 CT ANGIOGRAPHY CHEST: CPT

## 2023-12-29 PROCEDURE — 83880 ASSAY OF NATRIURETIC PEPTIDE: CPT

## 2023-12-29 PROCEDURE — 6360000004 HC RX CONTRAST MEDICATION: Performed by: RADIOLOGY

## 2023-12-29 PROCEDURE — 94640 AIRWAY INHALATION TREATMENT: CPT

## 2023-12-29 PROCEDURE — 84484 ASSAY OF TROPONIN QUANT: CPT

## 2023-12-29 PROCEDURE — 2580000003 HC RX 258: Performed by: EMERGENCY MEDICINE

## 2023-12-29 PROCEDURE — 6360000002 HC RX W HCPCS: Performed by: EMERGENCY MEDICINE

## 2023-12-29 PROCEDURE — 87040 BLOOD CULTURE FOR BACTERIA: CPT

## 2023-12-29 PROCEDURE — 96374 THER/PROPH/DIAG INJ IV PUSH: CPT

## 2023-12-29 RX ORDER — SODIUM CHLORIDE 0.9 % (FLUSH) 0.9 %
10 SYRINGE (ML) INJECTION PRN
Status: COMPLETED | OUTPATIENT
Start: 2023-12-29 | End: 2023-12-29

## 2023-12-29 RX ORDER — IPRATROPIUM BROMIDE AND ALBUTEROL SULFATE 2.5; .5 MG/3ML; MG/3ML
1 SOLUTION RESPIRATORY (INHALATION) ONCE
Status: COMPLETED | OUTPATIENT
Start: 2023-12-29 | End: 2023-12-29

## 2023-12-29 RX ORDER — ACETAMINOPHEN 500 MG
1000 TABLET ORAL
Status: COMPLETED | OUTPATIENT
Start: 2023-12-29 | End: 2023-12-29

## 2023-12-29 RX ADMIN — WATER 125 MG: 1 INJECTION INTRAMUSCULAR; INTRAVENOUS; SUBCUTANEOUS at 21:33

## 2023-12-29 RX ADMIN — IPRATROPIUM BROMIDE AND ALBUTEROL SULFATE 1 DOSE: 2.5; .5 SOLUTION RESPIRATORY (INHALATION) at 21:10

## 2023-12-29 RX ADMIN — ACETAMINOPHEN 1000 MG: 500 TABLET ORAL at 22:32

## 2023-12-29 RX ADMIN — IOPAMIDOL 70 ML: 755 INJECTION, SOLUTION INTRAVENOUS at 22:52

## 2023-12-29 RX ADMIN — Medication 10 ML: at 22:52

## 2023-12-29 ASSESSMENT — PAIN - FUNCTIONAL ASSESSMENT: PAIN_FUNCTIONAL_ASSESSMENT: NONE - DENIES PAIN

## 2023-12-29 ASSESSMENT — LIFESTYLE VARIABLES: HOW OFTEN DO YOU HAVE A DRINK CONTAINING ALCOHOL: NEVER

## 2023-12-30 PROBLEM — J10.1 INFLUENZA A: Status: ACTIVE | Noted: 2023-12-30

## 2023-12-30 PROBLEM — J96.01 ACUTE HYPOXEMIC RESPIRATORY FAILURE (HCC): Status: ACTIVE | Noted: 2023-12-30

## 2023-12-30 LAB
ALBUMIN SERPL-MCNC: 3.1 G/DL (ref 3.5–5.2)
ALP SERPL-CCNC: 115 U/L (ref 35–104)
ALT SERPL-CCNC: 11 U/L (ref 0–32)
ANION GAP SERPL CALCULATED.3IONS-SCNC: 15 MMOL/L (ref 7–16)
AST SERPL-CCNC: 18 U/L (ref 0–31)
B.E.: 2.2 MMOL/L (ref -3–3)
BILIRUB SERPL-MCNC: 0.3 MG/DL (ref 0–1.2)
BUN SERPL-MCNC: 13 MG/DL (ref 6–23)
CALCIUM SERPL-MCNC: 8.2 MG/DL (ref 8.6–10.2)
CHLORIDE SERPL-SCNC: 96 MMOL/L (ref 98–107)
CO2 SERPL-SCNC: 23 MMOL/L (ref 22–29)
COHB: 1.5 % (ref 0–1.5)
CREAT SERPL-MCNC: 0.5 MG/DL (ref 0.5–1)
CRITICAL: ABNORMAL
DATE ANALYZED: ABNORMAL
DATE OF COLLECTION: ABNORMAL
GFR SERPL CREATININE-BSD FRML MDRD: >60 ML/MIN/1.73M2
GLUCOSE SERPL-MCNC: 188 MG/DL (ref 74–99)
HCO3: 28.3 MMOL/L (ref 22–26)
HHB: 7.1 % (ref 0–5)
LAB: ABNORMAL
Lab: 350
METHB: 0.3 % (ref 0–1.5)
MODE: ABNORMAL
O2 SATURATION: 92.8 % (ref 92–98.5)
O2HB: 91.1 % (ref 94–97)
OPERATOR ID: 2577
PATIENT TEMP: 37 C
PCO2: 49.9 MMHG (ref 35–45)
PH BLOOD GAS: 7.37 (ref 7.35–7.45)
PO2: 68.4 MMHG (ref 75–100)
POTASSIUM SERPL-SCNC: 4.7 MMOL/L (ref 3.5–5)
PROT SERPL-MCNC: 6.7 G/DL (ref 6.4–8.3)
SODIUM SERPL-SCNC: 134 MMOL/L (ref 132–146)
SOURCE, BLOOD GAS: ABNORMAL
THB: 12.6 G/DL (ref 11.5–16.5)
TIME ANALYZED: 357

## 2023-12-30 PROCEDURE — 80053 COMPREHEN METABOLIC PANEL: CPT

## 2023-12-30 PROCEDURE — 6370000000 HC RX 637 (ALT 250 FOR IP): Performed by: NURSE PRACTITIONER

## 2023-12-30 PROCEDURE — 6360000002 HC RX W HCPCS: Performed by: NURSE PRACTITIONER

## 2023-12-30 PROCEDURE — 6370000000 HC RX 637 (ALT 250 FOR IP): Performed by: INTERNAL MEDICINE

## 2023-12-30 PROCEDURE — 36415 COLL VENOUS BLD VENIPUNCTURE: CPT

## 2023-12-30 PROCEDURE — 2580000003 HC RX 258: Performed by: NURSE PRACTITIONER

## 2023-12-30 PROCEDURE — 6370000000 HC RX 637 (ALT 250 FOR IP): Performed by: EMERGENCY MEDICINE

## 2023-12-30 PROCEDURE — 82805 BLOOD GASES W/O2 SATURATION: CPT

## 2023-12-30 PROCEDURE — 94660 CPAP INITIATION&MGMT: CPT

## 2023-12-30 PROCEDURE — 2140000000 HC CCU INTERMEDIATE R&B

## 2023-12-30 PROCEDURE — 94640 AIRWAY INHALATION TREATMENT: CPT

## 2023-12-30 RX ORDER — BENZONATATE 100 MG/1
100 CAPSULE ORAL 3 TIMES DAILY PRN
Status: DISCONTINUED | OUTPATIENT
Start: 2023-12-30 | End: 2024-01-06 | Stop reason: HOSPADM

## 2023-12-30 RX ORDER — BUMETANIDE 1 MG/1
2 TABLET ORAL 2 TIMES DAILY
Status: DISCONTINUED | OUTPATIENT
Start: 2023-12-30 | End: 2024-01-02

## 2023-12-30 RX ORDER — SENNOSIDES A AND B 8.6 MG/1
1 TABLET, FILM COATED ORAL DAILY PRN
Status: DISCONTINUED | OUTPATIENT
Start: 2023-12-30 | End: 2024-01-06 | Stop reason: HOSPADM

## 2023-12-30 RX ORDER — BACLOFEN 10 MG/1
10 TABLET ORAL 3 TIMES DAILY
Status: DISCONTINUED | OUTPATIENT
Start: 2023-12-30 | End: 2023-12-30 | Stop reason: SDUPTHER

## 2023-12-30 RX ORDER — ONDANSETRON 2 MG/ML
4 INJECTION INTRAMUSCULAR; INTRAVENOUS EVERY 6 HOURS PRN
Status: DISCONTINUED | OUTPATIENT
Start: 2023-12-30 | End: 2024-01-06 | Stop reason: HOSPADM

## 2023-12-30 RX ORDER — POTASSIUM CHLORIDE 7.45 MG/ML
10 INJECTION INTRAVENOUS PRN
Status: DISCONTINUED | OUTPATIENT
Start: 2023-12-30 | End: 2024-01-06 | Stop reason: HOSPADM

## 2023-12-30 RX ORDER — OSELTAMIVIR PHOSPHATE 75 MG/1
75 CAPSULE ORAL 2 TIMES DAILY
Status: COMPLETED | OUTPATIENT
Start: 2023-12-30 | End: 2024-01-03

## 2023-12-30 RX ORDER — ENOXAPARIN SODIUM 100 MG/ML
30 INJECTION SUBCUTANEOUS 2 TIMES DAILY
Status: DISCONTINUED | OUTPATIENT
Start: 2023-12-30 | End: 2024-01-06 | Stop reason: HOSPADM

## 2023-12-30 RX ORDER — IPRATROPIUM BROMIDE AND ALBUTEROL SULFATE 2.5; .5 MG/3ML; MG/3ML
1 SOLUTION RESPIRATORY (INHALATION) EVERY 6 HOURS PRN
Status: DISCONTINUED | OUTPATIENT
Start: 2023-12-30 | End: 2023-12-31

## 2023-12-30 RX ORDER — ONDANSETRON 4 MG/1
4 TABLET, ORALLY DISINTEGRATING ORAL EVERY 8 HOURS PRN
Status: DISCONTINUED | OUTPATIENT
Start: 2023-12-30 | End: 2024-01-06 | Stop reason: HOSPADM

## 2023-12-30 RX ORDER — ACETAMINOPHEN 650 MG/1
650 SUPPOSITORY RECTAL EVERY 6 HOURS PRN
Status: DISCONTINUED | OUTPATIENT
Start: 2023-12-30 | End: 2024-01-06 | Stop reason: HOSPADM

## 2023-12-30 RX ORDER — ROPINIROLE 0.5 MG/1
0.5 TABLET, FILM COATED ORAL NIGHTLY
Status: DISCONTINUED | OUTPATIENT
Start: 2023-12-30 | End: 2024-01-06 | Stop reason: HOSPADM

## 2023-12-30 RX ORDER — ROPINIROLE 0.5 MG/1
0.5 TABLET, FILM COATED ORAL EVERY 4 HOURS PRN
Status: DISCONTINUED | OUTPATIENT
Start: 2023-12-30 | End: 2024-01-06 | Stop reason: HOSPADM

## 2023-12-30 RX ORDER — ERGOCALCIFEROL 1.25 MG/1
50000 CAPSULE ORAL WEEKLY
Status: DISCONTINUED | OUTPATIENT
Start: 2023-12-30 | End: 2023-12-30 | Stop reason: ALTCHOICE

## 2023-12-30 RX ORDER — MAGNESIUM SULFATE IN WATER 40 MG/ML
2000 INJECTION, SOLUTION INTRAVENOUS PRN
Status: DISCONTINUED | OUTPATIENT
Start: 2023-12-30 | End: 2024-01-06 | Stop reason: HOSPADM

## 2023-12-30 RX ORDER — ACETAMINOPHEN 325 MG/1
650 TABLET ORAL EVERY 6 HOURS PRN
Status: DISCONTINUED | OUTPATIENT
Start: 2023-12-30 | End: 2024-01-06 | Stop reason: HOSPADM

## 2023-12-30 RX ORDER — SODIUM CHLORIDE 9 MG/ML
INJECTION, SOLUTION INTRAVENOUS PRN
Status: DISCONTINUED | OUTPATIENT
Start: 2023-12-30 | End: 2024-01-06 | Stop reason: HOSPADM

## 2023-12-30 RX ORDER — POTASSIUM CHLORIDE 20 MEQ/1
40 TABLET, EXTENDED RELEASE ORAL PRN
Status: DISCONTINUED | OUTPATIENT
Start: 2023-12-30 | End: 2024-01-06 | Stop reason: HOSPADM

## 2023-12-30 RX ORDER — TIZANIDINE 4 MG/1
2 TABLET ORAL EVERY 8 HOURS PRN
Status: DISCONTINUED | OUTPATIENT
Start: 2023-12-30 | End: 2023-12-30 | Stop reason: ALTCHOICE

## 2023-12-30 RX ORDER — SODIUM CHLORIDE 0.9 % (FLUSH) 0.9 %
10 SYRINGE (ML) INJECTION PRN
Status: DISCONTINUED | OUTPATIENT
Start: 2023-12-30 | End: 2024-01-06 | Stop reason: HOSPADM

## 2023-12-30 RX ORDER — SODIUM CHLORIDE 0.9 % (FLUSH) 0.9 %
10 SYRINGE (ML) INJECTION EVERY 12 HOURS SCHEDULED
Status: DISCONTINUED | OUTPATIENT
Start: 2023-12-30 | End: 2024-01-06 | Stop reason: HOSPADM

## 2023-12-30 RX ORDER — BACLOFEN 10 MG/1
10 TABLET ORAL 3 TIMES DAILY PRN
Status: DISCONTINUED | OUTPATIENT
Start: 2023-12-30 | End: 2024-01-06 | Stop reason: HOSPADM

## 2023-12-30 RX ADMIN — Medication 10 ML: at 09:59

## 2023-12-30 RX ADMIN — ACETAMINOPHEN 650 MG: 325 TABLET ORAL at 20:13

## 2023-12-30 RX ADMIN — ENOXAPARIN SODIUM 30 MG: 100 INJECTION SUBCUTANEOUS at 20:13

## 2023-12-30 RX ADMIN — Medication 10 ML: at 20:14

## 2023-12-30 RX ADMIN — IPRATROPIUM BROMIDE AND ALBUTEROL SULFATE 1 DOSE: .5; 3 SOLUTION RESPIRATORY (INHALATION) at 16:08

## 2023-12-30 RX ADMIN — BUMETANIDE 2 MG: 1 TABLET ORAL at 09:58

## 2023-12-30 RX ADMIN — BENZONATATE 100 MG: 100 CAPSULE ORAL at 20:13

## 2023-12-30 RX ADMIN — BACLOFEN 10 MG: 10 TABLET ORAL at 20:13

## 2023-12-30 RX ADMIN — WATER 40 MG: 1 INJECTION INTRAMUSCULAR; INTRAVENOUS; SUBCUTANEOUS at 09:43

## 2023-12-30 RX ADMIN — BUMETANIDE 2 MG: 1 TABLET ORAL at 20:12

## 2023-12-30 RX ADMIN — OSELTAMIVIR PHOSPHATE 75 MG: 75 CAPSULE ORAL at 16:21

## 2023-12-30 RX ADMIN — ROPINIROLE HYDROCHLORIDE 0.5 MG: 0.5 TABLET, FILM COATED ORAL at 02:17

## 2023-12-30 RX ADMIN — OSELTAMIVIR PHOSPHATE 75 MG: 75 CAPSULE ORAL at 20:12

## 2023-12-30 RX ADMIN — ENOXAPARIN SODIUM 30 MG: 100 INJECTION SUBCUTANEOUS at 09:59

## 2023-12-30 RX ADMIN — ROPINIROLE HYDROCHLORIDE 0.5 MG: 0.5 TABLET, FILM COATED ORAL at 20:12

## 2023-12-30 NOTE — PROGRESS NOTES
4 Eyes Skin Assessment     NAME:  Selena Ward  YOB: 1955  MEDICAL RECORD NUMBER:  [id number]    The patient is being assessed for  Admission    I agree that at least one RN has performed a thorough Head to Toe Skin Assessment on the patient. ALL assessment sites listed below have been assessed.      Areas assessed by both nurses:    Head, Face, Ears, Shoulders, Back, Chest, Arms, Elbows, Hands, Sacrum. Buttock, Coccyx, Ischium, Legs. Feet and Heels, and Under Medical Devices         Does the Patient have a Wound? No noted wound(s)       Damion Prevention initiated by RN: No  Wound Care Orders initiated by RN: No    Pressure Injury (Stage 3,4, Unstageable, DTI, NWPT, and Complex wounds) if present, place Wound referral order by RN under : No    New Ostomies, if present place, Ostomy referral order under : No     Nurse 1 eSignature: Electronically signed by Ruby Chambers RN on 12/30/23 at 5:29 AM EST    **SHARE this note so that the co-signing nurse can place an eSignature**    Nurse 2 eSignature: Electronically signed by Linda Griffin RN on 12/30/23 at 5:34 AM EST eyes

## 2023-12-30 NOTE — ED PROVIDER NOTES
[facility] EMERGENCY DEPARTMENT  EMERGENCY DEPARTMENT ENCOUNTER        Pt Name: Selena Ward  MRN: [id number]  Birthdate 1955  Date of evaluation: 12/29/2023  Provider: Sunni Jones DO  PCP: Gautam Razo DO  Note Started: 12:05 AM EST 12/30/23    CHIEF COMPLAINT       Chief Complaint   Patient presents with    Shortness of Breath     Increased resp distress arrived 66% in triage NRB applied been sick the past two days,        HISTORY OF PRESENT ILLNESS: 1 or more Elements        Limitations to history : Respiratory distress    Selena Ward is a 68 y.o. female who presents with shortness of breath since the onset 2 days ago.  She arrived to triage with a pulse ox of 66%.  She was placed on nonrebreather and immediately taken back to room 33.  She was subsequently placed on BiPAP immediately.  Her daughter reports that she has had cold symptoms for the past 2 days.  When she went over to her house today she found her in respiratory distress.  They then drove her here for further treatment.    Nursing Notes were all reviewed and agreed with or any disagreements were addressed in the HPI.      REVIEW OF EXTERNAL NOTE :       Reviewed previous office visit on 12/27/2023 with orthopedics.      Chart Review/External Note Review    Last Echo reviewed by Me:  Lab Results   Component Value Date    LVEF 65 03/24/2021             Controlled Substance Monitoring:    Acute and Chronic Pain Monitoring:   RX Monitoring Periodic Controlled Substance Monitoring   8/16/2022  12:52 PM Possible medication side effects, risk of tolerance/dependence & alternative treatments discussed.;No signs of potential drug abuse or diversion identified.;Assessed functional status.           REVIEW OF SYSTEMS :      Positives and Pertinent negatives as per HPI.     SURGICAL HISTORY     Past Surgical History:   Procedure Laterality Date    ABDOMINOPLASTY  2002    BREAST REDUCTION SURGERY    INTERNAL MEDICINE        PROCEDURES   Unless otherwise noted below, none       CRITICAL CARE TIME (.cct)   Critical Care  I spent a total of  65 minutes of critical care time in the evaluation and management of this patient. There was a high probability of clinically significant life-threatening deterioration of the patient's condition requiring my urgent intervention.This includes vital sign monitoring, pulse oximetry monitoring, telemetry monitoring, clinical response to the IV medications, reviewing the nursing notes, consultation time, dictation/documentation time, and interpretation of the labwork. This was necessary to treat or prevent deterioration of the following system(s):   Respiratory, which the patient had and/or has a high probability of suddenly developing.  Critical care time excludes separately billed procedures.    Sunni Jones DO    I am the Primary Clinician of Record.    FINAL IMPRESSION      1. Acute respiratory failure with hypoxia (HCC)    2. Influenza A    3. Respiratory syncytial virus (RSV)          DISPOSITION/PLAN     DISPOSITION Admitted 12/30/2023 01:32:42 AM      PATIENT REFERRED TO:  No follow-up provider specified.    DISCHARGE MEDICATIONS:  New Prescriptions    No medications on file       DISCONTINUED MEDICATIONS:  Discontinued Medications    No medications on file              (Please note that portions of this note were completed with a voice recognition program.  Efforts were made to edit the dictations but occasionally words are mis-transcribed.)    Sunni Jones DO (electronically signed)           Sunni Jones DO  12/30/23 0333

## 2023-12-30 NOTE — CARE COORDINATION
Internal Medicine On-call Care Coordination Note    I was called by the ED physician because they recommended admission for this patient and I cover their PCP.  The history as I understand it after discussion with the ED physician is as follows:    Pt presents with c/o shortness of breath X 2 days found to be 67% saturation on RA, requiring BIPAP then NC. Given duonebs and solumedrol. Positive for influenza A and RSV. Negative for PE. BC pending.     I placed admission orders.  Including:    Continue solumedrol  Duonebs ordered  Reviewed home medications  BIPAP     Dr. Luis or his coverage will see the patient tomorrow for H&P.    Electronically signed by HOLLY Garcia CNP on 12/30/2023 at 2:37 AM

## 2023-12-30 NOTE — H&P
Value Ref Range    Lactic Acid 1.5 0.5 - 2.2 mmol/L   Magnesium    Collection Time: 12/29/23  9:00 PM   Result Value Ref Range    Magnesium 1.9 1.6 - 2.6 mg/dL   Protime-INR    Collection Time: 12/29/23  9:00 PM   Result Value Ref Range    Protime 12.6 (H) 9.3 - 12.4 sec    INR 1.2    Troponin    Collection Time: 12/29/23  9:00 PM   Result Value Ref Range    Troponin, High Sensitivity 19 (H) 0 - 9 ng/L   Respiratory Panel, Molecular, with COVID-19 (Restricted: peds pts or suitable admitted adults)    Collection Time: 12/29/23  9:00 PM    Specimen: Nasopharyngeal Swab   Result Value Ref Range    Specimen Description .NASOPHARYNGEAL SWAB     Adenovirus PCR Not Detected Not Detected    Coronavirus 229E PCR Not Detected Not Detected    Coronavirus HKU1 PCR Not Detected Not Detected    Coronavirus NL63 PCR Not Detected Not Detected    Coronavirus OC43 PCR Not Detected Not Detected    SARS-CoV-2, PCR Not Detected Not Detected    Human Metapneumovirus PCR Not Detected Not Detected    Rhino/Enterovirus PCR Not Detected Not Detected    Influenza A by PCR DETECTED (A) Not Detected    Influenza A H1 (2009) PCR DETECTED (A) Not Detected    Influenza B by PCR Not Detected Not Detected    Parainfluenza 1 PCR Not Detected Not Detected    Parainfluenza 2 PCR Not Detected Not Detected    Parainfluenza 3 PCR Not Detected Not Detected    Parainfluenza 4 PCR Not Detected Not Detected    Resp Syncytial Virus PCR DETECTED (A) Not Detected    Bordetella parapertussis by PCR Not Detected Not Detected    B Pertussis by PCR Not Detected Not Detected    Chlamydia pneumoniae By PCR Not Detected Not Detected    Mycoplasma pneumo by PCR Not Detected Not Detected   EKG 12 Lead    Collection Time: 12/29/23  9:05 PM   Result Value Ref Range    Ventricular Rate 124 BPM    Atrial Rate 124 BPM    P-R Interval 164 ms    QRS Duration 68 ms    Q-T Interval 286 ms    QTc Calculation (Bazett) 410 ms    P Axis 56 degrees    R Axis 11 degrees    T Axis 57            ASSESSMENT :      Principal Problem:    Acute hypoxemic respiratory failure (HCC)  Active Problems:    Influenza A  Resolved Problems:    * No resolved hospital problems. *  bronchospasm    Plan :  Tamiflu  Steroids  Oxygen supplementation  Tessalon for cough        Electronically signed by Sánchez Underwood MD on 12/30/2023 at 1:26 PM    NOTE: This report was transcribed using voice recognition software. Every effort was made to ensure accuracy; however, inadvertent transcription errors may be present

## 2023-12-31 LAB
ALBUMIN SERPL-MCNC: 3.9 G/DL (ref 3.5–5.2)
ALP SERPL-CCNC: 107 U/L (ref 35–104)
ALT SERPL-CCNC: 11 U/L (ref 0–32)
ANION GAP SERPL CALCULATED.3IONS-SCNC: 13 MMOL/L (ref 7–16)
AST SERPL-CCNC: 12 U/L (ref 0–31)
BASOPHILS # BLD: 0.03 K/UL (ref 0–0.2)
BASOPHILS NFR BLD: 0 % (ref 0–2)
BILIRUB SERPL-MCNC: 0.2 MG/DL (ref 0–1.2)
BUN SERPL-MCNC: 15 MG/DL (ref 6–23)
CALCIUM SERPL-MCNC: 8.8 MG/DL (ref 8.6–10.2)
CHLORIDE SERPL-SCNC: 97 MMOL/L (ref 98–107)
CO2 SERPL-SCNC: 31 MMOL/L (ref 22–29)
CREAT SERPL-MCNC: 0.5 MG/DL (ref 0.5–1)
EOSINOPHIL # BLD: 0 K/UL (ref 0.05–0.5)
EOSINOPHILS RELATIVE PERCENT: 0 % (ref 0–6)
ERYTHROCYTE [DISTWIDTH] IN BLOOD BY AUTOMATED COUNT: 18.1 % (ref 11.5–15)
GFR SERPL CREATININE-BSD FRML MDRD: >60 ML/MIN/1.73M2
GLUCOSE SERPL-MCNC: 133 MG/DL (ref 74–99)
HCT VFR BLD AUTO: 41.7 % (ref 34–48)
HGB BLD-MCNC: 12.1 G/DL (ref 11.5–15.5)
IMM GRANULOCYTES # BLD AUTO: 0.07 K/UL (ref 0–0.58)
IMM GRANULOCYTES NFR BLD: 0 % (ref 0–5)
LYMPHOCYTES NFR BLD: 0.92 K/UL (ref 1.5–4)
LYMPHOCYTES RELATIVE PERCENT: 6 % (ref 20–42)
MAGNESIUM SERPL-MCNC: 2.2 MG/DL (ref 1.6–2.6)
MCH RBC QN AUTO: 23.3 PG (ref 26–35)
MCHC RBC AUTO-ENTMCNC: 29 G/DL (ref 32–34.5)
MCV RBC AUTO: 80.2 FL (ref 80–99.9)
MONOCYTES NFR BLD: 1.15 K/UL (ref 0.1–0.95)
MONOCYTES NFR BLD: 7 % (ref 2–12)
NEUTROPHILS NFR BLD: 86 % (ref 43–80)
NEUTS SEG NFR BLD: 13.75 K/UL (ref 1.8–7.3)
PLATELET # BLD AUTO: 208 K/UL (ref 130–450)
PMV BLD AUTO: 11.6 FL (ref 7–12)
POTASSIUM SERPL-SCNC: 3.3 MMOL/L (ref 3.5–5)
PROT SERPL-MCNC: 6.9 G/DL (ref 6.4–8.3)
RBC # BLD AUTO: 5.2 M/UL (ref 3.5–5.5)
SODIUM SERPL-SCNC: 141 MMOL/L (ref 132–146)
WBC OTHER # BLD: 15.9 K/UL (ref 4.5–11.5)

## 2023-12-31 PROCEDURE — 6370000000 HC RX 637 (ALT 250 FOR IP): Performed by: NURSE PRACTITIONER

## 2023-12-31 PROCEDURE — 2500000003 HC RX 250 WO HCPCS: Performed by: INTERNAL MEDICINE

## 2023-12-31 PROCEDURE — 83735 ASSAY OF MAGNESIUM: CPT

## 2023-12-31 PROCEDURE — 94660 CPAP INITIATION&MGMT: CPT

## 2023-12-31 PROCEDURE — 87070 CULTURE OTHR SPECIMN AEROBIC: CPT

## 2023-12-31 PROCEDURE — 6360000002 HC RX W HCPCS: Performed by: NURSE PRACTITIONER

## 2023-12-31 PROCEDURE — 6370000000 HC RX 637 (ALT 250 FOR IP): Performed by: INTERNAL MEDICINE

## 2023-12-31 PROCEDURE — 2580000003 HC RX 258: Performed by: INTERNAL MEDICINE

## 2023-12-31 PROCEDURE — 87205 SMEAR GRAM STAIN: CPT

## 2023-12-31 PROCEDURE — 6370000000 HC RX 637 (ALT 250 FOR IP): Performed by: EMERGENCY MEDICINE

## 2023-12-31 PROCEDURE — 87077 CULTURE AEROBIC IDENTIFY: CPT

## 2023-12-31 PROCEDURE — 80053 COMPREHEN METABOLIC PANEL: CPT

## 2023-12-31 PROCEDURE — 85025 COMPLETE CBC W/AUTO DIFF WBC: CPT

## 2023-12-31 PROCEDURE — 6360000002 HC RX W HCPCS: Performed by: INTERNAL MEDICINE

## 2023-12-31 PROCEDURE — 36415 COLL VENOUS BLD VENIPUNCTURE: CPT

## 2023-12-31 PROCEDURE — 94640 AIRWAY INHALATION TREATMENT: CPT

## 2023-12-31 PROCEDURE — 2580000003 HC RX 258: Performed by: NURSE PRACTITIONER

## 2023-12-31 PROCEDURE — 2140000000 HC CCU INTERMEDIATE R&B

## 2023-12-31 PROCEDURE — 2700000000 HC OXYGEN THERAPY PER DAY

## 2023-12-31 RX ORDER — IPRATROPIUM BROMIDE AND ALBUTEROL SULFATE 2.5; .5 MG/3ML; MG/3ML
1 SOLUTION RESPIRATORY (INHALATION)
Status: DISCONTINUED | OUTPATIENT
Start: 2023-12-31 | End: 2024-01-06 | Stop reason: HOSPADM

## 2023-12-31 RX ORDER — GUAIFENESIN/DEXTROMETHORPHAN 100-10MG/5
10 SYRUP ORAL EVERY 6 HOURS PRN
Status: DISCONTINUED | OUTPATIENT
Start: 2023-12-31 | End: 2024-01-06 | Stop reason: HOSPADM

## 2023-12-31 RX ORDER — HYDROXYZINE PAMOATE 25 MG/1
25 CAPSULE ORAL 3 TIMES DAILY PRN
Status: DISCONTINUED | OUTPATIENT
Start: 2023-12-31 | End: 2024-01-06 | Stop reason: HOSPADM

## 2023-12-31 RX ADMIN — BUMETANIDE 2 MG: 1 TABLET ORAL at 20:20

## 2023-12-31 RX ADMIN — ENOXAPARIN SODIUM 30 MG: 100 INJECTION SUBCUTANEOUS at 09:32

## 2023-12-31 RX ADMIN — Medication 10 ML: at 20:20

## 2023-12-31 RX ADMIN — DOXYCYCLINE 100 MG: 100 INJECTION, POWDER, LYOPHILIZED, FOR SOLUTION INTRAVENOUS at 10:46

## 2023-12-31 RX ADMIN — ACETAMINOPHEN 650 MG: 325 TABLET ORAL at 05:58

## 2023-12-31 RX ADMIN — Medication 10 ML: at 09:32

## 2023-12-31 RX ADMIN — ROPINIROLE HYDROCHLORIDE 0.5 MG: 0.5 TABLET, FILM COATED ORAL at 20:20

## 2023-12-31 RX ADMIN — BENZONATATE 100 MG: 100 CAPSULE ORAL at 05:58

## 2023-12-31 RX ADMIN — IPRATROPIUM BROMIDE AND ALBUTEROL SULFATE 1 DOSE: 2.5; .5 SOLUTION RESPIRATORY (INHALATION) at 11:23

## 2023-12-31 RX ADMIN — ENOXAPARIN SODIUM 30 MG: 100 INJECTION SUBCUTANEOUS at 20:19

## 2023-12-31 RX ADMIN — BUMETANIDE 2 MG: 1 TABLET ORAL at 09:32

## 2023-12-31 RX ADMIN — IPRATROPIUM BROMIDE AND ALBUTEROL SULFATE 1 DOSE: 2.5; .5 SOLUTION RESPIRATORY (INHALATION) at 15:26

## 2023-12-31 RX ADMIN — WATER 1000 MG: 1 INJECTION INTRAMUSCULAR; INTRAVENOUS; SUBCUTANEOUS at 10:37

## 2023-12-31 RX ADMIN — IPRATROPIUM BROMIDE AND ALBUTEROL SULFATE 1 DOSE: 2.5; .5 SOLUTION RESPIRATORY (INHALATION) at 20:06

## 2023-12-31 RX ADMIN — DOXYCYCLINE 100 MG: 100 INJECTION, POWDER, LYOPHILIZED, FOR SOLUTION INTRAVENOUS at 20:24

## 2023-12-31 RX ADMIN — METHYLPREDNISOLONE SODIUM SUCCINATE 40 MG: 40 INJECTION, POWDER, LYOPHILIZED, FOR SOLUTION INTRAMUSCULAR; INTRAVENOUS at 20:19

## 2023-12-31 RX ADMIN — OSELTAMIVIR PHOSPHATE 75 MG: 75 CAPSULE ORAL at 20:20

## 2023-12-31 RX ADMIN — GUAIFENESIN SYRUP AND DEXTROMETHORPHAN 10 ML: 100; 10 SYRUP ORAL at 09:32

## 2023-12-31 RX ADMIN — IPRATROPIUM BROMIDE AND ALBUTEROL SULFATE 1 DOSE: .5; 3 SOLUTION RESPIRATORY (INHALATION) at 04:03

## 2023-12-31 RX ADMIN — OSELTAMIVIR PHOSPHATE 75 MG: 75 CAPSULE ORAL at 09:31

## 2023-12-31 RX ADMIN — METHYLPREDNISOLONE SODIUM SUCCINATE 40 MG: 40 INJECTION, POWDER, LYOPHILIZED, FOR SOLUTION INTRAMUSCULAR; INTRAVENOUS at 10:38

## 2023-12-31 ASSESSMENT — PAIN SCALES - GENERAL: PAINLEVEL_OUTOF10: 6

## 2023-12-31 ASSESSMENT — PAIN DESCRIPTION - DESCRIPTORS: DESCRIPTORS: DISCOMFORT;ACHING

## 2023-12-31 ASSESSMENT — PAIN DESCRIPTION - LOCATION: LOCATION: GENERALIZED

## 2023-12-31 NOTE — PROGRESS NOTES
Pt self administering Belbuca. Pt & family state medication is expensive and refusing to send medication to pharmacy for verification.

## 2023-12-31 NOTE — PLAN OF CARE
Problem: Chronic Conditions and Co-morbidities  Goal: Patient's chronic conditions and co-morbidity symptoms are monitored and maintained or improved  Outcome: Progressing  Flowsheets (Taken 12/31/2023 0830)  Care Plan - Patient's Chronic Conditions and Co-Morbidity Symptoms are Monitored and Maintained or Improved: Monitor and assess patient's chronic conditions and comorbid symptoms for stability, deterioration, or improvement     Problem: Discharge Planning  Goal: Discharge to home or other facility with appropriate resources  Outcome: Progressing  Flowsheets (Taken 12/31/2023 0830)  Discharge to home or other facility with appropriate resources:   Identify barriers to discharge with patient and caregiver   Arrange for needed discharge resources and transportation as appropriate   Identify discharge learning needs (meds, wound care, etc)   Refer to discharge planning if patient needs post-hospital services based on physician order or complex needs related to functional status, cognitive ability or social support system     Problem: Safety - Adult  Goal: Free from fall injury  Outcome: Progressing  Flowsheets (Taken 12/31/2023 1822)  Free From Fall Injury: Instruct family/caregiver on patient safety     Problem: Skin/Tissue Integrity  Goal: Absence of new skin breakdown  Description: 1.  Monitor for areas of redness and/or skin breakdown  2.  Assess vascular access sites hourly  3.  Every 4-6 hours minimum:  Change oxygen saturation probe site  4.  Every 4-6 hours:  If on nasal continuous positive airway pressure, respiratory therapy assess nares and determine need for appliance change or resting period.  Outcome: Progressing

## 2023-12-31 NOTE — PROGRESS NOTES
[facility] Medicine    Subjective:  pt alert conversive sitting up in [facility] c/o cough productive of yellow sputum      Current Facility-Administered Medications:     guaiFENesin-dextromethorphan (ROBITUSSIN DM) 100-10 MG/5ML syrup 10 mL, 10 mL, Oral, Q6H PRN, Facundo Dent,     ipratropium 0.5 mg-albuterol 2.5 mg (DUONEB) nebulizer solution 1 Dose, 1 Dose, Inhalation, Q4H WA RT, Facundo Dent DO    methylPREDNISolone sodium succ (SOLU-MEDROL) 40 mg in sterile water 1 mL injection, 40 mg, IntraVENous, Q12H, Facundo Dent DO    doxycycline (VIBRAMYCIN) 100 mg in sodium chloride 0.9 % 100 mL IVPB (Hmbb5Itc), 100 mg, IntraVENous, Q12H, Facundo Dent DO    cefTRIAXone (ROCEPHIN) 1,000 mg in sterile water 10 mL IV syringe, 1,000 mg, IntraVENous, Q24H, Facundo Dent DO    hydrOXYzine pamoate (VISTARIL) capsule 25 mg, 25 mg, Oral, TID PRN, Facundo Dent,     rOPINIRole (REQUIP) tablet 0.5 mg, 0.5 mg, Oral, Nightly, Sunni Jones DO, 0.5 mg at 12/30/23 2012    bumetanide (BUMEX) tablet 2 mg, 2 mg, Oral, BID, Cambsp, Trang, APRN - CNP, 2 mg at 12/30/23 2012    rOPINIRole (REQUIP) tablet 0.5 mg, 0.5 mg, Oral, Q4H PRN, Cambert, Trang, APRN - CNP    sodium chloride flush 0.9 % injection 10 mL, 10 mL, IntraVENous, 2 times per day, Cambert, Trang, APRN - CNP, 10 mL at 12/30/23 2014    sodium chloride flush 0.9 % injection 10 mL, 10 mL, IntraVENous, PRN, Cambert, Trang, APRN - CNP    0.9 % sodium chloride infusion, , IntraVENous, PRN, Cambert, Trang, APRN - CNP    potassium chloride (KLOR-CON M) extended release tablet 40 mEq, 40 mEq, Oral, PRN **OR** potassium bicarb-citric acid (EFFER-K) effervescent tablet 40 mEq, 40 mEq, Oral, PRN **OR** potassium chloride 10 mEq/100 mL IVPB (Peripheral Line), 10 mEq, IntraVENous, PRN, Cambert, Trang, APRN - CNP    magnesium sulfate 2000 mg in 50 mL IVPB premix, 2,000 mg, IntraVENous, PRN, Cambert, Trang, APRN - CNP    enoxaparin

## 2024-01-01 LAB
ALBUMIN SERPL-MCNC: 3.4 G/DL (ref 3.5–5.2)
ALP SERPL-CCNC: 105 U/L (ref 35–104)
ALT SERPL-CCNC: 9 U/L (ref 0–32)
ANION GAP SERPL CALCULATED.3IONS-SCNC: 10 MMOL/L (ref 7–16)
AST SERPL-CCNC: 10 U/L (ref 0–31)
BASOPHILS # BLD: 0 K/UL (ref 0–0.2)
BASOPHILS NFR BLD: 0 % (ref 0–2)
BILIRUB SERPL-MCNC: 0.2 MG/DL (ref 0–1.2)
BUN SERPL-MCNC: 25 MG/DL (ref 6–23)
CALCIUM SERPL-MCNC: 8.7 MG/DL (ref 8.6–10.2)
CHLORIDE SERPL-SCNC: 96 MMOL/L (ref 98–107)
CO2 SERPL-SCNC: 33 MMOL/L (ref 22–29)
CREAT SERPL-MCNC: 0.6 MG/DL (ref 0.5–1)
EOSINOPHIL # BLD: 0 K/UL (ref 0.05–0.5)
EOSINOPHILS RELATIVE PERCENT: 0 % (ref 0–6)
ERYTHROCYTE [DISTWIDTH] IN BLOOD BY AUTOMATED COUNT: 18.3 % (ref 11.5–15)
GFR SERPL CREATININE-BSD FRML MDRD: >60 ML/MIN/1.73M2
GLUCOSE SERPL-MCNC: 182 MG/DL (ref 74–99)
HCT VFR BLD AUTO: 42.3 % (ref 34–48)
HGB BLD-MCNC: 11.7 G/DL (ref 11.5–15.5)
LYMPHOCYTES NFR BLD: 0.43 K/UL (ref 1.5–4)
LYMPHOCYTES RELATIVE PERCENT: 4 % (ref 20–42)
MCH RBC QN AUTO: 23.3 PG (ref 26–35)
MCHC RBC AUTO-ENTMCNC: 27.7 G/DL (ref 32–34.5)
MCV RBC AUTO: 84.3 FL (ref 80–99.9)
MICROORGANISM SPEC CULT: ABNORMAL
MICROORGANISM SPEC CULT: ABNORMAL
MICROORGANISM/AGENT SPEC: ABNORMAL
MONOCYTES NFR BLD: 0 % (ref 2–12)
MONOCYTES NFR BLD: 0 K/UL (ref 0.1–0.95)
NEUTROPHILS NFR BLD: 96 % (ref 43–80)
NEUTS SEG NFR BLD: 11.48 K/UL (ref 1.8–7.3)
PLATELET CONFIRMATION: NORMAL
PLATELET, FLUORESCENCE: 170 K/UL (ref 130–450)
PMV BLD AUTO: 11.6 FL (ref 7–12)
POTASSIUM SERPL-SCNC: 4.1 MMOL/L (ref 3.5–5)
PROT SERPL-MCNC: 6.8 G/DL (ref 6.4–8.3)
RBC # BLD AUTO: 5.02 M/UL (ref 3.5–5.5)
RBC # BLD: ABNORMAL 10*6/UL
SODIUM SERPL-SCNC: 139 MMOL/L (ref 132–146)
SPECIMEN DESCRIPTION: ABNORMAL
WBC OTHER # BLD: 11.9 K/UL (ref 4.5–11.5)

## 2024-01-01 PROCEDURE — 6360000002 HC RX W HCPCS: Performed by: NURSE PRACTITIONER

## 2024-01-01 PROCEDURE — 36415 COLL VENOUS BLD VENIPUNCTURE: CPT

## 2024-01-01 PROCEDURE — 6370000000 HC RX 637 (ALT 250 FOR IP): Performed by: INTERNAL MEDICINE

## 2024-01-01 PROCEDURE — 2140000000 HC CCU INTERMEDIATE R&B

## 2024-01-01 PROCEDURE — 6360000002 HC RX W HCPCS: Performed by: INTERNAL MEDICINE

## 2024-01-01 PROCEDURE — 2500000003 HC RX 250 WO HCPCS: Performed by: INTERNAL MEDICINE

## 2024-01-01 PROCEDURE — 6370000000 HC RX 637 (ALT 250 FOR IP): Performed by: EMERGENCY MEDICINE

## 2024-01-01 PROCEDURE — 94660 CPAP INITIATION&MGMT: CPT

## 2024-01-01 PROCEDURE — 2700000000 HC OXYGEN THERAPY PER DAY

## 2024-01-01 PROCEDURE — 94640 AIRWAY INHALATION TREATMENT: CPT

## 2024-01-01 PROCEDURE — 85025 COMPLETE CBC W/AUTO DIFF WBC: CPT

## 2024-01-01 PROCEDURE — 2580000003 HC RX 258: Performed by: NURSE PRACTITIONER

## 2024-01-01 PROCEDURE — 80053 COMPREHEN METABOLIC PANEL: CPT

## 2024-01-01 PROCEDURE — 2580000003 HC RX 258: Performed by: INTERNAL MEDICINE

## 2024-01-01 PROCEDURE — 6370000000 HC RX 637 (ALT 250 FOR IP): Performed by: NURSE PRACTITIONER

## 2024-01-01 RX ADMIN — Medication 10 ML: at 09:13

## 2024-01-01 RX ADMIN — DOXYCYCLINE 100 MG: 100 INJECTION, POWDER, LYOPHILIZED, FOR SOLUTION INTRAVENOUS at 09:18

## 2024-01-01 RX ADMIN — IPRATROPIUM BROMIDE AND ALBUTEROL SULFATE 1 DOSE: 2.5; .5 SOLUTION RESPIRATORY (INHALATION) at 08:57

## 2024-01-01 RX ADMIN — DOXYCYCLINE 100 MG: 100 INJECTION, POWDER, LYOPHILIZED, FOR SOLUTION INTRAVENOUS at 21:21

## 2024-01-01 RX ADMIN — IPRATROPIUM BROMIDE AND ALBUTEROL SULFATE 1 DOSE: 2.5; .5 SOLUTION RESPIRATORY (INHALATION) at 20:24

## 2024-01-01 RX ADMIN — WATER 1000 MG: 1 INJECTION INTRAMUSCULAR; INTRAVENOUS; SUBCUTANEOUS at 11:24

## 2024-01-01 RX ADMIN — ROPINIROLE HYDROCHLORIDE 0.5 MG: 0.5 TABLET, FILM COATED ORAL at 20:53

## 2024-01-01 RX ADMIN — BUMETANIDE 2 MG: 1 TABLET ORAL at 20:54

## 2024-01-01 RX ADMIN — IPRATROPIUM BROMIDE AND ALBUTEROL SULFATE 1 DOSE: 2.5; .5 SOLUTION RESPIRATORY (INHALATION) at 12:16

## 2024-01-01 RX ADMIN — ENOXAPARIN SODIUM 30 MG: 100 INJECTION SUBCUTANEOUS at 20:54

## 2024-01-01 RX ADMIN — HYDROXYZINE PAMOATE 25 MG: 25 CAPSULE ORAL at 20:52

## 2024-01-01 RX ADMIN — IPRATROPIUM BROMIDE AND ALBUTEROL SULFATE 1 DOSE: 2.5; .5 SOLUTION RESPIRATORY (INHALATION) at 16:19

## 2024-01-01 RX ADMIN — OSELTAMIVIR PHOSPHATE 75 MG: 75 CAPSULE ORAL at 09:13

## 2024-01-01 RX ADMIN — METHYLPREDNISOLONE SODIUM SUCCINATE 40 MG: 40 INJECTION, POWDER, LYOPHILIZED, FOR SOLUTION INTRAMUSCULAR; INTRAVENOUS at 21:05

## 2024-01-01 RX ADMIN — METHYLPREDNISOLONE SODIUM SUCCINATE 40 MG: 40 INJECTION, POWDER, LYOPHILIZED, FOR SOLUTION INTRAMUSCULAR; INTRAVENOUS at 11:21

## 2024-01-01 RX ADMIN — OSELTAMIVIR PHOSPHATE 75 MG: 75 CAPSULE ORAL at 20:53

## 2024-01-01 RX ADMIN — Medication 10 ML: at 21:04

## 2024-01-01 RX ADMIN — BUMETANIDE 2 MG: 1 TABLET ORAL at 09:13

## 2024-01-01 RX ADMIN — ENOXAPARIN SODIUM 30 MG: 100 INJECTION SUBCUTANEOUS at 09:12

## 2024-01-01 NOTE — PROGRESS NOTES
Unit supply stock does not have a big enough size of mauricio hose stocked, only have XL. Central supply to see if they have 3XL or bigger.

## 2024-01-01 NOTE — PROGRESS NOTES
Patients daughter to bring in patients personal mauricio bynum as the hospital does not have her size

## 2024-01-01 NOTE — PROGRESS NOTES
[facility] Medicine    Subjective:  pt alert conversive c/o chest congestion      Current Facility-Administered Medications:     guaiFENesin-dextromethorphan (ROBITUSSIN DM) 100-10 MG/5ML syrup 10 mL, 10 mL, Oral, Q6H PRN, Facundo Dent, , 10 mL at 12/31/23 0932    ipratropium 0.5 mg-albuterol 2.5 mg (DUONEB) nebulizer solution 1 Dose, 1 Dose, Inhalation, Q4H WA RT, Facundo Dent DO, 1 Dose at 12/31/23 2006    methylPREDNISolone sodium succ (SOLU-MEDROL) 40 mg in sterile water 1 mL injection, 40 mg, IntraVENous, Q12H, Facundo Dent DO, 40 mg at 12/31/23 2019    doxycycline (VIBRAMYCIN) 100 mg in sodium chloride 0.9 % 100 mL IVPB (Nmnc2Idn), 100 mg, IntraVENous, Q12H, Facundo Dent DO, Stopped at 12/31/23 2153    cefTRIAXone (ROCEPHIN) 1,000 mg in sterile water 10 mL IV syringe, 1,000 mg, IntraVENous, Q24H, Facundo Dent DO, 1,000 mg at 12/31/23 1037    hydrOXYzine pamoate (VISTARIL) capsule 25 mg, 25 mg, Oral, TID PRN, Facundo Dent,     rOPINIRole (REQUIP) tablet 0.5 mg, 0.5 mg, Oral, Nightly, HillSunni, DO, 0.5 mg at 12/31/23 2020    bumetanide (BUMEX) tablet 2 mg, 2 mg, Oral, BID, Trang Villalta, APRN - CNP, 2 mg at 12/31/23 2020    rOPINIRole (REQUIP) tablet 0.5 mg, 0.5 mg, Oral, Q4H PRN, Pawan Trang, APRN - CNP    sodium chloride flush 0.9 % injection 10 mL, 10 mL, IntraVENous, 2 times per day, Pawan Trang, APRN - CNP, 10 mL at 12/31/23 2020    sodium chloride flush 0.9 % injection 10 mL, 10 mL, IntraVENous, PRN, Cambsp Trang, APRN - CNP    0.9 % sodium chloride infusion, , IntraVENous, PRN, Cambert, Trang, APRN - CNP    potassium chloride (KLOR-CON M) extended release tablet 40 mEq, 40 mEq, Oral, PRN **OR** potassium bicarb-citric acid (EFFER-K) effervescent tablet 40 mEq, 40 mEq, Oral, PRN **OR** potassium chloride 10 mEq/100 mL IVPB (Peripheral Line), 10 mEq, IntraVENous, PRN, Pawan, Trang, APRN - CNP    magnesium sulfate 2000 mg in 50 mL

## 2024-01-01 NOTE — PLAN OF CARE
Problem: Chronic Conditions and Co-morbidities  Goal: Patient's chronic conditions and co-morbidity symptoms are monitored and maintained or improved  Outcome: Progressing  Flowsheets (Taken 1/1/2024 0830)  Care Plan - Patient's Chronic Conditions and Co-Morbidity Symptoms are Monitored and Maintained or Improved: Monitor and assess patient's chronic conditions and comorbid symptoms for stability, deterioration, or improvement     Problem: Discharge Planning  Goal: Discharge to home or other facility with appropriate resources  Outcome: Progressing  Flowsheets (Taken 1/1/2024 0830)  Discharge to home or other facility with appropriate resources:   Identify barriers to discharge with patient and caregiver   Arrange for needed discharge resources and transportation as appropriate   Identify discharge learning needs (meds, wound care, etc)   Refer to discharge planning if patient needs post-hospital services based on physician order or complex needs related to functional status, cognitive ability or social support system     Problem: Safety - Adult  Goal: Free from fall injury  Outcome: Progressing  Flowsheets (Taken 1/1/2024 0923)  Free From Fall Injury: Instruct family/caregiver on patient safety     Problem: Skin/Tissue Integrity  Goal: Absence of new skin breakdown  Description: 1.  Monitor for areas of redness and/or skin breakdown  2.  Assess vascular access sites hourly  3.  Every 4-6 hours minimum:  Change oxygen saturation probe site  4.  Every 4-6 hours:  If on nasal continuous positive airway pressure, respiratory therapy assess nares and determine need for appliance change or resting period.  Outcome: Progressing

## 2024-01-02 ENCOUNTER — APPOINTMENT (OUTPATIENT)
Dept: GENERAL RADIOLOGY | Age: 69
End: 2024-01-02
Payer: MEDICARE

## 2024-01-02 LAB
ALBUMIN SERPL-MCNC: 3.6 G/DL (ref 3.5–5.2)
ALP SERPL-CCNC: 111 U/L (ref 35–104)
ALT SERPL-CCNC: 11 U/L (ref 0–32)
ANION GAP SERPL CALCULATED.3IONS-SCNC: 10 MMOL/L (ref 7–16)
AST SERPL-CCNC: 11 U/L (ref 0–31)
BASOPHILS # BLD: 0.02 K/UL (ref 0–0.2)
BASOPHILS NFR BLD: 0 % (ref 0–2)
BILIRUB SERPL-MCNC: 0.2 MG/DL (ref 0–1.2)
BUN SERPL-MCNC: 26 MG/DL (ref 6–23)
CALCIUM SERPL-MCNC: 9.2 MG/DL (ref 8.6–10.2)
CHLORIDE SERPL-SCNC: 96 MMOL/L (ref 98–107)
CO2 SERPL-SCNC: 39 MMOL/L (ref 22–29)
CREAT SERPL-MCNC: 0.7 MG/DL (ref 0.5–1)
EKG ATRIAL RATE: 124 BPM
EKG P AXIS: 56 DEGREES
EKG P-R INTERVAL: 164 MS
EKG Q-T INTERVAL: 286 MS
EKG QRS DURATION: 68 MS
EKG QTC CALCULATION (BAZETT): 410 MS
EKG R AXIS: 11 DEGREES
EKG T AXIS: 57 DEGREES
EKG VENTRICULAR RATE: 124 BPM
EOSINOPHIL # BLD: 0 K/UL (ref 0.05–0.5)
EOSINOPHILS RELATIVE PERCENT: 0 % (ref 0–6)
ERYTHROCYTE [DISTWIDTH] IN BLOOD BY AUTOMATED COUNT: 18 % (ref 11.5–15)
GFR SERPL CREATININE-BSD FRML MDRD: >60 ML/MIN/1.73M2
GLUCOSE SERPL-MCNC: 133 MG/DL (ref 74–99)
HCT VFR BLD AUTO: 43 % (ref 34–48)
HGB BLD-MCNC: 12.5 G/DL (ref 11.5–15.5)
IMM GRANULOCYTES # BLD AUTO: 0.08 K/UL (ref 0–0.58)
IMM GRANULOCYTES NFR BLD: 1 % (ref 0–5)
LYMPHOCYTES NFR BLD: 0.97 K/UL (ref 1.5–4)
LYMPHOCYTES RELATIVE PERCENT: 8 % (ref 20–42)
MCH RBC QN AUTO: 23.6 PG (ref 26–35)
MCHC RBC AUTO-ENTMCNC: 29.1 G/DL (ref 32–34.5)
MCV RBC AUTO: 81.1 FL (ref 80–99.9)
MONOCYTES NFR BLD: 0.66 K/UL (ref 0.1–0.95)
MONOCYTES NFR BLD: 5 % (ref 2–12)
NEUTROPHILS NFR BLD: 86 % (ref 43–80)
NEUTS SEG NFR BLD: 10.98 K/UL (ref 1.8–7.3)
PLATELET # BLD AUTO: 256 K/UL (ref 130–450)
PMV BLD AUTO: 12.1 FL (ref 7–12)
POTASSIUM SERPL-SCNC: 3.7 MMOL/L (ref 3.5–5)
PROT SERPL-MCNC: 7.2 G/DL (ref 6.4–8.3)
RBC # BLD AUTO: 5.3 M/UL (ref 3.5–5.5)
SODIUM SERPL-SCNC: 145 MMOL/L (ref 132–146)
WBC OTHER # BLD: 12.7 K/UL (ref 4.5–11.5)

## 2024-01-02 PROCEDURE — 6370000000 HC RX 637 (ALT 250 FOR IP): Performed by: EMERGENCY MEDICINE

## 2024-01-02 PROCEDURE — 6370000000 HC RX 637 (ALT 250 FOR IP): Performed by: NURSE PRACTITIONER

## 2024-01-02 PROCEDURE — 6370000000 HC RX 637 (ALT 250 FOR IP): Performed by: INTERNAL MEDICINE

## 2024-01-02 PROCEDURE — 2580000003 HC RX 258: Performed by: INTERNAL MEDICINE

## 2024-01-02 PROCEDURE — 6360000002 HC RX W HCPCS: Performed by: NURSE PRACTITIONER

## 2024-01-02 PROCEDURE — 2700000000 HC OXYGEN THERAPY PER DAY

## 2024-01-02 PROCEDURE — 80053 COMPREHEN METABOLIC PANEL: CPT

## 2024-01-02 PROCEDURE — 6360000002 HC RX W HCPCS: Performed by: INTERNAL MEDICINE

## 2024-01-02 PROCEDURE — 71045 X-RAY EXAM CHEST 1 VIEW: CPT

## 2024-01-02 PROCEDURE — 2500000003 HC RX 250 WO HCPCS: Performed by: INTERNAL MEDICINE

## 2024-01-02 PROCEDURE — 97161 PT EVAL LOW COMPLEX 20 MIN: CPT

## 2024-01-02 PROCEDURE — 85025 COMPLETE CBC W/AUTO DIFF WBC: CPT

## 2024-01-02 PROCEDURE — 36415 COLL VENOUS BLD VENIPUNCTURE: CPT

## 2024-01-02 PROCEDURE — 2580000003 HC RX 258: Performed by: NURSE PRACTITIONER

## 2024-01-02 PROCEDURE — 2140000000 HC CCU INTERMEDIATE R&B

## 2024-01-02 PROCEDURE — 94660 CPAP INITIATION&MGMT: CPT

## 2024-01-02 PROCEDURE — 94640 AIRWAY INHALATION TREATMENT: CPT

## 2024-01-02 PROCEDURE — 97530 THERAPEUTIC ACTIVITIES: CPT

## 2024-01-02 PROCEDURE — 99222 1ST HOSP IP/OBS MODERATE 55: CPT | Performed by: INTERNAL MEDICINE

## 2024-01-02 RX ORDER — ARFORMOTEROL TARTRATE 15 UG/2ML
15 SOLUTION RESPIRATORY (INHALATION)
Status: DISCONTINUED | OUTPATIENT
Start: 2024-01-02 | End: 2024-01-06 | Stop reason: HOSPADM

## 2024-01-02 RX ORDER — BUDESONIDE 0.5 MG/2ML
0.5 INHALANT ORAL
Status: DISCONTINUED | OUTPATIENT
Start: 2024-01-02 | End: 2024-01-06 | Stop reason: HOSPADM

## 2024-01-02 RX ORDER — BUMETANIDE 0.25 MG/ML
2 INJECTION INTRAMUSCULAR; INTRAVENOUS 2 TIMES DAILY
Status: DISCONTINUED | OUTPATIENT
Start: 2024-01-02 | End: 2024-01-05

## 2024-01-02 RX ADMIN — Medication 10 ML: at 08:44

## 2024-01-02 RX ADMIN — ARFORMOTEROL TARTRATE 15 MCG: 15 SOLUTION RESPIRATORY (INHALATION) at 13:28

## 2024-01-02 RX ADMIN — OSELTAMIVIR PHOSPHATE 75 MG: 75 CAPSULE ORAL at 08:43

## 2024-01-02 RX ADMIN — AZITHROMYCIN MONOHYDRATE 500 MG: 500 INJECTION, POWDER, LYOPHILIZED, FOR SOLUTION INTRAVENOUS at 20:59

## 2024-01-02 RX ADMIN — WATER 1000 MG: 1 INJECTION INTRAMUSCULAR; INTRAVENOUS; SUBCUTANEOUS at 08:42

## 2024-01-02 RX ADMIN — HYDROXYZINE PAMOATE 25 MG: 25 CAPSULE ORAL at 14:07

## 2024-01-02 RX ADMIN — METHYLPREDNISOLONE SODIUM SUCCINATE 40 MG: 40 INJECTION, POWDER, LYOPHILIZED, FOR SOLUTION INTRAMUSCULAR; INTRAVENOUS at 08:44

## 2024-01-02 RX ADMIN — METHYLPREDNISOLONE SODIUM SUCCINATE 40 MG: 40 INJECTION, POWDER, LYOPHILIZED, FOR SOLUTION INTRAMUSCULAR; INTRAVENOUS at 20:45

## 2024-01-02 RX ADMIN — ARFORMOTEROL TARTRATE 15 MCG: 15 SOLUTION RESPIRATORY (INHALATION) at 19:17

## 2024-01-02 RX ADMIN — ENOXAPARIN SODIUM 30 MG: 100 INJECTION SUBCUTANEOUS at 08:42

## 2024-01-02 RX ADMIN — DOXYCYCLINE 100 MG: 100 INJECTION, POWDER, LYOPHILIZED, FOR SOLUTION INTRAVENOUS at 08:40

## 2024-01-02 RX ADMIN — IPRATROPIUM BROMIDE AND ALBUTEROL SULFATE 1 DOSE: 2.5; .5 SOLUTION RESPIRATORY (INHALATION) at 19:17

## 2024-01-02 RX ADMIN — OSELTAMIVIR PHOSPHATE 75 MG: 75 CAPSULE ORAL at 20:40

## 2024-01-02 RX ADMIN — ROPINIROLE HYDROCHLORIDE 0.5 MG: 0.5 TABLET, FILM COATED ORAL at 20:39

## 2024-01-02 RX ADMIN — Medication 10 ML: at 20:45

## 2024-01-02 RX ADMIN — BUDESONIDE INHALATION 500 MCG: 0.5 SUSPENSION RESPIRATORY (INHALATION) at 19:17

## 2024-01-02 RX ADMIN — IPRATROPIUM BROMIDE AND ALBUTEROL SULFATE 1 DOSE: 2.5; .5 SOLUTION RESPIRATORY (INHALATION) at 08:03

## 2024-01-02 RX ADMIN — IPRATROPIUM BROMIDE AND ALBUTEROL SULFATE 1 DOSE: 2.5; .5 SOLUTION RESPIRATORY (INHALATION) at 15:54

## 2024-01-02 RX ADMIN — BUMETANIDE 2 MG: 0.25 INJECTION INTRAMUSCULAR; INTRAVENOUS at 20:43

## 2024-01-02 RX ADMIN — BUMETANIDE 2 MG: 1 TABLET ORAL at 08:43

## 2024-01-02 RX ADMIN — ENOXAPARIN SODIUM 30 MG: 100 INJECTION SUBCUTANEOUS at 20:39

## 2024-01-02 RX ADMIN — BUDESONIDE INHALATION 500 MCG: 0.5 SUSPENSION RESPIRATORY (INHALATION) at 13:28

## 2024-01-02 NOTE — PROGRESS NOTES
Physical Therapy  Physical Therapy Initial Assessment     Name: Selena Ward  : 1955  MRN: [id number]      Date of Service: 2024    Evaluating PT:  Quyen Renteria, PT, DPT, [id number]    Room #:  6408/6408-B  Diagnosis:  Respiratory syncytial virus (RSV) [B33.8]  Influenza A [J10.1]  Acute respiratory failure with hypoxia (HCC) [J96.01]  Acute hypoxemic respiratory failure (HCC) [J96.01]  PMHx/PSHx:    Past Medical History:   Diagnosis Date    Acute pancreatitis without necrosis or infection, unspecified 2022    Acute respiratory failure with hypoxia (HCC) 2022    Arthritis     Communication problem 2023    COPD exacerbation (HCC) 2023    Decreased dorsalis pedis pulse 2022    Dermatophytosis 2022    Full dentures     Leg swelling 2022    Low back pain     Lymphedema of both lower extremities chronic and continues as of 22    Obesity     280 #    Open wound of right lower leg 10/22/2022    Osteoarthritis     Other speech disturbances 10/13/2022    Peripheral vision loss     Stroke (HCC)     Ulcer of calf with fat layer exposed, right (HCC) 2022    WOUND CLINIC DR HOANG      Past Surgical History:   Procedure Laterality Date    ABDOMINOPLASTY      BREAST REDUCTION SURGERY      reduction    CATARACT REMOVAL      bilateral     SECTION      x2    COCHLEAR IMPLANT Right 2022    RIGHT COCHLEAR IMPLANT INSERTION WITH NERVE INTEGRITY MONITOR performed by Adelaida Porter MD at [facility] OR    COLONOSCOPY  2012    and egd    COLONOSCOPY  2021    polyps; marginal prep--jessica    COLONOSCOPY N/A 2021    COLONOSCOPY WITH BIOPSY [place]azan [place]rary [place] [facility]    ECHOCARDIOGRAM COMPLETE 2D W DOPPLER W COLOR  2012         GASTRIC BYPASS SURGERY  2000    NO NASTOGASTRIC TUBE    HERNIA REPAIR          LUMBAR SPINE SURGERY N/A 2021    L3-L4  POSTERIOR LUMBAR INTERBODY  FUSION--OARM, JESSI, YAJAIRA TABLE, CELL  PLOF    Prognosis is fair for reaching above PT goals.    Patient and or family understand(s) diagnosis, prognosis, and plan of care.  yes    PHYSICAL THERAPY PLAN OF CARE:    PT POC is established based on physician order and patient diagnosis     Referring provider/PT Order:    12/30/2023  2:46 AM Trang Villalta APRN - CNP     Diagnosis:  Respiratory syncytial virus (RSV) [B33.8]  Influenza A [J10.1]  Acute respiratory failure with hypoxia (HCC) [J96.01]  Acute hypoxemic respiratory failure (HCC) [J96.01]  Specific instructions for next treatment:  Maximize safe and independent functional mobility     Current Treatment Recommendations:     [x] Strengthening to improve independence with functional mobility   [] ROM to improve independence with functional mobility   [x] Balance Training to improve static/dynamic balance and to reduce fall risk  [x] Endurance Training to improve activity tolerance during functional mobility   [x] Transfer Training to improve safety and independence with all functional transfers   [x] Gait Training to improve gait mechanics, endurance and assess need for appropriate assistive device  [x] Stair Training in preparation for safe discharge home and/or into the community   [x] Positioning to prevent skin breakdown and contractures  [x] Safety and Education Training   [x] Patient/Caregiver Education   [] HEP  [x] Other     PT long term treatment goals are located in above grid    Frequency of treatments: 2-5x/week x 1-2 weeks.    Time in  0945  Time out  1010    Total Treatment Time  10 minutes     Evaluation Time includes thorough review of current medical information, gathering information on past medical history/social history and prior level of function, completion of standardized testing/informal observation of tasks, assessment of data and education on plan of care and goals.    CPT codes:  [x] Low Complexity PT evaluation 52302  [] Moderate Complexity PT evaluation 07206  []

## 2024-01-02 NOTE — PROGRESS NOTES
Decreased oxygen to 3 Liters. No complints. Oxygen saturation maintaining 96% will continue to ween and monitor.

## 2024-01-02 NOTE — PROGRESS NOTES
[facility] Medicine    Subjective:  pt alert conversive sitting up in chair      Current Facility-Administered Medications:     guaiFENesin-dextromethorphan (ROBITUSSIN DM) 100-10 MG/5ML syrup 10 mL, 10 mL, Oral, Q6H PRN, Facundo Dent, , 10 mL at 12/31/23 0932    ipratropium 0.5 mg-albuterol 2.5 mg (DUONEB) nebulizer solution 1 Dose, 1 Dose, Inhalation, Q4H WA RT, Facundo Dent DO, 1 Dose at 01/02/24 0803    methylPREDNISolone sodium succ (SOLU-MEDROL) 40 mg in sterile water 1 mL injection, 40 mg, IntraVENous, Q12H, Facundo Dent DO, 40 mg at 01/01/24 2105    doxycycline (VIBRAMYCIN) 100 mg in sodium chloride 0.9 % 100 mL IVPB ([id number]), 100 mg, IntraVENous, Q12H, Facundo Dent DO, Stopped at 01/02/24 0007    cefTRIAXone (ROCEPHIN) 1,000 mg in sterile water 10 mL IV syringe, 1,000 mg, IntraVENous, Q24H, Facundo Dent DO, 1,000 mg at 01/01/24 1124    hydrOXYzine pamoate (VISTARIL) capsule 25 mg, 25 mg, Oral, TID PRN, Facundo Dent DO, 25 mg at 01/01/24 2052    rOPINIRole (REQUIP) tablet 0.5 mg, 0.5 mg, Oral, Nightly, Sunni Jones DO, 0.5 mg at 01/01/24 2053    bumetanide (BUMEX) tablet 2 mg, 2 mg, Oral, BID, Pawan Trang, APRN - CNP, 2 mg at 01/01/24 2054    rOPINIRole (REQUIP) tablet 0.5 mg, 0.5 mg, Oral, Q4H PRN, Cambert, Trang, APRN - CNP    sodium chloride flush 0.9 % injection 10 mL, 10 mL, IntraVENous, 2 times per day, Cambert, Trang, APRN - CNP, 10 mL at 01/01/24 2104    sodium chloride flush 0.9 % injection 10 mL, 10 mL, IntraVENous, PRN, Cambert, Trang, APRN - CNP    0.9 % sodium chloride infusion, , IntraVENous, PRN, Cambert, Trang, APRN - CNP    potassium chloride (KLOR-CON M) extended release tablet 40 mEq, 40 mEq, Oral, PRN **OR** potassium bicarb-citric acid (EFFER-K) effervescent tablet 40 mEq, 40 mEq, Oral, PRN **OR** potassium chloride 10 mEq/100 mL IVPB (Peripheral Line), 10 mEq, IntraVENous, PRN, Cambert, Trang, APRN - CNP    magnesium  AM    BASOSABS 0.02 01/02/2024 06:18 AM     CMP:    Lab Results   Component Value Date/Time     01/01/2024 04:58 AM    K 4.1 01/01/2024 04:58 AM    K 3.6 09/27/2022 06:51 AM    CL 96 01/01/2024 04:58 AM    CO2 33 01/01/2024 04:58 AM    BUN 25 01/01/2024 04:58 AM    CREATININE 0.6 01/01/2024 04:58 AM    GFRAA >60 09/27/2022 06:51 AM    LABGLOM >60 01/01/2024 04:58 AM    GLUCOSE 182 01/01/2024 04:58 AM    PROT 6.8 01/01/2024 04:58 AM    LABALBU 3.4 01/01/2024 04:58 AM    CALCIUM 8.7 01/01/2024 04:58 AM    BILITOT 0.2 01/01/2024 04:58 AM    ALKPHOS 105 01/01/2024 04:58 AM    AST 10 01/01/2024 04:58 AM    ALT 9 01/01/2024 04:58 AM     Warfarin PT/INR:    Lab Results   Component Value Date    INR 1.2 12/29/2023    INR 1.1 10/18/2023    INR 1.1 01/31/2022    PROTIME 12.6 (H) 12/29/2023    PROTIME 11.9 10/18/2023    PROTIME 11.4 01/31/2022       Assessment:    Principal Problem:    Acute hypoxemic respiratory failure (HCC)  Active Problems:    Influenza A  Resolved Problems:    * No resolved hospital problems. *  H influenza    Plan:  Cont aerosols atb        Facundo Dent DO  8:21 AM  1/2/2024

## 2024-01-02 NOTE — PROGRESS NOTES
Per attending ok for pulmonary consult. Pulmonary notified via perfect serve of new patient consult.

## 2024-01-02 NOTE — PROGRESS NOTES
Weened patient to 2 L continues to be at 90-91%. Having a little anxiety over sitting in chair so much. Offered to go to bed but family wanted a visteral to help with anxiety. I also offered pain medication but she was goint to wait to closer to bedtime

## 2024-01-02 NOTE — CONSULTS
Pulmonary Critical Care Medicine      PULMONARY CRITICAL CARE CONSULTATION NOTE.    01/02/24    CONSULTING  PHYSICIAN: Dr. Dent    REASON FOR REFERRAL:  AECOPD      Assessment / Recommendations-     Acute on chronic respiratory insufficiency secondary to AECOPD from Flu A LRTI   Post covid Pneumonitis   Severe TOBIAS   Current smoker     - Continue care in med/surg floor   - Start nocturnal BiPAP at 12/5  - Respiratory viral Panel - positive for Flu A   - Follow up Sputum cultures   - Follow up urine Antigens   - Continue Duonebs  Scheduled   - start Pulmicort and perforomist  nebulizations   - Cycle Troponins   - Diuresis - Bumex BID IV     - Ceftriaxone and Doxy started. Change doxy to Azithromycin   - Continue Systemic steroids IV    - IS, FV   - Continue supplemental O2 and wean as tolerated , Pt does not use O2 at home.     History of Present Illness    Ms. Selena Ward  is a 67 y.o.  current daily smoker  female  with over 40 pkyr H/o smoking previously admitted to [facility] in [place] December 2022 . This time comes in with increasing SOB, PATEL , cough with whitish phlegm , fatigue and myalgias . Was found to be positive for Flu A in ED . She contracted COVID in April 2021, December 2021 and has tested positive again during  last  hospital visit , but negative this time.   She continues to smoke about half a pack of cigarettes a day and she has got a history of about 45 pack years     Current Baseline symptomatology- She admits to not having symptoms at rest but also she does not do much these days.  Cough - moderate in severity , more in early am and gets better through the day , associated with whitish phlegm.   PATEL - MMRC score of II , present for several years and gradually worsening over the years. Improved with rescue inhaler or rest.   Wheezing - comes with exertion frequently  , mild to moderate in severity , relieved with rest or albuterol ( has used in the past) .       0.9 % 100 mL IVPB (Mugg0Lsc)  100 mg IntraVENous Q12H Facundo Dent, DO   Stopped at 01/02/24 1005    cefTRIAXone (ROCEPHIN) 1,000 mg in sterile water 10 mL IV syringe  1,000 mg IntraVENous Q24H Facundo Dent, DO   1,000 mg at 01/02/24 0842    hydrOXYzine pamoate (VISTARIL) capsule 25 mg  25 mg Oral TID PRN Facundo Dent, DO   25 mg at 01/02/24 1407    rOPINIRole (REQUIP) tablet 0.5 mg  0.5 mg Oral Nightly Audra Jonesmassiel PEREIRA, DO   0.5 mg at 01/01/24 2053    bumetanide (BUMEX) tablet 2 mg  2 mg Oral BID Cambsp, Trang, APRN - CNP   2 mg at 01/02/24 0843    rOPINIRole (REQUIP) tablet 0.5 mg  0.5 mg Oral Q4H PRN Cambert, Trang, APRN - CNP        sodium chloride flush 0.9 % injection 10 mL  10 mL IntraVENous 2 times per day Cambert, Trang, APRN - CNP   10 mL at 01/02/24 0844    sodium chloride flush 0.9 % injection 10 mL  10 mL IntraVENous PRN Cambert, Trang, APRN - CNP        0.9 % sodium chloride infusion   IntraVENous PRN Cambert, Trang, APRN - CNP        potassium chloride (KLOR-CON M) extended release tablet 40 mEq  40 mEq Oral PRN Cambert, Trang, APRN - CNP        Or    potassium bicarb-citric acid (EFFER-K) effervescent tablet 40 mEq  40 mEq Oral PRN Cambert, Trang, APRN - CNP        Or    potassium chloride 10 mEq/100 mL IVPB (Peripheral Line)  10 mEq IntraVENous PRN Cambert, Trang, APRN - CNP        magnesium sulfate 2000 mg in 50 mL IVPB premix  2,000 mg IntraVENous PRN Cambert, Trang, APRN - CNP        enoxaparin Sodium (LOVENOX) injection 30 mg  30 mg SubCUTAneous BID Cambert, Trang, APRN - CNP   30 mg at 01/02/24 0842    ondansetron (ZOFRAN-ODT) disintegrating tablet 4 mg  4 mg Oral Q8H PRN Cambert, Trang, APRN - CNP        Or    ondansetron (ZOFRAN) injection 4 mg  4 mg IntraVENous Q6H PRN Trang Villalta APRN - CNP        senna (SENOKOT) tablet 8.6 mg  1 tablet Oral Daily PRN Trang Villalta APRN - CNP        acetaminophen (TYLENOL) tablet 650 mg

## 2024-01-02 NOTE — ACP (ADVANCE CARE PLANNING)
Advance Care Planning   Healthcare Decision Maker:    Primary Decision Maker: Katharine Ward - Child - [phone]    Today we documented Decision Maker(s) consistent with Legal Next of Kin hierarchy.    Electronically signed by DAMON Jones on 1/2/2024 at 2:54 PM

## 2024-01-02 NOTE — PROGRESS NOTES
Oxygen was turned down to 4 liters and has been at 92% will continue weening has been ambulating to bathroom and back to chair

## 2024-01-02 NOTE — PROGRESS NOTES
Physician Progress Note      PATIENT:               REVA GLYNN  CSN #:                  [id number]  :                       1955  ADMIT DATE:       2023 8:49 PM  DISCH DATE:  RESPONDING  PROVIDER #:        Facundo Dent DO          QUERY TEXT:    Patient admitted with acute respiratory failure, noted to have respiratory   culture positive for H. Infuenzae and is being treated with antibiotics. If   possible, please document in progress notes and discharge summary if you are   evaluating and/or treating any of the following:    The medical record reflects the following:  Risk Factors: Influenza A and RSV positive  Clinical Indicators: Respiratory culture on  was positive for Haemophilus   Influenzae with heavy growth  Treatment: IV Rocephin, IV Doxycycline, respiratory culture    Thank you,  Luisa Arreguin, RN, BSN, CDIS  Clinical Documentation Improvement  [email or web address]  Options provided:  -- H. Influenzae pneumonia  -- H. Influenzae positive respiratory culture is not clinically significant  -- Other - I will add my own diagnosis  -- Disagree - Not applicable / Not valid  -- Disagree - Clinically unable to determine / Unknown  -- Refer to Clinical Documentation Reviewer    PROVIDER RESPONSE TEXT:    This patient has H. Influenzae pneumonia.    Query created by: Luisa Arreguin on 2024 10:50 AM      Electronically signed by:  Facundo Dent DO 2024 12:51 PM

## 2024-01-02 NOTE — CARE COORDINATION
Patient presented to the ED due to shortness of breath, pulse-ox 66%; admitted for acute hypoxemic respiratory failure. Spoke with patient for transition of care planning. Patient reports she lives in a 2-story home with her daughter, Katharine, resides on the main floor, 2 steps to enter. Patient states she ambulates with a walker, drives and has a wheelchair at home. [facility]ses [facility] ([facility]) and PCP is Dr. Razo. Patient reports she is getting up fine in the room, plans to return home and daughter to transport. Patient currently on 2L NC, no oxygen at baseline, IV Rocephin Q24, IV doxycycline Q12, IV Solumedrol Q12 and duonebs Q4; pending chest XR and pulmonology consult. If oxygen is needed at discharge, patient has no preference on DME company; list offered.    The Plan for Transition of Care is related to the following treatment goals: discharge planning    The Patient and/or patient representative Selena Ward was provided with a choice of provider and agrees with the discharge plan. [x] Yes [] No    Freedom of choice list was provided with basic dialogue that supports the patient's individualized plan of care/goals, treatment preferences and shares the quality data associated with the providers. [x] Yes [] No     Electronically signed by DAMON Jones on 1/2/2024 at 2:52 PM

## 2024-01-03 LAB
ALBUMIN SERPL-MCNC: 3.7 G/DL (ref 3.5–5.2)
ALP SERPL-CCNC: 105 U/L (ref 35–104)
ALT SERPL-CCNC: 12 U/L (ref 0–32)
ANION GAP SERPL CALCULATED.3IONS-SCNC: 11 MMOL/L (ref 7–16)
AST SERPL-CCNC: 11 U/L (ref 0–31)
BASOPHILS # BLD: 0.01 K/UL (ref 0–0.2)
BASOPHILS NFR BLD: 0 % (ref 0–2)
BILIRUB SERPL-MCNC: 0.2 MG/DL (ref 0–1.2)
BNP SERPL-MCNC: 522 PG/ML (ref 0–125)
BUN SERPL-MCNC: 26 MG/DL (ref 6–23)
CALCIUM SERPL-MCNC: 9.1 MG/DL (ref 8.6–10.2)
CHLORIDE SERPL-SCNC: 93 MMOL/L (ref 98–107)
CO2 SERPL-SCNC: 37 MMOL/L (ref 22–29)
CREAT SERPL-MCNC: 0.5 MG/DL (ref 0.5–1)
EOSINOPHIL # BLD: 0 K/UL (ref 0.05–0.5)
EOSINOPHILS RELATIVE PERCENT: 0 % (ref 0–6)
ERYTHROCYTE [DISTWIDTH] IN BLOOD BY AUTOMATED COUNT: 17.7 % (ref 11.5–15)
GFR SERPL CREATININE-BSD FRML MDRD: >60 ML/MIN/1.73M2
GLUCOSE SERPL-MCNC: 261 MG/DL (ref 74–99)
HCT VFR BLD AUTO: 40 % (ref 34–48)
HGB BLD-MCNC: 11.7 G/DL (ref 11.5–15.5)
IMM GRANULOCYTES # BLD AUTO: 0.11 K/UL (ref 0–0.58)
IMM GRANULOCYTES NFR BLD: 1 % (ref 0–5)
LYMPHOCYTES NFR BLD: 0.99 K/UL (ref 1.5–4)
LYMPHOCYTES RELATIVE PERCENT: 8 % (ref 20–42)
MCH RBC QN AUTO: 23.4 PG (ref 26–35)
MCHC RBC AUTO-ENTMCNC: 29.3 G/DL (ref 32–34.5)
MCV RBC AUTO: 79.8 FL (ref 80–99.9)
MICROORGANISM SPEC CULT: NORMAL
MICROORGANISM SPEC CULT: NORMAL
MONOCYTES NFR BLD: 0.55 K/UL (ref 0.1–0.95)
MONOCYTES NFR BLD: 5 % (ref 2–12)
NEUTROPHILS NFR BLD: 86 % (ref 43–80)
NEUTS SEG NFR BLD: 10.06 K/UL (ref 1.8–7.3)
PLATELET, FLUORESCENCE: 213 K/UL (ref 130–450)
PMV BLD AUTO: 12.5 FL (ref 7–12)
POTASSIUM SERPL-SCNC: 3.8 MMOL/L (ref 3.5–5)
PROT SERPL-MCNC: 6.8 G/DL (ref 6.4–8.3)
RBC # BLD AUTO: 5.01 M/UL (ref 3.5–5.5)
SERVICE CMNT-IMP: NORMAL
SERVICE CMNT-IMP: NORMAL
SODIUM SERPL-SCNC: 141 MMOL/L (ref 132–146)
SPECIMEN DESCRIPTION: NORMAL
SPECIMEN DESCRIPTION: NORMAL
WBC OTHER # BLD: 11.7 K/UL (ref 4.5–11.5)

## 2024-01-03 PROCEDURE — 97165 OT EVAL LOW COMPLEX 30 MIN: CPT

## 2024-01-03 PROCEDURE — 6360000002 HC RX W HCPCS: Performed by: INTERNAL MEDICINE

## 2024-01-03 PROCEDURE — 97535 SELF CARE MNGMENT TRAINING: CPT

## 2024-01-03 PROCEDURE — 2580000003 HC RX 258: Performed by: NURSE PRACTITIONER

## 2024-01-03 PROCEDURE — 6370000000 HC RX 637 (ALT 250 FOR IP): Performed by: NURSE PRACTITIONER

## 2024-01-03 PROCEDURE — 36415 COLL VENOUS BLD VENIPUNCTURE: CPT

## 2024-01-03 PROCEDURE — 6370000000 HC RX 637 (ALT 250 FOR IP): Performed by: EMERGENCY MEDICINE

## 2024-01-03 PROCEDURE — 2700000000 HC OXYGEN THERAPY PER DAY

## 2024-01-03 PROCEDURE — 6370000000 HC RX 637 (ALT 250 FOR IP): Performed by: INTERNAL MEDICINE

## 2024-01-03 PROCEDURE — 80053 COMPREHEN METABOLIC PANEL: CPT

## 2024-01-03 PROCEDURE — 83880 ASSAY OF NATRIURETIC PEPTIDE: CPT

## 2024-01-03 PROCEDURE — 94640 AIRWAY INHALATION TREATMENT: CPT

## 2024-01-03 PROCEDURE — 94660 CPAP INITIATION&MGMT: CPT

## 2024-01-03 PROCEDURE — 2580000003 HC RX 258: Performed by: INTERNAL MEDICINE

## 2024-01-03 PROCEDURE — 6360000002 HC RX W HCPCS: Performed by: NURSE PRACTITIONER

## 2024-01-03 PROCEDURE — 99233 SBSQ HOSP IP/OBS HIGH 50: CPT | Performed by: INTERNAL MEDICINE

## 2024-01-03 PROCEDURE — 85025 COMPLETE CBC W/AUTO DIFF WBC: CPT

## 2024-01-03 PROCEDURE — 2140000000 HC CCU INTERMEDIATE R&B

## 2024-01-03 RX ADMIN — BENZONATATE 100 MG: 100 CAPSULE ORAL at 21:31

## 2024-01-03 RX ADMIN — Medication 10 ML: at 09:03

## 2024-01-03 RX ADMIN — METHYLPREDNISOLONE SODIUM SUCCINATE 40 MG: 40 INJECTION, POWDER, LYOPHILIZED, FOR SOLUTION INTRAMUSCULAR; INTRAVENOUS at 09:02

## 2024-01-03 RX ADMIN — IPRATROPIUM BROMIDE AND ALBUTEROL SULFATE 1 DOSE: 2.5; .5 SOLUTION RESPIRATORY (INHALATION) at 15:47

## 2024-01-03 RX ADMIN — BUDESONIDE INHALATION 500 MCG: 0.5 SUSPENSION RESPIRATORY (INHALATION) at 20:44

## 2024-01-03 RX ADMIN — BUMETANIDE 2 MG: 0.25 INJECTION INTRAMUSCULAR; INTRAVENOUS at 09:02

## 2024-01-03 RX ADMIN — ROPINIROLE HYDROCHLORIDE 0.5 MG: 0.5 TABLET, FILM COATED ORAL at 21:10

## 2024-01-03 RX ADMIN — BUDESONIDE INHALATION 500 MCG: 0.5 SUSPENSION RESPIRATORY (INHALATION) at 08:02

## 2024-01-03 RX ADMIN — OSELTAMIVIR PHOSPHATE 75 MG: 75 CAPSULE ORAL at 09:01

## 2024-01-03 RX ADMIN — OSELTAMIVIR PHOSPHATE 75 MG: 75 CAPSULE ORAL at 21:10

## 2024-01-03 RX ADMIN — GUAIFENESIN SYRUP AND DEXTROMETHORPHAN 10 ML: 100; 10 SYRUP ORAL at 02:53

## 2024-01-03 RX ADMIN — IPRATROPIUM BROMIDE AND ALBUTEROL SULFATE 1 DOSE: 2.5; .5 SOLUTION RESPIRATORY (INHALATION) at 08:02

## 2024-01-03 RX ADMIN — ENOXAPARIN SODIUM 30 MG: 100 INJECTION SUBCUTANEOUS at 09:02

## 2024-01-03 RX ADMIN — Medication 10 ML: at 21:11

## 2024-01-03 RX ADMIN — METHYLPREDNISOLONE SODIUM SUCCINATE 40 MG: 40 INJECTION, POWDER, LYOPHILIZED, FOR SOLUTION INTRAMUSCULAR; INTRAVENOUS at 21:09

## 2024-01-03 RX ADMIN — BACLOFEN 10 MG: 10 TABLET ORAL at 09:31

## 2024-01-03 RX ADMIN — WATER 1000 MG: 1 INJECTION INTRAMUSCULAR; INTRAVENOUS; SUBCUTANEOUS at 09:02

## 2024-01-03 RX ADMIN — GUAIFENESIN SYRUP AND DEXTROMETHORPHAN 10 ML: 100; 10 SYRUP ORAL at 09:31

## 2024-01-03 RX ADMIN — GUAIFENESIN SYRUP AND DEXTROMETHORPHAN 10 ML: 100; 10 SYRUP ORAL at 21:31

## 2024-01-03 RX ADMIN — BENZONATATE 100 MG: 100 CAPSULE ORAL at 09:31

## 2024-01-03 RX ADMIN — ROPINIROLE HYDROCHLORIDE 0.5 MG: 0.5 TABLET, FILM COATED ORAL at 09:00

## 2024-01-03 RX ADMIN — ARFORMOTEROL TARTRATE 15 MCG: 15 SOLUTION RESPIRATORY (INHALATION) at 08:02

## 2024-01-03 RX ADMIN — IPRATROPIUM BROMIDE AND ALBUTEROL SULFATE 1 DOSE: 2.5; .5 SOLUTION RESPIRATORY (INHALATION) at 11:44

## 2024-01-03 RX ADMIN — ARFORMOTEROL TARTRATE 15 MCG: 15 SOLUTION RESPIRATORY (INHALATION) at 20:44

## 2024-01-03 RX ADMIN — IPRATROPIUM BROMIDE AND ALBUTEROL SULFATE 1 DOSE: 2.5; .5 SOLUTION RESPIRATORY (INHALATION) at 20:44

## 2024-01-03 RX ADMIN — BUMETANIDE 2 MG: 0.25 INJECTION INTRAMUSCULAR; INTRAVENOUS at 21:10

## 2024-01-03 RX ADMIN — ENOXAPARIN SODIUM 30 MG: 100 INJECTION SUBCUTANEOUS at 21:10

## 2024-01-03 RX ADMIN — AZITHROMYCIN MONOHYDRATE 500 MG: 500 INJECTION, POWDER, LYOPHILIZED, FOR SOLUTION INTRAVENOUS at 21:31

## 2024-01-03 NOTE — PROGRESS NOTES
OCCUPATIONAL THERAPY INITIAL EVALUATION    [facility]  [address], [place]      Date:1/3/2024                                                  Patient Name: Selena Ward  MRN: [id number]  : 1955  Room: [address] [place] [address]    Evaluating OT: Madison Pro, MOT, OTR/L  # [id number]    Referring Provider:  Trang chapa APRN - CNP    Specific Provider Orders:  \"OT Eval and Treat\"  23    Diagnosis: Respiratory syncytial virus (RSV) [B33.8]  Influenza A [J10.1]  Acute respiratory failure with hypoxia (HCC) [J96.01]  Acute hypoxemic respiratory failure (HCC) [J96.01]    Pt was admitted w/ SOB, Hypoxia - Found at 66% on Room air, placed on BiPAP.  (+) Flu and RSV    Pertinent Medical History:  Pt has a past medical history of Acute pancreatitis without necrosis or infection, unspecified, Acute respiratory failure with hypoxia (HCC), Arthritis, Communication problem, COPD exacerbation (HCC), Decreased dorsalis pedis pulse, Dermatophytosis, Full dentures, Leg swelling, Low back pain, Lymphedema of both lower extremities, Obesity, Open wound of right lower leg, Osteoarthritis, Other speech disturbances, Peripheral vision loss, Stroke (HCC), and Ulcer of calf with fat layer exposed, right (HCC).,  has a past surgical history that includes ECHO Complete 2D W Doppler W Color (2012);  section; Gastric bypass surgery (); Abdominoplasty (); Cataract removal; hernia repair; Colonoscopy (2012); Breast reduction surgery; Upper gastrointestinal endoscopy (2021); Colonoscopy (2021); Upper gastrointestinal endoscopy (N/A, 2021); Colonoscopy (N/A, 2021); Lumbar spine surgery (N/A, 2021); Nerve Block (Right, 2022); and Cochlear implant (Right, 2022).    Surgeries this admission: None     Precautions:  Fall Risk  Contact/Droplet Isolation  3L O2  Incontinent of urine - Pure-Wick    Assessment  Supine to sit: IND  Sit to supine: IND     Functional Transfers Min A    EOB 2x, Recliner 1x  Pt ed for safety/hand placement    Mod I     Functional Mobility Min A w/o AD  SUP w/ WW    Household distances in room  Pt ed for safety/improved safety awareness, walker safety, PLB    Mod I     Balance Sitting:     Static:  IND    Dynamic:  SUP w/ functional ax EOB     Standing:     Static:  Close SUP    Dynamic:  Min A w/ functional ax/mobility w/o AD, SUP w/ WW    Sitting:     Static:  IND    Dynamic:  IND w/ functional ax    Standing:     Static:  Mod I w/ AD PRN    Dynamic:  Mod I w/ functional ax/mobility w/ AD PRN   Activity Tolerance Fair    Limited by General weakness/malaise, SOB, Hypoxia    Mod I   Visual/  Perceptual    Hearing: WNL   Glasses: Reading(?)    WFL   Hearing Aids:  No               Hand Dominance: Right   AROM Strength Additional Info:    RUE  WFL WFL WFL ;   WFL FMC/dexterity noted during ADL tasks     LUE WFL WFL WFL ;    WFL FMC/dexterity noted during ADL tasks       Sensation:  Denies numbness or tingling Cooper UEs   Tone: WFL Cooper UEs   Edema: None Noted Cooper UEs     Comments: Upon arrival, patient was found seated EOB.  She was agreeable to participate in therapeutic ax.  No Family present during session.  Received permission from RN prior to engaging pt in OT services.  Educated pt on role of OT services.      At the end of the session, patient was properly positioned in Recliner - declined to elevate Cooper LEs.  Call light and phone within reach, all lines and tubes intact.  Oriented pt to call bell.  Made all appropriate Environmental Modifications to facilitate pt's level of IND and safety.  All needs met.  Notified RN of pt's position and performance.          Overall patient demonstrated decreased independence and safety during completion of ADL/functional transfer/mobility tasks.  Pt would benefit from continued skilled OT to increase safety and independence with completion of

## 2024-01-03 NOTE — PROGRESS NOTES
[facility] Medicine    Subjective:  pt alert conversive daughter at bedside      Current Facility-Administered Medications:     budesonide (PULMICORT) nebulizer suspension 500 mcg, 0.5 mg, Nebulization, BID RT, Sagar Roberto MD, 500 mcg at 01/02/24 1917    arformoterol tartrate (BROVANA) nebulizer solution 15 mcg, 15 mcg, Nebulization, BID RT, Sagar Roberto MD, 15 mcg at 01/02/24 1917    bumetanide (BUMEX) injection 2 mg, 2 mg, IntraVENous, BID, Facundo Dent DO, 2 mg at 01/02/24 2043    azithromycin (ZITHROMAX) 500 mg in sodium chloride 0.9 % 250 mL IVPB ([id number]), 500 mg, IntraVENous, Q24H, Facundo Dent DO, Stopped at 01/02/24 2224    guaiFENesin-dextromethorphan (ROBITUSSIN DM) 100-10 MG/5ML syrup 10 mL, 10 mL, Oral, Q6H PRN, Facundo Dent DO, 10 mL at 01/03/24 0253    ipratropium 0.5 mg-albuterol 2.5 mg (DUONEB) nebulizer solution 1 Dose, 1 Dose, Inhalation, Q4H WA RT, Facundo Dent DO, 1 Dose at 01/02/24 1917    methylPREDNISolone sodium succ (SOLU-MEDROL) 40 mg in sterile water 1 mL injection, 40 mg, IntraVENous, Q12H, Facundo Dent DO, 40 mg at 01/02/24 2045    cefTRIAXone (ROCEPHIN) 1,000 mg in sterile water 10 mL IV syringe, 1,000 mg, IntraVENous, Q24H, Facundo Dent DO, 1,000 mg at 01/02/24 0842    hydrOXYzine pamoate (VISTARIL) capsule 25 mg, 25 mg, Oral, TID PRN, Facundo Dent DO, 25 mg at 01/02/24 1407    rOPINIRole (REQUIP) tablet 0.5 mg, 0.5 mg, Oral, Nightly, Sunni Jones DO, 0.5 mg at 01/02/24 2039    rOPINIRole (REQUIP) tablet 0.5 mg, 0.5 mg, Oral, Q4H PRN, Cambert, Trang, APRN - CNP    sodium chloride flush 0.9 % injection 10 mL, 10 mL, IntraVENous, 2 times per day, Cambert, Trang, APRN - CNP, 10 mL at 01/02/24 2045    sodium chloride flush 0.9 % injection 10 mL, 10 mL, IntraVENous, PRN, Cambert, Trang, APRN - CNP    0.9 % sodium chloride infusion, , IntraVENous, PRN, Cambert, Trang, APRN - CNP    potassium chloride (KLOR-CON M) extended

## 2024-01-03 NOTE — PROGRESS NOTES
Pulmonary Critical Care Medicine           PULMONARY  CRITICAL CARE   SERVICE DAILY PROGRESS  NOTE     1/3/2024   Hospital LOS:  LOS: 4 days     Impression / Recommendations:    Acute on chronic respiratory insufficiency secondary to AECOPD from Flu A LRTI + Hemophilus  influenza pneumonia  Post covid Pneumonitis   Severe TOBIAS   Current smoker      - Continue care in med/surg floor   - Continue nocturnal BiPAP at 12/5  - Respiratory viral Panel - positive for Flu A   - Follow up Sputum cultures   - Follow up urine Antigens   - Continue Duonebs  Scheduled   - start Pulmicort and perforomist  nebulizations   - Cycle Troponins   - Diuresis - Bumex BID IV     - Ceftriaxone and Doxy started. Change doxy to Azithromycin   - Continue Systemic steroids IV    - IS, FV   - Continue supplemental O2 and wean as tolerated , Pt does not use O2 at home.         Interval History/Event Changes:  Improving clinically, feels better this am   O2 requirement coming down   Less congested today   No new complains     Allergies  Allergies   Allergen Reactions    Ferritin Shortness Of Breath and Other (See Comments)     Flushed,  Severe back pain       Review of Systems    A pertinent review of systems was performed and was otherwise non-contributory.    Vitals-     BP (!) 156/88   Pulse 87   Temp 97 °F (36.1 °C) (Infrared)   Resp 16   Ht 1.575 m (5' 2\")   Wt 115.8 kg (255 lb 3.2 oz)   SpO2 95%   BMI 46.68 kg/m²    Tmax: Temp (24hrs), Av.4 °F (36.3 °C), Min:96.9 °F (36.1 °C), Max:98.2 °F (36.8 °C)      Hemodynamics:  Cuff:   Systolic (24hrs), Av , Min:153 , Max:170   /Diastolic (24hrs), Av, Min:85, Max:94    Cuff MAP:MAP (mmHg)  Av.2  Min: 96  Max: 146  P:   Pulse  Av.8  Min: 84  Max: 98      Airway:     Observed RR: Resp  Av  Min: 16  Max: 22   Observed O2 sats: SpO2  Av.3 %  Min: 66 %  Max: 100 %      Intake/Output Summary (Last 24 hours) at 1/3/2024 1250  Last data filed at 1/3/2024  1022  Gross per 24 hour   Intake 300 ml   Output 2200 ml   Net -1900 ml         Physical exam-  General appearance: Ill appearing, alert, no converstional dyspnea  Head: Normocephalic, without obvious abnormality, atraumatic   Eyes:Pupils bilateral equal and reactive, EOM intact, conjunctiva - no icterus , no injection   Throat: Clear, no lesions, Mallampti = IV, no tonsillar eythema or edema  Neck: Supple, symmetrical, trachea midline, no lymphadenopathy, no JVD, no carotid bruits, no thyromegaly   Lungs: Bilateral  Air movement diminished overall , wheezing - much improved ,   no crackles ,  normal femitus, resonant to percussion  Heart: RRR, S1, S2 normal, no murmur, click, rub or gallop   Abdomen: soft, non-tender, nondistended. Bowel sounds normal, no hepatomegaly.  Extremities: Extremities normal, atraumatic, no cyanosis, 2+  edema, lymphedema +   Musculoskeletal - No deformities   Skin: Skin color, texture, turgor normal. No rashes or lesions   Neurological: No focal deficits, cranial nerves grossly intact, sensations intact.  Psychiatric : Mood and effect normal , alert and oriented times 4.        Routine labs:    Recent Labs     01/01/24  0458 01/02/24  0618 01/03/24  0424   WBC 11.9* 12.7* 11.7*   HGB 11.7 12.5 11.7   HCT 42.3 43.0 40.0   PLT  --  256  --      Recent Labs     01/01/24  0458 01/02/24  0618 01/03/24  0424    145 141   K 4.1 3.7 3.8   CL 96* 96* 93*   CO2 33* 39* 37*   BUN 25* 26* 26*   CREATININE 0.6 0.7 0.5         Other Laboratory - Imaging Studies:     Reviewed and as per electronic record. CxR/CT images reviewed by me when available.      Total time spent for this encounter was 51 minutes including but not limited to patient exam , family discussion , discussion with consultants , chart review and documentation.         Sagar Roberto MD  01/03/24

## 2024-01-03 NOTE — PROGRESS NOTES
Physician Progress Note      PATIENT:               REVA GLYNN  CSN #:                  [id number]  :                       1955  ADMIT DATE:       2023 8:49 PM  DISCH DATE:  RESPONDING  PROVIDER #:        Facundo Dent DO          QUERY TEXT:    Pt admitted with acute respiratory failure. Pt noted to have H. Influenzae   pneumonia, WBC of 14.6, HR up to 132, and RR up to 32 on arrival. If possible,   please document in the progress notes and discharge summary if you are   evaluating and/or treating any of the following:    The medical record reflects the following:  Risk Factors: H. Influenzae pneumonia  Clinical Indicators: WBC of 14.6 on arrival and up to 15.9 on , HR    and RR 19-32 in ED  Treatment: IV Rocephin started on , IV Zithromax started on , blood   cultures, telemetry monitoring    Thank you,  Luisa Arreguin, RN, BSN, CDIS  Clinical Documentation Improvement  [email or web address]  Options provided:  -- H. Influenzae Sepsis, present on admission  -- H. Influenzae pneumonia without Sepsis  -- Other - I will add my own diagnosis  -- Disagree - Not applicable / Not valid  -- Disagree - Clinically unable to determine / Unknown  -- Refer to Clinical Documentation Reviewer    PROVIDER RESPONSE TEXT:    This patient has H. Influenzae sepsis which was present on admission.    Query created by: Luisa Arreguin on 1/3/2024 1:58 PM      Electronically signed by:  Facundo Dent DO 1/3/2024 3:51 PM

## 2024-01-03 NOTE — PLAN OF CARE
Problem: Discharge Planning  Goal: Discharge to home or other facility with appropriate resources  Outcome: Progressing     Problem: Safety - Adult  Goal: Free from fall injury  Outcome: Progressing     Problem: Skin/Tissue Integrity  Goal: Absence of new skin breakdown  Description: 1.  Monitor for areas of redness and/or skin breakdown  2.  Assess vascular access sites hourly  3.  Every 4-6 hours minimum:  Change oxygen saturation probe site  4.  Every 4-6 hours:  If on nasal continuous positive airway pressure, respiratory therapy assess nares and determine need for appliance change or resting period.  Outcome: Progressing

## 2024-01-03 NOTE — PLAN OF CARE
Problem: Chronic Conditions and Co-morbidities  Goal: Patient's chronic conditions and co-morbidity symptoms are monitored and maintained or improved  Outcome: Progressing     Problem: Discharge Planning  Goal: Discharge to home or other facility with appropriate resources  1/3/2024 1152 by Daquan Garay RN  Outcome: Progressing  1/3/2024 0844 by Edelmira Harrington RN  Outcome: Progressing     Problem: Safety - Adult  Goal: Free from fall injury  1/3/2024 1152 by Daquan Gaary RN  Outcome: Progressing  1/3/2024 0844 by Edelmira Harrington RN  Outcome: Progressing     Problem: Skin/Tissue Integrity  Goal: Absence of new skin breakdown  Description: 1.  Monitor for areas of redness and/or skin breakdown  2.  Assess vascular access sites hourly  3.  Every 4-6 hours minimum:  Change oxygen saturation probe site  4.  Every 4-6 hours:  If on nasal continuous positive airway pressure, respiratory therapy assess nares and determine need for appliance change or resting period.  1/3/2024 1152 by Daquan Garay RN  Outcome: Progressing  1/3/2024 0844 by Edelmira Harrington RN  Outcome: Progressing

## 2024-01-04 LAB
ALBUMIN SERPL-MCNC: 3.4 G/DL (ref 3.5–5.2)
ALP SERPL-CCNC: 104 U/L (ref 35–104)
ALT SERPL-CCNC: 17 U/L (ref 0–32)
ANION GAP SERPL CALCULATED.3IONS-SCNC: 12 MMOL/L (ref 7–16)
AST SERPL-CCNC: 17 U/L (ref 0–31)
BASOPHILS # BLD: 0.01 K/UL (ref 0–0.2)
BASOPHILS NFR BLD: 0 % (ref 0–2)
BILIRUB SERPL-MCNC: 0.2 MG/DL (ref 0–1.2)
BUN SERPL-MCNC: 27 MG/DL (ref 6–23)
CALCIUM SERPL-MCNC: 8.9 MG/DL (ref 8.6–10.2)
CHLORIDE SERPL-SCNC: 91 MMOL/L (ref 98–107)
CO2 SERPL-SCNC: 35 MMOL/L (ref 22–29)
CREAT SERPL-MCNC: 0.6 MG/DL (ref 0.5–1)
EOSINOPHIL # BLD: 0 K/UL (ref 0.05–0.5)
EOSINOPHILS RELATIVE PERCENT: 0 % (ref 0–6)
ERYTHROCYTE [DISTWIDTH] IN BLOOD BY AUTOMATED COUNT: 17.6 % (ref 11.5–15)
GFR SERPL CREATININE-BSD FRML MDRD: >60 ML/MIN/1.73M2
GLUCOSE SERPL-MCNC: 426 MG/DL (ref 74–99)
HCT VFR BLD AUTO: 39.2 % (ref 34–48)
HGB BLD-MCNC: 11.7 G/DL (ref 11.5–15.5)
IMM GRANULOCYTES # BLD AUTO: 0.09 K/UL (ref 0–0.58)
IMM GRANULOCYTES NFR BLD: 1 % (ref 0–5)
LYMPHOCYTES NFR BLD: 0.84 K/UL (ref 1.5–4)
LYMPHOCYTES RELATIVE PERCENT: 8 % (ref 20–42)
MCH RBC QN AUTO: 23.6 PG (ref 26–35)
MCHC RBC AUTO-ENTMCNC: 29.8 G/DL (ref 32–34.5)
MCV RBC AUTO: 79.2 FL (ref 80–99.9)
MONOCYTES NFR BLD: 0.38 K/UL (ref 0.1–0.95)
MONOCYTES NFR BLD: 4 % (ref 2–12)
NEUTROPHILS NFR BLD: 87 % (ref 43–80)
NEUTS SEG NFR BLD: 8.74 K/UL (ref 1.8–7.3)
PLATELET # BLD AUTO: 215 K/UL (ref 130–450)
PMV BLD AUTO: 12.3 FL (ref 7–12)
POTASSIUM SERPL-SCNC: 4.5 MMOL/L (ref 3.5–5)
PROT SERPL-MCNC: 6.5 G/DL (ref 6.4–8.3)
RBC # BLD AUTO: 4.95 M/UL (ref 3.5–5.5)
SODIUM SERPL-SCNC: 138 MMOL/L (ref 132–146)
WBC OTHER # BLD: 10.1 K/UL (ref 4.5–11.5)

## 2024-01-04 PROCEDURE — 2580000003 HC RX 258: Performed by: NURSE PRACTITIONER

## 2024-01-04 PROCEDURE — 6370000000 HC RX 637 (ALT 250 FOR IP): Performed by: INTERNAL MEDICINE

## 2024-01-04 PROCEDURE — 6360000002 HC RX W HCPCS: Performed by: INTERNAL MEDICINE

## 2024-01-04 PROCEDURE — 6360000002 HC RX W HCPCS: Performed by: NURSE PRACTITIONER

## 2024-01-04 PROCEDURE — 85025 COMPLETE CBC W/AUTO DIFF WBC: CPT

## 2024-01-04 PROCEDURE — 80053 COMPREHEN METABOLIC PANEL: CPT

## 2024-01-04 PROCEDURE — 2580000003 HC RX 258: Performed by: INTERNAL MEDICINE

## 2024-01-04 PROCEDURE — 6370000000 HC RX 637 (ALT 250 FOR IP): Performed by: EMERGENCY MEDICINE

## 2024-01-04 PROCEDURE — 99233 SBSQ HOSP IP/OBS HIGH 50: CPT | Performed by: INTERNAL MEDICINE

## 2024-01-04 PROCEDURE — 2700000000 HC OXYGEN THERAPY PER DAY

## 2024-01-04 PROCEDURE — 94660 CPAP INITIATION&MGMT: CPT

## 2024-01-04 PROCEDURE — 36415 COLL VENOUS BLD VENIPUNCTURE: CPT

## 2024-01-04 PROCEDURE — 2140000000 HC CCU INTERMEDIATE R&B

## 2024-01-04 PROCEDURE — 94640 AIRWAY INHALATION TREATMENT: CPT

## 2024-01-04 RX ADMIN — ENOXAPARIN SODIUM 30 MG: 100 INJECTION SUBCUTANEOUS at 22:34

## 2024-01-04 RX ADMIN — GUAIFENESIN SYRUP AND DEXTROMETHORPHAN 10 ML: 100; 10 SYRUP ORAL at 22:42

## 2024-01-04 RX ADMIN — BUMETANIDE 2 MG: 0.25 INJECTION INTRAMUSCULAR; INTRAVENOUS at 22:33

## 2024-01-04 RX ADMIN — IPRATROPIUM BROMIDE AND ALBUTEROL SULFATE 1 DOSE: 2.5; .5 SOLUTION RESPIRATORY (INHALATION) at 11:55

## 2024-01-04 RX ADMIN — Medication 10 ML: at 22:59

## 2024-01-04 RX ADMIN — WATER 1000 MG: 1 INJECTION INTRAMUSCULAR; INTRAVENOUS; SUBCUTANEOUS at 08:54

## 2024-01-04 RX ADMIN — ARFORMOTEROL TARTRATE 15 MCG: 15 SOLUTION RESPIRATORY (INHALATION) at 19:34

## 2024-01-04 RX ADMIN — BUDESONIDE INHALATION 500 MCG: 0.5 SUSPENSION RESPIRATORY (INHALATION) at 08:24

## 2024-01-04 RX ADMIN — BUDESONIDE INHALATION 500 MCG: 0.5 SUSPENSION RESPIRATORY (INHALATION) at 19:35

## 2024-01-04 RX ADMIN — METHYLPREDNISOLONE SODIUM SUCCINATE 40 MG: 40 INJECTION, POWDER, LYOPHILIZED, FOR SOLUTION INTRAMUSCULAR; INTRAVENOUS at 08:47

## 2024-01-04 RX ADMIN — IPRATROPIUM BROMIDE AND ALBUTEROL SULFATE 1 DOSE: 2.5; .5 SOLUTION RESPIRATORY (INHALATION) at 19:34

## 2024-01-04 RX ADMIN — IPRATROPIUM BROMIDE AND ALBUTEROL SULFATE 1 DOSE: 2.5; .5 SOLUTION RESPIRATORY (INHALATION) at 15:36

## 2024-01-04 RX ADMIN — IPRATROPIUM BROMIDE AND ALBUTEROL SULFATE 1 DOSE: 2.5; .5 SOLUTION RESPIRATORY (INHALATION) at 08:24

## 2024-01-04 RX ADMIN — Medication 10 ML: at 08:55

## 2024-01-04 RX ADMIN — BUMETANIDE 2 MG: 0.25 INJECTION INTRAMUSCULAR; INTRAVENOUS at 08:47

## 2024-01-04 RX ADMIN — METHYLPREDNISOLONE SODIUM SUCCINATE 40 MG: 40 INJECTION, POWDER, LYOPHILIZED, FOR SOLUTION INTRAMUSCULAR; INTRAVENOUS at 22:33

## 2024-01-04 RX ADMIN — AZITHROMYCIN MONOHYDRATE 500 MG: 500 INJECTION, POWDER, LYOPHILIZED, FOR SOLUTION INTRAVENOUS at 22:49

## 2024-01-04 RX ADMIN — BENZONATATE 100 MG: 100 CAPSULE ORAL at 22:42

## 2024-01-04 RX ADMIN — ENOXAPARIN SODIUM 30 MG: 100 INJECTION SUBCUTANEOUS at 08:54

## 2024-01-04 RX ADMIN — ARFORMOTEROL TARTRATE 15 MCG: 15 SOLUTION RESPIRATORY (INHALATION) at 08:24

## 2024-01-04 RX ADMIN — ROPINIROLE HYDROCHLORIDE 0.5 MG: 0.5 TABLET, FILM COATED ORAL at 22:33

## 2024-01-04 NOTE — CARE COORDINATION
1/4:  Update CM Note:  Pt presented to the ER for SOB from home.  Pt is on 2.5L/NC at 95%, Iv Zithromax, Iv Bumex, Iv Solumedrol & Lovenox.  Pt is in ISO-Contact/Droplet Influenza & RSV.  Cm spoke with Katharine daughter via her phone at [phone] to discuss dc planning with PT/OT scores.  Per daughter Katharine dc plan is home & she can transport.  If pt needs 02 no preferences & declined a list.  Will need 02 testing.  Katharine declined any HHC.  Sw/CM will continue to follow.  Electronically signed by Maile Pickard RN on 1/4/2024 at 2:25 PM    The Plan for Transition of Care is related to the following treatment goals: DME    The Patient and/or patient representative  was provided with a choice of provider and agrees   with the discharge plan. [x] Yes [] No    Freedom of choice list was provided with basic dialogue that supports the patient's individualized plan of care/goals, treatment preferences and shares the quality data associated with the providers. [x] Yes [] No

## 2024-01-04 NOTE — PROGRESS NOTES
Asked patient about how we have been weighing her and she stated that \"there is no point in starting now no one has weighed me since I've been here.

## 2024-01-04 NOTE — PROGRESS NOTES
Hospital Medicine    Subjective:  pt alert conversive sitting up in chair breathing better      Current Facility-Administered Medications:     budesonide (PULMICORT) nebulizer suspension 500 mcg, 0.5 mg, Nebulization, BID RT, Sagar Roberto MD, 500 mcg at 01/03/24 2044    arformoterol tartrate (BROVANA) nebulizer solution 15 mcg, 15 mcg, Nebulization, BID RT, Sagar Roberto MD, 15 mcg at 01/03/24 2044    bumetanide (BUMEX) injection 2 mg, 2 mg, IntraVENous, BID, Facundo Dent DO, 2 mg at 01/03/24 2110    azithromycin (ZITHROMAX) 500 mg in sodium chloride 0.9 % 250 mL IVPB ([id number]), 500 mg, IntraVENous, Q24H, Facundo Dent DO, Stopped at 01/03/24 2248    guaiFENesin-dextromethorphan (ROBITUSSIN DM) 100-10 MG/5ML syrup 10 mL, 10 mL, Oral, Q6H PRN, Facundo Dent DO, 10 mL at 01/03/24 2131    ipratropium 0.5 mg-albuterol 2.5 mg (DUONEB) nebulizer solution 1 Dose, 1 Dose, Inhalation, Q4H WA RT, Facundo Dent DO, 1 Dose at 01/03/24 2044    methylPREDNISolone sodium succ (SOLU-MEDROL) 40 mg in sterile water 1 mL injection, 40 mg, IntraVENous, Q12H, Facundo Dent DO, 40 mg at 01/03/24 2109    cefTRIAXone (ROCEPHIN) 1,000 mg in sterile water 10 mL IV syringe, 1,000 mg, IntraVENous, Q24H, Facundo Dent DO, 1,000 mg at 01/03/24 0902    hydrOXYzine pamoate (VISTARIL) capsule 25 mg, 25 mg, Oral, TID PRN, Facundo Dent DO, 25 mg at 01/02/24 1407    rOPINIRole (REQUIP) tablet 0.5 mg, 0.5 mg, Oral, Nightly, Sunni Jones DO, 0.5 mg at 01/03/24 2110    rOPINIRole (REQUIP) tablet 0.5 mg, 0.5 mg, Oral, Q4H PRN, Cambert, Trang, APRN - CNP, 0.5 mg at 01/03/24 0900    sodium chloride flush 0.9 % injection 10 mL, 10 mL, IntraVENous, 2 times per day, Cambert, Trang, APRN - CNP, 10 mL at 01/03/24 2111    sodium chloride flush 0.9 % injection 10 mL, 10 mL, IntraVENous, PRN, Cambert, Trang, APRN - CNP    0.9 % sodium chloride infusion, , IntraVENous, PRN, Cambert, Trang, APRN - CNP     potassium chloride (KLOR-CON M) extended release tablet 40 mEq, 40 mEq, Oral, PRN **OR** potassium bicarb-citric acid (EFFER-K) effervescent tablet 40 mEq, 40 mEq, Oral, PRN **OR** potassium chloride 10 mEq/100 mL IVPB (Peripheral Line), 10 mEq, IntraVENous, PRN, Cambert, Trang, APRN - CNP    magnesium sulfate 2000 mg in 50 mL IVPB premix, 2,000 mg, IntraVENous, PRN, Cambert, Trang, APRN - CNP    enoxaparin Sodium (LOVENOX) injection 30 mg, 30 mg, SubCUTAneous, BID, Cambert, Trang, APRN - CNP, 30 mg at 01/03/24 2110    ondansetron (ZOFRAN-ODT) disintegrating tablet 4 mg, 4 mg, Oral, Q8H PRN **OR** ondansetron (ZOFRAN) injection 4 mg, 4 mg, IntraVENous, Q6H PRN, Cambert, Trang, APRN - CNP    senna (SENOKOT) tablet 8.6 mg, 1 tablet, Oral, Daily PRN, Cambert, Trang, APRN - CNP    acetaminophen (TYLENOL) tablet 650 mg, 650 mg, Oral, Q6H PRN, 650 mg at 12/31/23 0558 **OR** acetaminophen (TYLENOL) suppository 650 mg, 650 mg, Rectal, Q6H PRN, Cambert, Trang, APRN - CNP    baclofen (LIORESAL) tablet 10 mg, 10 mg, Oral, TID PRN, Cambert, Trang, APRN - CNP, 10 mg at 01/03/24 0931    benzonatate (TESSALON) capsule 100 mg, 100 mg, Oral, TID PRN, Sánchez Underwood MD, 100 mg at 01/03/24 2131    Objective:    BP (!) 157/86   Pulse 89   Temp 98.2 °F (36.8 °C) (Oral)   Resp 20   Ht 1.575 m (5' 2\")   Wt 115.8 kg (255 lb 3.2 oz)   SpO2 95%   BMI 46.68 kg/m²     Heart:  reg  Lungs:  less rhonchi  Abd: + bs soft nontender  Extrem:  edema legs    CBC with Differential:    Lab Results   Component Value Date/Time    WBC 10.1 01/04/2024 04:27 AM    RBC 4.95 01/04/2024 04:27 AM    HGB 11.7 01/04/2024 04:27 AM    HCT 39.2 01/04/2024 04:27 AM     01/04/2024 04:27 AM    MCV 79.2 01/04/2024 04:27 AM    MCH 23.6 01/04/2024 04:27 AM    MCHC 29.8 01/04/2024 04:27 AM    RDW 17.6 01/04/2024 04:27 AM    NRBC 1 10/09/2023 09:41 AM    SEGSPCT 72 02/28/2014 09:00 AM    LYMPHOPCT 8 01/04/2024 04:27 AM

## 2024-01-04 NOTE — PROGRESS NOTES
Pulmonary Critical Care Medicine           PULMONARY  CRITICAL CARE   SERVICE DAILY PROGRESS  NOTE     2024   Hospital LOS:  LOS: 5 days     Impression / Recommendations:    Acute on chronic respiratory insufficiency secondary to AECOPD from Flu A LRTI + Hemophilus  influenza pneumonia  Post covid Pneumonitis   Severe TOBIAS   Current smoker      - Continue care in med/surg floor , clinically looks better.  - Continue nocturnal BiPAP at 12/5, she is due for a titration study as an outpatient  - Respiratory viral Panel - positive for Flu A   - Sputum cultures -haemophilus influenza  - Continue Duonebs  Scheduled   -Continue Pulmicort and perforomist  nebulizations   - Diuresis - Bumex BID IV     -Continue ceftriaxone and  Azithromycin   - Continue Systemic steroids IV    - IS, FV   - Continue supplemental O2 and wean as tolerated , Pt does not use O2 at home.   - Will need home oxygen evaluation at the time of discharge      Interval History/Event Changes:  Significantly better this morning compared to presentation  Daughter at the bedside, updated answered all the questions  Patient sitting on a chair, not in distress, completing sentences    Allergies  Allergies   Allergen Reactions    Ferritin Shortness Of Breath and Other (See Comments)     Flushed,  Severe back pain       Review of Systems    A pertinent review of systems was performed and was otherwise non-contributory.    Vitals-     BP (!) 168/86   Pulse 83   Temp 98.2 °F (36.8 °C) (Oral)   Resp 16   Ht 1.575 m (5' 2\")   Wt 115.8 kg (255 lb 3.2 oz)   SpO2 97%   BMI 46.68 kg/m²    Tmax: Temp (24hrs), Av °F (36.7 °C), Min:97.3 °F (36.3 °C), Max:98.6 °F (37 °C)      Hemodynamics:  Cuff:   Systolic (24hrs), Avg:160 , Min:147 , Max:173   /Diastolic (24hrs), Av, Min:86, Max:92    Cuff MAP:MAP (mmHg)  Av.2  Min: 96  Max: 146  P:   Pulse  Av  Min: 83  Max: 92      Airway:     Observed RR: Resp  Av.5  Min: 16  Max: 20   Observed O2

## 2024-01-05 LAB
ALBUMIN SERPL-MCNC: 3.7 G/DL (ref 3.5–5.2)
ALP SERPL-CCNC: 105 U/L (ref 35–104)
ALT SERPL-CCNC: 15 U/L (ref 0–32)
ANION GAP SERPL CALCULATED.3IONS-SCNC: 11 MMOL/L (ref 7–16)
AST SERPL-CCNC: 14 U/L (ref 0–31)
BASOPHILS # BLD: 0.02 K/UL (ref 0–0.2)
BASOPHILS NFR BLD: 0 % (ref 0–2)
BILIRUB SERPL-MCNC: 0.3 MG/DL (ref 0–1.2)
BUN SERPL-MCNC: 29 MG/DL (ref 6–23)
CALCIUM SERPL-MCNC: 8.9 MG/DL (ref 8.6–10.2)
CHLORIDE SERPL-SCNC: 90 MMOL/L (ref 98–107)
CO2 SERPL-SCNC: 35 MMOL/L (ref 22–29)
CREAT SERPL-MCNC: 0.5 MG/DL (ref 0.5–1)
EOSINOPHIL # BLD: 0 K/UL (ref 0.05–0.5)
EOSINOPHILS RELATIVE PERCENT: 0 % (ref 0–6)
ERYTHROCYTE [DISTWIDTH] IN BLOOD BY AUTOMATED COUNT: 17.3 % (ref 11.5–15)
GFR SERPL CREATININE-BSD FRML MDRD: >60 ML/MIN/1.73M2
GLUCOSE SERPL-MCNC: 362 MG/DL (ref 74–99)
HCT VFR BLD AUTO: 40.6 % (ref 34–48)
HGB BLD-MCNC: 12.2 G/DL (ref 11.5–15.5)
IMM GRANULOCYTES # BLD AUTO: 0.26 K/UL (ref 0–0.58)
IMM GRANULOCYTES NFR BLD: 2 % (ref 0–5)
LYMPHOCYTES NFR BLD: 0.92 K/UL (ref 1.5–4)
LYMPHOCYTES RELATIVE PERCENT: 8 % (ref 20–42)
MCH RBC QN AUTO: 23.5 PG (ref 26–35)
MCHC RBC AUTO-ENTMCNC: 30 G/DL (ref 32–34.5)
MCV RBC AUTO: 78.1 FL (ref 80–99.9)
MONOCYTES NFR BLD: 0.5 K/UL (ref 0.1–0.95)
MONOCYTES NFR BLD: 4 % (ref 2–12)
NEUTROPHILS NFR BLD: 86 % (ref 43–80)
NEUTS SEG NFR BLD: 10.21 K/UL (ref 1.8–7.3)
PLATELET # BLD AUTO: 201 K/UL (ref 130–450)
PMV BLD AUTO: 11.5 FL (ref 7–12)
POTASSIUM SERPL-SCNC: 4.1 MMOL/L (ref 3.5–5)
PROT SERPL-MCNC: 6.7 G/DL (ref 6.4–8.3)
RBC # BLD AUTO: 5.2 M/UL (ref 3.5–5.5)
SODIUM SERPL-SCNC: 136 MMOL/L (ref 132–146)
WBC OTHER # BLD: 11.9 K/UL (ref 4.5–11.5)

## 2024-01-05 PROCEDURE — 2580000003 HC RX 258: Performed by: NURSE PRACTITIONER

## 2024-01-05 PROCEDURE — 80053 COMPREHEN METABOLIC PANEL: CPT

## 2024-01-05 PROCEDURE — 36415 COLL VENOUS BLD VENIPUNCTURE: CPT

## 2024-01-05 PROCEDURE — 6370000000 HC RX 637 (ALT 250 FOR IP): Performed by: INTERNAL MEDICINE

## 2024-01-05 PROCEDURE — 6370000000 HC RX 637 (ALT 250 FOR IP): Performed by: EMERGENCY MEDICINE

## 2024-01-05 PROCEDURE — 94660 CPAP INITIATION&MGMT: CPT

## 2024-01-05 PROCEDURE — 6360000002 HC RX W HCPCS: Performed by: NURSE PRACTITIONER

## 2024-01-05 PROCEDURE — 94640 AIRWAY INHALATION TREATMENT: CPT

## 2024-01-05 PROCEDURE — 6360000002 HC RX W HCPCS: Performed by: INTERNAL MEDICINE

## 2024-01-05 PROCEDURE — 85025 COMPLETE CBC W/AUTO DIFF WBC: CPT

## 2024-01-05 PROCEDURE — 2700000000 HC OXYGEN THERAPY PER DAY

## 2024-01-05 PROCEDURE — 6370000000 HC RX 637 (ALT 250 FOR IP): Performed by: NURSE PRACTITIONER

## 2024-01-05 PROCEDURE — 2140000000 HC CCU INTERMEDIATE R&B

## 2024-01-05 RX ORDER — BUMETANIDE 1 MG/1
2 TABLET ORAL 2 TIMES DAILY
Status: DISCONTINUED | OUTPATIENT
Start: 2024-01-05 | End: 2024-01-06 | Stop reason: HOSPADM

## 2024-01-05 RX ORDER — PREDNISONE 20 MG/1
40 TABLET ORAL DAILY
Status: DISCONTINUED | OUTPATIENT
Start: 2024-01-05 | End: 2024-01-06 | Stop reason: HOSPADM

## 2024-01-05 RX ORDER — AZITHROMYCIN 250 MG/1
500 TABLET, FILM COATED ORAL DAILY
Status: COMPLETED | OUTPATIENT
Start: 2024-01-05 | End: 2024-01-06

## 2024-01-05 RX ADMIN — ROPINIROLE HYDROCHLORIDE 0.5 MG: 0.5 TABLET, FILM COATED ORAL at 09:15

## 2024-01-05 RX ADMIN — ENOXAPARIN SODIUM 30 MG: 100 INJECTION SUBCUTANEOUS at 09:14

## 2024-01-05 RX ADMIN — ROPINIROLE HYDROCHLORIDE 0.5 MG: 0.5 TABLET, FILM COATED ORAL at 21:29

## 2024-01-05 RX ADMIN — ENOXAPARIN SODIUM 30 MG: 100 INJECTION SUBCUTANEOUS at 21:28

## 2024-01-05 RX ADMIN — BUMETANIDE 2 MG: 1 TABLET ORAL at 09:15

## 2024-01-05 RX ADMIN — IPRATROPIUM BROMIDE AND ALBUTEROL SULFATE 1 DOSE: 2.5; .5 SOLUTION RESPIRATORY (INHALATION) at 16:00

## 2024-01-05 RX ADMIN — Medication 10 ML: at 09:15

## 2024-01-05 RX ADMIN — GUAIFENESIN SYRUP AND DEXTROMETHORPHAN 10 ML: 100; 10 SYRUP ORAL at 09:25

## 2024-01-05 RX ADMIN — BUDESONIDE INHALATION 500 MCG: 0.5 SUSPENSION RESPIRATORY (INHALATION) at 18:42

## 2024-01-05 RX ADMIN — Medication 10 ML: at 21:29

## 2024-01-05 RX ADMIN — ARFORMOTEROL TARTRATE 15 MCG: 15 SOLUTION RESPIRATORY (INHALATION) at 18:42

## 2024-01-05 RX ADMIN — BUDESONIDE INHALATION 500 MCG: 0.5 SUSPENSION RESPIRATORY (INHALATION) at 06:23

## 2024-01-05 RX ADMIN — IPRATROPIUM BROMIDE AND ALBUTEROL SULFATE 1 DOSE: 2.5; .5 SOLUTION RESPIRATORY (INHALATION) at 06:21

## 2024-01-05 RX ADMIN — ARFORMOTEROL TARTRATE 15 MCG: 15 SOLUTION RESPIRATORY (INHALATION) at 06:22

## 2024-01-05 RX ADMIN — AZITHROMYCIN DIHYDRATE 500 MG: 250 TABLET ORAL at 09:24

## 2024-01-05 RX ADMIN — BUMETANIDE 2 MG: 1 TABLET ORAL at 21:29

## 2024-01-05 RX ADMIN — PREDNISONE 40 MG: 20 TABLET ORAL at 09:15

## 2024-01-05 RX ADMIN — BENZONATATE 100 MG: 100 CAPSULE ORAL at 09:25

## 2024-01-05 RX ADMIN — IPRATROPIUM BROMIDE AND ALBUTEROL SULFATE 1 DOSE: 2.5; .5 SOLUTION RESPIRATORY (INHALATION) at 12:18

## 2024-01-05 RX ADMIN — IPRATROPIUM BROMIDE AND ALBUTEROL SULFATE 1 DOSE: 2.5; .5 SOLUTION RESPIRATORY (INHALATION) at 18:42

## 2024-01-05 RX ADMIN — ONDANSETRON 4 MG: 2 INJECTION INTRAMUSCULAR; INTRAVENOUS at 15:09

## 2024-01-05 NOTE — PROGRESS NOTES
[facility] Medicine    Subjective:  pt alert conversive breathing better      Current Facility-Administered Medications:     budesonide (PULMICORT) nebulizer suspension 500 mcg, 0.5 mg, Nebulization, BID RT, Sagar Roberto MD, 500 mcg at 01/05/24 0623    arformoterol tartrate (BROVANA) nebulizer solution 15 mcg, 15 mcg, Nebulization, BID RT, Sagar Roberto MD, 15 mcg at 01/05/24 0622    bumetanide (BUMEX) injection 2 mg, 2 mg, IntraVENous, BID, Facundo Dent DO, 2 mg at 01/04/24 2233    azithromycin (ZITHROMAX) 500 mg in sodium chloride 0.9 % 250 mL IVPB ([id number]), 500 mg, IntraVENous, Q24H, Facundo Dent DO, Stopped at 01/05/24 0020    guaiFENesin-dextromethorphan (ROBITUSSIN DM) 100-10 MG/5ML syrup 10 mL, 10 mL, Oral, Q6H PRN, Facundo Dent DO, 10 mL at 01/04/24 2242    ipratropium 0.5 mg-albuterol 2.5 mg (DUONEB) nebulizer solution 1 Dose, 1 Dose, Inhalation, Q4H WA RT, Facundo Dent DO, 1 Dose at 01/05/24 0621    methylPREDNISolone sodium succ (SOLU-MEDROL) 40 mg in sterile water 1 mL injection, 40 mg, IntraVENous, Q12H, Facundo Dent DO, 40 mg at 01/04/24 2233    hydrOXYzine pamoate (VISTARIL) capsule 25 mg, 25 mg, Oral, TID PRN, Facundo Dent DO, 25 mg at 01/02/24 1407    rOPINIRole (REQUIP) tablet 0.5 mg, 0.5 mg, Oral, Nightly, Sunni Jones DO, 0.5 mg at 01/04/24 2233    rOPINIRole (REQUIP) tablet 0.5 mg, 0.5 mg, Oral, Q4H PRN, Trang Villalta, APRN - CNP, 0.5 mg at 01/03/24 0900    sodium chloride flush 0.9 % injection 10 mL, 10 mL, IntraVENous, 2 times per day, Cambert, Trang, APRN - CNP, 10 mL at 01/04/24 1266    sodium chloride flush 0.9 % injection 10 mL, 10 mL, IntraVENous, PRN, Cambert, Trang, APRN - CNP    0.9 % sodium chloride infusion, , IntraVENous, PRN, Cambert, Trang, APRN - CNP    potassium chloride (KLOR-CON M) extended release tablet 40 mEq, 40 mEq, Oral, PRN **OR** potassium bicarb-citric acid (EFFER-K) effervescent tablet 40 mEq, 40 mEq,  AM    EOSABS 0.00 01/05/2024 05:05 AM    BASOSABS 0.02 01/05/2024 05:05 AM     CMP:    Lab Results   Component Value Date/Time     01/05/2024 05:05 AM    K 4.1 01/05/2024 05:05 AM    K 3.6 09/27/2022 06:51 AM    CL 90 01/05/2024 05:05 AM    CO2 35 01/05/2024 05:05 AM    BUN 29 01/05/2024 05:05 AM    CREATININE 0.5 01/05/2024 05:05 AM    GFRAA >60 09/27/2022 06:51 AM    LABGLOM >60 01/05/2024 05:05 AM    GLUCOSE 362 01/05/2024 05:05 AM    PROT 6.7 01/05/2024 05:05 AM    LABALBU 3.7 01/05/2024 05:05 AM    CALCIUM 8.9 01/05/2024 05:05 AM    BILITOT 0.3 01/05/2024 05:05 AM    ALKPHOS 105 01/05/2024 05:05 AM    AST 14 01/05/2024 05:05 AM    ALT 15 01/05/2024 05:05 AM     Warfarin PT/INR:    Lab Results   Component Value Date    INR 1.2 12/29/2023    INR 1.1 10/18/2023    INR 1.1 01/31/2022    PROTIME 12.6 (H) 12/29/2023    PROTIME 11.9 10/18/2023    PROTIME 11.4 01/31/2022       Assessment:    Principal Problem:    Acute hypoxemic respiratory failure (HCC)  Active Problems:    Influenza A  Resolved Problems:    * No resolved hospital problems. *      Plan:  Check pulsox dc planning        Facundo Dent DO  7:54 AM  1/5/2024

## 2024-01-05 NOTE — CARE COORDINATION
Patient currently on 1.5L NC (no oxygen at baseline), p.o. meds and ambulating pulse-ox test pending. Patient has no preference on DME company should oxygen be needed. Patient plans to return home, no needs reported, patient's daughterKatharine has declined home care services and will transport home. Checking discharge.    Electronically signed by DMAON Jones on 1/5/2024 at 12:51 PM

## 2024-01-05 NOTE — PROGRESS NOTES
Comprehensive Nutrition Assessment    Type and Reason for Visit:  Initial, RD Nutrition Re-Screen/LOS    Nutrition Recommendations/Plan:   Continue current diet  Start ensure HP once/day to promote oral intake  Will monitor     Malnutrition Assessment:  Malnutrition Status:  No malnutrition (01/05/24 1510)    Context:  Acute Illness     Findings of the 6 clinical characteristics of malnutrition:  Energy Intake:  Mild decrease in energy intake (Comment)  Weight Loss:  No significant weight loss (~13% in~1 yr (1/5/23-134kg, 1/3/24-115kg) / ~2% in ~3 mo (10/9/23-118kg, 1/3/24-115kg))     Body Fat Loss:  No significant body fat loss     Muscle Mass Loss:  No significant muscle mass loss    Fluid Accumulation:  No significant fluid accumulation     Strength:  Not Performed    Nutrition Assessment:    pt adm d/t SOB/resp failure; pt positive for influenza A/RSV; PMhx of COPD,Acute pancreatitis; will start ONS to further promote oral intake & monitor.    Nutrition Related Findings:    alert; active BS; +2/+3 edema; -I/O Wound Type: None       Current Nutrition Intake & Therapies:    Average Meal Intake: 51-75%  Average Supplements Intake: None Ordered  ADULT DIET; Regular  ADULT ORAL NUTRITION SUPPLEMENT; Lunch; Low Calorie/High Protein Oral Supplement    Anthropometric Measures:  Height: 157.5 cm (5' 2.01\")  Ideal Body Weight (IBW): 110 lbs (50 kg)       Current Body Weight: 115.8 kg (255 lb 4.7 oz) (1/3-BS), 232.1 % IBW. Weight Source: Bed Scale  Current BMI (kg/m2): 46.7  Usual Body Weight: 134.1 kg (295 lb 10.2 oz) (1/5/23-actual)  % Weight Change (Calculated): -13.6  Weight Adjustment For: No Adjustment                 BMI Categories: Obese Class 3 (BMI 40.0 or greater)    Estimated Daily Nutrient Needs:  Energy Requirements Based On: Kcal/kg  Weight Used for Energy Requirements: Current  Energy (kcal/day): 13-15kcal/xlzOJP=0354-1624  Weight Used for Protein Requirements: Ideal  Protein (g/day):  2.0-2.5g/mbiGAE=235-621z  Method Used for Fluid Requirements: 1 ml/kcal  Fluid (ml/day): 6454-9832    Nutrition Diagnosis:   Inadequate oral intake related to impaired respiratory function (w/ SOB/resp failure) as evidenced by intake 51-75%    Nutrition Interventions:   Food and/or Nutrient Delivery: Continue Current Diet, Start Oral Nutrition Supplement (Ensure HP once/day)  Nutrition Education/Counseling: No recommendation at this time  Coordination of Nutrition Care: Continue to monitor while inpatient       Goals:     Goals: PO intake 75% or greater, by next RD assessment       Nutrition Monitoring and Evaluation:   Behavioral-Environmental Outcomes: None Identified  Food/Nutrient Intake Outcomes: Food and Nutrient Intake, Supplement Intake  Physical Signs/Symptoms Outcomes: Biochemical Data, Nutrition Focused Physical Findings, Skin, Chewing or Swallowing, Weight, GI Status, Fluid Status or Edema    Discharge Planning:    Too soon to determine     Ana Laura Joel RD, LD  Contact: [phone]

## 2024-01-05 NOTE — PLAN OF CARE
Problem: Chronic Conditions and Co-morbidities  Goal: Patient's chronic conditions and co-morbidity symptoms are monitored and maintained or improved  Outcome: Progressing     Problem: Discharge Planning  Goal: Discharge to home or other facility with appropriate resources  Outcome: Progressing     Problem: Safety - Adult  Goal: Free from fall injury  Outcome: Progressing     Problem: Skin/Tissue Integrity  Goal: Absence of new skin breakdown  Description: 1.  Monitor for areas of redness and/or skin breakdown  2.  Assess vascular access sites hourly  3.  Every 4-6 hours minimum:  Change oxygen saturation probe site  4.  Every 4-6 hours:  If on nasal continuous positive airway pressure, respiratory therapy assess nares and determine need for appliance change or resting period.  Outcome: Progressing

## 2024-01-05 NOTE — PROGRESS NOTES
Pulmonary Critical Care Medicine           PULMONARY  CRITICAL CARE   SERVICE DAILY PROGRESS  NOTE     2024   Hospital LOS:  LOS: 6 days     Impression / Recommendations:    Acute on chronic respiratory insufficiency secondary to AECOPD from Flu A LRTI + Hemophilus  influenza pneumonia  Post covid Pneumonitis   Severe TOBIAS   Current smoker      - Continue care in med/surg floor , clinically looks better.  - Continue nocturnal BiPAP at 12/, she is due for a titration study as an outpatient  - Respiratory viral Panel - positive for Flu A   - Sputum cultures -haemophilus influenza  - Continue Duonebs  Scheduled   -Continue Pulmicort and perforomist  nebulizations   - Diuresis - Bumex BID IV     -Continue ceftriaxone and  Azithromycin   - Continue Systemic steroids IV    - IS, FV   - Continue supplemental O2 and wean as tolerated , Pt does not use O2 at home.   - Will need home oxygen evaluation at the time of discharge    - Ok from pulmonary standpoint to be discharged home on home o2 today.       Interval History/Event Changes:  Sitting on a chair , much better   Improved oxygenation , Fio2 coming down   No new complains   Feels less congested     Allergies  Allergies   Allergen Reactions    Ferritin Shortness Of Breath and Other (See Comments)     Flushed,  Severe back pain       Review of Systems    A pertinent review of systems was performed and was otherwise non-contributory.    Vitals-     BP (!) 162/97   Pulse 89   Temp 98.2 °F (36.8 °C) (Oral)   Resp 20   Ht 1.575 m (5' 2.01\")   Wt 115.8 kg (255 lb 3.2 oz)   SpO2 93%   BMI 46.66 kg/m²    Tmax: Temp (24hrs), Av.2 °F (36.8 °C), Min:98.1 °F (36.7 °C), Max:98.4 °F (36.9 °C)      Hemodynamics:  Cuff:   Systolic (24hrs), Av , Min:150 , Max:179   /Diastolic (24hrs), Av, Min:75, Max:108    Cuff MAP:MAP (mmHg)  Av.4  Min: 96  Max: 146  P:   Pulse  Av.1  Min: 81  Max: 104      Airway:     Observed RR: Resp  Av.1  Min: 16  Max:

## 2024-01-05 NOTE — PROGRESS NOTES
PULSE OX ON ROOM AIR SITTING_84___%   PULSE OX ON __1.5__LITERS SITTING RECOVERY __92__%   PULSE OX ON ROOM AIR AMBULATING __83__%   PULSE OX ON _2 _LITERS AMBULATING RECOVERY _92__%

## 2024-01-06 VITALS
HEIGHT: 62 IN | RESPIRATION RATE: 20 BRPM | TEMPERATURE: 97.2 F | WEIGHT: 255.2 LBS | OXYGEN SATURATION: 91 % | HEART RATE: 96 BPM | DIASTOLIC BLOOD PRESSURE: 79 MMHG | SYSTOLIC BLOOD PRESSURE: 125 MMHG | BODY MASS INDEX: 46.96 KG/M2

## 2024-01-06 LAB
ALBUMIN SERPL-MCNC: 3.6 G/DL (ref 3.5–5.2)
ALP SERPL-CCNC: 105 U/L (ref 35–104)
ALT SERPL-CCNC: 26 U/L (ref 0–32)
ANION GAP SERPL CALCULATED.3IONS-SCNC: 12 MMOL/L (ref 7–16)
AST SERPL-CCNC: 27 U/L (ref 0–31)
BASOPHILS # BLD: 0.02 K/UL (ref 0–0.2)
BASOPHILS NFR BLD: 0 % (ref 0–2)
BILIRUB SERPL-MCNC: 0.3 MG/DL (ref 0–1.2)
BUN SERPL-MCNC: 31 MG/DL (ref 6–23)
CALCIUM SERPL-MCNC: 8.8 MG/DL (ref 8.6–10.2)
CHLORIDE SERPL-SCNC: 88 MMOL/L (ref 98–107)
CO2 SERPL-SCNC: 36 MMOL/L (ref 22–29)
CREAT SERPL-MCNC: 0.7 MG/DL (ref 0.5–1)
EOSINOPHIL # BLD: 0.02 K/UL (ref 0.05–0.5)
EOSINOPHILS RELATIVE PERCENT: 0 % (ref 0–6)
ERYTHROCYTE [DISTWIDTH] IN BLOOD BY AUTOMATED COUNT: 17.4 % (ref 11.5–15)
GFR SERPL CREATININE-BSD FRML MDRD: >60 ML/MIN/1.73M2
GLUCOSE SERPL-MCNC: 266 MG/DL (ref 74–99)
HCT VFR BLD AUTO: 40.2 % (ref 34–48)
HGB BLD-MCNC: 12 G/DL (ref 11.5–15.5)
IMM GRANULOCYTES # BLD AUTO: 0.34 K/UL (ref 0–0.58)
IMM GRANULOCYTES NFR BLD: 2 % (ref 0–5)
LYMPHOCYTES NFR BLD: 1.81 K/UL (ref 1.5–4)
LYMPHOCYTES RELATIVE PERCENT: 12 % (ref 20–42)
MCH RBC QN AUTO: 23.6 PG (ref 26–35)
MCHC RBC AUTO-ENTMCNC: 29.9 G/DL (ref 32–34.5)
MCV RBC AUTO: 79 FL (ref 80–99.9)
MONOCYTES NFR BLD: 1.06 K/UL (ref 0.1–0.95)
MONOCYTES NFR BLD: 7 % (ref 2–12)
NEUTROPHILS NFR BLD: 78 % (ref 43–80)
NEUTS SEG NFR BLD: 11.51 K/UL (ref 1.8–7.3)
PLATELET # BLD AUTO: 189 K/UL (ref 130–450)
PMV BLD AUTO: 11.3 FL (ref 7–12)
POTASSIUM SERPL-SCNC: 3.7 MMOL/L (ref 3.5–5)
PROT SERPL-MCNC: 6.5 G/DL (ref 6.4–8.3)
RBC # BLD AUTO: 5.09 M/UL (ref 3.5–5.5)
SODIUM SERPL-SCNC: 136 MMOL/L (ref 132–146)
WBC OTHER # BLD: 14.8 K/UL (ref 4.5–11.5)

## 2024-01-06 PROCEDURE — 6370000000 HC RX 637 (ALT 250 FOR IP): Performed by: INTERNAL MEDICINE

## 2024-01-06 PROCEDURE — 80053 COMPREHEN METABOLIC PANEL: CPT

## 2024-01-06 PROCEDURE — 6360000002 HC RX W HCPCS: Performed by: INTERNAL MEDICINE

## 2024-01-06 PROCEDURE — 94660 CPAP INITIATION&MGMT: CPT

## 2024-01-06 PROCEDURE — 36415 COLL VENOUS BLD VENIPUNCTURE: CPT

## 2024-01-06 PROCEDURE — 85025 COMPLETE CBC W/AUTO DIFF WBC: CPT

## 2024-01-06 PROCEDURE — 94640 AIRWAY INHALATION TREATMENT: CPT

## 2024-01-06 PROCEDURE — 6360000002 HC RX W HCPCS: Performed by: NURSE PRACTITIONER

## 2024-01-06 PROCEDURE — 2580000003 HC RX 258: Performed by: NURSE PRACTITIONER

## 2024-01-06 PROCEDURE — 2700000000 HC OXYGEN THERAPY PER DAY

## 2024-01-06 RX ORDER — GUAIFENESIN/DEXTROMETHORPHAN 100-10MG/5
10 SYRUP ORAL EVERY 6 HOURS PRN
Qty: 120 ML | Refills: 2 | Status: SHIPPED | OUTPATIENT
Start: 2024-01-06 | End: 2024-01-16

## 2024-01-06 RX ORDER — BENZONATATE 100 MG/1
100 CAPSULE ORAL 3 TIMES DAILY PRN
Qty: 12 CAPSULE | Refills: 0 | Status: SHIPPED | OUTPATIENT
Start: 2024-01-06 | End: 2024-01-13

## 2024-01-06 RX ORDER — PREDNISONE 20 MG/1
40 TABLET ORAL DAILY
Qty: 20 TABLET | Refills: 0 | Status: SHIPPED | OUTPATIENT
Start: 2024-01-07 | End: 2024-01-17

## 2024-01-06 RX ADMIN — IPRATROPIUM BROMIDE AND ALBUTEROL SULFATE 1 DOSE: 2.5; .5 SOLUTION RESPIRATORY (INHALATION) at 15:48

## 2024-01-06 RX ADMIN — BUDESONIDE INHALATION 500 MCG: 0.5 SUSPENSION RESPIRATORY (INHALATION) at 08:01

## 2024-01-06 RX ADMIN — IPRATROPIUM BROMIDE AND ALBUTEROL SULFATE 1 DOSE: 2.5; .5 SOLUTION RESPIRATORY (INHALATION) at 11:59

## 2024-01-06 RX ADMIN — IPRATROPIUM BROMIDE AND ALBUTEROL SULFATE 1 DOSE: 2.5; .5 SOLUTION RESPIRATORY (INHALATION) at 08:01

## 2024-01-06 RX ADMIN — ENOXAPARIN SODIUM 30 MG: 100 INJECTION SUBCUTANEOUS at 08:27

## 2024-01-06 RX ADMIN — BUMETANIDE 2 MG: 1 TABLET ORAL at 08:28

## 2024-01-06 RX ADMIN — AZITHROMYCIN DIHYDRATE 500 MG: 250 TABLET ORAL at 08:27

## 2024-01-06 RX ADMIN — ARFORMOTEROL TARTRATE 15 MCG: 15 SOLUTION RESPIRATORY (INHALATION) at 08:01

## 2024-01-06 RX ADMIN — Medication 10 ML: at 08:30

## 2024-01-06 RX ADMIN — PREDNISONE 40 MG: 20 TABLET ORAL at 08:29

## 2024-01-06 NOTE — CARE COORDINATION
1/6/24  All requested info faxed to [employer] as requested.  Tank to be delivered to room for transportation home.   Zuleyka GARCIAN RN-BC  [phone]       Eye Shield Used: No

## 2024-01-06 NOTE — PLAN OF CARE
Problem: Chronic Conditions and Co-morbidities  Goal: Patient's chronic conditions and co-morbidity symptoms are monitored and maintained or improved  Outcome: Progressing     Problem: Discharge Planning  Goal: Discharge to home or other facility with appropriate resources  Outcome: Progressing     Problem: Safety - Adult  Goal: Free from fall injury  1/6/2024 0942 by Zena Simms RN  Outcome: Progressing  1/6/2024 0047 by Edelmira Harrington RN  Outcome: Progressing     Problem: Skin/Tissue Integrity  Goal: Absence of new skin breakdown  Description: 1.  Monitor for areas of redness and/or skin breakdown  2.  Assess vascular access sites hourly  3.  Every 4-6 hours minimum:  Change oxygen saturation probe site  4.  Every 4-6 hours:  If on nasal continuous positive airway pressure, respiratory therapy assess nares and determine need for appliance change or resting period.  1/6/2024 0942 by Zena Simms RN  Outcome: Progressing  1/6/2024 0047 by Edelmira Harrington RN  Outcome: Progressing     Problem: Nutrition Deficit:  Goal: Optimize nutritional status  1/6/2024 0942 by Zena Simms RN  Outcome: Progressing  1/6/2024 0047 by Edelmira Harrington RN  Outcome: Progressing

## 2024-01-06 NOTE — CARE COORDINATION
1/6/2024Received call about possible dc and need for home o2. Will need pulse ox testing,home o2 order with appropriate dx,and physician face to face note stating need for home o2. Samy will fax to [facility] [phone].  Electronically signed by KANE Dyson on 1/6/2024 at 9:58 AM

## 2024-01-06 NOTE — PROGRESS NOTES
Pulmonary Critical Care Medicine           PULMONARY  CRITICAL CARE   SERVICE DAILY PROGRESS  NOTE     2024   Hospital LOS:  LOS: 6 days     Impression / Recommendations:    Acute on chronic respiratory insufficiency secondary to AECOPD from Flu A LRTI + Hemophilus  influenza pneumonia  Post covid Pneumonitis   Severe TOBIAS   Current smoker - strongly determined to quit this time      - Clinically looks better, using bathroom by herself , no PATEL   - She is due for a titration study as an outpatient  - Respiratory viral Panel - positive for Flu A   - Sputum cultures -haemophilus influenza  - Continue Duonebs  Scheduled   -Continue Pulmicort and perforomist  nebulizations   - Diuresis - Bumex BID IV     -Continue ceftriaxone and  Azithromycin , change to Augmentin for 5 days at discharge   - wean steroids - 7 to 10 days taper   - IS, FV   - Continue supplemental O2 and wean as tolerated , Pt does not use O2 at home.   - Will need home oxygen evaluation at the time of discharge    - Ok from pulmonary standpoint to be discharged home on home o2 today.       Interval History/Event Changes:    Feels better   No more congestion , mild wheezing on exertion still present   No fever , myalgias resolved     Daughter at bedside, updated , questions answered     Allergies  Allergies   Allergen Reactions    Ferritin Shortness Of Breath and Other (See Comments)     Flushed,  Severe back pain       Review of Systems    A pertinent review of systems was performed and was otherwise non-contributory.    Vitals-     BP (!) 162/97   Pulse 89   Temp 98.2 °F (36.8 °C) (Oral)   Resp 20   Ht 1.575 m (5' 2.01\")   Wt 115.8 kg (255 lb 3.2 oz)   SpO2 93%   BMI 46.66 kg/m²    Tmax: Temp (24hrs), Av.2 °F (36.8 °C), Min:98.1 °F (36.7 °C), Max:98.4 °F (36.9 °C)      Hemodynamics:  Cuff:   Systolic (24hrs), Av , Min:150 , Max:179   /Diastolic (24hrs), Av, Min:75, Max:108    Cuff MAP:MAP (mmHg)  Av.4  Min: 96  Max:  146  P:   Pulse  Av.1  Min: 81  Max: 104      Airway:     Observed RR: Resp  Av.1  Min: 16  Max: 20   Observed O2 sats: SpO2  Av.4 %  Min: 66 %  Max: 100 %      Intake/Output Summary (Last 24 hours) at 2024 1554  Last data filed at 2024 1300  Gross per 24 hour   Intake 240 ml   Output 3000 ml   Net -2760 ml         Physical exam-  General appearance: Ill appearing, alert, no converstional dyspnea  Head: Normocephalic, without obvious abnormality, atraumatic   Eyes:Pupils bilateral equal and reactive, EOM intact, conjunctiva - no icterus , no injection   Throat: Clear, no lesions, Mallampti = IV, no tonsillar eythema or edema  Neck: Supple, symmetrical, trachea midline, no lymphadenopathy, no JVD, no carotid bruits, no thyromegaly   Lungs: Bilateral  Air movement diminished overall , wheezing - scattered mild expiratory ,   no crackles ,  normal femitus, resonant to percussion  Heart: RRR, S1, S2 normal, no murmur, click, rub or gallop   Abdomen: soft, non-tender, nondistended. Bowel sounds normal, no hepatomegaly.  Extremities: Extremities normal, atraumatic, no cyanosis, 2+  edema, lymphedema +   Musculoskeletal - No deformities   Skin: Skin color, texture, turgor normal. No rashes or lesions   Neurological: No focal deficits, cranial nerves grossly intact, sensations intact.  Psychiatric : Mood and effect normal , alert and oriented times 4.        Routine labs:    Recent Labs     24  04224  04224  0505   WBC 11.7* 10.1 11.9*   HGB 11.7 11.7 12.2   HCT 40.0 39.2 40.6   PLT  --  215 201     Recent Labs     24  0424 24  0427 24  0505    138 136   K 3.8 4.5 4.1   CL 93* 91* 90*   CO2 37* 35* 35*   BUN 26* 27* 29*   CREATININE 0.5 0.6 0.5         Other Laboratory - Imaging Studies:     Reviewed and as per electronic record. CxR/CT images reviewed by me when available.      Total time spent for this encounter was 51 minutes including but not limited

## 2024-01-06 NOTE — PROGRESS NOTES
Internal Medicine Progress Note    Patient's name: Selena Ward  : 1955  Chief complaints (on day of admission): Shortness of Breath (Increased resp distress arrived 66% in triage NRB applied been sick the past two days, )  Admission date: 2023  Date of service: 2024   Room: [address]  Primary care physician: Gautam Razo DO  Reason for visit: Follow-up for shortness of breath     Subjective  Selena was seen and examined at bedside     Doing ok today   On 2 L O2 by NC   Wants to go home   Daughter at bedside   Needs pulm clearance and home O2   Talked to nursing     Review of Systems  There are no new complaints of chest pain, shortness of breath, abdominal pain, nausea, vomiting, diarrhea, constipation unless otherwise mentioned above.     Hospital Medications  Current Facility-Administered Medications   Medication Dose Route Frequency Provider Last Rate Last Admin    bumetanide (BUMEX) tablet 2 mg  2 mg Oral BID Facundo Dent DO   2 mg at 24 0828    predniSONE (DELTASONE) tablet 40 mg  40 mg Oral Daily Facundo Dent DO   40 mg at 24 0829    budesonide (PULMICORT) nebulizer suspension 500 mcg  0.5 mg Nebulization BID RT Sagar Roberto MD   500 mcg at 24 0801    arformoterol tartrate (BROVANA) nebulizer solution 15 mcg  15 mcg Nebulization BID RT Sagar Roberto MD   15 mcg at 24 0801    guaiFENesin-dextromethorphan (ROBITUSSIN DM) 100-10 MG/5ML syrup 10 mL  10 mL Oral Q6H PRN Facundo Dent DO   10 mL at 24 0925    ipratropium 0.5 mg-albuterol 2.5 mg (DUONEB) nebulizer solution 1 Dose  1 Dose Inhalation Q4H WA RT Facundo Dent DO   1 Dose at 24 1159    hydrOXYzine pamoate (VISTARIL) capsule 25 mg  25 mg Oral TID PRN Facundo Dent DO   25 mg at 24 1407    rOPINIRole (REQUIP) tablet 0.5 mg  0.5 mg Oral Nightly Sunni Jones DO   0.5 mg at 24    rOPINIRole (REQUIP) tablet 0.5 mg  0.5 mg Oral Q4H PRN Pawan,  Trang, APRN - CNP   0.5 mg at 01/05/24 0915    sodium chloride flush 0.9 % injection 10 mL  10 mL IntraVENous 2 times per day Cambert, Trang, APRN - CNP   10 mL at 01/06/24 0830    sodium chloride flush 0.9 % injection 10 mL  10 mL IntraVENous PRN Cambert, Trang, APRN - CNP        0.9 % sodium chloride infusion   IntraVENous PRN Cambert, Trang, APRN - CNP        potassium chloride (KLOR-CON M) extended release tablet 40 mEq  40 mEq Oral PRN Cambert, Trang, APRN - CNP        Or    potassium bicarb-citric acid (EFFER-K) effervescent tablet 40 mEq  40 mEq Oral PRN Cambert, Trang, APRN - CNP        Or    potassium chloride 10 mEq/100 mL IVPB (Peripheral Line)  10 mEq IntraVENous PRN Cambert, Trang, APRN - CNP        magnesium sulfate 2000 mg in 50 mL IVPB premix  2,000 mg IntraVENous PRN Cambert, Trang, APRN - CNP        enoxaparin Sodium (LOVENOX) injection 30 mg  30 mg SubCUTAneous BID Cambert, Trang, APRN - CNP   30 mg at 01/06/24 0827    ondansetron (ZOFRAN-ODT) disintegrating tablet 4 mg  4 mg Oral Q8H PRN Cambert, Trang, APRN - CNP        Or    ondansetron (ZOFRAN) injection 4 mg  4 mg IntraVENous Q6H PRN Cambert, Trang, APRN - CNP   4 mg at 01/05/24 1509    senna (SENOKOT) tablet 8.6 mg  1 tablet Oral Daily PRN Cambert, Trang, APRN - CNP        acetaminophen (TYLENOL) tablet 650 mg  650 mg Oral Q6H PRN Cambert, Trang, APRN - CNP   650 mg at 12/31/23 0558    Or    acetaminophen (TYLENOL) suppository 650 mg  650 mg Rectal Q6H PRN Cambert, Trang, APRN - CNP        baclofen (LIORESAL) tablet 10 mg  10 mg Oral TID PRN Cambert, Trang, APRN - CNP   10 mg at 01/03/24 0931    benzonatate (TESSALON) capsule 100 mg  100 mg Oral TID PRN Sánchez Underwood MD   100 mg at 01/05/24 0925       PRN Medications  guaiFENesin-dextromethorphan, hydrOXYzine pamoate, rOPINIRole, sodium chloride flush, sodium chloride, potassium chloride **OR** potassium alternative oral

## 2024-01-06 NOTE — PROGRESS NOTES
Per Dr. Luis please verify with pulmonary ok for discharge.  Dr. Dorsey contacted via perfect serve, Dr. Dorsey is only covering new consults. Please contact Dr. Roberto.

## 2024-01-06 NOTE — PROGRESS NOTES
Oxygen Template   PULSE OX ON ROOM AIR SITTING 92%   PULSE OX ON 2 LITERS SITTING RECOVERY 91%   PULSE OX ON ROOM AIR AMBULATING 87%   PULSE OX ON 2 LITERS AMBULATING RECOVERY 90%

## 2024-01-06 NOTE — PLAN OF CARE
Problem: Safety - Adult  Goal: Free from fall injury  Outcome: Progressing     Problem: Skin/Tissue Integrity  Goal: Absence of new skin breakdown  Description: 1.  Monitor for areas of redness and/or skin breakdown  2.  Assess vascular access sites hourly  3.  Every 4-6 hours minimum:  Change oxygen saturation probe site  4.  Every 4-6 hours:  If on nasal continuous positive airway pressure, respiratory therapy assess nares and determine need for appliance change or resting period.  Outcome: Progressing     Problem: Nutrition Deficit:  Goal: Optimize nutritional status  Outcome: Progressing

## 2024-01-06 NOTE — PROGRESS NOTES
CLINICAL PHARMACY NOTE: MEDS TO BEDS    Total # of Prescriptions Filled: 3   The following medications were delivered to the patient:  Oneida tussin  Benzonatate 100  Prednisone 20    Additional Documentation:

## 2024-01-07 NOTE — DISCHARGE SUMMARY
caliber. Mediastinum: The heart is normal in size.  The left ventricular wall appears somewhat hypertrophy.  Mild calcified coronary atherosclerosis.  Mild calcified atherosclerosis is seen in the aorta. No aneurysm.  No lymphadenopathy. Lungs/pleura: Mild emphysematous changes.  Mild bronchial wall thickening. The central airway is clear. Upper Abdomen: No acute abnormality seen in the upper abdomen.  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 Soft Tissues/Bones: No acute bone or soft tissue abnormality.     1. No evidence of pulmonary embolism or acute cardiopulmonary process. 2. Mild emphysematous changes. 3. Mild bronchial wall thickening may reflect bronchitis or reactive airway disease. 4. Left ventricular wall appears somewhat hypertrophy.     XR CHEST PORTABLE    Result Date: 12/29/2023  EXAMINATION: ONE XRAY VIEW OF THE CHEST 12/29/2023 9:03 pm COMPARISON: 12/13/2022. HISTORY: ORDERING SYSTEM PROVIDED HISTORY: sob TECHNOLOGIST PROVIDED HISTORY: Reason for exam:->sob What reading provider will be dictating this exam?->CRC FINDINGS: The lungs are without acute focal process.  There is no effusion or pneumothorax. The cardiomediastinal silhouette is without acute process. The osseous structures are without acute process.     No acute process.       MICROBIOLOGY:  BLOOD CX #1  No results for input(s): \"BC\" in the last 72 hours.  BLOOD CX #2  No results for input(s): \"BLOODCULT2\" in the last 72 hours.  TIP CULTURE  No results for input(s): \"CXCATHTIP\" in the last 72 hours.   CULTURE, RESPIRATORY   No results for input(s): \"CULTRESP\" in the last 72 hours.  RESPIRATORY SMEAR  No results for input(s): \"RESPSMEAR\" in the last 72 hours.       ECHO:      DISPOSITION:  The patient's condition is fair.   The patient is being discharged to home    DISCHARGE MEDICATIONS:      Medication List        START taking these medications      benzonatate 100 MG capsule  Commonly known as: TESSALON  Take 1 capsule    Reason for Stopping:             Discharge Medication List as of 1/6/2024  4:44 PM        START taking these medications    Details   predniSONE (DELTASONE) 20 MG tablet Take 2 tablets by mouth daily for 10 days, Disp-20 tablet, R-0Normal      benzonatate (TESSALON) 100 MG capsule Take 1 capsule by mouth 3 times daily as needed for Cough, Disp-12 capsule, R-0Normal      guaiFENesin-dextromethorphan (ROBITUSSIN DM) 100-10 MG/5ML syrup Take 10 mLs by mouth every 6 hours as needed for Cough, Disp-120 mL, R-2Normal             INTERNAL MEDICINE FOLLOW UP/INSTRUCTIONS:  Follow-up with primary care physician within 1 week of discharge from hospital  Please review changes to pre-hospital admission medications and prescriptions for new medications upon discharge from the hospital with PCP  Please review results of labs and imaging studies with PCP  Follow-up with consultants as directed by them   If recurrence or worsening of symptoms please call primary care physician or return to the ER immediately  Diet: No diet orders on file    Preparing for this patient's discharge, including paperwork, orders, instructions, and meeting with patient did required >35 minutes.    Electronically signed by Daniel Luis MD on 1/7/2024 at 5:46 PM

## 2024-01-09 ENCOUNTER — CARE COORDINATION (OUTPATIENT)
Dept: CARE COORDINATION | Age: 69
End: 2024-01-09

## 2024-01-09 DIAGNOSIS — J10.1 INFLUENZA A: Primary | ICD-10-CM

## 2024-01-09 DIAGNOSIS — J96.01 ACUTE HYPOXEMIC RESPIRATORY FAILURE (HCC): ICD-10-CM

## 2024-01-09 PROCEDURE — 1111F DSCHRG MED/CURRENT MED MERGE: CPT | Performed by: FAMILY MEDICINE

## 2024-01-09 NOTE — CARE COORDINATION
Care Transitions Initial Follow Up Call    Call within 2 business days of discharge: Yes    Patient Current Location:  Home: [address] [place] [id number]    Care Transition Nurse contacted the patient by telephone to perform post hospital discharge assessment. Verified name and  with patient as identifiers. Provided introduction to self, and explanation of the Care Transition Nurse role.     Patient: Selena Ward Patient : 1955   MRN: <[id number]>  Reason for Admission: SEYZ -24 Acute Hypoxemic Respiratory Failure; Influenza A  Discharge Date: 24 RARS: Readmission Risk Score: 12.8    Last Discharge Facility       Date Complaint Diagnosis Description Type Department Provider    23 Shortness of Breath Acute respiratory failure with hypoxia (HCC) ... ED to Hosp-Admission (Discharged) (ADMITTED) SE[facility] Facundo Dent DO; Audra Jones...          Was this an external facility discharge? No Discharge Facility: n/a    Challenges to be reviewed by the provider   Additional needs identified to be addressed with provider: No  none               Method of communication with provider: none.    Patient presented to the ED due to shortness of breath, pulse-ox 66%; admitted for acute hypoxemic respiratory failure.    CTN placed call to patient for Initial Care Transition outreach, Post Hospital Discharge. Pt reports PATEL, productive cough with yellowish green phlegm, chest congestion, extreme fatigue, and weakness. Pt denies CP, fever, chills, n/v/d.     Pt has O2 2L provided by [employer]. Pt reports seldom using. Stated SpO2 94-96% RA, admits to not checking pulse ox when ambulating. Discussed the importance of smoking cessation while on O2. Pt v/u.     Pt states appetite is good and is drinking plenty of fluids; normal elimination patterns.    Pt denies Transportation, Home, or Medication needs. Patient instructed to call PCP, or present to ED if symptoms occur, or become acutely

## 2024-01-10 ENCOUNTER — OFFICE VISIT (OUTPATIENT)
Dept: PRIMARY CARE CLINIC | Age: 69
End: 2024-01-10

## 2024-01-10 VITALS
WEIGHT: 268 LBS | HEIGHT: 62 IN | OXYGEN SATURATION: 95 % | HEART RATE: 79 BPM | SYSTOLIC BLOOD PRESSURE: 135 MMHG | TEMPERATURE: 98.8 F | BODY MASS INDEX: 49.32 KG/M2 | DIASTOLIC BLOOD PRESSURE: 82 MMHG

## 2024-01-10 DIAGNOSIS — I50.43 SYSTOLIC AND DIASTOLIC CHF, ACUTE ON CHRONIC (HCC): ICD-10-CM

## 2024-01-10 DIAGNOSIS — I89.0 LYMPHEDEMA OF BOTH LOWER EXTREMITIES: Chronic | ICD-10-CM

## 2024-01-10 DIAGNOSIS — Z09 HOSPITAL DISCHARGE FOLLOW-UP: Primary | ICD-10-CM

## 2024-01-10 DIAGNOSIS — E11.9 TYPE 2 DIABETES MELLITUS WITHOUT COMPLICATION, WITHOUT LONG-TERM CURRENT USE OF INSULIN (HCC): ICD-10-CM

## 2024-01-10 DIAGNOSIS — I50.812 CHRONIC RIGHT-SIDED CONGESTIVE HEART FAILURE (HCC): ICD-10-CM

## 2024-01-10 DIAGNOSIS — J20.8 ACUTE BRONCHITIS DUE TO OTHER SPECIFIED ORGANISMS: ICD-10-CM

## 2024-01-10 DIAGNOSIS — L97.212 ULCER OF CALF WITH FAT LAYER EXPOSED, RIGHT (HCC): ICD-10-CM

## 2024-01-10 DIAGNOSIS — J21.0 RSV (ACUTE BRONCHIOLITIS DUE TO RESPIRATORY SYNCYTIAL VIRUS): ICD-10-CM

## 2024-01-10 RX ORDER — GLUCOSAMINE HCL/CHONDROITIN SU 500-400 MG
CAPSULE ORAL
Qty: 100 STRIP | Refills: 12 | Status: SHIPPED | OUTPATIENT
Start: 2024-01-10

## 2024-01-10 RX ORDER — CEFDINIR 300 MG/1
300 CAPSULE ORAL 2 TIMES DAILY
Qty: 20 CAPSULE | Refills: 0 | Status: SHIPPED | OUTPATIENT
Start: 2024-01-10 | End: 2024-01-20

## 2024-01-10 RX ORDER — LANCETS 30 GAUGE
1 EACH MISCELLANEOUS DAILY
Qty: 100 EACH | Refills: 5 | Status: SHIPPED | OUTPATIENT
Start: 2024-01-10

## 2024-01-10 RX ORDER — ALBUTEROL SULFATE 2.5 MG/3ML
2.5 SOLUTION RESPIRATORY (INHALATION) 4 TIMES DAILY PRN
Qty: 120 EACH | Refills: 3 | Status: SHIPPED | OUTPATIENT
Start: 2024-01-10

## 2024-01-10 ASSESSMENT — PATIENT HEALTH QUESTIONNAIRE - PHQ9
SUM OF ALL RESPONSES TO PHQ9 QUESTIONS 1 & 2: 0
SUM OF ALL RESPONSES TO PHQ QUESTIONS 1-9: 0
1. LITTLE INTEREST OR PLEASURE IN DOING THINGS: 0
2. FEELING DOWN, DEPRESSED OR HOPELESS: 0

## 2024-01-10 NOTE — PROGRESS NOTES
Post-Discharge Transitional Care  Follow Up      Selena Ward   YOB: 1955    Date of Office Visit:  1/10/2024  Date of Hospital Admission: 12/29/23  Date of Hospital Discharge: 1/6/24  Risk of hospital readmission (high >=14%. Medium >=10%) :Readmission Risk Score: 12.8      Care management risk score Rising risk (score 2-5) and Complex Care (Scores >=6): No Risk Score On File     Non face to face  following discharge, date last encounter closed (first attempt may have been earlier): 01/09/2024    Call initiated 2 business days of discharge: Yes    ASSESSMENT/PLAN:   Hospital discharge follow-up  -     CA DISCHARGE MEDS RECONCILED W/ CURRENT OUTPATIENT MED LIST  Lymphedema of both lower extremities  Systolic and diastolic CHF, acute on chronic ([facility])  Body mass index (BMI) 45.0-49.9, adult ([facility])  Ulcer of calf with fat layer exposed, right ([facility])  Chronic right-sided congestive heart failure ([facility])  RSV (acute bronchiolitis due to respiratory syncytial virus)  Type 2 diabetes mellitus without complication, without long-term current use of insulin ([facility])  -     semaglutide, 2 MG/DOSE, (OZEMPIC) 8 MG/3ML SOPN sc injection; Inject 0.75 mLs into the skin every 7 days, Disp-16 mL, R-5Normal  -     blood glucose monitor kit and supplies; Dispense sufficient amount for indicated testing frequency plus additional to accommodate PRN testing needs. Dispense all needed supplies to include: monitor, strips, lancing device, lancets, control solutions, alcohol swabs., Disp-1 kit, R-0, Normal  -     blood glucose monitor strips; Test 1 times a day & as needed for symptoms of irregular blood glucose., Disp-100 strip, R-12, Normal  -     Lancets MISC; DAILY Starting Wed 1/10/2024, Disp-100 each, R-5, Normal  Acute bronchitis due to other specified organisms  -     cefdinir (OMNICEF) 300 MG capsule; Take 1 capsule by mouth 2 times daily for 10 days, Disp-20 capsule, R-0Normal      Medical Decision Making: high

## 2024-01-16 ENCOUNTER — CARE COORDINATION (OUTPATIENT)
Dept: CARE COORDINATION | Age: 69
End: 2024-01-16

## 2024-01-16 NOTE — CARE COORDINATION
Care Transitions Follow Up Call    Patient Current Location:  Home: [address] [place] [id number]    Care Transition Nurse contacted the patient by telephone to follow up after admission on 24.  Verified name and  with patient as identifiers.    Patient: Selena Ward  Patient : 1955   MRN: [id number]  Reason for Admission: Acute Hypoxemic Respiratory Failure; Influenza A  Discharge Date: 24 RARS: Readmission Risk Score: 12.8    Spoke with Selena for follow up care transition call. She reports that she is feeling \"good\" today. She did start Omnicef after her appt with Dr. Razo 1/10/24. She denies SOB and reports minimal PATEL when doing too much. She denies needing the 2L O2 today, reporting her pulse ox has been around 96% all day.   Selena reports that she has quit smoking, CTN applauded her hard work with quitting.   She denies any needs, questions, or concerns at this time.     Follow Up  Future Appointments   Date Time Provider Department Center   2024  8:30 AM Gautam Razo DO [facility] [place]   2024 10:45 AM Adelaida Porter MD [facility]   3/27/2024  8:00 AM Saulo Hannon MD [facility]      Care Transitions Subsequent and Final Call    Subsequent and Final Calls  Do you have any ongoing symptoms?: No  Have your medications changed?: Yes  Patient Reports: omnicef added and ozempic increased at her HFU 1/10/24  Do you have any questions related to your medications?: No  Do you currently have any active services?: No  Are you currently active with any services?: Home Health  Do you have any needs or concerns that I can assist you with?: No  Identified Barriers: None  Care Transitions Interventions  No Identified Needs  Other Interventions:         Plan for next call: symptom management-breathing improving  self management-pulse ox and BS readings?     Julieta Pyle RN

## 2024-01-23 ENCOUNTER — CARE COORDINATION (OUTPATIENT)
Dept: CARE COORDINATION | Age: 69
End: 2024-01-23

## 2024-01-23 NOTE — CARE COORDINATION
Care Transitions Follow Up Call  Patient: Selena Ward  Patient : 1955   MRN: [id number]  Reason for Admission: Acute Hypoxemic Respiratory Failure; Influenza A  Discharge Date: 24 RARS: Readmission Risk Score: 12.8    Attempted to reach the patient for subsequent Care Transition call. HIPAA compliant message left with CTN's contact information requesting return phone call.    Follow Up  Future Appointments   Date Time Provider Department Center   2024  8:30 AM Gautam Razo DO [place] [facility]   2024 10:45 AM Adelaida Porter MD [place] [facility]   3/27/2024  8:00 AM Saulo Hannon MD [facility]     Julieta Pyle RN

## 2024-01-29 PROBLEM — J10.1 INFLUENZA A: Status: RESOLVED | Noted: 2023-12-30 | Resolved: 2024-01-29

## 2024-01-30 ENCOUNTER — TELEPHONE (OUTPATIENT)
Dept: PRIMARY CARE CLINIC | Age: 69
End: 2024-01-30

## 2024-01-30 SDOH — HEALTH STABILITY: PHYSICAL HEALTH: ON AVERAGE, HOW MANY DAYS PER WEEK DO YOU ENGAGE IN MODERATE TO STRENUOUS EXERCISE (LIKE A BRISK WALK)?: 0 DAYS

## 2024-01-30 ASSESSMENT — LIFESTYLE VARIABLES
HOW MANY STANDARD DRINKS CONTAINING ALCOHOL DO YOU HAVE ON A TYPICAL DAY: PATIENT DOES NOT DRINK
HOW OFTEN DO YOU HAVE SIX OR MORE DRINKS ON ONE OCCASION: 1
HOW MANY STANDARD DRINKS CONTAINING ALCOHOL DO YOU HAVE ON A TYPICAL DAY: 0
HOW OFTEN DO YOU HAVE A DRINK CONTAINING ALCOHOL: 1
HOW OFTEN DO YOU HAVE A DRINK CONTAINING ALCOHOL: NEVER

## 2024-01-30 ASSESSMENT — PATIENT HEALTH QUESTIONNAIRE - PHQ9
1. LITTLE INTEREST OR PLEASURE IN DOING THINGS: 0
SUM OF ALL RESPONSES TO PHQ QUESTIONS 1-9: 0
2. FEELING DOWN, DEPRESSED OR HOPELESS: 0
SUM OF ALL RESPONSES TO PHQ QUESTIONS 1-9: 0
SUM OF ALL RESPONSES TO PHQ9 QUESTIONS 1 & 2: 0
SUM OF ALL RESPONSES TO PHQ QUESTIONS 1-9: 0
SUM OF ALL RESPONSES TO PHQ QUESTIONS 1-9: 0

## 2024-01-30 NOTE — TELEPHONE ENCOUNTER
"Pt A/O. VSS. Up ind/sba. Tele shows SB with 1st deg AVB. Pt denies any CP or SOB. Reported minor chest discomfort x1 today, resolved on own. Stress test performed, awaiting results. Possible discharge pending results. Pt has no new complaints.    /61 (BP Location: Left arm)   Pulse 60   Temp 97.8  F (36.6  C) (Oral)   Resp 16   Ht 1.727 m (5' 8\")   Wt 93.1 kg (205 lb 3.2 oz)   SpO2 97%   BMI 31.20 kg/m        " Advised pt we received her pt assistance Ozempic and it is in [facility] fridge. Pt states she will pick it up at her appt tomorrow

## 2024-01-31 ENCOUNTER — OFFICE VISIT (OUTPATIENT)
Dept: PRIMARY CARE CLINIC | Age: 69
End: 2024-01-31
Payer: MEDICARE

## 2024-01-31 VITALS
DIASTOLIC BLOOD PRESSURE: 83 MMHG | SYSTOLIC BLOOD PRESSURE: 138 MMHG | TEMPERATURE: 97.6 F | WEIGHT: 263 LBS | BODY MASS INDEX: 48.4 KG/M2 | HEART RATE: 102 BPM | RESPIRATION RATE: 17 BRPM | OXYGEN SATURATION: 96 % | HEIGHT: 62 IN

## 2024-01-31 DIAGNOSIS — M51.9 INTERVERTEBRAL LUMBAR DISC DISORDER: Chronic | ICD-10-CM

## 2024-01-31 DIAGNOSIS — M15.9 PRIMARY OSTEOARTHRITIS INVOLVING MULTIPLE JOINTS: Chronic | ICD-10-CM

## 2024-01-31 DIAGNOSIS — Z00.00 INITIAL MEDICARE ANNUAL WELLNESS VISIT: Primary | ICD-10-CM

## 2024-01-31 DIAGNOSIS — I50.812 CHRONIC RIGHT-SIDED CONGESTIVE HEART FAILURE (HCC): ICD-10-CM

## 2024-01-31 DIAGNOSIS — I89.0 LYMPHEDEMA OF BOTH LOWER EXTREMITIES: Chronic | ICD-10-CM

## 2024-01-31 PROCEDURE — 1123F ACP DISCUSS/DSCN MKR DOCD: CPT | Performed by: FAMILY MEDICINE

## 2024-01-31 PROCEDURE — 3017F COLORECTAL CA SCREEN DOC REV: CPT | Performed by: FAMILY MEDICINE

## 2024-01-31 PROCEDURE — G8484 FLU IMMUNIZE NO ADMIN: HCPCS | Performed by: FAMILY MEDICINE

## 2024-01-31 PROCEDURE — G0438 PPPS, INITIAL VISIT: HCPCS | Performed by: FAMILY MEDICINE

## 2024-01-31 NOTE — PATIENT INSTRUCTIONS
change.  If you tend to feel dizzy after you take medicine, avoid activity at that time. Try being active before you take your medicine. This will reduce your risk of falls.  If you plan to be active at home, make sure to clear your space before you get started. Remove things like TV cords, coffee tables, and throw rugs. It's safest to have plenty of space to move freely.  The key to getting more active is to take it slow and steady. Try to improve only a little bit at a time. Pick just one area to improve on at first. And if an activity hurts, stop and talk to your doctor.  Where can you learn more?  Go to https://www.stickapps.net/patientEd and enter P600 to learn more about \"Learning About Being Active as an Older Adult.\"  Current as of: June 6, 2023               Content Version: 13.9  © 3818-2057 [employer].   Care instructions adapted under license by [employer]. If you have questions about a medical condition or this instruction, always ask your healthcare professional. [employer] disclaims any warranty or liability for your use of this information.           Learning About Dental Care for Older Adults  Dental care for older adults: Overview  Dental care for older people is much the same as for younger adults. But older adults do have concerns that younger adults do not. Older adults may have problems with gum disease and decay on the roots of their teeth. They may need missing teeth replaced or broken fillings fixed. Or they may have dentures that need to be cared for. Some older adults may have trouble holding a toothbrush.  You can help remind the person you are caring for to brush and floss their teeth or to clean their dentures. In some cases, you may need to do the brushing and other dental care tasks. People who have trouble using their hands or who have dementia may need this extra help.  How can you help with dental care?  Normal dental care  To keep the teeth and gums

## 2024-01-31 NOTE — PROGRESS NOTES
Medicare Annual Wellness Visit    Selena Ward is here for Follow-up (3 Month follow up ), Medicare AWV, and Other (CARE GAP: Due for flu vaccine: patient decline /Due for mammogram: declines/Due for ARIELA Scan: declines )    Assessment & Plan   Initial Medicare annual wellness visit  Lymphedema of both lower extremities  Primary osteoarthritis involving multiple joints  Intervertebral lumbar disc disorder  Chronic right-sided congestive heart failure (HCC)    Recommendations for Preventive Services Due: see orders and patient instructions/AVS.  Recommended screening schedule for the next 5-10 years is provided to the patient in written form: see Patient Instructions/AVS.     Return for Medicare Annual Wellness Visit in 1 year.     Subjective   The following acute and/or chronic problems were also addressed today:    Ck  re   same   edmea    sob   weight       bed sore    Stilnto taking  demadex  manjinder  her and mad at her  o2    satying ok at hoem   pos  retana      She  refuses to ck bs  at home    Stilsot smiiking    She  ot susing the   oxygen    Patient's complete Health Risk Assessment and screening values have been reviewed and are found in Flowsheets. The following problems were reviewed today and where indicated follow up appointments were made and/or referrals ordered.    Positive Risk Factor Screenings with Interventions:    Fall Risk:  Do you feel unsteady or are you worried about falling? : (!) yes  2 or more falls in past year?: no  Fall with injury in past year?: no     Interventions:    Reviewed medications, home hazards, visual acuity, and co-morbidities that can increase risk for falls        Controlled Medication Review:      Today's Pain Level: No data recorded     Opioid Risk: (High risk score ?55) Opioid risk score: The patient doesn't have any registry metric data available      Last PDMP Mauricio as Reviewed:  Review User Review Instant Review Result   JAILYN CARPIO 9/26/2023  5:40 PM @   Reviewed  normal... Well appearing, awake, alert, oriented to person, place, time/situation and in no apparent distress.

## 2024-02-02 ENCOUNTER — CARE COORDINATION (OUTPATIENT)
Dept: CARE COORDINATION | Age: 69
End: 2024-02-02

## 2024-02-02 NOTE — CARE COORDINATION
Care Transitions Follow Up Call    Patient: Selena Ward  Patient : 1955   MRN: [id number]  Reason for Admission: Acute Hypoxemic Respiratory Failure; Influenza A  Discharge Date: 24 RARS: Readmission Risk Score: 12.8  Second and final attempt to reach the patient for subsequent Care Transition call. HIPAA compliant message left with CTN's contact information requesting return phone call. Will sign off.      Follow Up  Future Appointments   Date Time Provider Department Center   2024 10:45 AM Adelaida Porter MD [place] [facility]   3/13/2024  8:15 AM Gautam Razo DO [facility] [place] [facility]   3/27/2024  8:00 AM Saulo Hannon MD [facility]   Julieta Pyle RN

## 2024-02-12 ENCOUNTER — TELEPHONE (OUTPATIENT)
Dept: PRIMARY CARE CLINIC | Age: 69
End: 2024-02-12

## 2024-02-12 DIAGNOSIS — Z12.31 BREAST CANCER SCREENING BY MAMMOGRAM: Primary | ICD-10-CM

## 2024-02-22 ENCOUNTER — HOSPITAL ENCOUNTER (OUTPATIENT)
Dept: GENERAL RADIOLOGY | Age: 69
Discharge: HOME | End: 2024-02-24
Payer: MEDICARE

## 2024-02-22 DIAGNOSIS — Z12.31 BREAST CANCER SCREENING BY MAMMOGRAM: ICD-10-CM

## 2024-02-22 PROCEDURE — 77063 BREAST TOMOSYNTHESIS BI: CPT

## 2024-02-26 ENCOUNTER — TELEPHONE (OUTPATIENT)
Dept: PRIMARY CARE CLINIC | Age: 69
End: 2024-02-26

## 2024-02-27 NOTE — PROGRESS NOTES
CC:   Chief Complaint   Patient presents with    Follow-up     1 year CI follow up      Selena Ward is a 68 y.o. female is s/p right CI with AB HiRes Slim J on 22 with an uneventful postoperative course; OR Findings: complete insertion of electrodes, preserved chorda tympani and facial nerves; she states that she has had months of some discomfort with the right BTE piece and has been waiting on an extension from audiology. Audiology to see patient today to try and resolve issue as patient is not wearing CI due to discomfort     PMH: history of right sudden hearing loss with vertigo symptoms; she is unable to tolerate oral steroids. MRI IAC was done at [place] and read as normal, significant motion artefact. No benefit with her 2 transtympanic injections and noted to have significant tinnitus. PPSV done 2020              PAST MEDICAL HISTORY:   Past Medical History:   Diagnosis Date    Acute pancreatitis without necrosis or infection, unspecified 2022    Acute respiratory failure with hypoxia (HCC) 2022    Arthritis     Communication problem 2023    COPD exacerbation (HCC) 2023    Decreased dorsalis pedis pulse 2022    Dermatophytosis 2022    Full dentures     Leg swelling 2022    Low back pain     Lymphedema of both lower extremities chronic and continues as of 22    Obesity     280 #    Open wound of right lower leg 10/22/2022    Osteoarthritis     Other speech disturbances 10/13/2022    Peripheral vision loss     Stroke (HCC)     Ulcer of calf with fat layer exposed, right ([facility]) 2022    WOUND CLINIC DR HOANG        PAST SURGICAL HISTORY:   Past Surgical History:   Procedure Laterality Date    ABDOMINOPLASTY  2002    BREAST REDUCTION SURGERY      reduction    CATARACT REMOVAL      bilateral     SECTION      x2    COCHLEAR IMPLANT Right 2022    RIGHT COCHLEAR IMPLANT INSERTION WITH NERVE INTEGRITY MONITOR performed by Adelaida Porter MD

## 2024-02-29 ENCOUNTER — OFFICE VISIT (OUTPATIENT)
Dept: ENT CLINIC | Age: 69
End: 2024-02-29
Payer: MEDICARE

## 2024-02-29 ENCOUNTER — PROCEDURE VISIT (OUTPATIENT)
Dept: AUDIOLOGY | Age: 69
End: 2024-02-29
Payer: MEDICARE

## 2024-02-29 VITALS — BODY MASS INDEX: 48.4 KG/M2 | WEIGHT: 263 LBS | HEIGHT: 62 IN

## 2024-02-29 DIAGNOSIS — Z96.21 HISTORY OF COCHLEAR IMPLANT: ICD-10-CM

## 2024-02-29 DIAGNOSIS — H91.21 SUDDEN IDIOPATHIC HEARING LOSS OF RIGHT EAR WITH RESTRICTED HEARING OF LEFT EAR: ICD-10-CM

## 2024-02-29 DIAGNOSIS — H90.A21 SENSORINEURAL HEARING LOSS, UNILATERAL, RIGHT EAR, WITH RESTRICTED HEARING ON THE CONTRALATERAL SIDE: Primary | ICD-10-CM

## 2024-02-29 DIAGNOSIS — H91.8X3 ASYMMETRICAL HEARING LOSS: ICD-10-CM

## 2024-02-29 DIAGNOSIS — H90.A22 SENSORINEURAL HEARING LOSS (SNHL) OF LEFT EAR WITH RESTRICTED HEARING OF RIGHT EAR: Primary | ICD-10-CM

## 2024-02-29 PROCEDURE — 1090F PRES/ABSN URINE INCON ASSESS: CPT | Performed by: OTOLARYNGOLOGY

## 2024-02-29 PROCEDURE — 3017F COLORECTAL CA SCREEN DOC REV: CPT | Performed by: OTOLARYNGOLOGY

## 2024-02-29 PROCEDURE — 1036F TOBACCO NON-USER: CPT | Performed by: OTOLARYNGOLOGY

## 2024-02-29 PROCEDURE — G8427 DOCREV CUR MEDS BY ELIG CLIN: HCPCS | Performed by: OTOLARYNGOLOGY

## 2024-02-29 PROCEDURE — G8484 FLU IMMUNIZE NO ADMIN: HCPCS | Performed by: OTOLARYNGOLOGY

## 2024-02-29 PROCEDURE — 92557 COMPREHENSIVE HEARING TEST: CPT | Performed by: AUDIOLOGIST

## 2024-02-29 PROCEDURE — 99214 OFFICE O/P EST MOD 30 MIN: CPT | Performed by: OTOLARYNGOLOGY

## 2024-02-29 PROCEDURE — 1123F ACP DISCUSS/DSCN MKR DOCD: CPT | Performed by: OTOLARYNGOLOGY

## 2024-02-29 PROCEDURE — G8400 PT W/DXA NO RESULTS DOC: HCPCS | Performed by: OTOLARYNGOLOGY

## 2024-02-29 PROCEDURE — G8417 CALC BMI ABV UP PARAM F/U: HCPCS | Performed by: OTOLARYNGOLOGY

## 2024-02-29 NOTE — PROGRESS NOTES
Datalogging was completed at today's visit, which was 0.2 hours for sound processor. Patient reported that she has not been wearing the sound processor due to pain around the ear. Visual otoscopy of ear showed dry skin and irritation. Will order patient hair clip that she originally had, as well as an off the ear richard for the processor to help with the pain/pressure of the processor.     Electronically signed by Shira Cruz on 2/29/2024 at 11:50 AM

## 2024-02-29 NOTE — PROGRESS NOTES
This patient was referred for audiometric testing by Dr. Porter due to a longstanding unilateral hearing loss, right ear and restricted hearing sensitivity, left ear.  Patient does have a CI, right ear.  CI data logging was completed, at this visit, by Shira Tadeo.    Audiometry using pure tone air and bone conduction testing revealed a normal-to-mild  sensorineural hearing loss, through the frequency range, left ear.  Reliability was good. Speech reception thresholds were in good agreement with the pure tone averages, left ear.  Speech discrimination scores were 96%, left ear at 55dBHL.    The results were reviewed with the patient.     Recommendations for follow up will be made pending physician consult.    Marc Taylor CCC/ARIANNA  Audiologist  [id number]  NPI#:  [id number]      Electronically signed by Shira Wei on 2/29/2024 at 11:24 AM

## 2024-03-13 ENCOUNTER — OFFICE VISIT (OUTPATIENT)
Dept: PRIMARY CARE CLINIC | Age: 69
End: 2024-03-13

## 2024-03-13 VITALS
DIASTOLIC BLOOD PRESSURE: 83 MMHG | BODY MASS INDEX: 47.01 KG/M2 | TEMPERATURE: 98.2 F | SYSTOLIC BLOOD PRESSURE: 138 MMHG | WEIGHT: 257 LBS

## 2024-03-13 DIAGNOSIS — E79.0 HYPERURICEMIA: ICD-10-CM

## 2024-03-13 DIAGNOSIS — I10 ESSENTIAL HYPERTENSION: ICD-10-CM

## 2024-03-13 DIAGNOSIS — E11.9 DIET-CONTROLLED DIABETES MELLITUS (HCC): ICD-10-CM

## 2024-03-13 DIAGNOSIS — E03.9 ACQUIRED HYPOTHYROIDISM: ICD-10-CM

## 2024-03-13 DIAGNOSIS — M17.0 PRIMARY OSTEOARTHRITIS OF BOTH KNEES: Chronic | ICD-10-CM

## 2024-03-13 DIAGNOSIS — M47.26 OSTEOARTHRITIS OF SPINE WITH RADICULOPATHY, LUMBAR REGION: Primary | ICD-10-CM

## 2024-03-13 DIAGNOSIS — N39.46 MIXED STRESS AND URGE URINARY INCONTINENCE: ICD-10-CM

## 2024-03-13 DIAGNOSIS — E53.8 VITAMIN B 12 DEFICIENCY: ICD-10-CM

## 2024-03-13 DIAGNOSIS — M81.0 AGE-RELATED OSTEOPOROSIS WITHOUT CURRENT PATHOLOGICAL FRACTURE: ICD-10-CM

## 2024-03-13 DIAGNOSIS — I50.43 SYSTOLIC AND DIASTOLIC CHF, ACUTE ON CHRONIC (HCC): ICD-10-CM

## 2024-03-13 DIAGNOSIS — G47.33 SLEEP APNEA, OBSTRUCTIVE: ICD-10-CM

## 2024-03-13 DIAGNOSIS — R26.9 GAIT ABNORMALITY: ICD-10-CM

## 2024-03-13 DIAGNOSIS — M51.9 INTERVERTEBRAL LUMBAR DISC DISORDER: Chronic | ICD-10-CM

## 2024-03-13 LAB
ABSOLUTE IMMATURE GRANULOCYTE: 0.04 K/UL (ref 0–0.58)
ALBUMIN SERPL-MCNC: 3.8 G/DL (ref 3.5–5.2)
ALP BLD-CCNC: 142 U/L (ref 35–104)
ALT SERPL-CCNC: 8 U/L (ref 0–32)
ANION GAP SERPL CALCULATED.3IONS-SCNC: 17 MMOL/L (ref 7–16)
AST SERPL-CCNC: 16 U/L (ref 0–31)
BASOPHILS ABSOLUTE: 0.03 K/UL (ref 0–0.2)
BASOPHILS RELATIVE PERCENT: 0 % (ref 0–2)
BILIRUB SERPL-MCNC: 0.4 MG/DL (ref 0–1.2)
BILIRUBIN URINE: NEGATIVE
BUN BLDV-MCNC: 11 MG/DL (ref 6–23)
CALCIUM SERPL-MCNC: 9.4 MG/DL (ref 8.6–10.2)
CHLORIDE BLD-SCNC: 103 MMOL/L (ref 98–107)
CHOLESTEROL: 156 MG/DL
CO2: 23 MMOL/L (ref 22–29)
COLOR: YELLOW
COMMENT: NORMAL
CREAT SERPL-MCNC: 0.5 MG/DL (ref 0.5–1)
EOSINOPHILS ABSOLUTE: 0.09 K/UL (ref 0.05–0.5)
EOSINOPHILS RELATIVE PERCENT: 1 % (ref 0–6)
GFR SERPL CREATININE-BSD FRML MDRD: >60 ML/MIN/1.73M2
GLUCOSE BLD-MCNC: 101 MG/DL (ref 74–99)
GLUCOSE URINE: NEGATIVE MG/DL
HBA1C MFR BLD: 5.8 % (ref 4–5.6)
HCT VFR BLD CALC: 45.2 % (ref 34–48)
HDLC SERPL-MCNC: 44 MG/DL
HEMOGLOBIN: 13 G/DL (ref 11.5–15.5)
IMMATURE GRANULOCYTES: 0 % (ref 0–5)
KETONES, URINE: NEGATIVE MG/DL
LDL CHOLESTEROL: 93 MG/DL
LEUKOCYTE ESTERASE, URINE: NEGATIVE
LYMPHOCYTES ABSOLUTE: 1.69 K/UL (ref 1.5–4)
LYMPHOCYTES RELATIVE PERCENT: 16 % (ref 20–42)
MCH RBC QN AUTO: 22.9 PG (ref 26–35)
MCHC RBC AUTO-ENTMCNC: 28.8 G/DL (ref 32–34.5)
MCV RBC AUTO: 79.6 FL (ref 80–99.9)
MONOCYTES ABSOLUTE: 0.63 K/UL (ref 0.1–0.95)
MONOCYTES RELATIVE PERCENT: 6 % (ref 2–12)
NEUTROPHILS ABSOLUTE: 7.93 K/UL (ref 1.8–7.3)
NEUTROPHILS RELATIVE PERCENT: 76 % (ref 43–80)
NITRITE, URINE: NEGATIVE
PDW BLD-RTO: 20.4 % (ref 11.5–15)
PH UA: 6.5 (ref 5–9)
PLATELET, FLUORESCENCE: 213 K/UL (ref 130–450)
PMV BLD AUTO: 12 FL (ref 7–12)
POTASSIUM SERPL-SCNC: 4.4 MMOL/L (ref 3.5–5)
PROTEIN UA: NEGATIVE MG/DL
RBC # BLD: 5.68 M/UL (ref 3.5–5.5)
RBC # BLD: ABNORMAL 10*6/UL
SODIUM BLD-SCNC: 143 MMOL/L (ref 132–146)
SPECIFIC GRAVITY UA: 1.02 (ref 1–1.03)
T4 TOTAL: 9.5 UG/DL (ref 4.5–11.7)
TOTAL PROTEIN: 7.2 G/DL (ref 6.4–8.3)
TRIGL SERPL-MCNC: 96 MG/DL
TSH SERPL DL<=0.05 MIU/L-ACNC: 0.65 UIU/ML (ref 0.27–4.2)
TURBIDITY: CLEAR
URIC ACID: 4.8 MG/DL (ref 2.4–5.7)
URINE HGB: NEGATIVE
UROBILINOGEN, URINE: 1 EU/DL (ref 0–1)
VITAMIN B-12: 230 PG/ML (ref 211–946)
VITAMIN D 25-HYDROXY: 11.5 NG/ML (ref 30–100)
VLDLC SERPL CALC-MCNC: 19 MG/DL
WBC # BLD: 10.4 K/UL (ref 4.5–11.5)

## 2024-03-13 SDOH — ECONOMIC STABILITY: FOOD INSECURITY: WITHIN THE PAST 12 MONTHS, THE FOOD YOU BOUGHT JUST DIDN'T LAST AND YOU DIDN'T HAVE MONEY TO GET MORE.: NEVER TRUE

## 2024-03-13 SDOH — ECONOMIC STABILITY: FOOD INSECURITY: WITHIN THE PAST 12 MONTHS, YOU WORRIED THAT YOUR FOOD WOULD RUN OUT BEFORE YOU GOT MONEY TO BUY MORE.: NEVER TRUE

## 2024-03-13 SDOH — ECONOMIC STABILITY: INCOME INSECURITY: HOW HARD IS IT FOR YOU TO PAY FOR THE VERY BASICS LIKE FOOD, HOUSING, MEDICAL CARE, AND HEATING?: NOT HARD AT ALL

## 2024-03-13 ASSESSMENT — ENCOUNTER SYMPTOMS
ALLERGIC/IMMUNOLOGIC NEGATIVE: 1
EYES NEGATIVE: 1
GASTROINTESTINAL NEGATIVE: 1
BACK PAIN: 1
SHORTNESS OF BREATH: 1

## 2024-03-13 ASSESSMENT — PATIENT HEALTH QUESTIONNAIRE - PHQ9
SUM OF ALL RESPONSES TO PHQ9 QUESTIONS 1 & 2: 0
SUM OF ALL RESPONSES TO PHQ QUESTIONS 1-9: 0
SUM OF ALL RESPONSES TO PHQ QUESTIONS 1-9: 0
1. LITTLE INTEREST OR PLEASURE IN DOING THINGS: 0
SUM OF ALL RESPONSES TO PHQ QUESTIONS 1-9: 0
SUM OF ALL RESPONSES TO PHQ QUESTIONS 1-9: 0
2. FEELING DOWN, DEPRESSED OR HOPELESS: 0

## 2024-03-13 NOTE — PROGRESS NOTES
3/13/24  Name: Selena Ward    : 1955    Sex: female    Age: 68 y.o.        Subjective:  Chief Complaint: Patient is here for  ck      edema   sob  weight  arth  back pain         Here    with meghan   Meghan wants PT  for  back   She does not  take the diretic  and refuses  tot ro  No    calf pain both knees hrut  Seeign ortho  Dnkin bid    5  creamns   7 sugars  Stil smoking        Review of Systems   Constitutional: Negative.    HENT: Negative.     Eyes: Negative.    Respiratory:  Positive for shortness of breath (with exretion).    Cardiovascular: Negative.    Gastrointestinal: Negative.    Endocrine: Negative.    Genitourinary: Negative.    Musculoskeletal:  Positive for arthralgias and back pain.   Skin: Negative.    Allergic/Immunologic: Negative.    Neurological: Negative.    Hematological: Negative.    Psychiatric/Behavioral: Negative.           Current Outpatient Medications:     semaglutide, 2 MG/DOSE, (OZEMPIC) 8 MG/3ML SOPN sc injection, Inject 0.75 mLs into the skin every 7 days, Disp: 16 mL, Rfl: 5    blood glucose monitor kit and supplies, Dispense sufficient amount for indicated testing frequency plus additional to accommodate PRN testing needs. Dispense all needed supplies to include: monitor, strips, lancing device, lancets, control solutions, alcohol swabs., Disp: 1 kit, Rfl: 0    blood glucose monitor strips, Test 1 times a day & as needed for symptoms of irregular blood glucose., Disp: 100 strip, Rfl: 12    Lancets MISC, 1 each by Does not apply route daily, Disp: 100 each, Rfl: 5    albuterol (PROVENTIL) (2.5 MG/3ML) 0.083% nebulizer solution, Take 3 mLs by nebulization 4 times daily as needed for Wheezing, Disp: 120 each, Rfl: 3    rOPINIRole (REQUIP) 0.5 MG tablet, Take 1 tablet by mouth every 4 hours as needed (restless legs), Disp: 200 tablet, Rfl: 5    Semaglutide,0.25 or 0.5MG/DOS, (OZEMPIC, 0.25 OR 0.5 MG/DOSE,) 2 MG/1.5ML SOPN, Inject 0.5 mg into the skin once a week, Disp: 16

## 2024-03-18 ENCOUNTER — TELEPHONE (OUTPATIENT)
Dept: PRIMARY CARE CLINIC | Age: 69
End: 2024-03-18

## 2024-03-18 NOTE — TELEPHONE ENCOUNTER
Urine came back as quantity insufficient, they called the pt to do it again and she said she would just do it next time she has appt, she says.

## 2024-03-26 DIAGNOSIS — M17.0 BILATERAL PRIMARY OSTEOARTHRITIS OF KNEE: Primary | ICD-10-CM

## 2024-03-27 ENCOUNTER — HOSPITAL ENCOUNTER (OUTPATIENT)
Dept: GENERAL RADIOLOGY | Age: 69
Discharge: HOME | End: 2024-03-29
Payer: MEDICARE

## 2024-03-27 ENCOUNTER — OFFICE VISIT (OUTPATIENT)
Dept: ORTHOPEDIC SURGERY | Age: 69
End: 2024-03-27
Payer: MEDICARE

## 2024-03-27 VITALS — WEIGHT: 257.06 LBS | BODY MASS INDEX: 47.3 KG/M2 | HEIGHT: 62 IN

## 2024-03-27 DIAGNOSIS — M17.0 BILATERAL PRIMARY OSTEOARTHRITIS OF KNEE: Primary | ICD-10-CM

## 2024-03-27 DIAGNOSIS — M17.0 BILATERAL PRIMARY OSTEOARTHRITIS OF KNEE: ICD-10-CM

## 2024-03-27 PROCEDURE — 73560 X-RAY EXAM OF KNEE 1 OR 2: CPT

## 2024-03-27 PROCEDURE — 99213 OFFICE O/P EST LOW 20 MIN: CPT

## 2024-03-27 PROCEDURE — 20610 DRAIN/INJ JOINT/BURSA W/O US: CPT | Performed by: PHYSICIAN ASSISTANT

## 2024-03-27 RX ORDER — TRIAMCINOLONE ACETONIDE 40 MG/ML
160 INJECTION, SUSPENSION INTRA-ARTICULAR; INTRAMUSCULAR ONCE
Status: COMPLETED | OUTPATIENT
Start: 2024-03-27 | End: 2024-03-27

## 2024-03-27 RX ORDER — BUPIVACAINE HYDROCHLORIDE 2.5 MG/ML
6 INJECTION, SOLUTION INFILTRATION; PERINEURAL ONCE
Status: COMPLETED | OUTPATIENT
Start: 2024-03-27 | End: 2024-03-27

## 2024-03-27 RX ADMIN — BUPIVACAINE HYDROCHLORIDE 15 MG: 2.5 INJECTION, SOLUTION INFILTRATION; PERINEURAL at 09:15

## 2024-03-27 RX ADMIN — TRIAMCINOLONE ACETONIDE 160 MG: 40 INJECTION, SUSPENSION INTRA-ARTICULAR; INTRAMUSCULAR at 09:15

## 2024-03-27 NOTE — PROGRESS NOTES
identified.    Impression:   Encounter Diagnoses   Name Primary?    Primary osteoarthritis of right knee Yes    Primary osteoarthritis of left knee      Selena was seen today for follow-up.    Diagnoses and all orders for this visit:    Bilateral primary osteoarthritis of knee  -     IL ARTHROCENTESIS ASPIR&/INJ MAJOR JT/BURSA W/O US  -     triamcinolone acetonide (KENALOG-40) injection 160 mg  -     BUPivacaine (MARCAINE) 0.25 % injection 15 mg         Plan:   Discussed findings with the patient at today's visit. Discussed conservative options for the patients bilateral knee pain including rest, ice, antiinflammatories, Voltaren gel, tylenol, bracing, supervised physician home exercise program, OT/PT, and cortisone injections. Discussed healthy lifestyle. Patient does not desire surgical management at this time. Patient is happy with their response to cortisone previously. Patient wished to proceed with injections. Repeat injections provided to bilateral knees. Patient consented and this was tolerated well.  All questions answered.    Knee  Injection Procedure Note  With the patient's written and verbal permission, Bilateral  knee was prepped in standard sterile fashion with betadine and alcohol. Skin of the knee was anesthetized with ethyl chloride spray prior to injection. The knee was then injected with 2 ml Kenalog (80mg), 3 ml PF 0.25% marcaine and 3ml of 1% lidocaine. The patient tolerated this well. A band-aid was applied. The patient ambulated out of clinic on their own accord without difficulty.

## 2024-03-28 ENCOUNTER — TELEPHONE (OUTPATIENT)
Dept: PRIMARY CARE CLINIC | Age: 69
End: 2024-03-28

## 2024-03-28 RX ORDER — ERGOCALCIFEROL 1.25 MG/1
50000 CAPSULE ORAL WEEKLY
Qty: 12 CAPSULE | Refills: 1 | Status: SHIPPED | OUTPATIENT
Start: 2024-03-28

## 2024-03-28 NOTE — TELEPHONE ENCOUNTER
Notify patient I am not sure that we have reviewed her labs or not better mostly okay but her vitamin D is very low.  I sent a prescription for vitamin D weekly.  We will repeat that on her next visit in 3 months

## 2024-05-08 RX ORDER — BUMETANIDE 2 MG/1
2 TABLET ORAL 2 TIMES DAILY
Qty: 180 TABLET | Refills: 3 | Status: SHIPPED | OUTPATIENT
Start: 2024-05-08

## 2024-05-08 NOTE — TELEPHONE ENCOUNTER
Patients last appointment 3/13/2024.  Patients next scheduled appointment   Future Appointments   Date Time Provider Department Center   6/13/2024  8:45 AM Gautam Razo DO [place] [facility]   7/3/2024  8:00 AM Saulo Hannon MD [facility]

## 2024-05-14 ENCOUNTER — TELEPHONE (OUTPATIENT)
Dept: PRIMARY CARE CLINIC | Age: 69
End: 2024-05-14

## 2024-05-14 DIAGNOSIS — I89.0 LYMPHEDEMA OF BOTH LOWER EXTREMITIES: Primary | ICD-10-CM

## 2024-05-30 DIAGNOSIS — I89.0 LYMPHEDEMA OF BOTH LOWER EXTREMITIES: Chronic | ICD-10-CM

## 2024-05-30 DIAGNOSIS — G25.81 RESTLESS LEG SYNDROME: ICD-10-CM

## 2024-05-30 RX ORDER — ROPINIROLE 0.5 MG/1
0.5 TABLET, FILM COATED ORAL EVERY 4 HOURS PRN
Qty: 200 TABLET | Refills: 5 | Status: SHIPPED | OUTPATIENT
Start: 2024-05-30

## 2024-05-30 NOTE — TELEPHONE ENCOUNTER
Patients last appointment 3/13/2024.  Patients next scheduled appointment   Future Appointments   Date Time Provider Department Center   6/13/2024  8:45 AM Gautam Razo DO [place] [facility]   7/3/2024  8:00 AM Saulo Hannon MD [facility]

## 2024-06-13 ENCOUNTER — OFFICE VISIT (OUTPATIENT)
Dept: PRIMARY CARE CLINIC | Age: 69
End: 2024-06-13

## 2024-06-13 DIAGNOSIS — G47.33 SLEEP APNEA, OBSTRUCTIVE: ICD-10-CM

## 2024-06-13 DIAGNOSIS — I50.43 SYSTOLIC AND DIASTOLIC CHF, ACUTE ON CHRONIC (HCC): ICD-10-CM

## 2024-06-13 DIAGNOSIS — I89.0 LYMPHEDEMA OF BOTH LOWER EXTREMITIES: Chronic | ICD-10-CM

## 2024-06-13 DIAGNOSIS — J01.80 ACUTE NON-RECURRENT SINUSITIS OF OTHER SINUS: ICD-10-CM

## 2024-06-13 DIAGNOSIS — J20.8 ACUTE BRONCHITIS DUE TO OTHER SPECIFIED ORGANISMS: Primary | ICD-10-CM

## 2024-06-13 DIAGNOSIS — R05.9 COUGH, UNSPECIFIED TYPE: ICD-10-CM

## 2024-06-13 LAB
INFLUENZA A ANTIBODY: NEGATIVE
INFLUENZA B ANTIBODY: NEGATIVE
Lab: ABNORMAL
PERFORMING INSTRUMENT: ABNORMAL
QC PASS/FAIL: ABNORMAL
SARS-COV-2, POC: ABNORMAL

## 2024-06-13 RX ORDER — CEFDINIR 300 MG/1
300 CAPSULE ORAL 2 TIMES DAILY
Qty: 20 CAPSULE | Refills: 0 | Status: SHIPPED | OUTPATIENT
Start: 2024-06-13 | End: 2024-06-23

## 2024-06-13 RX ORDER — PREDNISONE 10 MG/1
10 TABLET ORAL
Qty: 15 TABLET | Refills: 0 | Status: SHIPPED | OUTPATIENT
Start: 2024-06-13 | End: 2024-06-18

## 2024-06-13 RX ORDER — CEFTRIAXONE 500 MG/1
500 INJECTION, POWDER, FOR SOLUTION INTRAMUSCULAR; INTRAVENOUS ONCE
Status: COMPLETED | OUTPATIENT
Start: 2024-06-13 | End: 2024-06-13

## 2024-06-13 RX ORDER — DEXTROMETHORPHAN HYDROBROMIDE AND PROMETHAZINE HYDROCHLORIDE 15; 6.25 MG/5ML; MG/5ML
5 SYRUP ORAL 4 TIMES DAILY PRN
Qty: 120 ML | Refills: 0 | Status: SHIPPED | OUTPATIENT
Start: 2024-06-13 | End: 2024-06-20

## 2024-06-13 RX ORDER — ALBUTEROL SULFATE 2.5 MG/3ML
2.5 SOLUTION RESPIRATORY (INHALATION) 4 TIMES DAILY PRN
Qty: 120 EACH | Refills: 3 | Status: SHIPPED | OUTPATIENT
Start: 2024-06-13

## 2024-06-13 RX ADMIN — CEFTRIAXONE 500 MG: 500 INJECTION, POWDER, FOR SOLUTION INTRAMUSCULAR; INTRAVENOUS at 09:20

## 2024-06-13 NOTE — PROGRESS NOTES
educated the patient about all medications.  Make sure they were correct and educated  on the risk associated with missing meds or taking them incorrectly.  A list of medications is being sent home with patient today.    Check blood pressure at home twice a day.  Low-salt low caffeine diet.  Call if systolic blood pressure is above 150 and diastolic blood pressures above 85.  Only use a upper arm digital cuff.    Aggressive low-fat diet.  Avoid red meats, greasy fried foods, dairy products.  Avoid processed foods.  Take cholesterol medications without food.    I informed patient about the risk associated with noncompliance of medication and taking medications incorrectly.  Appropriate follow-up with myself and all specialist.  Encourage family members to take active role in assisting with medications and medical care.  If any confusion should develop to notify my office immediately to avoid risk of worsening medical condition    A great deal of time spent reviewing medications, diet, exercise, social issues. Also reviewing the chart before entering the room with patient and finishing charting after leaving patient's room. More than half of that time was spent face to face with the patient in counseling and coordinating care.      Follow Up: Return in about 3 months (around 9/13/2024) for Lab Before.     Seen by:  Gautam Razo DO

## 2024-06-14 VITALS
HEART RATE: 102 BPM | WEIGHT: 264 LBS | OXYGEN SATURATION: 90 % | SYSTOLIC BLOOD PRESSURE: 132 MMHG | HEIGHT: 62 IN | TEMPERATURE: 98 F | BODY MASS INDEX: 48.58 KG/M2 | DIASTOLIC BLOOD PRESSURE: 82 MMHG

## 2024-06-14 PROBLEM — I50.43 SYSTOLIC AND DIASTOLIC CHF, ACUTE ON CHRONIC (HCC): Chronic | Status: ACTIVE | Noted: 2024-01-10

## 2024-06-14 SDOH — ECONOMIC STABILITY: FOOD INSECURITY: WITHIN THE PAST 12 MONTHS, THE FOOD YOU BOUGHT JUST DIDN'T LAST AND YOU DIDN'T HAVE MONEY TO GET MORE.: NEVER TRUE

## 2024-06-14 SDOH — ECONOMIC STABILITY: FOOD INSECURITY: WITHIN THE PAST 12 MONTHS, YOU WORRIED THAT YOUR FOOD WOULD RUN OUT BEFORE YOU GOT MONEY TO BUY MORE.: NEVER TRUE

## 2024-06-14 SDOH — ECONOMIC STABILITY: INCOME INSECURITY: HOW HARD IS IT FOR YOU TO PAY FOR THE VERY BASICS LIKE FOOD, HOUSING, MEDICAL CARE, AND HEATING?: NOT HARD AT ALL

## 2024-06-14 ASSESSMENT — ENCOUNTER SYMPTOMS
COUGH: 1
ALLERGIC/IMMUNOLOGIC NEGATIVE: 1
EYES NEGATIVE: 1
SHORTNESS OF BREATH: 1
GASTROINTESTINAL NEGATIVE: 1

## 2024-06-14 ASSESSMENT — PATIENT HEALTH QUESTIONNAIRE - PHQ9
SUM OF ALL RESPONSES TO PHQ QUESTIONS 1-9: 0
SUM OF ALL RESPONSES TO PHQ QUESTIONS 1-9: 0
2. FEELING DOWN, DEPRESSED OR HOPELESS: NOT AT ALL
1. LITTLE INTEREST OR PLEASURE IN DOING THINGS: NOT AT ALL
SUM OF ALL RESPONSES TO PHQ QUESTIONS 1-9: 0
SUM OF ALL RESPONSES TO PHQ QUESTIONS 1-9: 0
SUM OF ALL RESPONSES TO PHQ9 QUESTIONS 1 & 2: 0

## 2024-07-19 ENCOUNTER — OFFICE VISIT (OUTPATIENT)
Dept: ORTHOPEDIC SURGERY | Age: 69
End: 2024-07-19
Payer: MEDICARE

## 2024-07-19 VITALS — HEIGHT: 62 IN | BODY MASS INDEX: 48.56 KG/M2 | WEIGHT: 263.89 LBS

## 2024-07-19 DIAGNOSIS — M17.0 BILATERAL PRIMARY OSTEOARTHRITIS OF KNEE: Primary | ICD-10-CM

## 2024-07-19 PROCEDURE — 20610 DRAIN/INJ JOINT/BURSA W/O US: CPT | Performed by: PHYSICIAN ASSISTANT

## 2024-07-19 PROCEDURE — 99213 OFFICE O/P EST LOW 20 MIN: CPT

## 2024-07-19 RX ORDER — TRIAMCINOLONE ACETONIDE 40 MG/ML
40 INJECTION, SUSPENSION INTRA-ARTICULAR; INTRAMUSCULAR ONCE
Status: COMPLETED | OUTPATIENT
Start: 2024-07-19 | End: 2024-07-19

## 2024-07-19 RX ORDER — BUPIVACAINE HYDROCHLORIDE 2.5 MG/ML
3 INJECTION, SOLUTION INFILTRATION; PERINEURAL ONCE
Status: COMPLETED | OUTPATIENT
Start: 2024-07-19 | End: 2024-07-19

## 2024-07-19 RX ORDER — BUPIVACAINE HYDROCHLORIDE 2.5 MG/ML
6 INJECTION, SOLUTION INFILTRATION; PERINEURAL ONCE
Status: CANCELLED | OUTPATIENT
Start: 2024-07-19 | End: 2024-07-19

## 2024-07-19 RX ADMIN — BUPIVACAINE HYDROCHLORIDE 7.5 MG: 2.5 INJECTION, SOLUTION INFILTRATION; PERINEURAL at 08:39

## 2024-07-19 RX ADMIN — TRIAMCINOLONE ACETONIDE 40 MG: 40 INJECTION, SUSPENSION INTRA-ARTICULAR; INTRAMUSCULAR at 08:41

## 2024-07-19 RX ADMIN — BUPIVACAINE HYDROCHLORIDE 7.5 MG: 2.5 INJECTION, SOLUTION INFILTRATION; PERINEURAL at 08:40

## 2024-07-19 RX ADMIN — TRIAMCINOLONE ACETONIDE 40 MG: 40 INJECTION, SUSPENSION INTRA-ARTICULAR; INTRAMUSCULAR at 08:42

## 2024-07-19 NOTE — PATIENT INSTRUCTIONS
Continue to maintain healthy lifestyle and weight  Activity as tolerated.    You may be sore at the injection site for several days.  OK to use over the counter Acetaminophen or Ibuprofen as needed for pain  You may apply ice to your injection site.  Call the office if any unusual new swelling, pain or redness develops around your knee.    Steroid injections can be repeated every 3 months if needed  Goal for steroid injection is at least 8 weeks of relief

## 2024-07-19 NOTE — PROGRESS NOTES
Chief Complaint   Patient presents with    Follow-up      6 month Bilateral Knee pain(last CSI 3/27/23) TTS.Patient states pain lvl 7           Selena Ward is a 68 y.o. year old  who presents for follow up of bilateral knee pain.     Patients previous treatments have included bilateral knee cortisone injections for many years. She also previously has tried visco supplementation injections previously with no relief of her symptoms. Her last injection was 3/27/24. She reports about 7 weeks of relief with previous injections.     Currently, the patient is still complaining of bilateral knee pain. This is worse with ambulation. She ambulates with a Rolator. She does leave for the [facility] in 11 weeks.     Patient is happy with their response to cortisone previously and is requesting repeat injections today.       Past Medical History:   Diagnosis Date    Acute pancreatitis without necrosis or infection, unspecified 2022    Acute respiratory failure with hypoxia (HCC) 2022    Arthritis     Communication problem 2023    COPD exacerbation ([facility]) 2023    Decreased dorsalis pedis pulse 2022    Dermatophytosis 2022    Full dentures     Leg swelling 2022    Low back pain     Lymphedema of both lower extremities chronic and continues as of 22    Obesity     280 #    Open wound of right lower leg 10/22/2022    Osteoarthritis     Other speech disturbances 10/13/2022    Peripheral vision loss     Stroke ([facility])     Ulcer of calf with fat layer exposed, right ([facility]) 2022    WOUND CLINIC DR HOANG     Past Surgical History:   Procedure Laterality Date    ABDOMINOPLASTY  2002    BREAST REDUCTION SURGERY      reduction    CATARACT REMOVAL      bilateral     SECTION      x2    COCHLEAR IMPLANT Right 2022    RIGHT COCHLEAR IMPLANT INSERTION WITH NERVE INTEGRITY MONITOR performed by Adelaida Porter MD at [facility] OR    COLONOSCOPY  2012    and egd    COLONOSCOPY

## 2024-09-12 ENCOUNTER — OFFICE VISIT (OUTPATIENT)
Dept: PRIMARY CARE CLINIC | Age: 69
End: 2024-09-12

## 2024-09-12 VITALS
TEMPERATURE: 97.9 F | BODY MASS INDEX: 45.27 KG/M2 | HEART RATE: 101 BPM | OXYGEN SATURATION: 93 % | HEIGHT: 62 IN | SYSTOLIC BLOOD PRESSURE: 138 MMHG | WEIGHT: 246 LBS | DIASTOLIC BLOOD PRESSURE: 83 MMHG

## 2024-09-12 DIAGNOSIS — I89.0 LYMPHEDEMA OF BOTH LOWER EXTREMITIES: Primary | Chronic | ICD-10-CM

## 2024-09-12 DIAGNOSIS — E03.9 ACQUIRED HYPOTHYROIDISM: ICD-10-CM

## 2024-09-12 DIAGNOSIS — I50.43 SYSTOLIC AND DIASTOLIC CHF, ACUTE ON CHRONIC (HCC): Chronic | ICD-10-CM

## 2024-09-12 DIAGNOSIS — M81.0 AGE-RELATED OSTEOPOROSIS WITHOUT CURRENT PATHOLOGICAL FRACTURE: ICD-10-CM

## 2024-09-12 DIAGNOSIS — R73.03 PRE-DIABETES: ICD-10-CM

## 2024-09-12 DIAGNOSIS — E53.8 VITAMIN B 12 DEFICIENCY: ICD-10-CM

## 2024-09-12 DIAGNOSIS — M48.062 SPINAL STENOSIS OF LUMBAR REGION WITH NEUROGENIC CLAUDICATION: Chronic | ICD-10-CM

## 2024-09-12 DIAGNOSIS — I50.812 CHRONIC RIGHT-SIDED CONGESTIVE HEART FAILURE (HCC): ICD-10-CM

## 2024-09-12 DIAGNOSIS — M17.0 PRIMARY OSTEOARTHRITIS OF BOTH KNEES: Chronic | ICD-10-CM

## 2024-09-12 LAB
ALBUMIN: 3.7 G/DL (ref 3.5–5.2)
ALP BLD-CCNC: 135 U/L (ref 35–104)
ALT SERPL-CCNC: 7 U/L (ref 0–32)
ANION GAP SERPL CALCULATED.3IONS-SCNC: 13 MMOL/L (ref 7–16)
AST SERPL-CCNC: 15 U/L (ref 0–31)
BASOPHILS ABSOLUTE: 0.03 K/UL (ref 0–0.2)
BASOPHILS RELATIVE PERCENT: 0 % (ref 0–2)
BILIRUB SERPL-MCNC: 0.3 MG/DL (ref 0–1.2)
BUN BLDV-MCNC: 11 MG/DL (ref 6–23)
CALCIUM SERPL-MCNC: 9.2 MG/DL (ref 8.6–10.2)
CHLORIDE BLD-SCNC: 104 MMOL/L (ref 98–107)
CHOLESTEROL, TOTAL: 150 MG/DL
CO2: 27 MMOL/L (ref 22–29)
CREAT SERPL-MCNC: 0.6 MG/DL (ref 0.5–1)
EOSINOPHILS ABSOLUTE: 0.08 K/UL (ref 0.05–0.5)
EOSINOPHILS RELATIVE PERCENT: 1 % (ref 0–6)
GFR, ESTIMATED: >90 ML/MIN/1.73M2
GLUCOSE BLD-MCNC: 86 MG/DL (ref 74–99)
HBA1C MFR BLD: 5.7 % (ref 4–5.6)
HCT VFR BLD CALC: 43.6 % (ref 34–48)
HDLC SERPL-MCNC: 43 MG/DL
HEMOGLOBIN: 12.3 G/DL (ref 11.5–15.5)
IMMATURE GRANULOCYTES %: 1 % (ref 0–5)
IMMATURE GRANULOCYTES ABSOLUTE: 0.05 K/UL (ref 0–0.58)
LDL CHOLESTEROL: 85 MG/DL
LYMPHOCYTES ABSOLUTE: 1.53 K/UL (ref 1.5–4)
LYMPHOCYTES RELATIVE PERCENT: 16 % (ref 20–42)
MCH RBC QN AUTO: 21.2 PG (ref 26–35)
MCHC RBC AUTO-ENTMCNC: 28.2 G/DL (ref 32–34.5)
MCV RBC AUTO: 75.3 FL (ref 80–99.9)
MONOCYTES ABSOLUTE: 0.63 K/UL (ref 0.1–0.95)
MONOCYTES RELATIVE PERCENT: 7 % (ref 2–12)
NEUTROPHILS ABSOLUTE: 7.21 K/UL (ref 1.8–7.3)
NEUTROPHILS RELATIVE PERCENT: 76 % (ref 43–80)
NT PRO BNP: 297 PG/ML (ref 0–125)
PDW BLD-RTO: 23.1 % (ref 11.5–15)
PLATELET, FLUORESCENCE: 187 K/UL (ref 130–450)
PMV BLD AUTO: ABNORMAL FL (ref 7–12)
POTASSIUM SERPL-SCNC: 4.3 MMOL/L (ref 3.5–5)
RBC # BLD: 5.79 M/UL (ref 3.5–5.5)
RBC # BLD: ABNORMAL 10*6/UL
SODIUM BLD-SCNC: 144 MMOL/L (ref 132–146)
THYROXINE (T4): 8.4 UG/DL (ref 4.5–11.7)
TOTAL PROTEIN: 6.8 G/DL (ref 6.4–8.3)
TRIGL SERPL-MCNC: 108 MG/DL
TSH SERPL DL<=0.05 MIU/L-ACNC: 0.85 UIU/ML (ref 0.27–4.2)
VITAMIN B-12: 225 PG/ML (ref 211–946)
VITAMIN D 25-HYDROXY: 10.9 NG/ML (ref 30–100)
VLDLC SERPL CALC-MCNC: 22 MG/DL
WBC # BLD: 9.5 K/UL (ref 4.5–11.5)

## 2024-09-12 SDOH — ECONOMIC STABILITY: FOOD INSECURITY: WITHIN THE PAST 12 MONTHS, YOU WORRIED THAT YOUR FOOD WOULD RUN OUT BEFORE YOU GOT MONEY TO BUY MORE.: NEVER TRUE

## 2024-09-12 SDOH — ECONOMIC STABILITY: FOOD INSECURITY: WITHIN THE PAST 12 MONTHS, THE FOOD YOU BOUGHT JUST DIDN'T LAST AND YOU DIDN'T HAVE MONEY TO GET MORE.: NEVER TRUE

## 2024-09-12 SDOH — ECONOMIC STABILITY: INCOME INSECURITY: HOW HARD IS IT FOR YOU TO PAY FOR THE VERY BASICS LIKE FOOD, HOUSING, MEDICAL CARE, AND HEATING?: NOT HARD AT ALL

## 2024-09-12 ASSESSMENT — ENCOUNTER SYMPTOMS
ALLERGIC/IMMUNOLOGIC NEGATIVE: 1
RESPIRATORY NEGATIVE: 1
GASTROINTESTINAL NEGATIVE: 1
EYES NEGATIVE: 1

## 2024-09-12 ASSESSMENT — PATIENT HEALTH QUESTIONNAIRE - PHQ9
SUM OF ALL RESPONSES TO PHQ QUESTIONS 1-9: 0
SUM OF ALL RESPONSES TO PHQ QUESTIONS 1-9: 0
1. LITTLE INTEREST OR PLEASURE IN DOING THINGS: NOT AT ALL
SUM OF ALL RESPONSES TO PHQ QUESTIONS 1-9: 0
SUM OF ALL RESPONSES TO PHQ QUESTIONS 1-9: 0
SUM OF ALL RESPONSES TO PHQ9 QUESTIONS 1 & 2: 0
2. FEELING DOWN, DEPRESSED OR HOPELESS: NOT AT ALL

## 2024-09-17 ENCOUNTER — OFFICE VISIT (OUTPATIENT)
Dept: PRIMARY CARE CLINIC | Age: 69
End: 2024-09-17
Payer: MEDICARE

## 2024-09-17 VITALS
BODY MASS INDEX: 45.71 KG/M2 | DIASTOLIC BLOOD PRESSURE: 82 MMHG | WEIGHT: 250 LBS | SYSTOLIC BLOOD PRESSURE: 135 MMHG | TEMPERATURE: 98.7 F

## 2024-09-17 DIAGNOSIS — L03.012 CELLULITIS OF LEFT FINGER: Primary | ICD-10-CM

## 2024-09-17 DIAGNOSIS — I89.0 LYMPHEDEMA OF BOTH LOWER EXTREMITIES: Chronic | ICD-10-CM

## 2024-09-17 PROCEDURE — G8400 PT W/DXA NO RESULTS DOC: HCPCS | Performed by: FAMILY MEDICINE

## 2024-09-17 PROCEDURE — 1090F PRES/ABSN URINE INCON ASSESS: CPT | Performed by: FAMILY MEDICINE

## 2024-09-17 PROCEDURE — G8428 CUR MEDS NOT DOCUMENT: HCPCS | Performed by: FAMILY MEDICINE

## 2024-09-17 PROCEDURE — 99213 OFFICE O/P EST LOW 20 MIN: CPT | Performed by: FAMILY MEDICINE

## 2024-09-17 PROCEDURE — G8417 CALC BMI ABV UP PARAM F/U: HCPCS | Performed by: FAMILY MEDICINE

## 2024-09-17 PROCEDURE — 3017F COLORECTAL CA SCREEN DOC REV: CPT | Performed by: FAMILY MEDICINE

## 2024-09-17 PROCEDURE — 96372 THER/PROPH/DIAG INJ SC/IM: CPT | Performed by: FAMILY MEDICINE

## 2024-09-17 PROCEDURE — 1036F TOBACCO NON-USER: CPT | Performed by: FAMILY MEDICINE

## 2024-09-17 PROCEDURE — 1123F ACP DISCUSS/DSCN MKR DOCD: CPT | Performed by: FAMILY MEDICINE

## 2024-09-17 RX ORDER — DOXYCYCLINE HYCLATE 100 MG
100 TABLET ORAL 2 TIMES DAILY
Qty: 20 TABLET | Refills: 0 | Status: SHIPPED | OUTPATIENT
Start: 2024-09-17 | End: 2024-09-27

## 2024-09-17 RX ORDER — CEFTRIAXONE 1 G/1
1000 INJECTION, POWDER, FOR SOLUTION INTRAMUSCULAR; INTRAVENOUS ONCE
Status: COMPLETED | OUTPATIENT
Start: 2024-09-17 | End: 2024-09-17

## 2024-09-17 RX ORDER — CEPHALEXIN 500 MG/1
500 CAPSULE ORAL 3 TIMES DAILY
Qty: 21 CAPSULE | Refills: 0 | Status: SHIPPED
Start: 2024-09-17 | End: 2024-09-19

## 2024-09-17 RX ADMIN — CEFTRIAXONE 1000 MG: 1 INJECTION, POWDER, FOR SOLUTION INTRAMUSCULAR; INTRAVENOUS at 14:30

## 2024-09-17 ASSESSMENT — ENCOUNTER SYMPTOMS
ALLERGIC/IMMUNOLOGIC NEGATIVE: 1
GASTROINTESTINAL NEGATIVE: 1
RESPIRATORY NEGATIVE: 1
EYES NEGATIVE: 1

## 2024-09-19 ENCOUNTER — HOSPITAL ENCOUNTER (EMERGENCY)
Age: 69
Discharge: HOME | End: 2024-09-19
Attending: EMERGENCY MEDICINE
Payer: MEDICARE

## 2024-09-19 ENCOUNTER — APPOINTMENT (OUTPATIENT)
Dept: GENERAL RADIOLOGY | Age: 69
End: 2024-09-19
Payer: MEDICARE

## 2024-09-19 ENCOUNTER — OFFICE VISIT (OUTPATIENT)
Dept: PRIMARY CARE CLINIC | Age: 69
End: 2024-09-19

## 2024-09-19 VITALS
TEMPERATURE: 97.9 F | WEIGHT: 247.8 LBS | BODY MASS INDEX: 45.31 KG/M2 | HEART RATE: 98 BPM | RESPIRATION RATE: 17 BRPM | OXYGEN SATURATION: 96 %

## 2024-09-19 VITALS
TEMPERATURE: 98 F | DIASTOLIC BLOOD PRESSURE: 90 MMHG | OXYGEN SATURATION: 94 % | HEART RATE: 98 BPM | RESPIRATION RATE: 18 BRPM | SYSTOLIC BLOOD PRESSURE: 140 MMHG

## 2024-09-19 DIAGNOSIS — L03.012 CELLULITIS OF FINGER OF LEFT HAND: Primary | ICD-10-CM

## 2024-09-19 DIAGNOSIS — L03.012 CELLULITIS OF LEFT FINGER: Primary | ICD-10-CM

## 2024-09-19 PROBLEM — G20.A1 PARKINSON'S DISEASE (HCC): Status: RESOLVED | Noted: 2023-03-26 | Resolved: 2024-09-19

## 2024-09-19 LAB
ALBUMIN SERPL-MCNC: 3.7 G/DL (ref 3.5–5.2)
ALP SERPL-CCNC: 149 U/L (ref 35–104)
ALT SERPL-CCNC: 7 U/L (ref 0–32)
ANION GAP SERPL CALCULATED.3IONS-SCNC: 10 MMOL/L (ref 7–16)
AST SERPL-CCNC: 12 U/L (ref 0–31)
BASOPHILS # BLD: 0.02 K/UL (ref 0–0.2)
BASOPHILS NFR BLD: 0 % (ref 0–2)
BILIRUB SERPL-MCNC: 0.4 MG/DL (ref 0–1.2)
BUN SERPL-MCNC: 12 MG/DL (ref 6–23)
CALCIUM SERPL-MCNC: 9.1 MG/DL (ref 8.6–10.2)
CHLORIDE SERPL-SCNC: 102 MMOL/L (ref 98–107)
CO2 SERPL-SCNC: 28 MMOL/L (ref 22–29)
CREAT SERPL-MCNC: 0.5 MG/DL (ref 0.5–1)
EOSINOPHIL # BLD: 0.08 K/UL (ref 0.05–0.5)
EOSINOPHILS RELATIVE PERCENT: 1 % (ref 0–6)
ERYTHROCYTE [DISTWIDTH] IN BLOOD BY AUTOMATED COUNT: 22.9 % (ref 11.5–15)
GFR, ESTIMATED: >90 ML/MIN/1.73M2
GLUCOSE SERPL-MCNC: 89 MG/DL (ref 74–99)
HCT VFR BLD AUTO: 42.4 % (ref 34–48)
HGB BLD-MCNC: 12.2 G/DL (ref 11.5–15.5)
IMM GRANULOCYTES # BLD AUTO: 0.05 K/UL (ref 0–0.58)
IMM GRANULOCYTES NFR BLD: 1 % (ref 0–5)
LACTATE BLDV-SCNC: 0.8 MMOL/L (ref 0.5–2.2)
LYMPHOCYTES NFR BLD: 1.84 K/UL (ref 1.5–4)
LYMPHOCYTES RELATIVE PERCENT: 18 % (ref 20–42)
MCH RBC QN AUTO: 21.4 PG (ref 26–35)
MCHC RBC AUTO-ENTMCNC: 28.8 G/DL (ref 32–34.5)
MCV RBC AUTO: 74.4 FL (ref 80–99.9)
MONOCYTES NFR BLD: 0.65 K/UL (ref 0.1–0.95)
MONOCYTES NFR BLD: 6 % (ref 2–12)
NEUTROPHILS NFR BLD: 75 % (ref 43–80)
NEUTS SEG NFR BLD: 7.89 K/UL (ref 1.8–7.3)
PLATELET, FLUORESCENCE: 192 K/UL (ref 130–450)
PMV BLD AUTO: ABNORMAL FL (ref 7–12)
POTASSIUM SERPL-SCNC: 4.2 MMOL/L (ref 3.5–5)
PROT SERPL-MCNC: 6.9 G/DL (ref 6.4–8.3)
RBC # BLD AUTO: 5.7 M/UL (ref 3.5–5.5)
RBC # BLD: ABNORMAL 10*6/UL
SODIUM SERPL-SCNC: 140 MMOL/L (ref 132–146)
WBC OTHER # BLD: 10.5 K/UL (ref 4.5–11.5)

## 2024-09-19 PROCEDURE — 80053 COMPREHEN METABOLIC PANEL: CPT

## 2024-09-19 PROCEDURE — 83605 ASSAY OF LACTIC ACID: CPT

## 2024-09-19 PROCEDURE — 73130 X-RAY EXAM OF HAND: CPT

## 2024-09-19 PROCEDURE — 99284 EMERGENCY DEPT VISIT MOD MDM: CPT

## 2024-09-19 PROCEDURE — 85025 COMPLETE CBC W/AUTO DIFF WBC: CPT

## 2024-09-19 PROCEDURE — 87040 BLOOD CULTURE FOR BACTERIA: CPT

## 2024-09-19 RX ORDER — CEPHALEXIN 500 MG/1
500 CAPSULE ORAL 4 TIMES DAILY
Qty: 28 CAPSULE | Refills: 0 | Status: SHIPPED | OUTPATIENT
Start: 2024-09-19 | End: 2024-09-26

## 2024-09-19 ASSESSMENT — ENCOUNTER SYMPTOMS
RESPIRATORY NEGATIVE: 1
EYES NEGATIVE: 1
GASTROINTESTINAL NEGATIVE: 1
EYES NEGATIVE: 1
RESPIRATORY NEGATIVE: 1
GASTROINTESTINAL NEGATIVE: 1
ROS SKIN COMMENTS: HPI
ALLERGIC/IMMUNOLOGIC NEGATIVE: 1
ALLERGIC/IMMUNOLOGIC NEGATIVE: 1

## 2024-09-19 ASSESSMENT — PAIN DESCRIPTION - ORIENTATION: ORIENTATION: LEFT

## 2024-09-19 ASSESSMENT — PAIN DESCRIPTION - LOCATION: LOCATION: HAND

## 2024-09-19 ASSESSMENT — LIFESTYLE VARIABLES
HOW OFTEN DO YOU HAVE A DRINK CONTAINING ALCOHOL: NEVER
HOW MANY STANDARD DRINKS CONTAINING ALCOHOL DO YOU HAVE ON A TYPICAL DAY: PATIENT DOES NOT DRINK

## 2024-09-19 ASSESSMENT — PAIN SCALES - GENERAL: PAINLEVEL_OUTOF10: 7

## 2024-09-19 ASSESSMENT — PAIN DESCRIPTION - PAIN TYPE: TYPE: ACUTE PAIN

## 2024-09-19 ASSESSMENT — PAIN - FUNCTIONAL ASSESSMENT: PAIN_FUNCTIONAL_ASSESSMENT: 0-10

## 2024-09-22 LAB
MICROORGANISM SPEC CULT: NORMAL
MICROORGANISM SPEC CULT: NORMAL
SERVICE CMNT-IMP: NORMAL
SERVICE CMNT-IMP: NORMAL
SPECIMEN DESCRIPTION: NORMAL
SPECIMEN DESCRIPTION: NORMAL

## 2024-09-30 ENCOUNTER — APPOINTMENT (OUTPATIENT)
Dept: GENERAL RADIOLOGY | Age: 69
End: 2024-09-30
Payer: MEDICARE

## 2024-09-30 ENCOUNTER — HOSPITAL ENCOUNTER (EMERGENCY)
Age: 69
Discharge: HOME | End: 2024-09-30
Payer: MEDICARE

## 2024-09-30 VITALS
SYSTOLIC BLOOD PRESSURE: 194 MMHG | OXYGEN SATURATION: 94 % | TEMPERATURE: 98.4 F | DIASTOLIC BLOOD PRESSURE: 127 MMHG | HEART RATE: 108 BPM | RESPIRATION RATE: 18 BRPM

## 2024-09-30 DIAGNOSIS — K59.00 CONSTIPATION, UNSPECIFIED CONSTIPATION TYPE: Primary | ICD-10-CM

## 2024-09-30 PROCEDURE — 74018 RADEX ABDOMEN 1 VIEW: CPT

## 2024-09-30 PROCEDURE — 99283 EMERGENCY DEPT VISIT LOW MDM: CPT

## 2024-09-30 NOTE — DISCHARGE INSTRUCTIONS
Please return to the ED with new or worsening symptoms. Follow up with PCP for recheck.     Continue with stool softeners for the next Kaley days.  Make sure you stay well-hydrated as well.

## 2024-09-30 NOTE — ED PROVIDER NOTES
Independent COLETTE Visit.        HPI:  24, Time: 8:02 AM EDT         Selena Ward is a 68 y.o. female presenting to the ED for constipation, beginning just over a week ago.  The complaint has been persistent, moderate in severity, and worsened by nothing.  Patient reports that she has been doing stool softeners over-the-counter without much relief of symptoms.  Did try to drink some mag citrate however did not like the taste and then did not drink it.  Feels bloated but denies any overt abdominal pain or back pain.  No nausea or vomiting.  No history of obstructions in the past.  Afebrile that recent travel or sick contacts.  Patient denies all other symptoms at this time.    Review of Systems:   A complete review of systems was performed and pertinent positives and negatives are stated within HPI, all other systems reviewed and are negative.          --------------------------------------------- PAST HISTORY ---------------------------------------------  Past Medical History:  has a past medical history of Acute pancreatitis without necrosis or infection, unspecified, Acute respiratory failure with hypoxia, Arthritis, Communication problem, COPD exacerbation (HCC), Decreased dorsalis pedis pulse, Dermatophytosis, Full dentures, Leg swelling, Low back pain, Lymphedema of both lower extremities, Obesity, Open wound of right lower leg, Osteoarthritis, Other speech disturbances, Peripheral vision loss, Stroke (HCC), and Ulcer of calf with fat layer exposed, right (HCC).    Past Surgical History:  has a past surgical history that includes ECHO Complete 2D W Doppler W Color (2012);  section; Gastric bypass surgery (); Abdominoplasty (); Cataract removal; hernia repair; Colonoscopy (2012); Breast reduction surgery; Upper gastrointestinal endoscopy (2021); Colonoscopy (2021); Upper gastrointestinal endoscopy (N/A, 2021); Colonoscopy (N/A, 2021); Lumbar spine

## 2024-10-02 ENCOUNTER — OFFICE VISIT (OUTPATIENT)
Dept: ORTHOPEDIC SURGERY | Age: 69
End: 2024-10-02
Payer: MEDICARE

## 2024-10-02 DIAGNOSIS — M17.0 BILATERAL PRIMARY OSTEOARTHRITIS OF KNEE: Primary | ICD-10-CM

## 2024-10-02 PROCEDURE — 1090F PRES/ABSN URINE INCON ASSESS: CPT | Performed by: PHYSICIAN ASSISTANT

## 2024-10-02 PROCEDURE — 1123F ACP DISCUSS/DSCN MKR DOCD: CPT | Performed by: PHYSICIAN ASSISTANT

## 2024-10-02 PROCEDURE — 99213 OFFICE O/P EST LOW 20 MIN: CPT | Performed by: PHYSICIAN ASSISTANT

## 2024-10-02 PROCEDURE — 4004F PT TOBACCO SCREEN RCVD TLK: CPT | Performed by: PHYSICIAN ASSISTANT

## 2024-10-02 PROCEDURE — 3017F COLORECTAL CA SCREEN DOC REV: CPT | Performed by: PHYSICIAN ASSISTANT

## 2024-10-02 PROCEDURE — G8428 CUR MEDS NOT DOCUMENT: HCPCS | Performed by: PHYSICIAN ASSISTANT

## 2024-10-02 PROCEDURE — 20610 DRAIN/INJ JOINT/BURSA W/O US: CPT | Performed by: PHYSICIAN ASSISTANT

## 2024-10-02 PROCEDURE — G8484 FLU IMMUNIZE NO ADMIN: HCPCS | Performed by: PHYSICIAN ASSISTANT

## 2024-10-02 PROCEDURE — G8417 CALC BMI ABV UP PARAM F/U: HCPCS | Performed by: PHYSICIAN ASSISTANT

## 2024-10-02 PROCEDURE — G8400 PT W/DXA NO RESULTS DOC: HCPCS | Performed by: PHYSICIAN ASSISTANT

## 2024-10-02 RX ORDER — TRIAMCINOLONE ACETONIDE 40 MG/ML
80 INJECTION, SUSPENSION INTRA-ARTICULAR; INTRAMUSCULAR ONCE
Status: COMPLETED | OUTPATIENT
Start: 2024-10-02 | End: 2024-10-02

## 2024-10-02 RX ORDER — BUPIVACAINE HYDROCHLORIDE 2.5 MG/ML
6 INJECTION, SOLUTION INFILTRATION; PERINEURAL ONCE
Status: COMPLETED | OUTPATIENT
Start: 2024-10-02 | End: 2024-10-02

## 2024-10-02 RX ADMIN — TRIAMCINOLONE ACETONIDE 80 MG: 40 INJECTION, SUSPENSION INTRA-ARTICULAR; INTRAMUSCULAR at 10:09

## 2024-10-02 RX ADMIN — BUPIVACAINE HYDROCHLORIDE 15 MG: 2.5 INJECTION, SOLUTION INFILTRATION; PERINEURAL at 10:09

## 2024-10-02 NOTE — PROGRESS NOTES
not achieving at least 6-8 weeks of moderate relief  Continue to stay as active as able, maintain healthy weight   Follow-up in 3 months for reevaluation possible repeat CSI's.  Call if any questions or concerns    Electronically signed by CHERYL Pearl on 10/2/2024 at 1:20 PM  Note: This report was completed using [employer] voiced recognition software.  Every effort has been made to ensure accuracy; however, inadvertent computerized transcription errors may be present.

## 2024-10-03 ENCOUNTER — TELEPHONE (OUTPATIENT)
Dept: PRIMARY CARE CLINIC | Age: 69
End: 2024-10-03

## 2024-10-03 DIAGNOSIS — M47.26 OSTEOARTHRITIS OF SPINE WITH RADICULOPATHY, LUMBAR REGION: Primary | ICD-10-CM

## 2024-10-04 NOTE — TELEPHONE ENCOUNTER
Called patient informed them order finished and it will be mailed to them, Verbalized understanding

## 2024-10-16 NOTE — PROGRESS NOTES
[facility] Cardiology Inpatient Progress Note    Patient is a 60 y.o. female of Derrick Pedraza DO seen in hospital follow up. Chief complaint: Pre-op/PATEL    HPI: Some SOB. No CP. Patient Active Problem List   Diagnosis    Osteoarthritis of left knee    Osteoarthritis of right knee    BMI 45.0-49.9, adult ([address]    Lymphedema of both lower extremities    Cellulitis of leg    Microcytic anemia    Morbid [facility] [address]    Tobacco abuse    SOB (shortness of breath)    Iron deficiency anemia    Primary osteoarthritis involving multiple joints    Lymphedema    Intervertebral lumbar disc disorder    Reactive depression    Chronic pain syndrome    Primary osteoarthritis of both knees    Chronic right shoulder pain    Chronic right-sided low back pain without sciatica    Anxiety    Chronic fatigue    Tremor    Intractable back pain    Acute midline low back pain with right-sided sciatica    Spinal stenosis of lumbar region with neurogenic claudication    Osteoarthritis of spine with radiculopathy, lumbar region    Cellulitis       No Known Allergies    Current Facility-Administered Medications   Medication Dose Route Frequency Provider Last Rate Last Admin    HYDROcodone-acetaminophen (NORCO) 5-325 MG per tablet 1 tablet  1 tablet Oral Q6H PRN Patricia Dent, DO   1 tablet at 03/25/21 0446    perflutren lipid microspheres (DEFINITY) injection 1.65 mg  1.5 mL Intravenous ONCE PRN Rosa Whipple.  JORGE Hunt        cyanocobalamin injection 1,000 mcg  1,000 mcg Intramuscular Daily Nataly Alvarez MD   1,000 mcg at 03/24/21 1603    ferric gluconate (FERRLECIT) 125 mg in sodium chloride 0.9 % 100 mL IVPB  125 mg Intravenous Daily Jorge Luis Patton MD   Stopped at 03/24/21 1730    baclofen (LIORESAL) tablet 10 mg  10 mg Oral TID Cathalene Kristin Dent, DO   10 mg at 03/24/21 2114    Buprenorphine HCl FILM 450 mcg  450 mcg Oral Q12H Cathalejuan c Dent, DO   450 mcg at 03/25/21 0530    buPROPion (WELLBUTRIN XL) extended release tablet 150 mg  150 mg Oral QAM Chano Griffin, DO   150 mg at 03/24/21 9359    gabapentin (NEURONTIN) capsule 300 mg  300 mg Oral TID Pricilla Henrisukhwinder Dent, DO   300 mg at 03/24/21 2114    rOPINIRole (REQUIP) tablet 2 mg  2 mg Oral 4x daily [facility] Nnamdi Dent, DO   2 mg at 03/25/21 2461    furosemide (LASIX) injection 40 mg  40 mg Intravenous BID Pricilla Nnamdi Dent, DO   40 mg at 03/24/21 1742    ceFAZolin (ANCEF) 2000 mg in sterile water 20 mL IV syringe  2,000 mg Intravenous Q8H [facility] Nnamdi Dent, DO   2,000 mg at 03/25/21 4548    sodium chloride flush 0.9 % injection 10 mL  10 mL Intravenous 2 times per day Pricilla Nnamdi Dent, DO   10 mL at 03/24/21 2115    sodium chloride flush 0.9 % injection 10 mL  10 mL Intravenous PRN Pricillacintia Dent DO        enoxaparin (LOVENOX) injection 40 mg  40 mg Subcutaneous Daily [facility] Nnamdi Dent, DO   40 mg at 03/24/21 5927    promethazine (PHENERGAN) tablet 12.5 mg  12.5 mg Oral Q6H PRN Pricillacitnia Dent DO        Or    ondansetron [facility]) injection 4 mg  4 mg Intravenous Q6H PRN Pricillacintia Dent, DO        polyethylene glycol (GLYCOLAX) packet 17 g  17 g Oral Daily PRN Pricillacintia Dent, DO        acetaminophen (TYLENOL) tablet 650 mg  650 mg Oral Q6H PRN Pricillacintia Dent DO        Or    acetaminophen (TYLENOL) suppository 650 mg  650 mg Rectal Q6H PRN Pricillacintia Dent, DO           Review of systems:   Heart: as above   Lungs: as above   Eyes: denies changes in vision or discharge. Ears: denies changes in hearing or pain. Nose: denies epistaxis or masses   Throat: denies sore throat or trouble swallowing. Neuro: denies numbness, tingling, tremors. Skin: denies rashes or itching. : denies hematuria, dysuria   GI: denies vomiting, diarrhea   Psych: denies mood changed, anxiety, depression.     Physical Exam   /60   Pulse 76   Temp 96.8 °F (36 °C) (Temporal)   Resp 18   Ht 5' 2\" (1.575 m)   Wt (!) 302 lb (137 kg)   SpO2 90%   BMI 55.24 kg/m²   Constitutional: Oriented to person, place, and time. No distress. Well developed. Head: Normocephalic and atraumatic. Neck: Neck supple. No hepatojugular reflux. No JVD present. Carotid bruit is not present. No tracheal deviation present. No thyromegaly present. Cardiovascular: Normal rate, regular rhythm, normal heart sounds. intact distal pulses. No gallop and no friction rub. No murmur heard. Pulmonary: Breath sounds normal. No respiratory distress. No wheezes. No rales. Chest: Effort normal. No tenderness. Abdominal: Soft. Bowel sounds are normal. No distension or mass. No tenderness, rebound or guarding. Musculoskeletal: . No tenderness. No clubbing or cyanosis. Extremitites: Intact distal pulses. No edema  Neurological: Alert and oriented to person, place, and time. Skin: Skin is warm and dry. No rash noted. Not diaphoretic. No erythema. Psychiatric: Normal mood and affect. Behavior is normal.   Lymphadenopathy: No cervical adenopathy. No groin adenopathy.       CBC:   Lab Results   Component Value Date    WBC 8.5 03/23/2021    RBC 4.72 03/23/2021    HGB 8.0 03/23/2021    HCT 31.3 03/24/2021    MCV 66.7 03/23/2021    MCH 16.9 03/23/2021    MCHC 25.4 03/23/2021    RDW 24.7 03/23/2021     03/23/2021    MPV NOT CALC 03/23/2021     BMP:   Lab Results   Component Value Date     03/24/2021    K 3.4 03/24/2021    K 3.3 06/08/2019     03/24/2021    CO2 28 03/24/2021    BUN 9 03/24/2021    LABALBU 3.3 03/23/2021    CREATININE 0.6 03/24/2021    CALCIUM 8.5 03/24/2021    GFRAA >60 03/24/2021    LABGLOM >60 03/24/2021     Magnesium:    Lab Results   Component Value Date    MG 2.0 03/24/2021     Cardiac Enzymes:   Lab Results   Component Value Date    TROPONINI <0.01 03/23/2021    TROPONINI <0.01 06/14/2019    TROPONINI <0.01 06/14/2019      PT/INR:    Lab Results   Component Value Date    PROTIME 11.2 02/19/2021    INR 1.0 02/19/2021     TSH:    Lab Results pt arrives from nursing home c/o dizziness x few days. Denies chest pain, sob, n/v. Hx Intellectual disability, T2DM, Hypothyroidism, DVT on eliquis, Bipolar disorder, Seizure, asthma. Well appearing Component Value Date    TSH 0.988 03/10/2021     Echo Summary 3/24/2021:   Ejection fraction is visually estimated at 65%. No regional wall motion abnormalities seen. Moderate left ventricular concentric hypertrophy noted. Normal right ventricle structure and function. Physiologic and/or trace mitral regurgitation is present. Physiologic and/or trace tricuspid regurgitation. ASSESSMENT & PLAN:    Patient Active Problem List   Diagnosis    Osteoarthritis of left knee    Osteoarthritis of right knee    BMI 45.0-49.9, adult ([facility] [address]    Lymphedema of both lower extremities    Cellulitis of leg    Microcytic anemia    Morbid obesity ([facility] [address]    Tobacco abuse    SOB (shortness of breath)    Iron deficiency anemia    Primary osteoarthritis involving multiple joints    Lymphedema    Intervertebral lumbar disc disorder    Reactive depression    Chronic pain syndrome    Primary osteoarthritis of both knees    Chronic right shoulder pain    Chronic right-sided low back pain without sciatica    Anxiety    Chronic fatigue    Tremor    Intractable back pain    Acute midline low back pain with right-sided sciatica    Spinal stenosis of lumbar region with neurogenic claudication    Osteoarthritis of spine with radiculopathy, lumbar region    Cellulitis     1. Pre-op Risk Stratification/Lumbar surgery:     Echo benign. Low risk stress 6/2019. Patient is an acceptable risk to proceed. 2. PATEL/Lymphedema/Volume overload/Cellulitis: Per primary service. Echo okay. Diurese. On IV bumex. 3. Hx of CVA?: Consider ASA and statin. 4. Anemia: Follow labs. Heme following. 5. RLS: On Requip    6. Hx of gastric bypass    7. DJD    8. Obesity    Kayla Vieira D.O.   Cardiologist  Cardiology, [address] [place] [address]

## 2024-10-17 ENCOUNTER — PROCEDURE VISIT (OUTPATIENT)
Dept: PODIATRY | Age: 69
End: 2024-10-17
Payer: MEDICARE

## 2024-10-17 VITALS
WEIGHT: 246 LBS | BODY MASS INDEX: 44.98 KG/M2 | SYSTOLIC BLOOD PRESSURE: 140 MMHG | DIASTOLIC BLOOD PRESSURE: 78 MMHG | TEMPERATURE: 98.4 F

## 2024-10-17 DIAGNOSIS — B35.1 TINEA UNGUIUM: ICD-10-CM

## 2024-10-17 DIAGNOSIS — E11.9 TYPE 2 DIABETES MELLITUS WITHOUT COMPLICATION, WITH LONG-TERM CURRENT USE OF INSULIN (HCC): Primary | ICD-10-CM

## 2024-10-17 DIAGNOSIS — M79.675 PAIN IN LEFT TOE(S): ICD-10-CM

## 2024-10-17 DIAGNOSIS — L84 CORN OR CALLUS: ICD-10-CM

## 2024-10-17 DIAGNOSIS — R26.2 DIFFICULTY WALKING: ICD-10-CM

## 2024-10-17 DIAGNOSIS — I73.9 PERIPHERAL VASCULAR DISEASE, UNSPECIFIED (HCC): ICD-10-CM

## 2024-10-17 DIAGNOSIS — M79.674 PAIN IN RIGHT TOE(S): ICD-10-CM

## 2024-10-17 DIAGNOSIS — Z79.4 TYPE 2 DIABETES MELLITUS WITHOUT COMPLICATION, WITH LONG-TERM CURRENT USE OF INSULIN (HCC): Primary | ICD-10-CM

## 2024-10-17 PROCEDURE — 11055 PARING/CUTG B9 HYPRKER LES 1: CPT | Performed by: PODIATRIST

## 2024-10-17 PROCEDURE — 11720 DEBRIDE NAIL 1-5: CPT | Performed by: PODIATRIST

## 2024-10-17 NOTE — PROGRESS NOTES
[facility]  [facility]    Chief Complaint   Patient presents with    Diabetes    Toe Pain     Gautam Razo DO  10/3/24    Ingrown Toenail     Pt was last seen in 2022  Dm foot examination  Dm nail care  Ingrown toenail     Callouses       Subjective: This Selena Ward comes to office for foot and nail care.  Pt currently has complaint of thickened, painful, elongated nails that he/she cannot manage by themselves.  Pt. Relates pain to nails with shoe gear.  Pt's primary care physician is Gautam Razo DO.  Past Medical History:   Diagnosis Date    Acute pancreatitis without necrosis or infection, unspecified 2/1/2022    Acute respiratory failure with hypoxia 12/14/2022    Arthritis     Communication problem 1/12/2023    COPD exacerbation (HCC) 1/5/2023    Decreased dorsalis pedis pulse 11/01/2022    Dermatophytosis 11/01/2022    Full dentures     Leg swelling 11/01/2022    Low back pain     Lymphedema of both lower extremities chronic and continues as of 7/13/22    Obesity     280 #    Open wound of right lower leg 10/22/2022    Osteoarthritis     Other speech disturbances 10/13/2022    Peripheral vision loss     Stroke ([facility])     Ulcer of calf with fat layer exposed, right ([facility]) 11/01/2022    WOUND CLINIC DR HOANG       Allergies   Allergen Reactions    Ferritin Shortness Of Breath and Other (See Comments)     Flushed,  Severe back pain     Current Outpatient Medications on File Prior to Visit   Medication Sig Dispense Refill    Handicap Placard MISC by Does not apply route Permanent disability   10 years 1 each 0    albuterol (PROVENTIL) (2.5 MG/3ML) 0.083% nebulizer solution Take 3 mLs by nebulization 4 times daily as needed for Wheezing 120 each 3    rOPINIRole (REQUIP) 0.5 MG tablet Take 1 tablet by mouth every 4 hours as needed (restless legs) 200 tablet 5    bumetanide (BUMEX) 2 MG tablet Take 1 tablet by mouth 2 times daily 180 tablet 3    vitamin D (ERGOCALCIFEROL) 1.25 MG

## 2024-10-30 DIAGNOSIS — G25.81 RESTLESS LEG SYNDROME: ICD-10-CM

## 2024-10-30 DIAGNOSIS — I89.0 LYMPHEDEMA OF BOTH LOWER EXTREMITIES: Chronic | ICD-10-CM

## 2024-10-31 RX ORDER — ROPINIROLE 0.5 MG/1
0.5 TABLET, FILM COATED ORAL EVERY 4 HOURS PRN
Qty: 200 TABLET | Refills: 5 | Status: SHIPPED | OUTPATIENT
Start: 2024-10-31

## 2024-11-18 DIAGNOSIS — E11.9 TYPE 2 DIABETES MELLITUS WITHOUT COMPLICATION, WITHOUT LONG-TERM CURRENT USE OF INSULIN (HCC): ICD-10-CM

## 2024-12-12 ENCOUNTER — OFFICE VISIT (OUTPATIENT)
Dept: PRIMARY CARE CLINIC | Age: 69
End: 2024-12-12

## 2024-12-12 VITALS
SYSTOLIC BLOOD PRESSURE: 138 MMHG | RESPIRATION RATE: 18 BRPM | HEART RATE: 100 BPM | BODY MASS INDEX: 44.98 KG/M2 | WEIGHT: 246 LBS | TEMPERATURE: 97 F | DIASTOLIC BLOOD PRESSURE: 83 MMHG | OXYGEN SATURATION: 99 %

## 2024-12-12 DIAGNOSIS — E11.9 DIET-CONTROLLED DIABETES MELLITUS (HCC): ICD-10-CM

## 2024-12-12 DIAGNOSIS — M15.0 PRIMARY OSTEOARTHRITIS INVOLVING MULTIPLE JOINTS: Chronic | ICD-10-CM

## 2024-12-12 DIAGNOSIS — I50.42 CHRONIC COMBINED SYSTOLIC AND DIASTOLIC CONGESTIVE HEART FAILURE (HCC): Primary | ICD-10-CM

## 2024-12-12 DIAGNOSIS — I89.0 LYMPHEDEMA OF BOTH LOWER EXTREMITIES: Chronic | ICD-10-CM

## 2024-12-12 SDOH — ECONOMIC STABILITY: FOOD INSECURITY: WITHIN THE PAST 12 MONTHS, YOU WORRIED THAT YOUR FOOD WOULD RUN OUT BEFORE YOU GOT MONEY TO BUY MORE.: NEVER TRUE

## 2024-12-12 SDOH — ECONOMIC STABILITY: FOOD INSECURITY: WITHIN THE PAST 12 MONTHS, THE FOOD YOU BOUGHT JUST DIDN'T LAST AND YOU DIDN'T HAVE MONEY TO GET MORE.: NEVER TRUE

## 2024-12-12 SDOH — ECONOMIC STABILITY: INCOME INSECURITY: HOW HARD IS IT FOR YOU TO PAY FOR THE VERY BASICS LIKE FOOD, HOUSING, MEDICAL CARE, AND HEATING?: NOT VERY HARD

## 2024-12-12 ASSESSMENT — ENCOUNTER SYMPTOMS
EYES NEGATIVE: 1
ALLERGIC/IMMUNOLOGIC NEGATIVE: 1
RESPIRATORY NEGATIVE: 1
GASTROINTESTINAL NEGATIVE: 1
BACK PAIN: 1

## 2024-12-12 ASSESSMENT — PATIENT HEALTH QUESTIONNAIRE - PHQ9
SUM OF ALL RESPONSES TO PHQ QUESTIONS 1-9: 0
2. FEELING DOWN, DEPRESSED OR HOPELESS: NOT AT ALL
SUM OF ALL RESPONSES TO PHQ9 QUESTIONS 1 & 2: 0
SUM OF ALL RESPONSES TO PHQ QUESTIONS 1-9: 0
1. LITTLE INTEREST OR PLEASURE IN DOING THINGS: NOT AT ALL

## 2024-12-12 NOTE — PROGRESS NOTES
low relegs   Neurological:      Mental Status: She is alert and oriented to person, place, and time.   Psychiatric:         Behavior: Behavior normal.         Selena was seen today for follow-up.    Diagnoses and all orders for this visit:    Chronic combined systolic and diastolic congestive heart failure (HCC)    Primary osteoarthritis involving multiple joints    Lymphedema of both lower extremities    Diet-controlled diabetes mellitus (HCC)        Comments: shayy pinto  at [facility]   rflab  diet exer   lwo sguar    back to [facility]    Die texer  Plan lab next ov      I educated the patient about all medications.  Make sure they were correct and educated  on the risk associated with missing meds or taking them incorrectly.  A list of medications is being sent home with patient today.    Check blood pressure at home twice a day.  Low-salt low caffeine diet.  Call if systolic blood pressure is above 150 and diastolic blood pressures above 85.  Only use a upper arm digital cuff.  A great deal of time spent reviewing medications, diet, exercise, social issues. Also reviewing the chart before entering the room with patient and finishing charting after leaving patient's room. More than half of that time was spent face to face with the patient in counseling and coordinating care.  I informed patient about the risk associated with noncompliance of medication and taking medications incorrectly.  Appropriate follow-up with myself and all specialist.  Encourage family members to take active role in assisting with medications and medical care.  If any confusion should develop to notify my office immediately to avoid risk of worsening medical condition    Aggressive low-fat diet.  Avoid red meats, greasy fried foods, dairy products.  Avoid processed foods.  Take cholesterol medications without food.      Follow Up: Return in about 3 months (around 3/12/2025).     Seen by:  Gautam Razo DO

## 2024-12-17 NOTE — TELEPHONE ENCOUNTER
Pt lost referral Dr. Razo gave her in January for Lymphedema therapy.  Referral faxed to [place] @ [phone] per pt request  
no

## 2025-01-03 ENCOUNTER — OFFICE VISIT (OUTPATIENT)
Dept: ORTHOPEDIC SURGERY | Age: 70
End: 2025-01-03
Payer: MEDICARE

## 2025-01-03 VITALS
DIASTOLIC BLOOD PRESSURE: 90 MMHG | BODY MASS INDEX: 44.77 KG/M2 | WEIGHT: 243.3 LBS | HEART RATE: 98 BPM | HEIGHT: 62 IN | SYSTOLIC BLOOD PRESSURE: 130 MMHG | OXYGEN SATURATION: 94 % | TEMPERATURE: 97.7 F

## 2025-01-03 DIAGNOSIS — M17.0 BILATERAL PRIMARY OSTEOARTHRITIS OF KNEE: Primary | ICD-10-CM

## 2025-01-03 PROCEDURE — 20610 DRAIN/INJ JOINT/BURSA W/O US: CPT | Performed by: PHYSICIAN ASSISTANT

## 2025-01-03 RX ORDER — TRIAMCINOLONE ACETONIDE 40 MG/ML
40 INJECTION, SUSPENSION INTRA-ARTICULAR; INTRAMUSCULAR ONCE
Status: COMPLETED | OUTPATIENT
Start: 2025-01-03 | End: 2025-01-03

## 2025-01-03 RX ORDER — BUPIVACAINE HYDROCHLORIDE 2.5 MG/ML
6 INJECTION, SOLUTION INFILTRATION; PERINEURAL ONCE
Status: COMPLETED | OUTPATIENT
Start: 2025-01-03 | End: 2025-01-03

## 2025-01-03 RX ADMIN — TRIAMCINOLONE ACETONIDE 40 MG: 40 SUSPENSION INTRA-ARTERIAL; INTRAMUSCULAR at 09:37

## 2025-01-03 RX ADMIN — TRIAMCINOLONE ACETONIDE 40 MG: 40 SUSPENSION INTRA-ARTERIAL; INTRAMUSCULAR at 09:36

## 2025-01-03 RX ADMIN — BUPIVACAINE HYDROCHLORIDE 15 MG: 2.5 INJECTION, SOLUTION INFILTRATION; PERINEURAL at 09:36

## 2025-01-03 NOTE — PROGRESS NOTES
Chief Complaint   Patient presents with    Injections     Patient here today follow up for bilateral knee pain. Patient had CSI to bilateral knees 10/2/2024 and reports that injections lasted approximately 8 weeks. Patient requesting CSI to bilateral knees today.         Selena Ward is a 69 y.o. year old  who presents for follow up of bilateral knee pain.     Patients previous treatments have included bilateral knee cortisone injections for multiple years. She has also tried viscosupplementation injections previously.     Currently, the patient is still complaining of bilateral knee pain. She states she has pain at rest along with pain with ambulation. She ambulates with a Rolator. She denies any numbness or tingling to bilateral lower extremities. She denies any new injuries. She states her last set of injections provided her about 2.5 months of relief. She is uninterested in surgery.     Patient is happy with their response to cortisone previously and is requesting repeat injections today.       Past Medical History:   Diagnosis Date    Acute pancreatitis without necrosis or infection, unspecified 2022    Acute respiratory failure with hypoxia 2022    Arthritis     Communication problem 2023    COPD exacerbation (HCC) 2023    Decreased dorsalis pedis pulse 2022    Dermatophytosis 2022    Full dentures     Leg swelling 2022    Low back pain     Lymphedema of both lower extremities chronic and continues as of 22    Obesity     280 #    Open wound of right lower leg 10/22/2022    Osteoarthritis     Other speech disturbances 10/13/2022    Peripheral vision loss     Stroke (HCC)     Ulcer of calf with fat layer exposed, right (HCC) 2022    WOUND CLINIC DR HOANG     Past Surgical History:   Procedure Laterality Date    ABDOMINOPLASTY  2002    BREAST REDUCTION SURGERY      reduction    CATARACT REMOVAL      bilateral     SECTION      x2    COCHLEAR IMPLANT

## 2025-02-28 ENCOUNTER — PROCEDURE VISIT (OUTPATIENT)
Dept: PODIATRY | Age: 70
End: 2025-02-28

## 2025-02-28 VITALS
BODY MASS INDEX: 44.45 KG/M2 | SYSTOLIC BLOOD PRESSURE: 137 MMHG | WEIGHT: 243 LBS | OXYGEN SATURATION: 95 % | HEART RATE: 97 BPM | DIASTOLIC BLOOD PRESSURE: 81 MMHG | TEMPERATURE: 97.7 F

## 2025-02-28 DIAGNOSIS — E11.9 TYPE 2 DIABETES MELLITUS WITHOUT COMPLICATION, WITH LONG-TERM CURRENT USE OF INSULIN (HCC): ICD-10-CM

## 2025-02-28 DIAGNOSIS — Z79.4 TYPE 2 DIABETES MELLITUS WITHOUT COMPLICATION, WITH LONG-TERM CURRENT USE OF INSULIN (HCC): ICD-10-CM

## 2025-02-28 DIAGNOSIS — I73.9 PERIPHERAL VASCULAR DISEASE, UNSPECIFIED: ICD-10-CM

## 2025-02-28 DIAGNOSIS — B35.1 TINEA UNGUIUM: Primary | ICD-10-CM

## 2025-02-28 DIAGNOSIS — R26.2 DIFFICULTY WALKING: ICD-10-CM

## 2025-02-28 DIAGNOSIS — L84 CORN OR CALLUS: ICD-10-CM

## 2025-02-28 DIAGNOSIS — M79.674 PAIN IN RIGHT TOE(S): ICD-10-CM

## 2025-02-28 DIAGNOSIS — M79.675 PAIN IN LEFT TOE(S): ICD-10-CM

## 2025-02-28 NOTE — PROGRESS NOTES
[facility]  [facility]    Chief Complaint   Patient presents with    Toe Pain     Gautam Razo DO  LO V 1/3/25    Diabetes       Subjective: This Selena Ward comes to office for foot and nail care.  Pt currently has complaint of thickened, painful, elongated nails that he/she cannot manage by themselves.  Pt. Relates pain to nails with shoe gear.  Pt's primary care physician is Gautam Razo DO.  Past Medical History:   Diagnosis Date    Acute pancreatitis without necrosis or infection, unspecified 2/1/2022    Acute respiratory failure with hypoxia ([facility]) 12/14/2022    Arthritis     Communication problem 1/12/2023    COPD exacerbation ([facility]) 1/5/2023    Decreased dorsalis pedis pulse 11/01/2022    Dermatophytosis 11/01/2022    Full dentures     Leg swelling 11/01/2022    Low back pain     Lymphedema of both lower extremities chronic and continues as of 7/13/22    Obesity     280 #    Open wound of right lower leg 10/22/2022    Osteoarthritis     Other speech disturbances 10/13/2022    Peripheral vision loss     Stroke ([facility])     Ulcer of calf with fat layer exposed, right ([facility]) 11/01/2022    WOUND CLINIC DR HOANG       Allergies   Allergen Reactions    Ferritin Shortness Of Breath and Other (See Comments)     Flushed,  Severe back pain     Current Outpatient Medications on File Prior to Visit   Medication Sig Dispense Refill    semaglutide, 2 MG/DOSE, (OZEMPIC) 8 MG/3ML SOPN sc injection Inject 2 mg into the skin every 7 days 16 mL 5    rOPINIRole (REQUIP) 0.5 MG tablet Take 1 tablet by mouth every 4 hours as needed (restless legs) 200 tablet 5    albuterol (PROVENTIL) (2.5 MG/3ML) 0.083% nebulizer solution Take 3 mLs by nebulization 4 times daily as needed for Wheezing 120 each 3    bumetanide (BUMEX) 2 MG tablet Take 1 tablet by mouth 2 times daily 180 tablet 3    vitamin D (ERGOCALCIFEROL) 1.25 MG (61954 UT) CAPS capsule Take 1 capsule by mouth once a week 12 capsule 1    blood

## 2025-03-06 SDOH — HEALTH STABILITY: PHYSICAL HEALTH: ON AVERAGE, HOW MANY MINUTES DO YOU ENGAGE IN EXERCISE AT THIS LEVEL?: 0 MIN

## 2025-03-06 SDOH — HEALTH STABILITY: PHYSICAL HEALTH: ON AVERAGE, HOW MANY DAYS PER WEEK DO YOU ENGAGE IN MODERATE TO STRENUOUS EXERCISE (LIKE A BRISK WALK)?: 0 DAYS

## 2025-03-06 ASSESSMENT — PATIENT HEALTH QUESTIONNAIRE - PHQ9
SUM OF ALL RESPONSES TO PHQ QUESTIONS 1-9: 0
1. LITTLE INTEREST OR PLEASURE IN DOING THINGS: NOT AT ALL
2. FEELING DOWN, DEPRESSED OR HOPELESS: NOT AT ALL
SUM OF ALL RESPONSES TO PHQ QUESTIONS 1-9: 0

## 2025-03-06 ASSESSMENT — LIFESTYLE VARIABLES
HOW OFTEN DO YOU HAVE A DRINK CONTAINING ALCOHOL: 1
HOW OFTEN DO YOU HAVE SIX OR MORE DRINKS ON ONE OCCASION: 1
HOW MANY STANDARD DRINKS CONTAINING ALCOHOL DO YOU HAVE ON A TYPICAL DAY: 0
HOW OFTEN DO YOU HAVE A DRINK CONTAINING ALCOHOL: NEVER
HOW MANY STANDARD DRINKS CONTAINING ALCOHOL DO YOU HAVE ON A TYPICAL DAY: PATIENT DOES NOT DRINK

## 2025-03-09 SDOH — ECONOMIC STABILITY: FOOD INSECURITY: WITHIN THE PAST 12 MONTHS, YOU WORRIED THAT YOUR FOOD WOULD RUN OUT BEFORE YOU GOT MONEY TO BUY MORE.: NEVER TRUE

## 2025-03-09 SDOH — ECONOMIC STABILITY: FOOD INSECURITY: WITHIN THE PAST 12 MONTHS, THE FOOD YOU BOUGHT JUST DIDN'T LAST AND YOU DIDN'T HAVE MONEY TO GET MORE.: NEVER TRUE

## 2025-03-09 SDOH — ECONOMIC STABILITY: INCOME INSECURITY: IN THE LAST 12 MONTHS, WAS THERE A TIME WHEN YOU WERE NOT ABLE TO PAY THE MORTGAGE OR RENT ON TIME?: NO

## 2025-03-10 ENCOUNTER — OFFICE VISIT (OUTPATIENT)
Dept: PRIMARY CARE CLINIC | Age: 70
End: 2025-03-10
Payer: MEDICARE

## 2025-03-10 VITALS
HEART RATE: 95 BPM | RESPIRATION RATE: 16 BRPM | OXYGEN SATURATION: 98 % | BODY MASS INDEX: 43.06 KG/M2 | WEIGHT: 234 LBS | HEIGHT: 62 IN | TEMPERATURE: 98.1 F

## 2025-03-10 DIAGNOSIS — Z87.891 PERSONAL HISTORY OF TOBACCO USE: ICD-10-CM

## 2025-03-10 DIAGNOSIS — G89.4 CHRONIC PAIN SYNDROME: Chronic | ICD-10-CM

## 2025-03-10 DIAGNOSIS — Z00.00 MEDICARE ANNUAL WELLNESS VISIT, SUBSEQUENT: Primary | ICD-10-CM

## 2025-03-10 DIAGNOSIS — E53.8 VITAMIN B 12 DEFICIENCY: ICD-10-CM

## 2025-03-10 DIAGNOSIS — E11.9 DIET-CONTROLLED DIABETES MELLITUS (HCC): ICD-10-CM

## 2025-03-10 DIAGNOSIS — E03.9 ACQUIRED HYPOTHYROIDISM: ICD-10-CM

## 2025-03-10 DIAGNOSIS — I50.43 SYSTOLIC AND DIASTOLIC CHF, ACUTE ON CHRONIC (HCC): ICD-10-CM

## 2025-03-10 DIAGNOSIS — J44.1 CHRONIC OBSTRUCTIVE PULMONARY DISEASE WITH (ACUTE) EXACERBATION (HCC): ICD-10-CM

## 2025-03-10 DIAGNOSIS — E55.9 VITAMIN D DEFICIENCY: ICD-10-CM

## 2025-03-10 DIAGNOSIS — I89.0 LYMPHEDEMA OF BOTH LOWER EXTREMITIES: Chronic | ICD-10-CM

## 2025-03-10 DIAGNOSIS — M48.062 SPINAL STENOSIS OF LUMBAR REGION WITH NEUROGENIC CLAUDICATION: Chronic | ICD-10-CM

## 2025-03-10 PROBLEM — L97.212 ULCER OF CALF WITH FAT LAYER EXPOSED, RIGHT (HCC): Status: RESOLVED | Noted: 2022-11-01 | Resolved: 2025-03-10

## 2025-03-10 LAB
BACTERIA: ABNORMAL
BASOPHILS ABSOLUTE: 0.02 K/UL (ref 0–0.2)
BASOPHILS RELATIVE PERCENT: 0 % (ref 0–2)
BILIRUBIN, URINE: NEGATIVE
CHOLESTEROL, TOTAL: 153 MG/DL
COLOR, UA: YELLOW
CREATININE URINE: 111.4 MG/DL (ref 29–226)
CRYSTALS, UA: ABNORMAL /HPF
EOSINOPHILS ABSOLUTE: 0.08 K/UL (ref 0.05–0.5)
EOSINOPHILS RELATIVE PERCENT: 1 % (ref 0–6)
GLUCOSE URINE: NEGATIVE MG/DL
HCT VFR BLD CALC: 41.5 % (ref 34–48)
HDLC SERPL-MCNC: 42 MG/DL
HEMOGLOBIN: 12 G/DL (ref 11.5–15.5)
IMMATURE GRANULOCYTES %: 0 % (ref 0–5)
IMMATURE GRANULOCYTES ABSOLUTE: 0.04 K/UL (ref 0–0.58)
KETONES, URINE: NEGATIVE MG/DL
LDL CHOLESTEROL: 90 MG/DL
LEUKOCYTE ESTERASE, URINE: NEGATIVE
LYMPHOCYTES ABSOLUTE: 1.63 K/UL (ref 1.5–4)
LYMPHOCYTES RELATIVE PERCENT: 18 % (ref 20–42)
MCH RBC QN AUTO: 22.2 PG (ref 26–35)
MCHC RBC AUTO-ENTMCNC: 28.9 G/DL (ref 32–34.5)
MCV RBC AUTO: 76.9 FL (ref 80–99.9)
MICROALBUMIN/CREAT 24H UR: 19 MG/L (ref 0–19)
MICROALBUMIN/CREAT UR-RTO: 17 MCG/MG CREAT (ref 0–30)
MONOCYTES ABSOLUTE: 0.6 K/UL (ref 0.1–0.95)
MONOCYTES RELATIVE PERCENT: 7 % (ref 2–12)
NEUTROPHILS ABSOLUTE: 6.67 K/UL (ref 1.8–7.3)
NEUTROPHILS RELATIVE PERCENT: 74 % (ref 43–80)
NITRITE, URINE: NEGATIVE
NT PRO BNP: 295 PG/ML (ref 0–125)
PDW BLD-RTO: 22.1 % (ref 11.5–15)
PH, URINE: 6 (ref 5–8)
PLATELET, FLUORESCENCE: 179 K/UL (ref 130–450)
PMV BLD AUTO: ABNORMAL FL (ref 7–12)
PROTEIN UA: NEGATIVE MG/DL
RBC # BLD: 5.4 M/UL (ref 3.5–5.5)
RBC # BLD: ABNORMAL 10*6/UL
RBC UA: ABNORMAL /HPF
SPECIFIC GRAVITY UA: 1.02 (ref 1–1.03)
THYROXINE (T4): 7.7 UG/DL (ref 4.5–11.7)
TRIGL SERPL-MCNC: 106 MG/DL
TSH SERPL DL<=0.05 MIU/L-ACNC: 1.44 UIU/ML (ref 0.27–4.2)
TURBIDITY: ABNORMAL
URINE HGB: NEGATIVE
UROBILINOGEN, URINE: 1 EU/DL (ref 0–1)
VITAMIN B-12: 161 PG/ML (ref 211–946)
VITAMIN D 25-HYDROXY: 10.9 NG/ML (ref 30–100)
VLDLC SERPL CALC-MCNC: 21 MG/DL
WBC # BLD: 9 K/UL (ref 4.5–11.5)
WBC UA: ABNORMAL /HPF

## 2025-03-10 PROCEDURE — 1123F ACP DISCUSS/DSCN MKR DOCD: CPT | Performed by: FAMILY MEDICINE

## 2025-03-10 PROCEDURE — G0439 PPPS, SUBSEQ VISIT: HCPCS | Performed by: FAMILY MEDICINE

## 2025-03-10 PROCEDURE — 1159F MED LIST DOCD IN RCRD: CPT | Performed by: FAMILY MEDICINE

## 2025-03-10 PROCEDURE — G0296 VISIT TO DETERM LDCT ELIG: HCPCS | Performed by: FAMILY MEDICINE

## 2025-03-10 PROCEDURE — 3017F COLORECTAL CA SCREEN DOC REV: CPT | Performed by: FAMILY MEDICINE

## 2025-03-10 PROCEDURE — 3046F HEMOGLOBIN A1C LEVEL >9.0%: CPT | Performed by: FAMILY MEDICINE

## 2025-03-10 SDOH — ECONOMIC STABILITY: FOOD INSECURITY: WITHIN THE PAST 12 MONTHS, THE FOOD YOU BOUGHT JUST DIDN'T LAST AND YOU DIDN'T HAVE MONEY TO GET MORE.: NEVER TRUE

## 2025-03-10 SDOH — ECONOMIC STABILITY: FOOD INSECURITY: WITHIN THE PAST 12 MONTHS, YOU WORRIED THAT YOUR FOOD WOULD RUN OUT BEFORE YOU GOT MONEY TO BUY MORE.: NEVER TRUE

## 2025-03-10 NOTE — PATIENT INSTRUCTIONS
These can increase your chances of quitting for good. Quitting is one of the most important things you can do to protect your heart. It is never too late to quit. Try to avoid secondhand smoke too.     Stay at a weight that's healthy for you. Talk to your doctor if you need help losing weight.     Try to get 7 to 9 hours of sleep each night.     Limit alcohol to 2 drinks a day for men and 1 drink a day for women. Too much alcohol can cause health problems.     Manage other health problems such as diabetes, high blood pressure, and high cholesterol. If you think you may have a problem with alcohol or drug use, talk to your doctor.   Medicines    Take your medicines exactly as prescribed. Call your doctor if you think you are having a problem with your medicine.     If your doctor recommends aspirin, take the amount directed each day. Make sure you take aspirin and not another kind of pain reliever, such as acetaminophen (Tylenol).   When should you call for help?   Call 911 if you have symptoms of a heart attack. These may include:    Chest pain or pressure, or a strange feeling in the chest.     Sweating.     Shortness of breath.     Pain, pressure, or a strange feeling in the back, neck, jaw, or upper belly or in one or both shoulders or arms.     Lightheadedness or sudden weakness.     A fast or irregular heartbeat.   After you call 911, the  may tell you to chew 1 adult-strength or 2 to 4 low-dose aspirin. Wait for an ambulance. Do not try to drive yourself.  Watch closely for changes in your health, and be sure to contact your doctor if you have any problems.  Where can you learn more?  Go to https://www.healthCodelearn.net/patientEd and enter F075 to learn more about \"A Healthy Heart: Care Instructions.\"  Current as of: July 31, 2024  Content Version: 14.3  © 2024 [employer].   Care instructions adapted under license by [employer]. If you have questions about a medical condition or this

## 2025-03-10 NOTE — PROGRESS NOTES
Medicare Annual Wellness Visit    Selena Ward is here for Medicare AWV and Follow-up (3 mo f/u)    Assessment & Plan  1. Diabetes Mellitus.  She is currently on Ozempic 2 mg injections. Her blood pressure is 138/85, which is within her usual range. She will continue with the current dosage. Blood work will be conducted today to monitor her condition.    2. Cholesterol Management.  Blood work will be conducted today to monitor her cholesterol levels.    3. Arthritis.  She reports no new issues with her arthritis. She will continue her current pain management regimen.    4 wegiht    5. Low Back Pain.  She continues to see her pain management doctor monthly. She will maintain her current pain management regimen.    Etoh neg    7. Weight Management.  She has lost weight and is currently at 234 lbs. She will continue with her current weight loss regimen, including Ozempic 2 mg injections.    8. Edema.  She has slight non-pitting edema in her extremities. No lymphedema therapy is required at this time.    9. Lymphedema.  She has a history of lymphedema but reports no significant issues this year. No therapy is required at this time.  Medicare annual wellness visit, subsequent  Personal history of tobacco use  -     MD VISIT TO DISCUSS LUNG CA SCREEN W LDCT  Lymphedema of both lower extremities  Diet-controlled diabetes mellitus (HCC)  -     CBC; Future  -     Albumin/Creatinine Ratio, Urine; Future  -     CBC with Auto Differential; Future  -     Hemoglobin A1C; Future  -     Lipid Panel; Future  -     Vitamin B12; Future  -     Vitamin D 25 Hydroxy; Future  -     Urinalysis; Future  -     T4; Future  -     TSH; Future  Systolic and diastolic CHF, acute on chronic (HCC)  -     CBC; Future  -     Albumin/Creatinine Ratio, Urine; Future  -     CBC with Auto Differential; Future  -     Hemoglobin A1C; Future  -     Lipid Panel; Future  -     Vitamin B12; Future  -     Vitamin D 25 Hydroxy; Future  -     Urinalysis;

## 2025-03-11 LAB — HBA1C MFR BLD: 5.7 % (ref 4–5.6)

## 2025-03-16 ENCOUNTER — RESULTS FOLLOW-UP (OUTPATIENT)
Dept: PRIMARY CARE CLINIC | Age: 70
End: 2025-03-16

## 2025-03-16 NOTE — RESULT ENCOUNTER NOTE
Notify patient B12 and vitamin D are both low.  Encourage B12 thousand daily and D3 2000 daily.  If already taking those let me know.  Other labs are stable

## 2025-03-31 DIAGNOSIS — G25.81 RESTLESS LEG SYNDROME: ICD-10-CM

## 2025-03-31 DIAGNOSIS — I89.0 LYMPHEDEMA OF BOTH LOWER EXTREMITIES: Chronic | ICD-10-CM

## 2025-03-31 RX ORDER — ROPINIROLE 0.5 MG/1
0.5 TABLET, FILM COATED ORAL EVERY 4 HOURS PRN
Qty: 200 TABLET | Refills: 5 | Status: SHIPPED | OUTPATIENT
Start: 2025-03-31

## 2025-04-02 ENCOUNTER — OFFICE VISIT (OUTPATIENT)
Dept: ORTHOPEDIC SURGERY | Age: 70
End: 2025-04-02
Payer: MEDICARE

## 2025-04-02 VITALS
TEMPERATURE: 98.9 F | OXYGEN SATURATION: 95 % | HEART RATE: 72 BPM | SYSTOLIC BLOOD PRESSURE: 176 MMHG | DIASTOLIC BLOOD PRESSURE: 96 MMHG | RESPIRATION RATE: 20 BRPM

## 2025-04-02 DIAGNOSIS — M17.0 BILATERAL PRIMARY OSTEOARTHRITIS OF KNEE: Primary | ICD-10-CM

## 2025-04-02 PROCEDURE — 20610 DRAIN/INJ JOINT/BURSA W/O US: CPT | Performed by: PHYSICIAN ASSISTANT

## 2025-04-02 PROCEDURE — 99213 OFFICE O/P EST LOW 20 MIN: CPT | Performed by: PHYSICIAN ASSISTANT

## 2025-04-02 RX ORDER — TRIAMCINOLONE ACETONIDE 40 MG/ML
40 INJECTION, SUSPENSION INTRA-ARTICULAR; INTRAMUSCULAR ONCE
Status: COMPLETED | OUTPATIENT
Start: 2025-04-02 | End: 2025-04-02

## 2025-04-02 RX ORDER — LIDOCAINE HYDROCHLORIDE 10 MG/ML
2 INJECTION, SOLUTION INFILTRATION; PERINEURAL ONCE
Status: COMPLETED | OUTPATIENT
Start: 2025-04-02 | End: 2025-04-02

## 2025-04-02 RX ORDER — BUPIVACAINE HYDROCHLORIDE 2.5 MG/ML
6 INJECTION, SOLUTION INFILTRATION; PERINEURAL ONCE
Status: COMPLETED | OUTPATIENT
Start: 2025-04-02 | End: 2025-04-02

## 2025-04-02 RX ADMIN — TRIAMCINOLONE ACETONIDE 40 MG: 40 INJECTION, SUSPENSION INTRA-ARTICULAR; INTRAMUSCULAR at 08:25

## 2025-04-02 RX ADMIN — LIDOCAINE HYDROCHLORIDE 2 ML: 10 INJECTION, SOLUTION INFILTRATION; PERINEURAL at 08:25

## 2025-04-02 RX ADMIN — BUPIVACAINE HYDROCHLORIDE 15 MG: 2.5 INJECTION, SOLUTION INFILTRATION; PERINEURAL at 08:24

## 2025-04-02 NOTE — PROGRESS NOTES
Chief Complaint   Patient presents with    Follow-up      Bilateral Knee pain(last CSI 1/3/25) TTS. Patient states pain lvl 5 injection lasted 7 weeks          SUBJECTIVE: Selena Ward is a 69-year-old female who presents for follow-up for bilateral knee pain.  Left knee is worse than the right.  She states that her last injections helped and she would like repeat shots today.  She describes her pain as diffuse aching throughout bilateral knee joints.  Pain is worsened with prolonged standing, walking or going up and down steps.  She does ambulate with a wheeled walker.  She denies recent falls or injuries but states that sometimes it feels like her knees are going to give out and buckle on her.  Rates her pain as 5/10.  She states that her knee pain has been worsened with the cold weather.  Denies numbness, tingling or paresthesias.  No other orthopedic complaints at this time.    Review of Systems -   General ROS: negative for - chills, fatigue, fever or night sweats  Respiratory ROS: no cough, shortness of breath, or wheezing  Cardiovascular ROS: no chest pain or dyspnea on exertion  Gastrointestinal ROS: no abdominal pain, change in bowel habits, or black or bloody stools  Genitourinary: no hematuria, dysuria, or incontinence   Musculoskeletal ROS:see above  Neurological ROS: no TIA or stroke symptoms     OBJECTIVE:   Alert and oriented X 3, no acute distress, respirations easy and unlabored with no audible wheezes, skin warm and dry, speech and dress appropriate for noted age, affect euthymic.    bilateral Knee Exam  Skin intact. No erythema/induration/fluctuence at knee.   mild effusion noted bilaterally  Patella tracks normally  negative patellar grind test and negative J sign.   mild crepitus with flexion and extension of the knee  both Medial and Lateral joint line pain with palpation  Stable to varus and valgus at 0 and 30 degrees of flexion.    negative McMurrays, Apleys.  negative Lachman's and

## 2025-04-28 DIAGNOSIS — I89.0 LYMPHEDEMA OF BOTH LOWER EXTREMITIES: Primary | ICD-10-CM

## 2025-05-18 NOTE — TELEPHONE ENCOUNTER
I called Diego Wadsworth to advise of recommendation to obtain further injections from the orthopedic department and to seek a second opinion from another pain management provider. I advised her that the appointments she had with Dr. Michelle Agarwal in September had been cancelled. She stated \"I already have\" and hung up on me. understanding. Vitals were stable and they were in no distress at discharge.       Counseling:   The emergency provider has spoken with the patient and discussed today’s results, in addition to providing specific details for the plan of care and counseling regarding the diagnosis and prognosis.  Questions are answered at this time and they are agreeable with the plan.      --------------------------------- IMPRESSION AND DISPOSITION ---------------------------------    IMPRESSION  1. Palpitations    2. Hypertension, unspecified type        DISPOSITION  Disposition: Discharge to home  Patient condition is good      NOTE: This report was transcribed using voice recognition software. Every effort was made to ensure accuracy; however, inadvertent computerized transcription errors may be present

## 2025-05-22 ENCOUNTER — PROCEDURE VISIT (OUTPATIENT)
Dept: PODIATRY | Age: 70
End: 2025-05-22

## 2025-05-22 VITALS
DIASTOLIC BLOOD PRESSURE: 84 MMHG | BODY MASS INDEX: 42.8 KG/M2 | TEMPERATURE: 97.5 F | SYSTOLIC BLOOD PRESSURE: 134 MMHG | WEIGHT: 234 LBS

## 2025-05-22 DIAGNOSIS — E11.9 TYPE 2 DIABETES MELLITUS WITHOUT COMPLICATION, WITH LONG-TERM CURRENT USE OF INSULIN (HCC): ICD-10-CM

## 2025-05-22 DIAGNOSIS — I73.9 PERIPHERAL VASCULAR DISEASE, UNSPECIFIED: ICD-10-CM

## 2025-05-22 DIAGNOSIS — B35.1 TINEA UNGUIUM: Primary | ICD-10-CM

## 2025-05-22 DIAGNOSIS — M79.675 PAIN IN LEFT TOE(S): ICD-10-CM

## 2025-05-22 DIAGNOSIS — Z79.4 TYPE 2 DIABETES MELLITUS WITHOUT COMPLICATION, WITH LONG-TERM CURRENT USE OF INSULIN (HCC): ICD-10-CM

## 2025-05-22 DIAGNOSIS — M79.674 PAIN IN RIGHT TOE(S): ICD-10-CM

## 2025-05-22 DIAGNOSIS — R26.2 DIFFICULTY WALKING: ICD-10-CM

## 2025-05-22 DIAGNOSIS — L84 CORN OR CALLUS: ICD-10-CM

## 2025-05-22 NOTE — PROGRESS NOTES
thickened pitting mycotic yellowish incurvated causing pain with both shoe gear. Palpation nails greater then 3 mm thick painful       Dermatologic Exam:hair loss noted  lower extremity    Skin lesion/ulceration   Skin   Callus sub  5  left foot   hd  5th digit  left foot   Musculoskeletal:     1st MPJ ROM normal, Bilateral.  Muscle strength 5/5, Bilateral.  Pain present upon palpation of toenails 4th digit  right    Bilateral., Bilateral.  Ankle ROM normal,Bilateral.    Dorsally contracted digits 2-5  , Bilateral.     Vascular:  Pulses   bilateral DP absent     PT absent     Neurological:  Sensation present to light touch to level of digits, Bilateral.    Foot Exam    General  General Appearance: appears stated age and healthy   Orientation: alert and oriented to person, place, and time       Left Foot/Ankle      Inspection and Palpation  Hammertoes: fifth toe  Claw toes: fifth toe  Skin Exam: corn;     Neurovascular  Dorsalis pedis: absent  Posterior tibial: absent           Ortho Exam  Q7   []Yes    []No                Q8   [x]Yes    []No                     Q9   []Yes    []No  Assessment:  69 y.o. female with:   Selena was seen today for hammer toe, toe pain and callouses.    Diagnoses and all orders for this visit:    Tinea unguium    Corn or callus    Peripheral vascular disease, unspecified    Pain in left toe(s)    Pain in right toe(s)    Type 2 diabetes mellitus without complication, with long-term current use of insulin (HCC)    Difficulty walking               Plan: The corn on the fifth digit left foot was debrided padded.Corn  DEBRIDEMENT x1  Verbal informed content was obtained from the patient.  The callus lesion on the left foot was debrided with a sterile 15 blade to sub q. It was then debrided,  padded, and an antibiotic ointment was applied to the treated area.  Patient tolerated the procedure well with no complications.     Pt was evaluated and examined. Patient was given personalized discharge

## 2025-07-14 ENCOUNTER — OFFICE VISIT (OUTPATIENT)
Dept: PRIMARY CARE CLINIC | Age: 70
End: 2025-07-14

## 2025-07-14 VITALS
BODY MASS INDEX: 45.73 KG/M2 | HEART RATE: 95 BPM | DIASTOLIC BLOOD PRESSURE: 82 MMHG | WEIGHT: 250 LBS | TEMPERATURE: 98.2 F | OXYGEN SATURATION: 93 % | RESPIRATION RATE: 16 BRPM | SYSTOLIC BLOOD PRESSURE: 135 MMHG

## 2025-07-14 DIAGNOSIS — E66.01 MORBID OBESITY (HCC): Chronic | ICD-10-CM

## 2025-07-14 DIAGNOSIS — I50.43 SYSTOLIC AND DIASTOLIC CHF, ACUTE ON CHRONIC (HCC): ICD-10-CM

## 2025-07-14 DIAGNOSIS — I89.0 LYMPHEDEMA OF BOTH LOWER EXTREMITIES: Primary | Chronic | ICD-10-CM

## 2025-07-14 DIAGNOSIS — M79.602 LEFT ARM PAIN: ICD-10-CM

## 2025-07-14 DIAGNOSIS — L30.9 DERMATITIS: ICD-10-CM

## 2025-07-14 PROBLEM — I50.812 CHRONIC RIGHT-SIDED CONGESTIVE HEART FAILURE (HCC): Status: RESOLVED | Noted: 2023-09-21 | Resolved: 2025-07-14

## 2025-07-14 RX ORDER — AMMONIUM LACTATE 12 G/100G
LOTION TOPICAL
Qty: 1 EACH | Refills: 12 | Status: SHIPPED | OUTPATIENT
Start: 2025-07-14

## 2025-07-14 RX ORDER — BUMETANIDE 2 MG/1
2 TABLET ORAL 2 TIMES DAILY
Qty: 180 TABLET | Refills: 3 | Status: SHIPPED | OUTPATIENT
Start: 2025-07-14

## 2025-07-14 ASSESSMENT — PATIENT HEALTH QUESTIONNAIRE - PHQ9
SUM OF ALL RESPONSES TO PHQ QUESTIONS 1-9: 1
SUM OF ALL RESPONSES TO PHQ QUESTIONS 1-9: 1
2. FEELING DOWN, DEPRESSED OR HOPELESS: NOT AT ALL
SUM OF ALL RESPONSES TO PHQ QUESTIONS 1-9: 1
1. LITTLE INTEREST OR PLEASURE IN DOING THINGS: SEVERAL DAYS
SUM OF ALL RESPONSES TO PHQ QUESTIONS 1-9: 1

## 2025-07-14 NOTE — PROGRESS NOTES
Selena Ward (:  1955) is a 69 y.o. female,    here for evaluation of the following chief complaint(s):  Arm Swelling (left)            Subjective   History of Present Illness  The patient presents for evaluation of left forearm pain, lymphedema, and stool incontinence.    She reports a recurrence of swelling in her left forearm, describing it as feeling unusual, particularly on the underside. The onset of this discomfort was approximately a week ago. She does not recall any specific incident that could have caused strain or injury to the arm. She also mentions a previous episode of finger swelling following a hand injury. There is no numbness in the arm.    She has been undergoing therapy for lymphedema but has not been consistent with her diuretic medication due to concerns about frequent urination. She took one dose last week. She reports no shortness of breath. She has gained weight and is requesting a refill of her ammonium lactate cream prescription for application on her feet. She has been unable to receive knee injections due to leg swelling. She has an upcoming appointment with Dr. Cabrera in 2025. She has not consulted with a pulmonologist or cardiologist due to financial constraints. She has previously consulted with Dr. Mcdonald for cardiology issues.    She has been experiencing increased stool incontinence, often unable to reach the bathroom in time. She uses diapers for this issue. She had a bladder sling procedure in the past, which she believes has worsened her condition.    She experiences significant sinus drainage upon waking up each morning.    PAST SURGICAL HISTORY:  Bladder sling procedure    Review of Systems:  Constitutional:  No fever, no fatigue, no chills, no headaches, no weight change  Dermatology:  No rash, no mole, no dry or sensitive skin  ENT:  No cough, no sore throat, no sinus pain, no runny nose, no ear pain  Cardiology:  No chest pain, no palpitations, no leg

## 2025-07-23 ENCOUNTER — HOSPITAL ENCOUNTER (OUTPATIENT)
Dept: GENERAL RADIOLOGY | Age: 70
Discharge: HOME | End: 2025-07-25
Payer: MEDICARE

## 2025-07-23 ENCOUNTER — OFFICE VISIT (OUTPATIENT)
Age: 70
End: 2025-07-23
Payer: MEDICARE

## 2025-07-23 VITALS
TEMPERATURE: 98.6 F | SYSTOLIC BLOOD PRESSURE: 169 MMHG | HEART RATE: 98 BPM | DIASTOLIC BLOOD PRESSURE: 97 MMHG | OXYGEN SATURATION: 97 %

## 2025-07-23 DIAGNOSIS — M17.0 BILATERAL PRIMARY OSTEOARTHRITIS OF KNEE: ICD-10-CM

## 2025-07-23 DIAGNOSIS — M17.0 BILATERAL PRIMARY OSTEOARTHRITIS OF KNEE: Primary | ICD-10-CM

## 2025-07-23 PROCEDURE — 20610 DRAIN/INJ JOINT/BURSA W/O US: CPT | Performed by: PHYSICIAN ASSISTANT

## 2025-07-23 PROCEDURE — 73565 X-RAY EXAM OF KNEES: CPT

## 2025-07-23 PROCEDURE — 99213 OFFICE O/P EST LOW 20 MIN: CPT | Performed by: PHYSICIAN ASSISTANT

## 2025-07-23 RX ORDER — BUPIVACAINE HYDROCHLORIDE 2.5 MG/ML
6 INJECTION, SOLUTION INFILTRATION; PERINEURAL ONCE
Status: COMPLETED | OUTPATIENT
Start: 2025-07-23 | End: 2025-07-23

## 2025-07-23 RX ORDER — TRIAMCINOLONE ACETONIDE 40 MG/ML
80 INJECTION, SUSPENSION INTRA-ARTICULAR; INTRAMUSCULAR ONCE
Status: COMPLETED | OUTPATIENT
Start: 2025-07-23 | End: 2025-07-23

## 2025-07-23 RX ADMIN — BUPIVACAINE HYDROCHLORIDE 15 MG: 2.5 INJECTION, SOLUTION INFILTRATION; PERINEURAL at 14:11

## 2025-07-23 RX ADMIN — TRIAMCINOLONE ACETONIDE 80 MG: 400 INJECTION, SUSPENSION INTRA-ARTICULAR; INTRAMUSCULAR at 14:15

## 2025-07-23 RX ADMIN — TRIAMCINOLONE ACETONIDE 80 MG: 400 INJECTION, SUSPENSION INTRA-ARTICULAR; INTRAMUSCULAR at 14:13

## 2025-07-23 NOTE — PROGRESS NOTES
Topics Concern    Not on file   Social History Narrative        Chronic Diagnosis: Iron deficiency anemia, Depressive disorder, Benign essential hypertension, Mixed    hyperlipidemia.    HTN    OBESITY    LIPID    OA    SMOKER---quit   summer  2021-----------------------quit hosp      CVA L FIELD VISION    DEPRESSION    GASTRIC BYPASS 01    BREAST REDUCTION--    Selena Ward  1955 Page #2    ANEMIA==IRON AND B-12    Admitted 2019 with cellulitis left lower extremity then readmitted with chest pain with negative stress test    L--3-   lumbar surgeyr dr pineda-----    Chf    scorrdoug          Copd  dr Maldonado      Hearing loss right ear  seen by ENT injections in the right ear MRI done possible cochlear implant    Fall on 915 with laceration right leg 15 sutures and fall again on 927 with contusion left face    Start lymphedema therapy at [place]     OA both knees with injections bilateral     Bladder sling      dr santiago     Right and hip groin pain  referred back to neurosurgery.  Previous CAT scan shows spinal stenosis at L2-3 previous surgery was L3-4    Puncture wound right leg during emg with dr smith--ordered by dr caraballo        Admit  23 with   RSV      IFLU   and  bronchitits     seen by   pulm    Back to [facility] PT    Left forearm pain--order us ----  pt  quit taking bumex long ago  bck on        Social Drivers of Health     Financial Resource Strain: Low Risk  (2024)    Overall Financial Resource Strain (CARDIA)     Difficulty of Paying Living Expenses: Not very hard   Food Insecurity: No Food Insecurity (3/10/2025)    Hunger Vital Sign     Worried About Running Out of Food in the Last Year: Never true     Ran Out of Food in the Last Year: Never true   Transportation Needs: No Transportation Needs (3/10/2025)    PRAPARE - Transportation     Lack of Transportation (Medical): No     Lack of Transportation

## 2025-08-27 DIAGNOSIS — G25.81 RESTLESS LEG SYNDROME: ICD-10-CM

## 2025-08-27 DIAGNOSIS — I89.0 LYMPHEDEMA OF BOTH LOWER EXTREMITIES: Chronic | ICD-10-CM

## 2025-08-27 RX ORDER — ROPINIROLE 0.5 MG/1
0.5 TABLET, FILM COATED ORAL EVERY 4 HOURS PRN
Qty: 200 TABLET | Refills: 5 | Status: SHIPPED | OUTPATIENT
Start: 2025-08-27

## (undated) DEVICE — 1.0MM FINE DIAMOND ROUND EXTENDED

## (undated) DEVICE — JACKSON TABLE POSITIONER KIT: Brand: MEDLINE INDUSTRIES, INC.

## (undated) DEVICE — SHEET,DRAPE,40X58,STERILE: Brand: MEDLINE

## (undated) DEVICE — DRESSING ADH N ADH 8X35 IN 6X175 IN SFT CLTH MEDIPORE +

## (undated) DEVICE — SOLUTION IV IRRIG POUR BRL 0.9% SODIUM CHL 2F7124

## (undated) DEVICE — COVER HNDL LT DISP

## (undated) DEVICE — THE MILL DISPOSABLE - MEDIUM

## (undated) DEVICE — NEEDLE HYPO 18GA L1.5IN PNK POLYPR HUB S STL THN WALL FILL

## (undated) DEVICE — LOCATOR RAD PASS SPHR MRK NAVIGATION BALL DISP STLTH STN

## (undated) DEVICE — POUCH FLD COLL W/ DRNGE PRT N LINTING TEAR RESIST MOLD STRP

## (undated) DEVICE — SURGICAL PROCEDURE PACK EENT CUST

## (undated) DEVICE — BAG TRNSF AUTOLGS SUCT AND ANTICOAG LN AUTOLOG

## (undated) DEVICE — Device

## (undated) DEVICE — SYRINGE, LUER LOCK, 5ML: Brand: MEDLINE

## (undated) DEVICE — 3M™ RED DOT™ MONITORING ELECTRODE WITH FOAM TAPE AND STICKY GEL 2560, 50/BAG, 20/CASE, 72/PLT: Brand: RED DOT™

## (undated) DEVICE — GLOVE SURG 8.5 PF POLYMER WHT STRL SIGN LTX ESSENTIAL LTX

## (undated) DEVICE — DEFENDO AIR WATER SUCTION AND BIOPSY VALVE KIT FOR  OLYMPUS: Brand: DEFENDO AIR/WATER/SUCTION AND BIOPSY VALVE

## (undated) DEVICE — FORCEPS BX L240CM JAW DIA2.4MM WRK CHN 2.8MM ORNG L CAP W/

## (undated) DEVICE — SOLUTION IV 1000ML 0.9% SOD CHL PH 5 INJ USP VIAFLX PLAS

## (undated) DEVICE — INTENDED FOR TISSUE SEPARATION, AND OTHER PROCEDURES THAT REQUIRE A SHARP SURGICAL BLADE TO PUNCTURE OR CUT.: Brand: BARD-PARKER ® STAINLESS STEEL BLADES

## (undated) DEVICE — MARKER,SKIN,WI/RULER AND LABELS: Brand: MEDLINE

## (undated) DEVICE — NEEDLE FLTR 18GA L1.5IN MEM THK5UM BLNT DISP

## (undated) DEVICE — 6 ML SYRINGE LUER-LOCK TIP: Brand: MONOJECT

## (undated) DEVICE — NEEDLE HYPO 25GA L1.5IN BLU POLYPR HUB S STL REG BVL STR

## (undated) DEVICE — CORD,CAUTERY,BIPOLAR,STERILE: Brand: MEDLINE

## (undated) DEVICE — BLADE OPHTH GRN ROUNDED TIP 1 SIDE SHRP GRINDLESS MINI-BLDE

## (undated) DEVICE — PATIENT RETURN ELECTRODE, SINGLE-USE, CONTACT QUALITY MONITORING, ADULT, WITH 9FT CORD, FOR PATIENTS WEIGING OVER 33LBS. (15KG): Brand: MEGADYNE

## (undated) DEVICE — 3.0MM FINE DIAMOND ROUND EXTENDED

## (undated) DEVICE — Device: Brand: PORTEX

## (undated) DEVICE — GLOVE SURG SZ 55 CRM LTX FREE POLYISOPRENE POLYMER BEAD ANTI

## (undated) DEVICE — SYRINGE 20ML LL S/C 50

## (undated) DEVICE — SYRINGE, LUER LOCK, 10ML: Brand: MEDLINE

## (undated) DEVICE — NEEDLE SPNL L3.5IN PNK HUB S STL REG WALL FIT STYL W/ QNCKE

## (undated) DEVICE — BLADE ES L6IN ELASTOMERIC COAT EXT DURABLE BEND UPTO 90DEG

## (undated) DEVICE — GLOVE ORANGE PI 7 1/2   MSG9075

## (undated) DEVICE — GLOVE ORANGE PI 8   MSG9080

## (undated) DEVICE — C-ARM: Brand: UNBRANDED

## (undated) DEVICE — KIT PLT RATIO DISPNS KT 2IN CANN TIP SPRY TIP DISP MAGELLAN

## (undated) DEVICE — EXTRAS UGOKWE

## (undated) DEVICE — PACK PROCEDURE SURG GEN CUST

## (undated) DEVICE — BITEBLOCK 54FR W/ DENT RIM BLOX

## (undated) DEVICE — RESERVOIR CARDOTMY C4L HARDSHELL W/ 40UM FLTR EL SER 4 PRT

## (undated) DEVICE — 1000 S-DRAPE TOWEL DRAPE 10/BX: Brand: STERI-DRAPE™

## (undated) DEVICE — DOUBLE BASIN SET: Brand: MEDLINE INDUSTRIES, INC.

## (undated) DEVICE — HYPODERMIC SAFETY NEEDLE: Brand: MAGELLAN

## (undated) DEVICE — SPHERE EYE 1 MRK GUIDANCE PASS STEALTHSTATION 1PK/EA

## (undated) DEVICE — SHEET, T, LAPAROTOMY, STERILE: Brand: MEDLINE

## (undated) DEVICE — 1.5MM FINE DIAMOND ROUND EXTENDED

## (undated) DEVICE — 2.0MM FINE DIAMOND ROUND EXTENDED

## (undated) DEVICE — ADHESIVE SKIN CLOSURE TOP 36 CC HI VISC DERMBND MINI

## (undated) DEVICE — DRILL SYSTEM 7

## (undated) DEVICE — CODMAN® SURGICAL PATTIES 1/2" X 1" (1.27CM X 2.54CM): Brand: CODMAN®

## (undated) DEVICE — 4-PORT MANIFOLD: Brand: NEPTUNE 2

## (undated) DEVICE — 18 GA N.G. KIT, 10 PACK: Brand: SITE-RITE

## (undated) DEVICE — GAUZE,SPONGE,POST-OP,4X3,STRL,LF: Brand: MEDLINE

## (undated) DEVICE — PROBE 8225101 5PK STD PRASS FL TIP ROHS

## (undated) DEVICE — GAUZE,SPONGE,4"X4",12PLY,STERILE,LF,2'S: Brand: MEDLINE

## (undated) DEVICE — 5.0MM PRECISION ROUND

## (undated) DEVICE — COVER,TABLE,60X90,STERILE: Brand: MEDLINE

## (undated) DEVICE — MANIFOLD REPROC 4 PRT NEP 1

## (undated) DEVICE — 3.0MM COARSE DIAMOND ROUND

## (undated) DEVICE — GOWN,SIRUS,FABRNF,XL,20/CS: Brand: MEDLINE

## (undated) DEVICE — KIT EVAC 400CC DIA1/8IN H PAT 12.5IN 3 SPR RND SHP PVC DRN

## (undated) DEVICE — CLOTH SURG PREP PREOPERATIVE CHLORHEXIDINE GLUC 2% READYPREP

## (undated) DEVICE — TOWEL,OR,DSP,ST,BLUE,STD,6/PK,12PK/CS: Brand: MEDLINE

## (undated) DEVICE — SET SPINAL NEURO STNEU1

## (undated) DEVICE — LABEL MED 4 IN SURG PANEL W/ PEN STRL

## (undated) DEVICE — APPLICATOR PREP 26ML 0.7% IOD POVACRYLEX 74% ISO ALC ST

## (undated) DEVICE — STANDARD HYPODERMIC NEEDLE,ALUMINUM HUB: Brand: MONOJECT

## (undated) DEVICE — KIT SURG PREP POVIDONE IOD PRESATURATED PAINT WET FOR UNIV

## (undated) DEVICE — ELECTRODE 8227410 PAIRED 2 CH SET ROHS

## (undated) DEVICE — BANDAGE ADH W1XL3IN NAT FAB WVN FLX DURABLE N ADH PD SEAL

## (undated) DEVICE — DRESSING EAR PED 3.5IN PLAS SHELL 2 MOLD PDDED ADJ VELC

## (undated) DEVICE — Z DISCONTINUED APPLICATOR SURG PREP 0.35OZ 2% CHG 70% ISO ALC W/ HI LT

## (undated) DEVICE — TOTAL TRAY, DB, 100% SILI FOLEY, 16FR 10: Brand: MEDLINE

## (undated) DEVICE — DRAPE MICROSCOPE PRESCOTT

## (undated) DEVICE — DISPOSABLE IRRIGATION CASSETTE: Brand: CORE

## (undated) DEVICE — 7CM ELITE IRRIGATION SLEEVE

## (undated) DEVICE — SYRINGE MED 3ML CLR PLAS STD N CTRL LUERLOCK TIP DISP

## (undated) DEVICE — CATHETER IV 18 GAX1.25 IN WNG INTROCAN SAFETY

## (undated) DEVICE — CONTAINER SPEC 60ML PH 7NEUTRAL BUFF FRMLN RDY TO USE

## (undated) DEVICE — GRADUATE

## (undated) DEVICE — SURGICAL PROCEDURE PACK LAMINECTOMY LUMBAR

## (undated) DEVICE — BRUNNS CURRETTES

## (undated) DEVICE — TUBING SUCT 12FR MAL ALUM SHFT FN CAP VENT UNIV CONN W/ OBT

## (undated) DEVICE — MASTISOL ADHESIVE LIQ 2/3ML

## (undated) DEVICE — 1 ML INSULIN SYRINGE LUER-LOCK WITH TIP CAP: Brand: MONOJECT

## (undated) DEVICE — SPECIMEN CUP W/LID: Brand: DEROYAL

## (undated) DEVICE — READY WET SKIN SCRUB TRAY-LF: Brand: MEDLINE INDUSTRIES, INC.

## (undated) DEVICE — GOWN,SIRUS,FABRNF,L,20/CS: Brand: MEDLINE

## (undated) DEVICE — 12 ML SYRINGE,LUER-LOCK TIP: Brand: MONOJECT

## (undated) DEVICE — KIT SURG W7XL11IN 2 PKT UNTREATED NA

## (undated) DEVICE — KIT,ANTI FOG,W/SPONGE & FLUID,SOFT PACK: Brand: MEDLINE

## (undated) DEVICE — GAUZE,SPONGE,4"X4",8PLY,STRL,LF,10/TRAY: Brand: MEDLINE

## (undated) DEVICE — DRESSING EAR AD 5.5IN GLSCOCK

## (undated) DEVICE — CONNECTOR IRRIGATION AUXILIARY H2O JET W/ PRT MTL THRD HYDR

## (undated) DEVICE — NON-DEHP CATHETER EXTENSION SET, MALE LUER LOCK ADAPTER

## (undated) DEVICE — CANNULA NSL ORAL AD FOR CAPNOFLEX CO2 O2 AIRLFE

## (undated) DEVICE — 3M™ IOBAN™ 2 ANTIMICROBIAL INCISE DRAPE 6650EZ: Brand: IOBAN™ 2

## (undated) DEVICE — 6.0MM COARSE DIAMOND ROUND